# Patient Record
Sex: FEMALE | Race: WHITE | NOT HISPANIC OR LATINO | Employment: OTHER | URBAN - METROPOLITAN AREA
[De-identification: names, ages, dates, MRNs, and addresses within clinical notes are randomized per-mention and may not be internally consistent; named-entity substitution may affect disease eponyms.]

---

## 2017-02-08 ENCOUNTER — LAB VISIT (OUTPATIENT)
Dept: LAB | Facility: HOSPITAL | Age: 67
End: 2017-02-08
Attending: PSYCHIATRY & NEUROLOGY
Payer: MEDICARE

## 2017-02-08 ENCOUNTER — OFFICE VISIT (OUTPATIENT)
Dept: NEUROLOGY | Facility: CLINIC | Age: 67
End: 2017-02-08
Payer: MEDICARE

## 2017-02-08 VITALS
BODY MASS INDEX: 36.44 KG/M2 | DIASTOLIC BLOOD PRESSURE: 81 MMHG | HEART RATE: 100 BPM | HEIGHT: 62 IN | SYSTOLIC BLOOD PRESSURE: 140 MMHG | WEIGHT: 198 LBS

## 2017-02-08 DIAGNOSIS — M62.838 MUSCLE SPASTICITY: Primary | ICD-10-CM

## 2017-02-08 DIAGNOSIS — R26.9 GAIT ABNORMALITY: ICD-10-CM

## 2017-02-08 DIAGNOSIS — M62.838 MUSCLE SPASTICITY: ICD-10-CM

## 2017-02-08 DIAGNOSIS — R53.1 WEAKNESS: ICD-10-CM

## 2017-02-08 PROCEDURE — 99214 OFFICE O/P EST MOD 30 MIN: CPT | Mod: S$PBB,,, | Performed by: PHYSICIAN ASSISTANT

## 2017-02-08 PROCEDURE — 99999 PR PBB SHADOW E&M-EST. PATIENT-LVL III: CPT | Mod: PBBFAC,,, | Performed by: PHYSICIAN ASSISTANT

## 2017-02-08 PROCEDURE — 36415 COLL VENOUS BLD VENIPUNCTURE: CPT

## 2017-02-08 NOTE — LETTER
February 8, 2017      Bobby Tran MD  [address]  [place] [address]           [facility]  [address]  [place] [address]  Phone: [phone]  Fax: [phone]          Patient: Lupis Murcia   MR Number: [id number]   YOB: 1950   Date of Visit: 2/8/2017       Dear Dr. Bobby Tran:    Thank you for referring Lupis Murcia to me for evaluation. Attached you will find relevant portions of my assessment and plan of care.    If you have questions, please do not hesitate to call me. I look forward to following Lupis Murcia along with you.    Sincerely,    Corinne Fagan PA-C    Enclosure  CC:  No Recipients    If you would like to receive this communication electronically, please contact [email or web address] or [phone] to request more information on [employer] access.    For providers and/or their staff who would like to refer a patient to [facility], please contact us through our one-stop-shop provider referral line, [facility], at [phone].    If you feel you have received this communication in error or would no longer like to receive these types of communications, please e-mail [email or web address]

## 2017-02-08 NOTE — MR AVS SNAPSHOT
[facility]  [address]  [facility] [address]  Phone: [phone]  Fax: [phone]                  Lupis Mariejonh   2017 1:15 PM   Office Visit    Description:  Female : 1950   Provider:  Corinne Fagan PA-C   Department:  [facility]           Reason for Visit     Follow-up           Diagnoses this Visit        Comments    Muscle spasticity    -  Primary     Weakness         Gait abnormality                To Do List           Goals (5 Years of Data)     None      [facility]     [facility] Care [facility] -  Assistance  Registered nurses in the [facility] On Call Center provide clinical advisement, health education, appointment booking, and other advisory services.  Call for this free service at [phone].             Medications           Message regarding Medications     Verify the changes and/or additions to your medication regime listed below are the same as discussed with your clinician today.  If any of these changes or additions are incorrect, please notify your healthcare provider.             Verify that the below list of medications is an accurate representation of the medications you are currently taking.  If none reported, the list may be blank. If incorrect, please contact your healthcare provider. Carry this list with you in case of emergency.           Current Medications     acetaminophen-codeine 300-30mg (TYLENOL #3) 300-30 mg Tab Take 1 tablet by mouth every 6 (six) hours as needed.    baclofen (LIORESAL) 10 MG tablet Take 1 tablet (10 mg total) by mouth 3 (three) times daily.    candesartan-hydrochlorothiazide (ATACAND HCT) 16-12.5 mg per tablet Take 1 tablet by mouth Daily.    cyanocobalamin 1,000 mcg/mL injection Inject 1,000 mcg as directed every 30 days.    furosemide (LASIX) 20 MG tablet 20 mg.    lorazepam (ATIVAN) 1 MG tablet 1 mg.    naproxen (NAPROSYN) 500 MG tablet     vitamin B comp with C no.4 150 mg Tab Take one  "vitamin B complex tablet a day.  He may take over-the-counter.    vitamin B comp with vit C no.6 500-0.5 mg Tab Take 1 tablet by mouth once daily.           Clinical Reference Information           Your Vitals Were     BP Pulse Height Weight BMI    140/81 100 5' 2" (1.575 m) 89.8 kg (198 lb) 36.21 kg/m2      Blood Pressure          Most Recent Value    BP  (!)  140/81      Allergies as of 2/8/2017     Pcn [Penicillins]    Contrast Media    Motrin [Ibuprofen]      Immunizations Administered on Date of Encounter - 2/8/2017     None      Orders Placed During Today's Visit     Future Labs/Procedures Expected by Expires    GLUTAMIC ACID DECARBOXYLASE  2/8/2017 4/9/2018      Language Assistance Services     ATTENTION: Language assistance services are available, free of charge. Please call [phone].      ATENCIÓN: Si riley stovall, tiene a avina disposición servicios gratuitos de asistencia lingüística. Llame al [phone].     CECIL Ý: N?u b?n nói Ti?ng Vi?t, có các d?ch v? h? tr? ngôn ng? mi?n phí dành cho b?n. G?i s? [phone].         [facility] complies with applicable Federal civil rights laws and does not discriminate on the basis of race, color, national origin, age, disability, or sex.        "

## 2017-02-08 NOTE — PROGRESS NOTES
Patient Name: Lupis Murcia  MRN: [id number]    CC: Leg weakness    Interval hx 2/8/17:  Lupis Murcia is a 67 yo female with 2 yrs of LE weakness, gait/balance problems presents for follow up. No major changes since last visit. No improvement with Baclofen 10 mg tid compared to bid. Had intermittent numbness and tingling of hands, notes prior CTS dx.    Interval hx 12/15/2016:  Lupis Murcia is a 67 yo female with 2 yrs of lower extremity weakness and progressive gait and balance abnormality. Has used a walker for 8 mos. No recent falls. On Baclofen 10 mg twice daily x 2 wks. Notices less stiffness of the back and legs, hasn't noticed large change in function. Has low back pain, mostly when standing and walking. Repeat MRI brain not yet completed.       HPI 10/14/16: Lupis Murcia is a 66 y.o. female with 2yr of LE weakness that began as difficulty with gait and balance and has been progressive over the years. She has reached point of using a walker to get around.    First noticed more difficulty standing at work - tired or weak. Also noticed gait instability - resulted in a couple of falls. No sensory symptoms.     Progressed to having trouble getting up of the toilet, out of chairs, off the couch. She has moved from using cane to walker.     She has rapid onset, right low back pain and lateral left thigh pain.Only happens when standing.    Soles of feet are numb at night.    Prednisone trial for six months did not change her symptoms.     No difficulty chewing or swallowing.     No muscle cramps.     No fasciculations.     NO symptoms in UE.       ROS:   Review of Systems   Constitutional: Negative for malaise/fatigue. Negative for weight loss.   HENT: Negative for hearing loss.   Eyes: Negative for blurred vision and double vision.   Respiratory: Negative for shortness of breath and stridor.   Cardiovascular: Negative for chest pain and palpitations.   Gastrointestinal: Negative for nausea, vomiting  and constipation.   Genitourinary: Negative for frequency. Negative for urgency.   Musculoskeletal: Negative for joint pain. Negative for myalgias and falls.   Skin: Negative for rash.   Neurological: Negative for dizziness and tremors. Negative for focal weakness and seizures.   Endo/Heme/Allergies: Does not bruise/bleed easily.   Psychiatric/Behavioral: Negative for memory loss. Negative for depression and hallucinations. The patient is not nervous/anxious.      Past Medical History  Past Medical History   Diagnosis Date    Vitamin B12 deficiency        Medications    Current Outpatient Prescriptions:     acetaminophen-codeine 300-30mg (TYLENOL #3) 300-30 mg Tab, Take 1 tablet by mouth every 6 (six) hours as needed., Disp: , Rfl: 2    baclofen (LIORESAL) 10 MG tablet, Take 1 tablet (10 mg total) by mouth 3 (three) times daily., Disp: 90 tablet, Rfl: 11    candesartan-hydrochlorothiazide (ATACAND HCT) 16-12.5 mg per tablet, Take 1 tablet by mouth Daily., Disp: , Rfl:     cyanocobalamin 1,000 mcg/mL injection, Inject 1,000 mcg as directed every 30 days., Disp: , Rfl:     furosemide (LASIX) 20 MG tablet, 20 mg., Disp: , Rfl:     lorazepam (ATIVAN) 1 MG tablet, 1 mg., Disp: , Rfl: 2    naproxen (NAPROSYN) 500 MG tablet, , Disp: , Rfl:     vitamin B comp with C no.4 150 mg Tab, Take one vitamin B complex tablet a day.  He may take over-the-counter., Disp: , Rfl:     vitamin B comp with vit C no.6 500-0.5 mg Tab, Take 1 tablet by mouth once daily., Disp: , Rfl:     Allergies  Review of patient's allergies indicates:   Allergen Reactions    Pcn [penicillins] Shortness Of Breath and Rash    Motrin [ibuprofen] Rash       Social History  Social History     Social History    Marital status:      Spouse name: N/A    Number of children: N/A    Years of education: N/A     Occupational History    Not on file.     Social History Main Topics    Smoking status: Never Smoker    Smokeless tobacco: Not on  "file    Alcohol use No    Drug use: No    Sexual activity: Not on file     Other Topics Concern    Not on file     Social History Narrative       Family History  Family History   Problem Relation Age of Onset    Coronary artery disease Father     Lung cancer Father     Lung cancer Mother     Brain cancer Brother        Physical Exam  Visit Vitals    BP (!) 140/81    Pulse 100    Ht 5' 2" (1.575 m)    Wt 89.8 kg (198 lb)    BMI 36.21 kg/m2       General appearance: Well-developed, well-groomed.     Neurologic Exam: The patient is awake, alert and oriented. Language is fluent. Recent and remote memory are normal. Attention span and concentration are normal. Fund of knowledge is appropriate.     Cranial nerves: pupils are round and reactive to light and accommodation. Visual fields are full to confrontation. Fundoscopic exam reveals sharp discs bilaterally, with good venous pulsations. Ocular motility is full in all cardinal positions of gaze. Facial sensation is normal to pinprick and light touch. Corneal reflexes are present bilaterally. Facial activation is symmetric. Hearing is normal bilaterally. Palate elevates symmetrically and gag reflex is intact bilaterally. Shoulder elevation is symmetric and full strength bilaterally. Tongue is midline and neck rotation strength is normal bilaterally. Neck range of motion is normal.     Motor examination of all extremities demonstrates normal bulk. She has increased tone in the lower extremities, more on the left. There are no atrophy or fasciculations. Strength is 5/5 in the upper and lower extremities bilaterally except for weakness in left HF, hamstring, and TA, right HF weakness.     Sensory examination is normal to pinprick, vibration and proprioception in the upper and lower extremities bilaterally. Romberg is negative.    Deep tendon reflexes are 3+ and symmetric in the upper and lower extremities bilaterally. She has two beats of clonus on left and one " on right. Positive jaw jerk. Positive Negron's bilaterally, L>R. Toes are mute.     Gait: She has wide base, short stride length. Stiffness of back and LE noted.    Coordination: Finger to nose and heel to shin testing is normal in both upper and lower extremities. Rapid alternating movements are normal in both upper and lower extremities.     General exam  Cardiovascular: regular rate and rhythm with no murmurs, rubs or gallops. There are no carotid or vertebral artery bruits. Pulses in both upper and lower extremities are symmetric. There is no peripheral edema.   Head and neck: no cervical lymphadenopathy      Lab and Test Results  Results for TING CHAVEZ (MRN [id number]) as of 10/14/2016 14:02   Ref. Range 3/15/2016 12:05   Folate Latest Ref Range: 4.0 - 24.0 ng/mL 11.1   Vitamin B-12 Latest Ref Range: 210 - 950 pg/mL 347   Intrinsic Blocking Factor Latest Ref Range: Negative  Negative   Sed Rate Latest Ref Range: 0 - 20 mm/Hr 42 (H)   Prothrombin Gene Mutation Unknown Normal Genotype   APA Isotype IgG Latest Ref Range: 0.00 - 14.99 GPL <9.40   APA Isotype IgM Latest Ref Range: 0.00 - 12.49 MPL 11.62   DRVVT, Lupus Anticoagulant Latest Ref Range: Negative  SEE COMMENT   Protein C Activity Latest Ref Range: 70 - 140 % 105   Activated Protein C Resistance Latest Ref Range: >or=2.3  2.8   APC Interpretation Unknown SEE BELOW   Protein S Activity Latest Ref Range: 70 - 140 % 99   Hex Phosph Neut Test Latest Ref Range: Negative  Negative   Factor V Activity Latest Ref Range: 60 - 145 % 111   Antithrombin III Latest Ref Range: 83 - 118 % 93     Results for TING CHAVEZ (MRN [id number]) as of 10/14/2016 14:02   Ref. Range 3/15/2016 12:05 5/27/2016 12:40 6/15/2016 16:48   Protein, Serum Latest Ref Range: 6.0 - 8.4 g/dL 6.2     CRP Latest Ref Range: 0.0 - 8.2 mg/L 11.7 (H)     CPK Latest Ref Range: 20 - 180 U/L 121     Homocysteine Latest Ref Range: 4.0 - 15.5 umol/L 7.6     Thiamine Latest Ref Range: 38 - 122 ug/L 45      Vitamin B2 Latest Ref Range: 1 - 19 mcg/L 3     Vitamin B6 Latest Ref Range: 5 - 50 ug/L 3 (L) 4 (L) 4 (L)   TSH Latest Ref Range: 0.400 - 4.000 uIU/mL 1.863       Results for TING CHAVEZ (MRN [id number]) as of 10/14/2016 14:02   Ref. Range 3/15/2016 12:05   Parietal Cell Ab Latest Ref Range: Negative  Negative 1:20   Protein, Serum Latest Ref Range: 6.0 - 8.4 g/dL 6.2   Albumin grams/dl Latest Ref Range: 3.35 - 5.55 g/dL 3.71   Alpha-1 grams/dl Latest Ref Range: 0.17 - 0.41 g/dL 0.33   Alpha-2 grams/dl Latest Ref Range: 0.43 - 0.99 g/dL 0.74   Beta grams/dl Latest Ref Range: 0.50 - 1.10 g/dL 0.70   Gamma grams/dl Latest Ref Range: 0.67 - 1.58 g/dL 0.71   Pathologist Interpretation SPE Unknown REVIEWED   Rheumatoid Factor Latest Ref Range: 0.0 - 15.0 IU/mL <10.0     Results for TING CHAVEZ (MRN [id number]) as of 10/14/2016 14:02   Ref. Range 3/15/2016 12:05 5/27/2016 12:40 6/15/2016 16:48   HTLV I/II Ab Unknown  Negative    RPR Latest Ref Range: Non-reactive  Non-reactive     Lyme Ab Latest Ref Range: <0.90 Index Value   0.08     Results for TING CHAVEZ (MRN [id number]) as of 10/14/2016 14:02   Ref. Range 6/18/2012 11:16 3/15/2016 12:05 5/27/2016 12:40   19-I-II Latest Ref Range: Non-reactive    Non-reactive   AIF Comment Unknown  Test Not Performed    CHEPE Screen Latest Ref Range: Negative <1:160   Negative <1:160    CYTOLOGY SPECIMEN TO PATHOLOGY Unknown Rpt     GP21 Latest Ref Range: Non-reactive    Non-reactive   gp46 Latest Ref Range: Non-reactive    Non-reactive   gp46-I Latest Ref Range: Non-reactive    Non-reactive   gp46-II Latest Ref Range: Non-reactive    Non-reactive   Line Immunoassay Latest Ref Range: Negative    Negative   p19 Latest Ref Range: Non-reactive    Non-reactive   p24 Latest Ref Range: Non-reactive    Non-reactive   Streptavidin Latest Ref Range: Non-reactive    Non-reactive       EMG 11/22/16  Impression   This study is abnormal. There is electrophysiologic evidence for a chronic,  right sciatic neuropathy or S1 radiculopathy. Decreased activation can be due to pain, effort, or a central process.     Assessment and Plan    Orders Placed This Encounter   Procedures    CULTURE, CSF  (INCLUDES STAIN)    FL Lumbar Puncture (xpd)    GLUTAMIC ACID DECARBOXYLASE    Glucose, CSF    GAD65 AB, CSF    ACE, CSF    MS PROFILE    WEST NILE VIRUS, PCR, CSF    CSF cell count with differential    Protein, CSF    Miscellaneous Sendout Test Cerebrospinal Fluid    Lyme Disease Ab, Western Blot, CSF    VDRL, CSF       Lupis CORRY Murcia is a 66 y.o. female with progressive gait instability, hx of B12 deficiency, and an exam consistent with a central process. Baclofen 10 mg three times daily with some minimal improvement. Outside imaging of brain, cervical, and thoracic spine has been unremarkable thus far, though we will obtain the images to personally review. Query MS vs less likely PLS (timeline of symptoms is fairly fast for PLS) vs stiff person syndome vs genetic disorder.     -will check ELSY-65 today   -lumbar puncture to include MS panel   -continue Baclofen 10 mg to tid    -RTC following above results           Corinne Fagan PA-C  Department of Neurology   [facility] [place], [place]      Attending, Dr. Kyle, was available during today's encounter. Any change to plan along with cosign to appear in the EMR.      This document has been electronically signed by Corinne Fagan PA-C on 02/08/2017. I have personally typed this message using the EMR.

## 2017-02-08 NOTE — MR AVS SNAPSHOT
[facility]  [address]  [facility] [address]  Phone: [phone]  Fax: [phone]                  Lupis Mariejonh   2017 1:15 PM   Office Visit    Description:  Female : 1950   Provider:  Corinne Fagan PA-C   Department:  [facility]           Reason for Visit     Follow-up           Diagnoses this Visit        Comments    Muscle spasticity    -  Primary     Weakness         Gait abnormality                To Do List           Goals (5 Years of Data)     None      [facility]     [facility] On Call Nurse Care Line -  Assistance  Registered nurses in the [facility] On Call Center provide clinical advisement, health education, appointment booking, and other advisory services  Call for this free service at [phone].                 Medications           Message regarding Medications     Verify the changes and/or additions to your medication regime listed below are the same as discussed with your clinician today.  If any of these changes or additions are incorrect, please notify your healthcare provider.             Verify that the below list of medications is an accurate representation of the medications you are currently taking.  If none reported, the list may be blank. If incorrect, please contact your healthcare provider. Carry this list with you in case of emergency.           Current Medications     acetaminophen-codeine 300-30mg (TYLENOL #3) 300-30 mg Tab Take 1 tablet by mouth every 6 (six) hours as needed.    baclofen (LIORESAL) 10 MG tablet Take 1 tablet (10 mg total) by mouth 3 (three) times daily.    candesartan-hydrochlorothiazide (ATACAND HCT) 16-12.5 mg per tablet Take 1 tablet by mouth Daily.    cyanocobalamin 1,000 mcg/mL injection Inject 1,000 mcg as directed every 30 days.    furosemide (LASIX) 20 MG tablet 20 mg.    lorazepam (ATIVAN) 1 MG tablet 1 mg.    naproxen (NAPROSYN) 500 MG tablet     vitamin B comp with C no.4 150 mg Tab Take one  "vitamin B complex tablet a day.  He may take over-the-counter.    vitamin B comp with vit C no.6 500-0.5 mg Tab Take 1 tablet by mouth once daily.           Clinical Reference Information           Vital Signs - Last Recorded  Most recent update: 2/8/2017  1:24 PM by Harmony Montanez MA    BP Pulse Ht Wt BMI    (!) 140/81 100 5' 2" (1.575 m) 89.8 kg (198 lb) 36.21 kg/m2      Blood Pressure          Most Recent Value    BP  (!)  140/81      Allergies as of 2/8/2017     Pcn [Penicillins]    Contrast Media    Motrin [Ibuprofen]      Immunizations Administered on Date of Encounter - 2/8/2017     None      Orders Placed During Today's Visit     Future Labs/Procedures Expected by Expires    GLUTAMIC ACID DECARBOXYLASE  2/8/2017 4/9/2018      Translation Services Information     ATTENTION: Language assistance services are available, free of charge. Please call [phone].    ATENCIÓN: Si habla wilman, tiene a avina disposición servicios gratuitos de asistencia lingüística. Llame al [phone].     CECIL Ý: N?u b?n nói Ti?ng Vi?t, có các d?ch v? h? tr? ngôn ng? mi?n phí dành cho b?n. G?i s? [phone].         [facility] complies with applicable Federal civil rights laws and does not discriminate on the basis of race, color, national origin, age, disability, or sex.        "

## 2017-02-10 LAB — GAD65 AB SER-SCNC: 0 NMOL/L

## 2017-03-13 ENCOUNTER — PATIENT MESSAGE (OUTPATIENT)
Dept: NEUROLOGY | Facility: CLINIC | Age: 67
End: 2017-03-13

## 2017-03-24 ENCOUNTER — OFFICE VISIT (OUTPATIENT)
Dept: NEUROLOGY | Facility: CLINIC | Age: 67
End: 2017-03-24
Payer: MEDICARE

## 2017-03-24 ENCOUNTER — TELEPHONE (OUTPATIENT)
Dept: NEUROLOGY | Facility: CLINIC | Age: 67
End: 2017-03-24

## 2017-03-24 VITALS
DIASTOLIC BLOOD PRESSURE: 80 MMHG | SYSTOLIC BLOOD PRESSURE: 156 MMHG | HEIGHT: 62 IN | HEART RATE: 84 BPM | WEIGHT: 191.13 LBS | BODY MASS INDEX: 35.17 KG/M2

## 2017-03-24 DIAGNOSIS — M62.838 MUSCLE SPASM: Primary | ICD-10-CM

## 2017-03-24 PROCEDURE — 99213 OFFICE O/P EST LOW 20 MIN: CPT | Mod: PBBFAC | Performed by: PSYCHIATRY & NEUROLOGY

## 2017-03-24 PROCEDURE — 99213 OFFICE O/P EST LOW 20 MIN: CPT | Mod: S$PBB,,, | Performed by: PSYCHIATRY & NEUROLOGY

## 2017-03-24 PROCEDURE — 99999 PR PBB SHADOW E&M-EST. PATIENT-LVL III: CPT | Mod: PBBFAC,,, | Performed by: PSYCHIATRY & NEUROLOGY

## 2017-03-24 RX ORDER — CLONAZEPAM 0.5 MG/1
0.25 TABLET ORAL 2 TIMES DAILY
Qty: 90 TABLET | Refills: 1 | Status: SHIPPED | OUTPATIENT
Start: 2017-03-24 | End: 2017-05-01

## 2017-03-24 NOTE — MR AVS SNAPSHOT
[facility]  [address]  [facility] [address]  Phone: [phone]  Fax: [phone]                  Lupis Mariejonh   3/24/2017 2:00 PM   Office Visit    Description:  Female : 1950   Provider:  To Kyle MD   Department:  [facility]           Diagnoses this Visit        Comments    Muscle spasm    -  Primary            To Do List           Future Appointments        Provider Department Dept Phone    2017 1:00 PM [facility] [address] [facility] [phone]      Goals (5 Years of Data)     None      Follow-Up and Disposition     Return in about 4 weeks (around 2017).    Follow-up and Disposition History       These Medications        Disp Refills Start End    clonazePAM (KLONOPIN) 0.5 MG tablet 90 tablet 1 3/24/2017     Take 0.5 tablets (0.25 mg total) by mouth 2 (two) times daily. - Oral    Pharmacy: [facility]/pharmacy #[id number] - [place]  [address] [facility] [address] #: [phone]         [facility]     [facility] Nurse Care Line -  Assistance  Unless otherwise directed by your provider, please contact [facility], our nurse care line that is available for  assistance.     Registered nurses in the [facility] On Call Center provide: appointment scheduling, clinical advisement, health education, and other advisory services.  Call: [phone] (toll free)               Medications           Message regarding Medications     Verify the changes and/or additions to your medication regime listed below are the same as discussed with your clinician today.  If any of these changes or additions are incorrect, please notify your healthcare provider.        START taking these NEW medications        Refills    clonazePAM (KLONOPIN) 0.5 MG tablet 1    Sig: Take 0.5 tablets (0.25 mg total) by mouth 2 (two) times daily.    Class: Print    Route: Oral           Verify that the below list of medications is an  "accurate representation of the medications you are currently taking.  If none reported, the list may be blank. If incorrect, please contact your healthcare provider. Carry this list with you in case of emergency.           Current Medications     acetaminophen-codeine 300-30mg (TYLENOL #3) 300-30 mg Tab Take 1 tablet by mouth every 6 (six) hours as needed.    baclofen (LIORESAL) 10 MG tablet Take 1 tablet (10 mg total) by mouth 3 (three) times daily.    candesartan-hydrochlorothiazide (ATACAND HCT) 16-12.5 mg per tablet Take 1 tablet by mouth Daily.    clonazePAM (KLONOPIN) 0.5 MG tablet Take 0.5 tablets (0.25 mg total) by mouth 2 (two) times daily.    cyanocobalamin 1,000 mcg/mL injection Inject 1,000 mcg as directed every 30 days.    furosemide (LASIX) 20 MG tablet 20 mg.    lorazepam (ATIVAN) 1 MG tablet 1 mg.    naproxen (NAPROSYN) 500 MG tablet     vitamin B comp with C no.4 150 mg Tab Take one vitamin B complex tablet a day.  He may take over-the-counter.    vitamin B comp with vit C no.6 500-0.5 mg Tab Take 1 tablet by mouth once daily.           Clinical Reference Information           Your Vitals Were     BP Pulse Height Weight BMI    156/80 84 5' 2" (1.575 m) 86.7 kg (191 lb 2.2 oz) 34.96 kg/m2      Blood Pressure          Most Recent Value    BP  (!)  156/80      Allergies as of 3/24/2017     Pcn [Penicillins]    Contrast Media    Motrin [Ibuprofen]      Immunizations Administered on Date of Encounter - 3/24/2017     None      Orders Placed During Today's Visit      Normal Orders This Visit    Paraneoplastic Autoantibody Eval, CSF     Future Labs/Procedures Expected by Expires    HTLV I/II ANTIBODY  3/24/2017 5/23/2018      Language Assistance Services     ATTENTION: Language assistance services are available, free of charge. Please call [phone].      ATENCIÓN: Si habla wilman, tiene a avina disposición servicios gratuitos de asistencia lingüística. Llame al [phone].     CHÚ Ý: N?u b?n nói " Ti?ng Vi?t, có các d?ch v? h? tr? ngôn ng? mi?n phí dành cho b?n. G?i s? [phone].         [facility] complies with applicable Federal civil rights laws and does not discriminate on the basis of race, color, national origin, age, disability, or sex.

## 2017-03-24 NOTE — PROGRESS NOTES
Patient Name: Lupis Murcia  MRN: [id number]    CC: Leg weakness    Interval hx 3/24/17: Doing about the same. Had to cut back on baclofen from tid to bid d/t dizziness. LP not done yet. Uncertain about what happened with scheduling.     Interval hx 2/8/17:  Lupis Murcia is a 67 yo female with 2 yrs of LE weakness, gait/balance problems presents for follow up. No major changes since last visit. No improvement with Baclofen 10 mg tid compared to bid. Had intermittent numbness and tingling of hands, notes prior CTS dx.    Interval hx 12/15/2016:  Lupis Murcia is a 67 yo female with 2 yrs of lower extremity weakness and progressive gait and balance abnormality. Has used a walker for 8 mos. No recent falls. On Baclofen 10 mg twice daily x 2 wks. Notices less stiffness of the back and legs, hasn't noticed large change in function. Has low back pain, mostly when standing and walking. Repeat MRI brain not yet completed.       HPI 10/14/16: Lupis Murcia is a 66 y.o. female with 2yr of LE weakness that began as difficulty with gait and balance and has been progressive over the years. She has reached point of using a walker to get around.    First noticed more difficulty standing at work - tired or weak. Also noticed gait instability - resulted in a couple of falls. No sensory symptoms.     Progressed to having trouble getting up of the toilet, out of chairs, off the couch. She has moved from using cane to walker.     She has rapid onset, right low back pain and lateral left thigh pain.Only happens when standing.    Soles of feet are numb at night.    Prednisone trial for six months did not change her symptoms.     No difficulty chewing or swallowing.     No muscle cramps.     No fasciculations.     NO symptoms in UE.       ROS:   Review of Systems   Constitutional: Negative for malaise/fatigue. Negative for weight loss.   HENT: Negative for hearing loss.   Eyes: Negative for blurred vision and double vision.    Respiratory: Negative for shortness of breath and stridor.   Cardiovascular: Negative for chest pain and palpitations.   Gastrointestinal: Negative for nausea, vomiting and constipation.   Genitourinary: Negative for frequency. Negative for urgency.   Musculoskeletal: Negative for joint pain. Negative for myalgias and falls.   Skin: Negative for rash.   Neurological: Negative for dizziness and tremors. Negative for focal weakness and seizures.   Endo/Heme/Allergies: Does not bruise/bleed easily.   Psychiatric/Behavioral: Negative for memory loss. Negative for depression and hallucinations. The patient is not nervous/anxious.      Past Medical History  Past Medical History:   Diagnosis Date    Vitamin B12 deficiency        Medications    Current Outpatient Prescriptions:     acetaminophen-codeine 300-30mg (TYLENOL #3) 300-30 mg Tab, Take 1 tablet by mouth every 6 (six) hours as needed., Disp: , Rfl: 2    baclofen (LIORESAL) 10 MG tablet, Take 1 tablet (10 mg total) by mouth 3 (three) times daily., Disp: 90 tablet, Rfl: 11    candesartan-hydrochlorothiazide (ATACAND HCT) 16-12.5 mg per tablet, Take 1 tablet by mouth Daily., Disp: , Rfl:     cyanocobalamin 1,000 mcg/mL injection, Inject 1,000 mcg as directed every 30 days., Disp: , Rfl:     furosemide (LASIX) 20 MG tablet, 20 mg., Disp: , Rfl:     lorazepam (ATIVAN) 1 MG tablet, 1 mg., Disp: , Rfl: 2    naproxen (NAPROSYN) 500 MG tablet, , Disp: , Rfl:     vitamin B comp with C no.4 150 mg Tab, Take one vitamin B complex tablet a day.  He may take over-the-counter., Disp: , Rfl:     vitamin B comp with vit C no.6 500-0.5 mg Tab, Take 1 tablet by mouth once daily., Disp: , Rfl:     Allergies  Review of patient's allergies indicates:   Allergen Reactions    Pcn [penicillins] Shortness Of Breath and Rash    Motrin [ibuprofen] Rash       Social History  Social History     Social History    Marital status:      Spouse name: N/A    Number of children:  "N/A    Years of education: N/A     Occupational History    Not on file.     Social History Main Topics    Smoking status: Never Smoker    Smokeless tobacco: Not on file    Alcohol use No    Drug use: No    Sexual activity: Not on file     Other Topics Concern    Not on file     Social History Narrative       Family History  Family History   Problem Relation Age of Onset    Coronary artery disease Father     Lung cancer Father     Lung cancer Mother     Brain cancer Brother        Physical Exam  BP (!) 156/80  Pulse 84  Ht 5' 2" (1.575 m)  Wt 86.7 kg (191 lb 2.2 oz)  BMI 34.96 kg/m2    General appearance: Well-developed, well-groomed.     Neurologic Exam: The patient is awake, alert and oriented. Language is fluent. Recent and remote memory are normal. Attention span and concentration are normal. Fund of knowledge is appropriate.     Cranial nerves: pupils are round and reactive to light and accommodation. Visual fields are full to confrontation. Fundoscopic exam reveals sharp discs bilaterally, with good venous pulsations. Ocular motility is full in all cardinal positions of gaze. Facial sensation is normal to pinprick and light touch. Corneal reflexes are present bilaterally. Facial activation is symmetric. Hearing is normal bilaterally. Palate elevates symmetrically and gag reflex is intact bilaterally. Shoulder elevation is symmetric and full strength bilaterally. Tongue is midline and neck rotation strength is normal bilaterally. Neck range of motion is normal.     Motor examination of all extremities demonstrates normal bulk. She has increased tone in the lower extremities, more on the left. There are no atrophy or fasciculations. Strength is 5/5 in the upper and lower extremities bilaterally except for weakness in left HF, hamstring, and TA, right HF weakness.     Sensory examination is normal to pinprick, vibration and proprioception in the upper and lower extremities bilaterally. Romberg is " negative.    Deep tendon reflexes are 3+ and symmetric in the upper and lower extremities bilaterally. She has two beats of clonus on left and one on right. Positive jaw jerk. Positive Negron's bilaterally, L>R. Toes are mute.     Gait: She has wide base, short stride length. Stiffness of back and LE noted.    Coordination: Finger to nose and heel to shin testing is normal in both upper and lower extremities. Rapid alternating movements are normal in both upper and lower extremities.     General exam  Cardiovascular: regular rate and rhythm with no murmurs, rubs or gallops. There are no carotid or vertebral artery bruits. Pulses in both upper and lower extremities are symmetric. There is no peripheral edema.   Head and neck: no cervical lymphadenopathy      Lab and Test Results  Results for TING CHAVEZ (MRN [id number]) as of 10/14/2016 14:02   Ref. Range 3/15/2016 12:05   Folate Latest Ref Range: 4.0 - 24.0 ng/mL 11.1   Vitamin B-12 Latest Ref Range: 210 - 950 pg/mL 347   Intrinsic Blocking Factor Latest Ref Range: Negative  Negative   Sed Rate Latest Ref Range: 0 - 20 mm/Hr 42 (H)   Prothrombin Gene Mutation Unknown Normal Genotype   APA Isotype IgG Latest Ref Range: 0.00 - 14.99 GPL <9.40   APA Isotype IgM Latest Ref Range: 0.00 - 12.49 MPL 11.62   DRVVT, Lupus Anticoagulant Latest Ref Range: Negative  SEE COMMENT   Protein C Activity Latest Ref Range: 70 - 140 % 105   Activated Protein C Resistance Latest Ref Range: >or=2.3  2.8   APC Interpretation Unknown SEE BELOW   Protein S Activity Latest Ref Range: 70 - 140 % 99   Hex Phosph Neut Test Latest Ref Range: Negative  Negative   Factor V Activity Latest Ref Range: 60 - 145 % 111   Antithrombin III Latest Ref Range: 83 - 118 % 93     Results for TING CHAVEZ (MRN [id number]) as of 10/14/2016 14:02   Ref. Range 3/15/2016 12:05 5/27/2016 12:40 6/15/2016 16:48   Protein, Serum Latest Ref Range: 6.0 - 8.4 g/dL 6.2     CRP Latest Ref Range: 0.0 - 8.2 mg/L 11.7 (H)      CPK Latest Ref Range: 20 - 180 U/L 121     Homocysteine Latest Ref Range: 4.0 - 15.5 umol/L 7.6     Thiamine Latest Ref Range: 38 - 122 ug/L 45     Vitamin B2 Latest Ref Range: 1 - 19 mcg/L 3     Vitamin B6 Latest Ref Range: 5 - 50 ug/L 3 (L) 4 (L) 4 (L)   TSH Latest Ref Range: 0.400 - 4.000 uIU/mL 1.863       Results for TING CHAVEZ (MRN [id number]) as of 10/14/2016 14:02   Ref. Range 3/15/2016 12:05   Parietal Cell Ab Latest Ref Range: Negative  Negative 1:20   Protein, Serum Latest Ref Range: 6.0 - 8.4 g/dL 6.2   Albumin grams/dl Latest Ref Range: 3.35 - 5.55 g/dL 3.71   Alpha-1 grams/dl Latest Ref Range: 0.17 - 0.41 g/dL 0.33   Alpha-2 grams/dl Latest Ref Range: 0.43 - 0.99 g/dL 0.74   Beta grams/dl Latest Ref Range: 0.50 - 1.10 g/dL 0.70   Gamma grams/dl Latest Ref Range: 0.67 - 1.58 g/dL 0.71   Pathologist Interpretation SPE Unknown REVIEWED   Rheumatoid Factor Latest Ref Range: 0.0 - 15.0 IU/mL <10.0     Results for TNIG CHAVEZ (MRN [id number]) as of 10/14/2016 14:02   Ref. Range 3/15/2016 12:05 5/27/2016 12:40 6/15/2016 16:48   HTLV I/II Ab Unknown  Negative    RPR Latest Ref Range: Non-reactive  Non-reactive     Lyme Ab Latest Ref Range: <0.90 Index Value   0.08     Results for TING CHAVEZ (MRN [id number]) as of 10/14/2016 14:02   Ref. Range 6/18/2012 11:16 3/15/2016 12:05 5/27/2016 12:40   19-I-II Latest Ref Range: Non-reactive    Non-reactive   AIF Comment Unknown  Test Not Performed    CHEPE Screen Latest Ref Range: Negative <1:160   Negative <1:160    CYTOLOGY SPECIMEN TO PATHOLOGY Unknown Rpt     GP21 Latest Ref Range: Non-reactive    Non-reactive   gp46 Latest Ref Range: Non-reactive    Non-reactive   gp46-I Latest Ref Range: Non-reactive    Non-reactive   gp46-II Latest Ref Range: Non-reactive    Non-reactive   Line Immunoassay Latest Ref Range: Negative    Negative   p19 Latest Ref Range: Non-reactive    Non-reactive   p24 Latest Ref Range: Non-reactive    Non-reactive   Streptavidin Latest Ref  Range: Non-reactive    Non-reactive       EMG 11/22/16  Impression   This study is abnormal. There is electrophysiologic evidence for a chronic, right sciatic neuropathy or S1 radiculopathy. Decreased activation can be due to pain, effort, or a central process.     Assessment and Plan    No orders of the defined types were placed in this encounter.      Lupis Murcia is a 66 y.o. female with progressive gait instability, hx of B12 deficiency, and an exam consistent with a central process. Baclofen 10 mg three times daily with some minimal improvement, but had to decrease to bid due to side effects. Outside imaging of brain, cervical, and thoracic spine has been unremarkable thus far. Cspine with mild central canal stenosis in mid-cervical spine due to disc, but do not think that's causative. Query PLS vs hereditary spastic disorder vs autoimmune.       -lumbar puncture   -continue Baclofen 10 mg bid; add clonazepam 0.25mg bid  -RTC following above results

## 2017-03-24 NOTE — LETTER
March 24, 2017      Bobby Tran MD  [address]  [place] [address]           [facility]  [address]  [place] [address]  Phone: [phone]  Fax: [phone]          Patient: Lupis Murcia   MR Number: [id number]   YOB: 1950   Date of Visit: 3/24/2017       Dear Dr. Bobby Tran:    Thank you for referring Lupis Murcia to me for evaluation. Attached you will find relevant portions of my assessment and plan of care.    If you have questions, please do not hesitate to call me. I look forward to following Lupis Murcia along with you.    Sincerely,    To Kyle MD    Enclosure  CC:  No Recipients    If you would like to receive this communication electronically, please contact [email or web address] or [phone] to request more information on [employer] Link access.    For providers and/or their staff who would like to refer a patient to [facility], please contact us through our one-stop-shop provider referral line, [facility], at [phone].    If you feel you have received this communication in error or would no longer like to receive these types of communications, please e-mail [email or web address]

## 2017-04-21 ENCOUNTER — HOSPITAL ENCOUNTER (OUTPATIENT)
Dept: RADIOLOGY | Facility: HOSPITAL | Age: 67
Discharge: HOME | End: 2017-04-21
Attending: PSYCHIATRY & NEUROLOGY
Payer: MEDICARE

## 2017-04-21 DIAGNOSIS — M62.838 MUSCLE SPASTICITY: ICD-10-CM

## 2017-04-21 DIAGNOSIS — R53.1 WEAKNESS: ICD-10-CM

## 2017-04-21 DIAGNOSIS — R26.9 GAIT ABNORMALITY: ICD-10-CM

## 2017-04-21 DIAGNOSIS — M62.838 MUSCLE SPASTICITY: Primary | ICD-10-CM

## 2017-04-21 LAB
CLARITY CSF: ABNORMAL
COLOR CSF: ABNORMAL
GLUCOSE CSF-MCNC: 66 MG/DL
LYMPHOCYTES NFR CSF MANUAL: 36 %
MONOS+MACROS NFR CSF MANUAL: 2 %
NEUTROPHILS NFR CSF MANUAL: 62 %
PROT CSF-MCNC: 244 MG/DL
RBC # CSF: ABNORMAL /CU MM
SPECIMEN VOL CSF: 2 ML
WBC # CSF: 43 /CU MM

## 2017-04-21 PROCEDURE — 86341 ISLET CELL ANTIBODY: CPT

## 2017-04-21 PROCEDURE — 89051 BODY FLUID CELL COUNT: CPT

## 2017-04-21 PROCEDURE — 84157 ASSAY OF PROTEIN OTHER: CPT

## 2017-04-21 PROCEDURE — 62270 DX LMBR SPI PNXR: CPT | Mod: TC

## 2017-04-21 PROCEDURE — 87798 DETECT AGENT NOS DNA AMP: CPT

## 2017-04-21 PROCEDURE — 36415 COLL VENOUS BLD VENIPUNCTURE: CPT

## 2017-04-21 PROCEDURE — 62270 DX LMBR SPI PNXR: CPT | Mod: ,,, | Performed by: RADIOLOGY

## 2017-04-21 PROCEDURE — 86256 FLUORESCENT ANTIBODY TITER: CPT | Mod: 91

## 2017-04-21 PROCEDURE — 82945 GLUCOSE OTHER FLUID: CPT

## 2017-04-21 PROCEDURE — 30000890 ARUP MISCELLANEOUS TEST 1

## 2017-04-21 PROCEDURE — 83916 OLIGOCLONAL BANDS: CPT | Mod: 91

## 2017-04-21 PROCEDURE — 86790 VIRUS ANTIBODY NOS: CPT

## 2017-04-21 PROCEDURE — 86592 SYPHILIS TEST NON-TREP QUAL: CPT

## 2017-04-21 PROCEDURE — 82164 ANGIOTENSIN I ENZYME TEST: CPT

## 2017-04-21 PROCEDURE — 86617 LYME DISEASE ANTIBODY: CPT | Mod: 91

## 2017-04-21 PROCEDURE — 77003 FLUOROGUIDE FOR SPINE INJECT: CPT | Mod: 26,,, | Performed by: RADIOLOGY

## 2017-04-21 NOTE — H&P
Radiology History & Physical      SUBJECTIVE:     Chief Complaint: weakness    History of Present Illness:  Lupis Murcia is a 66 y.o. female who presents for diagnostic lumbar puncture.    Past Medical History:   Diagnosis Date    Vitamin B12 deficiency      Past Surgical History:   Procedure Laterality Date     SECTION         Home Meds:   Prior to Admission medications    Medication Sig Start Date End Date Taking? Authorizing Provider   acetaminophen-codeine 300-30mg (TYLENOL #3) 300-30 mg Tab Take 1 tablet by mouth every 6 (six) hours as needed. 14   Historical Provider, MD   baclofen (LIORESAL) 10 MG tablet Take 1 tablet (10 mg total) by mouth 3 (three) times daily. 16  To Kyle MD   candesartan-hydrochlorothiazide (ATACAND HCT) 16-12.5 mg per tablet Take 1 tablet by mouth Daily. 13   Historical Provider, MD   clonazePAM (KLONOPIN) 0.5 MG tablet Take 0.5 tablets (0.25 mg total) by mouth 2 (two) times daily. 3/24/17   To Kyle MD   cyanocobalamin 1,000 mcg/mL injection Inject 1,000 mcg as directed every 30 days.    Historical Provider, MD   furosemide (LASIX) 20 MG tablet 20 mg. 16   Historical Provider, MD   lorazepam (ATIVAN) 1 MG tablet 1 mg. 16   Historical Provider, MD   naproxen (NAPROSYN) 500 MG tablet  16   Historical Provider, MD   vitamin B comp with C no.4 150 mg Tab Take one vitamin B complex tablet a day.  He may take over-the-counter. 3/21/16   Mukesh Guevara MD   vitamin B comp with vit C no.6 500-0.5 mg Tab Take 1 tablet by mouth once daily.    Historical Provider, MD     Anticoagulants/Antiplatelets: no anticoagulation    Allergies:   Review of patient's allergies indicates:   Allergen Reactions    Pcn [penicillins] Shortness Of Breath and Rash    Contrast media Rash    Motrin [ibuprofen] Rash     Sedation History:  no adverse reactions    Review of Systems:   Hematological: past history of pulmonary  embolism  Respiratory: no shortness of breath  Cardiovascular: no chest pain  Gastrointestinal: no abdominal pain  Genito-Urinary: no dysuria  Musculoskeletal: leg weakness bilaterally  Neurological: no TIA or stroke symptoms         OBJECTIVE:     Vital Signs (Most Recent)       Physical Exam:  ASA: 2  Mallampati: 3, improves to 2 with phonation    General: no acute distress  Mental Status: alert and oriented to person, place and time  HEENT: normocephalic, atraumatic  Chest: unlabored breathing  Heart: regular heart rate  Abdomen: nondistended  Extremity: moves all extremities    Laboratory  No results found for: INR  No results found for: WBC, HGB, HCT, MCV, PLT No results found for: GLU, NA, K, CL, CO2, BUN, CREATININE, CALCIUM, MG, ALT, AST, ALBUMIN, BILITOT, BILIDIR    ASSESSMENT/PLAN:     Sedation Plan: local anesthesia only  Patient will undergo diagnostic LP.    Washington Alba MD  Resident  Department of Radiology  Pager: [phone]

## 2017-04-21 NOTE — PROCEDURES
Radiology Post-Procedure Note    Pre Op Diagnosis: bilateral LE weakness    Post Op Diagnosis: Same    Procedure: lumbar puncture    Procedure performed by: Deep Thomas MD ; Washington Alba MD    Written Informed Consent Obtained: Yes    Specimen Removed: 7 mL CSF    Estimated Blood Loss: Minimal    Findings: Following written informed consent and sterile prep and drape, a 22 gauge spinal needle was inserted at L3 - L4 intralaminar space under fluoroscopic surveillance. Opening pressure was 16 cm H2O. Then, 7 mL blood-tinged CSF removed and sent to the lab for further analysis.  There were no complications.    Patient tolerated procedure well.    Washington Alba MD  Resident  Department of Radiology  Pager: [phone]

## 2017-04-24 LAB
SPECIMEN SOURCE: NORMAL
WNV RNA SPEC QL NAA+PROBE: NEGATIVE

## 2017-04-25 ENCOUNTER — TELEPHONE (OUTPATIENT)
Dept: NEUROLOGY | Facility: CLINIC | Age: 67
End: 2017-04-25

## 2017-04-25 LAB
B BURGDOR IGG CSF-ACNC: NORMAL BANDS
B BURGDOR IGG PATRN CSF IB-IMP: NORMAL KDA
B BURGDOR IGM CSF IB-ACNC: NORMAL BANDS
B BURGDOR IGM PATRN CSF IB-IMP: NORMAL KDA
GAD65 AB CSF-SCNC: 0 NMOL/L

## 2017-04-25 NOTE — TELEPHONE ENCOUNTER
RN spoke with Maryan who stated the Paraneoplastic Autoantibody Eval test was not completed during the LP, due to not enough specimen available.

## 2017-04-25 NOTE — TELEPHONE ENCOUNTER
----- Message from Maryan Kelly sent at 4/25/2017  2:43 PM CDT -----  There was an issue with a test ordered on this patient from 4/21/17.  Please call the Sendout lab as soon as possible at [phone] ext. [phone] for complete details.  Anyone in the Sendout lab will be able to assist you with further information.

## 2017-04-26 LAB
ALBUMIN CSF-MCNC: 149 MG/DL
ALBUMIN SERPL-MCNC: 4600 MG/DL
ARUP MISCELLANEOUS TEST 1: NORMAL
IGG CSF-MCNC: 30.7 MG/DL
IGG SERPL-MCNC: 1050 MG/DL
IGG SYNTH RATE SER+CSF CALC-MRATE: 78.34 MG/24 H
IGG/ALB CLEAR SER+CSF-RTO: 0.91
IGG/ALB CSF: 0.21 {RATIO}
IGG/ALB SER: 0.23 {RATIO}
OLIGOCLONAL BANDS CSF ELPH-IMP: 2 BANDS
OLIGOCLONAL BANDS CSF ELPH-IMP: 4 BANDS
OLIGOCLONAL BANDS SERPL: 2 BANDS

## 2017-04-27 LAB — VDRL CSF QL: NORMAL

## 2017-04-28 ENCOUNTER — TELEPHONE (OUTPATIENT)
Dept: NEUROLOGY | Facility: CLINIC | Age: 67
End: 2017-04-28

## 2017-04-28 LAB — ACE CSF-CCNC: NORMAL U/L

## 2017-04-28 NOTE — TELEPHONE ENCOUNTER
Made aware by Rosalie in the send out lab that the ACE, CSF lab test was not performed because the CSF sample had hemolyzed.

## 2017-04-28 NOTE — TELEPHONE ENCOUNTER
----- Message from Rosalie Mosley sent at 4/28/2017  1:54 PM CDT -----  There was an issue with a test ordered on this patient from _4_/_21_/_17_.  Please call the Sendout lab as soon as possible at [phone] ext. [phone] for complete details.  Anyone in the Sendout lab will be able to assist you with further information.

## 2017-05-01 ENCOUNTER — OFFICE VISIT (OUTPATIENT)
Dept: NEUROLOGY | Facility: CLINIC | Age: 67
End: 2017-05-01
Payer: MEDICARE

## 2017-05-01 ENCOUNTER — TELEPHONE (OUTPATIENT)
Dept: NEUROLOGY | Facility: CLINIC | Age: 67
End: 2017-05-01

## 2017-05-01 VITALS
HEIGHT: 61 IN | WEIGHT: 195.13 LBS | SYSTOLIC BLOOD PRESSURE: 160 MMHG | HEART RATE: 88 BPM | DIASTOLIC BLOOD PRESSURE: 92 MMHG | BODY MASS INDEX: 36.84 KG/M2

## 2017-05-01 DIAGNOSIS — G37.9 DEMYELINATING CHANGES IN BRAIN: Primary | ICD-10-CM

## 2017-05-01 PROCEDURE — 99213 OFFICE O/P EST LOW 20 MIN: CPT | Mod: PBBFAC | Performed by: PSYCHIATRY & NEUROLOGY

## 2017-05-01 PROCEDURE — 99999 PR PBB SHADOW E&M-EST. PATIENT-LVL III: CPT | Mod: PBBFAC,,, | Performed by: PSYCHIATRY & NEUROLOGY

## 2017-05-01 PROCEDURE — 99213 OFFICE O/P EST LOW 20 MIN: CPT | Mod: S$PBB,,, | Performed by: PSYCHIATRY & NEUROLOGY

## 2017-05-01 NOTE — LETTER
May 1, 2017      Bobby Tran MD  [address]  [place] [address]           [facility]  [address]  [place] [address]  Phone: [phone]  Fax: [phone]          Patient: Lupis Murcia   MR Number: [id number]   YOB: 1950   Date of Visit: 5/1/2017       Dear Dr. Bobby Tran:    Thank you for referring Lupis Murcia to me for evaluation. Attached you will find relevant portions of my assessment and plan of care.    If you have questions, please do not hesitate to call me. I look forward to following Lupis Murcia along with you.    Sincerely,    To Kyle MD    Enclosure  CC:  No Recipients    If you would like to receive this communication electronically, please contact [email or web address] or [phone] to request more information on [email or web address] Link access.    For providers and/or their staff who would like to refer a patient to [facility], please contact us through our one-stop-shop provider referral line, [facility], at [phone].    If you feel you have received this communication in error or would no longer like to receive these types of communications, please e-mail [email or web address]

## 2017-05-01 NOTE — PROGRESS NOTES
Patient Name: Lupis Murcia  MRN: [id number]    CC: Leg weakness    Interval Hx 5/1/17: s/p LP, results below. No changes clinically    Interval hx 3/24/17: Doing about the same. Had to cut back on baclofen from tid to bid d/t dizziness. LP not done yet. Uncertain about what happened with scheduling.     Interval hx 2/8/17:  Lupis Murcia is a 65 yo female with 2 yrs of LE weakness, gait/balance problems presents for follow up. No major changes since last visit. No improvement with Baclofen 10 mg tid compared to bid. Had intermittent numbness and tingling of hands, notes prior CTS dx.    Interval hx 12/15/2016:  Lupis Murcia is a 65 yo female with 2 yrs of lower extremity weakness and progressive gait and balance abnormality. Has used a walker for 8 mos. No recent falls. On Baclofen 10 mg twice daily x 2 wks. Notices less stiffness of the back and legs, hasn't noticed large change in function. Has low back pain, mostly when standing and walking. Repeat MRI brain not yet completed.       HPI 10/14/16: Lupis Murcia is a 66 y.o. female with 2yr of LE weakness that began as difficulty with gait and balance and has been progressive over the years. She has reached point of using a walker to get around.    First noticed more difficulty standing at work - tired or weak. Also noticed gait instability - resulted in a couple of falls. No sensory symptoms.     Progressed to having trouble getting up of the toilet, out of chairs, off the couch. She has moved from using cane to walker.     She has rapid onset, right low back pain and lateral left thigh pain.Only happens when standing.    Soles of feet are numb at night.    Prednisone trial for six months did not change her symptoms.     No difficulty chewing or swallowing.     No muscle cramps.     No fasciculations.     NO symptoms in UE.       ROS:   Review of Systems   Constitutional: Negative for malaise/fatigue. Negative for weight loss.   HENT: Negative for  hearing loss.   Eyes: Negative for blurred vision and double vision.   Respiratory: Negative for shortness of breath and stridor.   Cardiovascular: Negative for chest pain and palpitations.   Gastrointestinal: Negative for nausea, vomiting and constipation.   Genitourinary: Negative for frequency. Negative for urgency.   Musculoskeletal: Negative for joint pain. Negative for myalgias and falls.   Skin: Negative for rash.   Neurological: Negative for dizziness and tremors. Negative for focal weakness and seizures.   Endo/Heme/Allergies: Does not bruise/bleed easily.   Psychiatric/Behavioral: Negative for memory loss. Negative for depression and hallucinations. The patient is not nervous/anxious.      Past Medical History  Past Medical History:   Diagnosis Date    Vitamin B12 deficiency        Medications    Current Outpatient Prescriptions:     acetaminophen-codeine 300-30mg (TYLENOL #3) 300-30 mg Tab, Take 1 tablet by mouth every 6 (six) hours as needed., Disp: , Rfl: 2    baclofen (LIORESAL) 10 MG tablet, Take 1 tablet (10 mg total) by mouth 3 (three) times daily., Disp: 90 tablet, Rfl: 11    candesartan-hydrochlorothiazide (ATACAND HCT) 16-12.5 mg per tablet, Take 1 tablet by mouth Daily., Disp: , Rfl:     cyanocobalamin 1,000 mcg/mL injection, Inject 1,000 mcg as directed every 30 days., Disp: , Rfl:     furosemide (LASIX) 20 MG tablet, 20 mg., Disp: , Rfl:     lorazepam (ATIVAN) 1 MG tablet, 1 mg., Disp: , Rfl: 2    naproxen (NAPROSYN) 500 MG tablet, , Disp: , Rfl:     vitamin B comp with C no.4 150 mg Tab, Take one vitamin B complex tablet a day.  He may take over-the-counter., Disp: , Rfl:     vitamin B comp with vit C no.6 500-0.5 mg Tab, Take 1 tablet by mouth once daily., Disp: , Rfl:     Allergies  Review of patient's allergies indicates:   Allergen Reactions    Pcn [penicillins] Shortness Of Breath and Rash    Motrin [ibuprofen] Rash       Social History  Social History     Social History     "Marital status:      Spouse name: N/A    Number of children: N/A    Years of education: N/A     Occupational History    Not on file.     Social History Main Topics    Smoking status: Never Smoker    Smokeless tobacco: Not on file    Alcohol use No    Drug use: No    Sexual activity: Not on file     Other Topics Concern    Not on file     Social History Narrative       Family History  Family History   Problem Relation Age of Onset    Coronary artery disease Father     Lung cancer Father     Lung cancer Mother     Brain cancer Brother        Physical Exam  BP (!) 160/92  Pulse 88  Ht 5' 1" (1.549 m)  Wt 88.5 kg (195 lb 1.7 oz)  BMI 36.87 kg/m2    General appearance: Well-developed, well-groomed.     Neurologic Exam: The patient is awake, alert and oriented. Language is fluent. Recent and remote memory are normal. Attention span and concentration are normal. Fund of knowledge is appropriate.     Cranial nerves: pupils are round and reactive to light and accommodation. Visual fields are full to confrontation. Fundoscopic exam reveals sharp discs bilaterally, with good venous pulsations. Ocular motility is full in all cardinal positions of gaze. Facial sensation is normal to pinprick and light touch. Corneal reflexes are present bilaterally. Facial activation is symmetric. Hearing is normal bilaterally. Palate elevates symmetrically and gag reflex is intact bilaterally. Shoulder elevation is symmetric and full strength bilaterally. Tongue is midline and neck rotation strength is normal bilaterally. Neck range of motion is normal.     Motor examination of all extremities demonstrates normal bulk. She has increased tone in the lower extremities, more on the left. There are no atrophy or fasciculations. Strength is 5/5 in the upper and lower extremities bilaterally except for weakness in left HF, hamstring, and TA, right HF weakness.     Sensory examination is normal to pinprick, vibration and " proprioception in the upper and lower extremities bilaterally. Romberg is negative.    Deep tendon reflexes are 3+ and symmetric in the upper and lower extremities bilaterally. She has two beats of clonus on left and one on right. Positive jaw jerk. Positive Negron's bilaterally, L>R. Toes are mute.     Gait: She has wide base, short stride length. Stiffness of back and LE noted.    Coordination: Finger to nose and heel to shin testing is normal in both upper and lower extremities. Rapid alternating movements are normal in both upper and lower extremities.     General exam  Cardiovascular: regular rate and rhythm with no murmurs, rubs or gallops. There are no carotid or vertebral artery bruits. Pulses in both upper and lower extremities are symmetric. There is no peripheral edema.   Head and neck: no cervical lymphadenopathy      Lab and Test Results  Results for TING CHAVEZ (MRN [id number]) as of 10/14/2016 14:02   Ref. Range 3/15/2016 12:05   Folate Latest Ref Range: 4.0 - 24.0 ng/mL 11.1   Vitamin B-12 Latest Ref Range: 210 - 950 pg/mL 347   Intrinsic Blocking Factor Latest Ref Range: Negative  Negative   Sed Rate Latest Ref Range: 0 - 20 mm/Hr 42 (H)   Prothrombin Gene Mutation Unknown Normal Genotype   APA Isotype IgG Latest Ref Range: 0.00 - 14.99 GPL <9.40   APA Isotype IgM Latest Ref Range: 0.00 - 12.49 MPL 11.62   DRVVT, Lupus Anticoagulant Latest Ref Range: Negative  SEE COMMENT   Protein C Activity Latest Ref Range: 70 - 140 % 105   Activated Protein C Resistance Latest Ref Range: >or=2.3  2.8   APC Interpretation Unknown SEE BELOW   Protein S Activity Latest Ref Range: 70 - 140 % 99   Hex Phosph Neut Test Latest Ref Range: Negative  Negative   Factor V Activity Latest Ref Range: 60 - 145 % 111   Antithrombin III Latest Ref Range: 83 - 118 % 93     Results for TING CHAVEZ (MRN [id number]) as of 10/14/2016 14:02   Ref. Range 3/15/2016 12:05 5/27/2016 12:40 6/15/2016 16:48   Protein, Serum Latest Ref  Range: 6.0 - 8.4 g/dL 6.2     CRP Latest Ref Range: 0.0 - 8.2 mg/L 11.7 (H)     CPK Latest Ref Range: 20 - 180 U/L 121     Homocysteine Latest Ref Range: 4.0 - 15.5 umol/L 7.6     Thiamine Latest Ref Range: 38 - 122 ug/L 45     Vitamin B2 Latest Ref Range: 1 - 19 mcg/L 3     Vitamin B6 Latest Ref Range: 5 - 50 ug/L 3 (L) 4 (L) 4 (L)   TSH Latest Ref Range: 0.400 - 4.000 uIU/mL 1.863       Results for TING CHAVEZ (MRN [id number]) as of 10/14/2016 14:02   Ref. Range 3/15/2016 12:05   Parietal Cell Ab Latest Ref Range: Negative  Negative 1:20   Protein, Serum Latest Ref Range: 6.0 - 8.4 g/dL 6.2   Albumin grams/dl Latest Ref Range: 3.35 - 5.55 g/dL 3.71   Alpha-1 grams/dl Latest Ref Range: 0.17 - 0.41 g/dL 0.33   Alpha-2 grams/dl Latest Ref Range: 0.43 - 0.99 g/dL 0.74   Beta grams/dl Latest Ref Range: 0.50 - 1.10 g/dL 0.70   Gamma grams/dl Latest Ref Range: 0.67 - 1.58 g/dL 0.71   Pathologist Interpretation SPE Unknown REVIEWED   Rheumatoid Factor Latest Ref Range: 0.0 - 15.0 IU/mL <10.0     Results for TING CHAVEZ (MRN [id number]) as of 10/14/2016 14:02   Ref. Range 3/15/2016 12:05 5/27/2016 12:40 6/15/2016 16:48   HTLV I/II Ab Unknown  Negative    RPR Latest Ref Range: Non-reactive  Non-reactive     Lyme Ab Latest Ref Range: <0.90 Index Value   0.08     Results for TING CHAVEZ (MRN [id number]) as of 10/14/2016 14:02   Ref. Range 6/18/2012 11:16 3/15/2016 12:05 5/27/2016 12:40   19-I-II Latest Ref Range: Non-reactive    Non-reactive   AIF Comment Unknown  Test Not Performed    CHEPE Screen Latest Ref Range: Negative <1:160   Negative <1:160    CYTOLOGY SPECIMEN TO PATHOLOGY Unknown Rpt     GP21 Latest Ref Range: Non-reactive    Non-reactive   gp46 Latest Ref Range: Non-reactive    Non-reactive   gp46-I Latest Ref Range: Non-reactive    Non-reactive   gp46-II Latest Ref Range: Non-reactive    Non-reactive   Line Immunoassay Latest Ref Range: Negative    Negative   p19 Latest Ref Range: Non-reactive    Non-reactive    p24 Latest Ref Range: Non-reactive    Non-reactive   Streptavidin Latest Ref Range: Non-reactive    Non-reactive     Results for LUPIS MURCIA (MRN [id number]) as of 5/1/2017 15:05   Ref. Range 4/21/2017 14:10   Color, CSF Latest Ref Range: Colorless  Red (A)   Heme Aliquot Latest Units: mL 2.0   Appearance, CSF Latest Ref Range: Clear  Bloody (A)   WBC, CSF Latest Ref Range: 0 - 5 /cu mm 43 (H)   RBC, CSF Latest Ref Range: 0 /cu mm 60649 (A)   Segmented Neutrophils, CSF Latest Ref Range: 0 - 6 % 62 (H)   Lymphs, CSF Latest Ref Range: 40 - 80 % 36 (L)   Mono/Macrophage, CSF Latest Ref Range: 15 - 45 % 2 (L)   Glucose, CSF Latest Ref Range: 40 - 70 mg/dL 66   Protein, CSF Latest Ref Range: 15 - 40 mg/dL 244 (H)   Angio Convert Enzyme, CSF Unknown Test Not Performed   CSF Bands Latest Units: bands 4   Serum Bands Latest Units: bands 2   Olig Bands Interpretation, CSF Latest Ref Range: <4 bands 2   IgG Index, CSF Latest Ref Range: <=0.85  0.91 (H)   Albumin, CSF Latest Ref Range: <=27.0 mg/dL 149.0 (H)   IGG/ALBUMIN RATIO, CSF Latest Ref Range: <=0.21  0.21   IgG Synthetic Rate Latest Ref Range: <=12 mg/24 h 78.34 (H)   Albumin, Serum Latest Ref Range: 3200 - 4800 mg/dL 4600   ELSY Antibody, CSF Latest Ref Range: <=0.02 nmol/L 0.00   West Nile Virus Source Unknown CSF   West Nile Virus Result Latest Ref Range: Negative  Negative   IgG, CSF Latest Ref Range: <=8.1 mg/dL 30.7 (H)     EMG 11/22/16  Impression   This study is abnormal. There is electrophysiologic evidence for a chronic, right sciatic neuropathy or S1 radiculopathy. Decreased activation can be due to pain, effort, or a central process.     Assessment and Plan    Lupis Murcia is a 66 y.o. female with progressive gait instability, hx of B12 deficiency, and an exam consistent with a central process. Baclofen 10 mg three times daily with some minimal improvement, but had to decrease to bid due to side effects. Outside imaging of brain, cervical, and  thoracic spine has been noteable for some subcortical white matter changes on brain MRI. This, in conjunction with LP results raise the issue of demyelinating disease.     Will refer to Dr Henley for question of MS vs other autoimmune central process.     Crescencio Kyle MD, ULI, FAAN  Department of Neurology   [facility] [place], [place]

## 2017-05-01 NOTE — MR AVS SNAPSHOT
[facility] Neurology  [address]  [facility] [address]  Phone: [phone]  Fax: [phone]                  Lupis Mariejonh   2017 3:00 PM   Office Visit    Description:  Female : 1950   Provider:  To Kyle MD   Department:  [facility] - Neurology           Reason for Visit     Follow-up                To Do List           Goals (5 Years of Data)     None      [facility]     [facility] On Call Nurse Care Line -  Assistance  Unless otherwise directed by your provider, please contact [facility], our nurse care line that is available for  assistance.     Registered nurses in the [facility] provide: appointment scheduling, clinical advisement, health education, and other advisory services.  Call: [phone] (toll free)               Medications           Message regarding Medications     Verify the changes and/or additions to your medication regime listed below are the same as discussed with your clinician today.  If any of these changes or additions are incorrect, please notify your healthcare provider.        STOP taking these medications     clonazePAM (KLONOPIN) 0.5 MG tablet Take 0.5 tablets (0.25 mg total) by mouth 2 (two) times daily.           Verify that the below list of medications is an accurate representation of the medications you are currently taking.  If none reported, the list may be blank. If incorrect, please contact your healthcare provider. Carry this list with you in case of emergency.           Current Medications     acetaminophen-codeine 300-30mg (TYLENOL #3) 300-30 mg Tab Take 1 tablet by mouth every 6 (six) hours as needed.    baclofen (LIORESAL) 10 MG tablet Take 1 tablet (10 mg total) by mouth 3 (three) times daily.    candesartan-hydrochlorothiazide (ATACAND HCT) 16-12.5 mg per tablet Take 1 tablet by mouth Daily.    cyanocobalamin 1,000 mcg/mL injection Inject 1,000 mcg as directed every 30 days.    furosemide  "(LASIX) 20 MG tablet 20 mg.    lorazepam (ATIVAN) 1 MG tablet 1 mg.    naproxen (NAPROSYN) 500 MG tablet     vitamin B comp with C no.4 150 mg Tab Take one vitamin B complex tablet a day.  He may take over-the-counter.    vitamin B comp with vit C no.6 500-0.5 mg Tab Take 1 tablet by mouth once daily.           Clinical Reference Information           Your Vitals Were     BP Pulse Height Weight BMI    160/92 88 5' 1" (1.549 m) 88.5 kg (195 lb 1.7 oz) 36.87 kg/m2      Blood Pressure          Most Recent Value    BP  (!)  160/92      Allergies as of 5/1/2017     Pcn [Penicillins]    Contrast Media    Motrin [Ibuprofen]      Immunizations Administered on Date of Encounter - 5/1/2017     None      Language Assistance Services     ATTENTION: Language assistance services are available, free of charge. Please call [phone].      ATENCIÓN: Si habla reinaldoañol, tiene a avina disposición servicios gratuitos de asistencia lingüística. Llame al [phone].     CECIL Ý: N?u b?n nói Ti?ng Vi?t, có các d?ch v? h? tr? ngôn ng? mi?n phí dành cho b?n. G?i s? [phone].         [facility] complies with applicable Federal civil rights laws and does not discriminate on the basis of race, color, national origin, age, disability, or sex.        "

## 2017-05-05 NOTE — TELEPHONE ENCOUNTER
Blanca,   It's showing on your Epic schedule that you will be participation [facility]. Is this still a go

## 2017-06-12 ENCOUNTER — OFFICE VISIT (OUTPATIENT)
Dept: NEUROLOGY | Facility: CLINIC | Age: 67
End: 2017-06-12
Payer: MEDICARE

## 2017-06-12 ENCOUNTER — TELEPHONE (OUTPATIENT)
Dept: NEUROLOGY | Facility: CLINIC | Age: 67
End: 2017-06-12

## 2017-06-12 VITALS
HEART RATE: 91 BPM | HEIGHT: 61 IN | SYSTOLIC BLOOD PRESSURE: 155 MMHG | WEIGHT: 194 LBS | BODY MASS INDEX: 36.63 KG/M2 | DIASTOLIC BLOOD PRESSURE: 83 MMHG

## 2017-06-12 DIAGNOSIS — Z74.09 OTHER REDUCED MOBILITY: ICD-10-CM

## 2017-06-12 DIAGNOSIS — R90.89 ABNORMAL BRAIN MRI: Primary | ICD-10-CM

## 2017-06-12 DIAGNOSIS — E55.9 VITAMIN D DEFICIENCY: ICD-10-CM

## 2017-06-12 DIAGNOSIS — G37.1: ICD-10-CM

## 2017-06-12 DIAGNOSIS — Z71.89 COUNSELING REGARDING GOALS OF CARE: ICD-10-CM

## 2017-06-12 DIAGNOSIS — R26.9 GAIT DISTURBANCE: ICD-10-CM

## 2017-06-12 DIAGNOSIS — R79.82 CRP ELEVATED: ICD-10-CM

## 2017-06-12 PROCEDURE — 99214 OFFICE O/P EST MOD 30 MIN: CPT | Mod: PBBFAC | Performed by: CLINICAL NURSE SPECIALIST

## 2017-06-12 PROCEDURE — 99354 PR PROLONGED SVC, OUPT, 1ST HR: CPT | Mod: S$PBB,,, | Performed by: CLINICAL NURSE SPECIALIST

## 2017-06-12 PROCEDURE — 1159F MED LIST DOCD IN RCRD: CPT | Mod: ,,, | Performed by: CLINICAL NURSE SPECIALIST

## 2017-06-12 PROCEDURE — 99215 OFFICE O/P EST HI 40 MIN: CPT | Mod: S$PBB,,, | Performed by: CLINICAL NURSE SPECIALIST

## 2017-06-12 PROCEDURE — 1126F AMNT PAIN NOTED NONE PRSNT: CPT | Mod: ,,, | Performed by: CLINICAL NURSE SPECIALIST

## 2017-06-12 PROCEDURE — 99999 PR PBB SHADOW E&M-EST. PATIENT-LVL IV: CPT | Mod: PBBFAC,,, | Performed by: CLINICAL NURSE SPECIALIST

## 2017-06-12 NOTE — PROGRESS NOTES
Neurology Consultation    Ms. Lupis Murcia is a 66 year old female who [facility] as referred by Dr. Kyle. Patient has scattered white matter lesions on brain MRI. LP shows 2 OCB and elevated IgG index.She is accompanied by her daughter, Mahogany. The history has been provided by the patient and her daughter.     History of present illness:    In 1998, patient reports that she was diagnosed with a B12 deficiency. She woke up one morning in that year and could not walk well. She went to see Dr. Phelan, a neurologist. She was admitted to the hospital for 2 days. No MRIs were done, and she was not treated with steroids. After discharge, she could walk a little better. The whole episode lasted about a week. Patient reports that she had a left Babinski at the time. She has done B12 injections since then.     About 3 years ago, she developed walking problems again, and she noticed that she was walking more slowly. Over the past 3 years, this walking problem has become more progressive. She stopped working 2.5 years ago, and she has not been able to drive for the past year. She developed a left foot drop about a year ago.     She is currently walking with a walker. Her last fall was about a year ago, and she was using a cane at the time. After the walking issue started, she used a cane within a year and a walker within two years.       In general, she feels like her energy level is good. She sleeps well at night. She feels rested when she gets up. She has urinary urgency and some occasional leakage. She denies history of UTIs. Bowels are regular. She feels tightness and stiffness in her legs. Her legs jump sometimes at night, right more than left. She does not feel like Baclofen is helping. She has cataracts and sees an eye doctor. She has numbness and tingling to the bottom of her feet and legs at night, and she sometimes feels like things are crawling on her. This comes and goes. She denies  any memory issues. She denies any depression or anxiety. She denies any issues with fine motor coordination, but does have carpal tunnel syndrome in the right hand.  She denies any slurred or stuttered speech. She denies any swallowing problems. She has lower back pain that sometimes makes her feel like she might fall. When she gets overheated, she feels weak in general. She denies any upper body weakness.     Over the years, she has seen 5 neurologists and has been given several different diagnoses. She has had several EMGs and MRIs of the brain and spine    Dr. De Leon gave her a trial of prednisone for 6-7 months, which did not help her walking at all. After taking prednisone, she developed lymphedema in her legs.   She also saw Dr. Kiran in the past, who told her that she did not have MS, but proposed starting her on Aubagio. This is by patient report.     She denies any family history of MS.     She lives at home with her daughter.      Review of systems:   A 15 system review of systems was completed by the patient and personally reviewed by me. This information will be scanned into the patient's chart.     Past Medical History:   Diagnosis Date    Vitamin B12 deficiency        Past Surgical History:   Procedure Laterality Date     SECTION         Family History   Problem Relation Age of Onset    Coronary artery disease Father     Lung cancer Father     Lung cancer Mother     Brain cancer Brother        Social History     Social History    Marital status:      Spouse name: N/A    Number of children: N/A    Years of education: N/A     Social History Main Topics    Smoking status: Never Smoker    Smokeless tobacco: None    Alcohol use No    Drug use: No    Sexual activity: Not Asked     Other Topics Concern    None     Social History Narrative    None     Medications reviewed with patient.     Review of patient's allergies indicates:   Allergen Reactions    Pcn [penicillins]  "Shortness Of Breath and Rash    Contrast media Rash    Motrin [ibuprofen] Rash       Physical Exam    Vitals:    06/12/17 1420   BP: (!) 155/83   Pulse: 91   Weight: 88 kg (194 lb)   Height: 5' 1" (1.549 m)     T25 FW: 36.49 seconds with walker; left foot drop and circumduction gait     In general, the patient is well nourished.    Unable to visualize fundi bilaterally.     MENTAL STATUS: language is fluent, normal verbal comprehension, short-term and remote memory is intact, attention is normal, patient is alert and oriented x 3, fund of knowlege is appropriate by vocabulary.     CRANIAL NERVE EXAM:  There is no internuclear ophthalmoplegia.  Extraocular muscles are intact. Pupils are equal, round, and reactive to light. No facial asymmetry. Facial sensation is intact bilaterally. There is no dysarthria. Uvula is midline, and palate moves symmetrically. Shoulder shrug intact bilaterally. Tongue protrusion is midline. Hearing is grossly intact. Neck is supple.     MOTOR EXAM: Normal bulk and tone throughout UE and LE bilaterally.  No pronator drift. Rapid sequential movements are slow in the left upper and lower extremity.  Strength is 5/5 in all groups in the upper extremities and right lower extremity. Left lower extremity--iliopsoas 3/5; hamstring 3/5, ant tibial 3/5    REFLEXES: 2+ and symmetric throughout in all four extremities; She has left Babinski.     SENSORY EXAM: Slightly decreased vibratory sense to bilateral lower extremities; intact to upper extremities    COORDINATION: Slow finger to nose with left hand    GAIT: Wide-based, slow, and unsteady. Unable to perform Romberg.       IMAGING (personally reviewed):    MRI brain 1/27/17--periventricular white matter changes noted  MRi c-spine 6/2016--possible area of demyelination in the upper cervical cord; moderate to severe degenerative disc changes at C5-6 and C6-7; mild but broad base protrusion of chronic discogenic osteophytic complex at C5-6; no " acute disc herniation or significant central canal stenosis; mild bilateral foraminal stenosis at C5-6    LABS:    MS PROFILE   Order: [id number]   Status:  Final result   Visible to patient:  Yes (Patient Portal) Next appt:  None Dx:  Gait abnormality; Muscle spasticity; ...    Ref Range & Units 1mo ago   CSF Bands bands 4   Olig Bands Interpretation, CSF <4 bands 2   Comments: The oligoclonal band assay detected 3 or less IgG bands in   the CSF, which are not present in the serum. This is a   Negative result.   CSF is used in the diagnosis of MS by identifying increased   intrathecal IgG sythesis qualitatively (Oligoclonal Bands)   or quantitatively (IgG index or IgG synthesis rate, CSF).   Oligoclonal bands (4 or more CSF-specific bands) and/or an   elevated CSF IgG index are detected in up to 90% of   patients with MS. These findings, however, are not specific   for MS as CSF-specific IgG synthesis may also be found in   patients with other neurologic diseases including   infectious, inflammatory, cerebrovascular, and   paraneoplastic disorders.    Serum Bands bands 2   IgG Index, CSF <=0.85 0.91    IgG, CSF <=8.1 mg/dL 30.7    Albumin, CSF <=27.0 mg/dL 149.0    IgG/Albumin Ratio, CSF <=0.21 0.21   IgG Synthetic Rate <=12 mg/24 h 78.34    IgG,S 767 - 1590 mg/dL 1050   Albumin, Serum 3200 - 4800 mg/dL 4600   IgG/Albumin, S <=0.40 0.23   Comments: Test Performed by:   [address] [address]    Resulting Agency  [place]   Narrative     Specimen Tube Number->Tube 2      Specimen Collected: 04/21/17 14:10 Last Resulted: 04/26/17 14:42 Lab Flowsheet Order Details View Encounter Lab and Collection Details Routing Result               CSF protein=244 (elevated)  HTLV I/II negative  VDRL CSF negative   Lyme CSF negative  West Nile CSF negative  CSF glucose normal    CSF cell count with differential   Order: [id number]   Status:  Final result   Visible to  patient:  Yes (Patient Portal) Next appt:  None Dx:  Gait abnormality; Muscle spasticity; ...    Ref Range & Units 1mo ago   Heme Aliquot mL 2.0   Appearance, CSF Clear Bloody    Color, CSF Colorless Red    WBC, CSF 0 - 5 /cu mm 43    RBC, CSF 0 /cu mm 24102    Segmented Neutrophils, CSF 0 - 6 % 62    Lymphs, CSF 40 - 80 % 36    Mono/Macrophage, CSF 15 - 45 % 2    Resulting Agency  [facility]           ELSY Ab CSF negative     ESR elevated in 3/2016     ASSESSMENT:        1.   White matter changes on MRI in brain and c-spine  2.   Gait disturbance     3.   Abnormal CSF results--positive MS profile (high IgG index).     4.   Herniated disc in cervical spine          DISCUSSION:    I think it is possible that this patient has MS. She has an MRI brain with white matter changes and a suspicious area on the cervical spine. She had a possible demyelinating event in 1998 and a progressive decline in her walking over the past 3 years. She also had a positive MS profile on her lumbar puncture (elevated IgG index).  We discussed doing some new MRIs, but she is resistant to doing these because of claustrophobia. We explained to her that the open scanners that she has used in the past do not provide the best images, and it's possible that something important has been missed diagnostically with all of her recent MRIs because of poor quality. Still, she prefers to do other diagnostic testing first. We will proceed with repeating some labs that have been elevated in the past, including ESR and CRP. We will also check B12 since she has reported deficiency. We will check SSA/SSB, ACE, CHEPE, and Cardiolipin Ab to complete MS mimic workup. We will check a visual evoked potential to assess for any evidence of demyelination along the optic nerve pathway. If this is inconclusive or negative, we will again discuss MRIs with patient. She will send us a copy of her T-spine MRI disc for upload. We will check her Vitamin D level today and make a  recommendation on dosage. We discussed the importance of high dose vitamin D and that higher doses of vitamin D have been linked to less MS disease activity. Dr. Henley and I plan to see her back upon completion of testing.     RECOMMENDATIONS:  1. Visual evoked potential  2. Labs today-- Repeat ESR and CRP; check B12, Vitamin D, Cardiolipin Ab, SSA/SSB, ACE, CHEPE           3. Patient will bring thoracic spine MRI disc            4. MRI brain and cervical spine--wide bore scanner here at [facility]; [facility] prior to MRI  (patient defers this for now)  5. Follow up with me and Dr. Henley after VEP and labs are done                   Over 50% of this 90 minute visit was spent discussing the patient's history and current symptoms, as well as making plans for additional testing and follow-up. Dr. Henley was present for the last 20 minutes of the visit and also participated in the medical decision-making. The patient and her daughter agree with the plan of care.   There are no diagnoses linked to this encounter.      Thank you for the consult.      Blanca Virgen, [facility]-BC, MSCN

## 2017-06-13 ENCOUNTER — TELEPHONE (OUTPATIENT)
Dept: NEUROLOGY | Facility: CLINIC | Age: 67
End: 2017-06-13

## 2017-06-14 ENCOUNTER — TELEPHONE (OUTPATIENT)
Dept: NEUROLOGY | Facility: CLINIC | Age: 67
End: 2017-06-14

## 2017-06-19 ENCOUNTER — DOCUMENTATION ONLY (OUTPATIENT)
Dept: NEUROLOGY | Facility: CLINIC | Age: 67
End: 2017-06-19

## 2017-06-20 ENCOUNTER — PATIENT MESSAGE (OUTPATIENT)
Dept: NEUROLOGY | Facility: CLINIC | Age: 67
End: 2017-06-20

## 2017-06-21 ENCOUNTER — TELEPHONE (OUTPATIENT)
Dept: NEUROLOGY | Facility: CLINIC | Age: 67
End: 2017-06-21

## 2017-06-22 ENCOUNTER — PATIENT MESSAGE (OUTPATIENT)
Dept: NEUROLOGY | Facility: CLINIC | Age: 67
End: 2017-06-22

## 2017-06-23 DIAGNOSIS — R79.82 CRP ELEVATED: ICD-10-CM

## 2017-06-23 DIAGNOSIS — R76.0 ANTI-CARDIOLIPIN ANTIBODY POSITIVE: Primary | ICD-10-CM

## 2017-06-23 DIAGNOSIS — R70.0 ELEVATED SED RATE: ICD-10-CM

## 2017-06-27 ENCOUNTER — HOSPITAL ENCOUNTER (OUTPATIENT)
Dept: NEUROLOGY | Facility: CLINIC | Age: 67
Discharge: HOME | End: 2017-06-27
Payer: MEDICARE

## 2017-06-27 ENCOUNTER — LAB VISIT (OUTPATIENT)
Dept: LAB | Facility: HOSPITAL | Age: 67
End: 2017-06-27
Attending: CLINICAL NURSE SPECIALIST
Payer: MEDICARE

## 2017-06-27 DIAGNOSIS — G37.1: ICD-10-CM

## 2017-06-27 DIAGNOSIS — R90.89 ABNORMAL BRAIN MRI: ICD-10-CM

## 2017-06-27 DIAGNOSIS — R76.0 ANTI-CARDIOLIPIN ANTIBODY POSITIVE: ICD-10-CM

## 2017-06-27 DIAGNOSIS — R26.9 GAIT DISTURBANCE: ICD-10-CM

## 2017-06-27 LAB — CRP SERPL-MCNC: 12 MG/L

## 2017-06-27 PROCEDURE — 86140 C-REACTIVE PROTEIN: CPT

## 2017-06-27 PROCEDURE — 85613 RUSSELL VIPER VENOM DILUTED: CPT

## 2017-06-27 PROCEDURE — 95930 VISUAL EP TEST CNS W/I&R: CPT | Mod: 26,S$PBB,, | Performed by: PSYCHIATRY & NEUROLOGY

## 2017-06-27 PROCEDURE — 95930 PR VISUAL EVOKED POTENTIAL TEST: ICD-10-PCS | Mod: 26,S$PBB,, | Performed by: PSYCHIATRY & NEUROLOGY

## 2017-06-27 PROCEDURE — 95930 VISUAL EP TEST CNS W/I&R: CPT | Mod: PBBFAC

## 2017-06-27 PROCEDURE — 86146 BETA-2 GLYCOPROTEIN ANTIBODY: CPT | Mod: 59

## 2017-06-27 PROCEDURE — 36415 COLL VENOUS BLD VENIPUNCTURE: CPT

## 2017-06-29 LAB
B2 GLYCOPROT1 IGA SER QL: <9 SAU
B2 GLYCOPROT1 IGG SER QL: <9 SGU
B2 GLYCOPROT1 IGM SER QL: 21 SMU

## 2017-06-30 PROCEDURE — 85598 HEXAGNAL PHOSPH PLTLT NEUTRL: CPT

## 2017-07-05 LAB — LA PPP-IMP: NORMAL

## 2017-07-07 ENCOUNTER — PATIENT MESSAGE (OUTPATIENT)
Dept: NEUROLOGY | Facility: CLINIC | Age: 67
End: 2017-07-07

## 2017-07-13 ENCOUNTER — TELEPHONE (OUTPATIENT)
Dept: NEUROLOGY | Facility: CLINIC | Age: 67
End: 2017-07-13

## 2017-07-13 ENCOUNTER — PATIENT MESSAGE (OUTPATIENT)
Dept: NEUROLOGY | Facility: CLINIC | Age: 67
End: 2017-07-13

## 2017-07-13 DIAGNOSIS — Z86.718 HISTORY OF BLOOD CLOTS: Primary | ICD-10-CM

## 2017-07-13 DIAGNOSIS — I15.9 SECONDARY HYPERTENSION: ICD-10-CM

## 2017-07-13 DIAGNOSIS — D68.8 OTHER SPECIFIED COAGULATION DEFECTS: ICD-10-CM

## 2017-07-13 DIAGNOSIS — R76.0 ANTI-CARDIOLIPIN ANTIBODY POSITIVE: ICD-10-CM

## 2017-07-14 LAB — APTT HEX PL PPP: NEGATIVE S

## 2017-07-17 ENCOUNTER — PATIENT MESSAGE (OUTPATIENT)
Dept: NEUROLOGY | Facility: CLINIC | Age: 67
End: 2017-07-17

## 2017-07-20 DIAGNOSIS — R76.0 ANTI-CARDIOLIPIN ANTIBODY POSITIVE: Primary | ICD-10-CM

## 2017-07-21 ENCOUNTER — CLINICAL SUPPORT (OUTPATIENT)
Dept: REHABILITATION | Facility: HOSPITAL | Age: 67
End: 2017-07-21
Attending: CLINICAL NURSE SPECIALIST
Payer: MEDICARE

## 2017-07-21 ENCOUNTER — PATIENT MESSAGE (OUTPATIENT)
Dept: NEUROLOGY | Facility: CLINIC | Age: 67
End: 2017-07-21

## 2017-07-21 ENCOUNTER — TELEPHONE (OUTPATIENT)
Dept: HEMATOLOGY/ONCOLOGY | Facility: CLINIC | Age: 67
End: 2017-07-21

## 2017-07-21 DIAGNOSIS — R26.9 GAIT DISTURBANCE: ICD-10-CM

## 2017-07-21 PROCEDURE — 97110 THERAPEUTIC EXERCISES: CPT | Mod: PO

## 2017-07-21 PROCEDURE — G8979 MOBILITY GOAL STATUS: HCPCS | Mod: CN,PO

## 2017-07-21 PROCEDURE — G8978 MOBILITY CURRENT STATUS: HCPCS | Mod: CN,PO

## 2017-07-21 PROCEDURE — 97162 PT EVAL MOD COMPLEX 30 MIN: CPT | Mod: PO

## 2017-07-21 NOTE — TELEPHONE ENCOUNTER
----- Message from Jimena Arriaga sent at 7/21/2017  1:15 PM CDT -----  Contact: Self   They need to reschedule pts consult with [facility] for a day in August when her daughter can take her.   Call Sue [phone]      Appointment changed. Appointment mailed.

## 2017-07-21 NOTE — PROGRESS NOTES
OUTPATIENT PHYSICAL THERAPY  PHYSICAL THERAPY EVALUATION    Name: Lupis WHEATLEY [facility] Number: [id number]    Diagnosis:   Encounter Diagnosis   Name Primary?    Gait disturbance      Physician: Blanca Virgen APRN,*  Treatment Orders: PT Eval and Treat  Past Medical History:   Diagnosis Date    Vitamin B12 deficiency        Evaluation Date: 7/21/17  Visit # authorized: 20  Authorization period: 12/31/17  Plan of care Expiration: 9/15/17  MD referral: y    History   Precautions: falls risk    Onset Date: Roughly a year to a year and 1/2 ago  Prior Level of Function: independent  Current level of Function: limited with mobility, ambulates with RW, poor balance  Social history: Pt lives with her daughter who is there to care for her in the morning and evenings. 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        History of Present Illness: Lupis is a 66 y.o. female that presents to [facility] secondary to gait abnormality and poor balance. Lupis states she has a neurological disorder which they are testing her for, but are currently unsure of the diagnosis. She states it might be DMITRIY or MS, but they are still testing. She states she has trouble with walking, and she has limited endurance. She states a RW for the last 10 months in the community and around the home. She has grab bars in her shower, and she reports she stands to shower. She reports she has not had a fall since she has been using her walker. She reports she has foot drop in her L LE, but she was never given an AFO. She reports she would like to walk better and increase her endurance. She reports she has had multiple MRIs of the brain, neck, and low back. She states she does not have much pain at this time.      Pts goals: increase her walking distance and mobility       No cultural, environmental, or spiritual barriers identified to treatment or learning.      Objective       ROM:   UPPER EXTREMITY--AROM/PROM  (R)  "UE: WNLs  (L) UE: limited with full L UE elevation           RANGE OF MOTION--LOWER EXTREMITIES  RANGE OF MOTION--LOWER EXTREMITIES   (R) LE: WFLs   (L) LE: WFLs      Strength: manual muscle test grades below     Lower Extremity Strength  Right LE  Left LE    Knee extension: 4/5 Knee extension: 3+/5   Knee flexion: 4/5 Knee flexion: 3+/5   Hip flexion: 4/5 Hip flexion: 2/5   Hip extension:  NT Hip extension: NT   Hip abduction: sitting 4-/5  Hip abduction: 3+/5   Hip adduction: sitting 3+/5 Hip adduction 3+/5   Ankle dorsiflexion: 4-/5 Ankle dorsiflexion: 2-/5   Ankle plantarflexion: NT Ankle plantarflexion: NT         Gait Assessment:   - AD used: RW  - Assistance: supervision  - Distance: 50"    GAIT DEVIATIONS:  Lupis displays the following deviations with ambulation: L foot drag noted with tendency to circumduct and ER rotate the left hip due to foot drag and lack of knee and hip flexion    Endurance Deficit:   Evaluation   Timed Up and Go 1 minute 8 seconds   30 second Chair Rise 6 completed           Postural control:    Standing unsupported: feet apart, level ground 30 seconds, close S  Standing unsupported: feet apart, eyes close 20 seconds, CGA, LOB  Standing unsupported: feet together, CGA 5 seconds, LOB      Functional Limitations Reports - G Codes  Category: Mobility  Tool: TUG  Score: 100% limited   Current: % impaired, limited or restricted  Goal: % impaired, limited or restricted      Written Home Exercises Provided: Exercises performed and provided in form of a handout including sitting march, LAQs, sitting heel raises, sitting toe raises, sitting hip add, and sitting abd with OTB 2 x 10 of each activity.  Pt demo good understanding of the education provided. Lupis demonstrated fair return demonstration of activities.     Education provided re:role of PT, goals for PT, scheduling - pt verbalized understanding. Discussed insurance limitations with pt.       Assessment     Lupis is a " 66 y.o. female referred to outpatient physical therapy with a diagnosis of gait disturbance. She is currently undergoing testing to determine her cause of B LE weakness, balance and gait issues. There is a suspected diagnosis of MS; however, it has not been confirmed at this time. Demonstrates impairments including: limitations as described in the problem list. Pt prognosis is Fair. Positive prognostic factors include motivation to participate. Negative prognostic factors include unknown origin of symptoms, progressive worsening of symptoms over time. Pt will benefit from skilled outpatient physical therapy to address the above stated deficits, provide pt/family education, and to maximize pt's level of independence.     Medical necessity is demonstrated by the following IMPAIRMENTS/PROBLEMS:  weakness, impaired endurance, impaired self care skills, impaired functional mobility, gait instability, impaired balance, decreased coordination, decreased lower extremity function, decreased safety awareness and decreased ROM      History  Co-morbidities and personal factors that may impact the plan of care Examination  Body Structures and Functions, activity limitations and participation restrictions that may impact the plan of care Clinical Presentation   Decision Making/ Complexity Score   Co-morbidities:   upper motor neuron lesion of undetermined origin        Personal Factors:   no deficits Body Regions:   lower extremities  trunk    Body Systems:   gross symmetry  ROM  strength  gross coordinated movement  balance  gait  transfers  transitions    Activity limitations:   Learning and applying knowledge  no deficits    General Tasks and Commands  no deficits    Mobility  walking  using transportation (bus, train, plane, car)  driving (bike, car, motorcycle)    Self care  washing oneself (bathing, drying, washing hands)  caring for body parts (brushing teeth, shaving, grooming)  toileting  dressing  looking after one's  health    Domestic Life  shopping  cooking  doing house work (cleaning house, washing dishes, laundry)  assisting others    Life Areas  no deficits    Community and Social Life  community life  recreation and leisure    Participation Restrictions:   difficulty with ADLs, household and community mobility         evolving clinical presentation with changing clinical characteristics            moderate high moderate moderate         Anticipated barriers to physical therapy: transportation barriers    Pt's spiritual, cultural and educational needs considered and pt agreeable to plan of care and goals as stated below:     GOALS:   Short term goals: 4 weeks, pt agrees to goals set.  1. Pt and caregivers will evaluate home for safety, including checking lighting and hazards on the floor such as rugs or cords and remove them.  2. Pt will be able to ambulate for 100' without requiring a rest break or reporting SOB in order to improve activity endurance.  3. Pt will tolerate 30 minutes of physical therapy treatment with 1 rest break to demonstrate overall improvement in CV endurance  4. Pt will decrease TUG score by > or = 3 seconds in order to demonstrate decreased risk for falls.      Long term goals: 8 weeks, pt agrees to goals set  1. Pt will ambulate 300 ft/communiuty distances with AD with no rest breaks with modified independence.  2. Pt will improve TUG score by > or = 5 seconds to decrease risk for falls in the home and community environment.  3. Pt will be able to safely negotiate 3 steps in order to improve her functional mobility with HR use and a step to pattern.  4. Pt will tolerate 50 minutes of physical therapy with < or = 1 rest breaks in order to demonstrate overall improved CV endurance.   5. Pt will be able to perform > or = 8 chair rises in order to demonstrate improved LE strength and endurance.       Plan   Outpatient physical therapy 1- 2 times weekly to include: pt ed, hep, therapeutic exercises,  therapeutic activities, neuromuscular re-education/ balance exercises, joint mobilizations, aquatic therapy and modalities prn.   Cont PT for  8 weeks. Patient may be seen by PTA as part of the rehabilitation team.   Ashley Holstein, PT 7/21/2017     I certify the need for these services furnished under this plan of treatment and while under my care.  ____________________________________ Physician/Referring Practitioner   Date of Signature

## 2017-07-21 NOTE — PLAN OF CARE
OUTPATIENT PHYSICAL THERAPY  PHYSICAL THERAPY EVALUATION    Name: Lupis WHEATLEY [facility] Number: [id number]    Diagnosis:   Encounter Diagnosis   Name Primary?    Gait disturbance      Physician: Blanca Virgen APRN,*  Treatment Orders: PT Eval and Treat  Past Medical History:   Diagnosis Date    Vitamin B12 deficiency        Evaluation Date: 7/21/17  Visit # authorized: 20  Authorization period: 12/31/17  Plan of care Expiration: 9/15/17  MD referral: y    History   Precautions: falls risk    Onset Date: Roughly a year to a year and 1/2 ago  Prior Level of Function: independent  Current level of Function: limited with mobility, ambulates with RW, poor balance  Social history: Pt lives with her daughter who is there to care for her in the morning and evenings. 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        History of Present Illness: Lupis is a 66 y.o. female that presents to [facility] secondary to gait abnormality and poor balance. uLpis states she has a neurological disorder which they are testing her for, but are currently unsure of the diagnosis. She states it might be DMITRIY or MS, but they are still testing. She states she has trouble with walking, and she has limited endurance. She states a RW for the last 10 months in the community and around the home. She has grab bars in her shower, and she reports she stands to shower. She reports she has not had a fall since she has been using her walker. She reports she has foot drop in her L LE, but she was never given an AFO. She reports she would like to walk better and increase her endurance. She reports she has had multiple MRIs of the brain, neck, and low back. She states she does not have much pain at this time.      Pts goals: increase her walking distance and mobility       No cultural, environmental, or spiritual barriers identified to treatment or learning.      Objective       ROM:   UPPER EXTREMITY--AROM/PROM  (R)  "UE: WNLs  (L) UE: limited with full L UE elevation           RANGE OF MOTION--LOWER EXTREMITIES  RANGE OF MOTION--LOWER EXTREMITIES   (R) LE: WFLs   (L) LE: WFLs      Strength: manual muscle test grades below     Lower Extremity Strength  Right LE  Left LE    Knee extension: 4/5 Knee extension: 3+/5   Knee flexion: 4/5 Knee flexion: 3+/5   Hip flexion: 4/5 Hip flexion: 2/5   Hip extension:  NT Hip extension: NT   Hip abduction: sitting 4-/5  Hip abduction: 3+/5   Hip adduction: sitting 3+/5 Hip adduction 3+/5   Ankle dorsiflexion: 4-/5 Ankle dorsiflexion: 2-/5   Ankle plantarflexion: NT Ankle plantarflexion: NT         Gait Assessment:   - AD used: RW  - Assistance: supervision  - Distance: 50"    GAIT DEVIATIONS:  Lupis displays the following deviations with ambulation: L foot drag noted with tendency to circumduct and ER rotate the left hip due to foot drag and lack of knee and hip flexion    Endurance Deficit:   Evaluation   Timed Up and Go 1 minute 8 seconds   30 second Chair Rise 6 completed           Postural control:    Standing unsupported: feet apart, level ground 30 seconds, close S  Standing unsupported: feet apart, eyes close 20 seconds, CGA, LOB  Standing unsupported: feet together, CGA 5 seconds, LOB      Functional Limitations Reports - G Codes  Category: Mobility  Tool: TUG  Score: 100% limited   Current: % impaired, limited or restricted  Goal: % impaired, limited or restricted      Written Home Exercises Provided: Exercises performed and provided in form of a handout including sitting march, LAQs, sitting heel raises, sitting toe raises, sitting hip add, and sitting abd with OTB 2 x 10 of each activity.  Pt demo good understanding of the education provided. Lupis demonstrated fair return demonstration of activities.     Education provided re:role of PT, goals for PT, scheduling - pt verbalized understanding. Discussed insurance limitations with pt.       Assessment     Lupis is a " 66 y.o. female referred to outpatient physical therapy with a diagnosis of gait disturbance. She is currently undergoing testing to determine her cause of B LE weakness, balance and gait issues. There is a suspected diagnosis of MS; however, it has not been confirmed at this time. Demonstrates impairments including: limitations as described in the problem list. Pt prognosis is Fair. Positive prognostic factors include motivation to participate. Negative prognostic factors include unknown origin of symptoms, progressive worsening of symptoms over time. Pt will benefit from skilled outpatient physical therapy to address the above stated deficits, provide pt/family education, and to maximize pt's level of independence.     Medical necessity is demonstrated by the following IMPAIRMENTS/PROBLEMS:  weakness, impaired endurance, impaired self care skills, impaired functional mobility, gait instability, impaired balance, decreased coordination, decreased lower extremity function, decreased safety awareness and decreased ROM      History  Co-morbidities and personal factors that may impact the plan of care Examination  Body Structures and Functions, activity limitations and participation restrictions that may impact the plan of care Clinical Presentation   Decision Making/ Complexity Score   Co-morbidities:   upper motor neuron lesion of undetermined origin        Personal Factors:   no deficits Body Regions:   lower extremities  trunk    Body Systems:   gross symmetry  ROM  strength  gross coordinated movement  balance  gait  transfers  transitions    Activity limitations:   Learning and applying knowledge  no deficits    General Tasks and Commands  no deficits    Mobility  walking  using transportation (bus, train, plane, car)  driving (bike, car, motorcycle)    Self care  washing oneself (bathing, drying, washing hands)  caring for body parts (brushing teeth, shaving, grooming)  toileting  dressing  looking after one's  health    Domestic Life  shopping  cooking  doing house work (cleaning house, washing dishes, laundry)  assisting others    Life Areas  no deficits    Community and Social Life  community life  recreation and leisure    Participation Restrictions:   difficulty with ADLs, household and community mobility         evolving clinical presentation with changing clinical characteristics            moderate high moderate moderate         Anticipated barriers to physical therapy: transportation barriers    Pt's spiritual, cultural and educational needs considered and pt agreeable to plan of care and goals as stated below:     GOALS:   Short term goals: 4 weeks, pt agrees to goals set.  1. Pt and caregivers will evaluate home for safety, including checking lighting and hazards on the floor such as rugs or cords and remove them.  2. Pt will be able to ambulate for 100' without requiring a rest break or reporting SOB in order to improve activity endurance.  3. Pt will tolerate 30 minutes of physical therapy treatment with 1 rest break to demonstrate overall improvement in CV endurance  4. Pt will decrease TUG score by > or = 3 seconds in order to demonstrate decreased risk for falls.      Long term goals: 8 weeks, pt agrees to goals set  1. Pt will ambulate 300 ft/communiuty distances with AD with no rest breaks with modified independence.  2. Pt will improve TUG score by > or = 5 seconds to decrease risk for falls in the home and community environment.  3. Pt will be able to safely negotiate 3 steps in order to improve her functional mobility with HR use and a step to pattern.  4. Pt will tolerate 50 minutes of physical therapy with < or = 1 rest breaks in order to demonstrate overall improved CV endurance.   5. Pt will be able to perform > or = 8 chair rises in order to demonstrate improved LE strength and endurance.       Plan   Outpatient physical therapy 1- 2 times weekly to include: pt ed, hep, therapeutic exercises,  therapeutic activities, neuromuscular re-education/ balance exercises, joint mobilizations, aquatic therapy and modalities prn.   Cont PT for  8 weeks. Patient may be seen by PTA as part of the rehabilitation team.   Ashley Holstein, PT 7/21/2017     I certify the need for these services furnished under this plan of treatment and while under my care.  ____________________________________ Physician/Referring Practitioner   Date of Signature

## 2017-07-24 ENCOUNTER — OFFICE VISIT (OUTPATIENT)
Dept: NEUROLOGY | Facility: CLINIC | Age: 67
End: 2017-07-24
Payer: MEDICARE

## 2017-07-24 VITALS
SYSTOLIC BLOOD PRESSURE: 140 MMHG | DIASTOLIC BLOOD PRESSURE: 82 MMHG | WEIGHT: 193 LBS | HEART RATE: 82 BPM | BODY MASS INDEX: 36.44 KG/M2 | HEIGHT: 61 IN

## 2017-07-24 DIAGNOSIS — Z71.89 COUNSELING REGARDING GOALS OF CARE: ICD-10-CM

## 2017-07-24 DIAGNOSIS — R26.9 GAIT DISTURBANCE: ICD-10-CM

## 2017-07-24 DIAGNOSIS — R83.9 ABNORMAL FINDING IN CSF: ICD-10-CM

## 2017-07-24 DIAGNOSIS — R76.0 ANTI-CARDIOLIPIN ANTIBODY POSITIVE: ICD-10-CM

## 2017-07-24 DIAGNOSIS — E53.8 B12 DEFICIENCY: ICD-10-CM

## 2017-07-24 DIAGNOSIS — R90.89 ABNORMAL BRAIN MRI: Primary | ICD-10-CM

## 2017-07-24 DIAGNOSIS — M21.372 LEFT FOOT DROP: ICD-10-CM

## 2017-07-24 DIAGNOSIS — E55.9 VITAMIN D DEFICIENCY: ICD-10-CM

## 2017-07-24 PROCEDURE — 99999 PR PBB SHADOW E&M-EST. PATIENT-LVL III: CPT | Mod: PBBFAC,,, | Performed by: CLINICAL NURSE SPECIALIST

## 2017-07-24 PROCEDURE — 1159F MED LIST DOCD IN RCRD: CPT | Mod: ,,, | Performed by: CLINICAL NURSE SPECIALIST

## 2017-07-24 PROCEDURE — 99213 OFFICE O/P EST LOW 20 MIN: CPT | Mod: PBBFAC | Performed by: CLINICAL NURSE SPECIALIST

## 2017-07-24 PROCEDURE — 99215 OFFICE O/P EST HI 40 MIN: CPT | Mod: S$PBB,,, | Performed by: CLINICAL NURSE SPECIALIST

## 2017-07-24 RX ORDER — CYANOCOBALAMIN 1000 UG/ML
INJECTION, SOLUTION INTRAMUSCULAR; SUBCUTANEOUS
Qty: 10 ML | Refills: 1 | Status: SHIPPED | OUTPATIENT
Start: 2017-07-24 | End: 2017-08-09 | Stop reason: SDUPTHER

## 2017-07-24 RX ORDER — CHOLECALCIFEROL (VITAMIN D3) 25 MCG
5000 TABLET ORAL DAILY
Status: ON HOLD | COMMUNITY
End: 2022-06-02 | Stop reason: HOSPADM

## 2017-07-24 NOTE — PROGRESS NOTES
"Subjective:       Patient ID: Lupis Murcia is a 66 y.o. female who presents today for a routine clinic visit. She was last seen on 6/12/17. She is accompanied by her daughter. The history has been provided by the patient and her daughter.     HPI:  · Taking vitamin D3 as recommended? Yes -  Dose: 1500 units   · She has started physical therapy and will be going once a week and doing home exercises.   · Her energy level remains low. She continues to have weakness in her legs.   · She "feels like things are crawling on her legs." This seems to be worse at night. She also has numbness in her feet and legs, but this is also worse at night. Her feet get cold at night.   · She has urinary frequency and urgency. She wears a pad.   · She continues to have tightness and stiffness in legs. It takes her a while to get moving in the morning.   · She denies any new or different symptoms.     She has not had any miscarriages in her lifetime. She has had four pregnancies & four term living babies. She denies any sensitivity to light, oral ulcers, rashes, cold fingertips, mottled skin. She does have hair loss/more thinning.  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  She also had DVT in her late 20s after being on birth control pills.     She has history of hypertension. She is not sure if she has high cholesterol.       SOCIAL HISTORY  Social History   Substance Use Topics    Smoking status: Never Smoker    Smokeless tobacco: Not on file    Alcohol use No     Living arrangements - the patient lives with her daughter   Employment: She is unemployed.         Objective:        1. 25 foot timed walk: 33 seconds with walker today; was 36.49 seconds at last visit with walker    Neurologic Exam      T25 FW: 36.49 seconds with walker; left foot drop and circumduction gait      In general, the patient is well nourished.       MENTAL STATUS: language is fluent, normal verbal comprehension, short-term " and remote memory is intact, attention is normal, patient is alert and oriented x 3, fund of knowlege is appropriate by vocabulary.        MOTOR EXAM: Normal bulk and tone throughout UE and LE bilaterally. . Rapid sequential movements are slow in the left upper extremity.  Strength is 5/5 in all groups in the upper extremities and right lower extremity. Left lower extremity--iliopsoas 3/5; hamstring 3/5, ant tibial 3/5     REFLEXES: 2+ and symmetric throughout in all four extremities.      SENSORY EXAM: Decreased vibratory sense to bilateral lower extremities; intact to upper extremities     COORDINATION: Slow finger to nose with left hand     GAIT: Wide-based, slow, and unsteady. Romberg is unsteady.        Imaging:   Visual evoked potential--6/27/17    RESULTS:   Visual Acuity OS 20 / 100 OD 20 / 100   Corrective Lenses Worn [ ] Not worn [X]   RESULTS:   The evoked potentials were not well formed but reproducible. The latency of the first major positive component is given above. The morphology and latencies comparing the response from stimulation each individually have the expected symmetry however the latencies of the P100 component is delayed bilaterally. The responses recorded over the lateral occipital cortex are also symmetrical.   INTERPRETATION:   Abnormal VEP - The responses are symmetrically delayed but Visual Acuity was reduced which can produce this finding. Re-evaluation of the patient is suggested with the patient wear corrective lenses.     She does not wear corrective lenses.       MRI T-spine 12/2014--Thoracic portion of the cord is normal in caliber and signal intensity.     Reviewed MRI brain from 2013, which showed white matter periventricular changes, but pattern is not typical of MS.   Labs:     Lab Results   Component Value Date    LFEOHYRM51DY 5 (L) 06/12/2017     CBC and APTT done earlier today (results pending)    Hexagonal Phospholipid Neutralization   Order: [id number]   Status:  Final  result   Visible to patient:  Yes (Patient Portal) Next appt:  07/28/2017 at 10:00 AM in Outpatient Rehab (Ashley Holstein, )     Ref Range & Units 3wk ago 1yr ago    Hex Phosph Neut Test Negative  Negative  NegativeCM           BETA-2 GLYCOPROTEIN ANTIBODIES   Order: [id number]   Status:  Final result   Visible to patient:  Yes (Patient Portal) Next appt:  07/28/2017 at 10:00 AM in Outpatient Rehab (Ashley Holstein, ) Dx:  Anti-cardiolipin antibody positive    Ref Range & Units 3wk ago   Beta-2 Glyco 1 IgG <=20 SGU <9   Beta-2 Glyco 1 IgM <=20 SMU 21    Beta-2 Glyco 1 IgA <=20 WAYNE <9           ANTICOAGULANT (DRVVT)   Order: [id number]   Status:  Final result   Visible to patient:  Yes (Patient Portal) Next appt:  07/28/2017 at 10:00 AM in Outpatient Rehab (Ashley Holstein, ) Dx:  Anti-cardiolipin antibody positive     Ref Range & Units 3wk ago 1yr ago    DRVVT, Lupus Anticoagulant Negative  SEE COMMENT  SEE COMMENTCM   Comments: The DRVVT is weakly positive, but the hexagonal phospholipid is   negative.  This pattern often implies a weak or transient lupus   anticoagulant.  Recommend repeat testing in 12 weeks or more.   Interpreted by Crystal Cobos M.D.    Resulting Agency              [facility]   Order: [id number]   Status:  Final result   Visible to patient:  Yes (Patient Portal) Next appt:  07/28/2017 at 10:00 AM in Outpatient Rehab (Ashley Holstein, ) Dx:  Anti-cardiolipin antibody positive   Notes Recorded by SURINDER Paige, CNS on 6/27/2017 at 6:01 PM CDT  CRP remains elevated.     Ref Range & Units 3wk ago 1yr ago    CRP 0.0 - 8.2 mg/L 12.0  11.7            SSA/SSB negative  ACE normal=33    Notes Recorded by SURINDER Paige, CNS on 6/14/2017 at 5:20 PM CDT  ACE normal. CHEPE negative. Cardiolipin IgG negative, but IgM positive. SSA/SSB negative.     Ref Range & Units 1mo ago 1yr ago    APA Isotype IgG 0.00 - 14.99 GPL <9.40 <9.40CM   Comments: IgG Anticardiolipin Antibody:  "  15 - 20 GPL   Indeterminate   21 - 80 GPL   Low to Medium Positive   >80 GPL      High Positive   The following results were obtained with the [employer]   Lite CHEO IgG III YAMILEX. Cardiolipin IgG values obtained with   different manufacturers' assay methods may not be used   interchangeably. The magnitude of the reported IgG levels   cannot be correlated to an endpoint titer.     APA Isotype IgM 0.00 - 12.49 MPL 26.46  11.62CM   Comments: IgM Anticardiolipin Antibody   12.5 - 20 MPL Indeterminate   21 - 80 MPL   Low to Medium Positive   >80 MPL     High Positive   The following results were obtained with the [employer]   Lite CHEO IgM III YAMILEX. Cardiolipin IgM values obtained with   different manufacturers' assay methods may not be used   interchangeably. The magnitude of the reported IgM levels   cannot be correlated to an endpoint titer.    Resulting Agency  [facility]           CHEPE   Order: [id number]   Status:  Final result   Visible to patient:  Yes (Patient Portal) Next appt:  07/28/2017 at 10:00 AM in Outpatient Rehab (Ashley Holstein, ) Dx:  Gait disturbance; Abnormal brain MRI   Notes Recorded by SURINDER Paige, CNS on 6/14/2017 at 5:20 PM CDT  ACE normal. CHEPE negative. Cardiolipin IgG negative, but IgM positive. SSA/SSB negative.     Ref Range & Units 1mo ago 1yr ago    CHEPE Screen Negative <1:160  Negative <1:160 <1:160" class="[id number]"> Negative <1:160           S03=431  ESR=49 (elevated)      Diagnosis/Assessment/Plan:   1.) White matter changes on MRI in brain  2.) Progressive gait disturbance  3.) Abnormal CSF results--positive MS profile (high IgG index)  4.) Elevated Cardiolipin Ab and beta 2 glycoprotein  · Assessment: Ms. Murcia has had a possible demyelinating event in 1998 and a progressive decline in her walking over the past 3 years. She also had a positive MS profile on her lumbar puncture (elevated IgG index). I think it's possible that she does have an MS diagnosis, but " she also has an elevated cardiolipin IgM, an elevated beta 2 glycoprotein, and a history of 2 thromboembolic events. Anti phospholipid syndrome is also a possibility for her diagnosis. I doubt that she has both of these disease processes occurring simultaneously. The VEP was not conclusive diagnostically. I have reached out to rheumatology, and they recommend that she proceed with a hematology workup to further explore possibility of antiphospholipid syndrome. If their workup is negative, we may recommend treatment for multiple sclerosis. Patient has deferred additional MRIs because of claustrophobia. She continues to have a left drop foot, and she is in physical therapy. I will send an order for a left AFO to [facility] so she can be fit for custom AFO. I also recommend that she start taking Vitamin D 10,000 units daily for her very low Vitamin D level. Finally, I recommend Vitamin B 12 supplementation with injections for Vit B72=560. We stressed the importance of ruling out other potential causes for her symptoms and MRI presentation in order to choose the right treatment.       Over 50% of this 40 minute visit was spent in direct face to face counseling of the patient about her test results and plans for ongoing workup, including visit with hematology.  I will see her back in late August. Dr. Henley was present for the last 10 minutes of the visit and participated in the medical decision making. The patient and her daughter agree with the plan of care.      There are no diagnoses linked to this encounter.    Blanca Virgen, [facility] Center[facility]-BC, MSCN

## 2017-07-26 ENCOUNTER — PATIENT MESSAGE (OUTPATIENT)
Dept: NEUROLOGY | Facility: CLINIC | Age: 67
End: 2017-07-26

## 2017-07-28 ENCOUNTER — CLINICAL SUPPORT (OUTPATIENT)
Dept: REHABILITATION | Facility: HOSPITAL | Age: 67
End: 2017-07-28
Attending: CLINICAL NURSE SPECIALIST
Payer: MEDICARE

## 2017-07-28 DIAGNOSIS — R26.9 GAIT DISTURBANCE: ICD-10-CM

## 2017-07-28 PROCEDURE — 97110 THERAPEUTIC EXERCISES: CPT | Mod: PO

## 2017-07-28 PROCEDURE — 97112 NEUROMUSCULAR REEDUCATION: CPT | Mod: PO

## 2017-07-28 NOTE — PROGRESS NOTES
"                                                    Physical Therapy Progress Note     Name: Lupis Murcia  Clinic Number: [id number]  Diagnosis:   Encounter Diagnosis   Name Primary?    Gait disturbance      Physician: Blanca Virgen APRN,*  Treatment Orders: PT Eval and Treat  Past Medical History:   Diagnosis Date    Vitamin B12 deficiency        Precautions: standard      Evaluation Date: 7/21/17  Visit # authorized: 2/20  Authorization period: 12/31/17  Plan of care Expiration: 9/15/17  MD referral: y    G code visit: 2 of 10    Time In: 1005  Time Out: 1100  Total Treatment Time: 55 minutes      Missed visits:  no show(),  Cancellations()    Subjective     Pt reports: Mornings are the worst. She is stiff and moves very slowly.  Pain Scale:  No pian prior to therapy    Objective     Lupis received therapeutic exercises to develop strength, endurance and ROM for 15 minutes including:    Sit to stand x 10 21" height x 10 20" height (no UE use)  Standing Heel raises 2 x 10  Standing march 2 x 10  Standing hip abd x 10 B    Patient participated in neuromuscular re-education activities to improve: Balance, Coordination, Proprioception and Posture for 40 minutes. The following activities were included:     Static balance eyes open feet together 3 x 30s  Static balance Eyes closed feet together 3 x 30s  Modified tandem balance 3 x 30s B  Weaving in and out of cones 10' x 4  Step over small obstacle 5x (unable to perform on L)  Step ups onto foam x 10 B, requires assist with moving L LE      Written Home Exercises Provided: Continue with current HEP.  Pt demo good understanding of the education provided. Lupis Murcia demonstrated good return demonstration of activities.     Assessment       Pt required frequent rest breaks throughout tx session due to fatigue. Pt has tendency to get discouraged when she has difficulty performing an activity requiring motivation from PT to continue. She continues with " significant L LE weakness especially notable during gait with decreased hip and knee flexion with L LE in ER and tendency to drag behind her. Discussed possible trial with the [employer] device, and pt has an appt for an AFO in the coming two to three weeks.  Pt will continue to benefit from skilled outpatient physical therapy to address the deficits listed below in the problem list, provide pt/family education and to maximize pt's level of independence in the home and community environment.        GOALS:   Short term goals: 4 weeks, pt agrees to goals set.  1. Pt and caregivers will evaluate home for safety, including checking lighting and hazards on the floor such as rugs or cords and remove them.  2. Pt will be able to ambulate for 100' without requiring a rest break or reporting SOB in order to improve activity endurance.  3. Pt will tolerate 30 minutes of physical therapy treatment with 1 rest break to demonstrate overall improvement in CV endurance  4. Pt will decrease TUG score by > or = 3 seconds in order to demonstrate decreased risk for falls.      Long term goals: 8 weeks, pt agrees to goals set  1. Pt will ambulate 300 ft/communiuty distances with AD with no rest breaks with modified independence.  2. Pt will improve TUG score by > or = 5 seconds to decrease risk for falls in the home and community environment.  3. Pt will be able to safely negotiate 3 steps in order to improve her functional mobility with HR use and a step to pattern.  4. Pt will tolerate 50 minutes of physical therapy with < or = 1 rest breaks in order to demonstrate overall improved CV endurance.   5. Pt will be able to perform > or = 8 chair rises in order to demonstrate improved LE strength and endurance.       Plan   Continue with established Plan of Care towards PT goals.

## 2017-08-01 ENCOUNTER — DOCUMENTATION ONLY (OUTPATIENT)
Dept: NEUROLOGY | Facility: CLINIC | Age: 67
End: 2017-08-01

## 2017-08-09 ENCOUNTER — TELEPHONE (OUTPATIENT)
Dept: HEMATOLOGY/ONCOLOGY | Facility: CLINIC | Age: 67
End: 2017-08-09

## 2017-08-09 ENCOUNTER — INITIAL CONSULT (OUTPATIENT)
Dept: HEMATOLOGY/ONCOLOGY | Facility: CLINIC | Age: 67
End: 2017-08-09
Payer: MEDICARE

## 2017-08-09 ENCOUNTER — LAB VISIT (OUTPATIENT)
Dept: LAB | Facility: HOSPITAL | Age: 67
End: 2017-08-09
Attending: INTERNAL MEDICINE
Payer: MEDICARE

## 2017-08-09 ENCOUNTER — PATIENT MESSAGE (OUTPATIENT)
Dept: NEUROLOGY | Facility: CLINIC | Age: 67
End: 2017-08-09

## 2017-08-09 VITALS
HEIGHT: 62 IN | HEART RATE: 80 BPM | BODY MASS INDEX: 35.3 KG/M2 | DIASTOLIC BLOOD PRESSURE: 76 MMHG | SYSTOLIC BLOOD PRESSURE: 142 MMHG | WEIGHT: 191.81 LBS

## 2017-08-09 DIAGNOSIS — D64.9 ANEMIA, UNSPECIFIED TYPE: ICD-10-CM

## 2017-08-09 DIAGNOSIS — E87.6 HYPOKALEMIA: Primary | ICD-10-CM

## 2017-08-09 DIAGNOSIS — E53.8 B12 DEFICIENCY: ICD-10-CM

## 2017-08-09 DIAGNOSIS — R26.9 GAIT ABNORMALITY: ICD-10-CM

## 2017-08-09 DIAGNOSIS — D64.9 ANEMIA, UNSPECIFIED TYPE: Primary | ICD-10-CM

## 2017-08-09 DIAGNOSIS — E53.8 VITAMIN B 12 DEFICIENCY: ICD-10-CM

## 2017-08-09 LAB
ALBUMIN SERPL BCP-MCNC: 3.9 G/DL
ALP SERPL-CCNC: 72 U/L
ALT SERPL W/O P-5'-P-CCNC: 13 U/L
ANION GAP SERPL CALC-SCNC: 10 MMOL/L
AST SERPL-CCNC: 14 U/L
BASOPHILS # BLD AUTO: 0.01 K/UL
BASOPHILS NFR BLD: 0.2 %
BILIRUB SERPL-MCNC: 0.4 MG/DL
BUN SERPL-MCNC: 12 MG/DL
CALCIUM SERPL-MCNC: 9.7 MG/DL
CHLORIDE SERPL-SCNC: 105 MMOL/L
CO2 SERPL-SCNC: 27 MMOL/L
CREAT SERPL-MCNC: 0.7 MG/DL
DIFFERENTIAL METHOD: ABNORMAL
EOSINOPHIL # BLD AUTO: 0.1 K/UL
EOSINOPHIL NFR BLD: 1.2 %
ERYTHROCYTE [DISTWIDTH] IN BLOOD BY AUTOMATED COUNT: 13.2 %
EST. GFR  (AFRICAN AMERICAN): >60 ML/MIN/1.73 M^2
EST. GFR  (NON AFRICAN AMERICAN): >60 ML/MIN/1.73 M^2
FERRITIN SERPL-MCNC: 74 NG/ML
GLUCOSE SERPL-MCNC: 126 MG/DL
HAPTOGLOB SERPL-MCNC: 230 MG/DL
HCT VFR BLD AUTO: 36.1 %
HGB BLD-MCNC: 12.3 G/DL
LYMPHOCYTES # BLD AUTO: 1.6 K/UL
LYMPHOCYTES NFR BLD: 28.7 %
MCH RBC QN AUTO: 31.2 PG
MCHC RBC AUTO-ENTMCNC: 34.1 G/DL
MCV RBC AUTO: 92 FL
MONOCYTES # BLD AUTO: 0.4 K/UL
MONOCYTES NFR BLD: 7.7 %
NEUTROPHILS # BLD AUTO: 3.5 K/UL
NEUTROPHILS NFR BLD: 62 %
PLATELET # BLD AUTO: 231 K/UL
PMV BLD AUTO: 8.9 FL
POTASSIUM SERPL-SCNC: 3.3 MMOL/L
PROT SERPL-MCNC: 7.4 G/DL
RBC # BLD AUTO: 3.94 M/UL
RETICS/RBC NFR AUTO: 1.6 %
SODIUM SERPL-SCNC: 142 MMOL/L
WBC # BLD AUTO: 5.61 K/UL

## 2017-08-09 PROCEDURE — 80053 COMPREHEN METABOLIC PANEL: CPT

## 2017-08-09 PROCEDURE — 82728 ASSAY OF FERRITIN: CPT

## 2017-08-09 PROCEDURE — 99999 PR PBB SHADOW E&M-EST. PATIENT-LVL III: CPT | Mod: PBBFAC,,, | Performed by: INTERNAL MEDICINE

## 2017-08-09 PROCEDURE — 86255 FLUORESCENT ANTIBODY SCREEN: CPT | Mod: 91

## 2017-08-09 PROCEDURE — 99205 OFFICE O/P NEW HI 60 MIN: CPT | Mod: S$PBB,,, | Performed by: INTERNAL MEDICINE

## 2017-08-09 PROCEDURE — 36415 COLL VENOUS BLD VENIPUNCTURE: CPT

## 2017-08-09 PROCEDURE — 1159F MED LIST DOCD IN RCRD: CPT | Mod: ,,, | Performed by: INTERNAL MEDICINE

## 2017-08-09 PROCEDURE — 82747 ASSAY OF FOLIC ACID RBC: CPT

## 2017-08-09 PROCEDURE — 85025 COMPLETE CBC W/AUTO DIFF WBC: CPT

## 2017-08-09 PROCEDURE — 85045 AUTOMATED RETICULOCYTE COUNT: CPT

## 2017-08-09 PROCEDURE — 83010 ASSAY OF HAPTOGLOBIN QUANT: CPT

## 2017-08-09 RX ORDER — CYANOCOBALAMIN 1000 UG/ML
INJECTION, SOLUTION INTRAMUSCULAR; SUBCUTANEOUS
Qty: 10 ML | Refills: 1 | Status: ON HOLD | OUTPATIENT
Start: 2017-08-09 | End: 2022-06-02 | Stop reason: HOSPADM

## 2017-08-09 RX ORDER — POTASSIUM CHLORIDE 20 MEQ/1
20 TABLET, EXTENDED RELEASE ORAL DAILY
Qty: 30 TABLET | Refills: 11 | Status: SHIPPED | OUTPATIENT
Start: 2017-08-09 | End: 2018-08-09

## 2017-08-09 NOTE — TELEPHONE ENCOUNTER
Left message  Anemia not present now  K3.3--advised K dur 20 mEq daily--will send Rx.  Repeat K in 3-4 weeks

## 2017-08-09 NOTE — LETTER
August 9, 2017      Blanca Virgen, APRN, CNS  [address]  [facility] [address]           [place] [facility]  [address]  [facility] [address]  Phone: [phone]          Patient: Lupis Murcia   MR Number: [id number]   YOB: 1950   Date of Visit: 8/9/2017       Dear Blanca Virgen:    Thank you for referring Lupis Murcia to me for evaluation. Attached you will find relevant portions of my assessment and plan of care.    If you have questions, please do not hesitate to call me. I look forward to following Lupis Murcia along with you.    Sincerely,    Dimas Tirado Jr., MD    Enclosure  CC:  No Recipients    If you would like to receive this communication electronically, please contact [email or web address] or [phone] to request more information on [employer] access.    For providers and/or their staff who would like to refer a patient to [facility], please contact us through our one-stop-shop provider referral line, [facility], at [phone].    If you feel you have received this communication in error or would no longer like to receive these types of communications, please e-mail [email or web address]

## 2017-08-09 NOTE — PROGRESS NOTES
HISTORY OF PRESENT ILLNESS:  Mrs. Divina lloyd is a 67-year-old woman who is seen   in consultation from Blanca Virgen RN and Kiki Henley M.D.  She appears to   primarily be here in regard to the question of thrombophilia and specifically   the antiphospholipid antibody syndrome.  However, there are a number of other   hematologic issues.    In her late 20s, she experienced thrombophlebitis in a leg, she believes her   right leg.  There were two episodes of this and each was treated with a short   inpatient course of heparin.  She recalls some discomfort in the leg and some   redness.  She had these episodes shortly after discontinuing oral contraceptive   medication and the physicians treating her at that time attributed the thromboses   to the birth control pills.    She has had no spontaneous abortions and no premature births.    In , she delivered her last child by  section.  During the two   weeks prior to that delivery, she had been experiencing leg pain.  The pains   were attributed to the pregnancy itself.  She was discharged from [facility] four days after the  section, but she recalls having some   chest pain on the day of discharge.  The following day, the chest pain became   more severe and she had dyspnea.  [place] and was diagnosed with   pulmonary emboli.  She believes that a V/Q scan was performed.  She recalls that   a venogram was done and was negative.  She specifically remembers the venogram   because of venogram had been performed many years earlier when she had had the   thrombophlebitis.  She remained an inpatient in [facility] taking heparin   for 6-1/2 weeks and she was discharged home to take Coumadin for between 6 and   12 months.  She has had no further episodes of thromboembolic disease.  She has   developed some chronic swelling of both of her legs, but she has not had   cellulitis or other leg complications.    She had coagulation  studies performed in March 2016.  All of the studies   assessing her for inherited causes of thrombophilia were normal or negative.  A   lupus anticoagulant assay was reported to show a weakly positive dilute Josue   viper venom time and a negative hexagonal phospholipid.  The IgM   antiphospholipid antibody was very slightly elevated at 11.6 and the IgG   antibody was normal.  These studies were repeated in late June 2017 and again   the dilute Josue viper venom time was weakly positive and the hexagonal   phospholipid was negative.  The IgM antiphospholipid antibody was again positive   at 26 (0-12.5) and the IgM anti-beta 2 glycoprotein 1 antibody was very   slightly positive at 21 (less than 20).  IgA beta 2 glycoprotein 1 antibody, IgG   beta 2 glycoprotein 1 antibody and the IgG antiphospholipid antibody assays   were negative or normal.    In 1998, she suddenly developed weakness in her legs.  She was hospitalized for   two days at [facility] and was told that her vitamin B12 level was   very low.  She recalls a value of around 50.  Since that time, she has taken 1   mg of vitamin B12 IM each month.  Her leg weakness improved substantially for a   number of years.    She has had several B12 levels done at [facility] since March 2016 and three of the   four have been in the lower portion of the normal range.  She was recently   advised to take 1 mg of vitamin B12 parenterally daily for seven days.  I agree   with that advice, but I have also instructed her to then continue taking vitamin   B12 every two weeks instead of every month.  A homocysteine level and   methylmalonic acid level were normal.    She developed problems with her balance about three years ago and her legs   became progressively weaker.  She eventually started to use a walker, which she   can still use for short distances, but she must use a wheelchair for longer   distances.  She frequently experiences numbness in her feet and  legs.  She   has had no similar symptoms in her upper extremities, although she does have   right carpal tunnel syndrome.  She recalls a physician elsewhere giving her a   short course of prednisone for her weakness and there was some transient   improvement.  However, she developed more swelling in her legs.  An   ultrasound examination revealed no venous thrombotic disease.  The prednisone   was discontinued.    A recent blood count shows a very mild anemia with hemoglobin 11.8.    She has hypertension and she has had a tubal ligation.    She does not smoke and she does not drink alcohol.  She is  and lives   with one of her daughters.    Both her mother and father had lung cancer.  A brother had a glioblastoma   multiforme at age 39 and  from that disease.  No family members have had   venous thrombotic disease and no family members have had hematologic disorders,   except her daughter, who accompanies her today, had anemia as a child and still   has problems with iron deficiency anemia.    ADDITIONAL PAST HISTORY, SYSTEM REVIEW, SOCIAL HISTORY AND FAMILY HISTORY:  Have   been reviewed and updated in the electronic record.    PHYSICAL EXAMINATION:  GENERAL APPEARANCE:  An overweight woman, who cannot climb on to an examination   table.  She sat on a procedure table which raised her with a hydraulic lift.  EYES:  No jaundice or pallor.  EARS:  Clear canals and membranes.  NOSE:  Clear nares.  SINUSES:  No tenderness.  MOUTH AND THROAT:  Only a few teeth in poor repair remain.  No mucosal lesions.  NECK:  No masses or bruits.  No thyroid abnormalities.  LYMPH NODES:  No enlarged cervical, axillary or inguinal nodes.  CHEST AND LUNGS:  Normal respiratory effort.  Clear to auscultation and   percussion.  HEART:  Regular rate and rhythm without murmur or gallop.  ABDOMEN:  Soft without masses or tenderness.  No hepatosplenomegaly.  PERIPHERAL VASCULATURE:  Good pulses in the feet and ankles.  EXTREMITIES:   There is a modest lymphedema of both lower legs and there is some   pink discoloration and thickening of the skin of the pretibial regions.  No   lesions that are suspicious for skin cancer in the areas examined.  NEUROLOGIC:  She walks very slowly and stands very slowly.  Memory is good.  She   is fully oriented.    IMPRESSION:  1.  Vitamin B12 deficiency.  2.  History of thrombophlebitis and pulmonary emboli--with these episodes   occurring in situations where the risk of thrombosis is increased (i.e.,provoked).  3.  I cannot make a diagnosis of the antiphospholipid antibody syndrome in this   patient.  I cannot find evidence in the notes recorded in the chart that any of   her neurologic difficulty is felt to be due to thrombotic disease.  The   laboratory evidence for the antiphospholipid syndrome is very minimal and she   does not have a clear history of unprovoked thrombosis or spontaneous  abortions or pregnancy complications. One of these clinical events is required  for that diagnosis. Of course, there would be more evidence for the diagnosis   if one finds that her neurologic deterioration is due to vascular events.  If that  is the case, anticoagulation would seem indicated regardless of the coagulation   studies.  4.  Mild anemia.    RECOMMENDATIONS:  1.  I have already noted my advice about the use of vitamin B12.  2.  To evaluate her anemia further, blood will be drawn today for CBC, iron and   iron binding capacity, ferritin, soluble transferrin receptor, reticulocyte   count, and haptoglobin.    Mrs. Murcia and her daughter had many issues to discuss and questions to be   answered and most of her 80-minute appointment today was devoted to discussing   the above matters with her.    ADDENDUM: Her hemoglobin is now normal, as are iron studies. No evidence of  hemolysis. I find no abnormalities in her peripheral blood smear.  SHERI/AVRIL  dd: 08/09/2017 13:48:34 (CDT)  td: 08/10/2017 01:57:51 (CDT)  Doc ID    #[id number]  Job ID #[id number]    CC: Blanca Henley M.D., MPH

## 2017-08-10 ENCOUNTER — TELEPHONE (OUTPATIENT)
Dept: NEUROLOGY | Facility: CLINIC | Age: 67
End: 2017-08-10

## 2017-08-10 LAB — STFR SERPL-MCNC: 3.5 MG/L

## 2017-08-11 ENCOUNTER — TELEPHONE (OUTPATIENT)
Dept: NEUROLOGY | Facility: CLINIC | Age: 67
End: 2017-08-11

## 2017-08-11 ENCOUNTER — DOCUMENTATION ONLY (OUTPATIENT)
Dept: NEUROLOGY | Facility: CLINIC | Age: 67
End: 2017-08-11

## 2017-08-11 ENCOUNTER — PATIENT MESSAGE (OUTPATIENT)
Dept: NEUROLOGY | Facility: CLINIC | Age: 67
End: 2017-08-11

## 2017-08-11 LAB
ANCA AB TITR SER IF: NORMAL TITER
FOLATE RBC-MCNC: 828 NG/ML
P-ANCA TITR SER IF: NORMAL TITER

## 2017-08-17 ENCOUNTER — CLINICAL SUPPORT (OUTPATIENT)
Dept: REHABILITATION | Facility: HOSPITAL | Age: 67
End: 2017-08-17
Attending: CLINICAL NURSE SPECIALIST
Payer: MEDICARE

## 2017-08-17 DIAGNOSIS — R26.9 GAIT DISTURBANCE: ICD-10-CM

## 2017-08-17 PROCEDURE — 97110 THERAPEUTIC EXERCISES: CPT | Mod: PO

## 2017-08-17 PROCEDURE — 97112 NEUROMUSCULAR REEDUCATION: CPT | Mod: PO

## 2017-08-17 NOTE — PROGRESS NOTES
"                                                    Physical Therapy Progress Note     Name: Lupis Murcia  Clinic Number: [id number]  Diagnosis:   Encounter Diagnosis   Name Primary?    Gait disturbance      Physician: Blanca Virgen APRN,*  Treatment Orders: PT Eval and Treat  Past Medical History:   Diagnosis Date    Vitamin B12 deficiency        Precautions: standard      Evaluation Date: 7/21/17  Visit # authorized: 3/20  Authorization period: 12/31/17  Plan of care Expiration: 9/15/17  MD referral: y    G code visit: 3 of 10    Time In: 0200 pm  Time Out: 0255 pm  Total Treatment Time: 55 minutes      Missed visits:  no show(),  Cancellations()    Subjective     Pt reports: Mornings are the worst. She is stiff and moves very slowly.  Pain Scale:  No pian prior to therapy    Objective     Lupis received therapeutic exercises to develop strength, endurance and ROM for 20 minutes including:    Sit to stand x 10 21" height x 10 20" height   Standing Heel raises 2 x 10  Standing march 2 x 10  Standing hip abd x 10 B  Sitting chops in diagonal red ball x 10 ea direction    Patient participated in neuromuscular re-education activities to improve: Balance, Coordination, Proprioception and Posture for 35 minutes. The following activities were included:     Static balance eyes open feet together 3 x 30s  Static balance Eyes closed feet together 3 x 30s  Modified tandem balance 3 x 30s B  Sitting on dynadisc reaching in different directions x 10  Ball throw and catch static standing with CGA x 20  Weaving in and out of cones 10' x 4      Written Home Exercises Provided: Continue with current HEP.  Pt demo good understanding of the education provided. Lupis Murcia demonstrated good return demonstration of activities.     Assessment       Pt did well with static and dynamic balance activities added at today's session. She requires CGA to min A with all balance activities due to unsteadiness and occasional LOB. " Pt continues to require frequent rest breaks throughout tx session due to fatigue. Pt has tendency to get discouraged when she has difficulty performing an activity requiring motivation from PT to continue. She continues with significant L LE weakness especially notable during gait with decreased hip and knee flexion with L LE in ER and tendency to drag behind her. Discussed possible trial with the [employer] device, and trial with neuro PT upstairs..  Pt will continue to benefit from skilled outpatient physical therapy to address the deficits listed below in the problem list, provide pt/family education and to maximize pt's level of independence in the home and community environment.        GOALS:   Short term goals: 4 weeks, pt agrees to goals set.  1. Pt and caregivers will evaluate home for safety, including checking lighting and hazards on the floor such as rugs or cords and remove them.  2. Pt will be able to ambulate for 100' without requiring a rest break or reporting SOB in order to improve activity endurance.  3. Pt will tolerate 30 minutes of physical therapy treatment with 1 rest break to demonstrate overall improvement in CV endurance  4. Pt will decrease TUG score by > or = 3 seconds in order to demonstrate decreased risk for falls.      Long term goals: 8 weeks, pt agrees to goals set  1. Pt will ambulate 300 ft/communiuty distances with AD with no rest breaks with modified independence.  2. Pt will improve TUG score by > or = 5 seconds to decrease risk for falls in the home and community environment.  3. Pt will be able to safely negotiate 3 steps in order to improve her functional mobility with HR use and a step to pattern.  4. Pt will tolerate 50 minutes of physical therapy with < or = 1 rest breaks in order to demonstrate overall improved CV endurance.   5. Pt will be able to perform > or = 8 chair rises in order to demonstrate improved LE strength and endurance.       Plan   Continue with  established Plan of Care towards PT goals.

## 2017-08-21 ENCOUNTER — DOCUMENTATION ONLY (OUTPATIENT)
Dept: NEUROLOGY | Facility: CLINIC | Age: 67
End: 2017-08-21

## 2017-08-21 NOTE — PROGRESS NOTES
Faxed signed Written Order and Letter of Medical Necessity to [facility] at [phone] on 8/21/2017

## 2017-08-25 ENCOUNTER — OFFICE VISIT (OUTPATIENT)
Dept: NEUROLOGY | Facility: CLINIC | Age: 67
End: 2017-08-25
Payer: MEDICARE

## 2017-08-25 ENCOUNTER — TELEPHONE (OUTPATIENT)
Dept: NEUROLOGY | Facility: CLINIC | Age: 67
End: 2017-08-25

## 2017-08-25 VITALS
SYSTOLIC BLOOD PRESSURE: 141 MMHG | BODY MASS INDEX: 36.44 KG/M2 | HEART RATE: 73 BPM | HEIGHT: 62 IN | DIASTOLIC BLOOD PRESSURE: 86 MMHG | WEIGHT: 198 LBS

## 2017-08-25 DIAGNOSIS — R25.2 SPASTICITY: ICD-10-CM

## 2017-08-25 DIAGNOSIS — Z79.899 HIGH RISK MEDICATION USE: ICD-10-CM

## 2017-08-25 DIAGNOSIS — R26.9 GAIT DISTURBANCE: ICD-10-CM

## 2017-08-25 DIAGNOSIS — G35 MULTIPLE SCLEROSIS: Primary | ICD-10-CM

## 2017-08-25 DIAGNOSIS — Z29.89 PROPHYLACTIC IMMUNOTHERAPY: ICD-10-CM

## 2017-08-25 DIAGNOSIS — Z71.89 COUNSELING REGARDING GOALS OF CARE: ICD-10-CM

## 2017-08-25 PROCEDURE — 1159F MED LIST DOCD IN RCRD: CPT | Mod: ,,, | Performed by: CLINICAL NURSE SPECIALIST

## 2017-08-25 PROCEDURE — 1126F AMNT PAIN NOTED NONE PRSNT: CPT | Mod: ,,, | Performed by: CLINICAL NURSE SPECIALIST

## 2017-08-25 PROCEDURE — 99999 PR PBB SHADOW E&M-EST. PATIENT-LVL III: CPT | Mod: PBBFAC,,, | Performed by: CLINICAL NURSE SPECIALIST

## 2017-08-25 PROCEDURE — 99214 OFFICE O/P EST MOD 30 MIN: CPT | Mod: S$PBB,,, | Performed by: CLINICAL NURSE SPECIALIST

## 2017-08-25 PROCEDURE — 99213 OFFICE O/P EST LOW 20 MIN: CPT | Mod: PBBFAC | Performed by: CLINICAL NURSE SPECIALIST

## 2017-08-25 NOTE — PROGRESS NOTES
"Subjective:       Patient ID: Lupis Murcia is a 67 y.o. female who presents today for a routine clinic visit. She was last seen on 7/24/17. She is accompanied by her daughter. The history has been provided by the patient and her daughter.      HPI:  · Taking vitamin D3 as recommended? Yes -  Dose: 1500 units   Since last visit, she saw Dr. Tirado in hematology. His note reads " I cannot make a diagnosis of the antiphospholipid antibody syndrome in this   patient.  I cannot find evidence in the notes recorded in the chart that any of her neurologic difficulty is felt to be due to thrombotic disease. The laboratory evidence for the antiphospholipid syndrome is very minimal and she does not have a clear history of unprovoked thrombosis or spontaneous abortions or pregnancy complications. One of these clinical events is required for that diagnosis. Of course, there would be more evidence for the diagnosis if one finds that her neurologic deterioration is due to vascular events.  If that  is the case, anticoagulation would seem indicated regardless of the coagulation."       She is experiencing stiffness after sitting too long and when she stands up to walk. She also gets muscle spasms in her feet.   SOCIAL HISTORY  Social History   Substance Use Topics    Smoking status: Never Smoker    Smokeless tobacco: Never Used    Alcohol use No     Living arrangements - the patient lives with her daughter.   Employment: unemployed         Objective:        1. 25 foot timed walk: 29.56 seconds today with walker; was 33 seconds at last visit with walker     Neurologic Exam      Remainder of neuro exam deferred today.         Labs:     Lab Results   Component Value Date    [id number] 5 (L) 06/12/2017       Diagnosis/Assessment/Plan:    1. Multiple Sclerosis  · Assessment: Ms. Murcia had a demyelinating event in 1998 and a progressive decline in her walking over the past 3 years. She also had a positive MS profile on her " lumbar puncture (elevated IgG index). She has abnormal brain MRI that is suggestive of MS (multiple periventricular lesions) and possible cervical spine lesion. As such, she meets criteria for multiple sclerosis.   She has seen hematology for possible antiphospholipid syndrome, and the hematologist did not feel that she met criteria for APS. Lab mimics were negative, except for elevated ESR and CRP, which are nonspecific. Since we have ruled out other conditions, we have decided to start treatment for multiple sclerosis. We discussed the options for disease modifying therapy, but ultimately decided on Aubagio. I think this is a good option for her since she is well past childbearing years, and I think she has more chance of some improvement on this medication than on first line injectables. However, we discussed expectations of disease modifying therapy in MS, which is to prevent disease progression and further disability. She does have degenerative changes in the spine, which may be contributing to some of her walking impairment. I recommend that she continue physical therapy, as her walk time has improved since she has started therapy.   · Imaging: MRI brain and cervical spine in March 2018 with oral Ativan and wide bore machine   · Disease Modifying Therapies: As above, we will start Aubagio. We discussed the mechanism of action of the medication, potential side effects and risks (elevated liver enzymes, stomach upset, hair thinning), and requirements for frequent blood draws (CBC and LFTs monthly for 6 months, then every 3 months thereafter). Patient verbalized understanding and is agreeable to this. We will get baseline CBC, LFT, and Quantiferon next week. Aubagio literature provided to patient.     2. MS Symptom Assessment / Management  · Spasticity: Increase baclofen to 10mg in the morning and 15mg at bedtime.   · Gait Disturbance: Continue physical therapy.     Over 50% of this 30 minute visit was spent in  direct face to face counseling of the patient about MS, the plan for immune therapy, and the management of her symptoms. The patient and her daughter agree with the plan of care. She will follow up with Dr. Henley in December.         There are no diagnoses linked to this encounter.

## 2017-08-27 ENCOUNTER — PATIENT MESSAGE (OUTPATIENT)
Dept: NEUROLOGY | Facility: CLINIC | Age: 67
End: 2017-08-27

## 2017-09-05 ENCOUNTER — LAB VISIT (OUTPATIENT)
Dept: LAB | Facility: HOSPITAL | Age: 67
End: 2017-09-05
Attending: CLINICAL NURSE SPECIALIST
Payer: MEDICARE

## 2017-09-05 DIAGNOSIS — Z79.899 HIGH RISK MEDICATION USE: ICD-10-CM

## 2017-09-05 DIAGNOSIS — G35 MULTIPLE SCLEROSIS: ICD-10-CM

## 2017-09-05 PROCEDURE — 86480 TB TEST CELL IMMUN MEASURE: CPT

## 2017-09-05 PROCEDURE — 36415 COLL VENOUS BLD VENIPUNCTURE: CPT

## 2017-09-07 LAB
MITOGEN NIL: >10 IU/ML
NIL: 0.03 IU/ML
TB ANTIGEN NIL: 0.05 IU/ML
TB ANTIGEN: 0.07 IU/ML
TB GOLD: NEGATIVE

## 2017-09-08 ENCOUNTER — DOCUMENTATION ONLY (OUTPATIENT)
Dept: NEUROLOGY | Facility: CLINIC | Age: 67
End: 2017-09-08

## 2017-09-13 ENCOUNTER — DOCUMENTATION ONLY (OUTPATIENT)
Dept: NEUROLOGY | Facility: CLINIC | Age: 67
End: 2017-09-13

## 2017-09-13 NOTE — PROGRESS NOTES
Fax received from [employer]. Loren DE JESUS approved from 8/14/17-9/13/18 with case ID [id number].

## 2017-09-26 ENCOUNTER — CLINICAL SUPPORT (OUTPATIENT)
Dept: REHABILITATION | Facility: HOSPITAL | Age: 67
End: 2017-09-26
Attending: CLINICAL NURSE SPECIALIST
Payer: MEDICARE

## 2017-09-26 DIAGNOSIS — R26.9 GAIT DISTURBANCE: ICD-10-CM

## 2017-09-26 PROCEDURE — 97116 GAIT TRAINING THERAPY: CPT | Mod: PO | Performed by: PHYSICAL THERAPIST

## 2017-09-26 NOTE — PROGRESS NOTES
"                                                    Physical Therapy Progress Note     Name: Lupis Murcia  Clinic Number: [id number]  Medical diagnosis:   R90.89 (ICD-10-CM) - Abnormal brain MRI   R26.9 (ICD-10-CM) - Gait disturbance     Encounter Diagnosis:   1. Gait disturbance           Physician: Blanca Virgen APRN,*  Treatment Orders: PT Eval and Treat  Past Medical History:   Diagnosis Date    Vitamin B12 deficiency        Precautions: standard    Evaluation Date: 7/21/17  Visit # authorized: 4/20  Authorization period: 12/31/17  Plan of care Expiration: 11/8/17  MD referral: y    G code visit: 4 of 10    Functional Limitations Reports - G Codes  Category: mobility   Tool: TUG  Score: 1'8"    G codes 4/10    eval CN-CN             Subjective     Pt reports: I was just diagnosed with MS  Pain Scale:  denies    Objective     Lupis received gait training to develop strength, endurance, ROM, and trial DME for 40 minutes including:    Left DF= 3/5    Left DF= -8 degrees    TUG with RW= 35 sec- left knee hyperextension and increased flexion at left hip  TUG with Bioness with RW- 29 sec with no knee hyperextension    NP today:   Sit to stand x 10 21" height x 10 20" height   Standing Heel raises 2 x 10  Standing march 2 x 10  Standing hip abd x 10 B  Sitting chops in diagonal red ball x 10 ea direction    Patient participated in neuromuscular re-education activities to improve: Balance, Coordination, Proprioception and Posture for 0 minutes. The following activities were included:   Static balance eyes open feet together 3 x 30s  Static balance Eyes closed feet together 3 x 30s  Modified tandem balance 3 x 30s B  Sitting on dynadisc reaching in different directions x 10  Ball throw and catch static standing with CGA x 20  Weaving in and out of cones 10' x 4      Written Home Exercises Provided: Continue with current HEP.  Pt demo good understanding of the education provided. Lupis Murcia demonstrated " good return demonstration of activities.     Assessment   Lupis tolerated treatment well. Pt trailed Bioness L300 on left to address foot drop. Prior to Bioness, pt demonstrated left knee hyperextension in standing with increased left hip flexion. With use of Bioness L300 on left, pt demonstrated improved left foot clearance and decreased knee hyperextension. Left DF appeared to fatigue quickly after the device was adjusted for the pt. Pt was able to demonstrate a 6 sec improvement in TUG time with use of Bioness L300. Pt's DF ROM on left is limited also affecting left foot clearance.  Pt is a good candidate for use of L300 to improve gait. Pt will continue to benefit from skilled outpatient physical therapy to address the deficits listed below in the problem list, provide pt/family education and to maximize pt's level of independence in the home and community environment. POC extended x 8 weeks to allow pt to utilize Bioness L300 and participate in PT for education in HEP.        GOALS:   Short term goals: 4 weeks, pt agrees to goals set.  1. Pt and caregivers will evaluate home for safety, including checking lighting and hazards on the floor such as rugs or cords and remove them. Ongoing- pt reports attempting to remove trip hazards  2. Pt will be able to ambulate for 100' without requiring a rest break or reporting SOB in order to improve activity endurance. NT formally  3. Pt will tolerate 30 minutes of physical therapy treatment with 1 rest break to demonstrate overall improvement in CV endurance NT formally  4. Pt will decrease TUG score by > or = 3 seconds in order to demonstrate decreased risk for falls.  Met- 29-35 sec with RW (1st score with Bioness L300)      Long term goals: 8 weeks, pt agrees to goals set  1. Pt will ambulate 300 ft/communiuty distances with AD with no rest breaks with modified independence.  2. Pt will improve TUG score by > or = 5 seconds to decrease risk for falls in the home and  community environment. Met- see STG 4   Revised 9/26/17: pt will perform TUG with Bioness L300 and RW in <20 sec to demonstrate improved independence with ambulation and decreased fall risk.   3. Pt will be able to safely negotiate 3 steps in order to improve her functional mobility with HR use and a step to pattern. NT  4. Pt will tolerate 50 minutes of physical therapy with < or = 1 rest breaks in order to demonstrate overall improved CV endurance. NT  5. Pt will be able to perform > or = 8 chair rises in order to demonstrate improved LE strength and endurance. NT      Plan   Continue with established Plan of Care towards PT goals.

## 2017-09-27 ENCOUNTER — TELEPHONE (OUTPATIENT)
Dept: NEUROLOGY | Facility: CLINIC | Age: 67
End: 2017-09-27

## 2017-09-27 NOTE — TELEPHONE ENCOUNTER
Call received from [facility]. States they are unable to reach pt regarding starting Aubagio.    VM left on both listed numbers for pt to call this office back.

## 2017-09-28 ENCOUNTER — TELEPHONE (OUTPATIENT)
Dept: OBSTETRICS AND GYNECOLOGY | Facility: CLINIC | Age: 67
End: 2017-09-28

## 2017-09-28 NOTE — TELEPHONE ENCOUNTER
Call received from pt. She states that she has decided to hold off on DMT at this time. We had a lengthy discussion about risks of not taking DMT. She states she is more worried about taking MS medication than she is regarding what can happen to her without them. She was very pleasant during this call and plans to keep her f/u appt in December and discuss more at that time.

## 2017-09-28 NOTE — TELEPHONE ENCOUNTER
----- Message from Marian Ibarra sent at 9/28/2017 12:13 PM CDT -----  Contact: Self/ [phone] or [phone]  Patient called and asked what type of beds are used in the appointment rooms.     Please call and advise.

## 2017-09-28 NOTE — TELEPHONE ENCOUNTER
----- Message from Nelsy Montoya sent at 9/28/2017  1:28 PM CDT -----  Contact: [phone]  Patient is returning your call

## 2017-10-04 ENCOUNTER — DOCUMENTATION ONLY (OUTPATIENT)
Dept: REHABILITATION | Facility: HOSPITAL | Age: 67
End: 2017-10-04

## 2017-10-04 ENCOUNTER — CLINICAL SUPPORT (OUTPATIENT)
Dept: REHABILITATION | Facility: HOSPITAL | Age: 67
End: 2017-10-04
Attending: CLINICAL NURSE SPECIALIST
Payer: MEDICARE

## 2017-10-04 DIAGNOSIS — R26.9 GAIT DISTURBANCE: ICD-10-CM

## 2017-10-04 PROCEDURE — 97112 NEUROMUSCULAR REEDUCATION: CPT | Mod: PO

## 2017-10-04 PROCEDURE — 97110 THERAPEUTIC EXERCISES: CPT | Mod: PO

## 2017-10-04 NOTE — PROGRESS NOTES
I, Radha Luz, MARTT, met with Mariann Barrett PTA to discuss this pt's POC. Pt recently diagnosed with MS. Perform core and LE strengthening. Pt demonstrated good response to Bioness L300, can use as able for gait. Pt demonstrated fast neural fatigue with Bioness. Also address DF ROM. Have pt schedule x 4 weeks.    Radha Luz DPT  10/4/2017      Face to face consultation with supervision PT held on 10/04/2017    Mariann Barrett PTA

## 2017-10-04 NOTE — PROGRESS NOTES
"                                                    Physical Therapy Progress Note     Name: Lupis Murcia  Clinic Number: [id number]  Medical diagnosis:   R90.89 (ICD-10-CM) - Abnormal brain MRI   R26.9 (ICD-10-CM) - Gait disturbance     Encounter Diagnosis:   1. Gait disturbance           Physician: Blanca Virgen APRN,*  Treatment Orders: PT Eval and Treat  Past Medical History:   Diagnosis Date    Vitamin B12 deficiency        Precautions: standard    Evaluation Date: 7/21/17  Visit # authorized: 5/20  Authorization period: 12/31/17  Plan of care Expiration: 11/8/17  MD referral: y    G code visit: 5 of 10    Functional Limitations Reports - G Codes  Category: mobility   Tool: TUG  Score: 1'8"    G codes 5/10    eval CN-CN             Subjective     Pt reports: " Ya know I was just diagnosed with MS, it took them 3 years of tests and stuff.  My whole Left side is effected."   Pain Scale:  denies    Objective     Lupis received gait training to develop strength, endurance, ROM, and trial DME for 0 minutes including:      Patient participated in neuromuscular re-education activities to improve: Balance, Coordination, Proprioception and Posture for 35 minutes. The following activities were included:   Bioness L 300 donned on LLE, calf cuff only  Gait around the gym and up/down hallway consisting of 206 total steps.    Sit to supine with Min A to manage B LE  Supine to sit with Min A to manage trunk.      Therapeutic exercises to increase strength and endurance x 10 min including:   Supine:   X 30 reps of bridges with 5 sec hold.  Min A to control LLE   X 30 reps of LLE AAROM heel slides.      Written Home Exercises Provided: Continue with current HEP.  Pt demo good understanding of the education provided. Lupis Murcia demonstrated good return demonstration of activities.     Assessment   Lupis tolerated treatment session fairly well with focus on gait with Bioness L 300 and core and L LE strengthening " exercises.  Pt is very distractible and very verbose making it difficult for pt to concentrate on task at hand.  During gait trials pt would start a conversation and then begin dragging her L LE more.  Pt reports some nervousness and this was causing her difficulty during tx session as well.  Bioness L 300 assisting with increased DF, with quick fatigue.  Had a difficult time with bed mobility today and will practice bed mobility next tx session.       GOALS:   Short term goals: 4 weeks, pt agrees to goals set.  1. Pt and caregivers will evaluate home for safety, including checking lighting and hazards on the floor such as rugs or cords and remove them. Ongoing- pt reports attempting to remove trip hazards  2. Pt will be able to ambulate for 100' without requiring a rest break or reporting SOB in order to improve activity endurance. NT formally  3. Pt will tolerate 30 minutes of physical therapy treatment with 1 rest break to demonstrate overall improvement in CV endurance NT formally  4. Pt will decrease TUG score by > or = 3 seconds in order to demonstrate decreased risk for falls.  Met- 29-35 sec with RW (1st score with Bioness L300)      Long term goals: 8 weeks, pt agrees to goals set  1. Pt will ambulate 300 ft/communiuty distances with AD with no rest breaks with modified independence.  2. Pt will improve TUG score by > or = 5 seconds to decrease risk for falls in the home and community environment. Met- see STG 4   Revised 9/26/17: pt will perform TUG with Bioness L300 and RW in <20 sec to demonstrate improved independence with ambulation and decreased fall risk.   3. Pt will be able to safely negotiate 3 steps in order to improve her functional mobility with HR use and a step to pattern. NT  4. Pt will tolerate 50 minutes of physical therapy with < or = 1 rest breaks in order to demonstrate overall improved CV endurance. NT  5. Pt will be able to perform > or = 8 chair rises in order to demonstrate improved  LE strength and endurance. NT      Plan   Continue with established Plan of Care towards PT goals.

## 2017-11-06 ENCOUNTER — CLINICAL SUPPORT (OUTPATIENT)
Dept: REHABILITATION | Facility: HOSPITAL | Age: 67
End: 2017-11-06
Attending: CLINICAL NURSE SPECIALIST
Payer: MEDICARE

## 2017-11-06 DIAGNOSIS — R26.9 GAIT DISTURBANCE: ICD-10-CM

## 2017-11-06 PROCEDURE — 97116 GAIT TRAINING THERAPY: CPT | Mod: PO | Performed by: PHYSICAL THERAPIST

## 2017-11-06 PROCEDURE — G8979 MOBILITY GOAL STATUS: HCPCS | Mod: CK,PO | Performed by: PHYSICAL THERAPIST

## 2017-11-06 PROCEDURE — G8978 MOBILITY CURRENT STATUS: HCPCS | Mod: CL,PO | Performed by: PHYSICAL THERAPIST

## 2017-11-06 PROCEDURE — 97110 THERAPEUTIC EXERCISES: CPT | Mod: PO | Performed by: PHYSICAL THERAPIST

## 2017-11-07 NOTE — PLAN OF CARE
"                                                    Physical Therapy Progress Note     Name: Lupis Murcia  Clinic Number: [id number]  Medical diagnosis:   R90.89 (ICD-10-CM) - Abnormal brain MRI   R26.9 (ICD-10-CM) - Gait disturbance     Encounter Diagnosis:   1. Gait disturbance           Physician: Blanca Virgen APRN,*  Treatment Orders: PT Eval and Treat  Past Medical History:   Diagnosis Date    Vitamin B12 deficiency        Precautions: standard    Evaluation Date: 7/21/17  Visit # authorized: 6/20  Authorization period: 12/31/17  Plan of care Expiration: 1/3/18      Functional Limitations Reports - G Codes  Category: mobility   Tool: TUG  Score: 37"    G codes 7/10    eval CN-CN   11/6/17 CL-CK         Subjective     Pt reports: no new complaints.  Pain Scale:  9-10/10 LBP    Objective     Lupis received gait training to develop strength, endurance, ROM, and trial DME for 15 minutes including:    TUG with RW= 36 +37 sec  Gait training with RW in and out of session - 130' x 2 with CGA- supervision- max VC not to push RW too far ahead      Therapeutic exercises to increase strength and endurance x 30 min including:   Sit to supine with Min A to manage B LE  Supine to sit with Min A to manage trunk.      30 sec sit<>stand= 8x    Sitting: sitting left HS curls 2 x 10, AAROM   L LAQ 2 x 10, AAROM   L DF with knee ext AROM ~30x   Attempted hip abd- unable to perform without pelvic rotation  Supine: HSS, hip rotation PROM, DF PROM   Pelvic tilts 20 x 5 sec    NP today:    X 30 reps of bridges with 5 sec hold.  Min A to control LLE   X 30 reps of LLE AAROM heel slides.      Written Home Exercises Provided: Continue with current HEP.  Pt demo good understanding of the education provided. Lupis Murcia demonstrated good return demonstration of activities.     Assessment   Assessment period: 10/4/2017- 11/6/17. Lupis tolerated treatment session well. Pt only participated in 1 treatment session due to " transportation (pt's caregiver works full time). Pt requires a left AFO or a Bioness to address left foot drop. Pt reports she has received an AFO, but did not wear to therapy. Pt described a carbon fiber AFO, but will bring to next session for PT observation of gait. Pt is consistently using RW for ambulation, but is unsafe. Pt pushes RW too far forward causing increased trunk and hip flexion in standing. Pt demonstrates improvement in chair rises, sit<>stand, and stair negotiation. Pt is considered a high fall risk per TUG. PT discussed improving attendance to address goals and recommend proper orthotics. Pt can benefit from continued skilled PT to address motor control, strength, ROM, and balance to improve gait ability and overall safety. Pt's progress is limited by attendance. Pt demonstrates a 73% average level of impairment for mobility, pt is anticipated to improve impairment level to 48% (gcode adjusted to demonstrate progress) by d/c. POC extended x 8 weeks to address mobility.      GOALS:   Status as of 11/6/17:   Short term goals: 4 weeks, pt agrees to goals set.  1. Pt and caregivers will evaluate home for safety, including checking lighting and hazards on the floor such as rugs or cords and remove them. met  2. Pt will be able to ambulate for 100' without requiring a rest break or reporting SOB in order to improve activity endurance. Improving- pt can ambulate ~150' with RW and slow speed  3. Pt will tolerate 30 minutes of physical therapy treatment with 1 rest break to demonstrate overall improvement in CV endurance improving- pt requires breaks after ambulating 130-140 ft  4. Pt will decrease TUG score by > or = 3 seconds in order to demonstrate decreased risk for falls.  Met previously-     Long term goals: 8 weeks, pt agrees to goals set  1. Pt will ambulate 300 ft/communiuty distances with AD with no rest breaks with modified independence. See STG 2  2. Pt will improve TUG score by > or = 5  seconds to decrease risk for falls in the home and community environment. Met previously.    Revised 9/26/17: pt will perform TUG with Bioness L300 and RW in <20 sec to demonstrate improved independence with ambulation and decreased fall risk. Improved- 37 sec with RW, no Bioness  3. Pt will be able to safely negotiate 3 steps in order to improve her functional mobility with HR use and a step to pattern. Improved- 4 steps, step to with WILFREDO HR and CGA, max VC  4. Pt will tolerate 50 minutes of physical therapy with < or = 1 rest breaks in order to demonstrate overall improved CV endurance. See STG 3  5. Pt will be able to perform > or = 8 chair rises in order to demonstrate improved LE strength and endurance. Met (11/6/17)- 8x with BUE support      Plan   Continue with established Plan of Care towards PT goals.      Radha Luz DPT  11/6/2017

## 2017-11-15 ENCOUNTER — TELEPHONE (OUTPATIENT)
Dept: NEUROLOGY | Facility: CLINIC | Age: 67
End: 2017-11-15

## 2017-11-15 NOTE — TELEPHONE ENCOUNTER
----- Message from Nilesh Carlson sent at 11/15/2017  3:48 PM CST -----  Contact: Nancy arriola/ Royer Lozoya @ [phone]   Caller is calling to get an update on th medical necessity form, pls call

## 2017-11-28 ENCOUNTER — CLINICAL SUPPORT (OUTPATIENT)
Dept: REHABILITATION | Facility: HOSPITAL | Age: 67
End: 2017-11-28
Attending: CLINICAL NURSE SPECIALIST
Payer: MEDICARE

## 2017-11-28 DIAGNOSIS — R26.9 GAIT DISTURBANCE: ICD-10-CM

## 2017-11-28 PROCEDURE — 97116 GAIT TRAINING THERAPY: CPT | Mod: PO | Performed by: PHYSICAL THERAPIST

## 2017-11-28 PROCEDURE — 97110 THERAPEUTIC EXERCISES: CPT | Mod: PO | Performed by: PHYSICAL THERAPIST

## 2017-11-28 NOTE — PROGRESS NOTES
"  Physical Therapy Progress Note      Name: Lupis WHEATLEY [facility] Number: [id number]  Medical diagnosis:   R90.89 (ICD-10-CM) - Abnormal brain MRI   R26.9 (ICD-10-CM) - Gait disturbance      Encounter Diagnosis:   1. Gait disturbance               Physician: Blanca Virgen APRN,*  Treatment Orders: PT Eval and Treat       Past Medical History:   Diagnosis Date    Vitamin B12 deficiency           Precautions: standard     Evaluation Date: 7/21/17  Visit # authorized: 7/20  Authorization period: 12/31/17  Plan of care Expiration: 1/3/18        Functional Limitations Reports - G Codes  Category: mobility   Tool: TUG  Score: 37"     G codes 8/10    eval CN-CN   11/6/17 CL-CK            Subjective      Pt reports: no new complaints.  Pain Scale:  grossly denies today, reports LBP with extended standing     Objective      Pt attended PT with RW    Lupis received gait training to develop strength, endurance, ROM, and trial DME for 23 minutes including:    Gait training with RW in and out of session - 130' x 2 with supervision- mod VC not to push RW too far ahead        Therapeutic exercises to increase strength and endurance x 30 min including:   Sit to supine with Min A to manage RLE  Supine to sit with Min A to manage trunk.       Sitting: sitting HS curls 2 x 10, AAROM Left   Sitting HSS 10 sec x 5    Supine: hip abd/add with towel to decrease friction 2 x 10, intermittent A with left   PROM: HSS, hip rotation, knee to chest, DF    Runner's stretch for DF- pt unable to perform with good technique    NP today:              L LAQ 2 x 10, AAROM              L DF with knee ext AROM ~30x              Attempted hip abd- unable to perform without pelvic rotation  Supine: HSS, hip rotation PROM, DF PROM              Pelvic tilts 20 x 5 sec     NP today:               X 30 reps of bridges with 5 sec hold.  Min A to control LLE              X 30 reps of LLE AAROM heel slides.       Written Home Exercises Provided: 11/28/17- " "supine hip abd/ add, sitting HS curls with foot slide, sitting HSS  Pt demo good understanding of the education provided. Lupis Murcia demonstrated good return demonstration of activities.      Assessment   Lupis tolerated treatment session well. Pt reports performing LAQ and practicing static standing balance at home. PT advance HEP to include self ROM and strengthening to improve standing and gait. Pt ambulated with a step to gait pattern upon arrival and a reciprocal gait pattern ~50-75% at conclusion of session. Pt reports that she does not currently have funding for obtaining [employer] L300 device for right foot drop. Pt reported that she has a carbon fiber AFO that "does not work" at home. PT requested pt bring in AFO for assessment. Pt can benefit from continued skilled PT to address motor control, strength, ROM, and balance to improve gait ability and overall safety. Pt's progress is limited by attendance.       GOALS:   Status as of 11/6/17:   Short term goals: 4 weeks, pt agrees to goals set.  1. Pt and caregivers will evaluate home for safety, including checking lighting and hazards on the floor such as rugs or cords and remove them. met  2. Pt will be able to ambulate for 100' without requiring a rest break or reporting SOB in order to improve activity endurance. Improving- pt can ambulate ~150' with RW and slow speed  3. Pt will tolerate 30 minutes of physical therapy treatment with 1 rest break to demonstrate overall improvement in CV endurance improving- pt requires breaks after ambulating 130-140 ft  4. Pt will decrease TUG score by > or = 3 seconds in order to demonstrate decreased risk for falls.  Met previously-      Long term goals: 8 weeks, pt agrees to goals set  1. Pt will ambulate 300 ft/communiuty distances with AD with no rest breaks with modified independence. See STG 2  2. Pt will improve TUG score by > or = 5 seconds to decrease risk for falls in the home and community environment. " Met previously.               Revised 9/26/17: pt will perform TUG with Bioness L300 and RW in <20 sec to demonstrate improved independence with ambulation and decreased fall risk. Improved- 37 sec with RW, no Bioness  3. Pt will be able to safely negotiate 3 steps in order to improve her functional mobility with HR use and a step to pattern. Improved- 4 steps, step to with WILFREDO HR and CGA, max VC  4. Pt will tolerate 50 minutes of physical therapy with < or = 1 rest breaks in order to demonstrate overall improved CV endurance. See STG 3  5. Pt will be able to perform > or = 8 chair rises in order to demonstrate improved LE strength and endurance. Met (11/6/17)- 8x with BUE support        Plan   Continue with established Plan of Care towards PT goals.        Radha Luz DPT  11/6/2017

## 2017-11-29 ENCOUNTER — TELEPHONE (OUTPATIENT)
Dept: NEUROLOGY | Facility: CLINIC | Age: 67
End: 2017-11-29

## 2017-11-29 NOTE — TELEPHONE ENCOUNTER
[facility]  With provider for review and signature.

## 2017-12-14 ENCOUNTER — DOCUMENTATION ONLY (OUTPATIENT)
Dept: REHABILITATION | Facility: HOSPITAL | Age: 67
End: 2017-12-14

## 2017-12-14 NOTE — PROGRESS NOTES
"Pt cancelled PT session. Stating she was "unable to make appointment. Pt has one more session scheduled. PT visits will not be extended due to poor attendance (~2x/month).      Radha Luz DPT  12/14/2017    "

## 2017-12-19 ENCOUNTER — OFFICE VISIT (OUTPATIENT)
Dept: NEUROLOGY | Facility: CLINIC | Age: 67
End: 2017-12-19
Payer: MEDICARE

## 2017-12-19 VITALS
DIASTOLIC BLOOD PRESSURE: 90 MMHG | HEIGHT: 62 IN | HEART RATE: 75 BPM | SYSTOLIC BLOOD PRESSURE: 141 MMHG | BODY MASS INDEX: 34.96 KG/M2 | WEIGHT: 190 LBS

## 2017-12-19 DIAGNOSIS — G89.29 CHRONIC LOW BACK PAIN, UNSPECIFIED BACK PAIN LATERALITY, WITH SCIATICA PRESENCE UNSPECIFIED: ICD-10-CM

## 2017-12-19 DIAGNOSIS — R26.9 GAIT DISTURBANCE: ICD-10-CM

## 2017-12-19 DIAGNOSIS — Z71.89 COUNSELING REGARDING GOALS OF CARE: ICD-10-CM

## 2017-12-19 DIAGNOSIS — G35 MS (MULTIPLE SCLEROSIS): Primary | ICD-10-CM

## 2017-12-19 DIAGNOSIS — M54.5 CHRONIC LOW BACK PAIN, UNSPECIFIED BACK PAIN LATERALITY, WITH SCIATICA PRESENCE UNSPECIFIED: ICD-10-CM

## 2017-12-19 PROCEDURE — 99999 PR PBB SHADOW E&M-EST. PATIENT-LVL III: CPT | Mod: PBBFAC,,, | Performed by: PSYCHIATRY & NEUROLOGY

## 2017-12-19 PROCEDURE — 99213 OFFICE O/P EST LOW 20 MIN: CPT | Mod: PBBFAC | Performed by: PSYCHIATRY & NEUROLOGY

## 2017-12-19 PROCEDURE — 99215 OFFICE O/P EST HI 40 MIN: CPT | Mod: S$PBB,,, | Performed by: PSYCHIATRY & NEUROLOGY

## 2017-12-19 NOTE — PROGRESS NOTES
Subjective:       Patient ID: Lupis Murcia is a 67 y.o. female who presents today for a routine clinic visit for MS.      MS HPI:  · DMT: none--she decided not to take Aubagio after all; never started  · Taking vitamin D3 as recommended? Yes -  Dose: 3,000 IU / day;    · Doing PT   · Takes baclofen periodically but it makes her dizzy at times;   · She continues to have some low back pain;   · She denies any other neurologic symptoms    SOCIAL HISTORY  Social History   Substance Use Topics    Smoking status: Never Smoker    Smokeless tobacco: Never Used    Alcohol use No     Living arrangements - the patient lives with their daughter.  Employment : disabled;         Objective:      25 foot timed walk: 32 seconds with rolling lightweight walker    Neurologic Exam    MENTAL STATUS: language is fluent, normal verbal comprehension, short-term and remote memory is intact, attention is normal, patient is alert and oriented x 3, fund of knowlege is appropriate by vocabulary.         MOTOR EXAM: Normal bulk and tone throughout UE and LE bilaterally. . Rapid sequential movements are slow in the left upper extremity.  Strength is 5/5 in all groups in the upper extremities and right lower extremity. Left lower extremity--iliopsoas 3/5; hamstring 3/5, ant tibial 3/5     REFLEXES: 2+ and symmetric throughout in all four extremities.      SENSORY EXAM: Decreased vibratory sense to bilateral lower extremities; intact to upper extremities     COORDINATION: Slow finger to nose with left hand     GAIT: Wide-based, slow, and unsteady. Romberg is unsteady.     Labs:     Lab Results   Component Value Date    THUVQYDE15MQ 5 (L) 06/12/2017     Lab Results   Component Value Date    WBC 5.19 09/05/2017    RBC 3.89 (L) 09/05/2017    HGB 12.1 09/05/2017    HCT 36.4 (L) 09/05/2017    MCV 94 09/05/2017    MCH 31.1 (H) 09/05/2017    MCHC 33.2 09/05/2017    RDW 13.0 09/05/2017     09/05/2017    MPV 9.4 09/05/2017    GRAN 2.4  09/05/2017    GRAN 46.3 09/05/2017    LYMPH 2.2 09/05/2017    LYMPH 41.8 09/05/2017    MONO 0.5 09/05/2017    MONO 9.6 09/05/2017    EOS 0.1 09/05/2017    BASO 0.01 09/05/2017    EOSINOPHIL 1.9 09/05/2017    BASOPHIL 0.2 09/05/2017     Sodium   Date Value Ref Range Status   08/09/2017 142 136 - 145 mmol/L Final     Potassium   Date Value Ref Range Status   08/09/2017 3.3 (L) 3.5 - 5.1 mmol/L Final     Chloride   Date Value Ref Range Status   08/09/2017 105 95 - 110 mmol/L Final     CO2   Date Value Ref Range Status   08/09/2017 27 23 - 29 mmol/L Final     Glucose   Date Value Ref Range Status   08/09/2017 126 (H) 70 - 110 mg/dL Final     BUN, Bld   Date Value Ref Range Status   08/09/2017 12 8 - 23 mg/dL Final     Creatinine   Date Value Ref Range Status   08/09/2017 0.7 0.5 - 1.4 mg/dL Final     Calcium   Date Value Ref Range Status   08/09/2017 9.7 8.7 - 10.5 mg/dL Final     Total Protein   Date Value Ref Range Status   09/05/2017 7.4 6.0 - 8.4 g/dL Final     Albumin   Date Value Ref Range Status   09/05/2017 4.0 3.5 - 5.2 g/dL Final     Total Bilirubin   Date Value Ref Range Status   09/05/2017 0.5 0.1 - 1.0 mg/dL Final     Comment:     For infants and newborns, interpretation of results should be based  on gestational age, weight and in agreement with clinical  observations.  Premature Infant recommended reference ranges:  Up to 24 hours.............<8.0 mg/dL  Up to 48 hours............<12.0 mg/dL  3-5 days..................<15.0 mg/dL  6-29 days.................<15.0 mg/dL       Alkaline Phosphatase   Date Value Ref Range Status   09/05/2017 78 55 - 135 U/L Final     AST   Date Value Ref Range Status   09/05/2017 12 10 - 40 U/L Final     ALT   Date Value Ref Range Status   09/05/2017 13 10 - 44 U/L Final     Anion Gap   Date Value Ref Range Status   08/09/2017 10 8 - 16 mmol/L Final     eGFR if    Date Value Ref Range Status   08/09/2017 >60.0 >60 mL/min/1.73 m^2 Final     eGFR if non African  American   Date Value Ref Range Status   08/09/2017 >60.0 >60 mL/min/1.73 m^2 Final     Comment:     Calculation used to obtain the estimated glomerular filtration  rate (eGFR) is the CKD-EPI equation. Since race is unknown   in our information system, the eGFR values for   -American and Non--American patients are given   for each creatinine result.         Diagnosis/Assessment/Plan:    1. Multiple Sclerosis  · Assessment: Pt is clinically stable; she defers all DMT; I have explained to her in detail that I think the risks of untreated MS (disability) outweigh the risks of the recommended DMT, and she expressed understanding of this, yet still defers DMT.   · Imaging: MRI brain planned March 2018  · Disease Modifying Therapies: as above    2. MS Symptom Assessment / Management  · No other changes to current regimen          3. Low back pain with sciatica--[facility]    Over 50% of this 40 minute visit was spent in direct face to face counseling of the patient about MS, DMT considerations, and MS symptom management.     Follow up with Blanca COLMENARES in March 2018 after MRI    MS (multiple sclerosis)  -     MRI Brain Without Contrast; Future; Expected date: 03/08/2018    Chronic low back pain, unspecified back pain laterality, with sciatica presence unspecified  -     Ambulatory Ref[facility]

## 2017-12-21 ENCOUNTER — CLINICAL SUPPORT (OUTPATIENT)
Dept: REHABILITATION | Facility: HOSPITAL | Age: 67
End: 2017-12-21
Attending: CLINICAL NURSE SPECIALIST
Payer: MEDICARE

## 2017-12-21 DIAGNOSIS — R26.9 GAIT DISTURBANCE: ICD-10-CM

## 2017-12-21 PROCEDURE — 97116 GAIT TRAINING THERAPY: CPT | Mod: PO | Performed by: PHYSICAL THERAPIST

## 2017-12-21 PROCEDURE — 97110 THERAPEUTIC EXERCISES: CPT | Mod: PO | Performed by: PHYSICAL THERAPIST

## 2017-12-26 ENCOUNTER — TELEPHONE (OUTPATIENT)
Dept: NEUROLOGY | Facility: CLINIC | Age: 67
End: 2017-12-26

## 2017-12-26 NOTE — TELEPHONE ENCOUNTER
----- Message from Kayode Buckner sent at 12/26/2017  3:44 PM CST -----  Contact: Peg w/  @ [phone] (VM can be left)  Peg is asking if they can close file for pt's Obagio since she hasn't ordered in a while. Pls call.

## 2017-12-28 NOTE — PLAN OF CARE
"  Physical Therapy Progress Note      Name: Lupis WHEATLEY [facility] Number: [id number]  Medical diagnosis:   R90.89 (ICD-10-CM) - Abnormal brain MRI   R26.9 (ICD-10-CM) - Gait disturbance      Encounter Diagnosis:   1. Gait disturbance               Physician: Blanca Virgen APRN,*  Treatment Orders: PT Eval and Treat       Past Medical History:   Diagnosis Date    Vitamin B12 deficiency           Precautions: standard     Evaluation Date: 7/21/17  Visit # authorized: 8/20  Authorization period: 12/31/17  Plan of care Expiration: 2/28/18        Functional Limitations Reports - G Codes  Category: mobility   Tool: TUG  Score: 37"     G codes 2/10    eval CN-CN   11/6/17 CL-CK            Subjective      Pt reports: no new complaints.  Pain Scale:  LBP, 8-9/10 currently. Pt denied LBP at conclusion of session     Objective      Pt attended PT with RW    Lupis received gait training to develop strength, endurance, ROM, and training with DME for 15 minutes including:    Gait training with RW into session - 130' with supervision- mod VC not to push RW too far ahead, to stand up straight. No rest break needed  TUG with RW= 36 sec + 44 sec       Therapeutic exercises to increase strength and endurance x 29 min including:   Recumbent stepper BUE/LE x 8 min    Sit to supine to right with Min A to manage LLE  Supine to sit with Min A to manage trunk.  VC for technique   Supine: HSS, hip rotation PROM, DF PROM              Pelvic tilts 20 x 5 sec   AAROM single knee to chest 3x   HSS   SLR with pelvic tilt 10x   LTR 10x   SKTC (assisted) 5x    NP today:   Sitting: sitting HS curls 2 x 10, AAROM Left   Sitting HSS 10 sec x 5  Supine:  hip rotation PROM, DF PROM          X 30 reps of bridges with 5 sec hold.  Min A to control LLE              X 30 reps of LLE AAROM heel slides.      hip abd/add with towel to decrease friction 2 x 10, intermittent A with left   L LAQ 2 x 10, AAROM              L DF with knee ext AROM ~30x       "        Attempted hip abd- unable to perform without pelvic rotation    Written Home Exercises Provided: 11/28/17- supine hip abd/ add, sitting HS curls with foot slide, sitting HSS  Pt demo good understanding of the education provided. Lupis Murcia demonstrated good return demonstration of activities.      Assessment   Assessment period: 11/28/17 to 12/21/17. Pt attended 2 PT sessions. Pt missed due to cancellations (transportation). Lupis tolerates treatment sessions well. Pt reports consistent use of RW for mobility. Pt continues to push RW too far forward and requires moderate to maximal verbal cues. Pt is not able to correct for distances needed to travel from room to room. This is likely increasing LBP. Upon this visit, pt reported LBP that resolved with use of hot pack, stretching and exercise. Pt was encouraged to increase participation in stretching and core strengthening exercises to address LBP.  Increased LLE extensor tone impaired mobility as pt became more fatigued. Pt demonstrates poor management of LLE during gait. The following gait deviations are present: increased flexion at hips with RW too far forward, left foot drop, poor WILFREDO hip ext in terminal stance, poor to no left knee flexion during swing, ER of LLE to advance, increased lateral lean to advance BLE, step to gait.  Pt can benefit from continued skilled PT to address motor control, strength, ROM, and balance to improve gait ability and overall safety. Pt's progress is limited by attendance.  POC extended x 8 weeks to continue to address mobility impairments to improve safety with mobility.     GOALS:   Status as of 12/21/17:   Short term goals: 4 weeks, pt agrees to goals set.  1. Pt and caregivers will evaluate home for safety, including checking lighting and hazards on the floor such as rugs or cords and remove them. Met previously  2. Pt will be able to ambulate for 100' without requiring a rest break or reporting SOB in order to  improve activity endurance. Met 12/21/17- pt can ambulate ~130 ft with RW without resting, pace is slow  3. Pt will tolerate 30 minutes of physical therapy treatment with 1 rest break to demonstrate overall improvement in CV endurance same- pt requires breaks after ambulating 130-140 ft  4. Pt will decrease TUG score by > or = 3 seconds in order to demonstrate decreased risk for falls.  Met previously-      Long term goals: 8 weeks, pt agrees to goals set  1. Pt will ambulate 300 ft/communiuty distances with AD with no rest breaks with modified independence. See STG 2  2. Pt will improve TUG score by > or = 5 seconds to decrease risk for falls in the home and community environment. Met previously.               Revised 9/26/17: pt will perform TUG with Bioness L300 and RW in <20 sec to demonstrate improved independence with ambulation and decreased fall risk. Varies- 40 sec (+3 sec) with RW, no Bioness  3. Pt will be able to safely negotiate 3 steps in order to improve her functional mobility with HR use and a step to pattern. NT today- 4 steps, step to with WILFREDO HR and CGA, max VC  4. Pt will tolerate 50 minutes of physical therapy with < or = 1 rest breaks in order to demonstrate overall improved CV endurance. See STG 3  5. Pt will be able to perform > or = 8 chair rises in order to demonstrate improved LE strength and endurance. Met (11/6/17)- 8x with BUE support        Plan   Continue with established Plan of Care towards PT goals.        Radha Luz, RICHARDSON  12/21/2017

## 2018-02-14 ENCOUNTER — TELEPHONE (OUTPATIENT)
Dept: NEUROLOGY | Facility: CLINIC | Age: 68
End: 2018-02-14

## 2018-02-14 NOTE — TELEPHONE ENCOUNTER
----- Message from Nilesh Carlson sent at 2/14/2018  3:24 PM CST -----  Contact: Patient @ [phone]  Patient is calling to r/s the 3-6 and 3-27 appt, pls call

## 2018-02-15 ENCOUNTER — TELEPHONE (OUTPATIENT)
Dept: NEUROLOGY | Facility: CLINIC | Age: 68
End: 2018-02-15

## 2018-02-15 NOTE — TELEPHONE ENCOUNTER
----- Message from Ban Pabon sent at 2/15/2018  1:06 PM CST -----  Contact: Pt  Pt is returning lucila in regards to appts and can be reached at [phone].    Thank you

## 2018-04-13 ENCOUNTER — LAB VISIT (OUTPATIENT)
Dept: LAB | Facility: HOSPITAL | Age: 68
End: 2018-04-13
Payer: MEDICARE

## 2018-04-13 ENCOUNTER — OFFICE VISIT (OUTPATIENT)
Dept: NEUROLOGY | Facility: CLINIC | Age: 68
End: 2018-04-13
Payer: MEDICARE

## 2018-04-13 VITALS
SYSTOLIC BLOOD PRESSURE: 148 MMHG | HEART RATE: 89 BPM | WEIGHT: 203.13 LBS | HEIGHT: 62 IN | BODY MASS INDEX: 37.38 KG/M2 | DIASTOLIC BLOOD PRESSURE: 81 MMHG

## 2018-04-13 DIAGNOSIS — E53.8 LOW SERUM VITAMIN B12: ICD-10-CM

## 2018-04-13 DIAGNOSIS — R26.9 ABNORMALITY OF GAIT AND MOBILITY: ICD-10-CM

## 2018-04-13 DIAGNOSIS — M62.838 MUSCLE SPASM: ICD-10-CM

## 2018-04-13 DIAGNOSIS — Z79.899 OTHER LONG TERM (CURRENT) DRUG THERAPY: ICD-10-CM

## 2018-04-13 DIAGNOSIS — G35 MULTIPLE SCLEROSIS: Primary | ICD-10-CM

## 2018-04-13 DIAGNOSIS — Z71.89 COUNSELING REGARDING GOALS OF CARE: ICD-10-CM

## 2018-04-13 DIAGNOSIS — E66.9 OBESITY, UNSPECIFIED CLASSIFICATION, UNSPECIFIED OBESITY TYPE, UNSPECIFIED WHETHER SERIOUS COMORBIDITY PRESENT: ICD-10-CM

## 2018-04-13 DIAGNOSIS — G35 MULTIPLE SCLEROSIS: ICD-10-CM

## 2018-04-13 DIAGNOSIS — M21.372 LEFT FOOT DROP: ICD-10-CM

## 2018-04-13 DIAGNOSIS — R79.89 LOW VITAMIN D LEVEL: ICD-10-CM

## 2018-04-13 LAB
25(OH)D3+25(OH)D2 SERPL-MCNC: 5 NG/ML
BASOPHILS # BLD AUTO: 0.01 K/UL
BASOPHILS NFR BLD: 0.2 %
DIFFERENTIAL METHOD: ABNORMAL
EOSINOPHIL # BLD AUTO: 0.1 K/UL
EOSINOPHIL NFR BLD: 2.3 %
ERYTHROCYTE [DISTWIDTH] IN BLOOD BY AUTOMATED COUNT: 12.6 %
HCT VFR BLD AUTO: 36.1 %
HGB BLD-MCNC: 12.1 G/DL
IMM GRANULOCYTES # BLD AUTO: 0.01 K/UL
IMM GRANULOCYTES NFR BLD AUTO: 0.2 %
LYMPHOCYTES # BLD AUTO: 2.1 K/UL
LYMPHOCYTES NFR BLD: 39.4 %
MCH RBC QN AUTO: 31.3 PG
MCHC RBC AUTO-ENTMCNC: 33.5 G/DL
MCV RBC AUTO: 93 FL
MONOCYTES # BLD AUTO: 0.5 K/UL
MONOCYTES NFR BLD: 9.2 %
NEUTROPHILS # BLD AUTO: 2.6 K/UL
NEUTROPHILS NFR BLD: 48.7 %
NRBC BLD-RTO: 0 /100 WBC
PLATELET # BLD AUTO: 201 K/UL
PMV BLD AUTO: 9.2 FL
RBC # BLD AUTO: 3.87 M/UL
VIT B12 SERPL-MCNC: 336 PG/ML
WBC # BLD AUTO: 5.33 K/UL

## 2018-04-13 PROCEDURE — 36415 COLL VENOUS BLD VENIPUNCTURE: CPT

## 2018-04-13 PROCEDURE — 82607 VITAMIN B-12: CPT

## 2018-04-13 PROCEDURE — 99215 OFFICE O/P EST HI 40 MIN: CPT | Mod: S$PBB,,, | Performed by: CLINICAL NURSE SPECIALIST

## 2018-04-13 PROCEDURE — 85025 COMPLETE CBC W/AUTO DIFF WBC: CPT

## 2018-04-13 PROCEDURE — 99999 PR PBB SHADOW E&M-EST. PATIENT-LVL III: CPT | Mod: PBBFAC,,, | Performed by: CLINICAL NURSE SPECIALIST

## 2018-04-13 PROCEDURE — 82306 VITAMIN D 25 HYDROXY: CPT

## 2018-04-13 PROCEDURE — 99213 OFFICE O/P EST LOW 20 MIN: CPT | Mod: PBBFAC | Performed by: CLINICAL NURSE SPECIALIST

## 2018-04-13 RX ORDER — BACLOFEN 10 MG/1
TABLET ORAL
Qty: 60 TABLET | Refills: 2 | Status: SHIPPED | OUTPATIENT
Start: 2018-04-13 | End: 2018-07-20 | Stop reason: SDUPTHER
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
## 2018-04-16 ENCOUNTER — PATIENT MESSAGE (OUTPATIENT)
Dept: NEUROLOGY | Facility: CLINIC | Age: 68
End: 2018-04-16

## 2018-04-18 ENCOUNTER — DOCUMENTATION ONLY (OUTPATIENT)
Dept: NEUROLOGY | Facility: CLINIC | Age: 68
End: 2018-04-18

## 2018-05-01 ENCOUNTER — TELEPHONE (OUTPATIENT)
Dept: NEUROLOGY | Facility: CLINIC | Age: 68
End: 2018-05-01

## 2018-05-01 NOTE — TELEPHONE ENCOUNTER
----- Message from Ban Pabon sent at 5/1/2018  3:02 PM CDT -----  Contact: Pt  Pt is calling to speak with nurse in regards to MRIs and can be reached at [phone].    Thank you

## 2018-05-11 ENCOUNTER — CLINICAL SUPPORT (OUTPATIENT)
Dept: REHABILITATION | Facility: HOSPITAL | Age: 68
End: 2018-05-11
Payer: MEDICARE

## 2018-05-11 DIAGNOSIS — M62.81 MUSCLE WEAKNESS OF LOWER EXTREMITY: ICD-10-CM

## 2018-05-11 DIAGNOSIS — Z74.09 IMPAIRED FUNCTIONAL MOBILITY, BALANCE, GAIT, AND ENDURANCE: ICD-10-CM

## 2018-05-11 DIAGNOSIS — Z91.81 AT HIGH RISK FOR FALLS: ICD-10-CM

## 2018-05-11 PROCEDURE — 97162 PT EVAL MOD COMPLEX 30 MIN: CPT | Mod: PO

## 2018-05-11 PROCEDURE — G8979 MOBILITY GOAL STATUS: HCPCS | Mod: CL,PO

## 2018-05-11 PROCEDURE — G8978 MOBILITY CURRENT STATUS: HCPCS | Mod: CM,PO

## 2018-05-11 NOTE — PLAN OF CARE
OUTPATIENT NEUROLOGICAL REHABILITATION  PHYSICAL THERAPY EVALUATION    Name: Lupis WHEATLEY [facility] Number: [id number]    Diagnosis:   Encounter Diagnoses   Name Primary?    Muscle weakness of lower extremity     Impaired functional mobility, balance, gait, and endurance     At high risk for falls      Physician: Blanca Virgen APRN,*  Treatment Orders: PT Eval and Treat  Past Medical History:   Diagnosis Date    Vitamin B12 deficiency        Evaluation Date: 05/11/18  Visit #: 1  Plan of care expiration: 07/06/18  Precautions: fall, universal    History     Medical Diagnosis: multiple sclerosis  PT Diagnosis: significant BLE weakness, impaired ambulation, impaired balance, poor endurance 2/2 diagnosis of MS    PMH significant for: insomnia, anemia    History of Present Illness: Lupis is a 67 y.o. female that presents to [facility] secondary to BLE weakness, poor endurance, impaired ambulation, and impaired balance due to MS. Pt was attending therapy in fall 2017,but had difficulty attending therapy consistently due to transportation. She has noticed a decline in her overall mobility since she stopped attending therapy. She reports feeling frustrated and at times depressed regarding her current state of mobility.     Chief complaints:  1. BLE weakness  2. Impaired ambulation  3. Impaired balance  4. Impaired endurance    Falls in the past year: 0 in a year and a half  Prior Therapy: December 2017, OP PT  Nutrition:  Obese  Social History/Support systems: daughter  Place of Residence (Steps/Adaptations/Levels): [facility], [address], uses RW to ascend/descend step  Previous functional status includes: Mod I for ambulation and transfers with RW, (A) for tub transfers, (A) for LB dressing; light cooking; family provides transportation  Current functional status:  Same as above  Exercise routine prior to onset : none  DME owned: RW  Work/Job description includes:  retired      Subjective   Pt  "stated goals: "Be able to walk without the walker."   Family present/states: none present  Pain: 0/10    Objective   - Follows commands: 100% of time   - Speech: no deficits     Mental status: alert, oriented to person, place, and time, normal mood, behavior, speech, dress, motor activity, and thought processes  Appearance: Casually dressed  Behavior:  Cooperative, appears anxious  Attention Span and Concentration:  Normal    Dominant hand:  left     Posture Alignment in sitting:   Head: forward head   Scapulae: rounded shoulders   Trunk: increased kyphosis   Pelvis: decreased lordosis   Legs: LLE rests in slight AB and IR     Posture Alignment in standing: VCs to stay close to RW  Head: forward head   Scapulae: slouched posture   Trunk: forward flexed   Pelvis: neutral   Legs: LLE ER     Sensation: Light Touch: Impaired: occasional numbness and tingling in B hands and BLE         Tone: gross BLE tone WFL    Visual/Auditory: cataracts, uses magnifying glass to read    Coordination:   - fine motor: WFL  - UE coordination: WFL    - LE coordination:  Unable to test due to significant muscle weakness    ROM:   UPPER EXTREMITY--AROM/PROM  (R) UE: WFLs  (L) UE: limited as follows:    AROM: shoulder flexion ~130 degrees, shoulder AB ~140 degrees   PROM: shoulder flexion 170 degrees, shoulder AB ~165 degrees         RANGE OF MOTION--LOWER EXTREMITIES  (R) LE Hip: PROM WFL, AROM limited by strength deficits   Knee: normal   Ankle: normal    (L) LE: Hip: PROM WFL, AROM limited by strength deficits   Knee: PROM WFL, AROM limited by strength deficits   Ankle: PROM WFL, AROM limited by strength deficits    Strength: manual muscle test grades below   Upper Extremity Strength- not tested at this time    Lower Extremity Strength  Right LE  Left LE    Hip Flexion: 3-/5 Hip Flexion: 2/5   Hip Abduction: 3/5 Hip Abduction: 3-/5   Hip Adduction: 3/5 Hip Adduction 3-/5   Knee Extension: 4+/5 Knee Extension: 3/5   Knee Flexion: 4+/5 Knee " Flexion: 3-/5   Ankle Dorsiflexion: 4/5 Ankle Dorsiflexion: 2-/5   Ankle Plantarflexion: 4+/5 Ankle Plantarflexion: 3/5     Abdominal Strength: 3/5    Flexibility: limited GS flexibility     Evaluation   30 second Chair Rise 10 completed with arms and with legs against that back of the chair       Gait Assessment:   - AD used: RW  - Assistance: S  - Distance: 100 feet    GAIT DEVIATIONS:  Lupis displays the following deviations with ambulation: poor BLE clearance, LLE foot drop, LLE circumduction in swing, jadon foot flat contact, decreased jadon hip extension in stance, forward flexed posture    Impairments contributing to deviations: impaired motor control, BLE weakness, poor endurance    Endurance Deficit: requires seated rest breaks throughout therapy activities     Evaluation   Timed Up and Go 1 min and 21 sec c/ RW, SBA  Poor safety when turning   Self Selected Walking Speed 0.19 m/sec (6m/31s)   Fast Walking Speed NT       Functional Mobility (Bed mobility, transfers)  Bed mobility: Mod I  Supine to sit: Mod I  Sit to supine: Mod I  Transfers to bed: Mod I  Transfers to toilet: Mod I  Sit to stand:  Mod I  Stand pivot:  Mod I  Car transfers: Min A  Wheelchair mobility: NA    ADL's:  Feeding: I  Grooming: Mod I  Hygiene: Mod I  UB Dressing: Mod I  LB Dressing: Mod A  Toileting: Mod I  Bathing: Mod I    Functional Limitations Reports - G Codes  Category: Mobility  Tool: SSWS  Score: 0.19 m/sec  Current: CM at least 80% < 100% impaired, limited or restricted  Goal: CL at least 60% < 80% impaired, limited or restricted    Education provided re:role of PT, goals for PT, scheduling - pt verbalized understanding. Discussed insurance limitations with pt.     Lupis verbalized good understanding of education provided.   Pt has no cultural, educational or language barriers to learning provided.      Assessment   This is a 67 y.o. female referred to outpatient physical therapy and presents with a medical diagnosis of  multiple sclerosis.  Patient presents with significant BLE weakness (LLE more impaired compared to RLE), impaired ambulation, impaired endurance, impaired balance, and limited functional mobility. At this time, mobility deficits mainly attributed to significant BLE weakness and poor endurance. Due to pt's limited mobility, pt is at an increased fall risk at this time.  PT is warranted to address mobility deficits listed above to improve safety and (I) with functional mobility.      PT/PTA met face to face to discuss pt's treatment plan and established goals. Pt will be seen by physical therapy every 6th visit and minimally once per month.     Pt rehab potential is Good. Pt will benefit from continuing skilled outpatient physical therapy to address the deficits listed below in the problem list, provide pt/family education and to maximize pt's level of independence in the home and community environment.     History  Co-morbidities and personal factors that may impact the plan of care Examination  Body Structures and Functions, activity limitations and participation restrictions that may impact the plan of care    Clinical Presentation   Co-morbidities:    high BMI and insomnia, anemia        Personal Factors:   coping style  lifestyle  character  attitudes  relies on family for transportation- family works throughout the week  Anxious behavior  Sedentary lifestyle   Body Regions:   lower extremities  upper extremities  trunk    Body Systems:    gross symmetry  ROM  strength  gross coordinated movement  balance  gait  transfers  motor control            Participation Restrictions:   Limited community interaction due to mobility limitations       Activity limitations:   Learning and applying knowledge  solving problems    General Tasks and Commands  undertaking multiple tasks    Communication  no deficits    Mobility  lifting and carrying objects  fine hand use (grasping/picking up)  walking  driving (bike, car,  motorcycle)    Self care  washing oneself (bathing, drying, washing hands)  dressing  looking after one's health    Domestic Life  shopping  cooking  doing house work (cleaning house, washing dishes, laundry)    Interactions/Relationships  relating with strangers    Life Areas  no deficits    Community and Social Life  community life  recreation and leisure         evolving clinical presentation with changing clinical characteristics                      moderate   high  high Decision Making/ Complexity Score:  moderate         Pt's spiritual, cultural and educational needs considered and pt agreeable to plan of care and goals as stated below:     GOALS:   Short term goals: 4 weeks, pt agrees to goals set.  1. Pt to be (I) with established HEP  2. Pt to improve R hip flexion MMT score to at least 3/5 and L hip flexion MMT score to at least 2+/5 for improved gait mechanics  3. Pt to improve R hip AB and AD MMT scores to at least 3+/5 for improved functional mobility  4. Pt to improve L hip AB and AD MMT scores to at least 3/5 for improved functional mobility  5. Pt to improve L knee extension MMT score to at least 3+/5 for improved gait mechanics  6. Pt to improve L knee flexion MMT score to at least 3/5 for improved gait mechanics  7. Pt to improve L ankle DF MMT score to at least a 2/5 for improved gait mechanics  8. Pt to improve L ankle PF MMT score to at least a 3+/5 for improved gait mechanics  9. Pt to improve chair rise score to at least 5 times with no more than 1 UE support for improved safety with functional mobility  10. Pt to improve TUG score to no more than 60 seconds for improved safety with household ambulation  11. Pt to improve SSWS score to at least 0.21 m/sec for improved safety with community ambulation  12. PT to perform mCTSIB for static balance assessment and establish appropriate goal    Long term goals: 8 weeks, pt agrees to goals set  13. Pt to be (I) with advanced HEP  14. Pt to improve R  hip flexion MMT score to at least 3+/5 and L hip flexion MMT score to at least 3/5 for improved gait mechanics  15. Pt to improve R hip AB and AD MMT scores to at least 4-/5 for improved functional mobility  16. Pt to improve L hip AB and AD MMT scores to at least 3+/5 for improved functional mobility  17. Pt to improve L knee extension MMT score to at least 4-/5 for improved gait mechanics  18. Pt to improve L knee flexion MMT score to at least 3+/5 for improved gait mechanics  19. Pt to improve L ankle DF MMT score to at least a 2+/5 for improved gait mechanics  20. Pt to improve L ankle PF MMT score to at least a 4-/5 for improved gait mechanics  21. Pt to improve chair rise score to at least 5 times with no UE support for improved safety with functional mobility  22. Pt to improve TUG score to no more than 50 seconds for improved safety with household ambulation  23. Pt to improve SSWS score to at least 0.24 m/sec for improved safety with community ambulation        Plan   Outpatient physical therapy 1-2 times weekly to include: Pt Education, HEP, therapeutic exercises, neuromuscular re-education, therapeutic activities, manual therapy, joint mobilizations, aquatic therapy and modalities PRN to achieve established goals. Pt may be seen by PTA as part of the rehabilitation team.     Cont PT for  8 weeks.     Mariola Nixon, PT  05/11/2018

## 2018-05-22 ENCOUNTER — PATIENT MESSAGE (OUTPATIENT)
Dept: NEUROLOGY | Facility: CLINIC | Age: 68
End: 2018-05-22

## 2018-05-29 ENCOUNTER — CLINICAL SUPPORT (OUTPATIENT)
Dept: REHABILITATION | Facility: HOSPITAL | Age: 68
End: 2018-05-29
Payer: MEDICARE

## 2018-05-29 DIAGNOSIS — M62.81 MUSCLE WEAKNESS OF LOWER EXTREMITY: ICD-10-CM

## 2018-05-29 DIAGNOSIS — Z74.09 IMPAIRED FUNCTIONAL MOBILITY, BALANCE, GAIT, AND ENDURANCE: ICD-10-CM

## 2018-05-29 DIAGNOSIS — Z91.81 AT HIGH RISK FOR FALLS: ICD-10-CM

## 2018-05-29 PROCEDURE — 97110 THERAPEUTIC EXERCISES: CPT | Mod: PO

## 2018-05-29 NOTE — PROGRESS NOTES
"  Name: Lupis WHEATLEY [facility] Number: [id number]  Diagnosis:        Encounter Diagnoses   Name Primary?    Muscle weakness of lower extremity      Impaired functional mobility, balance, gait, and endurance      At high risk for falls        Physician: Blanca Virgen APRN,*  Treatment Orders: PT Eval and Treat       Past Medical History:   Diagnosis Date    Vitamin B12 deficiency           Precautions: fall, universal  Visit #: 2  Date of Eval: 5/11/18  Plan of Care Expiration: 7/6/18     G codes 2/10    SSWS On ST caseload?  no          Subjective   Pt reports: feeling slow. Pt states her biggest complaint is her "foot drop" and how it affects her walking.  Pain Scale:  0/10     Objective      Patient received individual therapy with activities as follows:      Lupis received therapeutic exercises to develop strength, endurance, ROM, flexibility, posture and core stabilization for 45 minutes including:     2 x 10 quad set, bilateral, hold 3 sec  2 x 10 supine hip abduction, bilateral  2 x 10 glute set, hold 3 sec  2 x 10 SAQ  2 X 10 heel slides- only able to complete c/ R LE  x 10 ankle pumps, bilateral  X 10 seated marching, verbal cues to maintain upright posture- only able to complete c/ R LE    Ambulated to <> from downstairs waiting room to upstairs therapy gym with RW with supervision from SPT. Increased time to complete.      Written Home Exercises: quad set, supine hip ABD, glute set, SAQ, seated marching, heel slides, ankle pumps  Pt demo good understanding of the education provided. Lupis demonstrated fair return demonstration of activities.      Education provided re: POC, HEP  No spiritual or educational barriers to learning provided     Pt has no cultural, educational or language barriers to learning provided.     Assessment   Pt tolerated tx well and was highly motivated. Pt was unable to complete L LE seated marching and heel slides d/t muscle weakness. Pt was able to complete L ankle " pumps, but her DF was very minimal d/t weakness. Pt was able to ambulate c/ RW from downstairs lobby <> tx table without a rest break. Pt required Mod A during sit<> supine. Pt given HEP. Continue POC for LE strength, endurance, and functional mobility training.      Pt rehab potential is Good. Pt will benefit from continuing skilled outpatient physical therapy to address the deficits listed below in the problem list, provide pt/family education and to maximize pt's level of independence in the home and community environment.              History  Co-morbidities and personal factors that may impact the plan of care Examination  Body Structures and Functions, activity limitations and participation restrictions that may impact the plan of care      Clinical Presentation   Co-morbidities:    high BMI and insomnia, anemia           Personal Factors:   coping style  lifestyle  character  attitudes  relies on family for transportation- family works throughout the week  Anxious behavior  Sedentary lifestyle    Body Regions:   lower extremities  upper extremities  trunk     Body Systems:    gross symmetry  ROM  strength  gross coordinated movement  balance  gait  transfers  motor control                 Participation Restrictions:   Limited community interaction due to mobility limitations          Activity limitations:   Learning and applying knowledge  solving problems     General Tasks and Commands  undertaking multiple tasks     Communication  no deficits     Mobility  lifting and carrying objects  fine hand use (grasping/picking up)  walking  driving (bike, car, motorcycle)     Self care  washing oneself (bathing, drying, washing hands)  dressing  looking after one's health     Domestic Life  shopping  cooking  doing house work (cleaning house, washing dishes, laundry)     Interactions/Relationships  relating with strangers     Life Areas  no deficits     Community and Social Life  community life  recreation and  leisure             evolving clinical presentation with changing clinical characteristics                                moderate   high   high Decision Making/ Complexity Score:  moderate            Pt's spiritual, cultural and educational needs considered and pt agreeable to plan of care and goals as stated below:      GOALS:   Short term goals: 4 weeks, pt agrees to goals set.  1. Pt to be (I) with established HEP  2. Pt to improve R hip flexion MMT score to at least 3/5 and L hip flexion MMT score to at least 2+/5 for improved gait mechanics  3. Pt to improve R hip AB and AD MMT scores to at least 3+/5 for improved functional mobility  4. Pt to improve L hip AB and AD MMT scores to at least 3/5 for improved functional mobility  5. Pt to improve L knee extension MMT score to at least 3+/5 for improved gait mechanics  6. Pt to improve L knee flexion MMT score to at least 3/5 for improved gait mechanics  7. Pt to improve L ankle DF MMT score to at least a 2/5 for improved gait mechanics  8. Pt to improve L ankle PF MMT score to at least a 3+/5 for improved gait mechanics  9. Pt to improve chair rise score to at least 5 times with no more than 1 UE support for improved safety with functional mobility  10. Pt to improve TUG score to no more than 60 seconds for improved safety with household ambulation  11. Pt to improve SSWS score to at least 0.21 m/sec for improved safety with community ambulation  12. PT to perform mCTSIB for static balance assessment and establish appropriate goal     Long term goals: 8 weeks, pt agrees to goals set  13. Pt to be (I) with advanced HEP  14. Pt to improve R hip flexion MMT score to at least 3+/5 and L hip flexion MMT score to at least 3/5 for improved gait mechanics  15. Pt to improve R hip AB and AD MMT scores to at least 4-/5 for improved functional mobility  16. Pt to improve L hip AB and AD MMT scores to at least 3+/5 for improved functional mobility  17. Pt to improve L knee  extension MMT score to at least 4-/5 for improved gait mechanics  18. Pt to improve L knee flexion MMT score to at least 3+/5 for improved gait mechanics  19. Pt to improve L ankle DF MMT score to at least a 2+/5 for improved gait mechanics  20. Pt to improve L ankle PF MMT score to at least a 4-/5 for improved gait mechanics  21. Pt to improve chair rise score to at least 5 times with no UE support for improved safety with functional mobility  22. Pt to improve TUG score to no more than 50 seconds for improved safety with household ambulation  23. Pt to improve SSWS score to at least 0.24 m/sec for improved safety with community ambulation       Plan   Continue PT 1-2x weekly under established Plan of Care, with treatment to include: pt education, HEP, therapeutic exercises, neuromuscular re-education/balance exercises, therapeutic activities, joint mobilizations, and modalities PRN, to work towards established goals. Pt may be seen by PTA to carry out plan of care.      Heidi Pandya, SPT 05/29/2018

## 2018-06-08 ENCOUNTER — DOCUMENTATION ONLY (OUTPATIENT)
Dept: REHABILITATION | Facility: HOSPITAL | Age: 68
End: 2018-06-08

## 2018-07-14 ENCOUNTER — HOSPITAL ENCOUNTER (OUTPATIENT)
Dept: RADIOLOGY | Facility: HOSPITAL | Age: 68
Discharge: HOME | End: 2018-07-14
Attending: PSYCHIATRY & NEUROLOGY
Payer: MEDICARE

## 2018-07-14 ENCOUNTER — LAB VISIT (OUTPATIENT)
Dept: LAB | Facility: HOSPITAL | Age: 68
End: 2018-07-14
Payer: MEDICARE

## 2018-07-14 DIAGNOSIS — E66.9 OBESITY, UNSPECIFIED CLASSIFICATION, UNSPECIFIED OBESITY TYPE, UNSPECIFIED WHETHER SERIOUS COMORBIDITY PRESENT: ICD-10-CM

## 2018-07-14 DIAGNOSIS — G35 MS (MULTIPLE SCLEROSIS): ICD-10-CM

## 2018-07-14 DIAGNOSIS — G35 MULTIPLE SCLEROSIS: ICD-10-CM

## 2018-07-14 DIAGNOSIS — Z79.899 OTHER LONG TERM (CURRENT) DRUG THERAPY: ICD-10-CM

## 2018-07-14 LAB
ESTIMATED AVG GLUCOSE: 117 MG/DL
HBA1C MFR BLD HPLC: 5.7 %

## 2018-07-14 PROCEDURE — 70551 MRI BRAIN STEM W/O DYE: CPT | Mod: TC

## 2018-07-14 PROCEDURE — 36415 COLL VENOUS BLD VENIPUNCTURE: CPT

## 2018-07-14 PROCEDURE — 83036 HEMOGLOBIN GLYCOSYLATED A1C: CPT

## 2018-07-14 PROCEDURE — 70551 MRI BRAIN STEM W/O DYE: CPT | Mod: 26,,, | Performed by: RADIOLOGY

## 2018-07-17 ENCOUNTER — PATIENT MESSAGE (OUTPATIENT)
Dept: NEUROLOGY | Facility: CLINIC | Age: 68
End: 2018-07-17

## 2018-07-20 ENCOUNTER — TELEPHONE (OUTPATIENT)
Dept: NEUROLOGY | Facility: CLINIC | Age: 68
End: 2018-07-20

## 2018-07-20 ENCOUNTER — OFFICE VISIT (OUTPATIENT)
Dept: NEUROLOGY | Facility: CLINIC | Age: 68
End: 2018-07-20
Payer: MEDICARE

## 2018-07-20 ENCOUNTER — LAB VISIT (OUTPATIENT)
Dept: LAB | Facility: HOSPITAL | Age: 68
End: 2018-07-20
Payer: MEDICARE

## 2018-07-20 VITALS
HEART RATE: 87 BPM | SYSTOLIC BLOOD PRESSURE: 132 MMHG | HEIGHT: 62 IN | WEIGHT: 186 LBS | BODY MASS INDEX: 34.23 KG/M2 | DIASTOLIC BLOOD PRESSURE: 80 MMHG

## 2018-07-20 DIAGNOSIS — G35 MULTIPLE SCLEROSIS: ICD-10-CM

## 2018-07-20 DIAGNOSIS — R26.9 GAIT DISTURBANCE: ICD-10-CM

## 2018-07-20 DIAGNOSIS — E55.9 VITAMIN D DEFICIENCY: ICD-10-CM

## 2018-07-20 DIAGNOSIS — Z71.89 COUNSELING REGARDING GOALS OF CARE: ICD-10-CM

## 2018-07-20 DIAGNOSIS — M62.838 MUSCLE SPASM: ICD-10-CM

## 2018-07-20 DIAGNOSIS — G35 MULTIPLE SCLEROSIS: Primary | ICD-10-CM

## 2018-07-20 DIAGNOSIS — R30.0 DYSURIA: ICD-10-CM

## 2018-07-20 LAB
25(OH)D3+25(OH)D2 SERPL-MCNC: 40 NG/ML
BASOPHILS # BLD AUTO: 0.01 K/UL
BASOPHILS NFR BLD: 0.2 %
DIFFERENTIAL METHOD: ABNORMAL
EOSINOPHIL # BLD AUTO: 0.1 K/UL
EOSINOPHIL NFR BLD: 1.6 %
ERYTHROCYTE [DISTWIDTH] IN BLOOD BY AUTOMATED COUNT: 13 %
HCT VFR BLD AUTO: 36 %
HGB BLD-MCNC: 11.8 G/DL
IMM GRANULOCYTES # BLD AUTO: 0.01 K/UL
IMM GRANULOCYTES NFR BLD AUTO: 0.2 %
LYMPHOCYTES # BLD AUTO: 2.1 K/UL
LYMPHOCYTES NFR BLD: 41.7 %
MCH RBC QN AUTO: 30.8 PG
MCHC RBC AUTO-ENTMCNC: 32.8 G/DL
MCV RBC AUTO: 94 FL
MONOCYTES # BLD AUTO: 0.4 K/UL
MONOCYTES NFR BLD: 8.5 %
NEUTROPHILS # BLD AUTO: 2.4 K/UL
NEUTROPHILS NFR BLD: 47.8 %
NRBC BLD-RTO: 0 /100 WBC
PLATELET # BLD AUTO: 209 K/UL
PMV BLD AUTO: 9.3 FL
RBC # BLD AUTO: 3.83 M/UL
WBC # BLD AUTO: 5.06 K/UL

## 2018-07-20 PROCEDURE — 99999 PR PBB SHADOW E&M-EST. PATIENT-LVL III: CPT | Mod: PBBFAC,,, | Performed by: CLINICAL NURSE SPECIALIST

## 2018-07-20 PROCEDURE — 99213 OFFICE O/P EST LOW 20 MIN: CPT | Mod: PBBFAC | Performed by: CLINICAL NURSE SPECIALIST

## 2018-07-20 PROCEDURE — 85025 COMPLETE CBC W/AUTO DIFF WBC: CPT

## 2018-07-20 PROCEDURE — 99215 OFFICE O/P EST HI 40 MIN: CPT | Mod: S$PBB,,, | Performed by: CLINICAL NURSE SPECIALIST

## 2018-07-20 PROCEDURE — 82306 VITAMIN D 25 HYDROXY: CPT

## 2018-07-20 PROCEDURE — 36415 COLL VENOUS BLD VENIPUNCTURE: CPT

## 2018-07-20 RX ORDER — BACLOFEN 10 MG/1
TABLET ORAL
Qty: 60 TABLET | Refills: 2 | Status: SHIPPED | OUTPATIENT
Start: 2018-07-20 | End: 2018-09-18 | Stop reason: SDUPTHER

## 2018-07-20 NOTE — TELEPHONE ENCOUNTER
----- Message from Emily Acharya sent at 7/20/2018  3:53 PM CDT -----  Contact: Sue(dtr) @ [phone]  Calling to reschedule the 11/20 appt, says this date is not good. Please call.

## 2018-07-20 NOTE — PROGRESS NOTES
Subjective:       Patient ID: Lupis Murcia is a 67 y.o. female who presents today for a routine clinic visit for MS.  The history has been provided by the patient. She is accompanied by her daughter, Amanda. She was last seen in April 2018.      MS HPI:  · DMT: N/A  · Taking vitamin D3 as recommended? Yes -  Dose: 5000 units daily   · She did do some PT after her last visit, and this was helpful, but she wants to do it again.   · She did receive a new rollator and tub transfer bench, but Medicare did not pay for it.  · She denies any new or different symptoms.     SOCIAL HISTORY  Social History   Substance Use Topics    Smoking status: Never Smoker    Smokeless tobacco: Never Used    Alcohol use No     Living arrangements - the patient lives with her daughter  Employment--disabled     MS ROS:  · Fatigue: Yes - She gets tired a lot, but doesn't like to go to bed.   · Sleep Disturbance: No. She does not have sleep apnea.   · Bladder Dysfunction: Yes - She has an uncomfortable feeling when she urinates. She feels like she has frequency. She wears a pad for occasional incontinence.   · Bowel Dysfunction: No  · Spasticity: Yes - Spasms are improved. She takes Naproxen once or twice a week, which is helpful.   · Visual Symptoms: Yes - Stable.   · Cognitive: No  · Mood Disorder: Yes - She has depression and anxiety; feels frustrated about her limitations.   · Gait Disturbance: Yes - She uses a walker.   · Falls: No  · Hand Dysfunction: Yes - She has some tightness in her right hand from carpal tunnel.   · Pain: Yes - She has lower back pain; occasional leg pain.   · Sexual Dysfunction: Not Assessed  · Skin Breakdown: No  · Tremors: No  · Dysphagia:  No  · Dysarthria:  No  · Heat sensitivity:  Yes - She tries to avoid the heat.   · Any un-met adaptive needs? No      Objective:        1. 25 foot timed walk: 36.7 seconds today with rolling walker; was 31.8 seconds at last visit with rolling walker   Neurologic Exam       MENTAL STATUS: language is fluent, normal verbal comprehension, short-term and remote memory is intact, attention is normal, patient is alert and oriented x 3, fund of knowlege is appropriate by vocabulary.      MOTOR EXAM: Normal bulk and tone throughout UE and LE bilaterally. Rapid sequential movements are slow in the left upper extremity.  Strength is 5/5 in all groups in the upper extremities and right lower extremity. Left lower extremity--iliopsoas 3/5; hamstring 3/5, ant tibial 3/5     REFLEXES: 2+ and symmetric throughout in all four extremities.      SENSORY EXAM: Decreased vibratory sense to bilateral lower extremities; intact to upper extremities     COORDINATION: Slow finger to nose with left hand; slow toe tap with left foot.      GAIT: Wide-based, slow, and unsteady. Romberg with mild sway.     Imaging:   Narrative     EXAMINATION:  MRI BRAIN WITHOUT CONTRAST    CLINICAL HISTORY:  Multiple sclerosis    TECHNIQUE:  Multiplanar multisequence MR imaging of the brain was performed before without contrast.  Contiguous thin cut images were obtained per demyelination protocol.    COMPARISON:  Images only from outside MRI brain 01/27/2017, 05/23/2013.    FINDINGS:  Intracranial compartment:    Within the cerebral white matter, there are numerous patchy and focal punctate areas of T2/FLAIR signal hyperintensity, largely situated in the periventricular zones along the margins of the corpus callosum and lateral ventricles radiating outward.  Lesions involve the deep, peripheral, and subcortical supratentorial white matter.  Punctate lesions are also present within the britany. No lesions are present in the cerebellum.  Overall plaque burden is moderate.  Lesions are overall better delineated on the current examination related to technique as well as motion degradation of prior examinations; accounting for these limitations, there are no definite new lesions.    There is no diffusion restriction to suggest active  demyelinating plaques.    There is overall mild white matter volume loss.    Skull/extracranial contents (limited evaluation): Bone marrow signal intensity is normal.   Impression       Findings in the cerebral white matter and britany which are typical for multiple sclerosis.  No definite new lesions; however, lesions are overall much more conspicuous on the current examination favored to be primarily related to technical differences between the current examination and prior examinations.  No diffusion restricting lesions to suggest active demyelination.  Examination was performed without contrast, slightly degrading evaluation for active demyelination.    Electronically signed by resident: Chapito Riggins  Date: 07/14/2018  Time: 13:52       Images reviewed with patient.       Labs:     Lab Results   Component Value Date    [id number] 5 (L) 04/13/2018    [id number] 5 (L) 06/12/2017     Lab Results   Component Value Date    WBC 5.33 04/13/2018    RBC 3.87 (L) 04/13/2018    HGB 12.1 04/13/2018    HCT 36.1 (L) 04/13/2018    MCV 93 04/13/2018    MCH 31.3 (H) 04/13/2018    MCHC 33.5 04/13/2018    RDW 12.6 04/13/2018     04/13/2018    MPV 9.2 04/13/2018    GRAN 2.6 04/13/2018    GRAN 48.7 04/13/2018    LYMPH 2.1 04/13/2018    LYMPH 39.4 04/13/2018    MONO 0.5 04/13/2018    MONO 9.2 04/13/2018    EOS 0.1 04/13/2018    BASO 0.01 04/13/2018    EOSINOPHIL 2.3 04/13/2018    BASOPHIL 0.2 04/13/2018       Diagnosis/Assessment/Plan:    1. Multiple Sclerosis  · Assessment: Recent brain MRI was stable in comparison to prior. Lupis's walk time is slower today, and her left foot appears to be dragging more. She is having some urinary symptoms, so it's possible that she has a urinary tract infection that has led to pseudorelapse. I have also re-ordered physical therapy, as her daughter felt that she was walking better when she was in PT.   · Imaging: MRI brain July 2019  · Disease Modifying Therapies: She defers DMT. Continue  Vitamin D. Will check Vitamin D level today.     2. MS Symptom Assessment / Management  · Bladder Dysfunction: Will check UA, Cx, and CBC.   · Spasticity: Recommend that she take Baclofen more consistently (one at bedtime) to help with spasms and back pain.    · Mood Disorder: Discussed counseling and advised that she let us know if she is interested.   · Gait Disturbance: Referral entered for PT at [facility].       Over 50% of this 40 minute visit was spent in direct face to face counseling of the patient about MS and symptom management. The patient agrees with the plan of care. She will follow up with Dr. Henley in 3-4 months.     Blanca Virgen, [facility] Hospital[facility]-BC, MSCN      There are no diagnoses linked to this encounter.

## 2018-07-24 ENCOUNTER — CLINICAL SUPPORT (OUTPATIENT)
Dept: REHABILITATION | Facility: HOSPITAL | Age: 68
End: 2018-07-24
Payer: MEDICARE

## 2018-07-24 DIAGNOSIS — Z91.81 AT HIGH RISK FOR FALLS: ICD-10-CM

## 2018-07-24 DIAGNOSIS — Z74.09 IMPAIRED FUNCTIONAL MOBILITY, BALANCE, GAIT, AND ENDURANCE: ICD-10-CM

## 2018-07-24 DIAGNOSIS — M62.81 MUSCLE WEAKNESS OF LOWER EXTREMITY: ICD-10-CM

## 2018-07-24 DIAGNOSIS — M25.673 DECREASED ROM OF ANKLE: ICD-10-CM

## 2018-07-24 PROCEDURE — 97162 PT EVAL MOD COMPLEX 30 MIN: CPT | Mod: PO | Performed by: PHYSICAL THERAPIST

## 2018-07-24 PROCEDURE — G8978 MOBILITY CURRENT STATUS: HCPCS | Mod: CL,PO | Performed by: PHYSICAL THERAPIST

## 2018-07-24 PROCEDURE — G8979 MOBILITY GOAL STATUS: HCPCS | Mod: CK,PO | Performed by: PHYSICAL THERAPIST

## 2018-08-01 NOTE — PLAN OF CARE
OUTPATIENT NEUROLOGICAL REHABILITATION  PHYSICAL THERAPY EVALUATION    Name: Lupis WHEATLEY [facility] Number: [id number]    Medical Diagnosis:   G35 (ICD-10-CM) - Multiple sclerosis   R26.9 (ICD-10-CM) - Gait disturbance       Encounter Diagnosis:   Encounter Diagnoses   Name Primary?    Muscle weakness of lower extremity     Impaired functional mobility, balance, gait, and endurance     At high risk for falls     Decreased ROM of ankle      Physician: Blanca Virgen APRN,*  Treatment Orders: PT Eval and Treat  Past Medical History:   Diagnosis Date    Vitamin B12 deficiency        Evaluation Date: 7/24/2018  Visit #: 1  (prior visits 2018=2)  Plan of care expiration: 9/18/2018  Precautions: universal, fall, fear of elevators (tx downstairs)    Functional Limitations Reports - G Codes  Category: mobility   Tool: TUG, 30 sec sit<>stand  Score: 45.4 with RW, 5x with UE support  Current: CL at least 60% < 80% impaired, limited or restricted  Goal: CK at least 40% < 60% impaired, limited or restricted    FOTO: multiple sclerosis survey= 38% (62% limitation)  History   Medical Diagnosis: multiple sclerosis, gait disturbance  PT Diagnosis: decreased strength, decreased ankle ROM, decreased motor control, increased muscle tone. Impaired gait, impaired balance  Chief complaint: poor ability to walk, walks slowly, can't lift L foot  History of Present Illness: Lupis is a 67 y.o. female that presents to [facility] secondary to gait disturbance due to Multiple Sclerosis.       Prior Therapy: pt attended 2 PT visits this year, intermittently attended outpatient PT in 2017 ~8 visits over 5 months  Social History: not stated  Place of Residence (Steps/Adaptations/Levels)/ assistance available: pt resides with daughter (nurse), daughter is able to assist pt when she is at home. Pt spends at least 8 hrs/day without assitance  Previous functional status includes: last year pt reports ambulation at a  quicker speed with L foot drop not as noticeable. 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      Subjective   Pt stated goals: be able to walk without walker   Family present/states: NA  Pain: back pain, 8/10, intermittent pain    Objective   - Follows commands: yes   - Speech: no deficits     Mental status: alert, oriented to person, place, and time  Appearance: Casually dressed  Behavior:  calm and cooperative  Attention Span and Concentration:  Normal    Dominant hand:  left     Posture Alignment :forward head, rounded shoulders, LLE rests in abd and ER    Skin integrity:  Intact    Sensation:  Light Touch: Intact           Proprioception:   NT    Tone: 2 More marked increase in muscle tone through most of the ROM, but affected part(s) easily moved  Limbs/muscles affected: LLE- spasticity in quads, HS, hip rotators, PF    Visual/Auditory: reports cataracts    Coordination:   - fine motor: thumb to fingertip- decreased speed on L, fair aim  - UE coordination: supination/ pronation- L decreased speed    - LE coordination:  Alternating toe taps- unable to fully lift L toes    ROM:   UPPER EXTREMITY--AROM/PROM  (R) UE: limited as follows: shoulder ~3/4 full AROM  (L) UE: limited as follows: shoulder >1/2 range AROM           RANGE OF MOTION--LOWER EXTREMITIES  (R) LE Hip: AAROM WFLs   Knee: AAROM WFLs   Ankle: AAROM DF= 4 deg    (L) LE: Hip: AAROM WFLs   Knee: AAROM WFLs   Ankle: AAROM DF= -10 deg    Strength: manual muscle test grades below   Upper Extremity Strength   RUE LUE   Shoulder Flexion: 4+/5 4-/5   Shoulder Abduction: 4-/5 4-/5   Shoulder Extension: 5/5 4+/5   Shoulder External  Rotation: 4-/5 3+/5   Shoulder Internal  Rotation: 4-/5 3+/5   Elbow  Flexion: 5/5 5/5   Elbow Extension: 4/5 4/5   Wrist Flexion: NT NT   Wrist Extension: NT NT   : NT NT     Lower Extremity Strength   RLE LLE   Hip Flexion: 3+/5 2-/5   Hip Extension:  3+/5 2/5   Hip Abduction: 3-/5 2/5   Hip Adduction: NT 2/5   Knee Extension: 5/5 4+/5   Knee Flexion: 3+/5 2-/5   Ankle Dorsiflexion: 4+/5 2+/5   Ankle Plantarflexion: 3+/5 2+/5   Ankle Inversion: 3+/5 2+/5   Ankle Eversion: 4/5 2+/5        Evaluation   Single Limb Stance R LE unable  (<10 sec = HIGH FALL RISK)   Single Limb Stance L LE unable  (<10 sec = HIGH FALL RISK)   30 second Chair Rise 5 completed with arms (pulling on RW)   5 times sit-stand NT     Postural control:  static standing with BUE support= good, without UE support= fair-  Dynamic standing balance with UE support= fair, without UE support= poor-zero.    Gait Assessment:   - AD used: RW  - Assistance: mod I - S  - Distance: limited community,  ~100ft  - Curb: NT due to time  - Ramp:  NT due to time  - Stairs: NT due to time      GAIT DEVIATIONS:  Lupis displays the following deviations with ambulation: L foot drop, increased R lateral lean to advance LLE, no L pelvic advancement over foot in stance, poor L hip and knee flexion for swing, no L heel contact during initial contact, L knee hyperextension in stance    Impairments contributing to deviations: impaired motor control, decreased ankle ROM, increased muscle tone, decreased strength    Endurance Deficit: fair for eval, fair for ambulation      Evaluation   Timed Up and Go 45.4 sec with RW   Self Selected Walking Speed NT   Fast Walking Speed NT     Functional Mobility (Bed mobility, transfers)  Bed mobility: Mod A  Supine to sit: Max A- for trunk and BLE  Sit to supine: Mod A- for BLE  Rolling: Min A  Transfers to bed: Mod I  Transfers to toilet: NT  Sit to stand:  Mod I  Stand pivot:  Mod I  Car transfers: NT  Wheelchair mobility: NT  Floor transfers: NT    Written Home Exercises Provided: eval- ankle  circles, toe taps  Pt demo good understanding of the education provided. Lupis demonstrated fair return demonstration of activities.     Education provided re:role of PT, goals for PT, scheduling - pt verbalized understanding.     Lupis verbalized good understanding of education provided.   Pt has no cultural, educational or language barriers to learning provided.      Assessment   This is a 67 y.o. female referred to outpatient physical therapy and presents with a medical diagnosis of multiple sclerosis and gait disturbance and demonstrates limitations as described in the problem list. Due to pt's deficits, pt requires use of RW for ambulation and cannot clear L foot during swing phase. . PT is warranted to recommend proper orthotics to correct gait deviations and prevent onset of secondary impairments (i.e. Arthritis or joint injury). Pt is at risk for falls per formal assessment. Pt has limited, safe household mobility per formal assessments. Pt reports that impairments in gait are gradually worsening and she is not able to walk asa much. Pt also notes a new onset of LBP. Pt demonstrates a 67% average level of impairment for mobility, pt is anticipated to improve impairment level to 52% by d/c.      History  Co-morbidities and personal factors that may impact the plan of care Examination  Body Structures and Functions, activity limitations and participation restrictions that may impact the plan of care    Clinical Presentation   Co-morbidities:   transportation assistance required        Personal Factors:   history of poor attendance with PT Body Regions:   lower extremities  upper extremities    Body Systems:    gross symmetry  ROM  strength  gross coordinated movement  balance  gait  transfers  transitions  motor control    Participation Restrictions:   1. Fall Risk - impaired balance   2. Weakness   3. Impaired muscle tone  4. Decreased ROM  5. Gait deviations   6. Decreased ambulation   7. Pain    8. Decreased activity tolerance   9. Difficulty participating in daily activities   10. Continued inability to participate in vocational pursuits   11. Requires skilled supervision to complete and progress HEP      Activity limitations:   Learning and applying knowledge  solving problems    General Tasks and Commands  no deficits    Communication  no deficits    Mobility  lifting and carrying objects  fine hand use (grasping/picking up)  walking  using transportation (bus, train, plane, car)  driving (bike, car, motorcycle)    Self care  dressing    Domestic Life  shopping  cooking  doing house work (cleaning house, washing dishes, laundry)  assisting others    Interactions/Relationships  no deficits    Life Areas  no deficits    Community and Social Life  community life  recreation and leisure         evolving clinical presentation with changing clinical characteristics                      moderate     Pt rehab potential is Good. Pt will benefit from continuing skilled outpatient physical therapy to address the deficits listed below in the problem list, provide pt/family education and to maximize pt's level of independence in the home and community environment.       Pt's spiritual, cultural and educational needs considered and pt agreeable to plan of care and goals as stated below:     GOALS:   Short term goals: 6 weeks, pt agrees to goals set.  1. Pt will perform HEP for general strengthening and ROM with supervision to improve carryover of progress made.   2. Pt will demonstrate improved L AAROM DF to 0 deg to improve balance and foot clearance during gait.   3. Pt will perform sit<>supine with min A to demonstrate improved independence with getting in/out of bed.   4. Pt will roll with supervision to improve independence with bed mobility.   5. Assess and assist pt with appropriate orthotics to aid with ambulation.     Long term goals: 12 weeks, pt agrees to goals set  6. Pt will demonstrate improve L ankle  DF to 5 deg to improve foot clearance during gait.   7. Pt will perform sit<>supine with supervision to demonstrate improved independence with bed mobility.   8. Pt will demonstrate a gross improvement in LLE musculature to 2+/5 to improve balance and mobility.   9. Pt will perform TUG with RW in <30 sec to demonstrate a decreased fall risk and improved independence with gait.   10. Pt will perform 7 sit<>stands in 30 sec to demonstrate >25% improvement in strength and mobility.       Plan   Outpatient physical therapy 2-3 times weekly for 12 weeks to include: Pt Education, HEP, therapeutic exercises, gait training, neuromuscular re-education, therapeutic activities, and modalities PRN to achieve established goals. Pt may be seen by PTA as part of the rehabilitation team.       Radha Luz, PT  07/31/2018      I certify the need for these services furnished under this plan of treatment and while under my care.  ____________________________________ Physician/Referring Practitioner   Date of Signature

## 2018-08-01 NOTE — PLAN OF CARE
OUTPATIENT NEUROLOGICAL REHABILITATION  PHYSICAL THERAPY EVALUATION    Name: Lupis WHEATLEY [facility] Number: [id number]    Medical Diagnosis:   G35 (ICD-10-CM) - Multiple sclerosis   R26.9 (ICD-10-CM) - Gait disturbance       Encounter Diagnosis:   Encounter Diagnoses   Name Primary?    Muscle weakness of lower extremity     Impaired functional mobility, balance, gait, and endurance     At high risk for falls     Decreased ROM of ankle      Physician: Blanca Virgen APRN,*  Treatment Orders: PT Eval and Treat  Past Medical History:   Diagnosis Date    Vitamin B12 deficiency        Evaluation Date: 7/24/2018  Visit #: 1  (prior visits 2018=2)  Plan of care expiration: 9/18/2018  Precautions: universal, fall, fear of elevators (tx downstairs)    Functional Limitations Reports - G Codes  Category: mobility   Tool: TUG, 30 sec sit<>stand  Score: 45.4 with RW, 5x with UE support  Current: CL at least 60% < 80% impaired, limited or restricted  Goal: CK at least 40% < 60% impaired, limited or restricted    FOTO: multiple sclerosis survey= 38% (62% limitation)  History   Medical Diagnosis: multiple sclerosis, gait disturbance  PT Diagnosis: decreased strength, decreased ankle ROM, decreased motor control, increased muscle tone. Impaired gait, impaired balance  Chief complaint: poor ability to walk, walks slowly, can't lift L foot  History of Present Illness: Lupis is a 67 y.o. female that presents to [facility] secondary to gait disturbance due to Multiple Sclerosis.       Prior Therapy: pt attended 2 PT visits this year, intermittently attended outpatient PT in 2017 ~8 visits over 5 months  Social History: not stated  Place of Residence (Steps/Adaptations/Levels)/ assistance available: pt resides with daughter (nurse), daughter is able to assist pt when she is at home. Pt spends at least 8 hrs/day without assitance  Previous functional status includes: last year pt reports ambulation at a  quicker speed with L foot drop not as noticeable. 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      Subjective   Pt stated goals: be able to walk without walker   Family present/states: NA  Pain: back pain, 8/10, intermittent pain    Objective   - Follows commands: yes   - Speech: no deficits     Mental status: alert, oriented to person, place, and time  Appearance: Casually dressed  Behavior:  calm and cooperative  Attention Span and Concentration:  Normal    Dominant hand:  left     Posture Alignment :forward head, rounded shoulders, LLE rests in abd and ER    Skin integrity:  Intact    Sensation:  Light Touch: Intact           Proprioception:   NT    Tone: 2 More marked increase in muscle tone through most of the ROM, but affected part(s) easily moved  Limbs/muscles affected: LLE- spasticity in quads, HS, hip rotators, PF    Visual/Auditory: reports cataracts    Coordination:   - fine motor: thumb to fingertip- decreased speed on L, fair aim  - UE coordination: supination/ pronation- L decreased speed    - LE coordination:  Alternating toe taps- unable to fully lift L toes    ROM:   UPPER EXTREMITY--AROM/PROM  (R) UE: limited as follows: shoulder ~3/4 full AROM  (L) UE: limited as follows: shoulder >1/2 range AROM           RANGE OF MOTION--LOWER EXTREMITIES  (R) LE Hip: AAROM WFLs   Knee: AAROM WFLs   Ankle: AAROM DF= 4 deg    (L) LE: Hip: AAROM WFLs   Knee: AAROM WFLs   Ankle: AAROM DF= -10 deg    Strength: manual muscle test grades below   Upper Extremity Strength   RUE LUE   Shoulder Flexion: 4+/5 4-/5   Shoulder Abduction: 4-/5 4-/5   Shoulder Extension: 5/5 4+/5   Shoulder External  Rotation: 4-/5 3+/5   Shoulder Internal  Rotation: 4-/5 3+/5   Elbow  Flexion: 5/5 5/5   Elbow Extension: 4/5 4/5   Wrist Flexion: NT NT   Wrist Extension: NT NT   : NT NT     Lower Extremity Strength   RLE LLE   Hip Flexion: 3+/5 2-/5   Hip Extension:  3+/5 2/5   Hip Abduction: 3-/5 2/5   Hip Adduction: NT 2/5   Knee Extension: 5/5 4+/5   Knee Flexion: 3+/5 2-/5   Ankle Dorsiflexion: 4+/5 2+/5   Ankle Plantarflexion: 3+/5 2+/5   Ankle Inversion: 3+/5 2+/5   Ankle Eversion: 4/5 2+/5        Evaluation   Single Limb Stance R LE unable  (<10 sec = HIGH FALL RISK)   Single Limb Stance L LE unable  (<10 sec = HIGH FALL RISK)   30 second Chair Rise 5 completed with arms (pulling on RW)   5 times sit-stand NT     Postural control:  static standing with BUE support= good, without UE support= fair-  Dynamic standing balance with UE support= fair, without UE support= poor-zero.    Gait Assessment:   - AD used: RW  - Assistance: mod I - S  - Distance: limited community,  ~100ft  - Curb: NT due to time  - Ramp:  NT due to time  - Stairs: NT due to time      GAIT DEVIATIONS:  Lupis displays the following deviations with ambulation: L foot drop, increased R lateral lean to advance LLE, no L pelvic advancement over foot in stance, poor L hip and knee flexion for swing, no L heel contact during initial contact, L knee hyperextension in stance    Impairments contributing to deviations: impaired motor control, decreased ankle ROM, increased muscle tone, decreased strength    Endurance Deficit: fair for eval, fair for ambulation      Evaluation   Timed Up and Go 45.4 sec with RW   Self Selected Walking Speed NT   Fast Walking Speed NT     Functional Mobility (Bed mobility, transfers)  Bed mobility: Mod A  Supine to sit: Max A- for trunk and BLE  Sit to supine: Mod A- for BLE  Rolling: Min A  Transfers to bed: Mod I  Transfers to toilet: NT  Sit to stand:  Mod I  Stand pivot:  Mod I  Car transfers: NT  Wheelchair mobility: NT  Floor transfers: NT    Written Home Exercises Provided: eval- ankle  circles, toe taps  Pt demo good understanding of the education provided. Lupis demonstrated fair return demonstration of activities.     Education provided re:role of PT, goals for PT, scheduling - pt verbalized understanding.     Lupis verbalized good understanding of education provided.   Pt has no cultural, educational or language barriers to learning provided.      Assessment   This is a 67 y.o. female referred to outpatient physical therapy and presents with a medical diagnosis of multiple sclerosis and gait disturbance and demonstrates limitations as described in the problem list. Due to pt's deficits, pt requires use of RW for ambulation and cannot clear L foot during swing phase. . PT is warranted to recommend proper orthotics to correct gait deviations and prevent onset of secondary impairments (i.e. Arthritis or joint injury). Pt is at risk for falls per formal assessment. Pt has limited, safe household mobility per formal assessments. Pt reports that impairments in gait are gradually worsening and she is not able to walk asa much. Pt also notes a new onset of LBP. Pt demonstrates a 67% average level of impairment for mobility, pt is anticipated to improve impairment level to 52% by d/c.      History  Co-morbidities and personal factors that may impact the plan of care Examination  Body Structures and Functions, activity limitations and participation restrictions that may impact the plan of care    Clinical Presentation   Co-morbidities:   transportation assistance required        Personal Factors:   history of poor attendance with PT Body Regions:   lower extremities  upper extremities    Body Systems:    gross symmetry  ROM  strength  gross coordinated movement  balance  gait  transfers  transitions  motor control    Participation Restrictions:   1. Fall Risk - impaired balance   2. Weakness   3. Impaired muscle tone  4. Decreased ROM  5. Gait deviations   6. Decreased ambulation   7. Pain    8. Decreased activity tolerance   9. Difficulty participating in daily activities   10. Continued inability to participate in vocational pursuits   11. Requires skilled supervision to complete and progress HEP      Activity limitations:   Learning and applying knowledge  solving problems    General Tasks and Commands  no deficits    Communication  no deficits    Mobility  lifting and carrying objects  fine hand use (grasping/picking up)  walking  using transportation (bus, train, plane, car)  driving (bike, car, motorcycle)    Self care  dressing    Domestic Life  shopping  cooking  doing house work (cleaning house, washing dishes, laundry)  assisting others    Interactions/Relationships  no deficits    Life Areas  no deficits    Community and Social Life  community life  recreation and leisure         evolving clinical presentation with changing clinical characteristics                      moderate     Pt rehab potential is Good. Pt will benefit from continuing skilled outpatient physical therapy to address the deficits listed below in the problem list, provide pt/family education and to maximize pt's level of independence in the home and community environment.       Pt's spiritual, cultural and educational needs considered and pt agreeable to plan of care and goals as stated below:     GOALS:   Short term goals: 6 weeks, pt agrees to goals set.  1. Pt will perform HEP for general strengthening and ROM with supervision to improve carryover of progress made.   2. Pt will demonstrate improved L AAROM DF to 0 deg to improve balance and foot clearance during gait.   3. Pt will perform sit<>supine with min A to demonstrate improved independence with getting in/out of bed.   4. Pt will roll with supervision to improve independence with bed mobility.   5. Assess and assist pt with appropriate orthotics to aid with ambulation.     Long term goals: 12 weeks, pt agrees to goals set  6. Pt will demonstrate improve L ankle  DF to 5 deg to improve foot clearance during gait.   7. Pt will perform sit<>supine with supervision to demonstrate improved independence with bed mobility.   8. Pt will demonstrate a gross improvement in LLE musculature to 2+/5 to improve balance and mobility.   9. Pt will perform TUG with RW in <30 sec to demonstrate a decreased fall risk and improved independence with gait.   10. Pt will perform 7 sit<>stands in 30 sec to demonstrate >25% improvement in strength and mobility.       Plan   Outpatient physical therapy 2-3 times weekly for 12 weeks to include: Pt Education, HEP, therapeutic exercises, gait training, neuromuscular re-education, therapeutic activities, and modalities PRN to achieve established goals. Pt may be seen by PTA as part of the rehabilitation team.       Radha Luz, PT  07/31/2018      I certify the need for these services furnished under this plan of treatment and while under my care.  ____________________________________ Physician/Referring Practitioner   Date of Signature

## 2018-08-14 ENCOUNTER — TELEPHONE (OUTPATIENT)
Dept: NEUROLOGY | Facility: CLINIC | Age: 68
End: 2018-08-14

## 2018-08-14 ENCOUNTER — DOCUMENTATION ONLY (OUTPATIENT)
Dept: REHABILITATION | Facility: HOSPITAL | Age: 68
End: 2018-08-14

## 2018-08-14 NOTE — PROGRESS NOTES
Patient did not attend today's PT visit. Patient did not call to cancel or reschedule.    Radha Luz, RICHARDSON  8/14/2018

## 2018-08-14 NOTE — TELEPHONE ENCOUNTER
"Call received from pt. States that her daughter received an email from Blanca stating that pt needed to be "checked out" again and an appt was needed. Appt scheduled for 9/18 at 1:30pm. Pt aware.  "

## 2018-08-17 ENCOUNTER — DOCUMENTATION ONLY (OUTPATIENT)
Dept: REHABILITATION | Facility: HOSPITAL | Age: 68
End: 2018-08-17

## 2018-08-17 NOTE — PROGRESS NOTES
Patient did not attend today's PT visit. Patient did not call to cancel or reschedule.    Radha Luz, RICHARDSON  8/17/2018

## 2018-08-21 ENCOUNTER — DOCUMENTATION ONLY (OUTPATIENT)
Dept: REHABILITATION | Facility: HOSPITAL | Age: 68
End: 2018-08-21

## 2018-08-21 ENCOUNTER — TELEPHONE (OUTPATIENT)
Dept: REHABILITATION | Facility: HOSPITAL | Age: 68
End: 2018-08-21

## 2018-08-24 ENCOUNTER — CLINICAL SUPPORT (OUTPATIENT)
Dept: REHABILITATION | Facility: HOSPITAL | Age: 68
End: 2018-08-24
Payer: MEDICARE

## 2018-08-24 DIAGNOSIS — Z74.09 IMPAIRED FUNCTIONAL MOBILITY, BALANCE, GAIT, AND ENDURANCE: ICD-10-CM

## 2018-08-24 DIAGNOSIS — M25.673 DECREASED ROM OF ANKLE: ICD-10-CM

## 2018-08-24 DIAGNOSIS — Z91.81 AT HIGH RISK FOR FALLS: ICD-10-CM

## 2018-08-24 DIAGNOSIS — M62.81 MUSCLE WEAKNESS OF LOWER EXTREMITY: ICD-10-CM

## 2018-08-24 PROCEDURE — 97110 THERAPEUTIC EXERCISES: CPT | Mod: PO | Performed by: PHYSICAL THERAPIST

## 2018-08-24 NOTE — PLAN OF CARE
Physical Therapy Progress Note     Name: Lupis WHEATLEY [facility] Number: [id number]  Diagnosis:   G35 (ICD-10-CM) - Multiple sclerosis   R26.9 (ICD-10-CM) - Gait disturbance        Encounter Diagnoses   Name Primary?    Decreased ROM of ankle     Muscle weakness of lower extremity     Impaired functional mobility, balance, gait, and endurance     At high risk for falls      Physician: Blanca Virgen APRN,*  Treatment Orders: PT Eval and Treat  Past Medical History:   Diagnosis Date    Vitamin B12 deficiency        Precautions: standard  Visit #: 2  Date of Eval: 7/24/2018  Plan of Care Expiration: 9/18/2018    Functional Limitations Reports - G Codes  Category: mobility   Tool: TUG, 30 sec sit<>stand  Score: 45.4 with RW, 5x with UE support    G-CODE  2/10   eval CL-CK             Subjective   Pt reports: no new complaints- reports performing ankle exercises, denies any other exercises  Pain Scale:  7/10 LBP upon arrival to session. Pt reported sharp pain extending from R hip to lateral knee. Reported improve pain in RLE at conclusion of session    Objective     Patient received individual therapy to increase strength, endurance, ROM, flexibility, posture, core stabilization, balance, mobility, and gait with activities as follows:     Pt participated in therapeutic exercises x 43  Minutes to improve strength, ROM, decrease tone, and improve mobility:   AAROM L DF= - 6 deg    Sit>supine to L with mod A for BLE  Supine>sit to L with mod A for LLE and trunk. Max VC for technique    Ambulation with RW x 127 ft x 2 with supervision- L foot drop, no L foot clearance during swing, poor L hip/ knee flex for swing    Supine: SKTC, PROM 3 x 30 sec   PROM HSS 3 x 30 sec   Bridging 10x, max A to flex LLE, mod A to maintain position   Pelvic tilts 10x  Sitting: heel slides with pillowcase on L 10x, mod A for full ROM   Heel slides with OTB R 10x   Hip abd OTB  10x   Marching 20x, AAROM (pt performed)       Written Home Exercises: 8/24/18- seated exercises: pelvic tilt, heel slides, hip abd PTB, AAROM marching  eval- ankle circles, toe taps  Pt demo good understanding of the education provided. Lupis demonstrated good return demonstration of activities.     Education provided re: POC, HEP    Pt has no cultural, educational or language barriers to learning provided.    Assessment   Lupis tolerated treatment well. Pt's LLE demonstrated increased tone in quads- MAS 2+ to 3. Tone decreased to MAS 1+ after stretching. Pt reported complaince with ankle HEP, but denied any other significant activity. Pt was instructed in a sitting a HEP as she demonstrates poor motor control and increased LLE and is not able to independently participate in a supine strengthening program at this time. Pt is reporting attempting too ambulate more as supervision is available. Pt continues to report a fear of falling. Pt demonstrates the following gait impairments:  L foot drop, no L foot clearance during swing, poor L hip/ knee flex for swing, L knee hyperextension throughout gait cycle. Pt could benefit from obtaining a posterior leaf AFO for L to improve foot clearance and decrease L knee hyperextension. Pt reports she has a L AFO possibly carbon fiber anterior leaf per pt description. This type of AFO would encourage increased L knee hyperextension which is already present. Limited progress is due to pt attending only today's treatment since eval. Pt reports poor attendance due to transportation issues and not feeling well last week.     Pt prognosis is Good. Pt will continue to benefit from skilled outpatient physical therapy to address the deficits listed in the problem list, provide pt/family education and to maximize pt's level of independence in the home and community environment.     Anticipated barriers to physical therapy: transportation assistance required, history of poor attendance  with PT    Medical necessity is demonstrated by the following IMPAIRMENTS/PROBLEM LIST:   Fall Risk - impaired balance   Weakness   Impaired muscle tone  Decreased ROM  Gait deviations   Decreased ambulation   Pain   Decreased activity tolerance   Difficulty participating in daily activities   Continued inability to participate in vocational pursuits   Requires skilled supervision to complete and progress HEP   Pt's spiritual, cultural and educational needs considered and pt agreeable to plan of care and GOALS as stated below:   Status as of 8/24/18:   Short term goals: 6 weeks, pt agrees to goals set.  1. Pt will perform HEP for general strengthening and ROM with supervision to improve carryover of progress made. Ongoing- issued sitting HEP  2. Pt will demonstrate improved L AAROM DF to 0 deg to improve balance and foot clearance during gait. Improved- -6 deg AAROM   3. Pt will perform sit<>supine with min A to demonstrate improved independence with getting in/out of bed. Improved with VC- mod A sit<>supine   4. Pt will roll with supervision to improve independence with bed mobility. NT  5. Assess and assist pt with appropriate orthotics to aid with ambulation. PT recommends L AFO     Long term goals: 12 weeks, pt agrees to goals set  6. Pt will demonstrate improve L ankle DF to 5 deg to improve foot clearance during gait.   7. Pt will perform sit<>supine with supervision to demonstrate improved independence with bed mobility.   8. Pt will demonstrate a gross improvement in LLE musculature to 2+/5 to improve balance and mobility.   9. Pt will perform TUG with RW in <30 sec to demonstrate a decreased fall risk and improved independence with gait.   10. Pt will perform 7 sit<>stands in 30 sec to demonstrate >25% improvement in strength and mobility.      Plan   Continue PT 1-2x weekly under established Plan of Care, with treatment to include: pt education, HEP, therapeutic exercises, gait training, neuromuscular  re-education/balance exercises, therapeutic activities, and modalities PRN, to work towards established goals. Pt may be seen by PTA to carry out plan of care.     Radha Luz, PT   08/24/2018

## 2018-08-30 ENCOUNTER — TELEPHONE (OUTPATIENT)
Dept: NEUROLOGY | Facility: CLINIC | Age: 68
End: 2018-08-30

## 2018-08-30 DIAGNOSIS — M21.862 HYPEREXTENSION DEFORMITY OF LEFT KNEE: ICD-10-CM

## 2018-08-30 DIAGNOSIS — M21.379 FOOT-DROP, UNSPECIFIED LATERALITY: Primary | ICD-10-CM

## 2018-08-30 DIAGNOSIS — G35 MULTIPLE SCLEROSIS: ICD-10-CM

## 2018-08-30 NOTE — TELEPHONE ENCOUNTER
----- Message from Radha Luz PT sent at 8/24/2018  6:52 PM CDT -----  Blanca,    Pt could benefit from obtaining a L posterior leaf AFO to assist with correcting foot drop and L knee hyperextension. Pt has not brought in her AFO for PT to assess. Per pt's description, brace may be anterior leaf which would encourage knee hyperextension. If you agree please submit orders. We may need to get a custom AFO for her insurance to cover since she already has an AFO.     Thank you for this referral,  Radha Luz, MARTT

## 2018-09-14 ENCOUNTER — CLINICAL SUPPORT (OUTPATIENT)
Dept: REHABILITATION | Facility: HOSPITAL | Age: 68
End: 2018-09-14
Payer: MEDICARE

## 2018-09-14 DIAGNOSIS — Z74.09 IMPAIRED FUNCTIONAL MOBILITY, BALANCE, GAIT, AND ENDURANCE: ICD-10-CM

## 2018-09-14 DIAGNOSIS — M25.673 DECREASED ROM OF ANKLE: ICD-10-CM

## 2018-09-14 DIAGNOSIS — Z91.81 AT HIGH RISK FOR FALLS: ICD-10-CM

## 2018-09-14 DIAGNOSIS — M62.81 MUSCLE WEAKNESS OF LOWER EXTREMITY: ICD-10-CM

## 2018-09-14 PROCEDURE — 97110 THERAPEUTIC EXERCISES: CPT | Mod: PO | Performed by: PHYSICAL THERAPIST

## 2018-09-14 NOTE — PROGRESS NOTES
Physical Therapy Progress Note      Name: Lupis WHEATLEY [facility] Number: [id number]  Diagnosis:   G35 (ICD-10-CM) - Multiple sclerosis   R26.9 (ICD-10-CM) - Gait disturbance              Encounter Diagnoses   Name Primary?    Decreased ROM of ankle      Muscle weakness of lower extremity      Impaired functional mobility, balance, gait, and endurance      At high risk for falls        Physician: Blanca Virgen APRN,*  Treatment Orders: PT Eval and Treat       Past Medical History:   Diagnosis Date    Vitamin B12 deficiency           Precautions: standard  Visit #: 3  Date of Eval: 7/24/2018  Plan of Care Expiration: 9/18/2018     Functional Limitations Reports - G Codes  Category: mobility   Tool: TUG, 30 sec sit<>stand  Score: 45.4 with RW, 5x with UE support     G-CODE  3/10   eval CL-CK                Subjective   Pt reports: no new complaints- reports compliance with sitting and standing HEP  Pain Scale:  occassional LBP immediately upon standing  Objective      Patient received individual therapy to increase strength, endurance, ROM, flexibility, posture, core stabilization, balance, mobility, and gait with activities as follows:      Pt participated in therapeutic exercises x 53  Minutes to improve strength, ROM, decrease tone, and improve mobility:      Sit>supine to L with mod A for BLE  Supine>sit to L with mod A for LLE and trunk. Max VC for technique     Ambulation with RW x 127 ft x 2 with supervision- CGA- L foot drop, no L foot clearance during swing, poor L hip/ knee flex for swing, slow speed, intermittent VC to correct when pt unable to advance LLE     Supine: SKTC, PROM 3 x 30 sec              PROM HSS 3 x 30 sec   PROM hip rotation 5x   PROM DF 2 x 30 sec              Bridging 2 x 10, max A to flex LLE, mod A to maintain position              LTR 10x    Heel slides, max A L 10x   Hip abd/add 2 x 10, mod A    Sitting: HS curls L OTB 10x                 Written Home Exercises:  8/24/18- seated exercises: pelvic tilt, heel slides, hip abd PTB, AAROM marching  eval- ankle circles, toe taps  Pt demo good understanding of the education provided. Lupis demonstrated good return demonstration of activities.      Education provided re: POC, HEP     Pt has no cultural, educational or language barriers to learning provided.     Assessment   Lupis tolerated treatment well. Pt reported increased compliance with HEP. Pt reports she has scheduled an appointment to be fitted for a new AFO. Pt demonstrated increased LLE extensor tone today with decreased ability to consistently step with LLE> pt performed mostly a step to gait, but was jim to partially intermittently correct with moderate verbal cues.   Pt demonstrates the following gait impairments:  L foot drop, no L foot clearance during swing, poor L hip/ knee flex for swing, L knee hyperextension throughout gait cycle.      Pt prognosis is Good to fair. Pt will continue to benefit from skilled outpatient physical therapy to address the deficits listed in the problem list, provide pt/family education and to maximize pt's level of independence in the home and community environment.      Anticipated barriers to physical therapy: transportation assistance required, history of poor attendance with PT     Medical necessity is demonstrated by the following IMPAIRMENTS/PROBLEM LIST:   Fall Risk - impaired balance   Weakness   Impaired muscle tone  Decreased ROM  Gait deviations   Decreased ambulation   Pain   Decreased activity tolerance   Difficulty participating in daily activities   Continued inability to participate in vocational pursuits   Requires skilled supervision to complete and progress HEP   Pt's spiritual, cultural and educational needs considered and pt agreeable to plan of care and GOALS as stated below:   Status as of 8/24/18:   Short term goals: 6 weeks, pt agrees to goals set.  1. Pt will perform HEP for general strengthening and ROM  with supervision to improve carryover of progress made. Ongoing- issued sitting HEP  2. Pt will demonstrate improved L AAROM DF to 0 deg to improve balance and foot clearance during gait. Improved- -6 deg AAROM   3. Pt will perform sit<>supine with min A to demonstrate improved independence with getting in/out of bed. Improved with VC- mod A sit<>supine   4. Pt will roll with supervision to improve independence with bed mobility. NT  5. Assess and assist pt with appropriate orthotics to aid with ambulation. PT recommends L AFO     Long term goals: 12 weeks, pt agrees to goals set  6. Pt will demonstrate improve L ankle DF to 5 deg to improve foot clearance during gait.   7. Pt will perform sit<>supine with supervision to demonstrate improved independence with bed mobility.   8. Pt will demonstrate a gross improvement in LLE musculature to 2+/5 to improve balance and mobility.   9. Pt will perform TUG with RW in <30 sec to demonstrate a decreased fall risk and improved independence with gait.   10. Pt will perform 7 sit<>stands in 30 sec to demonstrate >25% improvement in strength and mobility.      Plan   Continue PT 1-2x weekly under established Plan of Care, with treatment to include: pt education, HEP, therapeutic exercises, gait training, neuromuscular re-education/balance exercises, therapeutic activities, and modalities PRN, to work towards established goals. Pt may be seen by PTA to carry out plan of care.      Radha Luz, PT   08/24/2018

## 2018-09-18 ENCOUNTER — OFFICE VISIT (OUTPATIENT)
Dept: NEUROLOGY | Facility: CLINIC | Age: 68
End: 2018-09-18
Payer: MEDICARE

## 2018-09-18 VITALS
BODY MASS INDEX: 35.94 KG/M2 | HEIGHT: 62 IN | DIASTOLIC BLOOD PRESSURE: 84 MMHG | SYSTOLIC BLOOD PRESSURE: 138 MMHG | WEIGHT: 195.31 LBS | HEART RATE: 84 BPM

## 2018-09-18 DIAGNOSIS — M21.372 LEFT FOOT DROP: ICD-10-CM

## 2018-09-18 DIAGNOSIS — G35 MULTIPLE SCLEROSIS: Primary | ICD-10-CM

## 2018-09-18 DIAGNOSIS — Z71.89 COUNSELING REGARDING GOALS OF CARE: ICD-10-CM

## 2018-09-18 DIAGNOSIS — R26.9 GAIT DISTURBANCE: ICD-10-CM

## 2018-09-18 DIAGNOSIS — R25.2 SPASTICITY: ICD-10-CM

## 2018-09-18 DIAGNOSIS — N31.9 NEUROGENIC DYSFUNCTION OF THE URINARY BLADDER: ICD-10-CM

## 2018-09-18 PROCEDURE — 99215 OFFICE O/P EST HI 40 MIN: CPT | Mod: S$PBB,,, | Performed by: CLINICAL NURSE SPECIALIST

## 2018-09-18 PROCEDURE — 99213 OFFICE O/P EST LOW 20 MIN: CPT | Mod: PBBFAC | Performed by: CLINICAL NURSE SPECIALIST

## 2018-09-18 PROCEDURE — 99999 PR PBB SHADOW E&M-EST. PATIENT-LVL III: CPT | Mod: PBBFAC,,, | Performed by: CLINICAL NURSE SPECIALIST

## 2018-09-18 RX ORDER — BACLOFEN 10 MG/1
TABLET ORAL
Qty: 60 TABLET | Refills: 5 | Status: SHIPPED | OUTPATIENT
Start: 2018-09-18 | End: 2019-10-23 | Stop reason: SDUPTHER

## 2018-09-18 NOTE — PROGRESS NOTES
"Subjective:       Patient ID: Lupis Murcia is a 68 y.o. female who presents today for a routine clinic visit for MS.  The history has been provided by the patient. She is accompanied by her daughter. She was last seen in July 2018.     MS HPI:  · DMT: N/A  · Taking vitamin D3 as recommended? Yes -  Dose: 5000 units   · At last visit, it was recommended that she take Baclofen more consistently to help with spasms and back pain. She reports that she has recently restarted it, and it seems to be helping.   · She was also referred to PT. She does not feel that her walking has improved. She is averaging once a week for therapy.   · She has numbness in both of her legs at night when lying flat (sometimes when sitting). This is not new.   · She denies any recent infections.   · She has a new plastic AFO that she got this afternoon.     SOCIAL HISTORY  Social History     Tobacco Use    Smoking status: Never Smoker    Smokeless tobacco: Never Used   Substance Use Topics    Alcohol use: No    Drug use: No     Living arrangements - the patient lives with her daughter  Employment--disabled     MS ROS:  · Fatigue: Yes - Stable. She states "You sleep when you're dead."   · Sleep Disturbance: No. She does not have sleep apnea.   · Bladder Dysfunction: Yes - She has frequency. She sometimes feels like she is not emptying completely. Her BP pill does contain a diuretic. She wears a pad for occasional incontinence, but this is rare.   · Bowel Dysfunction: No  · Spasticity: Yes -She has restarted Baclofen for spasms at night.   · Visual Symptoms: Yes - Stable. She has cataracts. She plans to make an eye doctor appt.   · Cognitive: No. She denies any memory changes.   · Mood Disorder: Yes - She feels "mean" sometimes.  She has depression and anxiety; feels frustrated about her limitations.   · Gait Disturbance: Yes - She uses a walker. She has a AFO now.   · Falls: No  · Hand Dysfunction: Yes - She has some numbness in her " "right hand from carpal tunnel (mostly at night).  · Pain: Yes - She has lower back pain; occasional leg pain when she gets out of bed in the morning  · Sexual Dysfunction: Not Assessed  · Skin Breakdown: No  · Tremors: No  · Dysphagia:  No  · Dysarthria:  The right upper lip feels "funny" at times when she speaks. This does not impact the quality of her speech.   · Heat sensitivity:  Yes - She tries to avoid the heat.   · Any un-met adaptive needs? No    Objective:        1. 25 foot timed walk: 33.6 seconds 1st attempt and 39.5 seconds with 2nd attempt today; was 36.7 seconds at last visit with rolling walker  Neurologic Exam      MENTAL STATUS: language is fluent, normal verbal comprehension, short-term and remote memory is intact, attention is normal, patient is alert and oriented x 3, fund of knowlege is appropriate by vocabulary.      MOTOR EXAM: Normal bulk and tone throughout UE and LE bilaterally. Rapid sequential movements are slow in the left upper extremity.  Strength is 5/5 in all groups in the upper extremities and right lower extremity. Left lower extremity--iliopsoas 3/5; hamstring 3/5, ant tibial 3/5     REFLEXES: 2+ and symmetric throughout in all four extremities.      SENSORY EXAM: Decreased vibratory sense to bilateral lower extremities; intact to upper extremities     COORDINATION: Slow finger to nose with left hand     GAIT: Wide-based, slow, and unsteady; She is wearing AFO today and using walker. Left foot is dragging.     Imaging:     No new imaging to review today.       Labs:     Lab Results   Component Value Date    PLFFZWTB54BM 40 07/20/2018    SSCBCDAX57LX 5 (L) 04/13/2018    WULGGYBL73VJ 5 (L) 06/12/2017       Lab Results   Component Value Date    WBC 5.06 07/20/2018    RBC 3.83 (L) 07/20/2018    HGB 11.8 (L) 07/20/2018    HCT 36.0 (L) 07/20/2018    MCV 94 07/20/2018    MCH 30.8 07/20/2018    MCHC 32.8 07/20/2018    RDW 13.0 07/20/2018     07/20/2018    MPV 9.3 07/20/2018    GRAN " 2.4 07/20/2018    GRAN 47.8 07/20/2018    LYMPH 2.1 07/20/2018    LYMPH 41.7 07/20/2018    MONO 0.4 07/20/2018    MONO 8.5 07/20/2018    EOS 0.1 07/20/2018    BASO 0.01 07/20/2018    EOSINOPHIL 1.6 07/20/2018    BASOPHIL 0.2 07/20/2018     Lab Results   Component Value Date    COLORU Yellow 07/20/2018    APPEARANCEUA Clear 07/20/2018    PHUR 5.0 07/20/2018    SPECGRAV 1.015 07/20/2018    PROTEINUA Negative 07/20/2018    GLUCUA Negative 07/20/2018    KETONESU Negative 07/20/2018    BILIRUBINUA Negative 07/20/2018    OCCULTUA 1+ (A) 07/20/2018    NITRITE Negative 07/20/2018    UROBILINOGEN Negative 07/20/2018    LEUKOCYTESUR Negative 07/20/2018       Diagnosis/Assessment/Plan:    1. Multiple Sclerosis  · Assessment: Lupis continue to have left foot drop and gait disturbance, but it seems to be mostly stable. Her walk time is about 3 seconds faster than at last visit, but it is still slower than her fastest time in the past 15 months. Her goal is to walk unassisted. I think with ongoing therapy, her gait will improve, but we discussed that safety and more stability is important now.   · Imaging: None planned for now. Will likely plan for brain MRI next July 2019.   · Disease Modifying Therapies: Continue Vitamin D. Will check Vitamin D level in November.     2. MS Symptom Assessment / Management  · Spasticity: Continue Baclofen.   · Gait Disturbance: Recommend continued physical therapy as outpatient and home exercises. Continue use of AFO.     Over 50% of this 40 minute visit was spent in direct face to face counseling of the patient about MS, DMT considerations, and MS symptom management. The patient agrees with the plan of care. She will follow up with Dr. Henley in January.     Blanca Virgen, [facility]-BC, MSCN      There are no diagnoses linked to this encounter.

## 2018-09-20 ENCOUNTER — TELEPHONE (OUTPATIENT)
Dept: OBSTETRICS AND GYNECOLOGY | Facility: CLINIC | Age: 68
End: 2018-09-20

## 2018-09-20 NOTE — TELEPHONE ENCOUNTER
----- Message from Se Waller sent at 9/20/2018  3:58 PM CDT -----  Contact: [phone]  Patient called in requesting to speak with you. Patient prefers to speak with a nurse. Please call.

## 2018-09-21 ENCOUNTER — DOCUMENTATION ONLY (OUTPATIENT)
Dept: REHABILITATION | Facility: HOSPITAL | Age: 68
End: 2018-09-21

## 2018-09-21 NOTE — PROGRESS NOTES
PT/PTA met face to face to discuss pt's treatment plan and progress towards established goals. Continue with current PT POC. Pt will be seen by physical therapist at least every 6th treatment day or every 30 days, whichever occurs first.      Bret Jose, PTA  09/21/2018

## 2018-10-09 ENCOUNTER — TELEPHONE (OUTPATIENT)
Dept: OBSTETRICS AND GYNECOLOGY | Facility: CLINIC | Age: 68
End: 2018-10-09

## 2018-10-09 NOTE — TELEPHONE ENCOUNTER
----- Message from Rubi Dos Santos sent at 10/9/2018  2:33 PM CDT -----  Contact: [phone]/self  Patient requesting to speak with you regarding her upcoming appt. Please call and advise.

## 2018-11-01 ENCOUNTER — DOCUMENTATION ONLY (OUTPATIENT)
Dept: REHABILITATION | Facility: HOSPITAL | Age: 68
End: 2018-11-01

## 2018-11-01 NOTE — PROGRESS NOTES
PHYSICAL THERAPY DISCHARGE SUMMARY     Name: Lupis WHEATLEY [facility] Number: [id number]    Diagnosis:   G35 (ICD-10-CM) - Multiple sclerosis   R26.9 (ICD-10-CM) - Gait disturbance                 Encounter Diagnoses   Name Primary?    Decreased ROM of ankle      Muscle weakness of lower extremity      Impaired functional mobility, balance, gait, and endurance      At high risk for falls        Physician: Blanca Virgen APRN,*  Treatment Orders: PT Eval and Treat  Past Medical History:   Diagnosis Date    Vitamin B12 deficiency        Initial visit: 7/24/2018  Date of Last visit: 9/14/2018  Total Visits Received: 3        Functional Limitations Reports - G Codes  Category: mobility   Tool: TUG, 30 sec sit<>stand  Score: Not tested due to unexpected d/c  Goal: CK at least 40% < 60% impaired, limited or restricted    DME recommended: pt has RW, recommended pt wear L AFO  HEP provided:seated exercises: pelvic tilt, heel slides, hip abd PTB, AAROM marching, ankle circles, toe taps  Pain:unrated occasional LBP with sit>stand    OBJECTIVE     ROM:   UPPER EXTREMITY--AROM/PROM  Not tested due to unexpected d/c         RANGE OF MOTION--LOWER EXTREMITIES  Not tested due to unexpected d/c    Strength: manual muscle test grades below   Upper Extremity Strength  Not tested due to unexpected d/c    Lower Extremity Strength  Not tested due to unexpected d/c    Abdominal Strength: Not tested due to unexpected d/c       Evaluation   Single Limb Stance R LE Not tested due to unexpected d/c  (<10 sec = HIGH FALL RISK)   Single Limb Stance L LE Not tested due to unexpected d/c  (<10 sec = HIGH FALL RISK)   30 second Chair Rise Not tested due to unexpected d/c   5 times sit-stand Not tested due to unexpected d/c     Gait Assessment:   - AD used: Rw  - Assistance: supervision- CGA (depends on fatigue and floor surface)  - Distance: ~100 ft  - Curb: NT  - Ramp:  NT     Evaluation   Timed Up and Go Not tested due to unexpected d/c    Self Selected Walking Speed Not tested due to unexpected d/c   Fast Walking Speed Not tested due to unexpected d/c     Functional Mobility (Bed mobility, transfers)  Bed mobility: Mod A  Supine to sit: Mod A  Sit to supine: Mod A  Rolling: Mod A  Transfers to bed: Mod A  Transfers to toilet: D  Sit to stand:  Min A  Stand pivot:  Min A- mod A  Car transfers: NT  Wheelchair mobility: D  Floor transfers: NT      ASSESSMENT   68 year old female with diagnosis of Multiple Sclerosis referred to [facility] received skilled outpatient PT 7/24/2018 to 9/14/2018. Pt attended 2 PT sessions after evaluation. Pt demonstrated limited progress with L ankle ROM and bed mobility. Pt was instructed in a sitting HEP to address strength and motor control. Pt was unable to independently perform supine exercises for HEP. Pt's gait was grossly unchanged and is limited by increased muscle tone of LLE and L foot drop. PT recommended pt wear AFO (pt reported she has a carbon fiber AFO) for fitting/ use assessment. Pt never wore AFO to therapy session. Pt cancelled and rescheduled several PT sessions due to weather and transportation. Pt d/c due to poor attendance with PT. Pt would benefit from skilled PT if she were able to attend on a consistent basis. Functional status obtained from last PT assessment performed on 8/24/2018.       Status Towards Goals Met:   as of 8/24/18:   Short term goals:   1. Pt will perform HEP for general strengthening and ROM with supervision to improve carryover of progress made. Ongoing/ not met- issued sitting HEP  2. Pt will demonstrate improved L AAROM DF to 0 deg to improve balance and foot clearance during gait. Improved/ not met- -6 deg AAROM   3. Pt will perform sit<>supine with min A to demonstrate improved independence with getting in/out of bed. Improved with VC/ not met- mod A sit<>supine   4. Pt will roll with supervision to improve independence with bed mobility. NT  5. Assess  and assist pt with appropriate orthotics to aid with ambulation. PT recommends L AFO     Long term goals: 12 weeks, pt agrees to goals set  6. Pt will demonstrate improve L ankle DF to 5 deg to improve foot clearance during gait.   7. Pt will perform sit<>supine with supervision to demonstrate improved independence with bed mobility.   8. Pt will demonstrate a gross improvement in LLE musculature to 2+/5 to improve balance and mobility.   9. Pt will perform TUG with RW in <30 sec to demonstrate a decreased fall risk and improved independence with gait.   10. Pt will perform 7 sit<>stands in 30 sec to demonstrate >25% improvement in strength and mobility.       Goals Not achieved and why: poor attendance/ compliance with HEP    Discharge reason : Non-Compliance with attendance    PLAN   This patient is discharged from Physical Therapy Services.         Radha Luz, PT  11/01/2018

## 2018-11-26 ENCOUNTER — PROCEDURE VISIT (OUTPATIENT)
Dept: OBSTETRICS AND GYNECOLOGY | Facility: CLINIC | Age: 68
End: 2018-11-26
Payer: MEDICARE

## 2018-11-26 ENCOUNTER — HOSPITAL ENCOUNTER (OUTPATIENT)
Dept: RADIOLOGY | Facility: HOSPITAL | Age: 68
Discharge: HOME | End: 2018-11-26
Attending: OBSTETRICS & GYNECOLOGY
Payer: MEDICARE

## 2018-11-26 ENCOUNTER — OFFICE VISIT (OUTPATIENT)
Dept: OBSTETRICS AND GYNECOLOGY | Facility: CLINIC | Age: 68
End: 2018-11-26
Payer: MEDICARE

## 2018-11-26 VITALS
SYSTOLIC BLOOD PRESSURE: 134 MMHG | DIASTOLIC BLOOD PRESSURE: 88 MMHG | HEIGHT: 62 IN | WEIGHT: 195.75 LBS | BODY MASS INDEX: 36.02 KG/M2

## 2018-11-26 DIAGNOSIS — Z01.419 WELL WOMAN EXAM WITH ROUTINE GYNECOLOGICAL EXAM: Primary | ICD-10-CM

## 2018-11-26 DIAGNOSIS — Z01.419 WELL WOMAN EXAM WITH ROUTINE GYNECOLOGICAL EXAM: ICD-10-CM

## 2018-11-26 DIAGNOSIS — Z78.0 MENOPAUSE: ICD-10-CM

## 2018-11-26 DIAGNOSIS — R14.0 BLOATING SYMPTOM: ICD-10-CM

## 2018-11-26 DIAGNOSIS — Z12.31 ENCOUNTER FOR SCREENING MAMMOGRAM FOR MALIGNANT NEOPLASM OF BREAST: ICD-10-CM

## 2018-11-26 PROCEDURE — 76830 TRANSVAGINAL US NON-OB: CPT | Mod: PBBFAC,PO

## 2018-11-26 PROCEDURE — 77067 SCR MAMMO BI INCL CAD: CPT | Mod: 26,,, | Performed by: RADIOLOGY

## 2018-11-26 PROCEDURE — 76830 TRANSVAGINAL US NON-OB: CPT | Mod: 26,S$PBB,, | Performed by: OBSTETRICS & GYNECOLOGY

## 2018-11-26 PROCEDURE — 76856 US EXAM PELVIC COMPLETE: CPT | Mod: PBBFAC,PO

## 2018-11-26 PROCEDURE — 88175 CYTOPATH C/V AUTO FLUID REDO: CPT

## 2018-11-26 PROCEDURE — 77063 BREAST TOMOSYNTHESIS BI: CPT | Mod: 26,,, | Performed by: RADIOLOGY

## 2018-11-26 PROCEDURE — 99999 PR PBB SHADOW E&M-EST. PATIENT-LVL IV: CPT | Mod: PBBFAC,,, | Performed by: OBSTETRICS & GYNECOLOGY

## 2018-11-26 PROCEDURE — G0101 CA SCREEN;PELVIC/BREAST EXAM: HCPCS | Mod: S$PBB,,, | Performed by: OBSTETRICS & GYNECOLOGY

## 2018-11-26 PROCEDURE — 77063 BREAST TOMOSYNTHESIS BI: CPT | Mod: TC

## 2018-11-26 PROCEDURE — 99214 OFFICE O/P EST MOD 30 MIN: CPT | Mod: PBBFAC,PO,25 | Performed by: OBSTETRICS & GYNECOLOGY

## 2018-11-26 PROCEDURE — 77067 SCR MAMMO BI INCL CAD: CPT | Mod: TC

## 2018-11-26 PROCEDURE — 76856 US EXAM PELVIC COMPLETE: CPT | Mod: 26,S$PBB,, | Performed by: OBSTETRICS & GYNECOLOGY

## 2018-11-26 NOTE — PROGRESS NOTES
Subjective:       Patient ID: Lupis Murcia is a 68 y.o. female.    Chief Complaint: Well Woman (no complaints)    Mobility impairment due to MS--seen in room with GYN procedures chair  No HRT  C/O bloated abdominal sensation    HPI  Review of Systems   Gastrointestinal: Negative for abdominal distention, abdominal pain, constipation and nausea.   Genitourinary: Negative for dyspareunia, dysuria, genital sores, pelvic pain, vaginal bleeding and vaginal discharge.       Objective:      Physical Exam   Constitutional: She appears well-developed and well-nourished.   Pulmonary/Chest: Right breast exhibits no mass, no nipple discharge, no skin change and no tenderness. Left breast exhibits no mass, no nipple discharge, no skin change and no tenderness.   Abdominal: Soft. Bowel sounds are normal. She exhibits no distension and no mass. There is no tenderness. There is no rebound and no guarding. Hernia confirmed negative in the right inguinal area and confirmed negative in the left inguinal area.   Genitourinary: Rectum normal, vagina normal and uterus normal. No breast tenderness or discharge. There is no lesion on the right labia. There is no lesion on the left labia. Uterus is not fixed and not tender. Cervix exhibits no motion tenderness, no discharge and no friability. Right adnexum displays no mass, no tenderness and no fullness. Left adnexum displays no mass, no tenderness and no fullness. No tenderness in the vagina. No vaginal discharge found.   Lymphadenopathy:        Right: No inguinal adenopathy present.        Left: No inguinal adenopathy present.       Bimanual is not adequate due to distended abdomen and positioning difficulties  Assessment:       1. Well woman exam with routine gynecological exam    2. Menopause    3. Bloating symptom    4. Encounter for screening mammogram for malignant neoplasm of breast         Plan:         annual gyn visit; pap and mammogram; U/S today    ULTRASOUND IS BENIGN---2  CALCIFIED SMALL FIBROIDS; EMS 2.7; NON-VIS OVARIES, NO FREE FLUID

## 2018-11-30 ENCOUNTER — TELEPHONE (OUTPATIENT)
Dept: OBSTETRICS AND GYNECOLOGY | Facility: CLINIC | Age: 68
End: 2018-11-30

## 2018-11-30 NOTE — TELEPHONE ENCOUNTER
Patient notified that pap smear is still in process. Will mail copy of mammogram and ultrasound report. All questions answered and patient verbalized understanding.

## 2018-11-30 NOTE — TELEPHONE ENCOUNTER
----- Message from Susie Multani sent at 11/30/2018  1:47 PM CST -----  Contact: self / [phone]  Patient is requesting a call back regarding, she has questions about her test results. Please advise

## 2018-12-07 ENCOUNTER — TELEPHONE (OUTPATIENT)
Dept: OBSTETRICS AND GYNECOLOGY | Facility: CLINIC | Age: 68
End: 2018-12-07

## 2018-12-07 NOTE — TELEPHONE ENCOUNTER
----- Message from Marian Ibarra sent at 12/7/2018  1:00 PM CST -----  Contact: self/[phone]  Patient called to speak with Batsheva about an issue that was spoken on last week.    Please call and advise.

## 2019-01-02 ENCOUNTER — HOSPITAL ENCOUNTER (EMERGENCY)
Facility: HOSPITAL | Age: 69
Discharge: HOME | End: 2019-01-02
Attending: EMERGENCY MEDICINE
Payer: MEDICARE

## 2019-01-02 VITALS
SYSTOLIC BLOOD PRESSURE: 140 MMHG | WEIGHT: 196 LBS | BODY MASS INDEX: 35.85 KG/M2 | TEMPERATURE: 99 F | OXYGEN SATURATION: 95 % | RESPIRATION RATE: 16 BRPM | HEART RATE: 105 BPM | DIASTOLIC BLOOD PRESSURE: 77 MMHG

## 2019-01-02 DIAGNOSIS — R07.81 RIB PAIN ON LEFT SIDE: ICD-10-CM

## 2019-01-02 DIAGNOSIS — S09.90XA INJURY OF HEAD, INITIAL ENCOUNTER: ICD-10-CM
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
## 2019-01-02 DIAGNOSIS — M79.89 LEG SWELLING: ICD-10-CM
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
## 2019-01-02 LAB
ANION GAP SERPL CALC-SCNC: 12 MMOL/L
BASOPHILS # BLD AUTO: 0.01 K/UL
BASOPHILS NFR BLD: 0.1 %
BUN SERPL-MCNC: 12 MG/DL
CALCIUM SERPL-MCNC: 10.1 MG/DL
CHLORIDE SERPL-SCNC: 103 MMOL/L
CO2 SERPL-SCNC: 27 MMOL/L
CREAT SERPL-MCNC: 0.7 MG/DL
DIFFERENTIAL METHOD: NORMAL
EOSINOPHIL # BLD AUTO: 0 K/UL
EOSINOPHIL NFR BLD: 0.2 %
ERYTHROCYTE [DISTWIDTH] IN BLOOD BY AUTOMATED COUNT: 12.8 %
EST. GFR  (AFRICAN AMERICAN): >60 ML/MIN/1.73 M^2
EST. GFR  (NON AFRICAN AMERICAN): >60 ML/MIN/1.73 M^2
GLUCOSE SERPL-MCNC: 123 MG/DL
HCT VFR BLD AUTO: 38.7 %
HGB BLD-MCNC: 13 G/DL
IMM GRANULOCYTES # BLD AUTO: 0.03 K/UL
IMM GRANULOCYTES NFR BLD AUTO: 0.3 %
LYMPHOCYTES # BLD AUTO: 1.7 K/UL
LYMPHOCYTES NFR BLD: 20.1 %
MCH RBC QN AUTO: 30.2 PG
MCHC RBC AUTO-ENTMCNC: 33.6 G/DL
MCV RBC AUTO: 90 FL
MONOCYTES # BLD AUTO: 0.6 K/UL
MONOCYTES NFR BLD: 6.6 %
NEUTROPHILS # BLD AUTO: 6.3 K/UL
NEUTROPHILS NFR BLD: 72.7 %
NRBC BLD-RTO: 0 /100 WBC
PLATELET # BLD AUTO: 248 K/UL
PMV BLD AUTO: 9.5 FL
POTASSIUM SERPL-SCNC: 3.8 MMOL/L
RBC # BLD AUTO: 4.31 M/UL
SODIUM SERPL-SCNC: 142 MMOL/L
WBC # BLD AUTO: 8.65 K/UL

## 2019-01-02 PROCEDURE — 99284 EMERGENCY DEPT VISIT MOD MDM: CPT | Mod: ,,, | Performed by: PHYSICIAN ASSISTANT

## 2019-01-02 PROCEDURE — 99284 PR EMERGENCY DEPT VISIT,LEVEL IV: ICD-10-PCS | Mod: ,,, | Performed by: PHYSICIAN ASSISTANT

## 2019-01-02 PROCEDURE — 93010 ELECTROCARDIOGRAM REPORT: CPT | Mod: ,,, | Performed by: INTERNAL MEDICINE

## 2019-01-02 PROCEDURE — 93005 ELECTROCARDIOGRAM TRACING: CPT

## 2019-01-02 PROCEDURE — 85025 COMPLETE CBC W/AUTO DIFF WBC: CPT

## 2019-01-02 PROCEDURE — 99285 EMERGENCY DEPT VISIT HI MDM: CPT | Mod: 25

## 2019-01-02 PROCEDURE — 93010 EKG 12-LEAD: ICD-10-PCS | Mod: ,,, | Performed by: INTERNAL MEDICINE

## 2019-01-02 PROCEDURE — 80048 BASIC METABOLIC PNL TOTAL CA: CPT

## 2019-01-02 RX ORDER — OXYCODONE AND ACETAMINOPHEN 5; 325 MG/1; MG/1
1 TABLET ORAL EVERY 6 HOURS PRN
Qty: 18 TABLET | Refills: 0 | Status: SHIPPED | OUTPATIENT
Start: 2019-01-02 | End: 2019-06-04

## 2019-01-02 NOTE — ED PROVIDER NOTES
Encounter Date: 2019    SCRIBE #1 NOTE: I, Son Jelena, am scribing for, and in the presence of,  Dr. Kaur . I have scribed the following portions of the note - the EKG reading.       History     Chief Complaint   Patient presents with    Fall     Fell in bathroom at 11am, struck back of head, no LOC.  Pt with pain to left ribs and left leg.      Ms Murcia is a 67 yo  female patient with PMHx of MS that presents to the ED with headache, left sided rib and chest pain after a mechanical fall at home this morning. Pt was using her walker to get to the bathroom at home when her walker got caught on something causing her to fall. Pt reports hitting her head on a towel rack and landing on her left side. Pt had to crawl to the phone to call her daughter after the fall. Pt denies LOC. Pt has moderate bruising to left sided ribs. Pt complaining of pain worse on deep inspiration. Pt also complaining of dull headache to left occipital region. Pt reports left lower extremity swelling and redness in her calf that has been present before her fall. Pt denies any dizziness, numbness, changes in vision, abdominal pain, N/V/D, urinary symptoms, fevers, chills, myalgias.           Review of patient's allergies indicates:   Allergen Reactions    Contrast media Shortness Of Breath and Rash    Pcn [penicillins] Shortness Of Breath and Rash    Celebrex [celecoxib] Other (See Comments)     Swallowing problems     Motrin [ibuprofen] Rash     Past Medical History:   Diagnosis Date    MS (multiple sclerosis)     Vitamin B12 deficiency      Past Surgical History:   Procedure Laterality Date    BREAST CYST EXCISION Left     over 40yrs ago     SECTION       Family History   Problem Relation Age of Onset    Coronary artery disease Father     Lung cancer Father     Lung cancer Mother     Brain cancer Brother      Social History     Tobacco Use    Smoking status: Never Smoker    Smokeless tobacco: Never Used    Substance Use Topics    Alcohol use: No    Drug use: No     Review of Systems   Constitutional: Negative for chills and fever.   HENT: Negative for congestion, rhinorrhea, sinus pressure, sinus pain and sore throat.    Eyes: Negative for pain and visual disturbance.   Respiratory: Negative for cough, choking and shortness of breath.    Cardiovascular: Positive for leg swelling. Negative for chest pain and palpitations.   Gastrointestinal: Negative for abdominal pain, diarrhea, nausea and vomiting.   Genitourinary: Negative for dysuria and flank pain.   Musculoskeletal: Positive for gait problem (Hx MS, drop foot, uses walker at home) and myalgias.   Skin: Positive for pallor and rash (redness to left calf).   Neurological: Positive for headaches. Negative for dizziness, syncope, speech difficulty, weakness, light-headedness and numbness.       Physical Exam     Initial Vitals [01/02/19 1643]   BP Pulse Resp Temp SpO2   (!) 184/109 (!) 118 20 98.4 °F (36.9 °C) 98 %      MAP       --         Physical Exam    Constitutional: She appears well-developed and well-nourished. She is cooperative. She does not appear ill. No distress.   HENT:   Head: Normocephalic and atraumatic. Head is without raccoon's eyes, without Tsang's sign, without contusion and without laceration.   Eyes: Conjunctivae and EOM are normal. Pupils are equal, round, and reactive to light.   Neck: Normal range of motion. Neck supple. No spinous process tenderness and no muscular tenderness present.   Cardiovascular: Normal rate and intact distal pulses. Exam reveals no gallop and no friction rub.    No murmur heard.  Pulmonary/Chest: No respiratory distress. She has no decreased breath sounds. She has no wheezes. She has no rhonchi. She has no rales.   Abdominal: Soft. Bowel sounds are normal. There is no tenderness. There is no rigidity, no guarding and no CVA tenderness.   Musculoskeletal:        Left lower leg: She exhibits swelling and edema.  She exhibits no tenderness and no bony tenderness.   Moderate bruising to left ribs. No abrasions, lacerations.    Neurological: She is alert and oriented to person, place, and time. She has normal strength. No cranial nerve deficit or sensory deficit. Gait abnormal. Coordination normal.         ED Course   Procedures  Labs Reviewed   BASIC METABOLIC PANEL - Abnormal; Notable for the following components:       Result Value    Glucose 123 (*)     All other components within normal limits   CBC W/ AUTO DIFFERENTIAL     EKG Readings: (Independently Interpreted)   Initial Reading: No STEMI. Heart Rate: 119 bpm .       Imaging Results          US Lower Extremity Veins Bilateral (Final result)  Result time 01/02/19 22:36:14    Final result by Terry Marie MD (01/02/19 22:36:14)                 Impression:      No evidence of deep venous thrombosis in either lower extremity.      Electronically signed by: Terry Marie MD  Date:    01/02/2019  Time:    22:36             Narrative:    EXAMINATION:  US LOWER EXTREMITY VEINS BILATERAL    CLINICAL HISTORY:  Other specified soft tissue disorders    TECHNIQUE:  Duplex and color flow Doppler and dynamic compression was performed of the bilateral lower extremity veins was performed.    COMPARISON:  None    FINDINGS:  Right thigh veins: The common femoral, femoral, popliteal, upper greater saphenous, and deep femoral veins are patent and free of thrombus. The veins are normally compressible and have normal phasic flow and augmentation response.    Right calf veins: The visualized calf veins are patent.    Left thigh veins: The common femoral, femoral, popliteal, upper greater saphenous, and deep femoral veins are patent and free of thrombus. The veins are normally compressible and have normal phasic flow and augmentation response.    Left calf veins: The visualized calf veins are patent.    Miscellaneous: None                               CT Head Without Contrast (Final  result)  Result time 01/02/19 19:18:24    Final result by Terry Marie MD (01/02/19 19:18:24)                 Impression:      No acute intracranial abnormalities    Senescent changes as above.      Electronically signed by: Terry Marie MD  Date:    01/02/2019  Time:    19:18             Narrative:    EXAMINATION:  CT HEAD WITHOUT CONTRAST    CLINICAL HISTORY:  Head trauma, headache;    TECHNIQUE:  Low dose axial images were obtained through the head.  Coronal and sagittal reformations were also performed. Contrast was not administered.    COMPARISON:  None.    FINDINGS:  The brain parenchyma appears normal for age with good corticomedullary differentiation.  There is no evidence of acute infarct, hemorrhage, or mass.  There is ventricular and sulcal enlargement consistent with generalized atrophy.  Moderate confluent decreased supratentorial white matter attenuation most likely related to chronic nonspecific small vessel disease.   No mass-effect or midline shift.  There are no abnormal extra-axial fluid collections.  The paranasal sinuses and mastoid air cells are essentially clear .  The calvarium appears intact.  .                               X-Ray Ribs 2 View Left (Final result)  Result time 01/02/19 18:52:34    Final result by Prieto Palacios MD (01/02/19 18:52:34)                 Impression:      No acute process.  No rib fracture is visualized.      Electronically signed by: Prieto Palacios MD  Date:    01/02/2019  Time:    18:52             Narrative:    EXAMINATION:  XR CHEST PA AND LATERAL; XR RIBS 2 VIEW LEFT    CLINICAL HISTORY:  Unspecified fall, initial encounter    TECHNIQUE:  PA and lateral views of the chest were performed.  Left ribs, five views    COMPARISON:  None    FINDINGS:  Mild cardiomegaly.  Tortuosity of the thoracic aorta.  Normal pulmonary vasculature.  Lungs are clear.  No pneumothorax.  No pleural effusion.    No evidence of acute fracture or dislocation.  No evidence  of rib fracture is visualized.                               X-Ray Chest PA And Lateral (Final result)  Result time 01/02/19 18:52:34    Final result by Prieto Palacios MD (01/02/19 18:52:34)                 Impression:      No acute process.  No rib fracture is visualized.      Electronically signed by: Prieto Palacios MD  Date:    01/02/2019  Time:    18:52             Narrative:    EXAMINATION:  XR CHEST PA AND LATERAL; XR RIBS 2 VIEW LEFT    CLINICAL HISTORY:  Unspecified fall, initial encounter    TECHNIQUE:  PA and lateral views of the chest were performed.  Left ribs, five views    COMPARISON:  None    FINDINGS:  Mild cardiomegaly.  Tortuosity of the thoracic aorta.  Normal pulmonary vasculature.  Lungs are clear.  No pneumothorax.  No pleural effusion.    No evidence of acute fracture or dislocation.  No evidence of rib fracture is visualized.                                 Medical Decision Making:   History:   Old Medical Records: I decided to obtain old medical records.  Initial Assessment:   Ms Murcia is a 67 yo  female patient with PMHx of MS that presents to the ED with headache, left sided rib and chest pain after a mechanical fall at home this morning. Pt was using her walker to get to the bathroom at home when her walker got caught on something causing her to fall. Pt reports hitting her head on a towel rack and landing on her left side. Pt had to crawl to the phone to call her daughter after the fall. Pt denies LOC. Pt has moderate bruising to left sided ribs. Pt complaining of pain worse on deep inspiration. Pt also complaining of dull headache to left occipital region.  Differential Diagnosis:   ICH   Rib fracture  Pneumothorax  DVT  Clinical Tests:   Lab Tests: Ordered and Reviewed  Radiological Study: Ordered and Reviewed  Medical Tests: Ordered and Reviewed  ED Management:  Pt hemodynamically stable on arrival to ED. Pt conversational and in no acute distress. Labs, CXR, X-ray  left ribs, CT Head ordered. CT Head reveals no acute intracranial abnormalities. Xrays reveals no acute fractures or processes. U/S bilateral lower extremities ordered due to LLE swelling and redness. Pt reports history of DVT thirty years ago. US reveals no evidence of DVT. Plan is to discharge patient home and have her follow up with PCP. Results and plan discussed with patient who is understanding and agreeable with plan. She was discharged with prescription for percocet.  She will follow up with PCP.  All of the patient's questions were answered.  I reviewed the patient's chart, labs, and imaging and discussed the case with my supervising physician.               Attending Attestation:     Physician Attestation Statement for NP/PA:   I discussed this assessment and plan of this patient with the NP/PA, but I did not personally examine the patient. The face to face encounter was performed by the NP/PA.                     Clinical Impression:   The primary encounter diagnosis was Fall. Diagnoses of Rib pain on left side, Accident due to mechanical fall without injury, initial encounter, Injury of head, initial encounter, and Leg swelling were also pertinent to this visit.      Disposition:   Disposition: Discharged  Condition: Stable                        Samir Bronson PA-C  01/03/19 1740       Jason Stockton MD  01/04/19 1343

## 2019-01-02 NOTE — ED NOTES
Patient arrives for evaluation of occipital head pain with intermittent dizziness and left rib pain with bruising since stumble and fall at 1300 today - no open wounds - bruising noted to left mid-axillary line along ribs on left side 4 cm x 8 cm - denies SOB - no other reported injuries

## 2019-01-03 NOTE — PROVIDER PROGRESS NOTES - EMERGENCY DEPT.
Encounter Date: 1/2/2019    ED Physician Progress Notes        Physician Note:   Ultrasound of LE are negative for DVT and the patient is discharged.      I, Solis Bowles, am scribing for, and in the presence of, Dr. Stockton. I performed the above scribed service and the documentation accurately describes the services I performed. I attest to the accuracy of the note.

## 2019-01-03 NOTE — DISCHARGE INSTRUCTIONS
Please follow up with PCP to discuss today's Emergency Department visit and for further evaluation and management. Please return to the Emergency Department if your symptoms worsen or you develop any additional concerning symptoms.

## 2019-01-09 ENCOUNTER — TELEPHONE (OUTPATIENT)
Dept: OBSTETRICS AND GYNECOLOGY | Facility: CLINIC | Age: 69
End: 2019-01-09

## 2019-01-09 NOTE — LETTER
January 9, 2019    Lupis Murcia  [address] [place] [address]             [facility]  [address]  [address] [facility]  [place] [address]  Phone: [phone] Dear Ms. Murcia:    The results of your most recent Pap smear are normal. This means that no cancerous or precancerous cells were seen. We recommend that you come back in 1 year for your annual exam and 1 years for your next Pap smear.    If you have any questions or concerns, please don't hesitate to call.    Sincerely,        Dr. Samir Pearson

## 2019-01-21 ENCOUNTER — TELEPHONE (OUTPATIENT)
Dept: NEUROLOGY | Facility: CLINIC | Age: 69
End: 2019-01-21

## 2019-01-21 NOTE — TELEPHONE ENCOUNTER
----- Message from Tran Campos sent at 1/21/2019  1:17 PM CST -----  Contact: Pt. [phone]  Needs Advice    Reason for call: The patient would like to speak to someone regarding scheduling. Please contact the patient to discuss further.          Communication Preference:PHONE     Additional Information:

## 2019-01-21 NOTE — TELEPHONE ENCOUNTER
----- Message from Nilesh Carlson sent at 1/21/2019  8:46 AM CST -----  Contact: Patient @ [phone]  Patient calling to r/s the 1-22nd appt, pls call to discuss

## 2019-02-05 ENCOUNTER — OFFICE VISIT (OUTPATIENT)
Dept: NEUROLOGY | Facility: CLINIC | Age: 69
End: 2019-02-05
Payer: MEDICARE

## 2019-02-05 VITALS
BODY MASS INDEX: 37 KG/M2 | SYSTOLIC BLOOD PRESSURE: 152 MMHG | WEIGHT: 201.06 LBS | HEIGHT: 62 IN | HEART RATE: 93 BPM | DIASTOLIC BLOOD PRESSURE: 80 MMHG

## 2019-02-05 DIAGNOSIS — R26.9 GAIT DISTURBANCE: ICD-10-CM

## 2019-02-05 DIAGNOSIS — G35 MULTIPLE SCLEROSIS: Primary | ICD-10-CM

## 2019-02-05 DIAGNOSIS — M62.838 MUSCLE SPASM: ICD-10-CM

## 2019-02-05 DIAGNOSIS — Z71.89 COUNSELING REGARDING GOALS OF CARE: ICD-10-CM

## 2019-02-05 PROCEDURE — 99214 OFFICE O/P EST MOD 30 MIN: CPT | Mod: S$PBB,,, | Performed by: CLINICAL NURSE SPECIALIST

## 2019-02-05 PROCEDURE — 99213 OFFICE O/P EST LOW 20 MIN: CPT | Mod: PBBFAC | Performed by: CLINICAL NURSE SPECIALIST

## 2019-02-05 PROCEDURE — 99999 PR PBB SHADOW E&M-EST. PATIENT-LVL III: ICD-10-PCS | Mod: PBBFAC,,, | Performed by: CLINICAL NURSE SPECIALIST

## 2019-02-05 PROCEDURE — 99999 PR PBB SHADOW E&M-EST. PATIENT-LVL III: CPT | Mod: PBBFAC,,, | Performed by: CLINICAL NURSE SPECIALIST

## 2019-02-05 PROCEDURE — 99214 PR OFFICE/OUTPT VISIT, EST, LEVL IV, 30-39 MIN: ICD-10-PCS | Mod: S$PBB,,, | Performed by: CLINICAL NURSE SPECIALIST

## 2019-02-05 NOTE — PROGRESS NOTES
Subjective:       Patient ID: Lupis Murcia is a 68 y.o. female who presents today for a routine clinic visit for MS.  The history has been provided by the patient. She was last seen in September 2018. She is accompanied by her daughter, Amanda.     MS HPI:  · DMT: N/A  · Taking vitamin D3 as recommended? Yes -  Dose: 5000 units daily   · She has not done PT in a few months.   · She is wearing new AFO today.   · She had a fall on 1/2/19 in the bathroom when home alone. She hit her head on the tile floor. She did not lose consciousness. She had to drag herself to the next room, which took about an hour. She was able to call her daughter and ask her to leave work and get her off of the floor. She went to ER. She had head CT. US lower extremity was normal (left leg was swollen and red). Chest xray was normal. She had severe rib pain after falling.   · Since her fall, she has not been walking much.   · She has carpal tunnel in the right hand. Her hand is numb at night, but not so much during the day.       SOCIAL HISTORY  Social History     Tobacco Use    Smoking status: Never Smoker    Smokeless tobacco: Never Used   Substance Use Topics    Alcohol use: No    Drug use: No     Living arrangements - the patient lives with her daughter  Employment--disabled     MS ROS:  · Fatigue: Yes - Stable.   · Sleep Disturbance: No.   · Bladder Dysfunction: Yes -Stable.  She has frequency and urgency.  Her BP pill does contain a diuretic. She wears a pad for occasional incontinence, but this is rare. She denies any recent UTIs. She recently saw her gynecologist.   · Bowel Dysfunction: No  · Spasticity: Yes -Mild spasms at night.   · Visual Symptoms: Yes - Stable. She has cataracts.   · Cognitive: No.   · Mood Disorder: Yes - She gets frustrated and depressed about her limitations sometimes.   · Gait Disturbance: Yes - She uses a walker. She has a AFO now.   · Falls: As above.   · Hand Dysfunction: Yes - She has some numbness  "in her right hand from carpal tunnel (mostly at night).  · Pain: Yes -pain level is "not that bad." Back pain is improved.   · Sexual Dysfunction: Not Assessed  · Skin Breakdown: No  · Tremors: No  · Dysphagia:  No  · Dysarthria:  No  · Heat sensitivity:  Yes - She tries to avoid the heat. She does not feel affected by the cold.   · Any un-met adaptive needs? No       Objective:        1. 25 foot timed walk: 33.3 seconds today with rolling walker;  was 33.6 seconds at last visit with rolling walker;   Neurologic Exam      MENTAL STATUS: language is fluent, normal verbal comprehension, short-term and remote memory is intact, attention is normal, patient is alert and oriented x 3, fund of knowlege is appropriate by vocabulary.      MOTOR EXAM: Normal bulk and tone throughout UE and LE bilaterally. Rapid sequential movements are slow in the left upper extremity.  Strength is 5/5 in all groups in the upper extremities and right lower extremity. Left lower extremity--iliopsoas 3/5; hamstring 3/5, ant tibial 3/5     REFLEXES: 2+ and symmetric throughout in all four extremities.      SENSORY EXAM: Decreased vibratory sense to bilateral lower extremities; intact to upper extremities     COORDINATION: Slow finger to nose with left hand     GAIT: Wide-based, slow, and unsteady; She is wearing AFO today and using walker. Left foot is dragging.     Imaging:     No new imaging to review today.     Labs:     Lab Results   Component Value Date    LRHHPTYW14IA 33 11/26/2018    [id number] 40 07/20/2018    [id number] 5 (L) 04/13/2018     Lab Results   Component Value Date    WBC 8.65 01/02/2019    RBC 4.31 01/02/2019    HGB 13.0 01/02/2019    HCT 38.7 01/02/2019    MCV 90 01/02/2019    MCH 30.2 01/02/2019    MCHC 33.6 01/02/2019    RDW 12.8 01/02/2019     01/02/2019    MPV 9.5 01/02/2019    GRAN 6.3 01/02/2019    GRAN 72.7 01/02/2019    LYMPH 1.7 01/02/2019    LYMPH 20.1 01/02/2019    MONO 0.6 01/02/2019    MONO 6.6 " 01/02/2019    EOS 0.0 01/02/2019    BASO 0.01 01/02/2019    EOSINOPHIL 0.2 01/02/2019    BASOPHIL 0.1 01/02/2019     Sodium   Date Value Ref Range Status   01/02/2019 142 136 - 145 mmol/L Final     Potassium   Date Value Ref Range Status   01/02/2019 3.8 3.5 - 5.1 mmol/L Final     Chloride   Date Value Ref Range Status   01/02/2019 103 95 - 110 mmol/L Final     CO2   Date Value Ref Range Status   01/02/2019 27 23 - 29 mmol/L Final     Glucose   Date Value Ref Range Status   01/02/2019 123 (H) 70 - 110 mg/dL Final     BUN, Bld   Date Value Ref Range Status   01/02/2019 12 8 - 23 mg/dL Final     Creatinine   Date Value Ref Range Status   01/02/2019 0.7 0.5 - 1.4 mg/dL Final     Calcium   Date Value Ref Range Status   01/02/2019 10.1 8.7 - 10.5 mg/dL Final     Total Protein   Date Value Ref Range Status   09/05/2017 7.4 6.0 - 8.4 g/dL Final     Albumin   Date Value Ref Range Status   09/05/2017 4.0 3.5 - 5.2 g/dL Final     Total Bilirubin   Date Value Ref Range Status   09/05/2017 0.5 0.1 - 1.0 mg/dL Final     Comment:     For infants and newborns, interpretation of results should be based  on gestational age, weight and in agreement with clinical  observations.  Premature Infant recommended reference ranges:  Up to 24 hours.............<8.0 mg/dL  Up to 48 hours............<12.0 mg/dL  3-5 days..................<15.0 mg/dL  6-29 days.................<15.0 mg/dL       Alkaline Phosphatase   Date Value Ref Range Status   09/05/2017 78 55 - 135 U/L Final     AST   Date Value Ref Range Status   09/05/2017 12 10 - 40 U/L Final     ALT   Date Value Ref Range Status   09/05/2017 13 10 - 44 U/L Final     Anion Gap   Date Value Ref Range Status   01/02/2019 12 8 - 16 mmol/L Final     eGFR if    Date Value Ref Range Status   01/02/2019 >60.0 >60 mL/min/1.73 m^2 Final     eGFR if non    Date Value Ref Range Status   01/02/2019 >60.0 >60 mL/min/1.73 m^2 Final     Comment:     Calculation used to  obtain the estimated glomerular filtration  rate (eGFR) is the CKD-EPI equation.          Diagnosis/Assessment/Plan:    1. Multiple Sclerosis  · Assessment: Lupis is clinically stable today.  · Imaging: May consider new brain MRI in July 2019; can order at next visit if indicated.   · Disease Modifying Therapies: Continue Vitamin D. Will check Vitamin D level at next visit. She continues to defer traditional disease modifying therapy.     2. MS Symptom Assessment / Management  · Bladder: Will monitor.   · Spasticity: Continue Baclofen.     Over 50% of this 35 minute visit was spent in direct face to face counseling of the patient about MS and MS symptom management. The patient agrees with the plan of care. She will follow up with Dr. Henley in June 2019.     Blanca Virgen, [facility]-BC, MSCN      There are no diagnoses linked to this encounter.

## 2019-03-08 ENCOUNTER — PATIENT MESSAGE (OUTPATIENT)
Dept: NEUROLOGY | Facility: CLINIC | Age: 69
End: 2019-03-08

## 2019-03-08 DIAGNOSIS — G35 MULTIPLE SCLEROSIS: Primary | ICD-10-CM

## 2019-03-28 ENCOUNTER — CLINICAL SUPPORT (OUTPATIENT)
Dept: REHABILITATION | Facility: HOSPITAL | Age: 69
End: 2019-03-28
Payer: MEDICARE

## 2019-03-28 DIAGNOSIS — M21.372 FOOT DROP, LEFT: ICD-10-CM

## 2019-03-28 DIAGNOSIS — R26.9 GAIT ABNORMALITY: Primary | ICD-10-CM

## 2019-03-28 PROCEDURE — 97162 PT EVAL MOD COMPLEX 30 MIN: CPT | Mod: PO

## 2019-03-28 NOTE — PROGRESS NOTES
[facility]  Physical Therapy Neurological Rehabilitation Initial Evaluation    Name: Lupis Murcia  Clinic Number: [id number]    Therapy Diagnosis:   Encounter Diagnoses   Name Primary?    Gait abnormality Yes    Foot drop, left      Physician: Blanca Virgen APRN,*    Physician Orders: PT Eval and Treat   Medical Diagnosis from Referral:   Diagnosis   G35 (ICD-10-CM) - Multiple sclerosis     Evaluation Date: 3/28/2019  Authorization Period Expiration: 19  Plan of Care Expiration: 19  Visit # / Visits authorized:     Time In: 1345  Time Out:1430  Total Billable Time: 45 minutes    Precautions: Standard and Fall    Subjective   Date of onset: History of MS  History of current condition - Lupis reports: that since she last saw physical therapy in November, she has been walking slower and doesn't go outside as often as she used to because she feels embarrassed by how long it takes for her to walk with her walker. She has noticed that her legs have been getting weaker, her left leg more than her right, and that he daughter has been helping her with getting on and off of the shower tub.     Medical History:   Past Medical History:   Diagnosis Date    MS (multiple sclerosis)     Vitamin B12 deficiency        Surgical History:   Lupis Murcia  has a past surgical history that includes  section and Breast cyst excision (Left).    Medications:   Lupis has a current medication list which includes the following prescription(s): acetaminophen-codeine 300-30mg, baclofen, candesartan-hydrochlorothiazide, cyanocobalamin, furosemide, lorazepam, naproxen, oxycodone-acetaminophen, vitamin b comp with vit c no.6, and vitamin d.    Allergies:   Review of patient's allergies indicates:   Allergen Reactions    Contrast media Shortness Of Breath and Rash    Pcn [penicillins] Shortness Of Breath and Rash    Celebrex [celecoxib] Other (See Comments)     Swallowing  problems     Motrin [ibuprofen] Rash        Prior Therapy: has had previous physical therapy but wasn't able to make many visits due to transportation issues  Social History:  lives with their daughter  Falls: in January in the bathroom  DME: Walker, Shower chair and L AFO    Home Environment: 1 story house with 2 steps to get into   Exercise Routine / History: rarely   Family Present at time of Eval: none - daughter dropped her off  Occupation: unemployed  Prior Level of Function: was able to walk faster in home and community  Current Level of Function: doesn't get out into community, very slow gait and high fall risk    Pain:  Current 0/10, worst 8/10, best 0/10   Location: bilateral upper legs  Description: Burning, Tingling, Numb and Sharp  Aggravating Factors: Morning  Easing Factors: n/a    Pts goals: wants to walk without the walker    Objective     Patient's mobility presenting to therapy evaluation: walks with device    Mental status: alert, oriented to person, place, and time  Appearance: Casually dressed  Behavior:  calm and cooperative  Attention Span and Concentration:  Normal  Follows commands: 100% of time   Speech: no deficits     Dominant hand:  left     Posture Alignment in sitting:  Right shoulder elevated, shifted towards the R side, weight bearing predominantly on R hip    Posture Alignment in standing: R shoulder elevated     Sensation: Light Touch: Intact         Tone: decreased  Limbs/muscles affected: B LE    Visual/Auditory: patient states she has cataracts    Coordination:   - fine motor:  Intact  - UE coordination:  Intact  - LE coordination:   Impaired    ROM:   UPPER EXTREMITY--AROM/PROM  (R) UE: WFLs  (L) UE: WFLs         RANGE OF MOTION--LOWER EXTREMITIES  (R) LE Hip: normal   Knee: normal   Ankle: normal    (L) LE: Hip: limited due to weakness   Knee: normal   Ankle: normal    Strength: manual muscle test grades below   Lower Extremity Strength  Right LE  Left LE    Hip Flexion:  3+/5 Hip Flexion: 2/5   Hip Extension:  4/5 Hip Extension: 4-/5   Hip Abduction: 4/5 Hip Abduction: 3+/5   Hip Adduction: 4+/5 Hip Adduction 3+/5   Knee Extension: 4/5 Knee Extension: 3+/5   Knee Flexion: 4/5 Knee Flexion: 2/5   Ankle Dorsiflexion: 4/5 Ankle Dorsiflexion: trace        Evaluation 04/01/2019   30 second Chair Rise  (normative values to maintain physical independence: ) 6 completed with arms         Balance Testing    Evaluation 04/01/2019   Tandem Stance R LE forward, eyes open 2s  (<30 sec = Increased FALL RISK)   Tandem Stance L LE forward, eyes open 1s  (<30 sec = Increased FALL RISK)       Postural control: MCTSIB: Evaluation 04/01/2019   1. Eyes Open/feet together/Firm:  30 seconds   2. Eyes Closed/feet together/Firm:  24 seconds   3. Eyes Open/feet together/Foam:  n/a seconds   4. Eyes Closed/feet together/Foam:  n/a seconds       GAIT ASSESSMENT  - AD used: Rolling Walker  - Assistance: supervision  - Distance: home distances    Observed Gait Deviations:  Lupis displays the following deviations with ambulation:  Abnormal, decreased pako, increased time in double stance, decreased step length, decreased stride length and decreased toe-to-floor clearance. Wears L AFO due to foot drop. L foot dragging with swing phase.     Impairments contributing to deviations/impairments: impaired balance, impaired motor control, abnormal muscle tone and decreased strength     Evaluation 04/01/2019   Self Selected Walking Speed 0.15 m/sec (6m/41s) with  Rolling Walker     Endurance Deficit: moderate    Functional Mobility (Bed mobility, transfers)  Bed mobility:  Min A   Supine to sit:  Mod I  Sit to supine:  Mod I  Sit to stand:   Mod I  Stand pivot:    Mod I  Transfers to bed:  Mod I  Transfers to toilet: Mod I  Transfers to shower / tub:   Min A  Car transfers:  Min A  aWheelchair mobility: n/a    CMS Impairment/Limitation/Restriction for FOTO  Survey    Therapist reviewed FOTO scores for Lupis CORRY  Divina on 3/28/2019.   FOTO documents entered into [employer] - see Media section.    Limitation Score: 60%         Assessment   Lupis is a 68 y.o. female referred to outpatient Physical Therapy with a medical diagnosis of multiple sclerosis. Pt presents with B LE weakness, decreased gait velocity, decreased balance, and decreased ability to perform sit to stands. The patient's LE weakness and decreased sit to stand repetitions shows difficulty with performing transfers. The patient requires assistance of her daughter when transferring onto the shower bench due to LE weakness. The patient has a decreased gait velocity making the patient anxious about walking in public thereby keeping the patient home and avoiding community ambulation. Balance deficits show an increased fall risk for the patient. Skilled therapy is required to improve LE strength to increasing transfer independence and mobility. Therapy can also improve balance leading to a decreased risk for falls for future ambulation. Patient will be seen 1 time a week due to transportation concerns for 8 weeks. POC : 3/28/19-5/22/19.    Pt prognosis is Fair.   Pt will benefit from skilled outpatient Physical Therapy to address the deficits stated above and in the chart below, provide pt/family education, and to maximize pt's level of independence.     Plan of care discussed with patient: Yes  Pt's spiritual, cultural and educational needs considered and patient is agreeable to the plan of care and goals as stated below:     Anticipated Barriers for therapy: transportation,     Medical Necessity is demonstrated by the following  History  Co-morbidities and personal factors that may impact the plan of care Co-morbidities:   transportation assistance required    Personal Factors:   coping style     moderate   Examination  Body Structures and Functions, activity limitations and participation restrictions that may impact the plan of care Body Regions:   lower  extremities    Body Systems:    ROM  strength  balance  gait    Activity limitations:   Learning and applying knowledge  no deficits    General Tasks and Commands  no deficits    Communication  no deficits    Mobility  walking  driving (bike, car, motorcycle)    Self care  looking after one's health    Domestic Life  doing house work (cleaning house, washing dishes, laundry)    Interactions/Relationships  no deficits    Life Areas  basic economic transactions    Community and Social Life  no deficits         high   Clinical Presentation evolving clinical presentation with changing clinical characteristics moderate   Decision Making/ Complexity Score: moderate     Goals:  Short Term Goals: 4 weeks   1. Pt will improve SSWS to 0.4 m/s for improvements in safety during ambulation  2. Pt will improve L hip flexion strength to 3/5 to allow for easier transfers  3. Pt will improve B tandem standing balance to 5 seconds each leg showing improved balance safety  4. Pt will improve 30 second sit to stand to 5 reps showing improvements in LE strength and endurance  5. Pt will be able to perform HEP without VC's showing ability to maintain gains from therapy since she is unable to attended 2 times per week    Long Term Goals: 8 weeks   1. Pt will improve B hip flexion to 4/5 to allow for easier transfer ability  2. Pt will improve SSWS to 0.6 m/s for improvements in safety during ambulation  3. Pt will improve FOTO limitation score to </= 45% for improved self perception of functional mobility.  4. Pt will be able to perform HEP independently showing ability to maintain gains from therapy since she is unable to attended 2 times per week  5. Pt will improve B tandem standing balance to 15 seconds each leg showing improved balance safety  6. Pt will improve 30 second sit to stand to 7 reps showing improvements in LE strength and endurance    Plan   Plan of care Certification: 3/28/2019 to 5/22/19.    Outpatient Physical Therapy  "1 times weekly for 8 weeks to include the following interventions: Gait Training, Manual Therapy, Moist Heat/ Ice, Neuromuscular Re-ed, Orthotic Management and Training, Patient Education, Self Care, Therapeutic Activites and Therapeutic Exercise.     Basil Morales, SPT    "I, Letty Lemus DPT, certify that I was present in the room directing the student in service delivery and guiding them using my skilled judgment. As the co-signing therapist, I have reviewed the student's documentation and am responsible for the treatment, assessment and plan."     Letty Lemus, PT  04/01/2019        "

## 2019-04-01 ENCOUNTER — DOCUMENTATION ONLY (OUTPATIENT)
Dept: REHABILITATION | Facility: HOSPITAL | Age: 69
End: 2019-04-01

## 2019-04-01 NOTE — PROGRESS NOTES
Face to face meeting completed with Letty Lemus  PT regarding current status and progress of   Lupis Murcia .Seen downstairs only  Michele Charlton, ANSELMO Lemus, PT  04/01/2019

## 2019-04-01 NOTE — PROGRESS NOTES
PT/PTA met face to face to discuss pt's treatment plan and progress towards established goals.  Continue with current PT POC with focus on general strengthening, stretching and gait.  Patient will be seen by physical therapist at least every sixth treatment or 30 days, whichever occurs first.    Mariann Barrett, PTA  04/01/2019      Letty Lemus, PT  04/01/2019

## 2019-04-01 NOTE — PLAN OF CARE
[facility]  Physical Therapy Neurological Rehabilitation Initial Evaluation    Name: Lupis Murcia  Clinic Number: [id number]    Therapy Diagnosis:   Encounter Diagnoses   Name Primary?    Gait abnormality Yes    Foot drop, left      Physician: Blanca Virgen APRN,*    Physician Orders: PT Eval and Treat   Medical Diagnosis from Referral:   Diagnosis   G35 (ICD-10-CM) - Multiple sclerosis     Evaluation Date: 3/28/2019  Authorization Period Expiration: 19  Plan of Care Expiration: 19  Visit # / Visits authorized:     Time In: 1345  Time Out:1430  Total Billable Time: 45 minutes    Precautions: Standard and Fall    Subjective   Date of onset: History of MS  History of current condition - Lupis reports: that since she last saw physical therapy in November, she has been walking slower and doesn't go outside as often as she used to because she feels embarrassed by how long it takes for her to walk with her walker. She has noticed that her legs have been getting weaker, her left leg more than her right, and that he daughter has been helping her with getting on and off of the shower tub.     Medical History:   Past Medical History:   Diagnosis Date    MS (multiple sclerosis)     Vitamin B12 deficiency        Surgical History:   Lupis Murcia  has a past surgical history that includes  section and Breast cyst excision (Left).    Medications:   Lupis has a current medication list which includes the following prescription(s): acetaminophen-codeine 300-30mg, baclofen, candesartan-hydrochlorothiazide, cyanocobalamin, furosemide, lorazepam, naproxen, oxycodone-acetaminophen, vitamin b comp with vit c no.6, and vitamin d.    Allergies:   Review of patient's allergies indicates:   Allergen Reactions    Contrast media Shortness Of Breath and Rash    Pcn [penicillins] Shortness Of Breath and Rash    Celebrex [celecoxib] Other (See Comments)     Swallowing  problems     Motrin [ibuprofen] Rash        Prior Therapy: has had previous physical therapy but wasn't able to make many visits due to transportation issues  Social History:  lives with their daughter  Falls: in January in the bathroom  DME: Walker, Shower chair and L AFO    Home Environment: 1 story house with 2 steps to get into   Exercise Routine / History: rarely   Family Present at time of Eval: none - daughter dropped her off  Occupation: unemployed  Prior Level of Function: was able to walk faster in home and community  Current Level of Function: doesn't get out into community, very slow gait and high fall risk    Pain:  Current 0/10, worst 8/10, best 0/10   Location: bilateral upper legs  Description: Burning, Tingling, Numb and Sharp  Aggravating Factors: Morning  Easing Factors: n/a    Pts goals: wants to walk without the walker    Objective     Patient's mobility presenting to therapy evaluation: walks with device    Mental status: alert, oriented to person, place, and time  Appearance: Casually dressed  Behavior:  calm and cooperative  Attention Span and Concentration:  Normal  Follows commands: 100% of time   Speech: no deficits     Dominant hand:  left     Posture Alignment in sitting:  Right shoulder elevated, shifted towards the R side, weight bearing predominantly on R hip    Posture Alignment in standing: R shoulder elevated     Sensation: Light Touch: Intact         Tone: decreased  Limbs/muscles affected: B LE    Visual/Auditory: patient states she has cataracts    Coordination:   - fine motor:  Intact  - UE coordination:  Intact  - LE coordination:   Impaired    ROM:   UPPER EXTREMITY--AROM/PROM  (R) UE: WFLs  (L) UE: WFLs         RANGE OF MOTION--LOWER EXTREMITIES  (R) LE Hip: normal   Knee: normal   Ankle: normal    (L) LE: Hip: limited due to weakness   Knee: normal   Ankle: normal    Strength: manual muscle test grades below   Lower Extremity Strength  Right LE  Left LE    Hip Flexion:  3+/5 Hip Flexion: 2/5   Hip Extension:  4/5 Hip Extension: 4-/5   Hip Abduction: 4/5 Hip Abduction: 3+/5   Hip Adduction: 4+/5 Hip Adduction 3+/5   Knee Extension: 4/5 Knee Extension: 3+/5   Knee Flexion: 4/5 Knee Flexion: 2/5   Ankle Dorsiflexion: 4/5 Ankle Dorsiflexion: trace        Evaluation 04/01/2019   30 second Chair Rise  (normative values to maintain physical independence: ) 6 completed with arms         Balance Testing    Evaluation 04/01/2019   Tandem Stance R LE forward, eyes open 2s  (<30 sec = Increased FALL RISK)   Tandem Stance L LE forward, eyes open 1s  (<30 sec = Increased FALL RISK)       Postural control: MCTSIB: Evaluation 04/01/2019   1. Eyes Open/feet together/Firm:  30 seconds   2. Eyes Closed/feet together/Firm:  24 seconds   3. Eyes Open/feet together/Foam:  n/a seconds   4. Eyes Closed/feet together/Foam:  n/a seconds       GAIT ASSESSMENT  - AD used: Rolling Walker  - Assistance: supervision  - Distance: home distances    Observed Gait Deviations:  Lupis displays the following deviations with ambulation:  Abnormal, decreased pako, increased time in double stance, decreased step length, decreased stride length and decreased toe-to-floor clearance. Wears L AFO due to foot drop. L foot dragging with swing phase.     Impairments contributing to deviations/impairments: impaired balance, impaired motor control, abnormal muscle tone and decreased strength     Evaluation 04/01/2019   Self Selected Walking Speed 0.15 m/sec (6m/41s) with  Rolling Walker     Endurance Deficit: moderate    Functional Mobility (Bed mobility, transfers)  Bed mobility:  Min A   Supine to sit:  Mod I  Sit to supine:  Mod I  Sit to stand:   Mod I  Stand pivot:    Mod I  Transfers to bed:  Mod I  Transfers to toilet: Mod I  Transfers to shower / tub:   Min A  Car transfers:  Min A  aWheelchair mobility: n/a    CMS Impairment/Limitation/Restriction for FOTO  Survey    Therapist reviewed FOTO scores for Lupis CORRY  Divina on 3/28/2019.   FOTO documents entered into [employer] - see Media section.    Limitation Score: 60%         Assessment   Lupis is a 68 y.o. female referred to outpatient Physical Therapy with a medical diagnosis of multiple sclerosis. Pt presents with B LE weakness, decreased gait velocity, decreased balance, and decreased ability to perform sit to stands. The patient's LE weakness and decreased sit to stand repetitions shows difficulty with performing transfers. The patient requires assistance of her daughter when transferring onto the shower bench due to LE weakness. The patient has a decreased gait velocity making the patient anxious about walking in public thereby keeping the patient home and avoiding community ambulation. Balance deficits show an increased fall risk for the patient. Skilled therapy is required to improve LE strength to increasing transfer independence and mobility. Therapy can also improve balance leading to a decreased risk for falls for future ambulation. Patient will be seen 1 time a week due to transportation concerns for 8 weeks. POC : 3/28/19-5/22/19.    Pt prognosis is Fair.   Pt will benefit from skilled outpatient Physical Therapy to address the deficits stated above and in the chart below, provide pt/family education, and to maximize pt's level of independence.     Plan of care discussed with patient: Yes  Pt's spiritual, cultural and educational needs considered and patient is agreeable to the plan of care and goals as stated below:     Anticipated Barriers for therapy: transportation,     Medical Necessity is demonstrated by the following  History  Co-morbidities and personal factors that may impact the plan of care Co-morbidities:   transportation assistance required    Personal Factors:   coping style     moderate   Examination  Body Structures and Functions, activity limitations and participation restrictions that may impact the plan of care Body Regions:   lower  extremities    Body Systems:    ROM  strength  balance  gait    Activity limitations:   Learning and applying knowledge  no deficits    General Tasks and Commands  no deficits    Communication  no deficits    Mobility  walking  driving (bike, car, motorcycle)    Self care  looking after one's health    Domestic Life  doing house work (cleaning house, washing dishes, laundry)    Interactions/Relationships  no deficits    Life Areas  basic economic transactions    Community and Social Life  no deficits         high   Clinical Presentation evolving clinical presentation with changing clinical characteristics moderate   Decision Making/ Complexity Score: moderate     Goals:  Short Term Goals: 4 weeks   1. Pt will improve SSWS to 0.4 m/s for improvements in safety during ambulation  2. Pt will improve L hip flexion strength to 3/5 to allow for easier transfers  3. Pt will improve B tandem standing balance to 5 seconds each leg showing improved balance safety  4. Pt will improve 30 second sit to stand to 5 reps showing improvements in LE strength and endurance  5. Pt will be able to perform HEP without VC's showing ability to maintain gains from therapy since she is unable to attended 2 times per week    Long Term Goals: 8 weeks   1. Pt will improve B hip flexion to 4/5 to allow for easier transfer ability  2. Pt will improve SSWS to 0.6 m/s for improvements in safety during ambulation  3. Pt will improve FOTO limitation score to </= 45% for improved self perception of functional mobility.  4. Pt will be able to perform HEP independently showing ability to maintain gains from therapy since she is unable to attended 2 times per week  5. Pt will improve B tandem standing balance to 15 seconds each leg showing improved balance safety  6. Pt will improve 30 second sit to stand to 7 reps showing improvements in LE strength and endurance    Plan   Plan of care Certification: 3/28/2019 to 5/22/19.    Outpatient Physical Therapy  "1 times weekly for 8 weeks to include the following interventions: Gait Training, Manual Therapy, Moist Heat/ Ice, Neuromuscular Re-ed, Orthotic Management and Training, Patient Education, Self Care, Therapeutic Activites and Therapeutic Exercise.     Basil Morales, SPT    "I, Letty Lemus DPT, certify that I was present in the room directing the student in service delivery and guiding them using my skilled judgment. As the co-signing therapist, I have reviewed the student's documentation and am responsible for the treatment, assessment and plan."     Letty Lemus, PT  04/01/2019      "

## 2019-04-06 ENCOUNTER — DOCUMENTATION ONLY (OUTPATIENT)
Dept: REHABILITATION | Facility: HOSPITAL | Age: 69
End: 2019-04-06

## 2019-04-06 NOTE — PROGRESS NOTES
PT/PTA met face to face to discuss pt's treatment plan and progress towards established goals. Continue with current PT POC. Pt will be seen by physical therapist at least every 6th treatment day or every 30 days, whichever occurs first.      Bret Jose, PTA  04/06/2019    Letty Lemus, PT  04/08/2019

## 2019-04-12 ENCOUNTER — CLINICAL SUPPORT (OUTPATIENT)
Dept: REHABILITATION | Facility: HOSPITAL | Age: 69
End: 2019-04-12
Payer: MEDICARE

## 2019-04-12 DIAGNOSIS — M21.372 FOOT DROP, LEFT: ICD-10-CM

## 2019-04-12 DIAGNOSIS — R26.9 GAIT ABNORMALITY: ICD-10-CM

## 2019-04-12 PROCEDURE — 97116 GAIT TRAINING THERAPY: CPT | Mod: PO

## 2019-04-12 PROCEDURE — 97530 THERAPEUTIC ACTIVITIES: CPT | Mod: PO

## 2019-04-12 PROCEDURE — 97110 THERAPEUTIC EXERCISES: CPT | Mod: PO

## 2019-04-12 NOTE — PROGRESS NOTES
Physical Therapy Daily Treatment Note     Name: Lupis WHEATLEY [facility] Number: [id number]    Therapy Diagnosis: No diagnosis found.  Physician: Blanca Virgen, SURINDER,*    Visit Date: 4/12/2019    Physician Orders: PT evaluation and treatment  Medical Diagnosis: G35 (ICD-10-CM) - Multiple sclerosis  Evaluation Date: 3/28/19  Authorization Period Expiration: 12/31/19  Plan of Care Certification Period: 5/22/19  Visit #/Visits authorized: 1/20     Time In: 1605  Time Out: 1655  Total Billable Time: 50 minutes    Precautions: Standard and Fall    Subjective     Pt reports: Feeling very stiff this afternoon.  She was not issued a HEP during the initial evaluation.  Response to previous treatment: no adverse affects  Functional change: no functional change reported    Pain: 0/10  Location: N/A    Objective     Lupis received therapeutic exercises to develop strength, flexibility and ROM for 30 minutes including:    Supine: bilaterally    Manual Hip flexor stretch 2 x 30 sec   Manual Quad stretch 2 x 30 sec   Manual HS stretch 2 x 30 sec   Hip/knee PROM   SKTC  10 x 10 sec     Bridges 2 x 10 reps     Sitting:   Sitting hip flexion RLE 2 x 10, LLE 2 x 10    Sit to stands from hi/low mat: 2 x 10 reps       Lupis participated in neuromuscular re-education activities to improve: balance for 5 minutes. The following activities were included:    Balance on foam 2 x 30 sec with nbos, cga, no UE support   Modified tandem on solid surface 2 x 30 sec, cga, intermittent UE support      Lupis participated in gait training to improve functional mobility and safety for 15 minutes, including:  Pt ambulated in and out of downstairs gym and between exercise stations (~120 ft total) with RW and L AFO secondary foot drop. Pt demonstrating significantly decreased pako, increased time in double stance, decreased step length, decreased stride length and decreased toe-to-floor clearance.      Home Exercises Provided and Patient  Education Provided     Education provided: Pt was educated on all therapeutic exercises performed during today's tx visit.     Written Home Exercises Provided: No. Pt was not issued any exercises during today's tx visit but will issued a HEP during the next tx visit.   Exercises were reviewed and Lupis was able to demonstrate them prior to the end of the session.  Lupis demonstrated good understanding of the education provided.       Assessment   Pt presents with a significantly stiff L hip and knee upon arrival. Lupis's LLE received manual stretching and PROM in supine which was tolerated well. Core weakness was also noted. HEP was not issued but will be issued during the next tx visit. Pt could benefit from continued BLE stretching/strengthening and core stabilization during future tx visits.        Lupis is progressing well towards her goals.   Pt prognosis is fair.     Pt will continue to benefit from skilled outpatient physical therapy to address the deficits listed in the problem list box on initial evaluation, provide pt/family education and to maximize pt's level of independence in the home and community environment.     Pt's spiritual, cultural and educational needs considered and pt agreeable to plan of care and goals.     Anticipated barriers to physical therapy:     Goals:  Short Term Goals: 4 weeks   1. Pt will improve SSWS to 0.4 m/s for improvements in safety during ambulation  2. Pt will improve L hip flexion strength to 3/5 to allow for easier transfers  3. Pt will improve B tandem standing balance to 5 seconds each leg showing improved balance safety  4. Pt will improve 30 second sit to stand to 5 reps showing improvements in LE strength and endurance  5. Pt will be able to perform HEP without VC's showing ability to maintain gains from therapy since she is unable to attended 2 times per week     Long Term Goals: 8 weeks   1. Pt will improve B hip flexion to 4/5 to allow for easier transfer  ability  2. Pt will improve SSWS to 0.6 m/s for improvements in safety during ambulation  3. Pt will improve FOTO limitation score to </= 45% for improved self perception of functional mobility.  4. Pt will be able to perform HEP independently showing ability to maintain gains from therapy since she is unable to attended 2 times per week  5. Pt will improve B tandem standing balance to 15 seconds each leg showing improved balance safety  6. Pt will improve 30 second sit to stand to 7 reps showing improvements in LE strength and endurance      Plan   Will continue to address flexibility/strengthening of BLE and core stabilization during subsequent tx visits.        Bret Jose, PTA

## 2019-04-22 ENCOUNTER — CLINICAL SUPPORT (OUTPATIENT)
Dept: REHABILITATION | Facility: HOSPITAL | Age: 69
End: 2019-04-22
Payer: MEDICARE

## 2019-04-22 DIAGNOSIS — R26.9 GAIT ABNORMALITY: ICD-10-CM

## 2019-04-22 DIAGNOSIS — M21.372 FOOT DROP, LEFT: ICD-10-CM

## 2019-04-22 PROCEDURE — 97110 THERAPEUTIC EXERCISES: CPT | Mod: PO

## 2019-04-22 PROCEDURE — 97116 GAIT TRAINING THERAPY: CPT | Mod: PO

## 2019-04-22 PROCEDURE — 97112 NEUROMUSCULAR REEDUCATION: CPT | Mod: PO

## 2019-04-22 NOTE — PROGRESS NOTES
Physical Therapy Daily Treatment Note     Name: Lupis WHEATLEY [facility] Number: [id number]    Therapy Diagnosis:   Encounter Diagnoses   Name Primary?    Foot drop, left     Gait abnormality      Physician: Blanca Virgen APRN,*    Visit Date: 4/22/2019    Physician Orders: PT evaluation and treatment  Medical Diagnosis: G35 (ICD-10-CM) - Multiple sclerosis  Evaluation Date: 3/28/19  Authorization Period Expiration: 12/31/19  Plan of Care Certification Period: 5/22/19  Visit #/Visits authorized: 3/20     Time In: 11:15  Time Out: 12:00  Total Billable Time: 40 minutes  5 minutes not billable      Precautions: Standard and Fall    Subjective     Pt reports: Feeling very stiff this morning and walking slow.  Response to previous treatment: no adverse affects  Functional change: no functional change reported    Pain: 0/10  Location: N/A    Objective     Lupis received therapeutic exercises to develop strength, flexibility and ROM for 17 minutes including:    Supine: bilaterally    LLE ankle DF stretch 3 x 30   SKTC  2 x 30 sec B     Sitting:   Sitting hip flexion RLE  x 10, LLE x 10 AAROM with cues to help initiate movement    Sit to stands from hi/low mat: x 10 reps    LAQ 2 x 10reps RLE with 2# weight LLE against gravity    Patient participated in neuromuscular re-education activities to improve: Balance, Coordination and Proprioception for 15 minutes. The following activities were included:     Standing static balance on floor 30 seconds without UE support  2 x 30sec static balance EC intermittent UE support  Tandem with each foot forward x 30 sec each side  Standing on airex foam pad vertical/horizontal head turns x 30 secs      Lupis participated in gait training to improve functional mobility and safety for 8 minutes, including:  Pt ambulated in and out of downstairs gym with SPT giving cues about knee bend and education discussing trying to activate hip flexors on LLE to assist with foot drop. Assessing  knee hyperextension and given cues when appropriate to avoid knee hyperextension    Home Exercises Provided and Patient Education Provided     Education provided: Pt was educated on MS related weakness / physiology since she asked and expressed poor understanding of her condition.     Written Home Exercises Provided: Not yet provided.      Assessment   Pt presents into clinic today with feeling slow and stiff. She felt that she was walking much slower than previous days. When getting onto the mat in a supine position, the patient required Francie to help elevate the LLE onto the mat. Sitting up from supine, patient required modA from SPT to assist with LLE negotiation and assist with sitting up. Patient required cues during tandem stance to avoid left knee hyperextension. Frequent breaks were taken during treatment due to patient fatigue and lower back becoming sore.      Lupis is progressing well towards her goals.   Pt prognosis is fair.     Pt will continue to benefit from skilled outpatient physical therapy to address the deficits listed in the problem list box on initial evaluation, provide pt/family education and to maximize pt's level of independence in the home and community environment.     Pt's spiritual, cultural and educational needs considered and pt agreeable to plan of care and goals.     Anticipated barriers to physical therapy: transportation dependent from family     Goals:  Short Term Goals: 4 weeks   1. Pt will improve SSWS to 0.4 m/s for improvements in safety during ambulation ONGOING  2. Pt will improve L hip flexion strength to 3/5 to allow for easier transfers ONGOING  3. Pt will improve B tandem standing balance to 5 seconds each leg showing improved balance safety ONGOING  4. Pt will improve 30 second sit to stand to 5 reps showing improvements in LE strength and endurance ONGOING  5. Pt will be able to perform HEP without VC's showing ability to maintain gains from therapy since she is  "unable to attended 2 times per week ONGOING     Long Term Goals: 8 weeks   1. Pt will improve B hip flexion to 4/5 to allow for easier transfer ability ONGOING  2. Pt will improve SSWS to 0.6 m/s for improvements in safety during ambulation ONGOING  3. Pt will improve FOTO limitation score to </= 45% for improved self perception of functional mobility. ONGOING  4. Pt will be able to perform HEP independently showing ability to maintain gains from therapy since she is unable to attended 2 times per week ONGOING  5. Pt will improve B tandem standing balance to 15 seconds each leg showing improved balance safety ONGOING  6. Pt will improve 30 second sit to stand to 7 reps showing improvements in LE strength and endurance ONGOING    Plan   Continue with stretching LE musculature, additional balance exercises and training, hip flexion exercise in supine with assist to reduce gravity placed on LLE. Provide HEP for balance and LLE strengthening (in sitting/supine) next visit.     Basil Morales, SPT     "I, Letty Lemus DPT, certify that I was present in the room directing the student in service delivery and guiding them using my skilled judgment. As the co-signing therapist, I have reviewed the student's documentation and am responsible for the treatment, assessment and plan."     Letty Lemus, PT  04/22/2019          "

## 2019-04-30 ENCOUNTER — CLINICAL SUPPORT (OUTPATIENT)
Dept: REHABILITATION | Facility: HOSPITAL | Age: 69
End: 2019-04-30
Payer: MEDICARE

## 2019-04-30 DIAGNOSIS — M21.372 FOOT DROP, LEFT: ICD-10-CM

## 2019-04-30 DIAGNOSIS — R26.9 GAIT ABNORMALITY: ICD-10-CM

## 2019-04-30 PROCEDURE — 97116 GAIT TRAINING THERAPY: CPT | Mod: PO

## 2019-04-30 PROCEDURE — 97110 THERAPEUTIC EXERCISES: CPT | Mod: PO

## 2019-04-30 PROCEDURE — 97112 NEUROMUSCULAR REEDUCATION: CPT | Mod: PO

## 2019-04-30 NOTE — PROGRESS NOTES
"  Physical Therapy Daily Treatment Note     Name: Lupis WHEATLEY [facility] Number: [id number]    Therapy Diagnosis:   Encounter Diagnoses   Name Primary?    Foot drop, left     Gait abnormality      Physician: Blanca Virgen APRN,*    Visit Date: 4/30/2019    Physician Orders: PT evaluation and treatment  Medical Diagnosis: G35 (ICD-10-CM) - Multiple sclerosis  Evaluation Date: 3/28/19  Authorization Period Expiration: 12/31/19  Plan of Care Certification Period: 5/22/19  Visit #/Visits authorized: 4/20     Time In: 11:15  Time Out: 12:00  Total Billable Time: 45 minutes     Precautions: Standard and Fall    Subjective     Pt reports: "I'm sorry I'm so slow. I had to walk a long ways in from the parking lot." Reports working on balance at home.   Response to previous treatment: no adverse affects  Functional change: no functional change reported    Pain: 0/10  Location: N/A    Objective     Lupis received therapeutic exercises to develop strength, flexibility and ROM for 10 minutes including:  Sitting:  Heel cord/hamstring stretch with strap and stool 3 x 30s B  Sitting hip flexion 3 x 1 min reps alternating sides      Patient participated in neuromuscular re-education activities to improve: Balance, Coordination and Proprioception for 20 minutes. The following activities were included:     Standing static balance on floor 30 seconds without UE support  Semi-Tandem with each foot forward  x 30 sec trials each side - arms out to sides  Feet together balance  x 30 sec trials each side - arms out to sides and forwards  Staggered stance rocking with and without UE support B sides trials    Lupis participated in gait training to improve functional mobility and safety for 15 minutes, including:  Pt ambulated in and out of downstairs gym with cues about knee bend and step length. As pt fatgiued, increased L foot drag present and increased difficulty with following gait compensation cues.       Written Home Exercises " Provided: yes.  Exercises were reviewed and Lupis was able to demonstrate them prior to the end of the session.  Lupis demonstrated good  understanding of the education provided.     See EMR under Patient Instructions for exercises provided 4/30/2019.       Assessment   Focused was on HEP today and ensuring good pt understanding. She needs increased repetitions with instructions and explanations during education due to poor understanding of condition. She did well with all HEP exercises and is able to complete them on her own, although I instructed her to only do balance exercises when her daughter is with her for safety reasons. She was able to show better step length on the L side with initial walking out of gym after exercises, but fatigued after 20 feet and returned to a step to pattern. Continue tx for strength, ROM and balance.       Lupis is progressing well towards her goals.   Pt prognosis is fair.     Pt will continue to benefit from skilled outpatient physical therapy to address the deficits listed in the problem list box on initial evaluation, provide pt/family education and to maximize pt's level of independence in the home and community environment.     Pt's spiritual, cultural and educational needs considered and pt agreeable to plan of care and goals.     Anticipated barriers to physical therapy: transportation dependent from family     Goals:  Short Term Goals: 4 weeks   1. Pt will improve SSWS to 0.4 m/s for improvements in safety during ambulation ONGOING  2. Pt will improve L hip flexion strength to 3/5 to allow for easier transfers ONGOING  3. Pt will improve B tandem standing balance to 5 seconds each leg showing improved balance safety ONGOING  4. Pt will improve 30 second sit to stand to 5 reps showing improvements in LE strength and endurance ONGOING  5. Pt will be able to perform HEP without VC's showing ability to maintain gains from therapy since she is unable to attended 2 times  per week ONGOING     Long Term Goals: 8 weeks   1. Pt will improve B hip flexion to 4/5 to allow for easier transfer ability ONGOING  2. Pt will improve SSWS to 0.6 m/s for improvements in safety during ambulation ONGOING  3. Pt will improve FOTO limitation score to </= 45% for improved self perception of functional mobility. ONGOING  4. Pt will be able to perform HEP independently showing ability to maintain gains from therapy since she is unable to attended 2 times per week ONGOING  5. Pt will improve B tandem standing balance to 15 seconds each leg showing improved balance safety ONGOING  6. Pt will improve 30 second sit to stand to 7 reps showing improvements in LE strength and endurance ONGOING    Plan   May try demo knee cage brace with gait next visit. Continue to work on ankle ROM, LE strength and balance for gait performance.     Letty Lemus, PT  04/30/2019

## 2019-04-30 NOTE — PATIENT INSTRUCTIONS
"Heel cord and  Hamstrings - Short Sitting with strap (non-elastic)        Rest leg on raised surface. Wrap strap or belt around ball of foot. Keep knee straight. Lift chest and lean forwards towards toes, pulling toes towards chest at same time. Hold 30-60 seconds. Should feel the   Repeat 3 times. 1-2 sessions per day.     Copyright © [employer]. All rights reserved.     High Stepping in Place (Sitting)        Sitting, alternately lift knees as high as possible. Keep torso erect.  Repeat 10 times. Do 2 sets. 1 sessions per day.      Feet Heel-Toe "semi-Tandem", Varied Arm Positions       With eyes open, STAGGER FEET - 1 foot forward, does not have to be perfectly lines up, arms out, look straight ahead at a stationary object. Hold up to 30 seconds.  Repeat with left foot in front.   Can change arm position for varied difficulty. Do within walker with chair behind.   Do 3-5 repetitions.  1-2 sessions per day.    Copyright © [employer]. All rights reserved.     Feet Together, Arm Motion - Eyes Open    With eyes open, feet together, move arms up and down: to front and sides  Repeat 10 times per session. Do 1-2 sessions per day.    Copyright © [employer]. All rights reserved.     "

## 2019-05-06 ENCOUNTER — DOCUMENTATION ONLY (OUTPATIENT)
Dept: REHABILITATION | Facility: HOSPITAL | Age: 69
End: 2019-05-06

## 2019-05-06 NOTE — PROGRESS NOTES
Face to face meeting completed with Letty Lemus PT regarding current status and progress of   Lupis [place] .  ANSELMO Aguilar, PT  05/06/2019

## 2019-05-06 NOTE — PROGRESS NOTES
PT/PTA met face to face to discuss pt's treatment plan and progress towards established goals.  Continue with current PT POC with focus on stretching, general strengthening and gait.  Patient will be seen by physical therapist at least every sixth treatment or 30 days, whichever occurs first.    Mariann Barrett, PTA  05/06/2019    Letty Lemus, PT  05/06/2019

## 2019-05-10 ENCOUNTER — DOCUMENTATION ONLY (OUTPATIENT)
Dept: REHABILITATION | Facility: HOSPITAL | Age: 69
End: 2019-05-10

## 2019-05-10 NOTE — PROGRESS NOTES
PT/PTA met face to face to discuss pt's treatment plan and progress towards established goals. Continue with current PT POC, including: balance, stretching, strengthening, ROM L foot . Pt will be seen by physical therapist at least every 6th treatment day or every 30 days, whichever occurs first.      Bret Jose, PTA  5/9/19    Letty Lemus, PT  05/13/2019

## 2019-05-13 ENCOUNTER — CLINICAL SUPPORT (OUTPATIENT)
Dept: REHABILITATION | Facility: HOSPITAL | Age: 69
End: 2019-05-13
Payer: MEDICARE

## 2019-05-13 DIAGNOSIS — M21.372 FOOT DROP, LEFT: ICD-10-CM

## 2019-05-13 DIAGNOSIS — R26.9 GAIT ABNORMALITY: ICD-10-CM

## 2019-05-13 PROCEDURE — 97116 GAIT TRAINING THERAPY: CPT | Mod: PO

## 2019-05-13 PROCEDURE — 97110 THERAPEUTIC EXERCISES: CPT | Mod: PO

## 2019-05-13 PROCEDURE — 97112 NEUROMUSCULAR REEDUCATION: CPT | Mod: PO

## 2019-05-13 NOTE — PROGRESS NOTES
"  Physical Therapy Daily Treatment Note     Name: Lupis WHEATLEY [facility] Number: [id number]    Therapy Diagnosis:   Encounter Diagnoses   Name Primary?    Foot drop, left     Gait abnormality      Physician: Blanca Virgen APRN,*    Visit Date: 5/13/2019    Physician Orders: PT evaluation and treatment  Medical Diagnosis: G35 (ICD-10-CM) - Multiple sclerosis  Evaluation Date: 3/28/19  Authorization Period Expiration: 12/31/19  Plan of Care Certification Period: 5/22/19  Visit #/Visits authorized: 4/20     Time In: 11:15  Time Out: 12:00  Total Billable Time: 45 minutes     Precautions: Standard and Fall    Subjective     Pt reports: "I'm sorry I'm slow, its this foot drop."   Response to previous treatment: no adverse affects  Functional change: no functional change reported    Pain: 0/10  Location: N/A    Objective     Lupis received therapeutic exercises to develop strength, flexibility and ROM for 25 minutes including:  Sit to supine with Min A.  Min A to manage B LE's  Supine:  3 x 30 sec of B LE manual HS stretches  X 30 reps of B LE manual hip and knee flex/ext  X 30 reps of bridges with 3 sec hold  X 30 reps of B LE SAQ over bolster with 3 sec hold  Supine to sit with Min A.  Min A to elevate trunk.      Patient participated in neuromuscular re-education activities to improve: Balance, Coordination and Proprioception for 8 minutes. The following activities were included:   Airex foam pad:   Min A to get on/off the pad    2 x 60 sec of static standing with CGA    2 x 60 sec of medial/lateral weight shifting with CGA    X 60 sec of anterior/posterior weight shifting with CGA    Lupis participated in gait training to improve functional mobility and safety for 10 minutes, including:  Pt ambulated in and out of downstairs gym with cues about knee bend and step length. As pt fatgiued, increased L foot drag present and increased difficulty with following gait compensation cues.       Written Home Exercises " Provided: yes.  Exercises were reviewed and Lupis was able to demonstrate them prior to the end of the session.  Lupis demonstrated good  understanding of the education provided.     See EMR under Patient Instructions for exercises provided 4/30/2019.       Assessment   Lupis tolerated tx session fairly well and focused on supine and sitting therex.  Pt requires assistance for bed mobility and exercises.  Cont to require assistance for exercises on the LLE.  Pt required CGA for safety on the Airex foam pad.      Lupis is progressing well towards her goals.   Pt prognosis is fair.     Pt will continue to benefit from skilled outpatient physical therapy to address the deficits listed in the problem list box on initial evaluation, provide pt/family education and to maximize pt's level of independence in the home and community environment.     Pt's spiritual, cultural and educational needs considered and pt agreeable to plan of care and goals.     Anticipated barriers to physical therapy: transportation dependent from family     Goals:  Short Term Goals: 4 weeks   1. Pt will improve SSWS to 0.4 m/s for improvements in safety during ambulation ONGOING  2. Pt will improve L hip flexion strength to 3/5 to allow for easier transfers ONGOING  3. Pt will improve B tandem standing balance to 5 seconds each leg showing improved balance safety ONGOING  4. Pt will improve 30 second sit to stand to 5 reps showing improvements in LE strength and endurance ONGOING  5. Pt will be able to perform HEP without VC's showing ability to maintain gains from therapy since she is unable to attended 2 times per week ONGOING     Long Term Goals: 8 weeks   1. Pt will improve B hip flexion to 4/5 to allow for easier transfer ability ONGOING  2. Pt will improve SSWS to 0.6 m/s for improvements in safety during ambulation ONGOING  3. Pt will improve FOTO limitation score to </= 45% for improved self perception of functional mobility.  ONGOING  4. Pt will be able to perform HEP independently showing ability to maintain gains from therapy since she is unable to attended 2 times per week ONGOING  5. Pt will improve B tandem standing balance to 15 seconds each leg showing improved balance safety ONGOING  6. Pt will improve 30 second sit to stand to 7 reps showing improvements in LE strength and endurance ONGOING    Plan   Try demo knee cage brace with gait next visit. Continue to work on ankle ROM, LE strength and balance for gait performance.     Mariann Barrett, PTA  05/13/2019

## 2019-05-30 ENCOUNTER — CLINICAL SUPPORT (OUTPATIENT)
Dept: REHABILITATION | Facility: HOSPITAL | Age: 69
End: 2019-05-30
Payer: MEDICARE

## 2019-05-30 DIAGNOSIS — R26.9 GAIT ABNORMALITY: ICD-10-CM

## 2019-05-30 DIAGNOSIS — M21.372 FOOT DROP, LEFT: ICD-10-CM

## 2019-05-30 PROCEDURE — 97110 THERAPEUTIC EXERCISES: CPT | Mod: PO

## 2019-05-30 NOTE — PATIENT INSTRUCTIONS
External Rotation: ROM In Flexion (Supine)        Position (A) [place]: Support left leg, hip and knee bent to 90°. Other hand grasps ankle. Motion (B) -Glide lower leg across opposite leg, keeping knee in place. CAUTION: Do not twist knee. Stop at point of tension. HOLD 30s, Repeat 3 times. Repeat with other leg. Do 1-2 sessions per day.      Copyright © [employer]. All rights reserved.     Internal Rotation / Flexion: Stretch - Piriformis (Supine)      Position (A) Patient: Keep trunk steady. [place]: Support left leg with both hands. Motion (B) - [place] lifts leg, bending hip and knee. - Move knee toward opposite shoulder. - Patient should feel stretch in buttock.  Hold 30 seconds. Repeat 3 times. Repeat with other leg. Do 1-2 sessions per day.    Copyright © [employer]. All rights reserved.

## 2019-05-30 NOTE — PLAN OF CARE
"Physical Therapy Daily Treatment Note     Name: Lupis WHEATLEY [facility] Number: [id number]    Therapy Diagnosis:   Encounter Diagnoses   Name Primary?    Foot drop, left     Gait abnormality      Physician: Blanca Virgen APRN,*    Visit Date: 5/30/2019    Physician Orders: PT evaluation and treatment  Medical Diagnosis: G35 (ICD-10-CM) - Multiple sclerosis  Evaluation Date: 3/28/19  Authorization Period Expiration: 12/31/19  Plan of Care Certification Period: 7/24/19  Visit #/Visits authorized: 5 / 20     Time In: 14:30  Time Out: 15:15  Total Billable Time: 45 minutes     Precautions: Standard and Fall    Subjective     Pt reports: "I missed a couple visits due to I think sciatic pain"   HEP compliance: POOR -   Pt was scared to do stretching exercises with the pain.    Response to previous treatment: no adverse affects   Functional change: no functional change reported     Pain: 3/10   Location: R buttocks     Objective     Lupis received therapeutic exercises to develop strength, flexibility and ROM for 45 minutes including:    Lower Extremity Strength  Right LE   Left LE     Hip Flexion: 3+/5 Hip Flexion: 2/5   Hip Extension:  4/5 Hip Extension: 4-/5   Hip Abduction: 4/5 Hip Abduction: 3+/5   Hip Adduction: 4+/5 Hip Adduction 4/5   Knee Extension: 3+/5 Knee Extension: 3+/5   Knee Flexion: 3+/5 Knee Flexion: 2/5   Ankle Dorsiflexion: 5/5 Ankle Dorsiflexion: trace           Evaluation  04/01/2019 PN 5/30/19   30 second Chair Rise  (normative values to maintain physical independence: 15 ) 6 completed with arms    9 reps with UE assist from red low mat in gym         Balance Testing     Evaluation 04/01/2019 PN 5/30/19   Tandem Stance R LE forward, eyes open 2s  (<30 sec = Increased FALL RISK) unable   Tandem Stance L LE forward, eyes open 1s  (<30 sec = Increased FALL RISK) unable         Postural control: MCTSIB: Evaluation 04/01/2019 5/30/19   1. Eyes Open/feet together/Firm:  30 seconds 25s   2. Eyes " Closed/feet together/Firm:  24 seconds 10s   3. Eyes Open/feet together/Foam:  n/a seconds NT    4. Eyes Closed/feet together/Foam:  n/a seconds NT      GAIT ASSESSMENT    - AD used: Rolling Walker  - Assistance: supervision  - Distance: home distances     Observed Gait Deviations:  Lupis displays the following deviations with ambulation:  Abnormal, decreased pako, increased time in double stance, decreased step length, decreased stride length and decreased toe-to-floor clearance. Wears L AFO due to foot drop. L foot dragging with swing phase.      Impairments contributing to deviations/impairments: impaired balance, impaired motor control, abnormal muscle tone and decreased strength       Evaluation 04/01/2019 5/30/19   Self Selected Walking Speed 0.15 m/sec (6m/41s) with  Rolling Walker 0.09 m/sec (6m/65s) with  Rolling Walker      Pt requested to not lay down for PROM as intended    Seated hamstring 3 x 30s B - reinforced edu for home stretching  Seated piriformis stretch passively from PT to R LE 3 x 30s, relieved hip/buttick pain    Education on how to perform same exercises in supine at home with daughter. Also demonstrated knee to chest stretch for piriformis.     CMS Impairment/Limitation/Restriction for FOTO  Survey     Therapist reviewed FOTO scores for Lupis Mariejonh on 5/30/2019.   FOTO documents entered into [employer] - see Media section.     Limitation Score: 57%                   Written Home Exercises Provided: yes.  Exercises were reviewed and Lupis was able to demonstrate them prior to the end of the session.  Lupis demonstrated good  understanding of the education provided.     See EMR under Patient Instructions for exercises provided 4/30/2019.       Assessment   Reassessment period: 4/1/19-5/30/2019. Pt reassessed for progress note today and updated POC. Lupis has had to cancel multiple appts which has decreased her to only attending 5 visits including today since the evaluation April  1st, 2019. She continues to have severe gait impairments and scored slower today with self selected speed. She however did improve with 30s chair rise score for LE endurance. She was given an updated stretching HEP to address the LE tone which is now causing pain and discomfort. I also reviewed her prior HEP for balance and encouraged her to continue that daily.  Her manual muscle testing did not have significant changes but did have slight 1/2 grade changes in either direction. She scored worse on the MCTSIB balance tests today as well. Her limited transportation, poor understanding of her condition and poor carryover at home are all significant barriers to her progress. Further skilled PT is warranted to continue to address balance deficits, gait impairments, ROM impairments and weakness at B LEs and trunk. Extend POC x 8 more weeks. Goals updated below.     Lupis is progressing well towards her goals.   Pt prognosis is fair.     Pt will continue to benefit from skilled outpatient physical therapy to address the deficits listed in the problem list box on initial evaluation, provide pt/family education and to maximize pt's level of independence in the home and community environment.     Pt's spiritual, cultural and educational needs considered and pt agreeable to plan of care and goals.     Anticipated barriers to physical therapy: transportation dependent from family     Goals:  Short Term Goals: 4 weeks   1. Pt will improve SSWS to 0.4 m/s for improvements in safety during ambulation ONGOING  2. Pt will improve L hip flexion strength to 3/5 to allow for easier transfers ONGOING  3. Pt will improve B tandem standing balance to 5 seconds each leg showing improved balance safety ONGOING  4. Pt will improve 30 second sit to stand to 5 reps showing improvements in LE strength and endurance MET 5/30/19  5. Pt will be able to perform HEP without VC's showing ability to maintain gains from therapy since she is unable  to attended 2 times per week ONGOING     Long Term Goals: 8 weeks   1. Pt will improve B hip flexion to 4/5 to allow for easier transfer ability ONGOING  2. Pt will improve SSWS to 0.6 m/s for improvements in safety during ambulation ONGOING  3. Pt will improve FOTO limitation score to </= 45% for improved self perception of functional mobility. ONGOING  4. Pt will be able to perform HEP independently showing ability to maintain gains from therapy since she is unable to attended 2 times per week ONGOING  5. Pt will improve B tandem standing balance to 15 seconds each leg showing improved balance safety Discontinue 5/30/19  6. Pt will improve 30 second sit to stand to 7 reps showing improvements in LE strength and endurance MET 5/30/19  1. Upgrade: Pt will improve 30 second sit to stand to 11 reps showing improvements in LE strength and endurance.      Plan   Try again with supine PROM to both LEs if pt will allow positioning. Reinforce HEP. Balance training as able after PROM.     Letty Lemus, PT  05/30/2019

## 2019-05-30 NOTE — PROGRESS NOTES
"  Physical Therapy Daily Treatment Note     Name: Lupis WHEATLEY [facility] Number: [id number]    Therapy Diagnosis:   Encounter Diagnoses   Name Primary?    Foot drop, left     Gait abnormality      Physician: Blanca Virgen APRN,*    Visit Date: 5/30/2019    Physician Orders: PT evaluation and treatment  Medical Diagnosis: G35 (ICD-10-CM) - Multiple sclerosis  Evaluation Date: 3/28/19  Authorization Period Expiration: 12/31/19  Plan of Care Certification Period: 7/24/19  Visit #/Visits authorized: 5 / 20     Time In: 14:30  Time Out: 15:15  Total Billable Time: 45 minutes     Precautions: Standard and Fall    Subjective     Pt reports: "I missed a couple visits due to I think sciatic pain"   HEP compliance: POOR -   Pt was scared to do stretching exercises with the pain.    Response to previous treatment: no adverse affects   Functional change: no functional change reported     Pain: 3/10   Location: R buttocks     Objective     Lupis received therapeutic exercises to develop strength, flexibility and ROM for 45 minutes including:    Lower Extremity Strength  Right LE   Left LE     Hip Flexion: 3+/5 Hip Flexion: 2/5   Hip Extension:  4/5 Hip Extension: 4-/5   Hip Abduction: 4/5 Hip Abduction: 3+/5   Hip Adduction: 4+/5 Hip Adduction 4/5   Knee Extension: 3+/5 Knee Extension: 3+/5   Knee Flexion: 3+/5 Knee Flexion: 2/5   Ankle Dorsiflexion: 5/5 Ankle Dorsiflexion: trace           Evaluation  04/01/2019 PN 5/30/19   30 second Chair Rise  (normative values to maintain physical independence: 15 ) 6 completed with arms    9 reps with UE assist from red low mat in gym         Balance Testing     Evaluation 04/01/2019 PN 5/30/19   Tandem Stance R LE forward, eyes open 2s  (<30 sec = Increased FALL RISK) unable   Tandem Stance L LE forward, eyes open 1s  (<30 sec = Increased FALL RISK) unable         Postural control: MCTSIB: Evaluation 04/01/2019 5/30/19   1. Eyes Open/feet together/Firm:  30 seconds 25s   2. Eyes " Closed/feet together/Firm:  24 seconds 10s   3. Eyes Open/feet together/Foam:  n/a seconds NT    4. Eyes Closed/feet together/Foam:  n/a seconds NT      GAIT ASSESSMENT    - AD used: Rolling Walker  - Assistance: supervision  - Distance: home distances     Observed Gait Deviations:  Lupis displays the following deviations with ambulation:  Abnormal, decreased pako, increased time in double stance, decreased step length, decreased stride length and decreased toe-to-floor clearance. Wears L AFO due to foot drop. L foot dragging with swing phase.      Impairments contributing to deviations/impairments: impaired balance, impaired motor control, abnormal muscle tone and decreased strength       Evaluation 04/01/2019 5/30/19   Self Selected Walking Speed 0.15 m/sec (6m/41s) with  Rolling Walker 0.09 m/sec (6m/65s) with  Rolling Walker      Pt requested to not lay down for PROM as intended    Seated hamstring 3 x 30s B - reinforced edu for home stretching  Seated piriformis stretch passively from PT to R LE 3 x 30s, relieved hip/buttick pain    Education on how to perform same exercises in supine at home with daughter. Also demonstrated knee to chest stretch for piriformis.     CMS Impairment/Limitation/Restriction for FOTO  Survey     Therapist reviewed FOTO scores for Lupis Mraiejonh on 5/30/2019.   FOTO documents entered into [id number] - see Media section.     Limitation Score: 57%                   Written Home Exercises Provided: yes.  Exercises were reviewed and Lupis was able to demonstrate them prior to the end of the session.  Lupis demonstrated good  understanding of the education provided.     See EMR under Patient Instructions for exercises provided 4/30/2019.       Assessment   Reassessment period: 4/1/19-5/30/2019. Pt reassessed for progress note today and updated POC. Lupis has had to cancel multiple appts which has decreased her to only attending 5 visits including today since the evaluation April  1st, 2019. She continues to have severe gait impairments and scored slower today with self selected speed. She however did improve with 30s chair rise score for LE endurance. She was given an updated stretching HEP to address the LE tone which is now causing pain and discomfort. I also reviewed her prior HEP for balance and encouraged her to continue that daily.  Her manual muscle testing did not have significant changes but did have slight 1/2 grade changes in either direction. She scored worse on the MCTSIB balance tests today as well. Her limited transportation, poor understanding of her condition and poor carryover at home are all significant barriers to her progress. Further skilled PT is warranted to continue to address balance deficits, gait impairments, ROM impairments and weakness at B LEs and trunk. Extend POC x 8 more weeks. Goals updated below.     Lupis is progressing well towards her goals.   Pt prognosis is fair.     Pt will continue to benefit from skilled outpatient physical therapy to address the deficits listed in the problem list box on initial evaluation, provide pt/family education and to maximize pt's level of independence in the home and community environment.     Pt's spiritual, cultural and educational needs considered and pt agreeable to plan of care and goals.     Anticipated barriers to physical therapy: transportation dependent from family     Goals:  Short Term Goals: 4 weeks   1. Pt will improve SSWS to 0.4 m/s for improvements in safety during ambulation ONGOING  2. Pt will improve L hip flexion strength to 3/5 to allow for easier transfers ONGOING  3. Pt will improve B tandem standing balance to 5 seconds each leg showing improved balance safety ONGOING  4. Pt will improve 30 second sit to stand to 5 reps showing improvements in LE strength and endurance MET 5/30/19  5. Pt will be able to perform HEP without VC's showing ability to maintain gains from therapy since she is unable  to attended 2 times per week ONGOING     Long Term Goals: 8 weeks   1. Pt will improve B hip flexion to 4/5 to allow for easier transfer ability ONGOING  2. Pt will improve SSWS to 0.6 m/s for improvements in safety during ambulation ONGOING  3. Pt will improve FOTO limitation score to </= 45% for improved self perception of functional mobility. ONGOING  4. Pt will be able to perform HEP independently showing ability to maintain gains from therapy since she is unable to attended 2 times per week ONGOING  5. Pt will improve B tandem standing balance to 15 seconds each leg showing improved balance safety Discontinue 5/30/19  6. Pt will improve 30 second sit to stand to 7 reps showing improvements in LE strength and endurance MET 5/30/19  1. Upgrade: Pt will improve 30 second sit to stand to 11 reps showing improvements in LE strength and endurance.      Plan   Try again with supine PROM to both LEs if pt will allow positioning. Reinforce HEP. Balance training as able after PROM.     Letty Lemus, PT  05/30/2019

## 2019-06-03 ENCOUNTER — DOCUMENTATION ONLY (OUTPATIENT)
Dept: REHABILITATION | Facility: HOSPITAL | Age: 69
End: 2019-06-03

## 2019-06-03 NOTE — PROGRESS NOTES
Face to face meeting completed with Letty Lemus PT regarding current status and progress of   Lupis Murcia .  Michele Charlton, PTA      Face to face completed 6/3/19.     Letty Lemus, PT  06/04/2019

## 2019-06-03 NOTE — PROGRESS NOTES
PT/PTA met face to face to discuss pt's treatment plan and progress towards established goals.  Continue with current PT POC with focus on strengthening, standing balance, standing tolerance, gait and stretching.  Patient will be seen by physical therapist at least every sixth treatment or 30 days, whichever occurs first.    Mariann Barrett, PTA  06/03/2019    Letty Lemus, PT  06/04/2019

## 2019-06-04 ENCOUNTER — OFFICE VISIT (OUTPATIENT)
Dept: NEUROLOGY | Facility: CLINIC | Age: 69
End: 2019-06-04
Payer: MEDICARE

## 2019-06-04 VITALS
WEIGHT: 201.94 LBS | SYSTOLIC BLOOD PRESSURE: 135 MMHG | DIASTOLIC BLOOD PRESSURE: 81 MMHG | HEART RATE: 90 BPM | BODY MASS INDEX: 37.16 KG/M2 | HEIGHT: 62 IN

## 2019-06-04 DIAGNOSIS — R26.9 GAIT DISTURBANCE: ICD-10-CM

## 2019-06-04 DIAGNOSIS — G82.20 PARAPARESIS: ICD-10-CM

## 2019-06-04 DIAGNOSIS — Z71.89 COUNSELING REGARDING GOALS OF CARE: ICD-10-CM

## 2019-06-04 DIAGNOSIS — E53.8 VITAMIN B12 DEFICIENCY: ICD-10-CM

## 2019-06-04 DIAGNOSIS — G35 MS (MULTIPLE SCLEROSIS): Primary | ICD-10-CM

## 2019-06-04 DIAGNOSIS — E55.9 VITAMIN D DEFICIENCY: ICD-10-CM

## 2019-06-04 PROCEDURE — 99999 PR PBB SHADOW E&M-EST. PATIENT-LVL III: CPT | Mod: PBBFAC,,, | Performed by: PSYCHIATRY & NEUROLOGY

## 2019-06-04 PROCEDURE — 99999 PR PBB SHADOW E&M-EST. PATIENT-LVL III: ICD-10-PCS | Mod: PBBFAC,,, | Performed by: PSYCHIATRY & NEUROLOGY

## 2019-06-04 PROCEDURE — 99213 OFFICE O/P EST LOW 20 MIN: CPT | Mod: PBBFAC | Performed by: PSYCHIATRY & NEUROLOGY

## 2019-06-04 PROCEDURE — 99215 PR OFFICE/OUTPT VISIT, EST, LEVL V, 40-54 MIN: ICD-10-PCS | Mod: S$PBB,,, | Performed by: PSYCHIATRY & NEUROLOGY

## 2019-06-04 PROCEDURE — 99215 OFFICE O/P EST HI 40 MIN: CPT | Mod: S$PBB,,, | Performed by: PSYCHIATRY & NEUROLOGY

## 2019-06-04 RX ORDER — ERYTHROMYCIN BASE 250 MG
1 CAPSULE,DELAYED RELEASE (ENTERIC COATED) ORAL
Refills: 0 | COMMUNITY
Start: 2019-05-02 | End: 2021-10-12

## 2019-06-04 NOTE — PROGRESS NOTES
Subjective:       Patient ID: Lupis Murcia is a 68 y.o. female who presents today for a routine clinic visit for MS.  She is accompanied by her daughter.     MS HPI:  · DMT: N/A   · Taking vitamin D3 as recommended? Yes -  5,000   · She has a left AFO  · Doing PT at [facility]  · Doing home exercise program  · Overall, she feels stable;     SOCIAL HISTORY  Social History     Tobacco Use    Smoking status: Never Smoker    Smokeless tobacco: Never Used   Substance Use Topics    Alcohol use: No    Drug use: No     Living arrangements - the patient lives with her daughter  Employment:  Disabled     MS ROS:  · Fatigue: Yes - Stable.   · Sleep Disturbance: No.  Does nap during the day;    · Bladder Dysfunction: Yes -Stable.  She has frequency and urgency. No UTIs  · Bowel Dysfunction: No  · Spasticity: No  · Visual Symptoms: Yes - She has cataracts, but o/w no issues;    · Cognitive: No.   · Mood Disorder: Yes, She gets frustrated by her disability  · Gait Disturbance: Yes - She uses a walker. She has a AFO now.   · Falls: None since last visit;   · Hand Dysfunction: Yes - no issues  · Pain: Yes - right sided sciatica  · Sexual Dysfunction: Not Assessed  · Skin Breakdown: No  · Tremors: No  · Dysphagia:  No  · Dysarthria:  No  · Heat sensitivity:  Yes -   · Any un-met adaptive needs? No      Objective:      25 foot timed walk:  38s with rolling walker and AFO; was 33 second in Feb with same equipment; she drags her left leg;   Neurologic Exam        Slow RSM right hand, 3/5 IO on right  Right LE: HF 1/5, KF 2/5, DF 2/5          Labs:     Lab Results   Component Value Date    AGTLQWDW01SX 34 06/04/2019    [id number] 33 11/26/2018    [id number] 40 07/20/2018       Lab Results   Component Value Date    WBC 5.35 06/04/2019    RBC 3.89 (L) 06/04/2019    HGB 12.1 06/04/2019    HCT 37.2 06/04/2019    MCV 96 06/04/2019    MCH 31.1 (H) 06/04/2019    MCHC 32.5 06/04/2019    RDW 12.8 06/04/2019      06/04/2019    MPV 9.6 06/04/2019    GRAN 2.8 06/04/2019    GRAN 53.1 06/04/2019    LYMPH 1.9 06/04/2019    LYMPH 35.7 06/04/2019    MONO 0.5 06/04/2019    MONO 9.3 06/04/2019    EOS 0.1 06/04/2019    BASO 0.01 06/04/2019    EOSINOPHIL 1.3 06/04/2019    BASOPHIL 0.2 06/04/2019     Sodium   Date Value Ref Range Status   01/02/2019 142 136 - 145 mmol/L Final     Potassium   Date Value Ref Range Status   01/02/2019 3.8 3.5 - 5.1 mmol/L Final     Chloride   Date Value Ref Range Status   01/02/2019 103 95 - 110 mmol/L Final     CO2   Date Value Ref Range Status   01/02/2019 27 23 - 29 mmol/L Final     Glucose   Date Value Ref Range Status   01/02/2019 123 (H) 70 - 110 mg/dL Final     BUN, Bld   Date Value Ref Range Status   01/02/2019 12 8 - 23 mg/dL Final     Creatinine   Date Value Ref Range Status   01/02/2019 0.7 0.5 - 1.4 mg/dL Final     Calcium   Date Value Ref Range Status   01/02/2019 10.1 8.7 - 10.5 mg/dL Final     Total Protein   Date Value Ref Range Status   09/05/2017 7.4 6.0 - 8.4 g/dL Final     Albumin   Date Value Ref Range Status   09/05/2017 4.0 3.5 - 5.2 g/dL Final     Total Bilirubin   Date Value Ref Range Status   09/05/2017 0.5 0.1 - 1.0 mg/dL Final     Comment:     For infants and newborns, interpretation of results should be based  on gestational age, weight and in agreement with clinical  observations.  Premature Infant recommended reference ranges:  Up to 24 hours.............<8.0 mg/dL  Up to 48 hours............<12.0 mg/dL  3-5 days..................<15.0 mg/dL  6-29 days.................<15.0 mg/dL       Alkaline Phosphatase   Date Value Ref Range Status   09/05/2017 78 55 - 135 U/L Final     AST   Date Value Ref Range Status   09/05/2017 12 10 - 40 U/L Final     ALT   Date Value Ref Range Status   09/05/2017 13 10 - 44 U/L Final     Anion Gap   Date Value Ref Range Status   01/02/2019 12 8 - 16 mmol/L Final     eGFR if    Date Value Ref Range Status   01/02/2019  >60.0 >60 mL/min/1.73 m^2 Final     eGFR if non    Date Value Ref Range Status   01/02/2019 >60.0 >60 mL/min/1.73 m^2 Final     Comment:     Calculation used to obtain the estimated glomerular filtration  rate (eGFR) is the CKD-EPI equation.            Diagnosis/Assessment/Plan:    1. Multiple Sclerosis  · Assessment: MS  · Imaging: deferred in 2019 since 2018 MRI stable;  Deferred until 2020, no marixa as she is allergic  · Disease Modifying Therapies: discussed various DMT including Copaxone, and Aubagio and briefly Ocrevus; for now pt wants to think more about it;  Also discussed Ampyra, but she defers for now; info given;     2. MS Symptom Assessment / Management  · No other changes to regimen described in ROS above    F/u Blanca Virgen CNS in 4mo      Our visit today lasted 40 minutes, and 100% of this time was spent face to face with the patient. Over 50% of this visit included discussion of the treatment plan/medication changes/symptom management/exam findings/imaging results/coordination of care. The patient agrees with the plan of care.         Problem List Items Addressed This Visit        Unprioritized    Gait disturbance    Paraparesis    MS (multiple sclerosis) - Primary    Relevant Orders    Vitamin B12 (Completed)    Vitamin D (Completed)    CBC auto differential (Completed)    Vitamin D deficiency    Relevant Orders    Vitamin B12 (Completed)    Vitamin D (Completed)    CBC auto differential (Completed)      Other Visit Diagnoses     Vitamin B12 deficiency        Relevant Orders    Vitamin B12 (Completed)    Vitamin D (Completed)    CBC auto differential (Completed)    Counseling regarding goals of care

## 2019-06-06 ENCOUNTER — DOCUMENTATION ONLY (OUTPATIENT)
Dept: REHABILITATION | Facility: HOSPITAL | Age: 69
End: 2019-06-06

## 2019-06-07 NOTE — PROGRESS NOTES
PT/PTA met face to face to discuss pt's treatment plan and progress towards established goals. Continue with current PT POC, including: balance & stretch/strength. Pt will be seen by physical therapist at least every 6th treatment day or every 30 days, whichever occurs first.      Bret Jose, PTA  6/05/19    Face to face on 6/5/19    Letty Lemus, PT  06/10/2019

## 2019-06-09 PROBLEM — E55.9 VITAMIN D DEFICIENCY: Status: ACTIVE | Noted: 2019-06-09

## 2019-06-09 PROBLEM — G82.20 PARAPARESIS: Status: ACTIVE | Noted: 2019-06-09

## 2019-06-09 PROBLEM — G35 MS (MULTIPLE SCLEROSIS): Status: ACTIVE | Noted: 2019-06-09

## 2019-06-20 ENCOUNTER — CLINICAL SUPPORT (OUTPATIENT)
Dept: REHABILITATION | Facility: HOSPITAL | Age: 69
End: 2019-06-20
Payer: MEDICARE

## 2019-06-20 DIAGNOSIS — M21.372 FOOT DROP, LEFT: ICD-10-CM

## 2019-06-20 DIAGNOSIS — R26.9 GAIT ABNORMALITY: ICD-10-CM

## 2019-06-20 PROCEDURE — 97110 THERAPEUTIC EXERCISES: CPT | Mod: PO

## 2019-06-20 NOTE — PATIENT INSTRUCTIONS
PELVIC TILT: Lateral      Shift weight to one side. Raise opposite hip from surface.     Copyright © [employer]. All rights reserved.     PELVIC TILT: Anterior      Start in slumped position. Roll pelvis forward to arch back.    Copyright © V[employer]. All rights reserved.

## 2019-06-20 NOTE — PROGRESS NOTES
"  Physical Therapy Daily Treatment Note     Name: Lupis WHEATLEY [facility]  Clinic Number: [id number]    Therapy Diagnosis:   Encounter Diagnoses   Name Primary?    Foot drop, left     Gait abnormality      Physician: Blanca Virgen APRN,*    Visit Date: 6/20/2019    Physician Orders: PT evaluation and treatment  Medical Diagnosis: G35 (ICD-10-CM) - Multiple sclerosis  Evaluation Date: 3/28/19  Authorization Period Expiration: 12/31/19  Plan of Care Certification Period: 7/24/19  Visit #/Visits authorized: 6 / 20     Time In: 13:45  Time Out: 14:30  Total Billable Time: 45 minutes     Precautions: Standard and Fall    Subjective     Pt reports: "Sorry I am so slow"  HEP compliance: POOR    Response to previous treatment: no adverse affects   Functional change: no functional change reported     Pain: 1-2/10   Location: R buttocks and low back    Objective     Lupis received therapeutic exercises to develop strength, flexibility and ROM for 45 minutes including:      Seated piriformis stretch passively from PT to R and L LE 3 x 30s   DKTC with feet on physioball x 10 AA from PT/tech  SAQ 2 x 10 LLE   Bridges 2 x 10 assist at LLE to stop from moving    Seated hip flexion marches x 10 B    Standing L knee flexion "lock/unlock" x 10 at RW  Standing hip hikes - switched to seated due to unable    Pelvic tilts A/P and medial lateral - poor facilitation and needed max cues and tactile cues to get partial motion. - updated HEP to include these    Gait leaving clinic with improved L hip hike and LLE swing although still not complete swing phase to even equal with R foot.     Written Home Exercises Provided: yes.  Exercises were reviewed and Lupis was able to demonstrate them prior to the end of the session.  Lupis demonstrated good  understanding of the education provided.     See EMR under Patient Instructions for exercises provided 4/30/2019. 6/20/19.       Assessment   Updated HEP to include pelvic tilts due to poor pt " "ability in clinic. She is unable to control her pelvis due to weakness and impaired flexibility which is likely a large factor in her LLE control in gait. Education completed on how this is connected to her gait. She did report leg felt "lighter" after stretching. Continue to stretch each session. Overall progress continues to be limited by pt attendance due to ride dependency and frequently arriving late which limits time in session.        Lupis is progressing well towards her goals.   Pt prognosis is fair.     Pt will continue to benefit from skilled outpatient physical therapy to address the deficits listed in the problem list box on initial evaluation, provide pt/family education and to maximize pt's level of independence in the home and community environment.     Pt's spiritual, cultural and educational needs considered and pt agreeable to plan of care and goals.     Anticipated barriers to physical therapy: transportation dependent from family     Goals:  Short Term Goals: 4 weeks   1. Pt will improve SSWS to 0.4 m/s for improvements in safety during ambulation ONGOING  2. Pt will improve L hip flexion strength to 3/5 to allow for easier transfers ONGOING  3. Pt will improve B tandem standing balance to 5 seconds each leg showing improved balance safety ONGOING  4. Pt will improve 30 second sit to stand to 5 reps showing improvements in LE strength and endurance MET 5/30/19  5. Pt will be able to perform HEP without VC's showing ability to maintain gains from therapy since she is unable to attended 2 times per week ONGOING     Long Term Goals: 8 weeks   1. Pt will improve B hip flexion to 4/5 to allow for easier transfer ability ONGOING  2. Pt will improve SSWS to 0.6 m/s for improvements in safety during ambulation ONGOING  3. Pt will improve FOTO limitation score to </= 45% for improved self perception of functional mobility. ONGOING  4. Pt will be able to perform HEP independently showing ability to " maintain gains from therapy since she is unable to attended 2 times per week ONGOING  5. Pt will improve B tandem standing balance to 15 seconds each leg showing improved balance safety Discontinue 5/30/19  6. Pt will improve 30 second sit to stand to 7 reps showing improvements in LE strength and endurance MET 5/30/19  1. Upgrade: Pt will improve 30 second sit to stand to 11 reps showing improvements in LE strength and endurance.      Plan   Try again with supine PROM to both LEs. Add L ankle PROM to DF. Continue pelvic tilts and add small dynadisc under hips.     Letty Lemus, PT  06/20/2019

## 2019-07-01 ENCOUNTER — DOCUMENTATION ONLY (OUTPATIENT)
Dept: REHABILITATION | Facility: HOSPITAL | Age: 69
End: 2019-07-01

## 2019-07-01 NOTE — PROGRESS NOTES
PT/PTA met face to face to discuss pt's treatment plan and progress towards established goals.  Continue with current PT POC with focus on stretching, strengthening, and pelvic mobility.  Patient will be seen by physical therapist at least every sixth treatment or 30 days, whichever occurs first.    Mariann Barrett, PTA  07/01/2019    Face to face 7/1/19  Letty Lemus, PT  07/02/2019

## 2019-07-03 ENCOUNTER — CLINICAL SUPPORT (OUTPATIENT)
Dept: REHABILITATION | Facility: HOSPITAL | Age: 69
End: 2019-07-03
Payer: MEDICARE

## 2019-07-03 DIAGNOSIS — M21.372 FOOT DROP, LEFT: ICD-10-CM

## 2019-07-03 DIAGNOSIS — R26.9 GAIT ABNORMALITY: ICD-10-CM

## 2019-07-03 PROCEDURE — 97110 THERAPEUTIC EXERCISES: CPT | Mod: PO

## 2019-07-03 NOTE — PROGRESS NOTES
"  Physical Therapy Daily Treatment Note     Name: Lupis WHEATLEY [facility] Number: [id number]    Therapy Diagnosis:   Encounter Diagnoses   Name Primary?    Foot drop, left     Gait abnormality      Physician: Blanca Virgen APRN,*    Visit Date: 7/3/2019    Physician Orders: PT evaluation and treatment  Medical Diagnosis: G35 (ICD-10-CM) - Multiple sclerosis  Evaluation Date: 3/28/19  Authorization Period Expiration: 12/31/19  Plan of Care Certification Period: 7/24/19  Visit #/Visits authorized: 7 / 20     Time In: 3:15  Time Out: 4:00  Total Billable Time: 45 minutes     Precautions: Standard and Fall    Subjective     Pt reports: no new c/o  HEP compliance: stated she does all her stretches at home.  Response to previous treatment: no adverse affects   Functional change: no functional change reported     Pain: 1-2/10   Location: R buttocks and low back    Objective     Lupis received therapeutic exercises to develop strength, flexibility and ROM for 45 minutes including:      Seated piriformis stretch passively from PTA to R and L LE 3 x 30" hold  DKTC with feet on physioball x 10 AA from PTA  SAQ 2 x 10 LLE   Bridges 2 x 10 assist at LLE to stop from moving    Seated hip flexion marches 2 x 10 B  Seated: AP and lateral pelvic tilts x 10 reps all directions    Standing L knee flexion "lock/unlock" x 10 at RW  Standing hip hikes - switched to seated due to unable  Standing: L TKE w/OTB 2 x 10 reps    Pelvic tilts A/P and medial lateral - poor facilitation and needed max cues and tactile cues to get partial motion. - updated HEP to include these    Gait following interventions demo poor L hip hike and LLE swing phase not even step to gait pattern    Written Home Exercises Provided: yes.  Exercises were reviewed and Ulpis was able to demonstrate them prior to the end of the session.  Lupis demonstrated good  understanding of the education provided.     See EMR under Patient Instructions for exercises " provided 4/30/2019. 6/20/19.       Assessment    Tolerated treatment well. Focused on LE stretching and core strengthening. Became tearful regarding dysfunction.      Lupis is progressing well towards her goals.   Pt prognosis is fair.     Pt will continue to benefit from skilled outpatient physical therapy to address the deficits listed in the problem list box on initial evaluation, provide pt/family education and to maximize pt's level of independence in the home and community environment.     Pt's spiritual, cultural and educational needs considered and pt agreeable to plan of care and goals.     Anticipated barriers to physical therapy: transportation dependent from family     Goals:  Short Term Goals: 4 weeks   1. Pt will improve SSWS to 0.4 m/s for improvements in safety during ambulation ONGOING  2. Pt will improve L hip flexion strength to 3/5 to allow for easier transfers ONGOING  3. Pt will improve B tandem standing balance to 5 seconds each leg showing improved balance safety ONGOING  4. Pt will improve 30 second sit to stand to 5 reps showing improvements in LE strength and endurance MET 5/30/19  5. Pt will be able to perform HEP without VC's showing ability to maintain gains from therapy since she is unable to attended 2 times per week ONGOING     Long Term Goals: 8 weeks   1. Pt will improve B hip flexion to 4/5 to allow for easier transfer ability ONGOING  2. Pt will improve SSWS to 0.6 m/s for improvements in safety during ambulation ONGOING  3. Pt will improve FOTO limitation score to </= 45% for improved self perception of functional mobility. ONGOING  4. Pt will be able to perform HEP independently showing ability to maintain gains from therapy since she is unable to attended 2 times per week ONGOING  5. Pt will improve B tandem standing balance to 15 seconds each leg showing improved balance safety Discontinue 5/30/19  6. Pt will improve 30 second sit to stand to 7 reps showing improvements in  LE strength and endurance MET 5/30/19  1. Upgrade: Pt will improve 30 second sit to stand to 11 reps showing improvements in LE strength and endurance.      Plan   Try again with supine PROM to both LEs. Add L ankle PROM to DF. Continue pelvic tilts and add small dynadisc under hips.     Michele Charlton, PTA  07/03/2019

## 2019-07-04 ENCOUNTER — DOCUMENTATION ONLY (OUTPATIENT)
Dept: REHABILITATION | Facility: HOSPITAL | Age: 69
End: 2019-07-04

## 2019-07-05 ENCOUNTER — DOCUMENTATION ONLY (OUTPATIENT)
Dept: REHABILITATION | Facility: HOSPITAL | Age: 69
End: 2019-07-05

## 2019-07-05 NOTE — PROGRESS NOTES
PT/PTA met face to face to discuss pt's treatment plan and progress towards established goals. Continue with current PT POC, including: stretching and pelvic mobility. Pt will be seen by physical therapist at least every 6th treatment day or every 30 days, whichever occurs first.      Bret Jose, PTA  7/01/19    Letty Lemus, PT  07/08/2019

## 2019-07-05 NOTE — PROGRESS NOTES
Face to face meeting completed with Letty Lemus PT regarding current status and progress of   Lupis Murcia . LI LE  Stretch strength and core pelvis  Michele Charlton, ANSELMO Lemus, PT  07/08/2019

## 2019-08-05 ENCOUNTER — DOCUMENTATION ONLY (OUTPATIENT)
Dept: REHABILITATION | Facility: HOSPITAL | Age: 69
End: 2019-08-05

## 2019-08-05 DIAGNOSIS — M21.372 FOOT DROP, LEFT: ICD-10-CM

## 2019-08-05 DIAGNOSIS — R26.9 GAIT ABNORMALITY: ICD-10-CM

## 2019-08-05 NOTE — PROGRESS NOTES
OUTPATIENT PHYSICAL THERAPY DISCHARGE SUMMARY     Name: Lupis WHEATLEY [facility] Number: [id number]    Diagnosis:   Encounter Diagnoses   Name Primary?    Foot drop, left     Gait abnormality      Treatment Orders: PT Eval and Treat  Past Medical History:   Diagnosis Date    MS (multiple sclerosis)     Vitamin B12 deficiency        Initial visit: 3/28/19  Date of Last visit: 7/3/19  Date of Discharge Note:  2019  Total Visits Received: 8  Missed Visits: Cancelled all remaining appts, never rescheduled.     ASSESSMENT   Mrs. Baugh had difficulty with attending PT throughout her POC which limited overall progress. She requested only 1x/week vs the 2x/week recommended due to rides, and even then she only attended an average of 2 x per month over the past 4 months. An HEP had been established but her understanding and lack of assist at home limited her compliance with it. She cancelled all remaining appts and never rescheduled, making it unable to get final DC numbers. Her POC is now  and she is discharged from PT at this time.     Goals:  Short Term Goals: 4 weeks   1. Pt will improve SSWS to 0.4 m/s for improvements in safety during ambulation   2. Pt will improve L hip flexion strength to 3/5 to allow for easier transfers   3. Pt will improve B tandem standing balance to 5 seconds each leg showing improved balance safety   4. Pt will improve 30 second sit to stand to 5 reps showing improvements in LE strength and endurance MET 19  5. Pt will be able to perform HEP without VC's showing ability to maintain gains from therapy since she is unable to attended 2 times per week ONGOING     Long Term Goals: 8 weeks   1. Pt will improve B hip flexion to 4/5 to allow for easier transfer ability   2. Pt will improve SSWS to 0.6 m/s for improvements in safety during ambulation   3. Pt will improve FOTO limitation score to </= 45% for improved self perception of functional mobility.   4. Pt will be able to  perform HEP independently showing ability to maintain gains from therapy since she is unable to attended 2 times per week   5. Pt will improve B tandem standing balance to 15 seconds each leg showing improved balance safety Discontinue 5/30/19  6. Pt will improve 30 second sit to stand to 7 reps showing improvements in LE strength and endurance MET 5/30/19  1. Upgrade: Pt will improve 30 second sit to stand to 11 reps showing improvements in LE strength and endurance.       Discharge reason : Patient self discharge    PLAN   This patient is discharged from Outpatient Physical Therapy Services.     Letty Lemus, PT  08/05/2019

## 2019-10-15 NOTE — PROGRESS NOTES
Contacted patient about rescheduling her follow up post testing-per AP. She was scheduled for 7/5 but appt had to be pushed back due to AP 3:30np-needing two blocks.     Pt was rescheduled for 7/24. Pt states that her daughter who also works for [employer] has already put in for that day (7/5) and got approved and she doesn't know if she will be able to change the date. But she will discuss with her daughter the change. (She sounded worrisome about her health.)   Dose change 10/11 to 75 mcg Levothyroxine. Rx denied, incorrect dose.

## 2019-10-23 ENCOUNTER — OFFICE VISIT (OUTPATIENT)
Dept: NEUROLOGY | Facility: CLINIC | Age: 69
End: 2019-10-23
Payer: MEDICARE

## 2019-10-23 ENCOUNTER — DOCUMENTATION ONLY (OUTPATIENT)
Dept: NEUROLOGY | Facility: CLINIC | Age: 69
End: 2019-10-23

## 2019-10-23 ENCOUNTER — LAB VISIT (OUTPATIENT)
Dept: LAB | Facility: HOSPITAL | Age: 69
End: 2019-10-23
Payer: MEDICARE

## 2019-10-23 VITALS
SYSTOLIC BLOOD PRESSURE: 155 MMHG | BODY MASS INDEX: 37.32 KG/M2 | DIASTOLIC BLOOD PRESSURE: 84 MMHG | HEIGHT: 62 IN | HEART RATE: 85 BPM | WEIGHT: 202.81 LBS

## 2019-10-23 DIAGNOSIS — G35 MULTIPLE SCLEROSIS: Primary | ICD-10-CM

## 2019-10-23 DIAGNOSIS — G35 MULTIPLE SCLEROSIS: ICD-10-CM

## 2019-10-23 DIAGNOSIS — M62.838 MUSCLE SPASM: ICD-10-CM

## 2019-10-23 DIAGNOSIS — R35.0 URINARY FREQUENCY: ICD-10-CM

## 2019-10-23 DIAGNOSIS — Z71.89 COUNSELING REGARDING GOALS OF CARE: ICD-10-CM

## 2019-10-23 DIAGNOSIS — R26.9 GAIT DISTURBANCE: ICD-10-CM

## 2019-10-23 DIAGNOSIS — F32.A DEPRESSION, UNSPECIFIED DEPRESSION TYPE: ICD-10-CM

## 2019-10-23 DIAGNOSIS — G35 MS (MULTIPLE SCLEROSIS): ICD-10-CM

## 2019-10-23 LAB
BASOPHILS # BLD AUTO: 0.02 K/UL (ref 0–0.2)
BASOPHILS NFR BLD: 0.4 % (ref 0–1.9)
BILIRUB UR QL STRIP: NEGATIVE
CLARITY UR REFRACT.AUTO: ABNORMAL
COLOR UR AUTO: YELLOW
DIFFERENTIAL METHOD: ABNORMAL
EOSINOPHIL # BLD AUTO: 0.1 K/UL (ref 0–0.5)
EOSINOPHIL NFR BLD: 1.8 % (ref 0–8)
ERYTHROCYTE [DISTWIDTH] IN BLOOD BY AUTOMATED COUNT: 12.8 % (ref 11.5–14.5)
GLUCOSE UR QL STRIP: NEGATIVE
HCT VFR BLD AUTO: 34.7 % (ref 37–48.5)
HGB BLD-MCNC: 11.4 G/DL (ref 12–16)
HGB UR QL STRIP: NEGATIVE
IMM GRANULOCYTES # BLD AUTO: 0.01 K/UL (ref 0–0.04)
IMM GRANULOCYTES NFR BLD AUTO: 0.2 % (ref 0–0.5)
KETONES UR QL STRIP: NEGATIVE
LEUKOCYTE ESTERASE UR QL STRIP: NEGATIVE
LYMPHOCYTES # BLD AUTO: 2.2 K/UL (ref 1–4.8)
LYMPHOCYTES NFR BLD: 45.5 % (ref 18–48)
MCH RBC QN AUTO: 30.6 PG (ref 27–31)
MCHC RBC AUTO-ENTMCNC: 32.9 G/DL (ref 32–36)
MCV RBC AUTO: 93 FL (ref 82–98)
MONOCYTES # BLD AUTO: 0.5 K/UL (ref 0.3–1)
MONOCYTES NFR BLD: 9.8 % (ref 4–15)
NEUTROPHILS # BLD AUTO: 2.1 K/UL (ref 1.8–7.7)
NEUTROPHILS NFR BLD: 42.3 % (ref 38–73)
NITRITE UR QL STRIP: NEGATIVE
NRBC BLD-RTO: 0 /100 WBC
PH UR STRIP: 6 [PH] (ref 5–8)
PLATELET # BLD AUTO: 194 K/UL (ref 150–350)
PMV BLD AUTO: 9 FL (ref 9.2–12.9)
PROT UR QL STRIP: NEGATIVE
RBC # BLD AUTO: 3.72 M/UL (ref 4–5.4)
SP GR UR STRIP: 1.02 (ref 1–1.03)
URN SPEC COLLECT METH UR: ABNORMAL
WBC # BLD AUTO: 4.9 K/UL (ref 3.9–12.7)

## 2019-10-23 PROCEDURE — 99213 OFFICE O/P EST LOW 20 MIN: CPT | Mod: PBBFAC | Performed by: CLINICAL NURSE SPECIALIST

## 2019-10-23 PROCEDURE — 99999 PR PBB SHADOW E&M-EST. PATIENT-LVL III: ICD-10-PCS | Mod: PBBFAC,,, | Performed by: CLINICAL NURSE SPECIALIST

## 2019-10-23 PROCEDURE — 85025 COMPLETE CBC W/AUTO DIFF WBC: CPT

## 2019-10-23 PROCEDURE — 99999 PR PBB SHADOW E&M-EST. PATIENT-LVL III: CPT | Mod: PBBFAC,,, | Performed by: CLINICAL NURSE SPECIALIST

## 2019-10-23 PROCEDURE — 99215 OFFICE O/P EST HI 40 MIN: CPT | Mod: S$PBB,,, | Performed by: CLINICAL NURSE SPECIALIST

## 2019-10-23 PROCEDURE — 36415 COLL VENOUS BLD VENIPUNCTURE: CPT

## 2019-10-23 PROCEDURE — 99215 PR OFFICE/OUTPT VISIT, EST, LEVL V, 40-54 MIN: ICD-10-PCS | Mod: S$PBB,,, | Performed by: CLINICAL NURSE SPECIALIST

## 2019-10-23 PROCEDURE — 81003 URINALYSIS AUTO W/O SCOPE: CPT

## 2019-10-23 RX ORDER — BACLOFEN 10 MG/1
TABLET ORAL
Qty: 60 TABLET | Refills: 5 | Status: SHIPPED | OUTPATIENT
Start: 2019-10-23 | End: 2020-03-03 | Stop reason: SDUPTHER

## 2019-10-23 NOTE — PROGRESS NOTES
Subjective:       Patient ID: Lupis Murcia is a 69 y.o. female who presents today for a routine clinic visit for MS.  She was last seen by Dr. Henley in June 2019.     MS HPI:  · DMT: N/A--She defers DMT   · Taking vitamin D3 as recommended? Yes -  Dose: 5000 units   · She did not have a good experience in PT. However, she would like to go back.   · She has had a lot of urinary frequency. She has some urgency at night. She wears a Poise pad. She limits her use of Lasix to avoid excessive urination.   · She has vision issues. She has trouble reading small print. She plans to see an eye doctor.     SOCIAL HISTORY  Social History     Tobacco Use    Smoking status: Never Smoker    Smokeless tobacco: Never Used   Substance Use Topics    Alcohol use: No    Drug use: No     Living arrangements - the patient lives with her daughter  Employment: receives Social Security     MS REVIEW OF SYMPTOMS 10/23/2019   Do you feel abnormally tired on most days? No   Do you feel you generally sleep well? Yes   Do you have difficulty controlling your bladder?  Yes   Do you have difficulty controlling your bowels?  No   Do you have frequent muscle cramps, tightness or spasms in your limbs?  Yes--baclofen helps    Do you have new visual symptoms?  No--ongoing issues; has been told in the past that she has cataracts   Do you have worsening difficulty with your memory or thinking? No   Do you have worsening symptoms of anxiety or depression?  Yes--feels frustrated over her limitations    For patients who walk, Do you have more difficulty walking?  No--worse at certain times; wears the AFO    Have you fallen since your last visit?  No   For patients who use wheelchairs: Do you have any skin wounds or breakdown? Not Applicable   Do you have difficulty using your hands?  No   Do you have shooting or burning pain? No; has numbness in the right hand, but she believes that this is carpal tunnel.    Do you have difficulty with sexual  function?  No   If you are sexually active, are you using birth control? Y/N  N/A Not Applicable   Do you often choke when swallowing liquids or solid food?  No   Do you experience worsening symptoms when overheated? No--avoids the heat now    Do you need any new equipment such as a wheelchair, walker or shower chair? No--interested in a lift chair    Do you receive co-pay financial assistance for your principal MS medicine? Not Applicable   Would you be interested in participating in an MS research trial in the future? Not Applicable   Do you feel you have adequate family/friend support?  No   Do you have health insurance?   Yes   Are you currently employed? No   Do you receive SSDI/SSI?  No   Do you use marijuana or cannabis products? No   Have you been diagnosed with a urinary tract infection since your last visit here? No   Have you been diagnosed with a respiratory tract infection since your last visit here? No   Have you been to the emergency room since your last visit here? No   Have you been hospitalized since your last visit here?  No         FSS SCORE & INTERPRETATION 10/23/2019   FSS SCORE  27   FSS SCORE INTERPRETATION May not be suffering from fatigue     MS KIMMY-D SCORE & INTERPRETATION 10/23/2019   KIMMY-D SCORE  17   KIMMY-D INTERPRETATION  Mild to moderate Depression     MS ELSY-7 SCORE & INTERPRETATION 10/23/2019   ELSY-7 SCORE  4   ELSY-7 SCORE INTERPRETATION Normal     PEQ MS MOS PAIN EFFECTS SCORE & INTERPRETATION 10/23/2019   PES SCORE 11   PES SCORE INTERPRETATION Scores can range from 6-30.  Items are scaled so that higher scores indicate a greater impact of pain on a patients mood and behavior.     No flowsheet data found.  MS BLADDER CONTROL SCORE & INTERPRETATION 10/23/2019   BLCS SCORE 8   BLCS SCORE INTERPRETATION  Scores can range from 0-22, with higher scores indicating greater bladder control problems.     MS BOWEL CONTROL SCORE & INTERPRETATION 10/23/2019   BWCS SCORE 2   BWCS SCORE  INTERPRETATION Scores can range from 0-26, with higher scores indicating greater bowel control problems.     PEQ MS IMPACT OF VISUAL IMPAIRMENT SCORE & INTERPRETATION 10/23/2019   CARLOTA SCALE SCORE  5   CARLOTA SCORE INTERPRETATION Scores can range from 0-15, with higher scores indicating greater impact of visual problems on daily activites.     MS PDQ SCORE & INTERPRETATION 10/23/2019   PDQ RETROSPECTIVE MEMORY SUBSCALE 1   PDQ ATTENTION/CONCENTRATION SUBSCALE 3   PDQ PROSPECTIVE MEMORY SUBSCALE 1   PDQ PLANNING/ORGANIZATION SUBSCALE 5   PDQ TOTAL SCORE 10   PDQ SCORE INTERPRETATION Scores can range from 0-80, with higher scores indicating greater perceived cognitive impairment.     MSSS SCORE & INTERPRETATION 10/23/2019   MSSS TANGIBLE SUPPORT SUBSCALE 75   MSSS EMOTIONAL/INFORMATIONAL SUPPORT SUBSCALE 68.75   MSSS AFFECTIONATE SUPPORT SUBSCALE 75   MSSS POSITIVE SOCIAL INTERACTION SUBSCALE 66.67   MSSS TOTAL SCORE 71.36   MSSS SCORE INTERPRETATION Scores can range from 0-100, with higher scores indicating greater perceived support.       Objective:        1. 25 foot timed walk: 33.8 seconds today with walker and AFO; was 38 seconds with rolling walker and AFO in June.   Neurologic Exam      MENTAL STATUS: language is fluent, normal verbal comprehension, short-term and remote memory is intact, attention is normal, patient is alert and oriented x 3, fund of knowlege is appropriate by vocabulary.      MOTOR EXAM: Normal bulk and tone throughout UE and LE bilaterally. Rapid sequential movements are slow in the left upper extremity.  Strength is 5/5 in all groups in the upper extremities and right lower extremity. Left lower extremity--iliopsoas 3/5; hamstring 3/5, ant tibial 3/5     REFLEXES: 2+ and symmetric throughout in all four extremities.      SENSORY EXAM: Decreased vibratory sense to bilateral lower extremities; intact to upper extremities     COORDINATION: Slow finger to nose with left hand. Romberg positive.       GAIT: Wide-based, slow, and unsteady; She is wearing AFO today and using walker. Left foot is dragging.        Imaging:     No results found for this or any previous visit.      Labs:     Lab Results   Component Value Date    [id number] 34 06/04/2019    [id number] 33 11/26/2018    [id number] 40 07/20/2018     Lab Results   Component Value Date    WBC 5.35 06/04/2019    RBC 3.89 (L) 06/04/2019    HGB 12.1 06/04/2019    HCT 37.2 06/04/2019    MCV 96 06/04/2019    MCH 31.1 (H) 06/04/2019    MCHC 32.5 06/04/2019    RDW 12.8 06/04/2019     06/04/2019    MPV 9.6 06/04/2019    GRAN 2.8 06/04/2019    GRAN 53.1 06/04/2019    LYMPH 1.9 06/04/2019    LYMPH 35.7 06/04/2019    MONO 0.5 06/04/2019    MONO 9.3 06/04/2019    EOS 0.1 06/04/2019    BASO 0.01 06/04/2019    EOSINOPHIL 1.3 06/04/2019    BASOPHIL 0.2 06/04/2019     Sodium   Date Value Ref Range Status   01/02/2019 142 136 - 145 mmol/L Final     Potassium   Date Value Ref Range Status   01/02/2019 3.8 3.5 - 5.1 mmol/L Final     Chloride   Date Value Ref Range Status   01/02/2019 103 95 - 110 mmol/L Final     CO2   Date Value Ref Range Status   01/02/2019 27 23 - 29 mmol/L Final     Glucose   Date Value Ref Range Status   01/02/2019 123 (H) 70 - 110 mg/dL Final     BUN, Bld   Date Value Ref Range Status   01/02/2019 12 8 - 23 mg/dL Final     Creatinine   Date Value Ref Range Status   01/02/2019 0.7 0.5 - 1.4 mg/dL Final     Calcium   Date Value Ref Range Status   01/02/2019 10.1 8.7 - 10.5 mg/dL Final     Total Protein   Date Value Ref Range Status   09/05/2017 7.4 6.0 - 8.4 g/dL Final     Albumin   Date Value Ref Range Status   09/05/2017 4.0 3.5 - 5.2 g/dL Final     Total Bilirubin   Date Value Ref Range Status   09/05/2017 0.5 0.1 - 1.0 mg/dL Final     Comment:     For infants and newborns, interpretation of results should be based  on gestational age, weight and in agreement with clinical  observations.  Premature Infant recommended reference  ranges:  Up to 24 hours.............<8.0 mg/dL  Up to 48 hours............<12.0 mg/dL  3-5 days..................<15.0 mg/dL  6-29 days.................<15.0 mg/dL       Alkaline Phosphatase   Date Value Ref Range Status   09/05/2017 78 55 - 135 U/L Final     AST   Date Value Ref Range Status   09/05/2017 12 10 - 40 U/L Final     ALT   Date Value Ref Range Status   09/05/2017 13 10 - 44 U/L Final     Anion Gap   Date Value Ref Range Status   01/02/2019 12 8 - 16 mmol/L Final     eGFR if    Date Value Ref Range Status   01/02/2019 >60.0 >60 mL/min/1.73 m^2 Final     eGFR if non    Date Value Ref Range Status   01/02/2019 >60.0 >60 mL/min/1.73 m^2 Final     Comment:     Calculation used to obtain the estimated glomerular filtration  rate (eGFR) is the CKD-EPI equation.          Diagnosis/Assessment/Plan:    1. Multiple Sclerosis  · Assessment: Lupis has moderate disability from MS. Her exam is stable today, and walk time is a bit faster.   · Imaging: Deferred for 2019; will consider in summer 2020  · Disease Modifying Therapies: She continues to defer PT. Continue Vitamin D. Will check level at next visit. Patient requests CBC today.     2. MS Symptom Assessment / Management  · Bladder Dysfunction: Will check UA today to screen for infection; may consider referral to urology.   · Spasticity: Continue Baclofen.   · Visual Symptoms: Encourage follow-up with her eye doctor.   · Gait Disturbance: Will discuss plan for PT with her therapists at [facility]. Patient would like to focus more on walking during sessions. Will send a new order if needed. Tried U-step in clinic, but she wants to hold off on this walker for now.   · Adaptive needs: Will discuss with our LCSW if lift chair might be covered by Medicare.   · Mood: Discussed that she likely has adjustment disorder. Encouraged her to try going out to grocery store and using scooter. She admits that she only leaves the house to go  to medical appointments.      Our visit today lasted 40 minutes, and 100% of this time was spent face to face with the patient. Over 50% of this visit included discussion of the treatment plan/symptom management/exam findings/coordination of care. The patient agrees with the plan of care. I will see her back in 4 months.     Blanca Virgen, [facility]-BC, MSCN    Problem List Items Addressed This Visit     None      Visit Diagnoses     Urinary frequency    -  Primary    Relevant Orders    Urinalysis, Reflex to Urine Culture Urine, Clean Catch (Completed)    CBC auto differential (Completed)    Multiple sclerosis        Relevant Medications    baclofen (LIORESAL) 10 MG tablet    Other Relevant Orders    CBC auto differential (Completed)

## 2019-10-24 ENCOUNTER — PATIENT MESSAGE (OUTPATIENT)
Dept: NEUROLOGY | Facility: CLINIC | Age: 69
End: 2019-10-24

## 2019-10-24 DIAGNOSIS — G35 MULTIPLE SCLEROSIS: Primary | ICD-10-CM

## 2019-10-24 DIAGNOSIS — R26.9 GAIT DISTURBANCE: ICD-10-CM

## 2019-10-30 ENCOUNTER — PATIENT MESSAGE (OUTPATIENT)
Dept: NEUROLOGY | Facility: CLINIC | Age: 69
End: 2019-10-30

## 2019-10-30 ENCOUNTER — TELEPHONE (OUTPATIENT)
Dept: PSYCHIATRY | Facility: CLINIC | Age: 69
End: 2019-10-30

## 2019-10-30 NOTE — TELEPHONE ENCOUNTER
SW intern left voicemail message for pt's daughter Amanda regarding proceeding with lift chair order and coverage. SW intern asked for call back.

## 2019-11-04 NOTE — TELEPHONE ENCOUNTER
FELICE intern spoke with Amanda, pt's daughter, by phone. SW intern informed Amanda of plan to attempted to receive coverage from insurance first and then continue with MS charities. Pt informed this SW intern that they are not necessarily looking for a lift chair but just a chair that pt can get out of much more easily. SW intern informed pt that lift chairs (or sofa chairs in general) are not typically covered by insurance. Pt reported acceptance of moving forward with this plan and provided her email address: [email or web address].

## 2019-11-05 ENCOUNTER — TELEPHONE (OUTPATIENT)
Dept: NEUROLOGY | Facility: CLINIC | Age: 69
End: 2019-11-05

## 2019-11-05 DIAGNOSIS — G35 MULTIPLE SCLEROSIS: ICD-10-CM

## 2019-11-05 DIAGNOSIS — R26.9 GAIT DISTURBANCE: Primary | ICD-10-CM

## 2019-11-05 NOTE — TELEPHONE ENCOUNTER
----- Message from Safia Sifuentes, MSW Intern sent at 11/4/2019 10:39 AM CST -----  Blanca,    Spoke with Amanda and she stated that lift chair is not necessarily what is needed (a regular sofa chair that is easy to get out of would do). I informed her of potential plan. She is good to proceed with this. I will continue moving forward. What do you think about trying to order/submit/get lift chair covered still?     Safia  ----- Message -----  From: SURINDER Paige CNS  Sent: 10/29/2019   5:49 PM CST  To: Safia Sifuentes, MSW Intern     Safia, could you touch base with Lupis's daughter to see if she is comfortable with proceeding with this?   ----- Message -----  From: NICOLE Barr  Sent: 10/25/2019   5:03 PM CDT  To: SURINDER Paige CNS, #    If you send and order we can see if [employer] or another vendor can provide the chair and bill insurance for the lift mechanism.  However, my guess is that they won't.  So, maybe [facility] or [employer] would be willing to chip in.  Can you please give to Safia to follow up on?  I'm copying her in this message.    ----- Message -----  From: SURINDER Paige CNS  Sent: 10/25/2019   4:57 PM CDT  To: NICOLE Barr    Like a lazy boy lift chair. Think it's worth sending an order?   ----- Message -----  From: NICOLE Barr  Sent: 10/23/2019   5:32 PM CDT  To: SURINDER Paige, CNS    Do you mean a stair lift chair or a lazy boy type chair that will lift her from sitting to standing?    For the stair lift, no.  For the lazy boy lift chair, maybe it will pay for the lift mechanism in the chair but generally, no.    ----- Message -----  From: SURINDER Paige, CNS  Sent: 10/23/2019   4:58 PM CDT  To: Clarissa Parra, [facility]    D, the patient is interested in a lift chair. Is this something that Medicare would cover?

## 2019-11-06 ENCOUNTER — CLINICAL SUPPORT (OUTPATIENT)
Dept: REHABILITATION | Facility: HOSPITAL | Age: 69
End: 2019-11-06
Attending: CLINICAL NURSE SPECIALIST
Payer: MEDICARE

## 2019-11-06 DIAGNOSIS — R29.898 WEAKNESS OF LEFT LOWER EXTREMITY: ICD-10-CM

## 2019-11-06 DIAGNOSIS — Z74.09 IMPAIRED FUNCTIONAL MOBILITY, BALANCE, GAIT, AND ENDURANCE: Primary | ICD-10-CM

## 2019-11-06 DIAGNOSIS — R29.898 ABNORMAL MUSCLE TONE: ICD-10-CM

## 2019-11-06 PROCEDURE — 97162 PT EVAL MOD COMPLEX 30 MIN: CPT | Mod: PO

## 2019-11-07 NOTE — PLAN OF CARE
[facility]  Physical Therapy Initial Evaluation    Name: Lupis WHEATLEY [facility] Number: [id number]    Therapy Diagnosis:   Encounter Diagnoses   Name Primary?    Weakness of left lower extremity     Abnormal muscle tone     Impaired functional mobility, balance, gait, and endurance Yes     Physician: Blanca Virgen APRN,*    Physician Orders: PT Eval and Treat ;Gait training, fall prevention, core stability, stretching  Medical Diagnosis from Referral: multiple sclerosis, gait disturbance  Evaluation Date: 11/6/2019  Authorization Period Expiration: 10/25/19 to 10/24/20  Plan of Care Expiration: 11/6/19 to 1/1/20  Visit # / Visits authorized: 1/ 1( further authorization needed)    Time In: 1600  Time Out: 1645  Total Billable Time: 45 minutes    Precautions: Standard, Fall and pt wears L AFO; pt has fear of elevators and needs escort by staff to and from 2nd floor gym.      Subjective     History of Present Illness: Lupis is a 69 y.o. female that presents to [facility] secondary to being diagnosed with MS ~ 1 and 1/2 years ago. Pt has been through many stints of PT at [facility] and has had difficulty with attendance due to transportation. Pt has also requested to be treated on the first floor in the past due to her fear of elevators. Today however, pt is willing to come up to 2nd floor, if escorted by PT or PT tech. Today's evaluation performed in 2nd floor gym.    Chief complaints:  1. Difficulty with walking  2. Decreased strength to L leg  3. Balance    Falls in the past year: pt fell in January and ended up in the ER.  Prior Therapy: many stints at [facility] here at [facility]  Nutrition:  Overweight  Social History/Support systems: Pt's youngest daughter lives with pt in [place]  Place of Residence (Steps/Adaptations/Levels): 1 story home with ramp access  Previous functional status includes: daughter helps with lower body dressing, showering, pt walks mod  "I with RW and transfers with mod I using RW  Current functional status:  Similar to above  Exercise routine prior to onset : pt tries to  her walker without holding on, does some cervical ROM as well as theraband exercises with LEs  DME owned: RW, tub bench, raised toilet  Work/Job description includes:  Pt is no longer working.    Pt stated goals: "to be able to walk without the walker."  Family present/states: daughter present but leaves at start of session    Pain:   Current 5-6/10, worst 8-9/10, best 2-3/10   Location: bilateral legs and and lower back   Description: Numb  Aggravating Factors: pt unsure  Easing Factors: elevation of legs    Past Medical History and Allergies  Lupis Murcia  has a past medical history of MS (multiple sclerosis) and Vitamin B12 deficiency.    Lupis Murcia  has a past surgical history that includes  section and Breast cyst excision (Left).    Lupis has a current medication list which includes the following prescription(s): acetaminophen-codeine 300-30mg, baclofen, candesartan-hydrochlorothiazide, cyanocobalamin, erythromycin, furosemide, lorazepam, naproxen, vitamin b comp with vit c no.6, and vitamin d.    Review of patient's allergies indicates:   Allergen Reactions    Contrast media Shortness Of Breath and Rash    Pcn [penicillins] Shortness Of Breath and Rash    Celebrex [celecoxib] Other (See Comments)     Swallowing problems     Motrin [ibuprofen] Rash        Imaging, CT scan films: 19  COMPARISON:  None.    FINDINGS:  The brain parenchyma appears normal for age with good corticomedullary differentiation.  There is no evidence of acute infarct, hemorrhage, or mass.  There is ventricular and sulcal enlargement consistent with generalized atrophy.  Moderate confluent decreased supratentorial white matter attenuation most likely related to chronic nonspecific small vessel disease.   No mass-effect or midline shift.  There are no abnormal " extra-axial fluid collections.  The paranasal sinuses and mastoid air cells are essentially clear .  The calvarium appears intact.  .      Impression       No acute intracranial abnormalities    Senescent changes as above.           Objective   - Follows commands: 100% of time   - Speech: no deficits ~ pt is extremely talkative throughout the evaluation!    Mental status: alert, oriented to person, place, and time  Appearance: Casually dressed and Poorly groomed  Behavior:  calm, cooperative and adequate rapport can be established  Attention Span and Concentration:  Normal    Dominant hand:  left     Posture Alignment in sitting:  Marked kyphosis        Posture Alignment in standing:   L knee hyperextension, L foot and LE ER    Sensation: Light Touch: Mostly intact with reports of tingling to LEs; pt does report limited sensation to L foot         Tone: 1-  Slight increase in muscle tone, manifested by a catch and release or by minimal resistance at the end of the range of motino when the affected part(s) is moved in flexion or extension  Limbs/muscles affected: B LE ~ this is noted upon first attempt to passively flex limb, then limb tends to demonstrate mildly decreased muscle tone    Edema: moderate to B LE    Visual/Auditory: pt reports she has cataracts  Tracking:NT  Saccades: NT  Acuity:uses magnifying glass for up close reading  R/L discrimination: Intact  Visual field: Intact    Coordination:   - fine motor: NT  - UE coordination: NT   - LE coordination:  NT    ROM:   UPPER EXTREMITY--AROM/PROM  (R) UE: WFL with passive assistance to reach full shoulder flexion range  (L) UE: limited as follows: L shoulder flex to about 155 degrees before pain           RANGE OF MOTION--LOWER EXTREMITIES  (R) LE Hip: lacks 90 degrees flexion in supine   Knee: WFL   Ankle: WFL with pasive stretching into ankle dorsiflex    (L) LE: Hip: lacks 90 degrees flexion in supine   Knee: WFL   Ankle: WFL with passive stretching into  ankle dorsiflex    Strength: manual muscle test grades below   Upper Extremity Strength~ not tested formally but at least 3/5 to B UE within available ROM    Lower Extremity Strength  Right LE  Left LE    Hip Flexion: 2+/5 Hip Flexion: 1+/5   Hip Extension:  4+/5 Hip Extension: 4-/5   Hip Abduction: 4+/5 Hip Abduction: 4-/5   Hip Adduction: 3+/5 Hip Adduction 2-/5   Knee Extension: 5/5 Knee Extension: 3+/5   Knee Flexion: 4-/5 Knee Flexion: 2-/5   Ankle Dorsiflexion: 4+/5 Ankle Dorsiflexion: 0/5   Ankle Plantarflexion: 5/5 Ankle Plantarflexion: 3/5*   *within available ROM    Abdominal Strength: 3/5    Flexibility: NT due to time constraints but appears limited at B hamstrings     Evaluation   Single Limb Stance R LE NT due to safety concerns  (<10 sec = HIGH FALL RISK)   Single Limb Stance L LE NT due to safety concerns  (<10 sec = HIGH FALL RISK)   30 second Chair Rise 9 completed with arms   5 times sit-stand NT         Gait Assessment:   - AD used: RW  - Assistance: mod I  - Distance: ~ 72 feet    GAIT DEVIATIONS:  Lupis displays the following deviations with ambulation: forward flexed trunk, very decreased pako, increased time in B limb support, decreased step length, L foot and limb ER, L knee hyperextension during stance phase, L toe drag during swing phase.    Impairments contributing to deviations: impaired motor control, LE edema, impaired tone, decreased strength, decreased endurance    Endurance Deficit:  moderate      Evaluation   Timed Up and Go 58 sec   Self Selected Walking Speed  0.18m/sec (6m/33s)   Fast Walking Speed NT       Functional Mobility (Bed mobility, transfers)  Bed mobility: Min A to Mod A in clinic with HOB elevated  Supine to sit: Mod A in clinic with HOB elevated  Sit to supine: Min A in clinic  Transfers to bed: Mod I with RW  Transfers to toilet: Mod I with RW  Sit to stand:  Mod I with RW  Stand pivot:  Mod I with RW  Car transfers: unknown at this time  Wheelchair mobility:  N/A    ADL's: ~ Pt reports mod I to I with all but lower body dressing and bathing( daughter assists)          CMS Impairment/Limitation/Restriction for FOTO  Survey~ not captured due to clerical error.         TREATMENT     Home Exercises and Patient Education Provided    Education provided: scheduling and plan of care      Written Home Exercises Provided: to be provided in a future session.      Assessment   This is a 69 y.o. female referred to outpatient physical therapy and presents with a medical diagnosis of multiple sclerosis, gait disturbance .  Patient presents with decreased LE strength, L> R, B LE edema, decreased posture in sitting and standing, limited ability to ambulate due to considerable foot drop on L, tonal abnormality and decreased activity tolerance. Due to pt's slow moving nature, tests and measures were limited today. With respect to TUG and SSWS, these are both tremendously limited, though her SSWS is a bit faster than what was recorded in a previous evaluation. Pt's 30 second chair rise is also a bit higher today than what was recorded in a previous evaluation. Pt's AFO appears flimsy for her body mass and LE edema, and is therefore not much help at all in assisting pt to clear her toes when ambulating. PT feels pt has some potential for improvement, but her goal of walking without a walker is not realistic. PT is warranted to help pt improve her overall mobility skills, balance, activity tolerance and to consider an alternative to her current L AFO .    Pt prognosis is Fair.   Pt will benefit from skilled outpatient Physical Therapy to address the deficits stated above and in the chart below, provide pt/family education, and to maximize pt's level of independence.     Plan of care discussed with patient: Yes  Pt's spiritual, cultural and educational needs considered and patient is agreeable to the plan of care and goals as stated below:     Anticipated Barriers for therapy: Pt is limited in  her ability to get to clinic due to not driving and daughter only being available 1x/week. Pt also has a fear of elevators.    PT/PTA met face to face to discuss pt's treatment plan and established goals. Pt will be seen by physical therapy every 6th visit and minimally once per month.      History  Co-morbidities and personal factors that may impact the plan of care Examination  Body Structures and Functions, activity limitations and participation restrictions that may impact the plan of care    Clinical Presentation   Co-morbidities:   difficulty sleeping and transportation assistance required        Personal Factors:   coping style Body Regions:   lower extremities  trunk    Body Systems:    gross symmetry  ROM  strength  balance  gait            Participation Restrictions:   Pt does not drive and has a fear of elevators     Activity limitations:   Learning and applying knowledge  no deficits    General Tasks and Commands  undertaking multiple tasks    Communication  no deficits    Mobility  lifting and carrying objects  walking  moving around using equipment (WC)  using transportation (bus, train, plane, car)  driving (bike, car, motorcycle)    Self care  washing oneself (bathing, drying, washing hands)  dressing  looking after one's health    Domestic Life  shopping  cooking  doing house work (cleaning house, washing dishes, laundry)  assisting others    Interactions/Relationships  no deficits    Life Areas  no deficits    Community and Social Life  community life  recreation and leisure         evolving clinical presentation with changing clinical characteristics                      moderate   moderate  high Decision Making/ Complexity Score:  moderate         Pt's spiritual, cultural and educational needs considered and pt agreeable to plan of care and goals as stated below:     GOALS:   Short term goals: 4 weeks, pt agrees to goals set.  1. Pt to be issued first installment of HEP and to report at least  partial compliance  2. Pt to improve her TUG score with RW to 56 seconds for improved household ambulation and decreased fall risk  3. Pt to improve her 30 second chair rise to 10 repetitions to demonstrate increased functional LE strength and improved ease with this transition  4. PT to perform MCTSIB to assess balance/postural control and set appropriate goals if warranted  5. Pt to improve her SSWS to 0.19 m/sec for improved limited community ambulation with decreased fall risk  6. Pt to improve supine> sit transition to min/mod A with HOB elevated    Long term goals: 8 weeks, pt agrees to goals set  7. Pt to be compliant with HEP  8. Pt to improve her 30 second chair rise to 11 repetitions to demonstrate  increased functional LE strength and improved ease with this transition  9. Pt to improve her TUG score with RW to 54 seconds for improved household ambulation and decreased fall risk  10. Pt to improve her SSWS to 0.21 m/sec for improved limited community ambulation with decreased fall risk  11. Pt to improve supine> sit transition to min A with HOB elevated      Plan   Outpatient physical therapy 1 times weekly for 8 weeks( until 1/1/20) to include: Pt Education, HEP, therapeutic exercises, neuromuscular re-education, therapeutic activities, manual therapy, joint mobilizations, aquatic therapy and modalities PRN to achieve established goals. Pt may be seen by PTA as part of the rehabilitation team.       Horace Agarwal, PT  11/06/2019

## 2019-11-11 NOTE — TELEPHONE ENCOUNTER
FELICE intern e-mailed pt's daughter, Keven, information to apply for financial assistance for the lift chair through the [employer] and the [employer] to [email or web address], as requested. FELICE foster informed Keven that provider portion and diagnosis letter would be submitted by this FELICE intern separately.

## 2019-11-11 NOTE — TELEPHONE ENCOUNTER
SW intern spoke with pt's daughter, Amanda, by phone. SW intern explained that the lift chair would not be covered through insurance. SW intern informed Amanda that lift chair (as opposed to regular sofa chair) was recommended by PAMEL[facility] Montemayor-BC, [facility] if possible.

## 2019-11-13 NOTE — TELEPHONE ENCOUNTER
SW intern faxed dx letter and provider portion of [employer] application to [facility] at F#: [phone].

## 2019-11-20 ENCOUNTER — PATIENT MESSAGE (OUTPATIENT)
Dept: NEUROLOGY | Facility: CLINIC | Age: 69
End: 2019-11-20

## 2019-11-29 ENCOUNTER — DOCUMENTATION ONLY (OUTPATIENT)
Dept: REHABILITATION | Facility: HOSPITAL | Age: 69
End: 2019-11-29

## 2019-11-29 NOTE — PROGRESS NOTES
PT/PTA met face to face to discuss pt's treatment plan and progress towards established goals. Continue with current PT POC, including: standing balance and strenghtening. Pt will be seen by physical therapist at least every 6th treatment day or every 30 days, whichever occurs first.      Bret Jose, PTA  11/29/19

## 2019-12-02 ENCOUNTER — DOCUMENTATION ONLY (OUTPATIENT)
Dept: REHABILITATION | Facility: HOSPITAL | Age: 69
End: 2019-12-02

## 2019-12-02 NOTE — PROGRESS NOTES
Face to face meeting completed with Horace Agarwal PT on 11/29/19 regarding current status and progress of   Lupis Murcia .  Michele Charlton, PTA

## 2019-12-03 ENCOUNTER — DOCUMENTATION ONLY (OUTPATIENT)
Dept: REHABILITATION | Facility: HOSPITAL | Age: 69
End: 2019-12-03

## 2019-12-03 NOTE — PROGRESS NOTES
PT/PTA met face to face to discuss pt's treatment plan and progress towards established goals.  Continue with current PT POC with focus on therex, stretching and strengthening.  Patient will be seen by physical therapist at least every sixth treatment or 30 days, whichever occurs first.    Mariann Barrett, PTA  12/03/2019

## 2019-12-26 ENCOUNTER — DOCUMENTATION ONLY (OUTPATIENT)
Dept: REHABILITATION | Facility: HOSPITAL | Age: 69
End: 2019-12-26

## 2019-12-27 NOTE — PROGRESS NOTES
Physical Therapy Discharge Summary    Patient: Lupis Murcia  MRN: [id number]  Diagnosis: multiple sclerosis, gait disturbance      Patient is a referred to Physical Therapy with a diagnosis of     Weakness of left lower extremity      Abnormal muscle tone      Impaired functional mobility, balance, gait, and endurance         and a referral for Eval and Treat . Patient received initial evaluation on  11/6/19 and has not returned since 11/6/19. Pt cancelled all subsequent visits following her initial evaluation and has not scheduled any further sessions. Lupis Murcia will be discharged from skilled PT services today.     Horace Agarwal, PT  12/26/19

## 2020-01-10 ENCOUNTER — TELEPHONE (OUTPATIENT)
Dept: OBSTETRICS AND GYNECOLOGY | Facility: CLINIC | Age: 70
End: 2020-01-10

## 2020-01-10 DIAGNOSIS — Z12.31 VISIT FOR SCREENING MAMMOGRAM: Primary | ICD-10-CM

## 2020-01-10 NOTE — TELEPHONE ENCOUNTER
Patient notified that she is due for her annual check up at the end of November. Notified patient that Dr. Pearson is out on medical leave until further notice. Mammogram order has been placed for patient to schedule. All questions answered and patient verbalized understanding.

## 2020-01-10 NOTE — TELEPHONE ENCOUNTER
----- Message from Gisselle Walden sent at 1/10/2020  9:27 AM CST -----  Contact: Self [phone]  Patient would like to speak with you about a personal matter. Patient would like the message sent on high alert because she is not getting a call back. Please advise

## 2020-02-10 ENCOUNTER — TELEPHONE (OUTPATIENT)
Dept: OBSTETRICS AND GYNECOLOGY | Facility: CLINIC | Age: 70
End: 2020-02-10

## 2020-02-10 NOTE — TELEPHONE ENCOUNTER
----- Message from Lisbet Duran sent at 2/10/2020  1:33 PM CST -----  Contact: [phone] self   Pt is requesting to speak with you re: mammogram order. Please advise

## 2020-02-11 ENCOUNTER — PATIENT MESSAGE (OUTPATIENT)
Dept: NEUROLOGY | Facility: CLINIC | Age: 70
End: 2020-02-11

## 2020-02-11 DIAGNOSIS — H26.8 OTHER CATARACT OF BOTH EYES: Primary | ICD-10-CM

## 2020-02-12 ENCOUNTER — HOSPITAL ENCOUNTER (OUTPATIENT)
Dept: RADIOLOGY | Facility: HOSPITAL | Age: 70
Discharge: HOME | End: 2020-02-12
Attending: OBSTETRICS & GYNECOLOGY
Payer: MEDICARE

## 2020-02-12 DIAGNOSIS — Z12.31 VISIT FOR SCREENING MAMMOGRAM: ICD-10-CM

## 2020-02-12 PROCEDURE — 77067 SCR MAMMO BI INCL CAD: CPT | Mod: TC

## 2020-02-12 PROCEDURE — 77067 SCR MAMMO BI INCL CAD: CPT | Mod: 26,,, | Performed by: RADIOLOGY

## 2020-02-12 PROCEDURE — 77063 MAMMO DIGITAL SCREENING BILAT WITH TOMOSYNTHESIS_CAD: ICD-10-PCS | Mod: 26,,, | Performed by: RADIOLOGY

## 2020-02-12 PROCEDURE — 77063 BREAST TOMOSYNTHESIS BI: CPT | Mod: 26,,, | Performed by: RADIOLOGY

## 2020-02-12 PROCEDURE — 77067 MAMMO DIGITAL SCREENING BILAT WITH TOMOSYNTHESIS_CAD: ICD-10-PCS | Mod: 26,,, | Performed by: RADIOLOGY

## 2020-03-03 ENCOUNTER — OFFICE VISIT (OUTPATIENT)
Dept: NEUROLOGY | Facility: CLINIC | Age: 70
End: 2020-03-03
Payer: MEDICARE

## 2020-03-03 VITALS
SYSTOLIC BLOOD PRESSURE: 146 MMHG | BODY MASS INDEX: 37.16 KG/M2 | HEART RATE: 89 BPM | HEIGHT: 62 IN | DIASTOLIC BLOOD PRESSURE: 85 MMHG | WEIGHT: 201.94 LBS

## 2020-03-03 DIAGNOSIS — N31.9 NEUROGENIC DYSFUNCTION OF THE URINARY BLADDER: ICD-10-CM

## 2020-03-03 DIAGNOSIS — G35 MULTIPLE SCLEROSIS: Primary | ICD-10-CM

## 2020-03-03 DIAGNOSIS — R26.9 GAIT DISTURBANCE: ICD-10-CM

## 2020-03-03 DIAGNOSIS — Z79.899 OTHER LONG TERM (CURRENT) DRUG THERAPY: ICD-10-CM

## 2020-03-03 DIAGNOSIS — Z71.89 COUNSELING REGARDING GOALS OF CARE: ICD-10-CM

## 2020-03-03 PROCEDURE — 99999 PR PBB SHADOW E&M-EST. PATIENT-LVL III: CPT | Mod: PBBFAC,,, | Performed by: CLINICAL NURSE SPECIALIST

## 2020-03-03 PROCEDURE — 99215 OFFICE O/P EST HI 40 MIN: CPT | Mod: S$PBB,,, | Performed by: CLINICAL NURSE SPECIALIST

## 2020-03-03 PROCEDURE — 99213 OFFICE O/P EST LOW 20 MIN: CPT | Mod: PBBFAC | Performed by: CLINICAL NURSE SPECIALIST

## 2020-03-03 PROCEDURE — 99215 PR OFFICE/OUTPT VISIT, EST, LEVL V, 40-54 MIN: ICD-10-PCS | Mod: S$PBB,,, | Performed by: CLINICAL NURSE SPECIALIST

## 2020-03-03 PROCEDURE — 99999 PR PBB SHADOW E&M-EST. PATIENT-LVL III: ICD-10-PCS | Mod: PBBFAC,,, | Performed by: CLINICAL NURSE SPECIALIST

## 2020-03-03 RX ORDER — FUROSEMIDE 40 MG/1
40 TABLET ORAL DAILY
COMMUNITY
Start: 2020-02-23 | End: 2021-10-12

## 2020-03-03 RX ORDER — BACLOFEN 10 MG/1
TABLET ORAL
Qty: 60 TABLET | Refills: 5 | Status: SHIPPED | OUTPATIENT
Start: 2020-03-03 | End: 2021-10-12

## 2020-03-03 NOTE — PROGRESS NOTES
Subjective:          Patient ID: Lupis Murcia is a 69 y.o. female who presents today for a routine clinic visit for MS.  She was last seen in October 2019. The history has been provided by the patient. She is accompanied by her daughter, Sue.     MS HPI:  · DMT: None--defers DMT   · Taking vitamin D3 as recommended? Yes -  Dose: 5000 units daily   · She did go back to  and then stopped again. She is interested in going back, but does not want to go back to [facility].   · She has a referral to ophthalmology to be evaluated for cataracts.   · She received her lift chair with assistance from the [employer], and this is working well.   · She is wearing her left AFO.   · She denies any falls since last visit.   · She continues to have some urinary frequency, but is not interested in taking a medication for this.  · She uses a walker around the house and a wheelchair when she comes to doctor's appointments. She feels unsteady when using a cane due to left foot drop.     Medications:  Current Outpatient Medications   Medication Sig    acetaminophen-codeine 300-30mg (TYLENOL #3) 300-30 mg Tab Take 1 tablet by mouth every 6 (six) hours as needed.    baclofen (LIORESAL) 10 MG tablet Take 10mg twice daily.    candesartan-hydrochlorothiazide (ATACAND HCT) 16-12.5 mg per tablet Take 1 tablet by mouth Daily.    cyanocobalamin 1,000 mcg/mL injection Inject 1ml daily for 1 week, then weekly for 1 month, then every 2 weeks thereafter. Please provide 23g 1in needle and 1cc syringes    erythromycin 250 mg EC capsule Take 1 mg by mouth.    furosemide (LASIX) 20 MG tablet 20 mg.    lorazepam (ATIVAN) 1 MG tablet 1 mg.    naproxen (NAPROSYN) 500 MG tablet Take by mouth.     vitamin B comp with vit C no.6 500-0.5 mg Tab Take 1 tablet by mouth once daily.    vitamin D 1000 units Tab Take 5,000 Units by mouth once daily.      No current facility-administered medications for this visit.        SOCIAL  HISTORY  Social History     Tobacco Use    Smoking status: Never Smoker    Smokeless tobacco: Never Used   Substance Use Topics    Alcohol use: No    Drug use: No       Living arrangements - the patient lives with her daughter    ROS:    REVIEW OF SYMPTOMS 3/3/2020   Do you feel abnormally tired on most days? No--poor endurance    Do you feel you generally sleep well? Yes--waking up three times to urinate   Do you have difficulty controlling your bladder?  Yes--as above    Do you have difficulty controlling your bowels?  No   Do you have frequent muscle cramps, tightness or spasms in your limbs?  No--takes baclofen, which is helpful    Do you have new visual symptoms?  Believes she has cataracts, plans to set up an appt with an eye doctor    Do you have worsening difficulty with your memory or thinking? No   Do you have worsening symptoms of anxiety or depression?  Yes--gets aggravated some times about her limitations    For patients who walk, Do you have more difficulty walking?  No--about the same    Have you fallen since your last visit?  No   For patients who use wheelchairs: Do you have any skin wounds or breakdown? Not Applicable; just dry skin    Do you have difficulty using your hands?  No; her daughter said she drops things frequently    Do you have shooting or burning pain? No   Do you have difficulty with sexual function?  Not assessed    If you are sexually active, are you using birth control? Y/N  N/A Not Applicable   Do you often choke when swallowing liquids or solid food?  No   Do you experience worsening symptoms when overheated? No--just avoids the heat    Do you need any new equipment such as a wheelchair, walker or shower chair? No--need new walker; she needs bilateral assistance to ambulate at home   Do you receive co-pay financial assistance for your principal MS medicine? Not Applicable   Would you be interested in participating in an MS research trial in the future? Not Applicable   Do  you feel you have adequate family/friend support?  No   Do you have health insurance?   Yes   Are you currently employed? No   Do you receive SSDI/SSI?  No   Do you use marijuana or cannabis products? No   Have you been diagnosed with a urinary tract infection since your last visit here? No   Have you been diagnosed with a respiratory tract infection since your last visit here? No   Have you been to the emergency room since your last visit here? No   Have you been hospitalized since your last visit here?  No            Objective:        1. 25 foot timed walk: 31.3 seconds today with walker and AFO--gait is wide-based, slow; was 33.8 seconds at last visit with walker and AFO. She needs to use walker for assistance when rising to standing from a seated position.     Neurologic Exam        MENTAL STATUS: language is fluent, normal verbal comprehension, short-term and remote memory is intact, attention is normal, patient is alert and oriented x 3, fund of knowlege is appropriate by vocabulary.      MOTOR EXAM: Normal bulk and tone throughout UE and LE bilaterally. Rapid sequential movements are slow in the left upper extremity.  Strength is 5/5 in all groups in the upper extremities and right lower extremity. Left lower extremity--iliopsoas 3/5; hamstring 3/5, ant tibial 3/5     REFLEXES: 2+ and symmetric throughout in all four extremities.      SENSORY EXAM: Decreased vibratory sense to bilateral lower extremities; intact to upper extremities     COORDINATION: Slow finger to nose with left hand. Romberg positive.      GAIT: Wide-based, slow, and unsteady--requires walker; She is wearing AFO today and using walker. Left foot is dragging.      Imaging:     No new imaging to review       Labs:     Lab Results   Component Value Date    [id number] 34 06/04/2019    GDRPCYKX76FQ 33 11/26/2018    [id number] 40 07/20/2018     Lab Results   Component Value Date    WBC 4.90 10/23/2019    RBC 3.72 (L) 10/23/2019    HGB 11.4  (L) 10/23/2019    HCT 34.7 (L) 10/23/2019    MCV 93 10/23/2019    MCH 30.6 10/23/2019    MCHC 32.9 10/23/2019    RDW 12.8 10/23/2019     10/23/2019    MPV 9.0 (L) 10/23/2019    GRAN 2.1 10/23/2019    GRAN 42.3 10/23/2019    LYMPH 2.2 10/23/2019    LYMPH 45.5 10/23/2019    MONO 0.5 10/23/2019    MONO 9.8 10/23/2019    EOS 0.1 10/23/2019    BASO 0.02 10/23/2019    EOSINOPHIL 1.8 10/23/2019    BASOPHIL 0.4 10/23/2019     Sodium   Date Value Ref Range Status   01/02/2019 142 136 - 145 mmol/L Final     Potassium   Date Value Ref Range Status   01/02/2019 3.8 3.5 - 5.1 mmol/L Final     Chloride   Date Value Ref Range Status   01/02/2019 103 95 - 110 mmol/L Final     CO2   Date Value Ref Range Status   01/02/2019 27 23 - 29 mmol/L Final     Glucose   Date Value Ref Range Status   01/02/2019 123 (H) 70 - 110 mg/dL Final     BUN, Bld   Date Value Ref Range Status   01/02/2019 12 8 - 23 mg/dL Final     Creatinine   Date Value Ref Range Status   01/02/2019 0.7 0.5 - 1.4 mg/dL Final     Calcium   Date Value Ref Range Status   01/02/2019 10.1 8.7 - 10.5 mg/dL Final     Total Protein   Date Value Ref Range Status   09/05/2017 7.4 6.0 - 8.4 g/dL Final     Albumin   Date Value Ref Range Status   09/05/2017 4.0 3.5 - 5.2 g/dL Final     Total Bilirubin   Date Value Ref Range Status   09/05/2017 0.5 0.1 - 1.0 mg/dL Final     Comment:     For infants and newborns, interpretation of results should be based  on gestational age, weight and in agreement with clinical  observations.  Premature Infant recommended reference ranges:  Up to 24 hours.............<8.0 mg/dL  Up to 48 hours............<12.0 mg/dL  3-5 days..................<15.0 mg/dL  6-29 days.................<15.0 mg/dL       Alkaline Phosphatase   Date Value Ref Range Status   09/05/2017 78 55 - 135 U/L Final     AST   Date Value Ref Range Status   09/05/2017 12 10 - 40 U/L Final     ALT   Date Value Ref Range Status   09/05/2017 13 10 - 44 U/L Final     Anion Gap    Date Value Ref Range Status   01/02/2019 12 8 - 16 mmol/L Final     eGFR if    Date Value Ref Range Status   01/02/2019 >60.0 >60 mL/min/1.73 m^2 Final     eGFR if non    Date Value Ref Range Status   01/02/2019 >60.0 >60 mL/min/1.73 m^2 Final     Comment:     Calculation used to obtain the estimated glomerular filtration  rate (eGFR) is the CKD-EPI equation.        MS Impression and Plan:     NEURO MULTIPLE SCLEROSIS IMPRESSION:   MS Status:     Number of relapses in the past year?:  0    Clinical Progression:  Clinically Stable  Plan:     DMT comment:  She defers DMT; continue Vitamin D.     Symptom Management:  Implement change in symptom management (Will refer her to PT; she and her daughter will explore options close to home and let me know where she wants to go. )    Implement Change in Symptom Management:  Adaptive Needs (Order placed for new walker--patient has gait disturbance and imbalance d/t left foot drop. A cane does not provide adequate stability. )     Next Imaging Due: 6/15/2020     MRI brain and c-spine without contrast in June  Will check Vitamin D during the summer, as well.  Baclofen refilled today      Our visit today lasted 40 minutes, and 100% of this time was spent face to face with the patient. Over 50% of this visit included discussion of the treatment plan/medication changes/symptom management/exam findings/imaging results/coordination of care. The patient agrees with the plan of care. She will follow up with Dr. Henley in 5 months.    Problem List Items Addressed This Visit     Gait disturbance      Other Visit Diagnoses     Multiple sclerosis    -  Primary    Relevant Medications    baclofen (LIORESAL) 10 MG tablet    Other Relevant Orders    MRI Brain Demyelinating Without Contrast    MRI Cervical Spine Demyelinating Without Contrast    WALKER FOR HOME USE    Vitamin D    Counseling regarding goals of care        Relevant Orders    Vitamin D    Other  long term (current) drug therapy         Relevant Orders    Vitamin D          SURINDER Ortiz, CNS

## 2020-03-06 ENCOUNTER — TELEPHONE (OUTPATIENT)
Dept: PSYCHIATRY | Facility: CLINIC | Age: 70
End: 2020-03-06

## 2020-03-11 ENCOUNTER — PATIENT MESSAGE (OUTPATIENT)
Dept: NEUROLOGY | Facility: CLINIC | Age: 70
End: 2020-03-11

## 2020-03-11 DIAGNOSIS — G35 MULTIPLE SCLEROSIS: Primary | ICD-10-CM

## 2020-03-11 DIAGNOSIS — R26.9 GAIT DISTURBANCE: ICD-10-CM

## 2020-03-16 ENCOUNTER — TELEPHONE (OUTPATIENT)
Dept: PSYCHIATRY | Facility: CLINIC | Age: 70
End: 2020-03-16

## 2020-03-16 NOTE — TELEPHONE ENCOUNTER
Received fax from [employer] that pt's new walker would not be covered by Medicare because it is within 5 years of last walker ordered. Called pt to update

## 2020-03-18 ENCOUNTER — TELEPHONE (OUTPATIENT)
Dept: PSYCHIATRY | Facility: CLINIC | Age: 70
End: 2020-03-18

## 2020-03-18 DIAGNOSIS — G35 MULTIPLE SCLEROSIS: Primary | ICD-10-CM

## 2020-03-18 DIAGNOSIS — R26.9 GAIT DISTURBANCE: ICD-10-CM

## 2020-03-18 NOTE — TELEPHONE ENCOUNTER
Sent email to pt explaining potential resources for MS foundations that could help her get a new walker. Called pt to update, left geovany.

## 2020-03-20 NOTE — TELEPHONE ENCOUNTER
MSW learned prior to leave that pt had received a walker from [facility] on 12/8/2015 and so does not qualify for a new walker.  In speaking with pt, she learned that the previous walker is damaged.  Will request repair order from provider.

## 2020-03-24 NOTE — TELEPHONE ENCOUNTER
SW received walker repair order from provider.  Phoned [employer] (original vendor) and was routed to [employer], which took over [employer].  Staff member advised that we could send a repair order to them, but Medicare won't pay for the repairs.  No action taken, as pt/daughter have been provided information on how to obtain a new walker through MS organizations.

## 2020-10-24 ENCOUNTER — HOSPITAL ENCOUNTER (OUTPATIENT)
Dept: RADIOLOGY | Facility: HOSPITAL | Age: 70
Discharge: HOME | End: 2020-10-24
Attending: CLINICAL NURSE SPECIALIST
Payer: MEDICARE

## 2020-10-24 DIAGNOSIS — G35 MULTIPLE SCLEROSIS: ICD-10-CM

## 2020-10-24 PROCEDURE — 70551 MRI BRAIN STEM W/O DYE: CPT | Mod: 26,,, | Performed by: RADIOLOGY

## 2020-10-24 PROCEDURE — 70551 MRI BRAIN STEM W/O DYE: CPT | Mod: TC

## 2020-10-24 PROCEDURE — 70551 MRI BRAIN DEMYELINATING WITHOUT CONTRAST: ICD-10-PCS | Mod: 26,,, | Performed by: RADIOLOGY

## 2020-10-29 ENCOUNTER — PATIENT MESSAGE (OUTPATIENT)
Dept: NEUROLOGY | Facility: CLINIC | Age: 70
End: 2020-10-29

## 2020-10-29 DIAGNOSIS — I63.81 THALAMIC INFARCTION: Primary | ICD-10-CM

## 2020-10-30 ENCOUNTER — TELEPHONE (OUTPATIENT)
Dept: ADMINISTRATIVE | Facility: OTHER | Age: 70
End: 2020-10-30

## 2020-10-30 NOTE — TELEPHONE ENCOUNTER
Left voice message for patient to return call to schedule appointment from referral to Vascular Neurology.  Angeline WHEATLEY [phone]

## 2020-11-02 ENCOUNTER — PATIENT MESSAGE (OUTPATIENT)
Dept: PSYCHIATRY | Facility: CLINIC | Age: 70
End: 2020-11-02

## 2020-11-25 ENCOUNTER — LAB VISIT (OUTPATIENT)
Dept: LAB | Facility: HOSPITAL | Age: 70
End: 2020-11-25
Attending: OBSTETRICS & GYNECOLOGY
Payer: MEDICARE

## 2020-11-25 DIAGNOSIS — R35.0 URINARY FREQUENCY: ICD-10-CM

## 2020-11-25 DIAGNOSIS — G35 MS (MULTIPLE SCLEROSIS): ICD-10-CM

## 2020-11-25 DIAGNOSIS — R35.0 URINARY FREQUENCY: Primary | ICD-10-CM

## 2020-11-25 PROCEDURE — 87086 URINE CULTURE/COLONY COUNT: CPT

## 2020-11-26 LAB — BACTERIA UR CULT: NO GROWTH

## 2020-11-27 NOTE — PROGRESS NOTES
Ms Baugh,   Your urine culture is negative so no UTI/ bladder infection   Have a great weekend!  Dr Pérez

## 2021-01-07 RX ORDER — SULFAMETHOXAZOLE AND TRIMETHOPRIM 400; 80 MG/1; MG/1
1 TABLET ORAL 2 TIMES DAILY
Qty: 20 TABLET | Refills: 0 | Status: SHIPPED | OUTPATIENT
Start: 2021-01-07 | End: 2021-01-17

## 2021-01-07 RX ORDER — FLUCONAZOLE 100 MG/1
100 TABLET ORAL DAILY
Qty: 11 TABLET | Refills: 0 | Status: SHIPPED | OUTPATIENT
Start: 2021-01-07 | End: 2021-01-18

## 2021-01-13 RX ORDER — MUPIROCIN 20 MG/G
OINTMENT TOPICAL 2 TIMES DAILY
Qty: 22 G | Refills: 3 | Status: SHIPPED | OUTPATIENT
Start: 2021-01-13 | End: 2021-10-12

## 2021-02-05 ENCOUNTER — PATIENT MESSAGE (OUTPATIENT)
Dept: NEUROLOGY | Facility: CLINIC | Age: 71
End: 2021-02-05

## 2021-02-12 RX ORDER — DOXYCYCLINE 100 MG/1
100 CAPSULE ORAL EVERY 12 HOURS
Qty: 20 CAPSULE | Refills: 1 | Status: SHIPPED | OUTPATIENT
Start: 2021-02-12 | End: 2021-02-19

## 2021-03-31 ENCOUNTER — IMMUNIZATION (OUTPATIENT)
Dept: PRIMARY CARE CLINIC | Facility: CLINIC | Age: 71
End: 2021-03-31
Payer: MEDICARE

## 2021-03-31 DIAGNOSIS — Z23 NEED FOR VACCINATION: Primary | ICD-10-CM

## 2021-03-31 PROCEDURE — 91303 PR SARSCOV2 VAC AD26 .5ML IM: CPT | Mod: PBBFAC | Performed by: INTERNAL MEDICINE

## 2021-03-31 PROCEDURE — 0031A PR IMMUNIZ ADMIN, SARS-COV-2 COVID-19 VACC, 5X10VP/0.5ML: CPT | Mod: CV19,PBBFAC | Performed by: INTERNAL MEDICINE

## 2021-03-31 RX ADMIN — JNJ-78436735 0.5 ML: 50000000000 SUSPENSION INTRAMUSCULAR at 11:03

## 2021-05-27 ENCOUNTER — LAB VISIT (OUTPATIENT)
Dept: LAB | Facility: HOSPITAL | Age: 71
End: 2021-05-27
Attending: OBSTETRICS & GYNECOLOGY
Payer: MEDICARE

## 2021-05-27 DIAGNOSIS — R31.9 HEMATURIA, UNSPECIFIED TYPE: ICD-10-CM

## 2021-05-27 DIAGNOSIS — R31.9 HEMATURIA, UNSPECIFIED TYPE: Primary | ICD-10-CM

## 2021-05-27 PROCEDURE — 87088 URINE BACTERIA CULTURE: CPT | Performed by: OBSTETRICS & GYNECOLOGY

## 2021-05-27 PROCEDURE — 87086 URINE CULTURE/COLONY COUNT: CPT | Performed by: OBSTETRICS & GYNECOLOGY

## 2021-05-27 PROCEDURE — 87186 SC STD MICRODIL/AGAR DIL: CPT | Performed by: OBSTETRICS & GYNECOLOGY

## 2021-05-27 PROCEDURE — 87077 CULTURE AEROBIC IDENTIFY: CPT | Performed by: OBSTETRICS & GYNECOLOGY

## 2021-05-29 LAB — BACTERIA UR CULT: ABNORMAL

## 2021-05-31 ENCOUNTER — PATIENT MESSAGE (OUTPATIENT)
Dept: PSYCHIATRY | Facility: CLINIC | Age: 71
End: 2021-05-31

## 2021-07-01 ENCOUNTER — LAB VISIT (OUTPATIENT)
Dept: LAB | Facility: HOSPITAL | Age: 71
End: 2021-07-01
Attending: OBSTETRICS & GYNECOLOGY
Payer: MEDICARE

## 2021-07-01 DIAGNOSIS — R35.0 URINARY FREQUENCY: ICD-10-CM

## 2021-07-01 DIAGNOSIS — R35.0 URINARY FREQUENCY: Primary | ICD-10-CM

## 2021-07-01 PROCEDURE — 87086 URINE CULTURE/COLONY COUNT: CPT | Performed by: OBSTETRICS & GYNECOLOGY

## 2021-07-02 LAB — BACTERIA UR CULT: NORMAL

## 2021-09-01 ENCOUNTER — PATIENT MESSAGE (OUTPATIENT)
Dept: PSYCHIATRY | Facility: CLINIC | Age: 71
End: 2021-09-01

## 2021-09-02 ENCOUNTER — PATIENT MESSAGE (OUTPATIENT)
Dept: PSYCHIATRY | Facility: CLINIC | Age: 71
End: 2021-09-02

## 2021-10-12 ENCOUNTER — OFFICE VISIT (OUTPATIENT)
Dept: NEUROLOGY | Facility: CLINIC | Age: 71
End: 2021-10-12
Payer: MEDICARE

## 2021-10-12 VITALS
HEART RATE: 70 BPM | DIASTOLIC BLOOD PRESSURE: 74 MMHG | SYSTOLIC BLOOD PRESSURE: 138 MMHG | HEIGHT: 62 IN | BODY MASS INDEX: 36.99 KG/M2 | WEIGHT: 201 LBS

## 2021-10-12 DIAGNOSIS — R26.9 GAIT DISTURBANCE: ICD-10-CM

## 2021-10-12 DIAGNOSIS — Z71.89 COUNSELING REGARDING GOALS OF CARE: ICD-10-CM

## 2021-10-12 DIAGNOSIS — N31.9 NEUROGENIC BLADDER: ICD-10-CM

## 2021-10-12 DIAGNOSIS — Z79.899 OTHER LONG TERM (CURRENT) DRUG THERAPY: ICD-10-CM

## 2021-10-12 DIAGNOSIS — G35 MULTIPLE SCLEROSIS: Primary | ICD-10-CM

## 2021-10-12 DIAGNOSIS — M21.372 LEFT FOOT DROP: ICD-10-CM

## 2021-10-12 PROCEDURE — 99999 PR PBB SHADOW E&M-EST. PATIENT-LVL IV: CPT | Mod: PBBFAC,,, | Performed by: CLINICAL NURSE SPECIALIST

## 2021-10-12 PROCEDURE — 99214 OFFICE O/P EST MOD 30 MIN: CPT | Mod: PBBFAC | Performed by: CLINICAL NURSE SPECIALIST

## 2021-10-12 PROCEDURE — 99999 PR PBB SHADOW E&M-EST. PATIENT-LVL IV: ICD-10-PCS | Mod: PBBFAC,,, | Performed by: CLINICAL NURSE SPECIALIST

## 2021-10-12 PROCEDURE — 99215 PR OFFICE/OUTPT VISIT, EST, LEVL V, 40-54 MIN: ICD-10-PCS | Mod: S$PBB,,, | Performed by: CLINICAL NURSE SPECIALIST

## 2021-10-12 PROCEDURE — 99215 OFFICE O/P EST HI 40 MIN: CPT | Mod: S$PBB,,, | Performed by: CLINICAL NURSE SPECIALIST

## 2021-10-12 RX ORDER — BACLOFEN 10 MG/1
10 TABLET ORAL
COMMUNITY
Start: 2021-04-28 | End: 2021-10-12 | Stop reason: SDUPTHER

## 2021-10-12 RX ORDER — ERYTHROMYCIN 250 MG/1
TABLET, COATED ORAL
Status: ON HOLD | COMMUNITY
Start: 2021-08-15 | End: 2022-05-11 | Stop reason: HOSPADM

## 2021-10-12 RX ORDER — GUAIFENESIN 1200 MG
325 TABLET, EXTENDED RELEASE 12 HR ORAL
Status: ON HOLD | COMMUNITY
Start: 2021-04-28 | End: 2022-06-14 | Stop reason: CLARIF

## 2021-10-12 RX ORDER — BACLOFEN 10 MG/1
10 TABLET ORAL 2 TIMES DAILY
Qty: 60 TABLET | Refills: 5 | Status: SHIPPED | OUTPATIENT
Start: 2021-10-12 | End: 2022-06-02

## 2021-10-15 ENCOUNTER — TELEPHONE (OUTPATIENT)
Dept: PSYCHIATRY | Facility: CLINIC | Age: 71
End: 2021-10-15
Payer: MEDICARE

## 2021-10-18 ENCOUNTER — TELEPHONE (OUTPATIENT)
Dept: PSYCHIATRY | Facility: CLINIC | Age: 71
End: 2021-10-18
Payer: MEDICARE

## 2021-10-22 ENCOUNTER — PATIENT MESSAGE (OUTPATIENT)
Dept: PSYCHIATRY | Facility: CLINIC | Age: 71
End: 2021-10-22
Payer: MEDICARE

## 2021-11-23 ENCOUNTER — LAB VISIT (OUTPATIENT)
Dept: LAB | Facility: HOSPITAL | Age: 71
End: 2021-11-23
Attending: OBSTETRICS & GYNECOLOGY
Payer: MEDICARE

## 2021-11-23 DIAGNOSIS — R35.0 URINARY FREQUENCY: Primary | ICD-10-CM

## 2021-11-23 DIAGNOSIS — R35.0 URINARY FREQUENCY: ICD-10-CM

## 2021-11-23 PROCEDURE — 87086 URINE CULTURE/COLONY COUNT: CPT | Performed by: OBSTETRICS & GYNECOLOGY

## 2021-11-24 LAB — BACTERIA UR CULT: NO GROWTH

## 2021-11-29 ENCOUNTER — PATIENT MESSAGE (OUTPATIENT)
Dept: UROGYNECOLOGY | Facility: CLINIC | Age: 71
End: 2021-11-29
Payer: MEDICARE

## 2021-12-18 ENCOUNTER — LAB VISIT (OUTPATIENT)
Dept: LAB | Facility: HOSPITAL | Age: 71
End: 2021-12-18
Payer: MEDICARE

## 2021-12-18 DIAGNOSIS — Z79.899 OTHER LONG TERM (CURRENT) DRUG THERAPY: ICD-10-CM

## 2021-12-18 DIAGNOSIS — G35 MULTIPLE SCLEROSIS: ICD-10-CM

## 2021-12-18 LAB
25(OH)D3+25(OH)D2 SERPL-MCNC: 39 NG/ML (ref 30–96)
ALBUMIN SERPL BCP-MCNC: 3.5 G/DL (ref 3.5–5.2)
ALP SERPL-CCNC: 59 U/L (ref 55–135)
ALT SERPL W/O P-5'-P-CCNC: 6 U/L (ref 10–44)
ANION GAP SERPL CALC-SCNC: 8 MMOL/L (ref 8–16)
AST SERPL-CCNC: 9 U/L (ref 10–40)
BASOPHILS # BLD AUTO: 0.02 K/UL (ref 0–0.2)
BASOPHILS NFR BLD: 0.3 % (ref 0–1.9)
BILIRUB SERPL-MCNC: 0.4 MG/DL (ref 0.1–1)
BUN SERPL-MCNC: 17 MG/DL (ref 8–23)
CALCIUM SERPL-MCNC: 9.7 MG/DL (ref 8.7–10.5)
CHLORIDE SERPL-SCNC: 102 MMOL/L (ref 95–110)
CHOLEST SERPL-MCNC: 191 MG/DL (ref 120–199)
CHOLEST/HDLC SERPL: 4 {RATIO} (ref 2–5)
CO2 SERPL-SCNC: 29 MMOL/L (ref 23–29)
CREAT SERPL-MCNC: 0.7 MG/DL (ref 0.5–1.4)
DIFFERENTIAL METHOD: ABNORMAL
EOSINOPHIL # BLD AUTO: 0.2 K/UL (ref 0–0.5)
EOSINOPHIL NFR BLD: 2.7 % (ref 0–8)
ERYTHROCYTE [DISTWIDTH] IN BLOOD BY AUTOMATED COUNT: 13.4 % (ref 11.5–14.5)
EST. GFR  (AFRICAN AMERICAN): >60 ML/MIN/1.73 M^2
EST. GFR  (NON AFRICAN AMERICAN): >60 ML/MIN/1.73 M^2
GLUCOSE SERPL-MCNC: 111 MG/DL (ref 70–110)
HCT VFR BLD AUTO: 33.3 % (ref 37–48.5)
HDLC SERPL-MCNC: 48 MG/DL (ref 40–75)
HDLC SERPL: 25.1 % (ref 20–50)
HGB BLD-MCNC: 10.2 G/DL (ref 12–16)
IMM GRANULOCYTES # BLD AUTO: 0.02 K/UL (ref 0–0.04)
IMM GRANULOCYTES NFR BLD AUTO: 0.3 % (ref 0–0.5)
LDLC SERPL CALC-MCNC: 119 MG/DL (ref 63–159)
LYMPHOCYTES # BLD AUTO: 2 K/UL (ref 1–4.8)
LYMPHOCYTES NFR BLD: 33.1 % (ref 18–48)
MCH RBC QN AUTO: 29.8 PG (ref 27–31)
MCHC RBC AUTO-ENTMCNC: 30.6 G/DL (ref 32–36)
MCV RBC AUTO: 97 FL (ref 82–98)
MONOCYTES # BLD AUTO: 0.6 K/UL (ref 0.3–1)
MONOCYTES NFR BLD: 10.1 % (ref 4–15)
NEUTROPHILS # BLD AUTO: 3.2 K/UL (ref 1.8–7.7)
NEUTROPHILS NFR BLD: 53.5 % (ref 38–73)
NONHDLC SERPL-MCNC: 143 MG/DL
NRBC BLD-RTO: 0 /100 WBC
PLATELET # BLD AUTO: 246 K/UL (ref 150–450)
PMV BLD AUTO: 9.9 FL (ref 9.2–12.9)
POTASSIUM SERPL-SCNC: 4 MMOL/L (ref 3.5–5.1)
PROT SERPL-MCNC: 7.2 G/DL (ref 6–8.4)
RBC # BLD AUTO: 3.42 M/UL (ref 4–5.4)
SODIUM SERPL-SCNC: 139 MMOL/L (ref 136–145)
TRIGL SERPL-MCNC: 120 MG/DL (ref 30–150)
VIT B12 SERPL-MCNC: 266 PG/ML (ref 210–950)
WBC # BLD AUTO: 6.02 K/UL (ref 3.9–12.7)

## 2021-12-18 PROCEDURE — 80061 LIPID PANEL: CPT | Performed by: CLINICAL NURSE SPECIALIST

## 2021-12-18 PROCEDURE — 85025 COMPLETE CBC W/AUTO DIFF WBC: CPT | Performed by: CLINICAL NURSE SPECIALIST

## 2021-12-18 PROCEDURE — 82306 VITAMIN D 25 HYDROXY: CPT | Performed by: CLINICAL NURSE SPECIALIST

## 2021-12-18 PROCEDURE — 80053 COMPREHEN METABOLIC PANEL: CPT | Performed by: CLINICAL NURSE SPECIALIST

## 2021-12-18 PROCEDURE — 36415 COLL VENOUS BLD VENIPUNCTURE: CPT | Performed by: CLINICAL NURSE SPECIALIST

## 2021-12-18 PROCEDURE — 82607 VITAMIN B-12: CPT | Performed by: CLINICAL NURSE SPECIALIST

## 2021-12-21 ENCOUNTER — PATIENT MESSAGE (OUTPATIENT)
Dept: NEUROLOGY | Facility: CLINIC | Age: 71
End: 2021-12-21
Payer: MEDICARE

## 2022-02-28 ENCOUNTER — PATIENT MESSAGE (OUTPATIENT)
Dept: PSYCHIATRY | Facility: CLINIC | Age: 72
End: 2022-02-28
Payer: MEDICARE

## 2022-04-15 RX ORDER — FLUCONAZOLE 150 MG/1
150 TABLET ORAL DAILY
Qty: 3 TABLET | Refills: 2 | Status: SHIPPED | OUTPATIENT
Start: 2022-04-15 | End: 2022-04-18

## 2022-04-15 RX ORDER — CLOTRIMAZOLE AND BETAMETHASONE DIPROPIONATE 10; .64 MG/G; MG/G
CREAM TOPICAL
Qty: 15 G | Refills: 2 | Status: ON HOLD | OUTPATIENT
Start: 2022-04-15 | End: 2022-06-02 | Stop reason: HOSPADM

## 2022-05-01 ENCOUNTER — PATIENT MESSAGE (OUTPATIENT)
Dept: NEUROLOGY | Facility: CLINIC | Age: 72
End: 2022-05-01
Payer: MEDICARE

## 2022-05-01 ENCOUNTER — HOSPITAL ENCOUNTER (INPATIENT)
Facility: HOSPITAL | Age: 72
LOS: 10 days | Discharge: NURSING, REHAB OR LONG-TERM CARE | DRG: 682 | End: 2022-05-11
Attending: EMERGENCY MEDICINE | Admitting: INTERNAL MEDICINE
Payer: MEDICARE

## 2022-05-01 DIAGNOSIS — L03.119 CELLULITIS OF LOWER EXTREMITY, UNSPECIFIED LATERALITY: Primary | ICD-10-CM

## 2022-05-01 DIAGNOSIS — L89.90 PRESSURE INJURY OF SKIN, UNSPECIFIED INJURY STAGE, UNSPECIFIED LOCATION: ICD-10-CM

## 2022-05-01 DIAGNOSIS — M25.512 LEFT SHOULDER PAIN: ICD-10-CM

## 2022-05-01 DIAGNOSIS — R14.0 ABDOMINAL DISTENSION: ICD-10-CM

## 2022-05-01 DIAGNOSIS — N17.9 AKI (ACUTE KIDNEY INJURY): ICD-10-CM

## 2022-05-01 DIAGNOSIS — L30.4 INTERTRIGO: ICD-10-CM

## 2022-05-01 DIAGNOSIS — R00.0 TACHYCARDIA: ICD-10-CM

## 2022-05-01 DIAGNOSIS — Z91.89 AT RISK FOR PROLONGED QT INTERVAL SYNDROME: ICD-10-CM

## 2022-05-01 DIAGNOSIS — E87.5 HYPERKALEMIA: ICD-10-CM

## 2022-05-01 DIAGNOSIS — R07.9 CHEST PAIN: ICD-10-CM

## 2022-05-01 DIAGNOSIS — M79.89 SWELLING OF BOTH LOWER EXTREMITIES: ICD-10-CM

## 2022-05-01 PROBLEM — L89.300 PRESSURE INJURY OF BUTTOCK, UNSTAGEABLE: Status: ACTIVE | Noted: 2022-05-01

## 2022-05-01 PROBLEM — I89.0 LYMPHEDEMA: Status: ACTIVE | Noted: 2022-05-01

## 2022-05-01 PROBLEM — G93.41 ENCEPHALOPATHY, METABOLIC: Status: ACTIVE | Noted: 2022-05-01

## 2022-05-01 LAB
ALBUMIN SERPL BCP-MCNC: 3.4 G/DL (ref 3.5–5.2)
ALP SERPL-CCNC: 85 U/L (ref 55–135)
ALT SERPL W/O P-5'-P-CCNC: 12 U/L (ref 10–44)
ANION GAP SERPL CALC-SCNC: 15 MMOL/L (ref 8–16)
ANION GAP SERPL CALC-SCNC: 19 MMOL/L (ref 8–16)
AST SERPL-CCNC: 13 U/L (ref 10–40)
BACTERIA #/AREA URNS AUTO: NORMAL /HPF
BASOPHILS # BLD AUTO: 0.02 K/UL (ref 0–0.2)
BASOPHILS NFR BLD: 0.2 % (ref 0–1.9)
BILIRUB SERPL-MCNC: 0.4 MG/DL (ref 0.1–1)
BILIRUB UR QL STRIP: NEGATIVE
BUN SERPL-MCNC: 121 MG/DL (ref 8–23)
BUN SERPL-MCNC: 169 MG/DL (ref 8–23)
CALCIUM SERPL-MCNC: 9.3 MG/DL (ref 8.7–10.5)
CALCIUM SERPL-MCNC: 9.9 MG/DL (ref 8.7–10.5)
CHLORIDE SERPL-SCNC: 103 MMOL/L (ref 95–110)
CHLORIDE SERPL-SCNC: 106 MMOL/L (ref 95–110)
CK SERPL-CCNC: 562 U/L (ref 20–180)
CLARITY UR REFRACT.AUTO: ABNORMAL
CO2 SERPL-SCNC: 16 MMOL/L (ref 23–29)
CO2 SERPL-SCNC: 18 MMOL/L (ref 23–29)
COLOR UR AUTO: YELLOW
CREAT SERPL-MCNC: 2.9 MG/DL (ref 0.5–1.4)
CREAT SERPL-MCNC: 3.9 MG/DL (ref 0.5–1.4)
DIFFERENTIAL METHOD: ABNORMAL
EOSINOPHIL # BLD AUTO: 0.1 K/UL (ref 0–0.5)
EOSINOPHIL NFR BLD: 1.3 % (ref 0–8)
ERYTHROCYTE [DISTWIDTH] IN BLOOD BY AUTOMATED COUNT: 14.6 % (ref 11.5–14.5)
EST. GFR  (AFRICAN AMERICAN): 12.5 ML/MIN/1.73 M^2
EST. GFR  (AFRICAN AMERICAN): 18 ML/MIN/1.73 M^2
EST. GFR  (NON AFRICAN AMERICAN): 10.9 ML/MIN/1.73 M^2
EST. GFR  (NON AFRICAN AMERICAN): 15.6 ML/MIN/1.73 M^2
GLUCOSE SERPL-MCNC: 122 MG/DL (ref 70–110)
GLUCOSE SERPL-MCNC: 128 MG/DL (ref 70–110)
GLUCOSE UR QL STRIP: NEGATIVE
HCT VFR BLD AUTO: 32.1 % (ref 37–48.5)
HGB BLD-MCNC: 9.9 G/DL (ref 12–16)
HGB UR QL STRIP: ABNORMAL
HYALINE CASTS UR QL AUTO: 1 /LPF
IMM GRANULOCYTES # BLD AUTO: 0.04 K/UL (ref 0–0.04)
IMM GRANULOCYTES NFR BLD AUTO: 0.4 % (ref 0–0.5)
KETONES UR QL STRIP: NEGATIVE
LACTATE SERPL-SCNC: 1.2 MMOL/L (ref 0.5–2.2)
LACTATE SERPL-SCNC: 1.6 MMOL/L (ref 0.5–2.2)
LEUKOCYTE ESTERASE UR QL STRIP: NEGATIVE
LYMPHOCYTES # BLD AUTO: 1 K/UL (ref 1–4.8)
LYMPHOCYTES NFR BLD: 10.2 % (ref 18–48)
MCH RBC QN AUTO: 29.2 PG (ref 27–31)
MCHC RBC AUTO-ENTMCNC: 30.8 G/DL (ref 32–36)
MCV RBC AUTO: 95 FL (ref 82–98)
MICROSCOPIC COMMENT: NORMAL
MONOCYTES # BLD AUTO: 0.8 K/UL (ref 0.3–1)
MONOCYTES NFR BLD: 8.2 % (ref 4–15)
NEUTROPHILS # BLD AUTO: 8 K/UL (ref 1.8–7.7)
NEUTROPHILS NFR BLD: 79.7 % (ref 38–73)
NITRITE UR QL STRIP: NEGATIVE
NRBC BLD-RTO: 0 /100 WBC
PH UR STRIP: 5 [PH] (ref 5–8)
PLATELET # BLD AUTO: 311 K/UL (ref 150–450)
PMV BLD AUTO: 10.2 FL (ref 9.2–12.9)
POCT GLUCOSE: 146 MG/DL (ref 70–110)
POCT GLUCOSE: 281 MG/DL (ref 70–110)
POTASSIUM SERPL-SCNC: 5.6 MMOL/L (ref 3.5–5.1)
POTASSIUM SERPL-SCNC: 6.3 MMOL/L (ref 3.5–5.1)
PROT SERPL-MCNC: 8.2 G/DL (ref 6–8.4)
PROT UR QL STRIP: NEGATIVE
RBC # BLD AUTO: 3.39 M/UL (ref 4–5.4)
RBC #/AREA URNS AUTO: 2 /HPF (ref 0–4)
SODIUM SERPL-SCNC: 138 MMOL/L (ref 136–145)
SODIUM SERPL-SCNC: 139 MMOL/L (ref 136–145)
SP GR UR STRIP: 1.01 (ref 1–1.03)
SQUAMOUS #/AREA URNS AUTO: 0 /HPF
URN SPEC COLLECT METH UR: ABNORMAL
WBC # BLD AUTO: 10.05 K/UL (ref 3.9–12.7)
WBC #/AREA URNS AUTO: 3 /HPF (ref 0–5)

## 2022-05-01 PROCEDURE — 80053 COMPREHEN METABOLIC PANEL: CPT | Performed by: PHYSICIAN ASSISTANT

## 2022-05-01 PROCEDURE — 84133 ASSAY OF URINE POTASSIUM: CPT | Performed by: PHYSICIAN ASSISTANT

## 2022-05-01 PROCEDURE — 99285 EMERGENCY DEPT VISIT HI MDM: CPT | Mod: 25

## 2022-05-01 PROCEDURE — 25000003 PHARM REV CODE 250

## 2022-05-01 PROCEDURE — 84300 ASSAY OF URINE SODIUM: CPT | Performed by: PHYSICIAN ASSISTANT

## 2022-05-01 PROCEDURE — 96375 TX/PRO/DX INJ NEW DRUG ADDON: CPT

## 2022-05-01 PROCEDURE — 96365 THER/PROPH/DIAG IV INF INIT: CPT

## 2022-05-01 PROCEDURE — 99284 PR EMERGENCY DEPT VISIT,LEVEL IV: ICD-10-PCS | Mod: ,,, | Performed by: PHYSICIAN ASSISTANT

## 2022-05-01 PROCEDURE — 63600175 PHARM REV CODE 636 W HCPCS: Performed by: PHYSICIAN ASSISTANT

## 2022-05-01 PROCEDURE — 87040 BLOOD CULTURE FOR BACTERIA: CPT | Performed by: PHYSICIAN ASSISTANT

## 2022-05-01 PROCEDURE — 86803 HEPATITIS C AB TEST: CPT | Performed by: EMERGENCY MEDICINE

## 2022-05-01 PROCEDURE — 85025 COMPLETE CBC W/AUTO DIFF WBC: CPT | Performed by: PHYSICIAN ASSISTANT

## 2022-05-01 PROCEDURE — 12000002 HC ACUTE/MED SURGE SEMI-PRIVATE ROOM

## 2022-05-01 PROCEDURE — 80047 BASIC METABLC PNL IONIZED CA: CPT

## 2022-05-01 PROCEDURE — 25000003 PHARM REV CODE 250: Performed by: PHYSICIAN ASSISTANT

## 2022-05-01 PROCEDURE — 83935 ASSAY OF URINE OSMOLALITY: CPT | Performed by: PHYSICIAN ASSISTANT

## 2022-05-01 PROCEDURE — 93010 ELECTROCARDIOGRAM REPORT: CPT | Mod: ,,, | Performed by: INTERNAL MEDICINE

## 2022-05-01 PROCEDURE — 82962 GLUCOSE BLOOD TEST: CPT

## 2022-05-01 PROCEDURE — 80048 BASIC METABOLIC PNL TOTAL CA: CPT | Performed by: PHYSICIAN ASSISTANT

## 2022-05-01 PROCEDURE — 81001 URINALYSIS AUTO W/SCOPE: CPT | Performed by: PHYSICIAN ASSISTANT

## 2022-05-01 PROCEDURE — 83605 ASSAY OF LACTIC ACID: CPT | Mod: 91 | Performed by: PHYSICIAN ASSISTANT

## 2022-05-01 PROCEDURE — 93010 EKG 12-LEAD: ICD-10-PCS | Mod: ,,, | Performed by: INTERNAL MEDICINE

## 2022-05-01 PROCEDURE — 93005 ELECTROCARDIOGRAM TRACING: CPT

## 2022-05-01 PROCEDURE — 99284 EMERGENCY DEPT VISIT MOD MDM: CPT | Mod: ,,, | Performed by: PHYSICIAN ASSISTANT

## 2022-05-01 PROCEDURE — 82436 ASSAY OF URINE CHLORIDE: CPT | Performed by: PHYSICIAN ASSISTANT

## 2022-05-01 PROCEDURE — 82550 ASSAY OF CK (CPK): CPT | Performed by: PHYSICIAN ASSISTANT

## 2022-05-01 PROCEDURE — 96361 HYDRATE IV INFUSION ADD-ON: CPT

## 2022-05-01 PROCEDURE — 84540 ASSAY OF URINE/UREA-N: CPT | Performed by: PHYSICIAN ASSISTANT

## 2022-05-01 PROCEDURE — 82570 ASSAY OF URINE CREATININE: CPT | Performed by: PHYSICIAN ASSISTANT

## 2022-05-01 RX ORDER — INDOMETHACIN 25 MG/1
50 CAPSULE ORAL
Status: COMPLETED | OUTPATIENT
Start: 2022-05-01 | End: 2022-05-01

## 2022-05-01 RX ORDER — LORAZEPAM 2 MG/ML
1 INJECTION INTRAMUSCULAR
Status: COMPLETED | OUTPATIENT
Start: 2022-05-01 | End: 2022-05-01

## 2022-05-01 RX ORDER — ACETAMINOPHEN AND CODEINE PHOSPHATE 300; 30 MG/1; MG/1
1 TABLET ORAL ONCE
Status: DISCONTINUED | OUTPATIENT
Start: 2022-05-01 | End: 2022-05-01

## 2022-05-01 RX ORDER — CALCIUM GLUCONATE 20 MG/ML
1 INJECTION, SOLUTION INTRAVENOUS
Status: COMPLETED | OUTPATIENT
Start: 2022-05-01 | End: 2022-05-01

## 2022-05-01 RX ORDER — DOXYLAMINE SUCCINATE 25 MG
TABLET ORAL 2 TIMES DAILY
Status: DISCONTINUED | OUTPATIENT
Start: 2022-05-01 | End: 2022-05-11 | Stop reason: HOSPADM

## 2022-05-01 RX ORDER — CALCIUM GLUCONATE 20 MG/ML
INJECTION, SOLUTION INTRAVENOUS
Status: COMPLETED
Start: 2022-05-01 | End: 2022-05-01

## 2022-05-01 RX ADMIN — SODIUM BICARBONATE 50 MEQ: 84 INJECTION, SOLUTION INTRAVENOUS at 07:05

## 2022-05-01 RX ADMIN — CALCIUM GLUCONATE 1 G: 20 INJECTION, SOLUTION INTRAVENOUS at 07:05

## 2022-05-01 RX ADMIN — LORAZEPAM 1 MG: 2 INJECTION INTRAMUSCULAR; INTRAVENOUS at 09:05

## 2022-05-01 RX ADMIN — SODIUM CHLORIDE, SODIUM LACTATE, POTASSIUM CHLORIDE, AND CALCIUM CHLORIDE 2262 ML: .6; .31; .03; .02 INJECTION, SOLUTION INTRAVENOUS at 06:05

## 2022-05-01 RX ADMIN — MICONAZOLE NITRATE: 20 CREAM TOPICAL at 07:05

## 2022-05-01 RX ADMIN — DEXTROSE 250 ML: 10 SOLUTION INTRAVENOUS at 08:05

## 2022-05-01 RX ADMIN — INSULIN HUMAN 10 UNITS: 100 INJECTION, SOLUTION PARENTERAL at 08:05

## 2022-05-01 RX ADMIN — CEFTRIAXONE 2 G: 2 INJECTION, SOLUTION INTRAVENOUS at 06:05

## 2022-05-01 NOTE — EKG INTERPRETATIONS - EMERGENCY DEPT.
Independently interpreted by MD:  Rate 101, NSR, no stemi, no ectopy, no hypertrophy, nonspecific ST and T wave changes  
I performed the initial face to face bedside interview with this patient regarding history of present illness, review of symptoms and past medical, social and family history.  I completed an independent physical examination.  I was the initial provider who evaluated this patient.  The history, review of symptoms and examination was documented by the scribe in my presence and I attest to the accuracy of the documentation.  I have signed out the follow up of any pending tests (i.e. labs, radiological studies) to the ACP.  I have discussed the patient’s plan of care and disposition with the ACP.

## 2022-05-01 NOTE — ED PROVIDER NOTES
Encounter Date: 2022       History     Chief Complaint   Patient presents with    Fall    Urinary Tract Infection     Arrived from home for unwitnessed fall 5 days ago - Denies hitting head or LOC. Per pt's daughter, she has not been ambulatory since fall and has been sitting in urine saturated diaper since fall. Erythema noted to vaginal area w/ strong, foul odor. Wounds noted to ble, oozing from site.     71-year-old female with history of multiple sclerosis, lymphedema presents emergency department for AMS, lower extremity weakness, rash.  Patient is accompanied by her daughters he states that 6 days ago she was helping her mother go to the bathroom when her right leg gave out and she lowered herself down hitting her right armpit on the walker as she fell.  Denies any head injury from the fall.  States that 3 days ago she has been sitting in her chair and unable to get up.  Daughter notes foul-smelling urine as well as a red rash on her vulva.  States that she has been having intermittent episodes of altered mental status and appears to be more lethargic than usual.  Patient denies any chest pain, shortness of breath, abdominal pain or fevers/chills.        Review of patient's allergies indicates:   Allergen Reactions    Contrast media Shortness Of Breath and Rash    Pcn [penicillins] Shortness Of Breath and Rash    Celebrex [celecoxib] Other (See Comments)     Swallowing problems     Diazepam Hives    Motrin [ibuprofen] Rash     Past Medical History:   Diagnosis Date    MS (multiple sclerosis)     Vitamin B12 deficiency      Past Surgical History:   Procedure Laterality Date    BREAST CYST EXCISION Left     over 40yrs ago     SECTION       Family History   Problem Relation Age of Onset    Coronary artery disease Father     Lung cancer Father     Lung cancer Mother     Brain cancer Brother      Social History     Tobacco Use    Smoking status: Never Smoker    Smokeless tobacco: Never  Used   Substance Use Topics    Alcohol use: No    Drug use: No     Review of Systems   Constitutional: Negative for chills and fever.   HENT: Negative for sore throat.    Eyes: Negative for pain and visual disturbance.   Respiratory: Negative for cough and shortness of breath.    Cardiovascular: Negative for chest pain.   Genitourinary: Negative for difficulty urinating and dysuria.   Musculoskeletal: Positive for arthralgias.   Skin: Positive for color change and rash.   Neurological: Positive for weakness.   Psychiatric/Behavioral: Positive for confusion.       Physical Exam     Initial Vitals [05/01/22 1624]   BP Pulse Resp Temp SpO2   (!) 109/44 (!) 118 18 98.8 °F (37.1 °C) 97 %      MAP       --         Physical Exam    Nursing note and vitals reviewed.  Constitutional: She appears well-developed and well-nourished.   HENT:   Head: Normocephalic and atraumatic.   Eyes: Conjunctivae are normal. Pupils are equal, round, and reactive to light.   Neck: Neck supple.   Normal range of motion.  Cardiovascular: Regular rhythm, normal heart sounds and intact distal pulses. Exam reveals no gallop and no friction rub.    No murmur heard.  Pulmonary/Chest: Breath sounds normal.   Abdominal: Abdomen is soft. Bowel sounds are normal. She exhibits no distension and no mass. There is no abdominal tenderness. There is no rebound and no guarding.   Musculoskeletal:      Cervical back: Normal range of motion and neck supple.      Comments: Bilateral lower extremity edema.  With chronic appearing dry crusted skin.    Foot drop to the left foot.         Neurological: She is alert and oriented to person, place, and time.   Skin: Capillary refill takes less than 2 seconds.   Erythema with satellite lesions noted to the vulva as well as the intertriginous spaces.    Large area of erythema on the sacral region with areas of skin breakdown which are black and appeared necrotic as noted in picture.   Psychiatric: She has a normal mood  and affect.         ED Course   Procedures         Labs Reviewed   CBC W/ AUTO DIFFERENTIAL - Abnormal; Notable for the following components:       Result Value    RBC 3.39 (*)     Hemoglobin 9.9 (*)     Hematocrit 32.1 (*)     MCHC 30.8 (*)     RDW 14.6 (*)     Gran # (ANC) 8.0 (*)     Gran % 79.7 (*)     Lymph % 10.2 (*)     All other components within normal limits   COMPREHENSIVE METABOLIC PANEL - Abnormal; Notable for the following components:    Potassium 6.3 (*)     CO2 16 (*)     Glucose 128 (*)      (*)     Creatinine 3.9 (*)     Albumin 3.4 (*)     Anion Gap 19 (*)     eGFR if  12.5 (*)     eGFR if non  10.9 (*)     All other components within normal limits   URINALYSIS, REFLEX TO URINE CULTURE - Abnormal; Notable for the following components:    Appearance, UA Hazy (*)     Occult Blood UA 3+ (*)     All other components within normal limits    Narrative:     Specimen Source->Urine   CK - Abnormal; Notable for the following components:     (*)     All other components within normal limits   BASIC METABOLIC PANEL - Abnormal; Notable for the following components:    Potassium 5.6 (*)     CO2 18 (*)     Glucose 122 (*)     Creatinine 2.9 (*)     eGFR if  18.0 (*)     eGFR if non  15.6 (*)     All other components within normal limits   POCT GLUCOSE - Abnormal; Notable for the following components:    POCT Glucose 146 (*)     All other components within normal limits   POCT GLUCOSE - Abnormal; Notable for the following components:    POCT Glucose 281 (*)     All other components within normal limits   CULTURE, BLOOD   CULTURE, BLOOD   LACTIC ACID, PLASMA   URINALYSIS MICROSCOPIC    Narrative:     Specimen Source->Urine   HEPATITIS C ANTIBODY   LACTIC ACID, PLASMA   POCT GLUCOSE MONITORING CONTINUOUS   ISTAT CHEM8          Imaging Results          CT Abdomen Pelvis  Without Contrast (Final result)  Result time 05/01/22 22:29:49     Final result by Nadeem Vega MD (05/01/22 22:29:49)                 Impression:      No evidence of nephrolithiasis or obstructive uropathy.    Hepatomegaly and hepatic steatosis.    Motion and artifact limited study.    Additional findings discussed in the body of the report.      Electronically signed by: Nadeem Vega MD  Date:    05/01/2022  Time:    22:29             Narrative:    EXAMINATION:  CT ABDOMEN PELVIS WITHOUT CONTRAST    CLINICAL HISTORY:  Sacra wound;    TECHNIQUE:  Low dose axial images, sagittal and coronal reformations were obtained from the lung bases to the pubic symphysis.  Oral contrast was not administered.    COMPARISON:  None.    FINDINGS:  Lower chest: Heart is borderline enlarged.  There are possible mild emphysematous changes in the lung bases.  Dependent subsegmental atelectasis.  Detailed evaluation of the lung bases is limited by motion.    Abdomen:    Evaluation of the solid abdominal organs and bowel is limited in the absence of IV contrast.  Evaluation is limited by extensive streak artifact due to the patient's arms overlying the field of view.    Liver is markedly enlarged measuring approximately 22.5 cm in craniocaudal length.  There is diffuse steatosis.  Evaluation for mass is limited by absence of IV contrast and the presence significant artifact related to the patient's arms overlying the field of view.  Gallbladder is unremarkable.  No intra-or extrahepatic biliary ductal dilatation.    Spleen, adrenals, and pancreas are unremarkable allowing for motion and artifact limitations.    Kidneys are symmetric.  No renal or ureteral stones. No hydronephrosis.    No distended loops of small bowel. Mild stool burden throughout the colon.  Appendix is normal.    Abdominal aorta is normal in course and caliber.    Multiple subcentimeter retroperitoneal lymph nodes which are not significantly enlarged by size criteria.    No abdominal free fluid or pneumoperitoneum.    Pelvis:  Urinary bladder, rectum, and uterus are unremarkable.  No free fluid in the pelvis.    Bones and soft tissues: No aggressive osseous lesions.  Degenerative changes in the spine.  Mild body wall edema and mild edema in the gluteal soft tissues.  No soft tissue emphysema or sizable collection identified in the field of view.  Suggest correlation with direct visualization.                               CT Head Without Contrast (Final result)  Result time 05/01/22 22:12:10    Final result by Nadeem Vega MD (05/01/22 22:12:10)                 Impression:      No CT evidence of acute intracranial abnormality.    Patchy and confluent periventricular white matter hypoattenuation suggestive of known multiple sclerosis or chronic microvascular ischemic change.  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      Electronically signed by: Nadeem Vega MD  Date:    05/01/2022  Time:    22:12             Narrative:    EXAMINATION:  CT HEAD WITHOUT CONTRAST    CLINICAL HISTORY:  Mental status change, unknown cause;    TECHNIQUE:  Low dose axial images were obtained through the head.  Coronal and sagittal reformations were also performed. Contrast was not administered.    COMPARISON:  MRI brain, 10/24/2020.  CT head, 01/02/2019.    FINDINGS:  Exam quality is limited by motion.    No evidence of acute territorial infarct, hemorrhage, mass effect, or midline shift.    Generalized cerebral volume loss with ex vacuo dilation of the ventricles and sulci.  Patchy and confluent periventricular white matter hypoattenuation.  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    No displaced calvarial fracture.    Minimal mucosal thickening in the maxillary sinuses.  Otherwise, the visualized paranasal sinuses and mastoid air cells are essentially clear.  Scattered dental caries noted.                               X-Ray Shoulder Trauma Left (Final result)  Result time 05/01/22 19:49:43    Final result by Yonathan Tomas MD  (05/01/22 19:49:43)                 Impression:      1. No acute displaced fracture or dislocation of the left shoulder.      Electronically signed by: Yonathan Tomas MD  Date:    05/01/2022  Time:    19:49             Narrative:    EXAMINATION:  XR SHOULDER TRAUMA 3 VIEW LEFT    CLINICAL HISTORY:  Pain in left shoulder    TECHNIQUE:  Three views of the left shoulder were performed.    COMPARISON  None    FINDINGS:  Three views left shoulder.    The left humeral head maintains appropriate relationship with the glenoid.  The acromioclavicular joint is intact.  No acute displaced left rib fracture.  The left lung zones are grossly clear allowing for technique.                               X-Ray Chest AP Portable (Final result)  Result time 05/01/22 19:44:08    Final result by Yonathan Tomas MD (05/01/22 19:44:08)                 Impression:      1. Coarse interstitial attenuation accentuated by shallow inspiratory effort, no large focal consolidation.      Electronically signed by: Yonathan Tomas MD  Date:    05/01/2022  Time:    19:44             Narrative:    EXAMINATION:  XR CHEST AP PORTABLE    CLINICAL HISTORY:  Sepsis;    TECHNIQUE:  Single frontal view of the chest was performed.    COMPARISON:  01/02/2019    FINDINGS:  The cardiomediastinal silhouette is prominent, similar to the previous examination noting magnification by technique..  There is no pleural effusion.  The trachea is deviated rightward by a tortuous aorta..  The lungs are symmetrically expanded bilaterally with prominent interstitial attenuation bilaterally accentuated by shallow inspiratory effort and habitus..  No large focal consolidation seen.  There is no pneumothorax.  The osseous structures are remarkable for degenerative changes..                                 Medications   miconazole 2 % cream ( Topical (Top) Given 5/1/22 1915)   dextrose 10% bolus 125 mL (has no administration in time range)   dextrose 10% bolus 250 mL (0 g  Intravenous Stopped 5/1/22 2024)   sodium zirconium cyclosilicate packet 10 g (0 g Oral Hold 5/1/22 2007)   lactated ringers bolus 2,262 mL (2,262 mLs Intravenous New Bag 5/1/22 1845)   cefTRIAXone (ROCEPHIN) 2 g/50 mL D5W IVPB (0 g Intravenous Stopped 5/1/22 1954)   insulin regular injection 10 Units (10 Units Intravenous Given 5/1/22 2011)   sodium bicarbonate solution 50 mEq (50 mEq Intravenous Given 5/1/22 1955)   calcium gluconat 1 g in NS IVPB (premixed) (0 g Intravenous Stopped 5/1/22 2001)   lorazepam injection 1 mg (1 mg Intravenous Given 5/1/22 2121)     Medical Decision Making:   History:   Old Medical Records: I decided to obtain old medical records.  Old Records Summarized: records from previous admission(s).  Clinical Tests:   Lab Tests: Ordered and Reviewed  Radiological Study: Ordered and Reviewed  Medical Tests: Ordered and Reviewed  Other:   I have discussed this case with another health care provider.       APC / Resident Notes:   71 y.o. year old female presenting with weakness, AMS.  On exam patient is afebrile and nontoxic.  Heart rate and rhythm are regular. Lungs with clear breath sounds throughout. Abdomen is soft, nontender.  Bilateral lower extremity edema with chronic appearing dry crusted skin lesions.  Pressure also noted to the sacral region with erythema as well as small area of black necrotic tissue.  Patient also noted to have intertriginous candidal infection on her vulva.    DDx includes but is not limited to intertrigo, sacral ulcer, sepsis, UTI, ICH, pneumonia, MS     ED workup reveals on arrival to the ED patient is alert and oriented x4 but is lethargic but easily arousable.  Noted to have intertriginous rash.  Was started on miconazole topical in the ED.  Also noted to have a larger pressure wound to the sacrum with some necrotic tissue.  CT abdomen pelvis was obtained which shows no fluid collection or tracking.  CT head noncontrast shows no acute intracranial abnormalities.   Patient noted to have foul-smelling urine on arrival UTI was suspected so 2 g of Rocephin was given in the ED.  However UA was negative for infection.  Chest x-ray also with no acute cardiopulmonary abnormalities.  Patient noted to have an HANDY with a BUN of 169 and creatinine of 3.9.  Patient is hyperkalemic with a potassium is 6.3.  He EKG sinus rhythm but does show some nonspecific T-wave abnormalities and calcium gluconate was given and patient was shifted once in the ED with a repeat potassium of 5.5.  Will admit to hospital medicine for further evaluation management of patient's HANDY, weakness and hyperkalemia.    I discussed the care of this patient with my supervising physician.          ED Course as of 05/01/22 2247   Sun May 01, 2022   1942 Potassium(!!): 6.3  Confirm with lab no hemolysis [HJ]   2010 Creatinine(!): 3.9 [HJ]   2010 BUN(!): 169 [HJ]      ED Course User Index  [HJ] Howie Lee PA-C             Clinical Impression:   Final diagnoses:  [R00.0] Tachycardia  [L30.4] Intertrigo  [M25.512] Left shoulder pain  [E87.5] Hyperkalemia  [N17.9] HANDY (acute kidney injury) (Primary)  [L89.90] Pressure injury of skin, unspecified injury stage, unspecified location          ED Disposition Condition    Admit               Howie Lee PA-C  05/01/22 2054       Howie Lee PA-C  05/01/22 2118       Howie Lee PA-C  05/01/22 6225

## 2022-05-01 NOTE — ED TRIAGE NOTES
"Lupis Murcia, a 71 y.o. female presents to the ED w/ complaint of UTI. Pt arrives from home after a witnessed fall on Monday. Pt's daughter states that she lives with patient and pt had an episode of weakness on Monday in the bathroom and daughter helped lower patient to ground. Denies LOC/head injury/blood thinners. Pt's daughter states that pt had a lift assist from  and was ambulatory with a walker after fall until Thursday. Per Pt's daughter pt became lethargic on Thursday with episodes of confusion. Pt's daughter states that pt was unable to stand starting on Thursday night. Per daughter pt was put in a depend on Thursday night and daughter was not able to move patient to change depend until she called EMS today. Daughter states that pt's depend was saturated with urine but daughter could not move pt despite getting help from several other people. Pt has history of MS and has a left foot drop. Pt has increased weakness to right leg since Thursday. Pt arrives in ED saturated in foul-smelling urine. Pt reports "I feel groggy and weak." Pt reports left shoulder pain. Denies chest pain/SOB. Denies n/v/d. Pt has weeping wounds to bilateral shins that have saturated stockings. Pt's genital area is edematous and red. Pressure wounds noted to inner thigh and buttocks. Pt reports "burning pain" to genital area.     Triage note:  Chief Complaint   Patient presents with    Fall    Urinary Tract Infection     Arrived from home for unwitnessed fall 5 days ago - Denies hitting head or LOC. Per pt's daughter, she has not been ambulatory since fall and has been sitting in urine saturated diaper since fall. Erythema noted to vaginal area w/ strong, foul odor. Wounds noted to ble, oozing from site.     Review of patient's allergies indicates:   Allergen Reactions    Contrast media Shortness Of Breath and Rash    Pcn [penicillins] Shortness Of Breath and Rash    Celebrex [celecoxib] Other (See Comments)     Swallowing " problems     Diazepam Hives    Motrin [ibuprofen] Rash     Past Medical History:   Diagnosis Date    MS (multiple sclerosis)     Vitamin B12 deficiency      Patient identifiers verified and correct for Lupis Murcia    LOC: The patient is alert but falls asleep if no one is speaking with her. The patient is AOX4 and speaking appropriately.   APPEARANCE: Pt is moaning in pain. Pt is saturated in urine.  HEENT: WDL, PERRLA  PSYCHOSOCIAL: Patient is calm and cooperative. Denies SI/HI.  SKIN: The skin is warm, flaky. Weeping wounds to bilateral shins. Genital area is edematous with erythema. Wounds noted to sacrum and inner thighs.   RESPIRATORY: Airway is open and patent. Bilateral chest rise and fall. Respiratory rate even and unlabored.  No accessory muscle use noted.  CARDIAC: Patient has a normal rate and rhythm. No complaints of chest pain.  ABDOMEN/GI: Soft, non tender. Distention noted. Denies n/v/d.   URINARY:  Incontinent of urine. Genital area is edematous with severe erythema.  NEUROLOGIC: Eyes open spontaneously. Speech clear.  Able to follow commands, demonstrating ability to actively and appropriately communicate within context of current conversation. Symmetrical facial muscles. Movement is purposeful. Denies dizziness/lightheadedness.  MUSCULOSKELETAL: No obvious deformities noted. Weakness noted to bilateral lower extremities. Pt has left foot drop from MS.  PERIPHERAL VASCULAR: Cap refill <3 secs bilaterally. Edema noted to bilateral arms and legs. Denies numbness and tingling in extremities.

## 2022-05-02 PROBLEM — R33.9 URINARY RETENTION: Status: ACTIVE | Noted: 2022-05-02

## 2022-05-02 LAB
ANION GAP SERPL CALC-SCNC: 10 MMOL/L (ref 8–16)
ANION GAP SERPL CALC-SCNC: 8 MMOL/L (ref 8–16)
ANION GAP SERPL CALC-SCNC: 9 MMOL/L (ref 8–16)
BASOPHILS # BLD AUTO: 0.02 K/UL (ref 0–0.2)
BASOPHILS NFR BLD: 0.3 % (ref 0–1.9)
BUN SERPL-MCNC: 105 MG/DL (ref 8–23)
BUN SERPL-MCNC: 114 MG/DL (ref 8–23)
BUN SERPL-MCNC: 136 MG/DL (ref 8–23)
CALCIUM SERPL-MCNC: 9 MG/DL (ref 8.7–10.5)
CALCIUM SERPL-MCNC: 9.4 MG/DL (ref 8.7–10.5)
CALCIUM SERPL-MCNC: 9.6 MG/DL (ref 8.7–10.5)
CHLORIDE SERPL-SCNC: 105 MMOL/L (ref 95–110)
CHLORIDE SERPL-SCNC: 107 MMOL/L (ref 95–110)
CHLORIDE SERPL-SCNC: 108 MMOL/L (ref 95–110)
CHLORIDE UR-SCNC: 42 MMOL/L (ref 25–200)
CO2 SERPL-SCNC: 23 MMOL/L (ref 23–29)
CO2 SERPL-SCNC: 25 MMOL/L (ref 23–29)
CO2 SERPL-SCNC: 26 MMOL/L (ref 23–29)
CREAT SERPL-MCNC: 1.5 MG/DL (ref 0.5–1.4)
CREAT SERPL-MCNC: 2.1 MG/DL (ref 0.5–1.4)
CREAT SERPL-MCNC: 2.6 MG/DL (ref 0.5–1.4)
CREAT UR-MCNC: 104 MG/DL (ref 15–325)
CREAT UR-MCNC: 51 MG/DL (ref 15–325)
DIFFERENTIAL METHOD: ABNORMAL
EOSINOPHIL # BLD AUTO: 0.1 K/UL (ref 0–0.5)
EOSINOPHIL NFR BLD: 1.7 % (ref 0–8)
ERYTHROCYTE [DISTWIDTH] IN BLOOD BY AUTOMATED COUNT: 14.8 % (ref 11.5–14.5)
EST. GFR  (AFRICAN AMERICAN): 20.5 ML/MIN/1.73 M^2
EST. GFR  (AFRICAN AMERICAN): 26.5 ML/MIN/1.73 M^2
EST. GFR  (AFRICAN AMERICAN): 39.8 ML/MIN/1.73 M^2
EST. GFR  (NON AFRICAN AMERICAN): 17.8 ML/MIN/1.73 M^2
EST. GFR  (NON AFRICAN AMERICAN): 23 ML/MIN/1.73 M^2
EST. GFR  (NON AFRICAN AMERICAN): 34.6 ML/MIN/1.73 M^2
GLUCOSE SERPL-MCNC: 118 MG/DL (ref 70–110)
GLUCOSE SERPL-MCNC: 135 MG/DL (ref 70–110)
GLUCOSE SERPL-MCNC: 173 MG/DL (ref 70–110)
HCT VFR BLD AUTO: 25.7 % (ref 37–48.5)
HGB BLD-MCNC: 8.1 G/DL (ref 12–16)
IMM GRANULOCYTES # BLD AUTO: 0.03 K/UL (ref 0–0.04)
IMM GRANULOCYTES NFR BLD AUTO: 0.5 % (ref 0–0.5)
LACTATE SERPL-SCNC: 1.5 MMOL/L (ref 0.5–2.2)
LYMPHOCYTES # BLD AUTO: 1 K/UL (ref 1–4.8)
LYMPHOCYTES NFR BLD: 17.5 % (ref 18–48)
MAGNESIUM SERPL-MCNC: 2.4 MG/DL (ref 1.6–2.6)
MCH RBC QN AUTO: 29.8 PG (ref 27–31)
MCHC RBC AUTO-ENTMCNC: 31.5 G/DL (ref 32–36)
MCV RBC AUTO: 95 FL (ref 82–98)
MONOCYTES # BLD AUTO: 0.6 K/UL (ref 0.3–1)
MONOCYTES NFR BLD: 10.3 % (ref 4–15)
NEUTROPHILS # BLD AUTO: 4 K/UL (ref 1.8–7.7)
NEUTROPHILS NFR BLD: 69.7 % (ref 38–73)
NRBC BLD-RTO: 0 /100 WBC
OSMOLALITY UR: 490 MOSM/KG (ref 50–1200)
PHOSPHATE SERPL-MCNC: 6.3 MG/DL (ref 2.7–4.5)
PLATELET # BLD AUTO: 243 K/UL (ref 150–450)
PMV BLD AUTO: 10.3 FL (ref 9.2–12.9)
POCT GLUCOSE: 164 MG/DL (ref 70–110)
POCT GLUCOSE: 202 MG/DL (ref 70–110)
POTASSIUM SERPL-SCNC: 4.2 MMOL/L (ref 3.5–5.1)
POTASSIUM SERPL-SCNC: 4.9 MMOL/L (ref 3.5–5.1)
POTASSIUM SERPL-SCNC: 5.3 MMOL/L (ref 3.5–5.1)
POTASSIUM UR-SCNC: 54 MMOL/L (ref 15–95)
PROCALCITONIN SERPL IA-MCNC: 0.22 NG/ML
PROT UR-MCNC: 9 MG/DL (ref 0–15)
PROT/CREAT UR: 0.18 MG/G{CREAT} (ref 0–0.2)
RBC # BLD AUTO: 2.72 M/UL (ref 4–5.4)
SARS-COV-2 RDRP RESP QL NAA+PROBE: NEGATIVE
SODIUM SERPL-SCNC: 138 MMOL/L (ref 136–145)
SODIUM SERPL-SCNC: 141 MMOL/L (ref 136–145)
SODIUM SERPL-SCNC: 142 MMOL/L (ref 136–145)
SODIUM UR-SCNC: 34 MMOL/L (ref 20–250)
URATE SERPL-MCNC: 12.2 MG/DL (ref 2.4–5.7)
UUN UR-MCNC: 684 MG/DL (ref 140–1050)
WBC # BLD AUTO: 5.72 K/UL (ref 3.9–12.7)

## 2022-05-02 PROCEDURE — 20600001 HC STEP DOWN PRIVATE ROOM

## 2022-05-02 PROCEDURE — 83605 ASSAY OF LACTIC ACID: CPT | Performed by: HOSPITALIST

## 2022-05-02 PROCEDURE — 63600175 PHARM REV CODE 636 W HCPCS: Performed by: HOSPITALIST

## 2022-05-02 PROCEDURE — 85025 COMPLETE CBC W/AUTO DIFF WBC: CPT | Performed by: HOSPITALIST

## 2022-05-02 PROCEDURE — S0166 INJ OLANZAPINE 2.5MG: HCPCS | Performed by: INTERNAL MEDICINE

## 2022-05-02 PROCEDURE — 25000242 PHARM REV CODE 250 ALT 637 W/ HCPCS: Performed by: HOSPITALIST

## 2022-05-02 PROCEDURE — 99223 1ST HOSP IP/OBS HIGH 75: CPT | Mod: ,,, | Performed by: HOSPITALIST

## 2022-05-02 PROCEDURE — 80048 BASIC METABOLIC PNL TOTAL CA: CPT | Mod: 91 | Performed by: INTERNAL MEDICINE

## 2022-05-02 PROCEDURE — 83735 ASSAY OF MAGNESIUM: CPT | Performed by: HOSPITALIST

## 2022-05-02 PROCEDURE — 25000003 PHARM REV CODE 250: Performed by: INTERNAL MEDICINE

## 2022-05-02 PROCEDURE — 94640 AIRWAY INHALATION TREATMENT: CPT

## 2022-05-02 PROCEDURE — 36415 COLL VENOUS BLD VENIPUNCTURE: CPT | Performed by: HOSPITALIST

## 2022-05-02 PROCEDURE — 92610 EVALUATE SWALLOWING FUNCTION: CPT

## 2022-05-02 PROCEDURE — 25000003 PHARM REV CODE 250: Performed by: HOSPITALIST

## 2022-05-02 PROCEDURE — 99900035 HC TECH TIME PER 15 MIN (STAT)

## 2022-05-02 PROCEDURE — 97535 SELF CARE MNGMENT TRAINING: CPT

## 2022-05-02 PROCEDURE — 99223 PR INITIAL HOSPITAL CARE,LEVL III: ICD-10-PCS | Mod: ,,, | Performed by: HOSPITALIST

## 2022-05-02 PROCEDURE — 63600175 PHARM REV CODE 636 W HCPCS: Performed by: INTERNAL MEDICINE

## 2022-05-02 PROCEDURE — 80048 BASIC METABOLIC PNL TOTAL CA: CPT | Performed by: HOSPITALIST

## 2022-05-02 PROCEDURE — 27000221 HC OXYGEN, UP TO 24 HOURS

## 2022-05-02 PROCEDURE — 84550 ASSAY OF BLOOD/URIC ACID: CPT | Performed by: HOSPITALIST

## 2022-05-02 PROCEDURE — U0002 COVID-19 LAB TEST NON-CDC: HCPCS | Performed by: HOSPITALIST

## 2022-05-02 PROCEDURE — 51702 INSERT TEMP BLADDER CATH: CPT

## 2022-05-02 PROCEDURE — 84145 PROCALCITONIN (PCT): CPT | Performed by: HOSPITALIST

## 2022-05-02 PROCEDURE — 82570 ASSAY OF URINE CREATININE: CPT | Performed by: INTERNAL MEDICINE

## 2022-05-02 PROCEDURE — 84100 ASSAY OF PHOSPHORUS: CPT | Performed by: HOSPITALIST

## 2022-05-02 PROCEDURE — 25000003 PHARM REV CODE 250: Performed by: PHYSICIAN ASSISTANT

## 2022-05-02 PROCEDURE — 94761 N-INVAS EAR/PLS OXIMETRY MLT: CPT

## 2022-05-02 RX ORDER — HEPARIN SODIUM 5000 [USP'U]/ML
7500 INJECTION, SOLUTION INTRAVENOUS; SUBCUTANEOUS EVERY 8 HOURS
Status: DISCONTINUED | OUTPATIENT
Start: 2022-05-02 | End: 2022-05-06

## 2022-05-02 RX ORDER — AMOXICILLIN 250 MG
1 CAPSULE ORAL DAILY
Status: DISCONTINUED | OUTPATIENT
Start: 2022-05-02 | End: 2022-05-09

## 2022-05-02 RX ORDER — AMMONIUM LACTATE 12 G/100G
LOTION TOPICAL 2 TIMES DAILY PRN
Status: DISCONTINUED | OUTPATIENT
Start: 2022-05-02 | End: 2022-05-11 | Stop reason: HOSPADM

## 2022-05-02 RX ORDER — SODIUM CHLORIDE, SODIUM LACTATE, POTASSIUM CHLORIDE, CALCIUM CHLORIDE 600; 310; 30; 20 MG/100ML; MG/100ML; MG/100ML; MG/100ML
INJECTION, SOLUTION INTRAVENOUS CONTINUOUS
Status: DISCONTINUED | OUTPATIENT
Start: 2022-05-02 | End: 2022-05-03

## 2022-05-02 RX ORDER — OLANZAPINE 10 MG/2ML
5 INJECTION, POWDER, FOR SOLUTION INTRAMUSCULAR ONCE
Status: COMPLETED | OUTPATIENT
Start: 2022-05-02 | End: 2022-05-02

## 2022-05-02 RX ORDER — OLANZAPINE 2.5 MG/1
2.5 TABLET ORAL EVERY 6 HOURS PRN
Status: DISCONTINUED | OUTPATIENT
Start: 2022-05-02 | End: 2022-05-09

## 2022-05-02 RX ORDER — TALC
6 POWDER (GRAM) TOPICAL NIGHTLY PRN
Status: DISCONTINUED | OUTPATIENT
Start: 2022-05-02 | End: 2022-05-11 | Stop reason: HOSPADM

## 2022-05-02 RX ORDER — PROCHLORPERAZINE EDISYLATE 5 MG/ML
5 INJECTION INTRAMUSCULAR; INTRAVENOUS EVERY 6 HOURS PRN
Status: DISCONTINUED | OUTPATIENT
Start: 2022-05-02 | End: 2022-05-02

## 2022-05-02 RX ORDER — CHOLECALCIFEROL (VITAMIN D3) 25 MCG
1000 TABLET ORAL DAILY
Status: DISCONTINUED | OUTPATIENT
Start: 2022-05-02 | End: 2022-05-11 | Stop reason: HOSPADM

## 2022-05-02 RX ORDER — OLANZAPINE 10 MG/2ML
2.5 INJECTION, POWDER, FOR SOLUTION INTRAMUSCULAR EVERY 6 HOURS PRN
Status: DISCONTINUED | OUTPATIENT
Start: 2022-05-02 | End: 2022-05-09

## 2022-05-02 RX ORDER — ONDANSETRON 2 MG/ML
4 INJECTION INTRAMUSCULAR; INTRAVENOUS EVERY 8 HOURS PRN
Status: DISCONTINUED | OUTPATIENT
Start: 2022-05-02 | End: 2022-05-11 | Stop reason: HOSPADM

## 2022-05-02 RX ORDER — IPRATROPIUM BROMIDE AND ALBUTEROL SULFATE 2.5; .5 MG/3ML; MG/3ML
3 SOLUTION RESPIRATORY (INHALATION) EVERY 4 HOURS PRN
Status: DISCONTINUED | OUTPATIENT
Start: 2022-05-02 | End: 2022-05-11 | Stop reason: HOSPADM

## 2022-05-02 RX ORDER — SODIUM CHLORIDE 9 MG/ML
INJECTION, SOLUTION INTRAVENOUS CONTINUOUS
Status: DISCONTINUED | OUTPATIENT
Start: 2022-05-02 | End: 2022-05-03

## 2022-05-02 RX ORDER — SODIUM CHLORIDE 0.9 % (FLUSH) 0.9 %
10 SYRINGE (ML) INJECTION
Status: DISCONTINUED | OUTPATIENT
Start: 2022-05-02 | End: 2022-05-11 | Stop reason: HOSPADM

## 2022-05-02 RX ORDER — ACETAMINOPHEN 325 MG/1
650 TABLET ORAL EVERY 4 HOURS PRN
Status: DISCONTINUED | OUTPATIENT
Start: 2022-05-02 | End: 2022-05-11 | Stop reason: HOSPADM

## 2022-05-02 RX ORDER — POLYETHYLENE GLYCOL 3350 17 G/17G
17 POWDER, FOR SOLUTION ORAL 2 TIMES DAILY PRN
Status: DISCONTINUED | OUTPATIENT
Start: 2022-05-02 | End: 2022-05-11 | Stop reason: HOSPADM

## 2022-05-02 RX ORDER — SODIUM CHLORIDE 0.9 % (FLUSH) 0.9 %
10 SYRINGE (ML) INJECTION EVERY 6 HOURS PRN
Status: DISCONTINUED | OUTPATIENT
Start: 2022-05-02 | End: 2022-05-05

## 2022-05-02 RX ORDER — B-COMPLEX WITH VITAMIN C
1 TABLET ORAL DAILY
Status: DISCONTINUED | OUTPATIENT
Start: 2022-05-02 | End: 2022-05-11 | Stop reason: HOSPADM

## 2022-05-02 RX ADMIN — HEPARIN SODIUM 7500 UNITS: 5000 INJECTION INTRAVENOUS; SUBCUTANEOUS at 06:05

## 2022-05-02 RX ADMIN — OLANZAPINE 5 MG: 10 INJECTION, POWDER, LYOPHILIZED, FOR SOLUTION INTRAMUSCULAR at 03:05

## 2022-05-02 RX ADMIN — SODIUM CHLORIDE 500 ML: 0.9 INJECTION, SOLUTION INTRAVENOUS at 04:05

## 2022-05-02 RX ADMIN — MICONAZOLE NITRATE: 20 CREAM TOPICAL at 08:05

## 2022-05-02 RX ADMIN — SODIUM BICARBONATE: 84 INJECTION, SOLUTION INTRAVENOUS at 02:05

## 2022-05-02 RX ADMIN — HEPARIN SODIUM 7500 UNITS: 5000 INJECTION INTRAVENOUS; SUBCUTANEOUS at 04:05

## 2022-05-02 RX ADMIN — ACETAMINOPHEN 650 MG: 325 TABLET ORAL at 08:05

## 2022-05-02 RX ADMIN — IPRATROPIUM BROMIDE AND ALBUTEROL SULFATE 3 ML: 2.5; .5 SOLUTION RESPIRATORY (INHALATION) at 04:05

## 2022-05-02 RX ADMIN — AMMONIUM LACTATE: 12 LOTION TOPICAL at 08:05

## 2022-05-02 RX ADMIN — HEPARIN SODIUM 7500 UNITS: 5000 INJECTION INTRAVENOUS; SUBCUTANEOUS at 08:05

## 2022-05-02 RX ADMIN — SODIUM CHLORIDE, SODIUM LACTATE, POTASSIUM CHLORIDE, AND CALCIUM CHLORIDE: .6; .31; .03; .02 INJECTION, SOLUTION INTRAVENOUS at 05:05

## 2022-05-02 RX ADMIN — OLANZAPINE 2.5 MG: 10 INJECTION, POWDER, LYOPHILIZED, FOR SOLUTION INTRAMUSCULAR at 11:05

## 2022-05-02 RX ADMIN — VANCOMYCIN HYDROCHLORIDE 1750 MG: 10 INJECTION, POWDER, LYOPHILIZED, FOR SOLUTION INTRAVENOUS at 04:05

## 2022-05-02 NOTE — H&P
[facility] - Transplant [facility] [place] Medicine  History & Physical    Patient Name: Lupis Murcia  MRN: [id number]  Patient Class: IP- Inpatient  Admission Date: 5/1/2022  Attending Physician: John Hardin MD [facility] MED S  Admitting Physician: Ritesh Euceda MD  Primary Care Provider: Bobby Tran MD    Patient information was obtained from relative(s), past medical records and ER records.     Subjective:     Principal Problem:HANDY (acute kidney injury)    Chief Complaint:   Chief Complaint   Patient presents with    Fall    Urinary Tract Infection     Arrived from home for unwitnessed fall 5 days ago - Denies hitting head or LOC. Per pt's daughter, she has not been ambulatory since fall and has been sitting in urine saturated diaper since fall. Erythema noted to vaginal area w/ strong, foul odor. Wounds noted to ble, oozing from site.        HPI: 70 y/o WF hx of Multiple Sclerosis, Left Foot Drop, Lymphedema, Obesity (bmi 45) who is referred for admission for acute kidney injury, hyperkalemia and acute encephalopathy.     Patient's daughter Amanda Lowery provides primary history due to pt somnolence s/p ativan and EMR review.     1 week ago on Monday patient had a fall, partially assisted by family when legs gave out, no head injury or LOC.  Daughter noted through the week patient c/o of weakness and difficulty standing as when she fell, her armpit fell onto the walker, pt c/o of left shoulder pain.   On Thursday it was noted patient unable to stand, she is normally able to perform ADL with her walker, but daughter felt when she came home that pt had not moved.  She was also using adult diaper at this time and since Thursday due to body habitus/size patient has been unable to change her diaper since this time.   She has intermittently been taking her medicines during this time, but daughter noted she was eating less,  sleeping more and making confused statements and sought to bring her to the ED.      ED staff noted pts diaper was saturated with urine, vulvar swelling with intertrigo and buttock pressure injury.   Labs notable for Hyperkalemia to 6.3 and BUn/Cr of 169/3.9.     ED Treatment: Insulin 10uni IV regular, D10 bolus, 1gram Calcium Gluconate.  LR 2.2Liters IV, naBicarb 50meq IV x 1, Ativan 1mg IV   Ceftriaxone 2gm IV x 1      Past Medical History:   Diagnosis Date    MS (multiple sclerosis)     Vitamin B12 deficiency        Past Surgical History:   Procedure Laterality Date    BREAST CYST EXCISION Left     over 40yrs ago     SECTION         Review of patient's allergies indicates:   Allergen Reactions    Contrast media Shortness Of Breath and Rash    Pcn [penicillins] Shortness Of Breath and Rash    Celebrex [celecoxib] Other (See Comments)     Swallowing problems     Diazepam Hives    Motrin [ibuprofen] Rash       No current facility-administered medications on file prior to encounter.     Current Outpatient Medications on File Prior to Encounter   Medication Sig    baclofen (LIORESAL) 10 MG tablet Take 1 tablet (10 mg total) by mouth 2 (two) times daily. (Patient taking differently: Take 10 mg by mouth 2 (two) times daily as needed.)    candesartan-hydrochlorothiazide (ATACAND HCT) 16-12.5 mg per tablet Take 1 tablet by mouth Daily.    cyanocobalamin 1,000 mcg/mL injection Inject 1ml daily for 1 week, then weekly for 1 month, then every 2 weeks thereafter. Please provide 23g 1in needle and 1cc syringes    furosemide (LASIX) 20 MG tablet Take 20 mg by mouth daily as needed (leg swelling).    lorazepam (ATIVAN) 1 MG tablet Take 0.5 mg by mouth 3 (three) times daily as needed for Anxiety.    naproxen (NAPROSYN) 500 MG tablet Take 500 mg by mouth 2 (two) times daily with meals.    vitamin B comp with vit C no.6 500-0.5 mg Tab Take 1 tablet by mouth once daily.    vitamin D 1000 units Tab Take 5,000 Units by mouth once daily.     acetaminophen 325 mg Cap Take 325 mg by mouth.     acetaminophen-codeine 300-30mg (TYLENOL #3) 300-30 mg Tab Take 1 tablet by mouth every 6 (six) hours as needed (chronic leg pain).    clotrimazole-betamethasone 1-0.05% (LOTRISONE) cream Apply to affected area 2 times daily    erythromycin base (E-MYCIN) 250 MG Tab PHARMACY ADMINISTERED     Family History       Problem Relation (Age of Onset)    Brain cancer Brother    Coronary artery disease Father    Lung cancer Father, Mother          Tobacco Use    Smoking status: Never Smoker    Smokeless tobacco: Never Used   Substance and Sexual Activity    Alcohol use: No    Drug use: No    Sexual activity: Not on file     Review of Systems   Unable to perform ROS: Mental status change   Objective:     Vital Signs (Most Recent):  Temp: 98.3 °F (36.8 °C) (05/02/22 0106)  Pulse: 94 (05/02/22 0106)  Resp: 16 (05/02/22 0106)  BP: (!) 111/58 (05/02/22 0106)  SpO2: 96 % (05/02/22 0106)   Vital Signs (24h Range):  Temp:  [98.3 °F (36.8 °C)-98.8 °F (37.1 °C)] 98.3 °F (36.8 °C)  Pulse:  [] 94  Resp:  [16-20] 16  SpO2:  [95 %-97 %] 96 %  BP: ()/(44-58) 111/58     Weight: 113.4 kg (250 lb)  Body mass index is 45.73 kg/m².    Physical Exam  Constitutional:       Appearance: She is obese. She is ill-appearing.   HENT:      Head: Normocephalic and atraumatic.      Mouth/Throat:      Pharynx: No oropharyngeal exudate.   Eyes:      General: No scleral icterus.     Pupils: Pupils are equal, round, and reactive to light.   Cardiovascular:      Rate and Rhythm: Normal rate and regular rhythm.   Pulmonary:      Effort: No respiratory distress.      Breath sounds: No wheezing.   Abdominal:      Palpations: Abdomen is soft.      Tenderness: There is no abdominal tenderness. There is no guarding.   Musculoskeletal:      Right lower leg: Edema present.      Left lower leg: Edema present.      Comments: Bilateral lymphedema with ulceration and hypertrophic skin changes   Skin:     Comments: Pressure injury - exam deferred, see  picture below   Neurological:      Mental Status: She is lethargic.      GCS: GCS eye subscore is 2. GCS verbal subscore is 2. GCS motor subscore is 4.         CRANIAL NERVES     CN III, IV, VI   Pupils are equal, round, and reactive to light.           Significant Labs: All pertinent labs within the past 24 hours have been reviewed.  CBC:   Recent Labs   Lab 05/01/22  1823   WBC 10.05   HGB 9.9*   HCT 32.1*        CMP:   Recent Labs   Lab 05/01/22 1822 05/01/22  2214    139   K 6.3* 5.6*    106   CO2 16* 18*   * 122*   * 121*   CREATININE 3.9* 2.9*   CALCIUM 9.9 9.3   PROT 8.2  --    ALBUMIN 3.4*  --    BILITOT 0.4  --    ALKPHOS 85  --    AST 13  --    ALT 12  --    ANIONGAP 19* 15   EGFRNONAA 10.9* 15.6*     Cardiac Markers: No results for input(s): CKMB, MYOGLOBIN, BNP, TROPISTAT in the last 48 hours.  Lactic Acid:   Recent Labs   Lab 05/01/22 1822 05/01/22  2254   LACTATE 1.2 1.6       Significant Imaging: I have reviewed all pertinent imaging results/findings within the past 24 hours.    CT ABDOMEN PELVIS WITHOUT CONTRAST     CLINICAL HISTORY:  Sacra wound;     TECHNIQUE:  Low dose axial images, sagittal and coronal reformations were obtained from the lung bases to the pubic symphysis.  Oral contrast was not administered.     COMPARISON:  None.     FINDINGS:  Lower chest: Heart is borderline enlarged.  There are possible mild emphysematous changes in the lung bases.  Dependent subsegmental atelectasis.  Detailed evaluation of the lung bases is limited by motion.     Abdomen:     Evaluation of the solid abdominal organs and bowel is limited in the absence of IV contrast.  Evaluation is limited by extensive streak artifact due to the patient's arms overlying the field of view.     Liver is markedly enlarged measuring approximately 22.5 cm in craniocaudal length.  There is diffuse steatosis.  Evaluation for mass is limited by absence of IV contrast and the presence significant  artifact related to the patient's arms overlying the field of view.  Gallbladder is unremarkable.  No intra-or extrahepatic biliary ductal dilatation.     Spleen, adrenals, and pancreas are unremarkable allowing for motion and artifact limitations.     Kidneys are symmetric.  No renal or ureteral stones. No hydronephrosis.     No distended loops of small bowel. Mild stool burden throughout the colon.  Appendix is normal.     Abdominal aorta is normal in course and caliber.     Multiple subcentimeter retroperitoneal lymph nodes which are not significantly enlarged by size criteria.     No abdominal free fluid or pneumoperitoneum.     Pelvis: Urinary bladder, rectum, and uterus are unremarkable.  No free fluid in the pelvis.     Bones and soft tissues: No aggressive osseous lesions.  Degenerative changes in the spine.  Mild body wall edema and mild edema in the gluteal soft tissues.  No soft tissue emphysema or sizable collection identified in the field of view.  Suggest correlation with direct visualization.     Impression:     No evidence of nephrolithiasis or obstructive uropathy.     Hepatomegaly and hepatic steatosis.     Motion and artifact limited study.     Additional findings discussed in the body of the report.        Electronically signed by: Nadeem Vega MD  Date:                                            05/01/2022  Time:                                           22:29    Assessment/Plan:     * HANDY (acute kidney injury)  Patient with acute kidney injury likely d/t IVVD/Dehydration and Pre-renal azotemia Which is currently improving. Labs reviewed- Renal function/electrolytes with Estimated Creatinine Clearance: 20.9 mL/min (A) (based on SCr of 2.9 mg/dL (H)). according to latest data. Monitor urine output and serial BMP and adjust therapy as needed. Avoid nephrotoxins and renally dose meds for GFR listed above.   -patient with non-oliguric handy that is likely pre-renal in etiology, but higher severity  given hyperkalemia, metabolic acidosis and uremic encephalopathy.   -obtain urine electrolytes,   -Q4hr BMP until hyperkalemia resolves  - Give IV Bicarb Drip x 1 L to assist with volume and acidosis management.   -Nursing made multiple attempts at Slaughter placement but pt vular swelling unable to pass, pt urinating connected to purewick.   MOnitor bladder scans if UOP falls as her bladder appears distended on CT scan but no hydronephrosis present.   -hold home anti-hypertensives (ARB/Thiazide) hold any NSAIDs, pt was taking both in last week.   -nephrology consultation   -    Hyperkalemia  As above   q4hr BMP  -bicarb infusion to help acidosis management.       Encephalopathy, metabolic  Secondary to uremia  -fall  -aspiration precautions      Pressure injury of buttock, unstageable  q2 turns  -low airloss overlay mattress  -wound care consultation       Lymphedema  -wound care consultation to assist in managmeent      MS (multiple sclerosis)  ENTER PT/OT consults when patient is more awake alert and can work with therapy services.         VTE Risk Mitigation (From admission, onward)         Ordered     IP VTE HIGH RISK PATIENT  Once         05/02/22 0031     Place sequential compression device  Until discontinued         05/02/22 0031                   Ritesh Euceda MD  Department of Hospital Medicine   [facility] - Transplant Stepdown

## 2022-05-02 NOTE — PROGRESS NOTES
Pt combative and confused.  Yelling obscenties at her daughter and nurse.  IV's pulled out.  MD notified -ordered zyprexa IM and restriants.  PICC team cx as daughter said iv's are difficult on her.  Purewick in place.

## 2022-05-02 NOTE — PLAN OF CARE
Rapid and MD at bedside for hypotension.   Tachycardia 's. Increase oxygen demands.   Admitted to floor from ED with HANDY.   Unable to answer orientation questions. Pt lethargic. Daughter suspects that IV ativan used to sedate patient during CT scan may have cause confusion.   Wound care consulted. Neph consulted.     Pt in lowest settings, call light w/in reach, NAD. WCTM.

## 2022-05-02 NOTE — CONSULTS
[facility] - Transplant Stepdown  Urology  Consult Note    Patient Name: Lupis Murcia  MRN: [id number]  Admission Date: 2022  Hospital Length of Stay: 1   Code Status: Full Code   Attending Provider: John Hardin MD   Consulting Provider: Bart Pizano MD  Primary Care Physician: Bobby Tran MD  Principal Problem:HANDY (acute kidney injury)    Inpatient consult to Urology  Consult performed by: Bart Pizano MD  Consult ordered by: John Hardin MD          Subjective:     HPI:  Lupis Murcia is a 70 y/o WF hx of Multiple Sclerosis, Left Foot Drop, Lymphedema, Obesity (bmi 45) who was admitted on 22 for acute kidney injury, hyperkalemia and acute encephalopathy. Urology consulted for obstructive uropathy likely causing newly developed HANDY. Presently, WBC 5.7, hgb 8.1, , Cr 2.9 (baseline 0.7). UA 3+ occult blood. Micro-UA 2 RBC, 3 WBC, rare bacteria. CT imaging showing significant bladder overdistention without signs of b/l hydroureteronephrosis.      Past Medical History:   Diagnosis Date    MS (multiple sclerosis)     Vitamin B12 deficiency        Past Surgical History:   Procedure Laterality Date    BREAST CYST EXCISION Left     over 40yrs ago     SECTION         Review of patient's allergies indicates:   Allergen Reactions    Contrast media Shortness Of Breath and Rash    Pcn [penicillins] Shortness Of Breath and Rash    Celebrex [celecoxib] Other (See Comments)     Swallowing problems     Diazepam Hives    Motrin [ibuprofen] Rash       Family History       Problem Relation (Age of Onset)    Brain cancer Brother    Coronary artery disease Father    Lung cancer Father, Mother            Tobacco Use    Smoking status: Never Smoker    Smokeless tobacco: Never Used   Substance and Sexual Activity    Alcohol use: No    Drug use: No    Sexual activity: Not on file       Review of Systems   Unable to perform ROS: Mental status change     Objective:     Temp:  [98.3 °F  (36.8 °C)-99.6 °F (37.6 °C)] 98.9 °F (37.2 °C)  Pulse:  [] 78  Resp:  [16-29] 20  SpO2:  [94 %-99 %] 98 %  BP: ()/(36-66) 102/54     Body mass index is 43.02 kg/m².    Date 05/02/22 0700 - 05/03/22 0659   Shift 8914-8783 5282-7894 9872-6851 24 Hour Total   INTAKE   I.V.(mL/kg) 629.7(5.9)   629.7(5.9)   Shift Total(mL/kg) 629.7(5.9)   629.7(5.9)   OUTPUT   Urine(mL/kg/hr) 900   900   Shift Total(mL/kg) 900(8.4)   900(8.4)   Weight (kg) 106.7 106.7 106.7 106.7          Drains       Drain  Duration             Female External Urinary Catheter 05/01/22 1752 <1 day                    Physical Exam  Vitals and nursing note reviewed.   Constitutional:       Appearance: She is obese. She is ill-appearing.   HENT:      Head: Atraumatic.      Nose: Nose normal.   Eyes:      Extraocular Movements: Extraocular movements intact.   Cardiovascular:      Rate and Rhythm: Normal rate.   Pulmonary:      Effort: Pulmonary effort is normal.   Abdominal:      General: Abdomen is flat.   Genitourinary:     Comments: Edematous/erythematous vulva  Musculoskeletal:         General: Swelling present. Normal range of motion.      Cervical back: Normal range of motion.      Right lower leg: Edema present.      Left lower leg: Edema present.      Comments: Bilateral lymphedema with ulceration and hypertrophic skin changes    Neurological:      Mental Status: She is disoriented.       Significant Labs:    BMP:  Recent Labs   Lab 05/01/22 1822 05/01/22  2214 05/02/22  0718    139 138   K 6.3* 5.6* 4.9    106 107   CO2 16* 18* 23   * 121* 136*   CREATININE 3.9* 2.9* 2.6*   CALCIUM 9.9 9.3 9.0       CBC:  Recent Labs   Lab 05/01/22 1823 05/02/22  0718   WBC 10.05 5.72   HGB 9.9* 8.1*   HCT 32.1* 25.7*    243       All pertinent labs results from the past 24 hours have been reviewed.    Significant Imaging:  All pertinent imaging results/findings from the past 24 hours have been  reviewed.                      Assessment and Plan:     Urinary retention  Lupis Murcia is a 71 y.o. female with hx of MS who was consulted to urology for urinary retention with HANDY.      - Consult d/w Staff Urologist  - Strict I's/O's  - Trend Cr, avoid nephrotoxic agents, and dose medications to patient's current GFR  - 16 Fr espinal placed with return of 750cc of urine   - Keep espinal for 7 days with subsequent VT   - Recommend ambulation as possible, avoiding constipation, avoiding narcotics.   - Please have nurse teach patient Clean Intermittent Catheterization (CIC). If the patient is able to do CIC, she will need to straight cath every 4-6 hours or to maintain a volume of <400 cc with each catheterization. The frequency of catheterization can be increased as needed if volumes are >400 cc with each cath, or decreased as needed if there is spontaneous voiding between caths and volumes are consistently low. Patient can stop CIC once he/she is spontaneously able to void on their own.  - If she is unable to do CIC, leave an indwelling espinal catheter for 1 week and place a referral for outpatient Urology follow up for a voiding trial.  - All remaining care per primary team            VTE Risk Mitigation (From admission, onward)         Ordered     heparin (porcine) injection 7,500 Units  Every 8 hours         05/02/22 0232     IP VTE HIGH RISK PATIENT  Once         05/02/22 0232     Place sequential compression device  Until discontinued         05/02/22 0232     Place sequential compression device  Until discontinued         05/02/22 0031                Thank you for your consult. I will sign off. Please contact us if you have any additional questions.    Bart Pizano MD  Urology  [facility] - Trans[facility] Stepdown

## 2022-05-02 NOTE — ASSESSMENT & PLAN NOTE
Patient with acute kidney injury likely d/t IVVD/Dehydration and Pre-renal azotemia Which is currently improving. Labs reviewed- Renal function/electrolytes with Estimated Creatinine Clearance: 20.9 mL/min (A) (based on SCr of 2.9 mg/dL (H)). according to latest data. Monitor urine output and serial BMP and adjust therapy as needed. Avoid nephrotoxins and renally dose meds for GFR listed above.   -patient with non-oliguric lucia that is likely pre-renal in etiology, but higher severity given hyperkalemia, metabolic acidosis and uremic encephalopathy.   -obtain urine electrolytes,   -Q4hr BMP until hyperkalemia resolves  - Give IV Bicarb Drip x 1 L to assist with volume and acidosis management.   -Nursing made multiple attempts at Slaughter placement but pt vular swelling unable to pass, pt urinating connected to purewick.   MOnitor bladder scans if UOP falls as her bladder appears distended on CT scan but no hydronephrosis present.   -hold home anti-hypertensives (ARB/Thiazide) hold any NSAIDs, pt was taking both in last week.   -nephrology consultation   -

## 2022-05-02 NOTE — PROGRESS NOTES
PT now awake. Recognizes daughter and friend at bedside. Picc team Able to place IV, RN give heparin and take blood pressure and temperature with ease.  Pt sipping on coke and eating ice chips.  Aware that she is at [facility] and that the year is 2022.

## 2022-05-02 NOTE — HPI
Lupis Murcia is a 72 y/o WF hx of Multiple Sclerosis, Left Foot Drop, Lymphedema, Obesity (bmi 45) who was admitted on 5/1/22 for acute kidney injury, hyperkalemia and acute encephalopathy. Urology consulted for obstructive uropathy likely causing newly developed HANDY. Presently, WBC 5.7, hgb 8.1, , Cr 2.9 (baseline 0.7). UA 3+ occult blood. Micro-UA 2 RBC, 3 WBC, rare bacteria. CT imaging showing significant bladder overdistention without signs of b/l hydroureteronephrosis.

## 2022-05-02 NOTE — PLAN OF CARE
"PT now oriented to person, time and place. Continues to make odd comments and thinks " they are going to kill me/ poison me" .  Slaughter placed by urology- see note. PIV placed by midline team - see note.  Consults to wound care, urology - both at bedside today. VSS. Telemetry monitoring in place SR-ST. Afebrile.  2L NC % when awake.  Sleep apnea noted- oxygen place while sleeping.  Pt on waffle mattress.  Turning with pillows.  Bilateral wounds to LE- RN to cleanse with soap/water/ pat dry and put on ammonim lactate - awaiting bottle.  Antifungal cream to perineal area.  Stage 2 x2 noted to backside- triad cream BID per wound care - no foam dressing needed.  LR @100cc/hr.  Trending labs- creat and BUN dec.  LActic dec. Procal WNL.  UA clean.  Blood cx's pending.  Pr/CR sent - WNL. Zyprexa given IM x1 - slept and woke up more oriented and calm.  IM zypreza prn ordered as needed.  Renal diet ordered- sipping water and coke for daughter and friend.  Accuchecks Q6. Emotional support given to daughter and friend at bedside- able to talk with staff MD, urology , wound care.   Daughter took shower and care for herself a bit- encouraged breaks for family members.  Will continue to trend labs this evening. Delitrium precautions in place.  Restraints ordered but not needed- order discontinued.   Pt calm and conversant - able to talk about where she went to high school.      "

## 2022-05-02 NOTE — PROGRESS NOTES
05/02/22 0340 05/02/22 0342 05/02/22 0344   Vital Signs   Temp  --  98.9 °F (37.2 °C)  --    Temp src  --  Axillary  --    Temp #2 96.26 °F (35.7 °C) 96.08 °F (35.6 °C) 96.08 °F (35.6 °C)   Temp #2 src Skin Skin Skin   Pulse 106 106 106   Heart Rate Source Continuous Continuous Continuous   Resp 16 17 17   SpO2 (!) 94 % 95 % 96 %   BP (!) 71/36 (!) 75/41 (!) 82/48   MAP (mmHg) 49 53 60     Notified Dr Burciaga of above vital signs MD to come to bedside

## 2022-05-02 NOTE — CONSULTS
Consult acknowledged.     See Medical Student note from today and attestation by Dr Pastor.    .  Yoseph Irwin MD.  Nephrology fellow

## 2022-05-02 NOTE — HPI
70 y/o female with history of Multiple Sclerosis, Left Foot Drop, Lymphedema, Obesity (bmi 43) who was admitted on 5/1/22 for acute kidney injury, hyperkalemia and acute encephalopathy. Admission labs notable for Hyperkalemia to 6.3, Cr 3.9 and . Nephrology consulted for present HANDY      1 week ago on Monday patient had a fall, partially assisted by family when legs gave out, no head injury or LOC.  On Thursday it was noted patient unable to stand,  but daughter felt when she came home that pt had not moved.  She was also using adult diaper at this time and since Thursday due to body habitus/size patient has been unable to change her diaper since this time.   She has intermittently been taking her medicines during this time, but daughter noted decreased intake,  sleeping more and making confused statements and sought to bring her to the ED.

## 2022-05-02 NOTE — PROGRESS NOTES
Pharmacokinetic Initial Assessment: IV Vancomycin    Assessment/Plan:    Initiate intravenous vancomycin with loading dose of 1750 mg once with subsequent doses when random concentrations are less than 20 mcg/mL  Desired empiric serum trough concentration is 10 to 20 mcg/mL  Draw vancomycin random level on 5/3 at 0400.  Pharmacy will continue to follow and monitor vancomycin.      Please contact pharmacy at extension [phone] with any questions regarding this assessment.     Thank you for the consult,   Re Anshul       Patient brief summary:  Lupis Murcia is a 71 y.o. female initiated on antimicrobial therapy with IV Vancomycin for treatment of suspected skin & soft tissue infection    Drug Allergies:   Review of patient's allergies indicates:   Allergen Reactions    Contrast media Shortness Of Breath and Rash    Pcn [penicillins] Shortness Of Breath and Rash    Celebrex [celecoxib] Other (See Comments)     Swallowing problems     Diazepam Hives    Motrin [ibuprofen] Rash       Actual Body Weight:   106.7 kg    Renal Function:   Estimated Creatinine Clearance: 20.1 mL/min (A) (based on SCr of 2.9 mg/dL (H)).     Dialysis Method (if applicable):  N/A    CBC (last 72 hours):  Recent Labs   Lab Result Units 05/01/22  1823   WBC K/uL 10.05   Hemoglobin g/dL 9.9*   Hematocrit % 32.1*   Platelets K/uL 311   Gran % % 79.7*   Lymph % % 10.2*   Mono % % 8.2   Eosinophil % % 1.3   Basophil % % 0.2   Differential Method  Automated       Metabolic Panel (last 72 hours):  Recent Labs   Lab Result Units 05/01/22  1822 05/01/22  1910 05/01/22  1911 05/01/22  2214   Sodium mmol/L 138  --   --  139   Sodium, Urine mmol/L  --  34  --   --    Potassium mmol/L 6.3*  --   --  5.6*   Potassium, Urine mmol/L  --  54  --   --    Chloride mmol/L 103  --   --  106   Chloride, Urine mmol/L  --  42  --   --    CO2 mmol/L 16*  --   --  18*   Glucose mg/dL 128*  --   --  122*   Glucose, UA   --   --  Negative  --    BUN mg/dL 169*   --   --  121*   Creatinine mg/dL 3.9*  --   --  2.9*   Creatinine, Urine mg/dL  --  104.0  --   --    Albumin g/dL 3.4*  --   --   --    Total Bilirubin mg/dL 0.4  --   --   --    Alkaline Phosphatase U/L 85  --   --   --    AST U/L 13  --   --   --    ALT U/L 12  --   --   --        Drug levels (last 3 results):  No results for input(s): VANCOMYCINRA, VANCOMYCINPE, VANCOMYCINTR in the last 72 hours.    Microbiologic Results:  Microbiology Results (last 7 days)     Procedure Component Value Units Date/Time    Blood culture #1 **CANNOT BE ORDERED STAT** [[id number]] Collected: 05/01/22 1823    Order Status: Completed Specimen: Blood from Peripheral, Antecubital, Right Updated: 05/02/22 0315     Blood Culture, Routine No Growth to date    Blood culture #2 **CANNOT BE ORDERED STAT** [[id number]] Collected: 05/01/22 1832    Order Status: Completed Specimen: Blood from Peripheral, Wrist, Right Updated: 05/02/22 0315     Blood Culture, Routine No Growth to date

## 2022-05-02 NOTE — CONSULTS
[facility] - Transplant Stepdown  Wound Care    Patient Name:  Lupis Murcia   MRN:  [id number]  Date: 5/2/2022  Diagnosis: HANDY (acute kidney injury)    History:     Past Medical History:   Diagnosis Date    MS (multiple sclerosis)     Vitamin B12 deficiency        Social History     Socioeconomic History    Marital status:    Tobacco Use    Smoking status: Never Smoker    Smokeless tobacco: Never Used   Substance and Sexual Activity    Alcohol use: No    Drug use: No       Precautions:     Allergies as of 05/01/2022 - Reviewed 05/01/2022   Allergen Reaction Noted    Contrast media Shortness Of Breath and Rash 12/15/2016    Pcn [penicillins] Shortness Of Breath and Rash 07/10/2013    Celebrex [celecoxib] Other (See Comments) 06/12/2017    Diazepam Hives 10/12/2021    Motrin [ibuprofen] Rash 07/10/2013       WO Assessment Details/Treatment   Wound care consult for BLE and coccyx  Patient sleeping; daughter and visitor at bedside. Family unable to remember the wound care treatment she was getting outpatient. Patient not happy about being woken up, but several nurses were able to help Ms. Baugh turn to her side in order for coccyx and buttocks to be visible. Bilateral buttock are black and purple with irregular edges. The periwound is pink and moist. No drainage present on removed dressing. On the left, medial buttocks there is a partial-thickness skin loss that is 1x1x0.5. Triad ointment applied to all areas on her coccyx and buttocks.   Patient's groin is pink and most. Nurse ordered anti-fungal cream to place on the area, but not arrived at bedside at this time.   Patient's bilateral lower extremities are dry, edematous and tender. The skin is dry, thickened with surface cracks with areas of pink, tan, purple, and white areas.     Plan:  Coccyx/buttocks: gently cleanse with wound cleanser and apply triad cream to wound and periwound BID and PRN if soiled.   Bilateral Lower Extremities: cleanse  BLE with soap and water, pat dry and apply Ammonium Lactate Lotion BID     Nursing to continue care and pressure prevention. Turn patient every two hours.   Recommendations made to primary team for above plan via secure chat. Orders placed. Wound care to follow-up PRN.     05/02/2022 05/02/22 1431        Wound 05/01/22 1720 Ulceration Left Leg #1   Date First Assessed/Time First Assessed: 05/01/22 1720   Pre-existing: Yes  Primary Wound Type: Ulceration  Side: Left  Location: Leg  Wound Number: #1   Wound Image     Wound WDL ex   Dressing Appearance Open to air   Drainage Amount None   Appearance Pink;Tan;White;Purple;Dry        Wound 05/01/22 1720 Ulceration Right Leg #2   Date First Assessed/Time First Assessed: 05/01/22 1720   Pre-existing: Yes  Primary Wound Type: Ulceration  Side: Right  Location: Leg  Wound Number: #2   Wound Image     Wound WDL ex   Dressing Appearance Open to air   Drainage Amount None   Appearance Pink;Purple;Tan;White;Dry        Wound 05/01/22 1720 Ulceration Sacral Spine #3   Date First Assessed/Time First Assessed: 05/01/22 1720   Pre-existing: Yes  Primary Wound Type: Ulceration  Location: Sacral Spine  Wound Number: #3   Wound Image    Wound WDL ex   Dressing Appearance Dry   Drainage Amount None   Appearance Pink;Purple;Black;Necrotic;Moist   Tissue loss description Partial thickness   Periwound Area Moist;Halsey   Wound Edges Irregular   Care Skin Barrier;Other (see comments)  (Triad)   Dressing Removed;Other (comment)  (triad)

## 2022-05-02 NOTE — MED STUDENT ASSESSMENT & PLAN
70 y/o female with history of Multiple Sclerosis, Left Foot Drop, Lymphedema, Obesity (bmi 43) who was admitted on 5/1/22 for acute kidney injury, hyperkalemia and acute encephalopathy. Labs notable for Hyperkalemia to 6.3, Cr 3.9 (Baseline ~ 0.7) and . Nephrology consulted for present HANDY      Baseline sCr of  0.7, admission Cr at 3.9 > 2.1 > 1.3 currently Cr at 1.1    - UA shows protein negative, 3 WBC, 2 RBC  - UPCR  0.18  - CT abdomen pelvis showed very distended bladder. Patient likely has urinary retention and overflow incontinence 2/2 MS, this is likely responsible for post-renal HANDY    - Volume status: Euvolemic to sightly volume down   - Electrolytes: , K  3.8  - Acid/Base:Co2 at 27   - Water deficit: 3.6 lt    Plan:  No need for RRT at this time.  Creatinine improving  Potassium within normal ranges  BUN decreasing  Slaughter placed by urology  Daily BMP  Monitor electrolytes closely.  Avoid nephrotoxic medications, NSAIDs, IV contrast, etc.  Medication doses adjusted to GFR  Maintain MAP > 65  IV fluids, 5% dextrose continuous infusion for Hypernatremia   Strict intake/output

## 2022-05-02 NOTE — SUBJECTIVE & OBJECTIVE
Past Medical History:   Diagnosis Date    MS (multiple sclerosis)     Vitamin B12 deficiency        Past Surgical History:   Procedure Laterality Date    BREAST CYST EXCISION Left     over 40yrs ago     SECTION         Review of patient's allergies indicates:   Allergen Reactions    Contrast media Shortness Of Breath and Rash    Pcn [penicillins] Shortness Of Breath and Rash    Celebrex [celecoxib] Other (See Comments)     Swallowing problems     Diazepam Hives    Motrin [ibuprofen] Rash       No current facility-administered medications on file prior to encounter.     Current Outpatient Medications on File Prior to Encounter   Medication Sig    baclofen (LIORESAL) 10 MG tablet Take 1 tablet (10 mg total) by mouth 2 (two) times daily. (Patient taking differently: Take 10 mg by mouth 2 (two) times daily as needed.)    candesartan-hydrochlorothiazide (ATACAND HCT) 16-12.5 mg per tablet Take 1 tablet by mouth Daily.    cyanocobalamin 1,000 mcg/mL injection Inject 1ml daily for 1 week, then weekly for 1 month, then every 2 weeks thereafter. Please provide 23g 1in needle and 1cc syringes    furosemide (LASIX) 20 MG tablet Take 20 mg by mouth daily as needed (leg swelling).    lorazepam (ATIVAN) 1 MG tablet Take 0.5 mg by mouth 3 (three) times daily as needed for Anxiety.    naproxen (NAPROSYN) 500 MG tablet Take 500 mg by mouth 2 (two) times daily with meals.    vitamin B comp with vit C no.6 500-0.5 mg Tab Take 1 tablet by mouth once daily.    vitamin D 1000 units Tab Take 5,000 Units by mouth once daily.     acetaminophen 325 mg Cap Take 325 mg by mouth.    acetaminophen-codeine 300-30mg (TYLENOL #3) 300-30 mg Tab Take 1 tablet by mouth every 6 (six) hours as needed (chronic leg pain).    clotrimazole-betamethasone 1-0.05% (LOTRISONE) cream Apply to affected area 2 times daily    erythromycin base (E-MYCIN) 250 MG Tab PHARMACY ADMINISTERED     Family History       Problem Relation (Age of Onset)    Brain cancer  Brother    Coronary artery disease Father    Lung cancer Father, Mother          Tobacco Use    Smoking status: Never Smoker    Smokeless tobacco: Never Used   Substance and Sexual Activity    Alcohol use: No    Drug use: No    Sexual activity: Not on file     Review of Systems   Unable to perform ROS: Mental status change   Objective:     Vital Signs (Most Recent):  Temp: 98.3 °F (36.8 °C) (05/02/22 0106)  Pulse: 94 (05/02/22 0106)  Resp: 16 (05/02/22 0106)  BP: (!) 111/58 (05/02/22 0106)  SpO2: 96 % (05/02/22 0106)   Vital Signs (24h Range):  Temp:  [98.3 °F (36.8 °C)-98.8 °F (37.1 °C)] 98.3 °F (36.8 °C)  Pulse:  [] 94  Resp:  [16-20] 16  SpO2:  [95 %-97 %] 96 %  BP: ()/(44-58) 111/58     Weight: 113.4 kg (250 lb)  Body mass index is 45.73 kg/m².    Physical Exam  Constitutional:       Appearance: She is obese. She is ill-appearing.   HENT:      Head: Normocephalic and atraumatic.      Mouth/Throat:      Pharynx: No oropharyngeal exudate.   Eyes:      General: No scleral icterus.     Pupils: Pupils are equal, round, and reactive to light.   Cardiovascular:      Rate and Rhythm: Normal rate and regular rhythm.   Pulmonary:      Effort: No respiratory distress.      Breath sounds: No wheezing.   Abdominal:      Palpations: Abdomen is soft.      Tenderness: There is no abdominal tenderness. There is no guarding.   Musculoskeletal:      Right lower leg: Edema present.      Left lower leg: Edema present.      Comments: Bilateral lymphedema with ulceration and hypertrophic skin changes   Skin:     Comments: Pressure injury - exam deferred, see picture below   Neurological:      Mental Status: She is lethargic.      GCS: GCS eye subscore is 2. GCS verbal subscore is 2. GCS motor subscore is 4.         CRANIAL NERVES     CN III, IV, VI   Pupils are equal, round, and reactive to light.           Significant Labs: All pertinent labs within the past 24 hours have been reviewed.  CBC:   Recent Labs   Lab  05/01/22  1823   WBC 10.05   HGB 9.9*   HCT 32.1*        CMP:   Recent Labs   Lab 05/01/22  1822 05/01/22  2214    139   K 6.3* 5.6*    106   CO2 16* 18*   * 122*   * 121*   CREATININE 3.9* 2.9*   CALCIUM 9.9 9.3   PROT 8.2  --    ALBUMIN 3.4*  --    BILITOT 0.4  --    ALKPHOS 85  --    AST 13  --    ALT 12  --    ANIONGAP 19* 15   EGFRNONAA 10.9* 15.6*     Cardiac Markers: No results for input(s): CKMB, MYOGLOBIN, BNP, TROPISTAT in the last 48 hours.  Lactic Acid:   Recent Labs   Lab 05/01/22 1822 05/01/22  2254   LACTATE 1.2 1.6       Significant Imaging: I have reviewed all pertinent imaging results/findings within the past 24 hours.    CT ABDOMEN PELVIS WITHOUT CONTRAST     CLINICAL HISTORY:  Sacra wound;     TECHNIQUE:  Low dose axial images, sagittal and coronal reformations were obtained from the lung bases to the pubic symphysis.  Oral contrast was not administered.     COMPARISON:  None.     FINDINGS:  Lower chest: Heart is borderline enlarged.  There are possible mild emphysematous changes in the lung bases.  Dependent subsegmental atelectasis.  Detailed evaluation of the lung bases is limited by motion.     Abdomen:     Evaluation of the solid abdominal organs and bowel is limited in the absence of IV contrast.  Evaluation is limited by extensive streak artifact due to the patient's arms overlying the field of view.     Liver is markedly enlarged measuring approximately 22.5 cm in craniocaudal length.  There is diffuse steatosis.  Evaluation for mass is limited by absence of IV contrast and the presence significant artifact related to the patient's arms overlying the field of view.  Gallbladder is unremarkable.  No intra-or extrahepatic biliary ductal dilatation.     Spleen, adrenals, and pancreas are unremarkable allowing for motion and artifact limitations.     Kidneys are symmetric.  No renal or ureteral stones. No hydronephrosis.     No distended loops of small bowel.  Mild stool burden throughout the colon.  Appendix is normal.     Abdominal aorta is normal in course and caliber.     Multiple subcentimeter retroperitoneal lymph nodes which are not significantly enlarged by size criteria.     No abdominal free fluid or pneumoperitoneum.     Pelvis: Urinary bladder, rectum, and uterus are unremarkable.  No free fluid in the pelvis.     Bones and soft tissues: No aggressive osseous lesions.  Degenerative changes in the spine.  Mild body wall edema and mild edema in the gluteal soft tissues.  No soft tissue emphysema or sizable collection identified in the field of view.  Suggest correlation with direct visualization.     Impression:     No evidence of nephrolithiasis or obstructive uropathy.     Hepatomegaly and hepatic steatosis.     Motion and artifact limited study.     Additional findings discussed in the body of the report.        Electronically signed by: Nadeem Vega MD  Date:                                            05/01/2022  Time:                                           22:29

## 2022-05-02 NOTE — ASSESSMENT & PLAN NOTE
Lupis Murcia is a 71 y.o. female with hx of MS who was consulted to urology for urinary retention with HANDY.      - Consult d/w Staff Urologist  - Strict I's/O's  - Trend Cr, avoid nephrotoxic agents, and dose medications to patient's current GFR  - 16 Fr espinal placed with return of 750cc of urine   - Keep espinal for 7 days with subsequent VT   - Recommend ambulation as possible, avoiding constipation, avoiding narcotics.   - Please have nurse teach patient Clean Intermittent Catheterization (CIC). If the patient is able to do CIC, she will need to straight cath every 4-6 hours or to maintain a volume of <400 cc with each catheterization. The frequency of catheterization can be increased as needed if volumes are >400 cc with each cath, or decreased as needed if there is spontaneous voiding between caths and volumes are consistently low. Patient can stop CIC once he/she is spontaneously able to void on their own.  - If she is unable to do CIC, leave an indwelling espinal catheter for 1 week and place a referral for outpatient Urology follow up for a voiding trial.  - All remaining care per primary team

## 2022-05-02 NOTE — PLAN OF CARE
[facility] - Transplant Stepdown  Initial Discharge Assessment       Primary Care Provider: Bobby Tran MD    Admission Diagnosis: Intertrigo [L30.4]  Hyperkalemia [E87.5]  Tachycardia [R00.0]  Left shoulder pain [M25.512]  HANDY (acute kidney injury) [N17.9]  Pressure injury of skin, unspecified injury stage, unspecified location [L89.90]    Admission Date: 5/1/2022  Expected Discharge Date: 5/9/2022    Discharge Barriers Identified: (P) None    Payor: MEDICARE / Plan: MEDICARE PART A & B / Product Type: Government /     Extended Emergency Contact Information  Primary Emergency Contact: Amanda Lowery   [place]  Mobile Phone: [phone]  Relation: Daughter    Discharge Plan A: (P) Skilled Nursing Facility  Discharge Plan B: (P) Home Health, Home with family      [id number] - [place], [place] - [address]  [address]  [place] [address]  Phone: [phone] Fax: [phone]    [id number] - [place], [place] - [address]  [address]  [place] [address]  Phone: [phone] Fax: [phone]      Initial Assessment (most recent)       Adult Discharge Assessment - 05/02/22 1807          Discharge Assessment    Assessment Type Discharge Planning Assessment (P)      Confirmed/corrected address, phone number and insurance Yes (P)      Confirmed Demographics Correct on Facesheet (P)      Source of Information patient;family (P)    completed with pt daughter    If unable to respond/provide information was family/caregiver contacted? Yes (P)      Contact Name/Number Azalea Lowery  (P)      Does patient/caregiver understand observation status Yes (P)      Communicated USMAN with patient/caregiver Yes (P)      Reason For Admission Interrigo (P)      Lives With child(reinier), adult (P)    Patient lives with daughter Amanda    Facility Arrived From: home (P)      Do you expect to return to your current living situation? Yes (P)      Do you have help  at home or someone to help you manage your care at home? Yes (P)      Who are your caregiver(s) and their phone number(s)? daughter (P)      Prior to hospitilization cognitive status: Alert/Oriented (P)      Current cognitive status: Alert/Oriented (P)      Walking or Climbing Stairs Difficulty ambulation difficulty, requires equipment (P)    home has ramp    Dressing/Bathing Difficulty bathing difficulty, assistance 1 person (P)      Home Accessibility wheelchair accessible (P)      Home Layout Able to live on 1st floor (P)      Equipment Currently Used at Home walker, standard;shower chair (P)      Readmission within 30 days? No (P)      Patient currently being followed by outpatient case management? No (P)      Do you currently have service(s) that help you manage your care at home? No (P)      Do you take prescription medications? Yes (P)      Do you have prescription coverage? Yes (P)      Do you have any problems affording any of your prescribed medications? No (P)      Is the patient taking medications as prescribed? yes (P)      Who is going to help you get home at discharge? Daughter (P)      How do you get to doctors appointments? family or friend will provide (P)      Are you on dialysis? No (P)      Do you take coumadin? No (P)      Discharge Plan A Skilled Nursing Facility (P)      Discharge Plan B Home Health;Home with family (P)      DME Needed Upon Discharge  -- (P)    patient may need lift chair. Sw advised daughter this is OOP expense but CM is able to provide referral    Discharge Plan discussed with: Adult children (P)      Discharge Barriers Identified None (P)                             Sandra Arguello LMSW  Case Management Social Worker   [facility] [address]

## 2022-05-02 NOTE — PT/OT/SLP EVAL
"Speech Language Pathology Evaluation  Bedside Swallow    Patient Name:  Lupis Murcia   MRN:  [id number]  Admitting Diagnosis: HANDY (acute kidney injury)    Recommendations:                 General Recommendations:  Dysphagia therapy  Diet recommendations:  Mechanical soft, Thin   Aspiration Precautions:  · Assistance with meals   · Check for pocketing/oral residue  · Eliminate distractions  · Feed only when awake/alert  · Meds whole 1 at a time  · Monitor for s/s of aspiration   · Strict aspiration precautions   General Precautions: Standard, aspiration, fall  Communication strategies:  none    History:     Past Medical History:   Diagnosis Date    MS (multiple sclerosis)     Vitamin B12 deficiency        Past Surgical History:   Procedure Laterality Date    BREAST CYST EXCISION Left     over 40yrs ago     SECTION         Prior Intubation HX:  none    Modified Barium Swallow: none on file    Chest X-Rays: 22- Coarse interstitial attenuation accentuated by shallow inspiratory effort, no large focal consolidation    Prior diet: Regular diet with thin liquids per family    Occupation/hobbies/homemaking: none stated    Subjective     Spoke with RN priro to session Pt found sleeping in bed upon SLP entry with family at bedside.    Patient goals: "I want to get out of here"     Pain/Comfort:  · Pain Rating 1:  (none stated)    Respiratory Status: Nasal cannula, flow 2 L/min    Objective:     Oral Musculature Evaluation  · Oral Musculature: unable to assess due to poor participation/comprehension  · Dentition: present and adequate  · Mucosal Quality: adequate  · Volitional Cough: not elicited  · Voice Prior to PO Intake: mild hoarseness, adequate volume    Bedside Swallow Eval:   Consistencies Assessed:  · No consistencies completed this date     Oral Phase:   · Unable to assess    Pharyngeal Phase:   · Unable to assess    Treatment: Pt largely agitated this date, shouting profanities at clinician and " family. Informal oral mechanisms assessment revealed good labia; and lingual ROM and coordination during connected speech tasks. Pt offered a variety of consistencies but declining all PO trials. Ice chip rubbed upon labial surface as well as offered via spoon but pt unwilling to accept this date.    SLP spoke with pt's family regarding findings from assessment, no chronic signs of dysphagia, acutely decreased mentation and effects upon swallow efficacy, ongoing assessments, and SLP POC. Family inquiring into ability to provide pt with food/drink if she becomes more awake/alert given good tolerance of PO prior to admit. Clinician encouraged ongoing attempts of PO and family educated to stop PO if overt signs of aspiration occur. Pt's family verbalized understanding but would continue to benefit from ongoing education.    Assessment:     Lupis Murcia is a 71 y.o. female who presents with decreased interest in PO intake this date. Given that pt consumes a regular diet with thin liquids at baseline and has no history of dysphagia or previous clinical signs of swallow dysfunction, recommend continuation of current mechanical soft diet with thin liquids and strict monitoring for signs of aspiration. SLP to continue to follow for ongoing assessment of swallow physiology.     Goals:   Multidisciplinary Problems     SLP Goals        Problem: SLP    Goal Priority Disciplines Outcome   SLP Goal     SLP Ongoing, Progressing   Description: Speech Pathology Goals  To be met by 5/26/22      1. Pt will participate in ongoing diagnostic dysphagia therapy                            Plan:     · Patient to be seen:  3 x/week   · Plan of Care expires:  06/01/22  · Plan of Care reviewed with:  daughter, family   · SLP Follow-Up:  Yes       Discharge recommendations:   (TBD)   Barriers to Discharge:  Level of Skilled Assistance Needed     Time Tracking:     SLP Treatment Date:   05/02/22  Speech Start Time:  1318  Speech Stop Time:   1335     Speech Total Time (min):  17 min    Billable Minutes: Eval Swallow and Oral Function 8 and Self Care/Home Management Training 9       05/02/2022

## 2022-05-02 NOTE — MEDICAL/APP STUDENT
[facility] - Transplant Stepdown  Progress Note    Patient Name: Lupis Murcia  MRN: [id number]  Patient Class: IP- Inpatient   Admission Date: 5/1/2022  Length of Stay: 1 days  Attending Physician: John Hardin MD  Primary Care Provider: Bobby Tran MD    Subjective:     Principal Problem: HANDY (acute kidney injury)    Chief Complaint:   Chief Complaint   Patient presents with    Fall    Urinary Tract Infection     Arrived from home for unwitnessed fall 5 days ago - Denies hitting head or LOC. Per pt's daughter, she has not been ambulatory since fall and has been sitting in urine saturated diaper since fall. Erythema noted to vaginal area w/ strong, foul odor. Wounds noted to ble, oozing from site.        HPI: 72 y/o female with history of Multiple Sclerosis, Left Foot Drop, Lymphedema, Obesity (bmi 43) who was admitted on 5/1/22 for acute kidney injury, hyperkalemia and acute encephalopathy. Labs notable for Hyperkalemia to 6.3, Cr 3.9 and . Nephrology consulted for present HANYD      1 week ago on Monday patient had a fall, partially assisted by family when legs gave out, no head injury or LOC.  On Thursday it was noted patient unable to stand,  but daughter felt when she came home that pt had not moved.  She was also using adult diaper at this time and since Thursday due to body habitus/size patient has been unable to change her diaper since this time.   She has intermittently been taking her medicines during this time, but daughter noted decreased intake,  sleeping more and making confused statements and sought to bring her to the ED.      ED staff noted pts diaper was saturated with urine, vulvar swelling with intertrigo and buttock pressure injury.         Hospital Course: No notes on file    Interval History: Patient with altered mental status. Creatinine levels have improved with IV fluids, urine output increasing.    Past Medical History:   Diagnosis Date    MS (multiple sclerosis)     Vitamin  B12 deficiency        Past Surgical History:   Procedure Laterality Date    BREAST CYST EXCISION Left     over 40yrs ago     SECTION         Review of patient's allergies indicates:   Allergen Reactions    Contrast media Shortness Of Breath and Rash    Pcn [penicillins] Shortness Of Breath and Rash    Celebrex [celecoxib] Other (See Comments)     Swallowing problems     Diazepam Hives    Motrin [ibuprofen] Rash       No current facility-administered medications on file prior to encounter.     Current Outpatient Medications on File Prior to Encounter   Medication Sig    baclofen (LIORESAL) 10 MG tablet Take 1 tablet (10 mg total) by mouth 2 (two) times daily. (Patient taking differently: Take 10 mg by mouth 2 (two) times daily as needed.)    candesartan-hydrochlorothiazide (ATACAND HCT) 16-12.5 mg per tablet Take 1 tablet by mouth Daily.    cyanocobalamin 1,000 mcg/mL injection Inject 1ml daily for 1 week, then weekly for 1 month, then every 2 weeks thereafter. Please provide 23g 1in needle and 1cc syringes    furosemide (LASIX) 20 MG tablet Take 20 mg by mouth daily as needed (leg swelling).    lorazepam (ATIVAN) 1 MG tablet Take 0.5 mg by mouth 3 (three) times daily as needed for Anxiety.    naproxen (NAPROSYN) 500 MG tablet Take 500 mg by mouth 2 (two) times daily with meals.    vitamin B comp with vit C no.6 500-0.5 mg Tab Take 1 tablet by mouth once daily.    vitamin D 1000 units Tab Take 5,000 Units by mouth once daily.     acetaminophen 325 mg Cap Take 325 mg by mouth.    acetaminophen-codeine 300-30mg (TYLENOL #3) 300-30 mg Tab Take 1 tablet by mouth every 6 (six) hours as needed (chronic leg pain).    clotrimazole-betamethasone 1-0.05% (LOTRISONE) cream Apply to affected area 2 times daily    erythromycin base (E-MYCIN) 250 MG Tab PHARMACY ADMINISTERED     Family History     Problem Relation (Age of Onset)    Brain cancer Brother    Coronary artery disease Father    Lung cancer  Father, Mother        Tobacco Use    Smoking status: Never Smoker    Smokeless tobacco: Never Used   Substance and Sexual Activity    Alcohol use: No    Drug use: No    Sexual activity: Not on file     Review of Systems   Unable to perform ROS: Mental status change     Objective:     Vital Signs (Most Recent):  Temp: 98.9 °F (37.2 °C) (05/02/22 0500)  Pulse: 82 (05/02/22 1523)  Resp: 20 (05/02/22 1200)  BP: (!) 102/54 (05/02/22 1200)  SpO2: 98 % (05/02/22 1200) Vital Signs (24h Range):  Temp:  [98.3 °F (36.8 °C)-99.6 °F (37.6 °C)] 98.9 °F (37.2 °C)  Pulse:  [] 82  Resp:  [16-29] 20  SpO2:  [94 %-99 %] 98 %  BP: ()/(36-66) 102/54     Weight: 106.7 kg (235 lb 3.7 oz)  Body mass index is 43.02 kg/m².    Intake/Output Summary (Last 24 hours) at 5/2/2022 1540  Last data filed at 5/2/2022 0800  Gross per 24 hour   Intake 3641.67 ml   Output 1220 ml   Net 2421.67 ml      Physical Exam  Constitutional:       General: She is not in acute distress.     Appearance: She is obese.   HENT:      Head: Normocephalic.   Eyes:      General: No scleral icterus.  Cardiovascular:      Rate and Rhythm: Normal rate and regular rhythm.      Heart sounds: Normal heart sounds.   Pulmonary:      Effort: Pulmonary effort is normal.   Abdominal:      General: There is no distension.      Palpations: Abdomen is soft.      Tenderness: There is no abdominal tenderness.   Musculoskeletal:         General: No tenderness.      Right lower leg: Edema present.      Left lower leg: Edema present.   Skin:     General: Skin is warm.      Capillary Refill: Capillary refill takes less than 2 seconds.      Comments: Bilateral lymphedema with hypertrophic skin changes     Neurological:      Mental Status: She is disoriented.         Significant Labs:   All pertinent labs within the past 24 hours have been reviewed.  BMP:    Recent Labs   Lab 05/02/22  0718 05/02/22  1335   * 118*    142   K 4.9 5.3*    108   CO2 23 25   BUN  136* 114*   CREATININE 2.6* 2.1*   CALCIUM 9.0 9.6   MG 2.4  --        Significant Imaging: I have reviewed all pertinent imaging results/findings within the past 24 hours.  CXR: I have reviewed all pertinent results/findings within the past 24 hours and my personal findings are:  No pulmonary edema seen   CT Abdomen: Distended bladder without signs of hydronephrosis        Assessment/Plan:      70 y/o female with history of Multiple Sclerosis, Left Foot Drop, Lymphedema, Obesity (bmi 43) who was admitted on 5/1/22 for acute kidney injury, hyperkalemia and acute encephalopathy. Labs notable for Hyperkalemia to 6.3, Cr 3.9 (Baseline ~ 0.7) and . Nephrology consulted for present HANDY      Baseline sCr of  0.7, admission Cr at 3.9 > 2.9 > 2.6 currently Cr at 2.1    - UA shows protein negative, 3 WBC, 2 RBC  - UPCR  0.18  - CT abdomen pelvis showed very distended bladder. Patient likely has urinary retention and overflow incontinence 2/2 MS, this is likely responsible for post-renal HANDY      - Volume status: appears normal  - Electrolytes: , K  5.3   - Acid/Base:Co2 at 25     Plan:  · No need for RRT at this time.  · Daily BMP  · Monitor electrolytes closely.  · Avoid nephrotoxic medications, NSAIDs, IV contrast, etc.  · Medication doses adjusted to GFR  · Maintain MAP > 65  · IV fluids, 0.9 saline 100 ml/hr for 10 hours  · Strict intake/output        Active Diagnoses:    Diagnosis Date Noted POA    PRINCIPAL PROBLEM:  HANDY (acute kidney injury) [N17.9] 05/01/2022 Yes    Urinary retention [R33.9] 05/02/2022 Unknown    Hyperkalemia [E87.5] 05/01/2022 Yes    Lymphedema [I89.0] 05/01/2022 Yes    Pressure injury of buttock, unstageable [L89.300] 05/01/2022 Yes    Encephalopathy, metabolic [G93.41] 05/01/2022 Yes    MS (multiple sclerosis) [G35] 06/09/2019 Yes      Problems Resolved During this Admission:     VTE Risk Mitigation (From admission, onward)         Ordered     heparin (porcine) injection 7,500  Units  Every 8 hours         05/02/22 0232     IP VTE HIGH RISK PATIENT  Once         05/02/22 0232     Place sequential compression device  Until discontinued         05/02/22 0232     Place sequential compression device  Until discontinued         05/02/22 0031                   [facility] y - Transplant Stepdown

## 2022-05-02 NOTE — PROGRESS NOTES
Urology team at the bedside - espinal catheter palced. Linen changed.  Foam dressing intact to sacrum.  REdness noted to perineal area- cream ordered to refill

## 2022-05-02 NOTE — SUBJECTIVE & OBJECTIVE
Past Medical History:   Diagnosis Date    MS (multiple sclerosis)     Vitamin B12 deficiency        Past Surgical History:   Procedure Laterality Date    BREAST CYST EXCISION Left     over 40yrs ago     SECTION         Review of patient's allergies indicates:   Allergen Reactions    Contrast media Shortness Of Breath and Rash    Pcn [penicillins] Shortness Of Breath and Rash    Celebrex [celecoxib] Other (See Comments)     Swallowing problems     Diazepam Hives    Motrin [ibuprofen] Rash       Family History       Problem Relation (Age of Onset)    Brain cancer Brother    Coronary artery disease Father    Lung cancer Father, Mother            Tobacco Use    Smoking status: Never Smoker    Smokeless tobacco: Never Used   Substance and Sexual Activity    Alcohol use: No    Drug use: No    Sexual activity: Not on file       Review of Systems   Unable to perform ROS: Mental status change     Objective:     Temp:  [98.3 °F (36.8 °C)-99.6 °F (37.6 °C)] 98.9 °F (37.2 °C)  Pulse:  [] 78  Resp:  [16-29] 20  SpO2:  [94 %-99 %] 98 %  BP: ()/(36-66) 102/54     Body mass index is 43.02 kg/m².    Date 22 0700 - 22 0659   Shift 1257-5330 0835-6880 1863-0772 24 Hour Total   INTAKE   I.V.(mL/kg) 629.7(5.9)   629.7(5.9)   Shift Total(mL/kg) 629.7(5.9)   629.7(5.9)   OUTPUT   Urine(mL/kg/hr) 900   900   Shift Total(mL/kg) 900(8.4)   900(8.4)   Weight (kg) 106.7 106.7 106.7 106.7          Drains       Drain  Duration             Female External Urinary Catheter 22 1752 <1 day                    Physical Exam  Vitals and nursing note reviewed.   Constitutional:       Appearance: She is obese. She is ill-appearing.   HENT:      Head: Atraumatic.      Nose: Nose normal.   Eyes:      Extraocular Movements: Extraocular movements intact.   Cardiovascular:      Rate and Rhythm: Normal rate.   Pulmonary:      Effort: Pulmonary effort is normal.   Abdominal:      General: Abdomen is flat.   Genitourinary:      Comments: Edematous/erythematous vulva  Musculoskeletal:         General: Swelling present. Normal range of motion.      Cervical back: Normal range of motion.      Right lower leg: Edema present.      Left lower leg: Edema present.      Comments: Bilateral lymphedema with ulceration and hypertrophic skin changes    Neurological:      Mental Status: She is disoriented.       Significant Labs:    BMP:  Recent Labs   Lab 05/01/22 1822 05/01/22 2214 05/02/22  0718    139 138   K 6.3* 5.6* 4.9    106 107   CO2 16* 18* 23   * 121* 136*   CREATININE 3.9* 2.9* 2.6*   CALCIUM 9.9 9.3 9.0       CBC:  Recent Labs   Lab 05/01/22 1823 05/02/22  0718   WBC 10.05 5.72   HGB 9.9* 8.1*   HCT 32.1* 25.7*    243       All pertinent labs results from the past 24 hours have been reviewed.    Significant Imaging:  All pertinent imaging results/findings from the past 24 hours have been reviewed.

## 2022-05-02 NOTE — HPI
72 y/o WF hx of Multiple Sclerosis, Left Foot Drop, Lymphedema, Obesity (bmi 45) who is referred for admission for acute kidney injury, hyperkalemia and acute encephalopathy.     Patient's daughter Amanda Lowery provides primary history due to pt somnolence s/p ativan and EMR review.     1 week ago on Monday patient had a fall, partially assisted by family when legs gave out, no head injury or LOC.  Daughter noted through the week patient c/o of weakness and difficulty standing as when she fell, her armpit fell onto the walker, pt c/o of left shoulder pain.   On Thursday it was noted patient unable to stand, she is normally able to perform ADL with her walker, but daughter felt when she came home that pt had not moved.  She was also using adult diaper at this time and since Thursday due to body habitus/size patient has been unable to change her diaper since this time.   She has intermittently been taking her medicines during this time, but daughter noted she was eating less,  sleeping more and making confused statements and sought to bring her to the ED.     ED staff noted pts diaper was saturated with urine, vulvar swelling with intertrigo and buttock pressure injury.   Labs notable for Hyperkalemia to 6.3 and BUn/Cr of 169/3.9.     ED Treatment: Insulin 10uni IV regular, D10 bolus, 1gram Calcium Gluconate.  LR 2.2Liters IV, naBicarb 50meq IV x 1, Ativan 1mg IV   Ceftriaxone 2gm IV x 1

## 2022-05-02 NOTE — CARE UPDATE
"RAPID RESPONSE NURSE CHART REVIEW        Chart Reviewed: 05/02/2022, 3:24 AM    MRN: [id number]  Bed: 48078/17353 A    Dx: HANDY (acute kidney injury)    Lupis Murcia has a past medical history of MS (multiple sclerosis) and Vitamin B12 deficiency.    Last VS: BP (!) 89/51   Pulse 103   Temp 99.6 °F (37.6 °C) (Oral)   Resp 16   Ht 5' 2" (1.575 m)   Wt 106.7 kg (235 lb 3.7 oz)   LMP  (LMP Unknown)   SpO2 98%   Breastfeeding No   BMI 43.02 kg/m²     24H Vital Sign Range:  Temp:  [98.3 °F (36.8 °C)-99.6 °F (37.6 °C)]   Pulse:  []   Resp:  [16-20]   BP: ()/(44-58)   SpO2:  [95 %-98 %]     Level of Consciousness (AVPU): alert    Recent Labs     05/01/22  1823   WBC 10.05   HGB 9.9*   HCT 32.1*          Recent Labs     05/01/22  1822 05/01/22  2214    139   K 6.3* 5.6*    106   CO2 16* 18*   CREATININE 3.9* 2.9*   * 122*        No results for input(s): PH, PCO2, PO2, HCO3, POCSATURATED, BE in the last 72 hours.     OXYGEN:        O2 Device (Oxygen Therapy): room air    MEWS score: 4    charge RNSiena contacted. No concerns verbalized at this time. Instructed to call [facility] for further concerns or assistance.    Lisbet Grace RN      "

## 2022-05-02 NOTE — ASSESSMENT & PLAN NOTE
72 y/o female with history of Multiple Sclerosis, Left Foot Drop, Lymphedema, Obesity (bmi 43) who was admitted on 5/1/22 for acute kidney injury, hyperkalemia and acute encephalopathy. Labs notable for Hyperkalemia to 6.3, Cr 3.9 (Baseline ~ 0.7) and . Nephrology consulted for present HANDY      CT abdomen pelvis showed very distended bladder. Patient likely has urinary retention and overflow incontinence 2/2 MS, this is likely responsible for post-renal HANDY    Recommendations:  -Place espinal catheter due to overflow incontinence and urinary retention  -Avoid nephrotoxins  -trend creatinine daily  -strict intake and output  -renal diet

## 2022-05-02 NOTE — ED NOTES
3 RNs attempt straight cath unsuccessfully  
Additional urine specimen sent to lab, telebox verified, fluids going to floor with pt  
Dressing removed from bilateral legs per MD order. MD Cherrie states to leave wounds open for a while to dry and then cover with non-adherent dressing.  
ECG changes noted. All leads checked for correct placement. PA brought to bedside. Repeat ECGs obtained. PA states no need to show to provider at this time.  
I-STAT Chem-8+ Results:   Value Reference Range   Sodium 136 136-145 mmol/L   Potassium  6.4 3.5-5.1 mmol/L   Chloride 105  mmol/L   Ionized Calcium 1.19 1.06-1.42 mmol/L   CO2 (measured) 22 23-29 mmol/L   Glucose 176  mg/dL   BUN >140 6-30 mg/dL   Creatinine 3.3 0.5-1.4 mg/dL   Hematocrit 26 36-54%        
I-STAT Chem-8+ Results:   Value Reference Range   Sodium 138 136-145 mmol/L   Potassium  5.5 3.5-5.1 mmol/L   Chloride 106  mmol/L   Ionized Calcium 1.20 1.06-1.42 mmol/L   CO2 (measured) 21 23-29 mmol/L   Glucose 121  mg/dL   BUN >140 6-30 mg/dL   Creatinine 3.2 0.5-1.4 mg/dL   Hematocrit 25 36-54%        
Nonadherent dressings requested from [facility]. [facility] states they can send Abd pads.  
PA aware of pt's K level  
Pt bed linens changed and pt assisted to position of comfort. New purewick applied.   
Pt sheets changed and patient assisted to position of comfort. Mepilex dressing placed on sacrum and inner thigh.  
Pt taken to CT  
Report called to oscar mckeon  
Waiting for telebox for transport  
Xray at bedside  
WDL

## 2022-05-02 NOTE — MED STUDENT SUBJECTIVE & OBJECTIVE
Medical Student Subjective & Objective     Principal Problem: HANDY (acute kidney injury)    Chief Complaint:   Chief Complaint   Patient presents with    Fall    Urinary Tract Infection     Arrived from home for unwitnessed fall 5 days ago - Denies hitting head or LOC. Per pt's daughter, she has not been ambulatory since fall and has been sitting in urine saturated diaper since fall. Erythema noted to vaginal area w/ strong, foul odor. Wounds noted to ble, oozing from site.        HPI: 70 y/o female with history of Multiple Sclerosis, Left Foot Drop, Lymphedema, Obesity (bmi 43) who was admitted on 22 for acute kidney injury, hyperkalemia and acute encephalopathy. Labs notable for Hyperkalemia to 6.3, Cr 3.9 and . Nephrology consulted for present HANDY      1 week ago on Monday patient had a fall, partially assisted by family when legs gave out, no head injury or LOC.  On Thursday it was noted patient unable to stand,  but daughter felt when she came home that pt had not moved.  She was also using adult diaper at this time and since Thursday due to body habitus/size patient has been unable to change her diaper since this time.   She has intermittently been taking her medicines during this time, but daughter noted decreased intake,  sleeping more and making confused statements and sought to bring her to the ED.      ED staff noted pts diaper was saturated with urine, vulvar swelling with intertrigo and buttock pressure injury.         Hospital Course: No notes on file    Interval History: Patient with altered mental status. Creatinine levels have improved with IV fluids, urine output increasing.    Past Medical History:   Diagnosis Date    MS (multiple sclerosis)     Vitamin B12 deficiency        Past Surgical History:   Procedure Laterality Date    BREAST CYST EXCISION Left     over 40yrs ago     SECTION         Review of patient's allergies indicates:   Allergen Reactions    Contrast media  Shortness Of Breath and Rash    Pcn [penicillins] Shortness Of Breath and Rash    Celebrex [celecoxib] Other (See Comments)     Swallowing problems     Diazepam Hives    Motrin [ibuprofen] Rash       No current facility-administered medications on file prior to encounter.     Current Outpatient Medications on File Prior to Encounter   Medication Sig    baclofen (LIORESAL) 10 MG tablet Take 1 tablet (10 mg total) by mouth 2 (two) times daily. (Patient taking differently: Take 10 mg by mouth 2 (two) times daily as needed.)    candesartan-hydrochlorothiazide (ATACAND HCT) 16-12.5 mg per tablet Take 1 tablet by mouth Daily.    cyanocobalamin 1,000 mcg/mL injection Inject 1ml daily for 1 week, then weekly for 1 month, then every 2 weeks thereafter. Please provide 23g 1in needle and 1cc syringes    furosemide (LASIX) 20 MG tablet Take 20 mg by mouth daily as needed (leg swelling).    lorazepam (ATIVAN) 1 MG tablet Take 0.5 mg by mouth 3 (three) times daily as needed for Anxiety.    naproxen (NAPROSYN) 500 MG tablet Take 500 mg by mouth 2 (two) times daily with meals.    vitamin B comp with vit C no.6 500-0.5 mg Tab Take 1 tablet by mouth once daily.    vitamin D 1000 units Tab Take 5,000 Units by mouth once daily.     acetaminophen 325 mg Cap Take 325 mg by mouth.    acetaminophen-codeine 300-30mg (TYLENOL #3) 300-30 mg Tab Take 1 tablet by mouth every 6 (six) hours as needed (chronic leg pain).    clotrimazole-betamethasone 1-0.05% (LOTRISONE) cream Apply to affected area 2 times daily    erythromycin base (E-MYCIN) 250 MG Tab PHARMACY ADMINISTERED     Family History     Problem Relation (Age of Onset)    Brain cancer Brother    Coronary artery disease Father    Lung cancer Father, Mother        Tobacco Use    Smoking status: Never Smoker    Smokeless tobacco: Never Used   Substance and Sexual Activity    Alcohol use: No    Drug use: No    Sexual activity: Not on file     Review of Systems   Unable  to perform ROS: Mental status change     Objective:     Vital Signs (Most Recent):  Temp: 98.9 °F (37.2 °C) (05/02/22 0500)  Pulse: 82 (05/02/22 1523)  Resp: 20 (05/02/22 1200)  BP: (!) 102/54 (05/02/22 1200)  SpO2: 98 % (05/02/22 1200) Vital Signs (24h Range):  Temp:  [98.3 °F (36.8 °C)-99.6 °F (37.6 °C)] 98.9 °F (37.2 °C)  Pulse:  [] 82  Resp:  [16-29] 20  SpO2:  [94 %-99 %] 98 %  BP: ()/(36-66) 102/54     Weight: 106.7 kg (235 lb 3.7 oz)  Body mass index is 43.02 kg/m².    Intake/Output Summary (Last 24 hours) at 5/2/2022 1540  Last data filed at 5/2/2022 0800  Gross per 24 hour   Intake 3641.67 ml   Output 1220 ml   Net 2421.67 ml      Physical Exam  Constitutional:       General: She is not in acute distress.     Appearance: She is obese.   HENT:      Head: Normocephalic.   Eyes:      General: No scleral icterus.  Cardiovascular:      Rate and Rhythm: Normal rate and regular rhythm.      Heart sounds: Normal heart sounds.   Pulmonary:      Effort: Pulmonary effort is normal.   Abdominal:      General: There is no distension.      Palpations: Abdomen is soft.      Tenderness: There is no abdominal tenderness.   Musculoskeletal:         General: No tenderness.      Right lower leg: Edema present.      Left lower leg: Edema present.   Skin:     General: Skin is warm.      Capillary Refill: Capillary refill takes less than 2 seconds.      Comments: Bilateral lymphedema with hypertrophic skin changes     Neurological:      Mental Status: She is disoriented.         Significant Labs:   All pertinent labs within the past 24 hours have been reviewed.  BMP:    Recent Labs   Lab 05/02/22  0718 05/02/22  1335   * 118*    142   K 4.9 5.3*    108   CO2 23 25   * 114*   CREATININE 2.6* 2.1*   CALCIUM 9.0 9.6   MG 2.4  --        Significant Imaging: I have reviewed all pertinent imaging results/findings within the past 24 hours.  CXR: I have reviewed all pertinent results/findings within  the past 24 hours and my personal findings are:  No pulmonary edema seen   CT Abdomen: Distended bladder without signs of hydronephrosis    Medical Student Subjective & Objective

## 2022-05-02 NOTE — CODE/ RAPID DOCUMENTATION
RAPID RESPONSE NURSE NOTE        Admit Date: 2022  LOS: 1  Code Status: Full Code   Date of Consult: 2022  : 1950  Age: 71 y.o.  Weight:   Wt Readings from Last 1 Encounters:   22 106.7 kg (235 lb 3.7 oz)     Sex: female  Race: White   Bed: [facility]/[id number] A:   MRN: [id number]  Time Rapid Response Team page Received: 034  Time Rapid Response Team at Bedside: 034  Time Rapid Response Team left Bedside: 0410  Was the patient discharged from an ICU this admission? No   Was the patient discharged from a PACU within last 24 hours? No   Did the patient receive conscious sedation/general anesthesia in last 24 hours? No  Was the patient in the ED within the past 24 hours? Yes  Was the patient on NIPPV within the past 24 hours? No   Did this progress into an ARC or CPA: no  Attending Physician: John Hardin MD  Primary Service: [facility] MED S       SITUATION    Notified by charge RN via phone call.  Reason for alert: hypotension  Called to evaluate the patient for Circulatory    BACKGROUND     Why is the patient in the hospital?: HANDY (acute kidney injury)    Patient has a past medical history of MS (multiple sclerosis) and Vitamin B12 deficiency.    Last Vitals:  Temp: 98.9 °F (37.2 °C) (342)  Pulse: 95 (453)  Resp: 18 (453)  BP: 85/50 (447)  SpO2: 99 % (453)    24 Hours Vitals Range:  Temp:  [98.3 °F (36.8 °C)-99.6 °F (37.6 °C)]   Pulse:  []   Resp:  [16-29]   BP: ()/(36-66)   SpO2:  [94 %-99 %]     Labs:  Recent Labs     22   WBC 10.05   HGB 9.9*   HCT 32.1*          Recent Labs     224    139   K 6.3* 5.6*    106   CO2 16* 18*   CREATININE 3.9* 2.9*   * 122*        No results for input(s): PH, PCO2, PO2, HCO3, POCSATURATED, BE in the last 72 hours.     ASSESSMENT    Physical Exam  Constitutional:       Appearance: She is ill-appearing.   Cardiovascular:      Rate and Rhythm: Tachycardia  present.   Pulmonary:      Breath sounds: Wheezing present.   Musculoskeletal:      Right lower leg: Tenderness present. 4+ Edema present.      Left lower leg: Tenderness present. 4+ Edema present.      Comments: BLE with thick, scaly red/yellow weeping flaky rash   Skin:     General: Skin is dry.      Capillary Refill: Capillary refill takes 2 to 3 seconds.      Findings: Rash present.      Comments: Moist, reddened rash to perineal area   Neurological:      Mental Status: She is lethargic and confused.      GCS: GCS eye subscore is 3. GCS verbal subscore is 4. GCS motor subscore is 6.       BP 81/72 (58)  SpO2 96% on 2L NC, RR 16  Temp 98.9 axillary    INTERVENTIONS    The patient was seen for a Medical problem. Staff concerns included hypotension. The following interventions were performed: normal saline 500 ml IV bolus  and lactic acid.   Dr Burciaga to bedside, ordered procal and repeat lactic  Broad spectrum antibiotics also ordered by Dr Burciaga    Repeat VS     BP 94/44 (63)    RECOMMENDATIONS    We recommend: Monitor VS closely  Advise against any further sedation medications  Strict I&O  Notify MD HOA if patient becomes acutely hypotensive again  Follow up with labs    PROVIDER ESCALATION    Orders received and case discussed with Dr. Burciaga.    Disposition: Remain in room 80087.    FOLLOW UP    charge Siena MCGRAW, bedside LUCIEN Serna updated on plan of care. Instructed to call the Rapid Response Nurse, Lisbet Grace RN at [phone] for additional questions or concerns.

## 2022-05-03 LAB
ANION GAP SERPL CALC-SCNC: 11 MMOL/L (ref 8–16)
ANION GAP SERPL CALC-SCNC: 12 MMOL/L (ref 8–16)
ANION GAP SERPL CALC-SCNC: 15 MMOL/L (ref 8–16)
BACTERIA #/AREA URNS AUTO: ABNORMAL /HPF
BASOPHILS # BLD AUTO: 0.01 K/UL (ref 0–0.2)
BASOPHILS NFR BLD: 0.2 % (ref 0–1.9)
BILIRUB UR QL STRIP: NEGATIVE
BUN SERPL-MCNC: 74 MG/DL (ref 8–23)
BUN SERPL-MCNC: 84 MG/DL (ref 8–23)
BUN SERPL-MCNC: 98 MG/DL (ref 8–23)
CALCIUM SERPL-MCNC: 9.2 MG/DL (ref 8.7–10.5)
CALCIUM SERPL-MCNC: 9.4 MG/DL (ref 8.7–10.5)
CALCIUM SERPL-MCNC: 9.6 MG/DL (ref 8.7–10.5)
CHLORIDE SERPL-SCNC: 106 MMOL/L (ref 95–110)
CHLORIDE SERPL-SCNC: 107 MMOL/L (ref 95–110)
CHLORIDE SERPL-SCNC: 110 MMOL/L (ref 95–110)
CLARITY UR REFRACT.AUTO: ABNORMAL
CO2 SERPL-SCNC: 24 MMOL/L (ref 23–29)
CO2 SERPL-SCNC: 28 MMOL/L (ref 23–29)
CO2 SERPL-SCNC: 31 MMOL/L (ref 23–29)
COLOR UR AUTO: YELLOW
CREAT SERPL-MCNC: 1.1 MG/DL (ref 0.5–1.4)
CREAT SERPL-MCNC: 1.3 MG/DL (ref 0.5–1.4)
CREAT SERPL-MCNC: 1.3 MG/DL (ref 0.5–1.4)
DIFFERENTIAL METHOD: ABNORMAL
EOSINOPHIL # BLD AUTO: 0 K/UL (ref 0–0.5)
EOSINOPHIL NFR BLD: 0.2 % (ref 0–8)
ERYTHROCYTE [DISTWIDTH] IN BLOOD BY AUTOMATED COUNT: 14.9 % (ref 11.5–14.5)
EST. GFR  (AFRICAN AMERICAN): 47.4 ML/MIN/1.73 M^2
EST. GFR  (AFRICAN AMERICAN): 47.4 ML/MIN/1.73 M^2
EST. GFR  (AFRICAN AMERICAN): 58 ML/MIN/1.73 M^2
EST. GFR  (NON AFRICAN AMERICAN): 41.1 ML/MIN/1.73 M^2
EST. GFR  (NON AFRICAN AMERICAN): 41.1 ML/MIN/1.73 M^2
EST. GFR  (NON AFRICAN AMERICAN): 50.3 ML/MIN/1.73 M^2
ESTIMATED AVG GLUCOSE: 131 MG/DL (ref 68–131)
GLUCOSE SERPL-MCNC: 118 MG/DL (ref 70–110)
GLUCOSE SERPL-MCNC: 136 MG/DL (ref 70–110)
GLUCOSE SERPL-MCNC: 166 MG/DL (ref 70–110)
GLUCOSE UR QL STRIP: NEGATIVE
HBA1C MFR BLD: 6.2 % (ref 4–5.6)
HCT VFR BLD AUTO: 27.5 % (ref 37–48.5)
HGB BLD-MCNC: 8.8 G/DL (ref 12–16)
HGB UR QL STRIP: ABNORMAL
IMM GRANULOCYTES # BLD AUTO: 0.04 K/UL (ref 0–0.04)
IMM GRANULOCYTES NFR BLD AUTO: 0.8 % (ref 0–0.5)
KETONES UR QL STRIP: NEGATIVE
LEUKOCYTE ESTERASE UR QL STRIP: NEGATIVE
LYMPHOCYTES # BLD AUTO: 0.6 K/UL (ref 1–4.8)
LYMPHOCYTES NFR BLD: 11.7 % (ref 18–48)
MAGNESIUM SERPL-MCNC: 2.1 MG/DL (ref 1.6–2.6)
MCH RBC QN AUTO: 29.2 PG (ref 27–31)
MCHC RBC AUTO-ENTMCNC: 32 G/DL (ref 32–36)
MCV RBC AUTO: 91 FL (ref 82–98)
MICROSCOPIC COMMENT: ABNORMAL
MONOCYTES # BLD AUTO: 0.2 K/UL (ref 0.3–1)
MONOCYTES NFR BLD: 3.7 % (ref 4–15)
NEUTROPHILS # BLD AUTO: 4.3 K/UL (ref 1.8–7.7)
NEUTROPHILS NFR BLD: 83.4 % (ref 38–73)
NITRITE UR QL STRIP: NEGATIVE
NRBC BLD-RTO: 0 /100 WBC
PH UR STRIP: 5 [PH] (ref 5–8)
PHOSPHATE SERPL-MCNC: 3.4 MG/DL (ref 2.7–4.5)
PLATELET # BLD AUTO: 251 K/UL (ref 150–450)
PMV BLD AUTO: 10 FL (ref 9.2–12.9)
POCT GLUCOSE: 167 MG/DL (ref 70–110)
POTASSIUM SERPL-SCNC: 3.7 MMOL/L (ref 3.5–5.1)
POTASSIUM SERPL-SCNC: 4.2 MMOL/L (ref 3.5–5.1)
POTASSIUM SERPL-SCNC: 4.9 MMOL/L (ref 3.5–5.1)
PROT UR QL STRIP: NEGATIVE
RBC # BLD AUTO: 3.01 M/UL (ref 4–5.4)
RBC #/AREA URNS AUTO: 12 /HPF (ref 0–4)
SODIUM SERPL-SCNC: 146 MMOL/L (ref 136–145)
SODIUM SERPL-SCNC: 149 MMOL/L (ref 136–145)
SODIUM SERPL-SCNC: 149 MMOL/L (ref 136–145)
SP GR UR STRIP: 1.01 (ref 1–1.03)
URN SPEC COLLECT METH UR: ABNORMAL
VANCOMYCIN SERPL-MCNC: 11 UG/ML
WBC # BLD AUTO: 5.15 K/UL (ref 3.9–12.7)
WBC #/AREA URNS AUTO: 2 /HPF (ref 0–5)

## 2022-05-03 PROCEDURE — 81001 URINALYSIS AUTO W/SCOPE: CPT | Performed by: INTERNAL MEDICINE

## 2022-05-03 PROCEDURE — 63600175 PHARM REV CODE 636 W HCPCS: Performed by: INTERNAL MEDICINE

## 2022-05-03 PROCEDURE — 87040 BLOOD CULTURE FOR BACTERIA: CPT | Performed by: INTERNAL MEDICINE

## 2022-05-03 PROCEDURE — 93010 EKG 12-LEAD: ICD-10-PCS | Mod: ,,, | Performed by: INTERNAL MEDICINE

## 2022-05-03 PROCEDURE — 99232 PR SUBSEQUENT HOSPITAL CARE,LEVL II: ICD-10-PCS | Mod: GC,,, | Performed by: INTERNAL MEDICINE

## 2022-05-03 PROCEDURE — 93010 ELECTROCARDIOGRAM REPORT: CPT | Mod: ,,, | Performed by: INTERNAL MEDICINE

## 2022-05-03 PROCEDURE — 36415 COLL VENOUS BLD VENIPUNCTURE: CPT | Performed by: INTERNAL MEDICINE

## 2022-05-03 PROCEDURE — 99233 SBSQ HOSP IP/OBS HIGH 50: CPT | Mod: ,,, | Performed by: INTERNAL MEDICINE

## 2022-05-03 PROCEDURE — 99232 SBSQ HOSP IP/OBS MODERATE 35: CPT | Mod: GC,,, | Performed by: INTERNAL MEDICINE

## 2022-05-03 PROCEDURE — 63600175 PHARM REV CODE 636 W HCPCS: Performed by: HOSPITALIST

## 2022-05-03 PROCEDURE — 20600001 HC STEP DOWN PRIVATE ROOM

## 2022-05-03 PROCEDURE — 25000003 PHARM REV CODE 250: Performed by: INTERNAL MEDICINE

## 2022-05-03 PROCEDURE — 99233 PR SUBSEQUENT HOSPITAL CARE,LEVL III: ICD-10-PCS | Mod: ,,, | Performed by: INTERNAL MEDICINE

## 2022-05-03 PROCEDURE — 84100 ASSAY OF PHOSPHORUS: CPT | Performed by: HOSPITALIST

## 2022-05-03 PROCEDURE — 80048 BASIC METABOLIC PNL TOTAL CA: CPT | Mod: 91 | Performed by: INTERNAL MEDICINE

## 2022-05-03 PROCEDURE — 80202 ASSAY OF VANCOMYCIN: CPT | Performed by: INTERNAL MEDICINE

## 2022-05-03 PROCEDURE — 85025 COMPLETE CBC W/AUTO DIFF WBC: CPT | Performed by: HOSPITALIST

## 2022-05-03 PROCEDURE — 94761 N-INVAS EAR/PLS OXIMETRY MLT: CPT

## 2022-05-03 PROCEDURE — 25000003 PHARM REV CODE 250: Performed by: PHYSICIAN ASSISTANT

## 2022-05-03 PROCEDURE — 83036 HEMOGLOBIN GLYCOSYLATED A1C: CPT | Performed by: INTERNAL MEDICINE

## 2022-05-03 PROCEDURE — 83735 ASSAY OF MAGNESIUM: CPT | Performed by: HOSPITALIST

## 2022-05-03 PROCEDURE — 93005 ELECTROCARDIOGRAM TRACING: CPT

## 2022-05-03 RX ORDER — SODIUM CHLORIDE 450 MG/100ML
INJECTION, SOLUTION INTRAVENOUS CONTINUOUS
Status: DISCONTINUED | OUTPATIENT
Start: 2022-05-03 | End: 2022-05-04

## 2022-05-03 RX ORDER — CEFEPIME HYDROCHLORIDE 1 G/50ML
2 INJECTION, SOLUTION INTRAVENOUS
Status: DISCONTINUED | OUTPATIENT
Start: 2022-05-03 | End: 2022-05-04

## 2022-05-03 RX ORDER — ACETAMINOPHEN 10 MG/ML
1000 INJECTION, SOLUTION INTRAVENOUS ONCE
Status: COMPLETED | OUTPATIENT
Start: 2022-05-03 | End: 2022-05-03

## 2022-05-03 RX ORDER — ACETAMINOPHEN AND CODEINE PHOSPHATE 300; 30 MG/1; MG/1
1 TABLET ORAL EVERY 6 HOURS PRN
Status: DISCONTINUED | OUTPATIENT
Start: 2022-05-03 | End: 2022-05-11 | Stop reason: HOSPADM

## 2022-05-03 RX ADMIN — AMMONIUM LACTATE: 12 LOTION TOPICAL at 10:05

## 2022-05-03 RX ADMIN — CEFTRIAXONE 2 G: 2 INJECTION, SOLUTION INTRAVENOUS at 01:05

## 2022-05-03 RX ADMIN — HEPARIN SODIUM 7500 UNITS: 5000 INJECTION INTRAVENOUS; SUBCUTANEOUS at 01:05

## 2022-05-03 RX ADMIN — HEPARIN SODIUM 7500 UNITS: 5000 INJECTION INTRAVENOUS; SUBCUTANEOUS at 10:05

## 2022-05-03 RX ADMIN — ONDANSETRON 4 MG: 2 INJECTION INTRAMUSCULAR; INTRAVENOUS at 08:05

## 2022-05-03 RX ADMIN — HEPARIN SODIUM 7500 UNITS: 5000 INJECTION INTRAVENOUS; SUBCUTANEOUS at 05:05

## 2022-05-03 RX ADMIN — VANCOMYCIN HYDROCHLORIDE 1500 MG: 1.5 INJECTION, POWDER, LYOPHILIZED, FOR SOLUTION INTRAVENOUS at 04:05

## 2022-05-03 RX ADMIN — MICONAZOLE NITRATE: 20 CREAM TOPICAL at 10:05

## 2022-05-03 RX ADMIN — SODIUM CHLORIDE: 0.45 INJECTION, SOLUTION INTRAVENOUS at 10:05

## 2022-05-03 RX ADMIN — SODIUM CHLORIDE: 0.45 INJECTION, SOLUTION INTRAVENOUS at 11:05

## 2022-05-03 RX ADMIN — ACETAMINOPHEN 1000 MG: 10 INJECTION INTRAVENOUS at 06:05

## 2022-05-03 RX ADMIN — SODIUM CHLORIDE, SODIUM LACTATE, POTASSIUM CHLORIDE, AND CALCIUM CHLORIDE: .6; .31; .03; .02 INJECTION, SOLUTION INTRAVENOUS at 12:05

## 2022-05-03 NOTE — PT/OT/SLP PROGRESS
Speech Language Pathology      Lupis CORRY Murcia  MRN: [id number]    Patient not seen today secondary to nursing asking therapist not to wake pt as she was asleep and calm  . Spoke with family who reported pt was able to eat a little jello and drink Coke last night without difficulty; however, pt with emesis this am. Family reported pt did not want to eat this am. Reviewed s/s of aspiration. Will follow up per plan of care.

## 2022-05-03 NOTE — MEDICAL/APP STUDENT
[facility] - Transplant Stepdown  Progress Note    Patient Name: Lupis Murcia  MRN: [id number]  Patient Class: IP- Inpatient   Admission Date: 5/1/2022  Length of Stay: 2 days  Attending Physician: John Hardin MD  Primary Care Provider: Bobby Tran MD    Subjective:     Principal Problem: HANDY (acute kidney injury)    Chief Complaint:   Chief Complaint   Patient presents with    Fall    Urinary Tract Infection     Arrived from home for unwitnessed fall 5 days ago - Denies hitting head or LOC. Per pt's daughter, she has not been ambulatory since fall and has been sitting in urine saturated diaper since fall. Erythema noted to vaginal area w/ strong, foul odor. Wounds noted to ble, oozing from site.       HPI: 72 y/o female with history of Multiple Sclerosis, Left Foot Drop, Lymphedema, Obesity (bmi 43) who was admitted on 5/1/22 for acute kidney injury, hyperkalemia and acute encephalopathy. Admission labs notable for Hyperkalemia to 6.3, Cr 3.9 and . Nephrology consulted for present HANDY      1 week ago on Monday patient had a fall, partially assisted by family when legs gave out, no head injury or LOC.  On Thursday it was noted patient unable to stand,  but daughter felt when she came home that pt had not moved.  She was also using adult diaper at this time and since Thursday due to body habitus/size patient has been unable to change her diaper since this time.   She has intermittently been taking her medicines during this time, but daughter noted decreased intake,  sleeping more and making confused statements and sought to bring her to the ED.            Hospital Course: No notes on file    Interval History: Patient with post-renal HANDY secondary to urinary retention. Slaughter catheter placed by urology. Hyperkalemia resolved, creatinine levels improving. Hypernatremia    Past Medical History:   Diagnosis Date    MS (multiple sclerosis)     Vitamin B12 deficiency        Past Surgical History:    Procedure Laterality Date    BREAST CYST EXCISION Left     over 40yrs ago     SECTION         Review of patient's allergies indicates:   Allergen Reactions    Contrast media Shortness Of Breath and Rash    Pcn [penicillins] Shortness Of Breath and Rash    Celebrex [celecoxib] Other (See Comments)     Swallowing problems     Diazepam Hives    Motrin [ibuprofen] Rash       No current facility-administered medications on file prior to encounter.     Current Outpatient Medications on File Prior to Encounter   Medication Sig    baclofen (LIORESAL) 10 MG tablet Take 1 tablet (10 mg total) by mouth 2 (two) times daily. (Patient taking differently: Take 10 mg by mouth 2 (two) times daily as needed.)    candesartan-hydrochlorothiazide (ATACAND HCT) 16-12.5 mg per tablet Take 1 tablet by mouth Daily.    cyanocobalamin 1,000 mcg/mL injection Inject 1ml daily for 1 week, then weekly for 1 month, then every 2 weeks thereafter. Please provide 23g 1in needle and 1cc syringes    furosemide (LASIX) 20 MG tablet Take 20 mg by mouth daily as needed (leg swelling).    lorazepam (ATIVAN) 1 MG tablet Take 0.5 mg by mouth 3 (three) times daily as needed for Anxiety.    naproxen (NAPROSYN) 500 MG tablet Take 500 mg by mouth 2 (two) times daily with meals.    vitamin B comp with vit C no.6 500-0.5 mg Tab Take 1 tablet by mouth once daily.    vitamin D 1000 units Tab Take 5,000 Units by mouth once daily.     acetaminophen 325 mg Cap Take 325 mg by mouth.    acetaminophen-codeine 300-30mg (TYLENOL #3) 300-30 mg Tab Take 1 tablet by mouth every 6 (six) hours as needed (chronic leg pain).    clotrimazole-betamethasone 1-0.05% (LOTRISONE) cream Apply to affected area 2 times daily    erythromycin base (E-MYCIN) 250 MG Tab PHARMACY ADMINISTERED     Family History     Problem Relation (Age of Onset)    Brain cancer Brother    Coronary artery disease Father    Lung cancer Father, Mother        Tobacco Use    Smoking  status: Never Smoker    Smokeless tobacco: Never Used   Substance and Sexual Activity    Alcohol use: No    Drug use: No    Sexual activity: Not on file     Review of Systems   Unable to perform ROS: Mental status change     Objective:     Vital Signs (Most Recent):  Temp: 100.3 °F (37.9 °C) (05/03/22 1201)  Pulse: 79 (05/03/22 1201)  Resp: 17 (05/03/22 1110)  BP: (!) 126/53 (05/03/22 1201)  SpO2: 95 % (05/03/22 1201) Vital Signs (24h Range):  Temp:  [97.5 °F (36.4 °C)-100.3 °F (37.9 °C)] 100.3 °F (37.9 °C)  Pulse:  [75-91] 79  Resp:  [17-20] 17  SpO2:  [93 %-100 %] 95 %  BP: (110-130)/(53-94) 126/53     Weight: 102.4 kg (225 lb 12 oz)  Body mass index is 41.29 kg/m².    Intake/Output Summary (Last 24 hours) at 5/3/2022 1450  Last data filed at 5/3/2022 0600  Gross per 24 hour   Intake 1759.4 ml   Output 3225 ml   Net -1465.6 ml      Physical Exam  Constitutional:       General: She is not in acute distress.     Appearance: She is obese.   HENT:      Head: Normocephalic.      Mouth/Throat:      Mouth: Mucous membranes are dry.   Eyes:      General: No scleral icterus.  Cardiovascular:      Rate and Rhythm: Normal rate and regular rhythm.      Heart sounds: Normal heart sounds.   Pulmonary:      Effort: Pulmonary effort is normal.    Abdominal:      General: There is no distension.      Palpations: Abdomen is soft.      Tenderness: There is no abdominal tenderness.   Musculoskeletal:      Right lower leg: Edema present.      Left lower leg: Edema present.   Skin:     General: Skin is warm.      Comments: Bilateral lymphedema with hypertrophic skin changes   Neurological:      Mental Status: She is disoriented.         Significant Labs:   All pertinent labs within the past 24 hours have been reviewed.  BMP:   Recent Labs   Lab 05/03/22  0734   *   *   K 4.2      CO2 28   BUN 84*   CREATININE 1.3   CALCIUM 9.6   MG 2.1     CBC:   Recent Labs   Lab 05/01/22  1823 05/02/22  0718 05/03/22  0735    WBC 10.05 5.72 5.15   HGB 9.9* 8.1* 8.8*   HCT 32.1* 25.7* 27.5*    243 251       Significant Imaging: I have reviewed all pertinent imaging results/findings within the past 24 hours.        Assessment/Plan:      72 y/o female with history of Multiple Sclerosis, Left Foot Drop, Lymphedema, Obesity (bmi 43) who was admitted on 5/1/22 for acute kidney injury, hyperkalemia and acute encephalopathy. Labs notable for Hyperkalemia to 6.3, Cr 3.9 (Baseline ~ 0.7) and . Nephrology consulted for present HANDY      Baseline sCr of  0.7, admission Cr at 3.9 > 2.6 > 2.1 currently Cr at 1.3    - UA shows protein negative, 3 WBC, 2 RBC  - UPCR  0.18  - CT abdomen pelvis showed very distended bladder. Patient likely has urinary retention and overflow incontinence 2/2 MS, this is likely responsible for post-renal HANDY    - Volume status: Euvolemic to sightly volume down   - Electrolytes: , K  4.2   - Acid/Base:Co2 at 28   - Water deficit: 3.3    Plan:  · No need for RRT at this time.  · Creatinine improving  · Slaughter placed by urology yesterday  · Daily BMP  · Monitor electrolytes closely.  · Avoid nephrotoxic medications, NSAIDs, IV contrast, etc.  · Medication doses adjusted to GFR  · Maintain MAP > 65  · IV fluids, 0.45 saline 125 ml/hr continuous infusion for Hypernatremia   · Strict intake/output        Active Diagnoses:    Diagnosis Date Noted POA    PRINCIPAL PROBLEM:  HANDY (acute kidney injury) [N17.9] 05/01/2022 Yes    Urinary retention [R33.9] 05/02/2022 Unknown    Hyperkalemia [E87.5] 05/01/2022 Yes    Lymphedema [I89.0] 05/01/2022 Yes    Pressure injury of buttock, unstageable [L89.300] 05/01/2022 Yes    Encephalopathy, metabolic [G93.41] 05/01/2022 Yes    MS (multiple sclerosis) [G35] 06/09/2019 Yes      Problems Resolved During this Admission:     VTE Risk Mitigation (From admission, onward)         Ordered     heparin (porcine) injection 7,500 Units  Every 8 hours         05/02/22 0232     IP  VTE HIGH RISK PATIENT  Once         05/02/22 0232     Place sequential compression device  Until discontinued         05/02/22 0232     Place sequential compression device  Until discontinued         05/02/22 0031                   [address] - Transplant Stepdown

## 2022-05-03 NOTE — PROGRESS NOTES
Pharmacokinetic Assessment Follow Up: IV Vancomycin    Vancomycin serum concentration assessment(s):    The random level was drawn correctly and can be used to guide therapy at this time. The measurement is within the desired definitive target range of 10 to 20 mcg/mL.    Vancomycin Regimen Plan:    Change regimen to Vancomycin 1500 mg IV once with next serum trough concentration measured with AM labs on 5/4/2022 approx.12 hours after upcoming dose. Patient recently HANDY, SCr improved as of today, ordered one time vanc as a conservative approach.     Drug levels (last 3 results):  Recent Labs   Lab Result Units 05/03/22  0734   Vancomycin, Random ug/mL 11.0       Pharmacy will continue to follow and monitor vancomycin.    Please contact pharmacy at extension [phone] for questions regarding this assessment.    Thank you for the consult,   Araceli Cobos, PharmD       Patient brief summary:  Lupis Murcia is a 71 y.o. female initiated on antimicrobial therapy with IV Vancomycin for treatment of skin & soft tissue infection      Drug Allergies:   Review of patient's allergies indicates:   Allergen Reactions    Contrast media Shortness Of Breath and Rash    Pcn [penicillins] Shortness Of Breath and Rash    Celebrex [celecoxib] Other (See Comments)     Swallowing problems     Diazepam Hives    Motrin [ibuprofen] Rash       Actual Body Weight:   102.4 kg    Renal Function:   Estimated Creatinine Clearance: 43.8 mL/min (based on SCr of 1.3 mg/dL).,     Dialysis Method (if applicable):  N/A    CBC (last 72 hours):  Recent Labs   Lab Result Units 05/01/22  1823 05/02/22  0718 05/03/22  0735   WBC K/uL 10.05 5.72 5.15   Hemoglobin g/dL 9.9* 8.1* 8.8*   Hematocrit % 32.1* 25.7* 27.5*   Platelets K/uL 311 243 251   Gran % % 79.7* 69.7 83.4*   Lymph % % 10.2* 17.5* 11.7*   Mono % % 8.2 10.3 3.7*   Eosinophil % % 1.3 1.7 0.2   Basophil % % 0.2 0.3 0.2   Differential Method  Automated Automated Automated       Metabolic  Panel (last 72 hours):  Recent Labs   Lab Result Units 05/01/22  1822 05/01/22  1910 05/01/22  1911 05/01/22  2214 05/02/22  0718 05/02/22  0918 05/02/22  1335 05/02/22  1856 05/02/22  2352 05/03/22  0734   Sodium mmol/L 138  --   --  139 138  --  142 141 146* 149*   Sodium, Urine mmol/L  --  34  --   --   --   --   --   --   --   --    Potassium mmol/L 6.3*  --   --  5.6* 4.9  --  5.3* 4.2 4.9 4.2   Potassium, Urine mmol/L  --  54  --   --   --   --   --   --   --   --    Chloride mmol/L 103  --   --  106 107  --  108 105 107 110   Chloride, Urine mmol/L  --  42  --   --   --   --   --   --   --   --    CO2 mmol/L 16*  --   --  18* 23  --  25 26 24 28   Glucose mg/dL 128*  --   --  122* 173*  --  118* 135* 118* 166*   Glucose, UA   --   --  Negative  --   --   --   --   --   --   --    BUN mg/dL 169*  --   --  121* 136*  --  114* 105* 98* 84*   Creatinine mg/dL 3.9*  --   --  2.9* 2.6*  --  2.1* 1.5* 1.3 1.3   Creatinine, Urine mg/dL  --  104.0  --   --   --  51.0  --   --   --   --    Albumin g/dL 3.4*  --   --   --   --   --   --   --   --   --    Total Bilirubin mg/dL 0.4  --   --   --   --   --   --   --   --   --    Alkaline Phosphatase U/L 85  --   --   --   --   --   --   --   --   --    AST U/L 13  --   --   --   --   --   --   --   --   --    ALT U/L 12  --   --   --   --   --   --   --   --   --    Magnesium mg/dL  --   --   --   --  2.4  --   --   --   --  2.1   Phosphorus mg/dL  --   --   --   --  6.3*  --   --   --   --  3.4       Vancomycin Administrations:  vancomycin given in the last 96 hours                   vancomycin 1.75 g in 5 % dextrose 500 mL IVPB (mg) 1,750 mg New Bag 05/02/22 6455                Microbiologic Results:  Microbiology Results (last 7 days)     Procedure Component Value Units Date/Time    Blood culture #1 **CANNOT BE ORDERED STAT** [[id number]] Collected: 05/01/22 7022    Order Status: Completed Specimen: Blood from Peripheral, Antecubital, Right Updated: 05/02/22 6771      Blood Culture, Routine No Growth to date      No Growth to date    Blood culture #2 **CANNOT BE ORDERED STAT** [[id number]] Collected: 05/01/22 1832    Order Status: Completed Specimen: Blood from Peripheral, Wrist, Right Updated: 05/02/22 2212     Blood Culture, Routine No Growth to date      No Growth to date

## 2022-05-03 NOTE — PLAN OF CARE
Patient resting in bed with eyes closed. Daughter at bedside with friend. Patient disoriented x2 with unsuccessful redirection. Resp even and unlabored with no distress noted. Skin warm and dry to touch. Patient easily agitated when touched or repositioned. Patient has multiple wounds present to body (see chart). Wound care and ointments applied per MD orders. Patient abdominal round and obese in appearance. X1 bowel movement this am. Patient is incontinent of bowel and bladder. Slaughter cath intact and patent. U/A obtained today per MD order. Patient tolerated specimen collection well. Patient has generalized weakness. X2 assistance needed for adl care and repositioning. Education provided to daughterSue. Daughter verbalized understanding. Call light within reach, bed lowest position, bed alarm activated. Will continue to monitor. Continue current plan of care.

## 2022-05-03 NOTE — MED STUDENT SUBJECTIVE & OBJECTIVE
Medical Student Subjective & Objective     Principal Problem: HANDY (acute kidney injury)    Chief Complaint:   Chief Complaint   Patient presents with    Fall    Urinary Tract Infection     Arrived from home for unwitnessed fall 5 days ago - Denies hitting head or LOC. Per pt's daughter, she has not been ambulatory since fall and has been sitting in urine saturated diaper since fall. Erythema noted to vaginal area w/ strong, foul odor. Wounds noted to ble, oozing from site.       HPI: 70 y/o female with history of Multiple Sclerosis, Left Foot Drop, Lymphedema, Obesity (bmi 43) who was admitted on 22 for acute kidney injury, hyperkalemia and acute encephalopathy. Admission labs notable for Hyperkalemia to 6.3, Cr 3.9 and . Nephrology consulted for present HANDY      1 week ago on Monday patient had a fall, partially assisted by family when legs gave out, no head injury or LOC.  On Thursday it was noted patient unable to stand,  but daughter felt when she came home that pt had not moved.  She was also using adult diaper at this time and since Thursday due to body habitus/size patient has been unable to change her diaper since this time.   She has intermittently been taking her medicines during this time, but daughter noted decreased intake,  sleeping more and making confused statements and sought to bring her to the ED.            Hospital Course: No notes on file    Interval History: Patient with post-renal HANDY secondary to urinary retention. Slaughter catheter placed by urology. Hyperkalemia resolved, creatinine levels improving. Hypernatremia    Past Medical History:   Diagnosis Date    MS (multiple sclerosis)     Vitamin B12 deficiency        Past Surgical History:   Procedure Laterality Date    BREAST CYST EXCISION Left     over 40yrs ago     SECTION         Review of patient's allergies indicates:   Allergen Reactions    Contrast media Shortness Of Breath and Rash    Pcn [penicillins]  Shortness Of Breath and Rash    Celebrex [celecoxib] Other (See Comments)     Swallowing problems     Diazepam Hives    Motrin [ibuprofen] Rash       No current facility-administered medications on file prior to encounter.     Current Outpatient Medications on File Prior to Encounter   Medication Sig    baclofen (LIORESAL) 10 MG tablet Take 1 tablet (10 mg total) by mouth 2 (two) times daily. (Patient taking differently: Take 10 mg by mouth 2 (two) times daily as needed.)    candesartan-hydrochlorothiazide (ATACAND HCT) 16-12.5 mg per tablet Take 1 tablet by mouth Daily.    cyanocobalamin 1,000 mcg/mL injection Inject 1ml daily for 1 week, then weekly for 1 month, then every 2 weeks thereafter. Please provide 23g 1in needle and 1cc syringes    furosemide (LASIX) 20 MG tablet Take 20 mg by mouth daily as needed (leg swelling).    lorazepam (ATIVAN) 1 MG tablet Take 0.5 mg by mouth 3 (three) times daily as needed for Anxiety.    naproxen (NAPROSYN) 500 MG tablet Take 500 mg by mouth 2 (two) times daily with meals.    vitamin B comp with vit C no.6 500-0.5 mg Tab Take 1 tablet by mouth once daily.    vitamin D 1000 units Tab Take 5,000 Units by mouth once daily.     acetaminophen 325 mg Cap Take 325 mg by mouth.    acetaminophen-codeine 300-30mg (TYLENOL #3) 300-30 mg Tab Take 1 tablet by mouth every 6 (six) hours as needed (chronic leg pain).    clotrimazole-betamethasone 1-0.05% (LOTRISONE) cream Apply to affected area 2 times daily    erythromycin base (E-MYCIN) 250 MG Tab PHARMACY ADMINISTERED     Family History     Problem Relation (Age of Onset)    Brain cancer Brother    Coronary artery disease Father    Lung cancer Father, Mother        Tobacco Use    Smoking status: Never Smoker    Smokeless tobacco: Never Used   Substance and Sexual Activity    Alcohol use: No    Drug use: No    Sexual activity: Not on file     Review of Systems   Unable to perform ROS: Mental status change     Objective:      Vital Signs (Most Recent):  Temp: 100.3 °F (37.9 °C) (05/03/22 1201)  Pulse: 79 (05/03/22 1201)  Resp: 17 (05/03/22 1110)  BP: (!) 126/53 (05/03/22 1201)  SpO2: 95 % (05/03/22 1201) Vital Signs (24h Range):  Temp:  [97.5 °F (36.4 °C)-100.3 °F (37.9 °C)] 100.3 °F (37.9 °C)  Pulse:  [75-91] 79  Resp:  [17-20] 17  SpO2:  [93 %-100 %] 95 %  BP: (110-130)/(53-94) 126/53     Weight: 102.4 kg (225 lb 12 oz)  Body mass index is 41.29 kg/m².    Intake/Output Summary (Last 24 hours) at 5/3/2022 1450  Last data filed at 5/3/2022 0600  Gross per 24 hour   Intake 1759.4 ml   Output 3225 ml   Net -1465.6 ml      Physical Exam  Constitutional:       General: She is not in acute distress.     Appearance: She is obese.   HENT:      Head: Normocephalic.      Mouth/Throat:      Mouth: Mucous membranes are dry.   Eyes:      General: No scleral icterus.  Cardiovascular:      Rate and Rhythm: Normal rate and regular rhythm.      Heart sounds: Normal heart sounds.   Pulmonary:      Effort: Pulmonary effort is normal.   Abdominal:      General: There is no distension.      Palpations: Abdomen is soft.      Tenderness: There is no abdominal tenderness.   Musculoskeletal:      Right lower leg: Edema present.      Left lower leg: Edema present.   Skin:     General: Skin is warm.      Comments: Bilateral lymphedema with hypertrophic skin changes   Neurological:      Mental Status: She is disoriented.         Significant Labs:   All pertinent labs within the past 24 hours have been reviewed.  BMP:   Recent Labs   Lab 05/03/22  0734   *   *   K 4.2      CO2 28   BUN 84*   CREATININE 1.3   CALCIUM 9.6   MG 2.1     CBC:   Recent Labs   Lab 05/01/22  1823 05/02/22  0718 05/03/22  0735   WBC 10.05 5.72 5.15   HGB 9.9* 8.1* 8.8*   HCT 32.1* 25.7* 27.5*    243 251       Significant Imaging: I have reviewed all pertinent imaging results/findings within the past 24 hours.    Medical Student Subjective & Objective

## 2022-05-03 NOTE — PLAN OF CARE
Pt disoriented x4, communicates with family and staff but unable to correctly answer orientation questions. IM zyprexa given x1 for nondirectable agitation - pt cursing and thrashing; not comforted by family at bedside. VSS, maintains SpO2>92% on RA. Telemetry monitoring continued, showing NSR. LR infusing @ 100ml/hr. Pt c/o pain to L arm from when she fell - PRN tylenol given. Redness and excoriation to vagina and thighs - miconazole ointment applied. Pressure ulcers to buttocks - triad ointment applied. BLE swollen, crusted, red, and oozing serous fluid - ammonium lactate lotion applied. Slaughter placed by urology for significant bladder distention, CDI and adhered to top of thigh with StatLock.  Drainage bag below bladder and off the floor, no dependent loops or kinks. Daughter and friend at bedside and active with care. Fall risk precautions maintained.  Pt in lowest bed position setting, lighting adjusted, pt to wear nonskid socks when ambulating, side rails up x2.  Pt remain free from falls during shift. Weight shift assistance given when necessary - waffle mattress and pillows in use. Call light within reach. Will continue to monitor.

## 2022-05-04 PROBLEM — E87.0 HYPERNATREMIA: Status: ACTIVE | Noted: 2022-05-04

## 2022-05-04 PROBLEM — L03.119 CELLULITIS OF LEG: Status: ACTIVE | Noted: 2022-05-04

## 2022-05-04 LAB
ANION GAP SERPL CALC-SCNC: 10 MMOL/L (ref 8–16)
ANION GAP SERPL CALC-SCNC: 10 MMOL/L (ref 8–16)
ANION GAP SERPL CALC-SCNC: 11 MMOL/L (ref 8–16)
ANION GAP SERPL CALC-SCNC: 9 MMOL/L (ref 8–16)
BASOPHILS # BLD AUTO: 0.01 K/UL (ref 0–0.2)
BASOPHILS NFR BLD: 0.2 % (ref 0–1.9)
BUN SERPL-MCNC: 51 MG/DL (ref 8–23)
BUN SERPL-MCNC: 57 MG/DL (ref 8–23)
BUN SERPL-MCNC: 58 MG/DL (ref 8–23)
BUN SERPL-MCNC: 63 MG/DL (ref 8–23)
CALCIUM SERPL-MCNC: 8.9 MG/DL (ref 8.7–10.5)
CALCIUM SERPL-MCNC: 9.2 MG/DL (ref 8.7–10.5)
CALCIUM SERPL-MCNC: 9.3 MG/DL (ref 8.7–10.5)
CALCIUM SERPL-MCNC: 9.3 MG/DL (ref 8.7–10.5)
CHLORIDE SERPL-SCNC: 108 MMOL/L (ref 95–110)
CHLORIDE SERPL-SCNC: 110 MMOL/L (ref 95–110)
CHLORIDE SERPL-SCNC: 112 MMOL/L (ref 95–110)
CHLORIDE SERPL-SCNC: 112 MMOL/L (ref 95–110)
CO2 SERPL-SCNC: 25 MMOL/L (ref 23–29)
CO2 SERPL-SCNC: 27 MMOL/L (ref 23–29)
CO2 SERPL-SCNC: 28 MMOL/L (ref 23–29)
CO2 SERPL-SCNC: 29 MMOL/L (ref 23–29)
CREAT SERPL-MCNC: 1 MG/DL (ref 0.5–1.4)
CREAT SERPL-MCNC: 1.1 MG/DL (ref 0.5–1.4)
DIFFERENTIAL METHOD: ABNORMAL
EOSINOPHIL # BLD AUTO: 0 K/UL (ref 0–0.5)
EOSINOPHIL NFR BLD: 0.5 % (ref 0–8)
ERYTHROCYTE [DISTWIDTH] IN BLOOD BY AUTOMATED COUNT: 14.6 % (ref 11.5–14.5)
ERYTHROCYTE [DISTWIDTH] IN BLOOD BY AUTOMATED COUNT: 14.8 % (ref 11.5–14.5)
EST. GFR  (AFRICAN AMERICAN): 58 ML/MIN/1.73 M^2
EST. GFR  (AFRICAN AMERICAN): >60 ML/MIN/1.73 M^2
EST. GFR  (NON AFRICAN AMERICAN): 50.3 ML/MIN/1.73 M^2
EST. GFR  (NON AFRICAN AMERICAN): 56.4 ML/MIN/1.73 M^2
GLUCOSE SERPL-MCNC: 127 MG/DL (ref 70–110)
GLUCOSE SERPL-MCNC: 133 MG/DL (ref 70–110)
GLUCOSE SERPL-MCNC: 141 MG/DL (ref 70–110)
GLUCOSE SERPL-MCNC: 158 MG/DL (ref 70–110)
HCT VFR BLD AUTO: 24.9 % (ref 37–48.5)
HCT VFR BLD AUTO: 26.5 % (ref 37–48.5)
HGB BLD-MCNC: 7.9 G/DL (ref 12–16)
HGB BLD-MCNC: 8.1 G/DL (ref 12–16)
IMM GRANULOCYTES # BLD AUTO: 0.08 K/UL (ref 0–0.04)
IMM GRANULOCYTES NFR BLD AUTO: 1.3 % (ref 0–0.5)
LYMPHOCYTES # BLD AUTO: 1.1 K/UL (ref 1–4.8)
LYMPHOCYTES NFR BLD: 18.2 % (ref 18–48)
MAGNESIUM SERPL-MCNC: 2 MG/DL (ref 1.6–2.6)
MCH RBC QN AUTO: 29.1 PG (ref 27–31)
MCH RBC QN AUTO: 30.4 PG (ref 27–31)
MCHC RBC AUTO-ENTMCNC: 30.6 G/DL (ref 32–36)
MCHC RBC AUTO-ENTMCNC: 31.7 G/DL (ref 32–36)
MCV RBC AUTO: 95 FL (ref 82–98)
MCV RBC AUTO: 96 FL (ref 82–98)
MONOCYTES # BLD AUTO: 0.6 K/UL (ref 0.3–1)
MONOCYTES NFR BLD: 9.4 % (ref 4–15)
NEUTROPHILS # BLD AUTO: 4.4 K/UL (ref 1.8–7.7)
NEUTROPHILS NFR BLD: 70.4 % (ref 38–73)
NRBC BLD-RTO: 0 /100 WBC
PHOSPHATE SERPL-MCNC: 2.5 MG/DL (ref 2.7–4.5)
PLATELET # BLD AUTO: 189 K/UL (ref 150–450)
PLATELET # BLD AUTO: 213 K/UL (ref 150–450)
PLATELET BLD QL SMEAR: ABNORMAL
PMV BLD AUTO: 10.3 FL (ref 9.2–12.9)
PMV BLD AUTO: 11.6 FL (ref 9.2–12.9)
POTASSIUM SERPL-SCNC: 3.2 MMOL/L (ref 3.5–5.1)
POTASSIUM SERPL-SCNC: 3.3 MMOL/L (ref 3.5–5.1)
POTASSIUM SERPL-SCNC: 3.5 MMOL/L (ref 3.5–5.1)
POTASSIUM SERPL-SCNC: 3.8 MMOL/L (ref 3.5–5.1)
PROCALCITONIN SERPL IA-MCNC: 0.1 NG/ML
RBC # BLD AUTO: 2.6 M/UL (ref 4–5.4)
RBC # BLD AUTO: 2.78 M/UL (ref 4–5.4)
SODIUM SERPL-SCNC: 145 MMOL/L (ref 136–145)
SODIUM SERPL-SCNC: 147 MMOL/L (ref 136–145)
SODIUM SERPL-SCNC: 149 MMOL/L (ref 136–145)
SODIUM SERPL-SCNC: 150 MMOL/L (ref 136–145)
VANCOMYCIN SERPL-MCNC: 13.5 UG/ML
WBC # BLD AUTO: 6.26 K/UL (ref 3.9–12.7)
WBC # BLD AUTO: 6.64 K/UL (ref 3.9–12.7)

## 2022-05-04 PROCEDURE — 99233 PR SUBSEQUENT HOSPITAL CARE,LEVL III: ICD-10-PCS | Mod: ,,, | Performed by: INTERNAL MEDICINE

## 2022-05-04 PROCEDURE — 99223 PR INITIAL HOSPITAL CARE,LEVL III: ICD-10-PCS | Mod: ,,, | Performed by: STUDENT IN AN ORGANIZED HEALTH CARE EDUCATION/TRAINING PROGRAM

## 2022-05-04 PROCEDURE — 20600001 HC STEP DOWN PRIVATE ROOM

## 2022-05-04 PROCEDURE — 84145 PROCALCITONIN (PCT): CPT | Performed by: INTERNAL MEDICINE

## 2022-05-04 PROCEDURE — 84100 ASSAY OF PHOSPHORUS: CPT | Performed by: HOSPITALIST

## 2022-05-04 PROCEDURE — 85025 COMPLETE CBC W/AUTO DIFF WBC: CPT | Performed by: HOSPITALIST

## 2022-05-04 PROCEDURE — 63600175 PHARM REV CODE 636 W HCPCS: Performed by: INTERNAL MEDICINE

## 2022-05-04 PROCEDURE — 99233 SBSQ HOSP IP/OBS HIGH 50: CPT | Mod: ,,, | Performed by: INTERNAL MEDICINE

## 2022-05-04 PROCEDURE — 25000003 PHARM REV CODE 250: Performed by: INTERNAL MEDICINE

## 2022-05-04 PROCEDURE — 63600175 PHARM REV CODE 636 W HCPCS: Performed by: HOSPITALIST

## 2022-05-04 PROCEDURE — 99223 1ST HOSP IP/OBS HIGH 75: CPT | Mod: ,,, | Performed by: STUDENT IN AN ORGANIZED HEALTH CARE EDUCATION/TRAINING PROGRAM

## 2022-05-04 PROCEDURE — 80048 BASIC METABOLIC PNL TOTAL CA: CPT | Mod: 91 | Performed by: INTERNAL MEDICINE

## 2022-05-04 PROCEDURE — 80202 ASSAY OF VANCOMYCIN: CPT | Performed by: INTERNAL MEDICINE

## 2022-05-04 PROCEDURE — 83735 ASSAY OF MAGNESIUM: CPT | Performed by: HOSPITALIST

## 2022-05-04 PROCEDURE — 97535 SELF CARE MNGMENT TRAINING: CPT

## 2022-05-04 PROCEDURE — 94761 N-INVAS EAR/PLS OXIMETRY MLT: CPT

## 2022-05-04 PROCEDURE — 36415 COLL VENOUS BLD VENIPUNCTURE: CPT | Performed by: INTERNAL MEDICINE

## 2022-05-04 PROCEDURE — 27000221 HC OXYGEN, UP TO 24 HOURS

## 2022-05-04 PROCEDURE — 25000003 PHARM REV CODE 250: Performed by: HOSPITALIST

## 2022-05-04 PROCEDURE — 85027 COMPLETE CBC AUTOMATED: CPT | Performed by: INTERNAL MEDICINE

## 2022-05-04 RX ORDER — POTASSIUM CHLORIDE 20 MEQ/1
20 TABLET, EXTENDED RELEASE ORAL ONCE
Status: DISCONTINUED | OUTPATIENT
Start: 2022-05-05 | End: 2022-05-05

## 2022-05-04 RX ORDER — DEXTROSE MONOHYDRATE 50 MG/ML
INJECTION, SOLUTION INTRAVENOUS CONTINUOUS
Status: DISCONTINUED | OUTPATIENT
Start: 2022-05-04 | End: 2022-05-04

## 2022-05-04 RX ORDER — MUPIROCIN 20 MG/G
OINTMENT TOPICAL 2 TIMES DAILY
Status: DISPENSED | OUTPATIENT
Start: 2022-05-04 | End: 2022-05-09

## 2022-05-04 RX ORDER — DEXTROSE MONOHYDRATE 50 MG/ML
INJECTION, SOLUTION INTRAVENOUS CONTINUOUS
Status: ACTIVE | OUTPATIENT
Start: 2022-05-04 | End: 2022-05-05

## 2022-05-04 RX ADMIN — ACETAMINOPHEN 650 MG: 325 TABLET ORAL at 09:05

## 2022-05-04 RX ADMIN — DEXTROSE: 5 SOLUTION INTRAVENOUS at 11:05

## 2022-05-04 RX ADMIN — MICONAZOLE NITRATE: 20 CREAM TOPICAL at 11:05

## 2022-05-04 RX ADMIN — DEXTROSE: 5 SOLUTION INTRAVENOUS at 08:05

## 2022-05-04 RX ADMIN — ONDANSETRON 4 MG: 2 INJECTION INTRAMUSCULAR; INTRAVENOUS at 09:05

## 2022-05-04 RX ADMIN — ONDANSETRON 4 MG: 2 INJECTION INTRAMUSCULAR; INTRAVENOUS at 01:05

## 2022-05-04 RX ADMIN — ACETAMINOPHEN AND CODEINE PHOSPHATE 1 TABLET: 300; 30 TABLET ORAL at 04:05

## 2022-05-04 RX ADMIN — HEPARIN SODIUM 7500 UNITS: 5000 INJECTION INTRAVENOUS; SUBCUTANEOUS at 08:05

## 2022-05-04 RX ADMIN — CEFEPIME HYDROCHLORIDE 2 G: 2 INJECTION, SOLUTION INTRAVENOUS at 11:05

## 2022-05-04 RX ADMIN — VANCOMYCIN HYDROCHLORIDE 1500 MG: 1.5 INJECTION, POWDER, LYOPHILIZED, FOR SOLUTION INTRAVENOUS at 05:05

## 2022-05-04 RX ADMIN — HEPARIN SODIUM 7500 UNITS: 5000 INJECTION INTRAVENOUS; SUBCUTANEOUS at 03:05

## 2022-05-04 RX ADMIN — MUPIROCIN: 20 OINTMENT TOPICAL at 10:05

## 2022-05-04 RX ADMIN — HEPARIN SODIUM 7500 UNITS: 5000 INJECTION INTRAVENOUS; SUBCUTANEOUS at 06:05

## 2022-05-04 RX ADMIN — POTASSIUM PHOSPHATE, MONOBASIC AND POTASSIUM PHOSPHATE, DIBASIC 20 MMOL: 224; 236 INJECTION, SOLUTION, CONCENTRATE INTRAVENOUS at 12:05

## 2022-05-04 RX ADMIN — AMMONIUM LACTATE: 12 LOTION TOPICAL at 11:05

## 2022-05-04 RX ADMIN — SODIUM CHLORIDE: 0.45 INJECTION, SOLUTION INTRAVENOUS at 08:05

## 2022-05-04 RX ADMIN — CEFEPIME HYDROCHLORIDE 2 G: 2 INJECTION, SOLUTION INTRAVENOUS at 12:05

## 2022-05-04 RX ADMIN — ONDANSETRON 4 MG: 2 INJECTION INTRAMUSCULAR; INTRAVENOUS at 11:05

## 2022-05-04 NOTE — PROGRESS NOTES
[facility]  Progress Note    Patient Name: Lupis Murcia  MRN: [id number]  Patient Class: IP- Inpatient   Admission Date: 5/1/2022  Length of Stay: 2 days  Attending Physician: John Hardin MD  Primary Care Provider: Bobby Tran MD        Subjective:     Principal Problem:HANDY (acute kidney injury)        HPI:  70 y/o WF hx of Multiple Sclerosis, Left Foot Drop, Lymphedema, Obesity (bmi 45) who is referred for admission for acute kidney injury, hyperkalemia and acute encephalopathy.     Patient's daughter Amanda Lowery provides primary history due to pt somnolence s/p ativan and EMR review.     1 week ago on Monday patient had a fall, partially assisted by family when legs gave out, no head injury or LOC.  Daughter noted through the week patient c/o of weakness and difficulty standing as when she fell, her armpit fell onto the walker, pt c/o of left shoulder pain.   On Thursday it was noted patient unable to stand, she is normally able to perform ADL with her walker, but daughter felt when she came home that pt had not moved.  She was also using adult diaper at this time and since Thursday due to body habitus/size patient has been unable to change her diaper since this time.   She has intermittently been taking her medicines during this time, but daughter noted she was eating less,  sleeping more and making confused statements and sought to bring her to the ED.     ED staff noted pts diaper was saturated with urine, vulvar swelling with intertrigo and buttock pressure injury.   Labs notable for Hyperkalemia to 6.3 and BUn/Cr of 169/3.9.     ED Treatment: Insulin 10uni IV regular, D10 bolus, 1gram Calcium Gluconate.  LR 2.2Liters IV, naBicarb 50meq IV x 1, Ativan 1mg IV   Ceftriaxone 2gm IV x 1      Overview/Hospital Course:  No notes on file    Interval History: Patient lying in bed, no acute distress. Family at bedside. Overnight, patient had episode of nausea and NBNB  emesis. Patient has waxing and waning of mental status likely due to delirium in the setting of infection and/or uremia. HANDY improving with espinal placed. Nephrology following. Switched to 1/2NS to correct hypernatremia. Resumed home tylenol #3 for chronic leg pain.       Review of Systems   Unable to perform ROS: Mental status change   Objective:     Vital Signs (Most Recent):  Temp: 99.6 °F (37.6 °C) (05/03/22 2000)  Pulse: 75 (05/03/22 2000)  Resp: 18 (05/03/22 2000)  BP: (!) 106/57 (05/03/22 2000)  SpO2: 96 % (05/03/22 2000)   Vital Signs (24h Range):  Temp:  [99.5 °F (37.5 °C)-100.7 °F (38.2 °C)] 99.6 °F (37.6 °C)  Pulse:  [75-89] 75  Resp:  [17-19] 18  SpO2:  [92 %-100 %] 96 %  BP: (106-130)/(53-94) 106/57     Weight: 102.4 kg (225 lb 12 oz)  Body mass index is 41.29 kg/m².    Intake/Output Summary (Last 24 hours) at 5/3/2022 2215  Last data filed at 5/3/2022 1809  Gross per 24 hour   Intake 1509.4 ml   Output 3375 ml   Net -1865.6 ml      Physical Exam  Constitutional:       Appearance: She is well-developed. She is obese. She is ill-appearing.   HENT:      Head: Normocephalic and atraumatic.      Mouth/Throat:      Mouth: Mucous membranes are moist.   Eyes:      General: No scleral icterus.     Pupils: Pupils are equal, round, and reactive to light.   Neck:      Vascular: No JVD.   Cardiovascular:      Rate and Rhythm: Normal rate and regular rhythm.      Heart sounds: No murmur heard.    No friction rub. No gallop.   Pulmonary:      Effort: Pulmonary effort is normal. No respiratory distress.      Breath sounds: Normal breath sounds. No wheezing or rales.   Abdominal:      General: Bowel sounds are normal. There is no distension.      Palpations: Abdomen is soft.      Tenderness: There is no abdominal tenderness. There is no guarding or rebound.   Musculoskeletal:         General: No deformity. Normal range of motion.      Cervical back: Neck supple.   Lymphadenopathy:      Cervical: No cervical adenopathy.    Skin:     General: Skin is warm and dry.      Capillary Refill: Capillary refill takes less than 2 seconds.      Findings: No erythema or rash.      Comments: Severe chronic venous stasis changes and lymphedema in BLE. TTP   Neurological:      Mental Status: She is alert and oriented to person, place, and time.      Cranial Nerves: No cranial nerve deficit.      Sensory: No sensory deficit.   Psychiatric:         Cognition and Memory: Cognition is impaired. Memory is impaired.       Significant Labs: All pertinent labs within the past 24 hours have been reviewed.    Significant Imaging: I have reviewed all pertinent imaging results/findings within the past 24 hours.      Assessment/Plan:      * HANDY (acute kidney injury)  Patient with acute kidney injury likely d/t IVVD/Dehydration and Pre-renal azotemia Which is currently improving. Labs reviewed- Renal function/electrolytes with Estimated Creatinine Clearance: 51.8 mL/min (based on SCr of 1.1 mg/dL). according to latest data. Monitor urine output and serial BMP and adjust therapy as needed. Avoid nephrotoxins and renally dose meds for GFR listed above.   -patient with non-oliguric handy that is likely pre-renal in etiology, but higher severity given hyperkalemia, metabolic acidosis and uremic encephalopathy.   -obtain urine electrolytes,   -trend BMP   - Give IV Bicarb Drip x 1 L to assist with volume and acidosis management.   -Nursing made multiple attempts at Espinal placement but pt vular swelling unable to pass, pt urinating connected to purewick.   MOnitor bladder scans if UOP falls as her bladder appears distended on CT scan but no hydronephrosis present.   -hold home anti-hypertensives (ARB/Thiazide) hold any NSAIDs, pt was taking both in last week.   -nephrology consultation. apprec recs  - switched LR infusion to 1/2NS  - IMPROVING    Urinary retention  - continue espinal   -voiding trial once encephalopathy resolves      Encephalopathy, metabolic  Secondary  to uremia vs infection   -fall  -aspiration precautions  - HANDY resolved with IVF  - now rising Na so encephalopathy could also be associated with hypernatremia  - changed to 1/sNS. If continued to be hypernatremic, will change to D5W      Pressure injury of buttock, unstageable  q2 turns  -low airloss overlay mattress  -wound care consultation       Lymphedema  -wound care consultation to assist in managmeent      Hyperkalemia  As above   q4hr BMP  -bicarb infusion to help acidosis management.       MS (multiple sclerosis)  ENTER PT/OT consults when patient is more awake alert and can work with therapy services.         VTE Risk Mitigation (From admission, onward)         Ordered     heparin (porcine) injection 7,500 Units  Every 8 hours         05/02/22 0232     IP VTE HIGH RISK PATIENT  Once         05/02/22 0232     Place sequential compression device  Until discontinued         05/02/22 0232     Place sequential compression device  Until discontinued         05/02/22 0031                Discharge Planning   USMAN: 5/9/2022     Code Status: Full Code   Is the patient medically ready for discharge?: No    Reason for patient still in hospital (select all that apply): Patient unstable, Patient trending condition, Laboratory test, Treatment and Consult recommendations  Discharge Plan A: Skilled Nursing Facility          Time spent in care of patient: > 35 minutes           John Hardin MD  Department of Hospital Medicine   [facility]

## 2022-05-04 NOTE — ASSESSMENT & PLAN NOTE
Secondary to uremia vs infection   -fall  -aspiration precautions  - HANDY resolved with IVF  - now rising Na so encephalopathy could also be associated with hypernatremia  - changed to 1/sNS. If continued to be hypernatremic, will change to D5W

## 2022-05-04 NOTE — SUBJECTIVE & OBJECTIVE
Interval History: Patient lying in bed, no acute distress. Family at bedside. Overnight, patient had episode of nausea and NBNB emesis. Patient has waxing and waning of mental status likely due to delirium in the setting of infection and/or uremia. HANDY improving with espinal placed. Nephrology following. Switched to 1/2NS to correct hypernatremia. Resumed home tylenol #3 for chronic leg pain.       Review of Systems   Unable to perform ROS: Mental status change   Objective:     Vital Signs (Most Recent):  Temp: 99.6 °F (37.6 °C) (05/03/22 2000)  Pulse: 75 (05/03/22 2000)  Resp: 18 (05/03/22 2000)  BP: (!) 106/57 (05/03/22 2000)  SpO2: 96 % (05/03/22 2000)   Vital Signs (24h Range):  Temp:  [99.5 °F (37.5 °C)-100.7 °F (38.2 °C)] 99.6 °F (37.6 °C)  Pulse:  [75-89] 75  Resp:  [17-19] 18  SpO2:  [92 %-100 %] 96 %  BP: (106-130)/(53-94) 106/57     Weight: 102.4 kg (225 lb 12 oz)  Body mass index is 41.29 kg/m².    Intake/Output Summary (Last 24 hours) at 5/3/2022 2215  Last data filed at 5/3/2022 1809  Gross per 24 hour   Intake 1509.4 ml   Output 3375 ml   Net -1865.6 ml      Physical Exam  Constitutional:       Appearance: She is well-developed. She is obese. She is ill-appearing.   HENT:      Head: Normocephalic and atraumatic.      Mouth/Throat:      Mouth: Mucous membranes are moist.   Eyes:      General: No scleral icterus.     Pupils: Pupils are equal, round, and reactive to light.   Neck:      Vascular: No JVD.   Cardiovascular:      Rate and Rhythm: Normal rate and regular rhythm.      Heart sounds: No murmur heard.    No friction rub. No gallop.   Pulmonary:      Effort: Pulmonary effort is normal. No respiratory distress.      Breath sounds: Normal breath sounds. No wheezing or rales.   Abdominal:      General: Bowel sounds are normal. There is no distension.      Palpations: Abdomen is soft.      Tenderness: There is no abdominal tenderness. There is no guarding or rebound.   Musculoskeletal:         General:  No deformity. Normal range of motion.      Cervical back: Neck supple.   Lymphadenopathy:      Cervical: No cervical adenopathy.   Skin:     General: Skin is warm and dry.      Capillary Refill: Capillary refill takes less than 2 seconds.      Findings: No erythema or rash.      Comments: Severe chronic venous stasis changes and lymphedema in BLE. TTP   Neurological:      Mental Status: She is alert and oriented to person, place, and time.      Cranial Nerves: No cranial nerve deficit.      Sensory: No sensory deficit.   Psychiatric:         Cognition and Memory: Cognition is impaired. Memory is impaired.       Significant Labs: All pertinent labs within the past 24 hours have been reviewed.    Significant Imaging: I have reviewed all pertinent imaging results/findings within the past 24 hours.

## 2022-05-04 NOTE — MED STUDENT SUBJECTIVE & OBJECTIVE
Medical Student Subjective & Objective     Principal Problem: HANDY (acute kidney injury)    Chief Complaint:   Chief Complaint   Patient presents with    Fall    Urinary Tract Infection     Arrived from home for unwitnessed fall 5 days ago - Denies hitting head or LOC. Per pt's daughter, she has not been ambulatory since fall and has been sitting in urine saturated diaper since fall. Erythema noted to vaginal area w/ strong, foul odor. Wounds noted to ble, oozing from site.       HPI: 72 y/o female with history of Multiple Sclerosis, Left Foot Drop, Lymphedema, Obesity (bmi 43) who was admitted on 22 for acute kidney injury, hyperkalemia and acute encephalopathy. Admission labs notable for Hyperkalemia to 6.3, Cr 3.9 and . Nephrology consulted for present HANDY      1 week ago on Monday patient had a fall, partially assisted by family when legs gave out, no head injury or LOC.  On Thursday it was noted patient unable to stand,  but daughter felt when she came home that pt had not moved.  She was also using adult diaper at this time and since Thursday due to body habitus/size patient has been unable to change her diaper since this time.   She has intermittently been taking her medicines during this time, but daughter noted decreased intake,  sleeping more and making confused statements and sought to bring her to the ED.            Hospital Course: No notes on file    Interval History: Patient alert today, responding to questions, with family at bedside. Creatinine and BUN down trending, kidney function improving. Hypernatremia present.    Past Medical History:   Diagnosis Date    MS (multiple sclerosis)     Vitamin B12 deficiency        Past Surgical History:   Procedure Laterality Date    BREAST CYST EXCISION Left     over 40yrs ago     SECTION         Review of patient's allergies indicates:   Allergen Reactions    Contrast media Shortness Of Breath and Rash    Pcn [penicillins] Shortness Of  Breath and Rash    Celebrex [celecoxib] Other (See Comments)     Swallowing problems     Diazepam Hives    Motrin [ibuprofen] Rash       No current facility-administered medications on file prior to encounter.     Current Outpatient Medications on File Prior to Encounter   Medication Sig    baclofen (LIORESAL) 10 MG tablet Take 1 tablet (10 mg total) by mouth 2 (two) times daily. (Patient taking differently: Take 10 mg by mouth 2 (two) times daily as needed.)    candesartan-hydrochlorothiazide (ATACAND HCT) 16-12.5 mg per tablet Take 1 tablet by mouth Daily.    cyanocobalamin 1,000 mcg/mL injection Inject 1ml daily for 1 week, then weekly for 1 month, then every 2 weeks thereafter. Please provide 23g 1in needle and 1cc syringes    furosemide (LASIX) 20 MG tablet Take 20 mg by mouth daily as needed (leg swelling).    lorazepam (ATIVAN) 1 MG tablet Take 0.5 mg by mouth 3 (three) times daily as needed for Anxiety.    naproxen (NAPROSYN) 500 MG tablet Take 500 mg by mouth 2 (two) times daily with meals.    vitamin B comp with vit C no.6 500-0.5 mg Tab Take 1 tablet by mouth once daily.    vitamin D 1000 units Tab Take 5,000 Units by mouth once daily.     acetaminophen 325 mg Cap Take 325 mg by mouth.    acetaminophen-codeine 300-30mg (TYLENOL #3) 300-30 mg Tab Take 1 tablet by mouth every 6 (six) hours as needed (chronic leg pain).    clotrimazole-betamethasone 1-0.05% (LOTRISONE) cream Apply to affected area 2 times daily    erythromycin base (E-MYCIN) 250 MG Tab PHARMACY ADMINISTERED     Family History     Problem Relation (Age of Onset)    Brain cancer Brother    Coronary artery disease Father    Lung cancer Father, Mother        Tobacco Use    Smoking status: Never Smoker    Smokeless tobacco: Never Used   Substance and Sexual Activity    Alcohol use: No    Drug use: No    Sexual activity: Not on file     Review of Systems   Cardiovascular: Positive for leg swelling.   Skin: Positive for color  change.     Objective:     Vital Signs (Most Recent):  Temp: 98.2 °F (36.8 °C) (05/04/22 1128)  Pulse: 81 (05/04/22 1128)  Resp: 18 (05/04/22 0738)  BP: 135/64 (05/04/22 1128)  SpO2: (!) 91 % (05/04/22 1128) Vital Signs (24h Range):  Temp:  [98.2 °F (36.8 °C)-100.7 °F (38.2 °C)] 98.2 °F (36.8 °C)  Pulse:  [75-82] 81  Resp:  [18] 18  SpO2:  [91 %-96 %] 91 %  BP: (106-135)/(57-66) 135/64     Weight: 101.1 kg (222 lb 14.2 oz)  Body mass index is 40.77 kg/m².    Intake/Output Summary (Last 24 hours) at 5/4/2022 1201  Last data filed at 5/4/2022 0615  Gross per 24 hour   Intake 200 ml   Output 2375 ml   Net -2175 ml      Physical Exam  Constitutional:       Appearance: She is ill-appearing.   HENT:      Head: Normocephalic.      Mouth/Throat:      Mouth: Mucous membranes are dry.   Eyes:      General: No scleral icterus.  Cardiovascular:      Rate and Rhythm: Normal rate and regular rhythm.      Heart sounds: Normal heart sounds.   Pulmonary:      Effort: Pulmonary effort is normal.   Abdominal:      General: There is no distension.      Palpations: Abdomen is soft.      Tenderness: There is no abdominal tenderness.   Musculoskeletal:      Right lower leg: Edema present.      Left lower leg: Edema present.   Skin:     General: Skin is warm.      Comments:  Bilateral lymphedema with hypertrophic skin changes    Neurological:      Mental Status: She is alert.         Significant Labs:   All pertinent labs within the past 24 hours have been reviewed.  BMP:   Recent Labs   Lab 05/04/22  0750 05/04/22  1127   * 133*   * 147*   K 3.8 3.5   * 112*   CO2 27 25   BUN 63* 58*   CREATININE 1.1 1.0   CALCIUM 8.9 9.3   MG 2.0  --      CBC:   Recent Labs   Lab 05/03/22  0735 05/04/22  0750 05/04/22  0942   WBC 5.15 6.26 6.64   HGB 8.8* 7.9* 8.1*   HCT 27.5* 24.9* 26.5*    189 213     CMP:   Recent Labs   Lab 05/03/22  1600 05/04/22  0750 05/04/22  1127   * 150* 147*   K 3.7 3.8 3.5    112* 112*    CO2 31* 27 25   * 127* 133*   BUN 74* 63* 58*   CREATININE 1.1 1.1 1.0   CALCIUM 9.4 8.9 9.3   ANIONGAP 12 11 10   EGFRNONAA 50.3* 50.3* 56.4*       Significant Imaging: I have reviewed all pertinent imaging results/findings within the past 24 hours.    Medical Student Subjective & Objective

## 2022-05-04 NOTE — MEDICAL/APP STUDENT
[facility] - Transplant Stepdown  Progress Note    Patient Name: Lupis Murcia  MRN: [id number]  Patient Class: IP- Inpatient   Admission Date: 5/1/2022  Length of Stay: 3 days  Attending Physician: John Hardin MD  Primary Care Provider: Bobby Tran MD    Subjective:     Principal Problem: HANDY (acute kidney injury)    Chief Complaint:   Chief Complaint   Patient presents with    Fall    Urinary Tract Infection     Arrived from home for unwitnessed fall 5 days ago - Denies hitting head or LOC. Per pt's daughter, she has not been ambulatory since fall and has been sitting in urine saturated diaper since fall. Erythema noted to vaginal area w/ strong, foul odor. Wounds noted to ble, oozing from site.       HPI: 70 y/o female with history of Multiple Sclerosis, Left Foot Drop, Lymphedema, Obesity (bmi 43) who was admitted on 5/1/22 for acute kidney injury, hyperkalemia and acute encephalopathy. Admission labs notable for Hyperkalemia to 6.3, Cr 3.9 and . Nephrology consulted for present HANDY      1 week ago on Monday patient had a fall, partially assisted by family when legs gave out, no head injury or LOC.  On Thursday it was noted patient unable to stand,  but daughter felt when she came home that pt had not moved.  She was also using adult diaper at this time and since Thursday due to body habitus/size patient has been unable to change her diaper since this time.   She has intermittently been taking her medicines during this time, but daughter noted decreased intake,  sleeping more and making confused statements and sought to bring her to the ED.            Hospital Course: No notes on file    Interval History: Patient alert today, responding to questions, with family at bedside. Creatinine and BUN down trending, kidney function improving. Hypernatremia present.    Past Medical History:   Diagnosis Date    MS (multiple sclerosis)     Vitamin B12 deficiency        Past Surgical History:   Procedure  Laterality Date    BREAST CYST EXCISION Left     over 40yrs ago     SECTION         Review of patient's allergies indicates:   Allergen Reactions    Contrast media Shortness Of Breath and Rash    Pcn [penicillins] Shortness Of Breath and Rash    Celebrex [celecoxib] Other (See Comments)     Swallowing problems     Diazepam Hives    Motrin [ibuprofen] Rash       No current facility-administered medications on file prior to encounter.     Current Outpatient Medications on File Prior to Encounter   Medication Sig    baclofen (LIORESAL) 10 MG tablet Take 1 tablet (10 mg total) by mouth 2 (two) times daily. (Patient taking differently: Take 10 mg by mouth 2 (two) times daily as needed.)    candesartan-hydrochlorothiazide (ATACAND HCT) 16-12.5 mg per tablet Take 1 tablet by mouth Daily.    cyanocobalamin 1,000 mcg/mL injection Inject 1ml daily for 1 week, then weekly for 1 month, then every 2 weeks thereafter. Please provide 23g 1in needle and 1cc syringes    furosemide (LASIX) 20 MG tablet Take 20 mg by mouth daily as needed (leg swelling).    lorazepam (ATIVAN) 1 MG tablet Take 0.5 mg by mouth 3 (three) times daily as needed for Anxiety.    naproxen (NAPROSYN) 500 MG tablet Take 500 mg by mouth 2 (two) times daily with meals.    vitamin B comp with vit C no.6 500-0.5 mg Tab Take 1 tablet by mouth once daily.    vitamin D 1000 units Tab Take 5,000 Units by mouth once daily.     acetaminophen 325 mg Cap Take 325 mg by mouth.    acetaminophen-codeine 300-30mg (TYLENOL #3) 300-30 mg Tab Take 1 tablet by mouth every 6 (six) hours as needed (chronic leg pain).    clotrimazole-betamethasone 1-0.05% (LOTRISONE) cream Apply to affected area 2 times daily    erythromycin base (E-MYCIN) 250 MG Tab PHARMACY ADMINISTERED     Family History     Problem Relation (Age of Onset)    Brain cancer Brother    Coronary artery disease Father    Lung cancer Father, Mother        Tobacco Use    Smoking status: Never  Smoker    Smokeless tobacco: Never Used   Substance and Sexual Activity    Alcohol use: No    Drug use: No    Sexual activity: Not on file     Review of Systems   Cardiovascular: Positive for leg swelling.   Skin: Positive for color change.      Objective:     Vital Signs (Most Recent):  Temp: 98.2 °F (36.8 °C) (05/04/22 1128)  Pulse: 81 (05/04/22 1128)  Resp: 18 (05/04/22 0738)  BP: 135/64 (05/04/22 1128)  SpO2: (!) 91 % (05/04/22 1128) Vital Signs (24h Range):  Temp:  [98.2 °F (36.8 °C)-100.7 °F (38.2 °C)] 98.2 °F (36.8 °C)  Pulse:  [75-82] 81  Resp:  [18] 18  SpO2:  [91 %-96 %] 91 %  BP: (106-135)/(57-66) 135/64     Weight: 101.1 kg (222 lb 14.2 oz)  Body mass index is 40.77 kg/m².    Intake/Output Summary (Last 24 hours) at 5/4/2022 1201  Last data filed at 5/4/2022 0615  Gross per 24 hour   Intake 200 ml   Output 2375 ml   Net -2175 ml      Physical Exam  Constitutional:       Appearance: She is ill-appearing.   HENT:      Head: Normocephalic.      Mouth/Throat:      Mouth: Mucous membranes are dry.   Eyes:      General: No scleral icterus.  Cardiovascular:      Rate and Rhythm: Normal rate and regular rhythm.      Heart sounds: Normal heart sounds.   Pulmonary:      Effort: Pulmonary effort is normal.   Abdominal:      General: There is no distension.      Palpations: Abdomen is soft.      Tenderness: There is no abdominal tenderness.   Musculoskeletal:      Right lower leg: Edema present.      Left lower leg: Edema present.   Skin:     General: Skin is warm.      Comments:  Bilateral lymphedema with hypertrophic skin changes    Neurological:      Mental Status: She is alert.         Significant Labs:   All pertinent labs within the past 24 hours have been reviewed.  BMP:   Recent Labs   Lab 05/04/22  0750 05/04/22  1127   * 133*   * 147*   K 3.8 3.5   * 112*   CO2 27 25   BUN 63* 58*   CREATININE 1.1 1.0   CALCIUM 8.9 9.3   MG 2.0  --      CBC:   Recent Labs   Lab 05/03/22  0730  05/04/22  0750 05/04/22  0942   WBC 5.15 6.26 6.64   HGB 8.8* 7.9* 8.1*   HCT 27.5* 24.9* 26.5*    189 213     CMP:   Recent Labs   Lab 05/03/22  1600 05/04/22  0750 05/04/22  1127   * 150* 147*   K 3.7 3.8 3.5    112* 112*   CO2 31* 27 25   * 127* 133*   BUN 74* 63* 58*   CREATININE 1.1 1.1 1.0   CALCIUM 9.4 8.9 9.3   ANIONGAP 12 11 10   EGFRNONAA 50.3* 50.3* 56.4*       Significant Imaging: I have reviewed all pertinent imaging results/findings within the past 24 hours.        Assessment/Plan:      70 y/o female with history of Multiple Sclerosis, Left Foot Drop, Lymphedema, Obesity (bmi 43) who was admitted on 5/1/22 for acute kidney injury, hyperkalemia and acute encephalopathy. Labs notable for Hyperkalemia to 6.3, Cr 3.9 (Baseline ~ 0.7) and . Nephrology consulted for present HANDY      Baseline sCr of  0.7, admission Cr at 3.9 > 2.1 > 1.3 currently Cr at 1.1    - UA shows protein negative, 3 WBC, 2 RBC  - UPCR  0.18  - CT abdomen pelvis showed very distended bladder. Patient likely has urinary retention and overflow incontinence 2/2 MS, this is likely responsible for post-renal HANDY    - Volume status: Euvolemic to sightly volume down   - Electrolytes: , K  3.8  - Acid/Base:Co2 at 27   - Water deficit: 3.6 lt    Plan:  · No need for RRT at this time.  · Creatinine improving  · Potassium within normal ranges  · BUN decreasing  · Slaughter placed by urology  · Daily BMP  · Monitor electrolytes closely.  · Avoid nephrotoxic medications, NSAIDs, IV contrast, etc.  · Medication doses adjusted to GFR  · Maintain MAP > 65  · IV fluids, 5% dextrose continuous infusion for Hypernatremia   · Strict intake/output        Active Diagnoses:    Diagnosis Date Noted POA    PRINCIPAL PROBLEM:  HANDY (acute kidney injury) [N17.9] 05/01/2022 Yes    Urinary retention [R33.9] 05/02/2022 Yes    Hyperkalemia [E87.5] 05/01/2022 Yes    Lymphedema [I89.0] 05/01/2022 Yes    Pressure injury of buttock,  unstageable [L89.300] 05/01/2022 Yes    Encephalopathy, metabolic [G93.41] 05/01/2022 Yes    MS (multiple sclerosis) [G35] 06/09/2019 Yes      Problems Resolved During this Admission:     VTE Risk Mitigation (From admission, onward)         Ordered     heparin (porcine) injection 7,500 Units  Every 8 hours         05/02/22 0232     IP VTE HIGH RISK PATIENT  Once         05/02/22 0232     Place sequential compression device  Until discontinued         05/02/22 0232     Place sequential compression device  Until discontinued         05/02/22 0031                   [facility] - Transplant Stepdown

## 2022-05-04 NOTE — ASSESSMENT & PLAN NOTE
71 year old female with PMH of MS, left foot drop, bilateral lower extremity lymphedema, and obesity (BMI 41) [place] [facility] [place] and weakness. On admit was found to have an HANDY, hyperkalemia, and urinary retention which primary team and urology are managing. CT head negative for acute findings. CT abd/pelvis with no obstructive findings.     ID consulted for fevers. Pt has been on Vancomycin and Ceftriaxone, and was recently broadened to Vancomycin and Cefepime due to fevers.     She was noted to have a sacral wound and bilateral leg lymphedema and possible superimposed cellulitis on exam which may be the cause for her fevers. Blood cultures NGTD and other her infectious workup has been unrevealing thus far.     Recommendations:  - Continue Vancomycin while inpatient. Pharmacy to manage dosing with trough level of 15-20.   - Discontinue Cefepime.   - Upon discharge, transition to doxycycline 100 mg PO BID, for a total abx duration of 10 days.   - Continue Miconazole powder.   - Consult wound care for wound mgmt.   - ID will sign off. Pt seen and reviewed with ID staff, Dr. Pedraza.

## 2022-05-04 NOTE — SUBJECTIVE & OBJECTIVE
Past Medical History:   Diagnosis Date    MS (multiple sclerosis)     Vitamin B12 deficiency        Past Surgical History:   Procedure Laterality Date    BREAST CYST EXCISION Left     over 40yrs ago     SECTION         Review of patient's allergies indicates:   Allergen Reactions    Contrast media Shortness Of Breath and Rash    Pcn [penicillins] Shortness Of Breath and Rash    Celebrex [celecoxib] Other (See Comments)     Swallowing problems     Diazepam Hives    Motrin [ibuprofen] Rash       Medications:  Medications Prior to Admission   Medication Sig    baclofen (LIORESAL) 10 MG tablet Take 1 tablet (10 mg total) by mouth 2 (two) times daily. (Patient taking differently: Take 10 mg by mouth 2 (two) times daily as needed.)    candesartan-hydrochlorothiazide (ATACAND HCT) 16-12.5 mg per tablet Take 1 tablet by mouth Daily.    cyanocobalamin 1,000 mcg/mL injection Inject 1ml daily for 1 week, then weekly for 1 month, then every 2 weeks thereafter. Please provide 23g 1in needle and 1cc syringes    furosemide (LASIX) 20 MG tablet Take 20 mg by mouth daily as needed (leg swelling).    lorazepam (ATIVAN) 1 MG tablet Take 0.5 mg by mouth 3 (three) times daily as needed for Anxiety.    naproxen (NAPROSYN) 500 MG tablet Take 500 mg by mouth 2 (two) times daily with meals.    vitamin B comp with vit C no.6 500-0.5 mg Tab Take 1 tablet by mouth once daily.    vitamin D 1000 units Tab Take 5,000 Units by mouth once daily.     acetaminophen 325 mg Cap Take 325 mg by mouth.    acetaminophen-codeine 300-30mg (TYLENOL #3) 300-30 mg Tab Take 1 tablet by mouth every 6 (six) hours as needed (chronic leg pain).    clotrimazole-betamethasone 1-0.05% (LOTRISONE) cream Apply to affected area 2 times daily    erythromycin base (E-MYCIN) 250 MG Tab PHARMACY ADMINISTERED     Antibiotics (From admission, onward)                Start     Stop Route Frequency Ordered    22 1600  vancomycin 1.5 g in dextrose 5 % 250 mL IVPB  "(ready to mix)         -- IV Once 05/04/22 1507    05/04/22 1015  mupirocin 2 % ointment         05/09 0859 Nasl 2 times daily 05/04/22 0903    05/02/22 0502  vancomycin - pharmacy to dose  (vancomycin IVPB)        "And" Linked Group Details    -- IV pharmacy to manage frequency 05/02/22 0402          Antifungals (From admission, onward)                Start     Stop Route Frequency Ordered    05/01/22 1800  miconazole 2 % cream         -- Top 2 times daily 05/01/22 1744          Antivirals (From admission, onward)      None             Immunization History   Administered Date(s) Administered    COVID-19, vector-nr, rS-Ad26, PF ([employer]) 03/31/2021    PPD Test 03/31/2021       Family History       Problem Relation (Age of Onset)    Brain cancer Brother    Coronary artery disease Father    Lung cancer Father, Mother          Social History     Socioeconomic History    Marital status:    Tobacco Use    Smoking status: Never Smoker    Smokeless tobacco: Never Used   Substance and Sexual Activity    Alcohol use: No    Drug use: No     Review of Systems   Unable to perform ROS: Other (limited as pt not cooperative)   Constitutional:  Positive for activity change and appetite change.   HENT:  Negative for mouth sores, sinus pressure, sinus pain, sneezing and sore throat.    Respiratory:  Negative for cough and shortness of breath.    Cardiovascular:  Negative for chest pain and palpitations.   Gastrointestinal:  Positive for diarrhea, nausea and vomiting. Negative for abdominal pain.   Genitourinary:  Negative for dysuria and hematuria.   Musculoskeletal:  Negative for myalgias, neck pain and neck stiffness.   Skin:  Positive for rash (lower legs) and wound (sacrum).   Neurological:  Positive for weakness. Negative for dizziness and headaches.   Psychiatric/Behavioral:  Positive for agitation, confusion and decreased concentration.    Objective:     Vital Signs (Most Recent):  Temp: 98.2 °F (36.8 °C) (05/04/22 " 1128)  Pulse: 81 (05/04/22 1128)  Resp: 18 (05/04/22 0738)  BP: 135/64 (05/04/22 1128)  SpO2: (!) 91 % (05/04/22 1128)   Vital Signs (24h Range):  Temp:  [98.2 °F (36.8 °C)-100.7 °F (38.2 °C)] 98.2 °F (36.8 °C)  Pulse:  [75-82] 81  Resp:  [18] 18  SpO2:  [91 %-96 %] 91 %  BP: (106-135)/(57-66) 135/64     Weight: 101.1 kg (222 lb 14.2 oz)  Body mass index is 40.77 kg/m².    Estimated Creatinine Clearance: 56.6 mL/min (based on SCr of 1 mg/dL).    Physical Exam  Vitals and nursing note reviewed.   Constitutional:       General: She is not in acute distress.     Appearance: Normal appearance. She is obese. She is not ill-appearing, toxic-appearing or diaphoretic.   HENT:      Head: Normocephalic.      Nose: Nose normal.      Mouth/Throat:      Comments: Pt refused assessment    Eyes:      Comments: Pt refused to open eyes during assessment      Cardiovascular:      Rate and Rhythm: Normal rate and regular rhythm.      Pulses: Normal pulses.      Heart sounds: Normal heart sounds. No murmur heard.    No friction rub. No gallop.   Pulmonary:      Effort: Pulmonary effort is normal. No respiratory distress.      Breath sounds: Normal breath sounds. No stridor. No wheezing, rhonchi or rales.   Chest:      Chest wall: No tenderness.   Abdominal:      General: Bowel sounds are normal. There is no distension.      Palpations: Abdomen is soft. There is no mass.      Tenderness: There is no abdominal tenderness. There is no right CVA tenderness, left CVA tenderness, guarding or rebound.      Hernia: No hernia is present.   Genitourinary:     Comments: Slaughter catheter   Musculoskeletal:         General: Swelling present.      Cervical back: Normal range of motion.      Right lower leg: Edema present.      Left lower leg: Edema present.      Comments: Bilateral lymphedema with ulceration and hypertrophic skin changes   Skin:     General: Skin is warm.      Coloration: Skin is not jaundiced or pale.      Findings: Erythema present.  Rash: weeping lymphedema.     Comments: Sacral wound     Neurological:      Mental Status: She is disoriented.      Motor: Weakness present.      Gait: Gait abnormal.   Psychiatric:      Comments: Agitated and non cooperative throughout assessment       Significant Labs:   Recent Lab Results         05/04/22  1127   05/04/22  0942   05/04/22  0750   05/03/22  1600        Procalcitonin   0.10  Comment: A concentration < 0.25 ng/mL represents a low risk of bacterial   infection.  Procalcitonin may not be accurate among patients with localized   infection, recent trauma or major surgery, immunosuppressed state,   invasive fungal infection, renal dysfunction. Decisions regarding   initiation or continuation of antibiotic therapy should not be based   solely on procalcitonin levels.             Anion Gap 10     11   12       Baso #     0.01         Basophil %     0.2         BUN 58     63   74       Calcium 9.3     8.9   9.4       Chloride 112     112   106       CO2 25     27   31       Creatinine 1.0     1.1   1.1       Differential Method     Automated         eGFR if  >60.0     58.0   58.0       eGFR if non  56.4  Comment: Calculation used to obtain the estimated glomerular filtration  rate (eGFR) is the CKD-EPI equation.        50.3  Comment: Calculation used to obtain the estimated glomerular filtration  rate (eGFR) is the CKD-EPI equation.      50.3  Comment: Calculation used to obtain the estimated glomerular filtration  rate (eGFR) is the CKD-EPI equation.          Eos #     0.0         Eosinophil %     0.5         Glucose 133     127   136       Gran # (ANC)     4.4         Gran %     70.4         Hematocrit   26.5   24.9         Hemoglobin   8.1   7.9         Immature Grans (Abs)     0.08  Comment: Mild elevation in immature granulocytes is non specific and   can be seen in a variety of conditions including stress response,   acute inflammation, trauma and pregnancy. Correlation  with other   laboratory and clinical findings is essential.           Immature Granulocytes     1.3         Lymph #     1.1         Lymph %     18.2         Magnesium     2.0         MCH   29.1   30.4         MCHC   30.6   31.7         MCV   95   96         Mono #     0.6         Mono %     9.4         MPV   10.3   11.6         nRBC     0         Phosphorus     2.5         Platelet Estimate     Appears normal         Platelets   213   189  Comment: Platelets are clumped on smear.Platelet count may be affected.         Potassium 3.5     3.8   3.7       RBC   2.78   2.60         RDW   14.6   14.8         Sodium 147     150   149       Vancomycin, Random 13.5             WBC   6.64   6.26                 Significant Imaging: I have reviewed all pertinent imaging results/findings within the past 24 hours.

## 2022-05-04 NOTE — NURSING
- Pt disoriented to time, situation, and place during the night. Redirection not effective.   - Pt becoming notably frustrated and combative when bothered. All interventions, medications explained to pt and family. Pt continues to refuse to turn in bed.  - VSS, monitored on bedside monitor. T max 99.9 during the night.   - Cefepime q8h intiated. Due to time of receiving medication via tube system, pharmacy to reschedule administration times.   - Continuous 0.45% NaCl at 125 cc/hr  - PRN zofran administered x1   - Slaughter catheter present, intact, patent. Draining yellow urine. Refer to flowsheets for output.   - Bed in lowest locked position, bed alarm set, family to remain at bedside.

## 2022-05-04 NOTE — ASSESSMENT & PLAN NOTE
Patient with acute kidney injury likely d/t IVVD/Dehydration and Pre-renal azotemia Which is currently improving. Labs reviewed- Renal function/electrolytes with Estimated Creatinine Clearance: 51.8 mL/min (based on SCr of 1.1 mg/dL). according to latest data. Monitor urine output and serial BMP and adjust therapy as needed. Avoid nephrotoxins and renally dose meds for GFR listed above.   -patient with non-oliguric lucia that is likely pre-renal in etiology, but higher severity given hyperkalemia, metabolic acidosis and uremic encephalopathy.   -obtain urine electrolytes,   -trend BMP   - Give IV Bicarb Drip x 1 L to assist with volume and acidosis management.   -Nursing made multiple attempts at Slaughter placement but pt vular swelling unable to pass, pt urinating connected to purewick.   MOnitor bladder scans if UOP falls as her bladder appears distended on CT scan but no hydronephrosis present.   -hold home anti-hypertensives (ARB/Thiazide) hold any NSAIDs, pt was taking both in last week.   -nephrology consultation. apprec recs  - switched LR infusion to 1/2NS  - IMPROVING

## 2022-05-04 NOTE — PT/OT/SLP PROGRESS
Speech Language Pathology Treatment    Patient Name:  Lupis Murcia   MRN:  [id number]  Admitting Diagnosis: HANDY (acute kidney injury)    Recommendations:       General Recommendations:  Dysphagia therapy  Diet recommendations:  Mechanical soft, Thin   Aspiration Precautions:  · Assistance with meals   · Check for pocketing/oral residue  · Eliminate distractions  · Feed only when awake/alert  · Meds whole 1 at a time  · Monitor for s/s of aspiration   · Strict aspiration precautions   General Precautions: Standard, aspiration, fall  Communication strategies:  none    Subjective     Pt found sleeping in bed with daughter at bedside upon SLP entry into room.     Patient goals: none stated     Pain/Comfort:  · Pain Rating 1: 0/10    Respiratory Status: Room air    Objective:     Has the patient been evaluated by SLP for swallowing?   Yes  Keep patient NPO? No   Current Respiratory Status:        Pt seen for ongoing assessment of swallow function. Daughter at bedside reported pt had a busy morning and recently fell asleep. She stated pt attempted trials of thin liquids including water and [employer] without evidence of difficulty. Pt minimally interested in foods of any textures despite family encouragement. Pt's daughter tearful regarding mother's condition and emotional support was provided. Education including ongoing assessment, continued encouragement of PO intake as able, risk for development of aspiration-related complications, safe swallow strategies, and POC reviewed. Pt's daughter verbalized understanding and had no additional questions or concerns.     Assessment:     Lupis Murcia is a 71 y.o. female who presents with decreased levels of alertness impacting ongoing PO intake. SLP to continue to follow.     Goals:   Multidisciplinary Problems     SLP Goals        Problem: SLP    Goal Priority Disciplines Outcome   SLP Goal     SLP Ongoing, Progressing   Description: Speech Pathology Goals  To be met by  5/26/22      1. Pt will participate in ongoing diagnostic dysphagia therapy                            Plan:     · Patient to be seen:  3 x/week   · Plan of Care expires:  06/01/22  · Plan of Care reviewed with:  daughter   · SLP Follow-Up:  Yes       Discharge recommendations:   (TBD)   Barriers to Discharge:  Level of Skilled Assistance Needed     Time Tracking:     SLP Treatment Date:   05/04/22  Speech Start Time:  1318  Speech Stop Time:  1328     Speech Total Time (min):  10 min    Billable Minutes: Self Care/Home Management Training 10       05/04/2022

## 2022-05-04 NOTE — HPI
Ms. Murcia is a 71 year old female with PMH of MS, left foot drop, bilateral lower extremity lymphedema, and obesity (BMI 41) who presented to [facility] ED with increased confusion, increased fatigue, decreased ADLs, and decreased appetite. Per chart review, pt's daughter witnessed a fall on April 25th in the bathroom after a period of weakness. Pt's daughter was able to catch her fall, but pt did sustain injury to her left shoulder after it fell onto her walker. No head injury or LOC noted. On April 28th, daughter reported the pt was unable to stand or ambulate. The pt uses adult diapers, and due to her size, daughter was unable to change her diaper since April 28th. Pt's daughter stated that her confusion, fatigue, decreased ADLs and decreased appetite started on April 28th.     Upon admission to ED, pt was found to be saturated in foul smelling urine with vulvar swelling and intertrigo, buttocks pressure injury, and bilateral lower extremity lymphedema with weeping wounds. Pt was found to have urinary retention, managed with espinal catheter. Pt labs were notable for an HANDY, BUN/Cr of 169/3.9 and hyperkalemia at 6.3. CPK elevated at 562. no leukocytosis. Negative UA. Afebrile. Chest Xray with no effusion or consolidation. Left shoulder xray with no acute displaced fracture or dislocation. CT head WO with no evidence of acute intracranial abnormality but was significant for patchy and confluent periventricular white matter hypoattenuation suggestive of known multiple sclerosis. CT abd/pelvis with no evidence of nephrolithiasis or obstructive uropathy, but was suggestive of heptomegaly and hepatic steatosis. Lower extremity vascular studies not evident of DVT, bilaterally.     Pt remains free of leukocytosis. HANDY improving- BUN/Cr 63/1.1. Hyperkalemia normalized at 3.8. Hypernatremia worsening at 150. Initial and repeat blood cultures are NGTD in both sets. Pt was originally on ceftriaxone but switched to cefepime  following fevers (tmax 100.7).     At bedside, pt remains confused, but family friend states the confusion has decreased. History limited as pt was only partially corporative during exam. Stated that she wanted to go home. Family friend stated that the pt's temperament is usually pleasant and coorporative, but her confusion and irritability has improved since earlier this week. Family friend at bedside stated pt had nausea and vomiting the day prior and that she has had a decreased appetite. Stated that she has been incontinent of her bowels and has been having frequent diarrhea. She was unsure of how long the pt has had her sacral wound, but states she is frequently immobile. Family friend stated she is usually able to ambulate with a walker and use the bathroom on her prior to last week.     ID was consulted for persistent fevers and negative infectious workup thus far. Pt is currently on cefepime and vancomycin.

## 2022-05-04 NOTE — CONSULTS
[facility] - Transplant Stepdown  Infectious Disease  Consult Note    Patient Name: Lupis Murcia  MRN: [id number]  Admission Date: 5/1/2022  Hospital Length of Stay: 3 days  Attending Physician: John Hardin MD  Primary Care Provider: Bobby Tran MD     Isolation Status: No active isolations    Patient information was obtained from patient, past medical records and ER records.      Inpatient consult to Infectious Diseases  Consult performed by: Ewelina Briceno NP  Consult ordered by: John Hardin MD        Assessment/Plan:     Cellulitis of leg  71 year old female with PMH of MS, left foot drop, bilateral lower extremity lymphedema, and obesity (BMI 41) who presented to [facility] ED with AMS and weakness. On admit was found to have an HANDY, hyperkalemia, and urinary retention which primary team and urology are managing. CT head negative for acute findings. CT abd/pelvis with no obstructive findings.     ID consulted for fevers. Pt has been on Vancomycin and Ceftriaxone, and was recently broadened to Vancomycin and Cefepime due to fevers.     She was noted to have a sacral wound and bilateral leg lymphedema and possible superimposed cellulitis on exam which may be the cause for her fevers. Blood cultures NGTD and other her infectious workup has been unrevealing thus far.     Recommendations:  - Continue Vancomycin while inpatient. Pharmacy to manage dosing with trough level of 15-20.   - Discontinue Cefepime.   - Upon discharge, transition to doxycycline 100 mg PO BID, for a total abx duration of 10 days.   - Continue Miconazole powder.   - Consult wound care for wound mgmt.   - ID will sign off. Pt seen and reviewed with ID staff, Dr. Pedraza.       Pressure injury of buttock, unstageable  See A/P above.         Thank you for your consult. I will sign off. Please contact us if you have any additional questions.    Ewelina Briceno NP  Infectious Disease  [facility] - Transplant Stepdown    Subjective:      Principal Problem: HANDY (acute kidney injury)    HPI: Ms. Murcia is a 71 year old female with PMH of MS, left foot drop, bilateral lower extremity lymphedema, and obesity (BMI 41) who presented to [facility] ED with increased confusion, increased fatigue, decreased ADLs, and decreased appetite. Per chart review, pt's daughter witnessed a fall on April 25th in the bathroom after a period of weakness. Pt's daughter was able to catch her fall, but pt did sustain injury to her left shoulder after it fell onto her walker. No head injury or LOC noted. On April 28th, daughter reported the pt was unable to stand or ambulate. The pt uses adult diapers, and due to her size, daughter was unable to change her diaper since April 28th. Pt's daughter stated that her confusion, fatigue, decreased ADLs and decreased appetite started on April 28th.     Upon admission to ED, pt was found to be saturated in foul smelling urine with vulvar swelling and intertrigo, buttocks pressure injury, and bilateral lower extremity lymphedema with weeping wounds. Pt was found to have urinary retention, managed with espinal catheter. Pt labs were notable for an HANDY, BUN/Cr of 169/3.9 and hyperkalemia at 6.3. CPK elevated at 562. no leukocytosis. Negative UA. Afebrile. Chest Xray with no effusion or consolidation. Left shoulder xray with no acute displaced fracture or dislocation. CT head WO with no evidence of acute intracranial abnormality but was significant for patchy and confluent periventricular white matter hypoattenuation suggestive of known multiple sclerosis. CT abd/pelvis with no evidence of nephrolithiasis or obstructive uropathy, but was suggestive of heptomegaly and hepatic steatosis. Lower extremity vascular studies not evident of DVT, bilaterally.     Pt remains free of leukocytosis. HANDY improving- BUN/Cr 63/1.1. Hyperkalemia normalized at 3.8. Hypernatremia worsening at 150. Initial and repeat blood cultures are NGTD in both sets. Pt was  originally on ceftriaxone but switched to cefepime following fevers (tmax 100.7).     At bedside, pt remains confused, but family friend states the confusion has decreased. History limited as pt was only partially corporative during exam. Stated that she wanted to go home. Family friend stated that the pt's temperament is usually pleasant and coorporative, but her confusion and irritability has improved since earlier this week. Family friend at bedside stated pt had nausea and vomiting the day prior and that she has had a decreased appetite. Stated that she has been incontinent of her bowels and has been having frequent diarrhea. She was unsure of how long the pt has had her sacral wound, but states she is frequently immobile. Family friend stated she is usually able to ambulate with a walker and use the bathroom on her prior to last week.     ID was consulted for persistent fevers and negative infectious workup thus far. Pt is currently on cefepime and vancomycin.                           Past Medical History:   Diagnosis Date    MS (multiple sclerosis)     Vitamin B12 deficiency        Past Surgical History:   Procedure Laterality Date    BREAST CYST EXCISION Left     over 40yrs ago     SECTION         Review of patient's allergies indicates:   Allergen Reactions    Contrast media Shortness Of Breath and Rash    Pcn [penicillins] Shortness Of Breath and Rash    Celebrex [celecoxib] Other (See Comments)     Swallowing problems     Diazepam Hives    Motrin [ibuprofen] Rash       Medications:  Medications Prior to Admission   Medication Sig    baclofen (LIORESAL) 10 MG tablet Take 1 tablet (10 mg total) by mouth 2 (two) times daily. (Patient taking differently: Take 10 mg by mouth 2 (two) times daily as needed.)    candesartan-hydrochlorothiazide (ATACAND HCT) 16-12.5 mg per tablet Take 1 tablet by mouth Daily.    cyanocobalamin 1,000 mcg/mL injection Inject 1ml daily for 1 week, then weekly for  "1 month, then every 2 weeks thereafter. Please provide 23g 1in needle and 1cc syringes    furosemide (LASIX) 20 MG tablet Take 20 mg by mouth daily as needed (leg swelling).    lorazepam (ATIVAN) 1 MG tablet Take 0.5 mg by mouth 3 (three) times daily as needed for Anxiety.    naproxen (NAPROSYN) 500 MG tablet Take 500 mg by mouth 2 (two) times daily with meals.    vitamin B comp with vit C no.6 500-0.5 mg Tab Take 1 tablet by mouth once daily.    vitamin D 1000 units Tab Take 5,000 Units by mouth once daily.     acetaminophen 325 mg Cap Take 325 mg by mouth.    acetaminophen-codeine 300-30mg (TYLENOL #3) 300-30 mg Tab Take 1 tablet by mouth every 6 (six) hours as needed (chronic leg pain).    clotrimazole-betamethasone 1-0.05% (LOTRISONE) cream Apply to affected area 2 times daily    erythromycin base (E-MYCIN) 250 MG Tab PHARMACY ADMINISTERED     Antibiotics (From admission, onward)                Start     Stop Route Frequency Ordered    05/04/22 1600  vancomycin 1.5 g in dextrose 5 % 250 mL IVPB (ready to mix)         -- IV Once 05/04/22 1507    05/04/22 1015  mupirocin 2 % ointment         05/09 0859 Nasl 2 times daily 05/04/22 0903    05/02/22 0502  vancomycin - pharmacy to dose  (vancomycin IVPB)        "And" Linked Group Details    -- IV pharmacy to manage frequency 05/02/22 0402          Antifungals (From admission, onward)                Start     Stop Route Frequency Ordered    05/01/22 1800  miconazole 2 % cream         -- Top 2 times daily 05/01/22 1744          Antivirals (From admission, onward)      None             Immunization History   Administered Date(s) Administered    COVID-19, vector-nr, rS-Ad26, PF ([facility] [place]) 03/31/2021    PPD Test 03/31/2021       Family History       Problem Relation (Age of Onset)    Brain cancer Brother    Coronary artery disease Father    Lung cancer Father, Mother          Social History     Socioeconomic History    Marital status:    Tobacco Use    " Smoking status: Never Smoker    Smokeless tobacco: Never Used   Substance and Sexual Activity    Alcohol use: No    Drug use: No     Review of Systems   Unable to perform ROS: Other (limited as pt not cooperative)   Constitutional:  Positive for activity change and appetite change.   HENT:  Negative for mouth sores, sinus pressure, sinus pain, sneezing and sore throat.    Respiratory:  Negative for cough and shortness of breath.    Cardiovascular:  Negative for chest pain and palpitations.   Gastrointestinal:  Positive for diarrhea, nausea and vomiting. Negative for abdominal pain.   Genitourinary:  Negative for dysuria and hematuria.   Musculoskeletal:  Negative for myalgias, neck pain and neck stiffness.   Skin:  Positive for rash (lower legs) and wound (sacrum).   Neurological:  Positive for weakness. Negative for dizziness and headaches.   Psychiatric/Behavioral:  Positive for agitation, confusion and decreased concentration.    Objective:     Vital Signs (Most Recent):  Temp: 98.2 °F (36.8 °C) (05/04/22 1128)  Pulse: 81 (05/04/22 1128)  Resp: 18 (05/04/22 0738)  BP: 135/64 (05/04/22 1128)  SpO2: (!) 91 % (05/04/22 1128)   Vital Signs (24h Range):  Temp:  [98.2 °F (36.8 °C)-100.7 °F (38.2 °C)] 98.2 °F (36.8 °C)  Pulse:  [75-82] 81  Resp:  [18] 18  SpO2:  [91 %-96 %] 91 %  BP: (106-135)/(57-66) 135/64     Weight: 101.1 kg (222 lb 14.2 oz)  Body mass index is 40.77 kg/m².    Estimated Creatinine Clearance: 56.6 mL/min (based on SCr of 1 mg/dL).    Physical Exam  Vitals and nursing note reviewed.   Constitutional:       General: She is not in acute distress.     Appearance: Normal appearance. She is obese. She is not ill-appearing, toxic-appearing or diaphoretic.   HENT:      Head: Normocephalic.      Nose: Nose normal.      Mouth/Throat:      Comments: Pt refused assessment    Eyes:      Comments: Pt refused to open eyes during assessment      Cardiovascular:      Rate and Rhythm: Normal rate and regular  rhythm.      Pulses: Normal pulses.      Heart sounds: Normal heart sounds. No murmur heard.    No friction rub. No gallop.   Pulmonary:      Effort: Pulmonary effort is normal. No respiratory distress.      Breath sounds: Normal breath sounds. No stridor. No wheezing, rhonchi or rales.   Chest:      Chest wall: No tenderness.   Abdominal:      General: Bowel sounds are normal. There is no distension.      Palpations: Abdomen is soft. There is no mass.      Tenderness: There is no abdominal tenderness. There is no right CVA tenderness, left CVA tenderness, guarding or rebound.      Hernia: No hernia is present.   Genitourinary:     Comments: Slaughter catheter   Musculoskeletal:         General: Swelling present.      Cervical back: Normal range of motion.      Right lower leg: Edema present.      Left lower leg: Edema present.      Comments: Bilateral lymphedema with ulceration and hypertrophic skin changes   Skin:     General: Skin is warm.      Coloration: Skin is not jaundiced or pale.      Findings: Erythema present. Rash: weeping lymphedema.     Comments: Sacral wound     Neurological:      Mental Status: She is disoriented.      Motor: Weakness present.      Gait: Gait abnormal.   Psychiatric:      Comments: Agitated and non cooperative throughout assessment       Significant Labs:   Recent Lab Results         05/04/22  1127   05/04/22  0942   05/04/22  0750   05/03/22  1600        Procalcitonin   0.10  Comment: A concentration < 0.25 ng/mL represents a low risk of bacterial   infection.  Procalcitonin may not be accurate among patients with localized   infection, recent trauma or major surgery, immunosuppressed state,   invasive fungal infection, renal dysfunction. Decisions regarding   initiation or continuation of antibiotic therapy should not be based   solely on procalcitonin levels.             Anion Gap 10     11   12       Baso #     0.01         Basophil %     0.2         BUN 58     63   74       Calcium  9.3     8.9   9.4       Chloride 112     112   106       CO2 25     27   31       Creatinine 1.0     1.1   1.1       Differential Method     Automated         eGFR if  >60.0     58.0   58.0       eGFR if non  56.4  Comment: Calculation used to obtain the estimated glomerular filtration  rate (eGFR) is the CKD-EPI equation.        50.3  Comment: Calculation used to obtain the estimated glomerular filtration  rate (eGFR) is the CKD-EPI equation.      50.3  Comment: Calculation used to obtain the estimated glomerular filtration  rate (eGFR) is the CKD-EPI equation.          Eos #     0.0         Eosinophil %     0.5         Glucose 133     127   136       Gran # (ANC)     4.4         Gran %     70.4         Hematocrit   26.5   24.9         Hemoglobin   8.1   7.9         Immature Grans (Abs)     0.08  Comment: Mild elevation in immature granulocytes is non specific and   can be seen in a variety of conditions including stress response,   acute inflammation, trauma and pregnancy. Correlation with other   laboratory and clinical findings is essential.           Immature Granulocytes     1.3         Lymph #     1.1         Lymph %     18.2         Magnesium     2.0         MCH   29.1   30.4         MCHC   30.6   31.7         MCV   95   96         Mono #     0.6         Mono %     9.4         MPV   10.3   11.6         nRBC     0         Phosphorus     2.5         Platelet Estimate     Appears normal         Platelets   213   189  Comment: Platelets are clumped on smear.Platelet count may be affected.         Potassium 3.5     3.8   3.7       RBC   2.78   2.60         RDW   14.6   14.8         Sodium 147     150   149       Vancomycin, Random 13.5             WBC   6.64   6.26                 Significant Imaging: I have reviewed all pertinent imaging results/findings within the past 24 hours.

## 2022-05-04 NOTE — PLAN OF CARE
Problem: Infection  Goal: Absence of Infection Signs and Symptoms  Outcome: Ongoing, Progressing     Problem: Adult Inpatient Plan of Care  Goal: Plan of Care Review  Outcome: Ongoing, Progressing  Goal: Patient-Specific Goal (Individualized)  Outcome: Ongoing, Progressing  Goal: Absence of Hospital-Acquired Illness or Injury  Outcome: Ongoing, Progressing  Goal: Optimal Comfort and Wellbeing  Outcome: Ongoing, Progressing  Goal: Readiness for Transition of Care  Outcome: Ongoing, Progressing     Problem: Bariatric Environmental Safety  Goal: Safety Maintained with Care  Outcome: Ongoing, Progressing     Problem: Fluid and Electrolyte Imbalance (Acute Kidney Injury/Impairment)  Goal: Fluid and Electrolyte Balance  Outcome: Ongoing, Progressing     Problem: Oral Intake Inadequate (Acute Kidney Injury/Impairment)  Goal: Optimal Nutrition Intake  Outcome: Ongoing, Progressing     Problem: Renal Function Impairment (Acute Kidney Injury/Impairment)  Goal: Effective Renal Function  Outcome: Ongoing, Progressing     Problem: Impaired Wound Healing  Goal: Optimal Wound Healing  Outcome: Ongoing, Progressing     Problem: Skin Injury Risk Increased  Goal: Skin Health and Integrity  Outcome: Ongoing, Progressing     Problem: Fall Injury Risk  Goal: Absence of Fall and Fall-Related Injury  Outcome: Ongoing, Progressing     Problem: Restraint, Nonbehavioral (Nonviolent)  Goal: Absence of Harm or Injury  Outcome: Ongoing, Progressing     Problem: Confusion Acute  Goal: Optimal Cognitive Function  Outcome: Ongoing, Progressing   Patient AAOx2. VSS on RA. US BLE complete. Patient had 3 loose BM and MD aware. Slaughter cath intact draining yellow urine. BLE applied RX lotion and pad underneath for the weeping. Applied cream to groin areas as directed. POC and safety checks reviewed with patient and daughter at bedside. Tylenol 3 given x1 PRN for HA and leg pain. Zofran x1 PRN given for nausea. WCTM.

## 2022-05-04 NOTE — NURSING
Pt notably frustrated, disoriented to time/situation/place and unable to redirect. Refusing to turn and refusing to have wound care provided. Daughter at bedside offering emotional support to pt at this time.

## 2022-05-04 NOTE — PROGRESS NOTES
Pharmacokinetic Assessment Follow Up: IV Vancomycin    Vancomycin serum concentration assessment(s):    The random level was drawn correctly and can be used to guide therapy at this time. The measurement is within the desired definitive target range of 10 to 15 mcg/mL.    Vancomycin Regimen Plan:     Patient with HANDY ( Scr= 3.9) on admit. Baseline = 0.7, Current Scr= 1.0 improved   Trough was drawn ~ 18 hrs from the last dose.   Will continue Pulse dosing today and give Vancomycin 1500 mg IV x 1 today.  Nex trough is due at 1500 on 5/5/22  Please change to Schedule regimen tomorrow if Scr continue to improve.        Drug levels (last 3 results):  Recent Labs   Lab Result Units 05/03/22  0734 05/04/22  1127   Vancomycin, Random ug/mL 11.0 13.5       Pharmacy will continue to follow and monitor vancomycin.    Please contact pharmacy at extension [phone] for questions regarding this assessment.    Thank you for the consult,   Bony Roger       Patient brief summary:  Lupis Murcia is a 71 y.o. female initiated on antimicrobial therapy with IV Vancomycin for treatment of skin & soft tissue infection    The patient's current regimen is Pulse dosing    Drug Allergies:   Review of patient's allergies indicates:   Allergen Reactions    Contrast media Shortness Of Breath and Rash    Pcn [penicillins] Shortness Of Breath and Rash    Celebrex [celecoxib] Other (See Comments)     Swallowing problems     Diazepam Hives    Motrin [ibuprofen] Rash       Actual Body Weight:   101.1 kg    Renal Function:   Estimated Creatinine Clearance: 56.6 mL/min (based on SCr of 1 mg/dL).,     Dialysis Method (if applicable):  N/A    CBC (last 72 hours):  Recent Labs   Lab Result Units 05/01/22  1823 05/02/22  0718 05/03/22  0735 05/03/22  1256 05/04/22  0750 05/04/22  0942   WBC K/uL 10.05 5.72 5.15  --  6.26 6.64   Hemoglobin g/dL 9.9* 8.1* 8.8*  --  7.9* 8.1*   Hemoglobin A1C %  --   --   --  6.2*  --   --    Hematocrit % 32.1* 25.7*  27.5*  --  24.9* 26.5*   Platelets K/uL 311 243 251  --  189 213   Gran % % 79.7* 69.7 83.4*  --  70.4  --    Lymph % % 10.2* 17.5* 11.7*  --  18.2  --    Mono % % 8.2 10.3 3.7*  --  9.4  --    Eosinophil % % 1.3 1.7 0.2  --  0.5  --    Basophil % % 0.2 0.3 0.2  --  0.2  --    Differential Method  Automated Automated Automated  --  Automated  --        Metabolic Panel (last 72 hours):  Recent Labs   Lab Result Units 05/01/22  1822 05/01/22  1910 05/01/22  1911 05/01/22  2214 05/02/22  0718 05/02/22  0918 05/02/22  1335 05/02/22  1856 05/02/22  2352 05/03/22  0734 05/03/22  1335 05/03/22  1600 05/04/22  0750 05/04/22  1127   Sodium mmol/L 138  --   --  139 138  --  142 141 146* 149*  --  149* 150* 147*   Sodium, Urine mmol/L  --  34  --   --   --   --   --   --   --   --   --   --   --   --    Potassium mmol/L 6.3*  --   --  5.6* 4.9  --  5.3* 4.2 4.9 4.2  --  3.7 3.8 3.5   Potassium, Urine mmol/L  --  54  --   --   --   --   --   --   --   --   --   --   --   --    Chloride mmol/L 103  --   --  106 107  --  108 105 107 110  --  106 112* 112*   Chloride, Urine mmol/L  --  42  --   --   --   --   --   --   --   --   --   --   --   --    CO2 mmol/L 16*  --   --  18* 23  --  25 26 24 28  --  31* 27 25   Glucose mg/dL 128*  --   --  122* 173*  --  118* 135* 118* 166*  --  136* 127* 133*   Glucose, UA   --   --  Negative  --   --   --   --   --   --   --  Negative  --   --   --    BUN mg/dL 169*  --   --  121* 136*  --  114* 105* 98* 84*  --  74* 63* 58*   Creatinine mg/dL 3.9*  --   --  2.9* 2.6*  --  2.1* 1.5* 1.3 1.3  --  1.1 1.1 1.0   Creatinine, Urine mg/dL  --  104.0  --   --   --  51.0  --   --   --   --   --   --   --   --    Albumin g/dL 3.4*  --   --   --   --   --   --   --   --   --   --   --   --   --    Total Bilirubin mg/dL 0.4  --   --   --   --   --   --   --   --   --   --   --   --   --    Alkaline Phosphatase U/L 85  --   --   --   --   --   --   --   --   --   --   --   --   --    AST U/L 13  --   --    --   --   --   --   --   --   --   --   --   --   --    ALT U/L 12  --   --   --   --   --   --   --   --   --   --   --   --   --    Magnesium mg/dL  --   --   --   --  2.4  --   --   --   --  2.1  --   --  2.0  --    Phosphorus mg/dL  --   --   --   --  6.3*  --   --   --   --  3.4  --   --  2.5*  --        Vancomycin Administrations:  vancomycin given in the last 96 hours                   vancomycin 1.5 g in dextrose 5 % 250 mL IVPB (ready to mix) (mg) 1,500 mg New Bag 05/03/22 1607    vancomycin 1.75 g in 5 % dextrose 500 mL IVPB (mg) 1,750 mg New Bag 05/02/22 5171                Microbiologic Results:  Microbiology Results (last 7 days)     Procedure Component Value Units Date/Time    Blood culture #1 **CANNOT BE ORDERED STAT** [[id number]] Collected: 05/01/22 1823    Order Status: Completed Specimen: Blood from Peripheral, Antecubital, Right Updated: 05/03/22 2212     Blood Culture, Routine No Growth to date      No Growth to date      No Growth to date    Blood culture #2 **CANNOT BE ORDERED STAT** [[id number]] Collected: 05/01/22 1832    Order Status: Completed Specimen: Blood from Peripheral, Wrist, Right Updated: 05/03/22 2212     Blood Culture, Routine No Growth to date      No Growth to date      No Growth to date    Blood culture [[id number]] Collected: 05/03/22 1256    Order Status: Completed Specimen: Blood Updated: 05/03/22 2115     Blood Culture, Routine No Growth to date    Blood culture [[id number]] Collected: 05/03/22 1256    Order Status: Completed Specimen: Blood Updated: 05/03/22 2115     Blood Culture, Routine No Growth to date

## 2022-05-05 ENCOUNTER — PATIENT MESSAGE (OUTPATIENT)
Dept: NEUROLOGY | Facility: CLINIC | Age: 72
End: 2022-05-05
Payer: MEDICARE

## 2022-05-05 LAB
ANION GAP SERPL CALC-SCNC: 10 MMOL/L (ref 8–16)
ANION GAP SERPL CALC-SCNC: 8 MMOL/L (ref 8–16)
ANION GAP SERPL CALC-SCNC: 8 MMOL/L (ref 8–16)
BUN SERPL-MCNC: 42 MG/DL (ref 8–23)
BUN SERPL-MCNC: 43 MG/DL (ref 8–23)
BUN SERPL-MCNC: 51 MG/DL (ref 8–23)
CALCIUM SERPL-MCNC: 8.9 MG/DL (ref 8.7–10.5)
CALCIUM SERPL-MCNC: 9 MG/DL (ref 8.7–10.5)
CALCIUM SERPL-MCNC: 9.2 MG/DL (ref 8.7–10.5)
CHLORIDE SERPL-SCNC: 105 MMOL/L (ref 95–110)
CHLORIDE SERPL-SCNC: 106 MMOL/L (ref 95–110)
CHLORIDE SERPL-SCNC: 108 MMOL/L (ref 95–110)
CO2 SERPL-SCNC: 28 MMOL/L (ref 23–29)
CO2 SERPL-SCNC: 29 MMOL/L (ref 23–29)
CO2 SERPL-SCNC: 31 MMOL/L (ref 23–29)
CREAT SERPL-MCNC: 1 MG/DL (ref 0.5–1.4)
CREAT SERPL-MCNC: 1.1 MG/DL (ref 0.5–1.4)
CREAT SERPL-MCNC: 1.1 MG/DL (ref 0.5–1.4)
ERYTHROCYTE [DISTWIDTH] IN BLOOD BY AUTOMATED COUNT: 14 % (ref 11.5–14.5)
EST. GFR  (AFRICAN AMERICAN): 58 ML/MIN/1.73 M^2
EST. GFR  (AFRICAN AMERICAN): 58 ML/MIN/1.73 M^2
EST. GFR  (AFRICAN AMERICAN): >60 ML/MIN/1.73 M^2
EST. GFR  (NON AFRICAN AMERICAN): 50.3 ML/MIN/1.73 M^2
EST. GFR  (NON AFRICAN AMERICAN): 50.3 ML/MIN/1.73 M^2
EST. GFR  (NON AFRICAN AMERICAN): 56.4 ML/MIN/1.73 M^2
GLUCOSE SERPL-MCNC: 127 MG/DL (ref 70–110)
GLUCOSE SERPL-MCNC: 130 MG/DL (ref 70–110)
GLUCOSE SERPL-MCNC: 141 MG/DL (ref 70–110)
HCT VFR BLD AUTO: 26.1 % (ref 37–48.5)
HCV AB SERPL QL IA: NEGATIVE
HGB BLD-MCNC: 8 G/DL (ref 12–16)
MAGNESIUM SERPL-MCNC: 1.8 MG/DL (ref 1.6–2.6)
MCH RBC QN AUTO: 29.5 PG (ref 27–31)
MCHC RBC AUTO-ENTMCNC: 30.7 G/DL (ref 32–36)
MCV RBC AUTO: 96 FL (ref 82–98)
PHOSPHATE SERPL-MCNC: 2.2 MG/DL (ref 2.7–4.5)
PLATELET # BLD AUTO: 213 K/UL (ref 150–450)
PMV BLD AUTO: 10.8 FL (ref 9.2–12.9)
POTASSIUM SERPL-SCNC: 3.1 MMOL/L (ref 3.5–5.1)
POTASSIUM SERPL-SCNC: 3.1 MMOL/L (ref 3.5–5.1)
POTASSIUM SERPL-SCNC: 3.3 MMOL/L (ref 3.5–5.1)
RBC # BLD AUTO: 2.71 M/UL (ref 4–5.4)
SODIUM SERPL-SCNC: 144 MMOL/L (ref 136–145)
SODIUM SERPL-SCNC: 144 MMOL/L (ref 136–145)
SODIUM SERPL-SCNC: 145 MMOL/L (ref 136–145)
VANCOMYCIN TROUGH SERPL-MCNC: 15.6 UG/ML (ref 10–22)
WBC # BLD AUTO: 8.4 K/UL (ref 3.9–12.7)

## 2022-05-05 PROCEDURE — A4216 STERILE WATER/SALINE, 10 ML: HCPCS | Performed by: PHYSICIAN ASSISTANT

## 2022-05-05 PROCEDURE — 85027 COMPLETE CBC AUTOMATED: CPT | Performed by: INTERNAL MEDICINE

## 2022-05-05 PROCEDURE — 20600001 HC STEP DOWN PRIVATE ROOM

## 2022-05-05 PROCEDURE — 25000003 PHARM REV CODE 250: Performed by: PHYSICIAN ASSISTANT

## 2022-05-05 PROCEDURE — 36415 COLL VENOUS BLD VENIPUNCTURE: CPT | Performed by: INTERNAL MEDICINE

## 2022-05-05 PROCEDURE — 92526 ORAL FUNCTION THERAPY: CPT

## 2022-05-05 PROCEDURE — 63600175 PHARM REV CODE 636 W HCPCS: Performed by: INTERNAL MEDICINE

## 2022-05-05 PROCEDURE — 63600175 PHARM REV CODE 636 W HCPCS: Performed by: HOSPITALIST

## 2022-05-05 PROCEDURE — 25000003 PHARM REV CODE 250: Performed by: INTERNAL MEDICINE

## 2022-05-05 PROCEDURE — 94761 N-INVAS EAR/PLS OXIMETRY MLT: CPT

## 2022-05-05 PROCEDURE — 80048 BASIC METABOLIC PNL TOTAL CA: CPT | Performed by: INTERNAL MEDICINE

## 2022-05-05 PROCEDURE — 27000221 HC OXYGEN, UP TO 24 HOURS

## 2022-05-05 PROCEDURE — 84100 ASSAY OF PHOSPHORUS: CPT | Performed by: INTERNAL MEDICINE

## 2022-05-05 PROCEDURE — 80048 BASIC METABOLIC PNL TOTAL CA: CPT | Mod: 91 | Performed by: INTERNAL MEDICINE

## 2022-05-05 PROCEDURE — 99900035 HC TECH TIME PER 15 MIN (STAT)

## 2022-05-05 PROCEDURE — 80202 ASSAY OF VANCOMYCIN: CPT | Performed by: INTERNAL MEDICINE

## 2022-05-05 PROCEDURE — 99233 PR SUBSEQUENT HOSPITAL CARE,LEVL III: ICD-10-PCS | Mod: ,,, | Performed by: INTERNAL MEDICINE

## 2022-05-05 PROCEDURE — 83735 ASSAY OF MAGNESIUM: CPT | Performed by: INTERNAL MEDICINE

## 2022-05-05 PROCEDURE — 99233 SBSQ HOSP IP/OBS HIGH 50: CPT | Mod: ,,, | Performed by: INTERNAL MEDICINE

## 2022-05-05 PROCEDURE — 25000003 PHARM REV CODE 250: Performed by: HOSPITALIST

## 2022-05-05 RX ORDER — SYRING-NEEDL,DISP,INSUL,0.3 ML 29 G X1/2"
296 SYRINGE, EMPTY DISPOSABLE MISCELLANEOUS ONCE
Status: DISCONTINUED | OUTPATIENT
Start: 2022-05-05 | End: 2022-05-09

## 2022-05-05 RX ORDER — POTASSIUM CHLORIDE 20 MEQ/1
40 TABLET, EXTENDED RELEASE ORAL 3 TIMES DAILY
Status: DISPENSED | OUTPATIENT
Start: 2022-05-05 | End: 2022-05-06

## 2022-05-05 RX ORDER — MAGNESIUM SULFATE HEPTAHYDRATE 40 MG/ML
2 INJECTION, SOLUTION INTRAVENOUS ONCE
Status: DISCONTINUED | OUTPATIENT
Start: 2022-05-05 | End: 2022-05-05

## 2022-05-05 RX ORDER — BISACODYL 10 MG
10 SUPPOSITORY, RECTAL RECTAL DAILY PRN
Status: DISCONTINUED | OUTPATIENT
Start: 2022-05-05 | End: 2022-05-09

## 2022-05-05 RX ORDER — METOCLOPRAMIDE HYDROCHLORIDE 5 MG/ML
10 INJECTION INTRAMUSCULAR; INTRAVENOUS EVERY 6 HOURS PRN
Status: DISCONTINUED | OUTPATIENT
Start: 2022-05-05 | End: 2022-05-11 | Stop reason: HOSPADM

## 2022-05-05 RX ORDER — SODIUM CHLORIDE 9 MG/ML
INJECTION, SOLUTION INTRAVENOUS
Status: DISCONTINUED | OUTPATIENT
Start: 2022-05-05 | End: 2022-05-11 | Stop reason: HOSPADM

## 2022-05-05 RX ORDER — LANOLIN ALCOHOL/MO/W.PET/CERES
400 CREAM (GRAM) TOPICAL 2 TIMES DAILY
Status: DISCONTINUED | OUTPATIENT
Start: 2022-05-05 | End: 2022-05-09

## 2022-05-05 RX ADMIN — POTASSIUM PHOSPHATE, MONOBASIC AND POTASSIUM PHOSPHATE, DIBASIC 20 MMOL: 224; 236 INJECTION, SOLUTION, CONCENTRATE INTRAVENOUS at 03:05

## 2022-05-05 RX ADMIN — ONDANSETRON 4 MG: 2 INJECTION INTRAMUSCULAR; INTRAVENOUS at 07:05

## 2022-05-05 RX ADMIN — ACETAMINOPHEN 650 MG: 325 TABLET ORAL at 04:05

## 2022-05-05 RX ADMIN — POTASSIUM CHLORIDE 40 MEQ: 20 TABLET, EXTENDED RELEASE ORAL at 02:05

## 2022-05-05 RX ADMIN — ACETAMINOPHEN AND CODEINE PHOSPHATE 1 TABLET: 300; 30 TABLET ORAL at 11:05

## 2022-05-05 RX ADMIN — HEPARIN SODIUM 7500 UNITS: 5000 INJECTION INTRAVENOUS; SUBCUTANEOUS at 06:05

## 2022-05-05 RX ADMIN — Medication 10 ML: at 08:05

## 2022-05-05 RX ADMIN — VANCOMYCIN HYDROCHLORIDE 1500 MG: 1.5 INJECTION, POWDER, LYOPHILIZED, FOR SOLUTION INTRAVENOUS at 06:05

## 2022-05-05 RX ADMIN — HEPARIN SODIUM 7500 UNITS: 5000 INJECTION INTRAVENOUS; SUBCUTANEOUS at 02:05

## 2022-05-05 RX ADMIN — AMMONIUM LACTATE: 12 LOTION TOPICAL at 09:05

## 2022-05-05 RX ADMIN — HEPARIN SODIUM 7500 UNITS: 5000 INJECTION INTRAVENOUS; SUBCUTANEOUS at 09:05

## 2022-05-05 RX ADMIN — DEXTROSE: 5 SOLUTION INTRAVENOUS at 12:05

## 2022-05-05 RX ADMIN — MAGNESIUM SULFATE 2 G: 2 INJECTION INTRAVENOUS at 12:05

## 2022-05-05 RX ADMIN — Medication 400 MG: at 02:05

## 2022-05-05 RX ADMIN — ONDANSETRON 4 MG: 2 INJECTION INTRAMUSCULAR; INTRAVENOUS at 10:05

## 2022-05-05 RX ADMIN — METOCLOPRAMIDE 10 MG: 5 INJECTION, SOLUTION INTRAMUSCULAR; INTRAVENOUS at 02:05

## 2022-05-05 RX ADMIN — POTASSIUM CHLORIDE 40 MEQ: 20 TABLET, EXTENDED RELEASE ORAL at 11:05

## 2022-05-05 NOTE — PHYSICIAN QUERY
PT Name: Lupis Murcia  MR #: [id number]     DOCUMENTATION CLARIFICATION     CDS/: Harmony Og RN              Contact information: [email or web address]  This form is a permanent document in the medical record.     Query Date: May 5, 2022    By submitting this query, we are merely seeking further clarification of documentation.  Please utilize your independent clinical judgment when addressing the question(s) below.    The Medical Record contains the following:   Indicators   Supporting Clinical Findings Location in Medical Record    Non-blanchable erythema/redness     x Ulcer/Injury/Skin Breakdown Pressure Injury to Buttoc, unstagable    Bilateral buttock are black and purple with irregular edges.   The periwound is pink and moist.   No drainage present on removed dressing.   On the left, medial buttocks there is a partial-thickness skin loss that is 1x1x0.5.   Triad ointment applied to all areas on her coccyx and buttocks    Sacral spine ulceration   Date first assessed:  5/1/22  Pink;Purple;Black;Necrotic;Moist  Partial thickness                        Severe dermatitis on sacrum due to incontinence with chronic lymphedema noted on LE with venous stasis        5/4 Hosp Med MD PN      5/2 Wound Care consult                                                            5/4 ID MD PN        5/1 Media/ Photographs    Deep Tissue Injury     x Wound care consult Plan:  Coccyx/buttocks: gently cleanse with wound cleanser and apply triad cream to wound and periwound BID and PRN if soiled.   Bilateral Lower Extremities: cleanse BLE with soap and water, pat dry and apply Ammonium Lactate Lotion BID      Nursing to continue care and pressure prevention. Turn patient every two hours.   Recommendations made to primary team for above plan via secure chat. Orders placed. Wound care to follow-up PRN.  5/2 Wound care consult   x Acute/Chronic Illness     x Medication/Treatment Wound care consult  Turn every 2  hours 5/2 NSG orders        Other       The clinical guidelines noted are only a system guideline. It does not replace the providers clinical judgment.    Per the National Pressure Injury Advisory Panel:   A pressure injury is localized damage to the skin and underlying soft tissue usually over a bony prominence or related to a medical or other device. The injury can present as intact skin or an open ulcer and may be painful. The injury occurs as a result of intense and/or prolonged pressure or pressure in combination with shear. The tolerance of soft tissue for pressure and shear may also be affected by microclimate, nutrition, perfusion, co-morbidities and condition of the soft tissue.       Stage 2 Pressure Injury:  Partial-thickness loss of skin with exposed dermis. The wound bed is viable, pink or red, moist, and may also present as an intact or ruptured serum-filled blister.    Stage 3 Pressure Injury:  Full-thickness loss of skin, in which adipose (fat) is visible in the ulcer and granulation tissue and epibole (rolled wound edges) are often present. Slough and/or eschar may be visible. Undermining and tunneling may occur.    Stage 4 Pressure Injury:  Full-thickness skin and tissue loss with exposed or directly palpable fascia, muscle, tendon, ligament, cartilage or bone in the ulcer. Slough and/or eschar may be visible. Epibole (rolled edges), undermining and/or tunneling often occur.    Unstageable Pressure Injury:  Full-thickness skin and tissue loss in which the extent of tissue damage within the ulcer cannot be confirmed because it is obscured by slough or eschar. If slough or eschar is removed, a Stage 3 or Stage 4 pressure injury will be revealed.      Michele all that apply     Clarify location of integumentary diagnosis:       (   ) Sacrum (specify stage):    (   ) Unstageable  (   ) Moisture Dermatitis only  (   ) other (specify stage):_______    (   )  Bilateral Buttock (specify stage):     (   )  Unstageable   (   ) other (specify stage):_______         [   ] Other Integumentary Diagnosis (please specify):______________     [ {Click F2; select 'X' if Clinically Undetermined:244543} ] Clinically Undetermined             Please document in your progress notes daily for the duration of treatment until resolved and include in your discharge summary.    Reference:    PERRY Hanson., STEVAN Marshall., Goldberg, M., PERRY Holloway, PERRY Cobb, & PAUL Herrera. (2016). Revised National Pressure Ulcer Advisory Panel Pressure Injury Staging System: Revised Pressure Injury Staging System. J Wound Ostomy Continence Nurs, 43(6), 585-597. doi:10.1097/won.9409080757695693    Form No.15558

## 2022-05-05 NOTE — SUBJECTIVE & OBJECTIVE
Interval History: Patient lying in bed, no acute distress. Fever overnight. Family at bedside. Mental status improving. HANDY has been improving since espinal placement. Wound care evaluated BLE and sacral wound. US LE negative for DVT. Tylenol #3 controlling BLE pain.       Review of Systems   Constitutional:  Positive for activity change, fatigue and fever. Negative for chills.   HENT:  Negative for congestion and trouble swallowing.    Eyes:  Negative for visual disturbance.   Respiratory:  Negative for cough and shortness of breath.    Cardiovascular:  Positive for leg swelling. Negative for chest pain.   Gastrointestinal:  Positive for nausea. Negative for abdominal distention, abdominal pain and vomiting.   Endocrine: Negative for cold intolerance, heat intolerance, polydipsia and polyuria.   Genitourinary:  Negative for difficulty urinating and dysuria.   Musculoskeletal:  Positive for myalgias. Negative for back pain, neck pain and neck stiffness.   Skin:  Positive for wound. Negative for rash.   Neurological:  Positive for weakness. Negative for dizziness and light-headedness.   Hematological:  Negative for adenopathy. Does not bruise/bleed easily.   Psychiatric/Behavioral:  Negative for confusion and sleep disturbance.      Objective:     Vital Signs (Most Recent):  Temp: (!) 100.6 °F (38.1 °C) (05/04/22 2024)  Pulse: 77 (05/04/22 2035)  Resp: 18 (05/04/22 2024)  BP: 121/60 (05/04/22 2024)  SpO2: (!) 90 % (05/04/22 2035)   Vital Signs (24h Range):  Temp:  [98.2 °F (36.8 °C)-100.6 °F (38.1 °C)] 100.6 °F (38.1 °C)  Pulse:  [77-82] 77  Resp:  [18] 18  SpO2:  [89 %-95 %] 90 %  BP: (118-135)/(59-66) 121/60     Weight: 101.1 kg (222 lb 14.2 oz)  Body mass index is 40.77 kg/m².    Intake/Output Summary (Last 24 hours) at 5/4/2022 2056  Last data filed at 5/4/2022 2028  Gross per 24 hour   Intake 2699.96 ml   Output 1625 ml   Net 1074.96 ml        Physical Exam  Constitutional:       Appearance: She is well-developed.  She is obese. She is ill-appearing.   HENT:      Head: Normocephalic and atraumatic.      Mouth/Throat:      Mouth: Mucous membranes are moist.   Eyes:      General: No scleral icterus.     Pupils: Pupils are equal, round, and reactive to light.   Neck:      Vascular: No JVD.   Cardiovascular:      Rate and Rhythm: Normal rate and regular rhythm.      Heart sounds: No murmur heard.    No friction rub. No gallop.   Pulmonary:      Effort: Pulmonary effort is normal. No respiratory distress.      Breath sounds: Normal breath sounds. No wheezing or rales.   Abdominal:      General: Bowel sounds are normal. There is no distension.      Palpations: Abdomen is soft.      Tenderness: There is no abdominal tenderness. There is no guarding or rebound.   Musculoskeletal:         General: No deformity. Normal range of motion.      Cervical back: Neck supple.   Lymphadenopathy:      Cervical: No cervical adenopathy.   Skin:     General: Skin is warm and dry.      Capillary Refill: Capillary refill takes less than 2 seconds.      Findings: No erythema or rash.      Comments: Severe chronic venous stasis changes and lymphedema in BLE. TTP  Sacral wound   Neurological:      Mental Status: She is alert and oriented to person, place, and time.      Cranial Nerves: No cranial nerve deficit.      Sensory: No sensory deficit.   Psychiatric:         Cognition and Memory: Cognition is impaired. Memory is impaired.       Significant Labs: All pertinent labs within the past 24 hours have been reviewed.    Significant Imaging: I have reviewed all pertinent imaging results/findings within the past 24 hours.

## 2022-05-05 NOTE — NURSING
"1130- Dr. Hardin notified of patient K+ result of 3.1, replacement ordered by MD    1157- Patient encouraged to take PO K+, patient states "those pills are too big, I'm gonna throw up". Daughter at bedside, patient able to swallow pills one at a time.     1245- Mg replacement ordered, initiated by RN.     1305-daughter called with call bell, patient complaining that IV site is burning. IV Mg stopped and disconnected, flushed with saline. Patient states saline flush does not hurt. MD Hardin notified via secured chat that patient cannot tolerate IV Mg.   "

## 2022-05-05 NOTE — NURSING
1400- midline consult obtained, new PIV in place. IV potassium phosphate initiated per MAR. Patient c/o nausea, not due for PRN zoan Dr. Hardin notified via secure chat, 1434-order for IV reglan PRN obtained and administered per MAR.     1545- wound care to BLE provided by RN, area cleansed and pat dry, ordered ammonium cream applied. Patient tolerated wound care well.    1615-RN completed wound care, patient c/o no relief of nausea with PRN reglan, patient noted to be distended and firm, denies tenderness. Daughter at bedside states patient's abd is usually distended. Dr. Hardin notified via secured chat, KUB ordered, completed at bedside.     1730- Patient with episode of fecal incontinence, area cleansed, triad cream applied to sacral wound, patient passing flatus while rolling side to side, states she feels some relief with repositioning

## 2022-05-05 NOTE — PT/OT/SLP PROGRESS
Physical Therapy      Patient Name:  Lupis Murcia   MRN:  [id number]    Patient not seen today secondary to  (AM: c/o needs to rest and nausea: PM: nursing hold d/t continuing nausea). Will follow-up next day.

## 2022-05-05 NOTE — PLAN OF CARE
Patient received, alert and awake, vitals per flowsheet. Patient c/o nausea, PRN med administered per MAR with moderate relief. Patient refused AM meds, care, and repositioning due to nausea. Patient restless and irritable, laying in bed with eyes closed. Slaughter catheter in place, draining with no issues. Daughter at bedside, educated on POC, questions encouraged. Call bell within reach, no needs at this time.     Problem: Infection  Goal: Absence of Infection Signs and Symptoms  Outcome: Ongoing, Progressing     Problem: Adult Inpatient Plan of Care  Goal: Plan of Care Review  Outcome: Ongoing, Progressing  Goal: Patient-Specific Goal (Individualized)  Outcome: Ongoing, Progressing  Goal: Absence of Hospital-Acquired Illness or Injury  Outcome: Ongoing, Progressing  Goal: Optimal Comfort and Wellbeing  Outcome: Ongoing, Progressing  Goal: Readiness for Transition of Care  Outcome: Ongoing, Progressing     Problem: Bariatric Environmental Safety  Goal: Safety Maintained with Care  Outcome: Ongoing, Progressing     Problem: Fluid and Electrolyte Imbalance (Acute Kidney Injury/Impairment)  Goal: Fluid and Electrolyte Balance  Outcome: Ongoing, Progressing     Problem: Oral Intake Inadequate (Acute Kidney Injury/Impairment)  Goal: Optimal Nutrition Intake  Outcome: Ongoing, Progressing     Problem: Renal Function Impairment (Acute Kidney Injury/Impairment)  Goal: Effective Renal Function  Outcome: Ongoing, Progressing     Problem: Impaired Wound Healing  Goal: Optimal Wound Healing  Outcome: Ongoing, Progressing     Problem: Skin Injury Risk Increased  Goal: Skin Health and Integrity  Outcome: Ongoing, Progressing     Problem: Fall Injury Risk  Goal: Absence of Fall and Fall-Related Injury  Outcome: Ongoing, Progressing     Problem: Confusion Acute  Goal: Optimal Cognitive Function  Outcome: Ongoing, Progressing

## 2022-05-05 NOTE — PT/OT/SLP PROGRESS
Speech Language Pathology Treatment/Discharge Summary    Patient Name:  Lupis Murcia   MRN:  [id number]  Admitting Diagnosis: HANDY (acute kidney injury)    Recommendations:                 General Recommendations:  Follow-up not indicated  Diet recommendations:  Mechanical soft, Thin   Aspiration Precautions:  · Assistance with meals   · Check for pocketing/oral residue  · Eliminate distractions  · Feed only when awake/alert  · Meds whole 1 at a time  · Monitor for s/s of aspiration   · Strict aspiration precautions   General Precautions: Standard, aspiration, fall  Communication strategies:  none    Subjective     Pt found resting in bed upon SLP entry. Daughter at bedside and attentive to pt's needs.     Patient goals: to get nausea meds     Pain/Comfort:  · Pain Rating 1: 0/10    Respiratory Status: Room air    Objective:     Has the patient been evaluated by SLP for swallowing?   Yes  Keep patient NPO? No   Current Respiratory Status:        Pt seen for ongoing assessment of swallow function. Daughter reported pt has been nauseated and is currently waiting for medications. Pt continues to refuse all PO intake with poor participation in meals. Occasional bites of jello, pudding, and applesauce tolerated well per family report; pt additionally took morning meds 1 pill at a time without difficulty.     SLP spoke with daughter regarding discontinuing therapy orders 2/2 pt's poor appetite and poor engagement in therapy sessions. Education regarding risk for aspiration and development of aspiration-related complications reviewed and family was encouraged to speak to staff if concerns of dysphagia arise when pt's PO intake increases. Pt verbalized understanding but would continue to benefit from ongoing education. Pt's daughter verbalized understanding and was in agreement with POC.     Assessment:     Lupis Murcia is a 71 y.o. female who presents with minimal PO intake. Given pt's decreased engagement in meals  with no overt signs of aspiration reported by family and no significant risk factors for aspiration or development of aspiration-related complications, recommend continuation of current diet orders. No additional skilled speech services required at this time. Please re-consult should concerns for dysphagia arise if/when increased PO intake is achieved.       Goals:   Multidisciplinary Problems     SLP Goals        Problem: SLP    Goal Priority Disciplines Outcome   SLP Goal     SLP Adequate for Care Transition   Description: Speech Pathology Goals  To be met by 5/26/22      1. Pt will participate in ongoing diagnostic dysphagia therapy                            Plan:     · Patient to be seen:  3 x/week   · Plan of Care expires:  06/01/22  · Plan of Care reviewed with:  patient, daughter   · SLP Follow-Up:  No       Discharge recommendations:  nursing facility, skilled   Barriers to Discharge:  Decreased PO intake    Time Tracking:     SLP Treatment Date:   05/05/22  Speech Start Time:  1415  Speech Stop Time:  1420     Speech Total Time (min):  5 min    Billable Minutes: Treatment Swallowing Dysfunction 5 05/05/2022

## 2022-05-05 NOTE — ASSESSMENT & PLAN NOTE
Patient with acute kidney injury likely d/t IVVD/Dehydration and Pre-renal azotemia Which is currently improving. Labs reviewed- Renal function/electrolytes with Estimated Creatinine Clearance: 51.5 mL/min (based on SCr of 1.1 mg/dL). according to latest data. Monitor urine output and serial BMP and adjust therapy as needed. Avoid nephrotoxins and renally dose meds for GFR listed above.   -patient with non-oliguric lucia that is likely pre-renal in etiology, but higher severity given hyperkalemia, metabolic acidosis and uremic encephalopathy.   -obtain urine electrolytes,   -trend BMP   - Give IV Bicarb Drip x 1 L to assist with volume and acidosis management.   -Nursing made multiple attempts at Slaughter placement but pt vular swelling unable to pass, pt urinating connected to purewick.   MOnitor bladder scans if UOP falls as her bladder appears distended on CT scan but no hydronephrosis present.   -hold home anti-hypertensives (ARB/Thiazide) hold any NSAIDs, pt was taking both in last week.   -nephrology consultation. apprec recs  - switched to D5W  - IMPROVING

## 2022-05-05 NOTE — PROGRESS NOTES
[facility] - Transplant [facility] Medicine  Progress Note    Patient Name: Lupis Murcia  MRN: [id number]  Patient Class: IP- Inpatient   Admission Date: 5/1/2022  Length of Stay: 3 days  Attending Physician: John Hardin MD  Primary Care Provider: Bobby Tran MD        Subjective:     Principal Problem:HANDY (acute kidney injury)        HPI:  72 y/o WF hx of Multiple Sclerosis, Left Foot Drop, Lymphedema, Obesity (bmi 45) who is referred for admission for acute kidney injury, hyperkalemia and acute encephalopathy.     Patient's daughter Amanda Lowery provides primary history due to pt somnolence s/p ativan and EMR review.     1 week ago on Monday patient had a fall, partially assisted by family when legs gave out, no head injury or LOC.  Daughter noted through the week patient c/o of weakness and difficulty standing as when she fell, her armpit fell onto the walker, pt c/o of left shoulder pain.   On Thursday it was noted patient unable to stand, she is normally able to perform ADL with her walker, but daughter felt when she came home that pt had not moved.  She was also using adult diaper at this time and since Thursday due to body habitus/size patient has been unable to change her diaper since this time.   She has intermittently been taking her medicines during this time, but daughter noted she was eating less,  sleeping more and making confused statements and sought to bring her to the ED.     ED staff noted pts diaper was saturated with urine, vulvar swelling with intertrigo and buttock pressure injury.   Labs notable for Hyperkalemia to 6.3 and BUn/Cr of 169/3.9.     ED Treatment: Insulin 10uni IV regular, D10 bolus, 1gram Calcium Gluconate.  LR 2.2Liters IV, naBicarb 50meq IV x 1, Ativan 1mg IV   Ceftriaxone 2gm IV x 1      Overview/Hospital Course:  No notes on file    Interval History: Patient lying in bed, no acute distress. Fever overnight. Family at bedside. Mental status improving. HANDY has  been improving since espinal placement. Wound care evaluated BLE and sacral wound. US LE negative for DVT. Tylenol #3 controlling BLE pain.       Review of Systems   Constitutional:  Positive for activity change, fatigue and fever. Negative for chills.   HENT:  Negative for congestion and trouble swallowing.    Eyes:  Negative for visual disturbance.   Respiratory:  Negative for cough and shortness of breath.    Cardiovascular:  Positive for leg swelling. Negative for chest pain.   Gastrointestinal:  Positive for nausea. Negative for abdominal distention, abdominal pain and vomiting.   Endocrine: Negative for cold intolerance, heat intolerance, polydipsia and polyuria.   Genitourinary:  Negative for difficulty urinating and dysuria.   Musculoskeletal:  Positive for myalgias. Negative for back pain, neck pain and neck stiffness.   Skin:  Positive for wound. Negative for rash.   Neurological:  Positive for weakness. Negative for dizziness and light-headedness.   Hematological:  Negative for adenopathy. Does not bruise/bleed easily.   Psychiatric/Behavioral:  Negative for confusion and sleep disturbance.      Objective:     Vital Signs (Most Recent):  Temp: (!) 100.6 °F (38.1 °C) (05/04/22 2024)  Pulse: 77 (05/04/22 2035)  Resp: 18 (05/04/22 2024)  BP: 121/60 (05/04/22 2024)  SpO2: (!) 90 % (05/04/22 2035)   Vital Signs (24h Range):  Temp:  [98.2 °F (36.8 °C)-100.6 °F (38.1 °C)] 100.6 °F (38.1 °C)  Pulse:  [77-82] 77  Resp:  [18] 18  SpO2:  [89 %-95 %] 90 %  BP: (118-135)/(59-66) 121/60     Weight: 101.1 kg (222 lb 14.2 oz)  Body mass index is 40.77 kg/m².    Intake/Output Summary (Last 24 hours) at 5/4/2022 2056  Last data filed at 5/4/2022 2028  Gross per 24 hour   Intake 2699.96 ml   Output 1625 ml   Net 1074.96 ml        Physical Exam  Constitutional:       Appearance: She is well-developed. She is obese. She is ill-appearing.   HENT:      Head: Normocephalic and atraumatic.      Mouth/Throat:      Mouth: Mucous  membranes are moist.   Eyes:      General: No scleral icterus.     Pupils: Pupils are equal, round, and reactive to light.   Neck:      Vascular: No JVD.   Cardiovascular:      Rate and Rhythm: Normal rate and regular rhythm.      Heart sounds: No murmur heard.    No friction rub. No gallop.   Pulmonary:      Effort: Pulmonary effort is normal. No respiratory distress.      Breath sounds: Normal breath sounds. No wheezing or rales.   Abdominal:      General: Bowel sounds are normal. There is no distension.      Palpations: Abdomen is soft.      Tenderness: There is no abdominal tenderness. There is no guarding or rebound.   Musculoskeletal:         General: No deformity. Normal range of motion.      Cervical back: Neck supple.   Lymphadenopathy:      Cervical: No cervical adenopathy.   Skin:     General: Skin is warm and dry.      Capillary Refill: Capillary refill takes less than 2 seconds.      Findings: No erythema or rash.      Comments: Severe chronic venous stasis changes and lymphedema in BLE. TTP  Sacral wound   Neurological:      Mental Status: She is alert and oriented to person, place, and time.      Cranial Nerves: No cranial nerve deficit.      Sensory: No sensory deficit.   Psychiatric:         Cognition and Memory: Cognition is impaired. Memory is impaired.       Significant Labs: All pertinent labs within the past 24 hours have been reviewed.    Significant Imaging: I have reviewed all pertinent imaging results/findings within the past 24 hours.      Assessment/Plan:      * HADNY (acute kidney injury)  Patient with acute kidney injury likely d/t IVVD/Dehydration and Pre-renal azotemia Which is currently improving. Labs reviewed- Renal function/electrolytes with Estimated Creatinine Clearance: 51.5 mL/min (based on SCr of 1.1 mg/dL). according to latest data. Monitor urine output and serial BMP and adjust therapy as needed. Avoid nephrotoxins and renally dose meds for GFR listed above.   -patient with  non-oliguric handy that is likely pre-renal in etiology, but higher severity given hyperkalemia, metabolic acidosis and uremic encephalopathy.   -obtain urine electrolytes,   -trend BMP   - Give IV Bicarb Drip x 1 L to assist with volume and acidosis management.   -Nursing made multiple attempts at Espinal placement but pt vular swelling unable to pass, pt urinating connected to purewick.   MOnitor bladder scans if UOP falls as her bladder appears distended on CT scan but no hydronephrosis present.   -hold home anti-hypertensives (ARB/Thiazide) hold any NSAIDs, pt was taking both in last week.   -nephrology consultation. apprec recs  - switched to D5W  - IMPROVING    Hypernatremia  - Na 146 --> 149  - switched to D5W  - nephro following   - BMP q4h      Cellulitis of leg  - continue vancomycin   - ID following, apprec recs  - continue local wound care per wound care      Urinary retention  - continue espinal   -voiding trial once encephalopathy resolves      Encephalopathy, metabolic  Secondary to uremia vs infection   -fall  -aspiration precautions  - HANDY resolved with IVF  - now rising Na so encephalopathy could also be associated with hypernatremia  - changed to D5W  - nephro following   - BMP q4h      Pressure injury of buttock, unstageable  q2 turns  -low airloss overlay mattress  -wound care consultation       Lymphedema  -wound care consultation to assist in managmeent      Hyperkalemia  As above   q4hr BMP  -bicarb infusion to help acidosis management.       MS (multiple sclerosis)  ENTER PT/OT consults when patient is more awake alert and can work with therapy services.         VTE Risk Mitigation (From admission, onward)         Ordered     heparin (porcine) injection 7,500 Units  Every 8 hours         05/02/22 0232     IP VTE HIGH RISK PATIENT  Once         05/02/22 0232     Place sequential compression device  Until discontinued         05/02/22 0232     Place sequential compression device  Until discontinued          05/02/22 0031                Discharge Planning   USMAN: 5/9/2022     Code Status: Full Code   Is the patient medically ready for discharge?: No    Reason for patient still in hospital (select all that apply): Patient unstable, Patient trending condition, Laboratory test, Treatment, Consult recommendations and PT / OT recommendations  Discharge Plan A: Skilled Nursing Facility          Time spent in care of patient: > 35 minutes           John Hardin MD  Department of Hospital Medicine   [facility] - Transplant Stepdown

## 2022-05-05 NOTE — NURSING
"RN rounding on patient, patient upset with nursing states "don't touch me, I can't do this anymore". Patient agitated. Daughter at bedside, comforting patient. Patient and daughter declining patient to be repositioned. Emotional support provided. Slaughter emptied, output documented per flowsheets. Patient states she is freezing, offered blankets. Fall precautions continued, no needs at this time.  "

## 2022-05-05 NOTE — PROGRESS NOTES
Pharmacokinetic Assessment Follow Up: IV Vancomycin    Vancomycin serum concentration assessment & plan:  · Vancomycin random level resulted at 15.6 mcg/ml which is within target range of 10 - 20 mcg/ml  · Level is 22 hours post dose of 1500mg  · Scr steady at ~1.0 mg/dl  · Schedule Vancomycin 1500mg IV every 24 hours  · Obtain trough in ~ 5 - 7 days or sooner if kidney function changes significantly    Drug levels (last 3 results):  Recent Labs   Lab Result Units 05/03/22  0734 05/04/22  1127 05/05/22  1517   Vancomycin, Random ug/mL 11.0 13.5  --    Vancomycin-Trough ug/mL  --   --  15.6       Pharmacy will continue to follow and monitor vancomycin.    Please contact pharmacy at extension [phone] for questions regarding this assessment.    Thank you for the consult,   Irasema Younger       Patient brief summary:  Lupis Murcia is a 71 y.o. female initiated on antimicrobial therapy with IV Vancomycin for treatment of skin & soft tissue infection    The patient's current regimen is pulse dosing    Drug Allergies:   Review of patient's allergies indicates:   Allergen Reactions    Contrast media Shortness Of Breath and Rash    Pcn [penicillins] Shortness Of Breath and Rash    Celebrex [celecoxib] Other (See Comments)     Swallowing problems     Diazepam Hives    Motrin [ibuprofen] Rash       Actual Body Weight:   100.8 kg    Renal Function:   Estimated Creatinine Clearance: 56.5 mL/min (based on SCr of 1 mg/dL).,     Dialysis Method (if applicable):  N/A    CBC (last 72 hours):  Recent Labs   Lab Result Units 05/03/22  0735 05/03/22  1256 05/04/22  0750 05/04/22  0942 05/05/22  0828   WBC K/uL 5.15  --  6.26 6.64 8.40   Hemoglobin g/dL 8.8*  --  7.9* 8.1* 8.0*   Hemoglobin A1C %  --  6.2*  --   --   --    Hematocrit % 27.5*  --  24.9* 26.5* 26.1*   Platelets K/uL 251  --  189 213 213   Gran % % 83.4*  --  70.4  --   --    Lymph % % 11.7*  --  18.2  --   --    Mono % % 3.7*  --  9.4  --   --    Eosinophil % % 0.2   --  0.5  --   --    Basophil % % 0.2  --  0.2  --   --    Differential Method  Automated  --  Automated  --   --        Metabolic Panel (last 72 hours):  Recent Labs   Lab Result Units 05/02/22  1856 05/02/22  2352 05/03/22  0734 05/03/22  1335 05/03/22  1600 05/04/22  0750 05/04/22  1127 05/04/22  1604 05/04/22  1952 05/05/22  0023 05/05/22  0828 05/05/22  1056   Sodium mmol/L 141 146* 149*  --  149* 150* 147* 149* 145 144 144 145   Potassium mmol/L 4.2 4.9 4.2  --  3.7 3.8 3.5 3.3* 3.2* 3.3* 3.1* 3.1*   Chloride mmol/L 105 107 110  --  106 112* 112* 110 108 108 105 106   CO2 mmol/L 26 24 28  --  31* 27 25 29 28 28 29 31*   Glucose mg/dL 135* 118* 166*  --  136* 127* 133* 141* 158* 141* 130* 127*   Glucose, UA   --   --   --  Negative  --   --   --   --   --   --   --   --    BUN mg/dL 105* 98* 84*  --  74* 63* 58* 57* 51* 51* 43* 42*   Creatinine mg/dL 1.5* 1.3 1.3  --  1.1 1.1 1.0 1.1 1.1 1.1 1.1 1.0   Magnesium mg/dL  --   --  2.1  --   --  2.0  --   --   --   --  1.8  --    Phosphorus mg/dL  --   --  3.4  --   --  2.5*  --   --   --   --  2.2*  --        Vancomycin Administrations:  vancomycin given in the last 96 hours                   vancomycin 1.5 g in dextrose 5 % 250 mL IVPB (ready to mix) (mg) 1,500 mg New Bag 05/04/22 1709    vancomycin 1.5 g in dextrose 5 % 250 mL IVPB (ready to mix) (mg) 1,500 mg New Bag 05/03/22 1607    vancomycin 1.75 g in 5 % dextrose 500 mL IVPB (mg) 1,750 mg New Bag 05/02/22 0456                Microbiologic Results:  Microbiology Results (last 7 days)     Procedure Component Value Units Date/Time    Blood culture [[id number]] Collected: 05/03/22 1256    Order Status: Completed Specimen: Blood Updated: 05/05/22 1612     Blood Culture, Routine No Growth to date      No Growth to date      No Growth to date    Blood culture [[id number]] Collected: 05/03/22 1256    Order Status: Completed Specimen: Blood Updated: 05/05/22 1612     Blood Culture, Routine No Growth to date      No  Growth to date      No Growth to date    Blood culture #1 **CANNOT BE ORDERED STAT** [[id number]] Collected: 05/01/22 1823    Order Status: Completed Specimen: Blood from Peripheral, Antecubital, Right Updated: 05/04/22 2212     Blood Culture, Routine No Growth to date      No Growth to date      No Growth to date      No Growth to date    Blood culture #2 **CANNOT BE ORDERED STAT** [[id number]] Collected: 05/01/22 1832    Order Status: Completed Specimen: Blood from Peripheral, Wrist, Right Updated: 05/04/22 2212     Blood Culture, Routine No Growth to date      No Growth to date      No Growth to date      No Growth to date

## 2022-05-05 NOTE — PT/OT/SLP PROGRESS
Occupational Therapy      Patient Name:  Lupis Murcia   MRN:  [id number]    Patient not seen today secondary to declining therapy evaluation 2* nausea. Attempted to see patient in a.m and p.m. Will follow-up for OT evaluation.    5/5/2022

## 2022-05-05 NOTE — PHYSICIAN QUERY
PT Name: Lupis Murcia  MR #: [id number]     DOCUMENTATION CLARIFICATION     CDS/: Harmony Og RN              Contact information: [email or web address]  This form is a permanent document in the medical record.     Query Date: May 5, 2022    By submitting this query, we are merely seeking further clarification of documentation.  Please utilize your independent clinical judgment when addressing the question(s) below.    The Medical Record contains the following:   Indicators   Supporting Clinical Findings Location in Medical Record    Non-blanchable erythema/redness     x Ulcer/Injury/Skin Breakdown Large area of erythema on the sacral region with areas of skin breakdown which are black and appeared necrotic as noted in picture  Pressure also noted to the sacral region with erythema as well as small area of black necrotic tissue      Pressure Injury to Buttock, unstagable      Bilateral buttock are black and purple with irregular edges.   The periwound is pink and moist.   No drainage present on removed dressing.   On the left, medial buttocks there is a partial-thickness skin loss that is 1x1x0.5.   Triad ointment applied to all areas on her coccyx and buttocks      Sacral spine ulceration   Date first assessed:  5/1/22  Pink;Purple;Black;Necrotic;Moist  Partial thickness      Severe dermatitis on sacrum due to incontinence with chronic lymphedema noted on LE with venous stasis    5/1 ED MD PN                                                      5/4 Hosp Med MD PN      5/2 Wound Care consult                                        5/4 ID MD PN    Deep Tissue Injury     x Wound care consult Plan:  Coccyx/buttocks: gently cleanse with wound cleanser and apply triad cream to wound and periwound BID and PRN if soiled.   Bilateral Lower Extremities: cleanse BLE with soap and water, pat dry and apply Ammonium Lactate Lotion BID      Nursing to continue care and pressure prevention. Turn patient every  two hours.   Recommendations made to primary team for above plan via secure chat. Orders placed. Wound care to follow-up PRN.  5/2 Wound care consult   x Acute/Chronic Illness     x Medication/Treatment Wound care consult  Turn every 2 hours 5/2 NSG orders        Other       The clinical guidelines noted are only a system guideline. It does not replace the providers clinical judgment.    Per the National Pressure Injury Advisory Panel:   A pressure injury is localized damage to the skin and underlying soft tissue usually over a bony prominence or related to a medical or other device. The injury can present as intact skin or an open ulcer and may be painful. The injury occurs as a result of intense and/or prolonged pressure or pressure in combination with shear. The tolerance of soft tissue for pressure and shear may also be affected by microclimate, nutrition, perfusion, co-morbidities and condition of the soft tissue.       Stage 2 Pressure Injury:  Partial-thickness loss of skin with exposed dermis. The wound bed is viable, pink or red, moist, and may also present as an intact or ruptured serum-filled blister.    Stage 3 Pressure Injury:  Full-thickness loss of skin, in which adipose (fat) is visible in the ulcer and granulation tissue and epibole (rolled wound edges) are often present. Slough and/or eschar may be visible. Undermining and tunneling may occur.    Stage 4 Pressure Injury:  Full-thickness skin and tissue loss with exposed or directly palpable fascia, muscle, tendon, ligament, cartilage or bone in the ulcer. Slough and/or eschar may be visible. Epibole (rolled edges), undermining and/or tunneling often occur.    Unstageable Pressure Injury:  Full-thickness skin and tissue loss in which the extent of tissue damage within the ulcer cannot be confirmed because it is obscured by slough or eschar. If slough or eschar is removed, a Stage 3 or Stage 4 pressure injury will be revealed.          Michele all  that apply     Provider, Please clarify location & type of integumentary diagnosis:       ( X  )Sacrum Pressue ulcer : (  )Stage 2 ( X )Unstageable  (  )Moisture Dermatitis  (  ) other(specify):_______      (   ) Bilateral Buttocks:     (   ) Unstageable    (   ) other (specify stage):_______         [   ] Other Integumentary Diagnosis (please specify):______________     [  ] Clinically Undetermined             Please document in your progress notes daily for the duration of treatment until resolved and include in your discharge summary.    Reference:    PERRY Hanson., STEVAN Marshall., Goldberg, M., LI Holloway., LI Cobb., & PAUL Herrera. (2016). Revised National Pressure Ulcer Advisory Panel Pressure Injury Staging System: Revised Pressure Injury Staging System. J Wound Ostomy Continence Nurs, 43(6), 585-597. doi:10.1097/won.6993138676795613    Form No.48352

## 2022-05-06 ENCOUNTER — ANESTHESIA (OUTPATIENT)
Dept: ENDOSCOPY | Facility: HOSPITAL | Age: 72
DRG: 682 | End: 2022-05-06
Payer: MEDICARE

## 2022-05-06 ENCOUNTER — ANESTHESIA EVENT (OUTPATIENT)
Dept: ENDOSCOPY | Facility: HOSPITAL | Age: 72
DRG: 682 | End: 2022-05-06
Payer: MEDICARE

## 2022-05-06 DIAGNOSIS — K20.90 ESOPHAGITIS: Primary | ICD-10-CM

## 2022-05-06 PROBLEM — R14.0 ABDOMINAL DISTENSION, GASEOUS: Status: ACTIVE | Noted: 2022-05-06

## 2022-05-06 LAB
ANION GAP SERPL CALC-SCNC: 10 MMOL/L (ref 8–16)
BACTERIA BLD CULT: NORMAL
BACTERIA BLD CULT: NORMAL
BUN SERPL-MCNC: 36 MG/DL (ref 8–23)
CALCIUM SERPL-MCNC: 8.9 MG/DL (ref 8.7–10.5)
CHLORIDE SERPL-SCNC: 106 MMOL/L (ref 95–110)
CO2 SERPL-SCNC: 27 MMOL/L (ref 23–29)
CREAT SERPL-MCNC: 1 MG/DL (ref 0.5–1.4)
ERYTHROCYTE [DISTWIDTH] IN BLOOD BY AUTOMATED COUNT: 13.6 % (ref 11.5–14.5)
EST. GFR  (AFRICAN AMERICAN): >60 ML/MIN/1.73 M^2
EST. GFR  (NON AFRICAN AMERICAN): 56.4 ML/MIN/1.73 M^2
GLUCOSE SERPL-MCNC: 116 MG/DL (ref 70–110)
HCT VFR BLD AUTO: 26.4 % (ref 37–48.5)
HGB BLD-MCNC: 8 G/DL (ref 12–16)
MAGNESIUM SERPL-MCNC: 1.7 MG/DL (ref 1.6–2.6)
MCH RBC QN AUTO: 29.6 PG (ref 27–31)
MCHC RBC AUTO-ENTMCNC: 30.3 G/DL (ref 32–36)
MCV RBC AUTO: 98 FL (ref 82–98)
PHOSPHATE SERPL-MCNC: 2.7 MG/DL (ref 2.7–4.5)
PLATELET # BLD AUTO: 208 K/UL (ref 150–450)
PMV BLD AUTO: 10.8 FL (ref 9.2–12.9)
POTASSIUM SERPL-SCNC: 3.7 MMOL/L (ref 3.5–5.1)
RBC # BLD AUTO: 2.7 M/UL (ref 4–5.4)
SODIUM SERPL-SCNC: 143 MMOL/L (ref 136–145)
WBC # BLD AUTO: 8.05 K/UL (ref 3.9–12.7)

## 2022-05-06 PROCEDURE — 25000003 PHARM REV CODE 250: Performed by: INTERNAL MEDICINE

## 2022-05-06 PROCEDURE — 84100 ASSAY OF PHOSPHORUS: CPT | Performed by: INTERNAL MEDICINE

## 2022-05-06 PROCEDURE — 25000003 PHARM REV CODE 250: Performed by: NURSE ANESTHETIST, CERTIFIED REGISTERED

## 2022-05-06 PROCEDURE — C9113 INJ PANTOPRAZOLE SODIUM, VIA: HCPCS | Performed by: INTERNAL MEDICINE

## 2022-05-06 PROCEDURE — 63600175 PHARM REV CODE 636 W HCPCS: Performed by: INTERNAL MEDICINE

## 2022-05-06 PROCEDURE — 99233 SBSQ HOSP IP/OBS HIGH 50: CPT | Mod: ,,, | Performed by: INTERNAL MEDICINE

## 2022-05-06 PROCEDURE — 36415 COLL VENOUS BLD VENIPUNCTURE: CPT | Performed by: INTERNAL MEDICINE

## 2022-05-06 PROCEDURE — 25000003 PHARM REV CODE 250: Performed by: ANESTHESIOLOGY

## 2022-05-06 PROCEDURE — 43235 EGD DIAGNOSTIC BRUSH WASH: CPT | Performed by: INTERNAL MEDICINE

## 2022-05-06 PROCEDURE — 43235 PR EGD, FLEX, DIAGNOSTIC: ICD-10-PCS | Mod: ,,, | Performed by: INTERNAL MEDICINE

## 2022-05-06 PROCEDURE — 99223 PR INITIAL HOSPITAL CARE,LEVL III: ICD-10-PCS | Mod: 25,,, | Performed by: INTERNAL MEDICINE

## 2022-05-06 PROCEDURE — D9220A PRA ANESTHESIA: ICD-10-PCS | Mod: ANES,,, | Performed by: ANESTHESIOLOGY

## 2022-05-06 PROCEDURE — 83735 ASSAY OF MAGNESIUM: CPT | Performed by: INTERNAL MEDICINE

## 2022-05-06 PROCEDURE — 37000008 HC ANESTHESIA 1ST 15 MINUTES: Performed by: INTERNAL MEDICINE

## 2022-05-06 PROCEDURE — 80048 BASIC METABOLIC PNL TOTAL CA: CPT | Performed by: INTERNAL MEDICINE

## 2022-05-06 PROCEDURE — 99223 1ST HOSP IP/OBS HIGH 75: CPT | Mod: 25,,, | Performed by: INTERNAL MEDICINE

## 2022-05-06 PROCEDURE — 99233 PR SUBSEQUENT HOSPITAL CARE,LEVL III: ICD-10-PCS | Mod: ,,, | Performed by: INTERNAL MEDICINE

## 2022-05-06 PROCEDURE — 43235 EGD DIAGNOSTIC BRUSH WASH: CPT | Mod: ,,, | Performed by: INTERNAL MEDICINE

## 2022-05-06 PROCEDURE — 37000009 HC ANESTHESIA EA ADD 15 MINS: Performed by: INTERNAL MEDICINE

## 2022-05-06 PROCEDURE — 27000221 HC OXYGEN, UP TO 24 HOURS

## 2022-05-06 PROCEDURE — 25000003 PHARM REV CODE 250: Performed by: HOSPITALIST

## 2022-05-06 PROCEDURE — 63600175 PHARM REV CODE 636 W HCPCS: Performed by: HOSPITALIST

## 2022-05-06 PROCEDURE — 25000003 PHARM REV CODE 250: Performed by: PHYSICIAN ASSISTANT

## 2022-05-06 PROCEDURE — D9220A PRA ANESTHESIA: ICD-10-PCS | Mod: CRNA,,, | Performed by: NURSE ANESTHETIST, CERTIFIED REGISTERED

## 2022-05-06 PROCEDURE — 12000002 HC ACUTE/MED SURGE SEMI-PRIVATE ROOM

## 2022-05-06 PROCEDURE — D9220A PRA ANESTHESIA: Mod: CRNA,,, | Performed by: NURSE ANESTHETIST, CERTIFIED REGISTERED

## 2022-05-06 PROCEDURE — 85027 COMPLETE CBC AUTOMATED: CPT | Performed by: INTERNAL MEDICINE

## 2022-05-06 PROCEDURE — D9220A PRA ANESTHESIA: Mod: ANES,,, | Performed by: ANESTHESIOLOGY

## 2022-05-06 PROCEDURE — 63600175 PHARM REV CODE 636 W HCPCS: Performed by: NURSE ANESTHETIST, CERTIFIED REGISTERED

## 2022-05-06 RX ORDER — ROCURONIUM BROMIDE 10 MG/ML
INJECTION, SOLUTION INTRAVENOUS
Status: DISCONTINUED | OUTPATIENT
Start: 2022-05-06 | End: 2022-05-06

## 2022-05-06 RX ORDER — PANTOPRAZOLE SODIUM 40 MG/10ML
40 INJECTION, POWDER, LYOPHILIZED, FOR SOLUTION INTRAVENOUS 2 TIMES DAILY
Status: DISCONTINUED | OUTPATIENT
Start: 2022-05-06 | End: 2022-05-11 | Stop reason: HOSPADM

## 2022-05-06 RX ORDER — LORAZEPAM 0.5 MG/1
0.5 TABLET ORAL ONCE
Status: COMPLETED | OUTPATIENT
Start: 2022-05-06 | End: 2022-05-06

## 2022-05-06 RX ORDER — LIDOCAINE HYDROCHLORIDE 20 MG/ML
INJECTION INTRAVENOUS
Status: DISCONTINUED | OUTPATIENT
Start: 2022-05-06 | End: 2022-05-06

## 2022-05-06 RX ORDER — ONDANSETRON 2 MG/ML
4 INJECTION INTRAMUSCULAR; INTRAVENOUS DAILY PRN
Status: DISCONTINUED | OUTPATIENT
Start: 2022-05-06 | End: 2022-05-06 | Stop reason: HOSPADM

## 2022-05-06 RX ORDER — PROPOFOL 10 MG/ML
VIAL (ML) INTRAVENOUS
Status: DISCONTINUED | OUTPATIENT
Start: 2022-05-06 | End: 2022-05-06

## 2022-05-06 RX ORDER — SUCCINYLCHOLINE CHLORIDE 20 MG/ML
INJECTION INTRAMUSCULAR; INTRAVENOUS
Status: DISCONTINUED | OUTPATIENT
Start: 2022-05-06 | End: 2022-05-06

## 2022-05-06 RX ORDER — HEPARIN SODIUM 5000 [USP'U]/ML
7500 INJECTION, SOLUTION INTRAVENOUS; SUBCUTANEOUS EVERY 8 HOURS
Status: DISCONTINUED | OUTPATIENT
Start: 2022-05-06 | End: 2022-05-11 | Stop reason: HOSPADM

## 2022-05-06 RX ADMIN — CHOLECALCIFEROL TAB 25 MCG (1000 UNIT) 1000 UNITS: 25 TAB at 08:05

## 2022-05-06 RX ADMIN — ONDANSETRON 4 MG: 2 INJECTION INTRAMUSCULAR; INTRAVENOUS at 08:05

## 2022-05-06 RX ADMIN — PROPOFOL 150 MG: 10 INJECTION, EMULSION INTRAVENOUS at 01:05

## 2022-05-06 RX ADMIN — VANCOMYCIN HYDROCHLORIDE 1500 MG: 1.5 INJECTION, POWDER, LYOPHILIZED, FOR SOLUTION INTRAVENOUS at 05:05

## 2022-05-06 RX ADMIN — AMMONIUM LACTATE: 12 LOTION TOPICAL at 08:05

## 2022-05-06 RX ADMIN — SUCCINYLCHOLINE CHLORIDE 200 MG: 20 INJECTION, SOLUTION INTRAMUSCULAR; INTRAVENOUS at 01:05

## 2022-05-06 RX ADMIN — MUPIROCIN: 20 OINTMENT TOPICAL at 08:05

## 2022-05-06 RX ADMIN — LIDOCAINE HYDROCHLORIDE 50 MG: 20 INJECTION, SOLUTION INTRAVENOUS at 01:05

## 2022-05-06 RX ADMIN — ROCURONIUM BROMIDE 5 MG: 10 INJECTION, SOLUTION INTRAVENOUS at 01:05

## 2022-05-06 RX ADMIN — SENNOSIDES AND DOCUSATE SODIUM 1 TABLET: 50; 8.6 TABLET ORAL at 08:05

## 2022-05-06 RX ADMIN — HEPARIN SODIUM 7500 UNITS: 5000 INJECTION INTRAVENOUS; SUBCUTANEOUS at 09:05

## 2022-05-06 RX ADMIN — ACETAMINOPHEN AND CODEINE PHOSPHATE 1 TABLET: 300; 30 TABLET ORAL at 09:05

## 2022-05-06 RX ADMIN — PANTOPRAZOLE SODIUM 40 MG: 40 INJECTION, POWDER, FOR SOLUTION INTRAVENOUS at 01:05

## 2022-05-06 RX ADMIN — ACETAMINOPHEN AND CODEINE PHOSPHATE 1 TABLET: 300; 30 TABLET ORAL at 04:05

## 2022-05-06 RX ADMIN — Medication 400 MG: at 08:05

## 2022-05-06 RX ADMIN — Medication 400 MG: at 09:05

## 2022-05-06 RX ADMIN — HEPARIN SODIUM 7500 UNITS: 5000 INJECTION INTRAVENOUS; SUBCUTANEOUS at 07:05

## 2022-05-06 RX ADMIN — PANTOPRAZOLE SODIUM 40 MG: 40 INJECTION, POWDER, FOR SOLUTION INTRAVENOUS at 09:05

## 2022-05-06 RX ADMIN — MICONAZOLE NITRATE: 20 CREAM TOPICAL at 09:05

## 2022-05-06 RX ADMIN — LORAZEPAM 0.5 MG: 0.5 TABLET ORAL at 11:05

## 2022-05-06 RX ADMIN — SODIUM CHLORIDE: 0.9 INJECTION, SOLUTION INTRAVENOUS at 01:05

## 2022-05-06 NOTE — PROGRESS NOTES
Patient returned to the unit from EGD, scope noted gastritis. Patient aao at this time, vitals stable.

## 2022-05-06 NOTE — NURSING TRANSFER
Nursing Transfer Note      5/6/2022     Reason patient is being transferred: postop    Transfer To: [id number]    Transfer via stretcher    Transfer with n/a    Transported by [facility] p[facility]    Medicines sent: n/a    Any special needs or follow-up needed: none    Chart send with patient: Yes    Notified: daughter    Patient reassessed at: 5/6/22    Upon arrival to floor:  Report given to

## 2022-05-06 NOTE — PROGRESS NOTES
[facility] Medicine  Progress Note    Patient Name: Lupis Murcia  MRN: [id number]  Patient Class: IP- Inpatient   Admission Date: 5/1/2022  Length of Stay: 5 days  Attending Physician: John Hardin MD  Primary Care Provider: Bobby Tran MD        Subjective:     Principal Problem:HANDY (acute kidney injury)        HPI:  70 y/o WF hx of Multiple Sclerosis, Left Foot Drop, Lymphedema, Obesity (bmi 45) who is referred for admission for acute kidney injury, hyperkalemia and acute encephalopathy.     Patient's daughter Amanda Lowery provides primary history due to pt somnolence s/p ativan and EMR review.     1 week ago on Monday patient had a fall, partially assisted by family when legs gave out, no head injury or LOC.  Daughter noted through the week patient c/o of weakness and difficulty standing as when she fell, her armpit fell onto the walker, pt c/o of left shoulder pain.   On Thursday it was noted patient unable to stand, she is normally able to perform ADL with her walker, but daughter felt when she came home that pt had not moved.  She was also using adult diaper at this time and since Thursday due to body habitus/size patient has been unable to change her diaper since this time.   She has intermittently been taking her medicines during this time, but daughter noted she was eating less,  sleeping more and making confused statements and sought to bring her to the ED.     ED staff noted pts diaper was saturated with urine, vulvar swelling with intertrigo and buttock pressure injury.   Labs notable for Hyperkalemia to 6.3 and BUn/Cr of 169/3.9.     ED Treatment: Insulin 10uni IV regular, D10 bolus, 1gram Calcium Gluconate.  LR 2.2Liters IV, naBicarb 50meq IV x 1, Ativan 1mg IV   Ceftriaxone 2gm IV x 1      Overview/Hospital Course:  No notes on file    Interval History: Patient lying in bed, no acute distress. Afebtrile overnight. Daughter at bedside. Mental status improving.  HANDY has been improving since espinal placement. Wound care evaluated BLE and sacral wound. US LE negative for DVT. Tylenol #3 controlling BLE pain. Constipation, abdominal distension and N/V today and increased stool softener. KUB showed distension and possible GOO. CT A/P with oral contrast ordered and GI consulted.       Review of Systems   Constitutional:  Positive for activity change, fatigue and fever. Negative for chills.   HENT:  Negative for congestion and trouble swallowing.    Eyes:  Negative for visual disturbance.   Respiratory:  Negative for cough and shortness of breath.    Cardiovascular:  Positive for leg swelling. Negative for chest pain.   Gastrointestinal:  Positive for nausea. Negative for abdominal distention, abdominal pain and vomiting.   Endocrine: Negative for cold intolerance, heat intolerance, polydipsia and polyuria.   Genitourinary:  Negative for difficulty urinating and dysuria.   Musculoskeletal:  Positive for myalgias. Negative for back pain, neck pain and neck stiffness.   Skin:  Positive for wound. Negative for rash.   Neurological:  Positive for weakness. Negative for dizziness and light-headedness.   Hematological:  Negative for adenopathy. Does not bruise/bleed easily.   Psychiatric/Behavioral:  Negative for confusion and sleep disturbance.      Objective:     Vital Signs (Most Recent):  Temp: 98.9 °F (37.2 °C) (05/05/22 2359)  Pulse: 69 (05/05/22 2359)  Resp: 18 (05/05/22 2359)  BP: 127/66 (05/05/22 2359)  SpO2: 97 % (05/05/22 2359)   Vital Signs (24h Range):  Temp:  [98.5 °F (36.9 °C)-100.1 °F (37.8 °C)] 98.9 °F (37.2 °C)  Pulse:  [64-74] 69  Resp:  [17-18] 18  SpO2:  [89 %-100 %] 97 %  BP: (111-147)/(57-70) 127/66     Weight: 100.8 kg (222 lb 3.6 oz)  Body mass index is 40.65 kg/m².    Intake/Output Summary (Last 24 hours) at 5/6/2022 0121  Last data filed at 5/5/2022 2200  Gross per 24 hour   Intake 1912.94 ml   Output 1310 ml   Net 602.94 ml        Physical  Exam  Constitutional:       Appearance: She is well-developed. She is obese. She is ill-appearing.   HENT:      Head: Normocephalic and atraumatic.      Mouth/Throat:      Mouth: Mucous membranes are moist.   Eyes:      General: No scleral icterus.     Extraocular Movements: Extraocular movements intact.      Pupils: Pupils are equal, round, and reactive to light.   Neck:      Vascular: No JVD.   Cardiovascular:      Rate and Rhythm: Normal rate and regular rhythm.      Heart sounds: No murmur heard.    No friction rub. No gallop.   Pulmonary:      Effort: Pulmonary effort is normal. No respiratory distress.      Breath sounds: Normal breath sounds. No wheezing or rales.   Abdominal:      General: Bowel sounds are normal. There is distension.      Palpations: Abdomen is soft.      Tenderness: There is abdominal tenderness. There is no guarding or rebound.   Musculoskeletal:         General: No deformity. Normal range of motion.      Cervical back: Neck supple.   Lymphadenopathy:      Cervical: No cervical adenopathy.   Skin:     General: Skin is warm and dry.      Capillary Refill: Capillary refill takes less than 2 seconds.      Findings: No erythema or rash.      Comments: Severe chronic venous stasis changes and lymphedema in BLE. TTP  Sacral wound   Neurological:      General: No focal deficit present.      Mental Status: She is alert and oriented to person, place, and time.      Cranial Nerves: No cranial nerve deficit.      Sensory: No sensory deficit.      Motor: Weakness present.   Psychiatric:         Mood and Affect: Mood normal.         Cognition and Memory: Cognition is impaired. Memory is impaired.       Significant Labs: All pertinent labs within the past 24 hours have been reviewed.    Significant Imaging: I have reviewed all pertinent imaging results/findings within the past 24 hours.      Assessment/Plan:      * HANDY (acute kidney injury)  Patient with acute kidney injury likely d/t IVVD/Dehydration  and Pre-renal azotemia Which is currently improving. Labs reviewed- Renal function/electrolytes with Estimated Creatinine Clearance: 56.5 mL/min (based on SCr of 1 mg/dL). according to latest data. Monitor urine output and serial BMP and adjust therapy as needed. Avoid nephrotoxins and renally dose meds for GFR listed above.   -patient with non-oliguric handy that is likely pre-renal in etiology, but higher severity given hyperkalemia, metabolic acidosis and uremic encephalopathy.   -obtain urine electrolytes,   -trend BMP   - Give IV Bicarb Drip x 1 L to assist with volume and acidosis management.   -Nursing made multiple attempts at Espinal placement but pt vular swelling unable to pass, pt urinating connected to purewick.   MOnitor bladder scans if UOP falls as her bladder appears distended on CT scan but no hydronephrosis present.   -hold home anti-hypertensives (ARB/Thiazide) hold any NSAIDs, pt was taking both in last week.   -nephrology consultation. apprec recs  - completed D5W  - IMPROVING    Abdominal distension, gaseous  Nausea/Vomiting  - KUB on 5/5 showed severe gaseous distension concerning for gastric outlet obstruction  - continue bowel regimen   - followup CT A/P with oral contrast  - GI consulted. followup recs  - continue anti-emetics  - cautious with opioids       Hypernatremia  - Na 146 --> 149  - switched to D5W  - nephro following   - BMP daily  - RESOLVED      Cellulitis of leg  - continue vancomycin   - ID following, apprec recs  - continue local wound care per wound care  - discontinued cefepime    Urinary retention  - continue espinal   -voiding trial once encephalopathy resolves      Encephalopathy, metabolic  Secondary to uremia vs infection   -fall  -aspiration precautions  - HANDY resolved with IVF  - now rising Na so encephalopathy could also be associated with hypernatremia  - changed to D5W  - nephro following   - BMP daily  - IMPROVING    Pressure injury of buttock, unstageable  q2  turns  -low airloss overlay mattress  -wound care consultation       Lymphedema  -wound care consultation to assist in managmeent  - continue vancomycin for cellulitis     Hyperkalemia  As above   q4hr BMP  -bicarb infusion to help acidosis management.       MS (multiple sclerosis)  ENTER PT/OT consults when patient is more awake alert and can work with therapy services.         VTE Risk Mitigation (From admission, onward)         Ordered     heparin (porcine) injection 7,500 Units  Every 8 hours         05/02/22 0232     IP VTE HIGH RISK PATIENT  Once         05/02/22 0232     Place sequential compression device  Until discontinued         05/02/22 0232                Discharge Planning   USMAN: 5/12/2022     Code Status: Full Code   Is the patient medically ready for discharge?: No    Reason for patient still in hospital (select all that apply): Patient unstable, Patient new problem, Patient trending condition, Laboratory test, Treatment, Imaging, Consult recommendations, PT / OT recommendations and Pending disposition  Discharge Plan A: Skilled Nursing Facility            Time spent in care of patient: > 35 minutes         John Hardin MD  Department of Hospital Medicine   [facility] - [facility]

## 2022-05-06 NOTE — HPI
Lupis Murcia is a 70 y/o WF hx of Multiple Sclerosis, Left Foot Drop, Lymphedema, Obesity who admitted for AMS, HANDY. GI consulted today for concerns of GOO.   She has been in the hospital for about 5 days, getting treated for HANDY, hyperkalemia and AMS, improving from that prospective. She started to have abdominal distension yesterday. She initally had diarrhea, but then had BM daily with formed stool. She had nausea once and vomiting once about 2 days ago. KUB was ordered and showed abdominal distension and possible gastric outlet obstruction. She never had EGD on Colonoscopy in the past. She has no other complains. Hx obtained for the daughter too at bedside. No abdominal surgery in the past. Not on AC, just DVT ppx while in hospital.

## 2022-05-06 NOTE — ANESTHESIA POSTPROCEDURE EVALUATION
Anesthesia Post Evaluation    Patient: Lupis Murcia    Procedure(s) Performed: Procedure(s) (LRB):  EGD (ESOPHAGOGASTRODUODENOSCOPY) (N/A)    Final Anesthesia Type: general      Patient location during evaluation: PACU  Patient participation: Yes- Able to Participate  Level of consciousness: awake and alert  Post-procedure vital signs: reviewed and stable  Pain management: adequate  Airway patency: patent    PONV status at discharge: No PONV  Anesthetic complications: no      Cardiovascular status: blood pressure returned to baseline  Respiratory status: unassisted, spontaneous ventilation and room air  Hydration status: euvolemic  Follow-up not needed.          Vitals Value Taken Time   /65 05/06/22 1415   Temp 36.8 °C (98.2 °F) 05/06/22 1400   Pulse 65 05/06/22 1415   Resp 16 05/06/22 1415   SpO2 95 % 05/06/22 1415         Event Time   Out of Recovery 14:16:00         Pain/Enriqueta Score: Pain Rating Prior to Med Admin: 6 (5/6/2022  4:50 AM)  Pain Rating Post Med Admin: 2 (5/6/2022  5:50 AM)  Enriqueta Score: 10 (5/6/2022  2:00 PM)

## 2022-05-06 NOTE — TRANSFER OF CARE
"Anesthesia Transfer of Care Note    Patient: Lupis Murcia    Procedure(s) Performed: Procedure(s) (LRB):  EGD (ESOPHAGOGASTRODUODENOSCOPY) (N/A)    Patient location: PACU    Anesthesia Type: general    Transport from OR: Transported from OR on 6-10 L/min O2 by face mask with adequate spontaneous ventilation    Post pain: adequate analgesia    Post assessment: no apparent anesthetic complications and tolerated procedure well    Post vital signs: stable    Level of consciousness: awake and alert    Nausea/Vomiting: no nausea/vomiting    Complications: none    Transfer of care protocol was followed      Last vitals:   Visit Vitals  /60 (BP Location: Left arm, Patient Position: Lying)   Pulse 75   Temp 37.1 °C (98.8 °F) (Temporal)   Resp 20   Ht 5' 2" (1.575 m)   Wt 100.2 kg (220 lb 14.4 oz)   LMP  (LMP Unknown)   SpO2 99%   Breastfeeding No   BMI 40.40 kg/m²     "

## 2022-05-06 NOTE — ASSESSMENT & PLAN NOTE
Secondary to uremia vs infection   -fall  -aspiration precautions  - HANDY resolved with IVF  - now rising Na so encephalopathy could also be associated with hypernatremia  - changed to D5W  - nephro following   - BMP daily  - IMPROVING

## 2022-05-06 NOTE — ASSESSMENT & PLAN NOTE
Nausea/Vomiting  - KUB on 5/5 showed severe gaseous distension concerning for gastric outlet obstruction  - continue bowel regimen   - followup CT A/P with oral contrast  - GI consulted. followup recs  - continue anti-emetics  - cautious with opioids

## 2022-05-06 NOTE — ANESTHESIA PROCEDURE NOTES
Intubation    Date/Time: 5/6/2022 1:13 PM  Performed by: Nela Gates CRNA  Authorized by: Yue France MD     Intubation:     Induction:  Rapid sequence induction    Intubated:  Postinduction    Mask Ventilation:  N/a    Attempts:  1    Attempted By:  CRNA    Method of Intubation:  Video laryngoscopy    Blade:  Teresa 3    Laryngeal View Grade: Grade I - full view of cords      Difficult Airway Encountered?: No      Complications:  None    Airway Device:  Oral endotracheal tube    Airway Device Size:  7.5    Style/Cuff Inflation:  Cuffed    Inflation Amount (mL):  5    Tube secured:  21    Secured at:  The lips    Placement Verified By:  Capnometry    Complicating Factors:  None    Findings Post-Intubation:  BS equal bilateral and atraumatic/condition of teeth unchanged

## 2022-05-06 NOTE — CONSULTS
[facility] - [facility]  Gastroenterology  Consult Note    Patient Name: Lupis Murcia  MRN: [id number]  Admission Date: 2022  Hospital Length of Stay: 5 days  Code Status: Full Code   Attending Provider: John Hardin MD   Consulting Provider: Rizwan Barrett MD  Primary Care Physician: Bobby Tran MD  Principal Problem:HANDY (acute kidney injury)    Inpatient consult to Gastroenterology  Consult performed by: Rizwan Barrett MD  Consult ordered by: John Hardin MD        Subjective:     HPI:  Lupis Murcia is a 72 y/o WF hx of Multiple Sclerosis, Left Foot Drop, Lymphedema, Obesity who admitted for AMS, HANDY. GI consulted today for concerns of GOO.   She has been in the hospital for about 5 days, getting treated for HANDY, hyperkalemia and AMS, improving from that prospective. She started to have abdominal distension yesterday. She initally had diarrhea, but then had BM daily with formed stool. She had nausea once and vomiting once about 2 days ago. KUB was ordered and showed abdominal distension and possible gastric outlet obstruction. She never had EGD on Colonoscopy in the past. She has no other complains. Hx obtained for the daughter too at bedside. No abdominal surgery in the past. Not on AC, just DVT ppx while in hospital.            Past Medical History:   Diagnosis Date    MS (multiple sclerosis)     Vitamin B12 deficiency        Past Surgical History:   Procedure Laterality Date    BREAST CYST EXCISION Left     over 40yrs ago     SECTION         Review of patient's allergies indicates:   Allergen Reactions    Contrast media Shortness Of Breath and Rash    Pcn [penicillins] Shortness Of Breath and Rash    Celebrex [celecoxib] Other (See Comments)     Swallowing problems     Diazepam Hives    Motrin [ibuprofen] Rash     Family History       Problem Relation (Age of Onset)    Brain cancer Brother    Coronary artery disease Father    Lung cancer Father, Mother          Tobacco  Use    Smoking status: Never Smoker    Smokeless tobacco: Never Used   Substance and Sexual Activity    Alcohol use: No    Drug use: No    Sexual activity: Not on file     Review of Systems   Constitutional:  Positive for activity change and appetite change. Negative for fever.   HENT:  Negative for congestion, sore throat and trouble swallowing.    Respiratory:  Negative for cough, shortness of breath and wheezing.    Cardiovascular:  Positive for leg swelling. Negative for chest pain.   Gastrointestinal:  Positive for abdominal distention, constipation, diarrhea, nausea and vomiting. Negative for abdominal pain and blood in stool.   Genitourinary:  Negative for difficulty urinating, dysuria and frequency.   Musculoskeletal:  Positive for gait problem. Negative for arthralgias and back pain.   Skin:  Positive for wound. Negative for color change.   Neurological:  Negative for dizziness, light-headedness and headaches.   Psychiatric/Behavioral:  Negative for agitation, behavioral problems and confusion.    Objective:     Vital Signs (Most Recent):  Temp: 98 °F (36.7 °C) (05/06/22 0342)  Pulse: 66 (05/06/22 0830)  Resp: 18 (05/06/22 0450)  BP: 107/60 (05/06/22 0342)  SpO2: (!) 94 % (05/06/22 0830)   Vital Signs (24h Range):  Temp:  [98 °F (36.7 °C)-99.2 °F (37.3 °C)] 98 °F (36.7 °C)  Pulse:  [59-70] 66  Resp:  [17-18] 18  SpO2:  [91 %-99 %] 94 %  BP: (107-147)/(58-70) 107/60     Weight: 100.2 kg (220 lb 14.4 oz) (05/06/22 0500)  Body mass index is 40.4 kg/m².      Intake/Output Summary (Last 24 hours) at 5/6/2022 0834  Last data filed at 5/6/2022 0600  Gross per 24 hour   Intake 966.04 ml   Output 1460 ml   Net -493.96 ml       Lines/Drains/Airways       Drain  Duration                  Urethral Catheter 05/02/22 1353 3 days              Peripheral Intravenous Line  Duration                  Peripheral IV - Single Lumen 05/05/22 1514 20 G;1 3/4 in Right;Anterior Upper Arm <1 day                    Physical  Exam  Constitutional:       Appearance: Normal appearance. She is obese.   HENT:      Head: Normocephalic and atraumatic.      Nose: Nose normal.      Mouth/Throat:      Mouth: Mucous membranes are moist.   Eyes:      Pupils: Pupils are equal, round, and reactive to light.   Cardiovascular:      Rate and Rhythm: Normal rate.      Pulses: Normal pulses.   Pulmonary:      Effort: Pulmonary effort is normal. No respiratory distress.      Breath sounds: No wheezing or rales.   Abdominal:      General: Bowel sounds are normal. There is distension.      Tenderness: There is no abdominal tenderness. There is no guarding.   Musculoskeletal:      Right lower leg: Edema present.      Left lower leg: Edema present.   Skin:     General: Skin is warm.      Comments: Lymphedema in both legs    Neurological:      General: No focal deficit present.      Mental Status: She is alert and oriented to person, place, and time.   Psychiatric:         Mood and Affect: Mood normal.         Behavior: Behavior normal.       Significant Labs:  CBC:   Recent Labs   Lab 05/04/22  0942 05/05/22  0828 05/06/22  0646   WBC 6.64 8.40 8.05   HGB 8.1* 8.0* 8.0*   HCT 26.5* 26.1* 26.4*    213 208     CMP:   Recent Labs   Lab 05/06/22  0646   *   CALCIUM 8.9      K 3.7   CO2 27      BUN 36*   CREATININE 1.0     All pertinent lab results from the last 24 hours have been reviewed.    Significant Imaging:  Imaging results within the past 24 hours have been reviewed.    Assessment/Plan:     Abdominal distension  Lupis Murcia is a 72 y/o WF hx of Multiple Sclerosis, Left Foot Drop, Lymphedema, Obesity who admitted for AMS, HANDY. GI consulted today for concerns of GOO.     KUB was ordered and showed abdominal distension and possible gastric outlet obstruction. She never had EGD on Colonoscopy in the past.      Recommendation:   - Keep NPO   - Hold DVT ppx today  - Will proceed with EGD today   - Please call for any questions or  concerns.         Thank you for your consult. I will follow-up with patient. Please contact us if you have any additional questions.    Rizwan Barrett MD  Gastroenterology  [facility]

## 2022-05-06 NOTE — PT/OT/SLP PROGRESS
Occupational Therapy      Patient Name:  Lupis Murcia   MRN:  [id number]    Patient not seen today secondary to Pt about to be transferred [facility] for EDG procedure. Pt and family member requesting to hold this date due to abdominal pain and pending EGD procedure. Will follow-up on 5/7.    5/6/2022

## 2022-05-06 NOTE — PT/OT/SLP PROGRESS
Physical Therapy      Patient Name:  Lupis Murcia   MRN:  [id number]    Patient not seen today secondary to  (pt. going for EGD). Will follow-up over weekend or Monday.    Horace Denton, PT  5/6/2022

## 2022-05-06 NOTE — PLAN OF CARE
"Patient aao today, according to patients daughter patient seems "much clearer". One bm noted, loose dark green, incontinent of stool. Wound care done to sacral DTI and perineum excoriation. Wound care done to BLE ammonium motion applied. EGD showed gastritis. Nausea noted once today, zofran given, no emesis. Vitals stable. Patient did not work with OT due to the pending EGD, patient declined. Creatinine 1, . T max 100.4.   Patients daughter at the bedside, very attentive to the patient and involved in patients care.  "

## 2022-05-06 NOTE — ANESTHESIA PREPROCEDURE EVALUATION
[facility]  Anesthesia Pre-Operative Evaluation         Patient Name: Lupis Murcia  YOB: 1950  MRN: [id number]    SUBJECTIVE:     Pre-operative evaluation for Procedure(s) (LRB):  EGD (ESOPHAGOGASTRODUODENOSCOPY) (N/A)     05/06/2022    Lupis Murcia is a 70 y/o WF hx of Multiple Sclerosis, Left Foot Drop, Lymphedema, Obesity who admitted for AMS, HANDY. GI consulted today for concerns of GOO.   She has been in the hospital for about 5 days, getting treated for HANDY, hyperkalemia and AMS, improving from that prospective. She started to have abdominal distension yesterday. She initally had diarrhea, but then had BM daily with formed stool. She had nausea once and vomiting once about 2 days ago. KUB was ordered and showed abdominal distension and possible gastric outlet obstruction. She never had EGD on Colonoscopy in the past. She has no other complains. Hx obtained for the daughter too at bedside. No abdominal surgery in the past. Not on AC, just DVT ppx while in hospital.     Patient now presents for the above procedure(s).      LDA: None documented.       Peripheral IV - Single Lumen 05/05/22 1514 20 G;1 3/4 in Right;Anterior Upper Arm (Active)   Site Assessment Clean;Dry;Intact;No redness;No swelling 05/05/22 1930   Extremity Assessment Distal to IV No abnormal discoloration 05/05/22 1930   Line Status Flushed;Saline locked;Blood return noted 05/05/22 1930   Dressing Status Clean;Dry;Intact 05/05/22 1930   Dressing Intervention Integrity maintained 05/05/22 1930   Dressing Change Due 05/09/22 05/05/22 1930   Site Change Due 05/09/22 05/05/22 1930   Reason Not Rotated Not due 05/05/22 1930   Number of days: 0            Urethral Catheter 05/02/22 1353 (Active)   $ Slaughter Insertion Bedside Insertion Performed 05/02/22 1200   Site Assessment Clean;Intact 05/05/22 1930   Collection Container Urimeter 05/05/22 1930   Securement Method secured to top of thigh w/ adhesive device  "05/05/22 1930   Catheter Care Performed yes 05/05/22 1930   Reason for Continuing Urinary Catheterization Placed by Urology Service 05/05/22 1930   CAUTI Prevention Bundle Securement Device in place with 1" slack;Intact seal between catheter & drainage tubing;Drainage bag/urimeter off the floor;Sheeting clip in use;No dependent loops or kinks;Drainage bag/urimeter not overfilled (<2/3 full);Drainage bag/urimeter below bladder 05/05/22 1930   Output (mL) 450 mL 05/06/22 0500   Number of days: 3        Prev airway: None documented.    Drips: None documented.       Patient Active Problem List   Diagnosis    Gait abnormality    Babinski reflex    Weakness of left hip    Negron sign present    Foot drop, left    Insomnia secondary to chronic pain    Abnormal brain MRI    Gait disturbance    Anemia    Vitamin B 12 deficiency    Muscle weakness of lower extremity    Impaired functional mobility, balance, gait, and endurance    At high risk for falls    Decreased ROM of ankle    Paraparesis    MS (multiple sclerosis)    Vitamin D deficiency    Leg weakness    Abnormal muscle tone    HANDY (acute kidney injury)    Hyperkalemia    Lymphedema    Pressure injury of buttock, unstageable    Encephalopathy, metabolic    Urinary retention    Cellulitis of leg    Hypernatremia    Abdominal distension       Review of patient's allergies indicates:   Allergen Reactions    Contrast media Shortness Of Breath and Rash    Pcn [penicillins] Shortness Of Breath and Rash    Celebrex [celecoxib] Other (See Comments)     Swallowing problems     Diazepam Hives    Motrin [ibuprofen] Rash       Current Outpatient Medications:    Current Facility-Administered Medications:     0.9%  NaCl infusion, , Intravenous, PRN, John Hardin MD    acetaminophen tablet 650 mg, 650 mg, Oral, Q4H PRN, Ritesh Euceda MD, 650 mg at 05/05/22 0424    acetaminophen-codeine 300-30mg per tablet 1 tablet, 1 tablet, Oral, Q6H " PRN, John Hardin MD, 1 tablet at 05/06/22 0450    albuterol-ipratropium 2.5 mg-0.5 mg/3 mL nebulizer solution 3 mL, 3 mL, Nebulization, Q4H PRN, Clinton Burciaga DO, 3 mL at 05/02/22 0453    ammonium lactate 12 % lotion, , Topical (Top), BID PRN, John Hardin MD, Given by Other at 05/06/22 0839    B-complex with vitamin C tablet 1 tablet, 1 tablet, Oral, Daily, Ritesh Euceda MD    bisacodyL suppository 10 mg, 10 mg, Rectal, Daily PRN, John Hardin MD    dextrose 10% bolus 125 mL, 12.5 g, Intravenous, PRN, Howie Lee PA-C    dextrose 10% bolus 250 mL, 25 g, Intravenous, PRN, Howie Lee PA-C, Stopped at 05/01/22 2024    heparin (porcine) injection 7,500 Units, 7,500 Units, Subcutaneous, Q8H, John Hardin MD    magnesium citrate solution 296 mL, 296 mL, Oral, Once, John Hardin MD    magnesium oxide tablet 400 mg, 400 mg, Oral, BID, John Hardin MD, 400 mg at 05/06/22 0857    melatonin tablet 6 mg, 6 mg, Oral, Nightly PRN, Howie Lee PA-C    metoclopramide HCl injection 10 mg, 10 mg, Intravenous, Q6H PRN, John Hardin MD, 10 mg at 05/05/22 1452    miconazole 2 % cream, , Topical (Top), BID, Howie Lee PA-C, Given at 05/06/22 0900    mupirocin 2 % ointment, , Nasal, BID, John Hardin MD, Given by Other at 05/06/22 0838    OLANZapine injection 2.5 mg, 2.5 mg, Intramuscular, Q6H PRN, John Hardin MD, 2.5 mg at 05/02/22 2345    OLANZapine tablet 2.5 mg, 2.5 mg, Oral, Q6H PRN, John Hardin MD    ondansetron injection 4 mg, 4 mg, Intravenous, Q8H PRN, Ritesh Euceda MD, 4 mg at 05/06/22 0853    polyethylene glycol packet 17 g, 17 g, Oral, BID PRN, Ritesh Euceda MD    senna-docusate 8.6-50 mg per tablet 1 tablet, 1 tablet, Oral, Daily, Ritesh Euceda MD, 1 tablet at 05/06/22 0857    sodium chloride 0.9% flush 10 mL, 10 mL, Intravenous, PRN, Howie Lee PA-C, 10 mL at 05/05/22 2030    Pharmacy to dose Vancomycin consult, , , Once **AND**  vancomycin - pharmacy to dose, , Intravenous, pharmacy to manage frequency, Clinton Burciaga,     vancomycin 1.5 g in dextrose 5 % 250 mL IVPB (ready to mix), 1,500 mg, Intravenous, Q24H, John Hardin MD, Stopped at 22    vitamin D 1000 units tablet 1,000 Units, 1,000 Units, Oral, Daily, Ritesh Euceda MD, 1,000 Units at 22 0857    Past Surgical History:   Procedure Laterality Date    BREAST CYST EXCISION Left     over 40yrs ago     SECTION         Social History     Socioeconomic History    Marital status:    Tobacco Use    Smoking status: Never Smoker    Smokeless tobacco: Never Used   Substance and Sexual Activity    Alcohol use: No    Drug use: No       OBJECTIVE:     Vital Signs Range (Last 24H):  Temp:  [36.6 °C (97.9 °F)-37.3 °C (99.2 °F)]   Pulse:  [59-70]   Resp:  [17-18]   BP: (107-147)/(58-70)   SpO2:  [91 %-99 %]       Significant Labs:  Lab Results   Component Value Date    WBC 8.05 2022    HGB 8.0 (L) 2022    HCT 26.4 (L) 2022     2022    CHOL 191 2021    TRIG 120 2021    HDL 48 2021    ALT 12 2022    AST 13 2022     2022    K 3.7 2022     2022    CREATININE 1.0 2022    BUN 36 (H) 2022    CO2 27 2022    TSH 1.863 03/15/2016    HGBA1C 6.2 (H) 2022       Diagnostic Studies: No relevant studies.    EKG:   Results for orders placed or performed during the hospital encounter of 22   EKG 12-lead    Collection Time: 22 11:45 AM    Narrative    Test Reason : Z91.89,    Vent. Rate : 087 BPM     Atrial Rate : 087 BPM     P-R Int : 148 ms          QRS Dur : 068 ms      QT Int : 362 ms       P-R-T Axes : 049 -13 009 degrees     QTc Int : 435 ms    Sinus rhythm with artifact  Minimal voltage criteria for LVH, may be normal variant ( R in aVL )  Nonspecific T wave abnormality  Abnormal ECG  When compared with ECG of 01-MAY-2022  22:33,  Premature ventricular complexes are now Present  ST no longer depressed in Inferior leads  Nonspecific T wave abnormality has replaced inverted T waves in Inferior  leads  Nonspecific T wave abnormality, worse in Lateral leads  Confirmed by Jeovany GIBBS MD ([id number]) on 5/3/2022 3:23:49 PM    Referred By: RAFAEL   SELF           Confirmed By:Jeovany GIBBS MD       2D ECHO:  TTE:  No results found for this or any previous visit.    PETE:  No results found for this or any previous visit.    ASSESSMENT/PLAN:           Pre-op Assessment    I have reviewed the Patient Summary Reports.    I have reviewed the NPO Status.      Review of Systems  Cardiovascular:  Cardiovascular Normal     Pulmonary:  Pulmonary Normal    Neurological:  Neurology Normal        Physical Exam  General: Well nourished    Airway:  Mallampati: III       Chest/Lungs:  Clear to auscultation, Normal Respiratory Rate    Heart:  Rate: Normal  Rhythm: Regular Rhythm        Anesthesia Plan  Type of Anesthesia, risks & benefits discussed:    Anesthesia Type: MAC, Gen Supraglottic Airway  Post Op Pain Control Plan: multimodal analgesia  Airway Plan: Direct  Informed Consent: Informed consent signed with the Patient and all parties understand the risks and agree with anesthesia plan.  All questions answered.   ASA Score: 3    Ready For Surgery From Anesthesia Perspective.     .

## 2022-05-06 NOTE — ASSESSMENT & PLAN NOTE
Lupis Murcia is a 72 y/o WF hx of Multiple Sclerosis, Left Foot Drop, Lymphedema, Obesity who admitted for AMS, HANDY. GI consulted today for concerns of GOO.     KUB was ordered and showed abdominal distension and possible gastric outlet obstruction. She never had EGD on Colonoscopy in the past.      Recommendation:   - Keep NPO   - Hold DVT ppx today  - Will proceed with EGD today   - Please call for any questions or concerns.

## 2022-05-06 NOTE — PLAN OF CARE
[facility] - Trans[facility] Stepdown  Discharge Reassessment    Primary Care Provider: Bobby Tran MD    Expected Discharge Date: 5/12/2022    Per MD, patient to have an EGD today.  Not medically ready for discharge        Reassessment (most recent)     Discharge Reassessment - 05/06/22 1000        Discharge Reassessment    Assessment Type Discharge Planning Reassessment     Did the patient's condition or plan change since previous assessment? No     Communicated USMAN with patient/caregiver Date not available/Unable to determine     Discharge Plan A Skilled Nursing Facility     Discharge Plan B Home Health     DME Needed Upon Discharge  none     Discharge Barriers Identified None     Why the patient remains in the hospital Requires continued medical care               Wendie Hurley, RN, CCRN-K, CCM  Neuro-Critical Care   [phone]

## 2022-05-06 NOTE — PROVATION PATIENT INSTRUCTIONS
Discharge Summary/Instructions after an Endoscopic Procedure  Patient Name: Lupis Murcia  Patient MRN: [id number]  Patient YOB: 1950  Friday, May 6, 2022  Radha Arango MD  Dear patient,  As a result of recent federal legislation (The Federal Cures Act), you may   receive lab or pathology results from your procedure in your [facility]   account before your physician is able to contact you. Your physician or   their representative will relay the results to you with their   recommendations at their soonest availability.  Thank you,  RESTRICTIONS:  During your procedure today, you received medications for sedation.  These   medications may affect your judgment, balance and coordination.  Therefore,   for 24 hours, you have the following restrictions:   - DO NOT drive a car, operate machinery, make legal/financial decisions,   sign important papers or drink alcohol.    ACTIVITY:  Today: no heavy lifting, straining or running due to procedural   sedation/anesthesia.  The following day: return to full activity including work.  DIET:  Eat and drink normally unless instructed otherwise.     TREATMENT FOR COMMON SIDE EFFECTS:  - Mild abdominal pain, nausea, belching, bloating or excessive gas:  rest,   eat lightly and use a heating pad.  - Sore Throat: treat with throat lozenges and/or gargle with warm salt   water.  - Because air was used during the procedure, expelling large amounts of air   from your rectum or belching is normal.  - If a bowel prep was taken, you may not have a bowel movement for 1-3 days.    This is normal.  SYMPTOMS TO WATCH FOR AND REPORT TO YOUR PHYSICIAN:  1. Abdominal pain or bloating, other than gas cramps.  2. Chest pain.  3. Back pain.  4. Signs of infection such as: chills or fever occurring within 24 hours   after the procedure.  5. Rectal bleeding, which would show as bright red, maroon, or black stools.   (A tablespoon of blood from the rectum is not serious, especially if    hemorrhoids are present.)  6. Vomiting.  7. Weakness or dizziness.  GO DIRECTLY TO THE NEAREST EMERGENCY ROOM IF YOU HAVE ANY OF THE FOLLOWING:      Difficulty breathing              Chills and/or fever over 101 F   Persistent vomiting and/or vomiting blood   Severe abdominal pain   Severe chest pain   Black, tarry stools   Bleeding- more than one tablespoon   Any other symptom or condition that you feel may need urgent attention  Your doctor recommends these additional instructions:  If any biopsies were taken, your doctors clinic will contact you in 1 to 2   weeks with any results.  - [facility] for [facility].   - Advance diet as tolerated.   - Continue present medications. Protonix 40mg IV BID started.  - Recommend CT abdomen and pelvis to further evaluate for a cause for her   presentation.  - Repeat upper endoscopy in 8 weeks to check healing of her esophagitis.  For questions, problems or results please call your physician - Radha Arango MD at Work:  [phone].  [facility], EMERGENCY ROOM PHONE NUMBER: [phone]  IF A COMPLICATION OR EMERGENCY SITUATION ARISES AND YOU ARE UNABLE TO REACH   YOUR PHYSICIAN - GO DIRECTLY TO THE EMERGENCY ROOM.  Radha Arango MD  5/6/2022 1:42:13 PM  This report has been verified and signed electronically.  Dear patient,  As a result of recent federal legislation (The Federal Cures Act), you may   receive lab or pathology results from your procedure in your [facility]   account before your physician is able to contact you. Your physician or   their representative will relay the results to you with their   recommendations at their soonest availability.  Thank you,  PROVATION

## 2022-05-06 NOTE — ASSESSMENT & PLAN NOTE
- continue vancomycin   - ID following, apprec recs  - continue local wound care per wound care  - discontinued cefepime

## 2022-05-06 NOTE — PROGRESS NOTES
Patient incontinent of stool this am, dark green liquid stool noted. Patient max assist to turn, barrier applied to DTI. BLE cleaned and ammonium lotion applied, pad changed.

## 2022-05-06 NOTE — SUBJECTIVE & OBJECTIVE
Past Medical History:   Diagnosis Date    MS (multiple sclerosis)     Vitamin B12 deficiency        Past Surgical History:   Procedure Laterality Date    BREAST CYST EXCISION Left     over 40yrs ago     SECTION         Review of patient's allergies indicates:   Allergen Reactions    Contrast media Shortness Of Breath and Rash    Pcn [penicillins] Shortness Of Breath and Rash    Celebrex [celecoxib] Other (See Comments)     Swallowing problems     Diazepam Hives    Motrin [ibuprofen] Rash     Family History       Problem Relation (Age of Onset)    Brain cancer Brother    Coronary artery disease Father    Lung cancer Father, Mother          Tobacco Use    Smoking status: Never Smoker    Smokeless tobacco: Never Used   Substance and Sexual Activity    Alcohol use: No    Drug use: No    Sexual activity: Not on file     Review of Systems   Constitutional:  Positive for activity change and appetite change. Negative for fever.   HENT:  Negative for congestion, sore throat and trouble swallowing.    Respiratory:  Negative for cough, shortness of breath and wheezing.    Cardiovascular:  Positive for leg swelling. Negative for chest pain.   Gastrointestinal:  Positive for abdominal distention, constipation, diarrhea, nausea and vomiting. Negative for abdominal pain and blood in stool.   Genitourinary:  Negative for difficulty urinating, dysuria and frequency.   Musculoskeletal:  Positive for gait problem. Negative for arthralgias and back pain.   Skin:  Positive for wound. Negative for color change.   Neurological:  Negative for dizziness, light-headedness and headaches.   Psychiatric/Behavioral:  Negative for agitation, behavioral problems and confusion.    Objective:     Vital Signs (Most Recent):  Temp: 98 °F (36.7 °C) (22 0342)  Pulse: 66 (22 0830)  Resp: 18 (22 0450)  BP: 107/60 (22 0342)  SpO2: (!) 94 % (22 0830)   Vital Signs (24h Range):  Temp:  [98 °F (36.7 °C)-99.2 °F (37.3 °C)]  98 °F (36.7 °C)  Pulse:  [59-70] 66  Resp:  [17-18] 18  SpO2:  [91 %-99 %] 94 %  BP: (107-147)/(58-70) 107/60     Weight: 100.2 kg (220 lb 14.4 oz) (05/06/22 0500)  Body mass index is 40.4 kg/m².      Intake/Output Summary (Last 24 hours) at 5/6/2022 0834  Last data filed at 5/6/2022 0600  Gross per 24 hour   Intake 966.04 ml   Output 1460 ml   Net -493.96 ml       Lines/Drains/Airways       Drain  Duration                  Urethral Catheter 05/02/22 1353 3 days              Peripheral Intravenous Line  Duration                  Peripheral IV - Single Lumen 05/05/22 1514 20 G;1 3/4 in Right;Anterior Upper Arm <1 day                    Physical Exam  Constitutional:       Appearance: Normal appearance. She is obese.   HENT:      Head: Normocephalic and atraumatic.      Nose: Nose normal.      Mouth/Throat:      Mouth: Mucous membranes are moist.   Eyes:      Pupils: Pupils are equal, round, and reactive to light.   Cardiovascular:      Rate and Rhythm: Normal rate.      Pulses: Normal pulses.   Pulmonary:      Effort: Pulmonary effort is normal. No respiratory distress.      Breath sounds: No wheezing or rales.   Abdominal:      General: Bowel sounds are normal. There is distension.      Tenderness: There is no abdominal tenderness. There is no guarding.   Musculoskeletal:      Right lower leg: Edema present.      Left lower leg: Edema present.   Skin:     General: Skin is warm.      Comments: Lymphedema in both legs    Neurological:      General: No focal deficit present.      Mental Status: She is alert and oriented to person, place, and time.   Psychiatric:         Mood and Affect: Mood normal.         Behavior: Behavior normal.       Significant Labs:  CBC:   Recent Labs   Lab 05/04/22  0942 05/05/22  0828 05/06/22  0646   WBC 6.64 8.40 8.05   HGB 8.1* 8.0* 8.0*   HCT 26.5* 26.1* 26.4*    213 208     CMP:   Recent Labs   Lab 05/06/22  0646   *   CALCIUM 8.9      K 3.7   CO2 27      BUN  36*   CREATININE 1.0     All pertinent lab results from the last 24 hours have been reviewed.    Significant Imaging:  Imaging results within the past 24 hours have been reviewed.

## 2022-05-06 NOTE — ASSESSMENT & PLAN NOTE
Patient with acute kidney injury likely d/t IVVD/Dehydration and Pre-renal azotemia Which is currently improving. Labs reviewed- Renal function/electrolytes with Estimated Creatinine Clearance: 56.5 mL/min (based on SCr of 1 mg/dL). according to latest data. Monitor urine output and serial BMP and adjust therapy as needed. Avoid nephrotoxins and renally dose meds for GFR listed above.   -patient with non-oliguric lucia that is likely pre-renal in etiology, but higher severity given hyperkalemia, metabolic acidosis and uremic encephalopathy.   -obtain urine electrolytes,   -trend BMP   - Give IV Bicarb Drip x 1 L to assist with volume and acidosis management.   -Nursing made multiple attempts at Slaughter placement but pt vular swelling unable to pass, pt urinating connected to purewick.   MOnitor bladder scans if UOP falls as her bladder appears distended on CT scan but no hydronephrosis present.   -hold home anti-hypertensives (ARB/Thiazide) hold any NSAIDs, pt was taking both in last week.   -nephrology consultation. apprec recs  - completed D5W  - IMPROVING

## 2022-05-06 NOTE — TREATMENT PLAN
GI Treatment Plan:    Impression:            - LA Grade D esophagitis with no bleeding.                          - Bilious gastric fluid.                          - Normal examined duodenum.                          - No specimens collected.                          - The patient is a 72 yo female who presented with                          abdominal distension, nausea and vomiting with                          imaging concerning for gastric outlet obstruction.                          EGD today showed severe esophagitis but there was                          not evidence of gastric outlet obstruction.   Recommendation:        - Return patient to [facility] for ongoing care.                          - Advance diet as tolerated.                          - Continue present medications. Protonix 40mg IV                          BID started.                          - Recommend CT abdomen and pelvis to further                          evaluate for a cause for her presentation.                          - Repeat upper endoscopy in 8 weeks to check                          healing of her esophagitis (ordered)    Please call back with questions or if patient has change in clinical status.    Sushant Lantigua MD  Gastroenterology/Hepatology, PGY IV  [facility]

## 2022-05-06 NOTE — SUBJECTIVE & OBJECTIVE
Interval History: Patient lying in bed, no acute distress. Afebtrile overnight. Daughter at bedside. Mental status improving. HANDY has been improving since espinal placement. Wound care evaluated BLE and sacral wound. US LE negative for DVT. Tylenol #3 controlling BLE pain. Constipation, abdominal distension and N/V today and increased stool softener. KUB showed distension and possible GOO. CT A/P with oral contrast ordered and GI consulted.       Review of Systems   Constitutional:  Positive for activity change, fatigue and fever. Negative for chills.   HENT:  Negative for congestion and trouble swallowing.    Eyes:  Negative for visual disturbance.   Respiratory:  Negative for cough and shortness of breath.    Cardiovascular:  Positive for leg swelling. Negative for chest pain.   Gastrointestinal:  Positive for nausea. Negative for abdominal distention, abdominal pain and vomiting.   Endocrine: Negative for cold intolerance, heat intolerance, polydipsia and polyuria.   Genitourinary:  Negative for difficulty urinating and dysuria.   Musculoskeletal:  Positive for myalgias. Negative for back pain, neck pain and neck stiffness.   Skin:  Positive for wound. Negative for rash.   Neurological:  Positive for weakness. Negative for dizziness and light-headedness.   Hematological:  Negative for adenopathy. Does not bruise/bleed easily.   Psychiatric/Behavioral:  Negative for confusion and sleep disturbance.      Objective:     Vital Signs (Most Recent):  Temp: 98.9 °F (37.2 °C) (05/05/22 2359)  Pulse: 69 (05/05/22 2359)  Resp: 18 (05/05/22 2359)  BP: 127/66 (05/05/22 2359)  SpO2: 97 % (05/05/22 2359)   Vital Signs (24h Range):  Temp:  [98.5 °F (36.9 °C)-100.1 °F (37.8 °C)] 98.9 °F (37.2 °C)  Pulse:  [64-74] 69  Resp:  [17-18] 18  SpO2:  [89 %-100 %] 97 %  BP: (111-147)/(57-70) 127/66     Weight: 100.8 kg (222 lb 3.6 oz)  Body mass index is 40.65 kg/m².    Intake/Output Summary (Last 24 hours) at 5/6/2022 0121  Last data  filed at 5/5/2022 2200  Gross per 24 hour   Intake 1912.94 ml   Output 1310 ml   Net 602.94 ml        Physical Exam  Constitutional:       Appearance: She is well-developed. She is obese. She is ill-appearing.   HENT:      Head: Normocephalic and atraumatic.      Mouth/Throat:      Mouth: Mucous membranes are moist.   Eyes:      General: No scleral icterus.     Extraocular Movements: Extraocular movements intact.      Pupils: Pupils are equal, round, and reactive to light.   Neck:      Vascular: No JVD.   Cardiovascular:      Rate and Rhythm: Normal rate and regular rhythm.      Heart sounds: No murmur heard.    No friction rub. No gallop.   Pulmonary:      Effort: Pulmonary effort is normal. No respiratory distress.      Breath sounds: Normal breath sounds. No wheezing or rales.   Abdominal:      General: Bowel sounds are normal. There is distension.      Palpations: Abdomen is soft.      Tenderness: There is abdominal tenderness. There is no guarding or rebound.   Musculoskeletal:         General: No deformity. Normal range of motion.      Cervical back: Neck supple.   Lymphadenopathy:      Cervical: No cervical adenopathy.   Skin:     General: Skin is warm and dry.      Capillary Refill: Capillary refill takes less than 2 seconds.      Findings: No erythema or rash.      Comments: Severe chronic venous stasis changes and lymphedema in BLE. TTP  Sacral wound   Neurological:      General: No focal deficit present.      Mental Status: She is alert and oriented to person, place, and time.      Cranial Nerves: No cranial nerve deficit.      Sensory: No sensory deficit.      Motor: Weakness present.   Psychiatric:         Mood and Affect: Mood normal.         Cognition and Memory: Cognition is impaired. Memory is impaired.       Significant Labs: All pertinent labs within the past 24 hours have been reviewed.    Significant Imaging: I have reviewed all pertinent imaging results/findings within the past 24 hours.

## 2022-05-07 LAB
ANION GAP SERPL CALC-SCNC: 11 MMOL/L (ref 8–16)
BUN SERPL-MCNC: 30 MG/DL (ref 8–23)
CALCIUM SERPL-MCNC: 8.9 MG/DL (ref 8.7–10.5)
CHLORIDE SERPL-SCNC: 106 MMOL/L (ref 95–110)
CO2 SERPL-SCNC: 28 MMOL/L (ref 23–29)
CREAT SERPL-MCNC: 1 MG/DL (ref 0.5–1.4)
ERYTHROCYTE [DISTWIDTH] IN BLOOD BY AUTOMATED COUNT: 13.4 % (ref 11.5–14.5)
EST. GFR  (AFRICAN AMERICAN): >60 ML/MIN/1.73 M^2
EST. GFR  (NON AFRICAN AMERICAN): 56.4 ML/MIN/1.73 M^2
GLUCOSE SERPL-MCNC: 96 MG/DL (ref 70–110)
HCT VFR BLD AUTO: 26.5 % (ref 37–48.5)
HGB BLD-MCNC: 8.1 G/DL (ref 12–16)
MCH RBC QN AUTO: 29.6 PG (ref 27–31)
MCHC RBC AUTO-ENTMCNC: 30.6 G/DL (ref 32–36)
MCV RBC AUTO: 97 FL (ref 82–98)
PLATELET # BLD AUTO: 208 K/UL (ref 150–450)
PMV BLD AUTO: 11.2 FL (ref 9.2–12.9)
POTASSIUM SERPL-SCNC: 3.6 MMOL/L (ref 3.5–5.1)
RBC # BLD AUTO: 2.74 M/UL (ref 4–5.4)
SODIUM SERPL-SCNC: 145 MMOL/L (ref 136–145)
WBC # BLD AUTO: 7.16 K/UL (ref 3.9–12.7)

## 2022-05-07 PROCEDURE — 25000003 PHARM REV CODE 250: Performed by: HOSPITALIST

## 2022-05-07 PROCEDURE — 85027 COMPLETE CBC AUTOMATED: CPT | Performed by: INTERNAL MEDICINE

## 2022-05-07 PROCEDURE — 25000003 PHARM REV CODE 250: Performed by: INTERNAL MEDICINE

## 2022-05-07 PROCEDURE — 97530 THERAPEUTIC ACTIVITIES: CPT

## 2022-05-07 PROCEDURE — 97167 OT EVAL HIGH COMPLEX 60 MIN: CPT

## 2022-05-07 PROCEDURE — 63600175 PHARM REV CODE 636 W HCPCS: Performed by: HOSPITALIST

## 2022-05-07 PROCEDURE — 63600175 PHARM REV CODE 636 W HCPCS: Performed by: INTERNAL MEDICINE

## 2022-05-07 PROCEDURE — C9113 INJ PANTOPRAZOLE SODIUM, VIA: HCPCS | Performed by: INTERNAL MEDICINE

## 2022-05-07 PROCEDURE — 99233 SBSQ HOSP IP/OBS HIGH 50: CPT | Mod: ,,, | Performed by: INTERNAL MEDICINE

## 2022-05-07 PROCEDURE — 99233 PR SUBSEQUENT HOSPITAL CARE,LEVL III: ICD-10-PCS | Mod: ,,, | Performed by: INTERNAL MEDICINE

## 2022-05-07 PROCEDURE — 99900035 HC TECH TIME PER 15 MIN (STAT)

## 2022-05-07 PROCEDURE — 12000002 HC ACUTE/MED SURGE SEMI-PRIVATE ROOM

## 2022-05-07 PROCEDURE — 94761 N-INVAS EAR/PLS OXIMETRY MLT: CPT

## 2022-05-07 PROCEDURE — 80048 BASIC METABOLIC PNL TOTAL CA: CPT | Performed by: INTERNAL MEDICINE

## 2022-05-07 PROCEDURE — 36415 COLL VENOUS BLD VENIPUNCTURE: CPT | Performed by: INTERNAL MEDICINE

## 2022-05-07 RX ORDER — LORAZEPAM 0.5 MG/1
0.5 TABLET ORAL 3 TIMES DAILY PRN
Status: DISCONTINUED | OUTPATIENT
Start: 2022-05-07 | End: 2022-05-11 | Stop reason: HOSPADM

## 2022-05-07 RX ADMIN — VANCOMYCIN HYDROCHLORIDE 1500 MG: 1.5 INJECTION, POWDER, LYOPHILIZED, FOR SOLUTION INTRAVENOUS at 05:05

## 2022-05-07 RX ADMIN — PANTOPRAZOLE SODIUM 40 MG: 40 INJECTION, POWDER, FOR SOLUTION INTRAVENOUS at 09:05

## 2022-05-07 RX ADMIN — MUPIROCIN: 20 OINTMENT TOPICAL at 09:05

## 2022-05-07 RX ADMIN — Medication 400 MG: at 10:05

## 2022-05-07 RX ADMIN — MUPIROCIN: 20 OINTMENT TOPICAL at 10:05

## 2022-05-07 RX ADMIN — HEPARIN SODIUM 7500 UNITS: 5000 INJECTION INTRAVENOUS; SUBCUTANEOUS at 09:05

## 2022-05-07 RX ADMIN — METOCLOPRAMIDE 10 MG: 5 INJECTION, SOLUTION INTRAMUSCULAR; INTRAVENOUS at 05:05

## 2022-05-07 RX ADMIN — AMMONIUM LACTATE: 12 LOTION TOPICAL at 10:05

## 2022-05-07 RX ADMIN — ACETAMINOPHEN AND CODEINE PHOSPHATE 1 TABLET: 300; 30 TABLET ORAL at 11:05

## 2022-05-07 RX ADMIN — ONDANSETRON 4 MG: 2 INJECTION INTRAMUSCULAR; INTRAVENOUS at 12:05

## 2022-05-07 RX ADMIN — LORAZEPAM 0.5 MG: 0.5 TABLET ORAL at 11:05

## 2022-05-07 RX ADMIN — HEPARIN SODIUM 7500 UNITS: 5000 INJECTION INTRAVENOUS; SUBCUTANEOUS at 03:05

## 2022-05-07 RX ADMIN — HEPARIN SODIUM 7500 UNITS: 5000 INJECTION INTRAVENOUS; SUBCUTANEOUS at 05:05

## 2022-05-07 RX ADMIN — PANTOPRAZOLE SODIUM 40 MG: 40 INJECTION, POWDER, FOR SOLUTION INTRAVENOUS at 10:05

## 2022-05-07 RX ADMIN — Medication 1 TABLET: at 10:05

## 2022-05-07 RX ADMIN — MICONAZOLE NITRATE: 20 CREAM TOPICAL at 09:05

## 2022-05-07 RX ADMIN — CHOLECALCIFEROL TAB 25 MCG (1000 UNIT) 1000 UNITS: 25 TAB at 10:05

## 2022-05-07 RX ADMIN — LORAZEPAM 0.5 MG: 0.5 TABLET ORAL at 03:05

## 2022-05-07 NOTE — PT/OT/SLP EVAL
Occupational Therapy   Evaluation    Name: Lupis Murcia  MRN: [id number]  Admitting Diagnosis:  HANDY (acute kidney injury)  Recent Surgery: Procedure(s) (LRB):  EGD (ESOPHAGOGASTRODUODENOSCOPY) (N/A) 1 Day Post-Op    Recommendations:     Discharge Recommendations: nursing facility, skilled  Discharge Equipment Recommendations:  none  Barriers to discharge:  None    Assessment:     Lupis Murcia is a 71 y.o. female with a medical diagnosis of HANDY (acute kidney injury).  She presents with impairments listed below. Pt did well to participate int he session, but did display deficits for endurance w/ therapy on this date. Pt is functioning below baseline at this time and is requiring increased overall assist. Pt is currently unable to assume prior life roles. Pt did well to complete all tasks and is very motivated to improve overall functioning at this time. Pt displayed global deconditioning requiring increased assist for ADLs and mobility at this time. Pt would benefit from skilled OT services to improve independence and overall occupational functioning.     Performance deficits affecting function: weakness, impaired endurance, impaired self care skills, impaired functional mobilty, impaired balance, gait instability, decreased lower extremity function, decreased upper extremity function, impaired cardiopulmonary response to activity.      Rehab Prognosis: Good; patient would benefit from acute skilled OT services to address these deficits and reach maximum level of function.       Plan:     Patient to be seen 4 x/week to address the above listed problems via self-care/home management, therapeutic activities, therapeutic exercises, neuromuscular re-education  · Plan of Care Expires: 06/07/22  · Plan of Care Reviewed with: patient    Subjective     Chief Complaint: Debility   Patient/Family Comments/goals: Return to [facility].     Occupational Profile:  Living Environment: Pt lives in a [facility] w/ TH to enter.   Previous  level of function: MOD I for ADLs and MOD I for mobility (RW).   Roles and Routines: N/A  Equipment Used at Home:  walker, standard, shower chair  Assistance upon Discharge: Pt has limited assistance during the day.     Pain/Comfort:  · Pain Rating 1: 0/10  · Pain Rating Post-Intervention 1: 0/10    Patients cultural, spiritual, Christianity conflicts given the current situation:      Objective:     Communicated with: RN prior to session.  Patient found HOB elevated with pulse ox (continuous), telemetry upon OT entry to room.    General Precautions: Standard, fall, aspiration   Orthopedic Precautions:N/A   Braces: N/A  Respiratory Status: Room air    Occupational Performance:    Bed Mobility:    · Patient completed Scooting/Bridging with maximal assistance  · Patient completed Supine to Sit with maximal assistance  · Patient completed Sit to Supine with maximal assistance    Functional Mobility/Transfers:  · Pt deferred on this date    Activities of Daily Living:  · Lower Body Dressing: total assistance      Cognitive/Visual Perceptual:  Cognitive/Psychosocial Skills:     -       Oriented to: Person, Place, Time and Situation   -       Follows Commands/attention:Follows multistep  commands  -       Communication: clear/fluent  -       Memory: No Deficits noted  -       Safety awareness/insight to disability: intact   -       Mood/Affect/Coping skills/emotional control: Appropriate to situation  Visual/Perceptual:      -Intact      Physical Exam:  Balance:    -       initially required max a for sitting balance, but progressed to sba as session prolonged  Postural examination/scapula alignment:    -       Rounded shoulders  Skin integrity: Visible skin intact  Upper Extremity Range of Motion:     -       Right Upper Extremity: WFL  -       Left Upper Extremity: WFL  Upper Extremity Strength:    -       Right Upper Extremity: WFL  -       Left Upper Extremity: WFL   Strength:    -       Right Upper Extremity: WFL  -        Left Upper Extremity: WFL  Fine Motor Coordination:    -       Intact  Gross motor coordination:   WFL    AMPAC 6 Click ADL:  AMPAC Total Score: 14    Treatment & Education:  Pt sat EOB ~20 min initially requiring max a for sitting balance, but progressed to sba as session prolonged  Pt educated on:   Pt educated on energy conservation technique of pursed lipped breathing.   POC & overall coordination of care    D/C recs   Early mobility   Importance of oob activities   Importance of participating in therapy   Possible benefits of therapy    Education:    Patient left HOB elevated with all lines intact, call button in reach and dtr present    GOALS:   Multidisciplinary Problems     Occupational Therapy Goals        Problem: Occupational Therapy    Goal Priority Disciplines Outcome Interventions   Occupational Therapy Goal     OT, PT/OT Ongoing, Progressing    Description: Goals to be met by: 2022     Patient will increase functional independence with ADLs by performing:    UE Dressing with [place].  LE Dressing with [place].  Grooming while standing at sink with Modified [place].  Toileting from toilet with [place] for hygiene and clothing management.   Toilet transfer to toilet with Modified [place].                     History:     Past Medical History:   Diagnosis Date    MS (multiple sclerosis)     Vitamin B12 deficiency        Past Surgical History:   Procedure Laterality Date    BREAST CYST EXCISION Left     over 40yrs ago     SECTION      ESOPHAGOGASTRODUODENOSCOPY N/A 2022    Procedure: EGD (ESOPHAGOGASTRODUODENOSCOPY);  Surgeon: Radha Arango MD;  Location: [address];  Service: Endoscopy;  Laterality: N/A;       Time Tracking:     OT Date of Treatment: 22  OT Start Time: 937  OT Stop Time: 1005  OT Total Time (min): 28 min    Billable Minutes:Evaluation 18 minutes  Therapeutic Activity 10 minutes    2022

## 2022-05-07 NOTE — PROGRESS NOTES
Dr. Hardin said espinal could be removed by RN. Will continue to monitor. Patient should void by midnight,

## 2022-05-07 NOTE — PLAN OF CARE
Problem: Occupational Therapy  Goal: Occupational Therapy Goal  Description: Goals to be met by: 5/21/2022     Patient will increase functional independence with ADLs by performing:    UE Dressing with [place].  LE Dressing with [place].  Grooming while standing at sink with Modified [place].  Toileting from toilet with [place] for hygiene and clothing management.   Toilet transfer to toilet with Modified [place].    Outcome: Ongoing, Progressing    Edward Cabral OTR/L  5/7/2022

## 2022-05-08 LAB
ANION GAP SERPL CALC-SCNC: 10 MMOL/L (ref 8–16)
BACTERIA BLD CULT: NORMAL
BACTERIA BLD CULT: NORMAL
BUN SERPL-MCNC: 25 MG/DL (ref 8–23)
CALCIUM SERPL-MCNC: 8.6 MG/DL (ref 8.7–10.5)
CHLORIDE SERPL-SCNC: 106 MMOL/L (ref 95–110)
CO2 SERPL-SCNC: 26 MMOL/L (ref 23–29)
CREAT SERPL-MCNC: 0.8 MG/DL (ref 0.5–1.4)
ERYTHROCYTE [DISTWIDTH] IN BLOOD BY AUTOMATED COUNT: 13.5 % (ref 11.5–14.5)
EST. GFR  (AFRICAN AMERICAN): >60 ML/MIN/1.73 M^2
EST. GFR  (NON AFRICAN AMERICAN): >60 ML/MIN/1.73 M^2
GLUCOSE SERPL-MCNC: 96 MG/DL (ref 70–110)
HCT VFR BLD AUTO: 26.4 % (ref 37–48.5)
HGB BLD-MCNC: 8.3 G/DL (ref 12–16)
MCH RBC QN AUTO: 29.6 PG (ref 27–31)
MCHC RBC AUTO-ENTMCNC: 31.4 G/DL (ref 32–36)
MCV RBC AUTO: 94 FL (ref 82–98)
PLATELET # BLD AUTO: 209 K/UL (ref 150–450)
PMV BLD AUTO: 10.9 FL (ref 9.2–12.9)
POTASSIUM SERPL-SCNC: 3.4 MMOL/L (ref 3.5–5.1)
RBC # BLD AUTO: 2.8 M/UL (ref 4–5.4)
SODIUM SERPL-SCNC: 142 MMOL/L (ref 136–145)
VANCOMYCIN TROUGH SERPL-MCNC: 13 UG/ML (ref 10–22)
WBC # BLD AUTO: 7.52 K/UL (ref 3.9–12.7)

## 2022-05-08 PROCEDURE — 99497 ADVNCD CARE PLAN 30 MIN: CPT | Mod: ,,, | Performed by: INTERNAL MEDICINE

## 2022-05-08 PROCEDURE — 85027 COMPLETE CBC AUTOMATED: CPT | Performed by: INTERNAL MEDICINE

## 2022-05-08 PROCEDURE — 94761 N-INVAS EAR/PLS OXIMETRY MLT: CPT

## 2022-05-08 PROCEDURE — C9113 INJ PANTOPRAZOLE SODIUM, VIA: HCPCS | Performed by: INTERNAL MEDICINE

## 2022-05-08 PROCEDURE — 63600175 PHARM REV CODE 636 W HCPCS: Performed by: HOSPITALIST

## 2022-05-08 PROCEDURE — 63600175 PHARM REV CODE 636 W HCPCS: Performed by: INTERNAL MEDICINE

## 2022-05-08 PROCEDURE — 80202 ASSAY OF VANCOMYCIN: CPT | Performed by: INTERNAL MEDICINE

## 2022-05-08 PROCEDURE — 97530 THERAPEUTIC ACTIVITIES: CPT

## 2022-05-08 PROCEDURE — 97161 PT EVAL LOW COMPLEX 20 MIN: CPT

## 2022-05-08 PROCEDURE — 25000003 PHARM REV CODE 250: Performed by: INTERNAL MEDICINE

## 2022-05-08 PROCEDURE — 99497 PR ADVNCD CARE PLAN 30 MIN: ICD-10-PCS | Mod: ,,, | Performed by: INTERNAL MEDICINE

## 2022-05-08 PROCEDURE — 99232 SBSQ HOSP IP/OBS MODERATE 35: CPT | Mod: ,,, | Performed by: INTERNAL MEDICINE

## 2022-05-08 PROCEDURE — 25000003 PHARM REV CODE 250: Performed by: PHYSICIAN ASSISTANT

## 2022-05-08 PROCEDURE — 12000002 HC ACUTE/MED SURGE SEMI-PRIVATE ROOM

## 2022-05-08 PROCEDURE — 99232 PR SUBSEQUENT HOSPITAL CARE,LEVL II: ICD-10-PCS | Mod: ,,, | Performed by: INTERNAL MEDICINE

## 2022-05-08 PROCEDURE — 36415 COLL VENOUS BLD VENIPUNCTURE: CPT | Performed by: INTERNAL MEDICINE

## 2022-05-08 PROCEDURE — 80048 BASIC METABOLIC PNL TOTAL CA: CPT | Performed by: INTERNAL MEDICINE

## 2022-05-08 PROCEDURE — 25000003 PHARM REV CODE 250: Performed by: HOSPITALIST

## 2022-05-08 RX ORDER — POTASSIUM CHLORIDE 750 MG/1
30 CAPSULE, EXTENDED RELEASE ORAL 3 TIMES DAILY
Status: COMPLETED | OUTPATIENT
Start: 2022-05-08 | End: 2022-05-08

## 2022-05-08 RX ADMIN — ONDANSETRON 4 MG: 2 INJECTION INTRAMUSCULAR; INTRAVENOUS at 08:05

## 2022-05-08 RX ADMIN — PANTOPRAZOLE SODIUM 40 MG: 40 INJECTION, POWDER, FOR SOLUTION INTRAVENOUS at 09:05

## 2022-05-08 RX ADMIN — CHOLECALCIFEROL TAB 25 MCG (1000 UNIT) 1000 UNITS: 25 TAB at 09:05

## 2022-05-08 RX ADMIN — AMMONIUM LACTATE: 12 LOTION TOPICAL at 10:05

## 2022-05-08 RX ADMIN — ACETAMINOPHEN 650 MG: 325 TABLET ORAL at 08:05

## 2022-05-08 RX ADMIN — Medication 6 MG: at 08:05

## 2022-05-08 RX ADMIN — Medication 1 TABLET: at 09:05

## 2022-05-08 RX ADMIN — VANCOMYCIN HYDROCHLORIDE 1500 MG: 1.5 INJECTION, POWDER, LYOPHILIZED, FOR SOLUTION INTRAVENOUS at 05:05

## 2022-05-08 RX ADMIN — POTASSIUM CHLORIDE 30 MEQ: 10 CAPSULE, COATED, EXTENDED RELEASE ORAL at 09:05

## 2022-05-08 RX ADMIN — MUPIROCIN: 20 OINTMENT TOPICAL at 08:05

## 2022-05-08 RX ADMIN — Medication 400 MG: at 09:05

## 2022-05-08 RX ADMIN — MICONAZOLE NITRATE: 20 CREAM TOPICAL at 08:05

## 2022-05-08 RX ADMIN — HEPARIN SODIUM 7500 UNITS: 5000 INJECTION INTRAVENOUS; SUBCUTANEOUS at 10:05

## 2022-05-08 RX ADMIN — MUPIROCIN: 20 OINTMENT TOPICAL at 10:05

## 2022-05-08 RX ADMIN — HEPARIN SODIUM 7500 UNITS: 5000 INJECTION INTRAVENOUS; SUBCUTANEOUS at 02:05

## 2022-05-08 RX ADMIN — HEPARIN SODIUM 7500 UNITS: 5000 INJECTION INTRAVENOUS; SUBCUTANEOUS at 07:05

## 2022-05-08 RX ADMIN — LORAZEPAM 0.5 MG: 0.5 TABLET ORAL at 10:05

## 2022-05-08 RX ADMIN — POTASSIUM CHLORIDE 30 MEQ: 10 CAPSULE, COATED, EXTENDED RELEASE ORAL at 02:05

## 2022-05-08 RX ADMIN — PANTOPRAZOLE SODIUM 40 MG: 40 INJECTION, POWDER, FOR SOLUTION INTRAVENOUS at 08:05

## 2022-05-08 NOTE — ASSESSMENT & PLAN NOTE
Nausea/Vomiting  - KUB on 5/5 showed severe gaseous distension concerning for gastric outlet obstruction  - continue bowel regimen   - CT A/P with oral contrast negative for obstruction  - GI consulted. followup recs  - continue anti-emetics  - cautious with opioids   - IMPROVING

## 2022-05-08 NOTE — ASSESSMENT & PLAN NOTE
- continue vancomycin   - ID following, apprec recs  - continue local wound care per wound care  - discontinued cefepime and continue vancomycin. Continue for 10 days and can switch to doxycycline upon discharge

## 2022-05-08 NOTE — ASSESSMENT & PLAN NOTE
Secondary to uremia vs infection   -fall  -aspiration precautions  - HANDY resolved with IVF  - now rising Na so encephalopathy could also be associated with hypernatremia  - changed to D5W  - nephro following   - BMP daily  - RESOLVED

## 2022-05-08 NOTE — PLAN OF CARE
Problem: Physical Therapy  Goal: Physical Therapy Goal  Description: Goals to be met by: 22    Patient will increase functional independence with mobility by performin. Supine to sit with Stand-by Assistance  2. Sit to supine with Stand-by Assistance  3. Sit to stand transfer with Contact Guard Assistance  4. Gait  x 50 feet with Minimal Assistance using Rolling Walker.   5.  Patient will demonstrate independence with a home exercise program  6. Patient's balance will be FAIR: Needs CONTACT GUARD during gait.  Outcome: Ongoing, Progressing     PT evaluation completed, goals established and plan of care reviewed with patient.  Patient demonstrates decreased independence secondary to weakness and confusion.  Patient followed directions appropriately with intermittent confusion throughout.  Patient required between max A and mod A x 2 for bed mobility and transfers.  Patient tolerated sitting edge of bed ~ 5-6 min before progressing to standing.  Patient obtained standing x 3 with mod A x2 and rolling walker.  Patient tolerated stance ~ 30 seconds each trial.  Patient will benefit from skilled PT for strengthening and mobility training in an acute care and SNF.    Prieto Flores, PT, DPT  2022

## 2022-05-08 NOTE — CONSULTS
Advance Care Planning     Date: 05/08/2022    Today a meeting took place: bedside    Patient Participation: Patient is able to participate     Attendees (Name and  Relationship to patient): Daughter Amanda Lowery and ex brother in law Mr. Murcia.    Staff attendees (Name and  Role): Rose Barba MD    ACP Conversation (General): Understanding of advance care planning and role of health care agent defined Discussed documents in [place] and the order of decision maker if do not define a HCPOA.  Provided copy of How to Start the Conversation about Advance Care Planning and how to complete. She will discuss with her daughter and her other kids (who live out of town-[place]) and complete documents. This illness has scared her.  Code Status: Full Code     ACP Documents: Provided ACP documents    Goals of care: The patient and family endorses that what is most important right now is to focus on remaining as independent as possible    Accordingly, we have decided that the best plan to meet the patient's goals includes continuing with treatment      Recommendations/  Follow-up tasks: Other (specify below) Azalea Patel was given the [id number]-wish line if she has any additional questions.      Length of ACP   conversation in minutes: 20 minutes

## 2022-05-08 NOTE — PROGRESS NOTES
[facility] ([address] Medicine  Progress Note    Patient Name: Lupis Murcia  MRN: [id number]  Patient Class: IP- Inpatient   Admission Date: 5/1/2022  Length of Stay: 7 days  Attending Physician: John Hardin MD  Primary Care Provider: Bobby Tran MD        Subjective:     Principal Problem:HANDY (acute kidney injury)        HPI:  72 y/o WF hx of Multiple Sclerosis, Left Foot Drop, Lymphedema, Obesity (bmi 45) who is referred for admission for acute kidney injury, hyperkalemia and acute encephalopathy.     Patient's daughter Amanda Lowery provides primary history due to pt somnolence s/p ativan and EMR review.     1 week ago on Monday patient had a fall, partially assisted by family when legs gave out, no head injury or LOC.  Daughter noted through the week patient c/o of weakness and difficulty standing as when she fell, her armpit fell onto the walker, pt c/o of left shoulder pain.   On Thursday it was noted patient unable to stand, she is normally able to perform ADL with her walker, but daughter felt when she came home that pt had not moved.  She was also using adult diaper at this time and since Thursday due to body habitus/size patient has been unable to change her diaper since this time.   She has intermittently been taking her medicines during this time, but daughter noted she was eating less,  sleeping more and making confused statements and sought to bring her to the ED.     ED staff noted pts diaper was saturated with urine, vulvar swelling with intertrigo and buttock pressure injury.   Labs notable for Hyperkalemia to 6.3 and BUn/Cr of 169/3.9.     ED Treatment: Insulin 10uni IV regular, D10 bolus, 1gram Calcium Gluconate.  LR 2.2Liters IV, naBicarb 50meq IV x 1, Ativan 1mg IV   Ceftriaxone 2gm IV x 1      Overview/Hospital Course:  No notes on file    Interval History: Patient lying in bed, no acute distress. Afebtrile overnight. Daughter at bedside. Alert and  oriented x3. In good spirits. Worked well with therapy. Kidney function at baseline. Plan to perform voiding trial today. Tolerating IV vancomycin. Resumed home prn xanax. Plan to discharge to rehab on 5/9.      Review of Systems   Constitutional:  Positive for activity change, fatigue and fever. Negative for chills.   HENT:  Negative for congestion and trouble swallowing.    Eyes:  Negative for visual disturbance.   Respiratory:  Negative for cough and shortness of breath.    Cardiovascular:  Positive for leg swelling. Negative for chest pain.   Gastrointestinal:  Positive for nausea. Negative for abdominal distention, abdominal pain and vomiting.   Endocrine: Negative for cold intolerance, heat intolerance, polydipsia and polyuria.   Genitourinary:  Negative for difficulty urinating and dysuria.   Musculoskeletal:  Positive for myalgias. Negative for back pain, neck pain and neck stiffness.   Skin:  Positive for wound. Negative for rash.   Neurological:  Positive for weakness. Negative for dizziness and light-headedness.   Hematological:  Negative for adenopathy. Does not bruise/bleed easily.   Psychiatric/Behavioral:  Negative for confusion and sleep disturbance.      Objective:     Vital Signs (Most Recent):  Temp: 98.5 °F (36.9 °C) (05/08/22 0423)  Pulse: 69 (05/08/22 0423)  Resp: 14 (05/08/22 0423)  BP: (!) 145/70 (05/08/22 0423)  SpO2: (!) 94 % (05/08/22 0423)   Vital Signs (24h Range):  Temp:  [98.4 °F (36.9 °C)-99.5 °F (37.5 °C)] 98.5 °F (36.9 °C)  Pulse:  [66-76] 69  Resp:  [14-16] 14  SpO2:  [93 %-97 %] 94 %  BP: (127-168)/(60-77) 145/70     Weight: 100.8 kg (222 lb 3.6 oz)  Body mass index is 40.65 kg/m².    Intake/Output Summary (Last 24 hours) at 5/8/2022 0448  Last data filed at 5/8/2022 0400  Gross per 24 hour   Intake 720 ml   Output 1150 ml   Net -430 ml        Physical Exam  Constitutional:       Appearance: She is well-developed. She is obese. She is ill-appearing.   HENT:      Head:  Normocephalic and atraumatic.      Mouth/Throat:      Mouth: Mucous membranes are moist.   Eyes:      General: No scleral icterus.     Extraocular Movements: Extraocular movements intact.      Pupils: Pupils are equal, round, and reactive to light.   Neck:      Vascular: No JVD.   Cardiovascular:      Rate and Rhythm: Normal rate and regular rhythm.      Heart sounds: No murmur heard.    No friction rub. No gallop.   Pulmonary:      Effort: Pulmonary effort is normal. No respiratory distress.      Breath sounds: Normal breath sounds. No wheezing or rales.   Abdominal:      General: Bowel sounds are normal. There is distension.      Palpations: Abdomen is soft.      Tenderness: There is abdominal tenderness. There is no guarding or rebound.   Musculoskeletal:         General: No deformity. Normal range of motion.      Cervical back: Neck supple.   Lymphadenopathy:      Cervical: No cervical adenopathy.   Skin:     General: Skin is warm and dry.      Capillary Refill: Capillary refill takes less than 2 seconds.      Findings: No erythema or rash.      Comments: Severe chronic venous stasis changes and lymphedema in BLE. TTP  Sacral wound   Neurological:      General: No focal deficit present.      Mental Status: She is alert and oriented to person, place, and time.      Cranial Nerves: No cranial nerve deficit.      Sensory: No sensory deficit.      Motor: Weakness present.   Psychiatric:         Mood and Affect: Mood normal.         Cognition and Memory: Cognition is impaired. Memory is impaired.       Significant Labs: All pertinent labs within the past 24 hours have been reviewed.    Significant Imaging: I have reviewed all pertinent imaging results/findings within the past 24 hours.      Assessment/Plan:      * HANDY (acute kidney injury)  Patient with acute kidney injury likely d/t IVVD/Dehydration and Pre-renal azotemia Which is currently improving. Labs reviewed- Renal function/electrolytes with Estimated  Creatinine Clearance: 56.5 mL/min (based on SCr of 1 mg/dL). according to latest data. Monitor urine output and serial BMP and adjust therapy as needed. Avoid nephrotoxins and renally dose meds for GFR listed above.   -patient with non-oliguric handy that is likely pre-renal in etiology, but higher severity given hyperkalemia, metabolic acidosis and uremic encephalopathy.   -obtain urine electrolytes,   -trend BMP   - Give IV Bicarb Drip x 1 L to assist with volume and acidosis management.   -Nursing made multiple attempts at Espinal placement but pt vular swelling unable to pass, pt urinating connected to purewick.   MOnitor bladder scans if UOP falls as her bladder appears distended on CT scan but no hydronephrosis present.   -hold home anti-hypertensives (ARB/Thiazide) hold any NSAIDs, pt was taking both in last week.   -nephrology consultation. apprec recs  - completed D5W  - IMPROVING    Abdominal distension  Nausea/Vomiting  - KUB on 5/5 showed severe gaseous distension concerning for gastric outlet obstruction  - continue bowel regimen   - CT A/P with oral contrast negative for obstruction  - GI consulted. followup recs  - continue anti-emetics  - cautious with opioids   - IMPROVING      Hypernatremia  - Na 146 --> 149  - switched to D5W  - nephro following   - BMP daily  - RESOLVED      Cellulitis of leg  - continue vancomycin   - ID following, apprec recs  - continue local wound care per wound care  - discontinued cefepime and continue vancomycin. Continue for 10 days and can switch to doxycycline upon discharge    Urinary retention  - continue espinal   -voiding trial once encephalopathy resolves. Plan to discontinued espinal tomorrow      Encephalopathy, metabolic  Secondary to uremia vs infection   -fall  -aspiration precautions  - HANDY resolved with IVF  - now rising Na so encephalopathy could also be associated with hypernatremia  - changed to D5W  - nephro following   - BMP daily  - RESOLVED    Pressure injury  of buttock, unstageable  q2 turns  -low airloss overlay mattress  -wound care consultation       Lymphedema  -wound care consultation to assist in managmeent  - continue vancomycin for cellulitis     Hyperkalemia  As above   q4hr BMP  -bicarb infusion to help acidosis management.       MS (multiple sclerosis)  ENTER PT/OT consults when patient is more awake alert and can work with therapy services.         VTE Risk Mitigation (From admission, onward)         Ordered     heparin (porcine) injection 7,500 Units  Every 8 hours         05/06/22 0850     IP VTE HIGH RISK PATIENT  Once         05/02/22 0232     Place sequential compression device  Until discontinued         05/02/22 0232                Discharge Planning   USMAN: 5/12/2022     Code Status: Full Code   Is the patient medically ready for discharge?: No    Reason for patient still in hospital (select all that apply): Patient unstable, Patient trending condition, Laboratory test, Treatment, Consult recommendations, PT / OT recommendations and Pending disposition  Discharge Plan A: Skilled Nursing Facility          Time spent in care of patient: > 35 minutes           John Hardin MD  Department of Hospital Medicine   [facility] - Intensive Care ([place])

## 2022-05-08 NOTE — PLAN OF CARE
Problem: Infection  Goal: Absence of Infection Signs and Symptoms  Outcome: Ongoing, Progressing     Problem: Adult Inpatient Plan of Care  Goal: Plan of Care Review  Outcome: Ongoing, Progressing     Problem: Bariatric Environmental Safety  Goal: Safety Maintained with Care  Outcome: Ongoing, Progressing     Problem: Fluid and Electrolyte Imbalance (Acute Kidney Injury/Impairment)  Goal: Fluid and Electrolyte Balance  Outcome: Ongoing, Progressing     Pt AOx3. No acute events noted during shift. Vitals remained stable. No c/o pain or discomfort noted, PRN meds given per MD orders. Q1-2hr safety checks completed. Bed remained low, locked, with all personal items in reach.

## 2022-05-08 NOTE — SUBJECTIVE & OBJECTIVE
Interval History: Patient lying in bed, no acute distress. Afebtrile overnight. Daughter at bedside. Mental status close to baseline. Continues to have good UOP with espinal. Denies fever, chills, chest pain, N/V or confusion.      Review of Systems   Constitutional:  Positive for activity change, fatigue and fever. Negative for chills.   HENT:  Negative for congestion and trouble swallowing.    Eyes:  Negative for visual disturbance.   Respiratory:  Negative for cough and shortness of breath.    Cardiovascular:  Positive for leg swelling. Negative for chest pain.   Gastrointestinal:  Positive for nausea. Negative for abdominal distention, abdominal pain and vomiting.   Endocrine: Negative for cold intolerance, heat intolerance, polydipsia and polyuria.   Genitourinary:  Negative for difficulty urinating and dysuria.   Musculoskeletal:  Positive for myalgias. Negative for back pain, neck pain and neck stiffness.   Skin:  Positive for wound. Negative for rash.   Neurological:  Positive for weakness. Negative for dizziness and light-headedness.   Hematological:  Negative for adenopathy. Does not bruise/bleed easily.   Psychiatric/Behavioral:  Negative for confusion and sleep disturbance.      Objective:     Vital Signs (Most Recent):  Temp: 98.5 °F (36.9 °C) (05/08/22 0423)  Pulse: 69 (05/08/22 0423)  Resp: 14 (05/08/22 0423)  BP: (!) 145/70 (05/08/22 0423)  SpO2: (!) 94 % (05/08/22 0423)   Vital Signs (24h Range):  Temp:  [98.4 °F (36.9 °C)-99.5 °F (37.5 °C)] 98.5 °F (36.9 °C)  Pulse:  [66-76] 69  Resp:  [14-16] 14  SpO2:  [93 %-97 %] 94 %  BP: (127-168)/(60-77) 145/70     Weight: 100.8 kg (222 lb 3.6 oz)  Body mass index is 40.65 kg/m².    Intake/Output Summary (Last 24 hours) at 5/8/2022 0443  Last data filed at 5/8/2022 0400  Gross per 24 hour   Intake 720 ml   Output 1150 ml   Net -430 ml        Physical Exam  Constitutional:       Appearance: She is well-developed. She is obese. She is ill-appearing.   HENT:       Head: Normocephalic and atraumatic.      Mouth/Throat:      Mouth: Mucous membranes are moist.   Eyes:      General: No scleral icterus.     Extraocular Movements: Extraocular movements intact.      Pupils: Pupils are equal, round, and reactive to light.   Neck:      Vascular: No JVD.   Cardiovascular:      Rate and Rhythm: Normal rate and regular rhythm.      Heart sounds: No murmur heard.    No friction rub. No gallop.   Pulmonary:      Effort: Pulmonary effort is normal. No respiratory distress.      Breath sounds: Normal breath sounds. No wheezing or rales.   Abdominal:      General: Bowel sounds are normal. There is distension.      Palpations: Abdomen is soft.      Tenderness: There is abdominal tenderness. There is no guarding or rebound.   Musculoskeletal:         General: No deformity. Normal range of motion.      Cervical back: Neck supple.   Lymphadenopathy:      Cervical: No cervical adenopathy.   Skin:     General: Skin is warm and dry.      Capillary Refill: Capillary refill takes less than 2 seconds.      Findings: No erythema or rash.      Comments: Severe chronic venous stasis changes and lymphedema in BLE. TTP  Sacral wound   Neurological:      General: No focal deficit present.      Mental Status: She is alert and oriented to person, place, and time.      Cranial Nerves: No cranial nerve deficit.      Sensory: No sensory deficit.      Motor: Weakness present.   Psychiatric:         Mood and Affect: Mood normal.         Cognition and Memory: Cognition is impaired. Memory is impaired.       Significant Labs: All pertinent labs within the past 24 hours have been reviewed.    Significant Imaging: I have reviewed all pertinent imaging results/findings within the past 24 hours.

## 2022-05-08 NOTE — ASSESSMENT & PLAN NOTE
- continue espinal   -voiding trial once encephalopathy resolves. Plan to discontinued espinal tomorrow

## 2022-05-08 NOTE — PROGRESS NOTES
[facility] - [facility] ([address] Medicine  Progress Note    Patient Name: Lupis Murcia  MRN: [id number]  Patient Class: IP- Inpatient   Admission Date: 5/1/2022  Length of Stay: 7 days  Attending Physician: John Hardin MD  Primary Care Provider: Bobby Tran MD        Subjective:     Principal Problem:HANDY (acute kidney injury)        HPI:  70 y/o WF hx of Multiple Sclerosis, Left Foot Drop, Lymphedema, Obesity (bmi 45) who is referred for admission for acute kidney injury, hyperkalemia and acute encephalopathy.     Patient's daughter Amanda Lowery provides primary history due to pt somnolence s/p ativan and EMR review.     1 week ago on Monday patient had a fall, partially assisted by family when legs gave out, no head injury or LOC.  Daughter noted through the week patient c/o of weakness and difficulty standing as when she fell, her armpit fell onto the walker, pt c/o of left shoulder pain.   On Thursday it was noted patient unable to stand, she is normally able to perform ADL with her walker, but daughter felt when she came home that pt had not moved.  She was also using adult diaper at this time and since Thursday due to body habitus/size patient has been unable to change her diaper since this time.   She has intermittently been taking her medicines during this time, but daughter noted she was eating less,  sleeping more and making confused statements and sought to bring her to the ED.     ED staff noted pts diaper was saturated with urine, vulvar swelling with intertrigo and buttock pressure injury.   Labs notable for Hyperkalemia to 6.3 and BUn/Cr of 169/3.9.     ED Treatment: Insulin 10uni IV regular, D10 bolus, 1gram Calcium Gluconate.  LR 2.2Liters IV, naBicarb 50meq IV x 1, Ativan 1mg IV   Ceftriaxone 2gm IV x 1      Overview/Hospital Course:  No notes on file    Interval History: Patient lying in bed, no acute distress. Afebtrile overnight. Daughter at bedside. Mental status  close to baseline. Continues to have good UOP with espinal. Denies fever, chills, chest pain, N/V or confusion.      Review of Systems   Constitutional:  Positive for activity change, fatigue and fever. Negative for chills.   HENT:  Negative for congestion and trouble swallowing.    Eyes:  Negative for visual disturbance.   Respiratory:  Negative for cough and shortness of breath.    Cardiovascular:  Positive for leg swelling. Negative for chest pain.   Gastrointestinal:  Positive for nausea. Negative for abdominal distention, abdominal pain and vomiting.   Endocrine: Negative for cold intolerance, heat intolerance, polydipsia and polyuria.   Genitourinary:  Negative for difficulty urinating and dysuria.   Musculoskeletal:  Positive for myalgias. Negative for back pain, neck pain and neck stiffness.   Skin:  Positive for wound. Negative for rash.   Neurological:  Positive for weakness. Negative for dizziness and light-headedness.   Hematological:  Negative for adenopathy. Does not bruise/bleed easily.   Psychiatric/Behavioral:  Negative for confusion and sleep disturbance.      Objective:     Vital Signs (Most Recent):  Temp: 98.5 °F (36.9 °C) (05/08/22 0423)  Pulse: 69 (05/08/22 0423)  Resp: 14 (05/08/22 0423)  BP: (!) 145/70 (05/08/22 0423)  SpO2: (!) 94 % (05/08/22 0423)   Vital Signs (24h Range):  Temp:  [98.4 °F (36.9 °C)-99.5 °F (37.5 °C)] 98.5 °F (36.9 °C)  Pulse:  [66-76] 69  Resp:  [14-16] 14  SpO2:  [93 %-97 %] 94 %  BP: (127-168)/(60-77) 145/70     Weight: 100.8 kg (222 lb 3.6 oz)  Body mass index is 40.65 kg/m².    Intake/Output Summary (Last 24 hours) at 5/8/2022 0443  Last data filed at 5/8/2022 0400  Gross per 24 hour   Intake 720 ml   Output 1150 ml   Net -430 ml        Physical Exam  Constitutional:       Appearance: She is well-developed. She is obese. She is ill-appearing.   HENT:      Head: Normocephalic and atraumatic.      Mouth/Throat:      Mouth: Mucous membranes are moist.   Eyes:       General: No scleral icterus.     Extraocular Movements: Extraocular movements intact.      Pupils: Pupils are equal, round, and reactive to light.   Neck:      Vascular: No JVD.   Cardiovascular:      Rate and Rhythm: Normal rate and regular rhythm.      Heart sounds: No murmur heard.    No friction rub. No gallop.   Pulmonary:      Effort: Pulmonary effort is normal. No respiratory distress.      Breath sounds: Normal breath sounds. No wheezing or rales.   Abdominal:      General: Bowel sounds are normal. There is distension.      Palpations: Abdomen is soft.      Tenderness: There is abdominal tenderness. There is no guarding or rebound.   Musculoskeletal:         General: No deformity. Normal range of motion.      Cervical back: Neck supple.   Lymphadenopathy:      Cervical: No cervical adenopathy.   Skin:     General: Skin is warm and dry.      Capillary Refill: Capillary refill takes less than 2 seconds.      Findings: No erythema or rash.      Comments: Severe chronic venous stasis changes and lymphedema in BLE. TTP  Sacral wound   Neurological:      General: No focal deficit present.      Mental Status: She is alert and oriented to person, place, and time.      Cranial Nerves: No cranial nerve deficit.      Sensory: No sensory deficit.      Motor: Weakness present.   Psychiatric:         Mood and Affect: Mood normal.         Cognition and Memory: Cognition is impaired. Memory is impaired.       Significant Labs: All pertinent labs within the past 24 hours have been reviewed.    Significant Imaging: I have reviewed all pertinent imaging results/findings within the past 24 hours.      Assessment/Plan:      * HANDY (acute kidney injury)  Patient with acute kidney injury likely d/t IVVD/Dehydration and Pre-renal azotemia Which is currently improving. Labs reviewed- Renal function/electrolytes with Estimated Creatinine Clearance: 56.5 mL/min (based on SCr of 1 mg/dL). according to latest data. Monitor urine output  and serial BMP and adjust therapy as needed. Avoid nephrotoxins and renally dose meds for GFR listed above.   -patient with non-oliguric handy that is likely pre-renal in etiology, but higher severity given hyperkalemia, metabolic acidosis and uremic encephalopathy.   -obtain urine electrolytes,   -trend BMP   - Give IV Bicarb Drip x 1 L to assist with volume and acidosis management.   -Nursing made multiple attempts at Espinal placement but pt vular swelling unable to pass, pt urinating connected to purewick.   MOnitor bladder scans if UOP falls as her bladder appears distended on CT scan but no hydronephrosis present.   -hold home anti-hypertensives (ARB/Thiazide) hold any NSAIDs, pt was taking both in last week.   -nephrology consultation. apprec recs  - completed D5W  - IMPROVING    Abdominal distension  Nausea/Vomiting  - KUB on 5/5 showed severe gaseous distension concerning for gastric outlet obstruction  - continue bowel regimen   - CT A/P with oral contrast negative for obstruction  - GI consulted. followup recs  - continue anti-emetics  - cautious with opioids   - IMPROVING      Hypernatremia  - Na 146 --> 149  - switched to D5W  - nephro following   - BMP daily  - RESOLVED      Cellulitis of leg  - continue vancomycin   - ID following, apprec recs  - continue local wound care per wound care  - discontinued cefepime and continue vancomycin. Continue for 10 days and can switch to doxycycline upon discharge    Urinary retention  - continue espinal   -voiding trial once encephalopathy resolves. Plan to discontinued espinal tomorrow      Encephalopathy, metabolic  Secondary to uremia vs infection   -fall  -aspiration precautions  - HANDY resolved with IVF  - now rising Na so encephalopathy could also be associated with hypernatremia  - changed to D5W  - nephro following   - BMP daily  - RESOLVED    Pressure injury of buttock, unstageable  q2 turns  -low airloss overlay mattress  -wound care consultation        Lymphedema  -wound care consultation to assist in managmeent  - continue vancomycin for cellulitis     Hyperkalemia  As above   q4hr BMP  -bicarb infusion to help acidosis management.       MS (multiple sclerosis)  ENTER PT/OT consults when patient is more awake alert and can work with therapy services.         VTE Risk Mitigation (From admission, onward)         Ordered     heparin (porcine) injection 7,500 Units  Every 8 hours         05/06/22 0850     IP VTE HIGH RISK PATIENT  Once         05/02/22 0232     Place sequential compression device  Until discontinued         05/02/22 0232                Discharge Planning   USMAN: 5/12/2022     Code Status: Full Code   Is the patient medically ready for discharge?: No    Reason for patient still in hospital (select all that apply): Patient unstable, Patient trending condition, Laboratory test, Treatment, Consult recommendations and PT / OT recommendations  Discharge Plan A: Skilled Nursing Facility          Time spent in care of patient: > 35 minutes           John Hardin MD  Department of Hospital Medicine   [facility] - Intensive Care ([place])

## 2022-05-08 NOTE — PROGRESS NOTES
Pharmacokinetic Assessment Follow Up: IV Vancomycin    Vancomycin serum concentration assessment(s):    The trough level was drawn correctly and can be used to guide therapy at this time. The measurement is within the desired definitive target range of 10 to 20 mcg/mL.    Vancomycin Regimen Plan:    Continue regimen of Vancomycin 1500 mg IV every 24 hours with next serum trough concentration measured on Friday 5/13 @ 1630 or sooner if kidney function changes significantly      Drug levels (last 3 results):  Recent Labs   Lab Result Units 05/08/22  1640   Vancomycin-Trough ug/mL 13.0       Pharmacy will continue to follow and monitor vancomycin.    Please contact pharmacy at extension [phone] for questions regarding this assessment.    Thank you for the consult,   Irasema Aren       Patient brief summary:  Lupis Murcia is a 71 y.o. female initiated on antimicrobial therapy with IV Vancomycin for treatment of skin & soft tissue infection    The patient's current regimen is 1500mg IV every 24 hours    Drug Allergies:   Review of patient's allergies indicates:   Allergen Reactions    Contrast media Shortness Of Breath and Rash    Pcn [penicillins] Shortness Of Breath and Rash    Celebrex [celecoxib] Other (See Comments)     Swallowing problems     Diazepam Hives    Motrin [ibuprofen] Rash       Actual Body Weight:   100.8 kg    Renal Function:   Estimated Creatinine Clearance: 70.6 mL/min (based on SCr of 0.8 mg/dL).,     Dialysis Method (if applicable):  N/A    CBC (last 72 hours):  Recent Labs   Lab Result Units 05/06/22  0646 05/07/22  0349 05/08/22  0643   WBC K/uL 8.05 7.16 7.52   Hemoglobin g/dL 8.0* 8.1* 8.3*   Hematocrit % 26.4* 26.5* 26.4*   Platelets K/uL 208 208 209       Metabolic Panel (last 72 hours):  Recent Labs   Lab Result Units 05/06/22  0646 05/07/22  0349 05/08/22  0643   Sodium mmol/L 143 145 142   Potassium mmol/L 3.7 3.6 3.4*   Chloride mmol/L 106 106 106   CO2 mmol/L 27 28 26   Glucose  mg/dL 116* 96 96   BUN mg/dL 36* 30* 25*   Creatinine mg/dL 1.0 1.0 0.8   Magnesium mg/dL 1.7  --   --    Phosphorus mg/dL 2.7  --   --        Vancomycin Administrations:  vancomycin given in the last 96 hours                   vancomycin 1.5 g in dextrose 5 % 250 mL IVPB (ready to mix) (mg) 1,500 mg New Bag 05/08/22 1734     1,500 mg New Bag 05/07/22 1735     1,500 mg New Bag 05/06/22 1725     1,500 mg New Bag 05/05/22 1855                Microbiologic Results:  Microbiology Results (last 7 days)     Procedure Component Value Units Date/Time    Blood culture [[id number]] Collected: 05/03/22 1256    Order Status: Completed Specimen: Blood Updated: 05/08/22 1612     Blood Culture, Routine No growth after 5 days.    Blood culture [[id number]] Collected: 05/03/22 1256    Order Status: Completed Specimen: Blood Updated: 05/08/22 1612     Blood Culture, Routine No growth after 5 days.    Blood culture #2 **CANNOT BE ORDERED STAT** [[id number]] Collected: 05/01/22 1832    Order Status: Completed Specimen: Blood from Peripheral, Wrist, Right Updated: 05/06/22 2212     Blood Culture, Routine No growth after 5 days.    Blood culture #1 **CANNOT BE ORDERED STAT** [[id number]] Collected: 05/01/22 1823    Order Status: Completed Specimen: Blood from Peripheral, Antecubital, Right Updated: 05/06/22 2212     Blood Culture, Routine No growth after 5 days.

## 2022-05-08 NOTE — PT/OT/SLP EVAL
"Physical Therapy Co-Evaluation    Patient Name:  Lupis Murcia   MRN:  [id number]    Recommendations:     Discharge Recommendations:  nursing facility, skilled   Discharge Equipment Recommendations: none   Barriers to discharge: Decreased caregiver support    Assessment:       Lupis Murcia is a 71 y.o. female admitted with a medical diagnosis of HANDY (acute kidney injury).  She presents with the following impairments/functional limitations:  weakness, impaired endurance, impaired self care skills, impaired functional mobilty, gait instability, decreased lower extremity function, orthopedic precautions, impaired muscle length, pain, impaired balance.      Patient demonstrates decreased independence secondary to weakness and confusion.  Patient followed directions appropriately with intermittent confusion throughout.  Patient required between max A and mod A x 2 for bed mobility and transfers.  Patient tolerated sitting edge of bed ~ 5-6 min before progressing to standing.  Patient obtained standing x 3 with mod A x2 and rolling walker.  Patient tolerated stance ~ 30 seconds each trial.  Patient will benefit from skilled PT for strengthening and mobility training in an acute care and SNF.    Rehab Prognosis: Good; patient would benefit from acute skilled PT services 4 x/week to address these deficits and reach maximum level of function.  Patient is most appropriate to go to nursing facility, skilled .  Recent Surgery: Procedure(s) (LRB):  EGD (ESOPHAGOGASTRODUODENOSCOPY) (N/A) 2 Days Post-Op    Plan:     During this hospitalization, patient to be seen 4 x/week to address the identified rehab impairments via gait training, therapeutic activities, therapeutic exercises, neuromuscular re-education and progress toward the following goals:    · Plan of Care Expires:  06/07/22    Subjective     Subjective:"It was good to sit up yesterday with the OT, Edward, the one with the name from the [facility] " "mermaid."  Pain/Comfort:  · Pain Rating 1: 0/10  · Pain Rating Post-Intervention 1: 0/10    Patients cultural, spiritual, Yazidi conflicts given the current situation: no    Living Environment:  Prior level of function: Patient lives with her daughter in a single story home.  Patient was using a walker for ambulation with baseline MS and has L foot drop (no AFO).  Family would intermittently assist with ADLs.    Support available upon discharge: family  Equipment owned: walker, rolling, shower chair  Objective:     Communicated with nursing prior to session.  Patient found supine with pulse ox (continuous), telemetry  upon PT entry to room.    General Precautions: Standard, fall   Orthopedic Precautions:N/A   Braces: N/A     Exams:  · RLE ROM: ~ 75% of functional range  · RLE Strength: grossly 4/5  · LLE ROM: ~ 75% of functional range  · LLE Strength: grossly 4/5  · Cognitive Exam:  Patient is oriented to Person and Place      Functional Mobility:  · Bed Mobility:     · Supine to Sit: maximal assistance for LE management and trunk management  · Sit to Supine: maximal assistance for LE management and trunk management  · Transfers:     · Sit to Stand: moderate assistance and of 2 persons with rolling walker for weight shifting  · Gait: Patient unable to ambulate secondary to weakness.  · Balance:   · Sitting: FAIR+: Maintains balance through MINIMAL excursions of active trunk motion  · Standing: POOR+: Needs MIN (minimal ) assist during gait      Therapeutic Activities and Exercises:   Patient tolerated sitting edge of bed ~ 8 min before progressing to standing.  Patient able to obtain stance ~ 3 times with tolerance of position between 15- 30 seconds each trial.      Patient Education:    Patient and Family member educated on assistive device use, bed mobility training, Fall risk, home safety, Home exercise program, plan of care and transfer training by explanation.  Patient was receptive to education and needs " reinforcement.     AM-PAC 6 CLICK MOBILITY  Total Score:10     Patient left supine with all lines intact and call button in reach.    GOALS:   Multidisciplinary Problems     Physical Therapy Goals        Problem: Physical Therapy    Goal Priority Disciplines Outcome Goal Variances Interventions   Physical Therapy Goal     PT, PT/OT Ongoing, Progressing     Description: Goals to be met by: 22    Patient will increase functional independence with mobility by performin. Supine to sit with Stand-by Assistance  2. Sit to supine with Stand-by Assistance  3. Sit to stand transfer with Contact Guard Assistance  4. Gait  x 50 feet with Minimal Assistance using Rolling Walker.   5.  Patient will demonstrate independence with a home exercise program  6. Patient's balance will be FAIR: Needs CONTACT GUARD during gait.                   History:     Past Medical History:   Diagnosis Date    MS (multiple sclerosis)     Vitamin B12 deficiency        Past Surgical History:   Procedure Laterality Date    BREAST CYST EXCISION Left     over 40yrs ago     SECTION      ESOPHAGOGASTRODUODENOSCOPY N/A 2022    Procedure: EGD (ESOPHAGOGASTRODUODENOSCOPY);  Surgeon: Radha Arango MD;  Location: [address];  Service: Endoscopy;  Laterality: N/A;       Time Tracking:     PT Received On: 22  PT Start Time: 1315     PT Stop Time: 1340  PT Total Time (min): 25 min     Billable Minutes: Evaluation 10 min Therapeutic Activity 15 min      Prieto Flores PT  2022

## 2022-05-08 NOTE — SUBJECTIVE & OBJECTIVE
Interval History: Patient lying in bed, no acute distress. Afebtrile overnight. Daughter at bedside. Alert and oriented x3. In good spirits. Worked well with therapy. Kidney function at baseline. Plan to perform voiding trial today. Tolerating IV vancomycin. Resumed home prn xanax. Plan to discharge to rehab on 5/9.      Review of Systems   Constitutional:  Positive for activity change, fatigue and fever. Negative for chills.   HENT:  Negative for congestion and trouble swallowing.    Eyes:  Negative for visual disturbance.   Respiratory:  Negative for cough and shortness of breath.    Cardiovascular:  Positive for leg swelling. Negative for chest pain.   Gastrointestinal:  Positive for nausea. Negative for abdominal distention, abdominal pain and vomiting.   Endocrine: Negative for cold intolerance, heat intolerance, polydipsia and polyuria.   Genitourinary:  Negative for difficulty urinating and dysuria.   Musculoskeletal:  Positive for myalgias. Negative for back pain, neck pain and neck stiffness.   Skin:  Positive for wound. Negative for rash.   Neurological:  Positive for weakness. Negative for dizziness and light-headedness.   Hematological:  Negative for adenopathy. Does not bruise/bleed easily.   Psychiatric/Behavioral:  Negative for confusion and sleep disturbance.      Objective:     Vital Signs (Most Recent):  Temp: 98.5 °F (36.9 °C) (05/08/22 0423)  Pulse: 69 (05/08/22 0423)  Resp: 14 (05/08/22 0423)  BP: (!) 145/70 (05/08/22 0423)  SpO2: (!) 94 % (05/08/22 0423)   Vital Signs (24h Range):  Temp:  [98.4 °F (36.9 °C)-99.5 °F (37.5 °C)] 98.5 °F (36.9 °C)  Pulse:  [66-76] 69  Resp:  [14-16] 14  SpO2:  [93 %-97 %] 94 %  BP: (127-168)/(60-77) 145/70     Weight: 100.8 kg (222 lb 3.6 oz)  Body mass index is 40.65 kg/m².    Intake/Output Summary (Last 24 hours) at 5/8/2022 3328  Last data filed at 5/8/2022 0400  Gross per 24 hour   Intake 720 ml   Output 1150 ml   Net -430 ml        Physical  Exam  Constitutional:       Appearance: She is well-developed. She is obese. She is ill-appearing.   HENT:      Head: Normocephalic and atraumatic.      Mouth/Throat:      Mouth: Mucous membranes are moist.   Eyes:      General: No scleral icterus.     Extraocular Movements: Extraocular movements intact.      Pupils: Pupils are equal, round, and reactive to light.   Neck:      Vascular: No JVD.   Cardiovascular:      Rate and Rhythm: Normal rate and regular rhythm.      Heart sounds: No murmur heard.    No friction rub. No gallop.   Pulmonary:      Effort: Pulmonary effort is normal. No respiratory distress.      Breath sounds: Normal breath sounds. No wheezing or rales.   Abdominal:      General: Bowel sounds are normal. There is distension.      Palpations: Abdomen is soft.      Tenderness: There is abdominal tenderness. There is no guarding or rebound.   Musculoskeletal:         General: No deformity. Normal range of motion.      Cervical back: Neck supple.   Lymphadenopathy:      Cervical: No cervical adenopathy.   Skin:     General: Skin is warm and dry.      Capillary Refill: Capillary refill takes less than 2 seconds.      Findings: No erythema or rash.      Comments: Severe chronic venous stasis changes and lymphedema in BLE. TTP  Sacral wound   Neurological:      General: No focal deficit present.      Mental Status: She is alert and oriented to person, place, and time.      Cranial Nerves: No cranial nerve deficit.      Sensory: No sensory deficit.      Motor: Weakness present.   Psychiatric:         Mood and Affect: Mood normal.         Cognition and Memory: Cognition is impaired. Memory is impaired.       Significant Labs: All pertinent labs within the past 24 hours have been reviewed.    Significant Imaging: I have reviewed all pertinent imaging results/findings within the past 24 hours.

## 2022-05-09 LAB
ANION GAP SERPL CALC-SCNC: 9 MMOL/L (ref 8–16)
BUN SERPL-MCNC: 19 MG/DL (ref 8–23)
CALCIUM SERPL-MCNC: 8.5 MG/DL (ref 8.7–10.5)
CHLORIDE SERPL-SCNC: 109 MMOL/L (ref 95–110)
CO2 SERPL-SCNC: 25 MMOL/L (ref 23–29)
CREAT SERPL-MCNC: 0.7 MG/DL (ref 0.5–1.4)
EST. GFR  (AFRICAN AMERICAN): >60 ML/MIN/1.73 M^2
EST. GFR  (NON AFRICAN AMERICAN): >60 ML/MIN/1.73 M^2
GLUCOSE SERPL-MCNC: 94 MG/DL (ref 70–110)
MAGNESIUM SERPL-MCNC: 1.7 MG/DL (ref 1.6–2.6)
PHOSPHATE SERPL-MCNC: 1.9 MG/DL (ref 2.7–4.5)
POTASSIUM SERPL-SCNC: 3.5 MMOL/L (ref 3.5–5.1)
SODIUM SERPL-SCNC: 143 MMOL/L (ref 136–145)

## 2022-05-09 PROCEDURE — 12000002 HC ACUTE/MED SURGE SEMI-PRIVATE ROOM

## 2022-05-09 PROCEDURE — 99232 SBSQ HOSP IP/OBS MODERATE 35: CPT | Mod: ,,, | Performed by: INTERNAL MEDICINE

## 2022-05-09 PROCEDURE — 63600175 PHARM REV CODE 636 W HCPCS: Performed by: INTERNAL MEDICINE

## 2022-05-09 PROCEDURE — 25000003 PHARM REV CODE 250: Performed by: INTERNAL MEDICINE

## 2022-05-09 PROCEDURE — 36415 COLL VENOUS BLD VENIPUNCTURE: CPT | Performed by: INTERNAL MEDICINE

## 2022-05-09 PROCEDURE — 83735 ASSAY OF MAGNESIUM: CPT | Performed by: INTERNAL MEDICINE

## 2022-05-09 PROCEDURE — 63600175 PHARM REV CODE 636 W HCPCS: Performed by: HOSPITALIST

## 2022-05-09 PROCEDURE — 80048 BASIC METABOLIC PNL TOTAL CA: CPT | Performed by: INTERNAL MEDICINE

## 2022-05-09 PROCEDURE — C9113 INJ PANTOPRAZOLE SODIUM, VIA: HCPCS | Performed by: INTERNAL MEDICINE

## 2022-05-09 PROCEDURE — 25000003 PHARM REV CODE 250: Performed by: HOSPITALIST

## 2022-05-09 PROCEDURE — 84100 ASSAY OF PHOSPHORUS: CPT | Performed by: INTERNAL MEDICINE

## 2022-05-09 PROCEDURE — 99232 PR SUBSEQUENT HOSPITAL CARE,LEVL II: ICD-10-PCS | Mod: ,,, | Performed by: INTERNAL MEDICINE

## 2022-05-09 RX ORDER — SODIUM,POTASSIUM PHOSPHATES 280-250MG
2 POWDER IN PACKET (EA) ORAL EVERY 4 HOURS
Status: DISPENSED | OUTPATIENT
Start: 2022-05-09 | End: 2022-05-10

## 2022-05-09 RX ORDER — AMOXICILLIN 250 MG
1 CAPSULE ORAL 2 TIMES DAILY PRN
Status: DISCONTINUED | OUTPATIENT
Start: 2022-05-09 | End: 2022-05-11 | Stop reason: HOSPADM

## 2022-05-09 RX ORDER — LOPERAMIDE HYDROCHLORIDE 2 MG/1
2 CAPSULE ORAL ONCE
Status: COMPLETED | OUTPATIENT
Start: 2022-05-09 | End: 2022-05-09

## 2022-05-09 RX ADMIN — LORAZEPAM 0.5 MG: 0.5 TABLET ORAL at 09:05

## 2022-05-09 RX ADMIN — ACETAMINOPHEN 650 MG: 325 TABLET ORAL at 04:05

## 2022-05-09 RX ADMIN — POTASSIUM & SODIUM PHOSPHATES POWDER PACK 280-160-250 MG 2 PACKET: 280-160-250 PACK at 04:05

## 2022-05-09 RX ADMIN — PANTOPRAZOLE SODIUM 40 MG: 40 INJECTION, POWDER, FOR SOLUTION INTRAVENOUS at 09:05

## 2022-05-09 RX ADMIN — ACETAMINOPHEN AND CODEINE PHOSPHATE 1 TABLET: 300; 30 TABLET ORAL at 06:05

## 2022-05-09 RX ADMIN — Medication 1 TABLET: at 09:05

## 2022-05-09 RX ADMIN — POTASSIUM & SODIUM PHOSPHATES POWDER PACK 280-160-250 MG 2 PACKET: 280-160-250 PACK at 09:05

## 2022-05-09 RX ADMIN — ACETAMINOPHEN 650 MG: 325 TABLET ORAL at 09:05

## 2022-05-09 RX ADMIN — LOPERAMIDE HYDROCHLORIDE 2 MG: 2 CAPSULE ORAL at 11:05

## 2022-05-09 RX ADMIN — LORAZEPAM 0.5 MG: 0.5 TABLET ORAL at 04:05

## 2022-05-09 RX ADMIN — MICONAZOLE NITRATE: 20 CREAM TOPICAL at 09:05

## 2022-05-09 RX ADMIN — HEPARIN SODIUM 7500 UNITS: 5000 INJECTION INTRAVENOUS; SUBCUTANEOUS at 09:05

## 2022-05-09 RX ADMIN — HEPARIN SODIUM 7500 UNITS: 5000 INJECTION INTRAVENOUS; SUBCUTANEOUS at 04:05

## 2022-05-09 RX ADMIN — CHOLECALCIFEROL TAB 25 MCG (1000 UNIT) 1000 UNITS: 25 TAB at 09:05

## 2022-05-09 RX ADMIN — HEPARIN SODIUM 7500 UNITS: 5000 INJECTION INTRAVENOUS; SUBCUTANEOUS at 05:05

## 2022-05-09 RX ADMIN — Medication 400 MG: at 09:05

## 2022-05-09 RX ADMIN — ONDANSETRON 4 MG: 2 INJECTION INTRAMUSCULAR; INTRAVENOUS at 06:05

## 2022-05-09 NOTE — PLAN OF CARE
[facility] - [facility] ([facility]-)  Discharge Reassessment    Primary Care Provider: Bobby Tran MD    Expected Discharge Date: 5/11/2022     Patient is not medically ready for discharge at this time.      Reassessment (most recent)     Discharge Reassessment - 05/09/22 1822        Discharge Reassessment    Assessment Type Discharge Planning Reassessment     Did the patient's condition or plan change since previous assessment? No     Discharge Plan A Skilled Nursing Facility     Discharge Plan B Home Health     DME Needed Upon Discharge  --   tbd    Discharge Barriers Identified None     Why the patient remains in the hospital Requires continued medical care

## 2022-05-09 NOTE — PLAN OF CARE
Problem: Infection  Goal: Absence of Infection Signs and Symptoms  Outcome: Ongoing, Progressing     Problem: Fluid and Electrolyte Imbalance (Acute Kidney Injury/Impairment)  Goal: Fluid and Electrolyte Balance  Outcome: Ongoing, Progressing   Pt had frequent BM's. MD notified and ordered loperamide and given. Wound care performed. Bed locked and in lowest position. Call light in reach.

## 2022-05-09 NOTE — PROGRESS NOTES
Patient refusing PRN tylenol 3 at this time. Medication secured in lock box located in [facility].

## 2022-05-09 NOTE — PLAN OF CARE
Problem: Infection  Goal: Absence of Infection Signs and Symptoms  Outcome: Ongoing, Progressing     Problem: Adult Inpatient Plan of Care  Goal: Plan of Care Review  Outcome: Ongoing, Progressing  Goal: Patient-Specific Goal (Individualized)  Outcome: Ongoing, Progressing  Goal: Absence of Hospital-Acquired Illness or Injury  Outcome: Ongoing, Progressing  Goal: Optimal Comfort and Wellbeing  Outcome: Ongoing, Progressing  Goal: Readiness for Transition of Care  Outcome: Ongoing, Progressing     Problem: Bariatric Environmental Safety  Goal: Safety Maintained with Care  Outcome: Ongoing, Progressing     Problem: Fluid and Electrolyte Imbalance (Acute Kidney Injury/Impairment)  Goal: Fluid and Electrolyte Balance  Outcome: Ongoing, Progressing     Problem: Oral Intake Inadequate (Acute Kidney Injury/Impairment)  Goal: Optimal Nutrition Intake  Outcome: Ongoing, Progressing     Problem: Renal Function Impairment (Acute Kidney Injury/Impairment)  Goal: Effective Renal Function  Outcome: Ongoing, Progressing     Problem: Impaired Wound Healing  Goal: Optimal Wound Healing  Outcome: Ongoing, Progressing     Problem: Skin Injury Risk Increased  Goal: Skin Health and Integrity  Outcome: Ongoing, Progressing     Problem: Fall Injury Risk  Goal: Absence of Fall and Fall-Related Injury  Outcome: Ongoing, Progressing     Problem: Confusion Acute  Goal: Optimal Cognitive Function  Outcome: Ongoing, Progressing     Problem: Coping Ineffective  Goal: Effective Coping  Outcome: Ongoing, Progressing

## 2022-05-09 NOTE — PT/OT/SLP PROGRESS
Physical Therapy      Patient Name:  Lupis Murcia   MRN:  [id number]    Patient not seen today secondary to pt refusal both AM/PM attempt due to pt w/ reports of having diarrhea all night. Nursing aware. Will follow-up per PT POC.

## 2022-05-09 NOTE — PT/OT/SLP PROGRESS
Occupational Therapy      Patient Name:  Lupis Murcia   MRN:  [id number]    Patient not seen today secondary to needing to be cleaned. Will follow-up.    5/9/2022

## 2022-05-10 PROBLEM — R14.0 ABDOMINAL DISTENSION: Status: RESOLVED | Noted: 2022-05-06 | Resolved: 2022-05-10

## 2022-05-10 PROBLEM — G93.41 ENCEPHALOPATHY, METABOLIC: Status: RESOLVED | Noted: 2022-05-01 | Resolved: 2022-05-10

## 2022-05-10 PROBLEM — N17.9 AKI (ACUTE KIDNEY INJURY): Status: RESOLVED | Noted: 2022-05-01 | Resolved: 2022-05-10

## 2022-05-10 PROBLEM — R33.9 URINARY RETENTION: Status: RESOLVED | Noted: 2022-05-02 | Resolved: 2022-05-10

## 2022-05-10 PROBLEM — E87.0 HYPERNATREMIA: Status: RESOLVED | Noted: 2022-05-04 | Resolved: 2022-05-10

## 2022-05-10 PROBLEM — E87.5 HYPERKALEMIA: Status: RESOLVED | Noted: 2022-05-01 | Resolved: 2022-05-10

## 2022-05-10 LAB
ANION GAP SERPL CALC-SCNC: 10 MMOL/L (ref 8–16)
BUN SERPL-MCNC: 17 MG/DL (ref 8–23)
CALCIUM SERPL-MCNC: 8.6 MG/DL (ref 8.7–10.5)
CHLORIDE SERPL-SCNC: 108 MMOL/L (ref 95–110)
CO2 SERPL-SCNC: 24 MMOL/L (ref 23–29)
CREAT SERPL-MCNC: 0.7 MG/DL (ref 0.5–1.4)
EST. GFR  (AFRICAN AMERICAN): >60 ML/MIN/1.73 M^2
EST. GFR  (NON AFRICAN AMERICAN): >60 ML/MIN/1.73 M^2
GLUCOSE SERPL-MCNC: 102 MG/DL (ref 70–110)
MAGNESIUM SERPL-MCNC: 1.7 MG/DL (ref 1.6–2.6)
PHOSPHATE SERPL-MCNC: 2.3 MG/DL (ref 2.7–4.5)
POTASSIUM SERPL-SCNC: 3.6 MMOL/L (ref 3.5–5.1)
SODIUM SERPL-SCNC: 142 MMOL/L (ref 136–145)

## 2022-05-10 PROCEDURE — 99232 PR SUBSEQUENT HOSPITAL CARE,LEVL II: ICD-10-PCS | Mod: ,,, | Performed by: INTERNAL MEDICINE

## 2022-05-10 PROCEDURE — 63600175 PHARM REV CODE 636 W HCPCS: Performed by: HOSPITALIST

## 2022-05-10 PROCEDURE — 84100 ASSAY OF PHOSPHORUS: CPT | Performed by: INTERNAL MEDICINE

## 2022-05-10 PROCEDURE — 97110 THERAPEUTIC EXERCISES: CPT

## 2022-05-10 PROCEDURE — 97530 THERAPEUTIC ACTIVITIES: CPT | Mod: CQ

## 2022-05-10 PROCEDURE — 63600175 PHARM REV CODE 636 W HCPCS: Performed by: INTERNAL MEDICINE

## 2022-05-10 PROCEDURE — 94761 N-INVAS EAR/PLS OXIMETRY MLT: CPT

## 2022-05-10 PROCEDURE — 36415 COLL VENOUS BLD VENIPUNCTURE: CPT | Performed by: INTERNAL MEDICINE

## 2022-05-10 PROCEDURE — 80048 BASIC METABOLIC PNL TOTAL CA: CPT | Performed by: INTERNAL MEDICINE

## 2022-05-10 PROCEDURE — 83735 ASSAY OF MAGNESIUM: CPT | Performed by: INTERNAL MEDICINE

## 2022-05-10 PROCEDURE — 25000003 PHARM REV CODE 250: Performed by: INTERNAL MEDICINE

## 2022-05-10 PROCEDURE — 25000003 PHARM REV CODE 250: Performed by: HOSPITALIST

## 2022-05-10 PROCEDURE — 99232 SBSQ HOSP IP/OBS MODERATE 35: CPT | Mod: ,,, | Performed by: INTERNAL MEDICINE

## 2022-05-10 PROCEDURE — C9113 INJ PANTOPRAZOLE SODIUM, VIA: HCPCS | Performed by: INTERNAL MEDICINE

## 2022-05-10 PROCEDURE — 12000002 HC ACUTE/MED SURGE SEMI-PRIVATE ROOM

## 2022-05-10 RX ORDER — LOPERAMIDE HYDROCHLORIDE 2 MG/1
2 CAPSULE ORAL 4 TIMES DAILY PRN
Status: DISCONTINUED | OUTPATIENT
Start: 2022-05-10 | End: 2022-05-11 | Stop reason: HOSPADM

## 2022-05-10 RX ORDER — TAMSULOSIN HYDROCHLORIDE 0.4 MG/1
0.4 CAPSULE ORAL DAILY
Status: DISCONTINUED | OUTPATIENT
Start: 2022-05-10 | End: 2022-05-11

## 2022-05-10 RX ORDER — AMOXICILLIN 250 MG
1 CAPSULE ORAL 2 TIMES DAILY PRN
Status: ON HOLD
Start: 2022-05-10 | End: 2022-06-02 | Stop reason: HOSPADM

## 2022-05-10 RX ORDER — PANTOPRAZOLE SODIUM 40 MG/1
40 TABLET, DELAYED RELEASE ORAL DAILY
Qty: 30 TABLET | Refills: 11 | Status: ON HOLD
Start: 2022-05-10 | End: 2022-06-02 | Stop reason: SDUPTHER

## 2022-05-10 RX ORDER — LOPERAMIDE HYDROCHLORIDE 2 MG/1
2 CAPSULE ORAL 4 TIMES DAILY PRN
Refills: 0 | Status: ON HOLD
Start: 2022-05-10 | End: 2022-06-02 | Stop reason: HOSPADM

## 2022-05-10 RX ORDER — AMMONIUM LACTATE 12 G/100G
LOTION TOPICAL 2 TIMES DAILY PRN
Refills: 0 | Status: ON HOLD
Start: 2022-05-10 | End: 2022-06-02 | Stop reason: HOSPADM

## 2022-05-10 RX ORDER — DOXYCYCLINE HYCLATE 100 MG
100 TABLET ORAL EVERY 12 HOURS
Qty: 4 TABLET | Refills: 0 | Status: ON HOLD
Start: 2022-05-10 | End: 2022-06-02 | Stop reason: HOSPADM

## 2022-05-10 RX ORDER — SODIUM,POTASSIUM PHOSPHATES 280-250MG
1 POWDER IN PACKET (EA) ORAL EVERY 4 HOURS
Status: ACTIVE | OUTPATIENT
Start: 2022-05-10 | End: 2022-05-10

## 2022-05-10 RX ORDER — DOXYLAMINE SUCCINATE 25 MG
TABLET ORAL 2 TIMES DAILY
Refills: 0
Start: 2022-05-10 | End: 2022-06-02

## 2022-05-10 RX ORDER — DOXYCYCLINE HYCLATE 100 MG
100 TABLET ORAL EVERY 12 HOURS
Status: DISCONTINUED | OUTPATIENT
Start: 2022-05-10 | End: 2022-05-11 | Stop reason: HOSPADM

## 2022-05-10 RX ADMIN — DOXYCYCLINE HYCLATE 100 MG: 100 TABLET, COATED ORAL at 09:05

## 2022-05-10 RX ADMIN — Medication 1 TABLET: at 09:05

## 2022-05-10 RX ADMIN — HEPARIN SODIUM 7500 UNITS: 5000 INJECTION INTRAVENOUS; SUBCUTANEOUS at 09:05

## 2022-05-10 RX ADMIN — ACETAMINOPHEN AND CODEINE PHOSPHATE 1 TABLET: 300; 30 TABLET ORAL at 06:05

## 2022-05-10 RX ADMIN — PANTOPRAZOLE SODIUM 40 MG: 40 INJECTION, POWDER, FOR SOLUTION INTRAVENOUS at 09:05

## 2022-05-10 RX ADMIN — HEPARIN SODIUM 7500 UNITS: 5000 INJECTION INTRAVENOUS; SUBCUTANEOUS at 04:05

## 2022-05-10 RX ADMIN — LORAZEPAM 0.5 MG: 0.5 TABLET ORAL at 04:05

## 2022-05-10 RX ADMIN — ACETAMINOPHEN 650 MG: 325 TABLET ORAL at 06:05

## 2022-05-10 RX ADMIN — MICONAZOLE NITRATE: 20 CREAM TOPICAL at 09:05

## 2022-05-10 RX ADMIN — CHOLECALCIFEROL TAB 25 MCG (1000 UNIT) 1000 UNITS: 25 TAB at 09:05

## 2022-05-10 RX ADMIN — HEPARIN SODIUM 7500 UNITS: 5000 INJECTION INTRAVENOUS; SUBCUTANEOUS at 06:05

## 2022-05-10 RX ADMIN — ONDANSETRON 4 MG: 2 INJECTION INTRAMUSCULAR; INTRAVENOUS at 10:05

## 2022-05-10 RX ADMIN — TAMSULOSIN HYDROCHLORIDE 0.4 MG: 0.4 CAPSULE ORAL at 06:05

## 2022-05-10 RX ADMIN — LORAZEPAM 0.5 MG: 0.5 TABLET ORAL at 06:05

## 2022-05-10 NOTE — PROGRESS NOTES
[facility] ([address] Medicine  Progress Note    Patient Name: Lupis Murcia  MRN: [id number]  Patient Class: IP- Inpatient   Admission Date: 5/1/2022  Length of Stay: 9 days  Attending Physician: John Hardin MD  Primary Care Provider: Bobby Tran MD        Subjective:     Principal Problem:HANDY (acute kidney injury)        HPI:  70 y/o WF hx of Multiple Sclerosis, Left Foot Drop, Lymphedema, Obesity (bmi 45) who is referred for admission for acute kidney injury, hyperkalemia and acute encephalopathy.     Patient's daughter Amanda Lowery provides primary history due to pt somnolence s/p ativan and EMR review.     1 week ago on Monday patient had a fall, partially assisted by family when legs gave out, no head injury or LOC.  Daughter noted through the week patient c/o of weakness and difficulty standing as when she fell, her armpit fell onto the walker, pt c/o of left shoulder pain.   On Thursday it was noted patient unable to stand, she is normally able to perform ADL with her walker, but daughter felt when she came home that pt had not moved.  She was also using adult diaper at this time and since Thursday due to body habitus/size patient has been unable to change her diaper since this time.   She has intermittently been taking her medicines during this time, but daughter noted she was eating less,  sleeping more and making confused statements and sought to bring her to the ED.     ED staff noted pts diaper was saturated with urine, vulvar swelling with intertrigo and buttock pressure injury.   Labs notable for Hyperkalemia to 6.3 and BUn/Cr of 169/3.9.     ED Treatment: Insulin 10uni IV regular, D10 bolus, 1gram Calcium Gluconate.  LR 2.2Liters IV, naBicarb 50meq IV x 1, Ativan 1mg IV   Ceftriaxone 2gm IV x 1      Overview/Hospital Course:  No notes on file    Interval History: Patient lying in bed, no acute distress. Afebtrile overnight. Daughter at bedside. Alert and  oriented x3. Mental status back to baseline. Discontinued espinal and she has been voiding well. Does note diarrhea and stopped stool softener, magnesium oxide and switched from vancomycin to oral doxycycline. Ordered prn Imodium. Denies fever, chills, chest pain, SOB, N/V or abdominal pain. Awaiting SNF placement.       Review of Systems   Constitutional:  Positive for activity change and fatigue. Negative for chills and fever.   HENT:  Negative for congestion and trouble swallowing.    Eyes:  Negative for visual disturbance.   Respiratory:  Negative for cough and shortness of breath.    Cardiovascular:  Positive for leg swelling. Negative for chest pain.   Gastrointestinal:  Negative for abdominal distention, abdominal pain, nausea and vomiting.   Endocrine: Negative for cold intolerance, heat intolerance, polydipsia and polyuria.   Genitourinary:  Negative for difficulty urinating and dysuria.   Musculoskeletal:  Positive for myalgias. Negative for back pain, neck pain and neck stiffness.   Skin:  Positive for wound (BL lower extremities). Negative for rash.   Neurological:  Positive for weakness. Negative for dizziness and light-headedness.   Hematological:  Negative for adenopathy. Does not bruise/bleed easily.   Psychiatric/Behavioral:  Negative for confusion and sleep disturbance.      Objective:     Vital Signs (Most Recent):  Temp: 98.3 °F (36.8 °C) (05/10/22 1140)  Pulse: 72 (05/10/22 1140)  Resp: 16 (05/10/22 1140)  BP: (!) 117/58 (05/10/22 1140)  SpO2: (!) 93 % (05/10/22 1140)   Vital Signs (24h Range):  Temp:  [97.7 °F (36.5 °C)-99.9 °F (37.7 °C)] 98.3 °F (36.8 °C)  Pulse:  [66-83] 72  Resp:  [16-19] 16  SpO2:  [90 %-95 %] 93 %  BP: (115-150)/(53-71) 117/58     Weight: 100.8 kg (222 lb 3.6 oz)  Body mass index is 40.65 kg/m².  No intake or output data in the 24 hours ending 05/10/22 1216     Physical Exam  Constitutional:       Appearance: She is well-developed. She is obese. She is ill-appearing.   HENT:       Head: Normocephalic and atraumatic.      Mouth/Throat:      Mouth: Mucous membranes are moist.   Eyes:      General: No scleral icterus.     Extraocular Movements: Extraocular movements intact.      Pupils: Pupils are equal, round, and reactive to light.   Neck:      Vascular: No JVD.   Cardiovascular:      Rate and Rhythm: Normal rate and regular rhythm.      Heart sounds: No murmur heard.    No friction rub. No gallop.   Pulmonary:      Effort: Pulmonary effort is normal. No respiratory distress.      Breath sounds: Normal breath sounds. No wheezing or rales.   Abdominal:      General: Bowel sounds are normal. There is no distension.      Palpations: Abdomen is soft.      Tenderness: There is no abdominal tenderness. There is no guarding or rebound.   Musculoskeletal:         General: No deformity. Normal range of motion.      Cervical back: Neck supple.   Lymphadenopathy:      Cervical: No cervical adenopathy.   Skin:     General: Skin is warm and dry.      Capillary Refill: Capillary refill takes less than 2 seconds.      Findings: No erythema or rash.      Comments: Severe chronic venous stasis changes and lymphedema in BLE. TTP  Sacral wound   Neurological:      General: No focal deficit present.      Mental Status: She is alert and oriented to person, place, and time.      Cranial Nerves: No cranial nerve deficit.      Sensory: No sensory deficit.      Motor: Weakness present.   Psychiatric:         Mood and Affect: Mood normal.       Significant Labs: All pertinent labs within the past 24 hours have been reviewed.    Significant Imaging: I have reviewed all pertinent imaging results/findings within the past 24 hours.      Assessment/Plan:      * HANDY (acute kidney injury)  Patient with acute kidney injury likely d/t IVVD/Dehydration and Pre-renal azotemia Which is currently improving. Labs reviewed- Renal function/electrolytes with Estimated Creatinine Clearance: 80.7 mL/min (based on SCr of 0.7 mg/dL).  according to latest data. Monitor urine output and serial BMP and adjust therapy as needed. Avoid nephrotoxins and renally dose meds for GFR listed above.   -patient with non-oliguric handy that is likely pre-renal in etiology, but higher severity given hyperkalemia, metabolic acidosis and uremic encephalopathy.   -obtain urine electrolytes,   -trend BMP   - Give IV Bicarb Drip x 1 L to assist with volume and acidosis management.   -Nursing made multiple attempts at Espinal placement but pt vular swelling unable to pass, pt urinating connected to purewick.   MOnitor bladder scans if UOP falls as her bladder appears distended on CT scan but no hydronephrosis present.   -hold home anti-hypertensives (ARB/Thiazide) hold any NSAIDs, pt was taking both in last week.   -nephrology consultation. apprec recs  - completed D5W  - RESOLVED    Abdominal distension  Nausea/Vomiting  - KUB on 5/5 showed severe gaseous distension concerning for gastric outlet obstruction  - continue bowel regimen   - CT A/P with oral contrast negative for obstruction  - GI consulted. followup recs  - continue anti-emetics  - cautious with opioids   - RESOLVED      Hypernatremia  - Na 146 --> 149  - switched to D5W  - nephro following   - BMP daily  - RESOLVED      Cellulitis of leg  - continue vancomycin   - ID following, apprec recs  - continue local wound care per wound care  - discontinued cefepime and continue vancomycin. Continue for 10 days and can switch to doxycycline upon discharge (end date: 5/12/22)  - Switched to oral doxycycline prior to admission per patient request due to concern for diarrhea    Urinary retention  - continue espinal   -voiding trial once encephalopathy resolves.   - discontinued espinal. Urinating well       Encephalopathy, metabolic  Secondary to uremia vs infection   -fall  -aspiration precautions  - HANDY resolved with IVF  - now rising Na so encephalopathy could also be associated with hypernatremia  - changed to D5W  -  nephro following   - BMP daily  - RESOLVED    Pressure injury of buttock, unstageable  q2 turns  -low airloss overlay mattress  -wound care consultation       Lymphedema  -wound care consultation to assist in managmeent  - continue vancomycin for cellulitis     Hyperkalemia  As above   q4hr BMP  -bicarb infusion to help acidosis management.       MS (multiple sclerosis)  ENTER PT/OT consults when patient is more awake alert and can work with therapy services.         VTE Risk Mitigation (From admission, onward)         Ordered     heparin (porcine) injection 7,500 Units  Every 8 hours         05/06/22 0850     IP VTE HIGH RISK PATIENT  Once         05/02/22 0232     Place sequential compression device  Until discontinued         05/02/22 0232                Discharge Planning   USMAN: 5/10/2022     Code Status: Full Code   Is the patient medically ready for discharge?: Yes    Reason for patient still in hospital (select all that apply): Patient unstable, Patient trending condition, Laboratory test, Treatment and Pending disposition  Discharge Plan A: Skilled Nursing Facility          Time spent in care of patient: > 35 minutes           John Hardin MD  Department of Hospital Medicine   [facility] - Intensive Care ([place])

## 2022-05-10 NOTE — ASSESSMENT & PLAN NOTE
Nausea/Vomiting  - KUB on 5/5 showed severe gaseous distension concerning for gastric outlet obstruction  - continue bowel regimen   - CT A/P with oral contrast negative for obstruction  - GI consulted. followup recs  - continue anti-emetics  - cautious with opioids   - RESOLVED

## 2022-05-10 NOTE — PLAN OF CARE
NURSING HOME ORDERS    05/11/2022  [facility]  [facility] - [facility] ([facility])  [address]  [facility] [address]  Dept: [phone]  Loc: [phone]     Admit to Nursing Home:  Skilled Nursing Facility    Diagnoses:  Active Hospital Problems    Diagnosis  POA    Cellulitis of leg [L03.119]  Yes    Lymphedema [I89.0]  Yes    Pressure injury of buttock, unstageable [L89.300]  Yes    MS (multiple sclerosis) [G35]  Yes      Resolved Hospital Problems    Diagnosis Date Resolved POA    *HANDY (acute kidney injury) [N17.9] 05/11/2022 Yes    Abdominal distension [R14.0] 05/11/2022 Yes    Hypernatremia [E87.0] 05/11/2022 No    Urinary retention [R33.9] 05/11/2022 Yes    Hyperkalemia [E87.5] 05/11/2022 Yes    Encephalopathy, metabolic [G93.41] 05/11/2022 Yes       Code Status: FULL    Patient is homebound due to:  HANDY (acute kidney injury)    Allergies:  Review of patient's allergies indicates:   Allergen Reactions    Contrast media Shortness Of Breath and Rash    Pcn [penicillins] Shortness Of Breath and Rash    Celebrex [celecoxib] Other (See Comments)     Swallowing problems     Diazepam Hives    Motrin [ibuprofen] Rash       Vitals:  Per facility protocol     Diet: regular diet    Activities:   Up in a chair each morning as tolerated, Ambulate with assistance to bathroom and Activity as tolerated    Labs:  Per facility protocol     Nursing Precautions:  Fall and Pressure ulcer prevention    Consults:   PT to evaluate and treat- 5 times a week, OT to evaluate and treat- 5 times a week and Wound Care     Miscellaneous Care: Routine Skin for Bedridden Patients:  Apply moisture barrier cream to all  Wound Care: :  - Nursing to cleanse BLE with soap and water, pat dry and apply Ammonium Lactate Lotion BID  - Apply to intertriginous areas, lower abdomen groin  - Nursing to gently cleanse with wound cleanser and apply triad cream to wound and periwound BID and PRN if  soiled                    Diabetes Care:  SN to perform and educate Diabetic management with blood glucose monitoring:, Fingerstick blood sugar a.m. and p.m. and Report CBG < 60 or > 350 to physician.      Medications: Discontinue all previous medication orders, if any. See new list below.     Medication List      START taking these medications    ammonium lactate 12 % lotion  Commonly known as: LAC-HYDRIN  Apply topically 2 (two) times daily as needed for Dry Skin (Nursing to cleanse BLE with soap and water, pat dry and apply Ammonium).     doxycycline 100 MG tablet  Commonly known as: VIBRA-TABS  Take 1 tablet (100 mg total) by mouth every 12 (twelve) hours. for 2 days     loperamide 2 mg capsule  Commonly known as: IMODIUM  Take 1 capsule (2 mg total) by mouth 4 (four) times daily as needed for Diarrhea.     miconazole 2 % cream  Commonly known as: MICOTIN  Apply topically 2 (two) times daily. Apply to intertriginous areas, lower abdomen groin for 14 days     pantoprazole 40 MG tablet  Commonly known as: PROTONIX  Take 1 tablet (40 mg total) by mouth once daily.     potassium, sodium phosphates 280-160-250 mg Pwpk  Commonly known as: PHOS-NAK  Take 1 packet by mouth 4 (four) times daily with meals and nightly. for 5 days     senna-docusate 8.6-50 mg 8.6-50 mg per tablet  Commonly known as: PERICOLACE  Take 1 tablet by mouth 2 (two) times daily as needed for Constipation.     tamsulosin 0.4 mg Cap  Commonly known as: FLOMAX  Take 1 capsule (0.4 mg total) by mouth daily with lunch.        CHANGE how you take these medications    baclofen 10 MG tablet  Commonly known as: LIORESAL  Take 1 tablet (10 mg total) by mouth 2 (two) times daily.  What changed:   · when to take this  · reasons to take this        CONTINUE taking these medications    acetaminophen 325 mg Cap  Take 325 mg by mouth.     acetaminophen-codeine 300-30mg 300-30 mg Tab  Commonly known as: TYLENOL #3  Take 1 tablet by mouth every 6 (six) hours as  needed (chronic leg pain).     candesartan-hydrochlorothiazide 16-12.5 mg per tablet  Commonly known as: ATACAND HCT  Take 1 tablet by mouth Daily.     clotrimazole-betamethasone 1-0.05% cream  Commonly known as: LOTRISONE  Apply to affected area 2 times daily     cyanocobalamin 1,000 mcg/mL injection  Inject 1ml daily for 1 week, then weekly for 1 month, then every 2 weeks thereafter. Please provide 23g 1in needle and 1cc syringes     furosemide 20 MG tablet  Commonly known as: LASIX  Take 20 mg by mouth daily as needed (leg swelling).     LORazepam 1 MG tablet  Commonly known as: ATIVAN  Take 0.5 mg by mouth 3 (three) times daily as needed for Anxiety.     vitamin B comp with vit C no.6 500-0.5 mg Tab  Take 1 tablet by mouth once daily.     vitamin D 1000 units Tab  Commonly known as: VITAMIN D3  Take 5,000 Units by mouth once daily.        STOP taking these medications    erythromycin base 250 MG Tab     naproxen 500 MG tablet  Commonly known as: NAPROSYN              Immunizations Administered as of 5/11/2022     Name Date Dose VIS Date Route Exp Date    COVID-19, vector-nr, [id number] (J&J) 3/31/2021 11:16 AM 0.5 mL 1/13/2021 Intramuscular 6/20/2021    Site: Right deltoid     Given By: Harmony Augustine LPN     : [employer]     Lot: [id number]           This patient has had both covid vaccinations    Some patients may experience side effects after vaccination.  These may include fever, headache, muscle or joint aches.  Most symptoms resolve with 24-48 hours and do not require urgent medical evaluation unless they persist for more than 72 hours or symptoms are concerning for an unrelated medical condition.          _________________________________  John Hardin MD  05/11/2022

## 2022-05-10 NOTE — ASSESSMENT & PLAN NOTE
- continue vancomycin   - ID following, apprec recs  - continue local wound care per wound care  - discontinued cefepime and continue vancomycin. Continue for 10 days and can switch to doxycycline upon discharge (end date: 5/12/22)  - Switched to oral doxycycline prior to admission per patient request due to concern for diarrhea

## 2022-05-10 NOTE — ASSESSMENT & PLAN NOTE
Patient with acute kidney injury likely d/t IVVD/Dehydration and Pre-renal azotemia Which is currently improving. Labs reviewed- Renal function/electrolytes with Estimated Creatinine Clearance: 80.7 mL/min (based on SCr of 0.7 mg/dL). according to latest data. Monitor urine output and serial BMP and adjust therapy as needed. Avoid nephrotoxins and renally dose meds for GFR listed above.   -patient with non-oliguric lucia that is likely pre-renal in etiology, but higher severity given hyperkalemia, metabolic acidosis and uremic encephalopathy.   -obtain urine electrolytes,   -trend BMP   - Give IV Bicarb Drip x 1 L to assist with volume and acidosis management.   -Nursing made multiple attempts at Slaughter placement but pt vular swelling unable to pass, pt urinating connected to purewick.   MOnitor bladder scans if UOP falls as her bladder appears distended on CT scan but no hydronephrosis present.   -hold home anti-hypertensives (ARB/Thiazide) hold any NSAIDs, pt was taking both in last week.   -nephrology consultation. apprec recs  - completed D5W  - RESOLVED

## 2022-05-10 NOTE — ASSESSMENT & PLAN NOTE
- continue espinal   -voiding trial once encephalopathy resolves.   - discontinued espinal. Urinating well

## 2022-05-10 NOTE — PROGRESS NOTES
VANCOMYCIN DOSING BY PHARMACY DISCONTINUATION NOTE    Lupis Murcia is a 71 y.o. female who had been consulted for vancomycin dosing.    The pharmacy consult for vancomycin dosing has been discontinued.     Vancomycin Dosing by Pharmacy Consult will sign-off. Please reconsult if necessary. Thank you for allowing us to participate in this patient's care.     Terry Bowles, PharmD, BCPS  Ext. [phone]

## 2022-05-10 NOTE — PLAN OF CARE
Problem: Occupational Therapy  Goal: Occupational Therapy Goal  Description: Goals to be met by: 5/21/2022     Patient will increase functional independence with ADLs by performing:    UE Dressing with Supervision.  LE Dressing with Supervision.  Grooming while standing at sink with Supervision  Toileting from toilet with Supervision for hygiene and clothing management.   Toilet transfer to toilet with Supervision.  Supine to sit with Supervision    Outcome: Ongoing, Progressing     Goals updated.

## 2022-05-10 NOTE — PT/OT/SLP PROGRESS
Occupational Therapy   Treatment    Name: Lupis Murcia  MRN: [id number]  Admitting Diagnosis:  HANDY (acute kidney injury)  4 Days Post-Op    Recommendations:     Discharge Recommendations: nursing facility, skilled  Discharge Equipment Recommendations:  none  Barriers to discharge:  None    Assessment:     Lupis Murcia is a 71 y.o. female with a medical diagnosis of HANDY (acute kidney injury).  She presents with fair participation and motivation. Pt continues to require (A) with all activities & is at risk for falls. Goals remain appropriate. Performance deficits affecting function are weakness, impaired endurance, impaired self care skills, impaired functional mobilty, impaired balance, decreased upper extremity function, decreased lower extremity function, decreased safety awareness, impaired cardiopulmonary response to activity, decreased ROM.     Rehab Prognosis:  Fair; patient would benefit from acute skilled OT services to address these deficits and reach maximum level of function.       Plan:     Patient to be seen 4 x/week to address the above listed problems via self-care/home management, therapeutic activities, therapeutic exercises, neuromuscular re-education  · Plan of Care Expires: 06/07/22  · Plan of Care Reviewed with: patient, daughter    Subjective   Upon 1st session attempt, pt verbalizing being upset regarding news that she was being discharged today.  Notified pt that OT would find CM/SW & ask him/her to come talk with pt & pt's family to discuss whether or not pt was discharging.  Upon 2nd attempt at session, pt reported that SW & MD came to talk with her & cleared everything up and that she was not being discharged today.  Pain/Comfort:  · Pain Rating 1: 0/10  · Pain Rating Post-Intervention 1: 0/10    Objective:     Communicated with: RN prior to session.  Patient found supine with telemetry, pulse ox (continuous) (daughter present in room) upon OT entry to room.    General Precautions:  Standard, fall, aspiration   Orthopedic Precautions:N/A   Braces: N/A     Occupational Performance:       [facility] 6 Click ADL: 14    Treatment & Education:  Pt declined EOB/OOB activities on this date reporting that she had performed therapy earlier today.  Upon discussion of UE therex and importance of UE strength & ROM pt was agreeable to performing therapy with BUE while supine. Pt performed AROM exercises with BUE in all planes (AAROM for shoulder flexion with LUE) x 10 reps each while supine.  Provided education on therex that pt can perform outside of therapy session later today & on other days including pacing of exercises with pt verbalizing understanding.  Pt had no further questions & when asked whether there were any concerns pt reported none.      Patient left supine with all lines intact, call button in reach, RN notified and daughter presentEducation:      GOALS:   Multidisciplinary Problems     Occupational Therapy Goals        Problem: Occupational Therapy    Goal Priority Disciplines Outcome Interventions   Occupational Therapy Goal     OT, PT/OT Ongoing, Progressing    Description: Goals to be met by: 5/21/2022     Patient will increase functional independence with ADLs by performing:    UE Dressing with Supervision.  LE Dressing with Supervision.  Grooming while standing at sink with Supervision  Toileting from toilet with Supervision for hygiene and clothing management.   Toilet transfer to toilet with Supervision.  Supine to sit with Supervision                     Time Tracking:     OT Date of Treatment: 05/10/22  OT Start Time: 1430  OT Stop Time: 1442  OT Total Time (min): 12 min    Billable Minutes:Therapeutic Exercise 12    OT/LOW: OT          5/10/2022

## 2022-05-10 NOTE — SUBJECTIVE & OBJECTIVE
Interval History: Patient lying in bed, no acute distress. Afebtrile overnight. Daughter at bedside. Alert and oriented x3. Mental status back to baseline. Discontinued espinal and she has been voiding well. Does note diarrhea and stopped stool softener, magnesium oxide and switched from vancomycin to oral doxycycline. Ordered prn Imodium. Denies fever, chills, chest pain, SOB, N/V or abdominal pain. Awaiting SNF placement.       Review of Systems   Constitutional:  Positive for activity change and fatigue. Negative for chills and fever.   HENT:  Negative for congestion and trouble swallowing.    Eyes:  Negative for visual disturbance.   Respiratory:  Negative for cough and shortness of breath.    Cardiovascular:  Positive for leg swelling. Negative for chest pain.   Gastrointestinal:  Negative for abdominal distention, abdominal pain, nausea and vomiting.   Endocrine: Negative for cold intolerance, heat intolerance, polydipsia and polyuria.   Genitourinary:  Negative for difficulty urinating and dysuria.   Musculoskeletal:  Positive for myalgias. Negative for back pain, neck pain and neck stiffness.   Skin:  Positive for wound (BL lower extremities). Negative for rash.   Neurological:  Positive for weakness. Negative for dizziness and light-headedness.   Hematological:  Negative for adenopathy. Does not bruise/bleed easily.   Psychiatric/Behavioral:  Negative for confusion and sleep disturbance.      Objective:     Vital Signs (Most Recent):  Temp: 98.3 °F (36.8 °C) (05/10/22 1140)  Pulse: 72 (05/10/22 1140)  Resp: 16 (05/10/22 1140)  BP: (!) 117/58 (05/10/22 1140)  SpO2: (!) 93 % (05/10/22 1140)   Vital Signs (24h Range):  Temp:  [97.7 °F (36.5 °C)-99.9 °F (37.7 °C)] 98.3 °F (36.8 °C)  Pulse:  [66-83] 72  Resp:  [16-19] 16  SpO2:  [90 %-95 %] 93 %  BP: (115-150)/(53-71) 117/58     Weight: 100.8 kg (222 lb 3.6 oz)  Body mass index is 40.65 kg/m².  No intake or output data in the 24 hours ending 05/10/22 1216      Physical Exam  Constitutional:       Appearance: She is well-developed. She is obese. She is ill-appearing.   HENT:      Head: Normocephalic and atraumatic.      Mouth/Throat:      Mouth: Mucous membranes are moist.   Eyes:      General: No scleral icterus.     Extraocular Movements: Extraocular movements intact.      Pupils: Pupils are equal, round, and reactive to light.   Neck:      Vascular: No JVD.   Cardiovascular:      Rate and Rhythm: Normal rate and regular rhythm.      Heart sounds: No murmur heard.    No friction rub. No gallop.   Pulmonary:      Effort: Pulmonary effort is normal. No respiratory distress.      Breath sounds: Normal breath sounds. No wheezing or rales.   Abdominal:      General: Bowel sounds are normal. There is no distension.      Palpations: Abdomen is soft.      Tenderness: There is no abdominal tenderness. There is no guarding or rebound.   Musculoskeletal:         General: No deformity. Normal range of motion.      Cervical back: Neck supple.   Lymphadenopathy:      Cervical: No cervical adenopathy.   Skin:     General: Skin is warm and dry.      Capillary Refill: Capillary refill takes less than 2 seconds.      Findings: No erythema or rash.      Comments: Severe chronic venous stasis changes and lymphedema in BLE. TTP  Sacral wound   Neurological:      General: No focal deficit present.      Mental Status: She is alert and oriented to person, place, and time.      Cranial Nerves: No cranial nerve deficit.      Sensory: No sensory deficit.      Motor: Weakness present.   Psychiatric:         Mood and Affect: Mood normal.       Significant Labs: All pertinent labs within the past 24 hours have been reviewed.    Significant Imaging: I have reviewed all pertinent imaging results/findings within the past 24 hours.

## 2022-05-10 NOTE — PROGRESS NOTES
[facility] - [facility] ([address] Medicine  Progress Note    Patient Name: Lupis Murcia  MRN: [id number]  Patient Class: IP- Inpatient   Admission Date: 5/1/2022  Length of Stay: 9 days  Attending Physician: John Hardin MD  Primary Care Provider: Bobby Tran MD        Subjective:     Principal Problem:HANDY (acute kidney injury)        HPI:  70 y/o WF hx of Multiple Sclerosis, Left Foot Drop, Lymphedema, Obesity (bmi 45) who is referred for admission for acute kidney injury, hyperkalemia and acute encephalopathy.     Patient's daughter Amanda Lowery provides primary history due to pt somnolence s/p ativan and EMR review.     1 week ago on Monday patient had a fall, partially assisted by family when legs gave out, no head injury or LOC.  Daughter noted through the week patient c/o of weakness and difficulty standing as when she fell, her armpit fell onto the walker, pt c/o of left shoulder pain.   On Thursday it was noted patient unable to stand, she is normally able to perform ADL with her walker, but daughter felt when she came home that pt had not moved.  She was also using adult diaper at this time and since Thursday due to body habitus/size patient has been unable to change her diaper since this time.   She has intermittently been taking her medicines during this time, but daughter noted she was eating less,  sleeping more and making confused statements and sought to bring her to the ED.     ED staff noted pts diaper was saturated with urine, vulvar swelling with intertrigo and buttock pressure injury.   Labs notable for Hyperkalemia to 6.3 and BUn/Cr of 169/3.9.     ED Treatment: Insulin 10uni IV regular, D10 bolus, 1gram Calcium Gluconate.  LR 2.2Liters IV, naBicarb 50meq IV x 1, Ativan 1mg IV   Ceftriaxone 2gm IV x 1      Overview/Hospital Course:  No notes on file    Interval History: Patient lying in bed, no acute distress. Afebtrile overnight. Daughter at bedside. Alert and  oriented x3. Mental status back to baseline. Worked well with therapy. Kidney function at baseline. Plan to perform voiding trial today. Tolerating IV vancomycin. Resumed home prn xanax. Plan to discharge to rehab on 5/9. Working well with therapy.       Review of Systems   Constitutional:  Positive for activity change and fatigue. Negative for chills and fever.   HENT:  Negative for congestion and trouble swallowing.    Eyes:  Negative for visual disturbance.   Respiratory:  Negative for cough and shortness of breath.    Cardiovascular:  Positive for leg swelling. Negative for chest pain.   Gastrointestinal:  Positive for nausea. Negative for abdominal distention, abdominal pain and vomiting.   Endocrine: Negative for cold intolerance, heat intolerance, polydipsia and polyuria.   Genitourinary:  Negative for difficulty urinating and dysuria.   Musculoskeletal:  Positive for myalgias. Negative for back pain, neck pain and neck stiffness.   Skin:  Positive for wound. Negative for rash.   Neurological:  Positive for weakness. Negative for dizziness and light-headedness.   Hematological:  Negative for adenopathy. Does not bruise/bleed easily.   Psychiatric/Behavioral:  Negative for confusion and sleep disturbance.      Objective:     Vital Signs (Most Recent):  Temp: 98.3 °F (36.8 °C) (05/10/22 1140)  Pulse: 72 (05/10/22 1140)  Resp: 16 (05/10/22 1140)  BP: (!) 117/58 (05/10/22 1140)  SpO2: (!) 93 % (05/10/22 1140)   Vital Signs (24h Range):  Temp:  [97.7 °F (36.5 °C)-99.9 °F (37.7 °C)] 98.3 °F (36.8 °C)  Pulse:  [66-83] 72  Resp:  [16-19] 16  SpO2:  [90 %-95 %] 93 %  BP: (115-150)/(53-71) 117/58     Weight: 100.8 kg (222 lb 3.6 oz)  Body mass index is 40.65 kg/m².  No intake or output data in the 24 hours ending 05/10/22 1214     Physical Exam  Constitutional:       Appearance: She is well-developed. She is obese. She is ill-appearing.   HENT:      Head: Normocephalic and atraumatic.      Mouth/Throat:      Mouth:  Mucous membranes are moist.   Eyes:      General: No scleral icterus.     Extraocular Movements: Extraocular movements intact.      Pupils: Pupils are equal, round, and reactive to light.   Neck:      Vascular: No JVD.   Cardiovascular:      Rate and Rhythm: Normal rate and regular rhythm.      Heart sounds: No murmur heard.    No friction rub. No gallop.   Pulmonary:      Effort: Pulmonary effort is normal. No respiratory distress.      Breath sounds: Normal breath sounds. No wheezing or rales.   Abdominal:      General: Bowel sounds are normal. There is distension.      Palpations: Abdomen is soft.      Tenderness: There is abdominal tenderness. There is no guarding or rebound.   Musculoskeletal:         General: No deformity. Normal range of motion.      Cervical back: Neck supple.   Lymphadenopathy:      Cervical: No cervical adenopathy.   Skin:     General: Skin is warm and dry.      Capillary Refill: Capillary refill takes less than 2 seconds.      Findings: No erythema or rash.      Comments: Severe chronic venous stasis changes and lymphedema in BLE. TTP  Sacral wound   Neurological:      General: No focal deficit present.      Mental Status: She is alert and oriented to person, place, and time.      Cranial Nerves: No cranial nerve deficit.      Sensory: No sensory deficit.      Motor: Weakness present.   Psychiatric:         Mood and Affect: Mood normal.       Significant Labs: All pertinent labs within the past 24 hours have been reviewed.    Significant Imaging: I have reviewed all pertinent imaging results/findings within the past 24 hours.      Assessment/Plan:      * HANDY (acute kidney injury)  Patient with acute kidney injury likely d/t IVVD/Dehydration and Pre-renal azotemia Which is currently improving. Labs reviewed- Renal function/electrolytes with Estimated Creatinine Clearance: 56.5 mL/min (based on SCr of 1 mg/dL). according to latest data. Monitor urine output and serial BMP and adjust therapy  as needed. Avoid nephrotoxins and renally dose meds for GFR listed above.   -patient with non-oliguric handy that is likely pre-renal in etiology, but higher severity given hyperkalemia, metabolic acidosis and uremic encephalopathy.   -obtain urine electrolytes,   -trend BMP   - Give IV Bicarb Drip x 1 L to assist with volume and acidosis management.   -Nursing made multiple attempts at Espinal placement but pt vular swelling unable to pass, pt urinating connected to purewick.   MOnitor bladder scans if UOP falls as her bladder appears distended on CT scan but no hydronephrosis present.   -hold home anti-hypertensives (ARB/Thiazide) hold any NSAIDs, pt was taking both in last week.   -nephrology consultation. apprec recs  - completed D5W  - IMPROVING    Abdominal distension  Nausea/Vomiting  - KUB on 5/5 showed severe gaseous distension concerning for gastric outlet obstruction  - continue bowel regimen   - CT A/P with oral contrast negative for obstruction  - GI consulted. followup recs  - continue anti-emetics  - cautious with opioids   - IMPROVING      Hypernatremia  - Na 146 --> 149  - switched to D5W  - nephro following   - BMP daily  - RESOLVED      Cellulitis of leg  - continue vancomycin   - ID following, apprec recs  - continue local wound care per wound care  - discontinued cefepime and continue vancomycin. Continue for 10 days and can switch to doxycycline upon discharge    Urinary retention  - continue espinal   -voiding trial once encephalopathy resolves. Plan to discontinued espinal tomorrow      Encephalopathy, metabolic  Secondary to uremia vs infection   -fall  -aspiration precautions  - HANDY resolved with IVF  - now rising Na so encephalopathy could also be associated with hypernatremia  - changed to D5W  - nephro following   - BMP daily  - RESOLVED    Pressure injury of buttock, unstageable  q2 turns  -low airloss overlay mattress  -wound care consultation       Lymphedema  -wound care consultation to  assist in managmeent  - continue vancomycin for cellulitis     Hyperkalemia  As above   q4hr BMP  -bicarb infusion to help acidosis management.       MS (multiple sclerosis)  ENTER PT/OT consults when patient is more awake alert and can work with therapy services.         VTE Risk Mitigation (From admission, onward)         Ordered     heparin (porcine) injection 7,500 Units  Every 8 hours         05/06/22 0850     IP VTE HIGH RISK PATIENT  Once         05/02/22 0232     Place sequential compression device  Until discontinued         05/02/22 0232                Discharge Planning   USMAN: 5/10/2022     Code Status: Full Code   Is the patient medically ready for discharge?: Yes    Reason for patient still in hospital (select all that apply): Patient trending condition, Laboratory test, Treatment and Pending disposition  Discharge Plan A: Skilled Nursing Facility          Time spent in care of patient: > 35 minutes           John Hardin MD  Department of Hospital Medicine   [facility] - Intensive Care ([place]-[facility])

## 2022-05-10 NOTE — PLAN OF CARE
Problem: Infection  Goal: Absence of Infection Signs and Symptoms  Outcome: Ongoing, Progressing     Problem: Adult Inpatient Plan of Care  Goal: Optimal Comfort and Wellbeing  Outcome: Ongoing, Progressing     Problem: Fluid and Electrolyte Imbalance (Acute Kidney Injury/Impairment)  Goal: Fluid and Electrolyte Balance  Outcome: Ongoing, Progressing     Problem: Oral Intake Inadequate (Acute Kidney Injury/Impairment)  Goal: Optimal Nutrition Intake  Outcome: Ongoing, Progressing     Problem: Impaired Wound Healing  Goal: Optimal Wound Healing  Outcome: Ongoing, Progressing   Initial bladder scan showed 453cc. MD notified and ordered a straight cath.  Pt urinated at least 200cc, resulting in second scan showing 214. Will let night nurse. No Bms noted during the shift. Call light in reach. Bed locked and in lowest position.

## 2022-05-10 NOTE — PLAN OF CARE
Problem: Adult Inpatient Plan of Care  Goal: Optimal Comfort and Wellbeing  Outcome: Ongoing, Progressing     Problem: Fluid and Electrolyte Imbalance (Acute Kidney Injury/Impairment)  Goal: Fluid and Electrolyte Balance  Outcome: Ongoing, Progressing     Problem: Oral Intake Inadequate (Acute Kidney Injury/Impairment)  Goal: Optimal Nutrition Intake  Outcome: Ongoing, Progressing   Pt free from pain. Pt has not c/o BM, loperamide is working. Bed locked and in lowest position. Call light in reach.

## 2022-05-10 NOTE — SUBJECTIVE & OBJECTIVE
Interval History: Patient lying in bed, no acute distress. Afebtrile overnight. Daughter at bedside. Alert and oriented x3. Mental status back to baseline. Worked well with therapy. Kidney function at baseline. Plan to perform voiding trial today. Tolerating IV vancomycin. Resumed home prn xanax. Plan to discharge to rehab on 5/9. Working well with therapy.       Review of Systems   Constitutional:  Positive for activity change and fatigue. Negative for chills and fever.   HENT:  Negative for congestion and trouble swallowing.    Eyes:  Negative for visual disturbance.   Respiratory:  Negative for cough and shortness of breath.    Cardiovascular:  Positive for leg swelling. Negative for chest pain.   Gastrointestinal:  Positive for nausea. Negative for abdominal distention, abdominal pain and vomiting.   Endocrine: Negative for cold intolerance, heat intolerance, polydipsia and polyuria.   Genitourinary:  Negative for difficulty urinating and dysuria.   Musculoskeletal:  Positive for myalgias. Negative for back pain, neck pain and neck stiffness.   Skin:  Positive for wound. Negative for rash.   Neurological:  Positive for weakness. Negative for dizziness and light-headedness.   Hematological:  Negative for adenopathy. Does not bruise/bleed easily.   Psychiatric/Behavioral:  Negative for confusion and sleep disturbance.      Objective:     Vital Signs (Most Recent):  Temp: 98.3 °F (36.8 °C) (05/10/22 1140)  Pulse: 72 (05/10/22 1140)  Resp: 16 (05/10/22 1140)  BP: (!) 117/58 (05/10/22 1140)  SpO2: (!) 93 % (05/10/22 1140)   Vital Signs (24h Range):  Temp:  [97.7 °F (36.5 °C)-99.9 °F (37.7 °C)] 98.3 °F (36.8 °C)  Pulse:  [66-83] 72  Resp:  [16-19] 16  SpO2:  [90 %-95 %] 93 %  BP: (115-150)/(53-71) 117/58     Weight: 100.8 kg (222 lb 3.6 oz)  Body mass index is 40.65 kg/m².  No intake or output data in the 24 hours ending 05/10/22 1214     Physical Exam  Constitutional:       Appearance: She is well-developed. She is  obese. She is ill-appearing.   HENT:      Head: Normocephalic and atraumatic.      Mouth/Throat:      Mouth: Mucous membranes are moist.   Eyes:      General: No scleral icterus.     Extraocular Movements: Extraocular movements intact.      Pupils: Pupils are equal, round, and reactive to light.   Neck:      Vascular: No JVD.   Cardiovascular:      Rate and Rhythm: Normal rate and regular rhythm.      Heart sounds: No murmur heard.    No friction rub. No gallop.   Pulmonary:      Effort: Pulmonary effort is normal. No respiratory distress.      Breath sounds: Normal breath sounds. No wheezing or rales.   Abdominal:      General: Bowel sounds are normal. There is distension.      Palpations: Abdomen is soft.      Tenderness: There is abdominal tenderness. There is no guarding or rebound.   Musculoskeletal:         General: No deformity. Normal range of motion.      Cervical back: Neck supple.   Lymphadenopathy:      Cervical: No cervical adenopathy.   Skin:     General: Skin is warm and dry.      Capillary Refill: Capillary refill takes less than 2 seconds.      Findings: No erythema or rash.      Comments: Severe chronic venous stasis changes and lymphedema in BLE. TTP  Sacral wound   Neurological:      General: No focal deficit present.      Mental Status: She is alert and oriented to person, place, and time.      Cranial Nerves: No cranial nerve deficit.      Sensory: No sensory deficit.      Motor: Weakness present.   Psychiatric:         Mood and Affect: Mood normal.       Significant Labs: All pertinent labs within the past 24 hours have been reviewed.    Significant Imaging: I have reviewed all pertinent imaging results/findings within the past 24 hours.

## 2022-05-10 NOTE — PT/OT/SLP PROGRESS
Physical Therapy Treatment    Patient Name:  Lupis Murcia   MRN:  [id number]    Recommendations:     Discharge Recommendations:  nursing facility, skilled   Discharge Equipment Recommendations: none   Barriers to discharge: Decreased caregiver support    Assessment:     Lupis Murcia is a 71 y.o. female admitted with a medical diagnosis of HANDY (acute kidney injury).  She presents with the following impairments/functional limitations:  weakness, impaired endurance, impaired functional mobilty, impaired self care skills, impaired balance, gait instability, impaired muscle length, pain. Pt participated, and tolerated treatment well. Pt will continue to benefit from skilled PT services to improve all deficits noted above. Resume PT POC as indicated.     Rehab Prognosis: Good; patient would benefit from acute skilled PT services to address these deficits and reach maximum level of function.    Recent Surgery: Procedure(s) (LRB):  EGD (ESOPHAGOGASTRODUODENOSCOPY) (N/A) 4 Days Post-Op    Plan:     During this hospitalization, patient to be seen 4 x/week to address the identified rehab impairments via gait training, therapeutic activities, therapeutic exercises, neuromuscular re-education and progress toward the following goals:    · Plan of Care Expires:  06/07/22    Subjective     Chief Complaint: none stated  Patient/Family Comments/goals: none stated  Pain/Comfort:  · Pain Rating 1:  (not rated)  · Location 1: back  · Pain Addressed 1: Reposition  · Pain Rating Post-Intervention 1:  (not rated)      Objective:     Communicated with nursing prior to session.  Patient found supine with  (all lines intact and daughter present) upon PT entry to room.     General Precautions: Standard, fall   Orthopedic Precautions:N/A   Braces: N/A     Functional Mobility:  · Bed Mobility:  Scooting: maximal assistance  · Supine to Sit: maximal assistance  · Sit to Supine: maximal assistance  · Transfers:  Sit to Stand:  minimum  assistance and of 2 persons with rolling walker  · Balance: Pt stood ~4 min with CGA using RW. Cueing for upright posture.       AM-PAC 6 CLICK MOBILITY   Total Score: 10       Therapeutic Activities and Exercises:   -BLE therex x10 rep with assistance: LAQ,HF    Patient left HOB elevated with all lines intact, call button in reach, nursing notified and daughter present..    GOALS:   Multidisciplinary Problems     Physical Therapy Goals        Problem: Physical Therapy    Goal Priority Disciplines Outcome Goal Variances Interventions   Physical Therapy Goal     PT, PT/OT Ongoing, Progressing     Description: Goals to be met by: 22    Patient will increase functional independence with mobility by performin. Supine to sit with Stand-by Assistance  2. Sit to supine with Stand-by Assistance  3. Sit to stand transfer with Contact Guard Assistance  4. Gait  x 50 feet with Minimal Assistance using Rolling Walker.   5.  Patient will demonstrate independence with a home exercise program  6. Patient's balance will be FAIR: Needs CONTACT GUARD during gait.                   Time Tracking:     PT Received On: 05/10/22  PT Start Time: 1040     PT Stop Time: 1109  PT Total Time (min): 29 min     Billable Minutes: Therapeutic Activity 29    Treatment Type: Treatment  PT/PTA: PTA     PTA Visit Number: 1     05/10/2022

## 2022-05-10 NOTE — PLAN OF CARE
05/10/22 1345   Post-Acute Status   Post-Acute Authorization Placement   Post-Acute Placement Status Referrals Sent   Discharge Plan   Discharge Plan A Skilled Nursing Facility   Discharge Plan B Skilled Nursing Facility     SW met at bedside with the Pt and her daughter. [facility] is the first choice for placement. The family is reviewing other SNF's in the area and will give choices today.    2:58 PM  Three additional SNF's were sent referrals:  [facility]  [facility]    SW will follow.    ADRIANA Garcia LMSW  [facility]  [id number]

## 2022-05-11 ENCOUNTER — HOSPITAL ENCOUNTER (INPATIENT)
Facility: HOSPITAL | Age: 72
LOS: 34 days | Discharge: ANOTHER HOSPITAL | DRG: 603 | End: 2022-06-14
Attending: HOSPITALIST | Admitting: HOSPITALIST
Payer: MEDICARE

## 2022-05-11 VITALS
HEIGHT: 62 IN | RESPIRATION RATE: 20 BRPM | HEART RATE: 75 BPM | BODY MASS INDEX: 40.85 KG/M2 | TEMPERATURE: 98 F | DIASTOLIC BLOOD PRESSURE: 77 MMHG | SYSTOLIC BLOOD PRESSURE: 146 MMHG | OXYGEN SATURATION: 95 % | WEIGHT: 222 LBS

## 2022-05-11 DIAGNOSIS — L02.419 CELLULITIS AND ABSCESS OF LEG, EXCEPT FOOT: ICD-10-CM

## 2022-05-11 DIAGNOSIS — L03.119 CELLULITIS AND ABSCESS OF LEG, EXCEPT FOOT: ICD-10-CM

## 2022-05-11 DIAGNOSIS — L89.300 PRESSURE INJURY OF BUTTOCK, UNSTAGEABLE, UNSPECIFIED LATERALITY: Primary | ICD-10-CM

## 2022-05-11 PROBLEM — E87.0 HYPERNATREMIA: Status: RESOLVED | Noted: 2022-05-04 | Resolved: 2022-05-11

## 2022-05-11 PROBLEM — R33.9 URINARY RETENTION: Status: RESOLVED | Noted: 2022-05-02 | Resolved: 2022-05-11

## 2022-05-11 PROBLEM — R14.0 ABDOMINAL DISTENSION: Status: RESOLVED | Noted: 2022-05-06 | Resolved: 2022-05-11

## 2022-05-11 PROBLEM — E87.5 HYPERKALEMIA: Status: RESOLVED | Noted: 2022-05-01 | Resolved: 2022-05-11

## 2022-05-11 PROBLEM — G93.41 ENCEPHALOPATHY, METABOLIC: Status: RESOLVED | Noted: 2022-05-01 | Resolved: 2022-05-11

## 2022-05-11 PROBLEM — N17.9 AKI (ACUTE KIDNEY INJURY): Status: RESOLVED | Noted: 2022-05-01 | Resolved: 2022-05-11

## 2022-05-11 LAB
ANION GAP SERPL CALC-SCNC: 9 MMOL/L (ref 8–16)
ASCENDING AORTA: 3.63 CM
AV INDEX (PROSTH): 0.5
AV MEAN GRADIENT: 20 MMHG
AV PEAK GRADIENT: 40 MMHG
AV VALVE AREA: 1.98 CM2
AV VELOCITY RATIO: 0.45
BSA FOR ECHO PROCEDURE: 2.1 M2
BUN SERPL-MCNC: 15 MG/DL (ref 8–23)
CALCIUM SERPL-MCNC: 8.6 MG/DL (ref 8.7–10.5)
CHLORIDE SERPL-SCNC: 106 MMOL/L (ref 95–110)
CO2 SERPL-SCNC: 25 MMOL/L (ref 23–29)
CREAT SERPL-MCNC: 0.8 MG/DL (ref 0.5–1.4)
CV ECHO LV RWT: 0.38 CM
DOP CALC AO PEAK VEL: 3.18 M/S
DOP CALC AO VTI: 59.93 CM
DOP CALC LVOT AREA: 4 CM2
DOP CALC LVOT DIAMETER: 2.25 CM
DOP CALC LVOT PEAK VEL: 1.42 M/S
DOP CALC LVOT STROKE VOLUME: 118.74 CM3
DOP CALCLVOT PEAK VEL VTI: 29.88 CM
E WAVE DECELERATION TIME: 158.65 MSEC
E/A RATIO: 1.02
E/E' RATIO: 13.69 M/S
ECHO LV POSTERIOR WALL: 0.95 CM (ref 0.6–1.1)
EJECTION FRACTION: 65 %
ERYTHROCYTE [DISTWIDTH] IN BLOOD BY AUTOMATED COUNT: 14.6 % (ref 11.5–14.5)
EST. GFR  (AFRICAN AMERICAN): >60 ML/MIN/1.73 M^2
EST. GFR  (NON AFRICAN AMERICAN): >60 ML/MIN/1.73 M^2
FRACTIONAL SHORTENING: 33 % (ref 28–44)
GLUCOSE SERPL-MCNC: 95 MG/DL (ref 70–110)
HCT VFR BLD AUTO: 26.4 % (ref 37–48.5)
HGB BLD-MCNC: 8.1 G/DL (ref 12–16)
INTERVENTRICULAR SEPTUM: 0.93 CM (ref 0.6–1.1)
LA MAJOR: 5.8 CM
LA MINOR: 5.67 CM
LA WIDTH: 4.16 CM
LEFT ATRIUM SIZE: 4.14 CM
LEFT ATRIUM VOLUME INDEX MOD: 34.9 ML/M2
LEFT ATRIUM VOLUME INDEX: 42 ML/M2
LEFT ATRIUM VOLUME MOD: 69.88 CM3
LEFT ATRIUM VOLUME: 83.94 CM3
LEFT INTERNAL DIMENSION IN SYSTOLE: 3.32 CM (ref 2.1–4)
LEFT VENTRICLE DIASTOLIC VOLUME INDEX: 57.68 ML/M2
LEFT VENTRICLE DIASTOLIC VOLUME: 115.35 ML
LEFT VENTRICLE MASS INDEX: 82 G/M2
LEFT VENTRICLE SYSTOLIC VOLUME INDEX: 22.4 ML/M2
LEFT VENTRICLE SYSTOLIC VOLUME: 44.76 ML
LEFT VENTRICULAR INTERNAL DIMENSION IN DIASTOLE: 4.95 CM (ref 3.5–6)
LEFT VENTRICULAR MASS: 164.77 G
LV LATERAL E/E' RATIO: 12.71 M/S
LV SEPTAL E/E' RATIO: 14.83 M/S
MCH RBC QN AUTO: 29 PG (ref 27–31)
MCHC RBC AUTO-ENTMCNC: 30.7 G/DL (ref 32–36)
MCV RBC AUTO: 95 FL (ref 82–98)
MV A" WAVE DURATION": 11.8 MSEC
MV PEAK A VEL: 0.87 M/S
MV PEAK E VEL: 0.89 M/S
MV STENOSIS PRESSURE HALF TIME: 46.01 MS
MV VALVE AREA P 1/2 METHOD: 4.78 CM2
PISA TR MAX VEL: 2.88 M/S
PLATELET # BLD AUTO: 173 K/UL (ref 150–450)
PMV BLD AUTO: 10.6 FL (ref 9.2–12.9)
POCT GLUCOSE: 91 MG/DL (ref 70–110)
POTASSIUM SERPL-SCNC: 3.5 MMOL/L (ref 3.5–5.1)
PULM VEIN S/D RATIO: 1.27
PV PEAK D VEL: 0.52 M/S
PV PEAK S VEL: 0.66 M/S
RA MAJOR: 5.4 CM
RA WIDTH: 3.15 CM
RBC # BLD AUTO: 2.79 M/UL (ref 4–5.4)
RIGHT VENTRICULAR END-DIASTOLIC DIMENSION: 3.19 CM
RV TISSUE DOPPLER FREE WALL SYSTOLIC VELOCITY 1 (APICAL 4 CHAMBER VIEW): 21 CM/S
SINUS: 3.17 CM
SODIUM SERPL-SCNC: 140 MMOL/L (ref 136–145)
STJ: 3.51 CM
TDI LATERAL: 0.07 M/S
TDI SEPTAL: 0.06 M/S
TDI: 0.07 M/S
TR MAX PG: 33 MMHG
TRICUSPID ANNULAR PLANE SYSTOLIC EXCURSION: 1.72 CM
WBC # BLD AUTO: 6.86 K/UL (ref 3.9–12.7)

## 2022-05-11 PROCEDURE — 25000003 PHARM REV CODE 250: Performed by: HOSPITALIST

## 2022-05-11 PROCEDURE — 25000003 PHARM REV CODE 250: Performed by: INTERNAL MEDICINE

## 2022-05-11 PROCEDURE — 63600175 PHARM REV CODE 636 W HCPCS: Performed by: INTERNAL MEDICINE

## 2022-05-11 PROCEDURE — 99239 PR HOSPITAL DISCHARGE DAY,>30 MIN: ICD-10-PCS | Mod: ,,, | Performed by: INTERNAL MEDICINE

## 2022-05-11 PROCEDURE — C9113 INJ PANTOPRAZOLE SODIUM, VIA: HCPCS | Performed by: INTERNAL MEDICINE

## 2022-05-11 PROCEDURE — 36415 COLL VENOUS BLD VENIPUNCTURE: CPT | Performed by: INTERNAL MEDICINE

## 2022-05-11 PROCEDURE — 85027 COMPLETE CBC AUTOMATED: CPT | Performed by: INTERNAL MEDICINE

## 2022-05-11 PROCEDURE — 99239 HOSP IP/OBS DSCHRG MGMT >30: CPT | Mod: ,,, | Performed by: INTERNAL MEDICINE

## 2022-05-11 PROCEDURE — 80048 BASIC METABOLIC PNL TOTAL CA: CPT | Performed by: INTERNAL MEDICINE

## 2022-05-11 PROCEDURE — 97530 THERAPEUTIC ACTIVITIES: CPT

## 2022-05-11 PROCEDURE — 11000004 HC SNF PRIVATE

## 2022-05-11 PROCEDURE — 63600175 PHARM REV CODE 636 W HCPCS: Performed by: HOSPITALIST

## 2022-05-11 PROCEDURE — 97535 SELF CARE MNGMENT TRAINING: CPT

## 2022-05-11 PROCEDURE — 97110 THERAPEUTIC EXERCISES: CPT

## 2022-05-11 PROCEDURE — 25500020 PHARM REV CODE 255: Performed by: INTERNAL MEDICINE

## 2022-05-11 RX ORDER — LOSARTAN POTASSIUM 50 MG/1
100 TABLET ORAL DAILY
Status: DISCONTINUED | OUTPATIENT
Start: 2022-05-12 | End: 2022-05-12

## 2022-05-11 RX ORDER — SODIUM,POTASSIUM PHOSPHATES 280-250MG
1 POWDER IN PACKET (EA) ORAL
Status: DISCONTINUED | OUTPATIENT
Start: 2022-05-11 | End: 2022-05-11 | Stop reason: HOSPADM

## 2022-05-11 RX ORDER — IBUPROFEN 200 MG
24 TABLET ORAL
Status: DISCONTINUED | OUTPATIENT
Start: 2022-05-11 | End: 2022-05-12

## 2022-05-11 RX ORDER — INSULIN ASPART 100 [IU]/ML
0-5 INJECTION, SOLUTION INTRAVENOUS; SUBCUTANEOUS
Status: DISCONTINUED | OUTPATIENT
Start: 2022-05-11 | End: 2022-05-12

## 2022-05-11 RX ORDER — ACETAMINOPHEN 325 MG/1
650 TABLET ORAL EVERY 6 HOURS PRN
Status: DISCONTINUED | OUTPATIENT
Start: 2022-05-11 | End: 2022-06-15 | Stop reason: HOSPADM

## 2022-05-11 RX ORDER — AMOXICILLIN 250 MG
1 CAPSULE ORAL 2 TIMES DAILY
Status: DISCONTINUED | OUTPATIENT
Start: 2022-05-11 | End: 2022-05-13

## 2022-05-11 RX ORDER — HYDROCHLOROTHIAZIDE 12.5 MG/1
12.5 TABLET ORAL DAILY
Status: DISCONTINUED | OUTPATIENT
Start: 2022-05-12 | End: 2022-05-13

## 2022-05-11 RX ORDER — TALC
6 POWDER (GRAM) TOPICAL NIGHTLY PRN
Status: DISCONTINUED | OUTPATIENT
Start: 2022-05-11 | End: 2022-06-15 | Stop reason: HOSPADM

## 2022-05-11 RX ORDER — SODIUM,POTASSIUM PHOSPHATES 280-250MG
1 POWDER IN PACKET (EA) ORAL
Status: DISPENSED | OUTPATIENT
Start: 2022-05-11 | End: 2022-05-16

## 2022-05-11 RX ORDER — CALCIUM CARBONATE 200(500)MG
500 TABLET,CHEWABLE ORAL 2 TIMES DAILY PRN
Status: DISCONTINUED | OUTPATIENT
Start: 2022-05-11 | End: 2022-06-15 | Stop reason: HOSPADM

## 2022-05-11 RX ORDER — B-COMPLEX WITH VITAMIN C
1 TABLET ORAL DAILY
Status: DISCONTINUED | OUTPATIENT
Start: 2022-05-12 | End: 2022-06-15 | Stop reason: HOSPADM

## 2022-05-11 RX ORDER — TAMSULOSIN HYDROCHLORIDE 0.4 MG/1
0.4 CAPSULE ORAL DAILY
Status: DISCONTINUED | OUTPATIENT
Start: 2022-05-12 | End: 2022-05-12

## 2022-05-11 RX ORDER — ALPRAZOLAM 0.5 MG/1
0.5 TABLET ORAL 3 TIMES DAILY PRN
Status: DISCONTINUED | OUTPATIENT
Start: 2022-05-11 | End: 2022-05-11

## 2022-05-11 RX ORDER — BACLOFEN 10 MG/1
10 TABLET ORAL 2 TIMES DAILY
Status: DISCONTINUED | OUTPATIENT
Start: 2022-05-11 | End: 2022-05-12

## 2022-05-11 RX ORDER — CYANOCOBALAMIN 1000 UG/ML
1000 INJECTION, SOLUTION INTRAMUSCULAR; SUBCUTANEOUS
Status: DISCONTINUED | OUTPATIENT
Start: 2022-05-26 | End: 2022-05-12

## 2022-05-11 RX ORDER — LOPERAMIDE HYDROCHLORIDE 2 MG/1
2 CAPSULE ORAL 4 TIMES DAILY PRN
Status: DISCONTINUED | OUTPATIENT
Start: 2022-05-11 | End: 2022-06-15 | Stop reason: HOSPADM

## 2022-05-11 RX ORDER — GLUCAGON 1 MG
1 KIT INJECTION
Status: DISCONTINUED | OUTPATIENT
Start: 2022-05-11 | End: 2022-05-12

## 2022-05-11 RX ORDER — LORAZEPAM 0.5 MG/1
0.5 TABLET ORAL EVERY 8 HOURS PRN
Status: DISCONTINUED | OUTPATIENT
Start: 2022-05-11 | End: 2022-06-13

## 2022-05-11 RX ORDER — ERYTHROMYCIN 500 MG/1
500 TABLET, COATED ORAL
Status: CANCELLED | OUTPATIENT
Start: 2022-05-11

## 2022-05-11 RX ORDER — CYANOCOBALAMIN 1000 UG/ML
1000 INJECTION, SOLUTION INTRAMUSCULAR; SUBCUTANEOUS
Status: DISCONTINUED | OUTPATIENT
Start: 2022-06-30 | End: 2022-05-12

## 2022-05-11 RX ORDER — IBUPROFEN 200 MG
16 TABLET ORAL
Status: DISCONTINUED | OUTPATIENT
Start: 2022-05-11 | End: 2022-05-12

## 2022-05-11 RX ORDER — ACETAMINOPHEN AND CODEINE PHOSPHATE 300; 30 MG/1; MG/1
1 TABLET ORAL EVERY 6 HOURS PRN
Status: DISCONTINUED | OUTPATIENT
Start: 2022-05-11 | End: 2022-06-15 | Stop reason: HOSPADM

## 2022-05-11 RX ORDER — FUROSEMIDE 20 MG/1
20 TABLET ORAL DAILY PRN
Status: DISCONTINUED | OUTPATIENT
Start: 2022-05-11 | End: 2022-06-15 | Stop reason: HOSPADM

## 2022-05-11 RX ORDER — TAMSULOSIN HYDROCHLORIDE 0.4 MG/1
0.4 CAPSULE ORAL
Status: DISCONTINUED | OUTPATIENT
Start: 2022-05-11 | End: 2022-05-11 | Stop reason: HOSPADM

## 2022-05-11 RX ORDER — CYANOCOBALAMIN 1000 UG/ML
1000 INJECTION, SOLUTION INTRAMUSCULAR; SUBCUTANEOUS DAILY
Status: DISCONTINUED | OUTPATIENT
Start: 2022-05-12 | End: 2022-05-12

## 2022-05-11 RX ORDER — SODIUM,POTASSIUM PHOSPHATES 280-250MG
1 POWDER IN PACKET (EA) ORAL
Refills: 0 | Status: ON HOLD
Start: 2022-05-11 | End: 2022-06-02 | Stop reason: HOSPADM

## 2022-05-11 RX ORDER — TAMSULOSIN HYDROCHLORIDE 0.4 MG/1
0.4 CAPSULE ORAL
Qty: 30 CAPSULE | Refills: 1
Start: 2022-05-11 | End: 2022-06-02

## 2022-05-11 RX ORDER — CHOLECALCIFEROL (VITAMIN D3) 25 MCG
1000 TABLET ORAL DAILY
Status: DISCONTINUED | OUTPATIENT
Start: 2022-05-12 | End: 2022-06-15 | Stop reason: HOSPADM

## 2022-05-11 RX ORDER — DOXYLAMINE SUCCINATE 25 MG
TABLET ORAL 2 TIMES DAILY
Status: DISPENSED | OUTPATIENT
Start: 2022-05-11 | End: 2022-05-25

## 2022-05-11 RX ORDER — PANTOPRAZOLE SODIUM 40 MG/1
40 TABLET, DELAYED RELEASE ORAL DAILY
Status: DISCONTINUED | OUTPATIENT
Start: 2022-05-12 | End: 2022-05-12

## 2022-05-11 RX ORDER — CLOTRIMAZOLE AND BETAMETHASONE DIPROPIONATE 10; .64 MG/G; MG/G
CREAM TOPICAL 2 TIMES DAILY
Status: DISCONTINUED | OUTPATIENT
Start: 2022-05-11 | End: 2022-05-12

## 2022-05-11 RX ORDER — AMMONIUM LACTATE 12 G/100G
LOTION TOPICAL 2 TIMES DAILY PRN
Status: DISCONTINUED | OUTPATIENT
Start: 2022-05-11 | End: 2022-05-13

## 2022-05-11 RX ORDER — DOXYCYCLINE HYCLATE 100 MG
100 TABLET ORAL EVERY 12 HOURS
Status: COMPLETED | OUTPATIENT
Start: 2022-05-11 | End: 2022-05-13

## 2022-05-11 RX ADMIN — DOXYCYCLINE HYCLATE 100 MG: 100 TABLET, COATED ORAL at 09:05

## 2022-05-11 RX ADMIN — LORAZEPAM 0.5 MG: 0.5 TABLET ORAL at 02:05

## 2022-05-11 RX ADMIN — CHOLECALCIFEROL TAB 25 MCG (1000 UNIT) 1000 UNITS: 25 TAB at 09:05

## 2022-05-11 RX ADMIN — HEPARIN SODIUM 7500 UNITS: 5000 INJECTION INTRAVENOUS; SUBCUTANEOUS at 06:05

## 2022-05-11 RX ADMIN — HUMAN ALBUMIN MICROSPHERES AND PERFLUTREN 0.66 MG: 10; .22 INJECTION, SOLUTION INTRAVENOUS at 08:05

## 2022-05-11 RX ADMIN — TAMSULOSIN HYDROCHLORIDE 0.4 MG: 0.4 CAPSULE ORAL at 02:05

## 2022-05-11 RX ADMIN — ONDANSETRON 4 MG: 2 INJECTION INTRAMUSCULAR; INTRAVENOUS at 04:05

## 2022-05-11 RX ADMIN — POTASSIUM & SODIUM PHOSPHATES POWDER PACK 280-160-250 MG 1 PACKET: 280-160-250 PACK at 02:05

## 2022-05-11 RX ADMIN — LORAZEPAM 0.5 MG: 0.5 TABLET ORAL at 10:05

## 2022-05-11 RX ADMIN — Medication 1 TABLET: at 09:05

## 2022-05-11 RX ADMIN — PANTOPRAZOLE SODIUM 40 MG: 40 INJECTION, POWDER, FOR SOLUTION INTRAVENOUS at 09:05

## 2022-05-11 RX ADMIN — LORAZEPAM 0.5 MG: 0.5 TABLET ORAL at 06:05

## 2022-05-11 RX ADMIN — ACETAMINOPHEN 650 MG: 325 TABLET ORAL at 06:05

## 2022-05-11 RX ADMIN — ACETAMINOPHEN AND CODEINE PHOSPHATE 1 TABLET: 300; 30 TABLET ORAL at 04:05

## 2022-05-11 RX ADMIN — HEPARIN SODIUM 7500 UNITS: 5000 INJECTION INTRAVENOUS; SUBCUTANEOUS at 02:05

## 2022-05-11 RX ADMIN — ACETAMINOPHEN 650 MG: 325 TABLET ORAL at 09:05

## 2022-05-11 RX ADMIN — MICONAZOLE NITRATE: 20 CREAM TOPICAL at 09:05

## 2022-05-11 NOTE — NURSING
Patient admitted to SNF. Patient arrived to SNF via [facility] transport and daughter present. Patient denies pain or discomfort at this time. Patient has +3 edema to BLE with skin dried drainage and dry flaky skin. Patient has redness to abdominal skin folds and under breasts. Patient has sacral wound with triad ointment applied to area. Sacral Area is blanchable. IV to left AC with 20G saline lock. Daughter at bedside.

## 2022-05-11 NOTE — PROGRESS NOTES
Pt being transferred to [facility].  Report given to Latonya receiving nurse.  Okay to transfer with piv to [facility].  W/C transport to take pt to facility.

## 2022-05-11 NOTE — PT/OT/SLP PROGRESS
"Occupational Therapy   Treatment    Name: Lupis Murcia  MRN: [id number]  Admitting Diagnosis:  HANDY (acute kidney injury)  5 Days Post-Op    Recommendations:     Discharge Recommendations: nursing facility, skilled  Discharge Equipment Recommendations:  none  Barriers to discharge:  None    Assessment:     Lupis Murcia is a 71 y.o. female with a medical diagnosis of HANDY (acute kidney injury).  She presents with increased anxiety with movement and general weakness. Pt motivated to participated in therapy with mod verbal cues to decrease anxiety. Patient stood well from EOB 2x for ~ 5 minute overall using RW with good trunk control secondary to increased anxiety using the RW. Performance deficits affecting function are weakness, impaired endurance, impaired self care skills, impaired functional mobilty, gait instability, impaired balance, decreased lower extremity function, pain, impaired coordination, impaired skin. Patient remains appropriate for SNF at discharge and continues to benefit from skilled acute OT while in the hospital.    Rehab Prognosis:  Good; patient would benefit from acute skilled OT services to address these deficits and reach maximum level of function.       Plan:     Patient to be seen 3 x/week to address the above listed problems via self-care/home management, therapeutic activities, therapeutic exercises  · Plan of Care Expires: 06/11/22  · Plan of Care Reviewed with: patient, daughter    Subjective   Patient states "I am so scared of falling with the RW like I did before"  Pain/Comfort:  · Pain Rating 1:  (Pt did not rate.)  · Pain Addressed 2: Pre-medicate for activity, Distraction, Reposition    Objective:     Communicated with: nurse prior to session.  Patient found HOB elevated with PureWick, pulse ox (continuous), telemetry upon OT entry to room. Daughter was present during session.    General Precautions: Standard, fall   Orthopedic Precautions:N/A   Braces: N/A  Respiratory " Status: Room air     Occupational Performance:     Bed Mobility:    · Patient completed Rolling/Turning to Left with  maximal assistance, 2 persons and with side rail  · Patient completed Scooting/Bridging with maximal assistance and 2 persons  · Patient completed Supine to Sit with maximal assistance and 2 persons  · Patient completed Sit to Supine with maximal assistance and 2 persons     Functional Mobility/Transfers:  · Patient completed Sit <> Stand Transfer 2x with moderate assistance and of 2 persons  with  rolling walker on first attempt. Patient performed sit<>stand with min assist of 2 person with RW on second attempt.  · Functional Mobility: Patient stood from EOB with 2-person min assist for ~5 minutes total.    Activities of Daily Living:  · Grooming: contact guard assistance to wipe face with washcloth while sitting EOB.      [facility] 6 Click ADL: 15    Treatment & Education:  - OT/POC  - Importance of mobility to maximize recovery.  - Safety w/ functional mobility; hand placement to ensure safe transfers to various surfaces in prep for ADLs  - Reviewed RW management via verbalization and demonstration   -Reviewed UE exercises given during last session. Patient verbalized understanding and demonstrated exercises.    Patient maintained dynamic sitting balance at EOB with CGA for ~15 minutes. Patient required mod cues to decrease anxiety about falling off EOB.    Patient anxious about moving to SNF and would benefit from education on the environment and roles she would encounter there. Provided education regarding what to expect/assistance available at SNF.    Provided education regarding role of OT, POC, & discharge recommendations with pt verbalizing understanding.  Pt had no further questions & when asked whether there were any concerns pt reported none.      Patient left supine with all lines intact, call button in reach, nurse notified and daughter presentEducation:      GOALS:   Multidisciplinary  Problems     Occupational Therapy Goals        Problem: Occupational Therapy    Goal Priority Disciplines Outcome Interventions   Occupational Therapy Goal     OT, PT/OT Ongoing, Progressing    Description: Goals to be met by: 5/21/2022     Patient will increase functional independence with ADLs by performing:    UE Dressing with Supervision.  LE Dressing with Supervision.  Grooming while standing at sink with Supervision  Toileting from toilet with Supervision for hygiene and clothing management.   Toilet transfer to toilet with Supervision.  Supine to sit with Supervision                     Time Tracking:     OT Date of Treatment: 05/11/22  OT Start Time: 0845  OT Stop Time: 0935  OT Total Time (min): 50 min    Billable Minutes:Self Care/Home Management 15  Therapeutic Activity 22  Therapeutic Exercise 13    OT/LOW: OT          5/11/2022

## 2022-05-11 NOTE — SUBJECTIVE & OBJECTIVE
Interval History: Patient lying in bed, no acute distress. Afebtrile overnight. Daughter at bedside. Alert and oriented x3. Mental status back to baseline. Patient and daughter were frustrated due to them being notified that they would be discharged today. Primary team explained that discharge order was placed but patient would not be discharged until SNF was arranged. Patient reports loose stools improved with imodium and stopping stool softeners. Patient also has reduced UOP and bladder scan 423. Straight cath performed and urinary retention protocol initiated and ordered flomax. Awaiting SNF placement.       Review of Systems   Constitutional:  Positive for activity change and fatigue. Negative for chills and fever.   HENT:  Negative for congestion and trouble swallowing.    Eyes:  Negative for visual disturbance.   Respiratory:  Negative for cough and shortness of breath.    Cardiovascular:  Positive for leg swelling. Negative for chest pain.   Gastrointestinal:  Negative for abdominal distention, abdominal pain, nausea and vomiting.   Endocrine: Negative for cold intolerance, heat intolerance, polydipsia and polyuria.   Genitourinary:  Negative for difficulty urinating and dysuria.   Musculoskeletal:  Positive for myalgias. Negative for back pain, neck pain and neck stiffness.   Skin:  Positive for wound (BL lower extremities). Negative for rash.   Neurological:  Positive for weakness. Negative for dizziness and light-headedness.   Hematological:  Negative for adenopathy. Does not bruise/bleed easily.   Psychiatric/Behavioral:  Negative for confusion and sleep disturbance.      Objective:     Vital Signs (Most Recent):  Temp: 98.3 °F (36.8 °C) (05/10/22 2035)  Pulse: 80 (05/10/22 2323)  Resp: 16 (05/10/22 2035)  BP: (!) 144/73 (05/10/22 2035)  SpO2: (!) 92 % (05/10/22 2323)   Vital Signs (24h Range):  Temp:  [98.2 °F (36.8 °C)-98.5 °F (36.9 °C)] 98.3 °F (36.8 °C)  Pulse:  [69-80] 80  Resp:  [14-18] 16  SpO2:   [90 %-95 %] 92 %  BP: (110-144)/(54-73) 144/73     Weight: 100.8 kg (222 lb 3.6 oz)  Body mass index is 40.65 kg/m².  No intake or output data in the 24 hours ending 05/11/22 0010     Physical Exam  Constitutional:       Appearance: She is well-developed. She is obese. She is ill-appearing.   HENT:      Head: Normocephalic and atraumatic.      Mouth/Throat:      Mouth: Mucous membranes are moist.   Eyes:      General: No scleral icterus.     Extraocular Movements: Extraocular movements intact.      Pupils: Pupils are equal, round, and reactive to light.   Neck:      Vascular: No JVD.   Cardiovascular:      Rate and Rhythm: Normal rate and regular rhythm.      Heart sounds: No murmur heard.    No friction rub. No gallop.   Pulmonary:      Effort: Pulmonary effort is normal. No respiratory distress.      Breath sounds: Normal breath sounds. No wheezing or rales.   Abdominal:      General: Bowel sounds are normal. There is no distension.      Palpations: Abdomen is soft.      Tenderness: There is no abdominal tenderness. There is no guarding or rebound.   Musculoskeletal:         General: No deformity. Normal range of motion.      Cervical back: Neck supple.   Lymphadenopathy:      Cervical: No cervical adenopathy.   Skin:     General: Skin is warm and dry.      Capillary Refill: Capillary refill takes less than 2 seconds.      Findings: No erythema or rash.      Comments: Severe chronic venous stasis changes and lymphedema in BLE. TTP  Sacral wound   Neurological:      General: No focal deficit present.      Mental Status: She is alert and oriented to person, place, and time.      Cranial Nerves: No cranial nerve deficit.      Sensory: No sensory deficit.      Motor: Weakness present.   Psychiatric:         Mood and Affect: Mood normal.       Significant Labs: All pertinent labs within the past 24 hours have been reviewed.    Significant Imaging: I have reviewed all pertinent imaging results/findings within the past  24 hours.

## 2022-05-11 NOTE — PLAN OF CARE
Per Harmony Fermin with [facility], pt is accepted to their facility.  Pt and family informed.  Nurse Kelly given number for report via secure chat.  Awaiting transport time info from [facility].  Will continue to follow for discharge needs.    Update 2:20pm  Per [facility], transport may be arranged for 4pm.  This CM arranged transport via wheelchair van (per pt request) through [facility] for 4pm today.  Nurse Mendoza notified of the above.    Hector Escobar RN CM  Case Management  [phone]

## 2022-05-11 NOTE — PLAN OF CARE
Problem: Occupational Therapy  Goal: Occupational Therapy Goal  Description: Goals to be met by: 5/21/2022     Patient will increase functional independence with ADLs by performing:    UE Dressing with Supervision.  LE Dressing with Supervision.  Grooming while standing at sink with Supervision  Toileting from toilet with Supervision for hygiene and clothing management.   Toilet transfer to toilet with Supervision.  Supine to sit with Supervision    Outcome: Ongoing, Progressing    Goals are made appropriate

## 2022-05-11 NOTE — ASSESSMENT & PLAN NOTE
- continue espinal   -voiding trial once encephalopathy resolves.   - discontinued espinal. Urinating well until 5/10 when UOP decreased and bladder scan 423 cc and straight cathed. Started urinary retention protocol

## 2022-05-11 NOTE — PROGRESS NOTES
[facility] ([address] Medicine  Progress Note    Patient Name: Lupis Murcia  MRN: [id number]  Patient Class: IP- Inpatient   Admission Date: 5/1/2022  Length of Stay: 10 days  Attending Physician: John Hardin MD  Primary Care Provider: Bobby Tran MD        Subjective:     Principal Problem:HANDY (acute kidney injury)        HPI:  72 y/o WF hx of Multiple Sclerosis, Left Foot Drop, Lymphedema, Obesity (bmi 45) who is referred for admission for acute kidney injury, hyperkalemia and acute encephalopathy.     Patient's daughter Amanda Lowery provides primary history due to pt somnolence s/p ativan and EMR review.     1 week ago on Monday patient had a fall, partially assisted by family when legs gave out, no head injury or LOC.  Daughter noted through the week patient c/o of weakness and difficulty standing as when she fell, her armpit fell onto the walker, pt c/o of left shoulder pain.   On Thursday it was noted patient unable to stand, she is normally able to perform ADL with her walker, but daughter felt when she came home that pt had not moved.  She was also using adult diaper at this time and since Thursday due to body habitus/size patient has been unable to change her diaper since this time.   She has intermittently been taking her medicines during this time, but daughter noted she was eating less,  sleeping more and making confused statements and sought to bring her to the ED.     ED staff noted pts diaper was saturated with urine, vulvar swelling with intertrigo and buttock pressure injury.   Labs notable for Hyperkalemia to 6.3 and BUn/Cr of 169/3.9.     ED Treatment: Insulin 10uni IV regular, D10 bolus, 1gram Calcium Gluconate.  LR 2.2Liters IV, naBicarb 50meq IV x 1, Ativan 1mg IV   Ceftriaxone 2gm IV x 1      Overview/Hospital Course:  No notes on file    Interval History: Patient lying in bed, no acute distress. Afebtrile overnight. Daughter at bedside. Alert and  oriented x3. Mental status back to baseline. Patient and daughter were frustrated due to them being notified that they would be discharged today. Primary team explained that discharge order was placed but patient would not be discharged until SNF was arranged. Patient reports loose stools improved with imodium and stopping stool softeners. Patient also has reduced UOP and bladder scan 423. Straight cath performed and urinary retention protocol initiated and ordered flomax. Awaiting SNF placement.       Review of Systems   Constitutional:  Positive for activity change and fatigue. Negative for chills and fever.   HENT:  Negative for congestion and trouble swallowing.    Eyes:  Negative for visual disturbance.   Respiratory:  Negative for cough and shortness of breath.    Cardiovascular:  Positive for leg swelling. Negative for chest pain.   Gastrointestinal:  Negative for abdominal distention, abdominal pain, nausea and vomiting.   Endocrine: Negative for cold intolerance, heat intolerance, polydipsia and polyuria.   Genitourinary:  Negative for difficulty urinating and dysuria.   Musculoskeletal:  Positive for myalgias. Negative for back pain, neck pain and neck stiffness.   Skin:  Positive for wound (BL lower extremities). Negative for rash.   Neurological:  Positive for weakness. Negative for dizziness and light-headedness.   Hematological:  Negative for adenopathy. Does not bruise/bleed easily.   Psychiatric/Behavioral:  Negative for confusion and sleep disturbance.      Objective:     Vital Signs (Most Recent):  Temp: 98.3 °F (36.8 °C) (05/10/22 2035)  Pulse: 80 (05/10/22 2323)  Resp: 16 (05/10/22 2035)  BP: (!) 144/73 (05/10/22 2035)  SpO2: (!) 92 % (05/10/22 2323)   Vital Signs (24h Range):  Temp:  [98.2 °F (36.8 °C)-98.5 °F (36.9 °C)] 98.3 °F (36.8 °C)  Pulse:  [69-80] 80  Resp:  [14-18] 16  SpO2:  [90 %-95 %] 92 %  BP: (110-144)/(54-73) 144/73     Weight: 100.8 kg (222 lb 3.6 oz)  Body mass index is 40.65  kg/m².  No intake or output data in the 24 hours ending 05/11/22 0010     Physical Exam  Constitutional:       Appearance: She is well-developed. She is obese. She is ill-appearing.   HENT:      Head: Normocephalic and atraumatic.      Mouth/Throat:      Mouth: Mucous membranes are moist.   Eyes:      General: No scleral icterus.     Extraocular Movements: Extraocular movements intact.      Pupils: Pupils are equal, round, and reactive to light.   Neck:      Vascular: No JVD.   Cardiovascular:      Rate and Rhythm: Normal rate and regular rhythm.      Heart sounds: No murmur heard.    No friction rub. No gallop.   Pulmonary:      Effort: Pulmonary effort is normal. No respiratory distress.      Breath sounds: Normal breath sounds. No wheezing or rales.   Abdominal:      General: Bowel sounds are normal. There is no distension.      Palpations: Abdomen is soft.      Tenderness: There is no abdominal tenderness. There is no guarding or rebound.   Musculoskeletal:         General: No deformity. Normal range of motion.      Cervical back: Neck supple.   Lymphadenopathy:      Cervical: No cervical adenopathy.   Skin:     General: Skin is warm and dry.      Capillary Refill: Capillary refill takes less than 2 seconds.      Findings: No erythema or rash.      Comments: Severe chronic venous stasis changes and lymphedema in BLE. TTP  Sacral wound   Neurological:      General: No focal deficit present.      Mental Status: She is alert and oriented to person, place, and time.      Cranial Nerves: No cranial nerve deficit.      Sensory: No sensory deficit.      Motor: Weakness present.   Psychiatric:         Mood and Affect: Mood normal.       Significant Labs: All pertinent labs within the past 24 hours have been reviewed.    Significant Imaging: I have reviewed all pertinent imaging results/findings within the past 24 hours.      Assessment/Plan:      * HANDY (acute kidney injury)  Patient with acute kidney injury likely d/t  IVVD/Dehydration and Pre-renal azotemia Which is currently improving. Labs reviewed- Renal function/electrolytes with Estimated Creatinine Clearance: 80.7 mL/min (based on SCr of 0.7 mg/dL). according to latest data. Monitor urine output and serial BMP and adjust therapy as needed. Avoid nephrotoxins and renally dose meds for GFR listed above.   -patient with non-oliguric lucia that is likely pre-renal in etiology, but higher severity given hyperkalemia, metabolic acidosis and uremic encephalopathy.   -obtain urine electrolytes,   -trend BMP   - Give IV Bicarb Drip x 1 L to assist with volume and acidosis management.   -Nursing made multiple attempts at Espinal placement but pt vular swelling unable to pass, pt urinating connected to purewick.   MOnitor bladder scans if UOP falls as her bladder appears distended on CT scan but no hydronephrosis present.   -hold home anti-hypertensives (ARB/Thiazide) hold any NSAIDs, pt was taking both in last week.   -nephrology consultation. apprec recs  - completed D5W  - RESOLVED    Abdominal distension  Nausea/Vomiting  - KUB on 5/5 showed severe gaseous distension concerning for gastric outlet obstruction  - continue bowel regimen   - CT A/P with oral contrast negative for obstruction  - GI consulted. followup recs  - continue anti-emetics  - cautious with opioids   - RESOLVED      Hypernatremia  - Na 146 --> 149  - switched to D5W  - nephro following   - BMP daily  - RESOLVED      Cellulitis of leg  - continue vancomycin   - ID following, apprec recs  - continue local wound care per wound care  - discontinued cefepime and continue vancomycin. Continue for 10 days and can switch to doxycycline upon discharge (end date: 5/12/22)  - Switched to oral doxycycline prior to admission per patient request due to concern for diarrhea    Urinary retention  - continue espinal   -voiding trial once encephalopathy resolves.   - discontinued espinal. Urinating well until 5/10 when UOP decreased and  bladder scan 423 cc and straight cathed. Started urinary retention protocol       Encephalopathy, metabolic  Secondary to uremia vs infection   -fall  -aspiration precautions  - HANDY resolved with IVF  - now rising Na so encephalopathy could also be associated with hypernatremia  - changed to D5W  - nephro following   - BMP daily  - RESOLVED    Pressure injury of buttock, unstageable  q2 turns  -low airloss overlay mattress  -wound care consultation       Lymphedema  -wound care consultation to assist in managmeent  - continue vancomycin for cellulitis     Hyperkalemia  As above   q4hr BMP  -bicarb infusion to help acidosis management.       MS (multiple sclerosis)  ENTER PT/OT consults when patient is more awake alert and can work with therapy services.         VTE Risk Mitigation (From admission, onward)         Ordered     heparin (porcine) injection 7,500 Units  Every 8 hours         05/06/22 0850     IP VTE HIGH RISK PATIENT  Once         05/02/22 0232     Place sequential compression device  Until discontinued         05/02/22 0232                Discharge Planning   USMAN: 5/10/2022     Code Status: Full Code   Is the patient medically ready for discharge?: Yes    Reason for patient still in hospital (select all that apply): Patient unstable, Patient trending condition, Laboratory test, Treatment and Pending disposition  Discharge Plan A: Skilled Nursing Facility          Time spent in care of patient: > 35 minutes           John Hardin MD  Department of Hospital Medicine   [facility] - Intensive Care ([place]-[facility])

## 2022-05-11 NOTE — PT/OT/SLP PROGRESS
Physical Therapy      Patient Name:  Lupis Murcia   MRN:  [id number]    Patient not seen today secondary to pt refusal 2/2 pt awaiting to be discharged to SNF facility. Daughter present. No charges made.

## 2022-05-11 NOTE — PLAN OF CARE
SW completed the LOCET via phone. SW faxed [facility] to obtain the 142 for NH admission.      Ruthie Hay, LEXI  [id number]

## 2022-05-12 LAB
ANION GAP SERPL CALC-SCNC: 13 MMOL/L (ref 8–16)
BASOPHILS # BLD AUTO: 0.01 K/UL (ref 0–0.2)
BASOPHILS NFR BLD: 0.1 % (ref 0–1.9)
BUN SERPL-MCNC: 13 MG/DL (ref 8–23)
CALCIUM SERPL-MCNC: 8.8 MG/DL (ref 8.7–10.5)
CHLORIDE SERPL-SCNC: 109 MMOL/L (ref 95–110)
CO2 SERPL-SCNC: 20 MMOL/L (ref 23–29)
CREAT SERPL-MCNC: 0.7 MG/DL (ref 0.5–1.4)
DIFFERENTIAL METHOD: ABNORMAL
EOSINOPHIL # BLD AUTO: 0.2 K/UL (ref 0–0.5)
EOSINOPHIL NFR BLD: 3 % (ref 0–8)
ERYTHROCYTE [DISTWIDTH] IN BLOOD BY AUTOMATED COUNT: 15.1 % (ref 11.5–14.5)
EST. GFR  (AFRICAN AMERICAN): >60 ML/MIN/1.73 M^2
EST. GFR  (NON AFRICAN AMERICAN): >60 ML/MIN/1.73 M^2
GLUCOSE SERPL-MCNC: 81 MG/DL (ref 70–110)
HCT VFR BLD AUTO: 28.3 % (ref 37–48.5)
HGB BLD-MCNC: 8.6 G/DL (ref 12–16)
IMM GRANULOCYTES # BLD AUTO: 0.05 K/UL (ref 0–0.04)
IMM GRANULOCYTES NFR BLD AUTO: 0.7 % (ref 0–0.5)
LYMPHOCYTES # BLD AUTO: 1.4 K/UL (ref 1–4.8)
LYMPHOCYTES NFR BLD: 19.2 % (ref 18–48)
MAGNESIUM SERPL-MCNC: 1.7 MG/DL (ref 1.6–2.6)
MCH RBC QN AUTO: 29.1 PG (ref 27–31)
MCHC RBC AUTO-ENTMCNC: 30.4 G/DL (ref 32–36)
MCV RBC AUTO: 96 FL (ref 82–98)
MONOCYTES # BLD AUTO: 1 K/UL (ref 0.3–1)
MONOCYTES NFR BLD: 13 % (ref 4–15)
NEUTROPHILS # BLD AUTO: 4.7 K/UL (ref 1.8–7.7)
NEUTROPHILS NFR BLD: 64 % (ref 38–73)
NRBC BLD-RTO: 0 /100 WBC
PHOSPHATE SERPL-MCNC: 2.5 MG/DL (ref 2.7–4.5)
PLATELET # BLD AUTO: 177 K/UL (ref 150–450)
PMV BLD AUTO: 10.8 FL (ref 9.2–12.9)
POCT GLUCOSE: 103 MG/DL (ref 70–110)
POCT GLUCOSE: 89 MG/DL (ref 70–110)
POTASSIUM SERPL-SCNC: 3.6 MMOL/L (ref 3.5–5.1)
RBC # BLD AUTO: 2.96 M/UL (ref 4–5.4)
SODIUM SERPL-SCNC: 142 MMOL/L (ref 136–145)
WBC # BLD AUTO: 7.33 K/UL (ref 3.9–12.7)

## 2022-05-12 PROCEDURE — 25000003 PHARM REV CODE 250: Performed by: HOSPITALIST

## 2022-05-12 PROCEDURE — 97530 THERAPEUTIC ACTIVITIES: CPT

## 2022-05-12 PROCEDURE — 80048 BASIC METABOLIC PNL TOTAL CA: CPT | Performed by: HOSPITALIST

## 2022-05-12 PROCEDURE — 84100 ASSAY OF PHOSPHORUS: CPT | Performed by: HOSPITALIST

## 2022-05-12 PROCEDURE — 83735 ASSAY OF MAGNESIUM: CPT | Performed by: HOSPITALIST

## 2022-05-12 PROCEDURE — 36415 COLL VENOUS BLD VENIPUNCTURE: CPT | Performed by: HOSPITALIST

## 2022-05-12 PROCEDURE — 97535 SELF CARE MNGMENT TRAINING: CPT

## 2022-05-12 PROCEDURE — 97163 PT EVAL HIGH COMPLEX 45 MIN: CPT

## 2022-05-12 PROCEDURE — 97165 OT EVAL LOW COMPLEX 30 MIN: CPT

## 2022-05-12 PROCEDURE — 63600175 PHARM REV CODE 636 W HCPCS: Performed by: NURSE PRACTITIONER

## 2022-05-12 PROCEDURE — 85025 COMPLETE CBC W/AUTO DIFF WBC: CPT | Performed by: HOSPITALIST

## 2022-05-12 PROCEDURE — 25000003 PHARM REV CODE 250: Performed by: NURSE PRACTITIONER

## 2022-05-12 PROCEDURE — 11000004 HC SNF PRIVATE

## 2022-05-12 RX ORDER — LOSARTAN POTASSIUM 25 MG/1
25 TABLET ORAL DAILY
Status: DISCONTINUED | OUTPATIENT
Start: 2022-05-13 | End: 2022-05-13

## 2022-05-12 RX ORDER — HEPARIN SODIUM 5000 [USP'U]/ML
7500 INJECTION, SOLUTION INTRAVENOUS; SUBCUTANEOUS EVERY 8 HOURS
Status: DISCONTINUED | OUTPATIENT
Start: 2022-05-12 | End: 2022-06-09

## 2022-05-12 RX ORDER — CYANOCOBALAMIN 1000 UG/ML
1000 INJECTION, SOLUTION INTRAMUSCULAR; SUBCUTANEOUS
Status: DISCONTINUED | OUTPATIENT
Start: 2022-05-13 | End: 2022-06-15 | Stop reason: HOSPADM

## 2022-05-12 RX ORDER — SODIUM BICARBONATE 650 MG/1
650 TABLET ORAL ONCE
Status: COMPLETED | OUTPATIENT
Start: 2022-05-12 | End: 2022-05-12

## 2022-05-12 RX ORDER — LANOLIN ALCOHOL/MO/W.PET/CERES
400 CREAM (GRAM) TOPICAL 2 TIMES DAILY
Status: COMPLETED | OUTPATIENT
Start: 2022-05-12 | End: 2022-05-13

## 2022-05-12 RX ORDER — PANTOPRAZOLE SODIUM 40 MG/1
40 TABLET, DELAYED RELEASE ORAL 2 TIMES DAILY
Status: DISCONTINUED | OUTPATIENT
Start: 2022-05-12 | End: 2022-06-15 | Stop reason: HOSPADM

## 2022-05-12 RX ADMIN — SODIUM BICARBONATE 650 MG TABLET 650 MG: at 11:05

## 2022-05-12 RX ADMIN — LORAZEPAM 0.5 MG: 0.5 TABLET ORAL at 08:05

## 2022-05-12 RX ADMIN — POTASSIUM & SODIUM PHOSPHATES POWDER PACK 280-160-250 MG 1 PACKET: 280-160-250 PACK at 04:05

## 2022-05-12 RX ADMIN — ACETAMINOPHEN 650 MG: 325 TABLET ORAL at 08:05

## 2022-05-12 RX ADMIN — AMMONIUM LACTATE: 12 LOTION TOPICAL at 08:05

## 2022-05-12 RX ADMIN — LORAZEPAM 0.5 MG: 0.5 TABLET ORAL at 06:05

## 2022-05-12 RX ADMIN — POTASSIUM & SODIUM PHOSPHATES POWDER PACK 280-160-250 MG 1 PACKET: 280-160-250 PACK at 10:05

## 2022-05-12 RX ADMIN — HEPARIN SODIUM 7500 UNITS: 5000 INJECTION INTRAVENOUS; SUBCUTANEOUS at 04:05

## 2022-05-12 RX ADMIN — HYDROCHLOROTHIAZIDE 12.5 MG: 12.5 TABLET ORAL at 08:05

## 2022-05-12 RX ADMIN — DOXYCYCLINE HYCLATE 100 MG: 100 TABLET, COATED ORAL at 10:05

## 2022-05-12 RX ADMIN — POTASSIUM & SODIUM PHOSPHATES POWDER PACK 280-160-250 MG 1 PACKET: 280-160-250 PACK at 11:05

## 2022-05-12 RX ADMIN — ACETAMINOPHEN AND CODEINE PHOSPHATE 1 TABLET: 300; 30 TABLET ORAL at 01:05

## 2022-05-12 RX ADMIN — SENNOSIDES AND DOCUSATE SODIUM 1 TABLET: 50; 8.6 TABLET ORAL at 10:05

## 2022-05-12 RX ADMIN — Medication 400 MG: at 11:05

## 2022-05-12 RX ADMIN — PANTOPRAZOLE SODIUM 40 MG: 40 TABLET, DELAYED RELEASE ORAL at 08:05

## 2022-05-12 RX ADMIN — HEPARIN SODIUM 7500 UNITS: 5000 INJECTION INTRAVENOUS; SUBCUTANEOUS at 10:05

## 2022-05-12 RX ADMIN — Medication 1 TABLET: at 08:05

## 2022-05-12 RX ADMIN — Medication 400 MG: at 10:05

## 2022-05-12 RX ADMIN — DOXYCYCLINE HYCLATE 100 MG: 100 TABLET, COATED ORAL at 09:05

## 2022-05-12 RX ADMIN — PANTOPRAZOLE SODIUM 40 MG: 40 TABLET, DELAYED RELEASE ORAL at 10:05

## 2022-05-12 RX ADMIN — Medication 1000 UNITS: at 08:05

## 2022-05-12 RX ADMIN — ACETAMINOPHEN 650 MG: 325 TABLET ORAL at 06:05

## 2022-05-12 NOTE — PLAN OF CARE
Problem: Adult Inpatient Plan of Care  Goal: Absence of Hospital-Acquired Illness or Injury  Outcome: Ongoing, Progressing     Problem: Adult Inpatient Plan of Care  Goal: Optimal Comfort and Wellbeing  Outcome: Ongoing, Progressing     Problem: Skin Injury Risk Increased  Goal: Skin Health and Integrity  Outcome: Ongoing, Progressing     Problem: Bariatric Environmental Safety  Goal: Safety Maintained with Care  Outcome: Ongoing, Progressing

## 2022-05-12 NOTE — PLAN OF CARE
Problem: Physical Therapy  Goal: Physical Therapy Goal  Description: Goals to be met by: 6/9/22    Patient will increase functional independence with mobility by performing:    . Supine to sit with MInimal Assistance  . Sit to supine with MInimal Assistance  . Rolling to Left and Right with Minimal Assistance.  . Sit to stand transfer with Minimal Assistance  . Bed to chair transfer with Minimal Assistance using Rolling Walker/no AD  . Gait  x 10 feet with Moderate Assistance using RW/// bars   . Wheelchair propulsion x50 feet with Minimal Assistance using bilateral uppper extremities  . Sitting at edge of bed x5 minutes with Stand-by Assistance    Outcome: Ongoing, Progressing

## 2022-05-12 NOTE — PT/OT/SLP EVAL
Occupational Therapy   Evaluation and Treatment     Name: Lupis Murcia  MRN: [id number]  Admit Date: 5/11/2022  Admitting Diagnosis:  Cellulitis and abscess of legs    General Precautions: Standard, fall   Orthopedic Precautions:N/A   Braces: N/A     Recommendations:     Discharge Recommendations: home health OT (and 24 hour extensive assistance)  Level of Assistance Recommended: 24 hours significant assistance  Discharge Equipment Recommendations:  hip kit  Barriers to discharge:  Decreased caregiver support    Assessment:     Lupis Murcia is a 71 y.o. female with a medical diagnosis of Cellulitis and abscess of legs. Pt agreeable to OT evaluation and treatment, tolerating session fair with good participation throughout. PLOF reported as requiring min-mod A for ADLs and completing functional mobility with mod I using RW. At this time, pt presents with deficits including weakness, impaired endurance, impaired self care skills, impaired functional mobilty, gait instability, impaired balance, decreased upper extremity function, decreased lower extremity function, decreased safety awareness, pain, edema, impaired cardiopulmonary response to activity, and impaired skin impacting safety and performance in self care, functional transfers, and functional mobility. Pt required max A of 2 persons for all bed mobility and to complete squat pivot transfers due to overall weakness and impaired standing tolerance and balance. Pt required significant assistance to complete self care tasks, which were mostly completed at bed level due to overall weakness and impaired sitting baalance and endurance. Pt would benefit from skilled OT services to address identified deficits for improved safety and independence in functional tasks.      Rehab Potential is fair    Activity Tolerance: Fair    Plan:     Patient to be seen 6 x/week to address the above listed problems via self-care/home management, therapeutic activities, therapeutic  exercises  · Plan of Care Expires: 06/12/22  · Plan of Care Reviewed with: patient    Subjective     Chief Complaint: pt complains of overall weakness  Patient/Family Comments/goals: to get stronger and to go home     Occupational Profile:  Living Environment: Pt lives with daughter in [facility] 2STE w/ ramp entrance; walk in tub w/ built in seat and WIS w/ shower chair.   Previous level of function: min-mod A for self care tasks; pt takes sponge baths vs showering; pt sleeps in left chair   Roles and Routines: retired   Equipment Used at Home:  shower chair, walker, rolling, rollator, raised toilet, lift device, bedside commode  Assistance upon Discharge: Limited assistance from daughter during day as she works.     Pain/Comfort:  · Pain Rating 1: 0/10  · Pain Rating Post-Intervention 1: 0/10    Patients cultural, spiritual, Latter-day conflicts given the current situation: no    Objective:   Co-evaluation performed due to patient's multiple deficits requiring two skilled therapists to appropriately and safely assess patient's strength and endurance while facilitating functional tasks in addition to accommodating for patient's activity tolerance.     Communicated with: TANMAY prior to session.  Patient found supine with  (no lines) upon OT entry to room.    Occupational Performance:     Eating   Assistance Needed Independent   CARE Score - Eating 6   Oral Hygiene   Assistance Needed Set-up / clean-up   Comment   (seated in w/c)   CARE Score - Oral Hygiene 5   Toileting Hygiene   Assistance Needed Physical assistance   Physical Assistance Level Total assistance   Comment   (completed at bed level: assist for all parts of pavan hygiene and clothing management with pt rolling bilaterally with max A, 2 person assist)   CARE Score - Toileting Hygiene 1   Shower/Bathe Self   Assistance Needed Physical assistance   Physical Assistance Level 26%-50%   Comment   (bilateral lower legs and feet w/ impaired skin; pt required assistance  to clean pavan area and thighs at bed level, pt cleaning UB seated at EOB with min A for balance)   CARE Score - Shower/Bathe Self 3   Upper Body Dressing   Assistance Needed Physical assistance   Physical Assistance Level 26%-50%   Comment   (to thread pullover shirt over head and down trunk)   CARE Score - Upper Body Dressing 3   Lower Body Dressing   Assistance Needed Physical assistance   Physical Assistance Level Total assistance   Comment   (assist of 2 persons to don pants in supine at bed level)   CARE Score - Lower Body Dressing 1   Putting On/Taking Off Footwear   Assistance Needed Physical assistance   Physical Assistance Level Total assistance   Comment   (assist for all parts of donning bilateral socks)   CARE Score - Putting On/Taking Off Footwear 1   Chair/Bed-to-Chair Transfer   Assistance Needed Physical assistance   Physical Assistance Level Total assistance   Comment   (max A of 2 persons to complete squat pivot t/f)   CARE Score - Chair/Bed-to-Chair Transfer 1   Toilet Transfer   Reason if not Attempted Safety concerns   CARE Score - Toilet Transfer 88       Bed Mobility:    · Patient completed Rolling/Turning to Left with  maximal assistance  · Patient completed Rolling/Turning to Right with maximal assistance  · Patient completed Scooting/Bridging with maximal assistance  · Patient completed Supine to Sit with maximal assistance and 2 persons    Functional Mobility/Transfers:  · Patient completed Sit <> Stand Transfer with maximal assistance and of 2 persons  with  rolling walker   · Patient completed Bed > Chair Transfer using Squat Pivot technique with maximal assistance and of 2 persons with no assistive device      Cognitive/Visual Perceptual:  Cognitive/Psychosocial Skills:     -       Oriented to: Person, Place, Time and Situation   -       Follows Commands/attention:Follows multistep  commands    Physical Exam:  Upper Extremity Range of Motion:     -       Right Upper Extremity: WFL  -        Left Upper Extremity: WFL  Upper Extremity Strength:    -       Right Upper Extremity: 4/5 throughout extremity   -       Left Upper Extremity: 4/5 throughout extremity   Balance:   Static Sitting: min A  Dynamic Sitting: min A      AMPAC 6 Click ADL:  AMPAC Total Score: 13    Treatment & Education:  Pt educated on:   - POC/OT role   - benefit of performing OOB activity   - use of call light for NSG assistance   - safety when performing self care tasks, functional transfers  - proper body mechanics & posture   Pt receptive to education providing verbal understanding on this day.     Education:    Patient left up in chair with call button in reach    GOALS:   Multidisciplinary Problems     Occupational Therapy Goals        Problem: Occupational Therapy    Goal Priority Disciplines Outcome Interventions   Occupational Therapy Goal     OT, PT/OT Ongoing, Progressing    Description: Goals to be met by:     Patient will increase functional independence with ADLs by performing:    UE Dressing with [place].  LE Dressing with Moderate Assistance using AE as needed.  Grooming while seated at sink with [place].  Toileting from bedside commode or toilet with Minimal Assistance for hygiene and clothing management.   Bathing from shower chair/chair level with Minimal Assistance.  Stand pivot transfers with Minimal Assistance using RW.  Toilet transfer to bedside commode with Minimal Assistance.  Upper extremity exercise program with supervision.  Caregiver will be educated on level of assist required to safely perform self care tasks and functional transfers.                     History:     Past Medical History:   Diagnosis Date    MS (multiple sclerosis)     Vitamin B12 deficiency        Past Surgical History:   Procedure Laterality Date    BREAST CYST EXCISION Left     over 40yrs ago     SECTION      ESOPHAGOGASTRODUODENOSCOPY N/A 2022    Procedure: EGD (ESOPHAGOGASTRODUODENOSCOPY);  Surgeon:  Radha Arango MD;  Location: [facility] ([address] [place] [address]);  Service: Endoscopy;  Laterality: N/A;       Time Tracking:   Co-evaluation performed due to patient's multiple deficits requiring two skilled therapists to appropriately and safely assess patient's strength and endurance while facilitating functional tasks in addition to accommodating for patient's activity tolerance.   OT Date of Treatment: 05/12/22  OT Start Time: 1028  OT Stop Time: 1101  OT Total Time (min): 33 min    Billable Minutes:Evaluation 18  Self Care/Home Management 15    5/12/2022

## 2022-05-12 NOTE — PLAN OF CARE
Problem: Occupational Therapy  Goal: Occupational Therapy Goal  Description: Goals to be met by: 6/2    Patient will increase functional independence with ADLs by performing:    UE Dressing with [place].  LE Dressing with Moderate Assistance using AE as needed.  Grooming while seated at sink with [place].  Toileting from bedside commode or toilet with Minimal Assistance for hygiene and clothing management.   Bathing from shower chair/chair level with Minimal Assistance.  Stand pivot transfers with Minimal Assistance using RW.  Toilet transfer to bedside commode with Minimal Assistance.  Upper extremity exercise program with supervision.  Caregiver will be educated on level of assist required to safely perform self care tasks and functional transfers.    Outcome: Ongoing, Progressing

## 2022-05-12 NOTE — PROGRESS NOTES
"                                                        [facility]                                  Skilled Nursing Facility                   Progress Note     Admit Date: 5/11/2022  USMAN TBD  Principal Problem:  Cellulitis of leg   HPI obtained from patient interview and chart review     Chief Complaint: Establish Care/ Initial Visit     HPI:   Mrs. Murcia is a 71 year old female with PMHx of Multiple Sclerosis, Left Foot Drop, Lymphedema, Obesity (bmi 45) who presents to SNF following hospitalization for cellulitis, acute kidney injury and acute encephalopathy.  Admission to SNF for secondary weakness and debility.     Patient originally presented to [facility] ED on 05/01 after suffering a recent fall.  Per [facility] notes, "Patient's daughter Amanda Lowery provides primary history due to pt somnolence s/p ativan and EMR review. 1 week ago on Monday patient had a fall, partially assisted by family when legs gave out, no head injury or LOC.  Daughter noted through the week patient c/o of weakness and difficulty standing as when she fell, her armpit fell onto the walker, pt c/o of left shoulder pain.  On Thursday it was noted patient unable to stand, she is normally able to perform ADL with her walker, but daughter felt when she came home that pt had not moved.  She was also using adult diaper at this time and since Thursday due to body habitus/size patient has been unable to change her diaper since this time.  She has intermittently been taking her medicines during this time, but daughter noted she was eating less,  sleeping more and making confused statements and sought to bring her to the ED. ED staff noted pts diaper was saturated with urine, vulvar swelling with intertrigo and buttock pressure injury.  Labs notable for Hyperkalemia to 6.3 and BUn/Cr of 169/3.9. ED Treatment: Insulin 10uni IV regular, D10 bolus, 1gram Calcium Gluconate.  LR 2.2Liters IV, naBicarb 50meq IV x 1, Ativan 1mg IV " "Ceftriaxone 2gm IV x . Patient reports loose stools improved with imodium and stopping stool softeners. Patient also has reduced UOP and bladder scan 423. Straight cath performed and urinary retention protocol initiated and ordered flomax."    Today, patient is urinating spontaneously, intermittent bladder scans done without > 300 mL, patient also refusing some bladder scans.  Patient states medications were added to her SNF orders that she is no longer on, discontinuing B12 loading dose of injections, baclofen and topical cream, patient also wants to be off of Flomax.  BP is normotensive, will decrease losartan 25 mg, replacing magnesium and sodium bicarb.  Patient is not diabetic and does not require blood glucose checks.    Patient will be treated at [facility] with PT and OT to improve functional status and ability to perform ADLs.     Past Medical History: Patient has a past medical history of MS (multiple sclerosis) and Vitamin B12 deficiency.    Past Surgical History: Patient has a past surgical history that includes  section; Breast cyst excision (Left); and Esophagogastroduodenoscopy (N/A, 2022).    Social History: Patient reports that she has never smoked. She has never used smokeless tobacco. She reports that she does not drink alcohol and does not use drugs.    Family History: family history includes Brain cancer in her brother; Coronary artery disease in her father; Lung cancer in her father and mother.    Allergies: Patient is allergic to contrast media, pcn [penicillins], celebrex [celecoxib], diazepam, and motrin [ibuprofen].    ROS  Constitutional: Negative for fever   Eyes: Negative for blurred vision, double vision   Respiratory: Negative for cough, shortness of breath   Cardiovascular: Negative for chest pain, palpitations. + leg swelling.   Gastrointestinal: Negative for abdominal pain, constipation, diarrhea, nausea, vomiting.   Genitourinary: Negative for dysuria, frequency "   Musculoskeletal:  + generalized weakness.  + bilateral lower extremity pain  Skin: Negative for itching and rash.   Neurological: Negative for dizziness, headaches.   Psychiatric/Behavioral: Negative for depression. The patient is nervous/anxious.      24 hour Vital Sign Range   Temp:  [98 °F (36.7 °C)-99.4 °F (37.4 °C)]   Pulse:  [75-87]   Resp:  [18-20]   BP: (111-148)/(54-77)   SpO2:  [94 %-95 %]     PEx  Constitutional: Patient appears debilitated.  No distress noted  HENT:   Head: Normocephalic and atraumatic.   Eyes: Pupils are equal, round  Neck: Normal range of motion. Neck supple.   Cardiovascular: Normal rate, regular rhythm and normal heart sounds.    Pulmonary/Chest: Effort normal and breath sounds are clear  Abdominal: Soft. Bowel sounds are normal.   Musculoskeletal: Normal range of motion.   Neurological: Alert and oriented to person, place, and time.   Psychiatric: Normal mood and affect. Behavior is normal.   Skin: Skin is warm and dry. Full skin assessment completed.  Bilateral lower extremity with pain contract scar tissue surrounded by hyperkeratotic skin.  Moisture associated dermatitis to buttocks.     Wound 05/01/22 1720 Ulceration Sacral Spine #3   Date First Assessed/Time First Assessed: 05/01/22 1720   Pre-existing: Yes  Primary Wound Type: Ulceration  Location: Sacral Spine  Wound Number: #3   Wound WDL ex   Dressing Appearance Intact   Drainage Amount None   Appearance Pink;Red;Moist   Tissue loss description Partial thickness   Periwound Area Excoriated;Moist   Wound Edges Irregular   Care Cleansed with:;Soap and water;Applied:;Skin Barrier        Wound 05/01/22 1720 Ulceration Left Leg #1   Date First Assessed/Time First Assessed: 05/01/22 1720   Pre-existing: Yes  Primary Wound Type: Ulceration  Side: Left  Location: Leg  Wound Number: #1   Wound WDL ex   Dressing Appearance Open to air   Drainage Amount None   Appearance Intact;Dry  (hyperkeratic skin)   Tissue loss description Not  applicable   Periwound Area Intact;Dry;Pink;Scar tissue   Wound Edges Rolled/closed   Care Cleansed with:;Soap and water;Applied:;Moisturizing agent   Dressing Change Due 05/13/22  (5/13/22 evening)        Wound 05/01/22 1720 Ulceration Right Leg #2   Date First Assessed/Time First Assessed: 05/01/22 1720   Pre-existing: Yes  Primary Wound Type: Ulceration  Side: Right  Location: Leg  Wound Number: #2   Wound WDL ex   Dressing Appearance Open to air   Drainage Amount None   Appearance Intact;Pink;Dry  (hyperkeratic skin)   Tissue loss description Not applicable   Periwound Area Intact;Edematous   Care Cleansed with:;Soap and water;Applied:;Moisturizing agent   Dressing Change Due 05/13/22  (5/13/22 evening)         Recent Labs   Lab 05/12/22  0453      K 3.6      CO2 20*   BUN 13   CREATININE 0.7   MG 1.7       Recent Labs   Lab 05/12/22  0453   WBC 7.33   RBC 2.96*   HGB 8.6*   HCT 28.3*      MCV 96   MCH 29.1   MCHC 30.4*         Assessment and Plan:    Cellulitis of leg  - continue vancomycin   - ID consulted at C  - continue local wound care per wound care  - completed cefepime and continue vancomycin. switched to doxycycline upon discharge (end date: 5/12/22)     Urinary retention   - discontinued espinal prior to transfer to SNF.   - Urinating well until 5/10   - initiated order for bladder scans PRN     Pressure injury of buttock, unstageable  - q2 turns  - low airloss overlay mattress  - wound care consultation       Lymphedema  - wound care consultation to assist in managmeent  - continue ammonium lactate lotion BID  - continue doxycycline for cellulitis     HTN  - decrease losartan to 25 mg daily     MS (multiple sclerosis)  - PT/OT  - f/u with out pt provider     Metabolic acidosis, mild  - initiated sodium bicarbonate 650 mg x 1 dose    Hypomagnesemia  - initiated magnesium oxide 400 mg BID x2 days    B12 deficiency  - change orders to cyanocobalamin 1000 mcg every 30 days, dose to be  given on 05/13 as patient has been overdue due to recent hospitalization    Debility   - Continue with PT/OT for gait training and strengthening and restoration of ADL's   - Encourage mobility, OOB in chair, and early ambulation as appropriate  - Fall precautions   - Monitor for bowel and bladder dysfunction  - Monitor for and prevent skin breakdown and pressure ulcers  - initiated DVT prophylaxis with  heparin 7.5 K q.8 hours       Anticipate disposition:  Home with home health      Follow-up needed during SNF stay-    Follow-up needed after discharge from SNF:   - PCP, hospital follow-up, needs to be scheduled    No future appointments.      I certify that SNF services are required to be given on an inpatient basis because Lupis Murcia needs for skilled nursing care and/or skilled rehabilitation are required on a daily basis and such services can only practically be provided in a skilled nursing facility setting and are for an ongoing condition for which she received inpatient care in the hospital.     Total time of the visit 112 minutes [id number]  Non physical exam/ non charting time: 70 minutes   Description of non physical exam/non charting time: counseling patient on clinical conditions and therapies provided regarding cellulitis with remaining antibiotic treatment, antihypertensive medication regimen, extensive medication review as patient states she is on a lot of old medications, importance of follow-up appointments and beginning of discharge planning.  Patient's daughter at bedside provided care update.  Extensive chart review completed including all consultation notes.  All pertinent laboratory and radiographical images reviewed.        Amy Conklin NP  Department of Hospital Medicine   [facility]     DOS: 5/12/2022       Patient note was created using MModal Dictation.  Any errors in syntax or even information may not have been identified and edited on initial  review prior to signing this note.

## 2022-05-12 NOTE — PT/OT/SLP EVAL
Physical Therapy Evaluation    Patient Name:  Lupis Murcia   MRN:  [id number]  Admit Date: 5/11/2022  Admitting Diagnosis:Cellulitis and abscess of legs  Recent Surgeries: N/A    General Precautions: Standard, fall   Orthopedic Precautions:N/A   Braces: N/A     Recommendations:     Discharge Recommendations:  home health PT (will need 24hr extensive assistance)   Level of Assistance Recommended: 24 hours significant assistance  Discharge Equipment Recommendations: none   Barriers to discharge: Decreased caregiver support    Assessment:     Lupis Murcia is a 71 y.o. female admitted with a medical diagnosis of Cellulitis and abscess of legs .  Performance deficits affecting function  weakness, impaired endurance, impaired self care skills, impaired functional mobilty, gait instability, impaired balance, decreased lower extremity function, decreased ROM, impaired skin, edema . Pt with significant B L/E swelling and generalized weakness. Pt was Mod I PTA and now requires Max A x 2people for transfers and was not able to ambulate today. Pt will benefit from continued snf rehab to help pt improve her overall functional mobility and safety.    Rehab Potential is good     Activity Tolerance: Good    Plan:     Patient to be seen 6 x/week to address the above listed problems via gait training, therapeutic activities, therapeutic exercises, neuromuscular re-education, wheelchair management/training     Plan of Care Expires: 06/11/22  Plan of Care Reviewed with: patient    Subjective     Chief Complaint: weakness, fear of falling  Patient/Family Comments/goals: gain (I)  Pain/Comfort:  ·  0/10 pain prior to and after treatment    Living Environment:   Pt lives with daughter in [facility] 2STE w/ ramp entrance; walk in tub w/ built in seat and WIS w/ shower chair.   Previous level of function: min-mod A for self care tasks; pt takes sponge baths vs showering; pt sleeps in left chair   Roles and Routines: retired   Equipment Used  at Home:  shower chair, walker, rolling, rollator, raised toilet, lift device, bedside commode  Assistance upon Discharge: Limited assistance from daughter during day as she works.   Patients cultural, spiritual, Gnosticist conflicts given the current situation: no    Objective:     Communicated with pt's nurse prior to session.  Patient found supine with    upon PT entry to room.    Exams:  · Cognitive Exam:  Patient is oriented to Person, Place, Time and Situation  · Gross Motor Coordination:  Decrease B L/E's d.t weakness and swelling  · Sensation:    · -       Intact  · Skin Integrity/Edema:      · -       Skin integrity: Dry scar B L/Es  · -       Edema: Severe B L/Es  · RLE ROM: WFL except limited knee flexion d.t swelling  · RLE Strength: grossly 2-/5  · LLE ROM: WFL except limited knee flexion d.t swelling  · LLE Strength: grossly 2-/5    Functional Mobility:     05/12/22 1300   Prior Functioning: Everyday Activities   Self Care Needed some help   Indoor Mobility (Ambulation) Independent   Stairs Unknown   Functional Cognition Independent   Prior Device Use Walker   Roll Left and Right   Physical Assistance Level 76% or more   Comment with HOB flat and no rail   CARE Score - Roll Left and Right 2   Sit to Lying   Physical Assistance Level Total assistance   Comment with HOB flat and no rail; Max A x 2people   CARE Score - Sit to Lying 1   Lying to Sitting on Side of Bed   Physical Assistance Level Total assistance   Comment with HOB flat and no rail; Max A x 2people   CARE Score - Lying to Sitting on Side of Bed 1   Sit to Stand   Physical Assistance Level Total assistance   Comment ModA x 2people in the //bars   CARE Score - Sit to Stand 1   Chair/Bed-to-Chair Transfer   Physical Assistance Level Total assistance   Comment Max A x 2 people SQPT   CARE Score - Chair/Bed-to-Chair Transfer 1   Chair/Bed-to-Chair Transfer Discharge Goal   Discharge Goal 2   Car Transfer   Reason if not Attempted Environmental  limitations   CARE Score - Car Transfer 10   Walk 10 Feet   Comment pt unable to advance B L/e's in the // bars   Reason if not Attempted Safety concerns   CARE Score - Walk 10 Feet 88   Walk 50 Feet with Two Turns   Reason if not Attempted Safety concerns   CARE Score - Walk 50 Feet with Two Turns 88   Walk 150 Feet   Reason if not Attempted Safety concerns   CARE Score - Walk 150 Feet 88   Walking 10 Feet on Uneven Surfaces   Reason if not Attempted Safety concerns   CARE Score - Walking 10 Feet on Uneven Surfaces 88   1 Step (Curb)   Reason if not Attempted Safety concerns   CARE Score - 1 Step (Curb) 88   4 Steps   Reason if not Attempted Safety concerns   CARE Score - 4 Steps 88   12 Steps   Reason if not Attempted Safety concerns   CARE Score - 12 Steps 88   Picking Up Object   Reason if not Attempted Safety concerns   CARE Score - Picking Up Object 88   Uses a Wheelchair/Scooter?   Uses a Wheelchair/Scooter? Yes   Wheel 50 Feet with Two Turns   Comment d.t fatigue and B U/E weakness   Reason if not Attempted Safety concerns   CARE Score - Wheel 50 Feet with Two Turns 88   Type of Wheelchair/Scooter Manual   Wheel 150 Feet   Reason if not Attempted Safety concerns   CARE Score - Wheel 150 Feet 88   Type of Wheelchair/Scooter Manual       Therapeutic Activities and Exercises: Multiple transfers and education.    Education:   Patient provided with daily orientation and goals of this PT session.   Patient educated to transfer to bedside chair/bedside commode/bathroom with RN/PCT present.    Patient educated on Fall risk, home safety and plan of care by explanation.    Patient Verbalized Understanding.   Time provided for therapeutic counseling and discussion of current health disposition. All questions answered to satisfaction, within scope of PT practice; voiced no other concerns. White board updated in patient's room, RN notified of session.      AM-PAC 6 CLICK MOBILITY  Total Score:10     Patient left  supine with call button in reach.    GOALS:   Multidisciplinary Problems     Physical Therapy Goals        Problem: Physical Therapy    Goal Priority Disciplines Outcome Goal Variances Interventions   Physical Therapy Goal     PT, PT/OT Ongoing, Progressing     Description: Goals to be met by: 22    Patient will increase functional independence with mobility by performing:    . Supine to sit with MInimal Assistance  . Sit to supine with MInimal Assistance  . Rolling to Left and Right with Minimal Assistance.  . Sit to stand transfer with Minimal Assistance  . Bed to chair transfer with Minimal Assistance using Rolling Walker/no AD  . Gait  x 10 feet with Moderate Assistance using RW/// bars   . Wheelchair propulsion x50 feet with Minimal Assistance using bilateral uppper extremities  . Sitting at edge of bed x5 minutes with Stand-by Assistance                     History:     Past Medical History:   Diagnosis Date    MS (multiple sclerosis)     Vitamin B12 deficiency        Past Surgical History:   Procedure Laterality Date    BREAST CYST EXCISION Left     over 40yrs ago     SECTION      ESOPHAGOGASTRODUODENOSCOPY N/A 2022    Procedure: EGD (ESOPHAGOGASTRODUODENOSCOPY);  Surgeon: Radha Arango MD;  Location: [address];  Service: Endoscopy;  Laterality: N/A;       Time Tracking:     PT Received On: 22  PT Start Time: 1020     PT Stop Time: 1115  PT Total Time (min): 55 min     Billable Minutes: Evaluation 30 and Therapeutic Activity 2022

## 2022-05-13 PROBLEM — D63.8 ANEMIA OF CHRONIC DISEASE: Status: ACTIVE | Noted: 2017-08-09

## 2022-05-13 PROCEDURE — 11000004 HC SNF PRIVATE

## 2022-05-13 PROCEDURE — 25000003 PHARM REV CODE 250: Performed by: NURSE PRACTITIONER

## 2022-05-13 PROCEDURE — 25000003 PHARM REV CODE 250: Performed by: HOSPITALIST

## 2022-05-13 PROCEDURE — 63600175 PHARM REV CODE 636 W HCPCS: Performed by: NURSE PRACTITIONER

## 2022-05-13 PROCEDURE — 99306 1ST NF CARE HIGH MDM 50: CPT | Mod: AI,,, | Performed by: HOSPITALIST

## 2022-05-13 PROCEDURE — 99306 PR NURSING FACILITY CARE, INIT, HIGH SEVERITY: ICD-10-PCS | Mod: AI,,, | Performed by: HOSPITALIST

## 2022-05-13 RX ORDER — ONDANSETRON 4 MG/1
4 TABLET, ORALLY DISINTEGRATING ORAL EVERY 6 HOURS PRN
Status: DISCONTINUED | OUTPATIENT
Start: 2022-05-13 | End: 2022-06-15 | Stop reason: HOSPADM

## 2022-05-13 RX ORDER — CANDESARTAN CILEXETIL AND HYDROCHLOROTHIAZIDE 16; 12.5 MG/1; MG/1
1 TABLET ORAL DAILY
Status: DISCONTINUED | OUTPATIENT
Start: 2022-05-14 | End: 2022-05-14

## 2022-05-13 RX ORDER — AMMONIUM LACTATE 12 G/100G
LOTION TOPICAL 2 TIMES DAILY
Status: DISCONTINUED | OUTPATIENT
Start: 2022-05-13 | End: 2022-06-15 | Stop reason: HOSPADM

## 2022-05-13 RX ORDER — BISACODYL 10 MG
10 SUPPOSITORY, RECTAL RECTAL DAILY PRN
Status: DISCONTINUED | OUTPATIENT
Start: 2022-05-13 | End: 2022-06-09

## 2022-05-13 RX ORDER — AMOXICILLIN 250 MG
2 CAPSULE ORAL 2 TIMES DAILY
Status: DISCONTINUED | OUTPATIENT
Start: 2022-05-13 | End: 2022-06-09

## 2022-05-13 RX ADMIN — CYANOCOBALAMIN 1000 MCG: 1000 INJECTION, SOLUTION INTRAMUSCULAR; SUBCUTANEOUS at 09:05

## 2022-05-13 RX ADMIN — Medication 400 MG: at 08:05

## 2022-05-13 RX ADMIN — Medication 400 MG: at 09:05

## 2022-05-13 RX ADMIN — HEPARIN SODIUM 7500 UNITS: 5000 INJECTION INTRAVENOUS; SUBCUTANEOUS at 10:05

## 2022-05-13 RX ADMIN — LORAZEPAM 0.5 MG: 0.5 TABLET ORAL at 11:05

## 2022-05-13 RX ADMIN — ONDANSETRON 4 MG: 4 TABLET, ORALLY DISINTEGRATING ORAL at 03:05

## 2022-05-13 RX ADMIN — POTASSIUM & SODIUM PHOSPHATES POWDER PACK 280-160-250 MG 1 PACKET: 280-160-250 PACK at 05:05

## 2022-05-13 RX ADMIN — LORAZEPAM 0.5 MG: 0.5 TABLET ORAL at 08:05

## 2022-05-13 RX ADMIN — ACETAMINOPHEN AND CODEINE PHOSPHATE 1 TABLET: 300; 30 TABLET ORAL at 02:05

## 2022-05-13 RX ADMIN — SENNOSIDES AND DOCUSATE SODIUM 2 TABLET: 50; 8.6 TABLET ORAL at 08:05

## 2022-05-13 RX ADMIN — MICONAZOLE NITRATE: 20 CREAM TOPICAL at 09:05

## 2022-05-13 RX ADMIN — Medication 1 TABLET: at 09:05

## 2022-05-13 RX ADMIN — LORAZEPAM 0.5 MG: 0.5 TABLET ORAL at 02:05

## 2022-05-13 RX ADMIN — HEPARIN SODIUM 7500 UNITS: 5000 INJECTION INTRAVENOUS; SUBCUTANEOUS at 05:05

## 2022-05-13 RX ADMIN — MICONAZOLE NITRATE: 20 CREAM TOPICAL at 10:05

## 2022-05-13 RX ADMIN — PANTOPRAZOLE SODIUM 40 MG: 40 TABLET, DELAYED RELEASE ORAL at 08:05

## 2022-05-13 RX ADMIN — ACETAMINOPHEN AND CODEINE PHOSPHATE 1 TABLET: 300; 30 TABLET ORAL at 11:05

## 2022-05-13 RX ADMIN — ACETAMINOPHEN AND CODEINE PHOSPHATE 1 TABLET: 300; 30 TABLET ORAL at 05:05

## 2022-05-13 RX ADMIN — POTASSIUM & SODIUM PHOSPHATES POWDER PACK 280-160-250 MG 1 PACKET: 280-160-250 PACK at 08:05

## 2022-05-13 RX ADMIN — PANTOPRAZOLE SODIUM 40 MG: 40 TABLET, DELAYED RELEASE ORAL at 09:05

## 2022-05-13 RX ADMIN — HEPARIN SODIUM 7500 UNITS: 5000 INJECTION INTRAVENOUS; SUBCUTANEOUS at 01:05

## 2022-05-13 RX ADMIN — DOXYCYCLINE HYCLATE 100 MG: 100 TABLET, COATED ORAL at 09:05

## 2022-05-13 RX ADMIN — HYDROCHLOROTHIAZIDE 12.5 MG: 12.5 TABLET ORAL at 09:05

## 2022-05-13 RX ADMIN — Medication 1000 UNITS: at 09:05

## 2022-05-13 RX ADMIN — AMMONIUM LACTATE: 12 LOTION TOPICAL at 10:05

## 2022-05-13 RX ADMIN — POTASSIUM & SODIUM PHOSPHATES POWDER PACK 280-160-250 MG 1 PACKET: 280-160-250 PACK at 11:05

## 2022-05-13 NOTE — PLAN OF CARE
Pt in room with family to bedside. Pt voided spontaneously last night. Bladder scan showed 200ml post void. Pt c/o nausea this am order placed. Wound care complete to sacral area.     Problem: Adult Inpatient Plan of Care  Goal: Plan of Care Review  Outcome: Ongoing, Progressing  Goal: Patient-Specific Goal (Individualized)  Outcome: Ongoing, Progressing  Goal: Absence of Hospital-Acquired Illness or Injury  Outcome: Ongoing, Progressing  Goal: Optimal Comfort and Wellbeing  Outcome: Ongoing, Progressing  Goal: Readiness for Transition of Care  Outcome: Ongoing, Progressing

## 2022-05-13 NOTE — PT/OT/SLP PROGRESS
"Occupational Therapy      Patient Name:  Lupis Murcia   MRN:  [id number]    Patient not seen today secondary to patient declining OT as she reports "having a rough morning" and would like to participate in therapy over the weekend. Therapist informing patient and daughter that OT will follow up tomorrow as appropriate.     5/13/2022  "

## 2022-05-13 NOTE — CONSULTS
[facility] - Skilled Nursing  Adult Nutrition  Consult Note    SUMMARY   Recommendations  Continue regular diet, RD following  Goals: PO to meet 85% of EEN/EPN by next RD follow up  Nutrition Goal Status: new  Communication of RD Recs: other (comment) (POC)    Assessment and Plan   Increased nutrient needs( protein) related to wound healing as evidenced by BLE lymphedema/Cellulitis    New    Plan  General diet  Collaboration with other providers  Vitamin/mineral supplement therapy- Vit D, Vit B complex/Vit C, Mg, Vit D     Malnutrition Assessment  5/13/22     Skin (Micronutrient): edema, thickened, wounds unhealed, cracked, rough  Hair/Scalp (Micronutrient): dry  Eyes (Micronutrient): conjunctiva dull  Teeth (Micronutrient): broken dentition (no upper teeth)  Musculoskeletal/Lower Extremities: edema       Energy Intake (Malnutrition): less than or equal to 50% for greater than or equal to 5 days   Orbital Region (Subcutaneous Fat Loss): well nourished  Upper Arm Region (Subcutaneous Fat Loss): well nourished  Thoracic and Lumbar Region: well nourished   [place] Region (Muscle Loss): mild depletion  Clavicle and Acromion Bone Region (Muscle Loss): mild depletion  Dorsal Hand (Muscle Loss): moderate depletion   Edema (Fluid Accumulation): 3-->moderate             Reason for Assessment    Reason For Assessment: consult  Diagnosis: infection/sepsis (Cellulitis of leg)  Relevant Medical History: HANDY, anemia, chronic pain, Vit B12 deficiency, MS, lymphedema, obesity  Interdisciplinary Rounds: did not attend  General Information Comments: Lives with family, patient states that she is distended with constipation, had poor appetite at acute hospital but PO intake is improving. Daughter is staying in room with her. Some outside food. She did order from menu today and is pleased. She currently has outside diet cola at bedside. Patient daughter reports that wound to buttocks is healing and that patient was recently diagnosed  "with esophagitis so no red gravy. NFPE completed no malnutrtion at this time. Lack of upper teeth does not affect chewing or swallowing.  Nutrition Discharge Planning: DC on regular diet    Nutrition Risk Screen    Nutrition Risk Screen: dysphagia or difficulty swallowing    Nutrition/Diet History    Patient Reported Diet/Restrictions/Preferences: general  Typical Food/Fluid Intake: regular meals  Food Preferences: no tomato gravy no spicy foods  Spiritual, Cultural Beliefs, Jain Practices, Values that Affect Care: no  Vitamin/Mineral/Herbal Supplements: Vit B12, Vit D, Vit B complex/Vit C,  Food Allergies: NKFA  Factors Affecting Nutritional Intake: decreased appetite, constipation    Anthropometrics    Temp: 98.5 °F (36.9 °C)  Height Method: Stated  Height: 5' 2" (157.5 cm)  Height (inches): 62 in  Weight Method: Bed Scale  Weight: 99.6 kg (219 lb 9.3 oz)  Weight (lb): 219.58 lb  Ideal Body Weight (IBW), Female: 110 lb  % Ideal Body Weight, Female (lb): 199.62 %  BMI (Calculated): 40.2  BMI Grade: greater than 40 - morbid obesity  Usual Body Weight (UBW), k.27 kg (reported by pt)  % Usual Body Weight: 129.17  % Weight Change From Usual Weight: 28.9 %       Lab/Procedures/Meds    Pertinent Labs Reviewed: reviewed  Pertinent Labs Comments: Hg 8.6, HCt 28.3, PO4 2.5  Pertinent Medications Reviewed: reviewed  Pertinent Medications Comments: Vit D, Vit B12, Vit B complex/Vit C, Mg, candesartan-HCTZ           Estimated/Assessed Needs    Weight Used For Calorie Calculations: 99.6 kg (219 lb 9.3 oz)  Energy Calorie Requirements (kcal): 1459  Energy Need Method: Campbell-St Jeor (x 1.0 (PAL) 2/2 obesity)  Protein Requirements: 60g  Weight Used For Protein Calculations: 50 kg (110 lb 3.7 oz) (x 1.2g/kg IBW 2/2 obesity)  Fluid Requirements (mL): 1500 or per MD  Estimated Fluid Requirement Method: RDA Method  RDA Method (mL): 1459  CHO Requirement: -      Nutrition Prescription Ordered    Current Diet Order: " Regular    Evaluation of Received Nutrient/Fluid Intake    I/O: +150  Energy Calories Required: meeting needs  Protein Required: meeting needs  Fluid Required: meeting needs  Comments: LBM 5/11  Tolerance: tolerating  % Intake of Estimated Energy Needs: 75 - 100 %  % Meal Intake: 50 - 75 %    Nutrition Risk    Level of Risk/Frequency of Follow-up: low (one time per week)       Monitor and Evaluation    Food and Nutrient Intake: food and beverage intake  Food and Nutrient Adminstration: diet order  Anthropometric Measurements: weight change  Biochemical Data, Medical Tests and Procedures: inflammatory profile, gastrointestinal profile, electrolyte and renal panel  Nutrition-Focused Physical Findings: skin       Nutrition Follow-Up    RD Follow-up?: Yes

## 2022-05-13 NOTE — PT/OT/SLP PROGRESS
"Physical Therapy      Patient Name:  Lupis Mucria   MRN:  [id number]    Patient not seen today secondary to unwilling to participate. Pt reports "having a rough morning" and not wanting to get OOB at this moment. Pt reports willing to participate tomorrow.  Will follow-up as schedule per PT POC.    05/13/2022          "

## 2022-05-13 NOTE — PLAN OF CARE
Continue regular diet, RD following  Goals: PO to meet 85% of EEN/EPN by next RD follow up  Nutrition Goal Status: new  Communication of RD Recs: other (comment) (POC)     Assessment and Plan   Increased nutrient needs( protein) related to wound healing as evidenced by BLE lymphedema/Cellulitis     New     Plan  General diet  Collaboration with other providers  Vitamin/mineral supplement therapy- Vit D, Vit B complex/Vit C, Mg, Vit D

## 2022-05-13 NOTE — PROGRESS NOTES
[facility] - Skilled Nursing  Wound Care    Patient Name:  Lupis Murcia   MRN:  [id number]  Date: 5/13/2022  Diagnosis: Cellulitis of leg    History:     Past Medical History:   Diagnosis Date    Lymphedema     MS (multiple sclerosis)     Vitamin B12 deficiency        Social History     Socioeconomic History    Marital status:    Tobacco Use    Smoking status: Never Smoker    Smokeless tobacco: Never Used   Substance and Sexual Activity    Alcohol use: No    Drug use: No       Precautions:     Allergies as of 05/11/2022 - Reviewed 05/11/2022   Allergen Reaction Noted    Contrast media Shortness Of Breath and Rash 12/15/2016    Pcn [penicillins] Shortness Of Breath and Rash 07/10/2013    Celebrex [celecoxib] Other (See Comments) 06/12/2017    Diazepam Hives 10/12/2021    Motrin [ibuprofen] Rash 07/10/2013       [facility] Assessment Details/Treatment   Wound care consulted for BLE and coccyx area  The BLE skin is intact, pink intact scar tissue surrounded by hyperkeratic skin that is easily removed with cleansing at edges.   The coccyx/buttocks have partial thickness skin loss with excoriation to the pavan-wound.   Discussed care of skin to BLE and buttocks with daughter/Ms. Murcia- verbalized understanding.     Plan:  Coccyx/buttocks, - triad ointment bid/prn cleansing  BLE-  after cleansing skin/pat dry then apply Lac Hydrin lotion BID  Waffle overlay, cushion    Nursing to continue care, pressure prevention measures  Wound care will follow-up prn   Recommendations made to primary team for above plan per written report . Orders placed.        05/13/22 0900        Wound 05/01/22 1720 Ulceration Sacral Spine #3   Date First Assessed/Time First Assessed: 05/01/22 1720   Pre-existing: Yes  Primary Wound Type: Ulceration  Location: Sacral Spine  Wound Number: #3   Wound Image    Wound WDL ex   Dressing Appearance Intact   Drainage Amount None   Appearance Pink;Red;Moist   Tissue loss description Partial thickness    Periwound Area Excoriated;Moist   Wound Edges Irregular   Care Cleansed with:;Soap and water;Applied:;Skin Barrier        Wound 05/01/22 1720 Ulceration Left Leg #1   Date First Assessed/Time First Assessed: 05/01/22 1720   Pre-existing: Yes  Primary Wound Type: Ulceration  Side: Left  Location: Leg  Wound Number: #1   Wound Image    Wound WDL ex   Dressing Appearance Open to air   Drainage Amount None   Appearance Intact;Dry  (hyperkeratic skin)   Tissue loss description Not applicable   Periwound Area Intact;Dry;Pink;Scar tissue   Wound Edges Rolled/closed   Care Cleansed with:;Soap and water;Applied:;Moisturizing agent   Dressing Change Due 05/13/22  (5/13/22 evening)        Wound 05/01/22 1720 Ulceration Right Leg #2   Date First Assessed/Time First Assessed: 05/01/22 1720   Pre-existing: Yes  Primary Wound Type: Ulceration  Side: Right  Location: Leg  Wound Number: #2   Wound Image    Wound WDL ex   Dressing Appearance Open to air   Drainage Amount None   Appearance Intact;Pink;Dry  (hyperkeratic skin)   Tissue loss description Not applicable   Periwound Area Intact;Edematous   Care Cleansed with:;Soap and water;Applied:;Moisturizing agent   Dressing Change Due 05/13/22  (5/13/22 evening)       05/13/2022

## 2022-05-13 NOTE — H&P
"Hospital Medicine  Skilled Nursing Facility   History and Physical Exam      Date of Service: 05/13/2022      Patient Name: Lupis Murcia  MRN: [id number]  Admission Date: 5/11/2022  Attending Physician: Rogerio Fried MD  Primary Care Provider: Bobby Tran MD  Code Status: Full Code      Principal problem:  Cellulitis of leg      Chief Complaint:   Chief Complaint   Patient presents with    Cellulitis     Admitted to OS for rehabilitation following hospital stay for cellulitis and acute kidney injury.           HPI:   "Mrs. Murcia is a 71 year old female with PMHx of Multiple Sclerosis, Left Foot Drop, Lymphedema, Obesity (bmi 45) who presents to SNF following hospitalization for cellulitis, acute kidney injury and acute encephalopathy.  Admission to SNF for secondary weakness and debility.     Patient originally presented to [facility] ED on 05/01 after suffering a recent fall.  Per [facility] notes, "Patient's daughter Amanda Lowery provides primary history due to pt somnolence s/p ativan and EMR review. 1 week ago on Monday patient had a fall, partially assisted by family when legs gave out, no head injury or LOC.  Daughter noted through the week patient c/o of weakness and difficulty standing as when she fell, her armpit fell onto the walker, pt c/o of left shoulder pain.  On Thursday it was noted patient unable to stand, she is normally able to perform ADL with her walker, but daughter felt when she came home that pt had not moved.  She was also using adult diaper at this time and since Thursday due to body habitus/size patient has been unable to change her diaper since this time.  She has intermittently been taking her medicines during this time, but daughter noted she was eating less,  sleeping more and making confused statements and sought to bring her to the ED. ED staff noted pts diaper was saturated with urine, vulvar swelling with intertrigo and buttock pressure injury.  Labs notable for Hyperkalemia to " "6.3 and BUn/Cr of 169/3.9. ED Treatment: Insulin 10uni IV regular, D10 bolus, 1gram Calcium Gluconate.  LR 2.2Liters IV, naBicarb 50meq IV x 1, Ativan 1mg IV Ceftriaxone 2gm IV x . Patient reports loose stools improved with imodium and stopping stool softeners. Patient also has reduced UOP and bladder scan 423. Straight cath performed and urinary retention protocol initiated and ordered flomax."     Today, patient is urinating spontaneously, intermittent bladder scans done without > 300 mL, patient also refusing some bladder scans.  Patient states medications were added to her SNF orders that she is no longer on, discontinuing B12 loading dose of injections, baclofen and topical cream, patient also wants to be off of Flomax.  BP is normotensive, will decrease losartan 25 mg, replacing magnesium and sodium bicarb.  Patient is not diabetic and does not require blood glucose checks." per Amy Conklin, NP on 22.     She is doing okay today. Says that her legs are feeling better with less swelling and redness. She is having constipation currently, but denies any abdominal pain or nausea. She says that she gets full quickly while eating. She would like to take her home candasartan/HCTZ instead of the losartan that is on formulary.     Patient admitted with skilled services with PT and OT to improve functional status and ability to perform ADLs.       Past Medical History:   Past Medical History:   Diagnosis Date    Lymphedema     MS (multiple sclerosis)     Vitamin B12 deficiency        Past Surgical History:   Past Surgical History:   Procedure Laterality Date    BREAST CYST EXCISION Left     over 40yrs ago     SECTION      ESOPHAGOGASTRODUODENOSCOPY N/A 2022    Procedure: EGD (ESOPHAGOGASTRODUODENOSCOPY);  Surgeon: Radha Arango MD;  Location: [address]);  Service: Endoscopy;  Laterality: N/A;       Social History:   Tobacco Use    Smoking status: Never Smoker    Smokeless tobacco: " Never Used   Substance and Sexual Activity    Alcohol use: No    Drug use: No    Sexual activity: Not on file       Family History:   Family History     Problem Relation (Age of Onset)    Brain cancer Brother    Coronary artery disease Father    Lung cancer Father, Mother          No current facility-administered medications on file prior to encounter.     Current Outpatient Medications on File Prior to Encounter   Medication Sig    acetaminophen-codeine 300-30mg (TYLENOL #3) 300-30 mg Tab Take 1 tablet by mouth every 6 (six) hours as needed (chronic leg pain).    ammonium lactate (LAC-HYDRIN) 12 % lotion Apply topically 2 (two) times daily as needed for Dry Skin (Nursing to cleanse BLE with soap and water, pat dry and apply Ammonium).    baclofen (LIORESAL) 10 MG tablet Take 1 tablet (10 mg total) by mouth 2 (two) times daily. (Patient taking differently: Take 10 mg by mouth 2 (two) times daily as needed.)    [] doxycycline (VIBRA-TABS) 100 MG tablet Take 1 tablet (100 mg total) by mouth every 12 (twelve) hours. for 2 days    loperamide (IMODIUM) 2 mg capsule Take 1 capsule (2 mg total) by mouth 4 (four) times daily as needed for Diarrhea.    lorazepam (ATIVAN) 1 MG tablet Take 0.5 mg by mouth 3 (three) times daily as needed for Anxiety.    miconazole (MICOTIN) 2 % cream Apply topically 2 (two) times daily. Apply to intertriginous areas, lower abdomen groin for 14 days    pantoprazole (PROTONIX) 40 MG tablet Take 1 tablet (40 mg total) by mouth once daily.    potassium, sodium phosphates (PHOS-NAK) 280-160-250 mg PwPk Take 1 packet by mouth 4 (four) times daily with meals and nightly. for 5 days    senna-docusate 8.6-50 mg (PERICOLACE) 8.6-50 mg per tablet Take 1 tablet by mouth 2 (two) times daily as needed for Constipation.    tamsulosin (FLOMAX) 0.4 mg Cap Take 1 capsule (0.4 mg total) by mouth daily with lunch.    vitamin B comp with vit C no.6 500-0.5 mg Tab Take 1 tablet by mouth once  daily.    vitamin D 1000 units Tab Take 5,000 Units by mouth once daily.     acetaminophen 325 mg Cap Take 325 mg by mouth.    candesartan-hydrochlorothiazide (ATACAND HCT) 16-12.5 mg per tablet Take 1 tablet by mouth Daily.    clotrimazole-betamethasone 1-0.05% (LOTRISONE) cream Apply to affected area 2 times daily    cyanocobalamin 1,000 mcg/mL injection Inject 1ml daily for 1 week, then weekly for 1 month, then every 2 weeks thereafter. Please provide 23g 1in needle and 1cc syringes    furosemide (LASIX) 20 MG tablet Take 20 mg by mouth daily as needed (leg swelling).       Allergies:   Review of patient's allergies indicates:   Allergen Reactions    Contrast media Shortness Of Breath and Rash    Pcn [penicillins] Shortness Of Breath and Rash    Celebrex [celecoxib] Other (See Comments)     Swallowing problems     Diazepam Hives    Motrin [ibuprofen] Rash       ROS:  Review of Systems   Constitutional: Positive for appetite change. Negative for chills and fever.   HENT: Negative for congestion and sore throat.    Eyes: Negative for redness and visual disturbance.   Respiratory: Negative for cough and shortness of breath.    Cardiovascular: Positive for leg swelling. Negative for chest pain and palpitations.   Gastrointestinal: Positive for abdominal distention and constipation. Negative for abdominal pain, nausea and vomiting.   Genitourinary: Negative for difficulty urinating and dysuria.   Musculoskeletal: Negative for arthralgias and back pain.   Skin: Negative for pallor and rash.   Allergic/Immunologic: Negative for environmental allergies and food allergies.   Neurological: Positive for weakness. Negative for dizziness and light-headedness.   Hematological: Does not bruise/bleed easily.   Psychiatric/Behavioral: Negative for sleep disturbance. The patient is not nervous/anxious.          Objective:  Temp:  [98.5 °F (36.9 °C)-99 °F (37.2 °C)]   Pulse:  [81-83]   Resp:  [13-18]   BP:  (131-141)/(67-74)   SpO2:  [94 %-96 %]     Body mass index is 40.16 kg/m².      Physical Exam  Vitals and nursing note reviewed.   Constitutional:       General: She is not in acute distress.     Appearance: She is well-developed.   HENT:      Head: Normocephalic and atraumatic.      Right Ear: External ear normal.      Left Ear: External ear normal.      Nose: No nasal deformity or mucosal edema.      Mouth/Throat:      Mouth: Mucous membranes are moist.      Pharynx: Uvula midline. No oropharyngeal exudate.   Eyes:      General: No scleral icterus.     Conjunctiva/sclera: Conjunctivae normal.      Pupils: Pupils are equal, round, and reactive to light.   Neck:      Trachea: Phonation normal.   Cardiovascular:      Rate and Rhythm: Normal rate and regular rhythm.      Heart sounds: S1 normal and S2 normal. No murmur heard.  Pulmonary:      Effort: Pulmonary effort is normal. No respiratory distress.      Breath sounds: Normal breath sounds. No wheezing or rales.   Chest:   Breasts:      Right: No supraclavicular adenopathy.      Left: No supraclavicular adenopathy.       Abdominal:      General: Bowel sounds are normal. There is no distension.      Palpations: Abdomen is soft.      Tenderness: There is no abdominal tenderness. There is no guarding or rebound.   Musculoskeletal:         General: No tenderness.      Cervical back: Neck supple. No muscular tenderness.      Right lower le+ Edema present.      Left lower le+ Edema present.   Lymphadenopathy:      Cervical:      Right cervical: No superficial cervical adenopathy.     Left cervical: No superficial cervical adenopathy.      Upper Body:      Right upper body: No supraclavicular adenopathy.      Left upper body: No supraclavicular adenopathy.   Skin:     General: Skin is warm and dry.      Findings: Bruising present. No erythema or rash.      Comments: Lymphedema changes present on bilateral legs.    Neurological:      Mental Status: She is alert and  oriented to person, place, and time.      Motor: No tremor or seizure activity.   Psychiatric:         Mood and Affect: Mood normal.         Behavior: Behavior normal. Behavior is cooperative.         Thought Content: Thought content normal.         Significant Labs:   A1C:   Recent Labs   Lab 05/03/22  1256   HGBA1C 6.2*     POCT Glucose:   Recent Labs   Lab 05/11/22  2130 05/12/22  0736 05/12/22  1139   POCTGLUCOSE 91 89 103     CBC:  Recent Labs   Lab 05/11/22  1110 05/12/22  0453   WBC 6.86 7.33   HGB 8.1* 8.6*   HCT 26.4* 28.3*    177   MCV 95 96     BMP:  Recent Labs   Lab 05/11/22  1110 05/12/22  0453   GLU 95 81    142   K 3.5 3.6    109   CO2 25 20*   BUN 15 13   CREATININE 0.8 0.7   CALCIUM 8.6* 8.8   MG  --  1.7   PHOS  --  2.5*       Significant Imaging: I have reviewed all pertinent imaging results/findings completed during prior hospitalization.      Assessment and Plan:  Active Diagnoses:    Diagnosis Date Noted POA    PRINCIPAL PROBLEM:  Cellulitis of leg [L03.119] 05/04/2022 Yes    Lymphedema [I89.0] 05/01/2022 Yes    Pressure injury of buttock, unstageable [L89.300] 05/01/2022 Yes    MS (multiple sclerosis) [G35] 06/09/2019 Yes    Paraparesis [G82.20] 06/09/2019 Yes    Vitamin D deficiency [E55.9] 06/09/2019 Yes    Impaired functional mobility, balance, gait, and endurance [Z74.09] 05/11/2018 Yes    Vitamin B 12 deficiency [E53.8] 08/09/2017 Yes    Anemia of chronic disease [D63.8] 08/09/2017 Yes    Insomnia secondary to chronic pain [G89.29, G47.01] 06/15/2016 Yes      Problems Resolved During this Admission:       Cellulitis of leg  - ID consulted at [facility]  - continue local wound care per wound care  - completed cefepime and continue vancomycin. switched to doxycycline upon discharge and completed course 5/12/22     Urinary retention   - discontinued espinal prior to transfer to SNF.   - Urinating well until 5/10   - bladder scans PRN     Pressure injury of buttock,  unstageable  - q2 turns  - low airloss overlay mattress  - wound care consultation       Lymphedema  - wound care consultation to assist in managmeent  - continue ammonium lactate lotion BID     HTN  - Will resume her home candasartan/HCTZ 16/12.5.   - Monitor BP closely and adjust meds as needed.      MS (multiple sclerosis)  - PT/OT  - Follow up with Dr. Henley after discharge.       B12 deficiency  - Resumed her home cyanocobalamin 1000 mcg every 30 days to be given today.    Debility   - Continue with PT/OT for gait training and strengthening and restoration of ADL's   - Encourage mobility, OOB in chair, and early ambulation as appropriate  - Fall precautions   - Monitor for bowel and bladder dysfunction  - Monitor for and prevent skin breakdown and pressure ulcers  - Continue DVT prophylaxis with  heparin 7.5 K q.8 hours       Anticipated Disposition:  Home with home health      No future appointments.    I certify that SNF services are required to be given on an inpatient basis because Lupis Murcia needs for skilled nursing care and/or skilled rehabilitation are required on a daily basis and such services can only practically be provided in a skilled nursing facility setting and are for an ongoing condition for which she received inpatient care in the hospital.       Rogerio Fried MD  Department of Hospital Medicine   [facility] - Skilled Nursing

## 2022-05-13 NOTE — PLAN OF CARE
SW met with patient in room for Discharge Planning Assessment. Pt lives with adult daughterAmanda. Pt was previously using a rollator for ambulation. Patient will have transportation and daily activity assistance at discharge from daughter.SW is following this Pt for DC planning needs. There are no identified needs at this time.     Patient's preferred pharmacy is [facility] LMSW  Case Management Department  [facility]  [email or web address] - Skilled Nursing  Initial Discharge Assessment       Primary Care Provider: Bobby Tran MD    Admission Diagnosis: Cellulitis and abscess of leg, except foot [L03.119, L02.419]    Admission Date: 5/11/2022  Expected Discharge Date: 6/2/2022    Discharge Barriers Identified: None    Payor: MEDICARE / Plan: MEDICARE PART A & B / Product Type: Government /     Extended Emergency Contact Information  Primary Emergency Contact: MorganAlec brayi  Address: [address] [place] [address]  Mobile Phone: [phone]  Relation: Daughter    Discharge Plan A: Home Health, Home with family  Discharge Plan B: Home Health, Home with family      [id number] [place], [place] - [address]  [address]  [place] [address]  Phone: [phone] Fax: [phone]    [id number] - [place] - [address]  [address]  [place] [address]  Phone: [phone] Fax: [phone]      Initial Assessment (most recent)     Adult Discharge Assessment - 05/12/22 1201        Discharge Assessment    Assessment Type Discharge Planning Assessment     Source of Information patient;family;health record     Contact Name/Number Azalea Lowery      Communicated USMAN with patient/caregiver Yes     Lives With child(reinier), adult     Do you expect to return to your current living situation? Yes     Do you have help at home or someone to help you manage your care at  home? Yes     Who are your caregiver(s) and their phone number(s)? Aazlea Lowery      Prior to hospitilization cognitive status: Alert/Oriented     Current cognitive status: Alert/Oriented     Home Accessibility wheelchair accessible     Home Layout Able to live on 1st floor     Readmission within 30 days? No     Patient currently being followed by outpatient case management? No     Do you currently have service(s) that help you manage your care at home? No     Do you take prescription medications? Yes     Do you have prescription coverage? Yes     Do you have any problems affording any of your prescribed medications? No     Is the patient taking medications as prescribed? yes     Who is going to help you get home at discharge? Azalea Lowery      How do you get to doctors appointments? family or friend will provide     Are you on dialysis? No     Do you take coumadin? No     Discharge Plan A Home Health;Home with family     Discharge Plan B Home Health;Home with family     DME Needed Upon Discharge  other (see comments)   TBD    Discharge Plan discussed with: Adult children     Discharge Barriers Identified None        Relationship/Environment    Name(s) of Who Lives With Patient Azalea Lowery

## 2022-05-13 NOTE — NURSING
"Attempted to bladder scan pt as ordered and as stated from nurse report of last BS of 0500. Patient yelled and stated, "they shouldn't be bladder scanning me, ""I don't need that." This nurse attempted to explain to patient that an order is written to record to bladder scanned. Patient continued to refuse and this nurse removed from room to inform charge nurse of patient's refusal.      "

## 2022-05-14 PROCEDURE — 11000004 HC SNF PRIVATE

## 2022-05-14 PROCEDURE — 97530 THERAPEUTIC ACTIVITIES: CPT

## 2022-05-14 PROCEDURE — 25000003 PHARM REV CODE 250: Performed by: HOSPITALIST

## 2022-05-14 PROCEDURE — 97535 SELF CARE MNGMENT TRAINING: CPT

## 2022-05-14 PROCEDURE — 97110 THERAPEUTIC EXERCISES: CPT

## 2022-05-14 PROCEDURE — 25000003 PHARM REV CODE 250: Performed by: NURSE PRACTITIONER

## 2022-05-14 PROCEDURE — 63600175 PHARM REV CODE 636 W HCPCS: Performed by: NURSE PRACTITIONER

## 2022-05-14 RX ORDER — LOSARTAN POTASSIUM 50 MG/1
100 TABLET ORAL DAILY
Status: DISCONTINUED | OUTPATIENT
Start: 2022-05-14 | End: 2022-05-16

## 2022-05-14 RX ORDER — HYDROCHLOROTHIAZIDE 12.5 MG/1
12.5 TABLET ORAL DAILY
Status: DISCONTINUED | OUTPATIENT
Start: 2022-05-14 | End: 2022-06-15 | Stop reason: HOSPADM

## 2022-05-14 RX ADMIN — ACETAMINOPHEN AND CODEINE PHOSPHATE 1 TABLET: 300; 30 TABLET ORAL at 09:05

## 2022-05-14 RX ADMIN — LORAZEPAM 0.5 MG: 0.5 TABLET ORAL at 11:05

## 2022-05-14 RX ADMIN — Medication 1 TABLET: at 09:05

## 2022-05-14 RX ADMIN — POTASSIUM & SODIUM PHOSPHATES POWDER PACK 280-160-250 MG 1 PACKET: 280-160-250 PACK at 04:05

## 2022-05-14 RX ADMIN — LORAZEPAM 0.5 MG: 0.5 TABLET ORAL at 04:05

## 2022-05-14 RX ADMIN — PANTOPRAZOLE SODIUM 40 MG: 40 TABLET, DELAYED RELEASE ORAL at 08:05

## 2022-05-14 RX ADMIN — HYDROCHLOROTHIAZIDE 12.5 MG: 12.5 TABLET ORAL at 09:05

## 2022-05-14 RX ADMIN — MICONAZOLE NITRATE: 20 CREAM TOPICAL at 09:05

## 2022-05-14 RX ADMIN — Medication 1000 UNITS: at 09:05

## 2022-05-14 RX ADMIN — HEPARIN SODIUM 7500 UNITS: 5000 INJECTION INTRAVENOUS; SUBCUTANEOUS at 10:05

## 2022-05-14 RX ADMIN — ACETAMINOPHEN AND CODEINE PHOSPHATE 1 TABLET: 300; 30 TABLET ORAL at 06:05

## 2022-05-14 RX ADMIN — HEPARIN SODIUM 7500 UNITS: 5000 INJECTION INTRAVENOUS; SUBCUTANEOUS at 02:05

## 2022-05-14 RX ADMIN — POTASSIUM & SODIUM PHOSPHATES POWDER PACK 280-160-250 MG 1 PACKET: 280-160-250 PACK at 08:05

## 2022-05-14 RX ADMIN — AMMONIUM LACTATE: 12 LOTION TOPICAL at 09:05

## 2022-05-14 RX ADMIN — AMMONIUM LACTATE: 12 LOTION TOPICAL at 08:05

## 2022-05-14 RX ADMIN — PANTOPRAZOLE SODIUM 40 MG: 40 TABLET, DELAYED RELEASE ORAL at 09:05

## 2022-05-14 RX ADMIN — LORAZEPAM 0.5 MG: 0.5 TABLET ORAL at 08:05

## 2022-05-14 RX ADMIN — SENNOSIDES AND DOCUSATE SODIUM 2 TABLET: 50; 8.6 TABLET ORAL at 08:05

## 2022-05-14 RX ADMIN — HEPARIN SODIUM 7500 UNITS: 5000 INJECTION INTRAVENOUS; SUBCUTANEOUS at 06:05

## 2022-05-14 RX ADMIN — ACETAMINOPHEN AND CODEINE PHOSPHATE 1 TABLET: 300; 30 TABLET ORAL at 02:05

## 2022-05-14 RX ADMIN — POTASSIUM & SODIUM PHOSPHATES POWDER PACK 280-160-250 MG 1 PACKET: 280-160-250 PACK at 11:05

## 2022-05-14 RX ADMIN — ONDANSETRON 4 MG: 4 TABLET, ORALLY DISINTEGRATING ORAL at 11:05

## 2022-05-14 RX ADMIN — POTASSIUM & SODIUM PHOSPHATES POWDER PACK 280-160-250 MG 1 PACKET: 280-160-250 PACK at 06:05

## 2022-05-14 RX ADMIN — MICONAZOLE NITRATE: 20 CREAM TOPICAL at 08:05

## 2022-05-14 RX ADMIN — SENNOSIDES AND DOCUSATE SODIUM 2 TABLET: 50; 8.6 TABLET ORAL at 09:05

## 2022-05-14 NOTE — PLAN OF CARE
Problem: Physical Therapy  Goal: Physical Therapy Goal  Description: Goals to be met by: 6/9/22    Patient will increase functional independence with mobility by performing:    . Supine to sit with MInimal Assistance  . Sit to supine with MInimal Assistance  . Rolling to Left and Right with Minimal Assistance.  . Sit to stand transfer with Minimal Assistance  . Bed to chair transfer with Minimal Assistance using Rolling Walker/no AD  . Gait  x 10 feet with Moderate Assistance using RW/// bars   . Wheelchair propulsion x50 feet with Minimal Assistance using bilateral uppper extremities  . Sitting at edge of bed x5 minutes with Stand-by Assistance    Outcome: Ongoing, Progressing   Pt's goals remain appropriate and pt will continue to benefit from skilled PT services to work towards improved functional mobility including: bed mobility, transfers, w/c mobility, and gait.   5/14/2022

## 2022-05-14 NOTE — PT/OT/SLP PROGRESS
"Physical Therapy Treatment/partial co treat with OT    Patient Name:  Lupis Murcia   MRN:  [id number]  Admit Date: 5/11/2022  Admitting Diagnosis: Cellulitis of leg  General Precautions: Standard, fall   Orthopedic Precautions:N/A   Braces: N/A     Recommendations:     Discharge Recommendations:  home with home health   Level of Assistance Recommended at Discharge: 24 hours significant assistance  Discharge Equipment Recommendations:  (TBD as pt progresses)   Barriers to discharge: Decreased caregiver support    Assessment:     Lupis Murcia is a 71 y.o. female admitted with a medical diagnosis of Cellulitis of leg . Pt is unsafe with functional mobility at this time due to pt requires max assist of 2 for bed mobility, moderate to max assist of 2 for transfers, and CGA for sitting balance due to posterior lean at times due to pt fearful of falling off end of the bed. Pt is motivated to progress with functional mobility.    Performance deficits affecting function:  weakness, impaired balance, impaired endurance, impaired self care skills, impaired functional mobilty, pain, edema, impaired skin, decreased safety awareness, decreased lower extremity function, decreased ROM .    Rehab Potential is fair    Activity Tolerance: Good    Plan:     Patient to be seen 6 x/week to address the above listed problems via gait training, therapeutic activities, therapeutic exercises, neuromuscular re-education, wheelchair management/training    · Plan of Care Expires: 06/11/22  · Plan of Care Reviewed with: patient, daughter    Subjective   "I fell with the walker, I can't use it".     Pain/Comfort:  · Pain Rating 1: 8/10  · Location - Side 1: Bilateral  · Location - Orientation 1: lower  · Location 1: leg  · Pain Addressed 1: Pre-medicate for activity, Reposition, Cessation of Activity  · Pain Rating Post-Intervention 1: 5/10    Patient's cultural, spiritual, Buddhist conflicts given the current situation:  · no    Objective: "     Communicated with nurse prior to session.  Patient found sitting edge of bed with OTwith  (no lines) upon PT entry to room.     Therapeutic Activities and Exercises: Partial Co-treat with OT due to medical complexity of pt and need for skilled hands for safe intervention. Pt performed B LE exs in sitting x5 reps; hip flex with AAROM and knee ext with AAROM. Pt stood x 4 trials with moderate assist of 1-2 persons with HHA of 1 person x 2 trial to clean pt, don brief,  and pants for ~ 15 sec each trial then with RW with 2 personsx 2 trials for 40 sec then 1 min    Functional Mobility:  · Bed Mobility:     · Rolling Left:  maximal assistance  · Rolling Right: maximal assistance  · Sit to Supine: maximal assistance and of 2 persons  · Transfers:     · Sit to Stand:  maximal assistance and of 2 persons with rolling walker  · Sit to stand with HHA with moderate assist with manual cues to facilitate hip/knee ext  · Transfer bed to w/c, w/c to bedside chair, then bedside chair to bed with max assist of 1-2 persons with squat pivot due to pt unable to lift her foot to step  · Bed to Chair: maximal assistance and of 2 persons with  hand-held assist  using  Squat Pivot    AM-PAC 6 CLICK MOBILITY  10    Patient left HOB elevated (pt remained up in bedside chair ~ 2 1/2 hours prior to PT return to transfer pt back to bed as above) with call button in reach, nurse notified and daughter present.    GOALS:   Multidisciplinary Problems     Physical Therapy Goals        Problem: Physical Therapy    Goal Priority Disciplines Outcome Goal Variances Interventions   Physical Therapy Goal     PT, PT/OT Ongoing, Progressing     Description: Goals to be met by: 6/9/22    Patient will increase functional independence with mobility by performing:    . Supine to sit with MInimal Assistance  . Sit to supine with MInimal Assistance  . Rolling to Left and Right with Minimal Assistance.  . Sit to stand transfer with Minimal Assistance  . Bed  to chair transfer with Minimal Assistance using Rolling Walker/no AD  . Gait  x 10 feet with Moderate Assistance using RW/// bars   . Wheelchair propulsion x50 feet with Minimal Assistance using bilateral uppper extremities  . Sitting at edge of bed x5 minutes with Stand-by Assistance                     Time Tracking:     PT Received On: 05/14/22  PT Total Time (min): 51 min     Billable Minutes: Therapeutic Activity 35 and Therapeutic Exercise 16    Treatment Type: Treatment  PT/PTA: PT     PTA Visit Number: 0     05/14/2022

## 2022-05-14 NOTE — PLAN OF CARE
Problem: Adult Inpatient Plan of Care  Goal: Plan of Care Review  Outcome: Ongoing, Progressing  Goal: Patient-Specific Goal (Individualized)  Outcome: Ongoing, Progressing     Problem: Bariatric Environmental Safety  Goal: Safety Maintained with Care  Outcome: Ongoing, Progressing     Problem: Impaired Wound Healing  Goal: Optimal Wound Healing  Outcome: Ongoing, Progressing     Problem: Skin Injury Risk Increased  Goal: Skin Health and Integrity  Outcome: Ongoing, Progressing     Problem: Fall Injury Risk  Goal: Absence of Fall and Fall-Related Injury  Outcome: Ongoing, Progressing

## 2022-05-14 NOTE — PT/OT/SLP PROGRESS
"Occupational Therapy   Treatment ([facility] with PT)    Name: Lupis Murcia  MRN: [id number]  Admit Date: 5/11/2022  Admitting Diagnosis:  Cellulitis of leg    General Precautions: Standard, fall   Orthopedic Precautions:N/A   Braces: N/A     Recommendations:     Discharge Recommendations: home health PT, home health OT  Level of Assistance Recommended at Discharge: 24 hours physical assistance for all ADL's and home management tasks  Discharge Equipment Recommendations:  hip kit  Barriers to discharge:  Decreased caregiver support    Assessment:     Lupis Murcia is a 71 y.o. female with a medical diagnosis of Cellulitis of leg.  She presents with the following performance deficits affecting function are weakness, impaired endurance, impaired sensation, impaired self care skills, impaired functional mobilty, gait instability, impaired balance, decreased coordination, decreased lower extremity function, decreased safety awareness, pain, impaired coordination, impaired skin, edema. Pt was agreeable to OT and was noted to make progress towards her goals in therapy.  Pt worked on ADLs and func mobility.  Pt was very fearful of falling (due to hx of falls) and required encouragement and max A x2 to perform standing tasks.  Pt's goals remain appropriate at this time.  She will continue to benefit from skilled OT services in order to assist her with increasing her safety and level of independence with self care and mobility tasks.     Rehab Potential is fair    Activity tolerance:  Fair    Plan:     Patient to be seen 6 x/week to address the above listed problems via self-care/home management, therapeutic activities, therapeutic exercises    · Plan of Care Expires: 06/12/22  · Plan of Care Reviewed with: patient, daughter    Subjective     Communicated with: nurse prior to session.   "I can't stand!  I don't want to fall!"  Pt very fearful of falling due to hx of fall, but agreed to attempt standing with assistance of both " OT and PT.  .    Pain/Comfort:  · Pain Rating 1: 8/10  · Location - Side 1: Bilateral  · Location 1: foot (lower leg)  · Pain Addressed 1: Pre-medicate for activity, Distraction, Cessation of Activity  · Pain Rating Post-Intervention 1: 5/10    Patient's cultural, spiritual, Judaism conflicts given the current situation:  · no    Objective:     Patient found HOB elevated with  (no active lines) upon OT entry to room.    Bed Mobility:    · Patient completed Supine to Sit with maximal assistance and assist with B LEs     Functional Mobility/Transfers:  · Patient completed Sit <> Stand Transfer with maximal assistance and varried between max x1 and max x2  with  hand-held assist and rolling walker   · Patient completed Bed <> Chair Transfer using Stand Pivot technique with maximal assistance and of 2 persons with hand-held assist  · Functional Mobility:   · Pt standing with PT in front of bed for ~3-4x while OT worked on toileting (hygiene and changing soiled brief) and pulling pants up to waist  · Pt standing with PT in front of w/c - attempted to stand with increasing duration - noted with L knee in hyperextension - when encouraged to step forward, pt stated she could not move her legs.  Stood for less than half a min for first attempt and 1 min for 2nd.  Able to shift weight side to side.     Activities of Daily Living:  · Feeding:  modified independence    · Grooming: set up A w/c level at sink  · Lower Body Dressing: total assistance to rosetta socks and pants  · Toileting: total assistance hygiene and clothing mgmt    [facility] 6 Click ADL: 13      Treatment & Education:  · Pt completed ADLs and func mobility activities for tx session as noted above  · Pt required max A x2 with transfers and standing tasks - needed encouragement with standing due to fear of falling, but able to stand   · Whiteboard updated  · Pt educated on role of OT and POC      Patient left up in chair with call button in reach and daughter  presentEducation:      GOALS:   Multidisciplinary Problems     Occupational Therapy Goals        Problem: Occupational Therapy    Goal Priority Disciplines Outcome Interventions   Occupational Therapy Goal     OT, PT/OT Ongoing, Progressing    Description: Goals to be met by: 6/2    Patient will increase functional independence with ADLs by performing:    UE Dressing with [place].  LE Dressing with Moderate Assistance using AE as needed.  Grooming while seated at sink with [place].  Toileting from bedside commode or toilet with Minimal Assistance for hygiene and clothing management.   Bathing from shower chair/chair level with Minimal Assistance.  Stand pivot transfers with Minimal Assistance using RW.  Toilet transfer to bedside commode with Minimal Assistance.  Upper extremity exercise program with supervision.  Caregiver will be educated on level of assist required to safely perform self care tasks and functional transfers.                     Time Tracking:     OT Date of Treatment: OT Date of Treatment: 05/14/22  OT Total Time (min):  38    Billable Minutes:Self Care/Home Management 23  Therapeutic Activity 8    5/14/2022

## 2022-05-14 NOTE — PLAN OF CARE
Problem: Occupational Therapy  Goal: Occupational Therapy Goal  Description: Goals to be met by: 6/2    Patient will increase functional independence with ADLs by performing:    UE Dressing with [place].  LE Dressing with Moderate Assistance using AE as needed.  Grooming while seated at sink with [place].  Toileting from bedside commode or toilet with Minimal Assistance for hygiene and clothing management.   Bathing from shower chair/chair level with Minimal Assistance.  Stand pivot transfers with Minimal Assistance using RW.  Toilet transfer to bedside commode with Minimal Assistance.  Upper extremity exercise program with supervision.  Caregiver will be educated on level of assist required to safely perform self care tasks and functional transfers.    Outcome: Ongoing, Progressing     Pt was agreeable to OT and was noted to make progress towards her goals in therapy.  Pt worked on ADLs and func mobility.  Pt was very fearful of falling (due to hx of falls) and required encouragement and max A x2 to perform standing tasks.  Pt's goals remain appropriate at this time.  She will continue to benefit from skilled OT services in order to assist her with increasing her safety and level of independence with self care and mobility tasks.     Lisbet Springer, OT  5/14/2022

## 2022-05-15 PROCEDURE — 25000003 PHARM REV CODE 250: Performed by: NURSE PRACTITIONER

## 2022-05-15 PROCEDURE — 25000003 PHARM REV CODE 250: Performed by: HOSPITALIST

## 2022-05-15 PROCEDURE — 11000004 HC SNF PRIVATE

## 2022-05-15 PROCEDURE — 63600175 PHARM REV CODE 636 W HCPCS: Performed by: NURSE PRACTITIONER

## 2022-05-15 PROCEDURE — 94761 N-INVAS EAR/PLS OXIMETRY MLT: CPT

## 2022-05-15 RX ADMIN — ACETAMINOPHEN AND CODEINE PHOSPHATE 1 TABLET: 300; 30 TABLET ORAL at 10:05

## 2022-05-15 RX ADMIN — POTASSIUM & SODIUM PHOSPHATES POWDER PACK 280-160-250 MG 1 PACKET: 280-160-250 PACK at 06:05

## 2022-05-15 RX ADMIN — MICONAZOLE NITRATE: 20 CREAM TOPICAL at 09:05

## 2022-05-15 RX ADMIN — AMMONIUM LACTATE: 12 LOTION TOPICAL at 08:05

## 2022-05-15 RX ADMIN — HEPARIN SODIUM 7500 UNITS: 5000 INJECTION INTRAVENOUS; SUBCUTANEOUS at 02:05

## 2022-05-15 RX ADMIN — HEPARIN SODIUM 7500 UNITS: 5000 INJECTION INTRAVENOUS; SUBCUTANEOUS at 06:05

## 2022-05-15 RX ADMIN — POTASSIUM & SODIUM PHOSPHATES POWDER PACK 280-160-250 MG 1 PACKET: 280-160-250 PACK at 11:05

## 2022-05-15 RX ADMIN — LORAZEPAM 0.5 MG: 0.5 TABLET ORAL at 05:05

## 2022-05-15 RX ADMIN — SENNOSIDES AND DOCUSATE SODIUM 2 TABLET: 50; 8.6 TABLET ORAL at 09:05

## 2022-05-15 RX ADMIN — PANTOPRAZOLE SODIUM 40 MG: 40 TABLET, DELAYED RELEASE ORAL at 09:05

## 2022-05-15 RX ADMIN — AMMONIUM LACTATE: 12 LOTION TOPICAL at 09:05

## 2022-05-15 RX ADMIN — Medication 1000 UNITS: at 09:05

## 2022-05-15 RX ADMIN — MICONAZOLE NITRATE: 20 CREAM TOPICAL at 08:05

## 2022-05-15 RX ADMIN — ACETAMINOPHEN AND CODEINE PHOSPHATE 1 TABLET: 300; 30 TABLET ORAL at 04:05

## 2022-05-15 RX ADMIN — ACETAMINOPHEN AND CODEINE PHOSPHATE 1 TABLET: 300; 30 TABLET ORAL at 09:05

## 2022-05-15 RX ADMIN — Medication 1 TABLET: at 09:05

## 2022-05-15 RX ADMIN — POTASSIUM & SODIUM PHOSPHATES POWDER PACK 280-160-250 MG 1 PACKET: 280-160-250 PACK at 04:05

## 2022-05-15 RX ADMIN — POTASSIUM & SODIUM PHOSPHATES POWDER PACK 280-160-250 MG 1 PACKET: 280-160-250 PACK at 08:05

## 2022-05-15 RX ADMIN — LORAZEPAM 0.5 MG: 0.5 TABLET ORAL at 02:05

## 2022-05-15 RX ADMIN — SENNOSIDES AND DOCUSATE SODIUM 2 TABLET: 50; 8.6 TABLET ORAL at 08:05

## 2022-05-15 RX ADMIN — HYDROCHLOROTHIAZIDE 12.5 MG: 12.5 TABLET ORAL at 09:05

## 2022-05-15 RX ADMIN — HEPARIN SODIUM 7500 UNITS: 5000 INJECTION INTRAVENOUS; SUBCUTANEOUS at 10:05

## 2022-05-15 RX ADMIN — LORAZEPAM 0.5 MG: 0.5 TABLET ORAL at 10:05

## 2022-05-15 RX ADMIN — PANTOPRAZOLE SODIUM 40 MG: 40 TABLET, DELAYED RELEASE ORAL at 08:05

## 2022-05-15 NOTE — NURSING
Patient has not had BM today. Patient was offered suppository throughout the day. Patient has stated several times she feels like to have a BM and to wait a little while. Charge nurse notified.

## 2022-05-15 NOTE — NURSING
Patient has not had BM in 4 days. Offered patient suppository. Patient asked that suppository be administered after lunch to see if Bm is made before then. Scheduled stool softener administered this morning. Warm prune juice offered to patient. Patient states she will sip on prune juice

## 2022-05-16 LAB
ANION GAP SERPL CALC-SCNC: 11 MMOL/L (ref 8–16)
BASOPHILS # BLD AUTO: 0.02 K/UL (ref 0–0.2)
BASOPHILS NFR BLD: 0.4 % (ref 0–1.9)
BUN SERPL-MCNC: 13 MG/DL (ref 8–23)
CALCIUM SERPL-MCNC: 8.8 MG/DL (ref 8.7–10.5)
CHLORIDE SERPL-SCNC: 101 MMOL/L (ref 95–110)
CO2 SERPL-SCNC: 24 MMOL/L (ref 23–29)
CREAT SERPL-MCNC: 0.6 MG/DL (ref 0.5–1.4)
DIFFERENTIAL METHOD: ABNORMAL
EOSINOPHIL # BLD AUTO: 0.1 K/UL (ref 0–0.5)
EOSINOPHIL NFR BLD: 3 % (ref 0–8)
ERYTHROCYTE [DISTWIDTH] IN BLOOD BY AUTOMATED COUNT: 15.5 % (ref 11.5–14.5)
EST. GFR  (AFRICAN AMERICAN): >60 ML/MIN/1.73 M^2
EST. GFR  (NON AFRICAN AMERICAN): >60 ML/MIN/1.73 M^2
GLUCOSE SERPL-MCNC: 82 MG/DL (ref 70–110)
HCT VFR BLD AUTO: 24.8 % (ref 37–48.5)
HGB BLD-MCNC: 7.7 G/DL (ref 12–16)
IMM GRANULOCYTES # BLD AUTO: 0.02 K/UL (ref 0–0.04)
IMM GRANULOCYTES NFR BLD AUTO: 0.4 % (ref 0–0.5)
LYMPHOCYTES # BLD AUTO: 1.2 K/UL (ref 1–4.8)
LYMPHOCYTES NFR BLD: 24.6 % (ref 18–48)
MAGNESIUM SERPL-MCNC: 1.6 MG/DL (ref 1.6–2.6)
MCH RBC QN AUTO: 29.6 PG (ref 27–31)
MCHC RBC AUTO-ENTMCNC: 31 G/DL (ref 32–36)
MCV RBC AUTO: 95 FL (ref 82–98)
MONOCYTES # BLD AUTO: 0.6 K/UL (ref 0.3–1)
MONOCYTES NFR BLD: 13.7 % (ref 4–15)
NEUTROPHILS # BLD AUTO: 2.7 K/UL (ref 1.8–7.7)
NEUTROPHILS NFR BLD: 57.9 % (ref 38–73)
NRBC BLD-RTO: 0 /100 WBC
PHOSPHATE SERPL-MCNC: 2.6 MG/DL (ref 2.7–4.5)
PLATELET # BLD AUTO: 199 K/UL (ref 150–450)
PMV BLD AUTO: 11.1 FL (ref 9.2–12.9)
POTASSIUM SERPL-SCNC: 3.6 MMOL/L (ref 3.5–5.1)
RBC # BLD AUTO: 2.6 M/UL (ref 4–5.4)
SODIUM SERPL-SCNC: 136 MMOL/L (ref 136–145)
WBC # BLD AUTO: 4.68 K/UL (ref 3.9–12.7)

## 2022-05-16 PROCEDURE — 97116 GAIT TRAINING THERAPY: CPT

## 2022-05-16 PROCEDURE — 97535 SELF CARE MNGMENT TRAINING: CPT | Mod: CO

## 2022-05-16 PROCEDURE — 63600175 PHARM REV CODE 636 W HCPCS: Performed by: NURSE PRACTITIONER

## 2022-05-16 PROCEDURE — 36415 COLL VENOUS BLD VENIPUNCTURE: CPT | Performed by: HOSPITALIST

## 2022-05-16 PROCEDURE — 97110 THERAPEUTIC EXERCISES: CPT

## 2022-05-16 PROCEDURE — 84100 ASSAY OF PHOSPHORUS: CPT | Performed by: HOSPITALIST

## 2022-05-16 PROCEDURE — 97530 THERAPEUTIC ACTIVITIES: CPT

## 2022-05-16 PROCEDURE — 85025 COMPLETE CBC W/AUTO DIFF WBC: CPT | Performed by: HOSPITALIST

## 2022-05-16 PROCEDURE — 25000003 PHARM REV CODE 250: Performed by: HOSPITALIST

## 2022-05-16 PROCEDURE — 80048 BASIC METABOLIC PNL TOTAL CA: CPT | Performed by: HOSPITALIST

## 2022-05-16 PROCEDURE — 83735 ASSAY OF MAGNESIUM: CPT | Performed by: HOSPITALIST

## 2022-05-16 PROCEDURE — 25000003 PHARM REV CODE 250: Performed by: NURSE PRACTITIONER

## 2022-05-16 PROCEDURE — 11000004 HC SNF PRIVATE

## 2022-05-16 RX ORDER — BISACODYL 10 MG
10 SUPPOSITORY, RECTAL RECTAL ONCE
Status: DISCONTINUED | OUTPATIENT
Start: 2022-05-16 | End: 2022-05-18

## 2022-05-16 RX ORDER — POTASSIUM CHLORIDE 20 MEQ/1
40 TABLET, EXTENDED RELEASE ORAL ONCE
Status: COMPLETED | OUTPATIENT
Start: 2022-05-16 | End: 2022-05-16

## 2022-05-16 RX ORDER — LANOLIN ALCOHOL/MO/W.PET/CERES
400 CREAM (GRAM) TOPICAL 2 TIMES DAILY
Status: COMPLETED | OUTPATIENT
Start: 2022-05-16 | End: 2022-05-18

## 2022-05-16 RX ADMIN — POTASSIUM CHLORIDE 40 MEQ: 1500 TABLET, EXTENDED RELEASE ORAL at 05:05

## 2022-05-16 RX ADMIN — PANTOPRAZOLE SODIUM 40 MG: 40 TABLET, DELAYED RELEASE ORAL at 08:05

## 2022-05-16 RX ADMIN — ACETAMINOPHEN AND CODEINE PHOSPHATE 1 TABLET: 300; 30 TABLET ORAL at 08:05

## 2022-05-16 RX ADMIN — SENNOSIDES AND DOCUSATE SODIUM 2 TABLET: 50; 8.6 TABLET ORAL at 08:05

## 2022-05-16 RX ADMIN — ACETAMINOPHEN AND CODEINE PHOSPHATE 1 TABLET: 300; 30 TABLET ORAL at 01:05

## 2022-05-16 RX ADMIN — Medication 1 TABLET: at 08:05

## 2022-05-16 RX ADMIN — HEPARIN SODIUM 7500 UNITS: 5000 INJECTION INTRAVENOUS; SUBCUTANEOUS at 06:05

## 2022-05-16 RX ADMIN — AMMONIUM LACTATE: 12 LOTION TOPICAL at 08:05

## 2022-05-16 RX ADMIN — Medication 400 MG: at 08:05

## 2022-05-16 RX ADMIN — POTASSIUM & SODIUM PHOSPHATES POWDER PACK 280-160-250 MG 1 PACKET: 280-160-250 PACK at 11:05

## 2022-05-16 RX ADMIN — HEPARIN SODIUM 7500 UNITS: 5000 INJECTION INTRAVENOUS; SUBCUTANEOUS at 01:05

## 2022-05-16 RX ADMIN — LORAZEPAM 0.5 MG: 0.5 TABLET ORAL at 05:05

## 2022-05-16 RX ADMIN — HYDROCHLOROTHIAZIDE 12.5 MG: 12.5 TABLET ORAL at 08:05

## 2022-05-16 RX ADMIN — POTASSIUM & SODIUM PHOSPHATES POWDER PACK 280-160-250 MG 1 PACKET: 280-160-250 PACK at 06:05

## 2022-05-16 RX ADMIN — MICONAZOLE NITRATE: 20 CREAM TOPICAL at 08:05

## 2022-05-16 RX ADMIN — DIBASIC SODIUM PHOSPHATE, MONOBASIC POTASSIUM PHOSPHATE AND MONOBASIC SODIUM PHOSPHATE 2 TABLET: 852; 155; 130 TABLET ORAL at 05:05

## 2022-05-16 RX ADMIN — Medication 1000 UNITS: at 08:05

## 2022-05-16 RX ADMIN — ONDANSETRON 4 MG: 4 TABLET, ORALLY DISINTEGRATING ORAL at 08:05

## 2022-05-16 RX ADMIN — ACETAMINOPHEN AND CODEINE PHOSPHATE 1 TABLET: 300; 30 TABLET ORAL at 06:05

## 2022-05-16 RX ADMIN — LORAZEPAM 0.5 MG: 0.5 TABLET ORAL at 06:05

## 2022-05-16 RX ADMIN — HEPARIN SODIUM 7500 UNITS: 5000 INJECTION INTRAVENOUS; SUBCUTANEOUS at 11:05

## 2022-05-16 NOTE — PROGRESS NOTES
[facility]                                  Skilled Nursing Facility                   Progress Note     Admit Date: 2022  USMAN 2022  Principal Problem:  Cellulitis of leg   HPI obtained from patient interview and chart review     Chief Complaint: Re-evaluation of medical treatment and therapy status: Lab review    HPI:   Mrs. Murcia is a 71 year old female with PMHx of Multiple Sclerosis, Left Foot Drop, Lymphedema, Obesity (bmi 45) who presents to SNF following hospitalization for cellulitis, acute kidney injury and acute encephalopathy.  Admission to SNF for secondary weakness and debility.    Interval history: All of today's labs reviewed and are listed below.  Phos 2.6, Mag 1.6, K 3.4.  24 hr vital sign ranges listed below.  Patient denies shortness of breath, abdominal discomfort, nausea, or vomiting.  Patient reports an adequate appetite.  Patient denies dysuria.  Patient's last bowel movement was on , she has been intermittently refusing her bowel regimen. Patient progessing with PT/OT. Continuing to follow and treat all acute and chronic conditions.    Past Medical History: Patient has a past medical history of Lymphedema, MS (multiple sclerosis), and Vitamin B12 deficiency.    Past Surgical History: Patient has a past surgical history that includes  section; Breast cyst excision (Left); and Esophagogastroduodenoscopy (N/A, 2022).    Social History: Patient reports that she has never smoked. She has never used smokeless tobacco. She reports that she does not drink alcohol and does not use drugs.    Family History: family history includes Brain cancer in her brother; Coronary artery disease in her father; Lung cancer in her father and mother.    Allergies: Patient is allergic to contrast media, pcn [penicillins], celebrex [celecoxib], diazepam, and motrin [ibuprofen].    ROS  Constitutional: Negative for  fever   Eyes: Negative for blurred vision, double vision   Respiratory: Negative for cough, shortness of breath   Cardiovascular: Negative for chest pain, palpitations. + leg swelling.   Gastrointestinal: Negative for abdominal pain, diarrhea, nausea, vomiting.  +constipation  Genitourinary: Negative for dysuria, frequency   Musculoskeletal:  + generalized weakness.  + bilateral lower extremity pain  Skin: Negative for itching and rash.   Neurological: Negative for dizziness, headaches.   Psychiatric/Behavioral: Negative for depression. The patient is nervous/anxious    24 hour Vital Sign Range   Temp:  [96.7 °F (35.9 °C)-98.4 °F (36.9 °C)]   Pulse:  [78-86]   Resp:  [18]   BP: (125-134)/(60-67)   SpO2:  [92 %-97 %]     PEx  Constitutional: Patient appears debilitated.  No distress noted  HENT:   Head: Normocephalic and atraumatic.   Eyes: Pupils are equal, round  Neck: Normal range of motion. Neck supple.   Cardiovascular: Normal rate, regular rhythm and normal heart sounds.    Pulmonary/Chest: Effort normal and breath sounds are clear  Abdominal: Soft. Bowel sounds are normal.   Musculoskeletal: Normal range of motion.   Neurological: Alert and oriented to person, place, and time.   Psychiatric: Normal mood and affect. Behavior is normal.   Skin: Skin is warm and dry. Bilateral lower extremity with pain contract scar tissue surrounded by hyperkeratotic skin.  Moisture associated dermatitis to buttocks.    Wound that you did not assess today:  Wound location(s)/type(s):  Buttocks  Not visualized today. No signs/symptoms of infection or wound-related changes reported.         Recent Labs   Lab 05/16/22 0513      K 3.6      CO2 24   BUN 13   CREATININE 0.6   MG 1.6       Recent Labs   Lab 05/16/22 0512   WBC 4.68   RBC 2.60*   HGB 7.7*   HCT 24.8*      MCV 95   MCH 29.6   MCHC 31.0*         Assessment and Plan:    Hypomagnesemia  Hypophosphatemia  Hypokalemia  - initiated magnesium oxide 400 mg  BID x2 days, K-Phos packets QID x2 doses, potassium 40 mEq x1 dose    Constipation  - initiated bisacodyl suppository, continue senna docusate 2 tabs BID    Cellulitis of leg  - continue vancomycin   - [place] [facility] [place] continue [place] wound care per wound care  - completed cefepime and continue vancomycin. switched to doxycycline upon discharge (end date: 5/12/22)  - completed treatment was doxycycline     Urinary retention   - discontinued espinal prior to transfer to SNF.   - Urinating well until 5/10   -  bladder scans PRN     Pressure injury of buttock, unstageable  - q2 turns  - low airloss overlay mattress  - wound care consultation       Lymphedema  - wound care consultation to assist in managmeent  - continue ammonium lactate lotion BID  - continue doxycycline for cellulitis      HTN  -  discontinue losartan to 25 mg daily- BP's low to normal and patient is refusing to take losartan.  Continue HCTZ 12.5 mg daily     MS (multiple sclerosis)  - PT/OT  - f/u with out pt provider      B12 deficiency  - change orders to cyanocobalamin 1000 mcg every 30 days, dose to be given on 05/13 as patient has been overdue due to recent hospitalization     Debility   - Continue with PT/OT for gait training and strengthening and restoration of ADL's   - Encourage mobility, OOB in chair, and early ambulation as appropriate  - Fall precautions   - Monitor for bowel and bladder dysfunction  - Monitor for and prevent skin breakdown and pressure ulcers  - initiated DVT prophylaxis with  heparin 7.5 K q.8 hours        Anticipate disposition:  Home with home health        Follow-up needed during SNF stay-     Follow-up needed after discharge from SNF:   - PCP, hospital follow-up, needs to be scheduled      No future appointments.      Amy Conklin NP  Department of Hospital Medicine   [facility]     DOS: 5/16/2022       Patient note was created using MModal Dictation.  Any errors in syntax or  even information may not have been identified and edited on initial review prior to signing this note.

## 2022-05-16 NOTE — PLAN OF CARE
Problem: Physical Therapy  Goal: Physical Therapy Goal  Description: Goals to be met by: 6/9/22    Patient will increase functional independence with mobility by performing:    . Supine to sit with MInimal Assistance  . Sit to supine with MInimal Assistance  . Rolling to Left and Right with Minimal Assistance.  . Sit to stand transfer with Minimal Assistance with RW-not met  . Bed to chair transfer with Minimal Assistance using Rolling Walker/no AD-not met  . Gait  x 10 feet with Moderate Assistance using RW-not met  . Wheelchair propulsion x50 feet with Minimal Assistance using bilateral uppper extremities-not met  . Sitting at edge of bed x5 minutes with Stand-by Assistance-not met    Outcome: Ongoing, Progressing

## 2022-05-16 NOTE — PT/OT/SLP PROGRESS
Physical Therapy Treatment    Patient Name:  Lupsi Murcia   MRN:  [id number]  Admit Date: 5/11/2022  Admitting Diagnosis: Cellulitis of leg  Recent Surgeries: N/A    General Precautions: Standard, fall   Orthopedic Precautions:N/A   Braces: N/A     Recommendations:     Discharge Recommendations:  home with home health   Level of Assistance Recommended at Discharge: 24 hours significant assistance  Discharge Equipment Recommendations:  (TBD as pt progresses)   Barriers to discharge: Decreased caregiver support    Assessment:     Lupis Murcia is a 71 y.o. female admitted with a medical diagnosis of Cellulitis of leg . Pt less fearful today and was able to ambulate 3 x length of the // bars with Long especially to advance LLE d.t previous LLE foot drop. Pt will benefit from continued snf rehab to help improve her overall functional mobility and safety.     Performance deficits affecting function:  weakness, impaired endurance, impaired self care skills, impaired functional mobilty, gait instability, impaired balance, decreased upper extremity function, decreased lower extremity function, decreased coordination, impaired skin, edema, impaired cardiopulmonary response to activity   Rehab Potential is good    Activity Tolerance: Good    Plan:     Patient to be seen 6 x/week to address the above listed problems via gait training, therapeutic activities, therapeutic exercises, neuromuscular re-education, wheelchair management/training    · Plan of Care Expires: 06/11/22  · Plan of Care Reviewed with: patient    Subjective     Pt. Agreeable and very motivated to participate with PT.     Pain/Comfort:  · Pain Rating 1: 0/10  · Pain Rating Post-Intervention 1: 0/10    Patient's cultural, spiritual, Restorationism conflicts given the current situation:  · no    Objective:     Communicated with pt's nurse prior to session.  Patient found up in chair with   upon PT entry to room.     Therapeutic Activities and Exercises:  Mini-eliptical x 10mins with frequent rest breaks d.t pt. Having a hard time processing information on how to conitnue the cycling motion.    Functional Mobility:  · Bed Mobility:     · Sit to Supine: maximal assistance and especially for B l/E elevation onto bed  · Scooting: to HOB with Long when bed placed in a trendelenberg position.  · Transfers:     · Sit to Standx4 trials:  minimum assistance with // bars x 3 trials and Mod A x 2people using a RW.  · Bed to Chair: moderate assistance and of 2 persons with  rolling walker  using  Stand Pivot  · Gait: 3 x length of the // bars with Long and w/c following closely. Long especially to advance LLE d.t previous LLE foot drop.    AM-PAC 6 CLICK MOBILITY  11    Patient left supine with call button in reach and pt's daughter present.    GOALS:   Multidisciplinary Problems     Physical Therapy Goals        Problem: Physical Therapy    Goal Priority Disciplines Outcome Goal Variances Interventions   Physical Therapy Goal     PT, PT/OT Ongoing, Progressing     Description: Goals to be met by: 6/9/22    Patient will increase functional independence with mobility by performing:    . Supine to sit with MInimal Assistance  . Sit to supine with MInimal Assistance  . Rolling to Left and Right with Minimal Assistance.  . Sit to stand transfer with Minimal Assistance with RW-not met  . Bed to chair transfer with Minimal Assistance using Rolling Walker/no AD-not met  . Gait  x 10 feet with Moderate Assistance using RW-not met  . Wheelchair propulsion x50 feet with Minimal Assistance using bilateral uppper extremities-not met  . Sitting at edge of bed x5 minutes with Stand-by Assistance-not met                     Time Tracking:     PT Received On: 05/16/22  PT Total Time (min):     Billable Minutes: Gait Training 15mins, Therapeutic Activity 13mins and Therapeutic Exercise 10mins    Treatment Type: Treatment  PT/PTA: PT     PTA Visit Number: 0     05/16/2022

## 2022-05-16 NOTE — PT/OT/SLP PROGRESS
Occupational Therapy   Treatment    Name: Lupis Murcia  MRN: [id number]  Admit Date: 5/11/2022  Admitting Diagnosis:  Cellulitis of leg    General Precautions: Standard, fall   Orthopedic Precautions:N/A   Braces:       Recommendations:     Discharge Recommendations: home health PT, home health OT  Level of Assistance Recommended at Discharge: 24 hours physical assistance for all ADL's and home management tasks  Discharge Equipment Recommendations:  hip kit  Barriers to discharge:  Decreased caregiver support    Assessment:     Lupis Murcia is a 71 y.o. female with a medical diagnosis of Cellulitis of leg.  She presents with  Performance deficits affecting function are weakness, impaired endurance, impaired sensation, impaired self care skills, impaired functional mobility, gait instability, impaired balance, decreased coordination, decreased lower extremity function, decreased safety awareness, pain, impaired coordination, impaired skin, edema  .     Pt. Was cooperative and participated well with session on this day.  Pt. Still continues to requires  increase  (A) for 2 for safety and task management. Pt. Encouragement provided.  Pt  continues to demonstrate levels of physical deficits with  functional indep with daily management activities tasks, selfcare skills with balance,  functional mobility, UB strength and endurance. Pt. Will continue to benefit from continued OT to progress towards goals     Rehab Potential is fair    Activity tolerance:  Fair    Plan:     Patient to be seen 6 x/week to address the above listed problems via self-care/home management, therapeutic activities, therapeutic exercises    · Plan of Care Expires: 06/12/22  · Plan of Care Reviewed with: patient    Subjective     Communicated with:  nsg and Pt.prior to session.  I just get so afraid at times    Pain/Comfort:  Pain Rating 1: 7/10  Location - Side 1: Left  Location - Orientation 1: lower  Location 1: leg  Pain Addressed 1:  Pre-medicate for activity, Reposition, Distraction  Pain Rating Post-Intervention 1: 6/10    Patient's cultural, spiritual, Uatsdin conflicts given the current situation:  no    Objective:     Patient found HOB elevated    upon OT entry to room.    Bed Mobility:    · Patient completed Rolling/Turning to Left with  moderate assistance  · Patient completed Rolling/Turning to Right with moderate assistance  · Patient completed Scooting/Bridging with maximal assistance  · Patient completed Supine to Sit with maximal assistance     Functional Mobility/Transfers:  · Patient completed Bed <> Chair Transfer using Slide Board technique with maximal assistance x2  with slide board from EOB to w/c and cues for safety and hand placement     Activities of Daily Living:  · Grooming: supervision with grooming aspects at sink level  · Upper Body Dressing: moderate assistance to doff/rosetta pull over gown and then rosetta shirt  · Lower Body Dressing: total assistance to rosetta pants bed level and BLE socks with TA  · Toileting: total assistance Pt. with (A) to change wet brief    AMPAC 6 Click ADL: 13    Treatment & Education:    Pt edu on role of OT, POC, safety when performing self care tasks , benefit of performing OOB activity, and safety when performing functional transfers and mobility management for preparation with goals to progress towards next level of care    Patient left up in chair with all lines intact and call button in reachEducation:      GOALS:   Multidisciplinary Problems     Occupational Therapy Goals        Problem: Occupational Therapy    Goal Priority Disciplines Outcome Interventions   Occupational Therapy Goal     OT, PT/OT Ongoing, Progressing    Description: Goals to be met by: 6/2    Patient will increase functional independence with ADLs by performing:    UE Dressing with [place].  LE Dressing with Moderate Assistance using AE as needed.  Grooming while seated at sink with [place].  Toileting from  bedside commode or toilet with Minimal Assistance for hygiene and clothing management.   Bathing from shower chair/chair level with Minimal Assistance.  Stand pivot transfers with Minimal Assistance using RW.  Toilet transfer to bedside commode with Minimal Assistance.  Upper extremity exercise program with supervision.  Caregiver will be educated on level of assist required to safely perform self care tasks and functional transfers.                     Time Tracking:     OT Date of Treatment: OT Date of Treatment: 05/16/22  OT Total Time (min):      Billable Minutes:Self Care/Home Management 45    5/16/2022  A client care conference was performed between the POPEYER and BOWIE, prior to treatment to discuss the patient's status, treatment plan and established goals.

## 2022-05-16 NOTE — PLAN OF CARE
Problem: Occupational Therapy  Goal: Occupational Therapy Goal  Description: Goals to be met by: 6/2    Patient will increase functional independence with ADLs by performing:    UE Dressing with [place].  LE Dressing with Moderate Assistance using AE as needed.  Grooming while seated at sink with [place].  Toileting from bedside commode or toilet with Minimal Assistance for hygiene and clothing management.   Bathing from shower chair/chair level with Minimal Assistance.  Stand pivot transfers with Minimal Assistance using RW.  Toilet transfer to bedside commode with Minimal Assistance.  Upper extremity exercise program with supervision.  Caregiver will be educated on level of assist required to safely perform self care tasks and functional transfers.    Outcome: Ongoing, Progressing

## 2022-05-16 NOTE — PLAN OF CARE
Patient is AAOX4 vitals stable respirations even and unlabored. Can voice needs and pain. No pain noted. No signs of distress noted. Incontinent to bowel and bladder. Cream applied to bilateral extremities per order.Safety measures in place. Call light within reach. Bed to lowest position.

## 2022-05-16 NOTE — CLINICAL REVIEW
Clinical Pharmacy Chart Review Note      Admit Date: 5/11/2022   LOS: 5 days       Lupis Murcia is a 71 y.o. female admitted to SNF for PT/OT after hospitalization for cellulitis of leg.    Active Hospital Problems    Diagnosis  POA    *Cellulitis of leg [L03.119]  Yes    Lymphedema [I89.0]  Yes    Pressure injury of buttock, unstageable [L89.300]  Yes    MS (multiple sclerosis) [G35]  Yes    Paraparesis [G82.20]  Yes    Vitamin D deficiency [E55.9]  Yes    Impaired functional mobility, balance, gait, and endurance [Z74.09]  Yes    Vitamin B 12 deficiency [E53.8]  Yes    Anemia of chronic disease [D63.8]  Yes    Insomnia secondary to chronic pain [G89.29, G47.01]  Yes      Resolved Hospital Problems   No resolved problems to display.     Review of patient's allergies indicates:   Allergen Reactions    Contrast media Shortness Of Breath and Rash    Pcn [penicillins] Shortness Of Breath and Rash    Celebrex [celecoxib] Other (See Comments)     Swallowing problems     Diazepam Hives    Motrin [ibuprofen] Rash     Patient Active Problem List    Diagnosis Date Noted    Cellulitis of leg 05/04/2022    Lymphedema 05/01/2022    Pressure injury of buttock, unstageable 05/01/2022    Leg weakness 11/06/2019    Abnormal muscle tone 11/06/2019    Paraparesis 06/09/2019    MS (multiple sclerosis) 06/09/2019    Vitamin D deficiency 06/09/2019    Decreased ROM of ankle 07/24/2018    Muscle weakness of lower extremity 05/11/2018    Impaired functional mobility, balance, gait, and endurance 05/11/2018    At high risk for falls 05/11/2018    Anemia of chronic disease 08/09/2017    Vitamin B 12 deficiency 08/09/2017    Gait disturbance 07/21/2017    Abnormal brain MRI     Foot drop, left 06/15/2016    Insomnia secondary to chronic pain 06/15/2016    Gait abnormality 03/15/2016    Babinski reflex 03/15/2016    Weakness of left hip 03/15/2016    Negron sign present 03/15/2016       Scheduled  Meds:    ammonium lactate   Topical (Top) BID    B-complex with vitamin C  1 tablet Oral Daily    cyanocobalamin  1,000 mcg Intramuscular Q30 Days    heparin (porcine)  7,500 Units Subcutaneous Q8H    hydroCHLOROthiazide  12.5 mg Oral Daily    losartan  100 mg Oral Daily    miconazole   Topical (Top) BID    pantoprazole  40 mg Oral BID    potassium, sodium phosphates  1 packet Oral QID (AC & HS)    senna-docusate 8.6-50 mg  2 tablet Oral BID    vitamin D  1,000 Units Oral Daily     Continuous Infusions:   PRN Meds: acetaminophen, acetaminophen-codeine 300-30mg, bisacodyL, calcium carbonate, dextrose 10%, dextrose 10%, furosemide, loperamide, LORazepam, melatonin, ondansetron    OBJECTIVE:     Vital Signs (Last 24H)  Temp:  [96.7 °F (35.9 °C)-98.4 °F (36.9 °C)]   Pulse:  [78-86]   Resp:  [18]   BP: (125-134)/(60-67)   SpO2:  [92 %-97 %]     Laboratory:  CBC:   Recent Labs   Lab 05/11/22 1110 05/12/22 0453 05/16/22 0512   WBC 6.86 7.33 4.68   RBC 2.79* 2.96* 2.60*   HGB 8.1* 8.6* 7.7*   HCT 26.4* 28.3* 24.8*    177 199   MCV 95 96 95   MCH 29.0 29.1 29.6   MCHC 30.7* 30.4* 31.0*     BMP:   Recent Labs   Lab 05/10/22  0535 05/11/22  1110 05/12/22 0453 05/16/22  0513    95 81 82    140 142 136   K 3.6 3.5 3.6 3.6    106 109 101   CO2 24 25 20* 24   BUN 17 15 13 13   CREATININE 0.7 0.8 0.7 0.6   CALCIUM 8.6* 8.6* 8.8 8.8   MG 1.7  --  1.7 1.6     CMP:   Recent Labs   Lab 05/11/22  1110 05/12/22 0453 05/16/22  0513   GLU 95 81 82   CALCIUM 8.6* 8.8 8.8    142 136   K 3.5 3.6 3.6   CO2 25 20* 24    109 101   BUN 15 13 13   CREATININE 0.8 0.7 0.6     Lab Results   Component Value Date    ALT 12 05/01/2022    AST 13 05/01/2022    ALKPHOS 85 05/01/2022    BILITOT 0.4 05/01/2022         ASSESSMENT/PLAN:     I have reviewed the medications in compliance with CMS Regulation F756 of the [place]. Based on information gathered, the following items may need to be  addressed:    **According to PMH and home medication list, patient takes the following medications at home. These medications are not currently ordered at SNF:  · Candesartan-HCTZ daily (non-formulary, currently taking losartan)    **Lorazepam ordered as needed for anxiety. Per CMS guidelines: The timeframe is limited for PRN psychotropic medications, which are not antipsychotic medications, to 14 days, unless a longer timeframe is deemed appropriate by the attending physician or the prescribing practitioner. This is a home medication and patient is currently taking. Will continue to monitor use and associated side effects. Consider discontinuation if adverse effects observed ie. Dizziness, drowsiness, somnolence       Medications reviewed by PharmD, please re-consult if needed.      Nicolette Morales, Pharm. D.  Clinical Pharmacist  [facility]

## 2022-05-17 PROCEDURE — 25000003 PHARM REV CODE 250: Performed by: HOSPITALIST

## 2022-05-17 PROCEDURE — 97530 THERAPEUTIC ACTIVITIES: CPT | Mod: CQ

## 2022-05-17 PROCEDURE — 25000003 PHARM REV CODE 250: Performed by: NURSE PRACTITIONER

## 2022-05-17 PROCEDURE — 97530 THERAPEUTIC ACTIVITIES: CPT | Mod: CO

## 2022-05-17 PROCEDURE — 97110 THERAPEUTIC EXERCISES: CPT | Mod: CQ

## 2022-05-17 PROCEDURE — 11000004 HC SNF PRIVATE

## 2022-05-17 PROCEDURE — 63600175 PHARM REV CODE 636 W HCPCS: Performed by: NURSE PRACTITIONER

## 2022-05-17 RX ADMIN — AMMONIUM LACTATE: 12 LOTION TOPICAL at 09:05

## 2022-05-17 RX ADMIN — SENNOSIDES AND DOCUSATE SODIUM 2 TABLET: 50; 8.6 TABLET ORAL at 08:05

## 2022-05-17 RX ADMIN — ONDANSETRON 4 MG: 4 TABLET, ORALLY DISINTEGRATING ORAL at 10:05

## 2022-05-17 RX ADMIN — AMMONIUM LACTATE: 12 LOTION TOPICAL at 08:05

## 2022-05-17 RX ADMIN — HEPARIN SODIUM 7500 UNITS: 5000 INJECTION INTRAVENOUS; SUBCUTANEOUS at 09:05

## 2022-05-17 RX ADMIN — SENNOSIDES AND DOCUSATE SODIUM 2 TABLET: 50; 8.6 TABLET ORAL at 09:05

## 2022-05-17 RX ADMIN — HYDROCHLOROTHIAZIDE 12.5 MG: 12.5 TABLET ORAL at 08:05

## 2022-05-17 RX ADMIN — MICONAZOLE NITRATE: 20 CREAM TOPICAL at 09:05

## 2022-05-17 RX ADMIN — PANTOPRAZOLE SODIUM 40 MG: 40 TABLET, DELAYED RELEASE ORAL at 08:05

## 2022-05-17 RX ADMIN — ACETAMINOPHEN AND CODEINE PHOSPHATE 1 TABLET: 300; 30 TABLET ORAL at 09:05

## 2022-05-17 RX ADMIN — LORAZEPAM 0.5 MG: 0.5 TABLET ORAL at 09:05

## 2022-05-17 RX ADMIN — LORAZEPAM 0.5 MG: 0.5 TABLET ORAL at 01:05

## 2022-05-17 RX ADMIN — ACETAMINOPHEN AND CODEINE PHOSPHATE 1 TABLET: 300; 30 TABLET ORAL at 02:05

## 2022-05-17 RX ADMIN — Medication 400 MG: at 09:05

## 2022-05-17 RX ADMIN — ACETAMINOPHEN AND CODEINE PHOSPHATE 1 TABLET: 300; 30 TABLET ORAL at 08:05

## 2022-05-17 RX ADMIN — Medication 400 MG: at 08:05

## 2022-05-17 RX ADMIN — PANTOPRAZOLE SODIUM 40 MG: 40 TABLET, DELAYED RELEASE ORAL at 09:05

## 2022-05-17 RX ADMIN — HEPARIN SODIUM 7500 UNITS: 5000 INJECTION INTRAVENOUS; SUBCUTANEOUS at 02:05

## 2022-05-17 RX ADMIN — Medication 1000 UNITS: at 08:05

## 2022-05-17 RX ADMIN — Medication 1 TABLET: at 08:05

## 2022-05-17 RX ADMIN — HEPARIN SODIUM 7500 UNITS: 5000 INJECTION INTRAVENOUS; SUBCUTANEOUS at 05:05

## 2022-05-17 RX ADMIN — MICONAZOLE NITRATE: 20 CREAM TOPICAL at 08:05

## 2022-05-17 RX ADMIN — LORAZEPAM 0.5 MG: 0.5 TABLET ORAL at 11:05

## 2022-05-17 NOTE — PT/OT/SLP PROGRESS
Occupational Therapy   Treatment    Name: Lupis Murcia  MRN: [id number]  Admit Date: 5/11/2022  Admitting Diagnosis:  Cellulitis of leg    General Precautions: Standard, fall   Orthopedic Precautions:N/A   Braces:       Recommendations:     Discharge Recommendations: home health PT, home health OT  Level of Assistance Recommended at Discharge: 24 hours physical assistance for all ADL's and home management tasks  Discharge Equipment Recommendations:  hip kit  Barriers to discharge:  Decreased caregiver support    Assessment:     Lupis Murcia is a 71 y.o. female with a medical diagnosis of Cellulitis of leg.  She presents with  Performance deficits affecting function are weakness, impaired endurance, impaired sensation, impaired self care skills, impaired functional mobility, gait instability, impaired balance, decreased coordination, decreased lower extremity function, decreased safety awareness, pain, impaired coordination, impaired skin, edema  .     Pt.fatigued on this day Pt. With  Pt.pt limited to in bed on this day. Pt  continues to demonstrate levels of physical deficits with  functional indep with daily management activities tasks, selfcare skills with balance,  functional mobility, UB strength and endurance. Pt. Will continue to benefit from continued OT to progress towards goals     Rehab Potential is fair    Activity tolerance:  Fair    Plan:     Patient to be seen 6 x/week to address the above listed problems via self-care/home management, therapeutic activities, therapeutic exercises    · Plan of Care Expires: 06/12/22  · Plan of Care Reviewed with: patient    Subjective     Communicated with:  nsg and Pt.prior to session.  I am just to tired I just had a chery BM    Pain/Comfort:  Pain Rating 1:  (did not rate)  Location - Side 1: Bilateral  Location - Orientation 1: generalized  Location 1: abdomen  Pain Addressed 1: Pre-medicate for activity, Reposition, Distraction    Patient's cultural,  spiritual, Roman Catholic conflicts given the current situation:  no    Objective:     Patient found HOB elevated  Sleeping on arrival   upon OT entry to room.    Bed Mobility:    · Patient completed Rolling/Turning to Left with  moderate assistance  · Patient completed Rolling/Turning to Right with moderate assistance  · Patient completed Scooting/Bridging with maximal assistance    Functional Mobility/Transfers:           Not tested     Activities of Daily Living:  · Grooming: supervision with grooming aspects bed level    [facility] 6 Click ADL: 13    Treatment & Education:  Pt. With 2# dowel activity with 2x20 reps with  shd flex, bicep curls horz adb/add and forward flex motion to increase BUE ROM and strength.     Pt. With therex performed to increase ROM, endurance selfcare task and fxl mobility for independence     Pt edu on role of OT, POC, safety when performing self care tasks , benefit of performing OOB activity, and safety when performing functional transfers and mobility management for preparation with goals to progress towards next level of care    Patient left up in chair with all lines intact and call button in reachEducation:      GOALS:   Multidisciplinary Problems     Occupational Therapy Goals        Problem: Occupational Therapy    Goal Priority Disciplines Outcome Interventions   Occupational Therapy Goal     OT, PT/OT Ongoing, Progressing    Description: Goals to be met by: 6/2    Patient will increase functional independence with ADLs by performing:    UE Dressing with [place].  LE Dressing with Moderate Assistance using AE as needed.  Grooming while seated at sink with [place].  Toileting from bedside commode or toilet with Minimal Assistance for hygiene and clothing management.   Bathing from shower chair/chair level with Minimal Assistance.  Stand pivot transfers with Minimal Assistance using RW.  Toilet transfer to bedside commode with Minimal Assistance.  Upper extremity exercise  program with supervision.  Caregiver will be educated on level of assist required to safely perform self care tasks and functional transfers.                     Time Tracking:     OT Date of Treatment: OT Date of Treatment: 05/17/22  OT Total Time (min):      Billable Minutes:Therapeutic Activity 15    5/17/2022

## 2022-05-17 NOTE — PT/OT/SLP PROGRESS
Physical Therapy Treatment    Patient Name:  Lupis Murcia   MRN:  [id number]  Admit Date: 5/11/2022  Admitting Diagnosis: Cellulitis of leg  Recent Surgeries: N/A    General Precautions: Standard, fall   Orthopedic Precautions:N/A   Braces: N/A     Recommendations:     Discharge Recommendations:  home with home health   Level of Assistance Recommended at Discharge: 24 hours significant assistance  Discharge Equipment Recommendations:  (TBD as pt progresses)   Barriers to discharge: Decreased caregiver support    Assessment:     Lupis Murcia is a 71 y.o. female admitted with a medical diagnosis of Cellulitis of leg. Pt therapy session limited due to pt's c/o abdominal pain from constipation. Pt stated she had recent diarrhea/bowel movement from suppository and is afraid to have another one, therefore, agreed to participate in therapy in her room only. Pt therapy session focused on bed mob, transfers and LE strengthening in order to achieve highest level of function. Pt would continue to benefit from skilled PT services per POC.       Performance deficits affecting function:  weakness, impaired endurance, impaired self care skills, impaired functional mobilty, gait instability, impaired balance, decreased upper extremity function, decreased lower extremity function, decreased coordination, impaired skin, edema, impaired cardiopulmonary response to activity .    Rehab Potential is good    Activity Tolerance: Fair    Plan:     Patient to be seen 6 x/week to address the above listed problems via gait training, therapeutic activities, therapeutic exercises, neuromuscular re-education, wheelchair management/training    · Plan of Care Expires: 06/11/22  · Plan of Care Reviewed with: patient    Subjective     Pt agreeable to PT in room.     Pain/Comfort:  · Pain Rating 1:  (pain not rated)  · Location - Side 1: Bilateral  · Location - Orientation 1: generalized  · Location 1: abdomen  · Pain Addressed 1: Reposition,  Cessation of Activity, Distraction  · Pain Rating Post-Intervention 1:  (constipated/bloated)    Patient's cultural, spiritual, Zoroastrianism conflicts given the current situation:  · no    Objective:     Communicated with nursing prior to session.  Patient found HOB elevated with  (no lines) upon PT entry to room.     Therapeutic Activities and Exercises: supine LE exercises, with AAROM applied to LLE, including: heel slides, ABD/ADD scissors, quad sets, AP and GS x 10 reps, each.     Functional Mobility:  · Bed Mobility:     · Bridging: maximal assistance  · Supine to Sit: moderate assistance  · Sit to Supine: moderate assistance and of 2 persons  · Transfers:     · Pt attempted to perform stand pivot t/f from EOB to recliner chair with Max A x 2 people, but pt was too anxious/fearful of falling and could not complete transfer.   · Sit to Stand:  moderate assistance and of 2 persons with rolling walker    AM-PAC 6 CLICK MOBILITY  11    Patient left HOB elevated with call button in reach.    GOALS:   Multidisciplinary Problems     Physical Therapy Goals        Problem: Physical Therapy    Goal Priority Disciplines Outcome Goal Variances Interventions   Physical Therapy Goal     PT, PT/OT Ongoing, Progressing     Description: Goals to be met by: 6/9/22    Patient will increase functional independence with mobility by performing:    . Supine to sit with MInimal Assistance  . Sit to supine with MInimal Assistance  . Rolling to Left and Right with Minimal Assistance.  . Sit to stand transfer with Minimal Assistance with RW-not met  . Bed to chair transfer with Minimal Assistance using Rolling Walker/no AD-not met  . Gait  x 10 feet with Moderate Assistance using RW-not met  . Wheelchair propulsion x50 feet with Minimal Assistance using bilateral uppper extremities-not met  . Sitting at edge of bed x5 minutes with Stand-by Assistance-not met                     Time Tracking:     PT Received On: 05/17/22  PT Total Time  (min):  28 min     Billable Minutes: Therapeutic Activity 13 and Therapeutic Exercise 15    Treatment Type: Treatment  PT/PTA: PTA     PTA Visit Number: 1 05/17/2022

## 2022-05-17 NOTE — NURSING
"Patient has not had a BM since 5/11. Patient keeps saying, "I feel like it is going to come out soon, I have a lot of gas." Patient's abdomen is large, hard, and distended. Patient continues to refused suppository.   Active bowel sounds in all four quadrants. Patient's stated, "If I do not have a BM today, (5-17-22) I will take let nurse give me a suppository."   "

## 2022-05-17 NOTE — PLAN OF CARE
Pt AAOx4, pt agrees with POC, NADN, VS as charted, No falls this shift, No complaints of N/V, pain managed PRN meds, regular diet tolerated, wound care performed, frequent rounding for patient safety and care, bed in lowest position, side rails up x2, call light in reach, wctm.        Problem: Adult Inpatient Plan of Care  Goal: Plan of Care Review  Outcome: Ongoing, Progressing  Goal: Patient-Specific Goal (Individualized)  Outcome: Ongoing, Progressing  Goal: Absence of Hospital-Acquired Illness or Injury  Outcome: Ongoing, Progressing  Goal: Optimal Comfort and Wellbeing  Outcome: Ongoing, Progressing  Goal: Readiness for Transition of Care  Outcome: Ongoing, Progressing

## 2022-05-18 PROCEDURE — 25000003 PHARM REV CODE 250: Performed by: HOSPITALIST

## 2022-05-18 PROCEDURE — 25000003 PHARM REV CODE 250: Performed by: NURSE PRACTITIONER

## 2022-05-18 PROCEDURE — 97116 GAIT TRAINING THERAPY: CPT | Mod: CQ

## 2022-05-18 PROCEDURE — 97535 SELF CARE MNGMENT TRAINING: CPT | Mod: CO

## 2022-05-18 PROCEDURE — 11000004 HC SNF PRIVATE

## 2022-05-18 PROCEDURE — 97530 THERAPEUTIC ACTIVITIES: CPT | Mod: CQ

## 2022-05-18 PROCEDURE — 63600175 PHARM REV CODE 636 W HCPCS: Performed by: NURSE PRACTITIONER

## 2022-05-18 RX ADMIN — ONDANSETRON 4 MG: 4 TABLET, ORALLY DISINTEGRATING ORAL at 04:05

## 2022-05-18 RX ADMIN — ACETAMINOPHEN AND CODEINE PHOSPHATE 1 TABLET: 300; 30 TABLET ORAL at 08:05

## 2022-05-18 RX ADMIN — AMMONIUM LACTATE: 12 LOTION TOPICAL at 08:05

## 2022-05-18 RX ADMIN — HEPARIN SODIUM 7500 UNITS: 5000 INJECTION INTRAVENOUS; SUBCUTANEOUS at 06:05

## 2022-05-18 RX ADMIN — AMMONIUM LACTATE: 12 LOTION TOPICAL at 09:05

## 2022-05-18 RX ADMIN — PANTOPRAZOLE SODIUM 40 MG: 40 TABLET, DELAYED RELEASE ORAL at 09:05

## 2022-05-18 RX ADMIN — ACETAMINOPHEN AND CODEINE PHOSPHATE 1 TABLET: 300; 30 TABLET ORAL at 09:05

## 2022-05-18 RX ADMIN — Medication 1 TABLET: at 08:05

## 2022-05-18 RX ADMIN — LORAZEPAM 0.5 MG: 0.5 TABLET ORAL at 05:05

## 2022-05-18 RX ADMIN — SENNOSIDES AND DOCUSATE SODIUM 2 TABLET: 50; 8.6 TABLET ORAL at 08:05

## 2022-05-18 RX ADMIN — PANTOPRAZOLE SODIUM 40 MG: 40 TABLET, DELAYED RELEASE ORAL at 08:05

## 2022-05-18 RX ADMIN — HEPARIN SODIUM 7500 UNITS: 5000 INJECTION INTRAVENOUS; SUBCUTANEOUS at 02:05

## 2022-05-18 RX ADMIN — MICONAZOLE NITRATE: 20 CREAM TOPICAL at 09:05

## 2022-05-18 RX ADMIN — ACETAMINOPHEN AND CODEINE PHOSPHATE 1 TABLET: 300; 30 TABLET ORAL at 03:05

## 2022-05-18 RX ADMIN — HYDROCHLOROTHIAZIDE 12.5 MG: 12.5 TABLET ORAL at 08:05

## 2022-05-18 RX ADMIN — MICONAZOLE NITRATE: 20 CREAM TOPICAL at 08:05

## 2022-05-18 RX ADMIN — Medication 1000 UNITS: at 08:05

## 2022-05-18 RX ADMIN — LORAZEPAM 0.5 MG: 0.5 TABLET ORAL at 08:05

## 2022-05-18 RX ADMIN — Medication 400 MG: at 08:05

## 2022-05-18 RX ADMIN — SENNOSIDES AND DOCUSATE SODIUM 2 TABLET: 50; 8.6 TABLET ORAL at 09:05

## 2022-05-18 RX ADMIN — HEPARIN SODIUM 7500 UNITS: 5000 INJECTION INTRAVENOUS; SUBCUTANEOUS at 09:05

## 2022-05-18 NOTE — HOSPITAL COURSE
Encephalopathy likely multifactorial. Mental status improved as electrolyte disturbances corrected and infection treated. Hypernatremia and acidosis resolved with IVF. HANDY due to urinary retention which resolved with espinal placement. ID consulted for fever and treated BLE cellulitis due to lymphedema. Wound care evaluated lymphedema in BLE and sacral pressure injury. N/V worked up by GI. EGD showed esophagitis. PPI started. Oral intake improved. PT/OT evaluated patient and recommended SNF.

## 2022-05-18 NOTE — PT/OT/SLP PROGRESS
Physical Therapy Treatment    Patient Name:  Lupis Murcia   MRN:  [id number]  Admit Date: 5/11/2022  Admitting Diagnosis: Cellulitis of leg  Recent Surgeries: N/A    General Precautions: Standard, fall   Orthopedic Precautions:N/A   Braces: N/A     Recommendations:     Discharge Recommendations:  home with home health   Level of Assistance Recommended at Discharge: 24 hours significant assistance  Discharge Equipment Recommendations:  (TBD as pt progresses)   Barriers to discharge: Decreased caregiver support    Assessment:     Lupis Murcia is a 71 y.o. female admitted with a medical diagnosis of Cellulitis of leg. Pt tolerated therapy session well with focus on increasing independence with functional transfers, bed mob, gait and w/c mob in order to assist with achieving highest level of function. Pt would continue to benefit from skilled PT services per POC.       Performance deficits affecting function:  weakness, impaired endurance, impaired self care skills, impaired functional mobilty, gait instability, impaired balance, decreased upper extremity function, decreased lower extremity function, decreased coordination, impaired skin, edema, impaired cardiopulmonary response to activity .    Rehab Potential is good    Activity Tolerance: Fair    Plan:     Patient to be seen 6 x/week to address the above listed problems via gait training, therapeutic activities, therapeutic exercises, neuromuscular re-education, wheelchair management/training    · Plan of Care Expires: 06/11/22  · Plan of Care Reviewed with: patient    Subjective     Pt agreeable to PT.     Pain/Comfort:  · Pain Rating 1: 0/10  · Pain Rating Post-Intervention 1: 0/10    Patient's cultural, spiritual, Advent conflicts given the current situation:  · no    Objective:   Patient found supine getting dressed with BOWIE/PCT.  with  (no lines) upon PT entry to room.     Functional Mobility:  · Bed Mobility:     · Supine to Sit: moderate assistance  and of 2 persons  · Sit to supine: Max A x 2  · Bridging: Mod A x 2, drawsheet/guard rails and bed in Trendelenberg  · Transfers:     · Sit to Stand: 3 sets, contact guard assistance with // bars. X 1 set from w/c to RW at Min A  · Bed to Chair: moderate assistance and of 2 persons with  no AD  using  Squat Pivot;  · W/c to EOB: mod A x 2, sliding board.   · Gait: x 10', 3 sets in // bars, Min A with tactile/verbal cues for assitance with LLE due to drop foot.   · Wheelchair Propulsion:  Pt propelled Standard wheelchair x 10 feet on Level tile with  Bilateral upper extremity with Moderate Assistance. Pt with decreased strength through BUEs, specifically LUE, presenting with difficulty steering/propelling w/c forward.     AM-PAC 6 CLICK MOBILITY  11    Patient left up in chair with call button in reach.    GOALS:   Multidisciplinary Problems     Physical Therapy Goals        Problem: Physical Therapy    Goal Priority Disciplines Outcome Goal Variances Interventions   Physical Therapy Goal     PT, PT/OT Ongoing, Progressing     Description: Goals to be met by: 6/9/22    Patient will increase functional independence with mobility by performing:    . Supine to sit with MInimal Assistance  . Sit to supine with MInimal Assistance  . Rolling to Left and Right with Minimal Assistance.  . Sit to stand transfer with Minimal Assistance with RW-not met  . Bed to chair transfer with Minimal Assistance using Rolling Walker/no AD-not met  . Gait  x 10 feet with Moderate Assistance using RW-not met  . Wheelchair propulsion x50 feet with Minimal Assistance using bilateral uppper extremities-not met  . Sitting at edge of bed x5 minutes with Stand-by Assistance-not met                     Time Tracking:     PT Received On: 05/18/22  PT Total Time (min):   57 min    Billable Minutes: Gait Training 12 and Therapeutic Activity 45    Treatment Type: Treatment  PT/PTA: PTA     PTA Visit Number: 2     05/18/2022

## 2022-05-18 NOTE — PLAN OF CARE
Problem: Adult Inpatient Plan of Care  Goal: Plan of Care Review  Outcome: Ongoing, Progressing  Goal: Patient-Specific Goal (Individualized)  Outcome: Ongoing, Progressing  Goal: Absence of Hospital-Acquired Illness or Injury  Outcome: Ongoing, Progressing  Goal: Optimal Comfort and Wellbeing  Outcome: Ongoing, Progressing  Goal: Readiness for Transition of Care  Outcome: Ongoing, Progressing     Problem: Bariatric Environmental Safety  Goal: Safety Maintained with Care  Outcome: Ongoing, Progressing     Problem: Impaired Wound Healing  Goal: Optimal Wound Healing  Outcome: Ongoing, Progressing     Problem: Skin Injury Risk Increased  Goal: Skin Health and Integrity  Outcome: Ongoing, Progressing     Problem: Fall Injury Risk  Goal: Absence of Fall and Fall-Related Injury  Outcome: Ongoing, Progressing     Problem: Fluid and Electrolyte Imbalance (Acute Kidney Injury/Impairment)  Goal: Fluid and Electrolyte Balance  Outcome: Ongoing, Progressing     Problem: Oral Intake Inadequate (Acute Kidney Injury/Impairment)  Goal: Optimal Nutrition Intake  Outcome: Ongoing, Progressing     Problem: Renal Function Impairment (Acute Kidney Injury/Impairment)  Goal: Effective Renal Function  Outcome: Ongoing, Progressing

## 2022-05-18 NOTE — TREATMENT PLAN
"Rehab Services' DME recommendations    Lupis Murcia  MRN: [id number]     [x] Walker Luis Alberto (4'4"-5'7")    Accessories N/A    Wheels Yes    [x] 3 in 1 commode Standard and Drop arm    [x] Wheelchair  Number of hours up in a wheelchair per day 4-8      Style Light weight       Seat Width 20    Seat Depth 20    Back Height Standard    Leg Support Elevated leg rest, Articulating and Swing Away    Arm Height Desk and Swing Away    Lap Belt Velcro    Accessories Front Brakes, Brake Extensions, Anti-tippers and Safety belt    Cushion Roho    Justification for Cushion prevent risk of pressure ulcer     Justification for wheelchair order: (Please select all that apply) Caregiver is capable and willing to operate wheelchair safely, The patient has significant edema of the lower extremities that requires elevating leg rest, The patient requires the use of a wheelchair for ADLs within the home and Patient mobility limitations cannot be sufficiently resolved by the use of other ambulatory therapies      - Hip Kit Standard/Short    - Home health PT, OT and Aide        Yue Dos Santos, PTA 5/18/2022     Addendum Gabriela Curtis, PT 6/2/2022    "

## 2022-05-18 NOTE — PROGRESS NOTES
[facility]                                  Skilled Nursing Facility                   Progress Note     Admit Date: 2022  USMAN 2022  Principal Problem:  Cellulitis of leg   HPI obtained from patient interview and chart review     Chief Complaint: Re-evaluation of medical treatment and therapy status:  Re-evaluation of constipation    HPI:   Mrs. Mrucia is a 71 year old female with PMHx of Multiple Sclerosis, Left Foot Drop, Lymphedema, Obesity (bmi 45) who presents to SNF following hospitalization for cellulitis, acute kidney injury and acute encephalopathy.  Admission to SNF for secondary weakness and debility.    Interval history: A 24 hr vital sign ranges listed below.  BP's low to normal.  Patient states she had a normal caliber bowel movement without the use of her suppository.  Patient denies shortness of breath, abdominal discomfort, nausea, or vomiting.  Patient reports an adequate appetite. Patient progessing slowly with PT/OT- Pt attempted to perform stand pivot t/f from EOB to recliner chair with Max A x 2 people, but pt was too anxious/fearful of falling and could not complete transfer. Continuing to follow and treat all acute and chronic conditions.    Past Medical History: Patient has a past medical history of Lymphedema, MS (multiple sclerosis), and Vitamin B12 deficiency.    Past Surgical History: Patient has a past surgical history that includes  section; Breast cyst excision (Left); and Esophagogastroduodenoscopy (N/A, 2022).    Social History: Patient reports that she has never smoked. She has never used smokeless tobacco. She reports that she does not drink alcohol and does not use drugs.    Family History: family history includes Brain cancer in her brother; Coronary artery disease in her father; Lung cancer in her father and mother.    Allergies: Patient is allergic to contrast media, pcn  [penicillins], celebrex [celecoxib], diazepam, and motrin [ibuprofen].    ROS  Constitutional: Negative for fever   Eyes: Negative for blurred vision, double vision   Respiratory: Negative for cough, shortness of breath   Cardiovascular: Negative for chest pain, palpitations. + leg swelling.   Gastrointestinal: Negative for abdominal pain, diarrhea, nausea, vomiting, constipation  Genitourinary: Negative for dysuria, frequency   Musculoskeletal:  + generalized weakness.  + bilateral lower extremity pain  Skin: Negative for itching and rash.   Neurological: Negative for dizziness, headaches.   Psychiatric/Behavioral: Negative for depression. The patient is nervous/anxious    24 hour Vital Sign Range   Temp:  [97.9 °F (36.6 °C)-98.9 °F (37.2 °C)]   Pulse:  [88-90]   Resp:  [16-18]   BP: (128-131)/(67-91)   SpO2:  [95 %-97 %]     PEx  Constitutional: Patient appears debilitated.  No distress noted  HENT:   Head: Normocephalic and atraumatic.   Eyes: Pupils are equal, round  Neck: Normal range of motion. Neck supple.   Cardiovascular: Normal rate, regular rhythm and normal heart sounds.    Pulmonary/Chest: Effort normal and breath sounds are clear  Abdominal: Soft. Bowel sounds are normal.   Musculoskeletal: Normal range of motion.   Neurological: Alert and oriented to person, place, and time.   Psychiatric: Normal mood and affect. Behavior is normal.   Skin: Skin is warm and dry. Bilateral lower extremity with pain contract scar tissue surrounded by hyperkeratotic skin.  Moisture associated dermatitis to buttocks.    Wound that you did not assess today:  Wound location(s)/type(s):  Buttocks  Not visualized today. No signs/symptoms of infection or wound-related changes reported.         No results for input(s): GLUCOSE, NA, K, CL, CO2, BUN, CREATININE, MG in the last 24 hours.    Invalid input(s):  CALCIUM    No results for input(s): WBC, RBC, HGB, HCT, PLT, MCV, MCH, MCHC in the last 24 hours.      Assessment and  Plan:      Constipation  - resolved, continue senna docusate 2 tabs BID    Cellulitis of leg  - continue vancomycin   [place] [facility] [place] continue [place] wound care per wound care  - completed cefepime and continue vancomycin. switched to doxycycline upon discharge (end date: 5/12/22)  - completed treatment was doxycycline     Urinary retention   - discontinued espinal prior to transfer to SNF.   - Urinating well until 5/10   -  bladder scans PRN     Pressure injury of buttock, unstageable  - q2 turns  - low airloss overlay mattress  - wound care consultation       Lymphedema  - wound care consultation to assist in managmeent  - continue ammonium lactate lotion BID  - continue doxycycline for cellulitis      HTN  -  continue to hold losartan to 25 mg daily- BP's low to normal and patient is refusing to take losartan.  Continue HCTZ 12.5 mg daily     MS (multiple sclerosis)  - PT/OT  - f/u with out pt provider      B12 deficiency  - change orders to cyanocobalamin 1000 mcg every 30 days, dose to be given on 05/13 as patient has been overdue due to recent hospitalization     Debility   - Continue with PT/OT for gait training and strengthening and restoration of ADL's   - Encourage mobility, OOB in chair, and early ambulation as appropriate  - Fall precautions   - Monitor for bowel and bladder dysfunction  - Monitor for and prevent skin breakdown and pressure ulcers  -  continue  DVT prophylaxis with  heparin 7.5 K q.8 hours        Anticipate disposition:  Home with home health        Follow-up needed during SNF stay-     Follow-up needed after discharge from SNF:   - PCP, hospital follow-up, needs to be scheduled      No future appointments.      Amy Conklin NP  Department of Hospital Medicine   [facility]     DOS: 5/17/2022       Patient note was created using MModal Dictation.  Any errors in syntax or even information may not have been identified and edited on initial review  prior to signing this note.

## 2022-05-18 NOTE — PT/OT/SLP PROGRESS
Occupational Therapy   Treatment    Name: Lupis Murcia  MRN: [id number]  Admit Date: 5/11/2022  Admitting Diagnosis:  Cellulitis of leg    General Precautions: Standard, fall   Orthopedic Precautions:N/A   Braces:       Recommendations:     Discharge Recommendations: home health PT, home health OT  Level of Assistance Recommended at Discharge: 24 hours physical assistance for all ADL's and home management tasks  Discharge Equipment Recommendations:  hip kit  Barriers to discharge:  Decreased caregiver support    Assessment:     Lupis Murcia is a 71 y.o. female with a medical diagnosis of Cellulitis of leg.  She presents with  Performance deficits affecting function are weakness, impaired endurance, impaired sensation, impaired self care skills, impaired functional mobility, gait instability, impaired balance, decreased coordination, decreased lower extremity function, decreased safety awareness, pain, impaired coordination, impaired skin, edema  .     Pt. Was cooperative and participated well with session on this day.  Pt. Still continues to requires  increase  (A) for 2 for safety and task management. Pt. With Encouragement provided.  Pt  continues to demonstrate levels of physical deficits with  functional indep with daily management activities tasks, selfcare skills with balance,  functional mobility, UB strength and endurance. Pt. Will continue to benefit from continued OT to progress towards goals     Rehab Potential is fair    Activity tolerance:  Fair    Plan:     Patient to be seen 6 x/week to address the above listed problems via self-care/home management, therapeutic activities, therapeutic exercises    · Plan of Care Expires: 06/12/22  · Plan of Care Reviewed with: patient    Subjective     Communicated with:  nsg and Pt.prior to session.  I just get so afraid at times    Pain/Comfort:  Pain Rating 1: 0/10  Pain Rating Post-Intervention 1: 0/10    Patient's cultural, spiritual, Islam conflicts  given the current situation:  no    Objective:     Patient found HOB elevated and daughter present    upon OT entry to room.    Bed Mobility:    · Patient completed Rolling/Turning to Left with  moderate assistance  · Patient completed Rolling/Turning to Right with moderate assistance  · Patient completed Scooting/Bridging with maximal assistance  · Patient completed Supine to Sit with maximal assistance     Functional Mobility/Transfers:  · Patient completed Bed <> Chair Transfer using with maximal assistance x2 with squat pivot from EOB to w/c and cues for safety and hand placement  COTX with PTA for t/fs    Activities of Daily Living:  · Grooming: supervision with grooming   · Upper Body Dressing: moderate assistance to doff/rosetta pull over gown and then rosetta shirt  · Lower Body Dressing: total assistance to rosetta pants bed level and BLE socks with TA  · Toileting: total assistance Pt. with (A) to change brief with BM     [facility] 6 Click ADL: 13    Treatment & Education:  Pt edu on role of OT, POC, safety when performing self care tasks , benefit of performing OOB activity, and safety when performing functional transfers and mobility management for preparation with goals to progress towards next level of care    Patient left up in chair with all lines intact and call button in reachEducation:      GOALS:   Multidisciplinary Problems     Occupational Therapy Goals        Problem: Occupational Therapy    Goal Priority Disciplines Outcome Interventions   Occupational Therapy Goal     OT, PT/OT Ongoing, Progressing    Description: Goals to be met by: 6/2    Patient will increase functional independence with ADLs by performing:    UE Dressing with [place].  LE Dressing with Moderate Assistance using AE as needed.  Grooming while seated at sink with [place].  Toileting from bedside commode or toilet with Minimal Assistance for hygiene and clothing management.   Bathing from shower chair/chair level with Minimal  Assistance.  Stand pivot transfers with Minimal Assistance using RW.  Toilet transfer to bedside commode with Minimal Assistance.  Upper extremity exercise program with supervision.  Caregiver will be educated on level of assist required to safely perform self care tasks and functional transfers.                     Time Tracking:     OT Date of Treatment: OT Date of Treatment: 05/18/22  OT Total Time (min):      Billable Minutes:Self Care/Home Management 38    5/18/2022

## 2022-05-18 NOTE — ASSESSMENT & PLAN NOTE
Patient with acute kidney injury likely d/t IVVD/Dehydration and Pre-renal azotemia Which is currently improving. Labs reviewed- Renal function/electrolytes with Estimated Creatinine Clearance: 94.1 mL/min (based on SCr of 0.6 mg/dL). according to latest data. Monitor urine output and serial BMP and adjust therapy as needed. Avoid nephrotoxins and renally dose meds for GFR listed above.   -patient with non-oliguric lucia that is likely pre-renal in etiology, but higher severity given hyperkalemia, metabolic acidosis and uremic encephalopathy.   -obtain urine electrolytes,   -trend BMP   - Give IV Bicarb Drip x 1 L to assist with volume and acidosis management.   -Nursing made multiple attempts at Slaughter placement but pt vular swelling unable to pass, pt urinating connected to purewick.   MOnitor bladder scans if UOP falls as her bladder appears distended on CT scan but no hydronephrosis present.   -hold home anti-hypertensives (ARB/Thiazide) hold any NSAIDs, pt was taking both in last week.   -nephrology consultation. apprec recs  - completed D5W  - RESOLVED

## 2022-05-18 NOTE — DISCHARGE SUMMARY
[facility] - Intensive [facility] ([place] [address])  [facility] Medicine  Discharge Summary      Patient Name: Lupis Murcia  MRN: [id number]  Patient Class: IP- Inpatient  Admission Date: 5/1/2022  Hospital Length of Stay: 10 days  Discharge Date and Time: 5/11/2022  4:43 PM  Attending Physician: No att. providers found   Discharging Provider: John Hardin MD  Primary Care Provider: Bobby Tran MD  [facility] Medicine Team: [facility]OS[facility] MED R John Hardin MD    HPI:   72 y/o WF hx of Multiple Sclerosis, Left Foot Drop, Lymphedema, Obesity (bmi 45) who is referred for admission for acute kidney injury, hyperkalemia and acute encephalopathy.     Patient's daughter Amanda Lowery provides primary history due to pt somnolence s/p ativan and EMR review.     1 week ago on Monday patient had a fall, partially assisted by family when legs gave out, no head injury or LOC.  Daughter noted through the week patient c/o of weakness and difficulty standing as when she fell, her armpit fell onto the walker, pt c/o of left shoulder pain.   On Thursday it was noted patient unable to stand, she is normally able to perform ADL with her walker, but daughter felt when she came home that pt had not moved.  She was also using adult diaper at this time and since Thursday due to body habitus/size patient has been unable to change her diaper since this time.   She has intermittently been taking her medicines during this time, but daughter noted she was eating less,  sleeping more and making confused statements and sought to bring her to the ED.     ED staff noted pts diaper was saturated with urine, vulvar swelling with intertrigo and buttock pressure injury.   Labs notable for Hyperkalemia to 6.3 and BUn/Cr of 169/3.9.     ED Treatment: Insulin 10uni IV regular, D10 bolus, 1gram Calcium Gluconate.  LR 2.2Liters IV, naBicarb 50meq IV x 1, Ativan 1mg IV   Ceftriaxone 2gm IV x 1      Procedure(s) (LRB):  EGD (ESOPHAGOGASTRODUODENOSCOPY) (N/A)       Hospital Course:   Encephalopathy likely multifactorial. Mental status improved as electrolyte disturbances corrected and infection treated. Hypernatremia and acidosis resolved with IVF. HANDY due to urinary retention which resolved with espinal placement. ID consulted for fever and treated BLE cellulitis due to lymphedema. Wound care evaluated lymphedema in BLE and sacral pressure injury. N/V worked up by GI. EGD showed esophagitis. PPI started. Oral intake improved. PT/OT evaluated patient and recommended SNF.       Goals of Care Treatment Preferences:  Code Status: Full Code          What is most important right now is to focus on remaining as independent as possible.  Accordingly, we have decided that the best plan to meet the patient's goals includes continuing with treatment.      Consults:   Consults (From admission, onward)        Status Ordering Provider     Inpatient consult to Palliative Care  Once        Provider:  (Not yet assigned)    Completed PERRI VILLELA     Inpatient consult to Gastroenterology  Once        Provider:  (Not yet assigned)    Completed PERRI VILLELA     Inpatient consult to Midline team  Once        Provider:  (Not yet assigned)    Completed PERRI VILLELA     Inpatient consult to Infectious Diseases  Once        Provider:  (Not yet assigned)    Completed PERRI VILLELA     Inpatient consult to Urology  Once        Provider:  (Not yet assigned)    Completed PERRI VILLELA     Inpatient consult to Midline team  Once        Provider:  (Not yet assigned)    Completed PERRI VILLELA     Inpatient consult to Nephrology  Once        Provider:  (Not yet assigned)    Completed DENY YARBROUGH          * HANDY (acute kidney injury)  Patient with acute kidney injury likely d/t IVVD/Dehydration and Pre-renal azotemia Which is currently improving. Labs reviewed- Renal function/electrolytes with Estimated Creatinine Clearance: 94.1 mL/min (based on SCr of 0.6 mg/dL). according  to latest data. Monitor urine output and serial BMP and adjust therapy as needed. Avoid nephrotoxins and renally dose meds for GFR listed above.   -patient with non-oliguric handy that is likely pre-renal in etiology, but higher severity given hyperkalemia, metabolic acidosis and uremic encephalopathy.   -obtain urine electrolytes,   -trend BMP   - Give IV Bicarb Drip x 1 L to assist with volume and acidosis management.   -Nursing made multiple attempts at Espinal placement but pt vular swelling unable to pass, pt urinating connected to purewick.   MOnitor bladder scans if UOP falls as her bladder appears distended on CT scan but no hydronephrosis present.   -hold home anti-hypertensives (ARB/Thiazide) hold any NSAIDs, pt was taking both in last week.   -nephrology consultation. apprec recs  - completed D5W  - RESOLVED    Abdominal distension  Nausea/Vomiting  - KUB on 5/5 showed severe gaseous distension concerning for gastric outlet obstruction  - continue bowel regimen   - CT A/P with oral contrast negative for obstruction  - GI consulted. followup recs  - continue anti-emetics  - cautious with opioids   - RESOLVED      Hypernatremia  - Na 146 --> 149  - switched to D5W  - nephro following   - BMP daily  - RESOLVED      Cellulitis of leg  - continue vancomycin   - ID following, apprec recs  - continue local wound care per wound care  - discontinued cefepime and continue vancomycin. Continue for 10 days and can switch to doxycycline upon discharge (end date: 5/12/22)  - Switched to oral doxycycline prior to admission per patient request due to concern for diarrhea    Urinary retention  - continue espinal   -voiding trial once encephalopathy resolves.   - discontinued espinal. Urinating well until 5/10 when UOP decreased and bladder scan 423 cc and straight cathed. Started urinary retention protocol       Encephalopathy, metabolic  Secondary to uremia vs infection   -fall  -aspiration precautions  - HANDY resolved with  IVF  - now rising Na so encephalopathy could also be associated with hypernatremia  - changed to D5W  - nephro following   - BMP daily  - RESOLVED    Pressure injury of buttock, unstageable  q2 turns  -low airloss overlay mattress  -wound care consultation       Lymphedema  -wound care consultation to assist in managmeent  - continue vancomycin for cellulitis     Hyperkalemia  As above   q4hr BMP  -bicarb infusion to help acidosis management.       MS (multiple sclerosis)  ENTER PT/OT consults when patient is more awake alert and can work with therapy services.         Final Active Diagnoses:    Diagnosis Date Noted POA    PRINCIPAL PROBLEM:  HANDY (acute kidney injury) [N17.9] 05/01/2022 Yes    Abdominal distension [R14.0] 05/06/2022 Yes    Cellulitis of leg [L03.119] 05/04/2022 Yes    Hypernatremia [E87.0] 05/04/2022 No    Urinary retention [R33.9] 05/02/2022 Yes    Hyperkalemia [E87.5] 05/01/2022 Yes    Lymphedema [I89.0] 05/01/2022 Yes    Pressure injury of buttock, unstageable [L89.300] 05/01/2022 Yes    Encephalopathy, metabolic [G93.41] 05/01/2022 Yes    MS (multiple sclerosis) [G35] 06/09/2019 Yes      Problems Resolved During this Admission:       Discharged Condition: good    Disposition: Skilled Nursing Facility    Follow Up:    Patient Instructions:      Ambulatory referral/consult to Wound Clinic   Standing Status: Future   Referral Priority: Routine Referral Type: Consultation   Referral Reason: Specialty Services Required   Requested Specialty: Wound Care   Number of Visits Requested: 1       Significant Diagnostic Studies: Labs:      Latest Reference Range & Units 05/11/22 11:10   WBC 3.90 - 12.70 K/uL 6.86   RBC 4.00 - 5.40 M/uL 2.79 (L)   Hemoglobin 12.0 - 16.0 g/dL 8.1 (L)   Hematocrit 37.0 - 48.5 % 26.4 (L)   MCV 82 - 98 fL 95   MCH 27.0 - 31.0 pg 29.0   MCHC 32.0 - 36.0 g/dL 30.7 (L)   RDW 11.5 - 14.5 % 14.6 (H)   Platelets 150 - 450 K/uL 173   (L): Data is abnormally low  (H): Data is  abnormally high     Latest Reference Range & Units 05/11/22 11:10   Sodium 136 - 145 mmol/L 140   Potassium 3.5 - 5.1 mmol/L 3.5   Chloride 95 - 110 mmol/L 106   CO2 23 - 29 mmol/L 25   Anion Gap 8 - 16 mmol/L 9   BUN 8 - 23 mg/dL 15   Creatinine 0.5 - 1.4 mg/dL 0.8   EGFR if non African American >60 mL/min/1.73 m^2 >60.0 [1]   EGFR if African American >60 mL/min/1.73 m^2 >60.0   Glucose 70 - 110 mg/dL 95   Calcium 8.7 - 10.5 mg/dL 8.6 (L)   (L): Data is abnormally low  [1] Calculation used to obtain the estimated glomerular filtration   rate (eGFR) is the CKD-EPI equation.      , Lipid Panel   Lab Results   Component Value Date    CHOL 191 12/18/2021    HDL 48 12/18/2021    LDLCALC 119.0 12/18/2021    TRIG 120 12/18/2021    CHOLHDL 25.1 12/18/2021   , Troponin No results for input(s): TROPONINI in the last 168 hours. and A1C:   Recent Labs   Lab 05/03/22  1256   HGBA1C 6.2*     Radiology:   Imaging Results          CT Abdomen Pelvis  Without Contrast (Final result)  Result time 05/01/22 22:29:49    Final result by Nadeem Vega MD (05/01/22 22:29:49)                 Impression:      No evidence of nephrolithiasis or obstructive uropathy.    Hepatomegaly and hepatic steatosis.    Motion and artifact limited study.    Additional findings discussed in the body of the report.      Electronically signed by: Nadeem Vega MD  Date:    05/01/2022  Time:    22:29             Narrative:    EXAMINATION:  CT ABDOMEN PELVIS WITHOUT CONTRAST    CLINICAL HISTORY:  Sacra wound;    TECHNIQUE:  Low dose axial images, sagittal and coronal reformations were obtained from the lung bases to the pubic symphysis.  Oral contrast was not administered.    COMPARISON:  None.    FINDINGS:  Lower chest: Heart is borderline enlarged.  There are possible mild emphysematous changes in the lung bases.  Dependent subsegmental atelectasis.  Detailed evaluation of the lung bases is limited by motion.    Abdomen:    Evaluation of the solid  abdominal organs and bowel is limited in the absence of IV contrast.  Evaluation is limited by extensive streak artifact due to the patient's arms overlying the field of view.    Liver is markedly enlarged measuring approximately 22.5 cm in craniocaudal length.  There is diffuse steatosis.  Evaluation for mass is limited by absence of IV contrast and the presence significant artifact related to the patient's arms overlying the field of view.  Gallbladder is unremarkable.  No intra-or extrahepatic biliary ductal dilatation.    Spleen, adrenals, and pancreas are unremarkable allowing for motion and artifact limitations.    Kidneys are symmetric.  No renal or ureteral stones. No hydronephrosis.    No distended loops of small bowel. Mild stool burden throughout the colon.  Appendix is normal.    Abdominal aorta is normal in course and caliber.    Multiple subcentimeter retroperitoneal lymph nodes which are not significantly enlarged by size criteria.    No abdominal free fluid or pneumoperitoneum.    Pelvis: Urinary bladder, rectum, and uterus are unremarkable.  No free fluid in the pelvis.    Bones and soft tissues: No aggressive osseous lesions.  Degenerative changes in the spine.  Mild body wall edema and mild edema in the gluteal soft tissues.  No soft tissue emphysema or sizable collection identified in the field of view.  Suggest correlation with direct visualization.                               CT Head Without Contrast (Final result)  Result time 05/01/22 22:12:10    Final result by Nadeem Vega MD (05/01/22 22:12:10)                 Impression:      No CT evidence of acute intracranial abnormality.    Patchy and confluent periventricular white matter hypoattenuation suggestive of known multiple sclerosis or chronic microvascular ischemic change.  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      Electronically signed by: Nadeem Vega MD  Date:    05/01/2022  Time:    22:12             Narrative:     EXAMINATION:  CT HEAD WITHOUT CONTRAST    CLINICAL HISTORY:  Mental status change, unknown cause;    TECHNIQUE:  Low dose axial images were obtained through the head.  Coronal and sagittal reformations were also performed. Contrast was not administered.    COMPARISON:  MRI brain, 10/24/2020.  CT head, 01/02/2019.    FINDINGS:  Exam quality is limited by motion.    No evidence of acute territorial infarct, hemorrhage, mass effect, or midline shift.    Generalized cerebral volume loss with ex vacuo dilation of the ventricles and sulci.  Patchy and confluent periventricular white matter hypoattenuation.  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    No displaced calvarial fracture.    Minimal mucosal thickening in the maxillary sinuses.  Otherwise, the visualized paranasal sinuses and mastoid air cells are essentially clear.  Scattered dental caries noted.                               X-Ray Shoulder Trauma Left (Final result)  Result time 05/01/22 19:49:43    Final result by Yonathan Tomas MD (05/01/22 19:49:43)                 Impression:      1. No acute displaced fracture or dislocation of the left shoulder.      Electronically signed by: Yonathan Tomas MD  Date:    05/01/2022  Time:    19:49             Narrative:    EXAMINATION:  XR SHOULDER TRAUMA 3 VIEW LEFT    CLINICAL HISTORY:  Pain in left shoulder    TECHNIQUE:  Three views of the left shoulder were performed.    COMPARISON  None    FINDINGS:  Three views left shoulder.    The left humeral head maintains appropriate relationship with the glenoid.  The acromioclavicular joint is intact.  No acute displaced left rib fracture.  The left lung zones are grossly clear allowing for technique.                               X-Ray Chest AP Portable (Final result)  Result time 05/01/22 19:44:08    Final result by Yonathan Tomas MD (05/01/22 19:44:08)                 Impression:      1. Coarse interstitial attenuation accentuated by shallow inspiratory  effort, no large focal consolidation.      Electronically signed by: Yonathan Tomas MD  Date:    05/01/2022  Time:    19:44             Narrative:    EXAMINATION:  XR CHEST AP PORTABLE    CLINICAL HISTORY:  Sepsis;    TECHNIQUE:  Single frontal view of the chest was performed.    COMPARISON:  01/02/2019    FINDINGS:  The cardiomediastinal silhouette is prominent, similar to the previous examination noting magnification by technique..  There is no pleural effusion.  The trachea is deviated rightward by a tortuous aorta..  The lungs are symmetrically expanded bilaterally with prominent interstitial attenuation bilaterally accentuated by shallow inspiratory effort and habitus..  No large focal consolidation seen.  There is no pneumothorax.  The osseous structures are remarkable for degenerative changes..                                  Pending Diagnostic Studies:     Procedure Component Value Units Date/Time    Basic metabolic panel [[id number]] Collected: 05/05/22 0828    Order Status: Sent Lab Status: In process Updated: 05/05/22 0828    Specimen: Blood     Vancomycin, random [[id number]] Collected: 05/04/22 0750    Order Status: Sent Lab Status: In process Updated: 05/04/22 0750    Specimen: Blood          TTE (5/10/22):    · The left ventricle is normal in size with normal systolic function. The estimated ejection fraction is 65%.  · Normal right ventricular size with normal right ventricular systolic function.  · Grade II left ventricular diastolic dysfunction.  · Biatrial enlargement.  · There is mild-to-moderate aortic valve stenosis.  · Aortic valve area is 1.98 cm2; peak velocity is 3.18 m/s; mean gradient is 20 mmHg.        Medications:  Reconciled Home Medications:      Medication List      START taking these medications    ammonium lactate 12 % lotion  Commonly known as: LAC-HYDRIN  Apply topically 2 (two) times daily as needed for Dry Skin (Nursing to cleanse BLE with soap and water, pat dry and apply  Ammonium).     loperamide 2 mg capsule  Commonly known as: IMODIUM  Take 1 capsule (2 mg total) by mouth 4 (four) times daily as needed for Diarrhea.     miconazole 2 % cream  Commonly known as: MICOTIN  Apply topically 2 (two) times daily. Apply to intertriginous areas, lower abdomen groin for 14 days     pantoprazole 40 MG tablet  Commonly known as: PROTONIX  Take 1 tablet (40 mg total) by mouth once daily.     senna-docusate 8.6-50 mg 8.6-50 mg per tablet  Commonly known as: PERICOLACE  Take 1 tablet by mouth 2 (two) times daily as needed for Constipation.     tamsulosin 0.4 mg Cap  Commonly known as: FLOMAX  Take 1 capsule (0.4 mg total) by mouth daily with lunch.        CHANGE how you take these medications    baclofen 10 MG tablet  Commonly known as: LIORESAL  Take 1 tablet (10 mg total) by mouth 2 (two) times daily.  What changed:   · when to take this  · reasons to take this        CONTINUE taking these medications    acetaminophen 325 mg Cap  Take 325 mg by mouth.     acetaminophen-codeine 300-30mg 300-30 mg Tab  Commonly known as: TYLENOL #3  Take 1 tablet by mouth every 6 (six) hours as needed (chronic leg pain).     candesartan-hydrochlorothiazide 16-12.5 mg per tablet  Commonly known as: ATACAND HCT  Take 1 tablet by mouth Daily.     clotrimazole-betamethasone 1-0.05% cream  Commonly known as: LOTRISONE  Apply to affected area 2 times daily     cyanocobalamin 1,000 mcg/mL injection  Inject 1ml daily for 1 week, then weekly for 1 month, then every 2 weeks thereafter. Please provide 23g 1in needle and 1cc syringes     furosemide 20 MG tablet  Commonly known as: LASIX  Take 20 mg by mouth daily as needed (leg swelling).     LORazepam 1 MG tablet  Commonly known as: ATIVAN  Take 0.5 mg by mouth 3 (three) times daily as needed for Anxiety.     vitamin B comp with vit C no.6 500-0.5 mg Tab  Take 1 tablet by mouth once daily.     vitamin D 1000 units Tab  Commonly known as: VITAMIN D3  Take 5,000 Units by  mouth once daily.        STOP taking these medications    erythromycin base 250 MG Tab     naproxen 500 MG tablet  Commonly known as: NAPROSYN        ASK your doctor about these medications    doxycycline 100 MG tablet  Commonly known as: VIBRA-TABS  Take 1 tablet (100 mg total) by mouth every 12 (twelve) hours. for 2 days  Ask about: Should I take this medication?     potassium, sodium phosphates 280-160-250 mg Pwpk  Commonly known as: PHOS-NAK  Take 1 packet by mouth 4 (four) times daily with meals and nightly. for 5 days  Ask about: Should I take this medication?            Indwelling Lines/Drains at time of discharge:   Lines/Drains/Airways     None                 Time spent on the discharge of patient: 45 minutes         John Hardin MD  Department of Hospital Medicine  [facility] - Intensive Care ([place])

## 2022-05-19 LAB
ANION GAP SERPL CALC-SCNC: 12 MMOL/L (ref 8–16)
BASOPHILS # BLD AUTO: 0.01 K/UL (ref 0–0.2)
BASOPHILS NFR BLD: 0.3 % (ref 0–1.9)
BUN SERPL-MCNC: 10 MG/DL (ref 8–23)
CALCIUM SERPL-MCNC: 8.8 MG/DL (ref 8.7–10.5)
CHLORIDE SERPL-SCNC: 106 MMOL/L (ref 95–110)
CO2 SERPL-SCNC: 27 MMOL/L (ref 23–29)
CREAT SERPL-MCNC: 0.7 MG/DL (ref 0.5–1.4)
DIFFERENTIAL METHOD: ABNORMAL
EOSINOPHIL # BLD AUTO: 0.1 K/UL (ref 0–0.5)
EOSINOPHIL NFR BLD: 3.6 % (ref 0–8)
ERYTHROCYTE [DISTWIDTH] IN BLOOD BY AUTOMATED COUNT: 15.9 % (ref 11.5–14.5)
EST. GFR  (AFRICAN AMERICAN): >60 ML/MIN/1.73 M^2
EST. GFR  (NON AFRICAN AMERICAN): >60 ML/MIN/1.73 M^2
GLUCOSE SERPL-MCNC: 120 MG/DL (ref 70–110)
HCT VFR BLD AUTO: 26 % (ref 37–48.5)
HGB BLD-MCNC: 7.9 G/DL (ref 12–16)
IMM GRANULOCYTES # BLD AUTO: 0.01 K/UL (ref 0–0.04)
IMM GRANULOCYTES NFR BLD AUTO: 0.3 % (ref 0–0.5)
LYMPHOCYTES # BLD AUTO: 1.5 K/UL (ref 1–4.8)
LYMPHOCYTES NFR BLD: 38.3 % (ref 18–48)
MAGNESIUM SERPL-MCNC: 1.6 MG/DL (ref 1.6–2.6)
MCH RBC QN AUTO: 29.4 PG (ref 27–31)
MCHC RBC AUTO-ENTMCNC: 30.4 G/DL (ref 32–36)
MCV RBC AUTO: 97 FL (ref 82–98)
MONOCYTES # BLD AUTO: 0.5 K/UL (ref 0.3–1)
MONOCYTES NFR BLD: 13.5 % (ref 4–15)
NEUTROPHILS # BLD AUTO: 1.7 K/UL (ref 1.8–7.7)
NEUTROPHILS NFR BLD: 44 % (ref 38–73)
NRBC BLD-RTO: 0 /100 WBC
PHOSPHATE SERPL-MCNC: 2.9 MG/DL (ref 2.7–4.5)
PLATELET # BLD AUTO: 219 K/UL (ref 150–450)
PMV BLD AUTO: 10.7 FL (ref 9.2–12.9)
POTASSIUM SERPL-SCNC: 3 MMOL/L (ref 3.5–5.1)
RBC # BLD AUTO: 2.69 M/UL (ref 4–5.4)
SODIUM SERPL-SCNC: 145 MMOL/L (ref 136–145)
WBC # BLD AUTO: 3.92 K/UL (ref 3.9–12.7)

## 2022-05-19 PROCEDURE — 84100 ASSAY OF PHOSPHORUS: CPT | Performed by: HOSPITALIST

## 2022-05-19 PROCEDURE — 97530 THERAPEUTIC ACTIVITIES: CPT | Mod: CQ

## 2022-05-19 PROCEDURE — 25000003 PHARM REV CODE 250: Performed by: HOSPITALIST

## 2022-05-19 PROCEDURE — 11000004 HC SNF PRIVATE

## 2022-05-19 PROCEDURE — 36415 COLL VENOUS BLD VENIPUNCTURE: CPT | Performed by: HOSPITALIST

## 2022-05-19 PROCEDURE — 85025 COMPLETE CBC W/AUTO DIFF WBC: CPT | Performed by: HOSPITALIST

## 2022-05-19 PROCEDURE — 80048 BASIC METABOLIC PNL TOTAL CA: CPT | Performed by: HOSPITALIST

## 2022-05-19 PROCEDURE — 63600175 PHARM REV CODE 636 W HCPCS: Performed by: NURSE PRACTITIONER

## 2022-05-19 PROCEDURE — 25000003 PHARM REV CODE 250: Performed by: NURSE PRACTITIONER

## 2022-05-19 PROCEDURE — 97116 GAIT TRAINING THERAPY: CPT | Mod: CQ

## 2022-05-19 PROCEDURE — 83735 ASSAY OF MAGNESIUM: CPT | Performed by: HOSPITALIST

## 2022-05-19 RX ORDER — POTASSIUM CHLORIDE 20 MEQ/1
40 TABLET, EXTENDED RELEASE ORAL 2 TIMES DAILY
Status: COMPLETED | OUTPATIENT
Start: 2022-05-19 | End: 2022-05-20

## 2022-05-19 RX ORDER — LANOLIN ALCOHOL/MO/W.PET/CERES
400 CREAM (GRAM) TOPICAL 2 TIMES DAILY
Status: COMPLETED | OUTPATIENT
Start: 2022-05-19 | End: 2022-05-21

## 2022-05-19 RX ADMIN — ACETAMINOPHEN AND CODEINE PHOSPHATE 1 TABLET: 300; 30 TABLET ORAL at 09:05

## 2022-05-19 RX ADMIN — MICONAZOLE NITRATE: 20 CREAM TOPICAL at 09:05

## 2022-05-19 RX ADMIN — AMMONIUM LACTATE: 12 LOTION TOPICAL at 09:05

## 2022-05-19 RX ADMIN — PANTOPRAZOLE SODIUM 40 MG: 40 TABLET, DELAYED RELEASE ORAL at 09:05

## 2022-05-19 RX ADMIN — HEPARIN SODIUM 7500 UNITS: 5000 INJECTION INTRAVENOUS; SUBCUTANEOUS at 09:05

## 2022-05-19 RX ADMIN — LORAZEPAM 0.5 MG: 0.5 TABLET ORAL at 01:05

## 2022-05-19 RX ADMIN — LORAZEPAM 0.5 MG: 0.5 TABLET ORAL at 07:05

## 2022-05-19 RX ADMIN — HEPARIN SODIUM 7500 UNITS: 5000 INJECTION INTRAVENOUS; SUBCUTANEOUS at 06:05

## 2022-05-19 RX ADMIN — HEPARIN SODIUM 7500 UNITS: 5000 INJECTION INTRAVENOUS; SUBCUTANEOUS at 03:05

## 2022-05-19 RX ADMIN — SENNOSIDES AND DOCUSATE SODIUM 2 TABLET: 50; 8.6 TABLET ORAL at 09:05

## 2022-05-19 RX ADMIN — ACETAMINOPHEN AND CODEINE PHOSPHATE 1 TABLET: 300; 30 TABLET ORAL at 03:05

## 2022-05-19 RX ADMIN — Medication 1 TABLET: at 09:05

## 2022-05-19 RX ADMIN — Medication 1000 UNITS: at 09:05

## 2022-05-19 RX ADMIN — POTASSIUM CHLORIDE 40 MEQ: 1500 TABLET, EXTENDED RELEASE ORAL at 09:05

## 2022-05-19 RX ADMIN — LORAZEPAM 0.5 MG: 0.5 TABLET ORAL at 10:05

## 2022-05-19 RX ADMIN — Medication 400 MG: at 09:05

## 2022-05-19 RX ADMIN — HYDROCHLOROTHIAZIDE 12.5 MG: 12.5 TABLET ORAL at 09:05

## 2022-05-19 NOTE — PT/OT/SLP PROGRESS
Occupational Therapy      Patient Name:  Lupis Murcia   MRN:  [id number]    Patient not seen today secondary to patient unwilling to participate due to fatigue. Will follow-up 5/20/22 as appropriate.     5/19/2022

## 2022-05-19 NOTE — PROGRESS NOTES
[facility] - Skilled Nursing  Wound Care    Patient Name:  Lupis Murcia   MRN:  [id number]  Date: 5/19/2022  Diagnosis: Cellulitis of leg    History:     Past Medical History:   Diagnosis Date    Lymphedema     MS (multiple sclerosis)     Vitamin B12 deficiency        Social History     Socioeconomic History    Marital status:    Tobacco Use    Smoking status: Never Smoker    Smokeless tobacco: Never Used   Substance and Sexual Activity    Alcohol use: No    Drug use: No       Precautions:     Allergies as of 05/11/2022 - Reviewed 05/11/2022   Allergen Reaction Noted    Contrast media Shortness Of Breath and Rash 12/15/2016    Pcn [penicillins] Shortness Of Breath and Rash 07/10/2013    Celebrex [celecoxib] Other (See Comments) 06/12/2017    Diazepam Hives 10/12/2021    Motrin [ibuprofen] Rash 07/10/2013       [facility] Assessment Details/Treatment   Wound care follow-up  The sacrum has partial thickness skin loss with irregular wound edges- improvement observed.   The BLE have thick dry skin with alligator skin appearance. Skin intact.  Thick hardened skin flaking off.   Independent in bed.     Plan:  Sacrum- continue Triad ointment BID/prn  BLE- continue Lac-Hydrin ointment BID    Nursing to continue care pressure prevention measures  Wound care to follow-up prn    Recommendations made to primary team for above plan per written report . Orders placed.      05/19/22 0815        Wound 05/01/22 1720 Incontinence associated dermatitis Sacral Spine #3   Date First Assessed/Time First Assessed: 05/01/22 1720   Pre-existing: Yes  Primary Wound Type: Incontinence associated dermatitis  Location: Sacral Spine  Wound Number: #3   Wound Image    Wound WDL ex   Dressing Appearance Open to air   Drainage Amount None   Drainage Characteristics/Odor No odor   Appearance Pink;Moist   Tissue loss description Partial thickness   Periwound Area Moist;Excoriated   Wound Edges Irregular;Open   Wound Length (cm) 1.2 cm   Wound  Width (cm) 0.4 cm   Wound Depth (cm) 0.2 cm   Wound Volume (cm^3) 0.096 cm^3   Wound Surface Area (cm^2) 0.48 cm^2   Care Cleansed with:;Soap and water;Applied:;Skin Barrier   Skin Interventions   Device Skin Pressure Protection positioning supports utilized;pressure points protected   Pressure Reduction Devices pressure-redistributing mattress utilized;positioning supports utilized;heel offloading device utilized;chair cushion utilized   Pressure Reduction Techniques frequent weight shift encouraged;heels elevated off bed;positioned off wounds;pressure points protected       05/19/2022

## 2022-05-19 NOTE — PROGRESS NOTES
[facility]                                  Skilled Nursing Facility                   Progress Note     Admit Date: 2022  USMAN 2022  Principal Problem:  Cellulitis of leg   HPI obtained from patient interview and chart review     Chief Complaint: Re-evaluation of medical treatment and therapy status:  Lab review,  Re-evaluation of constipation, hypokalemia, hypomagnesemia    HPI:   Mrs. Murcia is a 71 year old female with PMHx of Multiple Sclerosis, Left Foot Drop, Lymphedema, Obesity (bmi 45) who presents to SNF following hospitalization for cellulitis, acute kidney injury and acute encephalopathy.  Admission to SNF for secondary weakness and debility.    Interval history: All of today's labs reviewed and are listed below.  K 3.0, Mag 1.6.  24 hr vital sign ranges listed below.  Patient states she has been having regular bowel movements for the last 2 days.  Patient denies shortness of breath, abdominal discomfort, nausea, or vomiting.  Patient reports an adequate appetite.  Patient denies dysuria.    Patient progessing with PT/OT- Gait: [facility] with Bilateral UE support with max assist of LLE. W/C following for safety. With cues to weight shift for foot clearance.  Patient is intermittently refusing therapy.  I had a discussion with her today to at least do a little bit each time a session is offered.  Patient was in agreement to longer refuse but that refused OT shortly after.  Continuing to follow and treat all acute and chronic conditions.      Past Medical History: Patient has a past medical history of Lymphedema, MS (multiple sclerosis), and Vitamin B12 deficiency.    Past Surgical History: Patient has a past surgical history that includes  section; Breast cyst excision (Left); and Esophagogastroduodenoscopy (N/A, 2022).    Social History: Patient reports that she has never  smoked. She has never used smokeless tobacco. She reports that she does not drink alcohol and does not use drugs.    Family History: family history includes Brain cancer in her brother; Coronary artery disease in her father; Lung cancer in her father and mother.    Allergies: Patient is allergic to contrast media, pcn [penicillins], celebrex [celecoxib], diazepam, and motrin [ibuprofen].    ROS  Constitutional: Negative for fever   Eyes: Negative for blurred vision, double vision   Respiratory: Negative for cough, shortness of breath   Cardiovascular: Negative for chest pain, palpitations. + leg swelling.   Gastrointestinal: Negative for abdominal pain, diarrhea, nausea, vomiting, constipation  Genitourinary: Negative for dysuria, frequency   Musculoskeletal:  + generalized weakness.  + bilateral lower extremity pain  Skin: Negative for itching and rash.   Neurological: Negative for dizziness, headaches.   Psychiatric/Behavioral: Negative for depression. The patient is nervous/anxious    24 hour Vital Sign Range   Temp:  [98.3 °F (36.8 °C)-99.2 °F (37.3 °C)]   Pulse:  [90]   Resp:  [18-20]   BP: (142-145)/(64-69)   SpO2:  [92 %-95 %]     PEx  Constitutional: Patient appears debilitated.  No distress noted  HENT:   Head: Normocephalic and atraumatic.   Eyes: Pupils are equal, round  Neck: Normal range of motion. Neck supple.   Cardiovascular: Normal rate, regular rhythm and normal heart sounds.    Pulmonary/Chest: Effort normal and breath sounds are clear  Abdominal: Soft. Bowel sounds are normal.   Musculoskeletal: Normal range of motion.   Neurological: Alert and oriented to person, place, and time.   Psychiatric: Normal mood and affect. Behavior is normal.   Skin: Skin is warm and dry. Bilateral lower extremity with pain contract scar tissue surrounded by hyperkeratotic skin.  Moisture associated dermatitis to buttocks.    05/19/22 0815         Wound 05/01/22 1720 Incontinence associated dermatitis Sacral Spine #3    Date First Assessed/Time First Assessed: 05/01/22 1720   Pre-existing: Yes  Primary Wound Type: Incontinence associated dermatitis  Location: Sacral Spine  Wound Number: #3   Wound WDL ex   Dressing Appearance Open to air   Drainage Amount None   Drainage Characteristics/Odor No odor   Appearance Pink;Moist   Tissue loss description Partial thickness   Periwound Area Moist;Excoriated   Wound Edges Irregular;Open   Wound Length (cm) 1.2 cm   Wound Width (cm) 0.4 cm   Wound Depth (cm) 0.2 cm   Wound Volume (cm^3) 0.096 cm^3   Wound Surface Area (cm^2) 0.48 cm^2   Care Cleansed with:;Soap and water;Applied:;Skin Barrier   Skin Interventions   Device Skin Pressure Protection positioning supports utilized;pressure points protected   Pressure Reduction Devices pressure-redistributing mattress utilized;positioning supports utilized;heel offloading device utilized;chair cushion utilized   Pressure Reduction Techniques frequent weight shift encouraged;heels elevated off bed;positioned off wounds;pressure points protected           Recent Labs   Lab 05/19/22  0504      K 3.0*      CO2 27   BUN 10   CREATININE 0.7   MG 1.6       Recent Labs   Lab 05/19/22  0504   WBC 3.92   RBC 2.69*   HGB 7.9*   HCT 26.0*      MCV 97   MCH 29.4   MCHC 30.4*         Assessment and Plan:    Hypokalemia.  Hypomagnesemia  - initiated potassium 40 mEq x2 doses, magnesium oxide 400 mg BID x2 days    Constipation  - resolved, continue senna docusate 2 tabs BID    Cellulitis of leg  - continue vancomycin   - [place] [facility] [place]  - continue local wound care per wound care  - completed cefepime and continue vancomycin. switched to doxycycline upon discharge (end date: 5/12/22)  - completed treatment was doxycycline     Urinary retention   - discontinued espinal prior to transfer to SNF.   - Urinating well until 5/10   -  bladder scans PRN     Pressure injury of buttock, unstageable  - q2 turns  - low airloss overlay mattress  - wound  care consultation       Lymphedema  - wound care consultation to assist in managmeent  - continue ammonium lactate lotion BID  - continue doxycycline for cellulitis      HTN  -  continue to hold losartan to 25 mg daily- BP's low to normal and patient is refusing to take losartan.  Continue HCTZ 12.5 mg daily     MS (multiple sclerosis)  - PT/OT  - f/u with out pt provider      B12 deficiency  - change orders to cyanocobalamin 1000 mcg every 30 days, dose to be given on 05/13 as patient has been overdue due to recent hospitalization     Debility   - Continue with PT/OT for gait training and strengthening and restoration of ADL's   - Encourage mobility, OOB in chair, and early ambulation as appropriate  - Fall precautions   - Monitor for bowel and bladder dysfunction  - Monitor for and prevent skin breakdown and pressure ulcers  -  continue  DVT prophylaxis with  heparin 7.5 K q.8 hours        Anticipate disposition:  Home with home health        Follow-up needed during SNF stay-     Follow-up needed after discharge from SNF:   - PCP, hospital follow-up, needs to be scheduled      No future appointments.      Amy Conklin NP  Department of Hospital Medicine   [facility] Skilled [facility]     DOS: 5/19/2022      Patient note was created using MModal Dictation.  Any errors in syntax or even information may not have been identified and edited on initial review prior to signing this note.

## 2022-05-19 NOTE — PT/OT/SLP PROGRESS
"Physical Therapy Treatment    Patient Name:  Lupis Murcia   MRN:  [id number]  Admit Date: 5/11/2022  Admitting Diagnosis: Cellulitis of leg  Recent Surgeries: N/A    General Precautions: Standard, fall   Orthopedic Precautions:N/A   Braces: N/A     Recommendations:     Discharge Recommendations:  home with home health   Level of Assistance Recommended at Discharge: 24 hours significant assistance  Discharge Equipment Recommendations:  (TBD as pt progresses)   Barriers to discharge: Decreased caregiver support    Assessment:     Lupis Murcia is a 71 y.o. female admitted with a medical diagnosis of Cellulitis of leg . Pt did well today with focus on standing activities and gait training.Pt. Still continues to requires  max  (A) for 2 for safety for SB transfer due to fear of falling .  Pt does require max assistance during gait for managing LLE and requires seated rest breaks. Pt also requires maximal assist for transferring and moderate assistance with bed mobility. Pt will continue to benefit from PT to improve strength and become independent with functional activities upon discharge.    Performance deficits affecting function:  weakness, impaired endurance, impaired self care skills, impaired functional mobilty, gait instability, impaired balance, decreased upper extremity function, decreased lower extremity function, decreased coordination, impaired skin, edema, impaired cardiopulmonary response to activity .    Rehab Potential is good    Activity Tolerance: Good    Plan:     Patient to be seen 6 x/week to address the above listed problems via gait training, therapeutic activities, therapeutic exercises, neuromuscular re-education    · Plan of Care Expires: 06/11/22  · Plan of Care Reviewed with: patient    Subjective     "im scared Im gonna fall".     Pain/Comfort:  · Pain Rating 1: 0/10  · Location - Side 1: Bilateral  · Location - Orientation 1: generalized  · Location 1: abdomen  · Pain Addressed 1: " Pre-medicate for activity, Reposition, Distraction  · Pain Rating Post-Intervention 1: 0/10    Patient's cultural, spiritual, Druze conflicts given the current situation:  · no    Objective:     Communicated with Nurse prior to session.  Patient found HOB elevated with   upon PT entry to room.     Therapeutic Activities and Exercises:   Downed socks gait belt and mask for ambulation  Updated white board  Bedside table was setup for safety and convenience   Pt had no further questions or concerns within PTA scope of practice.    Functional Mobility:  · Bed Mobility:     · Rolling Right: moderate assistance  · Supine to Sit: moderate assistance  · Transfers:     · Slide board transfer from bed to W/C with cueing for hand placement and foot placement max assist of 2 people  · Sit to Stand:  minimum assistance with in parrel bars   · Gait: Pt ambulated inside parralel 3x 6ft,4ft,6ft with Bilateral UE support with max assist of LLE. W/C following for safety. With cues to weight shift for foot clearance.    AM-PAC 6 CLICK MOBILITY  11    Patient left In wheelchair with all lines intact and call button in reach.    GOALS:   Multidisciplinary Problems     Physical Therapy Goals        Problem: Physical Therapy    Goal Priority Disciplines Outcome Goal Variances Interventions   Physical Therapy Goal     PT, PT/OT Ongoing, Progressing     Description: Goals to be met by: 6/9/22    Patient will increase functional independence with mobility by performing:    . Supine to sit with MInimal Assistance  . Sit to supine with MInimal Assistance  . Rolling to Left and Right with Minimal Assistance.  . Sit to stand transfer with Minimal Assistance with RW-not met  . Bed to chair transfer with Minimal Assistance using Rolling Walker/no AD-not met  . Gait  x 10 feet with Moderate Assistance using RW-not met  . Wheelchair propulsion x50 feet with Minimal Assistance using bilateral uppper extremities-not met  . Sitting at edge of bed  x5 minutes with Stand-by Assistance-not met                     Time Tracking:     PT Received On:    PT Total Time (min):   38    Billable Minutes: Gait [id number] and Therapeutic Activity 23    Treatment Type: Treatment  PT/PTA: PTA     PTA Visit Number: 3     05/19/2022

## 2022-05-19 NOTE — PLAN OF CARE
Plan of care reviewed with patient. Patient up in chair, reports Tylenol 3 is most effective pain management for her. Bilateral lower extremities red, edematous, swollen; prescribed cream applied. Wound care following for wound assistance of lower extremities and sacrum. VSS, no questions at this time.

## 2022-05-20 PROCEDURE — 11000004 HC SNF PRIVATE

## 2022-05-20 PROCEDURE — 25000003 PHARM REV CODE 250: Performed by: NURSE PRACTITIONER

## 2022-05-20 PROCEDURE — 63600175 PHARM REV CODE 636 W HCPCS: Performed by: NURSE PRACTITIONER

## 2022-05-20 PROCEDURE — 25000003 PHARM REV CODE 250: Performed by: HOSPITALIST

## 2022-05-20 PROCEDURE — 97530 THERAPEUTIC ACTIVITIES: CPT

## 2022-05-20 PROCEDURE — 97530 THERAPEUTIC ACTIVITIES: CPT | Mod: CQ

## 2022-05-20 PROCEDURE — 97116 GAIT TRAINING THERAPY: CPT | Mod: CQ

## 2022-05-20 PROCEDURE — 97535 SELF CARE MNGMENT TRAINING: CPT

## 2022-05-20 RX ADMIN — HEPARIN SODIUM 7500 UNITS: 5000 INJECTION INTRAVENOUS; SUBCUTANEOUS at 05:05

## 2022-05-20 RX ADMIN — HEPARIN SODIUM 7500 UNITS: 5000 INJECTION INTRAVENOUS; SUBCUTANEOUS at 01:05

## 2022-05-20 RX ADMIN — MICONAZOLE NITRATE: 20 CREAM TOPICAL at 09:05

## 2022-05-20 RX ADMIN — LORAZEPAM 0.5 MG: 0.5 TABLET ORAL at 03:05

## 2022-05-20 RX ADMIN — AMMONIUM LACTATE: 12 LOTION TOPICAL at 10:05

## 2022-05-20 RX ADMIN — PANTOPRAZOLE SODIUM 40 MG: 40 TABLET, DELAYED RELEASE ORAL at 08:05

## 2022-05-20 RX ADMIN — ACETAMINOPHEN AND CODEINE PHOSPHATE 1 TABLET: 300; 30 TABLET ORAL at 06:05

## 2022-05-20 RX ADMIN — Medication 400 MG: at 08:05

## 2022-05-20 RX ADMIN — HEPARIN SODIUM 7500 UNITS: 5000 INJECTION INTRAVENOUS; SUBCUTANEOUS at 10:05

## 2022-05-20 RX ADMIN — Medication 1 TABLET: at 09:05

## 2022-05-20 RX ADMIN — ONDANSETRON 4 MG: 4 TABLET, ORALLY DISINTEGRATING ORAL at 06:05

## 2022-05-20 RX ADMIN — ACETAMINOPHEN AND CODEINE PHOSPHATE 1 TABLET: 300; 30 TABLET ORAL at 01:05

## 2022-05-20 RX ADMIN — LORAZEPAM 0.5 MG: 0.5 TABLET ORAL at 10:05

## 2022-05-20 RX ADMIN — SENNOSIDES AND DOCUSATE SODIUM 2 TABLET: 50; 8.6 TABLET ORAL at 08:05

## 2022-05-20 RX ADMIN — PANTOPRAZOLE SODIUM 40 MG: 40 TABLET, DELAYED RELEASE ORAL at 09:05

## 2022-05-20 RX ADMIN — Medication 400 MG: at 09:05

## 2022-05-20 RX ADMIN — HYDROCHLOROTHIAZIDE 12.5 MG: 12.5 TABLET ORAL at 09:05

## 2022-05-20 RX ADMIN — ACETAMINOPHEN AND CODEINE PHOSPHATE 1 TABLET: 300; 30 TABLET ORAL at 08:05

## 2022-05-20 RX ADMIN — POTASSIUM CHLORIDE 40 MEQ: 1500 TABLET, EXTENDED RELEASE ORAL at 09:05

## 2022-05-20 RX ADMIN — AMMONIUM LACTATE: 12 LOTION TOPICAL at 09:05

## 2022-05-20 RX ADMIN — LORAZEPAM 0.5 MG: 0.5 TABLET ORAL at 01:05

## 2022-05-20 RX ADMIN — Medication 1000 UNITS: at 09:05

## 2022-05-20 NOTE — PROGRESS NOTES
[facility] - Skilled Nursing  Adult Nutrition  Progress Note    SUMMARY   Recommendations  Continue regular diet, RD following  Goals: PO to meet 85% of EEN/EPN by next RD follow up  Nutrition Goal Status: progressing towards goal    Assessment and Plan   Increased nutrient needs( protein) related to wound healing as evidenced by BLE lymphedema/Cellulitis     Continues     Plan  General diet  Collaboration with other providers  Vitamin/mineral supplement therapy- Vit D, Vit B complex/Vit C, Mg, Vit D     Malnutrition Assessment  5/13/22     Skin (Micronutrient): edema, thickened, wounds unhealed, cracked, rough  Hair/Scalp (Micronutrient): dry  Eyes (Micronutrient): conjunctiva dull  Teeth (Micronutrient): broken dentition (no upper teeth)  Musculoskeletal/Lower Extremities: edema       Energy Intake (Malnutrition): less than or equal to 50% for greater than or equal to 5 days   Orbital Region (Subcutaneous Fat Loss): well nourished  Upper Arm Region (Subcutaneous Fat Loss): well nourished  Thoracic and Lumbar Region: well nourished   [place] Region (Muscle Loss): mild depletion  Clavicle and Acromion Bone Region (Muscle Loss): mild depletion  Dorsal Hand (Muscle Loss): moderate depletion   Edema (Fluid Accumulation): 3-->moderate             Reason for Assessment    Reason For Assessment: RD follow-up  Diagnosis: infection/sepsis (Cellulitis of leg)  Relevant Medical History: HANDY, anemia, chronic pain, Vit B12 deficiency, MS, lymphedema, obesity  Interdisciplinary Rounds: did not attend  General Information Comments: pt very happy with , she reports she is getting enough food, daughter on way in bringing beverage, iced tea.  Nutrition Discharge Planning: DC on regular diet    Nutrition Risk Screen    Nutrition Risk Screen: dysphagia or difficulty swallowing    Nutrition/Diet History    Patient Reported Diet/Restrictions/Preferences: general  Typical Food/Fluid Intake: regular meals  Food Preferences: no  "tomato gravy no spicy foods  Spiritual, Cultural Beliefs, Rastafari Practices, Values that Affect Care: no  Vitamin/Mineral/Herbal Supplements: Vit B12, Vit D, Vit B complex/Vit C,  Food Allergies: NKFA  Factors Affecting Nutritional Intake: decreased appetite, constipation    Anthropometrics    Temp: 97.7 °F (36.5 °C)  Height Method: Stated  Height: 5' 2" (157.5 cm)  Height (inches): 62 in  Weight Method: Bed Scale  Weight: 99.6 kg (219 lb 9.3 oz)  Weight (lb): 219.58 lb  Ideal Body Weight (IBW), Female: 110 lb  % Ideal Body Weight, Female (lb): 199.62 %  BMI (Calculated): 40.2  BMI Grade: greater than 40 - morbid obesity  Usual Body Weight (UBW), k.27 kg (reported by pt)  % Usual Body Weight: 129.17  % Weight Change From Usual Weight: 28.9 %       Lab/Procedures/Meds    Pertinent Labs Reviewed: reviewed  Pertinent Labs Comments: Hg 7.9, Hct 26.0, K 3.0 glucose 120  Pertinent Medications Reviewed: reviewed  Pertinent Medications Comments: Vit D, Vit B12, Vit B complex/Vit C, Mg, candesartan-HCTZ         Estimated/Assessed Needs    Weight Used For Calorie Calculations: 99.6 kg (219 lb 9.3 oz)  Energy Calorie Requirements (kcal): 1459  Energy Need Method: Harnett-St Jeor (x 1.0 (PAL) 2/2 obesity)  Protein Requirements: 60g  Weight Used For Protein Calculations: 50 kg (110 lb 3.7 oz) (x 1.2g/kg IBW 2/2 obesity)  Fluid Requirements (mL): 1500 or per MD  Estimated Fluid Requirement Method: RDA Method  RDA Method (mL): 1459  CHO Requirement: -      Nutrition Prescription Ordered    Current Diet Order: Regular  Nutrition Order Comments: PO 75%    Evaluation of Received Nutrient/Fluid Intake    I/O: no data  Energy Calories Required: meeting needs  Protein Required: meeting needs  Fluid Required: meeting needs  Comments: LBM   Tolerance: tolerating  % Intake of Estimated Energy Needs: 75 - 100 %  % Meal Intake: 75 - 100 %    Nutrition Risk    Level of Risk/Frequency of Follow-up: low (one time per week) "     Monitor and Evaluation    Food and Nutrient Intake: food and beverage intake  Food and Nutrient Adminstration: diet order  Anthropometric Measurements: weight change  Biochemical Data, Medical Tests and Procedures: inflammatory profile, gastrointestinal profile, electrolyte and renal panel  Nutrition-Focused Physical Findings: skin     Nutrition Follow-Up    RD Follow-up?: Yes

## 2022-05-20 NOTE — PT/OT/SLP PROGRESS
Occupational Therapy   Treatment    Name: Lupis Murcia  MRN: [id number]  Admit Date: 5/11/2022  Admitting Diagnosis:  Cellulitis of leg    General Precautions: Standard, fall (free floating anxiety)   Orthopedic Precautions:N/A   Braces: N/A     Recommendations:     Discharge Recommendations: home health OT  Level of Assistance Recommended at Discharge: 24 hours physical assistance for all ADL's and home management tasks  Discharge Equipment Recommendations:  bedside commode, bath bench  Barriers to discharge:   (increased care giver assist requirements.)    Assessment:     Lupis Murcia is a 71 y.o. female with a medical diagnosis of Cellulitis of leg (B). The Patient presents with significant anxiety related to OOB initiative and bedside standing. Anxiety responses diminished as session progressed and Patient calmly reintroduced to pre-standing activities, progressing then to assisted stand. Patient able to t/f with staff assist of 1>2 w/o slide board this am. Support staff of 1 beneficial for SBA, equipment monitoring and initial sit to stand (Patient successful stand trials x3/4 this am).  Performance deficits affecting function are weakness, impaired endurance, impaired self care skills, impaired sensation, impaired functional mobilty, gait instability, impaired balance, decreased safety awareness, decreased lower extremity function, decreased upper extremity function, decreased coordination, impaired skin, edema.     Rehab Potential is good    Activity tolerance:  Good with provision of supportive and corrective feedback.     Plan:     Patient to be seen 6 x/week to address the above listed problems via self-care/home management, therapeutic activities, therapeutic exercises, neuromuscular re-education    · Plan of Care Expires: 06/12/22  · Plan of Care Reviewed with: patient    Subjective     Communicated with: RN prior to session.     Pain/Comfort:  · Pain Rating 1: 0/10    Patient's cultural,  spiritual, Moravian conflicts given the current situation:  · no    Objective:     Patient found supine with  (no lines) upon OT entry to room.    Bed Mobility:    · Patient completed Rolling/Turning to Left with  maximal assistance and 2 persons  · Patient completed Rolling/Turning to Right with maximal assistance and 2 persons  · Patient completed Scooting/Bridging with maximal assistance and 2 persons  · Patient completed Supine to Sit with maximal assistance > moderate assistance     Functional Mobility/Transfers:  · Patient completed Sit <> Stand Transfer trials with maximal assistance x 2 persons x2 trials with hand-held assist iat outset. Performance levels progressing then to max a x1 for stand only.   · Patient completed Bed <> wheel Chair Transfer final trial max a x2 staff, with r/w . 2nd caregiver beneficial for w/c stability assurance during sitting, for hand placement tactile cues and for posterior hip guidance.   · Functional Mobility: Fair (+) static stand. Patient initially unable to weight shift and repositioning BLEs for step t/f or pivot t/f, Very high postural rigidity noted and anxiety related. Good to very good Patient resposnes to supportive feedback evidenced by decreased gravitational insecurity as session progressed.     Activities of Daily Living: Free floating anxiety responses to al intal transitional movements , noted. Patient unable to internally;y self regulate and reporting significant gravitational insecurity. EOB sitting effective, however, (+) responses to verbal support, good information integration noted.   · Grooming: set up <> SBA    · Upper Body Dressing: moderate assistance    · Lower Body Dressing: total assistance and of 2 persons  own stretch lounge wear  · Toileting: total assistance      AMPA 6 Click ADL: 16    OT Exercises: Deep breath ex intro at sessions outset to bolster resp/circualtion as needed in func mobility interventions, however session largely ADL and  therapeutic activity training this date.     Orienting interventions provided. Patient response (++)  *Education provided to Patient, re: role of OT, and OT Treatment rationales / protocols. Patient  verbalized understanding.  *Patient agreeable to therapy session. Lights on / shade open.  SC:   *Basic self-care tasks and rudimentary functional mobility undertaken -- assist levels noted above.    Treatment & Education:  Pt worked on OOB sitting > ADL t/f training  to improve safety and independence during functional activities, routine ADLs (as noted above) and general func mobility (above). Wheel chair training introduced at sessions close. However, Patient fatigued and RN arrival for nursing needs commenced.  Collaborative discussion of long term goals, importance of continued OOB activity, routine self care skills redevelopment needs and general in room/bedside safety constructs also undertaken.Patient states her Daughter (cohabitant) is an RN and assisted with LB dressing routinely prior to admit.     Whiteboard updated.      SESSION'S END:  *General in room safety and (S)'d mobility advised, call button use reviewed/in hand.  *Questions/concerns within OT scope addressed.         Patient left up in chair with call button in reach and RN presentEducation:      GOALS:   Multidisciplinary Problems     Occupational Therapy Goals        Problem: Occupational Therapy    Goal Priority Disciplines Outcome Interventions   Occupational Therapy Goal     OT, PT/OT Ongoing, Progressing    Description: Goals to be met by: 6/2    Patient will increase functional independence with ADLs by performing:    UE Dressing with [place].  LE Dressing with Moderate Assistance using AE as needed.  Grooming while seated at sink with [place].  Toileting from bedside commode or toilet with Minimal Assistance for hygiene and clothing management.   Bathing from shower chair/chair level with Minimal Assistance.  Stand pivot transfers  with Minimal Assistance using RW.  Toilet transfer to bedside commode with Minimal Assistance.  Upper extremity exercise program with supervision.  Caregiver will be educated on level of assist required to safely perform self care tasks and functional transfers.                     Time Tracking:     OT Date of Treatment: OT Date of Treatment: 05/20/22  OT Total Time (min):  39    Billable Minutes:Self Care/Self Management 25  Therapeutic Activity 14    5/20/2022

## 2022-05-20 NOTE — PLAN OF CARE
Problem: Occupational Therapy  Goal: Occupational Therapy Goal  Description: Goals to be met by: 6/2    Patient will increase functional independence with ADLs by performing:    UE Dressing with [place].  LE Dressing with Moderate Assistance using AE as needed.  Grooming while seated at sink with [place].  Toileting from bedside commode or toilet with Minimal Assistance for hygiene and clothing management.   Bathing from shower chair/chair level with Minimal Assistance.  Stand pivot transfers with Minimal Assistance using RW.  Toilet transfer to bedside commode with Minimal Assistance.  Upper extremity exercise program with supervision.  Caregiver will be educated on level of assist required to safely perform self care tasks and functional transfers.    Outcome: Ongoing, Progressing

## 2022-05-20 NOTE — PLAN OF CARE
Problem: Adult Inpatient Plan of Care  Goal: Plan of Care Review  Outcome: Ongoing, Progressing  Goal: Patient-Specific Goal (Individualized)  Outcome: Ongoing, Progressing  Goal: Absence of Hospital-Acquired Illness or Injury  Outcome: Ongoing, Progressing  Goal: Readiness for Transition of Care  Outcome: Ongoing, Progressing     Problem: Skin Injury Risk Increased  Goal: Skin Health and Integrity  Outcome: Ongoing, Progressing     Problem: Fall Injury Risk  Goal: Absence of Fall and Fall-Related Injury  Outcome: Ongoing, Progressing

## 2022-05-20 NOTE — PT/OT/SLP PROGRESS
Physical Therapy Treatment    Patient Name:  Lupis Murcia   MRN:  [id number]  Admit Date: 5/11/2022  Admitting Diagnosis: Cellulitis of leg  Recent Surgeries: N/A    General Precautions: Standard, fall   Orthopedic Precautions:N/A   Braces:   N.A    Recommendations:     Discharge Recommendations:  home with home health   Level of Assistance Recommended at Discharge: 24 hours significant assistance  Discharge Equipment Recommendations:  (TBD as pt progresses)   Barriers to discharge: Decreased caregiver support    Assessment:     Lupis Murcia is a 71 y.o. female admitted with a medical diagnosis of Cellulitis of leg.  Pt participated, and tolerated treatment well. Pt will continue to benefit from skilled PT services to improve all deficits noted below. Resume PT POC as indicated.      Performance deficits affecting function:  weakness, impaired endurance, impaired self care skills, impaired functional mobilty, gait instability, impaired balance, decreased upper extremity function, decreased lower extremity function, decreased coordination, impaired skin, edema, impaired cardiopulmonary response to activity .    Rehab Potential is good    Activity Tolerance: Good    Plan:     Patient to be seen 6 x/week to address the above listed problems via gait training, therapeutic activities, therapeutic exercises, neuromuscular re-education    · Plan of Care Expires: 06/11/22  · Plan of Care Reviewed with: patient    Subjective     Pt was willing to participate with therapy. .     Pain/Comfort:  · Pain Rating 1: 0/10  · Pain Rating Post-Intervention 1: 0/10    Patient's cultural, spiritual, Christian conflicts given the current situation:  · no    Objective:     Communicated with nursing prior to session.  Patient found up in chair with daughter present upon PT entry to room.     Therapeutic Activities and Exercises:   -BLE therex x10 reps with assistance to LLE: LAQ,HF  -UBE x5 min with 3 brief rest breaks during  activity.     Functional Mobility:  · Transfers:  Sit to Stand: to/from chair inside // bars  minimum assistance   · Gait: Pt ambulated length of // bars x2 trials with w/c follow and Max A to advance LLE.    AM-PAC 6 CLICK MOBILITY  11    Patient left up in chair with all lines intact, call button in reach and nursing notified.    GOALS:   Multidisciplinary Problems     Physical Therapy Goals        Problem: Physical Therapy    Goal Priority Disciplines Outcome Goal Variances Interventions   Physical Therapy Goal     PT, PT/OT Ongoing, Progressing     Description: Goals to be met by: 6/9/22    Patient will increase functional independence with mobility by performing:    . Supine to sit with MInimal Assistance  . Sit to supine with MInimal Assistance  . Rolling to Left and Right with Minimal Assistance.  . Sit to stand transfer with Minimal Assistance with RW-not met  . Bed to chair transfer with Minimal Assistance using Rolling Walker/no AD-not met  . Gait  x 10 feet with Moderate Assistance using RW-not met  . Wheelchair propulsion x50 feet with Minimal Assistance using bilateral uppper extremities-not met  . Sitting at edge of bed x5 minutes with Stand-by Assistance-not met                     Time Tracking:     PT Received On: 05/20/22  PT Total Time (min): 45 min     Billable Minutes: Gait Training 10, Therapeutic Activity 15 and Therapeutic Exercise 15    Treatment Type: Treatment  PT/PTA: PTA     PTA Visit Number: 4     05/20/2022

## 2022-05-21 PROCEDURE — 25000003 PHARM REV CODE 250: Performed by: HOSPITALIST

## 2022-05-21 PROCEDURE — 97116 GAIT TRAINING THERAPY: CPT

## 2022-05-21 PROCEDURE — 25000003 PHARM REV CODE 250: Performed by: NURSE PRACTITIONER

## 2022-05-21 PROCEDURE — 97530 THERAPEUTIC ACTIVITIES: CPT

## 2022-05-21 PROCEDURE — 11000004 HC SNF PRIVATE

## 2022-05-21 PROCEDURE — 97110 THERAPEUTIC EXERCISES: CPT

## 2022-05-21 PROCEDURE — 63600175 PHARM REV CODE 636 W HCPCS: Performed by: NURSE PRACTITIONER

## 2022-05-21 RX ADMIN — HYDROCHLOROTHIAZIDE 12.5 MG: 12.5 TABLET ORAL at 09:05

## 2022-05-21 RX ADMIN — Medication 1000 UNITS: at 09:05

## 2022-05-21 RX ADMIN — HEPARIN SODIUM 7500 UNITS: 5000 INJECTION INTRAVENOUS; SUBCUTANEOUS at 01:05

## 2022-05-21 RX ADMIN — LORAZEPAM 0.5 MG: 0.5 TABLET ORAL at 11:05

## 2022-05-21 RX ADMIN — PANTOPRAZOLE SODIUM 40 MG: 40 TABLET, DELAYED RELEASE ORAL at 09:05

## 2022-05-21 RX ADMIN — AMMONIUM LACTATE: 12 LOTION TOPICAL at 09:05

## 2022-05-21 RX ADMIN — HEPARIN SODIUM 7500 UNITS: 5000 INJECTION INTRAVENOUS; SUBCUTANEOUS at 06:05

## 2022-05-21 RX ADMIN — ACETAMINOPHEN AND CODEINE PHOSPHATE 1 TABLET: 300; 30 TABLET ORAL at 06:05

## 2022-05-21 RX ADMIN — ACETAMINOPHEN AND CODEINE PHOSPHATE 1 TABLET: 300; 30 TABLET ORAL at 01:05

## 2022-05-21 RX ADMIN — Medication 1 TABLET: at 09:05

## 2022-05-21 RX ADMIN — Medication 400 MG: at 09:05

## 2022-05-21 RX ADMIN — LORAZEPAM 0.5 MG: 0.5 TABLET ORAL at 06:05

## 2022-05-21 RX ADMIN — ACETAMINOPHEN AND CODEINE PHOSPHATE 1 TABLET: 300; 30 TABLET ORAL at 09:05

## 2022-05-21 RX ADMIN — HEPARIN SODIUM 7500 UNITS: 5000 INJECTION INTRAVENOUS; SUBCUTANEOUS at 11:05

## 2022-05-21 RX ADMIN — MICONAZOLE NITRATE: 20 CREAM TOPICAL at 09:05

## 2022-05-21 RX ADMIN — LORAZEPAM 0.5 MG: 0.5 TABLET ORAL at 03:05

## 2022-05-21 NOTE — PLAN OF CARE
Problem: Adult Inpatient Plan of Care  Goal: Plan of Care Review  Outcome: Ongoing, Progressing  Goal: Patient-Specific Goal (Individualized)  Outcome: Ongoing, Progressing  Goal: Absence of Hospital-Acquired Illness or Injury  Outcome: Ongoing, Progressing  Goal: Optimal Comfort and Wellbeing  Outcome: Ongoing, Progressing     Problem: Renal Function Impairment (Acute Kidney Injury/Impairment)  Goal: Effective Renal Function  Outcome: Ongoing, Progressing

## 2022-05-21 NOTE — PLAN OF CARE
Problem: Physical Therapy  Goal: Physical Therapy Goal  Description: Goals to be met by: 6/9/22    Patient will increase functional independence with mobility by performing:    . Supine to sit with MInimal Assistance-not met  . Sit to supine with MInimal Assistance-not met  . Rolling to Left and Right with Minimal Assistance.  . Sit to stand transfer with Minimal Assistance with RW-met  Updated 5/21/2022 Sit to stand transfer with CGA with RW  . Bed to chair transfer with Minimal Assistance using Rolling Walker/no AD-not met  . Gait  x 10 feet with Moderate Assistance using RW- met  Updated 5/21/2022 Gait  x 20 feet with Long using RW  . Wheelchair propulsion x50 feet with Minimal Assistance using bilateral uppper extremities-not met  . Sitting at edge of bed x5 minutes with Stand-by Assistance-not met    Outcome: Ongoing, Progressing

## 2022-05-21 NOTE — PT/OT/SLP PROGRESS
Physical Therapy Treatment    Patient Name:  Lupis Murcia   MRN:  [id number]  Admit Date: 5/11/2022  Admitting Diagnosis: Cellulitis of leg  Recent Surgeries: N/A    General Precautions: Standard, fall   Orthopedic Precautions:N/A   Braces: N/A     Recommendations:     Discharge Recommendations:  home with home health   Level of Assistance Recommended at Discharge: 24 hours significant assistance  Discharge Equipment Recommendations:  (TBD as pt progresses)   Barriers to discharge: Decreased caregiver support    Assessment:     Lupis Murcia is a 71 y.o. female admitted with a medical diagnosis of Cellulitis of leg . Pt making good functional progress but still very fearful of falling. Pt was able to ambulate with a RW today, a total of 18ft with Long. Pt will benefit from continued snf rehab to help pt improve her overall functional mobility and safety prior to d/c home.      Performance deficits affecting function:  weakness, impaired endurance, impaired self care skills, impaired functional mobilty, gait instability, impaired balance, decreased lower extremity function, decreased upper extremity function, decreased ROM, impaired skin, edema, impaired cardiopulmonary response to activity .    Rehab Potential is good    Activity Tolerance: Good    Plan:     Patient to be seen 6 x/week to address the above listed problems via gait training, therapeutic activities, therapeutic exercises, neuromuscular re-education    · Plan of Care Expires: 06/11/22  · Plan of Care Reviewed with: patient    Subjective     Pt agreeable to work with PT.     Pain/Comfort:  · Pain Rating 1: 0/10  · Pain Rating Post-Intervention 1: 0/10    Patient's cultural, spiritual, Amish conflicts given the current situation:  · no    Objective:     Communicated with pt's nurse prior to session.  Patient found HOB elevated with   upon PT entry to room.     Therapeutic Activities and Exercises: UBEx 12mins to help improve pt's overall  cardiovascular endurance. Resistance set where pt was working at a Modified [place] of 3.    Functional Mobility:  · Bed Mobility:     · Scooting: maximal assistance  · Supine to Sit: maximal assistance  · Sit to Supine: maximal assistance  · Transfers:     · Sit to Stand:  moderate assistance with rolling walker  · Bed <> Chair: moderate assistance and of 2 persons with  rolling walker  using  Stand Pivot. Pt needed extra time as she was very fearful of falling.  · Gait: 8ft +10ft with RW and Long for safety as pt needing Long to advance LLE and for walker management. pt with decrease B foot clearance LLE>RLE, decrease step length and decrease pako.    AM-PAC 6 CLICK MOBILITY  11    Patient left HOB elevated with call button in reach and pt's daughter present.    GOALS:   Multidisciplinary Problems     Physical Therapy Goals        Problem: Physical Therapy    Goal Priority Disciplines Outcome Goal Variances Interventions   Physical Therapy Goal     PT, PT/OT Ongoing, Progressing     Description: Goals to be met by: 6/9/22    Patient will increase functional independence with mobility by performing:    . Supine to sit with MInimal Assistance-not met  . Sit to supine with MInimal Assistance-not met  . Rolling to Left and Right with Minimal Assistance.  . Sit to stand transfer with Minimal Assistance with RW-met  Updated 5/21/2022 Sit to stand transfer with CGA with RW  . Bed to chair transfer with Minimal Assistance using Rolling Walker/no AD-not met  . Gait  x 10 feet with Moderate Assistance using RW- met  Updated 5/21/2022 Gait  x 20 feet with Long using RW  . Wheelchair propulsion x50 feet with Minimal Assistance using bilateral uppper extremities-not met  . Sitting at edge of bed x5 minutes with Stand-by Assistance-not met                     Time Tracking:     PT Received On: 05/21/22  PT Total Time (min):     Billable Minutes: Gait Training 15mins, Therapeutic Activity 17mins and Therapeutic Exercise  15mins    Treatment Type: Treatment  PT/PTA: PT     PTA Visit Number: 0     05/21/2022

## 2022-05-22 PROCEDURE — 25000003 PHARM REV CODE 250: Performed by: HOSPITALIST

## 2022-05-22 PROCEDURE — 63600175 PHARM REV CODE 636 W HCPCS: Performed by: NURSE PRACTITIONER

## 2022-05-22 PROCEDURE — 25000003 PHARM REV CODE 250: Performed by: NURSE PRACTITIONER

## 2022-05-22 PROCEDURE — 11000004 HC SNF PRIVATE

## 2022-05-22 RX ADMIN — ONDANSETRON 4 MG: 4 TABLET, ORALLY DISINTEGRATING ORAL at 10:05

## 2022-05-22 RX ADMIN — LORAZEPAM 0.5 MG: 0.5 TABLET ORAL at 03:05

## 2022-05-22 RX ADMIN — ACETAMINOPHEN AND CODEINE PHOSPHATE 1 TABLET: 300; 30 TABLET ORAL at 06:05

## 2022-05-22 RX ADMIN — AMMONIUM LACTATE: 12 LOTION TOPICAL at 10:05

## 2022-05-22 RX ADMIN — HEPARIN SODIUM 7500 UNITS: 5000 INJECTION INTRAVENOUS; SUBCUTANEOUS at 06:05

## 2022-05-22 RX ADMIN — AMMONIUM LACTATE: 12 LOTION TOPICAL at 09:05

## 2022-05-22 RX ADMIN — Medication 1 TABLET: at 10:05

## 2022-05-22 RX ADMIN — HEPARIN SODIUM 7500 UNITS: 5000 INJECTION INTRAVENOUS; SUBCUTANEOUS at 01:05

## 2022-05-22 RX ADMIN — PANTOPRAZOLE SODIUM 40 MG: 40 TABLET, DELAYED RELEASE ORAL at 10:05

## 2022-05-22 RX ADMIN — ACETAMINOPHEN AND CODEINE PHOSPHATE 1 TABLET: 300; 30 TABLET ORAL at 01:05

## 2022-05-22 RX ADMIN — PANTOPRAZOLE SODIUM 40 MG: 40 TABLET, DELAYED RELEASE ORAL at 07:05

## 2022-05-22 RX ADMIN — ACETAMINOPHEN AND CODEINE PHOSPHATE 1 TABLET: 300; 30 TABLET ORAL at 07:05

## 2022-05-22 RX ADMIN — Medication 1000 UNITS: at 10:05

## 2022-05-22 RX ADMIN — HEPARIN SODIUM 7500 UNITS: 5000 INJECTION INTRAVENOUS; SUBCUTANEOUS at 10:05

## 2022-05-22 RX ADMIN — HYDROCHLOROTHIAZIDE 12.5 MG: 12.5 TABLET ORAL at 10:05

## 2022-05-22 RX ADMIN — LORAZEPAM 0.5 MG: 0.5 TABLET ORAL at 10:05

## 2022-05-22 RX ADMIN — LORAZEPAM 0.5 MG: 0.5 TABLET ORAL at 06:05

## 2022-05-22 NOTE — PT/OT/SLP PROGRESS
Occupational Therapy      Patient Name:  Lupis Murcia   MRN:  [id number]    Patient not seen today secondary to polite decline stating she hasn't gotten any sleep   . Will follow-up with POC     5/22/2022

## 2022-05-22 NOTE — PLAN OF CARE
POC reviewed with pt and daughter, verbalized understanding. VSS on RA. AAOX4. Remains free of falls and injury.    - sacral wound  - bilateral LE wounds  - wound care completed per order  - last bm 5/21, brief incontinence  - per previous notes, pt refused OT    Tolerating diet, denies nausea. Pain controlled. Patient denies chest pain & SOB. No acute events or distress noted. Bed in lowest position, call light in reach, frequent rounds made for safety.     WCTM.      Problem: Adult Inpatient Plan of Care  Goal: Plan of Care Review  Outcome: Ongoing, Progressing  Goal: Patient-Specific Goal (Individualized)  Outcome: Ongoing, Progressing  Goal: Absence of Hospital-Acquired Illness or Injury  Outcome: Ongoing, Progressing  Goal: Optimal Comfort and Wellbeing  Outcome: Ongoing, Progressing  Goal: Readiness for Transition of Care  Outcome: Ongoing, Progressing     Problem: Bariatric Environmental Safety  Goal: Safety Maintained with Care  Outcome: Ongoing, Progressing     Problem: Skin Injury Risk Increased  Goal: Skin Health and Integrity  Outcome: Ongoing, Progressing     Problem: Fall Injury Risk  Goal: Absence of Fall and Fall-Related Injury  Outcome: Ongoing, Progressing

## 2022-05-23 LAB
ANION GAP SERPL CALC-SCNC: 11 MMOL/L (ref 8–16)
BASOPHILS # BLD AUTO: 0.01 K/UL (ref 0–0.2)
BASOPHILS NFR BLD: 0.3 % (ref 0–1.9)
BUN SERPL-MCNC: 11 MG/DL (ref 8–23)
CALCIUM SERPL-MCNC: 9.4 MG/DL (ref 8.7–10.5)
CHLORIDE SERPL-SCNC: 103 MMOL/L (ref 95–110)
CO2 SERPL-SCNC: 29 MMOL/L (ref 23–29)
CREAT SERPL-MCNC: 0.7 MG/DL (ref 0.5–1.4)
DIFFERENTIAL METHOD: ABNORMAL
EOSINOPHIL # BLD AUTO: 0.1 K/UL (ref 0–0.5)
EOSINOPHIL NFR BLD: 3.7 % (ref 0–8)
ERYTHROCYTE [DISTWIDTH] IN BLOOD BY AUTOMATED COUNT: 16.9 % (ref 11.5–14.5)
EST. GFR  (AFRICAN AMERICAN): >60 ML/MIN/1.73 M^2
EST. GFR  (NON AFRICAN AMERICAN): >60 ML/MIN/1.73 M^2
GLUCOSE SERPL-MCNC: 119 MG/DL (ref 70–110)
HCT VFR BLD AUTO: 27.9 % (ref 37–48.5)
HGB BLD-MCNC: 8.3 G/DL (ref 12–16)
IMM GRANULOCYTES # BLD AUTO: 0.03 K/UL (ref 0–0.04)
IMM GRANULOCYTES NFR BLD AUTO: 0.8 % (ref 0–0.5)
LYMPHOCYTES # BLD AUTO: 1.5 K/UL (ref 1–4.8)
LYMPHOCYTES NFR BLD: 42.4 % (ref 18–48)
MAGNESIUM SERPL-MCNC: 1.9 MG/DL (ref 1.6–2.6)
MCH RBC QN AUTO: 29.4 PG (ref 27–31)
MCHC RBC AUTO-ENTMCNC: 29.7 G/DL (ref 32–36)
MCV RBC AUTO: 99 FL (ref 82–98)
MONOCYTES # BLD AUTO: 0.4 K/UL (ref 0.3–1)
MONOCYTES NFR BLD: 11.8 % (ref 4–15)
NEUTROPHILS # BLD AUTO: 1.5 K/UL (ref 1.8–7.7)
NEUTROPHILS NFR BLD: 41 % (ref 38–73)
NRBC BLD-RTO: 0 /100 WBC
PHOSPHATE SERPL-MCNC: 2.9 MG/DL (ref 2.7–4.5)
PLATELET # BLD AUTO: 233 K/UL (ref 150–450)
PMV BLD AUTO: 10.4 FL (ref 9.2–12.9)
POTASSIUM SERPL-SCNC: 3.3 MMOL/L (ref 3.5–5.1)
RBC # BLD AUTO: 2.82 M/UL (ref 4–5.4)
SODIUM SERPL-SCNC: 143 MMOL/L (ref 136–145)
WBC # BLD AUTO: 3.56 K/UL (ref 3.9–12.7)

## 2022-05-23 PROCEDURE — 36415 COLL VENOUS BLD VENIPUNCTURE: CPT | Performed by: HOSPITALIST

## 2022-05-23 PROCEDURE — 25000003 PHARM REV CODE 250: Performed by: HOSPITALIST

## 2022-05-23 PROCEDURE — 97530 THERAPEUTIC ACTIVITIES: CPT | Mod: CQ

## 2022-05-23 PROCEDURE — 63600175 PHARM REV CODE 636 W HCPCS: Performed by: NURSE PRACTITIONER

## 2022-05-23 PROCEDURE — 97116 GAIT TRAINING THERAPY: CPT | Mod: CQ

## 2022-05-23 PROCEDURE — 80048 BASIC METABOLIC PNL TOTAL CA: CPT | Performed by: HOSPITALIST

## 2022-05-23 PROCEDURE — 85025 COMPLETE CBC W/AUTO DIFF WBC: CPT | Performed by: HOSPITALIST

## 2022-05-23 PROCEDURE — 25000003 PHARM REV CODE 250: Performed by: NURSE PRACTITIONER

## 2022-05-23 PROCEDURE — 11000004 HC SNF PRIVATE

## 2022-05-23 PROCEDURE — 83735 ASSAY OF MAGNESIUM: CPT | Performed by: HOSPITALIST

## 2022-05-23 PROCEDURE — 97530 THERAPEUTIC ACTIVITIES: CPT | Mod: CO

## 2022-05-23 PROCEDURE — 84100 ASSAY OF PHOSPHORUS: CPT | Performed by: HOSPITALIST

## 2022-05-23 RX ORDER — POTASSIUM CHLORIDE 20 MEQ/1
40 TABLET, EXTENDED RELEASE ORAL 2 TIMES DAILY
Status: COMPLETED | OUTPATIENT
Start: 2022-05-23 | End: 2022-05-24

## 2022-05-23 RX ADMIN — HEPARIN SODIUM 7500 UNITS: 5000 INJECTION INTRAVENOUS; SUBCUTANEOUS at 10:05

## 2022-05-23 RX ADMIN — Medication 1000 UNITS: at 09:05

## 2022-05-23 RX ADMIN — LORAZEPAM 0.5 MG: 0.5 TABLET ORAL at 03:05

## 2022-05-23 RX ADMIN — AMMONIUM LACTATE: 12 LOTION TOPICAL at 08:05

## 2022-05-23 RX ADMIN — LORAZEPAM 0.5 MG: 0.5 TABLET ORAL at 11:05

## 2022-05-23 RX ADMIN — AMMONIUM LACTATE: 12 LOTION TOPICAL at 09:05

## 2022-05-23 RX ADMIN — ACETAMINOPHEN AND CODEINE PHOSPHATE 1 TABLET: 300; 30 TABLET ORAL at 05:05

## 2022-05-23 RX ADMIN — HEPARIN SODIUM 7500 UNITS: 5000 INJECTION INTRAVENOUS; SUBCUTANEOUS at 01:05

## 2022-05-23 RX ADMIN — ACETAMINOPHEN AND CODEINE PHOSPHATE 1 TABLET: 300; 30 TABLET ORAL at 01:05

## 2022-05-23 RX ADMIN — HYDROCHLOROTHIAZIDE 12.5 MG: 12.5 TABLET ORAL at 09:05

## 2022-05-23 RX ADMIN — PANTOPRAZOLE SODIUM 40 MG: 40 TABLET, DELAYED RELEASE ORAL at 08:05

## 2022-05-23 RX ADMIN — MICONAZOLE NITRATE: 20 CREAM TOPICAL at 09:05

## 2022-05-23 RX ADMIN — SENNOSIDES AND DOCUSATE SODIUM 2 TABLET: 50; 8.6 TABLET ORAL at 09:05

## 2022-05-23 RX ADMIN — ACETAMINOPHEN AND CODEINE PHOSPHATE 1 TABLET: 300; 30 TABLET ORAL at 08:05

## 2022-05-23 RX ADMIN — Medication 1 TABLET: at 09:05

## 2022-05-23 RX ADMIN — POTASSIUM CHLORIDE 40 MEQ: 1500 TABLET, EXTENDED RELEASE ORAL at 08:05

## 2022-05-23 RX ADMIN — PANTOPRAZOLE SODIUM 40 MG: 40 TABLET, DELAYED RELEASE ORAL at 09:05

## 2022-05-23 RX ADMIN — HEPARIN SODIUM 7500 UNITS: 5000 INJECTION INTRAVENOUS; SUBCUTANEOUS at 05:05

## 2022-05-23 RX ADMIN — LORAZEPAM 0.5 MG: 0.5 TABLET ORAL at 06:05

## 2022-05-23 RX ADMIN — MICONAZOLE NITRATE: 20 CREAM TOPICAL at 08:05

## 2022-05-23 NOTE — PT/OT/SLP PROGRESS
Occupational Therapy   Treatment    Name: Lupis Murcia  MRN: [id number]  Admit Date: 5/11/2022  Admitting Diagnosis:  Cellulitis of leg    General Precautions: Standard, fall (free floating anxiety)   Orthopedic Precautions:N/A   Braces:       Recommendations:     Discharge Recommendations: home health OT  Level of Assistance Recommended at Discharge: 24 hours physical assistance for all ADL's and home management tasks  Discharge Equipment Recommendations:  bedside commode, bath bench  Barriers to discharge:   (increased care giver assist requirements.)    Assessment:     Lupis Murcia is a 71 y.o. female with a medical diagnosis of Cellulitis of leg.  She presents with  Performance deficits affecting function are weakness, impaired endurance, impaired sensation, impaired self care skills, impaired functional mobility, gait instability, impaired balance, decreased coordination, decreased lower extremity function, decreased safety awareness, pain, impaired coordination, impaired skin, edema  .     Pt. Was cooperative and participated well with session on this day.  Pt. Still continues to requires  Increase   (A) for 2 for safety and task management. Pt. With Encouragement provided. Pt. Still remains anxious.   Pt  continues to demonstrate levels of physical deficits with  functional indep with daily management activities tasks, selfcare skills with balance,  functional mobility, UB strength and endurance. Pt. Will continue to benefit from continued OT to progress towards goals     Rehab Potential is fair    Activity tolerance:  Fair    Plan:     Patient to be seen 6 x/week to address the above listed problems via self-care/home management, therapeutic activities, therapeutic exercises, neuromuscular re-education    · Plan of Care Expires: 06/12/22  · Plan of Care Reviewed with: patient    Subjective     Communicated with:  nsg and Pt.prior to session.  The back of my knees are tight. I don't want to  fall    Pain/Comfort:  Pain Rating 1: 6/10  Location - Side 1: Bilateral  Location - Orientation 1: posterior  Location 1: knee  Pain Addressed 1: Pre-medicate for activity, Distraction, Reposition    Patient's cultural, spiritual, Holiness conflicts given the current situation:  no    Objective:     Patient found sitting edge of bed and PTA present    upon OT entry to room.  Functional Mobility/Transfers:  · Patient completed Bed <> Chair Transfer using with maximal assistance x2 with 5 steps with RW Pt. Provided with encouragement as she became anxious with use of RW. from EOB to w/c and cues for safety and hand placemen.Pt. also with (A) with //bars with PTA   COTX with PTA for t/fs    Activities of Daily Living:  · Grooming: supervision with grooming   · Lower Body Dressing: total assistance to rosetta pants EOB level  an to manage over hips instance with RW for bal and BLE socks with TA  · Toileting: total assistance Pt. with (A) to change brief with BM       AMPAC 6 Click ADL: 16     OT Exercises UBE x 10 min with min resistance    Treatment & Education:  Pt edu on role of OT, POC, safety when performing self care tasks , benefit of performing OOB activity, and safety when performing functional transfers and mobility management for preparation with goals to progress towards next level of care    Patient left up in chair with all lines intact and call button in reachEducation:      GOALS:   Multidisciplinary Problems     Occupational Therapy Goals        Problem: Occupational Therapy    Goal Priority Disciplines Outcome Interventions   Occupational Therapy Goal     OT, PT/OT Ongoing, Progressing    Description: Goals to be met by: 6/2    Patient will increase functional independence with ADLs by performing:    UE Dressing with [place].  LE Dressing with Moderate Assistance using AE as needed.  Grooming while seated at sink with [place].  Toileting from bedside commode or toilet with Minimal Assistance  for hygiene and clothing management.   Bathing from shower chair/chair level with Minimal Assistance.  Stand pivot transfers with Minimal Assistance using RW.  Toilet transfer to bedside commode with Minimal Assistance.  Upper extremity exercise program with supervision.  Caregiver will be educated on level of assist required to safely perform self care tasks and functional transfers.                     Time Tracking:     OT Date of Treatment: OT Date of Treatment: 05/23/22  OT Total Time (min):      Billable Minutes:Therapeutic Activity 39    5/23/2022

## 2022-05-23 NOTE — PROGRESS NOTES
[facility]                                  Skilled Nursing Facility                   Progress Note     Admit Date: 2022  USMAN 2022  Principal Problem:  Cellulitis of leg   HPI obtained from patient interview and chart review     Chief Complaint: Re-evaluation of medical treatment and therapy status:  Lab review,  Re-evaluation of constipation, hypokalemia,     HPI:   Mrs. Murcia is a 71 year old female with PMHx of Multiple Sclerosis, Left Foot Drop, Lymphedema, Obesity (bmi 45) who presents to SNF following hospitalization for cellulitis, acute kidney injury and acute encephalopathy.  Admission to SNF for secondary weakness and debility.    Interval history: All of today's labs reviewed and are listed below.  24 hr vital sign ranges listed below.  K 3.3  Patient denies shortness of breath, abdominal discomfort, nausea, or vomiting.  Patient reports an adequate appetite.  Patient denies dysuria.  Patient reports having regular bowel movements.  Patient progessing with PT/OT. Continuing to follow and treat all acute and chronic conditions.    Past Medical History: Patient has a past medical history of Lymphedema, MS (multiple sclerosis), and Vitamin B12 deficiency.    Past Surgical History: Patient has a past surgical history that includes  section; Breast cyst excision (Left); and Esophagogastroduodenoscopy (N/A, 2022).    Social History: Patient reports that she has never smoked. She has never used smokeless tobacco. She reports that she does not drink alcohol and does not use drugs.    Family History: family history includes Brain cancer in her brother; Coronary artery disease in her father; Lung cancer in her father and mother.    Allergies: Patient is allergic to contrast media, pcn [penicillins], celebrex [celecoxib], diazepam, and motrin [ibuprofen].    ROS  Constitutional: Negative for fever   Eyes: Negative for  blurred vision, double vision   Respiratory: Negative for cough, shortness of breath   Cardiovascular: Negative for chest pain, palpitations. + leg swelling.   Gastrointestinal: Negative for abdominal pain, diarrhea, nausea, vomiting, constipation  Genitourinary: Negative for dysuria, frequency   Musculoskeletal:  + generalized weakness.  + bilateral lower extremity pain  Skin: Negative for itching and rash.   Neurological: Negative for dizziness, headaches.   Psychiatric/Behavioral: Negative for depression. The patient is nervous/anxious    24 hour Vital Sign Range   Temp:  [98.6 °F (37 °C)-99 °F (37.2 °C)]   Pulse:  [84-87]   Resp:  [18]   BP: (114-163)/(56-72)   SpO2:  [93 %-96 %]     PEx  Constitutional: Patient appears debilitated.  No distress noted  HENT:   Head: Normocephalic and atraumatic.   Eyes: Pupils are equal, round  Neck: Normal range of motion. Neck supple.   Cardiovascular: Normal rate, regular rhythm and normal heart sounds.    Pulmonary/Chest: Effort normal and breath sounds are clear  Abdominal: Soft. Bowel sounds are normal.   Musculoskeletal: Normal range of motion.   Neurological: Alert and oriented to person, place, and time.   Psychiatric: Normal mood and affect. Behavior is normal.   Skin: Skin is warm and dry. Bilateral lower extremity with pain contract scar tissue surrounded by hyperkeratotic skin.  Moisture associated dermatitis to buttocks.    05/19/22 0815         Wound 05/01/22 1720 Incontinence associated dermatitis Sacral Spine #3   Date First Assessed/Time First Assessed: 05/01/22 1720   Pre-existing: Yes  Primary Wound Type: Incontinence associated dermatitis  Location: Sacral Spine  Wound Number: #3   Wound WDL ex   Dressing Appearance Open to air   Drainage Amount None   Drainage Characteristics/Odor No odor   Appearance Pink;Moist   Tissue loss description Partial thickness   Periwound Area Moist;Excoriated   Wound Edges Irregular;Open   Wound Length (cm) 1.2 cm   Wound Width  (cm) 0.4 cm   Wound Depth (cm) 0.2 cm   Wound Volume (cm^3) 0.096 cm^3   Wound Surface Area (cm^2) 0.48 cm^2   Care Cleansed with:;Soap and water;Applied:;Skin Barrier   Skin Interventions   Device Skin Pressure Protection positioning supports utilized;pressure points protected   Pressure Reduction Devices pressure-redistributing mattress utilized;positioning supports utilized;heel offloading device utilized;chair cushion utilized   Pressure Reduction Techniques frequent weight shift encouraged;heels elevated off bed;positioned off wounds;pressure points protected           Recent Labs   Lab 05/23/22  0634      K 3.3*      CO2 29   BUN 11   CREATININE 0.7   MG 1.9       Recent Labs   Lab 05/23/22  0634   WBC 3.56*   RBC 2.82*   HGB 8.3*   HCT 27.9*      MCV 99*   MCH 29.4   MCHC 29.7*         Assessment and Plan:    Hypokalemia.  - initiated potassium 40 mEq x2 doses    Constipation  -  continue senna docusate 2 tabs BID    Cellulitis of leg  - continue vancomycin   - [place] [facility] [place]  - continue local wound care per wound care  - completed cefepime and continue vancomycin. switched to doxycycline upon discharge (end date: 5/12/22)  - completed treatment was doxycycline     Urinary retention   - discontinued espinal prior to transfer to SNF.   - Urinating well until 5/10   -  bladder scans PRN     Pressure injury of buttock, unstageable  - q2 turns  - low airloss overlay mattress  - wound care consultation       Lymphedema  - wound care consultation to assist in managmeent  - continue ammonium lactate lotion BID  - continue doxycycline for cellulitis      HTN  -  continue to hold losartan to 25 mg daily- BP's low to normal and patient is refusing to take losartan.  Continue HCTZ 12.5 mg daily     MS (multiple sclerosis)  - PT/OT  - f/u with out pt provider      B12 deficiency  - change orders to cyanocobalamin 1000 mcg every 30 days, dose to be given on 05/13 as patient has been overdue due to  recent hospitalization     Debility   - Continue with PT/OT for gait training and strengthening and restoration of ADL's   - Encourage mobility, OOB in chair, and early ambulation as appropriate  - Fall precautions   - Monitor for bowel and bladder dysfunction  - Monitor for and prevent skin breakdown and pressure ulcers  -  continue  DVT prophylaxis with  heparin 7.5 K q.8 hours        Anticipate disposition:  Home with home health        Follow-up needed during [facility] stay-     Follow-up needed after discharge from SNF:   - PCP, hospital follow-up, needs to be scheduled      Future Appointments   Date Time Provider Department Center   5/24/2022  7:00 AM [facility] [place]   5/27/2022  7:00 AM [facility] [place]   6/15/2022 10:45 AM Samir Sahni MD [facility]         Amy Conklin NP  Department of Hospital Medicine   [facility]     DOS: 5/23/2022      Patient note was created using MModal Dictation.  Any errors in syntax or even information may not have been identified and edited on initial review prior to signing this note.

## 2022-05-23 NOTE — PT/OT/SLP PROGRESS
Physical Therapy Treatment    Patient Name:  Lupis Murcia   MRN:  [id number]  Admit Date: 5/11/2022  Admitting Diagnosis: Cellulitis of leg  Recent Surgeries: N/A    General Precautions: Standard, fall   Orthopedic Precautions:N/A   Braces: N/A     Recommendations:     Discharge Recommendations:  home with home health   Level of Assistance Recommended at Discharge: 24 hours significant assistance  Discharge Equipment Recommendations:  (TBD as pt progresses)   Barriers to discharge: Decreased caregiver support    Assessment:     Lupis Murcia is a 71 y.o. female admitted with a medical diagnosis of Cellulitis of leg. Pt tolerated therapy session fairly well, presenting with anxiety still when performing standing and ambulation, but with encouragement patient agreeable to ambulate with RW x 5 steps. Pt therapy session focused on bed mob, transfers, sitting balance, gait training in order to increase pt's independence. Pt would continue to benefit from skilled PT services per POC.      Performance deficits affecting function:  weakness, impaired endurance, impaired self care skills, impaired functional mobilty, gait instability, impaired balance, decreased lower extremity function, decreased upper extremity function, decreased ROM, impaired skin, edema, impaired cardiopulmonary response to activity .    Rehab Potential is good    Activity Tolerance: Fair    Plan:     Patient to be seen 6 x/week to address the above listed problems via gait training, therapeutic activities, therapeutic exercises, neuromuscular re-education    · Plan of Care Expires: 06/11/22  · Plan of Care Reviewed with: patient    Subjective     Pt agreeable to PT.     Pain/Comfort:  · Pain Rating 1: 5/10  · Location - Side 1: Bilateral  · Location - Orientation 1: posterior  · Location 1: knee  · Pain Addressed 1: Reposition, Cessation of Activity  · Pain Rating Post-Intervention 1: 5/10    Patient's cultural, spiritual, Yazidi conflicts  given the current situation:  · no    Objective:     Patient found HOB elevated with  (no lines) upon PT entry to room.     Functional Mobility:  · Bed Mobility:     · Rolling Left:  minimum assistance  · Rolling Right: moderate assistance  · Scooting: moderate assistance  · Supine to Sit: moderate assistance  · Sit EOB to supine: Mod A x 2, guard rail, LE assistance and trunk assistance  · Transfers:     · Sit to Stand:  minimum assistance and of 2 persons with rolling walker  · Gait: x 5 steps, RW, Mod A x 2 to Max A x 2 upon pt's B knees becoming fatigued/anxiety/complaints of pain post B knees. pt requires Mod A for initiation of L LE step due to drop foot. // bars x 10', Min A x 2, Mod A for initiation of LLE to step forward due to drop foot.   · W/c to EOB: Max A x 2, squat pivot t/f, no AD/HHA.   · Balance: static standing, RW, x 1 minute at Min A.     AM-PAC 6 CLICK MOBILITY  11    Patient left up in chair with BOWIE present.    GOALS:   Multidisciplinary Problems     Physical Therapy Goals        Problem: Physical Therapy    Goal Priority Disciplines Outcome Goal Variances Interventions   Physical Therapy Goal     PT, PT/OT Ongoing, Progressing     Description: Goals to be met by: 6/9/22    Patient will increase functional independence with mobility by performing:    . Supine to sit with MInimal Assistance-not met  . Sit to supine with MInimal Assistance-not met  . Rolling to Left and Right with Minimal Assistance.  . Sit to stand transfer with Minimal Assistance with RW-met  Updated 5/21/2022 Sit to stand transfer with CGA with RW  . Bed to chair transfer with Minimal Assistance using Rolling Walker/no AD-not met  . Gait  x 10 feet with Moderate Assistance using RW- met  Updated 5/21/2022 Gait  x 20 feet with Long using RW  . Wheelchair propulsion x50 feet with Minimal Assistance using bilateral uppper extremities-not met  . Sitting at edge of bed x5 minutes with Stand-by Assistance-not met                      Time Tracking:     PT Received On: 05/23/22  PT Total Time (min):   42 min    Billable Minutes: Gait Training 15 and Therapeutic Activity 27    Treatment Type: Treatment  PT/PTA: PTA     PTA Visit Number: 1     05/23/2022

## 2022-05-24 PROCEDURE — 97535 SELF CARE MNGMENT TRAINING: CPT | Mod: CO

## 2022-05-24 PROCEDURE — 63600175 PHARM REV CODE 636 W HCPCS: Performed by: NURSE PRACTITIONER

## 2022-05-24 PROCEDURE — 25000003 PHARM REV CODE 250: Performed by: NURSE PRACTITIONER

## 2022-05-24 PROCEDURE — 25000003 PHARM REV CODE 250: Performed by: HOSPITALIST

## 2022-05-24 PROCEDURE — 11000004 HC SNF PRIVATE

## 2022-05-24 PROCEDURE — 97530 THERAPEUTIC ACTIVITIES: CPT | Mod: CQ

## 2022-05-24 PROCEDURE — 97116 GAIT TRAINING THERAPY: CPT | Mod: CQ

## 2022-05-24 RX ADMIN — HEPARIN SODIUM 7500 UNITS: 5000 INJECTION INTRAVENOUS; SUBCUTANEOUS at 10:05

## 2022-05-24 RX ADMIN — MICONAZOLE NITRATE: 20 CREAM TOPICAL at 08:05

## 2022-05-24 RX ADMIN — PANTOPRAZOLE SODIUM 40 MG: 40 TABLET, DELAYED RELEASE ORAL at 08:05

## 2022-05-24 RX ADMIN — LORAZEPAM 0.5 MG: 0.5 TABLET ORAL at 10:05

## 2022-05-24 RX ADMIN — Medication 1 TABLET: at 09:05

## 2022-05-24 RX ADMIN — SENNOSIDES AND DOCUSATE SODIUM 2 TABLET: 50; 8.6 TABLET ORAL at 08:05

## 2022-05-24 RX ADMIN — LORAZEPAM 0.5 MG: 0.5 TABLET ORAL at 06:05

## 2022-05-24 RX ADMIN — PANTOPRAZOLE SODIUM 40 MG: 40 TABLET, DELAYED RELEASE ORAL at 09:05

## 2022-05-24 RX ADMIN — HEPARIN SODIUM 7500 UNITS: 5000 INJECTION INTRAVENOUS; SUBCUTANEOUS at 02:05

## 2022-05-24 RX ADMIN — POTASSIUM CHLORIDE 40 MEQ: 1500 TABLET, EXTENDED RELEASE ORAL at 09:05

## 2022-05-24 RX ADMIN — AMMONIUM LACTATE: 12 LOTION TOPICAL at 08:05

## 2022-05-24 RX ADMIN — HYDROCHLOROTHIAZIDE 12.5 MG: 12.5 TABLET ORAL at 09:05

## 2022-05-24 RX ADMIN — Medication 1000 UNITS: at 09:05

## 2022-05-24 RX ADMIN — ACETAMINOPHEN AND CODEINE PHOSPHATE 1 TABLET: 300; 30 TABLET ORAL at 09:05

## 2022-05-24 RX ADMIN — MICONAZOLE NITRATE: 20 CREAM TOPICAL at 09:05

## 2022-05-24 RX ADMIN — ACETAMINOPHEN AND CODEINE PHOSPHATE 1 TABLET: 300; 30 TABLET ORAL at 05:05

## 2022-05-24 RX ADMIN — HEPARIN SODIUM 7500 UNITS: 5000 INJECTION INTRAVENOUS; SUBCUTANEOUS at 06:05

## 2022-05-24 RX ADMIN — LORAZEPAM 0.5 MG: 0.5 TABLET ORAL at 02:05

## 2022-05-24 NOTE — PT/OT/SLP PROGRESS
Occupational Therapy   Treatment    Name: Lupis Murcia  MRN: [id number]  Admit Date: 5/11/2022  Admitting Diagnosis:  Cellulitis of leg    General Precautions: Standard, fall (free floating anxiety)   Orthopedic Precautions:N/A   Braces:       Recommendations:     Discharge Recommendations: home health OT  Level of Assistance Recommended at Discharge: 24 hours physical assistance for all ADL's and home management tasks  Discharge Equipment Recommendations:  bedside commode, bath bench  Barriers to discharge:   (increased care giver assist requirements.)    Assessment:     Lupis Murcia is a 71 y.o. female with a medical diagnosis of Cellulitis of leg.  She presents with  Performance deficits affecting function are weakness, impaired endurance, impaired sensation, impaired self care skills, impaired functional mobility, gait instability, impaired balance, decreased coordination, decreased lower extremity function, decreased safety awareness, pain, impaired coordination, impaired skin, edema  .     Pt participated fair with session on this day.  Pt. Still continues to requires Increase (A) for 2 for safety and task management. Pt. With Encouragement provided. Pt. Still remains anxious epically  with transitional  Movement with RW.   Pt  continues to demonstrate levels of physical deficits with  functional indep with daily management activities tasks, selfcare skills with balance,  functional mobility, UB strength and endurance. Pt. Will continue to benefit from continued OT to progress towards goals     Rehab Potential is fair    Activity tolerance:  Fair    Plan:     Patient to be seen 6 x/week to address the above listed problems via self-care/home management, therapeutic activities, therapeutic exercises, neuromuscular re-education    · Plan of Care Expires: 06/12/22  · Plan of Care Reviewed with: patient    Subjective     Communicated with:  nsg and Pt.prior to session.  I can't I can't Pt. Very  anxious    Pain/Comfort:  Pain Rating 1: 0/10  Location - Side 1: Bilateral  Location - Orientation 1: posterior  Location 1: knee  Pain Addressed 1: Pre-medicate for activity, Reposition, Distraction  Pain Rating Post-Intervention 1: 6/10    Patient's cultural, spiritual, Judaism conflicts given the current situation:  no    Objective:     Patient found HOB elevated     upon OT entry to room.    Bed Mobility:    · Patient completed Rolling/Turning to Left with  moderate assistance  · Patient completed Rolling/Turning to Right with moderate assistance  · Patient completed Scooting/Bridging with maximal assistance  · Patient completed Supine to Sit with maximal assistance    · Patient completed  Sit to Supine with total  assistance x2    Functional Mobility/Transfers:  · Patient completed Sit <> Stand  Transfer with moderate assistance x 2 trials with RW for balance and cues for safety and hand placement   · Patient completed Bed <> Chair Transfer using with maximal assistance x2-3 with 5 steps and then 8 steps with RW Pt. Provided with encouragement as she became anxious with use of RW Pt. Began yelling I can't I can;t while transitioning with RW with w/c behind  and cues for safety and hand placement. COTX with PTA for t/fs    Activities of Daily Living:  · Grooming: supervision with hair care management   · Lower Body Dressing: total assistance to rosetta pants EOB level  an to manage over hips instance with RW for bal and BLE socks with TA    AMPA 6 Click ADL: 16     Treatment & Education:  Pt edu on role of OT, POC, safety when performing self care tasks , benefit of performing OOB activity, and safety when performing functional transfers and mobility management for preparation with goals to progress towards next level of care    Patient left up in chair with all lines intact and call button in reachEducation:      GOALS:   Multidisciplinary Problems     Occupational Therapy Goals        Problem: Occupational  Therapy    Goal Priority Disciplines Outcome Interventions   Occupational Therapy Goal     OT, PT/OT Ongoing, Progressing    Description: Goals to be met by: 6/2    Patient will increase functional independence with ADLs by performing:    UE Dressing with [place].  LE Dressing with Moderate Assistance using AE as needed.  Grooming while seated at sink with [place].  Toileting from bedside commode or toilet with Minimal Assistance for hygiene and clothing management.   Bathing from shower chair/chair level with Minimal Assistance.  Stand pivot transfers with Minimal Assistance using RW.  Toilet transfer to bedside commode with Minimal Assistance.  Upper extremity exercise program with supervision.  Caregiver will be educated on level of assist required to safely perform self care tasks and functional transfers.                     Time Tracking:     OT Date of Treatment: OT Date of Treatment: 05/24/22  OT Total Time (min):      Billable Minutes:Self Care/Home Management 40    5/24/2022

## 2022-05-24 NOTE — PT/OT/SLP PROGRESS
Physical Therapy Treatment    Patient Name:  Lupis Murcia   MRN:  [id number]  Admit Date: 5/11/2022  Admitting Diagnosis: Cellulitis of leg  Recent Surgeries: N/A    General Precautions: Standard, fall   Orthopedic Precautions:N/A   Braces: N/A     Recommendations:     Discharge Recommendations:  home with home health   Level of Assistance Recommended at Discharge: 24 hours significant assistance  Discharge Equipment Recommendations:  (TBD as pt progresses)   Barriers to discharge: Decreased caregiver support    Assessment:     Lupis Murcia is a 71 y.o. female admitted with a medical diagnosis of Cellulitis of leg. Pt tolerated therapy session well with focus on gait training, transfers, bed mob and safety awareness in order to assist with achieving highest level of independence.  Pt would continue to benefit from skilled PT services per POC. Pt still presents with high anxiety during gait/transfers with max encouragement required and Max A x 3 during gait.     Performance deficits affecting function:  weakness, impaired endurance, impaired self care skills, impaired functional mobilty, gait instability, impaired balance, decreased lower extremity function, decreased upper extremity function, decreased ROM, impaired skin, edema, impaired cardiopulmonary response to activity .    Rehab Potential is good    Activity Tolerance: Fair    Plan:     Patient to be seen 6 x/week to address the above listed problems via gait training, therapeutic activities, therapeutic exercises, neuromuscular re-education    · Plan of Care Expires: 06/11/22  · Plan of Care Reviewed with: patient    Subjective     Pt agreeable to PT.     Pain/Comfort:  · Pain Rating 1: 0/10  · Pain Rating Post-Intervention 1: 0/10    Patient's cultural, spiritual, Shinto conflicts given the current situation:  · no    Objective:     Patient found HOB elevated with  (no lines) upon PT entry to room.     Functional Mobility:  · Bed Mobility:      · Scooting: moderate assistance/max assistance  · Supine to Sit: moderate assistance  · Transfers:     · Sit to Stand: 2 sets, moderate assistance with rolling walker  · Bed to Chair: moderate assistance and maximal assistance with  rolling walker  using  Step Transfer  · Gait: x 5', 2 sets, RW, Mod/Max A x 3 with max tactile assistance with bringing LLE forward due to drop foot. Pt requires max encouragement due to anxiety upon standing/taking steps in RW, with vc/tactile cues on erect posture and keeping AD close to pt's body.     AM-PAC 6 CLICK MOBILITY  11    Patient left up in chair with call button in reach.    GOALS:   Multidisciplinary Problems     Physical Therapy Goals        Problem: Physical Therapy    Goal Priority Disciplines Outcome Goal Variances Interventions   Physical Therapy Goal     PT, PT/OT Ongoing, Progressing     Description: Goals to be met by: 6/9/22    Patient will increase functional independence with mobility by performing:    . Supine to sit with MInimal Assistance-not met  . Sit to supine with MInimal Assistance-not met  . Rolling to Left and Right with Minimal Assistance.  . Sit to stand transfer with Minimal Assistance with RW-met  Updated 5/21/2022 Sit to stand transfer with CGA with RW  . Bed to chair transfer with Minimal Assistance using Rolling Walker/no AD-not met  . Gait  x 10 feet with Moderate Assistance using RW- met  Updated 5/21/2022 Gait  x 20 feet with Long using RW  . Wheelchair propulsion x50 feet with Minimal Assistance using bilateral uppper extremities-not met  . Sitting at edge of bed x5 minutes with Stand-by Assistance-not met                     Time Tracking:     PT Received On: 05/24/22   Treatment minutes: 31 min          Billable Minutes: Gait Training 14 and Therapeutic Activity 17    Treatment Type: Treatment  PT/PTA: PTA     PTA Visit Number: 2     05/24/2022

## 2022-05-25 PROCEDURE — 25000003 PHARM REV CODE 250: Performed by: HOSPITALIST

## 2022-05-25 PROCEDURE — 97116 GAIT TRAINING THERAPY: CPT | Mod: CQ

## 2022-05-25 PROCEDURE — 25000003 PHARM REV CODE 250: Performed by: NURSE PRACTITIONER

## 2022-05-25 PROCEDURE — 63600175 PHARM REV CODE 636 W HCPCS: Performed by: NURSE PRACTITIONER

## 2022-05-25 PROCEDURE — 97535 SELF CARE MNGMENT TRAINING: CPT | Mod: CO

## 2022-05-25 PROCEDURE — 97110 THERAPEUTIC EXERCISES: CPT | Mod: CO

## 2022-05-25 PROCEDURE — 11000004 HC SNF PRIVATE

## 2022-05-25 RX ADMIN — HEPARIN SODIUM 7500 UNITS: 5000 INJECTION INTRAVENOUS; SUBCUTANEOUS at 01:05

## 2022-05-25 RX ADMIN — Medication 1 TABLET: at 11:05

## 2022-05-25 RX ADMIN — PANTOPRAZOLE SODIUM 40 MG: 40 TABLET, DELAYED RELEASE ORAL at 08:05

## 2022-05-25 RX ADMIN — MICONAZOLE NITRATE: 20 CREAM TOPICAL at 11:05

## 2022-05-25 RX ADMIN — ONDANSETRON 4 MG: 4 TABLET, ORALLY DISINTEGRATING ORAL at 09:05

## 2022-05-25 RX ADMIN — ACETAMINOPHEN AND CODEINE PHOSPHATE 1 TABLET: 300; 30 TABLET ORAL at 01:05

## 2022-05-25 RX ADMIN — LORAZEPAM 0.5 MG: 0.5 TABLET ORAL at 05:05

## 2022-05-25 RX ADMIN — AMMONIUM LACTATE: 12 LOTION TOPICAL at 08:05

## 2022-05-25 RX ADMIN — AMMONIUM LACTATE: 12 LOTION TOPICAL at 11:05

## 2022-05-25 RX ADMIN — ACETAMINOPHEN AND CODEINE PHOSPHATE 1 TABLET: 300; 30 TABLET ORAL at 08:05

## 2022-05-25 RX ADMIN — ACETAMINOPHEN AND CODEINE PHOSPHATE 1 TABLET: 300; 30 TABLET ORAL at 12:05

## 2022-05-25 RX ADMIN — HYDROCHLOROTHIAZIDE 12.5 MG: 12.5 TABLET ORAL at 11:05

## 2022-05-25 RX ADMIN — Medication 1000 UNITS: at 11:05

## 2022-05-25 RX ADMIN — LORAZEPAM 0.5 MG: 0.5 TABLET ORAL at 09:05

## 2022-05-25 RX ADMIN — HEPARIN SODIUM 7500 UNITS: 5000 INJECTION INTRAVENOUS; SUBCUTANEOUS at 05:05

## 2022-05-25 RX ADMIN — PANTOPRAZOLE SODIUM 40 MG: 40 TABLET, DELAYED RELEASE ORAL at 11:05

## 2022-05-25 RX ADMIN — HEPARIN SODIUM 7500 UNITS: 5000 INJECTION INTRAVENOUS; SUBCUTANEOUS at 08:05

## 2022-05-25 NOTE — PT/OT/SLP PROGRESS
"Physical Therapy Treatment    Patient Name:  Lupis Murcia   MRN:  [id number]  Admit Date: 5/11/2022  Admitting Diagnosis: Cellulitis of leg  General Precautions: Standard, fall   Orthopedic Precautions:N/A   Braces: N/A     Recommendations:     Discharge Recommendations:  home with home health   Level of Assistance Recommended at Discharge: 24 hours significant assistance  Discharge Equipment Recommendations:  (TBD as pt progresses)   Barriers to discharge: Decreased caregiver support    Assessment:     Lupis Murcia is a 71 y.o. female admitted with a medical diagnosis of Cellulitis of leg. Pt tolerated therapy session well with focus on gait training in // bars, transfers and standing balance in order to achieve highest level of function. Pt's anxiety is very limiting to pt's improvement in therapy, preventing pt from ambulating in RW and requires max encouragement. Pt would continue to benefit from skilled PT services per POC.       Performance deficits affecting function:  weakness, impaired endurance, impaired self care skills, impaired functional mobilty, gait instability, impaired balance, decreased lower extremity function, decreased upper extremity function, decreased ROM, impaired skin, edema, impaired cardiopulmonary response to activity .    Rehab Potential is good    Activity Tolerance: Fair    Plan:     Patient to be seen 6 x/week to address the above listed problems via gait training, therapeutic activities, therapeutic exercises, neuromuscular re-education    · Plan of Care Expires: 06/11/22  · Plan of Care Reviewed with: patient    Subjective     Pt agreeable to PT, very anxious "I did not sleep at all last night. I have been up worrying about walking with the RW today."      Pain/Comfort:  · Pain Rating 1: 0/10  · Pain Rating Post-Intervention 1: 0/10    Patient's cultural, spiritual, Faith conflicts given the current situation:  · no    Objective:     Communicated with OT prior to " session.  Patient found sitting edge of bed with  (no lines) upon PT entry to room.     Functional Mobility:  · Transfers:     · Sit to Stand: 3 sets, minimum assistance with // bars  · Gait: x 10', x 18', // bars, Min A overall with max A/tactile cues to LLE due to drop foot.   · Balance: standing dyn reaching across midline/outside MEGAN, CGA, UE support on // bars.     AM-PAC 6 CLICK MOBILITY  11    Patient left up in chair with call button in reach.    GOALS:   Multidisciplinary Problems     Physical Therapy Goals        Problem: Physical Therapy    Goal Priority Disciplines Outcome Goal Variances Interventions   Physical Therapy Goal     PT, PT/OT Ongoing, Progressing     Description: Goals to be met by: 6/9/22    Patient will increase functional independence with mobility by performing:    . Supine to sit with MInimal Assistance-not met  . Sit to supine with MInimal Assistance-not met  . Rolling to Left and Right with Minimal Assistance.  . Sit to stand transfer with Minimal Assistance with RW-met  Updated 5/21/2022 Sit to stand transfer with CGA with RW  . Bed to chair transfer with Minimal Assistance using Rolling Walker/no AD-not met  . Gait  x 10 feet with Moderate Assistance using RW- met  Updated 5/21/2022 Gait  x 20 feet with Long using RW  . Wheelchair propulsion x50 feet with Minimal Assistance using bilateral uppper extremities-not met  . Sitting at edge of bed x5 minutes with Stand-by Assistance-not met                     Time Tracking:     PT Received On: 05/25/22             Billable Minutes: Gait Training 29    Treatment Type: Treatment  PT/PTA: PTA     PTA Visit Number: 3     05/25/2022

## 2022-05-25 NOTE — PLAN OF CARE
Problem: Adult Inpatient Plan of Care  Goal: Plan of Care Review  Outcome: Ongoing, Progressing  Goal: Patient-Specific Goal (Individualized)  Outcome: Ongoing, Progressing  Goal: Absence of Hospital-Acquired Illness or Injury  Outcome: Ongoing, Progressing  Goal: Optimal Comfort and Wellbeing  Outcome: Ongoing, Progressing     Problem: Bariatric Environmental Safety  Goal: Safety Maintained with Care  Outcome: Ongoing, Progressing     Problem: Impaired Wound Healing  Goal: Optimal Wound Healing  Outcome: Ongoing, Progressing     Problem: Fall Injury Risk  Goal: Absence of Fall and Fall-Related Injury  Outcome: Ongoing, Progressing     Problem: Oral Intake Inadequate (Acute Kidney Injury/Impairment)  Goal: Optimal Nutrition Intake  Outcome: Ongoing, Progressing

## 2022-05-25 NOTE — PT/OT/SLP PROGRESS
Occupational Therapy   Treatment    Name: Lupis Murcia  MRN: [id number]  Admit Date: 5/11/2022  Admitting Diagnosis:  Cellulitis of leg    General Precautions: Standard, fall (free floating anxiety)   Orthopedic Precautions:N/A   Braces:       Recommendations:     Discharge Recommendations: home health OT  Level of Assistance Recommended at Discharge: 24 hours physical assistance for all ADL's and home management tasks  Discharge Equipment Recommendations:  bedside commode, bath bench  Barriers to discharge:   (increased care giver assist requirements.)    Assessment:     Lupis Murcia is a 71 y.o. female with a medical diagnosis of Cellulitis of leg.  She presents with  Performance deficits affecting function are weakness, impaired endurance, impaired sensation, impaired self care skills, impaired functional mobility, gait instability, impaired balance, decreased coordination, decreased lower extremity function, decreased safety awareness, pain, impaired coordination, impaired skin, edema  .     Pt participated fair with session on this day.  Pt. Still continues to requires Increase (A) for 2 for safety and task management. Pt. With Encouragement provided. Pt. Still remains anxious epically  with transitional  Movement with RW.   Pt  continues to demonstrate levels of physical deficits with  functional indep with daily management activities tasks, selfcare skills with balance,  functional mobility, UB strength and endurance. Pt. Will continue to benefit from continued OT to progress towards goals     Rehab Potential is fair    Activity tolerance:  Fair    Plan:     Patient to be seen 6 x/week to address the above listed problems via self-care/home management, therapeutic activities, therapeutic exercises, neuromuscular re-education    · Plan of Care Expires: 06/12/22  · Plan of Care Reviewed with: patient    Subjective     Communicated with:  nsg and Pt.prior to session.  I was so nervious I didn't sleep last  night    Pain/Comfort:  Pain Rating 1: 0/10  Location - Side 1: Bilateral  Location - Orientation 1: posterior  Location 1: knee    Patient's cultural, spiritual, Yazdanism conflicts given the current situation:  no    Objective:     Patient found HOB elevated and daughter present     upon OT entry to room.    Bed Mobility:    · Patient completed Supine to Sit with moderated to maximal assistance      Functional Mobility/Transfers:  · Patient completed Sit <> Stand  Transfer with moderate assistance with RW for balance and cues for safety and hand placement   · Patient completed Bed <> Chair Transfer using with maximal assistance with sliding board transfer  2 nd person for safety from EOB to w/c Pt. With cue for safety and hand placement with SBT technique     Activities of Daily Living:  · Grooming: supervision with hair care management   · Lower Body Dressing: maximum assistance to rosetta pants EOB level  an to manage over hips instance with RW for bal and BLE socks with TA    AMPAC 6 Click ADL: 16     OT Exercises UBE x 10 min with min resistance    Treatment & Education:    Pt. With 1# dowel activity with 2x20 reps with  shd flex, bicep curls horz adb/add and forward flex motion to increase BUE ROM and strength.    Pt. With therex performed to increase ROM, endurance selfcare task and fxl mobility for independence     Pt edu on role of OT, POC, safety when performing self care tasks , benefit of performing OOB activity, and safety when performing functional transfers and mobility management for preparation with goals to progress towards next level of care    Patient left up in chair with all lines intact and call button in reachEducation:      GOALS:   Multidisciplinary Problems     Occupational Therapy Goals        Problem: Occupational Therapy    Goal Priority Disciplines Outcome Interventions   Occupational Therapy Goal     OT, PT/OT Ongoing, Progressing    Description: Goals to be met by: 6/2    Patient will  increase functional independence with ADLs by performing:    UE Dressing with [place].  LE Dressing with Moderate Assistance using AE as needed.  Grooming while seated at sink with [place].  Toileting from bedside commode or toilet with Minimal Assistance for hygiene and clothing management.   Bathing from shower chair/chair level with Minimal Assistance.  Stand pivot transfers with Minimal Assistance using RW.  Toilet transfer to bedside commode with Minimal Assistance.  Upper extremity exercise program with supervision.  Caregiver will be educated on level of assist required to safely perform self care tasks and functional transfers.                     Time Tracking:     OT Date of Treatment: OT Date of Treatment: 05/25/22  OT Total Time (min):      Billable Minutes:Self Care/Home Management 24  Therapeutic Exercise 20    5/25/2022

## 2022-05-26 LAB
ANION GAP SERPL CALC-SCNC: 11 MMOL/L (ref 8–16)
BASOPHILS # BLD AUTO: 0.01 K/UL (ref 0–0.2)
BASOPHILS NFR BLD: 0.3 % (ref 0–1.9)
BUN SERPL-MCNC: 14 MG/DL (ref 8–23)
CALCIUM SERPL-MCNC: 9.6 MG/DL (ref 8.7–10.5)
CHLORIDE SERPL-SCNC: 104 MMOL/L (ref 95–110)
CO2 SERPL-SCNC: 25 MMOL/L (ref 23–29)
CREAT SERPL-MCNC: 0.8 MG/DL (ref 0.5–1.4)
DIFFERENTIAL METHOD: ABNORMAL
EOSINOPHIL # BLD AUTO: 0.2 K/UL (ref 0–0.5)
EOSINOPHIL NFR BLD: 4.7 % (ref 0–8)
ERYTHROCYTE [DISTWIDTH] IN BLOOD BY AUTOMATED COUNT: 17.2 % (ref 11.5–14.5)
EST. GFR  (AFRICAN AMERICAN): >60 ML/MIN/1.73 M^2
EST. GFR  (NON AFRICAN AMERICAN): >60 ML/MIN/1.73 M^2
GLUCOSE SERPL-MCNC: 112 MG/DL (ref 70–110)
HCT VFR BLD AUTO: 28.3 % (ref 37–48.5)
HGB BLD-MCNC: 8.6 G/DL (ref 12–16)
IMM GRANULOCYTES # BLD AUTO: 0.03 K/UL (ref 0–0.04)
IMM GRANULOCYTES NFR BLD AUTO: 0.8 % (ref 0–0.5)
LYMPHOCYTES # BLD AUTO: 1.5 K/UL (ref 1–4.8)
LYMPHOCYTES NFR BLD: 38.2 % (ref 18–48)
MAGNESIUM SERPL-MCNC: 1.7 MG/DL (ref 1.6–2.6)
MCH RBC QN AUTO: 29.7 PG (ref 27–31)
MCHC RBC AUTO-ENTMCNC: 30.4 G/DL (ref 32–36)
MCV RBC AUTO: 98 FL (ref 82–98)
MONOCYTES # BLD AUTO: 0.4 K/UL (ref 0.3–1)
MONOCYTES NFR BLD: 11.1 % (ref 4–15)
NEUTROPHILS # BLD AUTO: 1.7 K/UL (ref 1.8–7.7)
NEUTROPHILS NFR BLD: 44.9 % (ref 38–73)
NRBC BLD-RTO: 0 /100 WBC
PHOSPHATE SERPL-MCNC: 3.6 MG/DL (ref 2.7–4.5)
PLATELET # BLD AUTO: 238 K/UL (ref 150–450)
PMV BLD AUTO: 10.1 FL (ref 9.2–12.9)
POTASSIUM SERPL-SCNC: 4 MMOL/L (ref 3.5–5.1)
RBC # BLD AUTO: 2.9 M/UL (ref 4–5.4)
SODIUM SERPL-SCNC: 140 MMOL/L (ref 136–145)
WBC # BLD AUTO: 3.87 K/UL (ref 3.9–12.7)

## 2022-05-26 PROCEDURE — 25000003 PHARM REV CODE 250: Performed by: HOSPITALIST

## 2022-05-26 PROCEDURE — 36415 COLL VENOUS BLD VENIPUNCTURE: CPT | Performed by: HOSPITALIST

## 2022-05-26 PROCEDURE — 84100 ASSAY OF PHOSPHORUS: CPT | Performed by: HOSPITALIST

## 2022-05-26 PROCEDURE — 11000004 HC SNF PRIVATE

## 2022-05-26 PROCEDURE — 85025 COMPLETE CBC W/AUTO DIFF WBC: CPT | Performed by: HOSPITALIST

## 2022-05-26 PROCEDURE — 97530 THERAPEUTIC ACTIVITIES: CPT | Mod: CQ

## 2022-05-26 PROCEDURE — 63600175 PHARM REV CODE 636 W HCPCS: Performed by: NURSE PRACTITIONER

## 2022-05-26 PROCEDURE — 97530 THERAPEUTIC ACTIVITIES: CPT | Mod: CO

## 2022-05-26 PROCEDURE — 25000003 PHARM REV CODE 250: Performed by: NURSE PRACTITIONER

## 2022-05-26 PROCEDURE — 83735 ASSAY OF MAGNESIUM: CPT | Performed by: HOSPITALIST

## 2022-05-26 PROCEDURE — 80048 BASIC METABOLIC PNL TOTAL CA: CPT | Performed by: HOSPITALIST

## 2022-05-26 PROCEDURE — 97116 GAIT TRAINING THERAPY: CPT | Mod: CQ

## 2022-05-26 PROCEDURE — 97535 SELF CARE MNGMENT TRAINING: CPT | Mod: CO

## 2022-05-26 RX ORDER — LANOLIN ALCOHOL/MO/W.PET/CERES
400 CREAM (GRAM) TOPICAL 2 TIMES DAILY
Status: COMPLETED | OUTPATIENT
Start: 2022-05-26 | End: 2022-05-28

## 2022-05-26 RX ORDER — POLYETHYLENE GLYCOL 3350 17 G/17G
17 POWDER, FOR SOLUTION ORAL DAILY
Status: DISCONTINUED | OUTPATIENT
Start: 2022-05-26 | End: 2022-06-06

## 2022-05-26 RX ADMIN — LORAZEPAM 0.5 MG: 0.5 TABLET ORAL at 10:05

## 2022-05-26 RX ADMIN — ACETAMINOPHEN AND CODEINE PHOSPHATE 1 TABLET: 300; 30 TABLET ORAL at 09:05

## 2022-05-26 RX ADMIN — Medication 1000 UNITS: at 10:05

## 2022-05-26 RX ADMIN — Medication 1 TABLET: at 10:05

## 2022-05-26 RX ADMIN — LORAZEPAM 0.5 MG: 0.5 TABLET ORAL at 02:05

## 2022-05-26 RX ADMIN — HYDROCHLOROTHIAZIDE 12.5 MG: 12.5 TABLET ORAL at 10:05

## 2022-05-26 RX ADMIN — HEPARIN SODIUM 7500 UNITS: 5000 INJECTION INTRAVENOUS; SUBCUTANEOUS at 05:05

## 2022-05-26 RX ADMIN — LORAZEPAM 0.5 MG: 0.5 TABLET ORAL at 03:05

## 2022-05-26 RX ADMIN — PANTOPRAZOLE SODIUM 40 MG: 40 TABLET, DELAYED RELEASE ORAL at 09:05

## 2022-05-26 RX ADMIN — HEPARIN SODIUM 7500 UNITS: 5000 INJECTION INTRAVENOUS; SUBCUTANEOUS at 09:05

## 2022-05-26 RX ADMIN — HEPARIN SODIUM 7500 UNITS: 5000 INJECTION INTRAVENOUS; SUBCUTANEOUS at 02:05

## 2022-05-26 RX ADMIN — AMMONIUM LACTATE: 12 LOTION TOPICAL at 10:05

## 2022-05-26 RX ADMIN — PANTOPRAZOLE SODIUM 40 MG: 40 TABLET, DELAYED RELEASE ORAL at 10:05

## 2022-05-26 RX ADMIN — ACETAMINOPHEN AND CODEINE PHOSPHATE 1 TABLET: 300; 30 TABLET ORAL at 10:05

## 2022-05-26 RX ADMIN — AMMONIUM LACTATE: 12 LOTION TOPICAL at 09:05

## 2022-05-26 RX ADMIN — Medication 400 MG: at 09:05

## 2022-05-26 NOTE — PLAN OF CARE
Interdisciplinary team, Anup Sherman, Unit Director, Harmony Fermin RN MDS Coordinator, Mary Oh PT, Rehab Supervisor, Fatemeh Andrea RN, Case Management, and Lisbet Leary, Dietician, spoke to patient for care plan conference, weekly status update, and therapy progress update. Tentative discharge date set for 6/2/22. Patient requesting extension if possible.

## 2022-05-26 NOTE — PROGRESS NOTES
[facility] - Skilled Nursing  Wound Care    Patient Name:  Lupis Murcia   MRN:  [id number]  Date: 5/26/2022  Diagnosis: Cellulitis of leg    History:     Past Medical History:   Diagnosis Date    Lymphedema     MS (multiple sclerosis)     Vitamin B12 deficiency        Social History     Socioeconomic History    Marital status:    Tobacco Use    Smoking status: Never Smoker    Smokeless tobacco: Never Used   Substance and Sexual Activity    Alcohol use: No    Drug use: No       Precautions:     Allergies as of 05/11/2022 - Reviewed 05/11/2022   Allergen Reaction Noted    Contrast media Shortness Of Breath and Rash 12/15/2016    Pcn [penicillins] Shortness Of Breath and Rash 07/10/2013    Celebrex [celecoxib] Other (See Comments) 06/12/2017    Diazepam Hives 10/12/2021    Motrin [ibuprofen] Rash 07/10/2013       [facility] Assessment Details/Treatment   Ms. Murcia is sitting up in a chair at bedside with chair cushion.  She reports her coccyx/buttock area is dry, barely anything there anymore.  The PCT reports the skin is dry, no blemishes, no discoloration.    Her BLE skin is intact, the hard dry skin is softer now and easy to remove with cleansing.    Discussed wound care prevention/treatment- verbalized understanding.  Plan:  Coccyx/buttocks- continue triad ointment BID/prn  BLE- continue lac hydrin BID-     Nursing to continue care, pressure prevention measures  Wound care will follow-up prn     Recommendations made to primary team for above plan per written report . Orders in  place.     05/26/2022

## 2022-05-26 NOTE — PT/OT/SLP PROGRESS
Physical Therapy Treatment    Patient Name:  Lupis Murcia   MRN:  [id number]  Admit Date: 5/11/2022  Admitting Diagnosis: Cellulitis of leg  General Precautions: Standard, fall   Orthopedic Precautions:N/A   Braces: N/A     Recommendations:     Discharge Recommendations:  home with home health   Level of Assistance Recommended at Discharge: 24 hours significant assistance  Discharge Equipment Recommendations:  (TBD as pt progresses)   Barriers to discharge: Decreased caregiver support    Assessment:     Lupis Murcia is a 71 y.o. female admitted with a medical diagnosis of Cellulitis of leg. Pt tolerated therapy session well with focus on increasing independence with bed mob, transfers and gait training in order to assist with achieving highest level of function. Pt would continue to benefit from skilled PT services per POC.     Performance deficits affecting function:  weakness, impaired endurance, impaired self care skills, impaired functional mobilty, gait instability, impaired balance, decreased lower extremity function, decreased upper extremity function, decreased ROM, impaired skin, edema, impaired cardiopulmonary response to activity .    Rehab Potential is good    Activity Tolerance: Fair    Plan:     Patient to be seen 6 x/week to address the above listed problems via gait training, therapeutic activities, therapeutic exercises, neuromuscular re-education    · Plan of Care Expires: 06/11/22  · Plan of Care Reviewed with: patient    Subjective     Pt agreeable to PT.     Pain/Comfort:  · Pain Rating 1:  (not rated)  · Location - Side 1: Bilateral  · Location - Orientation 1: generalized  · Location 1: head  · Pain Addressed 1: Nurse notified  · Pain Rating Post-Intervention 1:  (not rated)    Patient's cultural, spiritual, Denominational conflicts given the current situation:  · no    Objective:     Communicated with nursing during session.  Patient found HOB elevated with  (no lines) upon PT entry to room.      Functional Mobility:  · Bed Mobility:     · Rolling Left:  moderate assistance  · Rolling Right: moderate assistance  · Scooting: moderate assistance  · Supine to Sit: moderate assistance  · Transfers:     · Sit to Stand: 3 sets, minimum assistance x 2 people with rolling walker  · Bed to Chair: moderate assistance and of 2 persons with  no AD  using  Squat Pivot  · Gait: x 8', x 4', RW, Mod A x 2 follow with w/c and tactile cues/Mod A to initiate LLE step due to drop foot.   · UE ergometer: x 3 mins to increase cardio endurance and trunk strengthening.     AM-PAC 6 CLICK MOBILITY  11    Patient left up in chair with call button in reach and visitors present.    GOALS:   Multidisciplinary Problems     Physical Therapy Goals        Problem: Physical Therapy    Goal Priority Disciplines Outcome Goal Variances Interventions   Physical Therapy Goal     PT, PT/OT Ongoing, Progressing     Description: Goals to be met by: 6/9/22    Patient will increase functional independence with mobility by performing:    . Supine to sit with MInimal Assistance-not met  . Sit to supine with MInimal Assistance-not met  . Rolling to Left and Right with Minimal Assistance.  . Sit to stand transfer with Minimal Assistance with RW-met  Updated 5/21/2022 Sit to stand transfer with CGA with RW  . Bed to chair transfer with Minimal Assistance using Rolling Walker/no AD-not met  . Gait  x 10 feet with Moderate Assistance using RW- met  Updated 5/21/2022 Gait  x 20 feet with Long using RW  . Wheelchair propulsion x50 feet with Minimal Assistance using bilateral uppper extremities-not met  . Sitting at edge of bed x5 minutes with Stand-by Assistance-not met                     Time Tracking:     PT Received On: 05/26/22             Billable Minutes: Gait Training 15 and Therapeutic Activity 36    Treatment Type: Treatment  PT/PTA: PTA     PTA Visit Number: 4     05/26/2022

## 2022-05-26 NOTE — PROGRESS NOTES
[facility]                                  Skilled Nursing Facility                   Progress Note     Admit Date: 2022  USMAN 2022  Principal Problem:  Cellulitis of leg   HPI obtained from patient interview and chart review     Chief Complaint: Re-evaluation of medical treatment and therapy status:  Lab review,  Re-evaluation of constipation, hypomagnesemia    HPI:   Mrs. Murcia is a 71 year old female with PMHx of Multiple Sclerosis, Left Foot Drop, Lymphedema, Obesity (bmi 45) who presents to SNF following hospitalization for cellulitis, acute kidney injury and acute encephalopathy.  Admission to SNF for secondary weakness and debility.    Interval history:All of today's labs reviewed and are listed below.  Mag 1.7.  24 hr vital sign ranges listed below.  Patient denies shortness of breath, abdominal discomfort, nausea, or vomiting.  Patient reports an adequate appetite.  Patient denies dysuria.  Patient still having bowel movement difficulties, only passing small movements, will initiate daily MiraLax.  Patient progessing slowly with PT/OT- Gait: x 8', x 4', RW, Mod A x 2 follow with w/c and tactile cues/Mod A to initiate LLE step due to drop foot.. Continuing to follow and treat all acute and chronic conditions.      Past Medical History: Patient has a past medical history of Lymphedema, MS (multiple sclerosis), and Vitamin B12 deficiency.    Past Surgical History: Patient has a past surgical history that includes  section; Breast cyst excision (Left); and Esophagogastroduodenoscopy (N/A, 2022).    Social History: Patient reports that she has never smoked. She has never used smokeless tobacco. She reports that she does not drink alcohol and does not use drugs.    Family History: family history includes Brain cancer in her brother; Coronary artery disease in her father; Lung cancer in her father and  mother.    Allergies: Patient is allergic to contrast media, pcn [penicillins], celebrex [celecoxib], diazepam, and motrin [ibuprofen].    ROS  Constitutional: Negative for fever   Eyes: Negative for blurred vision, double vision   Respiratory: Negative for cough, shortness of breath   Cardiovascular: Negative for chest pain, palpitations. + leg swelling.   Gastrointestinal: Negative for abdominal pain, diarrhea, nausea, vomiting, constipation  Genitourinary: Negative for dysuria, frequency   Musculoskeletal:  + generalized weakness.  + bilateral lower extremity pain  Skin: Negative for itching and rash.   Neurological: Negative for dizziness, headaches.   Psychiatric/Behavioral: Negative for depression. The patient is nervous/anxious    24 hour Vital Sign Range   Temp:  [97.1 °F (36.2 °C)-98.2 °F (36.8 °C)]   Pulse:  [90-95]   Resp:  [16-18]   BP: (147-150)/(68-72)   SpO2:  [96 %-99 %]     PEx  Constitutional: Patient appears debilitated.  No distress noted  HENT:   Head: Normocephalic and atraumatic.   Eyes: Pupils are equal, round  Neck: Normal range of motion. Neck supple.   Cardiovascular: Normal rate, regular rhythm and normal heart sounds.    Pulmonary/Chest: Effort normal and breath sounds are clear  Abdominal: Soft. Bowel sounds are normal.   Musculoskeletal: Normal range of motion.   Neurological: Alert and oriented to person, place, and time.   Psychiatric: Normal mood and affect. Behavior is normal.   Skin: Skin is warm and dry. Bilateral lower extremity with pain contract scar tissue surrounded by hyperkeratotic skin.  Moisture associated dermatitis to buttocks, improved.          Recent Labs   Lab 05/26/22  0511      K 4.0      CO2 25   BUN 14   CREATININE 0.8   MG 1.7       Recent Labs   Lab 05/26/22  0511   WBC 3.87*   RBC 2.90*   HGB 8.6*   HCT 28.3*      MCV 98   MCH 29.7   MCHC 30.4*         Assessment and Plan:    Constipation  -  initiated MiraLax daily, continue senna  docusate 2 tabs BID    Hypomagnesemia  - initiated magnesium oxide 400 mg BID x2 days    Cellulitis of leg  - continue vancomycin   - ID consulted at [facility]  - continue local wound care per wound care  - completed cefepime and continue vancomycin. switched to doxycycline upon discharge (end date: 5/12/22)  - completed treatment was doxycycline     Urinary retention   - discontinued espinal prior to transfer to SNF.   - Urinating well until 5/10   -  bladder scans PRN     Pressure injury of buttock, unstageable  - q2 turns  - low airloss overlay mattress  - wound care consultation       Lymphedema  - wound care consultation to assist in managmeent  - continue ammonium lactate lotion BID  - continue doxycycline for cellulitis      HTN  -  continue to hold losartan to 25 mg daily- BP's low to normal and patient is refusing to take losartan.  Continue HCTZ 12.5 mg daily     MS (multiple sclerosis)  - PT/OT  - f/u with out pt provider      B12 deficiency  - change orders to cyanocobalamin 1000 mcg every 30 days, dose to be given on 05/13 as patient has been overdue due to recent hospitalization     Debility   - Continue with PT/OT for gait training and strengthening and restoration of ADL's   - Encourage mobility, OOB in chair, and early ambulation as appropriate  - Fall precautions   - Monitor for bowel and bladder dysfunction  - Monitor for and prevent skin breakdown and pressure ulcers  -  continue  DVT prophylaxis with  heparin 7.5 K q.8 hours        Anticipate disposition:  Home with home health        Follow-up needed during SNF stay-     Follow-up needed after discharge from SNF:   - PCP, hospital follow-up, needs to be scheduled      Future Appointments   Date Time Provider Department Center   5/27/2022  7:00 AM [facility] SPEC LAB [facility] SPECLAB [facility]   6/15/2022 10:45 AM Samir Sahni MD [facility]         Amy Conklin NP  Department of Hospital Medicine    [facility]     DOS: 5/26/2022      Patient note was created using MModal Dictation.  Any errors in syntax or even information may not have been identified and edited on initial review prior to signing this note.

## 2022-05-26 NOTE — PT/OT/SLP PROGRESS
Occupational Therapy   Treatment    Name: Lupis Murcia  MRN: [id number]  Admit Date: 5/11/2022  Admitting Diagnosis:  Cellulitis of leg    General Precautions: Standard, fall (free floating anxiety)   Orthopedic Precautions:N/A   Braces:       Recommendations:     Discharge Recommendations: home health OT  Level of Assistance Recommended at Discharge: 24 hours physical assistance for all ADL's and home management tasks  Discharge Equipment Recommendations:  bedside commode, bath bench  Barriers to discharge:   (increased care giver assist requirements.)    Assessment:     Lupis Murcia is a 71 y.o. female with a medical diagnosis of Cellulitis of leg.  She presents with  Performance deficits affecting function are weakness, impaired endurance, impaired sensation, impaired self care skills, impaired functional mobility, gait instability, impaired balance, decreased coordination, decreased lower extremity function, decreased safety awareness, pain, impaired coordination, impaired skin, edema  .     Pt participated fair with session on this day.  Pt. Still continues to requires Increase (A) for 2 for safety and task management. Pt. With Encouragement provided. Pt. Still remains anxious epically  with transitional movements with RW.   Pt  continues to demonstrate levels of physical deficits with  functional indep with daily management activities tasks, selfcare skills with balance,  functional mobility, UB strength and endurance. Pt. Will continue to benefit from continued OT to progress towards goals     Rehab Potential is fair    Activity tolerance:  Fair    Plan:     Patient to be seen 6 x/week to address the above listed problems via self-care/home management, therapeutic activities, therapeutic exercises, neuromuscular re-education    · Plan of Care Expires: 06/12/22  · Plan of Care Reviewed with: patient    Subjective     Communicated with:  nsg and Pt.prior to session.  I just get so nervous      Pain/Comfort:  Pain Rating 1:  (did not rate)  Location - Orientation 1: generalized  Location 1: head  Pain Addressed 1: Nurse notified  Pain Rating Post-Intervention 1: other (see comments) (did not rate)    Patient's cultural, spiritual, Mu-ism conflicts given the current situation:  no    Objective:     Patient found HOB elevated     upon OT entry to room.    Bed Mobility:    · Patient completed Rolling/Turning to Left with  moderate assistance  · Patient completed Rolling/Turning to Right with moderate assistance  · Patient completed Scooting/Bridging with maximal assistance  · Patient completed Supine to Sit with moderate assistance      Functional Mobility/Transfers:  · Patient completed Sit <> Stand  Transfer with minimal to moderate assistance  X 2 with 3 trials with RW for balance and cues for safety and hand placement   · Patient completed Bed <> Chair Transfer using with moderate assistance x2 with SPT from EOB to w/c   · COTX with PTA for t/f's  with 5 steps and then 8 ft  with RW Pt. Provided with encouragement Pt. Not as anxious on this day with use of RW      Activities of Daily Living:  · Grooming: supervision with hair care management   · Lower Body Dressing: maximal assistance to rosetta pants EOB level an to manage over hips instance with RW for bal and BLE socks with TA    [facility] 6 Click ADL: 16     OT Exercises UBE x 10 min     Treatment & Education:  Pt edu on role of OT, POC, safety when performing self care tasks , benefit of performing OOB activity, and safety when performing functional transfers and mobility management for preparation with goals to progress towards next level of care    Patient left up in chair with all lines intact and call button in reachEducation:      GOALS:   Multidisciplinary Problems     Occupational Therapy Goals        Problem: Occupational Therapy    Goal Priority Disciplines Outcome Interventions   Occupational Therapy Goal     OT, PT/OT Ongoing, Progressing     Description: Goals to be met by: 6/2    Patient will increase functional independence with ADLs by performing:    UE Dressing with [place].  LE Dressing with Moderate Assistance using AE as needed.  Grooming while seated at sink with [place].  Toileting from bedside commode or toilet with Minimal Assistance for hygiene and clothing management.   Bathing from shower chair/chair level with Minimal Assistance.  Stand pivot transfers with Minimal Assistance using RW.  Toilet transfer to bedside commode with Minimal Assistance.  Upper extremity exercise program with supervision.  Caregiver will be educated on level of assist required to safely perform self care tasks and functional transfers.                     Time Tracking:     OT Date of Treatment: OT Date of Treatment: 05/26/22  OT Total Time (min):      Billable Minutes:Therapeutic Activity 42    5/26/2022

## 2022-05-27 PROCEDURE — 11000004 HC SNF PRIVATE

## 2022-05-27 PROCEDURE — 97530 THERAPEUTIC ACTIVITIES: CPT

## 2022-05-27 PROCEDURE — 25000003 PHARM REV CODE 250: Performed by: HOSPITALIST

## 2022-05-27 PROCEDURE — 97110 THERAPEUTIC EXERCISES: CPT

## 2022-05-27 PROCEDURE — 63600175 PHARM REV CODE 636 W HCPCS: Performed by: NURSE PRACTITIONER

## 2022-05-27 PROCEDURE — 97116 GAIT TRAINING THERAPY: CPT

## 2022-05-27 PROCEDURE — 25000003 PHARM REV CODE 250: Performed by: NURSE PRACTITIONER

## 2022-05-27 RX ADMIN — ACETAMINOPHEN AND CODEINE PHOSPHATE 1 TABLET: 300; 30 TABLET ORAL at 09:05

## 2022-05-27 RX ADMIN — PANTOPRAZOLE SODIUM 40 MG: 40 TABLET, DELAYED RELEASE ORAL at 08:05

## 2022-05-27 RX ADMIN — HYDROCHLOROTHIAZIDE 12.5 MG: 12.5 TABLET ORAL at 08:05

## 2022-05-27 RX ADMIN — Medication 400 MG: at 09:05

## 2022-05-27 RX ADMIN — ACETAMINOPHEN AND CODEINE PHOSPHATE 1 TABLET: 300; 30 TABLET ORAL at 08:05

## 2022-05-27 RX ADMIN — LORAZEPAM 0.5 MG: 0.5 TABLET ORAL at 05:05

## 2022-05-27 RX ADMIN — HEPARIN SODIUM 7500 UNITS: 5000 INJECTION INTRAVENOUS; SUBCUTANEOUS at 03:05

## 2022-05-27 RX ADMIN — Medication 1000 UNITS: at 08:05

## 2022-05-27 RX ADMIN — ACETAMINOPHEN AND CODEINE PHOSPHATE 1 TABLET: 300; 30 TABLET ORAL at 03:05

## 2022-05-27 RX ADMIN — LORAZEPAM 0.5 MG: 0.5 TABLET ORAL at 08:05

## 2022-05-27 RX ADMIN — PANTOPRAZOLE SODIUM 40 MG: 40 TABLET, DELAYED RELEASE ORAL at 09:05

## 2022-05-27 RX ADMIN — HEPARIN SODIUM 7500 UNITS: 5000 INJECTION INTRAVENOUS; SUBCUTANEOUS at 05:05

## 2022-05-27 RX ADMIN — Medication 1 TABLET: at 08:05

## 2022-05-27 RX ADMIN — Medication 400 MG: at 08:05

## 2022-05-27 RX ADMIN — HEPARIN SODIUM 7500 UNITS: 5000 INJECTION INTRAVENOUS; SUBCUTANEOUS at 09:05

## 2022-05-27 NOTE — PT/OT/SLP PROGRESS
Physical Therapy Treatment    Patient Name:  Lupis Murcia   MRN:  [id number]  Admit Date: 5/11/2022  Admitting Diagnosis: Cellulitis of leg  Recent Surgeries: none    General Precautions: Standard, fall   Orthopedic Precautions:N/A   Braces:   none    Recommendations:     Discharge Recommendations:  home with home health   Level of Assistance Recommended at Discharge: 24 hours light assistance  Discharge Equipment Recommendations:  (TBD as pt progresses)   Barriers to discharge: Decreased caregiver support    Assessment:     Lupis Murcia is a 71 y.o. female admitted with a medical diagnosis of Cellulitis of leg . Pt jeffery session well w/ good participation but does present w/ fear of falling. She is however making progress w/ mobility and required less assistance for transfers and gait on this date. She will continue PT POC.      Performance deficits affecting function:  weakness, impaired endurance, impaired self care skills, impaired functional mobilty, gait instability, impaired balance, decreased lower extremity function, decreased upper extremity function, decreased ROM, impaired skin, edema, impaired cardiopulmonary response to activity .    Rehab Potential is good    Activity Tolerance: Good    Plan:     Patient to be seen 6 x/week to address the above listed problems via gait training, therapeutic activities, therapeutic exercises, neuromuscular re-education    · Plan of Care Expires: 06/11/22  · Plan of Care Reviewed with: patient    Subjective     Pt agreeable to session.     Pain/Comfort:  · Pain Rating 1: 7/10  · Location - Side 1: Right  · Location - Orientation 1: generalized  · Location 1: leg  · Pain Addressed 1: Pre-medicate for activity, Reposition  · Pain Rating Post-Intervention 1: 7/10    Patient's cultural, spiritual, Confucianist conflicts given the current situation:  · no    Objective:     Communicated with patient and her daughter prior to session.  Patient found sitting edge of bed with   (no lines) upon PT entry to room.     Therapeutic Activities and Exercises:   Seated BLE therex x10 reps (GS, HF w/ AAROM, LAQ, AP) w/ cues for form    Functional Mobility:  · Transfers:    · Sit<>stand to/from EOB (1 trial) and to/from w/c (2 trials); all w/ RW and Long to rise  · Stand pivot EOB>w/c w/ RW and Long  · Cues for hand/foot placement  · Gait:   · 2 trials (5ft and 10ft) w/ RW and Mod/Long for stability, RW management, and LLE advancement  · Pt's daughter will bring pt's person AFO for LLE foot drop along w/ her tennis shoe for PT can assess fit and determine if a new brace needs to be ordered.   · Balance:   · Static stand w/ RW and Long for stability while PT/tech donned clean brief and pants  · Wheelchair Propulsion:   · 10ft w/ BUE and ModA since pt veers left despite cues for technique    AM-PAC 6 CLICK MOBILITY  13    Patient left up in chair with call button in reach.    GOALS:   Multidisciplinary Problems     Physical Therapy Goals        Problem: Physical Therapy    Goal Priority Disciplines Outcome Goal Variances Interventions   Physical Therapy Goal     PT, PT/OT Ongoing, Progressing     Description: Goals to be met by: 6/9/22    Patient will increase functional independence with mobility by performing:    . Supine to sit with MInimal Assistance-not met  . Sit to supine with MInimal Assistance-not met  . Rolling to Left and Right with Minimal Assistance.  . Sit to stand transfer with Minimal Assistance with RW-met  Updated 5/21/2022 Sit to stand transfer with CGA with RW  . Bed to chair transfer with Minimal Assistance using Rolling Walker/no AD-not met  . Gait  x 10 feet with Moderate Assistance using RW- met  Updated 5/21/2022 Gait  x 20 feet with Long using RW  . Wheelchair propulsion x50 feet with Minimal Assistance using bilateral uppper extremities-not met  . Sitting at edge of bed x5 minutes with Stand-by Assistance-Met 5/27/2022                     Time Tracking:     PT Received On:  05/27/22  PT Start Time: 1304  PT Stop Time: 1348  PT Total Time (min): 44 min    Billable Minutes: Gait Training 14, Therapeutic Activity 20, Therapeutic Exercise 10 and Total Time 44    Treatment Type: Treatment  PT/PTA: PT     PTA Visit Number: 0     05/27/2022

## 2022-05-27 NOTE — PT/OT/SLP PROGRESS
"Occupational Therapy      Patient Name:  Lupis Murcia   MRN:  [id number]    Patient not seen today secondary to patient unwilling to participate due to fatigue.  She said, "My sister's a nurse and helped me get ready earlier, and I worked with PT."  Will follow-up as scheduled per OT POC.    5/27/2022  "

## 2022-05-27 NOTE — PLAN OF CARE
Problem: Adult Inpatient Plan of Care  Goal: Plan of Care Review  Outcome: Ongoing, Progressing  Goal: Patient-Specific Goal (Individualized)  Outcome: Ongoing, Progressing  Goal: Absence of Hospital-Acquired Illness or Injury  Outcome: Ongoing, Progressing     Problem: Impaired Wound Healing  Goal: Optimal Wound Healing  Outcome: Ongoing, Progressing     Problem: Fall Injury Risk  Goal: Absence of Fall and Fall-Related Injury  Outcome: Ongoing, Progressing

## 2022-05-28 PROCEDURE — 25000003 PHARM REV CODE 250: Performed by: NURSE PRACTITIONER

## 2022-05-28 PROCEDURE — 11000004 HC SNF PRIVATE

## 2022-05-28 PROCEDURE — 25000003 PHARM REV CODE 250: Performed by: HOSPITALIST

## 2022-05-28 PROCEDURE — 63600175 PHARM REV CODE 636 W HCPCS: Performed by: NURSE PRACTITIONER

## 2022-05-28 RX ADMIN — Medication 400 MG: at 09:05

## 2022-05-28 RX ADMIN — ONDANSETRON 4 MG: 4 TABLET, ORALLY DISINTEGRATING ORAL at 02:05

## 2022-05-28 RX ADMIN — PANTOPRAZOLE SODIUM 40 MG: 40 TABLET, DELAYED RELEASE ORAL at 10:05

## 2022-05-28 RX ADMIN — ACETAMINOPHEN AND CODEINE PHOSPHATE 1 TABLET: 300; 30 TABLET ORAL at 05:05

## 2022-05-28 RX ADMIN — AMMONIUM LACTATE: 12 LOTION TOPICAL at 09:05

## 2022-05-28 RX ADMIN — Medication 1 TABLET: at 09:05

## 2022-05-28 RX ADMIN — PANTOPRAZOLE SODIUM 40 MG: 40 TABLET, DELAYED RELEASE ORAL at 09:05

## 2022-05-28 RX ADMIN — HEPARIN SODIUM 7500 UNITS: 5000 INJECTION INTRAVENOUS; SUBCUTANEOUS at 03:05

## 2022-05-28 RX ADMIN — LORAZEPAM 0.5 MG: 0.5 TABLET ORAL at 02:05

## 2022-05-28 RX ADMIN — HYDROCHLOROTHIAZIDE 12.5 MG: 12.5 TABLET ORAL at 09:05

## 2022-05-28 RX ADMIN — LORAZEPAM 0.5 MG: 0.5 TABLET ORAL at 06:05

## 2022-05-28 RX ADMIN — ACETAMINOPHEN AND CODEINE PHOSPHATE 1 TABLET: 300; 30 TABLET ORAL at 06:05

## 2022-05-28 RX ADMIN — LORAZEPAM 0.5 MG: 0.5 TABLET ORAL at 10:05

## 2022-05-28 RX ADMIN — HEPARIN SODIUM 7500 UNITS: 5000 INJECTION INTRAVENOUS; SUBCUTANEOUS at 05:05

## 2022-05-28 RX ADMIN — Medication 1000 UNITS: at 09:05

## 2022-05-28 RX ADMIN — POLYETHYLENE GLYCOL 3350 17 G: 17 POWDER, FOR SOLUTION ORAL at 09:05

## 2022-05-28 RX ADMIN — HEPARIN SODIUM 7500 UNITS: 5000 INJECTION INTRAVENOUS; SUBCUTANEOUS at 10:05

## 2022-05-28 RX ADMIN — ACETAMINOPHEN AND CODEINE PHOSPHATE 1 TABLET: 300; 30 TABLET ORAL at 12:05

## 2022-05-28 RX ADMIN — SENNOSIDES AND DOCUSATE SODIUM 2 TABLET: 50; 8.6 TABLET ORAL at 09:05

## 2022-05-29 PROCEDURE — 63600175 PHARM REV CODE 636 W HCPCS: Performed by: NURSE PRACTITIONER

## 2022-05-29 PROCEDURE — 25000003 PHARM REV CODE 250: Performed by: HOSPITALIST

## 2022-05-29 PROCEDURE — 97110 THERAPEUTIC EXERCISES: CPT

## 2022-05-29 PROCEDURE — 11000004 HC SNF PRIVATE

## 2022-05-29 PROCEDURE — 25000003 PHARM REV CODE 250: Performed by: NURSE PRACTITIONER

## 2022-05-29 PROCEDURE — 97116 GAIT TRAINING THERAPY: CPT | Mod: CQ

## 2022-05-29 PROCEDURE — 97530 THERAPEUTIC ACTIVITIES: CPT | Mod: CQ

## 2022-05-29 PROCEDURE — 97530 THERAPEUTIC ACTIVITIES: CPT

## 2022-05-29 RX ADMIN — Medication 1 TABLET: at 08:05

## 2022-05-29 RX ADMIN — ACETAMINOPHEN AND CODEINE PHOSPHATE 1 TABLET: 300; 30 TABLET ORAL at 12:05

## 2022-05-29 RX ADMIN — HEPARIN SODIUM 7500 UNITS: 5000 INJECTION INTRAVENOUS; SUBCUTANEOUS at 05:05

## 2022-05-29 RX ADMIN — HEPARIN SODIUM 7500 UNITS: 5000 INJECTION INTRAVENOUS; SUBCUTANEOUS at 02:05

## 2022-05-29 RX ADMIN — PANTOPRAZOLE SODIUM 40 MG: 40 TABLET, DELAYED RELEASE ORAL at 08:05

## 2022-05-29 RX ADMIN — ACETAMINOPHEN AND CODEINE PHOSPHATE 1 TABLET: 300; 30 TABLET ORAL at 03:05

## 2022-05-29 RX ADMIN — ACETAMINOPHEN AND CODEINE PHOSPHATE 1 TABLET: 300; 30 TABLET ORAL at 08:05

## 2022-05-29 RX ADMIN — LORAZEPAM 0.5 MG: 0.5 TABLET ORAL at 08:05

## 2022-05-29 RX ADMIN — LORAZEPAM 0.5 MG: 0.5 TABLET ORAL at 02:05

## 2022-05-29 RX ADMIN — HEPARIN SODIUM 7500 UNITS: 5000 INJECTION INTRAVENOUS; SUBCUTANEOUS at 10:05

## 2022-05-29 RX ADMIN — HYDROCHLOROTHIAZIDE 12.5 MG: 12.5 TABLET ORAL at 08:05

## 2022-05-29 RX ADMIN — LORAZEPAM 0.5 MG: 0.5 TABLET ORAL at 11:05

## 2022-05-29 RX ADMIN — Medication 1000 UNITS: at 08:05

## 2022-05-29 NOTE — PLAN OF CARE
Problem: Adult Inpatient Plan of Care  Goal: Plan of Care Review  Outcome: Ongoing, Progressing  Goal: Patient-Specific Goal (Individualized)  Outcome: Ongoing, Progressing     Problem: Skin Injury Risk Increased  Goal: Skin Health and Integrity  Outcome: Ongoing, Progressing     Problem: Fall Injury Risk  Goal: Absence of Fall and Fall-Related Injury  Outcome: Ongoing, Progressing

## 2022-05-29 NOTE — PT/OT/SLP PROGRESS
Physical Therapy Treatment    Patient Name:  Lupis Murcia   MRN:  [id number]  Admit Date: 5/11/2022  Admitting Diagnosis: Cellulitis of leg  General Precautions: Standard, fall   Orthopedic Precautions:N/A   Braces: N/A     Recommendations:     Discharge Recommendations:  home with home health   Level of Assistance Recommended at Discharge: 24 hours significant assistance  Discharge Equipment Recommendations:  (TBD as pt progresses)   Barriers to discharge: Decreased caregiver support    Assessment:     Lupis Murcia is a 71 y.o. female admitted with a medical diagnosis of Cellulitis of leg. Pt tolerated therapy session well with focus on transfers and gait training using RW, in order to assist with preparing pt for d/c from PT. Pt would continue to benefit from skilled PT services per POC.       Performance deficits affecting function:  weakness, impaired endurance, impaired self care skills, impaired functional mobilty, gait instability, impaired balance, decreased lower extremity function, decreased upper extremity function, decreased ROM, impaired skin, edema, impaired cardiopulmonary response to activity .    Rehab Potential is good    Activity Tolerance: Fair    Plan:     Patient to be seen 6 x/week to address the above listed problems via gait training, therapeutic activities, therapeutic exercises, neuromuscular re-education    · Plan of Care Expires: 06/11/22  · Plan of Care Reviewed with: patient    Subjective     Pt agreeable to PT.     Pain/Comfort:  · Pain Rating 1: 0/10  · Pain Rating Post-Intervention 1: 0/10    Patient's cultural, spiritual, Mandaen conflicts given the current situation:  · no    Objective:     Communicated with OT prior to session.  Patient found up in chair with  (no active lines) upon PT entry to room.     Functional Mobility:  · Transfers:     · Sit to Stand: 2 sets, minimum assistance with rolling walker. Requires sequencing reminders.   · Gait: x 5, x 6', RW, Min A x 2  (follow with w/c) and Mod A for initiation of LLE step due to drop foot/reinforcing RW in place due to pt's anxiety.   · W/c to EOB: stand pivot t/f, RW, Min A x 2.  · EOB to supine: Mod A x 2, guard rails      AM-PAC 6 CLICK MOBILITY  13    Patient left up in chair with call button in reach.    GOALS:   Multidisciplinary Problems     Physical Therapy Goals        Problem: Physical Therapy    Goal Priority Disciplines Outcome Goal Variances Interventions   Physical Therapy Goal     PT, PT/OT Ongoing, Progressing     Description: Goals to be met by: 6/9/22    Patient will increase functional independence with mobility by performing:    . Supine to sit with MInimal Assistance-not met  . Sit to supine with MInimal Assistance-not met  . Rolling to Left and Right with Minimal Assistance.  . Sit to stand transfer with Minimal Assistance with RW-met  Updated 5/21/2022 Sit to stand transfer with CGA with RW  . Bed to chair transfer with Minimal Assistance using Rolling Walker/no AD-not met  . Gait  x 10 feet with Moderate Assistance using RW- met  Updated 5/21/2022 Gait  x 20 feet with Long using RW  . Wheelchair propulsion x50 feet with Minimal Assistance using bilateral uppper extremities-not met  . Sitting at edge of bed x5 minutes with Stand-by Assistance-Met 5/27/2022                     Time Tracking:     PT Received On: 05/29/22  PT Start Time: 1014  PT Stop Time: 1036  PT Total Time (min): 22 min     Second Treatment Encounter:  Start Time: 1340  Stop Time: 1351  Total Time (min): 11 min     Two separate encounters performed for gait training and second session to get patient back into bed.     Billable Minutes: Gait Training 11 Therapeutic Activity 22 Total Mins: 33 mins    Treatment Type: Treatment  PT/PTA: PTA     PTA Visit Number: 1     05/29/2022

## 2022-05-29 NOTE — PT/OT/SLP PROGRESS
"Occupational Therapy   Treatment    Name: Lupis Murcia  MRN: [id number]  Admit Date: 5/11/2022  Admitting Diagnosis:  Cellulitis of leg    General Precautions: Standard, fall   Orthopedic Precautions:N/A   Braces: N/A     Recommendations:     Discharge Recommendations: home health OT, home health PT  Level of Assistance Recommended at Discharge: 24 hours physical assistance for all ADL's and home management tasks  Discharge Equipment Recommendations:  bedside commode, bath bench  Barriers to discharge:  Decreased caregiver support    Assessment:     Lupis Murcia is a 71 y.o. female with a medical diagnosis of Cellulitis of leg.  She presents with the following performance deficits affecting function: weakness, impaired endurance, impaired sensation, impaired self care skills, impaired functional mobilty, gait instability, impaired balance, decreased coordination, decreased lower extremity function, decreased safety awareness, pain, decreased ROM, impaired coordination, impaired skin, edema. Pt was agreeable to OT and was noted to make progress towards her goals in therapy.   During today's session, pt was able to reach goal for transfer with min A using RW, however, goal is being maintained for consistency before upgrading.  Pt worked on UE strength/endurance exercises after func mobility.  Pt's goals remain appropriate at this time.  She will continue to benefit from skilled OT services in order to assist her with increasing her safety and level of independence with self care and mobility tasks.       Rehab Potential is good    Activity tolerance:  Fair    Plan:     Patient to be seen 6 x/week to address the above listed problems via self-care/home management, therapeutic activities, therapeutic exercises    · Plan of Care Expires: 06/16/22  · Plan of Care Reviewed with: patient, daughter    Subjective     Communicated with: nurse prior to session.   "I didn't do that.  It was you."  Pt's response when OT " praised her for her fantastic progress with transfers as she was able to stand and transfer to her wheelchair with min A using her RW.  .    Pain/Comfort:  · Pain Rating 1: 0/10  · Location - Side 1: Left  · Location 1: flank  · Pain Addressed 1: Pre-medicate for activity, Distraction, Cessation of Activity  · Pain Rating Post-Intervention 1: 7/10    Patient's cultural, spiritual, Alevism conflicts given the current situation:  · no    Objective:     Patient found sitting EOB (assisted to EOB by daugther prior to OT arriving) with  (no active lines) upon OT entry to room.    Bed Mobility:    · N/A     Functional Mobility/Transfers:  · Patient completed Sit <> Stand Transfer with minimum assistance  with  rolling walker   · Patient completed Bed <> Chair Transfer using Step Transfer technique with minimum assistance with rolling walker  · Functional Mobility: Pt able to scoot to EOB and stand with min A - stood in front of bed for a short while before initiating steps to turn and transfer to w/c - able to perform full task with min A of 1 person only (2nd person in room available, but not needed)    Activities of Daily Living:  · N/A    [facility] 6 Click ADL: 16    OT Exercises: UE Ergometer set for 15' with mod resistance, but only able to complete 10' total with 3 short rest breaks.  Pt worked on UE strengthening and endurance exercises to improve her safety and independence during functional activities, such as, w/c mobility, transfers, walking with RW, bed mobility and ADLs'.  During and after UBE exercises, pt c/o pain in L side.     Treatment & Education:  · Pt completed ADLs and func mobility activities for tx session as noted above  · Pt noted with great improvement on standing and transfers - daughter present in room for beginning of session  · Pt educated on role of OT and POC      Patient left up in chair with in therapy gym - waiting to begin with PTEducation:      GOALS:   Multidisciplinary Problems      Occupational Therapy Goals        Problem: Occupational Therapy    Goal Priority Disciplines Outcome Interventions   Occupational Therapy Goal     OT, PT/OT Ongoing, Progressing    Description: Goals to be met by: 6/2    Patient will increase functional independence with ADLs by performing:    UE Dressing with [place].  LE Dressing with Moderate Assistance using AE as needed.  Grooming while seated at sink with [place] [employer].  Toileting from bedside commode or toilet with Minimal Assistance for hygiene and clothing management.   Bathing from shower chair/chair level with Minimal Assistance.  Stand pivot transfers with Minimal Assistance using RW.  Toilet transfer to bedside commode with Minimal Assistance.  Upper extremity exercise program with supervision.  Caregiver will be educated on level of assist required to safely perform self care tasks and functional transfers.                     Time Tracking:     OT Date of Treatment: 05/29/22               Billable Minutes:Therapeutic Activity 23  Therapeutic Exercise 15    5/29/2022

## 2022-05-29 NOTE — PLAN OF CARE
Problem: Occupational Therapy  Goal: Occupational Therapy Goal  Description: Goals to be met by: 6/2    Patient will increase functional independence with ADLs by performing:    UE Dressing with [place].  LE Dressing with Moderate Assistance using AE as needed.  Grooming while seated at sink with [place].  Toileting from bedside commode or toilet with Minimal Assistance for hygiene and clothing management.   Bathing from shower chair/chair level with Minimal Assistance.  Stand pivot transfers with Minimal Assistance using RW.  Toilet transfer to bedside commode with Minimal Assistance.  Upper extremity exercise program with supervision.  Caregiver will be educated on level of assist required to safely perform self care tasks and functional transfers.    Outcome: Ongoing, Progressing     Pt was agreeable to OT and was noted to make progress towards her goals in therapy.   During today's session, pt was able to reach goal for transfer with min A using RW, however, goal is being maintained for consistency before upgrading.  Pt worked on UE strength/endurance exercises after func mobility.  Pt's goals remain appropriate at this time.  She will continue to benefit from skilled OT services in order to assist her with increasing her safety and level of independence with self care and mobility tasks.     Lisbet Springer, OT  5/29/2022

## 2022-05-30 LAB
ANION GAP SERPL CALC-SCNC: 11 MMOL/L (ref 8–16)
BASOPHILS # BLD AUTO: 0.02 K/UL (ref 0–0.2)
BASOPHILS NFR BLD: 0.4 % (ref 0–1.9)
BUN SERPL-MCNC: 16 MG/DL (ref 8–23)
CALCIUM SERPL-MCNC: 9.8 MG/DL (ref 8.7–10.5)
CHLORIDE SERPL-SCNC: 102 MMOL/L (ref 95–110)
CO2 SERPL-SCNC: 27 MMOL/L (ref 23–29)
CREAT SERPL-MCNC: 0.8 MG/DL (ref 0.5–1.4)
DIFFERENTIAL METHOD: ABNORMAL
EOSINOPHIL # BLD AUTO: 0.2 K/UL (ref 0–0.5)
EOSINOPHIL NFR BLD: 4.7 % (ref 0–8)
ERYTHROCYTE [DISTWIDTH] IN BLOOD BY AUTOMATED COUNT: 17.1 % (ref 11.5–14.5)
EST. GFR  (AFRICAN AMERICAN): >60 ML/MIN/1.73 M^2
EST. GFR  (NON AFRICAN AMERICAN): >60 ML/MIN/1.73 M^2
GLUCOSE SERPL-MCNC: 136 MG/DL (ref 70–110)
HCT VFR BLD AUTO: 31.7 % (ref 37–48.5)
HGB BLD-MCNC: 9.4 G/DL (ref 12–16)
IMM GRANULOCYTES # BLD AUTO: 0.01 K/UL (ref 0–0.04)
IMM GRANULOCYTES NFR BLD AUTO: 0.2 % (ref 0–0.5)
LYMPHOCYTES # BLD AUTO: 1.5 K/UL (ref 1–4.8)
LYMPHOCYTES NFR BLD: 30.5 % (ref 18–48)
MAGNESIUM SERPL-MCNC: 1.8 MG/DL (ref 1.6–2.6)
MCH RBC QN AUTO: 30.1 PG (ref 27–31)
MCHC RBC AUTO-ENTMCNC: 29.7 G/DL (ref 32–36)
MCV RBC AUTO: 102 FL (ref 82–98)
MONOCYTES # BLD AUTO: 0.5 K/UL (ref 0.3–1)
MONOCYTES NFR BLD: 10 % (ref 4–15)
NEUTROPHILS # BLD AUTO: 2.7 K/UL (ref 1.8–7.7)
NEUTROPHILS NFR BLD: 54.2 % (ref 38–73)
NRBC BLD-RTO: 0 /100 WBC
PHOSPHATE SERPL-MCNC: 3.2 MG/DL (ref 2.7–4.5)
PLATELET # BLD AUTO: 258 K/UL (ref 150–450)
PMV BLD AUTO: 10.4 FL (ref 9.2–12.9)
POTASSIUM SERPL-SCNC: 3.6 MMOL/L (ref 3.5–5.1)
RBC # BLD AUTO: 3.12 M/UL (ref 4–5.4)
SODIUM SERPL-SCNC: 140 MMOL/L (ref 136–145)
WBC # BLD AUTO: 4.92 K/UL (ref 3.9–12.7)

## 2022-05-30 PROCEDURE — 25000003 PHARM REV CODE 250: Performed by: HOSPITALIST

## 2022-05-30 PROCEDURE — 85025 COMPLETE CBC W/AUTO DIFF WBC: CPT | Performed by: HOSPITALIST

## 2022-05-30 PROCEDURE — 11000004 HC SNF PRIVATE

## 2022-05-30 PROCEDURE — 84100 ASSAY OF PHOSPHORUS: CPT | Performed by: HOSPITALIST

## 2022-05-30 PROCEDURE — 83735 ASSAY OF MAGNESIUM: CPT | Performed by: HOSPITALIST

## 2022-05-30 PROCEDURE — 63600175 PHARM REV CODE 636 W HCPCS: Performed by: NURSE PRACTITIONER

## 2022-05-30 PROCEDURE — 97112 NEUROMUSCULAR REEDUCATION: CPT | Mod: CQ

## 2022-05-30 PROCEDURE — 97530 THERAPEUTIC ACTIVITIES: CPT | Mod: CQ

## 2022-05-30 PROCEDURE — 25000003 PHARM REV CODE 250: Performed by: NURSE PRACTITIONER

## 2022-05-30 PROCEDURE — 36415 COLL VENOUS BLD VENIPUNCTURE: CPT | Performed by: HOSPITALIST

## 2022-05-30 PROCEDURE — 97535 SELF CARE MNGMENT TRAINING: CPT

## 2022-05-30 PROCEDURE — 97116 GAIT TRAINING THERAPY: CPT | Mod: CQ

## 2022-05-30 PROCEDURE — 80048 BASIC METABOLIC PNL TOTAL CA: CPT | Performed by: HOSPITALIST

## 2022-05-30 RX ORDER — LANOLIN ALCOHOL/MO/W.PET/CERES
400 CREAM (GRAM) TOPICAL 2 TIMES DAILY
Status: COMPLETED | OUTPATIENT
Start: 2022-05-30 | End: 2022-06-01

## 2022-05-30 RX ADMIN — HEPARIN SODIUM 7500 UNITS: 5000 INJECTION INTRAVENOUS; SUBCUTANEOUS at 06:05

## 2022-05-30 RX ADMIN — ACETAMINOPHEN AND CODEINE PHOSPHATE 1 TABLET: 300; 30 TABLET ORAL at 09:05

## 2022-05-30 RX ADMIN — PANTOPRAZOLE SODIUM 40 MG: 40 TABLET, DELAYED RELEASE ORAL at 09:05

## 2022-05-30 RX ADMIN — HEPARIN SODIUM 7500 UNITS: 5000 INJECTION INTRAVENOUS; SUBCUTANEOUS at 09:05

## 2022-05-30 RX ADMIN — LORAZEPAM 0.5 MG: 0.5 TABLET ORAL at 03:05

## 2022-05-30 RX ADMIN — PANTOPRAZOLE SODIUM 40 MG: 40 TABLET, DELAYED RELEASE ORAL at 08:05

## 2022-05-30 RX ADMIN — HEPARIN SODIUM 7500 UNITS: 5000 INJECTION INTRAVENOUS; SUBCUTANEOUS at 01:05

## 2022-05-30 RX ADMIN — Medication 1000 UNITS: at 09:05

## 2022-05-30 RX ADMIN — Medication 1 TABLET: at 09:05

## 2022-05-30 RX ADMIN — AMMONIUM LACTATE: 12 LOTION TOPICAL at 09:05

## 2022-05-30 RX ADMIN — LORAZEPAM 0.5 MG: 0.5 TABLET ORAL at 06:05

## 2022-05-30 RX ADMIN — Medication 400 MG: at 08:05

## 2022-05-30 RX ADMIN — HYDROCHLOROTHIAZIDE 12.5 MG: 12.5 TABLET ORAL at 09:05

## 2022-05-30 RX ADMIN — ACETAMINOPHEN AND CODEINE PHOSPHATE 1 TABLET: 300; 30 TABLET ORAL at 02:05

## 2022-05-30 RX ADMIN — ACETAMINOPHEN AND CODEINE PHOSPHATE 1 TABLET: 300; 30 TABLET ORAL at 01:05

## 2022-05-30 RX ADMIN — LORAZEPAM 0.5 MG: 0.5 TABLET ORAL at 10:05

## 2022-05-30 NOTE — PT/OT/SLP PROGRESS
Occupational Therapy   Treatment    Name: Lupis Murcia  MRN: [id number]  Admit Date: 5/11/2022  Admitting Diagnosis:  Cellulitis of leg    General Precautions: Standard, fall   Orthopedic Precautions:N/A   Braces: N/A     Recommendations:     Discharge Recommendations: home health OT  Level of Assistance Recommended at Discharge: 24 hours physical assistance for all ADL's and home management tasks  Discharge Equipment Recommendations:  bedside commode, bath bench  Barriers to discharge:  Decreased caregiver support    Assessment:     Lupis Murcia is a 71 y.o. female with a medical diagnosis of Cellulitis of leg.Performance deficits affecting function are weakness, impaired endurance, impaired self care skills, impaired functional mobilty, gait instability, impaired balance, decreased coordination, impaired sensation, decreased safety awareness, decreased ROM, impaired coordination, impaired skin, edema. Patient demonstrates increased anxiety with sit<>stands and transfers, but patient did well needing min A using RW and education on hand placement and technique to perform sit>stand when using RW. Patient would benefit from continued skilled acute OT to improve functional mobility, increase independence with ADLs, and address established goals.    Rehab Potential is good    Activity tolerance:  Good    Plan:     Patient to be seen 6 x/week to address the above listed problems via self-care/home management, therapeutic activities, therapeutic exercises    · Plan of Care Expires: 06/16/22  · Plan of Care Reviewed with: patient    Subjective     Communicated with: TANMAY prior to session.     Pain/Comfort:  · Pain Rating 1: 0/10  · Pain Rating Post-Intervention 1: 0/10    Patient's cultural, spiritual, Orthodoxy conflicts given the current situation:  · no    Objective:     Patient found HOB elevated upon OT entry to room.    Bed Mobility:    · Patient completed Rolling/Turning to Left with  minimum  assistance  · Patient completed Scooting/Bridging with minimum assistance anteriorly to EOB  · Patient completed Supine to Sit with moderate assistance with HOB flat, but used handrail    Functional Mobility/Transfers:  · Patient completed Sit <> Stand Transfer with contact guard assistance  with  rolling walker   · Patient completed Bed > Chair Transfer using Stand Pivot technique with contact guard assistance with rolling walker  · Patient completed Toilet Transfer w/c<>BSC over toilet stand pivot technique with contact guard assistance with  rolling walker and grab bars    Activities of Daily Living:  · Toileting: total assistance patient had a BM and needed to be cleaned thoroughly. Extra time needed on toilet.     [facility] 6 Click ADL: 16    Treatment & Education:  Role of OT and POC  ADL retraining  Functional mobility training  Safety    Hand placement and technique with sit>stand using RW.     Patient left up in chair with call button in reach and all needs met. Education:      GOALS:   Multidisciplinary Problems     Occupational Therapy Goals        Problem: Occupational Therapy    Goal Priority Disciplines Outcome Interventions   Occupational Therapy Goal     OT, PT/OT Ongoing, Progressing    Description: Goals to be met by: 6/2    Patient will increase functional independence with ADLs by performing:    UE Dressing with [place].  LE Dressing with Moderate Assistance using AE as needed.  Grooming while seated at sink with [place].  Toileting from bedside commode or toilet with Minimal Assistance for hygiene and clothing management.   Bathing from shower chair/chair level with Minimal Assistance.  Stand pivot transfers with Minimal Assistance using RW.  Toilet transfer to bedside commode with Minimal Assistance.  Upper extremity exercise program with supervision.  Caregiver will be educated on level of assist required to safely perform self care tasks and functional transfers.                      Time Tracking:     OT Date of Treatment: 05/30/22  OT Start Time: 1126    OT Stop Time: 1208  OT Total Time (min): 42 min    Billable Minutes:Self Care/Home Management 42    5/30/2022

## 2022-05-30 NOTE — PT/OT/SLP PROGRESS
Physical Therapy Treatment    Patient Name:  Lupis Murcia   MRN:  [id number]  Admit Date: 5/11/2022  Admitting Diagnosis: Cellulitis of leg  Recent Surgeries:     General Precautions: Standard, fall   Orthopedic Precautions:N/A   Braces: N/A     Recommendations:     Discharge Recommendations:  home with home health   Level of Assistance Recommended at Discharge: 24 hours significant assistance  Discharge Equipment Recommendations:  (TBD as pt progresses)   Barriers to discharge: Decreased caregiver support    Assessment:     Lupis Murcia is a 71 y.o. female admitted with a medical diagnosis of Cellulitis of leg. Pt tolerated therapy session well with focus on gait training, transfers, bed mobility in order to assist with increasing pt's independence and prepare for d/c from PT. Pt would continue to benefit from skilled PT services per POC.      Performance deficits affecting function:  weakness, impaired endurance, impaired self care skills, impaired functional mobilty, gait instability, impaired balance, decreased lower extremity function, decreased upper extremity function, decreased ROM, impaired skin, edema, impaired cardiopulmonary response to activity .    Rehab Potential is good    Activity Tolerance: Good and Fair    Plan:     Patient to be seen 6 x/week to address the above listed problems via gait training, therapeutic activities, therapeutic exercises, neuromuscular re-education    · Plan of Care Expires: 06/11/22  · Plan of Care Reviewed with: patient    Subjective     Pt agreeable to PT.     Pain/Comfort:  · Pain Rating 1: 0/10  · Pain Rating Post-Intervention 1: 0/10    Patient's cultural, spiritual, Baptist conflicts given the current situation:  · no    Objective:     Communicated with PCT prior to session.  Patient found up in chair with  (no active lines) upon PT entry to room.     Therapeutic Activities and Exercises: UBE x 11 1/2 mins, low resistance to increase cardio endurance and  sitting balance.     Functional Mobility:  · Bed Mobility:     · Bridging: stand by assistance  · Sit to Supine: moderate assistance and of 2 persons  · Transfers:     · Sit to Stand: 2 sets, minimum assistance with rolling walker  · Bed to Chair: moderate assistance with  rolling walker  using  Step Transfer  · Gait: x 12', RW, Min A x 2 (follow with w/c). Tactile assistance with stepping with LLE due to drop foot/AD mngmt assistance.     AM-PAC 6 CLICK MOBILITY  13    Patient left HOB elevated with call button in reach and PCT present.    GOALS:   Multidisciplinary Problems     Physical Therapy Goals        Problem: Physical Therapy    Goal Priority Disciplines Outcome Goal Variances Interventions   Physical Therapy Goal     PT, PT/OT Ongoing, Progressing     Description: Goals to be met by: 6/9/22    Patient will increase functional independence with mobility by performing:    . Supine to sit with MInimal Assistance-not met  . Sit to supine with MInimal Assistance-not met  . Rolling to Left and Right with Minimal Assistance.  . Sit to stand transfer with Minimal Assistance with RW-met  Updated 5/21/2022 Sit to stand transfer with CGA with RW  . Bed to chair transfer with Minimal Assistance using Rolling Walker/no AD-not met  . Gait  x 10 feet with Moderate Assistance using RW- met  Updated 5/21/2022 Gait  x 20 feet with Long using RW  . Wheelchair propulsion x50 feet with Minimal Assistance using bilateral uppper extremities-not met  . Sitting at edge of bed x5 minutes with Stand-by Assistance-Met 5/27/2022                     Time Tracking:     PT Received On: 05/30/22  PT Start Time: 1502  PT Stop Time: 1547  PT Total Time (min): 45 min    Billable Minutes: Gait Training 12, Therapeutic Activity 21 and Neuromuscular Re-education 12    Treatment Type: Treatment  PT/PTA: PTA     PTA Visit Number: 2     05/30/2022

## 2022-05-30 NOTE — PROGRESS NOTES
[facility]                                  Skilled Nursing Facility                   Progress Note     Admit Date: 2022  USAMN 2022  Principal Problem:  Cellulitis of leg   HPI obtained from patient interview and chart review     Chief Complaint: Re-evaluation of medical treatment and therapy status:  Lab review,  Re-evaluation of constipation, hypomagnesemia    HPI:   Mrs. Murcia is a 71 year old female with PMHx of Multiple Sclerosis, Left Foot Drop, Lymphedema, Obesity (bmi 45) who presents to SNF following hospitalization for cellulitis, acute kidney injury and acute encephalopathy.  Admission to SNF for secondary weakness and debility.    Interval history:All of today's labs reviewed and are listed below.  Mag 1.8, H&H improved.  24 hr vital sign ranges listed below.  Patient denies shortness of breath, abdominal discomfort, nausea, or vomiting.  Patient reports an adequate appetite.  Patient denies dysuria.  Patient reports having regular bowel movements.  Patient progessing with PT/OT- Gait: x 5, x 6', RW, Min A x 2 (follow with w/c) and Mod A for initiation of LLE step due to drop foot/reinforcing RW in place due to pt's anxiety. Continuing to follow and treat all acute and chronic conditions.    Past Medical History: Patient has a past medical history of Lymphedema, MS (multiple sclerosis), and Vitamin B12 deficiency.    Past Surgical History: Patient has a past surgical history that includes  section; Breast cyst excision (Left); and Esophagogastroduodenoscopy (N/A, 2022).    Social History: Patient reports that she has never smoked. She has never used smokeless tobacco. She reports that she does not drink alcohol and does not use drugs.    Family History: family history includes Brain cancer in her brother; Coronary artery disease in her father; Lung cancer in her father and mother.    Allergies: Patient is  allergic to contrast media, pcn [penicillins], celebrex [celecoxib], diazepam, and motrin [ibuprofen].    ROS  Constitutional: Negative for fever   Eyes: Negative for blurred vision, double vision   Respiratory: Negative for cough, shortness of breath   Cardiovascular: Negative for chest pain, palpitations. + leg swelling.   Gastrointestinal: Negative for abdominal pain, diarrhea, nausea, vomiting, constipation  Genitourinary: Negative for dysuria, frequency   Musculoskeletal:  + generalized weakness.  + bilateral lower extremity pain  Skin: Negative for itching and rash.   Neurological: Negative for dizziness, headaches.   Psychiatric/Behavioral: Negative for depression. The patient is nervous/anxious    24 hour Vital Sign Range   Temp:  [97.8 °F (36.6 °C)-98.2 °F (36.8 °C)]   Pulse:  [92-93]   Resp:  [18]   BP: (118-140)/(64-77)   SpO2:  [95 %-96 %]     PEx  Constitutional: Patient appears debilitated.  No distress noted  HENT:   Head: Normocephalic and atraumatic.   Eyes: Pupils are equal, round  Neck: Normal range of motion. Neck supple.   Cardiovascular: Normal rate, regular rhythm and normal heart sounds.    Pulmonary/Chest: Effort normal and breath sounds are clear  Abdominal: Soft. Bowel sounds are normal.   Musculoskeletal: Normal range of motion.   Neurological: Alert and oriented to person, place, and time.   Psychiatric: Normal mood and affect. Behavior is normal.   Skin: Skin is warm and dry. Bilateral lower extremity with pain contract scar tissue surrounded by hyperkeratotic skin.  Moisture associated dermatitis to buttocks, improved.      Recent Labs   Lab 05/30/22  0807      K 3.6      CO2 27   BUN 16   CREATININE 0.8   MG 1.8       Recent Labs   Lab 05/30/22  0807   WBC 4.92   RBC 3.12*   HGB 9.4*   HCT 31.7*      *   MCH 30.1   MCHC 29.7*         Assessment and Plan:    Hypomagnesemia  - initiated magnesium oxide 400 mg BID x2 days    Constipation  -  continue MiraLax  daily, continue senna docusate 2 tabs BID- however patient refusing    Cellulitis of leg  - continue vancomycin   - ID consulted at [facility]  - continue local wound care per wound care  - completed cefepime and continue vancomycin. switched to doxycycline upon discharge (end date: 5/12/22)  - completed treatment was doxycycline     Urinary retention   - discontinued espinal prior to transfer to SNF.   - Urinating well until 5/10   -  bladder scans PRN     Pressure injury of buttock, unstageable  - q2 turns  - low airloss overlay mattress  - wound care consultation       Lymphedema  - wound care consultation to assist in managmeent  - continue ammonium lactate lotion BID  - continue doxycycline for cellulitis      HTN  -  continue to hold losartan to 25 mg daily- BP's low to normal and patient is refusing to take losartan.  Continue HCTZ 12.5 mg daily     MS (multiple sclerosis)  - PT/OT  - f/u with out pt provider      B12 deficiency  - change orders to cyanocobalamin 1000 mcg every 30 days, dose to be given on 05/13 as patient has been overdue due to recent hospitalization     Debility   - Continue with PT/OT for gait training and strengthening and restoration of ADL's   - Encourage mobility, OOB in chair, and early ambulation as appropriate  - Fall precautions   - Monitor for bowel and bladder dysfunction  - Monitor for and prevent skin breakdown and pressure ulcers  -  continue  DVT prophylaxis with  heparin 7.5 K q.8 hours        Anticipate disposition:  Home with home health        Follow-up needed during SNF stay-     Follow-up needed after discharge from SNF:   - PCP, hospital follow-up, needs to be scheduled      Future Appointments   Date Time Provider Department Center   5/31/2022  7:15 AM [facility] SPEC LAB [facility] SPECLAB [facility]   6/15/2022 10:45 AM Samir Sahni MD [facility]         Amy Conklin NP  Department of Hospital Medicine   [facility]     DOS: 5/30/2022      Patient note was created using MModal Dictation.  Any errors in syntax or even information may not have been identified and edited on initial review prior to signing this note.

## 2022-05-31 PROCEDURE — 25000003 PHARM REV CODE 250: Performed by: HOSPITALIST

## 2022-05-31 PROCEDURE — 25000003 PHARM REV CODE 250: Performed by: NURSE PRACTITIONER

## 2022-05-31 PROCEDURE — 97535 SELF CARE MNGMENT TRAINING: CPT | Mod: CO

## 2022-05-31 PROCEDURE — 97116 GAIT TRAINING THERAPY: CPT | Mod: CQ

## 2022-05-31 PROCEDURE — 97530 THERAPEUTIC ACTIVITIES: CPT | Mod: CQ

## 2022-05-31 PROCEDURE — 63600175 PHARM REV CODE 636 W HCPCS: Performed by: NURSE PRACTITIONER

## 2022-05-31 PROCEDURE — 97110 THERAPEUTIC EXERCISES: CPT | Mod: CQ

## 2022-05-31 PROCEDURE — 11000004 HC SNF PRIVATE

## 2022-05-31 RX ADMIN — HEPARIN SODIUM 7500 UNITS: 5000 INJECTION INTRAVENOUS; SUBCUTANEOUS at 02:05

## 2022-05-31 RX ADMIN — HYDROCHLOROTHIAZIDE 12.5 MG: 12.5 TABLET ORAL at 08:05

## 2022-05-31 RX ADMIN — Medication 400 MG: at 09:05

## 2022-05-31 RX ADMIN — ACETAMINOPHEN AND CODEINE PHOSPHATE 1 TABLET: 300; 30 TABLET ORAL at 11:05

## 2022-05-31 RX ADMIN — HEPARIN SODIUM 7500 UNITS: 5000 INJECTION INTRAVENOUS; SUBCUTANEOUS at 06:05

## 2022-05-31 RX ADMIN — LORAZEPAM 0.5 MG: 0.5 TABLET ORAL at 02:05

## 2022-05-31 RX ADMIN — Medication 1000 UNITS: at 08:05

## 2022-05-31 RX ADMIN — ACETAMINOPHEN AND CODEINE PHOSPHATE 1 TABLET: 300; 30 TABLET ORAL at 05:05

## 2022-05-31 RX ADMIN — Medication 1 TABLET: at 08:05

## 2022-05-31 RX ADMIN — LORAZEPAM 0.5 MG: 0.5 TABLET ORAL at 06:05

## 2022-05-31 RX ADMIN — HEPARIN SODIUM 7500 UNITS: 5000 INJECTION INTRAVENOUS; SUBCUTANEOUS at 09:05

## 2022-05-31 RX ADMIN — Medication 400 MG: at 08:05

## 2022-05-31 RX ADMIN — LORAZEPAM 0.5 MG: 0.5 TABLET ORAL at 09:05

## 2022-05-31 RX ADMIN — PANTOPRAZOLE SODIUM 40 MG: 40 TABLET, DELAYED RELEASE ORAL at 08:05

## 2022-05-31 RX ADMIN — PANTOPRAZOLE SODIUM 40 MG: 40 TABLET, DELAYED RELEASE ORAL at 09:05

## 2022-05-31 NOTE — PT/OT/SLP PROGRESS
Occupational Therapy   Treatment    Name: Lupis Murcia  MRN: [id number]  Admit Date: 5/11/2022  Admitting Diagnosis:  Cellulitis of leg    General Precautions: Standard, fall   Orthopedic Precautions:N/A   Braces:       Recommendations:     Discharge Recommendations: home health OT  Level of Assistance Recommended at Discharge: 24 hours physical assistance for all ADL's and home management tasks  Discharge Equipment Recommendations:  bedside commode, bath bench  Barriers to discharge:  Decreased caregiver support    Assessment:     Lupis Murcia is a 71 y.o. female with a medical diagnosis of Cellulitis of leg.  She presents with  Performance deficits affecting function are weakness, impaired endurance, impaired sensation, impaired self care skills, impaired functional mobility, gait instability, impaired balance, decreased coordination, decreased lower extremity function, decreased safety awareness, pain, impaired coordination, impaired skin, edema  .     Pt participated fair with session on this day. Pt. With Encouragement provided. Pt. With good demo on this day with transitional movements with RW.   Pt  continues to demonstrate levels of physical deficits with  functional indep with daily management activities tasks, selfcare skills with balance,  functional mobility, UB strength and endurance. Pt. Will continue to benefit from continued OT to progress towards goals     Rehab Potential is fair    Activity tolerance:  Fair    Plan:     Patient to be seen 6 x/week to address the above listed problems via self-care/home management, therapeutic activities, therapeutic exercises    · Plan of Care Expires: 06/16/22  · Plan of Care Reviewed with: patient    Subjective     Communicated with:  nsg and Pt.prior to session.  I just get so nervous     Pain/Comfort:  Pain Rating 1: 5/10  Location - Side 1: Left  Location 1: leg  Pain Addressed 1: Pre-medicate for activity, Reposition, Distraction  Pain Rating  Post-Intervention 1: 5/10    Patient's cultural, spiritual, Church conflicts given the current situation:  no    Objective:     Patient found HOB elevated     upon OT entry to room.    Bed Mobility:    · Patient completed Rolling/Turning to Left with  moderate assistance  · Patient completed Rolling/Turning to Right with moderate assistance  · Patient completed Scooting/Bridging with maximal assistance  · Patient completed Supine to Sit with moderate assistance      Functional Mobility/Transfers:  · Patient completed Sit <> Stand  Transfer with minimalwith RW for balance and cues for safety and hand placement   · Patient completed Bed <> Chair Transfer using with minimal assistance x2 with SPT from EOB to w/c     Activities of Daily Living:  · Grooming: supervision with hair care management   · Lower Body Dressing: maximal assistance to rosetta pants EOB level an to manage over hips instance with RW for bal and BLE socks with TA. Several attempts made to rosetta Pt. LLE AFO into tennis shoe. Unable to fit properly inside shoe due to swelling and improper fit.    [facility] 6 Click ADL: 16     OT Exercises UBE x 10 min     Treatment & Education:  Pt edu on role of OT, POC, safety when performing self care tasks , benefit of performing OOB activity, and safety when performing functional transfers and mobility management for preparation with goals to progress towards next level of care    Patient left up in chair with all lines intact and call button in reachEducation:      GOALS:   Multidisciplinary Problems     Occupational Therapy Goals        Problem: Occupational Therapy    Goal Priority Disciplines Outcome Interventions   Occupational Therapy Goal     OT, PT/OT Ongoing, Progressing    Description: Goals to be met by: 6/2    Patient will increase functional independence with ADLs by performing:    UE Dressing with [place].  LE Dressing with Moderate Assistance using AE as needed.  Grooming while seated at sink with  [place].  Toileting from bedside commode or toilet with Minimal Assistance for hygiene and clothing management.   Bathing from shower chair/chair level with Minimal Assistance.  Stand pivot transfers with Minimal Assistance using RW.  Toilet transfer to bedside commode with Minimal Assistance.  Upper extremity exercise program with supervision.  Caregiver will be educated on level of assist required to safely perform self care tasks and functional transfers.                     Time Tracking:     OT Date of Treatment: OT Date of Treatment: 05/31/22  OT Total Time (min):      Billable Minutes:Therapeutic Activity 46    5/31/2022

## 2022-05-31 NOTE — PT/OT/SLP PROGRESS
Physical Therapy Treatment    Patient Name:  Lupis Murcia   MRN:  [id number]  Admit Date: 5/11/2022  Admitting Diagnosis: Cellulitis of leg  Recent Surgeries:     General Precautions: Standard, fall   Orthopedic Precautions:N/A   Braces: N/A     Recommendations:     Discharge Recommendations:  home with home health   Level of Assistance Recommended at Discharge: 24 hours significant assistance  Discharge Equipment Recommendations:  (TBD as pt progresses)   Barriers to discharge: Decreased caregiver support    Assessment:     Lupis Murcia is a 71 y.o. female admitted with a medical diagnosis of Cellulitis of leg. Pt tolerated therapy session well with focus on increasing independence with gait training, transfers, safety awareness and LE strengthening. Pt would continue to benefit from skilled PT services per POC.      Performance deficits affecting function:  weakness, impaired endurance, impaired self care skills, impaired functional mobilty, gait instability, impaired balance, decreased lower extremity function, decreased upper extremity function, decreased ROM, impaired skin, edema, impaired cardiopulmonary response to activity .    Rehab Potential is good    Activity Tolerance: Fair    Plan:     Patient to be seen 6 x/week to address the above listed problems via gait training, therapeutic activities, therapeutic exercises, neuromuscular re-education    · Plan of Care Expires: 06/11/22  · Plan of Care Reviewed with: patient    Subjective     Pt agreeable to PT.     Pain/Comfort:  · Pain Rating 1: 0/10  · Pain Rating Post-Intervention 1: 0/10    Patient's cultural, spiritual, Baptism conflicts given the current situation:  · no    Objective:     Communicated with OT prior to session.  Patient found up in chair with  (no active lines) upon PT entry to room.     Therapeutic Activities and Exercises: seated RLE exercises, including: LAQ's, marching, AP, GS, ABD/ADD scissors x 15 - 20 reps, as well as LLE  CLEOPATRA Florez.     Functional Mobility:  · Transfers:     · Sit to Stand: 2 sets, minimum assistance with rolling walker. Pt requires reminders/cues on locking brakes, hand placement.   · Gait: x 3', x 8', RW, Min/Mod A x 2 (follow with w/c). Pt required tactile assistance with steering/AD mngmt; Mod to Max A to initiate LLE step forward due to drop foot.     AM-PAC 6 CLICK MOBILITY  13    Patient left up in chair with call button in reach.    GOALS:   Multidisciplinary Problems     Physical Therapy Goals        Problem: Physical Therapy    Goal Priority Disciplines Outcome Goal Variances Interventions   Physical Therapy Goal     PT, PT/OT Ongoing, Progressing     Description: Goals to be met by: 6/9/22    Patient will increase functional independence with mobility by performing:    . Supine to sit with MInimal Assistance-not met  . Sit to supine with MInimal Assistance-not met  . Rolling to Left and Right with Minimal Assistance.  . Sit to stand transfer with Minimal Assistance with RW-met  Updated 5/21/2022 Sit to stand transfer with CGA with RW  . Bed to chair transfer with Minimal Assistance using Rolling Walker/no AD-not met  . Gait  x 10 feet with Moderate Assistance using RW- met  Updated 5/21/2022 Gait  x 20 feet with Long using RW  . Wheelchair propulsion x50 feet with Minimal Assistance using bilateral uppper extremities-not met  . Sitting at edge of bed x5 minutes with Stand-by Assistance-Met 5/27/2022                     Time Tracking:     PT Received On: 05/31/22  PT Start Time: 1021  PT Stop Time: 1102  PT Total Time (min): 41 min    Billable Minutes: Gait Training 14, Therapeutic Activity 15 and Therapeutic Exercise 12    Treatment Type: Treatment  PT/PTA: PTA     PTA Visit Number: 3     05/31/2022

## 2022-06-01 PROCEDURE — 97530 THERAPEUTIC ACTIVITIES: CPT | Mod: CO

## 2022-06-01 PROCEDURE — 11000004 HC SNF PRIVATE

## 2022-06-01 PROCEDURE — 97116 GAIT TRAINING THERAPY: CPT | Mod: CQ

## 2022-06-01 PROCEDURE — 25000003 PHARM REV CODE 250: Performed by: HOSPITALIST

## 2022-06-01 PROCEDURE — 63600175 PHARM REV CODE 636 W HCPCS: Performed by: NURSE PRACTITIONER

## 2022-06-01 PROCEDURE — 25000003 PHARM REV CODE 250: Performed by: NURSE PRACTITIONER

## 2022-06-01 PROCEDURE — 97530 THERAPEUTIC ACTIVITIES: CPT | Mod: CQ

## 2022-06-01 RX ADMIN — AMMONIUM LACTATE: 12 LOTION TOPICAL at 09:06

## 2022-06-01 RX ADMIN — PANTOPRAZOLE SODIUM 40 MG: 40 TABLET, DELAYED RELEASE ORAL at 08:06

## 2022-06-01 RX ADMIN — HEPARIN SODIUM 7500 UNITS: 5000 INJECTION INTRAVENOUS; SUBCUTANEOUS at 02:06

## 2022-06-01 RX ADMIN — Medication 400 MG: at 09:06

## 2022-06-01 RX ADMIN — Medication 1000 UNITS: at 09:06

## 2022-06-01 RX ADMIN — Medication 1 TABLET: at 09:06

## 2022-06-01 RX ADMIN — LORAZEPAM 0.5 MG: 0.5 TABLET ORAL at 02:06

## 2022-06-01 RX ADMIN — ACETAMINOPHEN AND CODEINE PHOSPHATE 1 TABLET: 300; 30 TABLET ORAL at 05:06

## 2022-06-01 RX ADMIN — HYDROCHLOROTHIAZIDE 12.5 MG: 12.5 TABLET ORAL at 09:06

## 2022-06-01 RX ADMIN — LORAZEPAM 0.5 MG: 0.5 TABLET ORAL at 06:06

## 2022-06-01 RX ADMIN — HEPARIN SODIUM 7500 UNITS: 5000 INJECTION INTRAVENOUS; SUBCUTANEOUS at 09:06

## 2022-06-01 RX ADMIN — HEPARIN SODIUM 7500 UNITS: 5000 INJECTION INTRAVENOUS; SUBCUTANEOUS at 06:06

## 2022-06-01 RX ADMIN — ACETAMINOPHEN AND CODEINE PHOSPHATE 1 TABLET: 300; 30 TABLET ORAL at 09:06

## 2022-06-01 NOTE — PT/OT/SLP PROGRESS
Occupational Therapy   Treatment    Name: Lupis Murcia  MRN: [id number]  Admit Date: 5/11/2022  Admitting Diagnosis:  Cellulitis of leg    General Precautions: Standard, fall   Orthopedic Precautions:N/A   Braces:       Recommendations:     Discharge Recommendations: home health OT  Level of Assistance Recommended at Discharge: 24 hours physical assistance for all ADL's and home management tasks  Discharge Equipment Recommendations:  bedside commode, bath bench  Barriers to discharge:  Decreased caregiver support    Assessment:     Lupis Murcia is a 71 y.o. female with a medical diagnosis of Cellulitis of leg.  She presents with  Performance deficits affecting function are weakness, impaired endurance, impaired sensation, impaired self care skills, impaired functional mobility, gait instability, impaired balance, decreased coordination, decreased lower extremity function, decreased safety awareness, pain, impaired coordination, impaired skin, edema  .     Pt participated fair with session on this day. Pt. With Encouragement provided. Pt. With good demo on this day with transitional movements with RW.   Pt  continues to demonstrate levels of physical deficits with  functional indep with daily management activities tasks, selfcare skills with balance,  functional mobility, UB strength and endurance. Pt. Will continue to benefit from continued OT to progress towards goals     Rehab Potential is fair    Activity tolerance:  Fair    Plan:     Patient to be seen 6 x/week to address the above listed problems via self-care/home management, therapeutic activities, therapeutic exercises    · Plan of Care Expires: 06/16/22  · Plan of Care Reviewed with: patient    Subjective     Communicated with:  nsg and Pt.prior to session.  I just get so nervous     Pain/Comfort:  Pain Rating 1: 0/10  Pain Rating Post-Intervention 1: 0/10    Patient's cultural, spiritual, [place] conflicts given the current  situation:  no    Objective:     Patient found HOB elevated     upon OT entry to room.    Functional Mobility/Transfers:  · Patient completed Sit <> Stand  Transfer with minimal with RW for balance and cues for safety and hand placement       Activities of Daily Living:  · Grooming: supervision with hair care management   · Lower Body Dressing: moderate assistance to rosetta/doff pants with use of AE from w/c level an to manage over hips instance with RW for bal     AMPA 6 Click ADL: 16     OT Exercises UBE x 10 min     Treatment & Education:  n. Pt. With BUE AROM x 10 reps with  shd flex,elbow flex/ext horz adb/add and forward flex motion  to increase BUE ROM, sitting bal/posture and strength.     Pt edu on role of OT, POC, safety when performing self care tasks , benefit of performing OOB activity, and safety when performing functional transfers and mobility management for preparation with goals to progress towards next level of care    Patient left up in chair with all lines intact and call button in reachEducation:      GOALS:   Multidisciplinary Problems     Occupational Therapy Goals        Problem: Occupational Therapy    Goal Priority Disciplines Outcome Interventions   Occupational Therapy Goal     OT, PT/OT Ongoing, Progressing    Description: Goals to be met by: 6/2    Patient will increase functional independence with ADLs by performing:    UE Dressing with [place].  LE Dressing with Moderate Assistance using AE as needed.  Grooming while seated at sink with [place].  Toileting from bedside commode or toilet with Minimal Assistance for hygiene and clothing management.   Bathing from shower chair/chair level with Minimal Assistance.  Stand pivot transfers with Minimal Assistance using RW.  Toilet transfer to bedside commode with Minimal Assistance.  Upper extremity exercise program with supervision.  Caregiver will be educated on level of assist required to safely perform self care tasks and  functional transfers.                     Time Tracking:     OT Date of Treatment: OT Date of Treatment: 06/01/22  OT Total Time (min):      Billable Minutes:Therapeutic Activity 40    6/1/2022

## 2022-06-01 NOTE — PT/OT/SLP PROGRESS
Physical Therapy Treatment    Patient Name:  Lupis Murcia   MRN:  [id number]  Admit Date: 5/11/2022  Admitting Diagnosis: Cellulitis of leg  Recent Surgeries:     General Precautions: Standard, fall   Orthopedic Precautions:N/A   Braces: N/A     Recommendations:     Discharge Recommendations:  home with home health   Level of Assistance Recommended at Discharge: 24 hours significant assistance  Discharge Equipment Recommendations:  (TBD as pt progresses)   Barriers to discharge: Decreased caregiver support    Assessment:     Lupis Murcia is a 71 y.o. female admitted with a medical diagnosis of Cellulitis of leg. Pt tolerated therapy session well with focus on increasing independence with functional transfers and gait training using RW to assist with achieving highest level of function upon d/c. Pt is making consistent improvement with ambulation using RW each day, waiting to see if pt's daughter can bring a different pair of shoes for AFO to fit in due to edema in pt's feet. Pt would continue to benefit from skilled PT services per POC.     Performance deficits affecting function:  weakness, impaired endurance, impaired self care skills, impaired functional mobilty, gait instability, impaired balance, decreased lower extremity function, decreased upper extremity function, decreased ROM, impaired skin, edema, impaired cardiopulmonary response to activity .    Rehab Potential is good    Activity Tolerance: Good and Fair    Plan:     Patient to be seen 6 x/week to address the above listed problems via gait training, therapeutic activities, therapeutic exercises, neuromuscular re-education    · Plan of Care Expires: 06/11/22  · Plan of Care Reviewed with: patient    Subjective     Pt agreeable to PT.     Pain/Comfort:  · Pain Rating 1: 0/10  · Pain Rating Post-Intervention 1: 0/10    Patient's cultural, spiritual, Religion conflicts given the current situation:  · no    Objective:     Communicated with daughter  prior to session.  Patient found up in chair with  (no active lines) upon PT entry to room.     Functional Mobility:  · Transfers:     · Sit to Stand: 3 sets, minimum assistance with rolling walker  · Gait: x 8', x 13', x 25', RW at Mod A x 2 for w/c mngmt and assistance with AD steering and initiation of LLE due to drop foot.     AM-PAC 6 CLICK MOBILITY  13    Patient left up in chair with therapy tech returning patient back to her room, call light in reach.    GOALS:   Multidisciplinary Problems     Physical Therapy Goals        Problem: Physical Therapy    Goal Priority Disciplines Outcome Goal Variances Interventions   Physical Therapy Goal     PT, PT/OT Ongoing, Progressing     Description: Goals to be met by: 6/9/22    Patient will increase functional independence with mobility by performing:    . Supine to sit with MInimal Assistance-not met  . Sit to supine with MInimal Assistance-not met  . Rolling to Left and Right with Minimal Assistance.  . Sit to stand transfer with Minimal Assistance with RW-met  Updated 5/21/2022 Sit to stand transfer with CGA with RW  . Bed to chair transfer with Minimal Assistance using Rolling Walker/no AD-not met  . Gait  x 10 feet with Moderate Assistance using RW- met  Updated 5/21/2022 Gait  x 20 feet with Long using RW  . Wheelchair propulsion x50 feet with Minimal Assistance using bilateral uppper extremities-not met  . Sitting at edge of bed x5 minutes with Stand-by Assistance-Met 5/27/2022                     Time Tracking:     PT Received On: 06/01/22  PT Start Time: 1056  PT Stop Time: 1128  PT Total Time (min): 32 min    Billable Minutes: Gait Training 18 and Therapeutic Activity 14    Treatment Type: Treatment  PT/PTA: PTA     PTA Visit Number: 4     06/01/2022

## 2022-06-02 LAB
ANION GAP SERPL CALC-SCNC: 13 MMOL/L (ref 8–16)
BASOPHILS # BLD AUTO: 0.02 K/UL (ref 0–0.2)
BASOPHILS NFR BLD: 0.3 % (ref 0–1.9)
BUN SERPL-MCNC: 16 MG/DL (ref 8–23)
CALCIUM SERPL-MCNC: 9.9 MG/DL (ref 8.7–10.5)
CHLORIDE SERPL-SCNC: 102 MMOL/L (ref 95–110)
CO2 SERPL-SCNC: 25 MMOL/L (ref 23–29)
CREAT SERPL-MCNC: 0.8 MG/DL (ref 0.5–1.4)
DIFFERENTIAL METHOD: ABNORMAL
EOSINOPHIL # BLD AUTO: 0.2 K/UL (ref 0–0.5)
EOSINOPHIL NFR BLD: 3.6 % (ref 0–8)
ERYTHROCYTE [DISTWIDTH] IN BLOOD BY AUTOMATED COUNT: 16.4 % (ref 11.5–14.5)
EST. GFR  (AFRICAN AMERICAN): >60 ML/MIN/1.73 M^2
EST. GFR  (NON AFRICAN AMERICAN): >60 ML/MIN/1.73 M^2
GLUCOSE SERPL-MCNC: 113 MG/DL (ref 70–110)
HCT VFR BLD AUTO: 31.1 % (ref 37–48.5)
HGB BLD-MCNC: 9.7 G/DL (ref 12–16)
IMM GRANULOCYTES # BLD AUTO: 0.03 K/UL (ref 0–0.04)
IMM GRANULOCYTES NFR BLD AUTO: 0.5 % (ref 0–0.5)
LYMPHOCYTES # BLD AUTO: 2.3 K/UL (ref 1–4.8)
LYMPHOCYTES NFR BLD: 38.6 % (ref 18–48)
MAGNESIUM SERPL-MCNC: 1.9 MG/DL (ref 1.6–2.6)
MCH RBC QN AUTO: 31.3 PG (ref 27–31)
MCHC RBC AUTO-ENTMCNC: 31.2 G/DL (ref 32–36)
MCV RBC AUTO: 100 FL (ref 82–98)
MONOCYTES # BLD AUTO: 0.6 K/UL (ref 0.3–1)
MONOCYTES NFR BLD: 10 % (ref 4–15)
NEUTROPHILS # BLD AUTO: 2.8 K/UL (ref 1.8–7.7)
NEUTROPHILS NFR BLD: 47 % (ref 38–73)
NRBC BLD-RTO: 0 /100 WBC
PHOSPHATE SERPL-MCNC: 3.2 MG/DL (ref 2.7–4.5)
PLATELET # BLD AUTO: 269 K/UL (ref 150–450)
PMV BLD AUTO: 10.5 FL (ref 9.2–12.9)
POTASSIUM SERPL-SCNC: 3.3 MMOL/L (ref 3.5–5.1)
RBC # BLD AUTO: 3.1 M/UL (ref 4–5.4)
SODIUM SERPL-SCNC: 140 MMOL/L (ref 136–145)
WBC # BLD AUTO: 5.9 K/UL (ref 3.9–12.7)

## 2022-06-02 PROCEDURE — 36415 COLL VENOUS BLD VENIPUNCTURE: CPT | Performed by: HOSPITALIST

## 2022-06-02 PROCEDURE — 85025 COMPLETE CBC W/AUTO DIFF WBC: CPT | Performed by: HOSPITALIST

## 2022-06-02 PROCEDURE — 25000003 PHARM REV CODE 250: Performed by: NURSE PRACTITIONER

## 2022-06-02 PROCEDURE — 84100 ASSAY OF PHOSPHORUS: CPT | Performed by: HOSPITALIST

## 2022-06-02 PROCEDURE — 11000004 HC SNF PRIVATE

## 2022-06-02 PROCEDURE — 97530 THERAPEUTIC ACTIVITIES: CPT

## 2022-06-02 PROCEDURE — 83735 ASSAY OF MAGNESIUM: CPT | Performed by: HOSPITALIST

## 2022-06-02 PROCEDURE — 97110 THERAPEUTIC EXERCISES: CPT

## 2022-06-02 PROCEDURE — 63600175 PHARM REV CODE 636 W HCPCS: Performed by: NURSE PRACTITIONER

## 2022-06-02 PROCEDURE — 25000003 PHARM REV CODE 250: Performed by: HOSPITALIST

## 2022-06-02 PROCEDURE — 97116 GAIT TRAINING THERAPY: CPT

## 2022-06-02 PROCEDURE — 80048 BASIC METABOLIC PNL TOTAL CA: CPT | Performed by: HOSPITALIST

## 2022-06-02 PROCEDURE — 97530 THERAPEUTIC ACTIVITIES: CPT | Mod: CO

## 2022-06-02 RX ORDER — LORAZEPAM 0.5 MG/1
0.5 TABLET ORAL EVERY 8 HOURS PRN
Start: 2022-06-02 | End: 2023-09-26

## 2022-06-02 RX ORDER — AMMONIUM LACTATE 12 G/100G
LOTION TOPICAL 2 TIMES DAILY
Refills: 0
Start: 2022-06-02 | End: 2022-06-14

## 2022-06-02 RX ORDER — CHOLECALCIFEROL (VITAMIN D3) 25 MCG
1000 TABLET ORAL DAILY
Status: ON HOLD
Start: 2022-06-03 | End: 2023-06-10 | Stop reason: SDUPTHER

## 2022-06-02 RX ORDER — LOPERAMIDE HYDROCHLORIDE 2 MG/1
2 CAPSULE ORAL 4 TIMES DAILY PRN
Refills: 0
Start: 2022-06-02 | End: 2022-06-12

## 2022-06-02 RX ORDER — POLYETHYLENE GLYCOL 3350 17 G/17G
17 POWDER, FOR SOLUTION ORAL DAILY
Refills: 0
Start: 2022-06-03 | End: 2022-06-14

## 2022-06-02 RX ORDER — FUROSEMIDE 20 MG/1
20 TABLET ORAL DAILY PRN
Status: ON HOLD
Start: 2022-06-02 | End: 2022-06-19 | Stop reason: SDUPTHER

## 2022-06-02 RX ORDER — HYDROCHLOROTHIAZIDE 12.5 MG/1
12.5 TABLET ORAL DAILY
Qty: 30 TABLET | Refills: 11 | Status: ON HOLD
Start: 2022-06-03 | End: 2022-06-19 | Stop reason: HOSPADM

## 2022-06-02 RX ORDER — CYANOCOBALAMIN 1000 UG/ML
1000 INJECTION, SOLUTION INTRAMUSCULAR; SUBCUTANEOUS
Status: ON HOLD
Start: 2022-06-12 | End: 2022-06-20 | Stop reason: HOSPADM

## 2022-06-02 RX ORDER — B-COMPLEX WITH VITAMIN C
1 TABLET ORAL DAILY
Status: ON HOLD
Start: 2022-06-03 | End: 2023-06-10 | Stop reason: HOSPADM

## 2022-06-02 RX ORDER — AMOXICILLIN 250 MG
2 CAPSULE ORAL 2 TIMES DAILY
Start: 2022-06-02 | End: 2022-06-14

## 2022-06-02 RX ORDER — ACETAMINOPHEN AND CODEINE PHOSPHATE 300; 30 MG/1; MG/1
1 TABLET ORAL EVERY 6 HOURS PRN
Start: 2022-06-02 | End: 2022-06-12

## 2022-06-02 RX ORDER — ONDANSETRON 4 MG/1
4 TABLET, ORALLY DISINTEGRATING ORAL EVERY 6 HOURS PRN
Status: ON HOLD
Start: 2022-06-02 | End: 2022-07-25 | Stop reason: HOSPADM

## 2022-06-02 RX ORDER — CALCIUM CARBONATE 200(500)MG
500 TABLET,CHEWABLE ORAL 2 TIMES DAILY PRN
Start: 2022-06-02 | End: 2022-06-14

## 2022-06-02 RX ORDER — BISACODYL 10 MG
10 SUPPOSITORY, RECTAL RECTAL DAILY PRN
Refills: 0
Start: 2022-06-02 | End: 2022-06-14

## 2022-06-02 RX ORDER — TALC
6 POWDER (GRAM) TOPICAL NIGHTLY PRN
Refills: 0
Start: 2022-06-02 | End: 2022-06-14 | Stop reason: CLARIF

## 2022-06-02 RX ORDER — PANTOPRAZOLE SODIUM 40 MG/1
40 TABLET, DELAYED RELEASE ORAL DAILY
Qty: 90 TABLET | Refills: 3 | Status: ON HOLD
Start: 2022-06-02 | End: 2022-07-11 | Stop reason: SDUPTHER

## 2022-06-02 RX ADMIN — HEPARIN SODIUM 7500 UNITS: 5000 INJECTION INTRAVENOUS; SUBCUTANEOUS at 06:06

## 2022-06-02 RX ADMIN — HYDROCHLOROTHIAZIDE 12.5 MG: 12.5 TABLET ORAL at 09:06

## 2022-06-02 RX ADMIN — HEPARIN SODIUM 7500 UNITS: 5000 INJECTION INTRAVENOUS; SUBCUTANEOUS at 09:06

## 2022-06-02 RX ADMIN — LORAZEPAM 0.5 MG: 0.5 TABLET ORAL at 08:06

## 2022-06-02 RX ADMIN — ACETAMINOPHEN AND CODEINE PHOSPHATE 1 TABLET: 300; 30 TABLET ORAL at 06:06

## 2022-06-02 RX ADMIN — HEPARIN SODIUM 7500 UNITS: 5000 INJECTION INTRAVENOUS; SUBCUTANEOUS at 03:06

## 2022-06-02 RX ADMIN — PANTOPRAZOLE SODIUM 40 MG: 40 TABLET, DELAYED RELEASE ORAL at 08:06

## 2022-06-02 RX ADMIN — Medication 1000 UNITS: at 09:06

## 2022-06-02 RX ADMIN — PANTOPRAZOLE SODIUM 40 MG: 40 TABLET, DELAYED RELEASE ORAL at 09:06

## 2022-06-02 RX ADMIN — LORAZEPAM 0.5 MG: 0.5 TABLET ORAL at 12:06

## 2022-06-02 RX ADMIN — AMMONIUM LACTATE: 12 LOTION TOPICAL at 09:06

## 2022-06-02 RX ADMIN — Medication 1 TABLET: at 09:06

## 2022-06-02 RX ADMIN — ACETAMINOPHEN AND CODEINE PHOSPHATE 1 TABLET: 300; 30 TABLET ORAL at 09:06

## 2022-06-02 NOTE — PT/OT/SLP PROGRESS
Occupational Therapy   Treatment    Name: Lupis Murcia  MRN: [id number]  Admit Date: 5/11/2022  Admitting Diagnosis:  Cellulitis of leg    General Precautions: Standard, fall   Orthopedic Precautions:N/A   Braces:       Recommendations:     Discharge Recommendations: home health OT  Level of Assistance Recommended at Discharge: 24 hours physical assistance for all ADL's and home management tasks  Discharge Equipment Recommendations:  bedside commode, bath bench  Barriers to discharge:  Decreased caregiver support    Assessment:     Lupis Murcia is a 71 y.o. female with a medical diagnosis of Cellulitis of leg.  She presents with  Performance deficits affecting function are weakness, impaired endurance, impaired sensation, impaired self care skills, impaired functional mobility, gait instability, impaired balance, decreased coordination, decreased lower extremity function, decreased safety awareness, pain, impaired coordination, impaired skin, edema  .     Pt participated fair with session on this day. Pt. With Encouragement provided. Pt. And daughter  Emotional on this day  Due to concerns of Pt. Current status level and level of progress with will be needed for d/c hioime. Pt. With good demo on this day with transitional movements with RW.   Pt  continues to demonstrate levels of physical deficits with  functional indep with daily management activities tasks, selfcare skills with balance,  functional mobility, UB strength and endurance. Pt. Will continue to benefit from continued OT to progress towards goals     Rehab Potential is fair    Activity tolerance:  Fair    Plan:     Patient to be seen 6 x/week to address the above listed problems via self-care/home management, therapeutic activities, therapeutic exercises    · Plan of Care Expires: 06/16/22  · Plan of Care Reviewed with: patient    Subjective     Communicated with:  nsg and Pt.prior to session.  I just get so nervous     Pain/Comfort:  Pain Rating  1: 0/10  Pain Rating Post-Intervention 1: 0/10    Patient's cultural, spiritual, Zoroastrian conflicts given the current situation:  no    Objective:     Patient found sitting edge of bed with daughter present    upon OT entry to room.    Functional Mobility/Transfers:  · Patient completed Sit <> Stand  Transfer with minimal with RW for balance and cues for safety and hand placement.  Daughter with assisting with part of t/fs from EOB level    Activities of Daily Living:  · Lower Body Dressing: moderate assistance to rosetta pants from EOB  level an to manage over hips instance with RW for bal     AMPAC 6 Click ADL: 16     OT Exercises UBE x 10 min     Treatment & Education:  Pt edu on role of OT, POC, safety when performing self care tasks , benefit of performing OOB activity, and safety when performing functional transfers and mobility management for preparation with goals to progress towards next level of care    Patient left up in chair with with PT  presentEducation:   in gym     GOALS:   Multidisciplinary Problems     Occupational Therapy Goals        Problem: Occupational Therapy    Goal Priority Disciplines Outcome Interventions   Occupational Therapy Goal     OT, PT/OT Ongoing, Progressing    Description: Goals to be met by: 6/2    Patient will increase functional independence with ADLs by performing:    UE Dressing with Stanley.  LE Dressing with Moderate Assistance using AE as needed.  Grooming while seated at sink with Stanley.  Toileting from bedside commode or toilet with Minimal Assistance for hygiene and clothing management.   Bathing from shower chair/chair level with Minimal Assistance.  Stand pivot transfers with Minimal Assistance using RW.  Toilet transfer to bedside commode with Minimal Assistance.  Upper extremity exercise program with supervision.  Caregiver will be educated on level of assist required to safely perform self care tasks and functional transfers.                     Time  Tracking:     OT Date of Treatment: OT Date of Treatment: 06/02/22  OT Total Time (min):      Billable Minutes:Therapeutic Activity 27    6/2/2022

## 2022-06-02 NOTE — PROGRESS NOTES
[facility]                                  Skilled Nursing Facility                   Progress Note     Admit Date: 2022  USMAN 2022  Principal Problem:  Cellulitis of leg   HPI obtained from patient interview and chart review     Chief Complaint: Re-evaluation of medical treatment and therapy status:  Lab review,  Re-evaluation of constipation, hypomagnesemia    HPI:   Mrs. Murcia is a 71 year old female with PMHx of Multiple Sclerosis, Left Foot Drop, Lymphedema, Obesity (bmi 45) who presents to SNF following hospitalization for cellulitis, acute kidney injury and acute encephalopathy.  Admission to SNF for secondary weakness and debility.    Interval history:All of today's labs reviewed and are listed below.  24 hr vital sign ranges listed below.  Seen with daughter at bedside.  Both patient and daughter have concerns regarding anticipated discharge date of .  They are concerned that patient will not be able to ambulate well enough to discharge home.  Will further discuss with case management.  Patient denies shortness of breath, abdominal discomfort, nausea, or vomiting.  Patient reports an adequate appetite.  Patient denies dysuria.  Patient reports having regular bowel movements.  Patient progessing with PT/OT- Gait:x 8', x 13', x 25', RW at Mod A x 2 for w/c mngmt and assistance with AD steering and initiation of LLE due to drop foot.   . Continuing to follow and treat all acute and chronic conditions.    Past Medical History: Patient has a past medical history of Lymphedema, MS (multiple sclerosis), and Vitamin B12 deficiency.    Past Surgical History: Patient has a past surgical history that includes  section; Breast cyst excision (Left); and Esophagogastroduodenoscopy (N/A, 2022).    Social History: Patient reports that she has never smoked. She has never used smokeless tobacco. She reports that she does not  drink alcohol and does not use drugs.    Family History: family history includes Brain cancer in her brother; Coronary artery disease in her father; Lung cancer in her father and mother.    Allergies: Patient is allergic to contrast media, pcn [penicillins], celebrex [celecoxib], diazepam, and motrin [ibuprofen].    ROS  Constitutional: Negative for fever   Eyes: Negative for blurred vision, double vision   Respiratory: Negative for cough, shortness of breath   Cardiovascular: Negative for chest pain, palpitations. + leg swelling.   Gastrointestinal: Negative for abdominal pain, diarrhea, nausea, vomiting, constipation  Genitourinary: Negative for dysuria, frequency   Musculoskeletal:  + generalized weakness.  + bilateral lower extremity pain  Skin: Negative for itching and rash.   Neurological: Negative for dizziness, headaches.   Psychiatric/Behavioral: Negative for depression. The patient is nervous/anxious    24 hour Vital Sign Range   Temp:  [98.2 °F (36.8 °C)]   Pulse:  [90]   Resp:  [16-18]   BP: (140-168)/(67-85)   SpO2:  [96 %]     PEx  Constitutional: Patient appears debilitated.  No distress noted  HENT:   Head: Normocephalic and atraumatic.   Eyes: Pupils are equal, round  Neck: Normal range of motion. Neck supple.   Cardiovascular: Normal rate, regular rhythm and normal heart sounds.    Pulmonary/Chest: Effort normal and breath sounds are clear  Abdominal: Soft. Bowel sounds are normal.   Musculoskeletal: Normal range of motion.   Neurological: Alert and oriented to person, place, and time.   Psychiatric: Normal mood and affect. Behavior is normal.   Skin: Skin is warm and dry. Bilateral lower extremity with pain contract scar tissue surrounded by hyperkeratotic skin.  Moisture associated dermatitis to buttocks, improved.      Recent Labs   Lab 06/02/22 0522      K 3.3*      CO2 25   BUN 16   CREATININE 0.8   MG 1.9       Recent Labs   Lab 06/02/22 0522   WBC 5.90   RBC 3.10*   HGB 9.7*    HCT 31.1*      *   MCH 31.3*   MCHC 31.2*         Assessment and Plan:    Hypomagnesemia  - initiated magnesium oxide 400 mg BID x2 days  6/2-magnesium 1.9 today    Constipation  -  continue MiraLax daily, continue senna docusate 2 tabs BID- however patient refusing    Cellulitis of leg  - continue vancomycin   - ID consulted at [facility]  - continue local wound care per wound care  - completed cefepime and continue vancomycin. switched to doxycycline upon discharge (end date: 5/12/22)  - completed treatment was doxycycline     Urinary retention   - discontinued espinal prior to transfer to SNF.   - Urinating well until 5/10   -  bladder scans PRN     Pressure injury of buttock, unstageable  - q2 turns  - low airloss overlay mattress  - wound care consultation       Lymphedema  - wound care consultation to assist in managmeent  - continue ammonium lactate lotion BID  - continue doxycycline for cellulitis      HTN  -  continue to hold losartan to 25 mg daily- BP's low to normal and patient is refusing to take losartan.  Continue HCTZ 12.5 mg daily     MS (multiple sclerosis)  - PT/OT  - f/u with out pt provider      B12 deficiency  - change orders to cyanocobalamin 1000 mcg every 30 days, dose to be given on 05/13 as patient has been overdue due to recent hospitalization     Debility   - Continue with PT/OT for gait training and strengthening and restoration of ADL's   - Encourage mobility, OOB in chair, and early ambulation as appropriate  - Fall precautions   - Monitor for bowel and bladder dysfunction  - Monitor for and prevent skin breakdown and pressure ulcers  -  continue  DVT prophylaxis with  heparin 7.5 K q.8 hours        Anticipate disposition:  Home with home health        Follow-up needed during SNF stay-     Follow-up needed after discharge from SNF:   - PCP, hospital follow-up, needs to be scheduled      Future Appointments   Date Time Provider Department Center   6/15/2022 10:45 AM Samir PARNELL  MD [facility]         Dionne Galindo NP  Department of Hospital Medicine   [facility]     DOS: 6/2/2022      Patient note was created using MModal Dictation.  Any errors in syntax or even information may not have been identified and edited on initial review prior to signing this note.

## 2022-06-02 NOTE — PT/OT/SLP PROGRESS
Physical Therapy Treatment    Patient Name:  Lupis Murcia   MRN:  [id number]  Admit Date: 5/11/2022  Admitting Diagnosis: Cellulitis of leg  Recent Surgeries: none    General Precautions: Standard, fall   Orthopedic Precautions:N/A   Braces:   none    Recommendations:     Discharge Recommendations:  home with home health   Level of Assistance Recommended at Discharge: 24 hours light assistance  Discharge Equipment Recommendations:  (TBD as pt progresses)   Barriers to discharge: Decreased caregiver support    Assessment:     Lupis Murcia is a 71 y.o. female admitted with a medical diagnosis of Cellulitis of leg . Pt jeffery session well w/ good participation although she was fatigued due to consecutive OT/PT sessions. She remains at a Mod/Francie level for transfers and short distance gait due to weakness- including LLE foot drop. Dr. bond was messaged about getting an AFO order- PT/PTA will continue to follow up. Pt will continue PT POC.      Performance deficits affecting function:  weakness, impaired endurance, impaired self care skills, impaired functional mobilty, gait instability, impaired balance, decreased lower extremity function, decreased upper extremity function, decreased ROM, impaired skin, edema, impaired cardiopulmonary response to activity .    Rehab Potential is good    Activity Tolerance: Good    Plan:     Patient to be seen 6 x/week to address the above listed problems via gait training, therapeutic activities, therapeutic exercises, neuromuscular re-education    · Plan of Care Expires: 06/11/22  · Plan of Care Reviewed with: patient    Subjective     Pt agreeable to session.     Pain/Comfort:  · Pain Rating 1: 0/10  · Pain Rating Post-Intervention 1: 0/10    Patient's cultural, spiritual, Mandaeism conflicts given the current situation:  · no    Objective:     Communicated with patient and BOWIE prior to session.  Patient found up in chair with  (no lines) upon PT entry to room.     Therapeutic  Activities and Exercises:   Seated BLE therex 2x10 reps (GS,HF, LAQ, AP) w/ AAROM for LLE as needed t/o    Functional Mobility:  · Transfers:  · Sit<>stand to/from w/c; multiple trials; w/ RW and in room w/ bed rail; all w/ Francie to rise  · Cues for anterior weight shift  · Gait:   · 2 trials (12ft each) w/ RW and Mod/Francie for stability, RW management, and LLE advancement  · Ace wrap applied for LLE for DF assist  · PT/PTA will follow up about AFO order from Dr. Fried  · Balance:   · Static stand w/ bed rail and CGA/SBA while PT assisted w/ donning clean brief following toileting    AM-PAC 6 CLICK MOBILITY  13    Patient left up in chair with call button in reach and daughter present.    GOALS:   Multidisciplinary Problems     Physical Therapy Goals        Problem: Physical Therapy    Goal Priority Disciplines Outcome Goal Variances Interventions   Physical Therapy Goal     PT, PT/OT Ongoing, Progressing     Description: Goals to be met by: 6/9/22    Patient will increase functional independence with mobility by performing:    . Supine to sit with MInimal Assistance-not met  . Sit to supine with MInimal Assistance-not met  . Rolling to Left and Right with Minimal Assistance.  . Sit to stand transfer with Minimal Assistance with RW-met  Updated 5/21/2022 Sit to stand transfer with CGA with RW  . Bed to chair transfer with Minimal Assistance using Rolling Walker/no AD-not met  . Gait  x 10 feet with Moderate Assistance using RW- met  Updated 5/21/2022 Gait  x 20 feet with Francie using RW  . Wheelchair propulsion x50 feet with Minimal Assistance using bilateral uppper extremities-not met  . Sitting at edge of bed x5 minutes with Stand-by Assistance-Met 5/27/2022                     Time Tracking:     PT Received On: 06/02/22  PT Start Time: 1449  PT Stop Time: 1532  PT Total Time (min): 43 min    Billable Minutes: Gait Training 15, Therapeutic Activity 20, Therapeutic Exercise 8 and Total Time 43    Treatment Type:  Treatment  PT/PTA: PT     PTA Visit Number: 0     06/02/2022

## 2022-06-02 NOTE — PLAN OF CARE
[facility] - Skilled Nursing      HOME HEALTH ORDERS  FACE TO FACE ENCOUNTER    Patient Name: Lupis Murcia  YOB: 1950    PCP: Bobby Tran MD   PCP Address: [address]  PCP Phone Number: [phone]  PCP Fax: [phone]    Encounter Date: 5/11/22    Admit to Home Health    Diagnoses:  Active Hospital Problems    Diagnosis  POA    *Cellulitis of leg [L03.119]  Yes    Lymphedema [I89.0]  Yes    Pressure injury of buttock, unstageable [L89.300]  Yes    MS (multiple sclerosis) [G35]  Yes    Paraparesis [G82.20]  Yes    Vitamin D deficiency [E55.9]  Yes    Impaired functional mobility, balance, gait, and endurance [Z74.09]  Yes    Vitamin B 12 deficiency [E53.8]  Yes    Anemia of chronic disease [D63.8]  Yes    Insomnia secondary to chronic pain [G89.29, G47.01]  Yes      Resolved Hospital Problems   No resolved problems to display.       Follow Up Appointments:  Future Appointments   Date Time Provider Department Center   6/15/2022 10:45 AM Samir Sahni MD [facility]       Allergies:  Review of patient's allergies indicates:   Allergen Reactions    Contrast media Shortness Of Breath and Rash    Pcn [penicillins] Shortness Of Breath and Rash    Celebrex [celecoxib] Other (See Comments)     Swallowing problems     Diazepam Hives    Motrin [ibuprofen] Rash       Medications: Review discharge medications with patient and family and provide education.    Current Facility-Administered Medications   Medication Dose Route Frequency Provider Last Rate Last Admin    acetaminophen tablet 650 mg  650 mg Oral Q6H PRN Rogerio Fried MD   650 mg at 05/12/22 1817    acetaminophen-codeine 300-30mg per tablet 1 tablet  1 tablet Oral Q6H PRN Rogerio Fried MD   1 tablet at 06/02/22 0921    ammonium lactate 12 % lotion   Topical (Top) BID Amy Conklin NP   Given at 06/02/22 0924    B-complex with vitamin C tablet 1 tablet  1 tablet Oral  Daily Rogerio Fried MD   1 tablet at 06/02/22 0923    bisacodyL suppository 10 mg  10 mg Rectal Daily PRN Rogerio Fried MD        calcium carbonate 200 mg calcium (500 mg) chewable tablet 500 mg  500 mg Oral BID PRN Rogerio Fried MD        cyanocobalamin injection 1,000 mcg  1,000 mcg Intramuscular Q30 Days Amy Conklin NP   1,000 mcg at 05/13/22 0946    dextrose 10% bolus 125 mL  12.5 g Intravenous PRN Rogerio Fried MD        dextrose 10% bolus 250 mL  25 g Intravenous PRN Rogerio Fried MD        furosemide tablet 20 mg  20 mg Oral Daily PRN Rogerio Fried MD        heparin (porcine) injection 7,500 Units  7,500 Units Subcutaneous Q8H Amy Conklin NP   7,500 Units at 06/02/22 0610    hydroCHLOROthiazide tablet 12.5 mg  12.5 mg Oral Daily Rogerio Fried MD   12.5 mg at 06/02/22 0923    loperamide capsule 2 mg  2 mg Oral QID PRN Rogerio Fried MD        LORazepam tablet 0.5 mg  0.5 mg Oral Q8H PRN Yesenia Santacruz MD   0.5 mg at 06/02/22 0001    melatonin tablet 6 mg  6 mg Oral Nightly PRN Rogerio Fried MD        ondansetron disintegrating tablet 4 mg  4 mg Oral Q6H PRN Jose David Santacruz MD   4 mg at 05/28/22 1434    pantoprazole EC tablet 40 mg  40 mg Oral BID Amy Conklin NP   40 mg at 06/02/22 0924    polyethylene glycol packet 17 g  17 g Oral Daily Amy Conklin NP   17 g at 05/28/22 0912    senna-docusate 8.6-50 mg per tablet 2 tablet  2 tablet Oral BID Rogerio Fried MD   2 tablet at 05/28/22 0912    vitamin D 1000 units tablet 1,000 Units  1,000 Units Oral Daily Rogerio Fried MD   1,000 Units at 06/02/22 0922     Current Discharge Medication List      START taking these medications    Details   B-complex with vitamin C (Z-BEC OR EQUIV) tablet Take 1 tablet by mouth once daily.      bisacodyL (DULCOLAX) 10 mg Supp Place 1 suppository (10 mg total) rectally daily as needed.  Refills: 0      calcium carbonate (TUMS) 200 mg  calcium (500 mg) chewable tablet Take 1 tablet (500 mg total) by mouth 2 (two) times daily as needed for Heartburn.      hydroCHLOROthiazide (HYDRODIURIL) 12.5 MG Tab Take 1 tablet (12.5 mg total) by mouth once daily.  Qty: 30 tablet, Refills: 11    Comments: .      melatonin (MELATIN) 3 mg tablet Take 2 tablets (6 mg total) by mouth nightly as needed for Insomnia.  Refills: 0      ondansetron (ZOFRAN-ODT) 4 MG TbDL Take 1 tablet (4 mg total) by mouth every 6 (six) hours as needed.      polyethylene glycol (GLYCOLAX) 17 gram PwPk Take 17 g by mouth once daily.  Refills: 0         CONTINUE these medications which have CHANGED    Details   acetaminophen-codeine 300-30mg (TYLENOL #3) 300-30 mg Tab Take 1 tablet by mouth every 6 (six) hours as needed (Chronic leg pain).    Comments: Quantity prescribed more than 7 day supply? No      ammonium lactate (LAC-HYDRIN) 12 % lotion Apply topically 2 (two) times daily.  Refills: 0      cyanocobalamin 1,000 mcg/mL injection Inject 1 mL (1,000 mcg total) into the muscle every 30 days.      furosemide (LASIX) 20 MG tablet Take 1 tablet (20 mg total) by mouth daily as needed (leg swelling).      loperamide (IMODIUM) 2 mg capsule Take 1 capsule (2 mg total) by mouth 4 (four) times daily as needed for Diarrhea.  Refills: 0      LORazepam (ATIVAN) 0.5 MG tablet Take 1 tablet (0.5 mg total) by mouth every 8 (eight) hours as needed for Anxiety.      pantoprazole (PROTONIX) 40 MG tablet Take 1 tablet (40 mg total) by mouth once daily.  Qty: 90 tablet, Refills: 3      senna-docusate 8.6-50 mg (PERICOLACE) 8.6-50 mg per tablet Take 2 tablets by mouth 2 (two) times daily.      vitamin D (VITAMIN D3) 1000 units Tab Take 1 tablet (1,000 Units total) by mouth once daily.         CONTINUE these medications which have NOT CHANGED    Details   acetaminophen 325 mg Cap Take 325 mg by mouth.         STOP taking these medications       baclofen (LIORESAL) 10 MG tablet Comments:   Reason for  Stopping:         doxycycline (VIBRA-TABS) 100 MG tablet Comments:   Reason for Stopping:         miconazole (MICOTIN) 2 % cream Comments:   Reason for Stopping:         potassium, sodium phosphates (PHOS-NAK) 280-160-250 mg PwPk Comments:   Reason for Stopping:         tamsulosin (FLOMAX) 0.4 mg Cap Comments:   Reason for Stopping:         vitamin B comp with vit C no.6 500-0.5 mg Tab Comments:   Reason for Stopping:         candesartan-hydrochlorothiazide (ATACAND HCT) 16-12.5 mg per tablet Comments:   Reason for Stopping:         clotrimazole-betamethasone 1-0.05% (LOTRISONE) cream Comments:   Reason for Stopping:         erythromycin base (E-MYCIN) 250 MG Tab Comments:   Reason for Stopping:                 I have seen and examined this patient within the last 30 days. My clinical findings that support the need for the home health skilled services and home bound status are the following:no   Weakness/numbness causing balance and gait disturbance due to Weakness/Debility making it taxing to leave home.     Diet:   regular diet    Labs:  Report Lab results to PCP.    Referrals/ Consults  Physical Therapy to evaluate and treat. Evaluate for home safety and equipment needs; Establish/upgrade home exercise program. Perform / instruct on therapeutic exercises, gait training, transfer training, and Range of Motion.  Occupational Therapy to evaluate and treat. Evaluate home environment for safety and equipment needs. Perform/Instruct on transfers, ADL training, ROM, and therapeutic exercises.  Aide to provide assistance with personal care, ADLs, and vital signs.    Activities:   activity as tolerated    Nursing:   Agency to admit patient within 24 hours of hospital discharge unless specified on physician order or at patient request    SN to complete comprehensive assessment including routine vital signs. Instruct on disease process and s/s of complications to report to MD. Review/verify medication list sent home with  the patient at time of discharge  and instruct patient/caregiver as needed. Frequency may be adjusted depending on start of care date.     Skilled nurse to perform up to 3 visits PRN for symptoms related to diagnosis    Notify MD if SBP > 160 or < 90; DBP > 90 or < 50; HR > 120 or < 50; Temp > 101; O2 < 88%; Other:       Ok to schedule additional visits based on staff availability and patient request on consecutive days within the home health episode.    When multiple disciplines ordered:    Start of Care occurs on Sunday - Wednesday schedule remaining discipline evaluations as ordered on separate consecutive days following the start of care.    Thursday SOC -schedule subsequent evaluations Friday and Monday the following week.     Friday - Saturday SOC - schedule subsequent discipline evaluations on consecutive days starting Monday of the following week.    For all post-discharge communication and subsequent orders please contact patient's primary care physician. If unable to reach primary care physician or do not receive response within 30 minutes, please contact Dionne Galindo for clinical staff order clarification        Home Health Aide:  Nursing , Physical Therapy  and Occupational Therapy     Wound Care Orders    Cleanse BLE with soap and water, pay dry and apply ammonium lactate lotion BID to BLE, intertriginous areas, and lower abdomen groin    Coccyx/buttocks-  Nursing to cleanse perineal and affected area with perineal cleanser . Pat dry. Apply thin layer of Triad ointment BID and prn cleansing of incontinent episode. Do not apply cover dressing.      I certify that this patient is confined to her home and needs intermittent skilled nursing care, physical therapy and occupational therapy.

## 2022-06-02 NOTE — PLAN OF CARE
Interdisciplinary team, Tiana Pérez, RN Nurse Manager, Harmony Fermin, RN MDS Coordinator, and Lisbet Solomon [facility], spoke to patient and patient's daughter, for care plan conference, weekly status update, and therapy progress update. Tentative discharge date set for 6/14/22.

## 2022-06-03 PROCEDURE — 25000003 PHARM REV CODE 250: Performed by: NURSE PRACTITIONER

## 2022-06-03 PROCEDURE — 63600175 PHARM REV CODE 636 W HCPCS: Performed by: NURSE PRACTITIONER

## 2022-06-03 PROCEDURE — 11000004 HC SNF PRIVATE

## 2022-06-03 PROCEDURE — 97530 THERAPEUTIC ACTIVITIES: CPT | Mod: CO

## 2022-06-03 PROCEDURE — 97116 GAIT TRAINING THERAPY: CPT | Mod: CQ

## 2022-06-03 PROCEDURE — 25000003 PHARM REV CODE 250: Performed by: HOSPITALIST

## 2022-06-03 PROCEDURE — 97530 THERAPEUTIC ACTIVITIES: CPT | Mod: CQ

## 2022-06-03 RX ADMIN — AMMONIUM LACTATE: 12 LOTION TOPICAL at 09:06

## 2022-06-03 RX ADMIN — HEPARIN SODIUM 7500 UNITS: 5000 INJECTION INTRAVENOUS; SUBCUTANEOUS at 09:06

## 2022-06-03 RX ADMIN — LORAZEPAM 0.5 MG: 0.5 TABLET ORAL at 01:06

## 2022-06-03 RX ADMIN — PANTOPRAZOLE SODIUM 40 MG: 40 TABLET, DELAYED RELEASE ORAL at 10:06

## 2022-06-03 RX ADMIN — LORAZEPAM 0.5 MG: 0.5 TABLET ORAL at 05:06

## 2022-06-03 RX ADMIN — Medication 1000 UNITS: at 09:06

## 2022-06-03 RX ADMIN — Medication 1 TABLET: at 09:06

## 2022-06-03 RX ADMIN — PANTOPRAZOLE SODIUM 40 MG: 40 TABLET, DELAYED RELEASE ORAL at 09:06

## 2022-06-03 RX ADMIN — LORAZEPAM 0.5 MG: 0.5 TABLET ORAL at 09:06

## 2022-06-03 RX ADMIN — ACETAMINOPHEN AND CODEINE PHOSPHATE 1 TABLET: 300; 30 TABLET ORAL at 09:06

## 2022-06-03 RX ADMIN — HEPARIN SODIUM 7500 UNITS: 5000 INJECTION INTRAVENOUS; SUBCUTANEOUS at 02:06

## 2022-06-03 RX ADMIN — ACETAMINOPHEN AND CODEINE PHOSPHATE 1 TABLET: 300; 30 TABLET ORAL at 06:06

## 2022-06-03 RX ADMIN — HEPARIN SODIUM 7500 UNITS: 5000 INJECTION INTRAVENOUS; SUBCUTANEOUS at 05:06

## 2022-06-03 RX ADMIN — HYDROCHLOROTHIAZIDE 12.5 MG: 12.5 TABLET ORAL at 09:06

## 2022-06-03 NOTE — PT/OT/SLP PROGRESS
Occupational Therapy   Treatment    Name: Lupis Murcia  MRN: [id number]  Admit Date: 5/11/2022  Admitting Diagnosis:  Cellulitis of leg    General Precautions: Standard, fall   Orthopedic Precautions:N/A   Braces:       Recommendations:     Discharge Recommendations: home health OT  Level of Assistance Recommended at Discharge: 24 hours physical assistance for all ADL's and home management tasks  Discharge Equipment Recommendations:  bedside commode, bath bench  Barriers to discharge:  Decreased caregiver support    Assessment:     Lupis Murcia is a 71 y.o. female with a medical diagnosis of Cellulitis of leg.  She presents with  Performance deficits affecting function are weakness, impaired endurance, impaired sensation, impaired self care skills, impaired functional mobility, gait instability, impaired balance, decreased coordination, decreased lower extremity function, decreased safety awareness, pain, impaired coordination, impaired skin, edema  .     Pt participated fair with session on this day. Pt  continues to demonstrate levels of physical deficits with  functional indep with daily management activities tasks, selfcare skills with balance,  functional mobility, UB strength and endurance. Pt. Will continue to benefit from continued OT to progress towards goals     Rehab Potential is fair    Activity tolerance:  Fair    Plan:     Patient to be seen 6 x/week to address the above listed problems via self-care/home management, therapeutic activities, therapeutic exercises    · Plan of Care Expires: 06/16/22  · Plan of Care Reviewed with: patient    Subjective     Communicated with:  nsg and Pt.prior to session.  I am doing well today     Pain/Comfort:  Pain Rating 1: 0/10  Pain Rating Post-Intervention 1: 0/10    Patient's cultural, spiritual, Sikhism conflicts given the current situation:  no    Objective:     Patient found up in chair in gym with PT present    upon OT entry to room.    Functional  Mobility/Transfers:  · Patient completed Sit <> Stand  Transfer with minimal with RW for balance and cues for safety and hand placement.     Activities of Daily Living:  · Lower Body dressing: Maximum assistance to rosetta/doff L tennis shoe to try on for fit. Pt. Daughter just brought new shoes for Pt. To wear with AFO.    [facility] 6 Click ADL: 16     OT Exercises UBE x 10 min     Treatment & Education:  Pt. With standing act on this day with task. Pt. With CGA for balance aspects with task with  AD at raised counter Pt with visual perception task  x 3 min with standing bal and min cues through out with weight shifting and use of BUE's incorporated and crossing mid line and facilitation with posture in prep for home management .     Pt. With 2# dowel activity with 2x20 reps with  shd flex, bicep curls horz adb/add and forward flex motion to increase BUE ROM and strength.    Pt. With therex performed to increase ROM, endurance selfcare task and fxl mobility for independence   Pt edu on role of OT, POC, safety when performing self care tasks , benefit of performing OOB activity, and safety when performing functional transfers and mobility management for preparation with goals to progress towards next level of care    Patient left up in chair with all lines intact, call button in reach and daughter  presentEducation:   in room     GOALS:   Multidisciplinary Problems     Occupational Therapy Goals        Problem: Occupational Therapy    Goal Priority Disciplines Outcome Interventions   Occupational Therapy Goal     OT, PT/OT Ongoing, Progressing    Description: Goals to be met by: 6/2    Patient will increase functional independence with ADLs by performing:    UE Dressing with [place].  LE Dressing with Moderate Assistance using AE as needed.  Grooming while seated at sink with [place].  Toileting from bedside commode or toilet with Minimal Assistance for hygiene and clothing management.   Bathing from shower  chair/chair level with Minimal Assistance.  Stand pivot transfers with Minimal Assistance using RW.  Toilet transfer to bedside commode with Minimal Assistance.  Upper extremity exercise program with supervision.  Caregiver will be educated on level of assist required to safely perform self care tasks and functional transfers.                     Time Tracking:     OT Date of Treatment: OT Date of Treatment: 06/03/22  OT Total Time (min):      Billable Minutes:Therapeutic Activity 38    6/3/2022

## 2022-06-03 NOTE — PT/OT/SLP PROGRESS
Physical Therapy Treatment    Patient Name:  Lupis Murcia   MRN:  [id number]  Admit Date: 5/11/2022  Admitting Diagnosis: Cellulitis of leg  Recent Surgeries: none    General Precautions: Standard, fall   Orthopedic Precautions:N/A   Braces: N/A     Recommendations:     Discharge Recommendations:  home with home health   Level of Assistance Recommended at Discharge: 24 hours light assistance  Discharge Equipment Recommendations:  (TBD as pt progresses)   Barriers to discharge: Decreased caregiver support    Assessment:     Lupis Murcia is a 71 y.o. female admitted with a medical diagnosis of Cellulitis of leg. Pt tolerated therapy session well with focus on gait training, transfers and bed mob in order to assist with achieving highest level of independence. Pt is showing continual progress with gait training in furthering distance, as well as increased independence with transfers. Pt would continue to benefit from skilled PT services per POC.       Performance deficits affecting function:  weakness, impaired endurance, impaired self care skills, impaired functional mobilty, gait instability, impaired balance, decreased lower extremity function, decreased upper extremity function, decreased ROM, impaired skin, edema, impaired cardiopulmonary response to activity .    Rehab Potential is excellent and good    Activity Tolerance: Good and Fair    Plan:     Patient to be seen 6 x/week to address the above listed problems via gait training, therapeutic activities, therapeutic exercises, neuromuscular re-education    · Plan of Care Expires: 06/11/22  · Plan of Care Reviewed with: patient    Subjective     Pt agreeable to PT.     Pain/Comfort:  · Pain Rating 1: 0/10  · Pain Rating Post-Intervention 1: 0/10    Patient's cultural, spiritual, Anglican conflicts given the current situation:  · no    Objective:     Patient found HOB elevated with  (no lines) upon PT entry to room.     Therapeutic Activities and Exercises:  seated RLE LAQ's, AP x 15 reps, each    Functional Mobility:  · Bed Mobility:     · Scooting: minimum assistance  · Supine to Sit: minimum assistance  · Transfers:     · Sit to Stand: 2 sets, minimum assistance with rolling walker  · Bed to Chair: minimum assistance with  rolling walker  using  Step Transfer  · Gait: x 30', x 25', RW, Min/Mod A for steering AD/LLE mngmt and initiation    AM-PAC 6 CLICK MOBILITY  13    Patient left up in chair with OT present.    GOALS:   Multidisciplinary Problems     Physical Therapy Goals        Problem: Physical Therapy    Goal Priority Disciplines Outcome Goal Variances Interventions   Physical Therapy Goal     PT, PT/OT Ongoing, Progressing     Description: Goals to be met by: 6/9/22    Patient will increase functional independence with mobility by performing:    . Supine to sit with MInimal Assistance-not met  . Sit to supine with MInimal Assistance-not met  . Rolling to Left and Right with Minimal Assistance.  . Sit to stand transfer with Minimal Assistance with RW-met  Updated 5/21/2022 Sit to stand transfer with CGA with RW  . Bed to chair transfer with Minimal Assistance using Rolling Walker/no AD-not met  . Gait  x 10 feet with Moderate Assistance using RW- met  Updated 5/21/2022 Gait  x 20 feet with Long using RW  . Wheelchair propulsion x50 feet with Minimal Assistance using bilateral uppper extremities-not met  . Sitting at edge of bed x5 minutes with Stand-by Assistance-Met 5/27/2022                     Time Tracking:     PT Received On: 06/03/22  PT Start Time: 1050  PT Stop Time: 1127  PT Total Time (min): 37 min    Billable Minutes: Gait Training 15 and Therapeutic Activity 22    Treatment Type: Treatment  PT/PTA: PTA     PTA Visit Number: 1     06/03/2022

## 2022-06-04 PROCEDURE — 11000004 HC SNF PRIVATE

## 2022-06-04 PROCEDURE — 63600175 PHARM REV CODE 636 W HCPCS: Performed by: NURSE PRACTITIONER

## 2022-06-04 PROCEDURE — 25000003 PHARM REV CODE 250: Performed by: HOSPITALIST

## 2022-06-04 PROCEDURE — 25000003 PHARM REV CODE 250: Performed by: NURSE PRACTITIONER

## 2022-06-04 RX ADMIN — LORAZEPAM 0.5 MG: 0.5 TABLET ORAL at 02:06

## 2022-06-04 RX ADMIN — ACETAMINOPHEN AND CODEINE PHOSPHATE 1 TABLET: 300; 30 TABLET ORAL at 05:06

## 2022-06-04 RX ADMIN — HEPARIN SODIUM 7500 UNITS: 5000 INJECTION INTRAVENOUS; SUBCUTANEOUS at 02:06

## 2022-06-04 RX ADMIN — HYDROCHLOROTHIAZIDE 12.5 MG: 12.5 TABLET ORAL at 09:06

## 2022-06-04 RX ADMIN — HEPARIN SODIUM 7500 UNITS: 5000 INJECTION INTRAVENOUS; SUBCUTANEOUS at 06:06

## 2022-06-04 RX ADMIN — PANTOPRAZOLE SODIUM 40 MG: 40 TABLET, DELAYED RELEASE ORAL at 09:06

## 2022-06-04 RX ADMIN — Medication 1000 UNITS: at 09:06

## 2022-06-04 RX ADMIN — LORAZEPAM 0.5 MG: 0.5 TABLET ORAL at 10:06

## 2022-06-04 RX ADMIN — HEPARIN SODIUM 7500 UNITS: 5000 INJECTION INTRAVENOUS; SUBCUTANEOUS at 09:06

## 2022-06-04 RX ADMIN — ACETAMINOPHEN AND CODEINE PHOSPHATE 1 TABLET: 300; 30 TABLET ORAL at 11:06

## 2022-06-04 RX ADMIN — LORAZEPAM 0.5 MG: 0.5 TABLET ORAL at 06:06

## 2022-06-04 RX ADMIN — Medication 1 TABLET: at 09:06

## 2022-06-04 RX ADMIN — ACETAMINOPHEN AND CODEINE PHOSPHATE 1 TABLET: 300; 30 TABLET ORAL at 09:06

## 2022-06-04 NOTE — PT/OT/SLP PROGRESS
Physical Therapy      Patient Name:  Lupis Murcia   MRN:  [id number]    Patient not seen today secondary to fatigued. Upon 1st attempt, pt reports just getting back in bed from sitting up in bedside chair all morning and too fatigued for therapy. Pt's daughter reports getting shoes for new AFO, but new brace yet. Writing therapist unable to return for another attempt. Will follow-up as schedule per PT POC.    06/04/2022

## 2022-06-04 NOTE — PLAN OF CARE
Problem: Adult Inpatient Plan of Care  Goal: Plan of Care Review  Outcome: Ongoing, Progressing  Goal: Patient-Specific Goal (Individualized)  Outcome: Ongoing, Progressing     Problem: Bariatric Environmental Safety  Goal: Safety Maintained with Care  Outcome: Ongoing, Progressing     Problem: Impaired Wound Healing  Goal: Optimal Wound Healing  Outcome: Ongoing, Progressing     Problem: Skin Injury Risk Increased  Goal: Skin Health and Integrity  Outcome: Ongoing, Progressing     Problem: Fall Injury Risk  Goal: Absence of Fall and Fall-Related Injury  Outcome: Ongoing, Progressing     Problem: Fluid and Electrolyte Imbalance (Acute Kidney Injury/Impairment)  Goal: Fluid and Electrolyte Balance  Outcome: Ongoing, Progressing     Problem: Oral Intake Inadequate (Acute Kidney Injury/Impairment)  Goal: Optimal Nutrition Intake  Outcome: Ongoing, Progressing     Problem: Renal Function Impairment (Acute Kidney Injury/Impairment)  Goal: Effective Renal Function  Outcome: Ongoing, Progressing

## 2022-06-05 PROCEDURE — 25000003 PHARM REV CODE 250: Performed by: HOSPITALIST

## 2022-06-05 PROCEDURE — 63600175 PHARM REV CODE 636 W HCPCS: Performed by: NURSE PRACTITIONER

## 2022-06-05 PROCEDURE — 11000004 HC SNF PRIVATE

## 2022-06-05 PROCEDURE — 97530 THERAPEUTIC ACTIVITIES: CPT | Mod: CO

## 2022-06-05 PROCEDURE — 25000003 PHARM REV CODE 250: Performed by: NURSE PRACTITIONER

## 2022-06-05 RX ADMIN — PANTOPRAZOLE SODIUM 40 MG: 40 TABLET, DELAYED RELEASE ORAL at 09:06

## 2022-06-05 RX ADMIN — LORAZEPAM 0.5 MG: 0.5 TABLET ORAL at 10:06

## 2022-06-05 RX ADMIN — HEPARIN SODIUM 7500 UNITS: 5000 INJECTION INTRAVENOUS; SUBCUTANEOUS at 09:06

## 2022-06-05 RX ADMIN — FUROSEMIDE 20 MG: 20 TABLET ORAL at 09:06

## 2022-06-05 RX ADMIN — Medication 1000 UNITS: at 09:06

## 2022-06-05 RX ADMIN — ACETAMINOPHEN AND CODEINE PHOSPHATE 1 TABLET: 300; 30 TABLET ORAL at 06:06

## 2022-06-05 RX ADMIN — ACETAMINOPHEN AND CODEINE PHOSPHATE 1 TABLET: 300; 30 TABLET ORAL at 10:06

## 2022-06-05 RX ADMIN — Medication 1 TABLET: at 09:06

## 2022-06-05 RX ADMIN — HEPARIN SODIUM 7500 UNITS: 5000 INJECTION INTRAVENOUS; SUBCUTANEOUS at 03:06

## 2022-06-05 RX ADMIN — LORAZEPAM 0.5 MG: 0.5 TABLET ORAL at 06:06

## 2022-06-05 RX ADMIN — HEPARIN SODIUM 7500 UNITS: 5000 INJECTION INTRAVENOUS; SUBCUTANEOUS at 06:06

## 2022-06-05 RX ADMIN — LORAZEPAM 0.5 MG: 0.5 TABLET ORAL at 03:06

## 2022-06-05 RX ADMIN — HYDROCHLOROTHIAZIDE 12.5 MG: 12.5 TABLET ORAL at 09:06

## 2022-06-05 NOTE — PT/OT/SLP PROGRESS
Occupational Therapy   Treatment    Name: Lupis Murcia  MRN: [id number]  Admit Date: 5/11/2022  Admitting Diagnosis:  Cellulitis of leg    General Precautions: Standard, fall   Orthopedic Precautions:N/A   Braces:       Recommendations:     Discharge Recommendations: home health OT  Level of Assistance Recommended at Discharge: 24 hours physical assistance for all ADL's and home management tasks  Discharge Equipment Recommendations:  bedside commode, bath bench  Barriers to discharge:  Decreased caregiver support    Assessment:     Lupis Murcia is a 71 y.o. female with a medical diagnosis of Cellulitis of leg.  She presents with  Performance deficits affecting function are weakness, impaired endurance, impaired sensation, impaired self care skills, impaired functional mobility, gait instability, impaired balance, decreased coordination, decreased lower extremity function, decreased safety awareness, pain, impaired coordination, impaired skin, edema  .     Pt participated fair with session on this day. Pt  continues to demonstrate levels of physical deficits with  functional indep with daily management activities tasks, selfcare skills with balance,  functional mobility, UB strength and endurance. Pt. Will continue to benefit from continued OT to progress towards goals     Rehab Potential is fair    Activity tolerance:  Fair    Plan:     Patient to be seen 6 x/week to address the above listed problems via self-care/home management, therapeutic activities, therapeutic exercises    · Plan of Care Expires: 06/16/22  · Plan of Care Reviewed with: patient    Subjective     Communicated with:  nsg and Pt.prior to session.  I am doing well today     Pain/Comfort:  Pain Rating 1: 0/10  Pain Rating Post-Intervention 1: 0/10    Patient's cultural, spiritual, Congregational conflicts given the current situation:  no    Objective:     Patient found up in chair in w/c with daughtert    upon OT entry to room.    Functional  Mobility/Transfers:  · Patient completed Sit <> Stand  Transfer with  Contact guard assistance/minimal with RW for balance and cues for safety and hand placement.   · Pt. Completed stand pivot t/fs from bed side chair <> w/c with CGA with no AD and cues for safety and (A) with mange LLE with transitioning movement     Activities of Daily Living:  Lower Body dressing: Moderate assistance to rosetta pants with use of AE. Pt with (A) for LLE management insertion into pants leg and RLE management and to manage over hips instance with RW for balance   [facility] 6 Click ADL: 16     OT Exercises UBE x 10 min     Treatment & Education:  Pt. With standing act on this day with task. Pt. With CGA for balance aspects with task with  AD at raised counter Pt with visual perception task  x 3 min and then 2 min and 15 sec  with standing bal and min cues through out with weight shifting and use of BUE's incorporated and crossing mid line and facilitation with posture in prep for home management .     Pt. With therex performed to increase ROM, endurance selfcare task and fxl mobility for independence    Pt edu on role of OT, POC, safety when performing self care tasks , benefit of performing OOB activity, and safety when performing functional transfers and mobility management for preparation with goals to progress towards next level of care    Patient left up in chair with all lines intact, call button in reach and daughter  presentEducation:   in room     GOALS:   Multidisciplinary Problems     Occupational Therapy Goals        Problem: Occupational Therapy    Goal Priority Disciplines Outcome Interventions   Occupational Therapy Goal     OT, PT/OT Ongoing, Progressing    Description: Goals to be met by: 6/2    Patient will increase functional independence with ADLs by performing:    UE Dressing with [place].  LE Dressing with Moderate Assistance using AE as needed.  Grooming while seated at sink with [place].  Toileting from  bedside commode or toilet with Minimal Assistance for hygiene and clothing management.   Bathing from shower chair/chair level with Minimal Assistance.  Stand pivot transfers with Minimal Assistance using RW.  Toilet transfer to bedside commode with Minimal Assistance.  Upper extremity exercise program with supervision.  Caregiver will be educated on level of assist required to safely perform self care tasks and functional transfers.                     Time Tracking:     OT Date of Treatment: OT Date of Treatment: 06/05/22  OT Total Time (min):      Billable Minutes:Therapeutic Activity 39    6/5/2022

## 2022-06-06 LAB
ANION GAP SERPL CALC-SCNC: 12 MMOL/L (ref 8–16)
BASOPHILS # BLD AUTO: 0.02 K/UL (ref 0–0.2)
BASOPHILS NFR BLD: 0.4 % (ref 0–1.9)
BUN SERPL-MCNC: 20 MG/DL (ref 8–23)
CALCIUM SERPL-MCNC: 10.3 MG/DL (ref 8.7–10.5)
CHLORIDE SERPL-SCNC: 105 MMOL/L (ref 95–110)
CO2 SERPL-SCNC: 25 MMOL/L (ref 23–29)
CREAT SERPL-MCNC: 0.8 MG/DL (ref 0.5–1.4)
DIFFERENTIAL METHOD: ABNORMAL
EOSINOPHIL # BLD AUTO: 0.1 K/UL (ref 0–0.5)
EOSINOPHIL NFR BLD: 2.5 % (ref 0–8)
ERYTHROCYTE [DISTWIDTH] IN BLOOD BY AUTOMATED COUNT: 16 % (ref 11.5–14.5)
EST. GFR  (AFRICAN AMERICAN): >60 ML/MIN/1.73 M^2
EST. GFR  (NON AFRICAN AMERICAN): >60 ML/MIN/1.73 M^2
GLUCOSE SERPL-MCNC: 124 MG/DL (ref 70–110)
HCT VFR BLD AUTO: 30.3 % (ref 37–48.5)
HGB BLD-MCNC: 9.7 G/DL (ref 12–16)
IMM GRANULOCYTES # BLD AUTO: 0.04 K/UL (ref 0–0.04)
IMM GRANULOCYTES NFR BLD AUTO: 0.7 % (ref 0–0.5)
LYMPHOCYTES # BLD AUTO: 2.3 K/UL (ref 1–4.8)
LYMPHOCYTES NFR BLD: 40.7 % (ref 18–48)
MAGNESIUM SERPL-MCNC: 1.8 MG/DL (ref 1.6–2.6)
MCH RBC QN AUTO: 30.2 PG (ref 27–31)
MCHC RBC AUTO-ENTMCNC: 32 G/DL (ref 32–36)
MCV RBC AUTO: 94 FL (ref 82–98)
MONOCYTES # BLD AUTO: 0.5 K/UL (ref 0.3–1)
MONOCYTES NFR BLD: 8.8 % (ref 4–15)
NEUTROPHILS # BLD AUTO: 2.6 K/UL (ref 1.8–7.7)
NEUTROPHILS NFR BLD: 46.9 % (ref 38–73)
NRBC BLD-RTO: 0 /100 WBC
PHOSPHATE SERPL-MCNC: 3.5 MG/DL (ref 2.7–4.5)
PLATELET # BLD AUTO: 275 K/UL (ref 150–450)
PMV BLD AUTO: 10 FL (ref 9.2–12.9)
POTASSIUM SERPL-SCNC: 3.4 MMOL/L (ref 3.5–5.1)
RBC # BLD AUTO: 3.21 M/UL (ref 4–5.4)
SODIUM SERPL-SCNC: 142 MMOL/L (ref 136–145)
WBC # BLD AUTO: 5.58 K/UL (ref 3.9–12.7)

## 2022-06-06 PROCEDURE — 63600175 PHARM REV CODE 636 W HCPCS: Performed by: NURSE PRACTITIONER

## 2022-06-06 PROCEDURE — 83735 ASSAY OF MAGNESIUM: CPT | Performed by: HOSPITALIST

## 2022-06-06 PROCEDURE — 84100 ASSAY OF PHOSPHORUS: CPT | Performed by: HOSPITALIST

## 2022-06-06 PROCEDURE — 97535 SELF CARE MNGMENT TRAINING: CPT

## 2022-06-06 PROCEDURE — 80048 BASIC METABOLIC PNL TOTAL CA: CPT | Performed by: HOSPITALIST

## 2022-06-06 PROCEDURE — 97116 GAIT TRAINING THERAPY: CPT | Mod: CQ

## 2022-06-06 PROCEDURE — 25000003 PHARM REV CODE 250: Performed by: NURSE PRACTITIONER

## 2022-06-06 PROCEDURE — 97530 THERAPEUTIC ACTIVITIES: CPT | Mod: CQ

## 2022-06-06 PROCEDURE — 11000004 HC SNF PRIVATE

## 2022-06-06 PROCEDURE — 85025 COMPLETE CBC W/AUTO DIFF WBC: CPT | Performed by: HOSPITALIST

## 2022-06-06 PROCEDURE — 25000003 PHARM REV CODE 250: Performed by: HOSPITALIST

## 2022-06-06 PROCEDURE — 36415 COLL VENOUS BLD VENIPUNCTURE: CPT | Performed by: HOSPITALIST

## 2022-06-06 RX ORDER — BISACODYL 10 MG
10 SUPPOSITORY, RECTAL RECTAL ONCE
Status: DISCONTINUED | OUTPATIENT
Start: 2022-06-06 | End: 2022-06-08

## 2022-06-06 RX ORDER — POLYETHYLENE GLYCOL 3350 17 G/17G
17 POWDER, FOR SOLUTION ORAL 2 TIMES DAILY
Status: DISCONTINUED | OUTPATIENT
Start: 2022-06-06 | End: 2022-06-15 | Stop reason: HOSPADM

## 2022-06-06 RX ADMIN — HEPARIN SODIUM 7500 UNITS: 5000 INJECTION INTRAVENOUS; SUBCUTANEOUS at 10:06

## 2022-06-06 RX ADMIN — LORAZEPAM 0.5 MG: 0.5 TABLET ORAL at 06:06

## 2022-06-06 RX ADMIN — LORAZEPAM 0.5 MG: 0.5 TABLET ORAL at 03:06

## 2022-06-06 RX ADMIN — HYDROCHLOROTHIAZIDE 12.5 MG: 12.5 TABLET ORAL at 09:06

## 2022-06-06 RX ADMIN — PANTOPRAZOLE SODIUM 40 MG: 40 TABLET, DELAYED RELEASE ORAL at 09:06

## 2022-06-06 RX ADMIN — LORAZEPAM 0.5 MG: 0.5 TABLET ORAL at 11:06

## 2022-06-06 RX ADMIN — Medication 1 TABLET: at 09:06

## 2022-06-06 RX ADMIN — ACETAMINOPHEN AND CODEINE PHOSPHATE 1 TABLET: 300; 30 TABLET ORAL at 09:06

## 2022-06-06 RX ADMIN — PANTOPRAZOLE SODIUM 40 MG: 40 TABLET, DELAYED RELEASE ORAL at 10:06

## 2022-06-06 RX ADMIN — ACETAMINOPHEN AND CODEINE PHOSPHATE 1 TABLET: 300; 30 TABLET ORAL at 12:06

## 2022-06-06 RX ADMIN — HEPARIN SODIUM 7500 UNITS: 5000 INJECTION INTRAVENOUS; SUBCUTANEOUS at 02:06

## 2022-06-06 RX ADMIN — Medication 1000 UNITS: at 09:06

## 2022-06-06 RX ADMIN — HEPARIN SODIUM 7500 UNITS: 5000 INJECTION INTRAVENOUS; SUBCUTANEOUS at 06:06

## 2022-06-06 RX ADMIN — ACETAMINOPHEN AND CODEINE PHOSPHATE 1 TABLET: 300; 30 TABLET ORAL at 06:06

## 2022-06-06 NOTE — PLAN OF CARE
Problem: Adult Inpatient Plan of Care  Goal: Plan of Care Review  Outcome: Ongoing, Progressing  Goal: Patient-Specific Goal (Individualized)  Outcome: Ongoing, Progressing  Goal: Absence of Hospital-Acquired Illness or Injury  Outcome: Ongoing, Progressing  Goal: Optimal Comfort and Wellbeing  Outcome: Ongoing, Progressing  Goal: Readiness for Transition of Care  Outcome: Ongoing, Progressing     Problem: Impaired Wound Healing  Goal: Optimal Wound Healing  Outcome: Ongoing, Progressing     Problem: Fall Injury Risk  Goal: Absence of Fall and Fall-Related Injury  Outcome: Ongoing, Progressing     Problem: Oral Intake Inadequate (Acute Kidney Injury/Impairment)  Goal: Optimal Nutrition Intake  Outcome: Ongoing, Progressing

## 2022-06-06 NOTE — PT/OT/SLP PROGRESS
Physical Therapy Treatment    Patient Name:  Lupis Murcia   MRN:  [id number]  Admit Date: 5/11/2022  Admitting Diagnosis: Cellulitis of leg  Recent Surgeries: N/A    General Precautions: Standard, fall   Orthopedic Precautions:N/A   Braces: N/A     Recommendations:     Discharge Recommendations:  home with home health   Level of Assistance Recommended at Discharge: 24 hours light assistance  Discharge Equipment Recommendations:  (TBD as pt progresses)   Barriers to discharge: Decreased caregiver support    Assessment:     Lupis Murcia is a 71 y.o. female admitted with a medical diagnosis of Cellulitis of leg. Pt tolerated therapy session well with focus on bed mob, transfers and gait training in order to assist with preparing pt for d/c from PT. Pt would continue to benefit from skilled PT services per POC. Pt is improving with gait amb up to 38', 2 sets today with RW at Min A (with regular sock on LLE to assist with sliding foot). Pt attempted use of new tennis shoes with standard AFO and was not able to initiate LLE step without Max A. Interdisciplinary communication with PT and Rehab Supervisor to order patient custom AFO. Pt would continue to benefit from skilled PT services per POC.     Performance deficits affecting function:  weakness, impaired endurance, impaired self care skills, impaired functional mobilty, gait instability, impaired balance, decreased lower extremity function, decreased upper extremity function, decreased ROM, impaired skin, edema, impaired cardiopulmonary response to activity .    Rehab Potential is good    Activity Tolerance: Fair and good    Plan:     Patient to be seen 6 x/week to address the above listed problems via gait training, therapeutic activities, therapeutic exercises, neuromuscular re-education    · Plan of Care Expires: 06/11/22  · Plan of Care Reviewed with: patient    Subjective     Pt agreeable to PT.     Pain/Comfort:  · Pain Rating 1: 0/10  · Pain Rating  Post-Intervention 1: 0/10    Patient's cultural, spiritual, Pentecostalism conflicts given the current situation:  · no    Objective:     Patient found HOB elevated with  (no lines) upon PT entry to room.     Functional Mobility:  · Bed Mobility:     · Scooting: stand by assistance  · Supine to Sit: stand by assistance  · Transfers:     · Sit to Stand: 2 sets, minimum assistance with rolling walker  · Bed to Chair: minimum assistance with  rolling walker  using  Step Transfer  · Gait: x 38', 2 sets, RW, Min A for LLE initiation and steering/AD mngmt.     AM-PAC 6 CLICK MOBILITY  13    Patient left up in chair with OT present in gym.    GOALS:   Multidisciplinary Problems     Physical Therapy Goals        Problem: Physical Therapy    Goal Priority Disciplines Outcome Goal Variances Interventions   Physical Therapy Goal     PT, PT/OT Ongoing, Progressing     Description: Goals to be met by: 6/9/22    Patient will increase functional independence with mobility by performing:    . Supine to sit with MInimal Assistance-not met  . Sit to supine with MInimal Assistance-not met  . Rolling to Left and Right with Minimal Assistance.  . Sit to stand transfer with Minimal Assistance with RW-met  Updated 5/21/2022 Sit to stand transfer with CGA with RW  . Bed to chair transfer with Minimal Assistance using Rolling Walker/no AD-not met  . Gait  x 10 feet with Moderate Assistance using RW- met  Updated 5/21/2022 Gait  x 20 feet with Long using RW  . Wheelchair propulsion x50 feet with Minimal Assistance using bilateral uppper extremities-not met  . Sitting at edge of bed x5 minutes with Stand-by Assistance-Met 5/27/2022                     Time Tracking:     PT Received On: 06/06/22  PT Start Time: 1321  PT Stop Time: 1408  PT Total Time (min): 47 min    Billable Minutes: Gait Training 17 and Therapeutic Activity 30    Treatment Type: Treatment  PT/PTA: PTA     PTA Visit Number: 2     06/06/2022

## 2022-06-06 NOTE — PT/OT/SLP PROGRESS
"Occupational Therapy   Treatment    Name: Lupis Murcia  MRN: [id number]  Admit Date: 5/11/2022  Admitting Diagnosis:  Cellulitis of leg    General Precautions: Standard, fall   Orthopedic Precautions:N/A   Braces: N/A     Recommendations:     Discharge Recommendations: home health OT  Level of Assistance Recommended at Discharge: 24 hours physical assistance for all ADL's and home management tasks  Discharge Equipment Recommendations:  bedside commode, bath bench  Barriers to discharge:  Decreased caregiver support    Assessment:     Lupis Murcia is a 71 y.o. female with a medical diagnosis of Cellulitis of leg .  She presents after completing PT in gym, pt continued OT in her room. Performance deficits affecting function are weakness, impaired endurance, impaired self care skills, impaired functional mobilty, gait instability, impaired balance, decreased coordination, impaired sensation, decreased safety awareness, decreased ROM, impaired coordination, impaired skin, edema. Pt and daughter in room and discussed their goals to complete prior to d/c, which was safe toilet/BSC transfers. Pt was min A, with fear of falling being her biggest obstacle.      Rehab Potential is good    Activity tolerance:  Good    Plan:     Patient to be seen 6 x/week to address the above listed problems via self-care/home management, therapeutic activities, therapeutic exercises    · Plan of Care Expires: 06/16/22  · Plan of Care Reviewed with: patient, daughter    Subjective   " I didn't walk for 12 days and I feel so weak"  Communicated with: daughter and PT prior to session.     Pain/Comfort:  · Pain Rating 1: 0/10  · Location - Side 1: Left  · Location - Orientation 1: generalized  · Location 1: leg  · Pain Rating Post-Intervention 1: 0/10    Patient's cultural, spiritual, Buddhism conflicts given the current situation:  · no    Objective:     Patient found up in wheel chair with Other (comments) (no lines) upon OT entry to " room.    Bed Mobility:    · Pt up in chair in gym.    Functional Mobility/Transfers:  · Patient completed Sit <> Stand Transfer with minimum assistance  with  rolling walker   · Patient completed Toilet Transfer Step Transfer technique with minimum assistance with  rolling walker  · Functional Mobility: Pt able to ambulate with a RW and min A. Pts transfers and functional mobility hampered by drop foot which requires extra effort and attention.      Activities of Daily Living:  · Lower Body Dressing: maximal assistance to don/doff socks. Pt would benefit from sock aid.  · Toileting: moderate assistance for clothing and hygiene in simulations and min A to tf safely wit VC's for handplacement on BSC and keeping body in walker     [facility] 6 Click ADL: 16    Treatment & Education:  Role of OT and POC  Safety  ADL retraining  Functional mobility training  Discharge planning  Importance of EOB/OOB activity      Patient left up in chair with call button in reach and nurse notifiedEducation:      GOALS:   Multidisciplinary Problems     Occupational Therapy Goals        Problem: Occupational Therapy    Goal Priority Disciplines Outcome Interventions   Occupational Therapy Goal     OT, PT/OT Ongoing, Progressing    Description: Goals to be met by: 6/2    Patient will increase functional independence with ADLs by performing:    UE Dressing with [place].  LE Dressing with Moderate Assistance using AE as needed.  Grooming while seated at sink with [place].  Toileting from bedside commode or toilet with Minimal Assistance for hygiene and clothing management.   Bathing from shower chair/chair level with Minimal Assistance.  Stand pivot transfers with Minimal Assistance using RW.  Toilet transfer to bedside commode with Minimal Assistance.  Upper extremity exercise program with supervision.  Caregiver will be educated on level of assist required to safely perform self care tasks and functional transfers.                      Time Tracking:     OT Date of Treatment: 06/06/22  OT Start Time: 1405    OT Stop Time: 1430  OT Total Time (min): 25 min    Billable Minutes:Self Care/Home Management 25 6/6/2022

## 2022-06-07 PROCEDURE — 25000003 PHARM REV CODE 250: Performed by: HOSPITALIST

## 2022-06-07 PROCEDURE — 97530 THERAPEUTIC ACTIVITIES: CPT | Mod: CQ

## 2022-06-07 PROCEDURE — 25000003 PHARM REV CODE 250: Performed by: NURSE PRACTITIONER

## 2022-06-07 PROCEDURE — 97535 SELF CARE MNGMENT TRAINING: CPT | Mod: CO

## 2022-06-07 PROCEDURE — 63600175 PHARM REV CODE 636 W HCPCS: Performed by: NURSE PRACTITIONER

## 2022-06-07 PROCEDURE — 11000004 HC SNF PRIVATE

## 2022-06-07 PROCEDURE — 97116 GAIT TRAINING THERAPY: CPT | Mod: CQ

## 2022-06-07 RX ADMIN — ACETAMINOPHEN AND CODEINE PHOSPHATE 1 TABLET: 300; 30 TABLET ORAL at 08:06

## 2022-06-07 RX ADMIN — ONDANSETRON 4 MG: 4 TABLET, ORALLY DISINTEGRATING ORAL at 05:06

## 2022-06-07 RX ADMIN — Medication 1 TABLET: at 09:06

## 2022-06-07 RX ADMIN — ACETAMINOPHEN AND CODEINE PHOSPHATE 1 TABLET: 300; 30 TABLET ORAL at 03:06

## 2022-06-07 RX ADMIN — LORAZEPAM 0.5 MG: 0.5 TABLET ORAL at 08:06

## 2022-06-07 RX ADMIN — HEPARIN SODIUM 7500 UNITS: 5000 INJECTION INTRAVENOUS; SUBCUTANEOUS at 10:06

## 2022-06-07 RX ADMIN — LORAZEPAM 0.5 MG: 0.5 TABLET ORAL at 11:06

## 2022-06-07 RX ADMIN — HEPARIN SODIUM 7500 UNITS: 5000 INJECTION INTRAVENOUS; SUBCUTANEOUS at 01:06

## 2022-06-07 RX ADMIN — HEPARIN SODIUM 7500 UNITS: 5000 INJECTION INTRAVENOUS; SUBCUTANEOUS at 06:06

## 2022-06-07 RX ADMIN — HYDROCHLOROTHIAZIDE 12.5 MG: 12.5 TABLET ORAL at 09:06

## 2022-06-07 RX ADMIN — Medication 1000 UNITS: at 09:06

## 2022-06-07 RX ADMIN — PANTOPRAZOLE SODIUM 40 MG: 40 TABLET, DELAYED RELEASE ORAL at 08:06

## 2022-06-07 RX ADMIN — PANTOPRAZOLE SODIUM 40 MG: 40 TABLET, DELAYED RELEASE ORAL at 09:06

## 2022-06-07 NOTE — PT/OT/SLP PROGRESS
Occupational Therapy   Treatment/Family Training     Name: Lupis Murcia  MRN: [id number]  Admit Date: 5/11/2022  Admitting Diagnosis:  Cellulitis of leg    General Precautions: Standard, fall   Orthopedic Precautions:N/A   Braces:       Recommendations:     Discharge Recommendations: home health OT  Level of Assistance Recommended at Discharge: 24 hours physical assistance for all ADL's and home management tasks  Discharge Equipment Recommendations:  bedside commode, bath bench  Barriers to discharge:  Decreased caregiver support    Assessment:     Lupis Murcia is a 71 y.o. female with a medical diagnosis of Cellulitis of leg.  She presents with  Performance deficits affecting function are weakness, impaired endurance, impaired sensation, impaired self care skills, impaired functional mobility, gait instability, impaired balance, decreased coordination, decreased lower extremity function, decreased safety awareness, pain, impaired coordination, impaired skin, edema  .     Pt participated fair with session on this day. Pt  continues to demonstrate levels of physical deficits with  functional indep with daily management activities tasks, selfcare skills with balance,  functional mobility, UB strength and endurance. Pt. Will continue to benefit from continued OT to progress towards goals     Rehab Potential is fair    Activity tolerance:  Fair    Plan:     Patient to be seen 6 x/week to address the above listed problems via self-care/home management, therapeutic activities, therapeutic exercises    · Plan of Care Expires: 06/16/22  · Plan of Care Reviewed with: patient, daughter    Subjective     Communicated with:  nsg and Pt.prior to session.  I am doing well today     Pain/Comfort:  Pain Rating 1: 0/10  Pain Rating Post-Intervention 1: 0/10    Patient's cultural, spiritual, Lutheran conflicts given the current situation:  no    Objective:     Patient found up in chair in w/c with daughtert    upon OT entry to  room.    Functional Mobility/Transfers:  · Patient completed Sit <> Stand  Transfer with  Contact guard assistance to stand by assistance with RW for balance and cues for safety and hand placement.   · Pt. Completed stand pivot t/fs from bed side chair <> 3n1  with CCG/SBA with RW  and cues for safety and (A) with mange LLE with transitioning movement     Activities of Daily Living:  Lower Body dressing: Moderate assistance to rosetta pants with use of AE. Pt with (A) for LLE management insertion into pants leg and RLE management and to manage over hips instance with RW for balance     [facility] 6 Click ADL: 16     Treatment & Education:  Pt. With family training held on this day with daughter Sue  reviewed t/f's, selfcare skills and DME and level of (A) for home upon d/c.    Pt edu on role of OT, POC, safety when performing self care tasks , benefit of performing OOB activity, and safety when performing functional transfers and mobility management for preparation with goals to progress towards next level of care    Patient left up in chair with all lines intact, call button in reach and daughter  presentEducation:   in room     GOALS:   Multidisciplinary Problems     Occupational Therapy Goals        Problem: Occupational Therapy    Goal Priority Disciplines Outcome Interventions   Occupational Therapy Goal     OT, PT/OT Ongoing, Progressing    Description: Goals to be met by: 6/2    Patient will increase functional independence with ADLs by performing:    UE Dressing with [place].  LE Dressing with Moderate Assistance using AE as needed.  Grooming while seated at sink with [place].  Toileting from bedside commode or toilet with Minimal Assistance for hygiene and clothing management.   Bathing from shower chair/chair level with Minimal Assistance.  Stand pivot transfers with Minimal Assistance using RW.  Toilet transfer to bedside commode with Minimal Assistance.  Upper extremity exercise program with  supervision.  Caregiver will be educated on level of assist required to safely perform self care tasks and functional transfers.                     Time Tracking:     OT Date of Treatment: OT Date of Treatment: 06/07/22  OT Total Time (min):      Billable Minutes:Therapeutic Activity 45    6/7/2022

## 2022-06-07 NOTE — PT/OT/SLP PROGRESS
Physical Therapy Treatment    Patient Name:  Lupis Murcia   MRN:  [id number]  Admit Date: 5/11/2022  Admitting Diagnosis: Cellulitis of leg  Recent Surgeries: N/A  General Precautions: Standard, fall   Orthopedic Precautions:N/A   Braces: N/A (waiting for custom AFO)     Recommendations:     Discharge Recommendations:  home with home health   Level of Assistance Recommended at Discharge: 24 hours light assistance  Discharge Equipment Recommendations:  (TBD as pt progresses)   Barriers to discharge: Decreased caregiver support    Assessment:     Lupis Murcia is a 71 y.o. female admitted with a medical diagnosis of Cellulitis of leg. Pt tolerated therapy session well with focus on increasing independence with functional transfers, gait training and safety awareness. Patient and family educated on safety/proper tech with overall functional mobility to include trfs, gait, WC mechanics/mobility, fall precautions, pressure relief, and level of A/S required upon D/C for patient care, also level of assistance may fluctuate pending  patient's environment/fatigue/pain,alertness, etc. Discussed DME, HHPT. All questions answered within PTA scope of practice, all verbalized understanding. Deferred discharge questions and medical questions to SW/NP/MD. Pt would continue to benefit from skilled PT services per POC.         Performance deficits affecting function:  weakness, impaired endurance, impaired self care skills, impaired functional mobilty, gait instability, impaired balance, decreased lower extremity function, decreased upper extremity function, decreased ROM, impaired skin, edema, impaired cardiopulmonary response to activity .    Rehab Potential is good    Activity Tolerance: Good and Fair    Plan:     Patient to be seen 6 x/week to address the above listed problems via gait training, therapeutic activities, therapeutic exercises, neuromuscular re-education    · Plan of Care Expires: 06/11/22  · Plan of Care  Reviewed with: patient    Subjective     Pt agreeable to PT.     Pain/Comfort:  · Pain Rating 1: 0/10  · Pain Rating Post-Intervention 1: 0/10    Patient's cultural, spiritual, Restoration conflicts given the current situation:  · no    Objective:     Communicated with OT prior to session.  Patient found up in chair with  (no lines) upon PT entry to room.     Functional Mobility:  · Transfers:     · Sit to Stand: 2 sets, stand by assistance and contact guard assistance with rolling walker  · Recliner chair to wheelchair: stand by assistance with  rolling walker  using  Step Transfer  · Gait: x 30', x 40', RW, Min A for steering/AD mngmt and occasional Min A for initiation of LLE while wearing regular sock to assist wiith moving LLE.     AM-PAC 6 CLICK MOBILITY  14    Patient left up in chair with call button in reach and daughter present.    GOALS:   Multidisciplinary Problems     Physical Therapy Goals        Problem: Physical Therapy    Goal Priority Disciplines Outcome Goal Variances Interventions   Physical Therapy Goal     PT, PT/OT Ongoing, Progressing     Description: Goals to be met by: 6/9/22    Patient will increase functional independence with mobility by performing:    . Supine to sit with MInimal Assistance-not met  . Sit to supine with MInimal Assistance-not met  . Rolling to Left and Right with Minimal Assistance.  . Sit to stand transfer with Minimal Assistance with RW-met  Updated 5/21/2022 Sit to stand transfer with CGA with RW  . Bed to chair transfer with Minimal Assistance using Rolling Walker/no AD-not met  . Gait  x 10 feet with Moderate Assistance using RW- met  Updated 5/21/2022 Gait  x 20 feet with Long using RW  . Wheelchair propulsion x50 feet with Minimal Assistance using bilateral uppper extremities-not met  . Sitting at edge of bed x5 minutes with Stand-by Assistance-Met 5/27/2022                     Time Tracking:     PT Received On: 06/07/22  PT Start Time: 1106  PT Stop Time: 1139  PT  Total Time (min): 33 min    Billable Minutes: Gait Training 13 and Therapeutic Activity 20    Treatment Type: Family Training  PT/PTA: PTA     PTA Visit Number: 3     06/07/2022

## 2022-06-08 PROCEDURE — 25000003 PHARM REV CODE 250: Performed by: HOSPITALIST

## 2022-06-08 PROCEDURE — 25000003 PHARM REV CODE 250: Performed by: NURSE PRACTITIONER

## 2022-06-08 PROCEDURE — 97110 THERAPEUTIC EXERCISES: CPT | Mod: CO

## 2022-06-08 PROCEDURE — 11000004 HC SNF PRIVATE

## 2022-06-08 PROCEDURE — 63600175 PHARM REV CODE 636 W HCPCS: Performed by: NURSE PRACTITIONER

## 2022-06-08 PROCEDURE — 97530 THERAPEUTIC ACTIVITIES: CPT | Mod: CQ

## 2022-06-08 PROCEDURE — 97116 GAIT TRAINING THERAPY: CPT | Mod: CQ

## 2022-06-08 PROCEDURE — 97535 SELF CARE MNGMENT TRAINING: CPT | Mod: CO

## 2022-06-08 RX ADMIN — Medication 1000 UNITS: at 08:06

## 2022-06-08 RX ADMIN — HEPARIN SODIUM 7500 UNITS: 5000 INJECTION INTRAVENOUS; SUBCUTANEOUS at 03:06

## 2022-06-08 RX ADMIN — ACETAMINOPHEN AND CODEINE PHOSPHATE 1 TABLET: 300; 30 TABLET ORAL at 05:06

## 2022-06-08 RX ADMIN — HEPARIN SODIUM 7500 UNITS: 5000 INJECTION INTRAVENOUS; SUBCUTANEOUS at 09:06

## 2022-06-08 RX ADMIN — LORAZEPAM 0.5 MG: 0.5 TABLET ORAL at 05:06

## 2022-06-08 RX ADMIN — HYDROCHLOROTHIAZIDE 12.5 MG: 12.5 TABLET ORAL at 08:06

## 2022-06-08 RX ADMIN — ACETAMINOPHEN AND CODEINE PHOSPHATE 1 TABLET: 300; 30 TABLET ORAL at 09:06

## 2022-06-08 RX ADMIN — ACETAMINOPHEN AND CODEINE PHOSPHATE 1 TABLET: 300; 30 TABLET ORAL at 01:06

## 2022-06-08 RX ADMIN — HEPARIN SODIUM 7500 UNITS: 5000 INJECTION INTRAVENOUS; SUBCUTANEOUS at 06:06

## 2022-06-08 RX ADMIN — LORAZEPAM 0.5 MG: 0.5 TABLET ORAL at 09:06

## 2022-06-08 RX ADMIN — PANTOPRAZOLE SODIUM 40 MG: 40 TABLET, DELAYED RELEASE ORAL at 09:06

## 2022-06-08 RX ADMIN — Medication 1 TABLET: at 08:06

## 2022-06-08 RX ADMIN — PANTOPRAZOLE SODIUM 40 MG: 40 TABLET, DELAYED RELEASE ORAL at 08:06

## 2022-06-08 NOTE — PT/OT/SLP PROGRESS
Physical Therapy Treatment    Patient Name:  Lupis Murcia   MRN:  [id number]  Admit Date: 5/11/2022  Admitting Diagnosis: Cellulitis of leg  Recent Surgeries: N/A  General Precautions: Standard, fall   Orthopedic Precautions:N/A   Braces: N/A     Recommendations:     Discharge Recommendations:  home with home health   Level of Assistance Recommended at Discharge: 24 hours light assistance  Discharge Equipment Recommendations:  (TBD as pt progresses)   Barriers to discharge: Decreased caregiver support    Assessment:     Lupis Murcia is a 71 y.o. female admitted with a medical diagnosis of Cellulitis of leg. Pt tolerated therapy session well with focus on gait training, transfers and w/c mob in order to increase pt's level of independence upon d/c from PT. Pt would continue to benefit from skilled PT services per POC.       Performance deficits affecting function:  weakness, impaired endurance, impaired self care skills, impaired functional mobilty, gait instability, impaired balance, decreased lower extremity function, decreased upper extremity function, decreased ROM, impaired skin, edema, impaired cardiopulmonary response to activity .    Rehab Potential is good    Activity Tolerance: Good and Fair    Plan:     Patient to be seen 6 x/week to address the above listed problems via gait training, therapeutic activities, therapeutic exercises, neuromuscular re-education    · Plan of Care Expires: 06/11/22  · Plan of Care Reviewed with: patient    Subjective     Pt agreeable to PT.     Pain/Comfort:  · Pain Rating 1: 0/10  · Pain Rating Post-Intervention 1: 0/10    Patient's cultural, spiritual, Mandaeism conflicts given the current situation:  · no    Objective:     Communicated with OT prior to session.  Patient found up in chair with  (no lines) upon PT entry to room.     Functional Mobility:  · Transfers:     · Sit to Stand: 2 sets, contact guard assistance and minimum assistance with rolling walker  · Gait:  x 45', x 30', RW, Min A for AD steering/mngmt.   · Wheelchair Propulsion:  Pt propelled Standard wheelchair x 10 feet on Level tile with  Bilateral upper extremity with Moderate Assistance and Maximum Assistance due to decreased UE strength and veering to the Left. Pt resistant to w/c mobility, stating she is not going to be using w/c for mobility when she goes home.    AM-PAC 6 CLICK MOBILITY  14    Patient left up in chair with call button in reach.    GOALS:   Multidisciplinary Problems     Physical Therapy Goals        Problem: Physical Therapy    Goal Priority Disciplines Outcome Goal Variances Interventions   Physical Therapy Goal     PT, PT/OT Ongoing, Progressing     Description: Goals to be met by: 6/9/22    Patient will increase functional independence with mobility by performing:    . Supine to sit with MInimal Assistance-not met  . Sit to supine with MInimal Assistance-not met  . Rolling to Left and Right with Minimal Assistance.  . Sit to stand transfer with Minimal Assistance with RW-met  Updated 5/21/2022 Sit to stand transfer with CGA with RW  . Bed to chair transfer with Minimal Assistance using Rolling Walker/no AD-not met  . Gait  x 10 feet with Moderate Assistance using RW- met  Updated 5/21/2022 Gait  x 20 feet with Long using RW  . Wheelchair propulsion x50 feet with Minimal Assistance using bilateral uppper extremities-not met  . Sitting at edge of bed x5 minutes with Stand-by Assistance-Met 5/27/2022                     Time Tracking:     PT Received On: 06/08/22  PT Start Time: 1136  PT Stop Time: 1207  PT Total Time (min): 31 min    Billable Minutes: Gait Training 14 and Therapeutic Activity 17    Treatment Type: Treatment  PT/PTA: PTA     PTA Visit Number: 4     06/08/2022

## 2022-06-08 NOTE — PROGRESS NOTES
[facility]                                  Skilled Nursing Facility                   Progress Note     Admit Date: 2022  USMAN 2022  Principal Problem:  Cellulitis of leg   HPI obtained from patient interview and chart review     Chief Complaint: Re-evaluation of medical treatment and therapy status:  Lab review,  Re-evaluation of constipation    HPI:   Mrs. Murcia is a 71 year old female with PMHx of Multiple Sclerosis, Left Foot Drop, Lymphedema, Obesity (bmi 45) who presents to SNF following hospitalization for cellulitis, acute kidney injury and acute encephalopathy.  Admission to SNF for secondary weakness and debility.    Interval history:  Patient lying in bed.  No new complaints.  Vital signs stable, afebrile.  Labs reviewed no critical results.  Having regular bowel movements.  Progressing well with therapy. Patient denies shortness of breath, abdominal discomfort, nausea, or vomiting.  Patient reports an adequate appetite.      Past Medical History: Patient has a past medical history of Lymphedema, MS (multiple sclerosis), and Vitamin B12 deficiency.    Past Surgical History: Patient has a past surgical history that includes  section; Breast cyst excision (Left); and Esophagogastroduodenoscopy (N/A, 2022).    Social History: Patient reports that she has never smoked. She has never used smokeless tobacco. She reports that she does not drink alcohol and does not use drugs.    Family History: family history includes Brain cancer in her brother; Coronary artery disease in her father; Lung cancer in her father and mother.    Allergies: Patient is allergic to contrast media, pcn [penicillins], celebrex [celecoxib], diazepam, and motrin [ibuprofen].    ROS  Constitutional: Negative for fever   Eyes: Negative for blurred vision, double vision   Respiratory: Negative for cough, shortness of breath   Cardiovascular:  Negative for chest pain, palpitations. + leg swelling.   Gastrointestinal: Negative for abdominal pain, diarrhea, nausea, vomiting, constipation  Genitourinary: Negative for dysuria, frequency   Musculoskeletal:  + generalized weakness.  + bilateral lower extremity pain  Skin: Negative for itching and rash.   Neurological: Negative for dizziness, headaches.   Psychiatric/Behavioral: Negative for depression. The patient is nervous/anxious    24 hour Vital Sign Range   Temp:  [98 °F (36.7 °C)-98.2 °F (36.8 °C)]   Pulse:  [82-88]   Resp:  [18]   BP: (115-141)/(54-85)   SpO2:  [94 %-96 %]     PEx  Constitutional: Patient appears debilitated.  No distress noted  HENT:   Head: Normocephalic and atraumatic.   Eyes: Pupils are equal, round  Neck: Normal range of motion. Neck supple.   Cardiovascular: Normal rate, regular rhythm and normal heart sounds.    Pulmonary/Chest: Effort normal and breath sounds are clear  Abdominal: Soft. Bowel sounds are normal.   Musculoskeletal: Normal range of motion.   Neurological: Alert and oriented to person, place, and time.   Psychiatric: Normal mood and affect. Behavior is normal.   Skin: Skin is warm and dry. Bilateral lower extremity with pain contract scar tissue surrounded by hyperkeratotic skin.  Moisture associated dermatitis to buttocks, improved.      No results for input(s): GLUCOSE, NA, K, CL, CO2, BUN, CREATININE, MG in the last 24 hours.    Invalid input(s):  CALCIUM    No results for input(s): WBC, RBC, HGB, HCT, PLT, MCV, MCH, MCHC in the last 24 hours.      Assessment and Plan:    Hypomagnesemia  Magnesium 1.8 stable    Constipation  -  continue MiraLax daily, continue senna docusate 2 tabs BID- however patient refusing  Reports having bowel movement    Cellulitis of leg  - continue vancomycin   - ID consulted at [facility]  - continue local wound care per wound care  - completed cefepime and continue vancomycin. switched to doxycycline upon discharge (end date: 5/12/22)  -  completed treatment was doxycycline     Urinary retention   - discontinued espinal prior to transfer to SNF.   - Urinating well until 5/10   -  bladder scans PRN     Pressure injury of buttock, unstageable  - q2 turns  - low airloss overlay mattress  - wound care consultation       Lymphedema  - wound care consultation to assist in managmeent  - continue ammonium lactate lotion BID  - continue doxycycline for cellulitis      HTN  -  continue to hold losartan to 25 mg daily- BP's low to normal and patient is refusing to take losartan.  Continue HCTZ 12.5 mg daily     MS (multiple sclerosis)  - PT/OT  - f/u with out pt provider      B12 deficiency  - change orders to cyanocobalamin 1000 mcg every 30 days, dose to be given on 05/13 as patient has been overdue due to recent hospitalization     Debility   - Continue with PT/OT for gait training and strengthening and restoration of ADL's   - Encourage mobility, OOB in chair, and early ambulation as appropriate  - Fall precautions   - Monitor for bowel and bladder dysfunction  - Monitor for and prevent skin breakdown and pressure ulcers  -  continue  DVT prophylaxis with  heparin 7.5 K q.8 hours        Anticipate disposition:  Home with home health        Follow-up needed during SNF stay-     Follow-up needed after discharge from SNF:   - PCP, hospital follow-up, needs to be scheduled      Future Appointments   Date Time Provider Department Center   6/10/2022  7:00 AM [facility] SPEC LAB [facility] SPECLAB [facility]   7/14/2022  3:00 PM Samir Sahni MD [facility]         Dina Chauhan NP  Department of Hospital Medicine   [facility]     DOS: 6/7/22      Patient note was created using MModal Dictation.  Any errors in syntax or even information may not have been identified and edited on initial review prior to signing this note.

## 2022-06-08 NOTE — PT/OT/SLP PROGRESS
Occupational Therapy   Treatment    Name: Lupis Murcia  MRN: [id number]  Admit Date: 5/11/2022  Admitting Diagnosis:  Cellulitis of leg    General Precautions: Standard, fall   Orthopedic Precautions:N/A   Braces:       Recommendations:     Discharge Recommendations: home health OT  Level of Assistance Recommended at Discharge: 24 hours physical assistance for all ADL's and home management tasks  Discharge Equipment Recommendations:  bedside commode, bath bench  Barriers to discharge:  Decreased caregiver support    Assessment:     Lupis Murcia is a 71 y.o. female with a medical diagnosis of Cellulitis of leg.  She presents with  Performance deficits affecting function are weakness, impaired endurance, impaired sensation, impaired self care skills, impaired functional mobility, gait instability, impaired balance, decreased coordination, decreased lower extremity function, decreased safety awareness, pain, impaired coordination, impaired skin, edema  .     Pt participated fair with session on this day. Pt  continues to demonstrate levels of physical deficits with  functional indep with daily management activities tasks, selfcare skills with balance,  functional mobility, UB strength and endurance. Pt. Will continue to benefit from continued OT to progress towards goals     Rehab Potential is fair    Activity tolerance:  Fair    Plan:     Patient to be seen 6 x/week to address the above listed problems via self-care/home management, therapeutic activities, therapeutic exercises    · Plan of Care Expires: 06/16/22  · Plan of Care Reviewed with: patient, daughter    Subjective     Communicated with:  nsg and Pt.prior to session.  I am doing well today     Pain/Comfort:  Pain Rating 1: 0/10  Pain Rating Post-Intervention 1: 0/10    Patient's cultural, spiritual, Congregational conflicts given the current situation:  no    Objective:     Patient found up in chair in w/c with daughter present     upon OT entry to  room.    Functional Mobility/Transfers:  · Patient completed Sit <> Stand  Transfer with  Contact guard assistance/minimal with RW for balance and cues for safety and hand placement.   · Patient completed Toilet Transfer Step Transfer technique with minimum assistance with  rolling walker and g bars pt. Does have drop foot      Activities of Daily Living:  · Toileting : Moderate assistance from 3n1 level with assist and diaper management and use of G bars     AMP[facility] 6 Click ADL: 16     OT Exercises UBE x 10 min     Treatment & Education:  Pt. With therex performed to increase ROM, endurance selfcare task and fxl mobility for independence  Pt edu on role of OT, POC, safety when performing self care tasks , benefit of performing OOB activity, and safety when performing functional transfers and mobility management for preparation with goals to progress towards next level of care    Patient left up in chair with all lines intact, call button in reach and daughter  presentEducation:   in room     GOALS:   Multidisciplinary Problems     Occupational Therapy Goals        Problem: Occupational Therapy    Goal Priority Disciplines Outcome Interventions   Occupational Therapy Goal     OT, PT/OT Ongoing, Progressing    Description: Goals to be met by: 6/2    Patient will increase functional independence with ADLs by performing:    UE Dressing with [place].  LE Dressing with Moderate Assistance using AE as needed.  Grooming while seated at sink with [place].  Toileting from bedside commode or toilet with Minimal Assistance for hygiene and clothing management.   Bathing from shower chair/chair level with Minimal Assistance.  Stand pivot transfers with Minimal Assistance using RW.  Toilet transfer to bedside commode with Minimal Assistance.  Upper extremity exercise program with supervision.  Caregiver will be educated on level of assist required to safely perform self care tasks and functional transfers.                      Time Tracking:     OT Date of Treatment: OT Date of Treatment: 06/08/22  OT Total Time (min):      Billable Minutes:Self Care/Home Management 28  Therapeutic Exercise 10    6/8/2022

## 2022-06-09 LAB
ANION GAP SERPL CALC-SCNC: 13 MMOL/L (ref 8–16)
BASOPHILS # BLD AUTO: 0.01 K/UL (ref 0–0.2)
BASOPHILS NFR BLD: 0.2 % (ref 0–1.9)
BUN SERPL-MCNC: 18 MG/DL (ref 8–23)
CALCIUM SERPL-MCNC: 9.9 MG/DL (ref 8.7–10.5)
CHLORIDE SERPL-SCNC: 103 MMOL/L (ref 95–110)
CO2 SERPL-SCNC: 24 MMOL/L (ref 23–29)
CREAT SERPL-MCNC: 0.8 MG/DL (ref 0.5–1.4)
DIFFERENTIAL METHOD: ABNORMAL
EOSINOPHIL # BLD AUTO: 0.2 K/UL (ref 0–0.5)
EOSINOPHIL NFR BLD: 2.5 % (ref 0–8)
ERYTHROCYTE [DISTWIDTH] IN BLOOD BY AUTOMATED COUNT: 15.7 % (ref 11.5–14.5)
EST. GFR  (AFRICAN AMERICAN): >60 ML/MIN/1.73 M^2
EST. GFR  (NON AFRICAN AMERICAN): >60 ML/MIN/1.73 M^2
GLUCOSE SERPL-MCNC: 116 MG/DL (ref 70–110)
HCT VFR BLD AUTO: 30.9 % (ref 37–48.5)
HGB BLD-MCNC: 9.5 G/DL (ref 12–16)
IMM GRANULOCYTES # BLD AUTO: 0.03 K/UL (ref 0–0.04)
IMM GRANULOCYTES NFR BLD AUTO: 0.5 % (ref 0–0.5)
LYMPHOCYTES # BLD AUTO: 2.7 K/UL (ref 1–4.8)
LYMPHOCYTES NFR BLD: 41.3 % (ref 18–48)
MAGNESIUM SERPL-MCNC: 1.7 MG/DL (ref 1.6–2.6)
MCH RBC QN AUTO: 30.9 PG (ref 27–31)
MCHC RBC AUTO-ENTMCNC: 30.7 G/DL (ref 32–36)
MCV RBC AUTO: 101 FL (ref 82–98)
MONOCYTES # BLD AUTO: 0.6 K/UL (ref 0.3–1)
MONOCYTES NFR BLD: 9.8 % (ref 4–15)
NEUTROPHILS # BLD AUTO: 3 K/UL (ref 1.8–7.7)
NEUTROPHILS NFR BLD: 45.7 % (ref 38–73)
NRBC BLD-RTO: 0 /100 WBC
PHOSPHATE SERPL-MCNC: 3.6 MG/DL (ref 2.7–4.5)
PLATELET # BLD AUTO: 258 K/UL (ref 150–450)
PMV BLD AUTO: 10.5 FL (ref 9.2–12.9)
POTASSIUM SERPL-SCNC: 2.9 MMOL/L (ref 3.5–5.1)
RBC # BLD AUTO: 3.07 M/UL (ref 4–5.4)
SODIUM SERPL-SCNC: 140 MMOL/L (ref 136–145)
WBC # BLD AUTO: 6.51 K/UL (ref 3.9–12.7)

## 2022-06-09 PROCEDURE — 25000003 PHARM REV CODE 250: Performed by: NURSE PRACTITIONER

## 2022-06-09 PROCEDURE — 84100 ASSAY OF PHOSPHORUS: CPT | Performed by: HOSPITALIST

## 2022-06-09 PROCEDURE — 97530 THERAPEUTIC ACTIVITIES: CPT | Mod: CO

## 2022-06-09 PROCEDURE — 36415 COLL VENOUS BLD VENIPUNCTURE: CPT | Performed by: HOSPITALIST

## 2022-06-09 PROCEDURE — 97116 GAIT TRAINING THERAPY: CPT

## 2022-06-09 PROCEDURE — 85025 COMPLETE CBC W/AUTO DIFF WBC: CPT | Performed by: HOSPITALIST

## 2022-06-09 PROCEDURE — 97110 THERAPEUTIC EXERCISES: CPT

## 2022-06-09 PROCEDURE — 83735 ASSAY OF MAGNESIUM: CPT | Performed by: HOSPITALIST

## 2022-06-09 PROCEDURE — 63600175 PHARM REV CODE 636 W HCPCS: Performed by: NURSE PRACTITIONER

## 2022-06-09 PROCEDURE — 11000004 HC SNF PRIVATE

## 2022-06-09 PROCEDURE — 25000003 PHARM REV CODE 250: Performed by: HOSPITALIST

## 2022-06-09 PROCEDURE — 80048 BASIC METABOLIC PNL TOTAL CA: CPT | Performed by: HOSPITALIST

## 2022-06-09 RX ORDER — POTASSIUM CHLORIDE 20 MEQ/1
40 TABLET, EXTENDED RELEASE ORAL 3 TIMES DAILY
Status: COMPLETED | OUTPATIENT
Start: 2022-06-09 | End: 2022-06-10

## 2022-06-09 RX ORDER — AMOXICILLIN 250 MG
1 CAPSULE ORAL 2 TIMES DAILY PRN
Status: DISCONTINUED | OUTPATIENT
Start: 2022-06-09 | End: 2022-06-15 | Stop reason: HOSPADM

## 2022-06-09 RX ORDER — LANOLIN ALCOHOL/MO/W.PET/CERES
400 CREAM (GRAM) TOPICAL 2 TIMES DAILY
Status: COMPLETED | OUTPATIENT
Start: 2022-06-09 | End: 2022-06-11

## 2022-06-09 RX ADMIN — PANTOPRAZOLE SODIUM 40 MG: 40 TABLET, DELAYED RELEASE ORAL at 08:06

## 2022-06-09 RX ADMIN — Medication 1000 UNITS: at 09:06

## 2022-06-09 RX ADMIN — ACETAMINOPHEN AND CODEINE PHOSPHATE 1 TABLET: 300; 30 TABLET ORAL at 01:06

## 2022-06-09 RX ADMIN — AMMONIUM LACTATE: 12 LOTION TOPICAL at 09:06

## 2022-06-09 RX ADMIN — HYDROCHLOROTHIAZIDE 12.5 MG: 12.5 TABLET ORAL at 09:06

## 2022-06-09 RX ADMIN — Medication 1 TABLET: at 09:06

## 2022-06-09 RX ADMIN — LORAZEPAM 0.5 MG: 0.5 TABLET ORAL at 05:06

## 2022-06-09 RX ADMIN — HEPARIN SODIUM 7500 UNITS: 5000 INJECTION INTRAVENOUS; SUBCUTANEOUS at 05:06

## 2022-06-09 RX ADMIN — POTASSIUM CHLORIDE 40 MEQ: 1500 TABLET, EXTENDED RELEASE ORAL at 08:06

## 2022-06-09 RX ADMIN — Medication 400 MG: at 08:06

## 2022-06-09 RX ADMIN — LORAZEPAM 0.5 MG: 0.5 TABLET ORAL at 09:06

## 2022-06-09 RX ADMIN — LORAZEPAM 0.5 MG: 0.5 TABLET ORAL at 01:06

## 2022-06-09 RX ADMIN — ACETAMINOPHEN AND CODEINE PHOSPHATE 1 TABLET: 300; 30 TABLET ORAL at 06:06

## 2022-06-09 RX ADMIN — ACETAMINOPHEN AND CODEINE PHOSPHATE 1 TABLET: 300; 30 TABLET ORAL at 09:06

## 2022-06-09 RX ADMIN — PANTOPRAZOLE SODIUM 40 MG: 40 TABLET, DELAYED RELEASE ORAL at 09:06

## 2022-06-09 RX ADMIN — POTASSIUM CHLORIDE 40 MEQ: 1500 TABLET, EXTENDED RELEASE ORAL at 05:06

## 2022-06-09 NOTE — PT/OT/SLP PROGRESS
Occupational Therapy   Treatment    Name: Lupis Murcia  MRN: [id number]  Admit Date: 5/11/2022  Admitting Diagnosis:  Cellulitis of leg    General Precautions: Standard, fall   Orthopedic Precautions:N/A   Braces:       Recommendations:     Discharge Recommendations: home health OT  Level of Assistance Recommended at Discharge: 24 hours physical assistance for all ADL's and home management tasks  Discharge Equipment Recommendations:  bedside commode, bath bench  Barriers to discharge:  Decreased caregiver support    Assessment:     Lupis Murcia is a 71 y.o. female with a medical diagnosis of Cellulitis of leg.  She presents with  Performance deficits affecting function are weakness, impaired endurance, impaired sensation, impaired self care skills, impaired functional mobility, gait instability, impaired balance, decreased coordination, decreased lower extremity function, decreased safety awareness, pain, impaired coordination, impaired skin, edema  .     Pt participated fair with session on this day. Pt  continues to demonstrate levels of physical deficits with  functional indep with daily management activities tasks, selfcare skills with balance,  functional mobility, UB strength and endurance. Pt. Will continue to benefit from continued OT to progress towards goals     Rehab Potential is fair    Activity tolerance:  Fair    Plan:     Patient to be seen 6 x/week to address the above listed problems via self-care/home management, therapeutic activities, therapeutic exercises    · Plan of Care Expires: 06/16/22  · Plan of Care Reviewed with: patient    Subjective     Communicated with:  nsg and Pt.prior to session.  I am doing well today     Pain/Comfort:  Pain Rating 1: 0/10  Pain Rating Post-Intervention 1: 0/10    Patient's cultural, spiritual, Roman Catholic conflicts given the current situation:  no    Objective:     Patient found up in chair in w/c with daughter present     upon OT entry to  room.    Functional Mobility/Transfers:  · Patient completed Sit <> Stand  Transfer with  Contact guard assistance with RW for balance and cues for safety and hand placement.     [facility] 6 Click ADL: 16     OT Exercises UBE x 10 min     Treatment & Education:  Pt. With standing act on this day with task. Pt. With CGA/SBA for balance aspects with task with  AD at raised counter Pt with visual perception task with discrimination of various shapes and sizes x 3 min and 10 sec and another 2 min and 20 sec  with standing bal and min cues through out with weight shifting and use of BUE's incorporated and crossing mid line and facilitation with posture in prep for home management .     Pt. With 2# dowel activity with 2x20 reps with  shd flex, bicep curls horz adb/add and forward flex motion to increase BUE ROM and strength.    Pt. With therex performed to increase ROM, endurance selfcare task and fxl mobility for independence     Pt edu on role of OT, POC, safety when performing self care tasks , benefit of performing OOB activity, and safety when performing functional transfers and mobility management for preparation with goals to progress towards next level of care    Patient left up in chair with all lines intact, call button in reach and daughter  presentEducation:   in room     GOALS:   Multidisciplinary Problems     Occupational Therapy Goals        Problem: Occupational Therapy    Goal Priority Disciplines Outcome Interventions   Occupational Therapy Goal     OT, PT/OT Ongoing, Progressing    Description: Goals to be met by: 6/2    Patient will increase functional independence with ADLs by performing:    UE Dressing with [place].  LE Dressing with Moderate Assistance using AE as needed.  Grooming while seated at sink with [place].  Toileting from bedside commode or toilet with Minimal Assistance for hygiene and clothing management.   Bathing from shower chair/chair level with Minimal Assistance.  Stand pivot  transfers with Minimal Assistance using RW.  Toilet transfer to bedside commode with Minimal Assistance.  Upper extremity exercise program with supervision.  Caregiver will be educated on level of assist required to safely perform self care tasks and functional transfers.                     Time Tracking:     OT Date of Treatment: OT Date of Treatment: 06/09/22  OT Total Time (min):      Billable Minutes:Therapeutic Activity 30    6/9/2022

## 2022-06-09 NOTE — PT/OT/SLP PROGRESS
"Physical Therapy Treatment    Patient Name:  Lupis Murcia   MRN:  [id number]  Admit Date: 5/11/2022  Admitting Diagnosis: Cellulitis of leg  General Precautions: Standard, fall   Orthopedic Precautions:N/A   Braces: N/A     Recommendations:     Discharge Recommendations:  home health PT   Level of Assistance Recommended at Discharge: 24 hours light assistance  Discharge Equipment Recommendations: bedside commode, bath bench   Barriers to discharge: Decreased caregiver support    Assessment:     Lupis Murcia is a 71 y.o. female admitted with a medical diagnosis of Cellulitis of leg . Pt is unsafe with functional mobility at this time due to pt requires CGA to minimal assist for transfers, and minimal assist for gait due to pt allows the Rw to get too far in front of her at times and decreased clearance of R foot during swing phase. Pt is motivated to progress with functional mobility.    Performance deficits affecting function:  weakness, impaired endurance, impaired functional mobilty, impaired self care skills, gait instability, impaired balance, impaired sensation, decreased coordination, decreased lower extremity function, impaired skin, edema .    Rehab Potential is good    Activity Tolerance: Good    Plan:     Patient to be seen 6 x/week to address the above listed problems via gait training, therapeutic activities, therapeutic exercises, neuromuscular re-education, wheelchair management/training    · Plan of Care Expires: 06/11/22  · Plan of Care Reviewed with: patient    Subjective   . "They ordered me a brace for this L leg to help me walk"  Pain/Comfort:  · Pain Rating 1: 0/10  · Pain Rating Post-Intervention 1: 0/10    Patient's cultural, spiritual, Denominational conflicts given the current situation:  · no    Objective:     Communicated with nurse prior to session.  Patient found up in chair with  (no lines) upon PT entry to room.     Therapeutic Activities and Exercises: Pt performed sit to stand exs x " 2 sets of 5 reps with CGA to minimal assist    Functional Mobility:  · Transfers:     · Sit to Stand:  contact guard assistance and minimum assistance with rolling walker  · Gait: 26ft then 35ft with Rw with minimal assist with w/c following. pt performed gait with flexed trunk, decreased clearance of L>R foot during swing phase, decreased step length on L>R foot, pt allowed the RW to get too far ahead of her at times causing instability, and at slow pace.  · Wheelchair Propulsion:  Pt propelled Standard wheelchair x 18 feet then 15ft on Level tile with  Bilateral upper extremity with Minimal Assistance. Pt tends to drift to the L due to L UE weakness requiring assist to correct    AM-PAC 6 CLICK MOBILITY  14    Patient left up in chair with call button in reach and nurse notified.    GOALS:   Multidisciplinary Problems     Physical Therapy Goals        Problem: Physical Therapy    Goal Priority Disciplines Outcome Goal Variances Interventions   Physical Therapy Goal     PT, PT/OT Ongoing, Progressing     Description: Goals to be met by: 6/21/22    Patient will increase functional independence with mobility by performing:    . Supine to sit with MInimal Assistance-not met  . Sit to supine with MInimal Assistance-not met  . Rolling to Left and Right with Minimal Assistance.  . Sit to stand transfer with Minimal Assistance with RW-met  Updated 5/21/2022 Sit to stand transfer with CGA with RW  . Bed to chair transfer with Minimal Assistance using Rolling Walker/no AD-not met  . Gait  x 10 feet with Moderate Assistance using RW- met  Updated 5/21/2022 Gait  x 20 feet with Long using RW-met 6/9/22  Updated goal: gait 40ft with RW with CGA-not met  . Wheelchair propulsion x50 feet with Minimal Assistance using bilateral uppper extremities-not met  . Sitting at edge of bed x5 minutes with Stand-by Assistance-Met 5/27/2022                     Time Tracking:     PT Received On: 06/09/22  PT Start Time: 1110  PT Stop Time:  1138  PT Total Time (min): 28 min    Billable Minutes: Gait Training 18 and Therapeutic Exercise 10    Treatment Type: Treatment  PT/PTA: PT     PTA Visit Number: 0     06/09/2022

## 2022-06-09 NOTE — PLAN OF CARE
Interdisciplinary team, Tiana Pérez, RN Nurse Manager, Anup Sherman, Unit Director, and Harmony Fermin, LUCIEN MDS Coordinator, spoke to patient and patient's daughter for care plan conference, weekly status update, and therapy progress update. Tentative discharge date set for 6/17/22.

## 2022-06-09 NOTE — PROGRESS NOTES
[facility]                                  Skilled Nursing Facility                   Progress Note     Admit Date: 2022  USMAN 2022  Principal Problem:  Cellulitis of leg   HPI obtained from patient interview and chart review     Chief Complaint: Re-evaluation of medical treatment and therapy status:  Lab review,  Re-evaluation of constipation, hypomagnesemia, hypokalemia    HPI:   Mrs. Murcia is a 71 year old female with PMHx of Multiple Sclerosis, Left Foot Drop, Lymphedema, Obesity (bmi 45) who presents to SNF following hospitalization for cellulitis, acute kidney injury and acute encephalopathy.  Admission to SNF for secondary weakness and debility.    Interval history:All of today's labs reviewed and are listed below.  K 2.9, Mag 1.7.  24 hr vital sign ranges listed below.  Patient was seen by by orthotics to make her a custom AFO boot, turnaround time should be about a week.  Patient states she is now having more regular bowel movements.  Patient denies shortness of breath, abdominal discomfort, nausea, or vomiting.  Patient reports an adequate appetite.  Patient denies dysuria.  movements.  Patient progessing with PT/OT. Continuing to follow and treat all acute and chronic conditions.     Past Medical History: Patient has a past medical history of Lymphedema, MS (multiple sclerosis), and Vitamin B12 deficiency.    Past Surgical History: Patient has a past surgical history that includes  section; Breast cyst excision (Left); and Esophagogastroduodenoscopy (N/A, 2022).    Social History: Patient reports that she has never smoked. She has never used smokeless tobacco. She reports that she does not drink alcohol and does not use drugs.    Family History: family history includes Brain cancer in her brother; Coronary artery disease in her father; Lung cancer in her father and mother.    Allergies: Patient is allergic  to contrast media, pcn [penicillins], celebrex [celecoxib], diazepam, and motrin [ibuprofen].    ROS  Constitutional: Negative for fever   Eyes: Negative for blurred vision, double vision   Respiratory: Negative for cough, shortness of breath   Cardiovascular: Negative for chest pain, palpitations. + leg swelling.   Gastrointestinal: Negative for abdominal pain, diarrhea, nausea, vomiting, constipation  Genitourinary: Negative for dysuria, frequency   Musculoskeletal:  + generalized weakness.  + bilateral lower extremity pain  Skin: Negative for itching and rash.   Neurological: Negative for dizziness, headaches.   Psychiatric/Behavioral: Negative for depression. The patient is nervous/anxious    24 hour Vital Sign Range   Temp:  [97.6 °F (36.4 °C)-98.1 °F (36.7 °C)]   Pulse:  []   Resp:  [18-19]   BP: (131-138)/(78-93)   SpO2:  [95 %-97 %]     PEx  Constitutional: Patient appears debilitated.  No distress noted  HENT:   Head: Normocephalic and atraumatic.   Eyes: Pupils are equal, round  Neck: Normal range of motion. Neck supple.   Cardiovascular: Normal rate, regular rhythm and normal heart sounds.    Pulmonary/Chest: Effort normal and breath sounds are clear  Abdominal: Soft. Bowel sounds are normal.   Musculoskeletal: Normal range of motion.   Neurological: Alert and oriented to person, place, and time.   Psychiatric: Normal mood and affect. Behavior is normal.   Skin: Skin is warm and dry. Bilateral lower extremity with pain contract scar tissue surrounded by hyperkeratotic skin.  Moisture associated dermatitis to buttocks, improved.      Recent Labs   Lab 06/09/22  0532      K 2.9*      CO2 24   BUN 18   CREATININE 0.8   MG 1.7       Recent Labs   Lab 06/09/22  0532   WBC 6.51   RBC 3.07*   HGB 9.5*   HCT 30.9*      *   MCH 30.9   MCHC 30.7*         Assessment and Plan:    Hypokalemia  - initiated potassium 40 mEq TID x3 doses    Hypomagnesemia  - initiated magnesium oxide 400  mg BID x2 days    Constipation  -  resolved, changing bowel regimen to PRN per patient's request    Cellulitis of leg  - continue vancomycin   - [place] consulted at [facility]  - continue local wound care per wound care  - completed cefepime and continue vancomycin. switched to doxycycline upon discharge (end date: 5/12/22)  - completed treatment was doxycycline     Urinary retention   - discontinued espinal prior to transfer to SNF.   - Urinating well until 5/10   -  bladder scans PRN     Pressure injury of buttock, unstageable  - q2 turns  - low airloss overlay mattress  - wound care consultation       Lymphedema  - wound care consultation to assist in managmeent  - continue ammonium lactate lotion BID  - continue doxycycline for cellulitis      HTN  -  continue to hold losartan to 25 mg daily- BP's low to normal and patient is refusing to take losartan.  Continue HCTZ 12.5 mg daily     MS (multiple sclerosis)  - PT/OT  - f/u with out pt provider      B12 deficiency  - change orders to cyanocobalamin 1000 mcg every 30 days, dose to be given on 05/13 as patient has been overdue due to recent hospitalization     Debility   - Continue with PT/OT for gait training and strengthening and restoration of ADL's   - Encourage mobility, OOB in chair, and early ambulation as appropriate  - Fall precautions   - Monitor for bowel and bladder dysfunction  - Monitor for and prevent skin breakdown and pressure ulcers  -  continue  DVT prophylaxis with  heparin 7.5 K q.8 hours        Anticipate disposition:  Home with home health        Follow-up needed during SNF stay-     Follow-up needed after discharge from SNF:   - PCP, hospital follow-up, needs to be scheduled      Future Appointments   Date Time Provider Department Center   6/10/2022  7:00 AM [facility] SPEC LAB [facility] SPECLAB [facility]   7/14/2022  3:00 PM Samir Sahni MD [facility]         Amy Conklin NP  Department of Hospital  Medicine [facility] Nursing [facility]     DOS: 6/9/2022      Patient note was created using MModal Dictation.  Any errors in syntax or even information may not have been identified and edited on initial review prior to signing this note.

## 2022-06-09 NOTE — PLAN OF CARE
Problem: Physical Therapy  Goal: Physical Therapy Goal  Description: Goals to be met by: 6/21/22    Patient will increase functional independence with mobility by performing:    . Supine to sit with MInimal Assistance-not met  . Sit to supine with MInimal Assistance-not met  . Rolling to Left and Right with Minimal Assistance.  . Sit to stand transfer with Minimal Assistance with RW-met  Updated 5/21/2022 Sit to stand transfer with CGA with RW  . Bed to chair transfer with Minimal Assistance using Rolling Walker/no AD-not met  . Gait  x 10 feet with Moderate Assistance using RW- met  Updated 5/21/2022 Gait  x 20 feet with Long using RW-met 6/9/22  Updated goal: gait 40ft with RW with CGA-not met  . Wheelchair propulsion x50 feet with Minimal Assistance using bilateral uppper extremities-not met  . Sitting at edge of bed x5 minutes with Stand-by Assistance-Met 5/27/2022    Outcome: Ongoing, Progressing   Pt's goals revised as appropriate and pt will continue to benefit from skilled PT services to work towards improved functional mobility including: bed mobility, transfers, w/c mobility, and gait.   6/9/2022

## 2022-06-10 PROCEDURE — 97535 SELF CARE MNGMENT TRAINING: CPT

## 2022-06-10 PROCEDURE — 25000003 PHARM REV CODE 250: Performed by: NURSE PRACTITIONER

## 2022-06-10 PROCEDURE — 97116 GAIT TRAINING THERAPY: CPT | Mod: CQ

## 2022-06-10 PROCEDURE — 25000003 PHARM REV CODE 250: Performed by: HOSPITALIST

## 2022-06-10 PROCEDURE — 11000004 HC SNF PRIVATE

## 2022-06-10 PROCEDURE — 97112 NEUROMUSCULAR REEDUCATION: CPT | Mod: CQ

## 2022-06-10 RX ADMIN — ACETAMINOPHEN AND CODEINE PHOSPHATE 1 TABLET: 300; 30 TABLET ORAL at 07:06

## 2022-06-10 RX ADMIN — HYDROCHLOROTHIAZIDE 12.5 MG: 12.5 TABLET ORAL at 09:06

## 2022-06-10 RX ADMIN — Medication 400 MG: at 09:06

## 2022-06-10 RX ADMIN — PANTOPRAZOLE SODIUM 40 MG: 40 TABLET, DELAYED RELEASE ORAL at 08:06

## 2022-06-10 RX ADMIN — ACETAMINOPHEN AND CODEINE PHOSPHATE 1 TABLET: 300; 30 TABLET ORAL at 11:06

## 2022-06-10 RX ADMIN — PANTOPRAZOLE SODIUM 40 MG: 40 TABLET, DELAYED RELEASE ORAL at 09:06

## 2022-06-10 RX ADMIN — Medication 1000 UNITS: at 09:06

## 2022-06-10 RX ADMIN — POLYETHYLENE GLYCOL 3350 17 G: 17 POWDER, FOR SOLUTION ORAL at 08:06

## 2022-06-10 RX ADMIN — LORAZEPAM 0.5 MG: 0.5 TABLET ORAL at 11:06

## 2022-06-10 RX ADMIN — AMMONIUM LACTATE: 12 LOTION TOPICAL at 09:06

## 2022-06-10 RX ADMIN — Medication 6 MG: at 08:06

## 2022-06-10 RX ADMIN — POTASSIUM CHLORIDE 40 MEQ: 1500 TABLET, EXTENDED RELEASE ORAL at 09:06

## 2022-06-10 RX ADMIN — LORAZEPAM 0.5 MG: 0.5 TABLET ORAL at 08:06

## 2022-06-10 RX ADMIN — Medication 400 MG: at 08:06

## 2022-06-10 RX ADMIN — LORAZEPAM 0.5 MG: 0.5 TABLET ORAL at 02:06

## 2022-06-10 RX ADMIN — POLYETHYLENE GLYCOL 3350 17 G: 17 POWDER, FOR SOLUTION ORAL at 09:06

## 2022-06-10 RX ADMIN — ACETAMINOPHEN AND CODEINE PHOSPHATE 1 TABLET: 300; 30 TABLET ORAL at 04:06

## 2022-06-10 RX ADMIN — Medication 1 TABLET: at 09:06

## 2022-06-10 NOTE — PT/OT/SLP PROGRESS
Physical Therapy Treatment    Patient Name:  Lupis Murcia   MRN:  [id number]  Admit Date: 5/11/2022  Admitting Diagnosis: Cellulitis of leg  Recent Surgeries: N/A    General Precautions: Standard, fall   Orthopedic Precautions:N/A   Braces: N/A     Recommendations:     Discharge Recommendations:  home health PT   Level of Assistance Recommended at Discharge: 24 hours light assistance  Discharge Equipment Recommendations: bedside commode, bath bench   Barriers to discharge: Decreased caregiver support    Assessment:     Lupis Murcia is a 71 y.o. female admitted with a medical diagnosis of Cellulitis of leg. Pt tolerated therapy session well with focus on increasing independence with gait, transfers and cardio endurance/trunk strengthening in order to assist with achieving highest level of function upon d/c from PT. Pt would continue to benefit from skilled PT services per POC.     Performance deficits affecting function:  weakness, impaired endurance, impaired functional mobilty, impaired self care skills, gait instability, impaired balance, impaired sensation, decreased coordination, decreased lower extremity function, impaired skin, edema .    Rehab Potential is good    Activity Tolerance: Good    Plan:     Patient to be seen 6 x/week to address the above listed problems via gait training, therapeutic activities, therapeutic exercises, neuromuscular re-education, wheelchair management/training    · Plan of Care Expires: 06/11/22  · Plan of Care Reviewed with: patient    Subjective     Pt agreeable to PT.     Pain/Comfort:  Pain Rating 1: 0/10  Pain Rating Post-Intervention 1: 0/10    Patient's cultural, spiritual, Caodaism conflicts given the current situation:  no    Objective:     Communicated with OT prior to session.  Patient found up in chair with  (no lines) upon PT entry to room.     Therapeutic Activities and Exercises: UBE x 15 mins without rest    Functional Mobility:  · Transfers:     · Sit to  Stand:  contact guard assistance with rolling walker  · Gait: x 38', x 20', RW, Min A for AD mngmt/steering assistance.     AM-PAC 6 CLICK MOBILITY  14    Patient left up in chair with call button in reach.    GOALS:   Multidisciplinary Problems     Physical Therapy Goals        Problem: Physical Therapy    Goal Priority Disciplines Outcome Goal Variances Interventions   Physical Therapy Goal     PT, PT/OT Ongoing, Progressing     Description: Goals to be met by: 6/21/22    Patient will increase functional independence with mobility by performing:    . Supine to sit with MInimal Assistance-not met  . Sit to supine with MInimal Assistance-not met  . Rolling to Left and Right with Minimal Assistance.  . Sit to stand transfer with Minimal Assistance with RW-met  Updated 5/21/2022 Sit to stand transfer with CGA with RW  . Bed to chair transfer with Minimal Assistance using Rolling Walker/no AD-not met  . Gait  x 10 feet with Moderate Assistance using RW- met  Updated 5/21/2022 Gait  x 20 feet with Long using RW-met 6/9/22  Updated goal: gait 40ft with RW with CGA-not met  . Wheelchair propulsion x50 feet with Minimal Assistance using bilateral uppper extremities-not met  . Sitting at edge of bed x5 minutes with Stand-by Assistance-Met 5/27/2022                     Time Tracking:     PT Received On: 06/10/22  PT Start Time: 1110  PT Stop Time: 1115  PT Total Time (min): 5 min     Second Start Time: 1127  Second Stop Time: 1157  Second Total Time: 30    Billable Minutes: Gait Training 13 and Neuromuscular Re-education 22 35 min total    Treatment Type: Treatment  PT/PTA: PTA     PTA Visit Number: 1     06/10/2022

## 2022-06-10 NOTE — PLAN OF CARE
Problem: Occupational Therapy  Goal: Occupational Therapy Goal  Description: Goals to be met by: 6/17/2022    Patient will increase functional independence with ADLs by performing:    UE Dressing with [place].  LE Dressing with Moderate Assistance using AE as needed.  Grooming while seated at sink with [place].  Toileting from bedside commode or toilet with Minimal Assistance for hygiene and clothing management.   Bathing from shower chair/chair level with Minimal Assistance.  Stand pivot transfers with Minimal Assistance using RW.  Toilet transfer to bedside commode with Minimal Assistance. Met  [place] [facility] [place].   Upper extremity exercise program with supervision.  Caregiver will be educated on level of assist required to safely perform self care tasks and functional transfers.    Outcome: Ongoing, Progressing   Patient's goals are appropriate.

## 2022-06-10 NOTE — PT/OT/SLP PROGRESS
Occupational Therapy   Treatment    Name: Lupis Murcia  MRN: [id number]  Admit Date: 5/11/2022  Admitting Diagnosis:  Cellulitis of leg    General Precautions: Standard, fall   Orthopedic Precautions:N/A   Braces: N/A     Recommendations:     Discharge Recommendations: home health OT  Level of Assistance Recommended at Discharge: 24 hours light assistance for ADL's and homemaking tasks  Discharge Equipment Recommendations:  bedside commode, bath bench  Barriers to discharge:  Decreased caregiver support    Assessment:     Lupis Murcia is a 71 y.o. female with a medical diagnosis of Cellulitis of leg. Performance deficits affecting function are weakness, impaired endurance, impaired self care skills, impaired functional mobilty, gait instability, impaired balance, decreased coordination, impaired sensation, decreased safety awareness, decreased ROM, impaired coordination, impaired skin, edema. Patient would benefit from continued skilled acute OT services to improve functional mobility, increase independence with ADLs, and address established goals. Recommending HHOT once medically appropriate for discharge to increase maximal independence, reduce burden of care, and ensure safety.     Rehab Potential is good    Activity tolerance:  Good    Plan:     Patient to be seen 6 x/week to address the above listed problems via self-care/home management, therapeutic activities, therapeutic exercises    · Plan of Care Expires: 06/16/22  · Plan of Care Reviewed with: patient    Subjective     Communicated with: NSCRISTINA prior to session.     Pain/Comfort:  Pain Rating 1: 0/10  Pain Rating Post-Intervention 1: 0/10    Patient's cultural, spiritual, Gnosticism conflicts given the current situation:  · no    Objective:     Patient found HOB elevated upon OT entry to room.    Bed Mobility:    · Patient completed Supine to Sit with moderate assistance     Functional Mobility/Transfers:  · Patient completed Sit <> Stand Transfer with  contact guard assistance  with  rolling walker and grab bar by toilet  · Patient completed Bed > w/c Transfer using Stand Pivot technique with minimum assistance with rolling walker  · Patient completed Toilet Transfer w/c<>toilet with stand pivot technique with contact guard assistance with  rolling walker and grab bar    Activities of Daily Living:  · Grooming:  washing/drying hands seated in w/c at sink with SBA  · Upper Body Dressing: CGA for doffing and donning pullover gown seated in w/c Lower Body Dressing: moderate assistance Donning pants  · Toileting: maximal assistance  (patient needed extra time on toilet due to having a BM)    [facility] 6 Click ADL: 16    Treatment & Education:  Role of OT and POC  ADL retraining  Functional mobility training  Safety    Patient left up in chair with call button in reach and all needs met. Education:      GOALS:   Multidisciplinary Problems     Occupational Therapy Goals        Problem: Occupational Therapy    Goal Priority Disciplines Outcome Interventions   Occupational Therapy Goal     OT, PT/OT Ongoing, Progressing    Description: Goals to be met by: 6/17/2022    Patient will increase functional independence with ADLs by performing:    UE Dressing with [place].  LE Dressing with Moderate Assistance using AE as needed.  Grooming while seated at sink with [place].  Toileting from bedside commode or toilet with Minimal Assistance for hygiene and clothing management.   Bathing from shower chair/chair level with Minimal Assistance.  Stand pivot transfers with Minimal Assistance using RW.  Toilet transfer to bedside commode with Minimal Assistance. Met  Toilet transfer to [facility] with supervision.   Upper extremity exercise program with supervision.  Caregiver will be educated on level of assist required to safely perform self care tasks and functional transfers.                     Time Tracking:     OT Date of Treatment: 06/10/22  OT Start Time: 0851    OT Stop Time:  0946  OT Total Time (min): 55 min    Billable Minutes:Self Care/Home Management 55    6/10/2022

## 2022-06-10 NOTE — PROGRESS NOTES
[facility] - Skilled Nursing  Adult Nutrition  Progress Note    SUMMARY   Recommendations  Continue regular diet, RD following  Goals: PO to meet 85% of EEN/EPN by next RD follow up  Nutrition Goal Status: progressing towards goal  Communication of RD Recs: other (comment) (POC)    Assessment and Plan  Increased nutrient needs( protein) related to wound healing as evidenced by BLE lymphedema/Cellulitis     Continues     Plan  General diet  Collaboration with other providers  Vitamin/mineral supplement therapy- Vit D, Vit B complex/Vit C, Mg, Vit D     Malnutrition Assessment     Skin (Micronutrient): edema, thickened, wounds unhealed, cracked, rough  Hair/Scalp (Micronutrient): dry  Eyes (Micronutrient): conjunctiva dull  Teeth (Micronutrient): broken dentition (no upper teeth)  Musculoskeletal/Lower Extremities: edema       Energy Intake (Malnutrition): less than or equal to 50% for greater than or equal to 5 days   Orbital Region (Subcutaneous Fat Loss): well nourished  Upper Arm Region (Subcutaneous Fat Loss): well nourished  Thoracic and Lumbar Region: well nourished   Christian Region (Muscle Loss): mild depletion  Clavicle and Acromion Bone Region (Muscle Loss): mild depletion  Dorsal Hand (Muscle Loss): moderate depletion   Edema (Fluid Accumulation): 3-->moderate             Reason for Assessment    Reason For Assessment: RD follow-up  Diagnosis: infection/sepsis (Cellulitis of leg)  Relevant Medical History: HANDY, anemia, chronic pain, Vit B12 deficiency, MS, lymphedema, obesity  Interdisciplinary Rounds: did not attend  General Information Comments: PO % per pt and daugher , she has snacks in room and outside food as well, good appetite. Constipation resolved  Nutrition Discharge Planning: DC on regular diet    Nutrition Risk Screen    Nutrition Risk Screen: dysphagia or difficulty swallowing    Nutrition/Diet History    Patient Reported Diet/Restrictions/Preferences: general  Typical Food/Fluid Intake:  "regular meals  Food Preferences: no tomato gravy no spicy foods  Spiritual, Cultural Beliefs, Rastafari Practices, Values that Affect Care: no  Vitamin/Mineral/Herbal Supplements: Vit B12, Vit D, Vit B complex/Vit C,  Food Allergies: NKFA  Factors Affecting Nutritional Intake: decreased appetite, constipation    Anthropometrics    Temp: 98.1 °F (36.7 °C)  Height Method: Stated  Height: 5' 2" (157.5 cm)  Height (inches): 62 in  Weight Method: Bed Scale  Weight: 99.6 kg (219 lb 9.3 oz)  Weight (lb): 219.58 lb  Ideal Body Weight (IBW), Female: 110 lb  % Ideal Body Weight, Female (lb): 199.62 %  BMI (Calculated): 40.2  BMI Grade: greater than 40 - morbid obesity  Usual Body Weight (UBW), k.27 kg (reported by pt)  % Usual Body Weight: 129.17  % Weight Change From Usual Weight: 28.9 %       Lab/Procedures/Meds    Pertinent Labs Reviewed: reviewed  Pertinent Labs Comments: Hg9.5, Hct 30.9, K 2.9, glucose 116  Pertinent Medications Reviewed: reviewed  Pertinent Medications Comments: Mg         Estimated/Assessed Needs    Weight Used For Calorie Calculations: 99.6 kg (219 lb 9.3 oz)  Energy Calorie Requirements (kcal): 1459  Energy Need Method: [place]-St Jeor (x 1.0 (PAL) 2/2 obesity)  Protein Requirements: 60g  Weight Used For Protein Calculations: 50 kg (110 lb 3.7 oz) (x 1.2g/kg IBW 2/2 obesity)  Fluid Requirements (mL): 1500 or per MD  Estimated Fluid Requirement Method: RDA Method  RDA Method (mL): 1459  CHO Requirement: -      Nutrition Prescription Ordered    Current Diet Order: Regular  Nutrition Order Comments: no red gravy, likes biscuit and gravy in am  Oral Nutrition Supplement: -    Evaluation of Received Nutrient/Fluid Intake    I/O: no data  Energy Calories Required: meeting needs  Protein Required: meeting needs  Fluid Required: meeting needs  Comments: LBM   Tolerance: tolerating  % Intake of Estimated Energy Needs: 75 - 100 %  % Meal Intake: 75 - 100 %    Nutrition Risk    Level of Risk/Frequency " of Follow-up: low (POC)     Monitor and Evaluation    Food and Nutrient Intake: food and beverage intake  Food and Nutrient Adminstration: diet order  Anthropometric Measurements: weight change  Biochemical Data, Medical Tests and Procedures: inflammatory profile, gastrointestinal profile, electrolyte and renal panel  Nutrition-Focused Physical Findings: skin     Nutrition Follow-Up    RD Follow-up?: Yes

## 2022-06-11 PROCEDURE — 11000004 HC SNF PRIVATE

## 2022-06-11 PROCEDURE — 25000003 PHARM REV CODE 250: Performed by: NURSE PRACTITIONER

## 2022-06-11 PROCEDURE — 25000003 PHARM REV CODE 250: Performed by: HOSPITALIST

## 2022-06-11 RX ADMIN — Medication 1000 UNITS: at 08:06

## 2022-06-11 RX ADMIN — HYDROCHLOROTHIAZIDE 12.5 MG: 12.5 TABLET ORAL at 08:06

## 2022-06-11 RX ADMIN — ACETAMINOPHEN AND CODEINE PHOSPHATE 1 TABLET: 300; 30 TABLET ORAL at 04:06

## 2022-06-11 RX ADMIN — LORAZEPAM 0.5 MG: 0.5 TABLET ORAL at 08:06

## 2022-06-11 RX ADMIN — PANTOPRAZOLE SODIUM 40 MG: 40 TABLET, DELAYED RELEASE ORAL at 08:06

## 2022-06-11 RX ADMIN — Medication 400 MG: at 08:06

## 2022-06-11 RX ADMIN — AMMONIUM LACTATE: 12 LOTION TOPICAL at 09:06

## 2022-06-11 RX ADMIN — Medication 1 TABLET: at 08:06

## 2022-06-11 RX ADMIN — LORAZEPAM 0.5 MG: 0.5 TABLET ORAL at 10:06

## 2022-06-11 RX ADMIN — ACETAMINOPHEN AND CODEINE PHOSPHATE 1 TABLET: 300; 30 TABLET ORAL at 08:06

## 2022-06-12 PROCEDURE — 97116 GAIT TRAINING THERAPY: CPT | Mod: CQ

## 2022-06-12 PROCEDURE — 11000004 HC SNF PRIVATE

## 2022-06-12 PROCEDURE — 63600175 PHARM REV CODE 636 W HCPCS: Performed by: NURSE PRACTITIONER

## 2022-06-12 PROCEDURE — 25000003 PHARM REV CODE 250: Performed by: NURSE PRACTITIONER

## 2022-06-12 PROCEDURE — 25000003 PHARM REV CODE 250: Performed by: HOSPITALIST

## 2022-06-12 PROCEDURE — 97112 NEUROMUSCULAR REEDUCATION: CPT | Mod: CQ

## 2022-06-12 RX ADMIN — PANTOPRAZOLE SODIUM 40 MG: 40 TABLET, DELAYED RELEASE ORAL at 08:06

## 2022-06-12 RX ADMIN — Medication 1000 UNITS: at 08:06

## 2022-06-12 RX ADMIN — LORAZEPAM 0.5 MG: 0.5 TABLET ORAL at 11:06

## 2022-06-12 RX ADMIN — POLYETHYLENE GLYCOL 3350 17 G: 17 POWDER, FOR SOLUTION ORAL at 08:06

## 2022-06-12 RX ADMIN — LORAZEPAM 0.5 MG: 0.5 TABLET ORAL at 03:06

## 2022-06-12 RX ADMIN — LORAZEPAM 0.5 MG: 0.5 TABLET ORAL at 05:06

## 2022-06-12 RX ADMIN — AMMONIUM LACTATE: 12 LOTION TOPICAL at 09:06

## 2022-06-12 RX ADMIN — CYANOCOBALAMIN 1000 MCG: 1000 INJECTION, SOLUTION INTRAMUSCULAR; SUBCUTANEOUS at 09:06

## 2022-06-12 RX ADMIN — ACETAMINOPHEN AND CODEINE PHOSPHATE 1 TABLET: 300; 30 TABLET ORAL at 05:06

## 2022-06-12 RX ADMIN — ACETAMINOPHEN AND CODEINE PHOSPHATE 1 TABLET: 300; 30 TABLET ORAL at 12:06

## 2022-06-12 RX ADMIN — ACETAMINOPHEN AND CODEINE PHOSPHATE 1 TABLET: 300; 30 TABLET ORAL at 08:06

## 2022-06-12 RX ADMIN — HYDROCHLOROTHIAZIDE 12.5 MG: 12.5 TABLET ORAL at 08:06

## 2022-06-12 RX ADMIN — Medication 1 TABLET: at 08:06

## 2022-06-12 NOTE — PT/OT/SLP PROGRESS
Physical Therapy Treatment    Patient Name:  Lupis Murcia   MRN:  [id number]  Admit Date: 5/11/2022  Admitting Diagnosis: Cellulitis of leg  Recent Surgeries: N/A  General Precautions: Standard, fall   Orthopedic Precautions:N/A   Braces: N/A     Recommendations:     Discharge Recommendations:  home health PT   Level of Assistance Recommended at Discharge: 24 hours light assistance  Discharge Equipment Recommendations: bedside commode, bath bench   Barriers to discharge: Decreased caregiver support    Assessment:     Lupis Murcia is a 71 y.o. female admitted with a medical diagnosis of Cellulitis of leg. Pt tolerated therapy session well with focus on increasing independence with functional transfers, gait training, sitting balance and cardio endurance. Pt would continue to benefit from skilled PT services per POC.     Performance deficits affecting function:  weakness, impaired endurance, impaired functional mobilty, impaired self care skills, gait instability, impaired balance, impaired sensation, decreased coordination, decreased lower extremity function, impaired skin, edema .    Rehab Potential is good    Activity Tolerance: Good    Plan:     Patient to be seen 6 x/week to address the above listed problems via gait training, therapeutic activities, therapeutic exercises, neuromuscular re-education, wheelchair management/training    · Plan of Care Expires: 06/11/22  · Plan of Care Reviewed with: patient    Subjective     Pt agreeable to PT.     Pain/Comfort:  · Pain Rating 1: 0/10  · Pain Rating Post-Intervention 1: 0/10    Patient's cultural, spiritual, Amish conflicts given the current situation:  · no    Objective:     Patient found up in chair with  (no lines) upon PT entry to room.     Therapeutic Activities and Exercises: seated UBE x 10 mins, without rest to increase cardio endurance and trunk strengthening. Pt unable to do mini elliptical due to decreased muscle control through LLE.      Functional Mobility:  · Transfers:     · Sit to Stand:  contact guard assistance with rolling walker  · Gait: x 50', RW, Min A to CGA, for occasional assistance with re-directing AD to midline.     AM-PAC 6 CLICK MOBILITY  14    Patient left up in chair with call button in reach.    GOALS:   Multidisciplinary Problems     Physical Therapy Goals        Problem: Physical Therapy    Goal Priority Disciplines Outcome Goal Variances Interventions   Physical Therapy Goal     PT, PT/OT Ongoing, Progressing     Description: Goals to be met by: 6/21/22    Patient will increase functional independence with mobility by performing:    . Supine to sit with MInimal Assistance-not met  . Sit to supine with MInimal Assistance-not met  . Rolling to Left and Right with Minimal Assistance.  . Sit to stand transfer with Minimal Assistance with RW-met  Updated 5/21/2022 Sit to stand transfer with CGA with RW  . Bed to chair transfer with Minimal Assistance using Rolling Walker/no AD-not met  . Gait  x 10 feet with Moderate Assistance using RW- met  Updated 5/21/2022 Gait  x 20 feet with Long using RW-met 6/9/22  Updated goal: gait 40ft with RW with CGA-not met  . Wheelchair propulsion x50 feet with Minimal Assistance using bilateral uppper extremities-not met  . Sitting at edge of bed x5 minutes with Stand-by Assistance-Met 5/27/2022                     Time Tracking:     PT Received On: 06/12/22  PT Start Time: 1346  PT Stop Time: 1417  PT Total Time (min): 31 min    Billable Minutes: Gait Training 13 and Neuromuscular Re-education 18    Treatment Type: Treatment  PT/PTA: PTA     PTA Visit Number: 2     06/12/2022

## 2022-06-13 PROCEDURE — 25000242 PHARM REV CODE 250 ALT 637 W/ HCPCS: Performed by: NURSE PRACTITIONER

## 2022-06-13 PROCEDURE — 25000003 PHARM REV CODE 250: Performed by: HOSPITALIST

## 2022-06-13 PROCEDURE — 97530 THERAPEUTIC ACTIVITIES: CPT | Mod: CO

## 2022-06-13 PROCEDURE — 25000003 PHARM REV CODE 250: Performed by: NURSE PRACTITIONER

## 2022-06-13 PROCEDURE — 97110 THERAPEUTIC EXERCISES: CPT | Mod: CQ

## 2022-06-13 PROCEDURE — 11000004 HC SNF PRIVATE

## 2022-06-13 PROCEDURE — 94640 AIRWAY INHALATION TREATMENT: CPT

## 2022-06-13 PROCEDURE — 94761 N-INVAS EAR/PLS OXIMETRY MLT: CPT

## 2022-06-13 PROCEDURE — 97116 GAIT TRAINING THERAPY: CPT | Mod: CQ

## 2022-06-13 RX ORDER — LORAZEPAM 0.5 MG/1
0.5 TABLET ORAL 3 TIMES DAILY PRN
Status: DISCONTINUED | OUTPATIENT
Start: 2022-06-13 | End: 2022-06-15 | Stop reason: HOSPADM

## 2022-06-13 RX ORDER — LEVALBUTEROL INHALATION SOLUTION 0.63 MG/3ML
0.63 SOLUTION RESPIRATORY (INHALATION) EVERY 4 HOURS PRN
Status: DISCONTINUED | OUTPATIENT
Start: 2022-06-13 | End: 2022-06-15 | Stop reason: HOSPADM

## 2022-06-13 RX ORDER — LEVALBUTEROL INHALATION SOLUTION 0.63 MG/3ML
0.63 SOLUTION RESPIRATORY (INHALATION) ONCE
Status: COMPLETED | OUTPATIENT
Start: 2022-06-13 | End: 2022-06-13

## 2022-06-13 RX ADMIN — LEVALBUTEROL HYDROCHLORIDE 0.63 MG: 0.63 SOLUTION RESPIRATORY (INHALATION) at 02:06

## 2022-06-13 RX ADMIN — PANTOPRAZOLE SODIUM 40 MG: 40 TABLET, DELAYED RELEASE ORAL at 08:06

## 2022-06-13 RX ADMIN — PANTOPRAZOLE SODIUM 40 MG: 40 TABLET, DELAYED RELEASE ORAL at 09:06

## 2022-06-13 RX ADMIN — Medication 1 TABLET: at 08:06

## 2022-06-13 RX ADMIN — LORAZEPAM 0.5 MG: 0.5 TABLET ORAL at 09:06

## 2022-06-13 RX ADMIN — HYDROCHLOROTHIAZIDE 12.5 MG: 12.5 TABLET ORAL at 08:06

## 2022-06-13 RX ADMIN — ACETAMINOPHEN AND CODEINE PHOSPHATE 1 TABLET: 300; 30 TABLET ORAL at 09:06

## 2022-06-13 RX ADMIN — LORAZEPAM 0.5 MG: 0.5 TABLET ORAL at 02:06

## 2022-06-13 RX ADMIN — Medication 1000 UNITS: at 08:06

## 2022-06-13 RX ADMIN — ONDANSETRON 4 MG: 4 TABLET, ORALLY DISINTEGRATING ORAL at 04:06

## 2022-06-13 RX ADMIN — ACETAMINOPHEN AND CODEINE PHOSPHATE 1 TABLET: 300; 30 TABLET ORAL at 04:06

## 2022-06-13 NOTE — NURSING
ANT CALHOUN NP NOTIFIED OF WHILE PARTICIPATING IN PHYSICAL THERAPY BECAME SHORT OF BREATH PULSE OXIMETER DROPS TO 85% TO 87%.WHEN PLACED IN CHAIR ACTIVITY STOPPED PULSE OXIMETER RETURNS TO 95%.

## 2022-06-13 NOTE — PT/OT/SLP PROGRESS
Occupational Therapy   Treatment    Name: Lupis Murcia  MRN: [id number]  Admit Date: 5/11/2022  Admitting Diagnosis:  Cellulitis of leg    General Precautions: Standard, fall   Orthopedic Precautions:N/A   Braces:       Recommendations:     Discharge Recommendations: home health OT  Level of Assistance Recommended at Discharge: 24 hours physical assistance for all ADL's and home management tasks  Discharge Equipment Recommendations:  bedside commode, bath bench  Barriers to discharge:  Decreased caregiver support    Assessment:     Lupis Murcia is a 71 y.o. female with a medical diagnosis of Cellulitis of leg.  She presents with  Performance deficits affecting function are weakness, impaired endurance, impaired sensation, impaired self care skills, impaired functional mobility, gait instability, impaired balance, decreased coordination, decreased lower extremity function, decreased safety awareness, pain, impaired coordination, impaired skin, edema. .    Pt participated fair with session on this day.  Pt note with SOB and mild exertion on this day. Pt. 02 stats taken on this day with standing  Hr 137 and 02 88-91% pt. With breaks and HR down and 02 stats increasing. Pt. encouraged to task deep breaths  Pt  continues to demonstrate levels of physical deficits with  functional indep with daily management activities tasks, selfcare skills with balance,  functional mobility, UB strength and endurance. Pt. Will continue to benefit from continued OT to progress towards goals      Rehab Potential is fair    Activity tolerance:  Fair    Plan:     Patient to be seen 6 x/week to address the above listed problems via self-care/home management, therapeutic activities, therapeutic exercises    · Plan of Care Expires: 06/16/22  · Plan of Care Reviewed with: patient    Subjective     Communicated with:  nsg and Pt.prior to session. I am doing well today     Pain/Comfort:  Pain Rating 1: 0/10  Pain Rating Post-Intervention 1:  0/10    Patient's cultural, spiritual, Druze conflicts given the current situation:  no    Objective:     Patient found up in chair in w/c with daughter present    upon OT entry to room.    Functional Mobility/Transfers:  · Patient completed Sit <> Stand  Transfer with  Contact guard assistance/minimal with RW for balance and cues for safety and hand placement.     Activities of Daily Living:  · Pr. Stated daughter assisted her with bathing and grooming needs    · Lower Body Dressing Moderate assistance to rosetta pants over BLE feet due to drop (A)'d need to manage feet into pants and pt. Able to mange over hips instance with RW for bal    AMPA 6 Click ADL: 16     OT Exercises UBE x 10 min     Treatment & Education:  Pt. With standing act on this day with task. Pt. With CGA/SBA for balance aspects with task with  AD at raised counter Pt with visual perception task with discrimination of various shapes and sizes x 3 min 53 sec and then 2 12 sec with standing bal and min cues through out with weight shifting and use of BUE's incorporated and crossing mid line and facilitation with posture in prep for home management .    Pt. With therex performed to increase ROM, endurance selfcare task and fxl mobility for independence     Pt edu on role of OT, POC, safety when performing self care tasks , benefit of performing OOB activity, and safety when performing functional transfers and mobility management for preparation with goals to progress towards next level of care    Patient left up in chair with all lines intact, call button in reach Education:  in room     GOALS:   Multidisciplinary Problems     Occupational Therapy Goals        Problem: Occupational Therapy    Goal Priority Disciplines Outcome Interventions   Occupational Therapy Goal     OT, PT/OT Ongoing, Progressing    Description: Goals to be met by: 6/17/2022    Patient will increase functional independence with ADLs by performing:    UE Dressing with  [place].  LE Dressing with Moderate Assistance using AE as needed.  Grooming while seated at sink with [place].  Toileting from bedside commode or toilet with Minimal Assistance for hygiene and clothing management.   Bathing from shower chair/chair level with Minimal Assistance.  Stand pivot transfers with Minimal Assistance using RW.  Toilet transfer to bedside commode with Minimal Assistance. Met  Toilet transfer to [facility] with supervision.   Upper extremity exercise program with supervision.  Caregiver will be educated on level of assist required to safely perform self care tasks and functional transfers.                     Time Tracking:     OT Date of Treatment: OT Date of Treatment: 06/13/22  OT Total Time (min):      Billable Minutes:Therapeutic Exercise 40    6/13/2022

## 2022-06-13 NOTE — PT/OT/SLP PROGRESS
"Physical Therapy Treatment    Patient Name:  Lupis Murcia   MRN:  [id number]  Admit Date: 5/11/2022  Admitting Diagnosis: Cellulitis of leg  Recent Surgeries: N/A  General Precautions: Standard, fall   Orthopedic Precautions:N/A   Braces: N/A     Recommendations:     Discharge Recommendations:  home health PT   Level of Assistance Recommended at Discharge: 24 hours light assistance  Discharge Equipment Recommendations: bedside commode, bath bench   Barriers to discharge: Decreased caregiver support    Assessment:     Lupis Murcia is a 71 y.o. female admitted with a medical diagnosis of Cellulitis of leg  Pt tolerated therapy session well with focus on increasing independence with gait training, transfers, LE strengthening and safety awareness in order to assist with achieving highest level of function. Pt would continue to benefit from skilled PT services per POC.     Performance deficits affecting function:  weakness, impaired endurance, impaired functional mobilty, impaired self care skills, gait instability, impaired balance, impaired sensation, decreased coordination, decreased lower extremity function, impaired skin, edema .    Rehab Potential is good    Activity Tolerance: Good    Plan:     Patient to be seen 6 x/week to address the above listed problems via gait training, therapeutic activities, therapeutic exercises, neuromuscular re-education, wheelchair management/training    · Plan of Care Expires: 06/11/22  · Plan of Care Reviewed with: patient    Subjective     Pt agreeable to PT. "I have been SOB today with OT, and she measured my oxygen during the session and it went below 90%."      Pain/Comfort:  · Pain Rating 1: 0/10  · Pain Rating Post-Intervention 1: 0/10    Patient's cultural, spiritual, Sikh conflicts given the current situation:  · no    Objective:     Communicated with OT prior to session.  Patient found up in chair with  (no lines) upon PT entry to room.     Therapeutic Activities " and Exercises: Seated RLE strength exercises, including: LAQ's, AP, ABD/ADD scissors, marching, GS x 20 reps, each.     Functional Mobility:  · Transfers:     · Sit to Stand:  contact guard assistance with rolling walker  · Gait: x 20', 2 sets, RW, CGA. Focus on increasing step length with LLE due to foot drop.    · Pt education on deep breathing technique due to SOB.     AM-PAC 6 CLICK MOBILITY  14    Patient left up in chair with call button in reach and nursing notified of patient's o2 sats from 86-92% during session with SOB. When finished at end of session patient did not present SOB and stated she felt fine with 92% sats on RA.    GOALS:   Multidisciplinary Problems     Physical Therapy Goals        Problem: Physical Therapy    Goal Priority Disciplines Outcome Goal Variances Interventions   Physical Therapy Goal     PT, PT/OT Ongoing, Progressing     Description: Goals to be met by: 6/21/22    Patient will increase functional independence with mobility by performing:    . Supine to sit with MInimal Assistance-not met  . Sit to supine with MInimal Assistance-not met  . Rolling to Left and Right with Minimal Assistance.  . Sit to stand transfer with Minimal Assistance with RW-met  Updated 5/21/2022 Sit to stand transfer with CGA with RW  . Bed to chair transfer with Minimal Assistance using Rolling Walker/no AD-not met  . Gait  x 10 feet with Moderate Assistance using RW- met  Updated 5/21/2022 Gait  x 20 feet with Long using RW-met 6/9/22  Updated goal: gait 40ft with RW with CGA-not met  . Wheelchair propulsion x50 feet with Minimal Assistance using bilateral uppper extremities-not met  . Sitting at edge of bed x5 minutes with Stand-by Assistance-Met 5/27/2022                     Time Tracking:     PT Received On: 06/13/22  PT Start Time: 1140  PT Stop Time: 1216  PT Total Time (min): 36 min    Billable Minutes: Gait Training 20 and Therapeutic Exercise 16    Treatment Type: Treatment  PT/PTA: PTA     PTA  Visit Number: 3     06/13/2022

## 2022-06-14 ENCOUNTER — HOSPITAL ENCOUNTER (INPATIENT)
Facility: HOSPITAL | Age: 72
LOS: 6 days | Discharge: NURSING, REHAB OR LONG-TERM CARE | DRG: 175 | End: 2022-06-20
Attending: EMERGENCY MEDICINE | Admitting: INTERNAL MEDICINE
Payer: MEDICARE

## 2022-06-14 VITALS
TEMPERATURE: 98 F | SYSTOLIC BLOOD PRESSURE: 111 MMHG | WEIGHT: 203.25 LBS | BODY MASS INDEX: 37.4 KG/M2 | HEART RATE: 100 BPM | DIASTOLIC BLOOD PRESSURE: 67 MMHG | HEIGHT: 62 IN | OXYGEN SATURATION: 92 % | RESPIRATION RATE: 18 BRPM

## 2022-06-14 DIAGNOSIS — I26.99 PULMONARY EMBOLISM: ICD-10-CM

## 2022-06-14 DIAGNOSIS — R09.02 HYPOXIA: ICD-10-CM

## 2022-06-14 DIAGNOSIS — R07.9 CHEST PAIN: ICD-10-CM

## 2022-06-14 DIAGNOSIS — R06.02 SHORTNESS OF BREATH: ICD-10-CM

## 2022-06-14 PROBLEM — I26.09 ACUTE PULMONARY EMBOLISM WITH ACUTE COR PULMONALE: Status: ACTIVE | Noted: 2022-06-14

## 2022-06-14 PROBLEM — J96.01 ACUTE HYPOXEMIC RESPIRATORY FAILURE: Status: ACTIVE | Noted: 2022-06-14

## 2022-06-14 LAB
ALBUMIN SERPL BCP-MCNC: 3.9 G/DL (ref 3.5–5.2)
ALP SERPL-CCNC: 65 U/L (ref 55–135)
ALT SERPL W/O P-5'-P-CCNC: 18 U/L (ref 10–44)
ANION GAP SERPL CALC-SCNC: 15 MMOL/L (ref 8–16)
ANION GAP SERPL CALC-SCNC: 16 MMOL/L (ref 8–16)
APTT BLDCRRT: 24.1 SEC (ref 21–32)
APTT BLDCRRT: 33.5 SEC (ref 21–32)
AST SERPL-CCNC: 17 U/L (ref 10–40)
BASOPHILS # BLD AUTO: 0.01 K/UL (ref 0–0.2)
BASOPHILS # BLD AUTO: 0.01 K/UL (ref 0–0.2)
BASOPHILS NFR BLD: 0.1 % (ref 0–1.9)
BASOPHILS NFR BLD: 0.1 % (ref 0–1.9)
BILIRUB SERPL-MCNC: 0.5 MG/DL (ref 0.1–1)
BNP SERPL-MCNC: 577 PG/ML (ref 0–99)
BUN SERPL-MCNC: 22 MG/DL (ref 8–23)
BUN SERPL-MCNC: 24 MG/DL (ref 8–23)
CALCIUM SERPL-MCNC: 10.9 MG/DL (ref 8.7–10.5)
CALCIUM SERPL-MCNC: 10.9 MG/DL (ref 8.7–10.5)
CHLORIDE SERPL-SCNC: 100 MMOL/L (ref 95–110)
CHLORIDE SERPL-SCNC: 103 MMOL/L (ref 95–110)
CO2 SERPL-SCNC: 22 MMOL/L (ref 23–29)
CO2 SERPL-SCNC: 23 MMOL/L (ref 23–29)
CREAT SERPL-MCNC: 0.9 MG/DL (ref 0.5–1.4)
CREAT SERPL-MCNC: 0.9 MG/DL (ref 0.5–1.4)
CTP QC/QA: YES
DIFFERENTIAL METHOD: ABNORMAL
DIFFERENTIAL METHOD: ABNORMAL
EOSINOPHIL # BLD AUTO: 0 K/UL (ref 0–0.5)
EOSINOPHIL # BLD AUTO: 0.1 K/UL (ref 0–0.5)
EOSINOPHIL NFR BLD: 0.1 % (ref 0–8)
EOSINOPHIL NFR BLD: 1.2 % (ref 0–8)
ERYTHROCYTE [DISTWIDTH] IN BLOOD BY AUTOMATED COUNT: 15.9 % (ref 11.5–14.5)
ERYTHROCYTE [DISTWIDTH] IN BLOOD BY AUTOMATED COUNT: 16.1 % (ref 11.5–14.5)
EST. GFR  (AFRICAN AMERICAN): >60 ML/MIN/1.73 M^2
EST. GFR  (AFRICAN AMERICAN): >60 ML/MIN/1.73 M^2
EST. GFR  (NON AFRICAN AMERICAN): >60 ML/MIN/1.73 M^2
EST. GFR  (NON AFRICAN AMERICAN): >60 ML/MIN/1.73 M^2
GLUCOSE SERPL-MCNC: 156 MG/DL (ref 70–110)
GLUCOSE SERPL-MCNC: 168 MG/DL (ref 70–110)
HCT VFR BLD AUTO: 34.6 % (ref 37–48.5)
HCT VFR BLD AUTO: 35.2 % (ref 37–48.5)
HGB BLD-MCNC: 11 G/DL (ref 12–16)
HGB BLD-MCNC: 11.2 G/DL (ref 12–16)
IMM GRANULOCYTES # BLD AUTO: 0.02 K/UL (ref 0–0.04)
IMM GRANULOCYTES # BLD AUTO: 0.03 K/UL (ref 0–0.04)
IMM GRANULOCYTES NFR BLD AUTO: 0.3 % (ref 0–0.5)
IMM GRANULOCYTES NFR BLD AUTO: 0.4 % (ref 0–0.5)
INR PPP: 1.1 (ref 0.8–1.2)
LYMPHOCYTES # BLD AUTO: 1.2 K/UL (ref 1–4.8)
LYMPHOCYTES # BLD AUTO: 2.1 K/UL (ref 1–4.8)
LYMPHOCYTES NFR BLD: 15.3 % (ref 18–48)
LYMPHOCYTES NFR BLD: 27.5 % (ref 18–48)
MAGNESIUM SERPL-MCNC: 2 MG/DL (ref 1.6–2.6)
MCH RBC QN AUTO: 30.9 PG (ref 27–31)
MCH RBC QN AUTO: 31.1 PG (ref 27–31)
MCHC RBC AUTO-ENTMCNC: 31.8 G/DL (ref 32–36)
MCHC RBC AUTO-ENTMCNC: 31.8 G/DL (ref 32–36)
MCV RBC AUTO: 97 FL (ref 82–98)
MCV RBC AUTO: 98 FL (ref 82–98)
MONOCYTES # BLD AUTO: 0.5 K/UL (ref 0.3–1)
MONOCYTES # BLD AUTO: 0.8 K/UL (ref 0.3–1)
MONOCYTES NFR BLD: 6.6 % (ref 4–15)
MONOCYTES NFR BLD: 9.7 % (ref 4–15)
NEUTROPHILS # BLD AUTO: 4.7 K/UL (ref 1.8–7.7)
NEUTROPHILS # BLD AUTO: 6.1 K/UL (ref 1.8–7.7)
NEUTROPHILS NFR BLD: 61.2 % (ref 38–73)
NEUTROPHILS NFR BLD: 77.5 % (ref 38–73)
NRBC BLD-RTO: 0 /100 WBC
NRBC BLD-RTO: 0 /100 WBC
PHOSPHATE SERPL-MCNC: 4.2 MG/DL (ref 2.7–4.5)
PLATELET # BLD AUTO: 218 K/UL (ref 150–450)
PLATELET # BLD AUTO: 255 K/UL (ref 150–450)
PMV BLD AUTO: 10.1 FL (ref 9.2–12.9)
PMV BLD AUTO: 10.1 FL (ref 9.2–12.9)
POCT GLUCOSE: 144 MG/DL (ref 70–110)
POTASSIUM SERPL-SCNC: 3.6 MMOL/L (ref 3.5–5.1)
POTASSIUM SERPL-SCNC: 3.9 MMOL/L (ref 3.5–5.1)
PROT SERPL-MCNC: 7.7 G/DL (ref 6–8.4)
PROTHROMBIN TIME: 11 SEC (ref 9–12.5)
RBC # BLD AUTO: 3.54 M/UL (ref 4–5.4)
RBC # BLD AUTO: 3.62 M/UL (ref 4–5.4)
SARS-COV-2 RDRP RESP QL NAA+PROBE: NEGATIVE
SODIUM SERPL-SCNC: 138 MMOL/L (ref 136–145)
SODIUM SERPL-SCNC: 141 MMOL/L (ref 136–145)
TROPONIN I SERPL DL<=0.01 NG/ML-MCNC: 0.42 NG/ML (ref 0–0.03)
TROPONIN I SERPL DL<=0.01 NG/ML-MCNC: 0.71 NG/ML (ref 0–0.03)
WBC # BLD AUTO: 7.71 K/UL (ref 3.9–12.7)
WBC # BLD AUTO: 7.85 K/UL (ref 3.9–12.7)

## 2022-06-14 PROCEDURE — 96366 THER/PROPH/DIAG IV INF ADDON: CPT

## 2022-06-14 PROCEDURE — 25000003 PHARM REV CODE 250

## 2022-06-14 PROCEDURE — 85610 PROTHROMBIN TIME: CPT | Performed by: STUDENT IN AN ORGANIZED HEALTH CARE EDUCATION/TRAINING PROGRAM

## 2022-06-14 PROCEDURE — 99291 PR CRITICAL CARE, E/M 30-74 MINUTES: ICD-10-PCS | Mod: ,,, | Performed by: INTERNAL MEDICINE

## 2022-06-14 PROCEDURE — 83735 ASSAY OF MAGNESIUM: CPT | Performed by: NURSE PRACTITIONER

## 2022-06-14 PROCEDURE — 25000003 PHARM REV CODE 250: Performed by: STUDENT IN AN ORGANIZED HEALTH CARE EDUCATION/TRAINING PROGRAM

## 2022-06-14 PROCEDURE — U0002 COVID-19 LAB TEST NON-CDC: HCPCS

## 2022-06-14 PROCEDURE — 27000221 HC OXYGEN, UP TO 24 HOURS

## 2022-06-14 PROCEDURE — 85730 THROMBOPLASTIN TIME PARTIAL: CPT | Mod: 91 | Performed by: INTERNAL MEDICINE

## 2022-06-14 PROCEDURE — 96365 THER/PROPH/DIAG IV INF INIT: CPT

## 2022-06-14 PROCEDURE — 80053 COMPREHEN METABOLIC PANEL: CPT | Performed by: STUDENT IN AN ORGANIZED HEALTH CARE EDUCATION/TRAINING PROGRAM

## 2022-06-14 PROCEDURE — 93010 ELECTROCARDIOGRAM REPORT: CPT | Mod: 76,,, | Performed by: INTERNAL MEDICINE

## 2022-06-14 PROCEDURE — 99291 CRITICAL CARE FIRST HOUR: CPT | Mod: ,,, | Performed by: INTERNAL MEDICINE

## 2022-06-14 PROCEDURE — 96376 TX/PRO/DX INJ SAME DRUG ADON: CPT

## 2022-06-14 PROCEDURE — 99900035 HC TECH TIME PER 15 MIN (STAT)

## 2022-06-14 PROCEDURE — 99284 PR EMERGENCY DEPT VISIT,LEVEL IV: ICD-10-PCS | Mod: ,,, | Performed by: EMERGENCY MEDICINE

## 2022-06-14 PROCEDURE — 85025 COMPLETE CBC W/AUTO DIFF WBC: CPT | Mod: 91 | Performed by: STUDENT IN AN ORGANIZED HEALTH CARE EDUCATION/TRAINING PROGRAM

## 2022-06-14 PROCEDURE — 99285 EMERGENCY DEPT VISIT HI MDM: CPT | Mod: 25

## 2022-06-14 PROCEDURE — 94761 N-INVAS EAR/PLS OXIMETRY MLT: CPT

## 2022-06-14 PROCEDURE — 18500000 HC LEAVE OF ABSENCE HOSPITAL SERVICES

## 2022-06-14 PROCEDURE — 25000003 PHARM REV CODE 250: Performed by: NURSE PRACTITIONER

## 2022-06-14 PROCEDURE — 94640 AIRWAY INHALATION TREATMENT: CPT

## 2022-06-14 PROCEDURE — 20000000 HC ICU ROOM

## 2022-06-14 PROCEDURE — 93010 ELECTROCARDIOGRAM REPORT: CPT | Mod: ,,, | Performed by: INTERNAL MEDICINE

## 2022-06-14 PROCEDURE — 84484 ASSAY OF TROPONIN QUANT: CPT | Mod: 91

## 2022-06-14 PROCEDURE — 84484 ASSAY OF TROPONIN QUANT: CPT | Performed by: STUDENT IN AN ORGANIZED HEALTH CARE EDUCATION/TRAINING PROGRAM

## 2022-06-14 PROCEDURE — 36415 COLL VENOUS BLD VENIPUNCTURE: CPT | Performed by: NURSE PRACTITIONER

## 2022-06-14 PROCEDURE — 85025 COMPLETE CBC W/AUTO DIFF WBC: CPT | Performed by: NURSE PRACTITIONER

## 2022-06-14 PROCEDURE — 80048 BASIC METABOLIC PNL TOTAL CA: CPT | Mod: XB | Performed by: NURSE PRACTITIONER

## 2022-06-14 PROCEDURE — 63600175 PHARM REV CODE 636 W HCPCS: Performed by: STUDENT IN AN ORGANIZED HEALTH CARE EDUCATION/TRAINING PROGRAM

## 2022-06-14 PROCEDURE — 85730 THROMBOPLASTIN TIME PARTIAL: CPT | Performed by: STUDENT IN AN ORGANIZED HEALTH CARE EDUCATION/TRAINING PROGRAM

## 2022-06-14 PROCEDURE — 93005 ELECTROCARDIOGRAM TRACING: CPT

## 2022-06-14 PROCEDURE — 84100 ASSAY OF PHOSPHORUS: CPT | Performed by: NURSE PRACTITIONER

## 2022-06-14 PROCEDURE — 99284 EMERGENCY DEPT VISIT MOD MDM: CPT | Mod: ,,, | Performed by: EMERGENCY MEDICINE

## 2022-06-14 PROCEDURE — 83880 ASSAY OF NATRIURETIC PEPTIDE: CPT | Performed by: STUDENT IN AN ORGANIZED HEALTH CARE EDUCATION/TRAINING PROGRAM

## 2022-06-14 PROCEDURE — 93010 EKG 12-LEAD: ICD-10-PCS | Mod: ,,, | Performed by: INTERNAL MEDICINE

## 2022-06-14 PROCEDURE — 25000242 PHARM REV CODE 250 ALT 637 W/ HCPCS: Performed by: NURSE PRACTITIONER

## 2022-06-14 RX ORDER — SODIUM CHLORIDE 0.9 % (FLUSH) 0.9 %
10 SYRINGE (ML) INJECTION EVERY 12 HOURS PRN
Status: DISCONTINUED | OUTPATIENT
Start: 2022-06-14 | End: 2022-06-20 | Stop reason: HOSPADM

## 2022-06-14 RX ORDER — PANTOPRAZOLE SODIUM 40 MG/1
40 TABLET, DELAYED RELEASE ORAL DAILY
Status: DISCONTINUED | OUTPATIENT
Start: 2022-06-15 | End: 2022-06-14

## 2022-06-14 RX ORDER — ACETAMINOPHEN AND CODEINE PHOSPHATE 300; 30 MG/1; MG/1
1 TABLET ORAL EVERY 6 HOURS PRN
COMMUNITY

## 2022-06-14 RX ORDER — PANTOPRAZOLE SODIUM 40 MG/1
40 TABLET, DELAYED RELEASE ORAL 2 TIMES DAILY
Status: DISCONTINUED | OUTPATIENT
Start: 2022-06-14 | End: 2022-06-15

## 2022-06-14 RX ORDER — INSULIN ASPART 100 [IU]/ML
0-5 INJECTION, SOLUTION INTRAVENOUS; SUBCUTANEOUS
Status: DISCONTINUED | OUTPATIENT
Start: 2022-06-14 | End: 2022-06-20 | Stop reason: HOSPADM

## 2022-06-14 RX ORDER — HEPARIN SODIUM,PORCINE/D5W 25000/250
0-40 INTRAVENOUS SOLUTION INTRAVENOUS CONTINUOUS
Status: DISCONTINUED | OUTPATIENT
Start: 2022-06-14 | End: 2022-06-16

## 2022-06-14 RX ORDER — GLUCAGON 1 MG
1 KIT INJECTION
Status: DISCONTINUED | OUTPATIENT
Start: 2022-06-14 | End: 2022-06-20 | Stop reason: HOSPADM

## 2022-06-14 RX ORDER — NALOXONE HCL 0.4 MG/ML
0.02 VIAL (ML) INJECTION
Status: DISCONTINUED | OUTPATIENT
Start: 2022-06-14 | End: 2022-06-20 | Stop reason: HOSPADM

## 2022-06-14 RX ORDER — IBUPROFEN 200 MG
24 TABLET ORAL
Status: DISCONTINUED | OUTPATIENT
Start: 2022-06-14 | End: 2022-06-20 | Stop reason: HOSPADM

## 2022-06-14 RX ORDER — IBUPROFEN 200 MG
16 TABLET ORAL
Status: DISCONTINUED | OUTPATIENT
Start: 2022-06-14 | End: 2022-06-20 | Stop reason: HOSPADM

## 2022-06-14 RX ORDER — LORAZEPAM 0.5 MG/1
0.5 TABLET ORAL EVERY 8 HOURS PRN
Status: DISCONTINUED | OUTPATIENT
Start: 2022-06-14 | End: 2022-06-15

## 2022-06-14 RX ORDER — DIPHENHYDRAMINE HYDROCHLORIDE 50 MG/ML
50 INJECTION INTRAMUSCULAR; INTRAVENOUS
Status: DISCONTINUED | OUTPATIENT
Start: 2022-06-14 | End: 2022-06-14

## 2022-06-14 RX ORDER — METHYLPREDNISOLONE SOD SUCC 125 MG
125 VIAL (EA) INJECTION
Status: DISCONTINUED | OUTPATIENT
Start: 2022-06-14 | End: 2022-06-14

## 2022-06-14 RX ORDER — POLYETHYLENE GLYCOL 3350 17 G/17G
17 POWDER, FOR SOLUTION ORAL DAILY
Status: DISCONTINUED | OUTPATIENT
Start: 2022-06-15 | End: 2022-06-20 | Stop reason: HOSPADM

## 2022-06-14 RX ADMIN — LORAZEPAM 0.5 MG: 0.5 TABLET ORAL at 06:06

## 2022-06-14 RX ADMIN — HEPARIN SODIUM 30 UNITS/KG/HR: 5000 INJECTION INTRAVENOUS; SUBCUTANEOUS at 09:06

## 2022-06-14 RX ADMIN — PANTOPRAZOLE SODIUM 40 MG: 40 TABLET, DELAYED RELEASE ORAL at 09:06

## 2022-06-14 RX ADMIN — LEVALBUTEROL HYDROCHLORIDE 0.63 MG: 0.63 SOLUTION RESPIRATORY (INHALATION) at 06:06

## 2022-06-14 RX ADMIN — LORAZEPAM 0.5 MG: 0.5 TABLET ORAL at 05:06

## 2022-06-14 RX ADMIN — HEPARIN SODIUM 20 UNITS/KG/HR: 5000 INJECTION INTRAVENOUS; SUBCUTANEOUS at 09:06

## 2022-06-14 RX ADMIN — HEPARIN SODIUM 18 UNITS/KG/HR: 5000 INJECTION INTRAVENOUS; SUBCUTANEOUS at 02:06

## 2022-06-14 NOTE — ED PROVIDER NOTES
Encounter Date: 2022       History     Chief Complaint   Patient presents with    Shortness of Breath     Arrived from SNF for SOB X2 days. Progressive worsening today sating mid-80s on RA. Placed on 6L of O2 via NC w/ EMS sating 94%. No lung or cardiac hx.      HPI   71-year-old female with past medical history of remote thromboembolic disease, chronic lower extremity lymphedema, multiple sclerosis who presents with dyspnea and hypoxia.  Patient states that yesterday while working with physical therapy, she became acutely short of breath without associated chest pain.  She was found to be hypoxic and was placed on 3 L by nasal cannula with improvement in oxygen saturations.  Patient to declined dyspnea, so today she is brought to the hospital via EMS.  They note that she was satting approximately 91% on 3 L, so was placed on 6 L.    Of note, patient was previous on heparin for DVT prophylaxis which was discontinued about 3 days ago due to concerns over abdominal ecchymoses.    She denies chest pain, fever, chills, nausea, emesis.  She notes that her bilateral lower extremities are edematous at her baseline without acute changes.      Review of patient's allergies indicates:   Allergen Reactions    Contrast media Shortness Of Breath and Rash    Pcn [penicillins] Shortness Of Breath and Rash    Celebrex [celecoxib] Other (See Comments)     Swallowing problems     Diazepam Hives    Motrin [ibuprofen] Rash     Past Medical History:   Diagnosis Date    Lymphedema     MS (multiple sclerosis)     Vitamin B12 deficiency      Past Surgical History:   Procedure Laterality Date    BREAST CYST EXCISION Left     over 40yrs ago     SECTION      ESOPHAGOGASTRODUODENOSCOPY N/A 2022    Procedure: EGD (ESOPHAGOGASTRODUODENOSCOPY);  Surgeon: Radha Arango MD;  Location: [facility] ([address] [place] [address]);  Service: Endoscopy;  Laterality: N/A;     Family History   Problem Relation Age of Onset    Coronary artery  disease Father     Lung cancer Father     Lung cancer Mother     Brain cancer Brother      Social History     Tobacco Use    Smoking status: Never Smoker    Smokeless tobacco: Never Used   Substance Use Topics    Alcohol use: No    Drug use: No     Review of Systems   Constitutional: Negative for chills and fever.   HENT: Negative for congestion and sore throat.    Eyes: Negative for visual disturbance.   Respiratory: Positive for shortness of breath. Negative for chest tightness.    Cardiovascular: Positive for leg swelling (Unchanged from baseline). Negative for chest pain.   Gastrointestinal: Negative for abdominal pain, blood in stool, constipation, diarrhea, nausea and vomiting.   Endocrine: Negative for polyuria.   Genitourinary: Negative for dysuria.   Musculoskeletal: Negative for joint swelling.   Skin: Negative for rash.   Neurological: Negative for dizziness, seizures, weakness and headaches.   Psychiatric/Behavioral: Negative for agitation.       Physical Exam     Initial Vitals [06/14/22 0915]   BP Pulse Resp Temp SpO2   (!) 140/92 (!) 115 18 98.1 °F (36.7 °C) (!) 94 %      MAP       --         Physical Exam    Nursing note and vitals reviewed.  Constitutional: She appears well-developed.   HENT:   Head: Normocephalic and atraumatic.   Eyes: Conjunctivae and EOM are normal. Pupils are equal, round, and reactive to light.   Neck: Neck supple.   Normal range of motion.  Cardiovascular: Regular rhythm and intact distal pulses.   Tachycardic   Pulmonary/Chest: No respiratory distress. She has no wheezes.   Abdominal: Abdomen is soft. She exhibits no distension. There is no abdominal tenderness.   Musculoskeletal:         General: Edema present. Normal range of motion.      Cervical back: Normal range of motion and neck supple.      Comments: 2+ pitting edema bilateral lower extremities with tree barking; negative Francisco Javier sign bilaterally     Neurological: She is alert and oriented to person, place, and  time. No cranial nerve deficit.   Skin: Skin is warm and dry.   Psychiatric: She has a normal mood and affect.         ED Course   Procedures  Labs Reviewed   B-TYPE NATRIURETIC PEPTIDE - Abnormal; Notable for the following components:       Result Value     (*)     All other components within normal limits   CBC W/ AUTO DIFFERENTIAL - Abnormal; Notable for the following components:    RBC 3.54 (*)     Hemoglobin 11.0 (*)     Hematocrit 34.6 (*)     MCH 31.1 (*)     MCHC 31.8 (*)     RDW 15.9 (*)     Gran % 77.5 (*)     Lymph % 15.3 (*)     All other components within normal limits   COMPREHENSIVE METABOLIC PANEL - Abnormal; Notable for the following components:    CO2 22 (*)     Glucose 168 (*)     BUN 24 (*)     Calcium 10.9 (*)     All other components within normal limits   TROPONIN I - Abnormal; Notable for the following components:    Troponin I 0.423 (*)     All other components within normal limits   APTT    Narrative:     Draw baseline aPTT prior to starting the heparin bolus or  infusion  (if patient is on warfarin prior to heparin therapy)   PROTIME-INR    Narrative:     Draw baseline aPTT prior to starting the heparin bolus or  infusion  (if patient is on warfarin prior to heparin therapy)        ECG Results          EKG 12-lead (Final result)  Result time 06/14/22 11:34:58    Final result by Interface, Lab In [facility] (06/14/22 11:34:58)                 Narrative:    Test Reason : R06.02,    Vent. Rate : 103 BPM     Atrial Rate : 103 BPM     P-R Int : 124 ms          QRS Dur : 068 ms      QT Int : 306 ms       P-R-T Axes : 040 -24 082 degrees     QTc Int : 400 ms    Sinus tachycardia  LVH  Possible Lateral infarct ,age undetermined  Inferior infarct ,age undetermined  Abnormal ECG  When compared with ECG of 03-MAY-2022 11:45,  Borderline criteria for Lateral infarct are now Present  Inferior infarct is now Present  Nonspecific T wave abnormality now evident in Anterior leads  Confirmed by CHANDA  SUZANNA ZAYAS ([id number]) on 6/14/2022 11:34:47 AM    Referred By: [email or web address]   SELF           Confirmed By:SUZANNA ARMAS MD                            Imaging Results           NM Lung Scan Perfusion Particulate (Final result)  Result time 06/14/22 14:04:13   Procedure changed from NM Lung Scan Ventilation Perfusion     Final result by Willie Michaels IV, MD (06/14/22 14:04:13)                 Impression:      This represents a high suspicion for pulmonary embolism.    This report was flagged in Epic as abnormal.    This critical finding was discovered/received at 13:40.  The critical information above was relayed directly by Dr. Mauricio Muniz by telephone to Dr. Hayes on 6/14/2022 at 13:45.    Electronically signed by resident: Mauricio Muniz  Date:    06/14/2022  Time:    13:49    Electronically signed by: Willie Michaels  Date:    06/14/2022  Time:    14:04             Narrative:    EXAMINATION:  NM LUNG SCAN PERFUSION    CLINICAL HISTORY:  Pulmonary embolism (PE) suspected, high prob;PE;    TECHNIQUE:  Following the IV administration of 5.5 mCi of Tc-99m-MAA, multiple images of the thorax were obtained in various projections.    COMPARISON:  Chest radiograph 06/14/2022.    FINDINGS:  Perfusion: Multiple peripheral wedge-shaped segmental perfusion defects, most prominent in the mid to lower lung zones bilaterally without corresponding abnormality on same day radiograph.                               X-Ray Chest AP Portable (Final result)  Result time 06/14/22 10:33:01    Final result by Barry Wolf MD (06/14/22 10:33:01)                 Impression:      No significant changes      Electronically signed by: Barry Wolf MD  Date:    06/14/2022  Time:    10:33             Narrative:    EXAMINATION:  XR CHEST AP PORTABLE    CLINICAL HISTORY:  hyopxia;    TECHNIQUE:  Single frontal view of the chest was performed.    COMPARISON:  No 06/13/2022 ne    FINDINGS:  Mild cardiomegaly.  The lungs are clear.  No pleural  effusion                                 Medications   heparin 25,000 units in dextrose 5% 250 mL (100 units/mL) infusion HIGH INTENSITY nomogram - [facility] (18 Units/kg/hr × 66.9 kg (Adjusted) Intravenous New Bag 6/14/22 1411)   heparin 25,000 units in dextrose 5% (100 units/ml) IV bolus from bag - ADDITIONAL PRN BOLUS - 60 units/kg (has no administration in time range)   heparin 25,000 units in dextrose 5% (100 units/ml) IV bolus from bag - ADDITIONAL PRN BOLUS - 30 units/kg (has no administration in time range)   heparin 25,000 units in dextrose 5% (100 units/ml) IV bolus from bag INITIAL BOLUS (5,352 Units Intravenous Bolus from Bag 6/14/22 1412)           APC / Resident Notes:   71-year-old female with past medical history as above who presents with acute onset dyspnea which began yesterday.  Afebrile, hypoxic, tachycardic.  Physical exam notable for chronic lower extremity edema without other significant findings.  Given patient's history of thromboembolic disease as well as hypoxia, high level of concern for pulmonary embolus.  Unable to obtain CT angiogram secondary to patient's contrast allergy, so V/Q scan was ordered.  Workup notable for elevated BNP and troponin concerning for underlying right heart strain, so heparin drip initiated prior to the CT scan.  V/Q scan resulted as high probability for pulmonary embolus, so Cardiology was consulted who performed point of care ultrasound which demonstrated signs of right heart strain.  Patient will be admitted to critical care for close monitoring.    Karishma Nation  [facility] PGY-3  Emergency Medicine  3:55 PM 6/14/2022        [unfilled]   ED Course as of 06/14/22 1555   Tue Jun 14, 2022   1151 Given evidence of heart strain, will treat empirically for pulmonary embolus [CC]   1406 VQ study with high probability for pulmonary embolus.  Cardiology consulted for stat echo [CC]   1522 Spoke with Critical Care, pending evaluation [CC]      ED Course User Index  [CC]  Karishma Nation MD             Clinical Impression:   Final diagnoses:  [R09.02] Hypoxia  [R06.02] Shortness of breath          ED Disposition Condition    Admit               Karishma Nation MD  Resident  06/14/22 6314

## 2022-06-14 NOTE — HPI
71-year-old female with history of PE during pregnancy requiring heparin drip, chronic lower extremity lymphedema, multiple sclerosis, vitamin D deficiency who presented to the ED due to shortness of breath and dyspnea on exertion two days prior to arrival. Patient states she was undergoing physical therapy at her rehabilitation facility and was walking when she suddenly became short of breath. She states since that time, her symptoms have worsened. She stated that 5 days prior, her primary physician noted that she was experiencing abdominal bruising related to heparin subcutaneous injections. She states she started having worsening respiratory symptoms and was satting 80% on room air. In the ED, cardiology was consulted and a bedside ECHO showed a dilated RV with Cox's sign. Due to her allergy for contrast, she underwent a VQ scan showing evidence of a submassive pulmonary embolism. She was started on a heparin drip.    Vitals were overall stable other than mild tachycardia and on 6L NC with oxygen saturation above 90%. CXR in ED showed mild cardiomegaly. BNP elevated to 577 with troponin elevated to 0.423. She was admitted to Critical Care Team 2 for further management.

## 2022-06-14 NOTE — ED NOTES
Pt BIB EMS for SOB, SPO2 in 80's on RA, SPO2 now 95% on 6L per NC, desats to 90% when speaking.  Pt RR increase when speaking.  Pt denies chest pain, skin warm/dry, lyphedema and scaling to BLEs that pt states has improved.  Lung sounds clear.  VSS. Pt has diffuse bruising to periumbilical abdomen from heparin injections, heparin D/C'd recently.  Bed low/locked, siderails upx2, pt agrees to plan of care.

## 2022-06-14 NOTE — H&P
[facility] - Emergency Dept  Critical Care Medicine  History & Physical    Patient Name: Lupis Murcia  MRN: [id number]  Admission Date: 2022  Hospital Length of Stay: 0 days  Code Status: Full Code  Attending Physician: Albino Acosta MD   Primary Care Provider: Bobby Tran MD   Principal Problem: Acute pulmonary embolism with acute cor pulmonale    Subjective:     HPI:  71-year-old female with history of PE during pregnancy requiring heparin drip, chronic lower extremity lymphedema, multiple sclerosis, vitamin D deficiency who presented to the ED due to shortness of breath and dyspnea on exertion two days prior to arrival. Patient states she was undergoing physical therapy at her rehabilitation facility and was walking when she suddenly became short of breath. She states since that time, her symptoms have worsened. She stated that 5 days prior, her primary physician noted that she was experiencing abdominal bruising related to heparin subcutaneous injections. She states she started having worsening respiratory symptoms and was satting 80% on room air. In the ED, cardiology was consulted and a bedside ECHO showed a dilated RV with Cox's sign. Due to her allergy for contrast, she underwent a VQ scan showing evidence of a submassive pulmonary embolism. She was started on a heparin drip.    Vitals were overall stable other than mild tachycardia and on 6L NC with oxygen saturation above 90%. CXR in ED showed mild cardiomegaly. BNP elevated to 577 with troponin elevated to 0.423. She was admitted to Critical Care Team 2 for further management.       Hospital/ICU Course:  No notes on file     Past Medical History:   Diagnosis Date    Lymphedema     MS (multiple sclerosis)     Vitamin B12 deficiency        Past Surgical History:   Procedure Laterality Date    BREAST CYST EXCISION Left     over 40yrs ago     SECTION      ESOPHAGOGASTRODUODENOSCOPY N/A 2022    Procedure: EGD  (ESOPHAGOGASTRODUODENOSCOPY);  Surgeon: Radha Arango MD;  Location: [facility] ([address] [place] [address]);  Service: Endoscopy;  Laterality: N/A;       Review of patient's allergies indicates:   Allergen Reactions    Contrast media Shortness Of Breath and Rash    Pcn [penicillins] Shortness Of Breath and Rash    Celebrex [celecoxib] Other (See Comments)     Swallowing problems     Diazepam Hives    Motrin [ibuprofen] Rash       Family History       Problem Relation (Age of Onset)    Brain cancer Brother    Coronary artery disease Father    Lung cancer Father, Mother          Tobacco Use    Smoking status: Never Smoker    Smokeless tobacco: Never Used   Substance and Sexual Activity    Alcohol use: No    Drug use: No    Sexual activity: Not on file      Review of Systems   Constitutional:  Negative for chills, fatigue and fever.   HENT:  Negative for congestion and sinus pain.    Eyes:  Negative for pain and discharge.   Respiratory:  Negative for cough, shortness of breath and wheezing.    Cardiovascular:  Negative for chest pain and leg swelling.   Gastrointestinal:  Negative for abdominal pain, constipation, diarrhea, nausea and vomiting.   Genitourinary:  Negative for difficulty urinating and enuresis.   Musculoskeletal:  Positive for gait problem.   Skin:  Positive for color change and wound. Negative for rash.        Due to lymphedema of BL LE   Neurological:  Positive for weakness. Negative for dizziness and light-headedness.        LLE weakness due to MS   Hematological:  Does not bruise/bleed easily.   Psychiatric/Behavioral:  Negative for confusion. The patient is nervous/anxious.    Objective:     Vital Signs (Most Recent):  Temp: 98.3 °F (36.8 °C) (06/14/22 1045)  Pulse: 101 (06/14/22 1502)  Resp: 18 (06/14/22 1502)  BP: 100/63 (06/14/22 1502)  SpO2: (!) 91 % (06/14/22 1502)   Vital Signs (24h Range):  Temp:  [98.1 °F (36.7 °C)-98.6 °F (37 °C)] 98.3 °F (36.8 °C)  Pulse:  [] 101  Resp:  [18-20]  18  SpO2:  [91 %-97 %] 91 %  BP: ()/(63-92) 100/63   Weight: 92.1 kg (203 lb)  Body mass index is 37.13 kg/m².      Intake/Output Summary (Last 24 hours) at 6/14/2022 1750  Last data filed at 6/13/2022 1800  Gross per 24 hour   Intake 350 ml   Output --   Net 350 ml       Physical Exam  Vitals and nursing note reviewed.   Constitutional:       General: She is not in acute distress.     Appearance: Normal appearance.   HENT:      Head: Normocephalic and atraumatic.   Eyes:      Extraocular Movements: Extraocular movements intact.      Conjunctiva/sclera: Conjunctivae normal.   Cardiovascular:      Rate and Rhythm: Regular rhythm. Tachycardia present.      Pulses: Normal pulses.      Heart sounds: Normal heart sounds. No murmur heard.     Comments: Systolic murmur  Pulmonary:      Effort: Pulmonary effort is normal. No respiratory distress.      Breath sounds: Normal breath sounds. No wheezing.   Abdominal:      General: Abdomen is flat. Bowel sounds are normal. There is no distension.      Palpations: Abdomen is soft.      Tenderness: There is no abdominal tenderness.   Musculoskeletal:         General: No swelling or tenderness.      Right lower leg: Edema present.      Left lower leg: Edema present.   Skin:     General: Skin is warm and dry.      Coloration: Skin is not jaundiced.      Findings: Erythema and lesion present.      Comments: BL LE erythema with overlying hyperkeratotic changes   Neurological:      Mental Status: She is alert and oriented to person, place, and time.      Motor: Weakness present.      Comments: LLE weakness due to MS   Psychiatric:         Mood and Affect: Mood normal.         Behavior: Behavior normal.       Vents:     Lines/Drains/Airways       Peripheral Intravenous Line  Duration                  Peripheral IV - Single Lumen 06/14/22 1004 18 G Left Antecubital <1 day         Peripheral IV - Single Lumen 06/14/22 1215 20 G Right Antecubital <1 day                  Significant  Labs:    CBC/Anemia Profile:  Recent Labs   Lab 06/14/22  0522 06/14/22  1016   WBC 7.71 7.85   HGB 11.2* 11.0*   HCT 35.2* 34.6*    218   MCV 97 98   RDW 16.1* 15.9*        Chemistries:  Recent Labs   Lab 06/14/22  0522 06/14/22  1016    141   K 3.9 3.6    103   CO2 23 22*   BUN 22 24*   CREATININE 0.9 0.9   CALCIUM 10.9* 10.9*   ALBUMIN  --  3.9   PROT  --  7.7   BILITOT  --  0.5   ALKPHOS  --  65   ALT  --  18   AST  --  17   MG 2.0  --    PHOS 4.2  --        All pertinent labs within the past 24 hours have been reviewed.    Significant Imaging: I have reviewed all pertinent imaging results/findings within the past 24 hours.    Assessment/Plan:     Neuro  MS (multiple sclerosis)  Muscle weakness of lower extremity    Patient states she has diagnosis of multiple sclerosis leading to weakness of her left lower extremity. She states she normally ambulates with a walker. She normally has mild pain in her legs. Also states she does not take medication for MS.     -- Tylenol prn for pain   -- PT/OT assessment with stabilization     Insomnia secondary to chronic pain  Patient states she takes ativan 0.5mg prn for insomnia and anxiety. Currently anxious due to diagnosis of pulmonary embolism    -- Continue ativan 0.5mg PRN   -- Monitor for signs of acute encephalopathy    Hematology  * Acute pulmonary embolism with acute cor pulmonale  Acute hypoxemic respiratory failure    Patient states two days prior to arrival while undergoing physical therapy and walking, she suddenly developed shortness of breath with dyspnea on exertion. She states she continued to have worsening symptoms over the next two days and was found to have oxygenation saturation of 86% on RA. Of note, she was recently receiving subcutaneous heparin at her rehab facility but five days prior to arrival, it was discontinued. She states her PCP stopped it due to abdominal bruising.     In ED, cardiology evaluated patient and bedside ECHO  showed a dilated RV with Cox's sign, mild RV dysfunction, PASP estimated at ~70 mmHg with systolic flattening of the IVS consistent with pressure overload. LV function appears wnl. IVC 15 mmHg. CXR showed mild cardiomegaly. BNP elevated to 577 and troponin elevated to 0.423.     Cardiology discussed next step in management, including CDT, aspiration thrombectomy, mechanical/surgical thrombectomy, but patient opted for medical management with full-dose anticoagulation.     -- Continue heparin drip  -- Monitor for signs of bleeding  -- Daily CBC  -- aPTT monitoring   -- trend troponin  -- ECHO pending  -- DVT US pending     Endocrine  BMI 37.0-37.9, adult  BMI noted to be 37.13    -- Continue cardiac diet  -- Education of healthy diet upon discharge     Other  Lymphedema  Chronic recorded history of lymphedema. Consider lymphedema with associated chronic statis dermatitis given bilateral findings with overlying hyperkeratotic changes. During last admission (May 2021), required treatment with physical therapy for increased functional status. States improvement since last admission.         Critical Care Daily Checklist:    A: Awake: RASS Goal/Actual Goal:    Actual:     B: Spontaneous Breathing Trial Performed?     C: SAT & SBT Coordinated?  N/A                      D: Delirium: CAM-ICU     E: Early Mobility Performed? Yes   F: Feeding Goal:    Status:     Current Diet Order   Procedures    Diet Cardiac      AS: Analgesia/Sedation None   T: Thromboembolic Prophylaxis Heparin drip   H: HOB > 300 Yes   U: Stress Ulcer Prophylaxis (if needed) protonix   G: Glucose Control LDSSI   B: Bowel Function     I: Indwelling Catheter (Lines & Slaughter) Necessity PIVx2   D: De-escalation of Antimicrobials/Pharmacotherapies None    Plan for the day/ETD Admit to ICU, monitor respiratory distress    Code Status:  Family/Goals of Care: Full Code       Critical secondary to Patient has a condition that poses threat to life and bodily  function: Severe Respiratory Distress     Critical care was time spent personally by me on the following activities: development of treatment plan with patient or surrogate and bedside caregivers, discussions with consultants, evaluation of patient's response to treatment, examination of patient, ordering and performing treatments and interventions, ordering and review of laboratory studies, ordering and review of radiographic studies, pulse oximetry, re-evaluation of patient's condition. This critical care time did not overlap with that of any other provider or involve time for any procedures.     Abena Thomas MD  Critical Care Medicine  [facility] - Emergency Dept

## 2022-06-14 NOTE — PROGRESS NOTES
[facility]                                  Skilled Nursing Facility                   Progress Note     Admit Date: 2022  USMAN 2022  Principal Problem:  Cellulitis of leg   HPI obtained from patient interview and chart review     Chief Complaint:  Patient reporting shortness of breath during therapy    HPI:   Mrs. Murcia is a 71 year old female with PMHx of Multiple Sclerosis, Left Foot Drop, Lymphedema, Obesity (bmi 45) who presents to SNF following hospitalization for cellulitis, acute kidney injury and acute encephalopathy.  Admission to SNF for secondary weakness and debility.    Interval history:  Patient reporting new shortness of breath that began while she was participating with therapy.  Patient is visibly upset over this occurrence.  Daughter is tearful in the room.  Patient is 89-90% on room air.  Patient's daughter at bedside states this was also happening at [facility] and after rest and deep breaths her SpO2 would return to 94%.  During my assessment patient's SpO2 returned to 94% with rest and deep breathing.  Lung sounds are clear with diminished sounds to bases which has been her baseline since admission.  I have ordered a Xopenex treatment, encouraged her to take an additional Ativan dosing which is already ordered PRN and will order chest x-ray.  Patient suffers from severe anxiety as well.  During my assessment patient is sitting in wheelchair and states that she is feeling more comfortable now.  Will re-evaluate tomorrow.    Past Medical History: Patient has a past medical history of Lymphedema, MS (multiple sclerosis), and Vitamin B12 deficiency.    Past Surgical History: Patient has a past surgical history that includes  section; Breast cyst excision (Left); and Esophagogastroduodenoscopy (N/A, 2022).    Social History: Patient reports that she has never smoked. She has never used smokeless tobacco. She  reports that she does not drink alcohol and does not use drugs.    Family History: family history includes Brain cancer in her brother; Coronary artery disease in her father; Lung cancer in her father and mother.    Allergies: Patient is allergic to contrast media, pcn [penicillins], celebrex [celecoxib], diazepam, and motrin [ibuprofen].    ROS  Constitutional: Negative for fever   Eyes: Negative for blurred vision, double vision   Respiratory: Negative for cough.  + shortness of breath   Cardiovascular: Negative for chest pain, palpitations. + leg swelling.   Gastrointestinal: Negative for abdominal pain, diarrhea, nausea, vomiting, constipation  Genitourinary: Negative for dysuria, frequency   Musculoskeletal:  + generalized weakness.  + bilateral lower extremity pain  Skin: Negative for itching and rash.   Neurological: Negative for dizziness, headaches.   Psychiatric/Behavioral: Negative for depression. The patient is nervous/anxious    24 hour Vital Sign Range   Temp:  [97.4 °F (36.3 °C)-98.6 °F (37 °C)]   Pulse:  [100-115]   Resp:  [16-20]   BP: (111-142)/(67-92)   SpO2:  [91 %-96 %]     PEx  Constitutional: Patient appears debilitated.  No distress noted  HENT:   Head: Normocephalic and atraumatic.   Eyes: Pupils are equal, round  Neck: Normal range of motion. Neck supple.   Cardiovascular: Normal rate, regular rhythm and normal heart sounds.    Pulmonary/Chest: Effort normal and breath sounds are clear  Abdominal: Soft. Bowel sounds are normal.   Musculoskeletal: Normal range of motion.   Neurological: Alert and oriented to person, place, and time.   Psychiatric: Normal mood and affect. Behavior is normal.   Skin: Skin is warm and dry. Bilateral lower extremity with pain contract scar tissue surrounded by hyperkeratotic skin.  Moisture associated dermatitis to buttocks, improved.      Recent Labs   Lab 06/14/22  0522 06/14/22  1016    141   K 3.9 3.6    103   CO2 23 22*   BUN 22 24*   CREATININE  0.9 0.9   MG 2.0  --        Recent Labs   Lab 06/14/22  1016   WBC 7.85   RBC 3.54*   HGB 11.0*   HCT 34.6*      MCV 98   MCH 31.1*   MCHC 31.8*         Assessment and Plan:    Shortness of breath  - initiated Xopenex nebulizer treatment x1 and PRN thereafter, chest x-ray, encouraged patient to ask for her PRN Ativan as she appears very anxious  - will follow up with patient tomorrow    Constipation  -  resolved, changing bowel regimen to PRN per patient's request    Cellulitis of leg  - continue vancomycin   - ID consulted at [facility]  - continue local wound care per wound care  - completed cefepime and continue vancomycin. switched to doxycycline upon discharge (end date: 5/12/22)  - completed treatment was doxycycline     Urinary retention   - discontinued espinal prior to transfer to SNF.   - Urinating well until 5/10   -  bladder scans PRN     Pressure injury of buttock, unstageable  - q2 turns  - low airloss overlay mattress  - wound care consultation       Lymphedema  - wound care consultation to assist in managmeent  - continue ammonium lactate lotion BID  - continue doxycycline for cellulitis      HTN  -  continue to hold losartan to 25 mg daily- BP's low to normal and patient is refusing to take losartan.  Continue HCTZ 12.5 mg daily     MS (multiple sclerosis)  - PT/OT  - f/u with out pt provider      B12 deficiency  - change orders to cyanocobalamin 1000 mcg every 30 days, dose to be given on 05/13 as patient has been overdue due to recent hospitalization     Debility   - Continue with PT/OT for gait training and strengthening and restoration of ADL's   - Encourage mobility, OOB in chair, and early ambulation as appropriate  - Fall precautions   - Monitor for bowel and bladder dysfunction  - Monitor for and prevent skin breakdown and pressure ulcers  -  discontinued  DVT prophylaxis with  heparin 7.5 K q.8 hours- per patient request for severely bruised abdomen, patient has been walking more with  therapy, patient reminded to perform frequent ambulation        Anticipate disposition:  Home with home health        Follow-up needed during SNF stay-     Follow-up needed after discharge from SNF:   - PCP, hospital follow-up, needs to be scheduled      Future Appointments   Date Time Provider Department Center   7/14/2022  3:00 PM Samir Sahni MD [facility]         Amy Conklin NP  Department of Hospital Medicine   [facility]     DOS: 6/13/2022      Patient note was created using MModal Dictation.  Any errors in syntax or even information may not have been identified and edited on initial review prior to signing this note.

## 2022-06-14 NOTE — ASSESSMENT & PLAN NOTE
Submassive PE. History of DVT ~30 years prior which sounded provoked. Had extensive discussions with patient and family regarding risks versus benefits of CDT, aspiration thrombectomy, mechanical/surgical thrombectomy, versus full dose anticoagulation. The patient was adamant that she did not want any procedures performed so interventional cardiology was not consulted on this case.  - Recommend full dose lifelong anticoagulation (currently on heparin)  - Formal TTE  - Admit to MICU  - Close monitoring of hemodynamics and respiratory status

## 2022-06-14 NOTE — CONSULTS
[facility] - Emergency Dept  Cardiac Electrophysiology  Consult Note    Admission Date: 2022  Code Status: Prior   Attending Provider: Levon Hayes III, MD  Consulting Provider: Bryan Mathew MD  Principal Problem:<principal problem not specified>    Inpatient consult to Cardiology  Consult performed by: Bryan Mathew MD  Consult ordered by: Karishma Nation MD        Subjective:     Chief Complaint:  SOB     HPI:   72 yo F with MS with foot drop, vit D deficiency, grade D esophagitis who presents from SNF with SOB and hypoxemia. Patient and daughter give the history. She was recently discharged this past month with encephalopathy and HANDY. She was bedridden for several days and lost a great deal of her strength. She was slowly regaining strength and ambulating. Heparin ppx stopped 3 days prior. Reports dyspnea x 2 days. Found to have high prob PE in bilateral mid-lower lung zones. Hypoxemic and requiring supplemental O2. Trop and BNP elevated. HD stable but BP on the softer side. Bedside echo with dilated RV with Cox's sign, mild RV dysfunction, PASP estimated at ~70 mmHg with systolic flattening of the IVS consistent with pressure overload. LV function appears wnl. IVC 15 mmHg.        Past Medical History:   Diagnosis Date    Lymphedema     MS (multiple sclerosis)     Vitamin B12 deficiency        Past Surgical History:   Procedure Laterality Date    BREAST CYST EXCISION Left     over 40yrs ago     SECTION      ESOPHAGOGASTRODUODENOSCOPY N/A 2022    Procedure: EGD (ESOPHAGOGASTRODUODENOSCOPY);  Surgeon: Radha Arango MD;  Location: [address]);  Service: Endoscopy;  Laterality: N/A;       Review of patient's allergies indicates:   Allergen Reactions    Contrast media Shortness Of Breath and Rash    Pcn [penicillins] Shortness Of Breath and Rash    Celebrex [celecoxib] Other (See Comments)     Swallowing problems     Diazepam Hives    Motrin [ibuprofen] Rash        Current Facility-Administered Medications on File Prior to Encounter   Medication    [MAR Hold - Suspended Admission] acetaminophen tablet 650 mg    [MAR Hold - Suspended Admission] acetaminophen-codeine 300-30mg per tablet 1 tablet    [MAR Hold - Suspended Admission] ammonium lactate 12 % lotion    [MAR Hold - Suspended Admission] B-complex with vitamin C tablet 1 tablet    [MAR Hold - Suspended Admission] calcium carbonate 200 mg calcium (500 mg) chewable tablet 500 mg    [MAR Hold - Suspended Admission] cyanocobalamin injection 1,000 mcg    [MAR Hold - Suspended Admission] dextrose 10% bolus 125 mL    [MAR Hold - Suspended Admission] dextrose 10% bolus 250 mL    [MAR Hold - Suspended Admission] furosemide tablet 20 mg    [MAR Hold - Suspended Admission] hydroCHLOROthiazide tablet 12.5 mg    [MAR Hold - Suspended Admission] levalbuterol nebulizer solution 0.63 mg    [MAR Hold - Suspended Admission] loperamide capsule 2 mg    [MAR Hold - Suspended Admission] LORazepam tablet 0.5 mg    [MAR Hold - Suspended Admission] melatonin tablet 6 mg    [MAR Hold - Suspended Admission] ondansetron disintegrating tablet 4 mg    [MAR Hold - Suspended Admission] pantoprazole EC tablet 40 mg    [MAR Hold - Suspended Admission] polyethylene glycol packet 17 g    [MAR Hold - Suspended Admission] senna-docusate 8.6-50 mg per tablet 1 tablet    [MAR Hold - Suspended Admission] vitamin D 1000 units tablet 1,000 Units     Current Outpatient Medications on File Prior to Encounter   Medication Sig    acetaminophen 325 mg Cap Take 325 mg by mouth.    ammonium lactate (LAC-HYDRIN) 12 % lotion Apply topically 2 (two) times daily.    B-complex with vitamin C (Z-BEC OR EQUIV) tablet Take 1 tablet by mouth once daily.    bisacodyL (DULCOLAX) 10 mg Supp Place 1 suppository (10 mg total) rectally daily as needed.    calcium carbonate (TUMS) 200 mg calcium (500 mg) chewable tablet Take 1 tablet (500 mg total) by mouth  2 (two) times daily as needed for Heartburn.    cyanocobalamin 1,000 mcg/mL injection Inject 1 mL (1,000 mcg total) into the muscle every 30 days.    furosemide (LASIX) 20 MG tablet Take 1 tablet (20 mg total) by mouth daily as needed (leg swelling).    hydroCHLOROthiazide (HYDRODIURIL) 12.5 MG Tab Take 1 tablet (12.5 mg total) by mouth once daily.    LORazepam (ATIVAN) 0.5 MG tablet Take 1 tablet (0.5 mg total) by mouth every 8 (eight) hours as needed for Anxiety.    melatonin (MELATIN) 3 mg tablet Take 2 tablets (6 mg total) by mouth nightly as needed for Insomnia.    ondansetron (ZOFRAN-ODT) 4 MG TbDL Take 1 tablet (4 mg total) by mouth every 6 (six) hours as needed.    pantoprazole (PROTONIX) 40 MG tablet Take 1 tablet (40 mg total) by mouth once daily.    polyethylene glycol (GLYCOLAX) 17 gram PwPk Take 17 g by mouth once daily.    senna-docusate 8.6-50 mg (PERICOLACE) 8.6-50 mg per tablet Take 2 tablets by mouth 2 (two) times daily.    vitamin D (VITAMIN D3) 1000 units Tab Take 1 tablet (1,000 Units total) by mouth once daily.    [DISCONTINUED] baclofen (LIORESAL) 10 MG tablet Take 1 tablet (10 mg total) by mouth 2 (two) times daily. (Patient taking differently: Take 10 mg by mouth 2 (two) times daily as needed.)    [DISCONTINUED] candesartan-hydrochlorothiazide (ATACAND HCT) 16-12.5 mg per tablet Take 1 tablet by mouth Daily.    [DISCONTINUED] miconazole (MICOTIN) 2 % cream Apply topically 2 (two) times daily. Apply to intertriginous areas, lower abdomen groin for 14 days    [DISCONTINUED] tamsulosin (FLOMAX) 0.4 mg Cap Take 1 capsule (0.4 mg total) by mouth daily with lunch.     Family History       Problem Relation (Age of Onset)    Brain cancer Brother    Coronary artery disease Father    Lung cancer Father, Mother          Tobacco Use    Smoking status: Never Smoker    Smokeless tobacco: Never Used   Substance and Sexual Activity    Alcohol use: No    Drug use: No    Sexual activity:  Not on file     Review of Systems   All other systems reviewed and are negative.  Objective:     Vital Signs (Most Recent):  Temp: 98.3 °F (36.8 °C) (06/14/22 1045)  Pulse: 101 (06/14/22 1502)  Resp: 18 (06/14/22 1502)  BP: 100/63 (06/14/22 1502)  SpO2: (!) 91 % (06/14/22 1502)   Vital Signs (24h Range):  Temp:  [98.1 °F (36.7 °C)-98.6 °F (37 °C)] 98.3 °F (36.8 °C)  Pulse:  [] 101  Resp:  [18-20] 18  SpO2:  [91 %-97 %] 91 %  BP: ()/(63-92) 100/63       Weight: 92.1 kg (203 lb)  Body mass index is 37.13 kg/m².    SpO2: (!) 91 %  O2 Device (Oxygen Therapy): nasal cannula    Physical Exam  General: NAD. AAO  HENT: EOMI  Neck: supple. No JVD  CV: tachycardic and regular. Normal S1/S2.   Resp: CTAB. No increased work of breathing  Ext: warm. No edema.      Significant Labs: All pertinent lab results from the last 24 hours have been reviewed.    Significant Imaging:  Reviewed              Assessment and Plan:     Acute pulmonary embolism with acute cor pulmonale  Submassive PE. History of DVT ~30 years prior which sounded provoked. Had extensive discussions with patient and family regarding risks versus benefits of CDT, aspiration thrombectomy, mechanical/surgical thrombectomy, versus full dose anticoagulation. The patient was adamant that she did not want any procedures performed so interventional cardiology was not consulted on this case.  - Recommend full dose lifelong anticoagulation (currently on heparin)  - Formal TTE  - Admit to MICU  - Close monitoring of hemodynamics and respiratory status        Thank you for your consult. I will sign off. Please contact us if you have any additional questions.    Bryan Mathew MD  Cardiac Electrophysiology  [facility] - Emergency Dept

## 2022-06-14 NOTE — PT/OT/SLP PROGRESS
Physical Therapy      Patient Name:  Lupis Murcia   MRN:  [id number]    Patient not seen today secondary to being sent out for SOB to ED  . Will follow-up as scheduled/appropriate.

## 2022-06-14 NOTE — SUBJECTIVE & OBJECTIVE
Past Medical History:   Diagnosis Date    Lymphedema     MS (multiple sclerosis)     Vitamin B12 deficiency        Past Surgical History:   Procedure Laterality Date    BREAST CYST EXCISION Left     over 40yrs ago     SECTION      ESOPHAGOGASTRODUODENOSCOPY N/A 2022    Procedure: EGD (ESOPHAGOGASTRODUODENOSCOPY);  Surgeon: Radha Arango MD;  Location: [address]);  Service: Endoscopy;  Laterality: N/A;       Review of patient's allergies indicates:   Allergen Reactions    Contrast media Shortness Of Breath and Rash    Pcn [penicillins] Shortness Of Breath and Rash    Celebrex [celecoxib] Other (See Comments)     Swallowing problems     Diazepam Hives    Motrin [ibuprofen] Rash       Current Facility-Administered Medications on File Prior to Encounter   Medication    [MAR Hold - Suspended Admission] acetaminophen tablet 650 mg    [MAR Hold - Suspended Admission] acetaminophen-codeine 300-30mg per tablet 1 tablet    [MAR Hold - Suspended Admission] ammonium lactate 12 % lotion    [MAR Hold - Suspended Admission] B-complex with vitamin C tablet 1 tablet    [MAR Hold - Suspended Admission] calcium carbonate 200 mg calcium (500 mg) chewable tablet 500 mg    [MAR Hold - Suspended Admission] cyanocobalamin injection 1,000 mcg    [MAR Hold - Suspended Admission] dextrose 10% bolus 125 mL    [MAR Hold - Suspended Admission] dextrose 10% bolus 250 mL    [MAR Hold - Suspended Admission] furosemide tablet 20 mg    [MAR Hold - Suspended Admission] hydroCHLOROthiazide tablet 12.5 mg    [MAR Hold - Suspended Admission] levalbuterol nebulizer solution 0.63 mg    [MAR Hold - Suspended Admission] loperamide capsule 2 mg    [MAR Hold - Suspended Admission] LORazepam tablet 0.5 mg    [MAR Hold - Suspended Admission] melatonin tablet 6 mg    [MAR Hold - Suspended Admission] ondansetron disintegrating tablet 4 mg    [MAR Hold - Suspended Admission] pantoprazole EC tablet 40 mg    [MAR Hold - Suspended Admission]  polyethylene glycol packet 17 g    [MAR Hold - Suspended Admission] senna-docusate 8.6-50 mg per tablet 1 tablet    [MAR Hold - Suspended Admission] vitamin D 1000 units tablet 1,000 Units     Current Outpatient Medications on File Prior to Encounter   Medication Sig    acetaminophen 325 mg Cap Take 325 mg by mouth.    ammonium lactate (LAC-HYDRIN) 12 % lotion Apply topically 2 (two) times daily.    B-complex with vitamin C (Z-BEC OR EQUIV) tablet Take 1 tablet by mouth once daily.    bisacodyL (DULCOLAX) 10 mg Supp Place 1 suppository (10 mg total) rectally daily as needed.    calcium carbonate (TUMS) 200 mg calcium (500 mg) chewable tablet Take 1 tablet (500 mg total) by mouth 2 (two) times daily as needed for Heartburn.    cyanocobalamin 1,000 mcg/mL injection Inject 1 mL (1,000 mcg total) into the muscle every 30 days.    furosemide (LASIX) 20 MG tablet Take 1 tablet (20 mg total) by mouth daily as needed (leg swelling).    hydroCHLOROthiazide (HYDRODIURIL) 12.5 MG Tab Take 1 tablet (12.5 mg total) by mouth once daily.    LORazepam (ATIVAN) 0.5 MG tablet Take 1 tablet (0.5 mg total) by mouth every 8 (eight) hours as needed for Anxiety.    melatonin (MELATIN) 3 mg tablet Take 2 tablets (6 mg total) by mouth nightly as needed for Insomnia.    ondansetron (ZOFRAN-ODT) 4 MG TbDL Take 1 tablet (4 mg total) by mouth every 6 (six) hours as needed.    pantoprazole (PROTONIX) 40 MG tablet Take 1 tablet (40 mg total) by mouth once daily.    polyethylene glycol (GLYCOLAX) 17 gram PwPk Take 17 g by mouth once daily.    senna-docusate 8.6-50 mg (PERICOLACE) 8.6-50 mg per tablet Take 2 tablets by mouth 2 (two) times daily.    vitamin D (VITAMIN D3) 1000 units Tab Take 1 tablet (1,000 Units total) by mouth once daily.    [DISCONTINUED] baclofen (LIORESAL) 10 MG tablet Take 1 tablet (10 mg total) by mouth 2 (two) times daily. (Patient taking differently: Take 10 mg by mouth 2 (two) times daily as needed.)    [DISCONTINUED]  candesartan-hydrochlorothiazide (ATACAND HCT) 16-12.5 mg per tablet Take 1 tablet by mouth Daily.    [DISCONTINUED] miconazole (MICOTIN) 2 % cream Apply topically 2 (two) times daily. Apply to intertriginous areas, lower abdomen groin for 14 days    [DISCONTINUED] tamsulosin (FLOMAX) 0.4 mg Cap Take 1 capsule (0.4 mg total) by mouth daily with lunch.     Family History       Problem Relation (Age of Onset)    Brain cancer Brother    Coronary artery disease Father    Lung cancer Father, Mother          Tobacco Use    Smoking status: Never Smoker    Smokeless tobacco: Never Used   Substance and Sexual Activity    Alcohol use: No    Drug use: No    Sexual activity: Not on file     Review of Systems   All other systems reviewed and are negative.  Objective:     Vital Signs (Most Recent):  Temp: 98.3 °F (36.8 °C) (06/14/22 1045)  Pulse: 101 (06/14/22 1502)  Resp: 18 (06/14/22 1502)  BP: 100/63 (06/14/22 1502)  SpO2: (!) 91 % (06/14/22 1502)   Vital Signs (24h Range):  Temp:  [98.1 °F (36.7 °C)-98.6 °F (37 °C)] 98.3 °F (36.8 °C)  Pulse:  [] 101  Resp:  [18-20] 18  SpO2:  [91 %-97 %] 91 %  BP: ()/(63-92) 100/63       Weight: 92.1 kg (203 lb)  Body mass index is 37.13 kg/m².    SpO2: (!) 91 %  O2 Device (Oxygen Therapy): nasal cannula    Physical Exam  General: NAD. AAO  HENT: EOMI  Neck: supple. No JVD  CV: tachycardic and regular. Normal S1/S2.   Resp: CTAB. No increased work of breathing  Ext: warm. No edema.      Significant Labs: All pertinent lab results from the last 24 hours have been reviewed.    Significant Imaging:  Reviewed

## 2022-06-14 NOTE — ASSESSMENT & PLAN NOTE
Muscle weakness of lower extremity    Patient states she has diagnosis of multiple sclerosis leading to weakness of her left lower extremity. She states she normally ambulates with a walker. She normally has mild pain in her legs. Also states she does not take medication for MS.     -- Tylenol prn for pain   -- PT/OT assessment with stabilization

## 2022-06-14 NOTE — ASSESSMENT & PLAN NOTE
Acute hypoxemic respiratory failure    Patient states two days prior to arrival while undergoing physical therapy and walking, she suddenly developed shortness of breath with dyspnea on exertion. She states she continued to have worsening symptoms over the next two days and was found to have oxygenation saturation of 86% on RA. Of note, she was recently receiving subcutaneous heparin at her rehab facility but five days prior to arrival, it was discontinued. She states her PCP stopped it due to abdominal bruising.     In ED, cardiology evaluated patient and bedside ECHO showed a dilated RV with Cox's sign, mild RV dysfunction, PASP estimated at ~70 mmHg with systolic flattening of the IVS consistent with pressure overload. LV function appears wnl. IVC 15 mmHg. CXR showed mild cardiomegaly. BNP elevated to 577 and troponin elevated to 0.423.     Cardiology discussed next step in management, including CDT, aspiration thrombectomy, mechanical/surgical thrombectomy, but patient opted for medical management with full-dose anticoagulation.     -- Continue heparin drip  -- Monitor for signs of bleeding  -- Daily CBC  -- aPTT monitoring   -- trend troponin  -- ECHO pending  -- DVT US pending

## 2022-06-14 NOTE — NURSING
ANT CALHOUN NP NOTIFIED OF PATIENT PRESENTLY ON O2 3LPM NASAL CANNULA.PATIENT HAD EPISODE OF SHORTNESS OF BREATH.EARLIER THIS AM.PULSE OXIMETER DROPPED TO 85%ON ROOM AIR.WITH O2 3LPM N/C 92%.

## 2022-06-14 NOTE — ASSESSMENT & PLAN NOTE
Chronic recorded history of lymphedema. Consider lymphedema with associated chronic statis dermatitis given bilateral findings with overlying hyperkeratotic changes. During last admission (May 2021), required treatment with physical therapy for increased functional status. States improvement since last admission.

## 2022-06-14 NOTE — NURSING
VERBAL REPORT CALLED TO [facility] NURSE AILYN YAP RN.TO [facility] SID[facility] UP ACCOMPANIED BY DAUGHTER AND EMT X2..FOR FURTHER TREATMENT AND EVALUATION OF RESPIRATORY STATUS.

## 2022-06-14 NOTE — ASSESSMENT & PLAN NOTE
Patient states she takes ativan 0.5mg prn for insomnia and anxiety. Currently anxious due to diagnosis of pulmonary embolism    -- Continue ativan 0.5mg PRN   -- Monitor for signs of acute encephalopathy

## 2022-06-14 NOTE — PT/OT/SLP PROGRESS
Occupational Therapy      Patient Name:  Lpuis Murcia   MRN:  [id number]    Patient not seen today secondary to [facility] am. Will follow-up if appropriate upon return.    6/14/2022

## 2022-06-14 NOTE — HPI
72 yo F with MS with foot drop, vit D deficiency, grade D esophagitis who presents from SNF with SOB and hypoxemia. Patient and daughter give the history. She was recently discharged this past month with encephalopathy and HANDY. She was bedridden for several days and lost a great deal of her strength. She was slowly regaining strength and ambulating. Heparin ppx stopped 3 days prior. Reports dyspnea x 2 days. Found to have high prob PE in bilateral mid-lower lung zones. Hypoxemic and requiring supplemental O2. Trop and BNP elevated. HD stable but BP on the softer side. Bedside echo with dilated RV with Cox's sign, mild RV dysfunction, PASP estimated at ~70 mmHg with systolic flattening of the IVS consistent with pressure overload. LV function appears wnl. IVC 15 mmHg.

## 2022-06-15 LAB
ALBUMIN SERPL BCP-MCNC: 3.4 G/DL (ref 3.5–5.2)
ALP SERPL-CCNC: 56 U/L (ref 55–135)
ALT SERPL W/O P-5'-P-CCNC: 15 U/L (ref 10–44)
ANION GAP SERPL CALC-SCNC: 10 MMOL/L (ref 8–16)
APTT BLDCRRT: 36.2 SEC (ref 21–32)
ASCENDING AORTA: 3.34 CM
AST SERPL-CCNC: 14 U/L (ref 10–40)
AV INDEX (PROSTH): 0.56
AV MEAN GRADIENT: 20 MMHG
AV PEAK GRADIENT: 37 MMHG
AV VALVE AREA: 2.2 CM2
AV VELOCITY RATIO: 0.41
BASOPHILS # BLD AUTO: 0.01 K/UL (ref 0–0.2)
BASOPHILS NFR BLD: 0.2 % (ref 0–1.9)
BILIRUB SERPL-MCNC: 0.4 MG/DL (ref 0.1–1)
BSA FOR ECHO PROCEDURE: 1.99 M2
BUN SERPL-MCNC: 23 MG/DL (ref 8–23)
CALCIUM SERPL-MCNC: 10 MG/DL (ref 8.7–10.5)
CHLORIDE SERPL-SCNC: 104 MMOL/L (ref 95–110)
CO2 SERPL-SCNC: 25 MMOL/L (ref 23–29)
CREAT SERPL-MCNC: 0.9 MG/DL (ref 0.5–1.4)
CV ECHO LV RWT: 0.46 CM
DIFFERENTIAL METHOD: ABNORMAL
DOP CALC AO PEAK VEL: 3.03 M/S
DOP CALC AO VTI: 38.86 CM
DOP CALC LVOT AREA: 3.9 CM2
DOP CALC LVOT DIAMETER: 2.23 CM
DOP CALC LVOT PEAK VEL: 1.23 M/S
DOP CALC LVOT STROKE VOLUME: 85.61 CM3
DOP CALCLVOT PEAK VEL VTI: 21.93 CM
E WAVE DECELERATION TIME: 293.35 MSEC
E/A RATIO: 0.65
E/E' RATIO: 11.78 M/S
ECHO LV POSTERIOR WALL: 1.01 CM (ref 0.6–1.1)
EJECTION FRACTION: 60 %
EOSINOPHIL # BLD AUTO: 0.1 K/UL (ref 0–0.5)
EOSINOPHIL NFR BLD: 1.1 % (ref 0–8)
ERYTHROCYTE [DISTWIDTH] IN BLOOD BY AUTOMATED COUNT: 16.4 % (ref 11.5–14.5)
EST. GFR  (AFRICAN AMERICAN): >60 ML/MIN/1.73 M^2
EST. GFR  (NON AFRICAN AMERICAN): >60 ML/MIN/1.73 M^2
FRACTIONAL SHORTENING: 28 % (ref 28–44)
GLUCOSE SERPL-MCNC: 136 MG/DL (ref 70–110)
HCT VFR BLD AUTO: 29 % (ref 37–48.5)
HGB BLD-MCNC: 9.4 G/DL (ref 12–16)
IMM GRANULOCYTES # BLD AUTO: 0.02 K/UL (ref 0–0.04)
IMM GRANULOCYTES NFR BLD AUTO: 0.3 % (ref 0–0.5)
INTERVENTRICULAR SEPTUM: 1.01 CM (ref 0.6–1.1)
LA MAJOR: 5 CM
LA MINOR: 5.45 CM
LA WIDTH: 2.68 CM
LEFT ATRIUM SIZE: 3.85 CM
LEFT ATRIUM VOLUME INDEX MOD: 11.6 ML/M2
LEFT ATRIUM VOLUME INDEX: 23.9 ML/M2
LEFT ATRIUM VOLUME MOD: 22.18 CM3
LEFT ATRIUM VOLUME: 45.74 CM3
LEFT INTERNAL DIMENSION IN SYSTOLE: 3.17 CM (ref 2.1–4)
LEFT VENTRICLE DIASTOLIC VOLUME INDEX: 46.02 ML/M2
LEFT VENTRICLE DIASTOLIC VOLUME: 87.89 ML
LEFT VENTRICLE MASS INDEX: 78 G/M2
LEFT VENTRICLE SYSTOLIC VOLUME INDEX: 20.9 ML/M2
LEFT VENTRICLE SYSTOLIC VOLUME: 39.92 ML
LEFT VENTRICULAR INTERNAL DIMENSION IN DIASTOLE: 4.4 CM (ref 3.5–6)
LEFT VENTRICULAR MASS: 149.88 G
LV LATERAL E/E' RATIO: 10.6 M/S
LV SEPTAL E/E' RATIO: 13.25 M/S
LYMPHOCYTES # BLD AUTO: 2.3 K/UL (ref 1–4.8)
LYMPHOCYTES NFR BLD: 34.8 % (ref 18–48)
MAGNESIUM SERPL-MCNC: 1.9 MG/DL (ref 1.6–2.6)
MCH RBC QN AUTO: 31.5 PG (ref 27–31)
MCHC RBC AUTO-ENTMCNC: 32.4 G/DL (ref 32–36)
MCV RBC AUTO: 97 FL (ref 82–98)
MONOCYTES # BLD AUTO: 0.6 K/UL (ref 0.3–1)
MONOCYTES NFR BLD: 9.1 % (ref 4–15)
MV PEAK A VEL: 0.81 M/S
MV PEAK E VEL: 0.53 M/S
MV STENOSIS PRESSURE HALF TIME: 85.07 MS
MV VALVE AREA P 1/2 METHOD: 2.59 CM2
NEUTROPHILS # BLD AUTO: 3.6 K/UL (ref 1.8–7.7)
NEUTROPHILS NFR BLD: 54.5 % (ref 38–73)
NRBC BLD-RTO: 0 /100 WBC
PHOSPHATE SERPL-MCNC: 3.9 MG/DL (ref 2.7–4.5)
PISA TR MAX VEL: 3.67 M/S
PLATELET # BLD AUTO: 201 K/UL (ref 150–450)
PMV BLD AUTO: 9.9 FL (ref 9.2–12.9)
POCT GLUCOSE: 134 MG/DL (ref 70–110)
POCT GLUCOSE: 142 MG/DL (ref 70–110)
POCT GLUCOSE: 158 MG/DL (ref 70–110)
POTASSIUM SERPL-SCNC: 3.3 MMOL/L (ref 3.5–5.1)
PROT SERPL-MCNC: 6.4 G/DL (ref 6–8.4)
RA MAJOR: 5.47 CM
RA PRESSURE: 15 MMHG
RA WIDTH: 4.71 CM
RBC # BLD AUTO: 2.98 M/UL (ref 4–5.4)
RIGHT VENTRICULAR END-DIASTOLIC DIMENSION: 4.55 CM
SODIUM SERPL-SCNC: 139 MMOL/L (ref 136–145)
STJ: 2.56 CM
TDI LATERAL: 0.05 M/S
TDI SEPTAL: 0.04 M/S
TDI: 0.05 M/S
TR MAX PG: 54 MMHG
TRICUSPID ANNULAR PLANE SYSTOLIC EXCURSION: 1.65 CM
TROPONIN I SERPL DL<=0.01 NG/ML-MCNC: 0.64 NG/ML (ref 0–0.03)
TROPONIN I SERPL DL<=0.01 NG/ML-MCNC: 0.68 NG/ML (ref 0–0.03)
TROPONIN I SERPL DL<=0.01 NG/ML-MCNC: 0.79 NG/ML (ref 0–0.03)
TV REST PULMONARY ARTERY PRESSURE: 69 MMHG
WBC # BLD AUTO: 6.58 K/UL (ref 3.9–12.7)

## 2022-06-15 PROCEDURE — 94761 N-INVAS EAR/PLS OXIMETRY MLT: CPT

## 2022-06-15 PROCEDURE — 25000003 PHARM REV CODE 250: Performed by: INTERNAL MEDICINE

## 2022-06-15 PROCEDURE — 25500020 PHARM REV CODE 255: Performed by: INTERNAL MEDICINE

## 2022-06-15 PROCEDURE — 80053 COMPREHEN METABOLIC PANEL: CPT

## 2022-06-15 PROCEDURE — 99900035 HC TECH TIME PER 15 MIN (STAT)

## 2022-06-15 PROCEDURE — 85730 THROMBOPLASTIN TIME PARTIAL: CPT | Performed by: STUDENT IN AN ORGANIZED HEALTH CARE EDUCATION/TRAINING PROGRAM

## 2022-06-15 PROCEDURE — 25000003 PHARM REV CODE 250

## 2022-06-15 PROCEDURE — 27000221 HC OXYGEN, UP TO 24 HOURS

## 2022-06-15 PROCEDURE — 83735 ASSAY OF MAGNESIUM: CPT

## 2022-06-15 PROCEDURE — 99291 CRITICAL CARE FIRST HOUR: CPT | Mod: ,,, | Performed by: INTERNAL MEDICINE

## 2022-06-15 PROCEDURE — 84484 ASSAY OF TROPONIN QUANT: CPT | Mod: 91 | Performed by: STUDENT IN AN ORGANIZED HEALTH CARE EDUCATION/TRAINING PROGRAM

## 2022-06-15 PROCEDURE — 84100 ASSAY OF PHOSPHORUS: CPT

## 2022-06-15 PROCEDURE — 25000003 PHARM REV CODE 250: Performed by: STUDENT IN AN ORGANIZED HEALTH CARE EDUCATION/TRAINING PROGRAM

## 2022-06-15 PROCEDURE — 85025 COMPLETE CBC W/AUTO DIFF WBC: CPT | Performed by: INTERNAL MEDICINE

## 2022-06-15 PROCEDURE — 84484 ASSAY OF TROPONIN QUANT: CPT | Mod: 91

## 2022-06-15 PROCEDURE — 20000000 HC ICU ROOM

## 2022-06-15 PROCEDURE — 99291 PR CRITICAL CARE, E/M 30-74 MINUTES: ICD-10-PCS | Mod: ,,, | Performed by: INTERNAL MEDICINE

## 2022-06-15 RX ORDER — ACETAMINOPHEN 500 MG
5000 TABLET ORAL DAILY
Status: DISCONTINUED | OUTPATIENT
Start: 2022-06-15 | End: 2022-06-20 | Stop reason: HOSPADM

## 2022-06-15 RX ORDER — ONDANSETRON 4 MG/1
4 TABLET, ORALLY DISINTEGRATING ORAL EVERY 8 HOURS PRN
Status: DISCONTINUED | OUTPATIENT
Start: 2022-06-15 | End: 2022-06-20 | Stop reason: HOSPADM

## 2022-06-15 RX ORDER — B-COMPLEX WITH VITAMIN C
1 TABLET ORAL DAILY
Status: DISCONTINUED | OUTPATIENT
Start: 2022-06-15 | End: 2022-06-20 | Stop reason: HOSPADM

## 2022-06-15 RX ORDER — PANTOPRAZOLE SODIUM 40 MG/1
40 TABLET, DELAYED RELEASE ORAL DAILY
Status: DISCONTINUED | OUTPATIENT
Start: 2022-06-15 | End: 2022-06-20 | Stop reason: HOSPADM

## 2022-06-15 RX ORDER — LORAZEPAM 0.5 MG/1
0.5 TABLET ORAL EVERY 8 HOURS PRN
Status: DISCONTINUED | OUTPATIENT
Start: 2022-06-15 | End: 2022-06-20 | Stop reason: HOSPADM

## 2022-06-15 RX ORDER — LORAZEPAM 0.5 MG/1
0.5 TABLET ORAL DAILY PRN
Status: DISCONTINUED | OUTPATIENT
Start: 2022-06-15 | End: 2022-06-15

## 2022-06-15 RX ORDER — FUROSEMIDE 20 MG/1
20 TABLET ORAL DAILY
Status: DISCONTINUED | OUTPATIENT
Start: 2022-06-15 | End: 2022-06-20 | Stop reason: HOSPADM

## 2022-06-15 RX ORDER — POTASSIUM CHLORIDE 20 MEQ/1
40 TABLET, EXTENDED RELEASE ORAL
Status: DISPENSED | OUTPATIENT
Start: 2022-06-15 | End: 2022-06-15

## 2022-06-15 RX ADMIN — Medication 1 TABLET: at 03:06

## 2022-06-15 RX ADMIN — POTASSIUM CHLORIDE 40 MEQ: 20 TABLET, EXTENDED RELEASE ORAL at 08:06

## 2022-06-15 RX ADMIN — LORAZEPAM 0.5 MG: 0.5 TABLET ORAL at 08:06

## 2022-06-15 RX ADMIN — PANTOPRAZOLE SODIUM 40 MG: 40 TABLET, DELAYED RELEASE ORAL at 08:06

## 2022-06-15 RX ADMIN — ONDANSETRON 4 MG: 4 TABLET, ORALLY DISINTEGRATING ORAL at 10:06

## 2022-06-15 RX ADMIN — HUMAN ALBUMIN MICROSPHERES AND PERFLUTREN 0.66 MG: 10; .22 INJECTION, SOLUTION INTRAVENOUS at 09:06

## 2022-06-15 RX ADMIN — Medication 5000 UNITS: at 03:06

## 2022-06-15 RX ADMIN — FUROSEMIDE 20 MG: 20 TABLET ORAL at 03:06

## 2022-06-15 RX ADMIN — LORAZEPAM 0.5 MG: 0.5 TABLET ORAL at 12:06

## 2022-06-15 RX ADMIN — HEPARIN SODIUM 20 UNITS/KG/HR: 5000 INJECTION INTRAVENOUS; SUBCUTANEOUS at 05:06

## 2022-06-15 NOTE — PROGRESS NOTES
[facility] - Cardiac Medical ICU  Critical Care Medicine  Progress Note    Patient Name: Lupis Murcia  MRN: [id number]  Admission Date: 6/14/2022  Hospital Length of Stay: 1 days  Code Status: Full Code  Attending Provider: Albino Acosta MD  Primary Care Provider: Bobby Tran MD   Principal Problem: Acute pulmonary embolism with acute cor pulmonale    Subjective:     HPI:  71-year-old female with history of PE during pregnancy requiring heparin drip, chronic lower extremity lymphedema, multiple sclerosis, vitamin D deficiency who presented to the ED due to shortness of breath and dyspnea on exertion two days prior to arrival. Patient states she was undergoing physical therapy at her rehabilitation facility and was walking when she suddenly became short of breath. She states since that time, her symptoms have worsened. She stated that 5 days prior, her primary physician noted that she was experiencing abdominal bruising related to heparin subcutaneous injections. She states she started having worsening respiratory symptoms and was satting 80% on room air. In the ED, cardiology was consulted and a bedside ECHO showed a dilated RV with Cox's sign. Due to her allergy for contrast, she underwent a VQ scan showing evidence of a submassive pulmonary embolism. She was started on a heparin drip.    Vitals were overall stable other than mild tachycardia and on 6L NC with oxygen saturation above 90%. CXR in ED showed mild cardiomegaly. BNP elevated to 577 with troponin elevated to 0.423. She was admitted to Critical Care Team 2 for further management.       Hospital/ICU Course:  Upon admission to the ICU, patient was continued on heparin drip. Cardiology following. TTE showed EF 60% with elevated pulm pressure. Troponins continued to rise, but peaked overtime. After many discussions with patient and family at bedside about procedural intervention for her pulmonary embolism, patient has decided against this and does not want  to further discuss this.       Interval History/Significant Events: LAURA. Remains on 5L NC. PT/OT to assess functionality. Cardiology continued to follow. Troponins peaked at 0.712, but downtrended since that time. Hypokalemic at 3.3, replaced. Continued on heparin drip. ECHO pending.    Review of Systems   Constitutional:  Negative for chills, fatigue and fever.   HENT:  Negative for congestion and sinus pain.    Eyes:  Negative for pain and discharge.   Respiratory:  Negative for cough, shortness of breath and wheezing.    Cardiovascular:  Negative for chest pain and leg swelling.   Gastrointestinal:  Negative for abdominal pain, constipation, diarrhea, nausea and vomiting.   Genitourinary:  Negative for difficulty urinating and enuresis.   Musculoskeletal:  Positive for gait problem.   Skin:  Positive for color change and wound. Negative for rash.        Due to lymphedema of BL LE   Neurological:  Positive for weakness. Negative for dizziness and light-headedness.        LLE weakness due to MS   Hematological:  Does not bruise/bleed easily.   Psychiatric/Behavioral:  Negative for confusion. The patient is nervous/anxious.    Objective:     Vital Signs (Most Recent):  Temp: 98.3 °F (36.8 °C) (06/15/22 1101)  Pulse: 87 (06/15/22 1301)  Resp: 12 (06/15/22 1301)  BP: 114/73 (06/15/22 1300)  SpO2: (!) 92 % (06/15/22 1301)   Vital Signs (24h Range):  Temp:  [98.2 °F (36.8 °C)-98.4 °F (36.9 °C)] 98.3 °F (36.8 °C)  Pulse:  [] 87  Resp:  [12-34] 12  SpO2:  [90 %-97 %] 92 %  BP: (103-156)/(64-86) 114/73   Weight: 90.3 kg (199 lb)  Body mass index is 36.4 kg/m².      Intake/Output Summary (Last 24 hours) at 6/15/2022 1503  Last data filed at 6/15/2022 1301  Gross per 24 hour   Intake 664.79 ml   Output 150 ml   Net 514.79 ml       Physical Exam  Vitals and nursing note reviewed.   Constitutional:       General: She is not in acute distress.     Appearance: Normal appearance.   HENT:      Head: Normocephalic and  atraumatic.   Eyes:      Extraocular Movements: Extraocular movements intact.      Conjunctiva/sclera: Conjunctivae normal.   Cardiovascular:      Rate and Rhythm: Regular rhythm. Tachycardia present.      Pulses: Normal pulses.      Heart sounds: Normal heart sounds. No murmur heard.     Comments: Systolic murmur  Pulmonary:      Effort: Pulmonary effort is normal. No respiratory distress.      Breath sounds: Normal breath sounds. No wheezing.   Abdominal:      General: Abdomen is flat. Bowel sounds are normal. There is no distension.      Palpations: Abdomen is soft.      Tenderness: There is no abdominal tenderness.   Musculoskeletal:         General: No swelling or tenderness.      Right lower leg: Edema present.      Left lower leg: Edema present.   Skin:     General: Skin is warm and dry.      Coloration: Skin is not jaundiced.      Findings: Erythema and lesion present.      Comments: BL LE erythema with overlying hyperkeratotic changes   Neurological:      Mental Status: She is alert and oriented to person, place, and time.      Motor: Weakness present.      Comments: LLE weakness due to MS   Psychiatric:         Mood and Affect: Mood normal.         Behavior: Behavior normal.       Vents:  Oxygen Concentration (%): 28 (06/15/22 0900)  Lines/Drains/Airways       Drain  Duration             Female External Urinary Catheter 06/14/22 2100 <1 day              Peripheral Intravenous Line  Duration                  Peripheral IV - Single Lumen 06/14/22 1004 18 G Left Antecubital 1 day         Peripheral IV - Single Lumen 06/14/22 1215 20 G Right Antecubital 1 day                  Significant Labs:    CBC/Anemia Profile:  Recent Labs   Lab 06/14/22  0522 06/14/22  1016 06/15/22  0603   WBC 7.71 7.85 6.58   HGB 11.2* 11.0* 9.4*   HCT 35.2* 34.6* 29.0*    218 201   MCV 97 98 97   RDW 16.1* 15.9* 16.4*        Chemistries:  Recent Labs   Lab 06/14/22  0522 06/14/22  1016 06/15/22  0653    141 139   K 3.9  3.6 3.3*    103 104   CO2 23 22* 25   BUN 22 24* 23   CREATININE 0.9 0.9 0.9   CALCIUM 10.9* 10.9* 10.0   ALBUMIN  --  3.9 3.4*   PROT  --  7.7 6.4   BILITOT  --  0.5 0.4   ALKPHOS  --  65 56   ALT  --  18 15   AST  --  17 14   MG 2.0  --  1.9   PHOS 4.2  --  3.9       All pertinent labs within the past 24 hours have been reviewed.    Significant Imaging:  I have reviewed all pertinent imaging results/findings within the past 24 hours.      ABG  No results for input(s): PH, PO2, PCO2, HCO3, BE in the last 168 hours.  Assessment/Plan:     Neuro  MS (multiple sclerosis)  Muscle weakness of lower extremity    Patient states she has diagnosis of multiple sclerosis leading to weakness of her left lower extremity. She states she normally ambulates with a walker. She normally has mild pain in her legs. Also states she does not take medication for MS.     -- Tylenol prn for pain   -- PT/OT assessment pending    Insomnia secondary to chronic pain  Patient states she takes ativan 0.5mg prn for insomnia and anxiety. Currently anxious due to diagnosis of pulmonary embolism    -- Continue ativan 0.5mg PRN   -- Monitor for signs of acute encephalopathy    Hematology  * Acute pulmonary embolism with acute cor pulmonale  Acute hypoxemic respiratory failure    Patient states two days prior to arrival while undergoing physical therapy and walking, she suddenly developed shortness of breath with dyspnea on exertion. She states she continued to have worsening symptoms over the next two days and was found to have oxygenation saturation of 86% on RA. Of note, she was recently receiving subcutaneous heparin at her rehab facility but five days prior to arrival, it was discontinued. She states her PCP stopped it due to abdominal bruising.     In ED, cardiology evaluated patient and bedside ECHO showed a dilated RV with Cox's sign, mild RV dysfunction, PASP estimated at ~70 mmHg with systolic flattening of the IVS consistent with  pressure overload. LV function appears wnl. IVC 15 mmHg. CXR showed mild cardiomegaly. BNP elevated to 577 and troponin elevated to 0.423.     Cardiology discussed next step in management, including CDT, aspiration thrombectomy, mechanical/surgical thrombectomy, but patient opted for medical management with full-dose anticoagulation.     ECHO results showing:   · The estimated ejection fraction is 60%.  · The left ventricle is normal in size with concentric remodeling and normal systolic function.  · Grade I left ventricular diastolic dysfunction.  · Mild right ventricular enlargement with mildly to moderately reduced right ventricular systolic function. Positive Cox's sign.  · Mild to moderate tricuspid regurgitation.  · Right atrial enlargement.  · There is mild-to-moderate aortic valve stenosis.  · Aortic valve area is 2.20 cm2; peak velocity is 3.03 m/s; mean gradient is 20 mmHg.  · The estimated PA systolic pressure is 69 mmHg.  · Elevated central venous pressure (15 mmHg).     -- Continue heparin drip  -- Monitor for signs of bleeding  -- Daily CBC  -- aPTT monitoring   -- trend troponin q8hrs  -- DVT US negative    Endocrine  BMI 37.0-37.9, adult  BMI noted to be 37.13    -- Continue cardiac diet  -- Education of healthy diet upon discharge     Vitamin D deficiency  -- Continue home Vit D     Other  Lymphedema  Chronic recorded history of lymphedema. Consider lymphedema with associated chronic statis dermatitis given bilateral findings with overlying hyperkeratotic changes. During last admission (May 2021), required treatment with physical therapy for increased functional status. States improvement since last admission.        Critical Care Daily Checklist:    A: Awake: RASS Goal/Actual Goal:    Actual: Small Agitation Sedation Scale (RASS): Alert and calm   B: Spontaneous Breathing Trial Performed?     C: SAT & SBT Coordinated?  N/A                      D: Delirium: CAM-ICU Overall CAM-ICU: Negative    E: Early Mobility Performed? Yes   F: Feeding Goal:    Status:     Current Diet Order   Procedures    Diet Cardiac      AS: Analgesia/Sedation None   T: Thromboembolic Prophylaxis Heparin   H: HOB > 300 Yes   U: Stress Ulcer Prophylaxis (if needed) Protonix   G: Glucose Control LDSSI   B: Bowel Function     I: Indwelling Catheter (Lines & Slaughter) Necessity PIV, purewick   D: De-escalation of Antimicrobials/Pharmacotherapies None    Plan for the day/ETD Wean oxygen, PT/OT, heparin drip continued    Code Status:  Family/Goals of Care: Full Code       Critical secondary to Patient has a condition that poses threat to life and bodily function: Pulmonary Embolism      Critical care was time spent personally by me on the following activities: development of treatment plan with patient or surrogate and bedside caregivers, discussions with consultants, evaluation of patient's response to treatment, examination of patient, ordering and performing treatments and interventions, ordering and review of laboratory studies, ordering and review of radiographic studies, pulse oximetry, re-evaluation of patient's condition. This critical care time did not overlap with that of any other provider or involve time for any procedures.     Abena Thomas MD  Critical Care Medicine  [facility] - Cardiac Medical ICU

## 2022-06-15 NOTE — SUBJECTIVE & OBJECTIVE
Interval History/Significant Events: NAEON. Remains on 5L NC. PT/OT to assess functionality. Cardiology continued to follow. Troponins peaked at 0.712, but downtrended since that time. Hypokalemic at 3.3, replaced. Continued on heparin drip. ECHO pending.    Review of Systems   Constitutional:  Negative for chills, fatigue and fever.   HENT:  Negative for congestion and sinus pain.    Eyes:  Negative for pain and discharge.   Respiratory:  Negative for cough, shortness of breath and wheezing.    Cardiovascular:  Negative for chest pain and leg swelling.   Gastrointestinal:  Negative for abdominal pain, constipation, diarrhea, nausea and vomiting.   Genitourinary:  Negative for difficulty urinating and enuresis.   Musculoskeletal:  Positive for gait problem.   Skin:  Positive for color change and wound. Negative for rash.        Due to lymphedema of BL LE   Neurological:  Positive for weakness. Negative for dizziness and light-headedness.        LLE weakness due to MS   Hematological:  Does not bruise/bleed easily.   Psychiatric/Behavioral:  Negative for confusion. The patient is nervous/anxious.    Objective:     Vital Signs (Most Recent):  Temp: 98.3 °F (36.8 °C) (06/15/22 1101)  Pulse: 87 (06/15/22 1301)  Resp: 12 (06/15/22 1301)  BP: 114/73 (06/15/22 1300)  SpO2: (!) 92 % (06/15/22 1301)   Vital Signs (24h Range):  Temp:  [98.2 °F (36.8 °C)-98.4 °F (36.9 °C)] 98.3 °F (36.8 °C)  Pulse:  [] 87  Resp:  [12-34] 12  SpO2:  [90 %-97 %] 92 %  BP: (103-156)/(64-86) 114/73   Weight: 90.3 kg (199 lb)  Body mass index is 36.4 kg/m².      Intake/Output Summary (Last 24 hours) at 6/15/2022 1503  Last data filed at 6/15/2022 1301  Gross per 24 hour   Intake 664.79 ml   Output 150 ml   Net 514.79 ml       Physical Exam  Vitals and nursing note reviewed.   Constitutional:       General: She is not in acute distress.     Appearance: Normal appearance.   HENT:      Head: Normocephalic and atraumatic.   Eyes:      Extraocular  Movements: Extraocular movements intact.      Conjunctiva/sclera: Conjunctivae normal.   Cardiovascular:      Rate and Rhythm: Regular rhythm. Tachycardia present.      Pulses: Normal pulses.      Heart sounds: Normal heart sounds. No murmur heard.     Comments: Systolic murmur  Pulmonary:      Effort: Pulmonary effort is normal. No respiratory distress.      Breath sounds: Normal breath sounds. No wheezing.   Abdominal:      General: Abdomen is flat. Bowel sounds are normal. There is no distension.      Palpations: Abdomen is soft.      Tenderness: There is no abdominal tenderness.   Musculoskeletal:         General: No swelling or tenderness.      Right lower leg: Edema present.      Left lower leg: Edema present.   Skin:     General: Skin is warm and dry.      Coloration: Skin is not jaundiced.      Findings: Erythema and lesion present.      Comments: BL LE erythema with overlying hyperkeratotic changes   Neurological:      Mental Status: She is alert and oriented to person, place, and time.      Motor: Weakness present.      Comments: LLE weakness due to MS   Psychiatric:         Mood and Affect: Mood normal.         Behavior: Behavior normal.       Vents:  Oxygen Concentration (%): 28 (06/15/22 0900)  Lines/Drains/Airways       Drain  Duration             Female External Urinary Catheter 06/14/22 2100 <1 day              Peripheral Intravenous Line  Duration                  Peripheral IV - Single Lumen 06/14/22 1004 18 G Left Antecubital 1 day         Peripheral IV - Single Lumen 06/14/22 1215 20 G Right Antecubital 1 day                  Significant Labs:    CBC/Anemia Profile:  Recent Labs   Lab 06/14/22  0522 06/14/22  1016 06/15/22  0603   WBC 7.71 7.85 6.58   HGB 11.2* 11.0* 9.4*   HCT 35.2* 34.6* 29.0*    218 201   MCV 97 98 97   RDW 16.1* 15.9* 16.4*        Chemistries:  Recent Labs   Lab 06/14/22  0522 06/14/22  1016 06/15/22  0653    141 139   K 3.9 3.6 3.3*    103 104   CO2 23 22*  25   BUN 22 24* 23   CREATININE 0.9 0.9 0.9   CALCIUM 10.9* 10.9* 10.0   ALBUMIN  --  3.9 3.4*   PROT  --  7.7 6.4   BILITOT  --  0.5 0.4   ALKPHOS  --  65 56   ALT  --  18 15   AST  --  17 14   MG 2.0  --  1.9   PHOS 4.2  --  3.9       All pertinent labs within the past 24 hours have been reviewed.    Significant Imaging:  I have reviewed all pertinent imaging results/findings within the past 24 hours.

## 2022-06-15 NOTE — HOSPITAL COURSE
Patient prematurely discharge from SNF.  Patient sent to ED for evaluation of shortness of breath with possible PE.

## 2022-06-15 NOTE — NURSING
Informed [facility] that patient is complaining of Chest pain. EKG obtained and MD came to bedside.  Chest pain resolved quickly with no intervention.  Patient feels better.  No new orders received

## 2022-06-15 NOTE — PROGRESS NOTES
[facility]                                  Skilled Nursing Facility                   Progress Note     Admit Date: 2022  USMAN 2022  Principal Problem:  Cellulitis of leg   HPI obtained from patient interview and chart review     Chief Complaint:  SOB    HPI:   Mrs. Murcia is a 71 year old female with PMHx of Multiple Sclerosis, Left Foot Drop, Lymphedema, Obesity (bmi 45) who presents to SNF following hospitalization for cellulitis, acute kidney injury and acute encephalopathy.  Admission to SNF for secondary weakness and debility.    Interval history:  Patient shortness of breath has progressed, she is now requiring 3 L nasal cannula.  Concern for PE.  Will send patient to ED for further evaluation.    Past Medical History: Patient has a past medical history of Lymphedema, MS (multiple sclerosis), and Vitamin B12 deficiency.    Past Surgical History: Patient has a past surgical history that includes  section; Breast cyst excision (Left); and Esophagogastroduodenoscopy (N/A, 2022).    Social History: Patient reports that she has never smoked. She has never used smokeless tobacco. She reports that she does not drink alcohol and does not use drugs.    Family History: family history includes Brain cancer in her brother; Coronary artery disease in her father; Lung cancer in her father and mother.    Allergies: Patient is allergic to contrast media, pcn [penicillins], celebrex [celecoxib], diazepam, and motrin [ibuprofen].    ROS  Constitutional: Negative for fever   Eyes: Negative for blurred vision, double vision   Respiratory: Negative for cough.  + shortness of breath   Cardiovascular: Negative for chest pain, palpitations. + leg swelling.   Gastrointestinal: Negative for abdominal pain, diarrhea, nausea, vomiting, constipation  Genitourinary: Negative for dysuria, frequency   Musculoskeletal:  + generalized weakness.   + bilateral lower extremity pain  Skin: Negative for itching and rash.   Neurological: Negative for dizziness, headaches.   Psychiatric/Behavioral: Negative for depression. The patient is nervous/anxious    24 hour Vital Sign Range   Temp:  [98.2 °F (36.8 °C)-98.6 °F (37 °C)]   Pulse:  []   Resp:  [12-34]   BP: (103-156)/(62-86)   SpO2:  [90 %-97 %]     PEx  Constitutional: Patient appears debilitated.  No distress noted  HENT:   Head: Normocephalic and atraumatic.   Eyes: Pupils are equal, round  Neck: Normal range of motion. Neck supple.   Cardiovascular: Normal rate, regular rhythm and normal heart sounds.    Pulmonary/Chest: Effort normal and breath sounds are clear with diminished bases  Abdominal: Soft. Bowel sounds are normal.   Musculoskeletal: Normal range of motion.   Neurological: Alert and oriented to person, place, and time.   Psychiatric: Normal mood and affect. Behavior is normal.   Skin: Skin is warm and dry. Bilateral lower extremity with pain contract scar tissue surrounded by hyperkeratotic skin.  Moisture associated dermatitis to buttocks, improved.      Recent Labs   Lab 06/15/22  0653      K 3.3*      CO2 25   BUN 23   CREATININE 0.9   MG 1.9       Recent Labs   Lab 06/15/22  0603   WBC 6.58   RBC 2.98*   HGB 9.4*   HCT 29.0*      MCV 97   MCH 31.5*   MCHC 32.4         Assessment and Plan:    Shortness of breath  - 6/13 Xopenex nebulizer treatment x1 and PRN thereafter, chest x-ray, encouraged patient to ask for her PRN Ativan as she appears very anxious  - 6/14 sending patient to ED for evaluation for PE    Constipation  -  resolved, changing bowel regimen to PRN per patient's request    Cellulitis of leg  - continue vancomycin   - ID consulted at [facility]  - continue local wound care per wound care  - completed cefepime and continue vancomycin. switched to doxycycline upon discharge (end date: 5/12/22)  - completed treatment was doxycycline     Urinary retention   -  discontinued espinal prior to transfer to SNF.   - Urinating well until 5/10   -  bladder scans PRN     Pressure injury of buttock, unstageable  - q2 turns  - low airloss overlay mattress  - wound care consultation       Lymphedema  - wound care consultation to assist in managmeent  - continue ammonium lactate lotion BID  - continue doxycycline for cellulitis      HTN  -  continue to hold losartan to 25 mg daily- BP's low to normal and patient is refusing to take losartan.  Continue HCTZ 12.5 mg daily     MS (multiple sclerosis)  - PT/OT  - f/u with out pt provider      B12 deficiency  - change orders to cyanocobalamin 1000 mcg every 30 days, dose to be given on 05/13 as patient has been overdue due to recent hospitalization     Debility   - Continue with PT/OT for gait training and strengthening and restoration of ADL's   - Encourage mobility, OOB in chair, and early ambulation as appropriate  - Fall precautions   - Monitor for bowel and bladder dysfunction  - Monitor for and prevent skin breakdown and pressure ulcers  -  discontinued  DVT prophylaxis with  heparin 7.5 K q.8 hours- per patient request for severely bruised abdomen, patient has been walking more with therapy, patient reminded to perform frequent ambulation        Anticipate disposition:  Home with home health        Follow-up needed during SNF stay-     Follow-up needed after discharge from SNF:   - PCP, hospital follow-up, needs to be scheduled      Future Appointments   Date Time Provider Department Center   7/14/2022  3:00 PM Samir Sahni MD [facility]         Amy Conklin NP  Department of Hospital Medicine   [facility]- Skilled Nursing Facility     DOS: 6/14/2022      Patient note was created using MModal Dictation.  Any errors in syntax or even information may not have been identified and edited on initial review prior to signing this note.

## 2022-06-15 NOTE — CONSULTS
Consult received. Patient admitted to MICU for further management. Please see H& P dated 6/14 by Dr. Thomas.     Kumar Rudolph D.O.  PGY-2 Internal Medicine  Critical Care Medicine

## 2022-06-15 NOTE — ASSESSMENT & PLAN NOTE
Muscle weakness of lower extremity    Patient states she has diagnosis of multiple sclerosis leading to weakness of her left lower extremity. She states she normally ambulates with a walker. She normally has mild pain in her legs. Also states she does not take medication for MS.     -- Tylenol prn for pain   -- PT/OT assessment pending

## 2022-06-15 NOTE — ASSESSMENT & PLAN NOTE
Acute hypoxemic respiratory failure    Patient states two days prior to arrival while undergoing physical therapy and walking, she suddenly developed shortness of breath with dyspnea on exertion. She states she continued to have worsening symptoms over the next two days and was found to have oxygenation saturation of 86% on RA. Of note, she was recently receiving subcutaneous heparin at her rehab facility but five days prior to arrival, it was discontinued. She states her PCP stopped it due to abdominal bruising.     In ED, cardiology evaluated patient and bedside ECHO showed a dilated RV with Cox's sign, mild RV dysfunction, PASP estimated at ~70 mmHg with systolic flattening of the IVS consistent with pressure overload. LV function appears wnl. IVC 15 mmHg. CXR showed mild cardiomegaly. BNP elevated to 577 and troponin elevated to 0.423.     Cardiology discussed next step in management, including CDT, aspiration thrombectomy, mechanical/surgical thrombectomy, but patient opted for medical management with full-dose anticoagulation.     ECHO results showing:   · The estimated ejection fraction is 60%.  · The left ventricle is normal in size with concentric remodeling and normal systolic function.  · Grade I left ventricular diastolic dysfunction.  · Mild right ventricular enlargement with mildly to moderately reduced right ventricular systolic function. Positive Cox's sign.  · Mild to moderate tricuspid regurgitation.  · Right atrial enlargement.  · There is mild-to-moderate aortic valve stenosis.  · Aortic valve area is 2.20 cm2; peak velocity is 3.03 m/s; mean gradient is 20 mmHg.  · The estimated PA systolic pressure is 69 mmHg.  · Elevated central venous pressure (15 mmHg).     -- Continue heparin drip  -- Monitor for signs of bleeding  -- Daily CBC  -- aPTT monitoring   -- trend troponin q8hrs  -- DVT US negative

## 2022-06-15 NOTE — DISCHARGE SUMMARY
"[facility] Medicine  Discharge Summary      Patient Name: Lupis Murcia  MRN: [id number]  Patient Class: IP- SNF  Admission Date: 5/11/2022  Hospital Length of Stay: 34 days  Discharge Date and Time:6/14/2022  Attending Physician: No att. providers found   Discharging Provider: Amy Conklin NP  Primary Care Provider: Bobby Tran MD      HPI:   Mrs. Murcia is a 71 year old female with PMHx of Multiple Sclerosis, Left Foot Drop, Lymphedema, Obesity (bmi 45) who presents to SNF following hospitalization for cellulitis, acute kidney injury and acute encephalopathy.  Admission to SNF for secondary weakness and debility.     Patient originally presented to [facility] ED on 05/01 after suffering a recent fall.  Per [facility] notes, "Patient's daughter Amanda Lowery provides primary history due to pt somnolence s/p ativan and EMR review. 1 week ago on Monday patient had a fall, partially assisted by family when legs gave out, no head injury or LOC.  Daughter noted through the week patient c/o of weakness and difficulty standing as when she fell, her armpit fell onto the walker, pt c/o of left shoulder pain.  On Thursday it was noted patient unable to stand, she is normally able to perform ADL with her walker, but daughter felt when she came home that pt had not moved.  She was also using adult diaper at this time and since Thursday due to body habitus/size patient has been unable to change her diaper since this time.  She has intermittently been taking her medicines during this time, but daughter noted she was eating less,  sleeping more and making confused statements and sought to bring her to the ED. ED staff noted pts diaper was saturated with urine, vulvar swelling with intertrigo and buttock pressure injury.  Labs notable for Hyperkalemia to 6.3 and BUn/Cr of 169/3.9. ED Treatment: Insulin 10uni IV regular, D10 bolus, 1gram Calcium Gluconate.  LR 2.2Liters IV, naBicarb 50meq IV x 1, Ativan 1mg " "IV Ceftriaxone 2gm IV x . Patient reports loose stools improved with imodium and stopping stool softeners. Patient also has reduced UOP and bladder scan 423. Straight cath performed and urinary retention protocol initiated and ordered flomax."     Today, patient is urinating spontaneously, intermittent bladder scans done without > 300 mL, patient also refusing some bladder scans.  Patient states medications were added to her SNF orders that she is no longer on, discontinuing B12 loading dose of injections, baclofen and topical cream, patient also wants to be off of Flomax.  BP is normotensive, will decrease losartan 25 mg, replacing magnesium and sodium bicarb.  Patient is not diabetic and does not require blood glucose checks.     Patient will be treated at [facility] with PT and OT to improve functional status and ability to perform ADLs.        Hospital Course:   Patient prematurely discharge from SNF.  Patient sent to ED for evaluation of shortness of breath with possible PE.       Goals of Care Treatment Preferences:  Code Status: Full Code          What is most important right now is to focus on remaining as independent as possible.  Accordingly, we have decided that the best plan to meet the patient's goals includes continuing with treatment.      Consults:   Consults (From admission, onward)        Status Ordering Provider     IP consult to case management  Once        Provider:  (Not yet assigned)    Completed UBALDO GILMAN     Inpatient consult to Registered Dietitian/Nutritionist  Once        Provider:  (Not yet assigned)    Completed UBALDO GILMAN          No new Assessment & Plan notes have been filed under this hospital service since the last note was generated.  Service: Hospital Medicine    Final Active Diagnoses:    Diagnosis Date Noted POA    PRINCIPAL PROBLEM:  Cellulitis of leg [L03.119] 05/04/2022 Yes    Lymphedema [I89.0] 05/01/2022 Yes    Pressure injury of buttock, unstageable " "[L89.300] 05/01/2022 Yes    MS (multiple sclerosis) [G35] 06/09/2019 Yes    Paraparesis [G82.20] 06/09/2019 Yes    Vitamin D deficiency [E55.9] 06/09/2019 Yes    Impaired functional mobility, balance, gait, and endurance [Z74.09] 05/11/2018 Yes    Vitamin B 12 deficiency [E53.8] 08/09/2017 Yes    Anemia of chronic disease [D63.8] 08/09/2017 Yes    Insomnia secondary to chronic pain [G89.29, G47.01] 06/15/2016 Yes      Problems Resolved During this Admission:       Discharged Condition: fair    Disposition: Short Term Hospital    Follow Up:    Patient Instructions:      WALKER FOR HOME USE     Order Specific Question Answer Comments   Type of Walker: Luis Alberto (4'4"-5'6")    With wheels? Yes    Height: 5' 2" (1.575 m)    Weight: 99.6 kg (219 lb 9.3 oz)    Length of need (1-99 months): 99    Does patient have medical equipment at home? shower chair    Does patient have medical equipment at home? walker, rolling    Does patient have medical equipment at home? rollator    Does patient have medical equipment at home? raised toilet    Does patient have medical equipment at home? lift device    Does patient have medical equipment at home? bedside commode    Please check all that apply: Patient's condition impairs ambulation.      COMMODE FOR HOME USE     Order Specific Question Answer Comments   Type: Drop arm    Type: need to transfer    Height: 5' 2" (1.575 m)    Weight: 99.6 kg (219 lb 9.3 oz)    Does patient have medical equipment at home? shower chair    Does patient have medical equipment at home? walker, rolling    Does patient have medical equipment at home? rollator    Does patient have medical equipment at home? raised toilet    Does patient have medical equipment at home? lift device    Does patient have medical equipment at home? bedside commode    Length of need (1-99 months): 99      WHEELCHAIR FOR HOME USE     Order Specific Question Answer Comments   Hours in W/C per day: 6    Type of Wheelchair: " "Lightweight    Patient unable to propel in Standard wheelchair? Yes    Size(Width): 20"    Leg Support: Elevating leg rests    Leg Support: Articulating leg rests    Leg Support: Swing Away    Arm Height: Swing away    Arm Height: Desk length    Lap Belt: Velcro    Accessories: Front brakes    Accessories: Brake extensions    Accessories: Anti-tippers    Accessories: Safety belt    Cushion: Specialty Cushion    Cushion: Roho    Justification for cushion: Prevent pressure ulcers    Height: 5' 2" (1.575 m)    Weight: 99.6 kg (219 lb 9.3 oz)    Does patient have medical equipment at home? shower chair    Does patient have medical equipment at home? walker, rolling    Does patient have medical equipment at home? rollator    Does patient have medical equipment at home? raised toilet    Does patient have medical equipment at home? lift device    Does patient have medical equipment at home? bedside commode    Length of need (1-99 months): 99    Please check all that apply: Caregiver is capable and willing to operate wheelchair safely.      HIP KIT FOR HOME USE     Order Specific Question Answer Comments   Height: 5' 2" (1.575 m)    Weight: 99.6 kg (219 lb 9.3 oz)    Does patient have medical equipment at home? shower chair    Does patient have medical equipment at home? walker, rolling    Does patient have medical equipment at home? rollator    Does patient have medical equipment at home? raised toilet    Does patient have medical equipment at home? lift device    Does patient have medical equipment at home? bedside commode    Length of need (1-99 months): 99    Type: Short Horn Hip Kit        Significant Diagnostic Studies: Labs:   BMP:   Recent Labs   Lab 06/14/22  0522 06/14/22  1016 06/15/22  0653   * 168* 136*    141 139   K 3.9 3.6 3.3*    103 104   CO2 23 22* 25   BUN 22 24* 23   CREATININE 0.9 0.9 0.9   CALCIUM 10.9* 10.9* 10.0   MG 2.0  --  1.9    and CMP   Recent Labs   Lab 06/14/22  0522 " 06/14/22  1016 06/15/22  0653    141 139   K 3.9 3.6 3.3*    103 104   CO2 23 22* 25   * 168* 136*   BUN 22 24* 23   CREATININE 0.9 0.9 0.9   CALCIUM 10.9* 10.9* 10.0   PROT  --  7.7 6.4   ALBUMIN  --  3.9 3.4*   BILITOT  --  0.5 0.4   ALKPHOS  --  65 56   AST  --  17 14   ALT  --  18 15   ANIONGAP 15 16 10   ESTGFRAFRICA >60.0 >60.0 >60.0   EGFRNONAA >60.0 >60.0 >60.0       Pending Diagnostic Studies:     None         Medications:  Reconciled Home Medications:      Medication List      START taking these medications    B-complex with vitamin C tablet  Commonly known as: Z-Bec or Equiv  Take 1 tablet by mouth once daily.     hydroCHLOROthiazide 12.5 MG Tab  Commonly known as: HYDRODIURIL  Take 1 tablet (12.5 mg total) by mouth once daily.     ondansetron 4 MG Tbdl  Commonly known as: ZOFRAN-ODT  Take 1 tablet (4 mg total) by mouth every 6 (six) hours as needed.        CHANGE how you take these medications    cyanocobalamin 1,000 mcg/mL injection  Inject 1 mL (1,000 mcg total) into the muscle every 30 days.  What changed:   · how much to take  · how to take this  · when to take this  · additional instructions     LORazepam 0.5 MG tablet  Commonly known as: ATIVAN  Take 1 tablet (0.5 mg total) by mouth every 8 (eight) hours as needed for Anxiety.  What changed:   · medication strength  · when to take this     vitamin D 1000 units Tab  Commonly known as: VITAMIN D3  Take 1 tablet (1,000 Units total) by mouth once daily.  What changed: how much to take        CONTINUE taking these medications    furosemide 20 MG tablet  Commonly known as: LASIX  Take 1 tablet (20 mg total) by mouth daily as needed (leg swelling).     pantoprazole 40 MG tablet  Commonly known as: PROTONIX  Take 1 tablet (40 mg total) by mouth once daily.        STOP taking these medications    ammonium lactate 12 % lotion  Commonly known as: LAC-HYDRIN     baclofen 10 MG tablet  Commonly known as: LIORESAL      candesartan-hydrochlorothiazide 16-12.5 mg per tablet  Commonly known as: ATACAND HCT     clotrimazole-betamethasone 1-0.05% cream  Commonly known as: LOTRISONE     doxycycline 100 MG tablet  Commonly known as: VIBRA-TABS     erythromycin base 250 MG Tab     loperamide 2 mg capsule  Commonly known as: IMODIUM     miconazole 2 % cream  Commonly known as: MICOTIN     potassium, sodium phosphates 280-160-250 mg Pwpk  Commonly known as: PHOS-NAK     senna-docusate 8.6-50 mg 8.6-50 mg per tablet  Commonly known as: PERICOLACE     tamsulosin 0.4 mg Cap  Commonly known as: FLOMAX     vitamin B comp with vit C no.6 500-0.5 mg Tab        ASK your doctor about these medications    * acetaminophen-codeine 300-30mg 300-30 mg Tab  Commonly known as: TYLENOL #3  Take 1 tablet by mouth every 6 (six) hours as needed (Chronic leg pain).  Ask about: Should I take this medication?     * acetaminophen-codeine 300-30mg 300-30 mg Tab  Commonly known as: TYLENOL #3  Take 1 tablet by mouth every 6 (six) hours as needed (pain).  Ask about: Which instructions should I use?     loperamide 2 mg capsule  Commonly known as: IMODIUM  Take 1 capsule (2 mg total) by mouth 4 (four) times daily as needed for Diarrhea.  Ask about: Should I take this medication?         * This list has 2 medication(s) that are the same as other medications prescribed for you. Read the directions carefully, and ask your doctor or other care provider to review them with you.                Indwelling Lines/Drains at time of discharge:   Lines/Drains/Airways     None                 Time spent on the discharge of patient: 0 minutes         Amy Conklin NP  Department of [facility] Medicine  [facility]

## 2022-06-15 NOTE — PLAN OF CARE
CMICU DAILY GOALS       A: Awake    RASS: Goal -    Actual - RASS (Small Agitation-Sedation Scale): 0-->alert and calm   Restraint necessity:    B: Breathe   SBT: Not intubated   C: Coordinate A & B, analgesics/sedatives   Pain: managed    SAT: Not intubated  D: Delirium   CAM-ICU: Overall CAM-ICU: Negative  E: Early(intubated/ Progressive (non-intubated) Mobility   MOVE Screen: Pass   Activity: Activity Management: Rolling - L1  FAS: Feeding/Nutrition   Diet order: Diet/Nutrition Received: low saturated fat/low cholesterol,    T: Thrombus   DVT prophylaxis: VTE Required Core Measure: Pharmacological prophylaxis initiated/maintained  H: HOB Elevation   Head of Bed (HOB) Positioning: HOB at 60 degrees  U: Ulcer Prophylaxis   GI: yes  G: Glucose control   managed    S: Skin   Bathing/Skin Care: linen changed, incontinence care, dressed/undressed, bath, complete  Device Skin Pressure Protection: adhesive use limited  Pressure Reduction Devices: foam padding utilized  Pressure Reduction Techniques: weight shift assistance provided  Skin Protection: tubing/devices free from skin contact  B: Bowel Function   no issues   I: Indwelling Catheters   Slaughter necessity:     CVC necessity: No  D: De-escalation Antibiotics   No    Family/Goals of care/Code Status   Code Status: Full Code    24H Vital Sign Range  Temp:  [98.1 °F (36.7 °C)-98.4 °F (36.9 °C)]   Pulse:  []   Resp:  [14-34]   BP: ()/(63-92)   SpO2:  [90 %-97 %]      Shift Events   Patient admitted to the unit from Emergency Room for SOB.  Patient remained on 5L nasal cannula with oxygen sats 90-95.  Patient gets SOB with exertion and is more comfortable with her HOB elevated.  Patient daughter remained at bedside throughout the night.    VS and assessment per flow sheet, patient progressing towards goals as tolerated, plan of care reviewed with patient and daughter, and all concerns addressed, will continue to monitor.    Mya Mustafa

## 2022-06-15 NOTE — PLAN OF CARE
[facility] Card[facility] Medical [facility]  Initial Discharge Assessment       Primary Care Provider: Bobby Tran MD    Admission Diagnosis: Shortness of breath [R06.02]  Pulmonary embolism [I26.99]  Hypoxia [R09.02]  Chest pain [R07.9]    Admission Date: 6/14/2022  Expected Discharge Date:     Discharge Barriers Identified: None    Payor: MEDICARE / Plan: MEDICARE PART A & B / Product Type: Government /     Extended Emergency Contact Information  Primary Emergency Contact: Amanda Lowery  Address: [address]           [place] [address] [place]  Mobile Phone: [phone]  Relation: Daughter    Discharge Plan A: Home Health  Discharge Plan B: Home with family      [id number] [place] [place] - [address]  [address]  [place] [address]  Phone: [phone] Fax: [phone]    [id number] - [place] - [address]  [address] [facility]  [place] [address]  Phone: [phone] Fax: [phone]      Transferred from:     Past Medical History:   Diagnosis Date    Lymphedema     MS (multiple sclerosis)     Vitamin B12 deficiency          CM met with patient and Amanda Lowery (daughter) [phone] in room for Discharge Planning Assessment.  Patient was able to answer some questions due to being very sleepy.  Per daughter, patient lives with her in a 1-story home with 1 step(s) to enter.   Per daughter, patient was dependent with ADLS and used rolling walker for ambulation. Per daughter, patient is not on dialysis and does not take Coumadin.  Patient will have help from Amanda Lowery (daughter) [phone] upon discharge.   Discharge Planning Booklet given to patient/family and discussed.  All questions addressed.  CM will follow for needs.      Initial Assessment (most recent)     Adult Discharge Assessment - 06/15/22 5594        Discharge Assessment    Assessment Type Discharge Planning Assessment     Confirmed/corrected address, phone number and insurance  Yes     Confirmed Demographics Correct on Facesheet     Source of Information patient;family     When was your last doctors appointment? --   unknown    Communicated USMAN with patient/caregiver Date not available/Unable to determine     Reason For Admission Acute pulmonary embolism with acute cor pulmonale     Lives With child(reinier), adult     Facility Arrived From: [facility]     Do you expect to return to your current living situation? Yes     Do you have help at home or someone to help you manage your care at home? Yes     Who are your caregiver(s) and their phone number(s)? Amanda Lowery (daughter) [phone]     Prior to hospitilization cognitive status: Alert/Oriented     Current cognitive status: Alert/Oriented     Walking or Climbing Stairs Difficulty ambulation difficulty, requires equipment     Mobility Management rolling walker     Dressing/Bathing Difficulty bathing difficulty, assistance 1 person;dressing difficulty, assistance 1 person     Dressing/Bathing Management daughter assists in these tasks     Equipment Currently Used at Home walker, rolling     Readmission within 30 days? No     Patient currently being followed by outpatient case management? No     Do you currently have service(s) that help you manage your care at home? No     Do you take prescription medications? Yes     Do you have prescription coverage? Yes     Coverage Medicare - Medicare Part A & B     Do you have any problems affording any of your prescribed medications? No     Is the patient taking medications as prescribed? yes     Who is going to help you get home at discharge? Amanda Lowery (daughter) [phone]     How do you get to doctors appointments? family or friend will provide     Are you on dialysis? No     Do you take coumadin? No     Discharge Plan A Home Health     Discharge Plan B Home with family     DME Needed Upon Discharge  other (see comments)   TBD    Discharge Plan discussed with:  Patient;Adult children     Discharge Barriers Identified None        Relationship/Environment    Name(s) of Who Lives With Patient Amanda Lowery (daughter) [phone]                        PCP:  Bobby Tran MD  [phone]        Pharmacy:    [id number] - [place] - [address]  [address]  [place] [address]  Phone: [phone] Fax: [phone]    [id number] - [place] - [address]  [address] [facility]  [place] [address]  Phone: [phone] Fax: [phone]        Emergency Contacts:  Extended Emergency Contact Information  Primary Emergency Contact: Amanda Lowery  Address: [address] [place], [place] [address] [address] [facility]  Mobile Phone: [phone]  Relation: Daughter      Insurance:    Payor: MEDICARE / Plan: MEDICARE PART A & B / Product Type: Government /     Mandi Mendez RN     [phone]      06/15/2022  2:53 PM

## 2022-06-15 NOTE — HOSPITAL COURSE
Upon admission to the ICU, patient was continued on heparin drip. Cardiology following. TTE showed EF 60% with elevated pulm pressure. Troponins continued to rise, but peaked overtime and downtrended. After many discussions with patient and family at bedside about procedural intervention for her pulmonary embolism, patient has decided against this and does not want to further discuss this. She was found to be subtherapeutic, and aPTT monitored since she was on heparin drip. She was able to have stable oxygenation when sitting up in bed and trying to stand. Therefore, she was determined to be clinically stable for transfer to a stepdown unit.

## 2022-06-16 LAB
ALBUMIN SERPL BCP-MCNC: 3.5 G/DL (ref 3.5–5.2)
ALP SERPL-CCNC: 66 U/L (ref 55–135)
ALT SERPL W/O P-5'-P-CCNC: 21 U/L (ref 10–44)
ANION GAP SERPL CALC-SCNC: 14 MMOL/L (ref 8–16)
APTT BLDCRRT: 37.4 SEC (ref 21–32)
AST SERPL-CCNC: 19 U/L (ref 10–40)
BASOPHILS # BLD AUTO: 0.01 K/UL (ref 0–0.2)
BASOPHILS NFR BLD: 0.1 % (ref 0–1.9)
BILIRUB SERPL-MCNC: 0.4 MG/DL (ref 0.1–1)
BUN SERPL-MCNC: 20 MG/DL (ref 8–23)
CALCIUM SERPL-MCNC: 9.9 MG/DL (ref 8.7–10.5)
CHLORIDE SERPL-SCNC: 108 MMOL/L (ref 95–110)
CO2 SERPL-SCNC: 21 MMOL/L (ref 23–29)
CREAT SERPL-MCNC: 0.8 MG/DL (ref 0.5–1.4)
DIFFERENTIAL METHOD: ABNORMAL
EOSINOPHIL # BLD AUTO: 0.1 K/UL (ref 0–0.5)
EOSINOPHIL NFR BLD: 1.1 % (ref 0–8)
ERYTHROCYTE [DISTWIDTH] IN BLOOD BY AUTOMATED COUNT: 15.9 % (ref 11.5–14.5)
EST. GFR  (AFRICAN AMERICAN): >60 ML/MIN/1.73 M^2
EST. GFR  (NON AFRICAN AMERICAN): >60 ML/MIN/1.73 M^2
GLUCOSE SERPL-MCNC: 131 MG/DL (ref 70–110)
HCT VFR BLD AUTO: 30 % (ref 37–48.5)
HGB BLD-MCNC: 9.8 G/DL (ref 12–16)
IMM GRANULOCYTES # BLD AUTO: 0.02 K/UL (ref 0–0.04)
IMM GRANULOCYTES NFR BLD AUTO: 0.3 % (ref 0–0.5)
LYMPHOCYTES # BLD AUTO: 2.5 K/UL (ref 1–4.8)
LYMPHOCYTES NFR BLD: 33 % (ref 18–48)
MAGNESIUM SERPL-MCNC: 1.9 MG/DL (ref 1.6–2.6)
MCH RBC QN AUTO: 31.9 PG (ref 27–31)
MCHC RBC AUTO-ENTMCNC: 32.7 G/DL (ref 32–36)
MCV RBC AUTO: 98 FL (ref 82–98)
MONOCYTES # BLD AUTO: 0.7 K/UL (ref 0.3–1)
MONOCYTES NFR BLD: 8.9 % (ref 4–15)
NEUTROPHILS # BLD AUTO: 4.3 K/UL (ref 1.8–7.7)
NEUTROPHILS NFR BLD: 56.6 % (ref 38–73)
NRBC BLD-RTO: 0 /100 WBC
PHOSPHATE SERPL-MCNC: 4.2 MG/DL (ref 2.7–4.5)
PLATELET # BLD AUTO: 175 K/UL (ref 150–450)
PMV BLD AUTO: 10 FL (ref 9.2–12.9)
POTASSIUM SERPL-SCNC: 3.6 MMOL/L (ref 3.5–5.1)
PROT SERPL-MCNC: 6.3 G/DL (ref 6–8.4)
RBC # BLD AUTO: 3.07 M/UL (ref 4–5.4)
SODIUM SERPL-SCNC: 143 MMOL/L (ref 136–145)
WBC # BLD AUTO: 7.55 K/UL (ref 3.9–12.7)

## 2022-06-16 PROCEDURE — 97165 OT EVAL LOW COMPLEX 30 MIN: CPT

## 2022-06-16 PROCEDURE — 99233 PR SUBSEQUENT HOSPITAL CARE,LEVL III: ICD-10-PCS | Mod: ,,, | Performed by: INTERNAL MEDICINE

## 2022-06-16 PROCEDURE — 83735 ASSAY OF MAGNESIUM: CPT

## 2022-06-16 PROCEDURE — 25000003 PHARM REV CODE 250

## 2022-06-16 PROCEDURE — 85025 COMPLETE CBC W/AUTO DIFF WBC: CPT | Performed by: STUDENT IN AN ORGANIZED HEALTH CARE EDUCATION/TRAINING PROGRAM

## 2022-06-16 PROCEDURE — 97162 PT EVAL MOD COMPLEX 30 MIN: CPT

## 2022-06-16 PROCEDURE — 85730 THROMBOPLASTIN TIME PARTIAL: CPT | Performed by: INTERNAL MEDICINE

## 2022-06-16 PROCEDURE — 84100 ASSAY OF PHOSPHORUS: CPT

## 2022-06-16 PROCEDURE — 27000221 HC OXYGEN, UP TO 24 HOURS

## 2022-06-16 PROCEDURE — 20600001 HC STEP DOWN PRIVATE ROOM

## 2022-06-16 PROCEDURE — 99900035 HC TECH TIME PER 15 MIN (STAT)

## 2022-06-16 PROCEDURE — 25000003 PHARM REV CODE 250: Performed by: INTERNAL MEDICINE

## 2022-06-16 PROCEDURE — 99233 SBSQ HOSP IP/OBS HIGH 50: CPT | Mod: ,,, | Performed by: INTERNAL MEDICINE

## 2022-06-16 PROCEDURE — 25000003 PHARM REV CODE 250: Performed by: STUDENT IN AN ORGANIZED HEALTH CARE EDUCATION/TRAINING PROGRAM

## 2022-06-16 PROCEDURE — 94761 N-INVAS EAR/PLS OXIMETRY MLT: CPT

## 2022-06-16 PROCEDURE — 80053 COMPREHEN METABOLIC PANEL: CPT

## 2022-06-16 RX ORDER — AMMONIUM LACTATE 12 G/100G
LOTION TOPICAL 2 TIMES DAILY PRN
Status: CANCELLED | OUTPATIENT
Start: 2022-06-16

## 2022-06-16 RX ORDER — AMMONIUM LACTATE 12 G/100G
LOTION TOPICAL 2 TIMES DAILY PRN
Status: DISCONTINUED | OUTPATIENT
Start: 2022-06-16 | End: 2022-06-20 | Stop reason: HOSPADM

## 2022-06-16 RX ORDER — LIDOCAINE 50 MG/G
2 PATCH TOPICAL
Status: DISCONTINUED | OUTPATIENT
Start: 2022-06-16 | End: 2022-06-20 | Stop reason: HOSPADM

## 2022-06-16 RX ORDER — POTASSIUM CHLORIDE 20 MEQ/1
40 TABLET, EXTENDED RELEASE ORAL ONCE
Status: COMPLETED | OUTPATIENT
Start: 2022-06-16 | End: 2022-06-16

## 2022-06-16 RX ORDER — ACETAMINOPHEN AND CODEINE PHOSPHATE 300; 30 MG/1; MG/1
1 TABLET ORAL EVERY 6 HOURS PRN
Status: DISCONTINUED | OUTPATIENT
Start: 2022-06-16 | End: 2022-06-20 | Stop reason: HOSPADM

## 2022-06-16 RX ADMIN — APIXABAN 10 MG: 5 TABLET, FILM COATED ORAL at 08:06

## 2022-06-16 RX ADMIN — LORAZEPAM 0.5 MG: 0.5 TABLET ORAL at 06:06

## 2022-06-16 RX ADMIN — Medication 5000 UNITS: at 09:06

## 2022-06-16 RX ADMIN — PANTOPRAZOLE SODIUM 40 MG: 40 TABLET, DELAYED RELEASE ORAL at 09:06

## 2022-06-16 RX ADMIN — HEPARIN SODIUM 20 UNITS/KG/HR: 5000 INJECTION INTRAVENOUS; SUBCUTANEOUS at 01:06

## 2022-06-16 RX ADMIN — POTASSIUM CHLORIDE 40 MEQ: 20 TABLET, EXTENDED RELEASE ORAL at 09:06

## 2022-06-16 RX ADMIN — FUROSEMIDE 20 MG: 20 TABLET ORAL at 09:06

## 2022-06-16 RX ADMIN — APIXABAN 10 MG: 5 TABLET, FILM COATED ORAL at 11:06

## 2022-06-16 RX ADMIN — Medication 1 TABLET: at 09:06

## 2022-06-16 RX ADMIN — LIDOCAINE 2 PATCH: 50 PATCH CUTANEOUS at 09:06

## 2022-06-16 RX ADMIN — LORAZEPAM 0.5 MG: 0.5 TABLET ORAL at 09:06

## 2022-06-16 NOTE — ASSESSMENT & PLAN NOTE
Muscle weakness of lower extremity    Patient states she has diagnosis of multiple sclerosis leading to weakness of her left lower extremity. She states she normally ambulates with a walker. She normally has mild pain in her legs. Also states she does not take medication for MS.     -- Tylenol prn for pain   -- PT/OT assessment

## 2022-06-16 NOTE — NURSING
Pt AAOx4, NAD, resp. Even/unlabored. Pt denies SOB at this time. Pt attempted to stand at edge of bed with walker. RN and PCT at side to assist Pt. Pt not able to stand straight up. Pulse OX remained 95%. Will inform MD.

## 2022-06-16 NOTE — PLAN OF CARE
Problem: Occupational Therapy  Goal: Occupational Therapy Goal  Description: Goals to be met by: 7 days 6/23/22     Patient will increase functional independence with ADLs by performing:    Pt to complete t/f BSC with MIN A   Pt to complete toileting with MIN A   Pt to complete UE dressing with set-up  Pt to complete g/h skills with supervision.   Pt to tolerated OOB to chair x 3-4 hours daily to increase endurance for functional activity.   Outcome: Ongoing, Progressing

## 2022-06-16 NOTE — PT/OT/SLP EVAL
Physical Therapy Evaluation    Patient Name:  Lupis Murcia   MRN:  [id number]  Admit Date: 6/14/2022  Admitting Diagnosis:  Acute pulmonary embolism with acute cor pulmonale  Length of Stay: 2 days  Recent Surgery: * No surgery found *      Recommendations:     Discharge Recommendations:  Return to SNF   Discharge Equipment Recommendations: bedside commode   Barriers to discharge: None    Assessment:     Lupis Murcia is a 71 y.o. female admitted with a medical diagnosis of Acute pulmonary embolism with acute cor pulmonale.  Patient found alert and has anxiety around performing activity. Patient's vitals remained stable throughout session. Patient stated that she was short of breath. SpO2 readings in mid to high 90's and patient able to carry on a conversation.     Patient was able to sit at edge of bed with one person assistance. Patient declined initiating sit to stands due above listed SOB. Progress next session to initiate sit to stands and gait training. Patient is appropriate for SNF upon hospital discharge.       Problem List: weakness, impaired endurance, impaired sensation, impaired functional mobilty, gait instability, impaired balance, decreased lower extremity function, impaired cardiopulmonary response to activity  Rehab Prognosis: Good; patient would benefit from acute skilled PT services to address these deficits and reach maximum level of function.      Plan:     During this hospitalization, patient to be seen 4 x/week to address the identified rehab impairments via gait training, therapeutic activities, therapeutic exercises, neuromuscular re-education and progress towards the established goals.    · Plan of Care Expires:  07/15/22    Subjective   Communicated with RN prior to session.  Patient found HOB elevated upon PT entry to room, agreeable to evaluation. Lupis Murcia's daughter present during session.    Chief Complaint: Shortness of Breath (Arrived from SNF for SOB X2 days.  "Progressive worsening today sating mid-80s on RA. Placed on 6L of O2 via NC w/ EMS sating 94%. No lung or cardiac hx. )    Patient/Family Comments/goals: Patient states she has not eaten in days and that she is short of breath  Pain/Comfort:  · Pain Rating 1: 0/10  · Pain Rating Post-Intervention 1: 0/10    Living Environment:  Patient lives with daughter in a single family home with no SERINA.     Prior Level of Function:   Patient reports needing assistance with mobility & with ADLs.  Patient was participating in gait training at [facility] prior to admission.   Patient uses DME as follows: walker, rolling, bath bench, raised toilet.       Patient reports they will have assistance from daughter upon discharge.    Objective:   Patient found with: peripheral IV, espinal catheter, blood pressure cuff, telemetry, pulse ox (continuous)     General Precautions: Standard, Cardiac fall   Orthopedic Precautions:N/A   Braces: N/A   Oxygen Device: Nasal Cannula   Vitals: /77   Pulse 88   Temp 98 °F (36.7 °C) (Oral)   Resp 20   Ht 5' 2" (1.575 m)   Wt 90.3 kg (199 lb)   LMP  (LMP Unknown)   SpO2 96%   BMI 36.40 kg/m²     Exams:  · Cognition:   · Alert and Cooperative  · AxOx4  · Command following: Follows multistep  commands  · Fluency: clear/fluent  · Hearing: Intact    · RLE ROM: WFL  · RLE Strength: grossly 3+/5   · LLE ROM: Deficits: Left foot drop (daughter states AFO is coming soon)   · LLE Strength: Deficits: grossly 3/5    Outcome Measures:  AM-PAC 6 CLICK MOBILITY  Turning over in bed (including adjusting bedclothes, sheets and blankets)?: 2  Sitting down on and standing up from a chair with arms (e.g., wheelchair, bedside commode, etc.): 2  Moving from lying on back to sitting on the side of the bed?: 2  Moving to and from a bed to a chair (including a wheelchair)?: 2  Need to walk in hospital room?: 2  Climbing 3-5 steps with a railing?: 2  Basic Mobility Total Score: 12     Functional Mobility:  Additional staff " present: OT and Supervising PT  Bed Mobility:  · Supine to Sit: total assistance with 2 people; HOB elevated   · Scoot to EOB: Total with 2 people     Sitting Balance at Edge of Bed:   Assistance Level Required: Minimal Assistance (static) Maximum Assistance (balance)    Time: 10 min    Postural deviations noted: slouched posture, rounded shoulders, forward head, trunk deviated left and trunk deviated right   Comments:   o Patient able to perform manual muscle testing in a seated position with maximal assistance     Transfers:   · Sit <> Stand Transfer: Not initiated due to patient declining.         Therapeutic Activities, Exercises, & Education:   Patient educated on PT role and POC.  Patient educated on importance of daily ambulation and OOB activity.   Patient verbalized understanding.         Patient left HOB elevated with all lines intact, call button in reach and RN notified.    GOALS:   Multidisciplinary Problems     Physical Therapy Goals        Problem: Physical Therapy    Goal Priority Disciplines Outcome Goal Variances Interventions   Physical Therapy Goal     PT, PT/OT Ongoing, Progressing     Description: Goals to be met by: 2022    Patient will increase functional independence with mobility by performin. Supine to sit with Minimal Assistance  2. Rolling to Left and Right with Minimal Assistance.  3. Sit to stand transfer with Moderate Assistance using Rolling Walker.   4. Gait  x 50 feet with Minimal Assistance using Rolling Walker.                      History:     Past Medical History:   Diagnosis Date    Lymphedema     MS (multiple sclerosis)     Vitamin B12 deficiency        Past Surgical History:   Procedure Laterality Date    BREAST CYST EXCISION Left     over 40yrs ago     SECTION      ESOPHAGOGASTRODUODENOSCOPY N/A 2022    Procedure: EGD (ESOPHAGOGASTRODUODENOSCOPY);  Surgeon: Radha Arango MD;  Location: [address];  Service: Endoscopy;   Laterality: N/A;       Time Tracking:     PT Received On: 06/16/22  PT Start Time: 0810     PT Stop Time: 0823  PT Total Time (min): 13 min     Billable Minutes: Evaluation 10

## 2022-06-16 NOTE — RESIDENT HANDOFF
Handoff     Primary Team: Networked reference to record PCT  Room Number: 8079/8079 A     Patient Name: Lupis Murcia MRN: [id number]     Date of Birth: [id number] Allergies: Contrast media, Pcn [penicillins], Celebrex [celecoxib], Diazepam, and Motrin [ibuprofen]     Age: 71 y.o. Admit Date: 6/14/2022     Sex: female  BMI: Body mass index is 36.4 kg/m².     Code Status: Full Code        Illness Level (current clinical status): Watcher - No    Reason for Admission: Acute pulmonary embolism with acute cor pulmonale    Brief HPI (pertinent PMH and diagnosis or differential diagnosis): 71-year-old female with history of PE during pregnancy requiring heparin drip, chronic lower extremity lymphedema, multiple sclerosis, vitamin D deficiency who presented to the ED due to shortness of breath and dyspnea on exertion two days prior to arrival. Patient states she was undergoing physical therapy at her rehabilitation facility and was walking when she suddenly became short of breath. She states since that time, her symptoms have worsened. She stated that 5 days prior, her primary physician noted that she was experiencing abdominal bruising related to heparin subcutaneous injections. She states she started having worsening respiratory symptoms and was satting 80% on room air. In the ED, cardiology was consulted and a bedside ECHO showed a dilated RV with Cox's sign. Due to her allergy for contrast, she underwent a VQ scan showing evidence of a submassive pulmonary embolism. She was started on a heparin drip.     Vitals were overall stable other than mild tachycardia and on 6L NC with oxygen saturation above 90%. CXR in ED showed mild cardiomegaly. BNP elevated to 577 with troponin elevated to 0.423. She was admitted to Critical Care Team 2 for further management.     Procedure Date: -    Hospital Course (updated, brief assessment by system or problem, significant events): Upon admission to the ICU, patient was continued on  heparin drip. Cardiology following. TTE showed EF 60% with elevated pulm pressure. Troponins continued to rise, but peaked overtime and downtrended. After many discussions with patient and family at bedside about procedural intervention for her pulmonary embolism, patient has decided against this and does not want to further discuss this. She was found to be subtherapeutic, and aPTT monitored since she was on heparin drip. Transitioned to eliquis. She was able to have stable oxygenation when sitting up in bed and trying to stand. Therefore, she was determined to be clinically stable for transfer to a stepdown unit.        Tasks (specific, using if-then statements):   - monitor for signs of hypoxia   - Switched heparin to eliquis, monitor for signs of bleeding     Contingency Plan (special circumstances anticipated and plan): -    Estimated Discharge Date: -    Discharge Disposition: Home or Self Care    Mentored By: Dave ZAYAS

## 2022-06-16 NOTE — NURSING
Pt refused second dose of potassium replacement oral tablets. Effervescent potassium and IV potassium options were also refused. Team notified; will recheck potassium during AM labs.

## 2022-06-16 NOTE — PLAN OF CARE
CMICU DAILY GOALS       A: Awake    RASS: Goal -    Actual - RASS (Small Agitation-Sedation Scale): 0-->alert and calm   Restraint necessity:    B: Breathe   SBT: Not intubated   C: Coordinate A & B, analgesics/sedatives   Pain: managed    SAT: Not intubated  D: Delirium   CAM-ICU: Overall CAM-ICU: Negative  E: Early(intubated/ Progressive (non-intubated) Mobility   MOVE Screen: Fail   Activity: Activity Management: Rolling - L1  FAS: Feeding/Nutrition   Diet order: Diet/Nutrition Received: full liquid,    T: Thrombus   DVT prophylaxis: VTE Required Core Measure: Pharmacological prophylaxis initiated/maintained  H: HOB Elevation   Head of Bed (HOB) Positioning: HOB at 60-90 degrees  U: Ulcer Prophylaxis   GI: yes  G: Glucose control   managed    S: Skin   Bathing/Skin Care: linen changed, incontinence care  Device Skin Pressure Protection: absorbent pad utilized/changed, adhesive use limited  Pressure Reduction Devices: specialty bed utilized, foam padding utilized  Pressure Reduction Techniques: weight shift assistance provided, frequent weight shift encouraged  Skin Protection: adhesive use limited, incontinence pads utilized, transparent dressing maintained, tubing/devices free from skin contact  B: Bowel Function   diarrhea   I: Indwelling Catheters   Slaughter necessity:     CVC necessity: No  D: De-escalation Antibiotics   Yes    Family/Goals of care/Code Status   Code Status: Full Code    24H Vital Sign Range  Temp:  [98 °F (36.7 °C)-99.1 °F (37.3 °C)]   Pulse:  [80-94]   Resp:  [12-27]   BP: ()/(59-90)   SpO2:  [90 %-100 %]      Shift Events   Pt complained of nausea last night and given PRN zofran with moderate relief. Pt refused second dose of potassium replacement and team was notified. Morning potassium 3.6 and team made aware; potassium replacement to be completed during AM.         YOHANNES PALACIOS

## 2022-06-16 NOTE — CONSULTS
[facility] - Telemetry Stepdown  Wound Care    Patient Name:  Lupis Murcia   MRN:  [id number]  Date: 6/16/2022  Diagnosis: Acute pulmonary embolism with acute cor pulmonale    History:     Past Medical History:   Diagnosis Date    Lymphedema     MS (multiple sclerosis)     Vitamin B12 deficiency        Social History     Socioeconomic History    Marital status:    Tobacco Use    Smoking status: Never Smoker    Smokeless tobacco: Never Used   Substance and Sexual Activity    Alcohol use: No    Drug use: No       Precautions:     Allergies as of 06/14/2022 - Reviewed 06/14/2022   Allergen Reaction Noted    Contrast media Shortness Of Breath and Rash 12/15/2016    Pcn [penicillins] Shortness Of Breath and Rash 07/10/2013    Celebrex [celecoxib] Other (See Comments) 06/12/2017    Diazepam Hives 10/12/2021    Motrin [ibuprofen] Rash 07/10/2013       [facility] Assessment Details/Treatment   Wound care consult for buttocks and lower extremities.  There is a partial thickness skin loss to the coccyx POA due to MAD/IAD. Triad applied. The bilateral lower extremities are dry, and scaly. Patient states they look better than they used to. No drainage.    Plan:  Coccyx - Nursing to cleanse with NS or wound cleanser, pat dry and apply triad to coccyx BID and PRN. Cover with foam per patient's request.   Bilateral lower extremity - Nursing to cleanse with NS, pat dry and apply ammonium lactate daily. Can alternate ammonium lactate with sween cream (pink top) per patients request.   Waffle mattress overlay  Turning every 2 hours     Recommendations made to primary team for above plan via secure chat. Orders placed. Wound care to follow-up PRN.      06/16/22 1110        Altered Skin Integrity 06/16/22 1110 Coccyx Moisture associated dermatitis   Date First Assessed/Time First Assessed: 06/16/22 1110   Altered Skin Integrity Present on Admission: yes  Location: Coccyx  Primary Wound Type: Moisture associated dermatitis    Wound Image    Dressing Appearance Dry;Intact   Drainage Amount None   Appearance Pink;Red;Moist   Tissue loss description Partial thickness   Periwound Area Moist;Pink   Dressing Removed;Applied;Other (comment)  (triad)        Altered Skin Integrity 06/16/22 1110 Right anterior;lower Leg Other (comment)   Date First Assessed/Time First Assessed: 06/16/22 1110   Altered Skin Integrity Present on Admission: yes  Side: Right  Orientation: anterior;lower  Location: Leg  Primary Wound Type: (c) Other (comment)   Wound Image    Dressing Appearance Open to air   Drainage Amount None   Appearance Intact;Dry;Yellow;Tan   Tissue loss description Not applicable        Altered Skin Integrity 06/16/22 1110 Left anterior;lower Leg Other (comment)   Date First Assessed/Time First Assessed: 06/16/22 1110   Altered Skin Integrity Present on Admission: yes  Side: Left  Orientation: anterior;lower  Location: Leg  Primary Wound Type: (c) Other (comment)   Dressing Appearance Open to air   Drainage Amount None   Appearance Tan;Yellow;Dry   Tissue loss description Not applicable     06/16/2022

## 2022-06-16 NOTE — PLAN OF CARE
06/16/22 1543   Post-Acute Status   Post-Acute Authorization Placement   Post-Acute Placement Status Referrals Sent   Met with patient and daughter, Amanda, at bedside to discuss discharge planning. PT/OT recommending SNF at this time. Amanda reports patient was admitted to [facility] from [facility] and was informed patient had about 1 week left of therapy. Amanda would like for patient to return to [facility] at discharge.    Spoke with Palma who stated patient would have been discharged within a day or so, also will not have beds until next week.    Adali King, LEXI  [facility]  Ext. [phone]

## 2022-06-16 NOTE — PROGRESS NOTES
[facility] - Telemetry Stepdown  Critical Care Medicine  Progress Note    Patient Name: Lupis Murcia  MRN: [id number]  Admission Date: 6/14/2022  Hospital Length of Stay: 2 days  Code Status: Full Code  Attending Provider: Albino Acosta MD  Primary Care Provider: Bobby Tran MD   Principal Problem: Acute pulmonary embolism with acute cor pulmonale    Subjective:     HPI:  71-year-old female with history of PE during pregnancy requiring heparin drip, chronic lower extremity lymphedema, multiple sclerosis, vitamin D deficiency who presented to the ED due to shortness of breath and dyspnea on exertion two days prior to arrival. Patient states she was undergoing physical therapy at her rehabilitation facility and was walking when she suddenly became short of breath. She states since that time, her symptoms have worsened. She stated that 5 days prior, her primary physician noted that she was experiencing abdominal bruising related to heparin subcutaneous injections. She states she started having worsening respiratory symptoms and was satting 80% on room air. In the ED, cardiology was consulted and a bedside ECHO showed a dilated RV with Cox's sign. Due to her allergy for contrast, she underwent a VQ scan showing evidence of a submassive pulmonary embolism. She was started on a heparin drip.    Vitals were overall stable other than mild tachycardia and on 6L NC with oxygen saturation above 90%. CXR in ED showed mild cardiomegaly. BNP elevated to 577 with troponin elevated to 0.423. She was admitted to Critical Care Team 2 for further management.       Hospital/ICU Course:  Upon admission to the ICU, patient was continued on heparin drip. Cardiology following. TTE showed EF 60% with elevated pulm pressure. Troponins continued to rise, but peaked overtime and downtrended. After many discussions with patient and family at bedside about procedural intervention for her pulmonary embolism, patient has decided against this and  does not want to further discuss this. She was found to be subtherapeutic, and aPTT monitored since she was on heparin drip. Transitioned to eliquis. She was able to have stable oxygenation when sitting up in bed and trying to stand. Therefore, she was determined to be clinically stable for transfer to a stepdown unit.       Interval History/Significant Events: NAEON. PT/OT to evaluate patient. Able to sit on side of bed and stand with assistance with stable oxygenation. Subtherapeutic aPTT, transitioned heparin to eliquis. Continuing to monitor. Lasix 20mg continued. Clinically stable for transfer to stepdown unit.     Review of Systems   Constitutional:  Negative for chills, fatigue and fever.   HENT:  Negative for congestion and sinus pain.    Eyes:  Negative for pain and discharge.   Respiratory:  Negative for cough, shortness of breath and wheezing.    Cardiovascular:  Negative for chest pain and leg swelling.   Gastrointestinal:  Negative for abdominal pain, constipation, diarrhea, nausea and vomiting.   Genitourinary:  Negative for difficulty urinating and enuresis.   Musculoskeletal:  Positive for gait problem.   Skin:  Positive for color change and wound. Negative for rash.        Due to lymphedema of BL LE   Neurological:  Positive for weakness. Negative for dizziness and light-headedness.        LLE weakness due to MS   Hematological:  Does not bruise/bleed easily.   Psychiatric/Behavioral:  Negative for confusion. The patient is nervous/anxious.    Objective:     Vital Signs (Most Recent):  Temp: 97.8 °F (36.6 °C) (06/16/22 1100)  Pulse: 82 (06/16/22 1100)  Resp: (!) 22 (06/16/22 1100)  BP: 131/78 (06/16/22 1100)  SpO2: 97 % (06/16/22 1100)   Vital Signs (24h Range):  Temp:  [97.8 °F (36.6 °C)-99.1 °F (37.3 °C)] 97.8 °F (36.6 °C)  Pulse:  [79-90] 82  Resp:  [12-27] 22  SpO2:  [92 %-100 %] 97 %  BP: ()/(55-90) 131/78   Weight: 90.3 kg (199 lb)  Body mass index is 36.4 kg/m².      Intake/Output  Summary (Last 24 hours) at 6/16/2022 1231  Last data filed at 6/16/2022 1000  Gross per 24 hour   Intake 347.27 ml   Output 401 ml   Net -53.73 ml       Physical Exam  Vitals and nursing note reviewed.   Constitutional:       General: She is not in acute distress.     Appearance: Normal appearance.   HENT:      Head: Normocephalic and atraumatic.   Eyes:      Extraocular Movements: Extraocular movements intact.      Conjunctiva/sclera: Conjunctivae normal.   Cardiovascular:      Rate and Rhythm: Normal rate and regular rhythm.      Pulses: Normal pulses.      Heart sounds: Normal heart sounds. No murmur heard.     Comments: Systolic murmur  Pulmonary:      Effort: Pulmonary effort is normal. No respiratory distress.      Breath sounds: Normal breath sounds. No wheezing.   Abdominal:      General: Abdomen is flat. Bowel sounds are normal. There is no distension.      Palpations: Abdomen is soft.      Tenderness: There is no abdominal tenderness.   Musculoskeletal:         General: No swelling or tenderness.      Right lower leg: Edema present.      Left lower leg: Edema present.   Skin:     General: Skin is warm and dry.      Coloration: Skin is not jaundiced.      Findings: Erythema and lesion present.      Comments: BL LE erythema with overlying hyperkeratotic changes   Neurological:      Mental Status: She is alert and oriented to person, place, and time.      Motor: Weakness present.      Comments: LLE weakness due to MS   Psychiatric:         Mood and Affect: Mood normal.         Behavior: Behavior normal.       Vents:  Oxygen Concentration (%): 28 (06/15/22 0900)  Lines/Drains/Airways       Drain  Duration             Female External Urinary Catheter 06/14/22 2100 1 day              Peripheral Intravenous Line  Duration                  Peripheral IV - Single Lumen 06/14/22 1004 18 G Left Antecubital 2 days         Peripheral IV - Single Lumen 06/14/22 1215 20 G Right Antecubital 2 days                   Significant Labs:    CBC/Anemia Profile:  Recent Labs   Lab 06/15/22  0603 06/16/22  0347   WBC 6.58 7.55   HGB 9.4* 9.8*   HCT 29.0* 30.0*    175   MCV 97 98   RDW 16.4* 15.9*        Chemistries:  Recent Labs   Lab 06/15/22  0653 06/16/22  0347    143   K 3.3* 3.6    108   CO2 25 21*   BUN 23 20   CREATININE 0.9 0.8   CALCIUM 10.0 9.9   ALBUMIN 3.4* 3.5   PROT 6.4 6.3   BILITOT 0.4 0.4   ALKPHOS 56 66   ALT 15 21   AST 14 19   MG 1.9 1.9   PHOS 3.9 4.2       All pertinent labs within the past 24 hours have been reviewed.    Significant Imaging:  I have reviewed all pertinent imaging results/findings within the past 24 hours.      ABG  No results for input(s): PH, PO2, PCO2, HCO3, BE in the last 168 hours.  Assessment/Plan:     Neuro  MS (multiple sclerosis)  Muscle weakness of lower extremity    Patient states she has diagnosis of multiple sclerosis leading to weakness of her left lower extremity. She states she normally ambulates with a walker. She normally has mild pain in her legs. Also states she does not take medication for MS.     -- Tylenol prn for pain   -- PT/OT assessment    Insomnia secondary to chronic pain  Patient states she takes ativan 0.5mg prn for insomnia and anxiety. Currently anxious due to diagnosis of pulmonary embolism    -- Continue ativan 0.5mg PRN q8  -- Monitor for signs of acute encephalopathy    Hematology  * Acute pulmonary embolism with acute cor pulmonale  Acute hypoxemic respiratory failure    Patient states two days prior to arrival while undergoing physical therapy and walking, she suddenly developed shortness of breath with dyspnea on exertion. She states she continued to have worsening symptoms over the next two days and was found to have oxygenation saturation of 86% on RA. Of note, she was recently receiving subcutaneous heparin at her rehab facility but five days prior to arrival, it was discontinued. She states her PCP stopped it due to abdominal  bruising.     In ED, cardiology evaluated patient and bedside ECHO showed a dilated RV with Cox's sign, mild RV dysfunction, PASP estimated at ~70 mmHg with systolic flattening of the IVS consistent with pressure overload. LV function appears wnl. IVC 15 mmHg. CXR showed mild cardiomegaly. BNP elevated to 577 and troponin elevated to 0.423.     Cardiology discussed next step in management, including CDT, aspiration thrombectomy, mechanical/surgical thrombectomy, but patient opted for medical management with full-dose anticoagulation.     ECHO results showing:   · The estimated ejection fraction is 60%.  · The left ventricle is normal in size with concentric remodeling and normal systolic function.  · Grade I left ventricular diastolic dysfunction.  · Mild right ventricular enlargement with mildly to moderately reduced right ventricular systolic function. Positive Cox's sign.  · Mild to moderate tricuspid regurgitation.  · Right atrial enlargement.  · There is mild-to-moderate aortic valve stenosis.  · Aortic valve area is 2.20 cm2; peak velocity is 3.03 m/s; mean gradient is 20 mmHg.  · The estimated PA systolic pressure is 69 mmHg.  · Elevated central venous pressure (15 mmHg).     -- Transitioned from heparin drip to eliquis  -- Monitor for signs of bleeding  -- Daily CBC  -- aPTT monitoring, subtherapeutic  -- DVT US negative    Endocrine  BMI 37.0-37.9, adult  BMI noted to be 37.13    -- Continue cardiac diet  -- Education of healthy diet upon discharge     Vitamin D deficiency  -- Continue home Vit D     Other  Lymphedema  Chronic recorded history of lymphedema. Consider lymphedema with associated chronic statis dermatitis given bilateral findings with overlying hyperkeratotic changes. During last admission (May 2021), required treatment with physical therapy for increased functional status. States improvement since last admission.      Critical Care Daily Checklist:    A: Awake: RASS Goal/Actual  Goal:    Actual: Small Agitation Sedation Scale (RASS): Drowsy   B: Spontaneous Breathing Trial Performed?     C: SAT & SBT Coordinated?  N/A                      D: Delirium: CAM-ICU Overall CAM-ICU: Negative   E: Early Mobility Performed? Yes   F: Feeding Goal:    Status:     Current Diet Order   Procedures    Diet full liquid      AS: Analgesia/Sedation None   T: Thromboembolic Prophylaxis Eliquis   H: HOB > 300 Yes   U: Stress Ulcer Prophylaxis (if needed) protonix   G: Glucose Control LDSSI   B: Bowel Function Stool Occurrence: 1   I: Indwelling Catheter (Lines & Espinal) Necessity PIV and espinal   D: De-escalation of Antimicrobials/Pharmacotherapies None    Plan for the day/ETD Stepdown    Code Status:  Family/Goals of Care: Full Code       Stepdown to Hospital Medicine service.      Critical care was time spent personally by me on the following activities: development of treatment plan with patient or surrogate and bedside caregivers, discussions with consultants, evaluation of patient's response to treatment, examination of patient, ordering and performing treatments and interventions, ordering and review of laboratory studies, ordering and review of radiographic studies, pulse oximetry, re-evaluation of patient's condition. This critical care time did not overlap with that of any other provider or involve time for any procedures.     Abena Thomas MD  Critical Care Medicine  [facility] - Telemetry Stepdown

## 2022-06-16 NOTE — PROVIDER TRANSFER
Hospital Medicine  Stepdown Acceptance Note      Patient Name: Lupis Murcia  MRN:  [id number]  Primary Team: Networked reference to record PCT  Helga Early MD  Date of Admission:  6/14/2022     Length of Stay:  LOS: 2 days   Principal Problem:  Acute pulmonary embolism with acute cor pulmonale      HPI:       71-year-old female with history of PE during pregnancy requiring heparin drip, chronic lower extremity lymphedema, multiple sclerosis, vitamin D deficiency who presented to the ED due to shortness of breath and dyspnea on exertion two days prior to arrival. Patient states she was undergoing physical therapy at her rehabilitation facility and was walking when she suddenly became short of breath. She states since that time, her symptoms have worsened. She stated that 5 days prior, her primary physician noted that she was experiencing abdominal bruising related to heparin subcutaneous injections. She states she started having worsening respiratory symptoms and was satting 80% on room air. In the ED, cardiology was consulted and a bedside ECHO showed a dilated RV with Cox's sign. Due to her allergy for contrast, she underwent a VQ scan showing evidence of a submassive pulmonary embolism. She was started on a heparin drip.     Vitals were overall stable other than mild tachycardia and on 6L NC with oxygen saturation above 90%. CXR in ED showed mild cardiomegaly. BNP elevated to 577 with troponin elevated to 0.423. She was admitted to Critical Care Team 2 for further management.         Hospital Course:       Ms. Lupis Murcia is a 71 y.o. female who was admitted to [facility] ICU on 6/14/2022 with an acute submassive PE with acute cor pulmonale.  She was started on a Heparin gtt.  Cardiology was consulted.  2D echo showed EF 60% with elevated PAP.  Troponin peaked at 0.790.  BLE US was negative for PE.  She was given the option of TPA but refused.  She was transitioned to Eliquis.   She was able to have  stable oxygenation at 4L NC when sitting up in bed and trying to stand, and was thus felt stable to SD to HM on 6/16.    · Monitor oxygen  · F/u CM/SW for SNF      Disposition:  SNF  Discharge Needs:  TBD  USMAN:  6/20/2022  Code Status:    Code Status: Full Code      Patient has been accepted by [facility], who will assume care of the patient upon arrival to the floor.  [phone] with any concerns prior to arrival.      Helga Early MD  [facility]

## 2022-06-16 NOTE — ASSESSMENT & PLAN NOTE
Patient states she takes ativan 0.5mg prn for insomnia and anxiety. Currently anxious due to diagnosis of pulmonary embolism    -- Continue ativan 0.5mg PRN q8  -- Monitor for signs of acute encephalopathy

## 2022-06-16 NOTE — HOSPITAL COURSE
Ms. Lupis Murcia is a 71 y.o. female who was admitted to [facility] ICU on 6/14/2022 with an acute submassive PE with acute cor pulmonale.  She was started on a Heparin gtt.  Cardiology was consulted.  2D echo showed EF 60% with elevated PAP.  Troponin peaked at 0.790.  BLE US was negative for PE.  She was given the option of TPA but refused.  She was transitioned to [facility].   She was able to have stable oxygenation at 4L NC when sitting up in bed and trying to stand, and was thus felt stable to SD to  on 6/16.  PT/OT say SNF.  She was accepted back to [facility], weaned down to 1-2L NC.

## 2022-06-16 NOTE — PLAN OF CARE
Problem: Physical Therapy  Goal: Physical Therapy Goal  Description: Goals to be met by: 2022    Patient will increase functional independence with mobility by performin. Supine to sit with Minimal Assistance  2. Rolling to Left and Right with Minimal Assistance.  3. Sit to stand transfer with Moderate Assistance using Rolling Walker.   4. Gait  x 50 feet with Minimal Assistance using Rolling Walker.     Outcome: Ongoing, Progressing    Eval Completed. Goals Appropriate

## 2022-06-16 NOTE — NURSING
Pt transferred to room 8079 with RN and PCT at side. Telemetry monitor on. Pt AAOx4, NAD, resp. Even/unlabored. Daughter at bedside.

## 2022-06-16 NOTE — SUBJECTIVE & OBJECTIVE
Interval History/Significant Events: NAEON. PT/OT to evaluate patient. Able to sit on side of bed and stand with assistance with stable oxygenation. Subtherapeutic aPTT, transitioned heparin to eliquis. Continuing to monitor. Lasix 20mg continued. Clinically stable for transfer to stepdown unit.     Review of Systems   Constitutional:  Negative for chills, fatigue and fever.   HENT:  Negative for congestion and sinus pain.    Eyes:  Negative for pain and discharge.   Respiratory:  Negative for cough, shortness of breath and wheezing.    Cardiovascular:  Negative for chest pain and leg swelling.   Gastrointestinal:  Negative for abdominal pain, constipation, diarrhea, nausea and vomiting.   Genitourinary:  Negative for difficulty urinating and enuresis.   Musculoskeletal:  Positive for gait problem.   Skin:  Positive for color change and wound. Negative for rash.        Due to lymphedema of BL LE   Neurological:  Positive for weakness. Negative for dizziness and light-headedness.        LLE weakness due to MS   Hematological:  Does not bruise/bleed easily.   Psychiatric/Behavioral:  Negative for confusion. The patient is nervous/anxious.    Objective:     Vital Signs (Most Recent):  Temp: 97.8 °F (36.6 °C) (06/16/22 1100)  Pulse: 82 (06/16/22 1100)  Resp: (!) 22 (06/16/22 1100)  BP: 131/78 (06/16/22 1100)  SpO2: 97 % (06/16/22 1100)   Vital Signs (24h Range):  Temp:  [97.8 °F (36.6 °C)-99.1 °F (37.3 °C)] 97.8 °F (36.6 °C)  Pulse:  [79-90] 82  Resp:  [12-27] 22  SpO2:  [92 %-100 %] 97 %  BP: ()/(55-90) 131/78   Weight: 90.3 kg (199 lb)  Body mass index is 36.4 kg/m².      Intake/Output Summary (Last 24 hours) at 6/16/2022 1231  Last data filed at 6/16/2022 1000  Gross per 24 hour   Intake 347.27 ml   Output 401 ml   Net -53.73 ml       Physical Exam  Vitals and nursing note reviewed.   Constitutional:       General: She is not in acute distress.     Appearance: Normal appearance.   HENT:      Head: Normocephalic  and atraumatic.   Eyes:      Extraocular Movements: Extraocular movements intact.      Conjunctiva/sclera: Conjunctivae normal.   Cardiovascular:      Rate and Rhythm: Normal rate and regular rhythm.      Pulses: Normal pulses.      Heart sounds: Normal heart sounds. No murmur heard.     Comments: Systolic murmur  Pulmonary:      Effort: Pulmonary effort is normal. No respiratory distress.      Breath sounds: Normal breath sounds. No wheezing.   Abdominal:      General: Abdomen is flat. Bowel sounds are normal. There is no distension.      Palpations: Abdomen is soft.      Tenderness: There is no abdominal tenderness.   Musculoskeletal:         General: No swelling or tenderness.      Right lower leg: Edema present.      Left lower leg: Edema present.   Skin:     General: Skin is warm and dry.      Coloration: Skin is not jaundiced.      Findings: Erythema and lesion present.      Comments: BL LE erythema with overlying hyperkeratotic changes   Neurological:      Mental Status: She is alert and oriented to person, place, and time.      Motor: Weakness present.      Comments: LLE weakness due to MS   Psychiatric:         Mood and Affect: Mood normal.         Behavior: Behavior normal.       Vents:  Oxygen Concentration (%): 28 (06/15/22 0900)  Lines/Drains/Airways       Drain  Duration             Female External Urinary Catheter 06/14/22 2100 1 day              Peripheral Intravenous Line  Duration                  Peripheral IV - Single Lumen 06/14/22 1004 18 G Left Antecubital 2 days         Peripheral IV - Single Lumen 06/14/22 1215 20 G Right Antecubital 2 days                  Significant Labs:    CBC/Anemia Profile:  Recent Labs   Lab 06/15/22  0603 06/16/22  0347   WBC 6.58 7.55   HGB 9.4* 9.8*   HCT 29.0* 30.0*    175   MCV 97 98   RDW 16.4* 15.9*        Chemistries:  Recent Labs   Lab 06/15/22  0653 06/16/22  0347    143   K 3.3* 3.6    108   CO2 25 21*   BUN 23 20   CREATININE 0.9 0.8    CALCIUM 10.0 9.9   ALBUMIN 3.4* 3.5   PROT 6.4 6.3   BILITOT 0.4 0.4   ALKPHOS 56 66   ALT 15 21   AST 14 19   MG 1.9 1.9   PHOS 3.9 4.2       All pertinent labs within the past 24 hours have been reviewed.    Significant Imaging:  I have reviewed all pertinent imaging results/findings within the past 24 hours.

## 2022-06-16 NOTE — PT/OT/SLP EVAL
"Occupational Therapy  Co-Evaluation    Name: Lupis Murcia  MRN: [id number]  Admitting Diagnosis:  Acute pulmonary embolism with acute cor pulmonale  Pt with hx of MS with LE weakness. Pt was admitted from SNF with SOB with submassive PE    Recommendations:     Discharge Recommendations: nursing facility, skilled    Assessment:     Lupis Murcia is a 71 y.o. female with a medical diagnosis of Acute pulmonary embolism with acute cor pulmonale. Performance deficits affecting function: weakness, impaired endurance, impaired self care skills, impaired functional mobilty, gait instability, impaired balance, decreased safety awareness.  Pt tolerated session fairly well. Pt was limited this date by anxiety and fear of movement.   Anticipate pt will be appropriate for return to SNF prior to return home to allow for increased independence and safety.     Rehab Prognosis: Good; patient would benefit from acute skilled OT services to address these deficits and reach maximum level of function.       Plan:     Patient to be seen 4 x/week to address the above listed problems via therapeutic activities, therapeutic exercises, self-care/home management  · Plan of Care Expires:    · Plan of Care Reviewed with: patient, daughter    Subjective     "Oh wait. I am not doing this right now!" pt states.   "I can't breathe! Lay me down" pt states.     Occupational Profile:  Pt resides with daughter in one story home with ramp access over one step.  Pt has able to complete basic t/f skills with RW and able to complete dressing and toileting without assist.   Pt has RW, TTB and raised toilet seat    Pain/Comfort:  · Pain Rating 1: 0/10    Patients cultural, spiritual, Cheondoism conflicts given the current situation: no    Objective:     Communicated with: nsg  prior to session.  Patient found sitting EOB with nsg present and daughter present. Pt was propped on pillows with left trunk supported on HOB which was elevated. Pt with tele, " pulse ox, BP in place   General Precautions: Standard,   fall    Occupational Performance:    Bed Mobility:    Pt required TOTAL A x 2 to return supine.     Functional Mobility/Transfers:  · Pt declined stand despite encouragement and support with RW present.     Activities of Daily Living:  · Pt demo ability to complete self feeding and g/h skills with  set-up in supported sitting. Pt unwilling to engage with tasks seated EOB as eager to return supine.     Cognitive/Visual Perceptual  Pt awake, alert and following commands. However, pt with increased anxiety needing cues     Physical Exam:  Pt is right hand dominant and demo Good , Fair strength and intact light touch sensation     AMPAC 6 Click ADL:  [facility] Total Score:      Treatment & Education:  Pt tolerated sitting EOB with MIN A for postural control. Pt declined standing and further assessment stating difficulty breathing and desire to return supine to eat her breakfast. Despite education and encouragement, pt continued to decline and became increasingly frustrated. VSS assessed and remained stable.   Education provided re: importance of continued engagement with ADL and mobility skills.   Education provided re: OT POC and safety with functional mobility/ADL skills.     Education:    Patient left supine with all lines intact, call button in reach, nsg notified and daughter present    GOALS:   Multidisciplinary Problems     Occupational Therapy Goals        Problem: Occupational Therapy    Goal Priority Disciplines Outcome Interventions   Occupational Therapy Goal     OT, PT/OT Ongoing, Progressing    Description: Goals to be met by: 7 days 6/23/22     Patient will increase functional independence with ADLs by performing:    Pt to complete t/f BSC with MIN A   Pt to complete toileting with MIN A   Pt to complete UE dressing with set-up  Pt to complete g/h skills with supervision.   Pt to tolerated OOB to chair x 3-4 hours daily to increase endurance for  functional activity.                    History:     Past Medical History:   Diagnosis Date    Lymphedema     MS (multiple sclerosis)     Vitamin B12 deficiency        Past Surgical History:   Procedure Laterality Date    BREAST CYST EXCISION Left     over 40yrs ago     SECTION      ESOPHAGOGASTRODUODENOSCOPY N/A 2022    Procedure: EGD (ESOPHAGOGASTRODUODENOSCOPY);  Surgeon: Radha Arango MD;  Location: [address];  Service: Endoscopy;  Laterality: N/A;       Time Tracking:     OT Date of Treatment: 22  OT Start Time: 808  OT Stop Time: 823  OT Total Time (min): 15 min    Billable Minutes:Evaluation 15    2022

## 2022-06-16 NOTE — ASSESSMENT & PLAN NOTE
Acute hypoxemic respiratory failure    Patient states two days prior to arrival while undergoing physical therapy and walking, she suddenly developed shortness of breath with dyspnea on exertion. She states she continued to have worsening symptoms over the next two days and was found to have oxygenation saturation of 86% on RA. Of note, she was recently receiving subcutaneous heparin at her rehab facility but five days prior to arrival, it was discontinued. She states her PCP stopped it due to abdominal bruising.     In ED, cardiology evaluated patient and bedside ECHO showed a dilated RV with Cox's sign, mild RV dysfunction, PASP estimated at ~70 mmHg with systolic flattening of the IVS consistent with pressure overload. LV function appears wnl. IVC 15 mmHg. CXR showed mild cardiomegaly. BNP elevated to 577 and troponin elevated to 0.423.     Cardiology discussed next step in management, including CDT, aspiration thrombectomy, mechanical/surgical thrombectomy, but patient opted for medical management with full-dose anticoagulation.     ECHO results showing:   · The estimated ejection fraction is 60%.  · The left ventricle is normal in size with concentric remodeling and normal systolic function.  · Grade I left ventricular diastolic dysfunction.  · Mild right ventricular enlargement with mildly to moderately reduced right ventricular systolic function. Positive Cox's sign.  · Mild to moderate tricuspid regurgitation.  · Right atrial enlargement.  · There is mild-to-moderate aortic valve stenosis.  · Aortic valve area is 2.20 cm2; peak velocity is 3.03 m/s; mean gradient is 20 mmHg.  · The estimated PA systolic pressure is 69 mmHg.  · Elevated central venous pressure (15 mmHg).     -- Transitioned from heparin drip to eliquis  -- Monitor for signs of bleeding  -- Daily CBC  -- aPTT monitoring, subtherapeutic  -- DVT US negative

## 2022-06-16 NOTE — PLAN OF CARE
Problem: Adult Inpatient Plan of Care  Goal: Plan of Care Review  Outcome: Ongoing, Progressing  Goal: Patient-Specific Goal (Individualized)  Outcome: Ongoing, Progressing  Goal: Absence of Hospital-Acquired Illness or Injury  Outcome: Ongoing, Progressing  Goal: Optimal Comfort and Wellbeing  Outcome: Ongoing, Progressing  Goal: Readiness for Transition of Care  Outcome: Ongoing, Progressing     Problem: Fluid and Electrolyte Imbalance (Acute Kidney Injury/Impairment)  Goal: Fluid and Electrolyte Balance  Outcome: Ongoing, Progressing     Problem: Oral Intake Inadequate (Acute Kidney Injury/Impairment)  Goal: Optimal Nutrition Intake  Outcome: Ongoing, Progressing     Problem: Renal Function Impairment (Acute Kidney Injury/Impairment)  Goal: Effective Renal Function  Outcome: Ongoing, Progressing     Problem: Impaired Wound Healing  Goal: Optimal Wound Healing  Outcome: Ongoing, Progressing     Problem: Fall Injury Risk  Goal: Absence of Fall and Fall-Related Injury  Outcome: Ongoing, Progressing     Problem: Skin Injury Risk Increased  Goal: Skin Health and Integrity  Outcome: Ongoing, Progressing

## 2022-06-16 NOTE — NURSING
"Pt refused TB skin test dt "being stuck too many times today."  Teaching on TB skin test given with daughter, Sue, at bedside. Pt and daughter acknowledge teaching and are agreeable to receiving the injection only if absolutely necessary. MD notified and medication placed in patient specific bin.   "

## 2022-06-17 PROBLEM — E66.812 CLASS 2 OBESITY DUE TO EXCESS CALORIES IN ADULT: Status: ACTIVE | Noted: 2022-06-14

## 2022-06-17 PROBLEM — E66.09 CLASS 2 OBESITY DUE TO EXCESS CALORIES IN ADULT: Status: ACTIVE | Noted: 2022-06-14

## 2022-06-17 LAB
ALBUMIN SERPL BCP-MCNC: 3.4 G/DL (ref 3.5–5.2)
ALP SERPL-CCNC: 65 U/L (ref 55–135)
ALT SERPL W/O P-5'-P-CCNC: 17 U/L (ref 10–44)
ANION GAP SERPL CALC-SCNC: 13 MMOL/L (ref 8–16)
AST SERPL-CCNC: 15 U/L (ref 10–40)
BILIRUB SERPL-MCNC: 0.5 MG/DL (ref 0.1–1)
BUN SERPL-MCNC: 17 MG/DL (ref 8–23)
CALCIUM SERPL-MCNC: 10 MG/DL (ref 8.7–10.5)
CHLORIDE SERPL-SCNC: 108 MMOL/L (ref 95–110)
CO2 SERPL-SCNC: 22 MMOL/L (ref 23–29)
CREAT SERPL-MCNC: 0.8 MG/DL (ref 0.5–1.4)
EST. GFR  (AFRICAN AMERICAN): >60 ML/MIN/1.73 M^2
EST. GFR  (NON AFRICAN AMERICAN): >60 ML/MIN/1.73 M^2
GLUCOSE SERPL-MCNC: 109 MG/DL (ref 70–110)
MAGNESIUM SERPL-MCNC: 1.8 MG/DL (ref 1.6–2.6)
PHOSPHATE SERPL-MCNC: 3.9 MG/DL (ref 2.7–4.5)
POCT GLUCOSE: 120 MG/DL (ref 70–110)
POTASSIUM SERPL-SCNC: 3.3 MMOL/L (ref 3.5–5.1)
PROT SERPL-MCNC: 6.5 G/DL (ref 6–8.4)
SODIUM SERPL-SCNC: 143 MMOL/L (ref 136–145)

## 2022-06-17 PROCEDURE — 30200315 PPD INTRADERMAL TEST REV CODE 302: Performed by: HOSPITALIST

## 2022-06-17 PROCEDURE — 97530 THERAPEUTIC ACTIVITIES: CPT

## 2022-06-17 PROCEDURE — 25000003 PHARM REV CODE 250

## 2022-06-17 PROCEDURE — 99232 PR SUBSEQUENT HOSPITAL CARE,LEVL II: ICD-10-PCS | Mod: ,,, | Performed by: HOSPITALIST

## 2022-06-17 PROCEDURE — 97116 GAIT TRAINING THERAPY: CPT | Mod: CQ

## 2022-06-17 PROCEDURE — 25000003 PHARM REV CODE 250: Performed by: STUDENT IN AN ORGANIZED HEALTH CARE EDUCATION/TRAINING PROGRAM

## 2022-06-17 PROCEDURE — 86580 TB INTRADERMAL TEST: CPT | Performed by: HOSPITALIST

## 2022-06-17 PROCEDURE — 99232 SBSQ HOSP IP/OBS MODERATE 35: CPT | Mod: ,,, | Performed by: HOSPITALIST

## 2022-06-17 PROCEDURE — 80053 COMPREHEN METABOLIC PANEL: CPT

## 2022-06-17 PROCEDURE — 25000003 PHARM REV CODE 250: Performed by: HOSPITALIST

## 2022-06-17 PROCEDURE — 84100 ASSAY OF PHOSPHORUS: CPT

## 2022-06-17 PROCEDURE — 36415 COLL VENOUS BLD VENIPUNCTURE: CPT

## 2022-06-17 PROCEDURE — 20600001 HC STEP DOWN PRIVATE ROOM

## 2022-06-17 PROCEDURE — 25000003 PHARM REV CODE 250: Performed by: INTERNAL MEDICINE

## 2022-06-17 PROCEDURE — 83735 ASSAY OF MAGNESIUM: CPT

## 2022-06-17 RX ORDER — POTASSIUM CHLORIDE 20 MEQ/1
40 TABLET, EXTENDED RELEASE ORAL ONCE
Status: COMPLETED | OUTPATIENT
Start: 2022-06-17 | End: 2022-06-17

## 2022-06-17 RX ADMIN — Medication 1 TABLET: at 09:06

## 2022-06-17 RX ADMIN — POTASSIUM CHLORIDE 40 MEQ: 1500 TABLET, EXTENDED RELEASE ORAL at 09:06

## 2022-06-17 RX ADMIN — Medication 5000 UNITS: at 09:06

## 2022-06-17 RX ADMIN — LORAZEPAM 0.5 MG: 0.5 TABLET ORAL at 02:06

## 2022-06-17 RX ADMIN — APIXABAN 10 MG: 5 TABLET, FILM COATED ORAL at 08:06

## 2022-06-17 RX ADMIN — ACETAMINOPHEN AND CODEINE PHOSPHATE 1 TABLET: 300; 30 TABLET ORAL at 12:06

## 2022-06-17 RX ADMIN — TUBERCULIN PURIFIED PROTEIN DERIVATIVE 5 UNITS: 5 INJECTION, SOLUTION INTRADERMAL at 06:06

## 2022-06-17 RX ADMIN — APIXABAN 10 MG: 5 TABLET, FILM COATED ORAL at 09:06

## 2022-06-17 RX ADMIN — FUROSEMIDE 20 MG: 20 TABLET ORAL at 09:06

## 2022-06-17 RX ADMIN — LORAZEPAM 0.5 MG: 0.5 TABLET ORAL at 11:06

## 2022-06-17 RX ADMIN — ACETAMINOPHEN AND CODEINE PHOSPHATE 1 TABLET: 300; 30 TABLET ORAL at 09:06

## 2022-06-17 RX ADMIN — PANTOPRAZOLE SODIUM 40 MG: 40 TABLET, DELAYED RELEASE ORAL at 09:06

## 2022-06-17 RX ADMIN — ACETAMINOPHEN AND CODEINE PHOSPHATE 1 TABLET: 300; 30 TABLET ORAL at 04:06

## 2022-06-17 NOTE — PLAN OF CARE
Problem: Impaired Wound Healing  Goal: Optimal Wound Healing  Outcome: Ongoing, Progressing     Problem: Fall Injury Risk  Goal: Absence of Fall and Fall-Related Injury  Outcome: Ongoing, Progressing     Problem: Skin Injury Risk Increased  Goal: Skin Health and Integrity  Outcome: Ongoing, Progressing

## 2022-06-17 NOTE — ASSESSMENT & PLAN NOTE
· Weakness of LLE  · Has been at SNF  · Normally ambulates with walker  · Not on meds  · PT/OT suggest SNF, wants to return to [facility]

## 2022-06-17 NOTE — PT/OT/SLP PROGRESS
Physical Therapy Treatment    Patient Name:  Lupis Murcia   MRN:  [id number]    Recommendations:     Discharge Recommendations:  nursing facility, skilled (return to SNF)   Discharge Equipment Recommendations: bedside commode   Barriers to discharge: None    Co-treatment performed due to patient's multiple deficits requiring two skilled therapists to appropriately and safely assess patient's strength and endurance while facilitating functional tasks in addition to accommodating for patient's activity tolerance.   Assessment:     Lupis Murcia is a 71 y.o. female admitted with a medical diagnosis of Acute pulmonary embolism with acute cor pulmonale.  She presents with the following impairments/functional limitations:  weakness, impaired endurance, impaired self care skills, impaired functional mobilty, gait instability, decreased ROM, decreased lower extremity function, decreased safety awareness, impaired balance, edema, impaired cardiopulmonary response to activity.    Pt with good tolerance and participation throughout today's treatment session. Pt demonstrated increased confidence while performing functional transfers and gait training after encouragement. Pt required between contact guard and moderate assistance for sit<>stand transfers and ambulation with the RW. Mrs. Murcia continues to progress towards goals, and will continue to benefit from skilled PT treatment to address the functional limitations listed above.    Rehab Prognosis: Good; patient would benefit from acute skilled PT services to address these deficits and reach maximum level of function.    Recent Surgery: * No surgery found *      Plan:     During this hospitalization, patient to be seen 4 x/week to address the identified rehab impairments via gait training, therapeutic activities, therapeutic exercises, neuromuscular re-education and progress toward the following goals:    · Plan of Care Expires:  07/15/22    Subjective       Complaint: fear/ anxiety  Patient/Family Comments/goals: pt with increased confidence t/o the treatment session  Pain/Comfort:  · Pain Rating 1: 0/10 (pt did not indicate pain)  · Pain Rating Post-Intervention 1: 0/10      Objective:     Communicated with RN prior to session.  Patient found sitting edge of bed with OT with pulse ox (continuous), telemetry, oxygen upon PT entry to room.     Additional staff present: OTClaudia    General Precautions: Standard, fall   Orthopedic Precautions:N/A   Braces: N/A  Respiratory Status: Nasal cannula, flow 3 L/min     Functional Mobility:  · Bed Mobility:   · Pt met sitting at EOB with OT, and returned to bedside chair.  · Transfers:     · Sit <> Stand:  contact guard assistance and minimum assistance with rolling walker, 3 trials. Cued for hand placement  · 1st trial from EOB: min (A) of 2   · 2nd & 3rd trials: from chair: CGA of 2  · Gait: Pt ambulates ~8 ft, and then ~10 ft with RW and Min (A) with Max (A) to advance (L) LE due to foot drop. PT able to weight-shift and off load (B) LE, but unable to shuffle (L) LE without assistance. Pt also demonstrates (L) knee hyperextension during standing t/o ambulation trial. Faciliation and cues provided for proper sequencing, weight-shifting, and postural alignment. No LOB noted. All lines remained intact. Seated rest breaks provided between ambulation trials.  · Balance:   · Static standing: CGA   · Dynamic standing: Min (A)      AM-PAC 6 CLICK MOBILITY  Turning over in bed (including adjusting bedclothes, sheets and blankets)?: 2  Sitting down on and standing up from a chair with arms (e.g., wheelchair, bedside commode, etc.): 3  Moving from lying on back to sitting on the side of the bed?: 2  Moving to and from a bed to a chair (including a wheelchair)?: 2  Need to walk in hospital room?: 2  Climbing 3-5 steps with a railing?: 1  Basic Mobility Total Score: 12       Therapeutic Activities and Exercises:  Pt educated on the goals  of the session, PT POC, energy conservation and breathing techniques, and benefits of OOB mobility over the weekend.    O2 sats monitored throughout session. Maintained within normal limits. (94-99%)    Patient left up in chair with all lines intact, call button in reach, RN notified and daughter present..    GOALS:   Multidisciplinary Problems     Physical Therapy Goals        Problem: Physical Therapy    Goal Priority Disciplines Outcome Goal Variances Interventions   Physical Therapy Goal     PT, PT/OT Ongoing, Progressing     Description: Goals to be met by: 2022    Patient will increase functional independence with mobility by performin. Supine to sit with Minimal Assistance  2. Rolling to Left and Right with Minimal Assistance.  3. Sit to stand transfer with Moderate Assistance using Rolling Walker.   4. Gait  x 50 feet with Minimal Assistance using Rolling Walker.                      Time Tracking:     PT Received On: 22  PT Start Time: 1416     PT Stop Time: 1435  PT Total Time (min): 19 min     Billable Minutes: Gait Training 19    Treatment Type: Treatment  PT/PTA: PTA     PTA Visit Number: 1     2022

## 2022-06-17 NOTE — SUBJECTIVE & OBJECTIVE
Interval History: No acute events overnight.  SD from ICU.  Feels well.  Would like to return to [facility] SN[facility], but reports she was allegedly going to be DCed today.  As there are no beds and insurance is closed today for holiday, will follow up on Monday.  Daughter at bedside.  Discussed lifelong anticoagulation.    Review of Systems   Constitutional:  Negative for chills, fatigue and fever.   Respiratory:  Negative for cough and shortness of breath.    Cardiovascular:  Negative for chest pain, palpitations and leg swelling.   Gastrointestinal:  Negative for abdominal pain, diarrhea, nausea and vomiting.   Genitourinary:  Negative for dysuria and urgency.   Neurological:  Negative for dizziness and headaches.   All other systems reviewed and are negative.  Objective:     Vital Signs (Most Recent):  Temp: 97.9 °F (36.6 °C) (06/17/22 1120)  Pulse: 81 (06/17/22 1120)  Resp: 20 (06/17/22 1214)  BP: (!) 131/92 (06/17/22 1120)  SpO2: (!) 93 % (06/17/22 1120)   Vital Signs (24h Range):  Temp:  [97.4 °F (36.3 °C)-98.7 °F (37.1 °C)] 97.9 °F (36.6 °C)  Pulse:  [75-83] 81  Resp:  [18-20] 20  SpO2:  [93 %-98 %] 93 %  BP: (109-133)/(59-99) 131/92     Weight: 90.3 kg (199 lb)  Body mass index is 36.4 kg/m².    Intake/Output Summary (Last 24 hours) at 6/17/2022 1324  Last data filed at 6/17/2022 0918  Gross per 24 hour   Intake 200 ml   Output --   Net 200 ml      Physical Exam  Constitutional:       Appearance: Normal appearance. She is well-developed.   HENT:      Head: Normocephalic and atraumatic.   Cardiovascular:      Rate and Rhythm: Normal rate and regular rhythm.      Heart sounds: No murmur heard.  Pulmonary:      Effort: Pulmonary effort is normal. No respiratory distress.      Breath sounds: Normal breath sounds. No wheezing or rales.      Comments: Comfortable on NC, no increased work of breathing, speaking in full sentences  Abdominal:      General: There is no distension.      Palpations: Abdomen is soft.       Tenderness: There is no abdominal tenderness.   Musculoskeletal:         General: No deformity.   Skin:     General: Skin is warm.   Neurological:      General: No focal deficit present.      Mental Status: She is alert and oriented to person, place, and time. Mental status is at baseline.       Significant Labs: All pertinent labs within the past 24 hours have been reviewed.    Significant Imaging: I have reviewed all pertinent imaging results/findings within the past 24 hours.

## 2022-06-17 NOTE — PT/OT/SLP PROGRESS
Occupational Therapy   Treatment    Name: Lupis Murcia  MRN: [id number]  Admitting Diagnosis:  Acute pulmonary embolism with acute cor pulmonale       Recommendations:     Discharge Recommendations: nursing facility, skilled  Discharge Equipment Recommendations:  bedside commode, wheelchair, walker, rolling  Barriers to discharge:  Decreased caregiver support (Pts daughter highly supportive yet woks FT.)    Assessment:     Lupis Murcia is a 71 y.o. female with a medical diagnosis of Acute pulmonary embolism with acute cor pulmonale.  She presents with the following Performance deficits affecting function are weakness, impaired endurance, impaired self care skills, impaired functional mobilty, impaired cardiopulmonary response to activity.   On approach patient wanted to share entire medical hx with OT although I reported I had performed full chart review. Noted pts anxiety required therapeutic listening throughout session. Pt highly motivated to regain max functional level of I in order to participate in meaningful activities.   Noted SOB with exertion yet no concerns noted with desats and pt exhibited good breathing control during exertion.    Rehab Prognosis:  Good; patient would benefit from acute skilled OT services to address these deficits and reach maximum level of function.       Plan:     Patient to be seen 4 x/week to address the above listed problems via self-care/home management, therapeutic activities, therapeutic exercises  · Plan of Care Expires: 07/16/22  · Plan of Care Reviewed with: patient, daughter    Subjective     Pain/Comfort:  · Pain Rating 1: 0/10  · Pain Rating Post-Intervention 1: 0/10    Objective:     Communicated with: nurse prior to session.  Patient found HOB elevated with peripheral IV, espinal catheter, telemetry, pulse ox (continuous) upon OT entry to room.    General Precautions: Standard, fall   Orthopedic Precautions:N/A   Braces: N/A  Respiratory Status: Room air      Occupational Performance:     Bed Mobility:    · Patient completed Rolling/Turning to Left with  minimum assistance  · Patient completed Supine to Sit with moderate assistance for BLE management. Pt able to manage UB with use of bedrails.    Functional Mobility/Transfers:  · Patient completed Sit <> Stand Transfer with minimum assistance  with  rolling walker   · Patient completed Bed <> Chair Transfer using Step Transfer technique with minimum assistance and and mod A of LLE management due to patient not having her AFO available for this session. with rolling walker  · Functional Mobility: Pt seen for sitting/ standing balance on EOB and walker level. She was able to unweight BUE's while in standing x 30 sec x 3 to participate in ADL task.    Activities of Daily Living:  · Lower Body Dressing: maximal assistance to remove and re-rosetta adult brief while in standing.   · Toileting: minimum assistance for anterior and posterior perineal care.      [facility] 6 Click ADL: 10    Treatment & Education:  -Pt education on OT role and POC.  -Importance of E/OOB activity with staff assistance, emphasis on daily participation and encouragement for patient to sit in BS chair over the weekend.  -Multiple self-care tasks and functional mobility completed -- assistance level noted above  -Safety during functional transfer and mobility ensured  -Patient provided with education on importance of Bilateral UB/LB integration during functional tasks for improvement in functional performance.   -Education provided/reviewed, questions answered within OT scope of practice.   -Education provided to pt on use of call bell and repeating call to receive service in timely manner to prevent falls/injury  -Additional time spent providing emotional support as pt expressed experience since medical diagnosis and hospital stay  -Patient demonstrates understanding and learning this date.    Patient left up in chair with call button in  reachEducation:      GOALS:   Multidisciplinary Problems     Occupational Therapy Goals        Problem: Occupational Therapy    Goal Priority Disciplines Outcome Interventions   Occupational Therapy Goal     OT, PT/OT Ongoing, Progressing    Description: Goals to be met by: 7 days 6/23/22     Patient will increase functional independence with ADLs by performing:    Pt to complete t/f BSC with MIN A   Pt to complete toileting with MIN A   Pt to complete UE dressing with set-up  Pt to complete g/h skills with supervision.   Pt to tolerated OOB to chair x 3-4 hours daily to increase endurance for functional activity.                    Time Tracking:     OT Date of Treatment: 06/17/22  OT Start Time: 1410  OT Stop Time: 1434  OT Total Time (min): 24 min    Billable Minutes:Therapeutic Activity 24    OT/LOW: OT          6/17/2022

## 2022-06-17 NOTE — PROGRESS NOTES
[facility] - Telemetry [facility] Medicine  Progress Note    Patient Name: Lupis Murcia  MRN: [id number]  Patient Class: IP- Inpatient   Admission Date: 6/14/2022  Length of Stay: 3 days  Attending Physician: Helga Early MD  Primary Care Provider: Bobby Tran MD        Subjective:     Principal Problem:Acute pulmonary embolism with acute cor pulmonale        HPI:  71-year-old female with history of PE during pregnancy requiring heparin drip, chronic lower extremity lymphedema, multiple sclerosis, vitamin D deficiency who presented to the ED due to shortness of breath and dyspnea on exertion two days prior to arrival. Patient states she was undergoing physical therapy at her rehabilitation facility and was walking when she suddenly became short of breath. She states since that time, her symptoms have worsened. She stated that 5 days prior, her primary physician noted that she was experiencing abdominal bruising related to heparin subcutaneous injections. She states she started having worsening respiratory symptoms and was satting 80% on room air. In the ED, cardiology was consulted and a bedside ECHO showed a dilated RV with Cox's sign. Due to her allergy for contrast, she underwent a VQ scan showing evidence of a submassive pulmonary embolism. She was started on a heparin drip.     Vitals were overall stable other than mild tachycardia and on 6L NC with oxygen saturation above 90%. CXR in ED showed mild cardiomegaly. BNP elevated to 577 with troponin elevated to 0.423. She was admitted to Critical Care Team 2 for further management.       Overview/Hospital Course:  Ms. Lupis Murcia is a 71 y.o. female who was admitted to [facility] ICU on 6/14/2022 with an acute submassive PE with acute cor pulmonale.  She was started on a Heparin gtt.  Cardiology was consulted.  2D echo showed EF 60% with elevated PAP.  Troponin peaked at 0.790.  BLE US was negative for PE.  She was given the option of TPA but  refused.  She was transition[facility] to [facility].   She was able to have stable oxygenation at 4L NC when sitting up in bed and trying to stand, and was thus felt stable to SD to  on 6/16.  PT/OT say SNF.      Interval History: No acute events overnight.  SD from ICU.  Feels well.  Would like to return to [facility], but reports she was allegedly going to be DCed today.  As there are no beds and insurance is closed today for holiday, will follow up on Monday.  Daughter at bedside.  Discussed lifelong anticoagulation.    Review of Systems   Constitutional:  Negative for chills, fatigue and fever.   Respiratory:  Negative for cough and shortness of breath.    Cardiovascular:  Negative for chest pain, palpitations and leg swelling.   Gastrointestinal:  Negative for abdominal pain, diarrhea, nausea and vomiting.   Genitourinary:  Negative for dysuria and urgency.   Neurological:  Negative for dizziness and headaches.   All other systems reviewed and are negative.  Objective:     Vital Signs (Most Recent):  Temp: 97.9 °F (36.6 °C) (06/17/22 1120)  Pulse: 81 (06/17/22 1120)  Resp: 20 (06/17/22 1214)  BP: (!) 131/92 (06/17/22 1120)  SpO2: (!) 93 % (06/17/22 1120)   Vital Signs (24h Range):  Temp:  [97.4 °F (36.3 °C)-98.7 °F (37.1 °C)] 97.9 °F (36.6 °C)  Pulse:  [75-83] 81  Resp:  [18-20] 20  SpO2:  [93 %-98 %] 93 %  BP: (109-133)/(59-99) 131/92     Weight: 90.3 kg (199 lb)  Body mass index is 36.4 kg/m².    Intake/Output Summary (Last 24 hours) at 6/17/2022 1324  Last data filed at 6/17/2022 0918  Gross per 24 hour   Intake 200 ml   Output --   Net 200 ml      Physical Exam  Constitutional:       Appearance: Normal appearance. She is well-developed.   HENT:      Head: Normocephalic and atraumatic.   Cardiovascular:      Rate and Rhythm: Normal rate and regular rhythm.      Heart sounds: No murmur heard.  Pulmonary:      Effort: Pulmonary effort is normal. No respiratory distress.      Breath sounds: Normal breath sounds. No  wheezing or rales.      Comments: Comfortable on NC, no increased work of breathing, speaking in full sentences  Abdominal:      General: There is no distension.      Palpations: Abdomen is soft.      Tenderness: There is no abdominal tenderness.   Musculoskeletal:         General: No deformity.   Skin:     General: Skin is warm.   Neurological:      General: No focal deficit present.      Mental Status: She is alert and oriented to person, place, and time. Mental status is at baseline.       Significant Labs: All pertinent labs within the past 24 hours have been reviewed.    Significant Imaging: I have reviewed all pertinent imaging results/findings within the past 24 hours.      Assessment/Plan:      * Acute pulmonary embolism with acute cor pulmonale  · Sent from [facility] and found to have a submassive PE  · 2D echo showed EF 60% with elevated PAP  · Troponin peaked at 0.790  · BLE US was negative for PE  · Cardiology consulted:  Offered TPA but refused  · Transitioned from Heparin gtt to Eliquis  · Eliquis 10mg PO BID til 6/22  · Eliquis 5mg PO BID starting 6/23 indefinitely  · On 3L NC, wean oxygen as tolerated  · Of note, does report a history of PE for which she was on Warfarin many years ago      Acute hypoxemic respiratory failure  · 2/2 PE  · Wean off NC as tolerated    Class 2 obesity due to excess calories in adult  · Body mass index is 36.4 kg/m².   · Morbid obesity complicates all aspects of disease management from diagnostic modalities to treatment.   · Weight loss encouraged and health benefits explained to patient.         Lymphedema  · Chronic and stable  · No acute issues      Vitamin D deficiency  · Noted  · Replace      MS (multiple sclerosis)  · Weakness of LLE  · Has been at [facility]  · Normally ambulates with walker  · Not on meds  · PT/OT suggest SNF, wants to return to [facility]      Muscle weakness of lower extremity  · PT/OT      Anemia of chronic disease  · Chronic and stable  · Monitor for  "bleeding on Eliquis      Insomnia secondary to chronic pain  · Chronic issue  · Meds prn      VTE Risk Mitigation (From admission, onward)         Ordered     apixaban tablet 5 mg  2 times daily        "Followed by" Linked Group Details    06/16/22 0951     apixaban tablet 10 mg  2 times daily        "Followed by" Linked Group Details    06/16/22 0951     IP VTE HIGH RISK PATIENT  Once         06/14/22 1728     Place sequential compression device  Until discontinued         06/14/22 1728                Discharge Planning   USMAN: 6/20/2022     Code Status: Full Code   Is the patient medically ready for discharge?: Yes    Reason for patient still in hospital (select all that apply): Patient trending condition  Discharge Plan A: Home Health                  Helga Early MD  Department of Hospital Medicine   [facility] - Telemetry Stepdown    "

## 2022-06-17 NOTE — ASSESSMENT & PLAN NOTE
· Sent from [facility] and found to have a submassive PE  · 2D echo showed EF 60% with elevated PAP  · Troponin peaked at 0.790  · BLE US was negative for PE  · Cardiology consulted:  Offered TPA but refused  · Transitioned from Heparin gtt to Eliquis  · Eliquis 10mg PO BID til 6/22  · Eliquis 5mg PO BID starting 6/23 indefinitely  · On 3L NC, wean oxygen as tolerated  · Of note, does report a history of PE for which she was on Warfarin many years ago

## 2022-06-17 NOTE — ASSESSMENT & PLAN NOTE
· Body mass index is 36.4 kg/m².   · Morbid obesity complicates all aspects of disease management from diagnostic modalities to treatment.   · Weight loss encouraged and health benefits explained to patient.

## 2022-06-18 PROCEDURE — 99232 SBSQ HOSP IP/OBS MODERATE 35: CPT | Mod: ,,, | Performed by: HOSPITALIST

## 2022-06-18 PROCEDURE — 25000003 PHARM REV CODE 250: Performed by: INTERNAL MEDICINE

## 2022-06-18 PROCEDURE — 99232 PR SUBSEQUENT HOSPITAL CARE,LEVL II: ICD-10-PCS | Mod: ,,, | Performed by: HOSPITALIST

## 2022-06-18 PROCEDURE — 25000003 PHARM REV CODE 250: Performed by: STUDENT IN AN ORGANIZED HEALTH CARE EDUCATION/TRAINING PROGRAM

## 2022-06-18 PROCEDURE — 25000003 PHARM REV CODE 250: Performed by: HOSPITALIST

## 2022-06-18 PROCEDURE — 20600001 HC STEP DOWN PRIVATE ROOM

## 2022-06-18 PROCEDURE — 25000003 PHARM REV CODE 250

## 2022-06-18 RX ADMIN — ACETAMINOPHEN AND CODEINE PHOSPHATE 1 TABLET: 300; 30 TABLET ORAL at 08:06

## 2022-06-18 RX ADMIN — LORAZEPAM 0.5 MG: 0.5 TABLET ORAL at 01:06

## 2022-06-18 RX ADMIN — APIXABAN 10 MG: 5 TABLET, FILM COATED ORAL at 08:06

## 2022-06-18 RX ADMIN — ACETAMINOPHEN AND CODEINE PHOSPHATE 1 TABLET: 300; 30 TABLET ORAL at 02:06

## 2022-06-18 RX ADMIN — LORAZEPAM 0.5 MG: 0.5 TABLET ORAL at 09:06

## 2022-06-18 RX ADMIN — FUROSEMIDE 20 MG: 20 TABLET ORAL at 08:06

## 2022-06-18 RX ADMIN — Medication 5000 UNITS: at 08:06

## 2022-06-18 RX ADMIN — PANTOPRAZOLE SODIUM 40 MG: 40 TABLET, DELAYED RELEASE ORAL at 08:06

## 2022-06-18 NOTE — PROGRESS NOTES
[facility] - Telemetry [facility] Medicine  Progress Note    Patient Name: Lupis Murcia  MRN: [id number]  Patient Class: IP- Inpatient   Admission Date: 6/14/2022  Length of Stay: 4 days  Attending Physician: Helga Early MD  Primary Care Provider: Bobby Tran MD        Subjective:     Principal Problem:Acute pulmonary embolism with acute cor pulmonale        HPI:  71-year-old female with history of PE during pregnancy requiring heparin drip, chronic lower extremity lymphedema, multiple sclerosis, vitamin D deficiency who presented to the ED due to shortness of breath and dyspnea on exertion two days prior to arrival. Patient states she was undergoing physical therapy at her rehabilitation facility and was walking when she suddenly became short of breath. She states since that time, her symptoms have worsened. She stated that 5 days prior, her primary physician noted that she was experiencing abdominal bruising related to heparin subcutaneous injections. She states she started having worsening respiratory symptoms and was satting 80% on room air. In the ED, cardiology was consulted and a bedside ECHO showed a dilated RV with Cox's sign. Due to her allergy for contrast, she underwent a VQ scan showing evidence of a submassive pulmonary embolism. She was started on a heparin drip.     Vitals were overall stable other than mild tachycardia and on 6L NC with oxygen saturation above 90%. CXR in ED showed mild cardiomegaly. BNP elevated to 577 with troponin elevated to 0.423. She was admitted to Critical Care Team 2 for further management.       Overview/Hospital Course:  Ms. Lupis Murcia is a 71 y.o. female who was admitted to [facility] ICU on 6/14/2022 with an acute submassive PE with acute cor pulmonale.  She was started on a Heparin gtt.  Cardiology was consulted.  2D echo showed EF 60% with elevated PAP.  Troponin peaked at 0.790.  BLE US was negative for PE.  She was given the option of TPA but  refused.  She was transitioned to [facility].   She was able to have stable oxygenation at 4L NC when sitting up in bed and trying to stand, and was thus felt stable to SD to  on 6/16.  PT/OT say SNF.      Interval History: No acute events overnight.  Worked with therapy yesterday.  Feels good.  Daughter at bedside.  Waiting on SNF.    Review of Systems   Constitutional:  Negative for chills, fatigue and fever.   Respiratory:  Negative for cough and shortness of breath.    Cardiovascular:  Negative for chest pain, palpitations and leg swelling.   Gastrointestinal:  Negative for abdominal pain, diarrhea, nausea and vomiting.   Genitourinary:  Negative for dysuria and urgency.   Neurological:  Negative for dizziness and headaches.   All other systems reviewed and are negative.  Objective:     Vital Signs (Most Recent):  Temp: 98.2 °F (36.8 °C) (06/18/22 1101)  Pulse: 79 (06/18/22 1101)  Resp: 20 (06/18/22 1101)  BP: 121/63 (06/18/22 1101)  SpO2: 96 % (06/18/22 1101)   Vital Signs (24h Range):  Temp:  [97 °F (36.1 °C)-100 °F (37.8 °C)] 98.2 °F (36.8 °C)  Pulse:  [71-85] 79  Resp:  [16-22] 20  SpO2:  [95 %-98 %] 96 %  BP: (121-147)/(63-97) 121/63     Weight: 90.3 kg (199 lb)  Body mass index is 36.4 kg/m².    Intake/Output Summary (Last 24 hours) at 6/18/2022 1130  Last data filed at 6/17/2022 1802  Gross per 24 hour   Intake 640 ml   Output --   Net 640 ml        Physical Exam  Constitutional:       Appearance: Normal appearance. She is well-developed.   HENT:      Head: Normocephalic and atraumatic.   Cardiovascular:      Rate and Rhythm: Normal rate and regular rhythm.      Heart sounds: No murmur heard.  Pulmonary:      Effort: Pulmonary effort is normal. No respiratory distress.      Breath sounds: Normal breath sounds. No wheezing or rales.      Comments: Comfortable on NC, no increased work of breathing, speaking in full sentences  Abdominal:      General: There is no distension.      Palpations: Abdomen is soft.       Tenderness: There is no abdominal tenderness.   Musculoskeletal:         General: No deformity.   Skin:     General: Skin is warm.   Neurological:      General: No focal deficit present.      Mental Status: She is alert and oriented to person, place, and time. Mental status is at baseline.       Significant Labs: All pertinent labs within the past 24 hours have been reviewed.    Significant Imaging: I have reviewed all pertinent imaging results/findings within the past 24 hours.      Assessment/Plan:      * Acute pulmonary embolism with acute cor pulmonale  · Sent from [facility] and found to have a submassive PE  · 2D echo showed EF 60% with elevated PAP  · Troponin peaked at 0.790  · BLE US was negative for PE  · Cardiology consulted:  Offered TPA but refused  · Transitioned from Heparin gtt to Eliquis  · Eliquis 10mg PO BID til 6/22  · Eliquis 5mg PO BID starting 6/23 indefinitely  · On 3L NC, wean oxygen as tolerated  · Of note, does report a history of PE for which she was on Warfarin many years ago      Acute hypoxemic respiratory failure  · 2/2 PE  · Wean off NC as tolerated    Class 2 obesity due to excess calories in adult  · Body mass index is 36.4 kg/m².   · Morbid obesity complicates all aspects of disease management from diagnostic modalities to treatment.   · Weight loss encouraged and health benefits explained to patient.         Lymphedema  · Chronic and stable  · No acute issues      Vitamin D deficiency  · Noted  · Replace      MS (multiple sclerosis)  · Weakness of LLE  · Has been at SNF  · Normally ambulates with walker  · Not on meds  · PT/OT suggest SNF, wants to return to [facility]      Muscle weakness of lower extremity  · PT/OT      Anemia of chronic disease  · Chronic and stable  · Monitor for bleeding on Eliquis      Insomnia secondary to chronic pain  · Chronic issue  · Meds prn        VTE Risk Mitigation (From admission, onward)         Ordered     apixaban tablet 5 mg  2 times daily  "       "Followed by" Linked Group Details    06/16/22 0951     apixaban tablet 10 mg  2 times daily        "Followed by" Linked Group Details    06/16/22 0951     IP VTE HIGH RISK PATIENT  Once         06/14/22 1728     Place sequential compression device  Until discontinued         06/14/22 1728                Discharge Planning   USMAN: 6/20/2022     Code Status: Full Code   Is the patient medically ready for discharge?: Yes    Reason for patient still in hospital (select all that apply): Pending disposition  Discharge Plan A: Home Health                  Helga Early MD  Department of Hospital Medicine   [facility] - Telemetry Stepdown    "

## 2022-06-18 NOTE — SUBJECTIVE & OBJECTIVE
Interval History: No acute events overnight.  Worked with therapy yesterday.  Feels good.  Daughter at bedside.  Waiting on SNF.    Review of Systems   Constitutional:  Negative for chills, fatigue and fever.   Respiratory:  Negative for cough and shortness of breath.    Cardiovascular:  Negative for chest pain, palpitations and leg swelling.   Gastrointestinal:  Negative for abdominal pain, diarrhea, nausea and vomiting.   Genitourinary:  Negative for dysuria and urgency.   Neurological:  Negative for dizziness and headaches.   All other systems reviewed and are negative.  Objective:     Vital Signs (Most Recent):  Temp: 98.2 °F (36.8 °C) (06/18/22 1101)  Pulse: 79 (06/18/22 1101)  Resp: 20 (06/18/22 1101)  BP: 121/63 (06/18/22 1101)  SpO2: 96 % (06/18/22 1101)   Vital Signs (24h Range):  Temp:  [97 °F (36.1 °C)-100 °F (37.8 °C)] 98.2 °F (36.8 °C)  Pulse:  [71-85] 79  Resp:  [16-22] 20  SpO2:  [95 %-98 %] 96 %  BP: (121-147)/(63-97) 121/63     Weight: 90.3 kg (199 lb)  Body mass index is 36.4 kg/m².    Intake/Output Summary (Last 24 hours) at 6/18/2022 1130  Last data filed at 6/17/2022 1802  Gross per 24 hour   Intake 640 ml   Output --   Net 640 ml        Physical Exam  Constitutional:       Appearance: Normal appearance. She is well-developed.   HENT:      Head: Normocephalic and atraumatic.   Cardiovascular:      Rate and Rhythm: Normal rate and regular rhythm.      Heart sounds: No murmur heard.  Pulmonary:      Effort: Pulmonary effort is normal. No respiratory distress.      Breath sounds: Normal breath sounds. No wheezing or rales.      Comments: Comfortable on NC, no increased work of breathing, speaking in full sentences  Abdominal:      General: There is no distension.      Palpations: Abdomen is soft.      Tenderness: There is no abdominal tenderness.   Musculoskeletal:         General: No deformity.   Skin:     General: Skin is warm.   Neurological:      General: No focal deficit present.      Mental  Status: She is alert and oriented to person, place, and time. Mental status is at baseline.       Significant Labs: All pertinent labs within the past 24 hours have been reviewed.    Significant Imaging: I have reviewed all pertinent imaging results/findings within the past 24 hours.

## 2022-06-18 NOTE — PLAN OF CARE
Problem: Adult Inpatient Plan of Care  Goal: Optimal Comfort and Wellbeing  Outcome: Ongoing, Progressing     Problem: Oral Intake Inadequate (Acute Kidney Injury/Impairment)  Goal: Optimal Nutrition Intake  Outcome: Ongoing, Progressing     Problem: Impaired Wound Healing  Goal: Optimal Wound Healing  Outcome: Ongoing, Progressing

## 2022-06-19 LAB — TB INDURATION 48 - 72 HR READ: 0 MM

## 2022-06-19 PROCEDURE — 25000003 PHARM REV CODE 250

## 2022-06-19 PROCEDURE — 99232 PR SUBSEQUENT HOSPITAL CARE,LEVL II: ICD-10-PCS | Mod: ,,, | Performed by: HOSPITALIST

## 2022-06-19 PROCEDURE — 99232 SBSQ HOSP IP/OBS MODERATE 35: CPT | Mod: ,,, | Performed by: HOSPITALIST

## 2022-06-19 PROCEDURE — 25000003 PHARM REV CODE 250: Performed by: STUDENT IN AN ORGANIZED HEALTH CARE EDUCATION/TRAINING PROGRAM

## 2022-06-19 PROCEDURE — 25000003 PHARM REV CODE 250: Performed by: INTERNAL MEDICINE

## 2022-06-19 PROCEDURE — 20600001 HC STEP DOWN PRIVATE ROOM

## 2022-06-19 PROCEDURE — 25000003 PHARM REV CODE 250: Performed by: HOSPITALIST

## 2022-06-19 RX ORDER — FUROSEMIDE 20 MG/1
20 TABLET ORAL DAILY
Status: ON HOLD
Start: 2022-06-19 | End: 2022-07-11 | Stop reason: SDUPTHER

## 2022-06-19 RX ADMIN — APIXABAN 10 MG: 5 TABLET, FILM COATED ORAL at 08:06

## 2022-06-19 RX ADMIN — ACETAMINOPHEN AND CODEINE PHOSPHATE 1 TABLET: 300; 30 TABLET ORAL at 05:06

## 2022-06-19 RX ADMIN — ACETAMINOPHEN AND CODEINE PHOSPHATE 1 TABLET: 300; 30 TABLET ORAL at 10:06

## 2022-06-19 RX ADMIN — PANTOPRAZOLE SODIUM 40 MG: 40 TABLET, DELAYED RELEASE ORAL at 08:06

## 2022-06-19 RX ADMIN — ACETAMINOPHEN AND CODEINE PHOSPHATE 1 TABLET: 300; 30 TABLET ORAL at 03:06

## 2022-06-19 RX ADMIN — FUROSEMIDE 20 MG: 20 TABLET ORAL at 08:06

## 2022-06-19 RX ADMIN — Medication 5000 UNITS: at 08:06

## 2022-06-19 RX ADMIN — LORAZEPAM 0.5 MG: 0.5 TABLET ORAL at 02:06

## 2022-06-19 RX ADMIN — LORAZEPAM 0.5 MG: 0.5 TABLET ORAL at 08:06

## 2022-06-19 RX ADMIN — Medication 1 TABLET: at 08:06

## 2022-06-19 NOTE — PROGRESS NOTES
[facility] - Telemetry [facility] Medicine  Progress Note    Patient Name: Lupis Murcai  MRN: [id number]  Patient Class: IP- Inpatient   Admission Date: 6/14/2022  Length of Stay: 5 days  Attending Physician: Helga Early MD  Primary Care Provider: Bobby Tran MD        Subjective:     Principal Problem:Acute pulmonary embolism with acute cor pulmonale        HPI:  71-year-old female with history of PE during pregnancy requiring heparin drip, chronic lower extremity lymphedema, multiple sclerosis, vitamin D deficiency who presented to the ED due to shortness of breath and dyspnea on exertion two days prior to arrival. Patient states she was undergoing physical therapy at her rehabilitation facility and was walking when she suddenly became short of breath. She states since that time, her symptoms have worsened. She stated that 5 days prior, her primary physician noted that she was experiencing abdominal bruising related to heparin subcutaneous injections. She states she started having worsening respiratory symptoms and was satting 80% on room air. In the ED, cardiology was consulted and a bedside ECHO showed a dilated RV with Cox's sign. Due to her allergy for contrast, she underwent a VQ scan showing evidence of a submassive pulmonary embolism. She was started on a heparin drip.     Vitals were overall stable other than mild tachycardia and on 6L NC with oxygen saturation above 90%. CXR in ED showed mild cardiomegaly. BNP elevated to 577 with troponin elevated to 0.423. She was admitted to Critical Care Team 2 for further management.       Overview/Hospital Course:  Ms. Lupis Murcia is a 71 y.o. female who was admitted to [facility] ICU on 6/14/2022 with an acute submassive PE with acute cor pulmonale.  She was started on a Heparin gtt.  Cardiology was consulted.  2D echo showed EF 60% with elevated PAP.  Troponin peaked at 0.790.  BLE US was negative for PE.  She was given the option of TPA but  refused.  She was transitioned to [facility].   She was able to have stable oxygenation at 4L NC when sitting up in bed and trying to stand, and was thus felt stable to SD to  on 6/16.  PT/OT say SNF.      Interval History: No acute events overnight.  Sat in chair yesterday.  Daughter at bedside.  Waiting on SNF.  Down to 2L NC    Review of Systems   Constitutional:  Negative for chills, fatigue and fever.   Respiratory:  Negative for cough and shortness of breath.    Cardiovascular:  Negative for chest pain, palpitations and leg swelling.   Gastrointestinal:  Negative for abdominal pain, diarrhea, nausea and vomiting.   Genitourinary:  Negative for dysuria and urgency.   Neurological:  Negative for dizziness and headaches.   All other systems reviewed and are negative.  Objective:     Vital Signs (Most Recent):  Temp: 97.9 °F (36.6 °C) (06/19/22 0814)  Pulse: 76 (06/19/22 0814)  Resp: 19 (06/19/22 1037)  BP: 128/68 (06/19/22 0814)  SpO2: (!) 94 % (06/19/22 0814)   Vital Signs (24h Range):  Temp:  [97.9 °F (36.6 °C)-98.5 °F (36.9 °C)] 97.9 °F (36.6 °C)  Pulse:  [76-87] 76  Resp:  [12-22] 19  SpO2:  [94 %-100 %] 94 %  BP: (128-142)/(65-79) 128/68     Weight: 90.3 kg (199 lb)  Body mass index is 36.4 kg/m².    Intake/Output Summary (Last 24 hours) at 6/19/2022 1149  Last data filed at 6/19/2022 0604  Gross per 24 hour   Intake 240 ml   Output --   Net 240 ml        Physical Exam  Constitutional:       Appearance: Normal appearance. She is well-developed.   HENT:      Head: Normocephalic and atraumatic.   Cardiovascular:      Rate and Rhythm: Normal rate and regular rhythm.      Heart sounds: No murmur heard.  Pulmonary:      Effort: Pulmonary effort is normal. No respiratory distress.      Breath sounds: Normal breath sounds. No wheezing or rales.      Comments: Comfortable on NC, no increased work of breathing, speaking in full sentences  Abdominal:      General: There is no distension.      Palpations: Abdomen is  soft.      Tenderness: There is no abdominal tenderness.   Musculoskeletal:         General: No deformity.   Skin:     General: Skin is warm.   Neurological:      General: No focal deficit present.      Mental Status: She is alert and oriented to person, place, and time. Mental status is at baseline.       Significant Labs: All pertinent labs within the past 24 hours have been reviewed.    Significant Imaging: I have reviewed all pertinent imaging results/findings within the past 24 hours.      Assessment/Plan:      * Acute pulmonary embolism with acute cor pulmonale  · Sent from [facility] and found to have a submassive PE  · 2D echo showed EF 60% with elevated PAP  · Troponin peaked at 0.790  · BLE US was negative for PE  · Cardiology consulted:  Offered TPA but refused  · Transitioned from Heparin gtt to Eliquis  · Eliquis 10mg PO BID til 6/22  · Eliquis 5mg PO BID starting 6/23 indefinitely  · On 2L NC, wean oxygen as tolerated  · Of note, does report a history of PE for which she was on Warfarin many years ago      Acute hypoxemic respiratory failure  · 2/2 PE  · Wean off NC as tolerated    Class 2 obesity due to excess calories in adult  · Body mass index is 36.4 kg/m².   · Morbid obesity complicates all aspects of disease management from diagnostic modalities to treatment.   · Weight loss encouraged and health benefits explained to patient.         Lymphedema  · Chronic and stable  · No acute issues      Vitamin D deficiency  · Noted  · Replace      MS (multiple sclerosis)  · Weakness of LLE  · Has been at SNF  · Normally ambulates with walker  · Not on meds  · PT/OT suggest SNF, wants to return to [facility]      Muscle weakness of lower extremity  · PT/OT      Anemia of chronic disease  · Chronic and stable  · Monitor for bleeding on Eliquis      Insomnia secondary to chronic pain  · Chronic issue  · Meds prn        VTE Risk Mitigation (From admission, onward)         Ordered     apixaban tablet 5 mg  2 times  "daily        "Followed by" Linked Group Details    06/16/22 0951     apixaban tablet 10 mg  2 times daily        "Followed by" Linked Group Details    06/16/22 0951     IP VTE HIGH RISK PATIENT  Once         06/14/22 1728     Place sequential compression device  Until discontinued         06/14/22 1728                Discharge Planning   USMAN: 6/20/2022     Code Status: Full Code   Is the patient medically ready for discharge?: Yes    Reason for patient still in hospital (select all that apply): Pending disposition  Discharge Plan A: Home Health                  Helga Early MD  Department of Hospital Medicine   [facility] - Telemetry Stepdown    "

## 2022-06-19 NOTE — ASSESSMENT & PLAN NOTE
· Sent from [facility] and found to have a submassive PE  · 2D echo showed EF 60% with elevated PAP  · Troponin peaked at 0.790  · BLE US was negative for PE  · Cardiology consulted:  Offered TPA but refused  · Transitioned from Heparin gtt to Eliquis  · Eliquis 10mg PO BID til 6/22  · Eliquis 5mg PO BID starting 6/23 indefinitely  · On 2L NC, wean oxygen as tolerated  · Of note, does report a history of PE for which she was on Warfarin many years ago

## 2022-06-19 NOTE — PLAN OF CARE
Problem: Adult Inpatient Plan of Care  Goal: Plan of Care Review  Outcome: Ongoing, Progressing  Goal: Patient-Specific Goal (Individualized)  Outcome: Ongoing, Progressing  Goal: Absence of Hospital-Acquired Illness or Injury  Outcome: Ongoing, Progressing  Goal: Optimal Comfort and Wellbeing  Outcome: Ongoing, Progressing  Goal: Readiness for Transition of Care  Outcome: Ongoing, Progressing     Problem: Fluid and Electrolyte Imbalance (Acute Kidney Injury/Impairment)  Goal: Fluid and Electrolyte Balance  Outcome: Ongoing, Progressing     Problem: Oral Intake Inadequate (Acute Kidney Injury/Impairment)  Goal: Optimal Nutrition Intake  Outcome: Ongoing, Progressing     Problem: Renal Function Impairment (Acute Kidney Injury/Impairment)  Goal: Effective Renal Function  Outcome: Ongoing, Progressing     Problem: Impaired Wound Healing  Goal: Optimal Wound Healing  Outcome: Ongoing, Progressing     Problem: Fall Injury Risk  Goal: Absence of Fall and Fall-Related Injury  Outcome: Ongoing, Progressing     Problem: Skin Injury Risk Increased  Goal: Skin Health and Integrity  Outcome: Ongoing, Progressing    Pt AAOx4, VS WNL, on 2 L NC. C/o headache throughout the day, administered acetaminophen-codeine x2. Spent the afternoon sitting up in her chair. Daughter has remained at the bedside throughout the day. No s/s of distress noted at this time.

## 2022-06-19 NOTE — SUBJECTIVE & OBJECTIVE
Interval History: No acute events overnight.  Sat in chair yesterday.  Daughter at bedside.  Waiting on SNF.  Down to 2L NC    Review of Systems   Constitutional:  Negative for chills, fatigue and fever.   Respiratory:  Negative for cough and shortness of breath.    Cardiovascular:  Negative for chest pain, palpitations and leg swelling.   Gastrointestinal:  Negative for abdominal pain, diarrhea, nausea and vomiting.   Genitourinary:  Negative for dysuria and urgency.   Neurological:  Negative for dizziness and headaches.   All other systems reviewed and are negative.  Objective:     Vital Signs (Most Recent):  Temp: 97.9 °F (36.6 °C) (06/19/22 0814)  Pulse: 76 (06/19/22 0814)  Resp: 19 (06/19/22 1037)  BP: 128/68 (06/19/22 0814)  SpO2: (!) 94 % (06/19/22 0814)   Vital Signs (24h Range):  Temp:  [97.9 °F (36.6 °C)-98.5 °F (36.9 °C)] 97.9 °F (36.6 °C)  Pulse:  [76-87] 76  Resp:  [12-22] 19  SpO2:  [94 %-100 %] 94 %  BP: (128-142)/(65-79) 128/68     Weight: 90.3 kg (199 lb)  Body mass index is 36.4 kg/m².    Intake/Output Summary (Last 24 hours) at 6/19/2022 1149  Last data filed at 6/19/2022 0604  Gross per 24 hour   Intake 240 ml   Output --   Net 240 ml        Physical Exam  Constitutional:       Appearance: Normal appearance. She is well-developed.   HENT:      Head: Normocephalic and atraumatic.   Cardiovascular:      Rate and Rhythm: Normal rate and regular rhythm.      Heart sounds: No murmur heard.  Pulmonary:      Effort: Pulmonary effort is normal. No respiratory distress.      Breath sounds: Normal breath sounds. No wheezing or rales.      Comments: Comfortable on NC, no increased work of breathing, speaking in full sentences  Abdominal:      General: There is no distension.      Palpations: Abdomen is soft.      Tenderness: There is no abdominal tenderness.   Musculoskeletal:         General: No deformity.   Skin:     General: Skin is warm.   Neurological:      General: No focal deficit present.       Mental Status: She is alert and oriented to person, place, and time. Mental status is at baseline.       Significant Labs: All pertinent labs within the past 24 hours have been reviewed.    Significant Imaging: I have reviewed all pertinent imaging results/findings within the past 24 hours.

## 2022-06-20 ENCOUNTER — HOSPITAL ENCOUNTER (INPATIENT)
Facility: HOSPITAL | Age: 72
LOS: 36 days | Discharge: HOME HEALTH | DRG: 175 | End: 2022-07-26
Attending: HOSPITALIST | Admitting: HOSPITALIST
Payer: MEDICARE

## 2022-06-20 VITALS
BODY MASS INDEX: 36.62 KG/M2 | DIASTOLIC BLOOD PRESSURE: 62 MMHG | HEIGHT: 62 IN | WEIGHT: 199 LBS | RESPIRATION RATE: 18 BRPM | TEMPERATURE: 99 F | OXYGEN SATURATION: 96 % | SYSTOLIC BLOOD PRESSURE: 128 MMHG | HEART RATE: 84 BPM

## 2022-06-20 DIAGNOSIS — G35 MS (MULTIPLE SCLEROSIS): ICD-10-CM

## 2022-06-20 DIAGNOSIS — I26.99 ACUTE PULMONARY EMBOLISM: ICD-10-CM

## 2022-06-20 DIAGNOSIS — I26.02 ACUTE SADDLE PULMONARY EMBOLISM WITH ACUTE COR PULMONALE: Primary | ICD-10-CM

## 2022-06-20 PROCEDURE — 25000003 PHARM REV CODE 250: Performed by: INTERNAL MEDICINE

## 2022-06-20 PROCEDURE — 25000003 PHARM REV CODE 250

## 2022-06-20 PROCEDURE — 99239 PR HOSPITAL DISCHARGE DAY,>30 MIN: ICD-10-PCS | Mod: ,,, | Performed by: HOSPITALIST

## 2022-06-20 PROCEDURE — 27000221 HC OXYGEN, UP TO 24 HOURS

## 2022-06-20 PROCEDURE — 11000004 HC SNF PRIVATE

## 2022-06-20 PROCEDURE — 99900035 HC TECH TIME PER 15 MIN (STAT)

## 2022-06-20 PROCEDURE — 25000003 PHARM REV CODE 250: Performed by: HOSPITALIST

## 2022-06-20 PROCEDURE — 25000003 PHARM REV CODE 250: Performed by: STUDENT IN AN ORGANIZED HEALTH CARE EDUCATION/TRAINING PROGRAM

## 2022-06-20 PROCEDURE — 94761 N-INVAS EAR/PLS OXIMETRY MLT: CPT

## 2022-06-20 PROCEDURE — 99239 HOSP IP/OBS DSCHRG MGMT >30: CPT | Mod: ,,, | Performed by: HOSPITALIST

## 2022-06-20 RX ORDER — CHOLECALCIFEROL (VITAMIN D3) 25 MCG
1000 TABLET ORAL DAILY
Status: DISCONTINUED | OUTPATIENT
Start: 2022-06-21 | End: 2022-07-26 | Stop reason: HOSPADM

## 2022-06-20 RX ORDER — IBUPROFEN 200 MG
16 TABLET ORAL
Status: DISCONTINUED | OUTPATIENT
Start: 2022-06-20 | End: 2022-06-21

## 2022-06-20 RX ORDER — INSULIN ASPART 100 [IU]/ML
0-5 INJECTION, SOLUTION INTRAVENOUS; SUBCUTANEOUS
Status: DISCONTINUED | OUTPATIENT
Start: 2022-06-20 | End: 2022-06-21

## 2022-06-20 RX ORDER — GLUCAGON 1 MG
1 KIT INJECTION
Status: DISCONTINUED | OUTPATIENT
Start: 2022-06-20 | End: 2022-06-21

## 2022-06-20 RX ORDER — CYANOCOBALAMIN 1000 UG/ML
1000 INJECTION, SOLUTION INTRAMUSCULAR; SUBCUTANEOUS
Status: DISCONTINUED | OUTPATIENT
Start: 2022-06-21 | End: 2022-06-20

## 2022-06-20 RX ORDER — ACETAMINOPHEN AND CODEINE PHOSPHATE 300; 30 MG/1; MG/1
1 TABLET ORAL EVERY 6 HOURS PRN
Status: DISCONTINUED | OUTPATIENT
Start: 2022-06-20 | End: 2022-07-26 | Stop reason: HOSPADM

## 2022-06-20 RX ORDER — FUROSEMIDE 20 MG/1
20 TABLET ORAL DAILY
Status: DISCONTINUED | OUTPATIENT
Start: 2022-06-21 | End: 2022-07-26 | Stop reason: HOSPADM

## 2022-06-20 RX ORDER — ONDANSETRON 4 MG/1
4 TABLET, ORALLY DISINTEGRATING ORAL EVERY 6 HOURS PRN
Status: DISCONTINUED | OUTPATIENT
Start: 2022-06-20 | End: 2022-07-26 | Stop reason: HOSPADM

## 2022-06-20 RX ORDER — TALC
6 POWDER (GRAM) TOPICAL NIGHTLY PRN
Status: DISCONTINUED | OUTPATIENT
Start: 2022-06-20 | End: 2022-07-26 | Stop reason: HOSPADM

## 2022-06-20 RX ORDER — ACETAMINOPHEN 325 MG/1
650 TABLET ORAL EVERY 6 HOURS PRN
Status: DISCONTINUED | OUTPATIENT
Start: 2022-06-20 | End: 2022-07-26 | Stop reason: HOSPADM

## 2022-06-20 RX ORDER — LORAZEPAM 0.5 MG/1
0.5 TABLET ORAL EVERY 8 HOURS PRN
Status: DISCONTINUED | OUTPATIENT
Start: 2022-06-20 | End: 2022-07-26 | Stop reason: HOSPADM

## 2022-06-20 RX ORDER — AMOXICILLIN 250 MG
1 CAPSULE ORAL 2 TIMES DAILY
Status: DISCONTINUED | OUTPATIENT
Start: 2022-06-20 | End: 2022-07-26 | Stop reason: HOSPADM

## 2022-06-20 RX ORDER — B-COMPLEX WITH VITAMIN C
1 TABLET ORAL DAILY
Status: DISCONTINUED | OUTPATIENT
Start: 2022-06-21 | End: 2022-07-26 | Stop reason: HOSPADM

## 2022-06-20 RX ORDER — IBUPROFEN 200 MG
24 TABLET ORAL
Status: DISCONTINUED | OUTPATIENT
Start: 2022-06-20 | End: 2022-06-21

## 2022-06-20 RX ORDER — CALCIUM CARBONATE 200(500)MG
500 TABLET,CHEWABLE ORAL 2 TIMES DAILY PRN
Status: DISCONTINUED | OUTPATIENT
Start: 2022-06-20 | End: 2022-07-26 | Stop reason: HOSPADM

## 2022-06-20 RX ORDER — PANTOPRAZOLE SODIUM 40 MG/1
40 TABLET, DELAYED RELEASE ORAL DAILY
Status: DISCONTINUED | OUTPATIENT
Start: 2022-06-21 | End: 2022-07-26 | Stop reason: HOSPADM

## 2022-06-20 RX ADMIN — PANTOPRAZOLE SODIUM 40 MG: 40 TABLET, DELAYED RELEASE ORAL at 08:06

## 2022-06-20 RX ADMIN — LORAZEPAM 0.5 MG: 0.5 TABLET ORAL at 06:06

## 2022-06-20 RX ADMIN — Medication 1 TABLET: at 12:06

## 2022-06-20 RX ADMIN — APIXABAN 10 MG: 2.5 TABLET, FILM COATED ORAL at 08:06

## 2022-06-20 RX ADMIN — Medication 5000 UNITS: at 08:06

## 2022-06-20 RX ADMIN — SENNOSIDES AND DOCUSATE SODIUM 1 TABLET: 50; 8.6 TABLET ORAL at 09:06

## 2022-06-20 RX ADMIN — ACETAMINOPHEN AND CODEINE PHOSPHATE 1 TABLET: 300; 30 TABLET ORAL at 12:06

## 2022-06-20 RX ADMIN — ACETAMINOPHEN AND CODEINE PHOSPHATE 1 TABLET: 300; 30 TABLET ORAL at 08:06

## 2022-06-20 RX ADMIN — APIXABAN 10 MG: 5 TABLET, FILM COATED ORAL at 08:06

## 2022-06-20 RX ADMIN — FUROSEMIDE 20 MG: 20 TABLET ORAL at 08:06

## 2022-06-20 RX ADMIN — LORAZEPAM 0.5 MG: 0.5 TABLET ORAL at 08:06

## 2022-06-20 RX ADMIN — ACETAMINOPHEN AND CODEINE PHOSPHATE 1 TABLET: 300; 30 TABLET ORAL at 01:06

## 2022-06-20 NOTE — SUBJECTIVE & OBJECTIVE
Interval History: No acute events overnight.  Sat in chair yesterday.  Daughter at bedside.  Accepted to [facility] today.    Review of Systems   Constitutional:  Negative for chills, fatigue and fever.   Respiratory:  Negative for cough and shortness of breath.    Cardiovascular:  Negative for chest pain, palpitations and leg swelling.   Gastrointestinal:  Negative for abdominal pain, diarrhea, nausea and vomiting.   Genitourinary:  Negative for dysuria and urgency.   Neurological:  Negative for dizziness and headaches.   All other systems reviewed and are negative.  Objective:     Vital Signs (Most Recent):  Temp: 98.6 °F (37 °C) (06/20/22 0736)  Pulse: 78 (06/20/22 0736)  Resp: 18 (06/20/22 0736)  BP: 120/72 (06/20/22 0846)  SpO2: 95 % (06/20/22 0853)   Vital Signs (24h Range):  Temp:  [98.5 °F (36.9 °C)-98.9 °F (37.2 °C)] 98.6 °F (37 °C)  Pulse:  [78-89] 78  Resp:  [15-20] 18  SpO2:  [94 %-98 %] 95 %  BP: (120-139)/(66-79) 120/72     Weight: 90.3 kg (199 lb)  Body mass index is 36.4 kg/m².  No intake or output data in the 24 hours ending 06/20/22 1018     Physical Exam  Constitutional:       Appearance: Normal appearance. She is well-developed.   HENT:      Head: Normocephalic and atraumatic.   Cardiovascular:      Rate and Rhythm: Normal rate and regular rhythm.      Heart sounds: No murmur heard.  Pulmonary:      Effort: Pulmonary effort is normal. No respiratory distress.      Breath sounds: Normal breath sounds. No wheezing or rales.      Comments: Comfortable on NC, no increased work of breathing, speaking in full sentences  Abdominal:      General: There is no distension.      Palpations: Abdomen is soft.      Tenderness: There is no abdominal tenderness.   Musculoskeletal:         General: No deformity.   Skin:     General: Skin is warm.   Neurological:      General: No focal deficit present.      Mental Status: She is alert and oriented to person, place, and time. Mental status is at baseline.        Significant Labs: All pertinent labs within the past 24 hours have been reviewed.    Significant Imaging: I have reviewed all pertinent imaging results/findings within the past 24 hours.

## 2022-06-20 NOTE — LETTER
[address] [address]  Phone: [phone]  Fax: [phone]     To whom it may concern:    Lupis Murcia, 1950, was readmitted to [facility]'s ICU on 6/14/2022 until 6/20/2022. After discharge from the acute setting she was then admitted to the Skilled Nursing Facility to receive further care.     Should you have any further questions, I can be reached at [phone].                 Amy Conklin NP  Post Acute Care- Skilled Nursing Facility (SNF)  [facility]

## 2022-06-20 NOTE — PLAN OF CARE
"EMERGENCY DEPARTMENT ENCOUNTER    Room Number:  03/03  Date seen:  4/21/2018  Time seen: 11:10 AM  PCP: Waqar Melissa MD    HPI:  Chief complaint: Sore throat and Cough  Context:Shira Davila is a 89 y.o. female who presents to the ED with c/o sore throat.   Pt reports cough with yellow sputum. Pt states that it has been difficult to cough the sputum ans she consequently feels very congested in her chest.  Pt denies fever, abdominal pain, chest pain or headache.  Pt took one dose of Mucinex at home PTA.    Timing:Constant  Location:Throat  Radiation:None  Quality:\"Sore\"  Intensity/Severity:Moderate  Associated Symptoms:Cough   Aggravating Factors:None  Alleviating Factors:None  Previous Episodes:Unknown  Treatment before arrival:Mucinex    MEDICAL RECORD REVIEW    ALLERGIES  Amoxicillin; Cortisone; Diazepam; Erythromycin; Horse-derived products; Levoxyl  [levothyroxine sodium]; Lexapro  [escitalopram oxalate]; Lipitor [atorvastatin]; Lopressor  [metoprolol tartrate]; Metaxalone; Omeprazole; Penicillins; Sesame seed (diagnostic); Solarcaine [benzocaine]; and Sulfa antibiotics    PAST MEDICAL HISTORY  Active Ambulatory Problems     Diagnosis Date Noted   • Abdominal pain, vomiting, and diarrhea 03/16/2016   • Chronic constipation 03/16/2016   • Hemorrhoid 03/16/2016   • BP (high blood pressure) 03/16/2016   • Arthralgia of hip 03/16/2016   • LBP (low back pain) 03/16/2016   • Disease of thyroid gland 03/16/2016   • Benign essential HTN 05/28/2017   • Chronic kidney disease, stage III (moderate) 05/28/2017   • Debility 05/28/2017   • Chronic systolic CHF (congestive heart failure) 05/28/2017   • Coronary artery disease 05/29/2017     Resolved Ambulatory Problems     Diagnosis Date Noted   • Colitis presumed infectious (bacterial) 05/28/2017   • Rectal bleeding 05/29/2017   • Abnormal finding in urine 05/29/2017     Past Medical History:   Diagnosis Date   • Arthritis    • CHF (congestive heart failure)    • " Per [facility] at [facility], they have a bed available for patient today. Awaiting report information and SW will set up transportation when appropriate.    Adali King, LEXI  [facility]  Ext. [phone]   Coronary artery disease    • Disease of thyroid gland 3/16/2016   • Diverticulitis    • Hypertension        PAST SURGICAL HISTORY  Past Surgical History:   Procedure Laterality Date   • ABDOMINAL SURGERY      liban   • APPENDECTOMY     • COLON SURGERY      fistulectomy   • EYE SURGERY      cataract   • HYSTERECTOMY     • TONSILLECTOMY         FAMILY HISTORY  Family History   Problem Relation Age of Onset   • Hypertension Other        SOCIAL HISTORY  Social History     Social History   • Marital status: Single     Spouse name: N/A   • Number of children: N/A   • Years of education: N/A     Occupational History   • Not on file.     Social History Main Topics   • Smoking status: Never Smoker   • Smokeless tobacco: Not on file   • Alcohol use No   • Drug use: No   • Sexual activity: Defer     Other Topics Concern   • Not on file     Social History Narrative   • No narrative on file       REVIEW OF SYSTEMS  Review of Systems   Constitutional: Negative for activity change, appetite change, diaphoresis and fever.   HENT: Positive for sore throat. Negative for trouble swallowing.    Eyes: Negative for visual disturbance.   Respiratory: Positive for cough. Negative for chest tightness, shortness of breath and wheezing.    Cardiovascular: Negative for chest pain, palpitations and leg swelling.   Gastrointestinal: Negative for abdominal pain, diarrhea, nausea and vomiting.   Genitourinary: Negative for dysuria.   Musculoskeletal: Negative for back pain.   Skin: Negative for rash.   Neurological: Negative for dizziness, speech difficulty and light-headedness.       PHYSICAL EXAM  ED Triage Vitals [04/21/18 1104]   Temp Heart Rate Resp BP SpO2   -- 86 16 -- 94 %      Temp src Heart Rate Source Patient Position BP Location FiO2 (%)   -- -- -- -- --     Physical Exam   Constitutional: She is oriented to person, place, and time and well-developed, well-nourished, and in no distress. No distress.   HENT:   Head: Normocephalic and  atraumatic.   Mouth/Throat: Uvula is midline and mucous membranes are normal.   Foul smell to breath   Neck: Normal range of motion. Neck supple.   Cardiovascular: S1 normal, S2 normal and normal heart sounds.  Exam reveals no gallop and no friction rub.    No murmur heard.  Pulmonary/Chest: Effort normal. Tachypnea noted. She has no decreased breath sounds. She has wheezes. She has rhonchi. She has no rales.   Abdominal: Soft. Normal appearance. There is no rebound and no guarding.   Musculoskeletal: Normal range of motion.   Neurological: She is alert and oriented to person, place, and time.   Skin: Skin is warm, dry and intact.   Psychiatric: Affect and judgment normal.   Nursing note and vitals reviewed.      LAB RESULTS  Recent Results (from the past 24 hour(s))   Comprehensive Metabolic Panel    Collection Time: 04/21/18 11:47 AM   Result Value Ref Range    Glucose 101 (H) 65 - 99 mg/dL    BUN 22 8 - 23 mg/dL    Creatinine 1.25 (H) 0.57 - 1.00 mg/dL    Sodium 139 136 - 145 mmol/L    Potassium 4.4 3.5 - 5.2 mmol/L    Chloride 99 98 - 107 mmol/L    CO2 27.6 22.0 - 29.0 mmol/L    Calcium 9.2 8.2 - 9.6 mg/dL    Total Protein 7.1 6.0 - 8.5 g/dL    Albumin 3.80 3.50 - 5.20 g/dL    ALT (SGPT) 16 1 - 33 U/L    AST (SGOT) 24 1 - 32 U/L    Alkaline Phosphatase 55 39 - 117 U/L    Total Bilirubin 0.6 0.1 - 1.2 mg/dL    eGFR Non African Amer 40 (L) >60 mL/min/1.73    Globulin 3.3 gm/dL    A/G Ratio 1.2 g/dL    BUN/Creatinine Ratio 17.6 7.0 - 25.0    Anion Gap 12.4 mmol/L   BNP    Collection Time: 04/21/18 11:47 AM   Result Value Ref Range    proBNP 2,259.0 (H) 0.0 - 1,800.0 pg/mL   Lactic Acid, Plasma    Collection Time: 04/21/18 11:47 AM   Result Value Ref Range    Lactate 0.9 0.5 - 2.0 mmol/L   CBC Auto Differential    Collection Time: 04/21/18 11:47 AM   Result Value Ref Range    WBC 7.74 4.50 - 10.70 10*3/mm3    RBC 4.77 3.90 - 5.20 10*6/mm3    Hemoglobin 13.6 11.9 - 15.5 g/dL    Hematocrit 42.8 35.6 - 45.5 %    MCV  89.7 80.5 - 98.2 fL    MCH 28.5 26.9 - 32.0 pg    MCHC 31.8 (L) 32.4 - 36.3 g/dL    RDW 15.4 (H) 11.7 - 13.0 %    RDW-SD 50.6 37.0 - 54.0 fl    MPV 11.5 6.0 - 12.0 fL    Platelets 176 140 - 500 10*3/mm3    Neutrophil % 74.2 42.7 - 76.0 %    Lymphocyte % 16.3 (L) 19.6 - 45.3 %    Monocyte % 7.8 5.0 - 12.0 %    Eosinophil % 1.4 0.3 - 6.2 %    Basophil % 0.3 0.0 - 1.5 %    Immature Grans % 0.0 0.0 - 0.5 %    Neutrophils, Absolute 5.75 1.90 - 8.10 10*3/mm3    Lymphocytes, Absolute 1.26 0.90 - 4.80 10*3/mm3    Monocytes, Absolute 0.60 0.20 - 1.20 10*3/mm3    Eosinophils, Absolute 0.11 0.00 - 0.70 10*3/mm3    Basophils, Absolute 0.02 0.00 - 0.20 10*3/mm3    Immature Grans, Absolute 0.00 0.00 - 0.03 10*3/mm3   Influenza Antigen, Rapid - Swab, Nasopharynx    Collection Time: 04/21/18 11:50 AM   Result Value Ref Range    Influenza A Ag, EIA Negative Negative    Influenza B Ag, EIA Negative Negative   Rapid Strep A Screen - Swab, Throat    Collection Time: 04/21/18 11:50 AM   Result Value Ref Range    Strep A Ag Negative Negative       I ordered the above labs and reviewed the results    RADIOLOGY  XR Chest 2 View   Final Result        TWO-VIEW CHEST     HISTORY: Cough and congestion.     FINDINGS: There is cardiomegaly that is unchanged from 02/22/2018. There  is now mild generalized vascular congestion. There is no focal  infiltrate.     This report was finalized on 4/21/2018 1:12 PM by Dr. Trell Landrum MD.  I ordered the above noted radiological studies and reviewed the images on the PACS system.      MEDICATIONS GIVEN IN ER  Medications   sodium chloride 0.9 % flush 10 mL (not administered)   albuterol (PROVENTIL) nebulizer solution 0.083% 2.5 mg/3mL (2.5 mg Nebulization Given 4/21/18 1148)       EKG  Interpreted by ED Physician    PROCEDURES  Procedures    COURSE & MEDICAL DECISION MAKING  Pertinent Labs and Imaging studies that were ordered and reviewed are noted above.  Results were reviewed/discussed with the  "patient and they were also made aware of online assess.  Pt also made aware that some labs, such as cultures, will not be resulted during ER visit and follow up with PMD is necessary.     Antibiotics prescribed for chronic CHF, symptoms greater than 7 days, age and multiple comorbidities    PROGRESS AND CONSULTS    Progress Notes:    ED Course       1128  Ordered Chest XR, strep screen, and blood work for further evaluation.     1402  Reviewed pt's history and workup with Dr. Valiente.  After a bedside evaluation; Dr Valiente agrees with the plan of care.    1428  Workup is unremarkable.  Rechecked Pt and and discussed the plan to to discharge with antibiotics.  Pt understands and agrees with the plan, all questions answered.      The patient's history, physical exam, and lab findings were discussed with the physician, who also performed a face to face history and physical exam.  I discussed all results and noted any abnormalities with patient.  Discussed absoute need to recheck abnormalities with their family physician.  I answered any of the patient's questions.  Discussed plan for discharge, as there is no emergent indication for admission.  Pt is agreeable and understands need for follow up and repeat testing.  Pt is aware that discharge does not mean that nothing is wrong but it indicates no emergency is present and they must continue care with their family physician.  Pt is discharged with instructions to follow up with primary care doctor to have their blood pressure rechecked.     Disposition vitals:  /75   Pulse 73   Temp 99 °F (37.2 °C) (Tympanic)   Resp 20   Ht 162.6 cm (64\")   Wt 55.8 kg (123 lb)   SpO2 95%   BMI 21.11 kg/m²       DIAGNOSIS  Final diagnoses:   Acute bronchitis, unspecified organism       FOLLOW UP   Waqar Melissa MD  [address]  [address]  [facility] [address]  [phone]    Schedule an appointment as soon as possible for a visit in 2 days        RX   "   Medication List      New Prescriptions    albuterol 108 (90 Base) MCG/ACT inhaler  Commonly known as:  PROVENTIL HFA;VENTOLIN HFA  Inhale 2 puffs Every 6 (Six) Hours As Needed for Wheezing.     levoFLOXacin 500 MG tablet  Commonly known as:  LEVAQUIN  Take 1 tablet by mouth Daily for 5 days.     predniSONE 20 MG tablet  Commonly known as:  DELTASONE  Take 2 tablets by mouth Daily for 5 days.          Documentation assistance provided by henrique Richmond for FRANCISCO Castelan.  Information recorded by the henrique was done at my direction and has been verified and validated by me.  Electronically signed by Amalia Richmond on 4/21/2018 at time 2:28 PM           Amalia Richmond  04/21/18 1442       FRANCISCO Amanda  04/21/18 3626

## 2022-06-20 NOTE — PROGRESS NOTES
[facility] Tele[facility] Medicine  Progress Note    Patient Name: Lupis Murcia  MRN: [id number]  Patient Class: IP- Inpatient   Admission Date: 6/14/2022  Length of Stay: 6 days  Attending Physician: Helga Early MD  Primary Care Provider: Bobby Tran MD        Subjective:     Principal Problem:Acute pulmonary embolism with acute cor pulmonale        HPI:  71-year-old female with history of PE during pregnancy requiring heparin drip, chronic lower extremity lymphedema, multiple sclerosis, vitamin D deficiency who presented to the ED due to shortness of breath and dyspnea on exertion two days prior to arrival. Patient states she was undergoing physical therapy at her rehabilitation facility and was walking when she suddenly became short of breath. She states since that time, her symptoms have worsened. She stated that 5 days prior, her primary physician noted that she was experiencing abdominal bruising related to heparin subcutaneous injections. She states she started having worsening respiratory symptoms and was satting 80% on room air. In the ED, cardiology was consulted and a bedside ECHO showed a dilated RV with Cox's sign. Due to her allergy for contrast, she underwent a VQ scan showing evidence of a submassive pulmonary embolism. She was started on a heparin drip.     Vitals were overall stable other than mild tachycardia and on 6L NC with oxygen saturation above 90%. CXR in ED showed mild cardiomegaly. BNP elevated to 577 with troponin elevated to 0.423. She was admitted to Critical Care Team 2 for further management.       Overview/Hospital Course:  Ms. Lupis Murcia is a 71 y.o. female who was admitted to [facility] ICU on 6/14/2022 with an acute submassive PE with acute cor pulmonale.  She was started on a Heparin gtt.  Cardiology was consulted.  2D echo showed EF 60% with elevated PAP.  Troponin peaked at 0.790.  BLE US was negative for PE.  She was given the option of TPA but  refused.  She was transitioned to [facility].   She was able to have stable oxygenation at 4L NC when sitting up in bed and trying to stand, and was thus felt stable to SD to  on 6/16.  PT/OT say SNF.  She was accepted back to [facility], weaned down to 1-2L NC.      Interval History: No acute events overnight.  Sat in chair yesterday.  Daughter at bedside.  Accepted to [facility] today.    Review of Systems   Constitutional:  Negative for chills, fatigue and fever.   Respiratory:  Negative for cough and shortness of breath.    Cardiovascular:  Negative for chest pain, palpitations and leg swelling.   Gastrointestinal:  Negative for abdominal pain, diarrhea, nausea and vomiting.   Genitourinary:  Negative for dysuria and urgency.   Neurological:  Negative for dizziness and headaches.   All other systems reviewed and are negative.  Objective:     Vital Signs (Most Recent):  Temp: 98.6 °F (37 °C) (06/20/22 0736)  Pulse: 78 (06/20/22 0736)  Resp: 18 (06/20/22 0736)  BP: 120/72 (06/20/22 0846)  SpO2: 95 % (06/20/22 0853)   Vital Signs (24h Range):  Temp:  [98.5 °F (36.9 °C)-98.9 °F (37.2 °C)] 98.6 °F (37 °C)  Pulse:  [78-89] 78  Resp:  [15-20] 18  SpO2:  [94 %-98 %] 95 %  BP: (120-139)/(66-79) 120/72     Weight: 90.3 kg (199 lb)  Body mass index is 36.4 kg/m².  No intake or output data in the 24 hours ending 06/20/22 1018     Physical Exam  Constitutional:       Appearance: Normal appearance. She is well-developed.   HENT:      Head: Normocephalic and atraumatic.   Cardiovascular:      Rate and Rhythm: Normal rate and regular rhythm.      Heart sounds: No murmur heard.  Pulmonary:      Effort: Pulmonary effort is normal. No respiratory distress.      Breath sounds: Normal breath sounds. No wheezing or rales.      Comments: Comfortable on NC, no increased work of breathing, speaking in full sentences  Abdominal:      General: There is no distension.      Palpations: Abdomen is soft.      Tenderness: There is no abdominal  "tenderness.   Musculoskeletal:         General: No deformity.   Skin:     General: Skin is warm.   Neurological:      General: No focal deficit present.      Mental Status: She is alert and oriented to person, place, and time. Mental status is at baseline.       Significant Labs: All pertinent labs within the past 24 hours have been reviewed.    Significant Imaging: I have reviewed all pertinent imaging results/findings within the past 24 hours.      Assessment/Plan:      * Acute pulmonary embolism with acute cor pulmonale  · Sent from [facility] and found to have a submassive PE  · 2D echo showed EF 60% with elevated PAP  · Troponin peaked at 0.790  · BLE US was negative for PE  · Cardiology consulted:  Offered TPA but refused  · Transitioned from Heparin gtt to Eliquis  · Eliquis 10mg PO BID til 6/22  · Eliquis 5mg PO BID starting 6/23 indefinitely  · On 2L NC, wean oxygen as tolerated  · Of note, does report a history of PE for which she was on Warfarin many years ago      Acute hypoxemic respiratory failure  · 2/2 PE  · Wean off NC as tolerated    Class 2 obesity due to excess calories in adult  · Body mass index is 36.4 kg/m².   · Morbid obesity complicates all aspects of disease management from diagnostic modalities to treatment.   · Weight loss encouraged and health benefits explained to patient.         Lymphedema  · Chronic and stable  · No acute issues      Vitamin D deficiency  · Noted  · Replace      MS (multiple sclerosis)  · Weakness of LLE  · Has been at SNF  · Normally ambulates with walker  · Not on meds  · PT/OT suggest SNF, wants to return to [facility]      Muscle weakness of lower extremity  · PT/OT      Anemia of chronic disease  · Chronic and stable  · Monitor for bleeding on Eliquis      Insomnia secondary to chronic pain  · Chronic issue  · Meds prn        VTE Risk Mitigation (From admission, onward)         Ordered     apixaban tablet 5 mg  2 times daily        "Followed by" Linked Group " "Details    06/16/22 0951     apixaban tablet 10 mg  2 times daily        "Followed by" Linked Group Details    06/16/22 0951     IP VTE HIGH RISK PATIENT  Once         06/14/22 1728     Place sequential compression device  Until discontinued         06/14/22 1728                Discharge Planning   USMAN: 6/20/2022     Code Status: Full Code   Is the patient medically ready for discharge?: Yes    Reason for patient still in hospital (select all that apply): Pending disposition  Discharge Plan A: Home Health                  Helga Early MD  Department of Hospital Medicine   [facility] - Telemetry Stepdown    "

## 2022-06-20 NOTE — DISCHARGE SUMMARY
[facility] - Telemetry [facility] Medicine  Discharge Summary      Patient Name: Lupis Murcia  MRN: [id number]  Patient Class: IP- Inpatient  Admission Date: 6/14/2022  Hospital Length of Stay: 6 days  Discharge Date and Time:  06/20/2022 10:20 AM  Attending Physician: Helga Early MD   Discharging Provider: Helga Early MD  Primary Care Provider: Bobby Tran MD  [facility] Medicine Team: [facility] [place] [facility]P MED  Helga Early MD    HPI:   71-year-old female with history of PE during pregnancy requiring heparin drip, chronic lower extremity lymphedema, multiple sclerosis, vitamin D deficiency who presented to the ED due to shortness of breath and dyspnea on exertion two days prior to arrival. Patient states she was undergoing physical therapy at her rehabilitation facility and was walking when she suddenly became short of breath. She states since that time, her symptoms have worsened. She stated that 5 days prior, her primary physician noted that she was experiencing abdominal bruising related to heparin subcutaneous injections. She states she started having worsening respiratory symptoms and was satting 80% on room air. In the ED, cardiology was consulted and a bedside ECHO showed a dilated RV with Cox's sign. Due to her allergy for contrast, she underwent a VQ scan showing evidence of a submassive pulmonary embolism. She was started on a heparin drip.     Vitals were overall stable other than mild tachycardia and on 6L NC with oxygen saturation above 90%. CXR in ED showed mild cardiomegaly. BNP elevated to 577 with troponin elevated to 0.423. She was admitted to Critical Care Team 2 for further management.       * No surgery found *      Hospital Course:   Ms. Lupis Murcia is a 71 y.o. female who was admitted to [facility] ICU on 6/14/2022 with an acute submassive PE with acute cor pulmonale.  She was started on a Heparin gtt.  Cardiology was consulted.  2D echo showed EF 60% with elevated PAP.   Troponin peaked at 0.790.  BLE US was negative for PE.  She was given the option of TPA but refused.  She was transitioned to [facility].   She was able to have stable oxygenation at 4L NC when sitting up in bed and trying to stand, and was thus felt stable to SD to  on 6/16.  PT/OT say SNF.  She was accepted back to [facility], weaned down to 1-2L NC.       Goals of Care Treatment Preferences:  Code Status: Full Code          What is most important right now is to focus on remaining as independent as possible.  Accordingly, we have decided that the best plan to meet the patient's goals includes continuing with treatment.      Consults:   Consults (From admission, onward)        Status Ordering Provider     Inpatient consult to Critical Care Medicine  Once        Provider:  (Not yet assigned)    Completed JUNIOR GREER     Inpatient consult to Cardiology  Once        Provider:  (Not yet assigned)    Completed JUNIOR GREER          No new Assessment & Plan notes have been filed under this hospital service since the last note was generated.  Service: Hospital Medicine    Final Active Diagnoses:    Diagnosis Date Noted POA    PRINCIPAL PROBLEM:  Acute pulmonary embolism with acute cor pulmonale [I26.09] 06/14/2022 Yes    Acute hypoxemic respiratory failure [J96.01] 06/14/2022 Yes    Class 2 obesity due to excess calories in adult [E66.09] 06/14/2022 Yes    Lymphedema [I89.0] 05/01/2022 Yes    Vitamin D deficiency [E55.9] 06/09/2019 Yes    MS (multiple sclerosis) [G35] 06/09/2019 Yes    Muscle weakness of lower extremity [M62.81] 05/11/2018 Yes    Anemia of chronic disease [D63.8] 08/09/2017 Yes    Insomnia secondary to chronic pain [G89.29, G47.01] 06/15/2016 Yes      Problems Resolved During this Admission:       Discharged Condition: good    Disposition: [facility]    Follow Up:     Medications:  Reconciled Home Medications:      Medication List      START taking these medications    * apixaban 5 mg  Tab  Commonly known as: ELIQUIS  Take 2 tablets (10 mg total) by mouth 2 (two) times daily. for 3 days     * apixaban 5 mg Tab  Commonly known as: ELIQUIS  Take 1 tablet (5 mg total) by mouth 2 (two) times daily.  Start taking on: June 23, 2022         * This list has 2 medication(s) that are the same as other medications prescribed for you. Read the directions carefully, and ask your doctor or other care provider to review them with you.            CHANGE how you take these medications    furosemide 20 MG tablet  Commonly known as: LASIX  Take 1 tablet (20 mg total) by mouth once daily.  What changed:   · when to take this  · reasons to take this     vitamin D 1000 units Tab  Commonly known as: VITAMIN D3  Take 1 tablet (1,000 Units total) by mouth once daily.  What changed: how much to take        CONTINUE taking these medications    acetaminophen-codeine 300-30mg 300-30 mg Tab  Commonly known as: TYLENOL #3  Take 1 tablet by mouth every 6 (six) hours as needed (pain).     B-complex with vitamin C tablet  Commonly known as: Z-Bec or Equiv  Take 1 tablet by mouth once daily.     cyanocobalamin 1,000 mcg/mL injection  Inject 1 mL (1,000 mcg total) into the muscle every 30 days.     LORazepam 0.5 MG tablet  Commonly known as: ATIVAN  Take 1 tablet (0.5 mg total) by mouth every 8 (eight) hours as needed for Anxiety.     ondansetron 4 MG Tbdl  Commonly known as: ZOFRAN-ODT  Take 1 tablet (4 mg total) by mouth every 6 (six) hours as needed.     pantoprazole 40 MG tablet  Commonly known as: PROTONIX  Take 1 tablet (40 mg total) by mouth once daily.        STOP taking these medications    ammonium lactate 12 % lotion  Commonly known as: LAC-HYDRIN     bisacodyL 10 mg Supp  Commonly known as: DULCOLAX     calcium carbonate 200 mg calcium (500 mg) chewable tablet  Commonly known as: TUMS     hydroCHLOROthiazide 12.5 MG Tab  Commonly known as: HYDRODIURIL     polyethylene glycol 17 gram Pwpk  Commonly known as: GLYCOLAX      senna-docusate 8.6-50 mg 8.6-50 mg per tablet  Commonly known as: PERICOLACE        ASK your doctor about these medications    baclofen 10 MG tablet  Commonly known as: LIORESAL  Take 1 tablet (10 mg total) by mouth 2 (two) times daily.     candesartan-hydrochlorothiazide 16-12.5 mg per tablet  Commonly known as: ATACAND HCT  Take 1 tablet by mouth Daily.     miconazole 2 % cream  Commonly known as: MICOTIN  Apply topically 2 (two) times daily. Apply to intertriginous areas, lower abdomen groin for 14 days     tamsulosin 0.4 mg Cap  Commonly known as: FLOMAX  Take 1 capsule (0.4 mg total) by mouth daily with lunch.            Indwelling Lines/Drains at time of discharge:   Lines/Drains/Airways     None                 Time spent on the discharge of patient: 35 minutes         Helga Early MD  Department of Hospital Medicine  [facility] - Telemetry Stepdown

## 2022-06-20 NOTE — PLAN OF CARE
06/20/22 1417   Post-Acute Status   Post-Acute Authorization Placement   Post-Acute Placement Status Set-up Complete/Auth obtained   Per [place] in admissions at [facility] in the team secure chat, nurse can call report to [phone] and SW can schedule transportation for 4:00PM.    SW arranged wheelchair transport via [facility]. Requested  time is 4:00PM. Requested  time does not guarantee arrival time.      Adali King LMSW  [facility]  Ext. [phone]

## 2022-06-20 NOTE — NURSING
Education:  Patient given patient education regarding transfer to [facility] unit.  Patient and daughter both educated regarding medications that patient will stop taking and continue taking.  Patient to transfer when RN is notified that bed is ready and who will receive patient report.  Patient VSS at this time and ready for discharge.

## 2022-06-20 NOTE — PT/OT/SLP PROGRESS
Physical Therapy      Patient Name:  Lupis Murcia   MRN:  [id number]    Patient attempted @1420. Patient not seen today secondary to patient with discharge orders signed, and requesting to rest before transport (scheduled @1600). Will follow-up for progressive mobility if pt does not d/c as scheduled.

## 2022-06-21 PROCEDURE — 97116 GAIT TRAINING THERAPY: CPT

## 2022-06-21 PROCEDURE — 27000221 HC OXYGEN, UP TO 24 HOURS

## 2022-06-21 PROCEDURE — 97162 PT EVAL MOD COMPLEX 30 MIN: CPT

## 2022-06-21 PROCEDURE — 97530 THERAPEUTIC ACTIVITIES: CPT

## 2022-06-21 PROCEDURE — 25000003 PHARM REV CODE 250: Performed by: HOSPITALIST

## 2022-06-21 PROCEDURE — 94761 N-INVAS EAR/PLS OXIMETRY MLT: CPT

## 2022-06-21 PROCEDURE — 97165 OT EVAL LOW COMPLEX 30 MIN: CPT

## 2022-06-21 PROCEDURE — 11000004 HC SNF PRIVATE

## 2022-06-21 RX ORDER — MICONAZOLE NITRATE 2 %
POWDER (GRAM) TOPICAL DAILY PRN
Status: DISCONTINUED | OUTPATIENT
Start: 2022-06-21 | End: 2022-07-26 | Stop reason: HOSPADM

## 2022-06-21 RX ORDER — AMMONIUM LACTATE 12 G/100G
LOTION TOPICAL DAILY
Status: DISCONTINUED | OUTPATIENT
Start: 2022-06-22 | End: 2022-07-05

## 2022-06-21 RX ADMIN — APIXABAN 10 MG: 2.5 TABLET, FILM COATED ORAL at 09:06

## 2022-06-21 RX ADMIN — Medication 1 TABLET: at 09:06

## 2022-06-21 RX ADMIN — ACETAMINOPHEN AND CODEINE PHOSPHATE 1 TABLET: 300; 30 TABLET ORAL at 12:06

## 2022-06-21 RX ADMIN — LORAZEPAM 0.5 MG: 0.5 TABLET ORAL at 09:06

## 2022-06-21 RX ADMIN — SENNOSIDES AND DOCUSATE SODIUM 1 TABLET: 50; 8.6 TABLET ORAL at 08:06

## 2022-06-21 RX ADMIN — APIXABAN 10 MG: 2.5 TABLET, FILM COATED ORAL at 08:06

## 2022-06-21 RX ADMIN — LORAZEPAM 0.5 MG: 0.5 TABLET ORAL at 05:06

## 2022-06-21 RX ADMIN — PANTOPRAZOLE SODIUM 40 MG: 40 TABLET, DELAYED RELEASE ORAL at 09:06

## 2022-06-21 RX ADMIN — CHOLECALCIFEROL TAB 25 MCG (1000 UNIT) 1000 UNITS: 25 TAB at 09:06

## 2022-06-21 RX ADMIN — FUROSEMIDE 20 MG: 20 TABLET ORAL at 09:06

## 2022-06-21 RX ADMIN — ACETAMINOPHEN AND CODEINE PHOSPHATE 1 TABLET: 300; 30 TABLET ORAL at 08:06

## 2022-06-21 NOTE — NURSING
Pt arrived to floor for skilled services via wheel chair. Pt required 1 person assistance from wheelchair to bed. Pt is AAOX4, incontinent of B/B. Brief on at arrival to floor. Pt is on a regular diet. Skin assessment done: Patient has bruising to bilateral upper arms, redness to chin, Bilateral lymphedema to lower legs, dry/tan/scaly skin; Stage 2 to sacrum. Daughter at bedside with patient. POC reviewed with patient. Pt denies pain at this time and is educated to use call light and not get OOB w/o assistance

## 2022-06-21 NOTE — PT/OT/SLP EVAL
Physical Therapy Evaluation    Patient Name:  Lupis Murcia   MRN:  [id number]  Admit Date: 6/20/2022  Admitting Diagnosis: Acute pulmonary embolism  Recent Surgeries: N/A    General Precautions: Standard, fall   Orthopedic Precautions:N/A   Braces: N/A     Recommendations:     Discharge Recommendations: home health PT   Level of Assistance Recommended: 24 hours significant assistance  Discharge Equipment Recommendations: bedside commode, grab bar, raised toilet, shower chair, walker, rolling   Barriers to discharge: Decreased caregiver support (Pt's daughter works 8 hours a day during the week.)    Assessment:     Lupis Murcia is a 71 y.o. female admitted with a medical diagnosis of acute pulmonary embolism. Performance deficits affecting function  weakness, impaired endurance, impaired sensation, impaired self care skills, impaired functional mobilty, gait instability, impaired balance, decreased lower extremity function, abnormal tone, decreased ROM, impaired skin, impaired cardiopulmonary response to activity, impaired muscle length. She required MaxA for rolling, totalA for lying<>sitting EOB, Long for STS and bed<>chair transfers, and totalA for gait due to wc follow for safety. Assessed SpO2 levels, at rest with no oxygen, SpO2 98%. After ambulating ~15 feet, SpO2 levels were 94% with oxygen. Oxygen levels stayed between % throughout entire treatment session without oxygen. Patient required skilled physical therapy services to address deficits, reduce burden of care, and return patient to [facility] with no limitations.    Rehab Potential is good     Activity Tolerance: Good    Plan:     Patient to be seen 6 x/week to address the above listed problems via gait training, therapeutic activities, therapeutic exercises, neuromuscular re-education, wheelchair management/training     Plan of Care Expires: 07/12/22  Plan of Care Reviewed with: patient, daughter    Subjective     Chief Complaint: I am feeling  SOB and am scared I will have a PE again  Patient/Family Comments/goals: Return to PLOF  Pain/Comfort:  Pain Rating 1: 0/10  Pain Rating Post-Intervention 1: 0/10    Living Environment:   Pt lives with daughter in 1 story home with 2STE w/ ramp entrance. She has a walk in tub w/ built in seat and walk in shower with shower chair.     Prior to admission, patients level of function was Min/ModA for self care tasks, taking sponge baths vs showering. She sleeps in left chair. Equipment used at home: shower chair, rolling walker, rollator, raised toilet, lift device, bedside commode. DME owned (not currently used): none.  Upon discharge, patient will have assistance from daughter (limited assistance as she works during the day as she works).    Patients cultural, spiritual, Zoroastrian conflicts given the current situation: no    Objective:     Communicated with nursing staff prior to session. Patient found up in chair with oxygen (2 L via nasal canula)  upon PT entry to room.    Exams:  · Cognitive Exam: Patient is oriented to Person, Place, Time and Situation  · Gross Motor Coordination: Heel to shin test, unable to complete due to decreased mobility/flexibility in BLEs.  · Postural Exam: Patient presented with the following abnormalities:     -       Rounded shoulders   -       Forward head  · Sensation:     -       Impaired  light/touch Below bilateral malleoli on BLEs, worse on LLE vs RLE.  · Skin Integrity/Edema:      -       Skin integrity: Bruising of BLEs and Dry   -       Edema: Mild swelling bilaterally  · Oxygen levels:   -       At rest without oxygen: 98%   -       Immediately after exercise without oxygen: 94%, returning to baseline after ~2 minutes.   -      Oxygen levels stayed between % throughout entire treatment session without oxygen.  · AROM/Strength:              -      RLE ROM: Grossly limited actively, able to achieve full PROM              -      RLE Strength: HF 3-/5 > KE 3-/5 > KF 4/5 >  DF 4/5              -      LLE ROM: Grossly limited actively, able to achieve full PROM              -      LLE Strength: HF 3-/5 > KE 2+/5 > KF 2+/5 > DF 0/5    Functional Mobility:   06/21/22 1309   Prior Functioning: Everyday Activities   Self Care Needed some help  (with lower body dressing and bathing her lower body)   Indoor Mobility (Ambulation) Independent   Stairs Unknown   Functional Cognition Independent   Prior Device Use Walker   Roll Left and Right   Assistance Needed Physical assistance   Physical Assistance Level 76% or more   Comment MaxA with large wedge under patient   CARE Score - Roll Left and Right 2   Roll Left and Right Discharge Goal   Discharge Goal 4   Sit to Lying   Assistance Needed Physical assistance   Physical Assistance Level Total assistance   Comment With large wedge under patient   CARE Score - Sit to Lying 1   Lying to Sitting on Side of Bed   Assistance Needed Physical assistance   Physical Assistance Level Total assistance   Comment With large wedge under patient   CARE Score - Lying to Sitting on Side of Bed 1   Sit to Stand   Assistance Needed Physical assistance   Physical Assistance Level 25% or less   Comment To RW, Long from wc without cushion, CGA once 3 inch cushion was added   CARE Score - Sit to Stand 3   Sit to Stand Discharge Goal   Discharge Goal 6   Chair/Bed-to-Chair Transfer   Assistance Needed Physical assistance   Physical Assistance Level 25% or less   Comment Long with RW   CARE Score - Chair/Bed-to-Chair Transfer 3   Car Transfer   Reason if not Attempted Environmental limitations   CARE Score - Car Transfer 10   Walk 10 Feet   Assistance Needed Physical assistance   Physical Assistance Level Total assistance   Comment TotalA due to wc follow for safety. 3 trials to complete x42 feet with RW. Mod/MaxA to advance LLE.   CARE Score - Walk 10 Feet 1   Walk 50 Feet with Two Turns   Reason if not Attempted Safety concerns   CARE Score - Walk 50 Feet with Two  Turns 88   Walk 150 Feet   Reason if not Attempted Safety concerns   CARE Score - Walk 150 Feet 88   Walking 10 Feet on Uneven Surfaces   Reason if not Attempted Safety concerns   CARE Score - Walking 10 Feet on Uneven Surfaces 88   1 Step (Curb)   Reason if not Attempted Safety concerns   CARE Score - 1 Step (Curb) 88   4 Steps   Reason if not Attempted Safety concerns   CARE Score - 4 Steps 88   12 Steps   Reason if not Attempted Safety concerns   CARE Score - 12 Steps 88   Picking Up Object   Reason if not Attempted Safety concerns   CARE Score - Picking Up Object 88   Uses a Wheelchair/Scooter?   Uses a Wheelchair/Scooter? Yes   Wheel 50 Feet with Two Turns   Comment Performed x10 feet with BUEs. She required manual cues for turning R/L.   Reason if not Attempted Safety concerns   CARE Score - Wheel 50 Feet with Two Turns 88   Type of Wheelchair/Scooter Manual   Wheel 50 Feet with Two Turns Discharge Goal   Discharge Goal 6   Wheel 150 Feet   Reason if not Attempted Safety concerns   CARE Score - Wheel 150 Feet 88   Type of Wheelchair/Scooter Manual     Bed Mobility:     · Rolling Left: maximal assistance  · Rolling Right: maximal assistance  · Supine to Sit: total assistance  · Sit to Supine: total assistance    Transfers:     · Sit to Stand x multiple trials: minimum assistance with rolling walker. Added 3 inch wc cushion, progressed to CGA with RW.  · Wc<>mat: minimum assistance with rolling walker using Stand Pivot    Gait: Patient performed 3 trials completing 42 feet with RW and totalA due to wc follow for safety. She required Mod/MaxA to advance LLE and vc to stay within rolling walker parameters.    Wheelchair Propulsion: Pt propelled Standard wheelchair x 10 feet on Level tile with Bilateral upper extremity with Stand-by Assistance and manual cues for turning left and right.     Education:   Patient provided with daily orientation and goals of this PT session.   Patient educated to transfer to bedside  chair/bedside commode/bathroom with RN/PCT present.    Patient educated on bed mobility training, energy conservation, Fall risk, gait training, home safety, plan of care and transfer training by explanation.    Patient Verbalized Understanding.   Time provided for therapeutic counseling and discussion of current health disposition. All questions answered to satisfaction, within scope of PT practice; voiced no other concerns. White board updated in patient's room, RN notified of session.    AM-PAC 6 CLICK MOBILITY  Total Score:14     Patient left up in chair with call button in reach and daughter present.    GOALS:   Multidisciplinary Problems     Physical Therapy Goals        Problem: Physical Therapy    Goal Priority Disciplines Outcome Goal Variances Interventions   Physical Therapy Goal     PT, PT/OT Ongoing, Progressing     Description: Goals to be met by: 2022:    Patient will increase functional independence with mobility by performin. Supine to sit with Maximum Assistance.  2. Sit to supine with Maximum Assistance.  3. Rolling to Left and Right with Moderate Assistance.  4. Sit to stand transfer with Contact Guard Assistance.  5. Bed to chair transfer with Contact Guard Assistance using Rolling Walker.  6. Gait  x 100 feet with Maximum Assistance using Rolling Walker.   7. Wheelchair propulsion x 50 feet with Minimal Assistance using bilateral upper extremities.  8. Ascend/Descend 4 inch curb step with Maximum Assistance using Rolling Walker.  9. Sitting at edge of bed x 20 minutes with Supervision performing dynamic sitting balance activities.  10. Lower extremity exercise program x 30 reps per handout, with supervision.                     History:     Past Medical History:   Diagnosis Date    Lymphedema     MS (multiple sclerosis)     Other pulmonary embolism without acute cor pulmonale     Vitamin B12 deficiency        Past Surgical History:   Procedure Laterality Date    BREAST CYST  EXCISION Left     over 40yrs ago     SECTION      ESOPHAGOGASTRODUODENOSCOPY N/A 2022    Procedure: EGD (ESOPHAGOGASTRODUODENOSCOPY);  Surgeon: Radha Arango MD;  Location: [address]);  Service: Endoscopy;  Laterality: N/A;       Time Tracking:     PT Received On: 22  PT Start Time: 1309     PT Stop Time: 1402  PT Total Time (min): 53 min     Billable Minutes: Evaluation 25, Gait Training 14 and Therapeutic Activity 14      2022

## 2022-06-21 NOTE — PT/OT/SLP EVAL
"Occupational Therapy   Evaluation    Name: Lupis Murcia  MRN: [id number]  Admit Date: 6/20/2022  Admitting Diagnosis:  Acute pulmonary embolism    Recent Surgeries: N/a    General Precautions: Standard, fall   Orthopedic Precautions:N/A   Braces: N/A     Recommendations:     Discharge Recommendations: home health OT  Level of Assistance Recommended: 24 hours significant assistance  Discharge Equipment Recommendations:  walker, rolling (TBD)  Barriers to discharge:  Decreased caregiver support (Pt's daughter works 8 hours a day during the week.)    Assessment:     Lupis Murcia is a 71 y.o. female with a medical diagnosis of acute pulmonary embolism.  Pt had limited tolerance of assessment due to fatigue and wanting to eat her breakfast, but she performed bed to chair transfer.  She requires increased assistance to perform mobility and daily tasks compared to her baseline.  Pt would continue to benefit from skilled OT services at SNF to maximize her gains in functional independence and to ensure her safety upon returning home.  She presents with the following. Performance deficits affecting function: weakness, impaired endurance, impaired self care skills, impaired functional mobilty, gait instability, impaired balance, decreased ROM, decreased lower extremity function, edema, impaired cardiopulmonary response to activity, decreased safety awareness, impaired skin, decreased coordination.      Rehab Potential is good    Activity Tolerance: Fair    Plan:     Patient to be seen 6 x/week to address the above listed problems via self-care/home management, therapeutic activities, therapeutic exercises  · Plan of Care Expires: 07/12/22  · Plan of Care Reviewed with: patient, daughter    Subjective     Chief Complaint: "I have to do this right now?  I haven't eaten yet."  Patient/Family Comments/goals: Pt's daughter said, "I want her to be able to walk around the house by herself like she used to be able to do before " "this happened."     Occupational Profile:  Living Environment: Pt lives with her daughter in a H with a ramp entrance over one step.  She has a tub/shower combo with a tub bench.     Previous level of function: Pt was Mod I with upper body dressing, toileting, feeding, and grooming.  She required assistance for lower body dressing and for bathing her lower body.  Mod I to ambulate in the house with RW prior to May 1st, 2022.   Roles and Routines: mother  Equipment Used at Home:  walker, rolling, bath bench, bedside commode, raised toilet (Pt needs a new RW, per pt and her daughter.)  Assistance upon Discharge: Her daughter, but she has a full time job.     Pain/Comfort:  Pain Rating 1: other (see comments) (none at rest, not rated during mobility)  Location - Side 1: Left  Location - Orientation 1: generalized  Location 1: leg  Pain Addressed 1: Reposition, Distraction  Pain Rating Post-Intervention 1: 0/10 (at rest)    Patients cultural, spiritual, Hoahaoism conflicts given the current situation: no    Objective:     Communicated with: nursing prior to session.  Patient found HOB elevated with oxygen (2 L) with her daughter present upon OT entry to room.    Occupational Performance:     Bed Mobility:    · Patient completed Scooting/Bridging to EOB to place her feet on the floor with contact guard assistance  · Patient completed Supine to Sit to the R with maximal assistance for LE and trunk management    Functional Mobility/Transfers:  · Patient completed Sit <> Stand Transfer from EOB x 1 trial with minimum assistance  with  rolling walker   · Patient completed Bed <> Chair Transfer to her R using Stand Pivot technique with moderate assistance with rolling walker.  Pt has difficulty advancing her LLE.    Activities of Daily Living:  · Feeding:  Setup assistance of tray table to eat her breakfast while sitting in bedside chair  · Lower Body Dressing: total assistance to rosetta non-skid socks    Cognitive/Visual " Perceptual:  Cognitive/Psychosocial Skills:     -       Oriented to: Person, Place, Time and Situation   -       Follows Commands/attention:Follows multistep  commands  -       Communication: clear/fluent    Physical Exam:  Dominant hand:    -       L-handed  Upper Extremity Range of Motion:     -       Right Upper Extremity: WFL  -       Left Upper Extremity: WFL  Upper Extremity Strength:    -       Right Upper Extremity: WFL  -       Left Upper Extremity: WFL   Strength:    -       Right Upper Extremity: WFL  -       Left Upper Extremity: WFL  Fine Motor Coordination:    -       Intact    AMPAC 6 Click ADL:  AMPAC Total Score: 15    Treatment & Education:  Pt and her daughter edu on role of OT, POC, safety when performing self care tasks, benefit of performing OOB activity, and safety when performing functional transfers and mobility.  - White board updated  - Self care tasks completed-- as noted above     Education:    Patient left up in chair with all lines intact, call button in reach, nursing notified and her daughter present    GOALS:   Multidisciplinary Problems     Occupational Therapy Goals        Problem: Occupational Therapy    Goal Priority Disciplines Outcome Interventions   Occupational Therapy Goal     OT, PT/OT Ongoing, Progressing    Description: Goals to be met by: 7/12/2022    Patient will increase functional independence with ADLs by performing:    UE Dressing with Set-up Assistance.  LE Dressing, including donning/doffing pants, with Minimal Assistance using appropriate AE.  Grooming while seated at sink with Set-up Assistance.  Toileting from bedside commode with Minimal Assistance for hygiene and clothing management.   Bathing from sitting at sink with Minimal Assistance.  Supine to sit with Stand-by Assistance.  Sit to stand transfer with Stand-by Assistance with RW.  Toilet transfer to bedside commode with Stand-by Assistance with RW and grab bar.  Upper extremity exercise program with  Supervision.  Caregiver will be educated on level of assist required to safely perform self care tasks and functional transfers.                        History:     Past Medical History:   Diagnosis Date    Lymphedema     MS (multiple sclerosis)     Other pulmonary embolism without acute cor pulmonale     Vitamin B12 deficiency          Past Surgical History:   Procedure Laterality Date    BREAST CYST EXCISION Left     over 40yrs ago     SECTION      ESOPHAGOGASTRODUODENOSCOPY N/A 2022    Procedure: EGD (ESOPHAGOGASTRODUODENOSCOPY);  Surgeon: Radha Arango MD;  Location: [address];  Service: Endoscopy;  Laterality: N/A;       Time Tracking:     OT Date of Treatment: 22  OT Start Time: 826  OT Stop Time: 842  OT Total Time (min): 16 min    Billable Minutes:Evaluation 16 min    2022

## 2022-06-21 NOTE — PLAN OF CARE
Problem: Adult Inpatient Plan of Care  Goal: Plan of Care Review  Outcome: Ongoing, Progressing  Goal: Patient-Specific Goal (Individualized)  Outcome: Ongoing, Progressing  Goal: Absence of Hospital-Acquired Illness or Injury  Outcome: Ongoing, Progressing  Goal: Optimal Comfort and Wellbeing  Outcome: Ongoing, Progressing  Goal: Readiness for Transition of Care  Outcome: Ongoing, Progressing     Problem: Oral Intake Inadequate (Acute Kidney Injury/Impairment)  Goal: Optimal Nutrition Intake  Outcome: Ongoing, Progressing     Problem: Renal Function Impairment (Acute Kidney Injury/Impairment)  Goal: Effective Renal Function  Outcome: Ongoing, Progressing     Problem: Skin Injury Risk Increased  Goal: Skin Health and Integrity  Outcome: Ongoing, Progressing   Pt admitted to Skilled Nursing for Acute pulmonary embolism [I26.99] . Pt is AA0X4. Receiving daily PT/OT for strengthening, balance, gait training and ambulation. Pt currently requiring  1 person assistance,for transfers and ambulation. Patient also requiring 1 person assistance with dressing and set up for grooming and eating. Daily skilled nursing interventions include pain management, medication management,  wound management and ADL assistance. Currently pain is being managed by acetaminophen with codeine and rates pain 8/10, resolved after administration. Pt is on a regular diet, tolerating well. Care plan reviewed and updated. No complaints at this time, will continue to update care and monitor.

## 2022-06-21 NOTE — PROGRESS NOTES
[facility]                                  Skilled Nursing Facility                   Progress Note     Admit Date: 2022  USMAN TBD  Principal Problem:  Acute acute pulmonary embolism  HPI obtained from patient interview and chart review     Chief Complaint: Establish Care/ Initial Visit     HPI:   Lupis Murcia is a 71 y.o. female w/ PMHx of PE during pregnancy requiring heparin drip, chronic lower extremity lymphedema, multiple sclerosis, vitamin D deficiency who presented to the ED 22 due to shortness of breath and dyspnea on exertion that started 22 while working with therapy. In the ED, cardiology was consulted and a bedside ECHO showed  EF 60% a dilated RV with Cox's sign. Due to her allergy for contrast, she underwent a VQ scan showing evidence of a submassive pulmonary embolism. Mildly tachycardic on 6L NC with O2 sats > 90%.  Troponin peaked at 0.790. She was given the option of TPA but refused. She was started on a heparin drip transitioned to Eliquis. She was able to have stable oxygenation at 4L NC when sitting up in bed and trying to stand, and was thus felt stable to SD to HM on . Weaned to 1-2L NC.      Patient will be treated at [facility] with PT and OT to improve functional status and ability to perform ADLs.     Past Medical History: Patient has a past medical history of Lymphedema, MS (multiple sclerosis), Other pulmonary embolism without acute cor pulmonale, and Vitamin B12 deficiency.    Past Surgical History: Patient has a past surgical history that includes  section; Breast cyst excision (Left); and Esophagogastroduodenoscopy (N/A, 2022).    Social History: Patient reports that she has never smoked. She has never used smokeless tobacco. She reports that she does not drink alcohol and does not use drugs.    Family History: family history includes Brain cancer in her brother;  Coronary artery disease in her father; Lung cancer in her father and mother.    Allergies: Patient is allergic to contrast media, pcn [penicillins], celebrex [celecoxib], diazepam, and motrin [ibuprofen].    ROS  Constitutional: Negative for fever   Eyes: Negative for blurred vision, double vision   Respiratory: +SOB on exertion Negative for cough  Cardiovascular: Negative for chest pain, palpitations, and leg swelling.   Gastrointestinal: Negative for abdominal pain, constipation, diarrhea, nausea, vomiting.   Genitourinary: Negative for dysuria, frequency   Musculoskeletal:  + generalized weakness, BLE weakness. Negative for back pain and myalgias.   Skin: Negative for itching and rash.   Neurological: Negative for dizziness, headaches.   Psychiatric/Behavioral: Negative for depression. The patient is not nervous/anxious.      24 hour Vital Sign Range   Temp:  [97.7 °F (36.5 °C)-98.9 °F (37.2 °C)]   Pulse:  [74-88]   Resp:  [16-20]   BP: (121-169)/(58-77)   SpO2:  [96 %-99 %]     PEx  Constitutional: Patient appears debilitated.  No distress noted  HENT:   Head: Normocephalic and atraumatic.   Eyes: Pupils are equal, round  Neck: Normal range of motion. Neck supple.   Cardiovascular: Normal rate, regular rhythm and normal heart sounds.    Pulmonary/Chest: Effort normal and breath sounds are clear  Abdominal: Soft. Bowel sounds are normal.   Musculoskeletal: Normal range of motion.   Neurological: Alert and oriented to person, place, and time.   Psychiatric: Normal mood and affect. Behavior is normal.   Skin: Skin is warm and dry. Full skin assessment to be performed next visit.    No results for input(s): GLUCOSE, NA, K, CL, CO2, BUN, CREATININE, MG in the last 24 hours.    Invalid input(s):  CALCIUM    No results for input(s): WBC, RBC, HGB, HCT, PLT, MCV, MCH, MCHC in the last 24 hours.      Assessment and Plan:  Acute pulmonary embolism with acute cor pulmonale  - continue Eliquis 10mg PO BID til 6/22 followed  by Eliquis 5mg PO BID starting 6/23 indefinitely  - On 2L NC, wean oxygen as tolerated     Acute hypoxemic respiratory failure  - 2/2 PE  - On 2L NC, wean off NC as tolerated  - continue lasix 20 mg daily     Class 2 obesity due to excess calories in adult  - Body mass index is 37.74 kg/m².   - Morbid obesity complicates all aspects of disease management from diagnostic modalities to treatment.   - Weight loss encouraged and health benefits explained to patient.                Lymphedema  - Chronic and stable  - No acute issues     Vitamin D deficiency  - continue vitamin D 1000 units daily      MS (multiple sclerosis)  - PT/OT  - f/u with out pt provider      Muscle weakness of lower extremity  - PT/OT     Anemia of chronic disease  - Monitor for bleeding on Eliquis     Insomnia secondary to chronic pain  - continue melatonin     Anxiety  - continue lorazepam 0.5 mg Q 8 hr prn anxiety    Debility   - Continue with PT/OT for gait training and strengthening and restoration of ADL's   - Encourage mobility, OOB in chair, and early ambulation as appropriate  - Fall precautions   - Monitor for bowel and bladder dysfunction  - Monitor for and prevent skin breakdown and pressure ulcers  - Continue DVT prophylaxis with eliquis    Anticipate disposition:  Home with home health      Follow-up needed during SNF stay-    Follow-up needed after discharge from SNF: PCP,     Future Appointments   Date Time Provider Department Center   7/14/2022  3:00 PM Samir Sahni MD [facility]         I certify that SNF services are required to be given on an inpatient basis because Lupis Murcia needs for skilled nursing care and/or skilled rehabilitation are required on a daily basis and such services can only practically be provided in a skilled nursing facility setting and are for an ongoing condition for which she received inpatient care in the hospital.     Total time of the visit 114 minutes [id number]  Non  physical exam/ non charting time: 64 minutes   Description of non physical exam/non charting time: Time spent in the care of new patient (counseling patient on plan of care, orienting to new facility, coordinating patient care, extensive chart review of previous medical records and summarization, medication reconciliation, initiating consults, reviewing and interpreting labs and any pertinent imaging, reviewing all transferring facility orders, reviewing all transferring facility medications, initiating new orders, and documentation. Discussed patient's clinical course and plan of care with primary team.       Rudolph Hernandez NP  Department of Hospital Medicine   [facility]     DOS: 6/21/2022       Patient note was created using MModal Dictation.  Any errors in syntax or even information may not have been identified and edited on initial review prior to signing this note.

## 2022-06-21 NOTE — PLAN OF CARE
Problem: Adult Inpatient Plan of Care  Goal: Plan of Care Review  Outcome: Ongoing, Progressing  Goal: Patient-Specific Goal (Individualized)  Outcome: Ongoing, Progressing  Goal: Absence of Hospital-Acquired Illness or Injury  Outcome: Ongoing, Progressing  Goal: Optimal Comfort and Wellbeing  Outcome: Ongoing, Progressing     Problem: Fluid and Electrolyte Imbalance (Acute Kidney Injury/Impairment)  Goal: Fluid and Electrolyte Balance  Outcome: Ongoing, Progressing     Problem: Oral Intake Inadequate (Acute Kidney Injury/Impairment)  Goal: Optimal Nutrition Intake  Outcome: Ongoing, Progressing     Problem: Renal Function Impairment (Acute Kidney Injury/Impairment)  Goal: Effective Renal Function  Outcome: Ongoing, Progressing     Problem: Impaired Wound Healing  Goal: Optimal Wound Healing  Outcome: Ongoing, Progressing     Problem: Skin Injury Risk Increased  Goal: Skin Health and Integrity  Outcome: Ongoing, Progressing

## 2022-06-21 NOTE — PLAN OF CARE
[facility] - Tele[facility] Stepdown  Discharge Final Note    Primary Care Provider: Bobby Tran MD    Expected Discharge Date: 6/20/2022       Future Appointments   Date Time Provider Department Center   7/14/2022  3:00 PM Samir Sahni MD [facility] [place]         Final Discharge Note (most recent)     Final Note - 06/21/22 0811        Final Note    Assessment Type Final Discharge Note     Anticipated Discharge Disposition Skilled Nursing Facility     What phone number can be called within the next 1-3 days to see how you are doing after discharge? [phone]     Hospital Resources/Appts/Education Provided Appointments scheduled and added to AVS        Post-Acute Status    Post-Acute Authorization Placement     Post-Acute Placement Status Set-up Complete/Auth obtained   [facility]                Important Message from Medicare  Important Message from Medicare regarding Discharge Appeal Rights: Given to patient/caregiver, Explained to patient/caregiver, Signed/date by patient/caregiver     Date IMM was signed: 06/20/22  Time IMM was signed: 0930    Contact Info     [facility]   Specialty: Skilled Nursing Facility    [address], [address]  [place] [address]   Phone: [phone]       Next Steps: Follow up            Ashley Hardy RN  Ext [phone]

## 2022-06-21 NOTE — PLAN OF CARE
Problem: Physical Therapy  Goal: Physical Therapy Goal  Description: Goals to be met by: 2022:    Patient will increase functional independence with mobility by performin. Supine to sit with Maximum Assistance.  2. Sit to supine with Maximum Assistance.  3. Rolling to Left and Right with Moderate Assistance.  4. Sit to stand transfer with Contact Guard Assistance.  5. Bed to chair transfer with Contact Guard Assistance using Rolling Walker.  6. Gait  x 100 feet with Maximum Assistance using Rolling Walker.   7. Wheelchair propulsion x 50 feet with Minimal Assistance using bilateral upper extremities.  8. Ascend/Descend 4 inch curb step with Maximum Assistance using Rolling Walker.  9. Sitting at edge of bed x 20 minutes with Supervision performing dynamic sitting balance activities.  10. Lower extremity exercise program x 30 reps per handout, with supervision.    Outcome: Ongoing, Progressing

## 2022-06-21 NOTE — NURSING
"POCT Glucose monitoring ordered for patient at admit. Pt states "I Am not a diabetic and I don't want my fingers stuck". Review of patient history there is no current history of DM Type 1 or 2. Informed patient of current orders, patient still refused. Rudolph Hernandez NP made aware, awaiting orders for d/c.   "

## 2022-06-21 NOTE — PLAN OF CARE
Problem: Occupational Therapy  Goal: Occupational Therapy Goal  Description: Goals to be met by: 7/12/2022    Patient will increase functional independence with ADLs by performing:    UE Dressing with Set-up Assistance.  LE Dressing, including donning/doffing pants, with Minimal Assistance using appropriate AE.  Grooming while seated at sink with Set-up Assistance.  Toileting from bedside commode with Minimal Assistance for hygiene and clothing management.   Bathing from sitting at sink with Minimal Assistance.  Supine to sit with Stand-by Assistance.  Sit to stand transfer with Stand-by Assistance with RW.  Toilet transfer to bedside commode with Stand-by Assistance with RW and grab bar.  Upper extremity exercise program with Supervision.  Caregiver will be educated on level of assist required to safely perform self care tasks and functional transfers.       Outcome: Ongoing, Progressing     Pt evaluated and OT goals established.

## 2022-06-22 LAB
ALBUMIN SERPL BCP-MCNC: 3.2 G/DL (ref 3.5–5.2)
ALP SERPL-CCNC: 52 U/L (ref 55–135)
ALT SERPL W/O P-5'-P-CCNC: 10 U/L (ref 10–44)
ANION GAP SERPL CALC-SCNC: 9 MMOL/L (ref 8–16)
AST SERPL-CCNC: 9 U/L (ref 10–40)
BASOPHILS # BLD AUTO: 0.01 K/UL (ref 0–0.2)
BASOPHILS NFR BLD: 0.2 % (ref 0–1.9)
BILIRUB SERPL-MCNC: 0.3 MG/DL (ref 0.1–1)
BUN SERPL-MCNC: 15 MG/DL (ref 8–23)
CALCIUM SERPL-MCNC: 9.4 MG/DL (ref 8.7–10.5)
CHLORIDE SERPL-SCNC: 104 MMOL/L (ref 95–110)
CO2 SERPL-SCNC: 27 MMOL/L (ref 23–29)
CREAT SERPL-MCNC: 0.6 MG/DL (ref 0.5–1.4)
DIFFERENTIAL METHOD: ABNORMAL
EOSINOPHIL # BLD AUTO: 0.2 K/UL (ref 0–0.5)
EOSINOPHIL NFR BLD: 3.3 % (ref 0–8)
ERYTHROCYTE [DISTWIDTH] IN BLOOD BY AUTOMATED COUNT: 15 % (ref 11.5–14.5)
EST. GFR  (AFRICAN AMERICAN): >60 ML/MIN/1.73 M^2
EST. GFR  (NON AFRICAN AMERICAN): >60 ML/MIN/1.73 M^2
GLUCOSE SERPL-MCNC: 111 MG/DL (ref 70–110)
HCT VFR BLD AUTO: 27.3 % (ref 37–48.5)
HGB BLD-MCNC: 8.8 G/DL (ref 12–16)
IMM GRANULOCYTES # BLD AUTO: 0.01 K/UL (ref 0–0.04)
IMM GRANULOCYTES NFR BLD AUTO: 0.2 % (ref 0–0.5)
LYMPHOCYTES # BLD AUTO: 2 K/UL (ref 1–4.8)
LYMPHOCYTES NFR BLD: 41.2 % (ref 18–48)
MCH RBC QN AUTO: 30.8 PG (ref 27–31)
MCHC RBC AUTO-ENTMCNC: 32.2 G/DL (ref 32–36)
MCV RBC AUTO: 96 FL (ref 82–98)
MONOCYTES # BLD AUTO: 0.5 K/UL (ref 0.3–1)
MONOCYTES NFR BLD: 9.8 % (ref 4–15)
NEUTROPHILS # BLD AUTO: 2.2 K/UL (ref 1.8–7.7)
NEUTROPHILS NFR BLD: 45.3 % (ref 38–73)
NRBC BLD-RTO: 0 /100 WBC
PLATELET # BLD AUTO: 239 K/UL (ref 150–450)
PMV BLD AUTO: 10.3 FL (ref 9.2–12.9)
POTASSIUM SERPL-SCNC: 3.1 MMOL/L (ref 3.5–5.1)
PROT SERPL-MCNC: 6.3 G/DL (ref 6–8.4)
RBC # BLD AUTO: 2.86 M/UL (ref 4–5.4)
SODIUM SERPL-SCNC: 140 MMOL/L (ref 136–145)
WBC # BLD AUTO: 4.81 K/UL (ref 3.9–12.7)

## 2022-06-22 PROCEDURE — 25000003 PHARM REV CODE 250: Performed by: HOSPITALIST

## 2022-06-22 PROCEDURE — 85025 COMPLETE CBC W/AUTO DIFF WBC: CPT | Performed by: FAMILY MEDICINE

## 2022-06-22 PROCEDURE — 80053 COMPREHEN METABOLIC PANEL: CPT | Performed by: FAMILY MEDICINE

## 2022-06-22 PROCEDURE — 27000221 HC OXYGEN, UP TO 24 HOURS

## 2022-06-22 PROCEDURE — 97116 GAIT TRAINING THERAPY: CPT | Mod: CQ

## 2022-06-22 PROCEDURE — 97530 THERAPEUTIC ACTIVITIES: CPT | Mod: CQ

## 2022-06-22 PROCEDURE — 94761 N-INVAS EAR/PLS OXIMETRY MLT: CPT

## 2022-06-22 PROCEDURE — 25000003 PHARM REV CODE 250: Performed by: FAMILY MEDICINE

## 2022-06-22 PROCEDURE — 36415 COLL VENOUS BLD VENIPUNCTURE: CPT | Performed by: FAMILY MEDICINE

## 2022-06-22 PROCEDURE — 11000004 HC SNF PRIVATE

## 2022-06-22 RX ORDER — POTASSIUM CHLORIDE 20 MEQ/1
40 TABLET, EXTENDED RELEASE ORAL ONCE
Status: COMPLETED | OUTPATIENT
Start: 2022-06-22 | End: 2022-06-22

## 2022-06-22 RX ADMIN — LORAZEPAM 0.5 MG: 0.5 TABLET ORAL at 09:06

## 2022-06-22 RX ADMIN — ACETAMINOPHEN AND CODEINE PHOSPHATE 1 TABLET: 300; 30 TABLET ORAL at 05:06

## 2022-06-22 RX ADMIN — PANTOPRAZOLE SODIUM 40 MG: 40 TABLET, DELAYED RELEASE ORAL at 08:06

## 2022-06-22 RX ADMIN — POTASSIUM CHLORIDE 40 MEQ: 1500 TABLET, EXTENDED RELEASE ORAL at 01:06

## 2022-06-22 RX ADMIN — ACETAMINOPHEN AND CODEINE PHOSPHATE 1 TABLET: 300; 30 TABLET ORAL at 08:06

## 2022-06-22 RX ADMIN — CHOLECALCIFEROL TAB 25 MCG (1000 UNIT) 1000 UNITS: 25 TAB at 08:06

## 2022-06-22 RX ADMIN — LORAZEPAM 0.5 MG: 0.5 TABLET ORAL at 01:06

## 2022-06-22 RX ADMIN — SENNOSIDES AND DOCUSATE SODIUM 1 TABLET: 50; 8.6 TABLET ORAL at 08:06

## 2022-06-22 RX ADMIN — LORAZEPAM 0.5 MG: 0.5 TABLET ORAL at 03:06

## 2022-06-22 RX ADMIN — AMMONIUM LACTATE: 12 LOTION TOPICAL at 08:06

## 2022-06-22 RX ADMIN — Medication 1 TABLET: at 08:06

## 2022-06-22 RX ADMIN — FUROSEMIDE 20 MG: 20 TABLET ORAL at 08:06

## 2022-06-22 RX ADMIN — APIXABAN 10 MG: 2.5 TABLET, FILM COATED ORAL at 08:06

## 2022-06-22 NOTE — PT/OT/SLP PROGRESS
Physical Therapy Treatment    Patient Name:  Lupis Murcia   MRN:  [id number]  Admit Date: 6/20/2022  Admitting Diagnosis: Acute Pulmonary Embolism  Recent Surgeries: N/A    General Precautions: Standard, fall   Orthopedic Precautions:N/A   Braces: N/A     Recommendations:     Discharge Recommendations:  home health PT   Level of Assistance Recommended at Discharge: 24 hours significant assistance  Discharge Equipment Recommendations: bedside commode, grab bar, raised toilet, shower chair, walker, rolling   Barriers to discharge: Decreased caregiver support (Pt's daughter works 8 hours a day during the week.)    Assessment:     Lupis Murcia is a 71 y.o. female admitted with a medical diagnosis of Acute pulmonary embolism. Pt tolerated therapy session well with focus on increasing independence with gait training, transfers, and safety awareness in order to assist with achieving highest level of function. Pt would continue to benefit from skilled PT services per POC.         Performance deficits affecting function:  weakness, impaired endurance, impaired sensation, impaired self care skills, impaired functional mobilty, gait instability, impaired balance, decreased lower extremity function, abnormal tone, decreased ROM, impaired skin, impaired cardiopulmonary response to activity, impaired muscle length .    Rehab Potential is good    Activity Tolerance: Fair    Plan:     Patient to be seen 6 x/week to address the above listed problems via gait training, therapeutic activities, therapeutic exercises, neuromuscular re-education, wheelchair management/training    · Plan of Care Expires: 07/12/22  · Plan of Care Reviewed with: patient, daughter    Subjective     Pt agreeable to PT on second attempt, first attempt patient finishing eating lunch and changing.     Pain/Comfort:  · Pain Rating 1: 0/10  · Pain Rating Post-Intervention 1: 0/10    Patient's cultural, spiritual, Restorationism conflicts given the current  situation:  · no    Objective:     Patient found up in chair with oxygen (2 L via nasal cannula) upon PT entry to room.       Functional Mobility:  · Transfers:     · Sit to Stand: 2 sets, contact guard assistance with rolling walker  · Recliner chair to wheelchair: minimum assistance with  rolling walker  using  Step Transfer  · Gait: x 25', x 28', RW, min A for steering.   · Pt remained on room air throughout session with O2 sats WFL throughout session and no SOB. Nursing notified at end of session with patient placed back on 2LO2 as she was found.     AM-PAC 6 CLICK MOBILITY  15    Patient left up in chair with all lines intact and call button in reach.    GOALS:   Multidisciplinary Problems     Physical Therapy Goals        Problem: Physical Therapy    Goal Priority Disciplines Outcome Goal Variances Interventions   Physical Therapy Goal     PT, PT/OT Ongoing, Progressing     Description: Goals to be met by: 2022:    Patient will increase functional independence with mobility by performin. Supine to sit with Maximum Assistance.  2. Sit to supine with Maximum Assistance.  3. Rolling to Left and Right with Moderate Assistance.  4. Sit to stand transfer with Contact Guard Assistance.  5. Bed to chair transfer with Contact Guard Assistance using Rolling Walker.  6. Gait  x 100 feet with Maximum Assistance using Rolling Walker.   7. Wheelchair propulsion x 50 feet with Minimal Assistance using bilateral upper extremities.  8. Ascend/Descend 4 inch curb step with Maximum Assistance using Rolling Walker.  9. Sitting at edge of bed x 20 minutes with Supervision performing dynamic sitting balance activities.  10. Lower extremity exercise program x 30 reps per handout, with supervision.                     Time Tracking:     PT Received On: 22  PT Start Time: 1408  PT Stop Time: 1447  PT Total Time (min): 39 min    Billable Minutes: Gait Training 14 and Therapeutic Activity 25    Treatment Type:  Treatment  PT/PTA: PTA     PTA Visit Number: 1 06/22/2022

## 2022-06-22 NOTE — PLAN OF CARE
Problem: Adult Inpatient Plan of Care  Goal: Plan of Care Review  6/22/2022 1044 by Florencia Ahuja RN  Outcome: Ongoing, Progressing  6/22/2022 1043 by Florencia Ahuja RN  Outcome: Ongoing, Progressing  Goal: Patient-Specific Goal (Individualized)  6/22/2022 1044 by Florencia Ahuja RN  Outcome: Ongoing, Progressing  6/22/2022 1043 by Florencia Ahuja RN  Outcome: Ongoing, Progressing     Problem: Fluid and Electrolyte Imbalance (Acute Kidney Injury/Impairment)  Goal: Fluid and Electrolyte Balance  Outcome: Ongoing, Progressing     Problem: Impaired Wound Healing  Goal: Optimal Wound Healing  6/22/2022 1044 by Florencia Ahuja RN  Outcome: Ongoing, Progressing  6/22/2022 1043 by Florencia Ahuja RN  Outcome: Ongoing, Progressing     Problem: Skin Injury Risk Increased  Goal: Skin Health and Integrity  6/22/2022 1044 by Florencia Ahuja RN  Outcome: Ongoing, Progressing  6/22/2022 1043 by Florencia Ahuja RN  Outcome: Ongoing, Progressing

## 2022-06-22 NOTE — PROGRESS NOTES
[facility]                                  Skilled Nursing Facility                   Progress Note     Admit Date: 6/20/2022  USMAN TBD  Principal Problem:  Acute acute pulmonary embolism  HPI obtained from patient interview and chart review     Chief Complaint: Revaluation of medical treatment and therapy status: Lab review, hypokalemia, BG management     HPI:   Lupis Murcia is a 71 y.o. female w/ PMHx of PE during pregnancy requiring heparin drip, chronic lower extremity lymphedema, multiple sclerosis, vitamin D deficiency who presented to the ED 6/14/22 due to shortness of breath and dyspnea on exertion that started 6/12/22 while working with therapy. In the ED, cardiology was consulted and a bedside ECHO showed  EF 60% a dilated RV with Cox's sign. Due to her allergy for contrast, she underwent a VQ scan showing evidence of a submassive pulmonary embolism. Mildly tachycardic on 6L NC with O2 sats > 90%.  Troponin peaked at 0.790. She was given the option of TPA but refused. She was started on a heparin drip transitioned to Eliquis. She was able to have stable oxygenation at 4L NC when sitting up in bed and trying to stand, and was thus felt stable to SD to HM on 6/16. Weaned to 1-2L NC.      Patient will be treated at [facility] with PT and OT to improve functional status and ability to perform ADLs.     Interval History:  - labs reviewed. K 3.1. no critical results. See below   - will replace K. Hypokalemia due to lasix  - Pt refusing BG checks, A1C 6.2 on 5/3/2022, discontinue SSI. Prediabetes education given  - SOB on exertion stable.  - No acute signs of distress noted. No acute events reported over night.   - VSS, afebrile. Tolerating 1-2L NC. O2 sats 97-98%  - Denies CP, abdominal pain, loss of appetite, constipation, diarrhea, nausea or vomiting.       Past Medical History: Patient has a past medical history of  Lymphedema, MS (multiple sclerosis), Other pulmonary embolism without acute cor pulmonale, and Vitamin B12 deficiency.    Past Surgical History: Patient has a past surgical history that includes  section; Breast cyst excision (Left); and Esophagogastroduodenoscopy (N/A, 2022).    Social History: Patient reports that she has never smoked. She has never used smokeless tobacco. She reports that she does not drink alcohol and does not use drugs.    Family History: family history includes Brain cancer in her brother; Coronary artery disease in her father; Lung cancer in her father and mother.    Allergies: Patient is allergic to contrast media, pcn [penicillins], celebrex [celecoxib], diazepam, and motrin [ibuprofen].    ROS  Constitutional: Negative for fever   Eyes: Negative for blurred vision, double vision   Respiratory: +SOB on exertion Negative for cough  Cardiovascular: Negative for chest pain, palpitations, and leg swelling.   Gastrointestinal: Negative for abdominal pain, constipation, diarrhea, nausea, vomiting.   Genitourinary: Negative for dysuria, frequency   Musculoskeletal:  + generalized weakness, BLE weakness. Negative for back pain and myalgias.   Skin: Negative for itching and rash.   Neurological: Negative for dizziness, headaches.   Psychiatric/Behavioral: Negative for depression. The patient is not nervous/anxious.      24 hour Vital Sign Range   Temp:  [98.1 °F (36.7 °C)-98.3 °F (36.8 °C)]   Pulse:  [81-91]   Resp:  [16-18]   BP: (141-159)/(74-77)   SpO2:  [96 %-98 %]     PEx  Constitutional: Patient appears debilitated.  No distress noted  HENT:   Head: Normocephalic and atraumatic.   Eyes: Pupils are equal, round  Neck: Normal range of motion. Neck supple.   Cardiovascular: Normal rate, regular rhythm and normal heart sounds.    Pulmonary/Chest: Effort normal and breath sounds are clear  Abdominal: Soft. Bowel sounds are normal.   Musculoskeletal: Normal range of motion.    Neurological: Alert and oriented to person, place, and time.   Psychiatric: Normal mood and affect. Behavior is normal.   Skin: Skin is warm and dry. Full skin assessment to be performed next visit.    Recent Labs   Lab 06/22/22  0607      K 3.1*      CO2 27   BUN 15   CREATININE 0.6       Recent Labs   Lab 06/22/22  0607   WBC 4.81   RBC 2.86*   HGB 8.8*   HCT 27.3*      MCV 96   MCH 30.8   MCHC 32.2         Assessment and Plan:  Acute pulmonary embolism with acute cor pulmonale  - continue Eliquis 10mg PO BID til 6/22 followed by Eliquis 5mg PO BID starting 6/23 indefinitely  - On 2L NC, wean oxygen as tolerated     Acute hypoxemic respiratory failure  - 2/2 PE  - On 2L NC, wean off NC as tolerated  - continue lasix 20 mg daily    Hypokalemia  - due to lasix  - 6/22 initiated potassium 40 mEq x1 dose    Prediabetes  - Hgb A1C 6.2 on 5/3/22  - Prediabetes education given. Verbalized understanding   - pt refusing BG checks. Initiate discontinue BG checks and SSI  - BG stable. Remains on regular diet per pt request. WCTM.      Class 2 obesity due to excess calories in adult  - Body mass index is 37.74 kg/m².   - Morbid obesity complicates all aspects of disease management from diagnostic modalities to treatment.   - Weight loss encouraged and health benefits explained to patient.                Lymphedema  - Chronic and stable  - No acute issues     Vitamin D deficiency  - continue vitamin D 1000 units daily      MS (multiple sclerosis)  - PT/OT  - f/u with out pt provider      Muscle weakness of lower extremity  - PT/OT     Anemia of chronic disease  - Monitor for bleeding on Eliquis     Insomnia secondary to chronic pain  - continue melatonin     Anxiety  - continue lorazepam 0.5 mg Q 8 hr prn anxiety    Debility   - Continue with PT/OT for gait training and strengthening and restoration of ADL's   - Encourage mobility, OOB in chair, and early ambulation as appropriate  - Fall precautions   -  Monitor for bowel and bladder dysfunction  - Monitor for and prevent skin breakdown and pressure ulcers  - Continue DVT prophylaxis with eliquis    Anticipate disposition:  Home with home health      Follow-up needed during SNF stay-    Follow-up needed after discharge from SNF: PCP,     Future Appointments   Date Time Provider Department Center   7/14/2022  3:00 PM Samir Sahni MD [facility] [place]         I certify that SNF services are required to be given on an inpatient basis because Lupis Murcia needs for skilled nursing care and/or skilled rehabilitation are required on a daily basis and such services can only practically be provided in a skilled nursing facility setting and are for an ongoing condition for which she received inpatient care in the hospital.       Rudolph Hernandez NP  Department of Hospital Medicine   [facility]     DOS: 6/22/2022       Patient note was created using MModal Dictation.  Any errors in syntax or even information may not have been identified and edited on initial review prior to signing this note.

## 2022-06-22 NOTE — PLAN OF CARE
Problem: Adult Inpatient Plan of Care  Goal: Plan of Care Review  Outcome: Ongoing, Progressing  Goal: Patient-Specific Goal (Individualized)  Outcome: Ongoing, Progressing     Problem: Impaired Wound Healing  Goal: Optimal Wound Healing  Outcome: Ongoing, Progressing     Problem: Skin Injury Risk Increased  Goal: Skin Health and Integrity  Outcome: Ongoing, Progressing

## 2022-06-22 NOTE — PT/OT/SLP PROGRESS
"Occupational Therapy      Patient Name:  Lupis Murcia   MRN:  [id number]    Patient not seen today secondary to pt politely declined this date.  She said, "It's not you.  I've had a rough day.  I just want to wait for Yue (PTA) and walk today."  Will follow-up as scheduled per OT POC.    6/22/2022  "

## 2022-06-23 PROBLEM — E87.6 HYPOKALEMIA: Status: ACTIVE | Noted: 2022-06-23

## 2022-06-23 LAB
ANION GAP SERPL CALC-SCNC: 11 MMOL/L (ref 8–16)
BASOPHILS # BLD AUTO: 0.01 K/UL (ref 0–0.2)
BASOPHILS NFR BLD: 0.2 % (ref 0–1.9)
BUN SERPL-MCNC: 15 MG/DL (ref 8–23)
CALCIUM SERPL-MCNC: 10.3 MG/DL (ref 8.7–10.5)
CHLORIDE SERPL-SCNC: 105 MMOL/L (ref 95–110)
CO2 SERPL-SCNC: 27 MMOL/L (ref 23–29)
CREAT SERPL-MCNC: 0.7 MG/DL (ref 0.5–1.4)
DIFFERENTIAL METHOD: ABNORMAL
EOSINOPHIL # BLD AUTO: 0.2 K/UL (ref 0–0.5)
EOSINOPHIL NFR BLD: 2.8 % (ref 0–8)
ERYTHROCYTE [DISTWIDTH] IN BLOOD BY AUTOMATED COUNT: 15.3 % (ref 11.5–14.5)
EST. GFR  (AFRICAN AMERICAN): >60 ML/MIN/1.73 M^2
EST. GFR  (NON AFRICAN AMERICAN): >60 ML/MIN/1.73 M^2
GLUCOSE SERPL-MCNC: 108 MG/DL (ref 70–110)
HCT VFR BLD AUTO: 32.7 % (ref 37–48.5)
HGB BLD-MCNC: 10.3 G/DL (ref 12–16)
IMM GRANULOCYTES # BLD AUTO: 0.01 K/UL (ref 0–0.04)
IMM GRANULOCYTES NFR BLD AUTO: 0.2 % (ref 0–0.5)
LYMPHOCYTES # BLD AUTO: 1.9 K/UL (ref 1–4.8)
LYMPHOCYTES NFR BLD: 32.6 % (ref 18–48)
MAGNESIUM SERPL-MCNC: 1.6 MG/DL (ref 1.6–2.6)
MCH RBC QN AUTO: 31.2 PG (ref 27–31)
MCHC RBC AUTO-ENTMCNC: 31.5 G/DL (ref 32–36)
MCV RBC AUTO: 99 FL (ref 82–98)
MONOCYTES # BLD AUTO: 0.5 K/UL (ref 0.3–1)
MONOCYTES NFR BLD: 8.9 % (ref 4–15)
NEUTROPHILS # BLD AUTO: 3.2 K/UL (ref 1.8–7.7)
NEUTROPHILS NFR BLD: 55.3 % (ref 38–73)
NRBC BLD-RTO: 0 /100 WBC
PHOSPHATE SERPL-MCNC: 3 MG/DL (ref 2.7–4.5)
PLATELET # BLD AUTO: 271 K/UL (ref 150–450)
PMV BLD AUTO: 10.6 FL (ref 9.2–12.9)
POTASSIUM SERPL-SCNC: 3.6 MMOL/L (ref 3.5–5.1)
RBC # BLD AUTO: 3.3 M/UL (ref 4–5.4)
SODIUM SERPL-SCNC: 143 MMOL/L (ref 136–145)
WBC # BLD AUTO: 5.74 K/UL (ref 3.9–12.7)

## 2022-06-23 PROCEDURE — 25000003 PHARM REV CODE 250: Performed by: HOSPITALIST

## 2022-06-23 PROCEDURE — 27000221 HC OXYGEN, UP TO 24 HOURS

## 2022-06-23 PROCEDURE — 94761 N-INVAS EAR/PLS OXIMETRY MLT: CPT

## 2022-06-23 PROCEDURE — 83735 ASSAY OF MAGNESIUM: CPT | Performed by: HOSPITALIST

## 2022-06-23 PROCEDURE — 80048 BASIC METABOLIC PNL TOTAL CA: CPT | Performed by: HOSPITALIST

## 2022-06-23 PROCEDURE — 85025 COMPLETE CBC W/AUTO DIFF WBC: CPT | Performed by: HOSPITALIST

## 2022-06-23 PROCEDURE — 99306 1ST NF CARE HIGH MDM 50: CPT | Mod: AI,,, | Performed by: HOSPITALIST

## 2022-06-23 PROCEDURE — 99900035 HC TECH TIME PER 15 MIN (STAT)

## 2022-06-23 PROCEDURE — 97116 GAIT TRAINING THERAPY: CPT | Mod: CQ

## 2022-06-23 PROCEDURE — 97530 THERAPEUTIC ACTIVITIES: CPT | Mod: CO

## 2022-06-23 PROCEDURE — 36415 COLL VENOUS BLD VENIPUNCTURE: CPT | Performed by: HOSPITALIST

## 2022-06-23 PROCEDURE — 84100 ASSAY OF PHOSPHORUS: CPT | Performed by: HOSPITALIST

## 2022-06-23 PROCEDURE — 99306 PR NURSING FACILITY CARE, INIT, HIGH SEVERITY: ICD-10-PCS | Mod: AI,,, | Performed by: HOSPITALIST

## 2022-06-23 PROCEDURE — 97530 THERAPEUTIC ACTIVITIES: CPT | Mod: CQ

## 2022-06-23 PROCEDURE — 11000004 HC SNF PRIVATE

## 2022-06-23 RX ADMIN — FUROSEMIDE 20 MG: 20 TABLET ORAL at 08:06

## 2022-06-23 RX ADMIN — ACETAMINOPHEN AND CODEINE PHOSPHATE 1 TABLET: 300; 30 TABLET ORAL at 05:06

## 2022-06-23 RX ADMIN — PANTOPRAZOLE SODIUM 40 MG: 40 TABLET, DELAYED RELEASE ORAL at 08:06

## 2022-06-23 RX ADMIN — LORAZEPAM 0.5 MG: 0.5 TABLET ORAL at 12:06

## 2022-06-23 RX ADMIN — APIXABAN 5 MG: 2.5 TABLET, FILM COATED ORAL at 08:06

## 2022-06-23 RX ADMIN — AMMONIUM LACTATE: 12 LOTION TOPICAL at 09:06

## 2022-06-23 RX ADMIN — Medication 1 TABLET: at 08:06

## 2022-06-23 RX ADMIN — CHOLECALCIFEROL TAB 25 MCG (1000 UNIT) 1000 UNITS: 25 TAB at 08:06

## 2022-06-23 RX ADMIN — ACETAMINOPHEN AND CODEINE PHOSPHATE 1 TABLET: 300; 30 TABLET ORAL at 08:06

## 2022-06-23 RX ADMIN — ONDANSETRON 4 MG: 4 TABLET, ORALLY DISINTEGRATING ORAL at 07:06

## 2022-06-23 RX ADMIN — LORAZEPAM 0.5 MG: 0.5 TABLET ORAL at 08:06

## 2022-06-23 NOTE — PLAN OF CARE
Problem: Occupational Therapy  Goal: Occupational Therapy Goal  Description: Goals to be met by: 7/12/2022    Patient will increase functional independence with ADLs by performing:    UE Dressing with Set-up Assistance.  LE Dressing, including donning/doffing pants, with Minimal Assistance using appropriate AE.  Grooming while seated at sink with Set-up Assistance.  Toileting from bedside commode with Minimal Assistance for hygiene and clothing management.   Bathing from sitting at sink with Minimal Assistance.  Supine to sit with Stand-by Assistance.  Sit to stand transfer with Stand-by Assistance with RW.  Toilet transfer to bedside commode with Stand-by Assistance with RW and grab bar.  Upper extremity exercise program with Supervision.  Caregiver will be educated on level of assist required to safely perform self care tasks and functional transfers.       Outcome: Ongoing, Progressing

## 2022-06-23 NOTE — CLINICAL REVIEW
Clinical Pharmacy Chart Review Note      Admit Date: 6/20/2022   LOS: 3 days       Lupis Murcia is a 71 y.o. female re-admitted to SNF for PT/OT after hospitalization for acute acute pulmonary embolism.    Review of patient's allergies indicates:   Allergen Reactions    Contrast media Shortness Of Breath and Rash    Pcn [penicillins] Shortness Of Breath and Rash    Celebrex [celecoxib] Other (See Comments)     Swallowing problems     Diazepam Hives    Motrin [ibuprofen] Rash     Patient Active Problem List    Diagnosis Date Noted    Acute pulmonary embolism with acute cor pulmonale 06/14/2022    Acute hypoxemic respiratory failure 06/14/2022    Class 2 obesity due to excess calories in adult 06/14/2022    Abdominal distension 05/06/2022    Cellulitis of leg 05/04/2022    Hypernatremia 05/04/2022    Urinary retention 05/02/2022    HANDY (acute kidney injury) 05/01/2022    Hyperkalemia 05/01/2022    Lymphedema 05/01/2022    Pressure injury of buttock, unstageable 05/01/2022    Encephalopathy, metabolic 05/01/2022    Leg weakness 11/06/2019    Abnormal muscle tone 11/06/2019    Paraparesis 06/09/2019    MS (multiple sclerosis) 06/09/2019    Vitamin D deficiency 06/09/2019    Decreased ROM of ankle 07/24/2018    Muscle weakness of lower extremity 05/11/2018    Impaired functional mobility, balance, gait, and endurance 05/11/2018    At high risk for falls 05/11/2018    Anemia of chronic disease 08/09/2017    Vitamin B 12 deficiency 08/09/2017    Gait disturbance 07/21/2017    Abnormal brain MRI     Foot drop, left 06/15/2016    Insomnia secondary to chronic pain 06/15/2016    Gait abnormality 03/15/2016    Babinski reflex 03/15/2016    Weakness of left hip 03/15/2016    Negron sign present 03/15/2016       Scheduled Meds:    ammonium lactate   Topical (Top) Daily    apixaban  10 mg Oral BID    apixaban  5 mg Oral BID    B-complex with vitamin C  1 tablet Oral Daily     furosemide  20 mg Oral Daily    pantoprazole  40 mg Oral Daily    senna-docusate 8.6-50 mg  1 tablet Oral BID    vitamin D  1,000 Units Oral Daily     Continuous Infusions:   PRN Meds: acetaminophen, acetaminophen-codeine 300-30mg, calcium carbonate, dextrose 10%, dextrose 10%, LORazepam, melatonin, miconazole NITRATE 2 %, ondansetron    OBJECTIVE:     Vital Signs (Last 24H)  Temp:  [98.1 °F (36.7 °C)-98.3 °F (36.8 °C)]   Pulse:  [75-84]   Resp:  [18]   BP: (147-162)/(75-91)   SpO2:  [96 %-99 %]     Laboratory:  CBC:   Recent Labs   Lab 06/22/22  0607   WBC 4.81   RBC 2.86*   HGB 8.8*   HCT 27.3*      MCV 96   MCH 30.8   MCHC 32.2     BMP:   Recent Labs   Lab 06/17/22  0422 06/22/22  0607    111*    140   K 3.3* 3.1*    104   CO2 22* 27   BUN 17 15   CREATININE 0.8 0.6   CALCIUM 10.0 9.4   MG 1.8  --      CMP:   Recent Labs   Lab 06/17/22  0422 06/22/22  0607    111*   CALCIUM 10.0 9.4   ALBUMIN 3.4* 3.2*   PROT 6.5 6.3    140   K 3.3* 3.1*   CO2 22* 27    104   BUN 17 15   CREATININE 0.8 0.6   ALKPHOS 65 52*   ALT 17 10   AST 15 9*   BILITOT 0.5 0.3     LFTs:   Recent Labs   Lab 06/17/22  0422 06/22/22  0607   ALT 17 10   AST 15 9*   ALKPHOS 65 52*   BILITOT 0.5 0.3   PROT 6.5 6.3   ALBUMIN 3.4* 3.2*         ASSESSMENT/PLAN:     I have reviewed the medications in compliance with CMS Regulation F756 of the [place]. Based on information gathered, the following items may need to be addressed:     **According to PMH and home medication list, patient takes the following medications at home. These medications are not currently ordered at SNF:  · Candesartan-HCTZ daily (non-formulary, currently taking losartan)     **Lorazepam ordered as needed for anxiety. Per CMS guidelines: The timeframe is limited for PRN psychotropic medications, which are not antipsychotic medications, to 14 days, unless a longer timeframe is deemed appropriate by the attending physician or the prescribing  practitioner. This is a home medication and patient is currently taking. Will continue to monitor use and associated side effects. Consider discontinuation if adverse effects observed ie. Dizziness, drowsiness, somnolence       Medications reviewed by PharmD, please re-consult if needed.      Nicolette Morales, Pharm. D.  Clinical Pharmacist  [facility]

## 2022-06-23 NOTE — PT/OT/SLP PROGRESS
Physical Therapy Treatment    Patient Name:  Lupis Murcia   MRN:  [id number]  Admit Date: 6/20/2022  Admitting Diagnosis: Acute Pulmonary Embolism  Recent Surgeries: N/A    General Precautions: Standard, fall   Orthopedic Precautions:N/A   Braces: N/A     Recommendations:     Discharge Recommendations:  home health PT   Level of Assistance Recommended at Discharge: 24 hours significant assistance  Discharge Equipment Recommendations: bedside commode, grab bar, raised toilet, shower chair, walker, rolling   Barriers to discharge: Decreased caregiver support (Pt's daughter works 8 hours a day during the week.)    Assessment:     Lupis Murcia is a 71 y.o. female admitted with a medical diagnosis of Acute Pulmonary Embolism. Pt tolerated therapy session well with focus on increasing independence with gait training, transfers and safety awareness in order to assist with achieving highest level of function. Pt would continue to benefit from skilled PT services per POC.     Performance deficits affecting function:  weakness, impaired endurance, impaired sensation, impaired self care skills, impaired functional mobilty, gait instability, impaired balance, decreased lower extremity function, abnormal tone, decreased ROM, impaired skin, impaired cardiopulmonary response to activity, impaired muscle length .    Rehab Potential is good    Activity Tolerance: Fair    Plan:     Patient to be seen 6 x/week to address the above listed problems via gait training, therapeutic activities, therapeutic exercises, neuromuscular re-education, wheelchair management/training    · Plan of Care Expires: 07/12/22  · Plan of Care Reviewed with: patient, daughter    Subjective     Pt agreeable to PT.     Pain/Comfort:  · Pain Rating 1: 0/10  · Pain Rating Post-Intervention 1: 0/10    Patient's cultural, spiritual, Advent conflicts given the current situation:  · no    Objective:     Communicated with nursing prior to session.  Patient  found up in chair with oxygen (1 L via nasal cannula) upon PT entry to room.     Functional Mobility:  · Transfers:     · Sit to Stand: 2 sets, contact guard assistance with rolling walker  · Recliner chair to w/c: Step transfer, RW, CGA.   · Gait: x 28', x 30', RW, Min A due to assistance required for steering RW. Follow with w/c.   · Pt performed session on RA, maintained o2 sats WFL. Per nursing orders, put patient back on 1LO2 at end of session.     AM-PAC 6 CLICK MOBILITY  15    Patient left up in chair with all lines intact and BOWIE present.    GOALS:   Multidisciplinary Problems     Physical Therapy Goals        Problem: Physical Therapy    Goal Priority Disciplines Outcome Goal Variances Interventions   Physical Therapy Goal     PT, PT/OT Ongoing, Progressing     Description: Goals to be met by: 2022:    Patient will increase functional independence with mobility by performin. Supine to sit with Maximum Assistance.  2. Sit to supine with Maximum Assistance.  3. Rolling to Left and Right with Moderate Assistance.  4. Sit to stand transfer with Contact Guard Assistance.  5. Bed to chair transfer with Contact Guard Assistance using Rolling Walker.  6. Gait  x 100 feet with Maximum Assistance using Rolling Walker.   7. Wheelchair propulsion x 50 feet with Minimal Assistance using bilateral upper extremities.  8. Ascend/Descend 4 inch curb step with Maximum Assistance using Rolling Walker.  9. Sitting at edge of bed x 20 minutes with Supervision performing dynamic sitting balance activities.  10. Lower extremity exercise program x 30 reps per handout, with supervision.                     Time Tracking:     PT Received On: 22  PT Start Time: 929  PT Stop Time: 100  PT Total Time (min): 32 min    Billable Minutes: Gait Training 15 and Therapeutic Activity 17    Treatment Type: Treatment  PT/PTA: PTA     PTA Visit Number: 2     2022

## 2022-06-23 NOTE — H&P
"[facility] Medicine  Skilled Nursing Facility   History and Physical Exam      Date of Service: 06/23/2022      Patient Name: Lupis Murcia  MRN: [id number]  Admission Date: 6/20/2022  Attending Physician: Rogerio Fried MD  Primary Care Provider: Bobby Tran MD  Code Status: Full Code      Principal problem:  Acute pulmonary embolism with acute cor pulmonale      Chief Complaint:   Chief Complaint   Patient presents with    Shortness of Breath     Admitted to [facility] for rehabilitation following hospital stay for pulmonary embolism          HPI:   "Lupis Murcia is a 71 y.o. female w/ PMHx of PE during pregnancy requiring heparin drip, chronic lower extremity lymphedema, multiple sclerosis, vitamin D deficiency who presented to the ED 6/14/22 due to shortness of breath and dyspnea on exertion that started 6/12/22 while working with therapy. In the ED, cardiology was consulted and a bedside ECHO showed  EF 60% a dilated RV with Cox's sign. Due to her allergy for contrast, she underwent a VQ scan showing evidence of a submassive pulmonary embolism. Mildly tachycardic on 6L NC with O2 sats > 90%.  Troponin peaked at 0.790. She was given the option of TPA but refused. She was started on a heparin drip transitioned to Eliquis. She was able to have stable oxygenation at 4L NC when sitting up in bed and trying to stand, and was thus felt stable to SD to HM on 6/16. Weaned to 1-2L NC." per Rudolph Hernandez NP on 6/21/22.     She is doing better today. Denies any shortness of breath or chest pain. She is working well with therapy. Still having some constipation.     Patient admitted with skilled services with PT and OT to improve functional status and ability to perform ADLs.       Past Medical History:   Past Medical History:   Diagnosis Date    Lymphedema     MS (multiple sclerosis)     Other pulmonary embolism without acute cor pulmonale     Vitamin B12 deficiency        Past Surgical History:   Past " Surgical History:   Procedure Laterality Date    BREAST CYST EXCISION Left     over 40yrs ago     SECTION      ESOPHAGOGASTRODUODENOSCOPY N/A 2022    Procedure: EGD (ESOPHAGOGASTRODUODENOSCOPY);  Surgeon: Radha Arango MD;  Location: [address];  Service: Endoscopy;  Laterality: N/A;       Social History:   Tobacco Use    Smoking status: Never Smoker    Smokeless tobacco: Never Used   Substance and Sexual Activity    Alcohol use: No    Drug use: No    Sexual activity: Not on file       Family History:   Family History     Problem Relation (Age of Onset)    Brain cancer Brother    Coronary artery disease Father    Lung cancer Father, Mother          No current facility-administered medications on file prior to encounter.     Current Outpatient Medications on File Prior to Encounter   Medication Sig    [] apixaban (ELIQUIS) 5 mg Tab Take 2 tablets (10 mg total) by mouth 2 (two) times daily. for 3 days    B-complex with vitamin C (Z-BEC OR EQUIV) tablet Take 1 tablet by mouth once daily.    furosemide (LASIX) 20 MG tablet Take 1 tablet (20 mg total) by mouth once daily.    LORazepam (ATIVAN) 0.5 MG tablet Take 1 tablet (0.5 mg total) by mouth every 8 (eight) hours as needed for Anxiety.    ondansetron (ZOFRAN-ODT) 4 MG TbDL Take 1 tablet (4 mg total) by mouth every 6 (six) hours as needed.    pantoprazole (PROTONIX) 40 MG tablet Take 1 tablet (40 mg total) by mouth once daily.    acetaminophen-codeine 300-30mg (TYLENOL #3) 300-30 mg Tab Take 1 tablet by mouth every 6 (six) hours as needed (pain).    apixaban (ELIQUIS) 5 mg Tab Take 1 tablet (5 mg total) by mouth 2 (two) times daily.    vitamin D (VITAMIN D3) 1000 units Tab Take 1 tablet (1,000 Units total) by mouth once daily. (Patient taking differently: Take 5,000 Units by mouth once daily.)    [DISCONTINUED] baclofen (LIORESAL) 10 MG tablet Take 1 tablet (10 mg total) by mouth 2 (two) times daily. (Patient taking  differently: Take 10 mg by mouth 2 (two) times daily as needed.)    [DISCONTINUED] candesartan-hydrochlorothiazide (ATACAND HCT) 16-12.5 mg per tablet Take 1 tablet by mouth Daily.    [DISCONTINUED] hydroCHLOROthiazide (HYDRODIURIL) 12.5 MG Tab Take 1 tablet (12.5 mg total) by mouth once daily.    [DISCONTINUED] miconazole (MICOTIN) 2 % cream Apply topically 2 (two) times daily. Apply to intertriginous areas, lower abdomen groin for 14 days    [DISCONTINUED] tamsulosin (FLOMAX) 0.4 mg Cap Take 1 capsule (0.4 mg total) by mouth daily with lunch.       Allergies:   Review of patient's allergies indicates:   Allergen Reactions    Contrast media Shortness Of Breath and Rash    Pcn [penicillins] Shortness Of Breath and Rash    Celebrex [celecoxib] Other (See Comments)     Swallowing problems     Diazepam Hives    Motrin [ibuprofen] Rash       ROS:  Review of Systems   Constitutional: Positive for appetite change. Negative for chills and fever.   HENT: Negative for congestion and sore throat.    Eyes: Negative for redness and visual disturbance.   Respiratory: Negative for cough and shortness of breath.    Cardiovascular: Positive for leg swelling. Negative for chest pain and palpitations.   Gastrointestinal: Positive for constipation. Negative for abdominal pain, nausea and vomiting.   Genitourinary: Negative for difficulty urinating and dysuria.   Musculoskeletal: Negative for arthralgias and back pain.   Skin: Negative for pallor and rash.   Allergic/Immunologic: Negative for environmental allergies and food allergies.   Neurological: Positive for weakness. Negative for dizziness and light-headedness.   Hematological: Does not bruise/bleed easily.   Psychiatric/Behavioral: Negative for sleep disturbance. The patient is not nervous/anxious.          Objective:  Temp:  [98.1 °F (36.7 °C)-98.3 °F (36.8 °C)]   Pulse:  [75-84]   Resp:  [18]   BP: (147-162)/(75-91)   SpO2:  [96 %-99 %]     Body mass index is 37.74  kg/m².      Physical Exam  Vitals and nursing note reviewed.   Constitutional:       General: She is not in acute distress.     Appearance: She is well-developed.   HENT:      Head: Normocephalic and atraumatic.      Right Ear: External ear normal.      Left Ear: External ear normal.      Nose: No nasal deformity or mucosal edema.      Mouth/Throat:      Mouth: Mucous membranes are moist.      Pharynx: Uvula midline. No oropharyngeal exudate.   Eyes:      General: No scleral icterus.     Conjunctiva/sclera: Conjunctivae normal.      Pupils: Pupils are equal, round, and reactive to light.   Neck:      Trachea: Phonation normal.   Cardiovascular:      Rate and Rhythm: Normal rate and regular rhythm.      Heart sounds: S1 normal and S2 normal. No murmur heard.  Pulmonary:      Effort: Pulmonary effort is normal. No respiratory distress.      Breath sounds: Normal breath sounds. No wheezing or rales.   Chest:   Breasts:      Right: No supraclavicular adenopathy.      Left: No supraclavicular adenopathy.       Abdominal:      General: Bowel sounds are normal. There is no distension.      Palpations: Abdomen is soft.      Tenderness: There is no abdominal tenderness. There is no guarding or rebound.   Musculoskeletal:         General: No tenderness.      Cervical back: Neck supple. No muscular tenderness.      Right lower le+ Edema present.      Left lower le+ Edema present.   Lymphadenopathy:      Cervical:      Right cervical: No superficial cervical adenopathy.     Left cervical: No superficial cervical adenopathy.      Upper Body:      Right upper body: No supraclavicular adenopathy.      Left upper body: No supraclavicular adenopathy.   Skin:     General: Skin is warm and dry.      Findings: Bruising present. No erythema or rash.      Comments: Lymphedema changes present on bilateral legs.    Neurological:      Mental Status: She is alert and oriented to person, place, and time.      Motor: No tremor or  seizure activity.   Psychiatric:         Mood and Affect: Mood normal.         Behavior: Behavior normal. Behavior is cooperative.         Thought Content: Thought content normal.         Significant Labs:   A1C:   Recent Labs   Lab 05/03/22  1256   HGBA1C 6.2*     CBC:  Recent Labs   Lab 06/22/22  0607 06/23/22  0609   WBC 4.81 5.74   HGB 8.8* 10.3*   HCT 27.3* 32.7*    271   MCV 96 99*     BMP:  Recent Labs   Lab 06/22/22  0607 06/23/22  0609   * 108    143   K 3.1* 3.6    105   CO2 27 27   BUN 15 15   CREATININE 0.6 0.7   CALCIUM 9.4 10.3   MG  --  1.6   PHOS  --  3.0     LFTs:  Recent Labs   Lab 06/22/22  0607   ALT 10   AST 9*   ALKPHOS 52*   BILITOT 0.3   PROT 6.3   ALBUMIN 3.2*       Significant Imaging: I have reviewed all pertinent imaging results/findings completed during prior hospitalization.      Assessment and Plan:  Active Diagnoses:    Diagnosis Date Noted POA    PRINCIPAL PROBLEM:  Acute pulmonary embolism with acute cor pulmonale [I26.09] 06/14/2022 Yes    Hypokalemia [E87.6] 06/23/2022 Yes    Acute hypoxemic respiratory failure [J96.01] 06/14/2022 Yes    Class 2 obesity due to excess calories in adult [E66.09] 06/14/2022 Yes    Lymphedema [I89.0] 05/01/2022 Yes    MS (multiple sclerosis) [G35] 06/09/2019 Yes    Vitamin D deficiency [E55.9] 06/09/2019 Yes    Muscle weakness of lower extremity [M62.81] 05/11/2018 Yes    Impaired functional mobility, balance, gait, and endurance [Z74.09] 05/11/2018 Yes    Anemia of chronic disease [D63.8] 08/09/2017 Yes    Vitamin B 12 deficiency [E53.8] 08/09/2017 Yes    Insomnia secondary to chronic pain [G89.29, G47.01] 06/15/2016 Yes      Problems Resolved During this Admission:       Acute pulmonary embolism with acute cor pulmonale  Acute hypoxemic respiratory failure  - Completed apixaban 10mg PO BID on 6/22.   - Continue apixaban 5mg PO BID indefinitely  - On 2L NC, wean oxygen as tolerated  - continue lasix 20 mg  daily     Hypokalemia  - Resolved with KCl replacement yesterday     Prediabetes  - Hgb A1C 6.2 on 5/3/22  - Prediabetes education given. Verbalized understanding   - Remains on regular diet per pt request.      Class 2 obesity due to excess calories in adult  - Body mass index is 37.74 kg/m².   - Morbid obesity complicates all aspects of disease management from diagnostic modalities to treatment.   - Weight loss encouraged and health benefits explained to patient.                Lymphedema  - Chronic and stable  - No acute issues     Vitamin D deficiency  - continue vitamin D 1000 units daily      MS (multiple sclerosis)  - PT/OT  - f/u with out pt provider      Muscle weakness of lower extremity  Debility   - Continue with PT/OT for gait training and strengthening and restoration of ADL's   - Encourage mobility, OOB in chair, and early ambulation as appropriate  - Fall precautions   - Monitor for bowel and bladder dysfunction  - Monitor for and prevent skin breakdown and pressure ulcers  - Continue DVT prophylaxis with apixaban     Anemia of chronic disease  - Hgb is stable.   - Monitor regularly     Insomnia secondary to chronic pain  - continue melatonin      Anxiety  - continue lorazepam 0.5 mg Q 8 hr prn anxiety      Anticipated Disposition:  Home with home health      Future Appointments   Date Time Provider Department Center   7/14/2022  3:00 PM Samir Sahni MD [facility]       I certify that SNF services are required to be given on an inpatient basis because Lupis CORRY Mariejonh needs for skilled nursing care and/or skilled rehabilitation are required on a daily basis and such services can only practically be provided in a skilled nursing facility setting and are for an ongoing condition for which she received inpatient care in the hospital.       Rogerio Fried MD  Department of Hospital Medicine   [facility] - Skilled Nursing

## 2022-06-23 NOTE — PLAN OF CARE
Problem: Adult Inpatient Plan of Care  Goal: Plan of Care Review  Outcome: Ongoing, Progressing  Goal: Patient-Specific Goal (Individualized)  Outcome: Ongoing, Progressing     Problem: Fluid and Electrolyte Imbalance (Acute Kidney Injury/Impairment)  Goal: Fluid and Electrolyte Balance  Outcome: Ongoing, Progressing     Problem: Impaired Wound Healing  Goal: Optimal Wound Healing  Outcome: Ongoing, Progressing     Problem: Skin Injury Risk Increased  Goal: Skin Health and Integrity  Outcome: Ongoing, Progressing

## 2022-06-23 NOTE — PT/OT/SLP PROGRESS
Occupational Therapy   Treatment    Name: Lupis Murcia  MRN: [id number]  Admit Date: 6/20/2022  Admitting Diagnosis:  Acute pulmonary embolism with acute cor pulmonale    General Precautions: Standard, fall   Orthopedic Precautions:N/A   Braces:       Recommendations:     Discharge Recommendations: home health OT  Level of Assistance Recommended at Discharge: 24 hours physical assistance for all ADL's and home management tasks  Discharge Equipment Recommendations:  walker, rolling (TBD)  Barriers to discharge:  Decreased caregiver support (Pt's daughter works 8 hours a day during the week.)    Assessment:     Lupis Murcia is a 71 y.o. female with a medical diagnosis of Acute pulmonary embolism with acute cor pulmonale.  She presents with  Performance deficits affecting function are weakness, impaired endurance, impaired self care skills, impaired functional mobilty, gait instability, impaired balance, decreased ROM, decreased lower extremity function, edema, impaired cardiopulmonary response to activity, decreased safety awareness, impaired skin, decreased coordination    Pt. Was cooperative and participated well with session on this day. Pt  continues to demonstrate levels of physical deficits with  functional indep with daily management activities tasks, selfcare skills with balance,  functional mobility, UB strength and endurance. Pt. Will continue to benefit from continued OT to progress towards goals   Rehab Potential is fair    Activity tolerance:  Fair    Plan:     Patient to be seen 6 x/week to address the above listed problems via self-care/home management, therapeutic activities, therapeutic exercises    · Plan of Care Expires: 07/12/22  · Plan of Care Reviewed with: patient    Subjective     Communicated with: PTA  and Pt prior to session. Hey I am doing well today    Pain/Comfort:  · Pain Rating 1: 0/10  · Pain Rating Post-Intervention 1: 0/10    Patient's cultural, spiritual, Evangelical conflicts  given the current situation:  · no    Objective:     Patient found up in chair with oxygen upon OT entry to room.    Functional Mobility/Transfers:  · Patient completed Sit <> Stand Transfer with contact guard assistance  with  rolling walker     Activities of Daily Living:  Already performed     [facility] 6 Click ADL: 15    OT Exercises: UE Ergometer x 10 min    Treatment & Education:  Pt. With standing act on this day with task. Pt. With CGA/SBA for balance aspects with task with  AD at raised counter Pt with visual perception task with discrimination of various shapes and sizes x 3 min with standing bal and min cues through out with weight shifting and use of BUE's incorporated and crossing mid line and facilitation with posture in prep for home management .     Pt. With 2# dowel activity with 2x20 reps with  shd flex, bicep curls horz adb/add and forward flex motion to increase BUE ROM and strength.    Pt. With therex performed to increase ROM, endurance selfcare task and fxl mobility for independence     Pt edu on role of OT, POC, safety when performing self care tasks , benefit of performing OOB activity, and safety when performing functional transfers and mobility management for preparation with goals to progress towards next level of care    Patient left up in chair with all lines intact and call button in reachEducation:      GOALS:   Multidisciplinary Problems     Occupational Therapy Goals        Problem: Occupational Therapy    Goal Priority Disciplines Outcome Interventions   Occupational Therapy Goal     OT, PT/OT Ongoing, Progressing    Description: Goals to be met by: 7/12/2022    Patient will increase functional independence with ADLs by performing:    UE Dressing with Set-up Assistance.  LE Dressing, including donning/doffing pants, with Minimal Assistance using appropriate AE.  Grooming while seated at sink with Set-up Assistance.  Toileting from bedside commode with Minimal Assistance for hygiene and  clothing management.   Bathing from sitting at sink with Minimal Assistance.  Supine to sit with Stand-by Assistance.  Sit to stand transfer with Stand-by Assistance with RW.  Toilet transfer to bedside commode with Stand-by Assistance with RW and grab bar.  Upper extremity exercise program with Supervision.  Caregiver will be educated on level of assist required to safely perform self care tasks and functional transfers.                        Time Tracking:     OT Date of Treatment: 06/23/22  OT Start Time: 1003    OT Stop Time: 1030  OT Total Time (min): 27 min    Billable Minutes:Therapeutic Activity 27    6/23/2022  A client care conference was performed between the POPEYER and JAMIR, prior to treatment to discuss the patient's status, treatment plan and established goals.

## 2022-06-24 ENCOUNTER — PATIENT MESSAGE (OUTPATIENT)
Dept: PSYCHIATRY | Facility: CLINIC | Age: 72
End: 2022-06-24
Payer: MEDICARE

## 2022-06-24 PROCEDURE — 27000221 HC OXYGEN, UP TO 24 HOURS

## 2022-06-24 PROCEDURE — 97110 THERAPEUTIC EXERCISES: CPT

## 2022-06-24 PROCEDURE — 11000004 HC SNF PRIVATE

## 2022-06-24 PROCEDURE — 97530 THERAPEUTIC ACTIVITIES: CPT

## 2022-06-24 PROCEDURE — 94761 N-INVAS EAR/PLS OXIMETRY MLT: CPT

## 2022-06-24 PROCEDURE — 25000003 PHARM REV CODE 250: Performed by: HOSPITALIST

## 2022-06-24 PROCEDURE — 97116 GAIT TRAINING THERAPY: CPT | Mod: CQ

## 2022-06-24 PROCEDURE — 97530 THERAPEUTIC ACTIVITIES: CPT | Mod: CQ

## 2022-06-24 RX ADMIN — LORAZEPAM 0.5 MG: 0.5 TABLET ORAL at 07:06

## 2022-06-24 RX ADMIN — LORAZEPAM 0.5 MG: 0.5 TABLET ORAL at 10:06

## 2022-06-24 RX ADMIN — APIXABAN 5 MG: 2.5 TABLET, FILM COATED ORAL at 08:06

## 2022-06-24 RX ADMIN — CHOLECALCIFEROL TAB 25 MCG (1000 UNIT) 1000 UNITS: 25 TAB at 09:06

## 2022-06-24 RX ADMIN — PANTOPRAZOLE SODIUM 40 MG: 40 TABLET, DELAYED RELEASE ORAL at 09:06

## 2022-06-24 RX ADMIN — APIXABAN 5 MG: 2.5 TABLET, FILM COATED ORAL at 09:06

## 2022-06-24 RX ADMIN — FUROSEMIDE 20 MG: 20 TABLET ORAL at 09:06

## 2022-06-24 RX ADMIN — ACETAMINOPHEN AND CODEINE PHOSPHATE 1 TABLET: 300; 30 TABLET ORAL at 02:06

## 2022-06-24 RX ADMIN — Medication 1 TABLET: at 09:06

## 2022-06-24 RX ADMIN — ACETAMINOPHEN AND CODEINE PHOSPHATE 1 TABLET: 300; 30 TABLET ORAL at 04:06

## 2022-06-24 NOTE — PT/OT/SLP PROGRESS
"Occupational Therapy   Treatment    Name: Lupis Murcia  MRN: [id number]  Admit Date: 6/20/2022  Admitting Diagnosis:  Acute pulmonary embolism with acute cor pulmonale    General Precautions: Standard, fall   Orthopedic Precautions:N/A   Braces: N/A     Recommendations:     Discharge Recommendations: home health OT  Level of Assistance Recommended at Discharge: 24 hours physical assistance for all ADL's and home management tasks  Discharge Equipment Recommendations:  other (see comments) (TBD)  Barriers to discharge:  Decreased caregiver support    Assessment:     Lupis Murcia is a 71 y.o. female with a medical diagnosis of Acute pulmonary embolism with acute cor pulmonale.  Pt had good tolerance of session, performing standing activity and UE exercises.  She would continue to benefit from skilled OT services at [facility] to maximize her gains in functional independence.  She presents with the following. Performance deficits affecting function are weakness, impaired endurance, impaired sensation, impaired self care skills, impaired functional mobilty, gait instability, impaired balance, decreased lower extremity function, abnormal tone, decreased ROM, impaired cardiopulmonary response to activity, impaired skin.     Rehab Potential is good    Activity tolerance:  Fair    Plan:     Patient to be seen 6 x/week to address the above listed problems via self-care/home management, therapeutic exercises, therapeutic activities    · Plan of Care Expires: 07/12/22  · Plan of Care Reviewed with: patient, daughter    Subjective   "I get nervous standing because that's when I had my pulmonary embolism."  Communicated with: nursing prior to session.     Pain/Comfort:  · Pain Rating 1: 0/10  · Pain Rating Post-Intervention 1: 0/10    Patient's cultural, spiritual, Lutheran conflicts given the current situation:  · no    Objective:     Patient found up in chair with 1 L oxygen with her daughter present upon OT entry to " room.    Bed Mobility:    · N/a due to pt sitting in chair at beginning and end of session     Functional Mobility/Transfers:  · Patient completed Sit <> Stand Transfer from w/c x 2 trials with contact guard assistance  with  rolling walker   · Patient completed Wheelchair <> Bedside Chair Transfer using Stand Pivot technique with minimum assistance with rolling walker  · Pt scooted back into her w/c with Mod A with cueing for leaning forward and using BUE on the arms of the w/c to propel herself back.     Activities of Daily Living:  · Pt deferred due to having already performed    [facility] 6 Click ADL: 15    OT Exercises: AROM RUE/AAROM LUE resistive exercises with 2 lb hand weights for 2 sets of 20 reps each of the following: shoulder flexion, biceps curls, chest presses, and external rotation.  These were done for UE strengthening that's required for daily tasks.     Treatment & Education:  Pt edu on role of OT, POC, safety when performing self care tasks , benefit of performing OOB activity, and safety when performing functional transfers and mobility.  - White board updated  - Self care tasks completed-- as noted above      - Pt performed functional fine motor activity in standing to address her standing endurance.  She stood for 4 min and 8 sec before needing to sit due to back pain.       Patient left up in chair with all lines intact, call button in reach and her daughter presentEducation:      GOALS:   Multidisciplinary Problems     Occupational Therapy Goals        Problem: Occupational Therapy    Goal Priority Disciplines Outcome Interventions   Occupational Therapy Goal     OT, PT/OT Ongoing, Progressing    Description: Goals to be met by: 7/12/2022    Patient will increase functional independence with ADLs by performing:    UE Dressing with Set-up Assistance.  LE Dressing, including donning/doffing pants, with Minimal Assistance using appropriate AE.  Grooming while seated at sink with Set-up  Assistance.  Toileting from bedside commode with Minimal Assistance for hygiene and clothing management.   Bathing from sitting at sink with Minimal Assistance.  Supine to sit with Stand-by Assistance.  Sit to stand transfer with Stand-by Assistance with RW.  Toilet transfer to bedside commode with Stand-by Assistance with RW and grab bar.  Upper extremity exercise program with Supervision.  Caregiver will be educated on level of assist required to safely perform self care tasks and functional transfers.                        Time Tracking:     OT Date of Treatment: 06/24/22  OT Start Time: 1304    OT Stop Time: 1347  OT Total Time (min): 43 min    Billable Minutes:Therapeutic Activity 25 min  Therapeutic Exercise 18 min    6/24/2022

## 2022-06-24 NOTE — CONSULTS
[facility] - Skilled Nursing  Adult Nutrition  Consult Note    SUMMARY   Recommendations  Continue regular diet, consider 1500 calorie diet ,RD following  Goals: PO to meet 85% of EEN with no weight gain by next RD fu  Nutrition Goal Status: new  Communication of RD Recs: other (comment) (POC)    Assessment and Plan    Class 2 obesity due to excess calories in adult  Obesity  Related to (etiology):   Excessive calorie intake over needs, lack of activity  Signs and Symptoms (as evidenced by):   Debility, BMI 37    Interventions/Recommendations (treatment strategy):  General diet  Nutrition education- weight loss  Collaboration with other providers    Nutrition Diagnosis Status:   New           Malnutrition Assessment     Skin (Micronutrient): bruised, cracked, thickened, scaly  Hair/Scalp (Micronutrient): dull  Eyes (Micronutrient): conjunctiva dull  Teeth (Micronutrient): broken dentition  Neck/Chest (Micronutrient): muscle wasting  Musculoskeletal/Lower Extremities: edema       Weight Loss (Malnutrition): greater than 5% in 1 month   Orbital Region (Subcutaneous Fat Loss): well nourished  Upper Arm Region (Subcutaneous Fat Loss): well nourished  Thoracic and Lumbar Region: well nourished   [place] Region (Muscle Loss): mild depletion  Clavicle Bone Region (Muscle Loss): mild depletion  Clavicle and Acromion Bone Region (Muscle Loss): mild depletion  Dorsal Hand (Muscle Loss): moderate depletion  Anterior Thigh Region (Muscle Loss): moderate depletion   Edema (Fluid Accumulation): 3-->moderate             Reason for Assessment    Reason For Assessment: consult  Diagnosis:  (PE)  Relevant Medical History: HANDY, anemia, chronic pain, MS, obesity, lymphedema, Vit B12 deficiency,  General Information Comments: recently here for cellulitis, has outside food, orders her own menu, NFPE pending , PO has improved currently %. Daughter works during the day.  Nutrition Discharge Planning: Dc on regular diet, weight loss  "encouraged    Nutrition Risk Screen    Nutrition Risk Screen: large or nonhealing wound, burn or pressure injury    Nutrition/Diet History    Patient Reported Diet/Restrictions/Preferences: general  Typical Food/Fluid Intake: likes to snack, regular meals  Spiritual, Cultural Beliefs, Rastafari Practices, Values that Affect Care: no  Vitamin/Mineral/Herbal Supplements: Vit D, Vit B12,Vit B complex, Vit C,  Food Allergies: NKFA  Factors Affecting Nutritional Intake: None identified at this time    Anthropometrics    Temp: 98.3 °F (36.8 °C)  Height Method: Stated  Height: 5' 2" (157.5 cm)  Height (inches): 62 in  Weight Method: Bed Scale  Weight: 93.6 kg (206 lb 5.6 oz)  Weight (lb): 206.35 lb  Ideal Body Weight (IBW), Female: 110 lb  % Ideal Body Weight, Female (lb): 187.59 %  BMI (Calculated): 37.7  BMI Grade: 35 - 39.9 - obesity - grade II  Weight Loss: intentional  Usual Body Weight (UBW), k.6 kg  % Usual Body Weight: 94.17  % Weight Change From Usual Weight: -6.02 %       Lab/Procedures/Meds    Pertinent Labs Reviewed: reviewed  Pertinent Labs Comments: Hg 10.3, Hct 32.7, albumin 3.2, HgA1c 6.2  Pertinent Medications Comments: lasix, senna-docusate, Vit D, Vit B complex/Vit C, apixaban, pantoprazole             Estimated/Assessed Needs    Weight Used For Calorie Calculations: 93.6 kg (206 lb 5.6 oz)  Energy Calorie Requirements (kcal): 1400  Energy Need Method: [place]-St Jeor (x 1.0(PAL) 2/2 obesity)  Protein Requirements: 60g  Weight Used For Protein Calculations: 50 kg (110 lb 3.7 oz) (IBW kg 2/2 obesity x 1.2g/kg)  Fluid Requirements (mL): 1400 or per MD  Estimated Fluid Requirement Method: RDA Method  RDA Method (mL): 1400  CHO Requirement: -      Nutrition Prescription Ordered    Current Diet Order: Regular  Nutrition Order Comments: PO %    Evaluation of Received Nutrient/Fluid Intake    I/O: no data  Energy Calories Required: meeting needs  Protein Required: meeting needs  Fluid Required: " meeting needs  Comments: LBM 6/23  Tolerance: tolerating  % Intake of Estimated Energy Needs: 75 - 100 %  % Meal Intake: 75 - 100 %    Nutrition Risk    Level of Risk/Frequency of Follow-up: low (one time per week)       Monitor and Evaluation    Food and Nutrient Adminstration: diet order  Physical Activity and Function: nutrition-related ADLs and IADLs  Anthropometric Measurements: weight change  Biochemical Data, Medical Tests and Procedures: electrolyte and renal panel, gastrointestinal profile, glucose/endocrine profile, inflammatory profile  Nutrition-Focused Physical Findings: skin       Nutrition Follow-Up    RD Follow-up?: Yes

## 2022-06-24 NOTE — PLAN OF CARE
Problem: Adult Inpatient Plan of Care  Goal: Plan of Care Review  Outcome: Ongoing, Progressing     Problem: Impaired Wound Healing  Goal: Optimal Wound Healing  Outcome: Ongoing, Progressing     Problem: Skin Injury Risk Increased  Goal: Skin Health and Integrity  Outcome: Ongoing, Progressing     Problem: Fall Injury Risk  Goal: Absence of Fall and Fall-Related Injury  Outcome: Ongoing, Progressing     Problem: Pain Acute  Goal: Acceptable Pain Control and Functional Ability  Outcome: Ongoing, Progressing     POC reviewed with pt who verbalized understanding. AAOx4. VSS on 1L NC. Remains free of falls and injury. Pain managed. Incont care provided. Call light in reach. Daughter at bedside. Will continue to monitor.

## 2022-06-24 NOTE — PT/OT/SLP PROGRESS
Physical Therapy Treatment    Patient Name:  Lupis Murcia   MRN:  [id number]  Admit Date: 6/20/2022  Admitting Diagnosis: Acute pulmonary embolism with acute cor pulmonale  Recent Surgeries: N/A    General Precautions: Standard, fall   Orthopedic Precautions:N/A   Braces: N/A     Recommendations:     Discharge Recommendations:  home health PT   Level of Assistance Recommended at Discharge: 24 hours significant assistance  Discharge Equipment Recommendations: bedside commode, grab bar, raised toilet, shower chair, walker, rolling   Barriers to discharge: Decreased caregiver support (Pt's daughter works 8 hours a day during the week.)    Assessment:     Lupis Murcia is a 71 y.o. female admitted with a medical diagnosis of Acute pulmonary embolism with acute cor pulmonale. Pt tolerated therapy session well with focus on increasing independence with gait training, transfers and safety awareness in order to assist with achieving highest level of function. Pt would continue to benefit from skilled PT services per POC.     Performance deficits affecting function:  weakness, impaired endurance, impaired sensation, impaired self care skills, impaired functional mobilty, gait instability, impaired balance, decreased lower extremity function, abnormal tone, decreased ROM, impaired skin, impaired cardiopulmonary response to activity, impaired muscle length .    Rehab Potential is good    Activity Tolerance: Fair    Plan:     Patient to be seen 6 x/week to address the above listed problems via gait training, therapeutic activities, therapeutic exercises, neuromuscular re-education, wheelchair management/training    · Plan of Care Expires: 07/12/22  · Plan of Care Reviewed with: patient, daughter    Subjective     Pt agreeable to PT on second attempt. First attempt pt still eating breakfast/waiting for medicine.     Pain/Comfort:  · Pain Rating 1: 0/10  · Pain Rating Post-Intervention 1: 0/10    Patient's cultural,  spiritual, Rastafarian conflicts given the current situation:  · no    Objective:     Patient found up in chair with oxygen upon PT entry to room.     Functional Mobility:  · Transfers:     · Sit to Stand: 2 sets, contact guard assistance with rolling walker  · Bed to Chair: minimum assistance with  rolling walker  using  Step Transfer  · Gait: x 30', x 25', RW, min A to CGA required for steering AD.  · O2 sats WFL throughout session on RA. Returned patient back to 1LO2 at end of session.     AM-PAC 6 CLICK MOBILITY  15    Patient left up in chair with all lines intact and call button in reach.    GOALS:   Multidisciplinary Problems     Physical Therapy Goals        Problem: Physical Therapy    Goal Priority Disciplines Outcome Goal Variances Interventions   Physical Therapy Goal     PT, PT/OT Ongoing, Progressing     Description: Goals to be met by: 2022:    Patient will increase functional independence with mobility by performin. Supine to sit with Maximum Assistance.  2. Sit to supine with Maximum Assistance.  3. Rolling to Left and Right with Moderate Assistance.  4. Sit to stand transfer with Contact Guard Assistance.  5. Bed to chair transfer with Contact Guard Assistance using Rolling Walker.  6. Gait  x 100 feet with Maximum Assistance using Rolling Walker.   7. Wheelchair propulsion x 50 feet with Minimal Assistance using bilateral upper extremities.  8. Ascend/Descend 4 inch curb step with Maximum Assistance using Rolling Walker.  9. Sitting at edge of bed x 20 minutes with Supervision performing dynamic sitting balance activities.  10. Lower extremity exercise program x 30 reps per handout, with supervision.                     Time Tracking:     PT Received On: 22  PT Start Time: 1022  PT Stop Time: 1102  PT Total Time (min): 40 min    Billable Minutes: Gait Training 15 and Therapeutic Activity 25    Treatment Type: Treatment  PT/PTA: PTA     PTA Visit Number: 3     2022

## 2022-06-24 NOTE — PLAN OF CARE
Continue regular diet, consider 1500 calorie diet ,RD following  Goals: PO to meet 85% of EEN with no weight gain by next RD fu  Nutrition Goal Status: new  Communication of RD Recs: other (comment) (POC)     Assessment and Plan     Class 2 obesity due to excess calories in adult  Obesity  Related to (etiology):   Excessive calorie intake over needs, lack of activity  Signs and Symptoms (as evidenced by):   Debility, BMI 37     Interventions/Recommendations (treatment strategy):  General diet  Nutrition education- weight loss  Collaboration with other providers     Nutrition Diagnosis Status:   New

## 2022-06-24 NOTE — ASSESSMENT & PLAN NOTE
Obesity  Related to (etiology):   Excessive calorie intake over needs, lack of activity  Signs and Symptoms (as evidenced by):   Debility, BMI 37    Interventions/Recommendations (treatment strategy):  General diet  Nutrition education- weight loss  Collaboration with other providers    Nutrition Diagnosis Status:   New

## 2022-06-24 NOTE — PLAN OF CARE
Problem: Adult Inpatient Plan of Care  Goal: Plan of Care Review  Outcome: Ongoing, Progressing  Goal: Patient-Specific Goal (Individualized)  Outcome: Ongoing, Progressing  Goal: Absence of Hospital-Acquired Illness or Injury  Outcome: Ongoing, Progressing  Goal: Optimal Comfort and Wellbeing  Outcome: Ongoing, Progressing  Goal: Readiness for Transition of Care  Outcome: Ongoing, Progressing     Problem: Fluid and Electrolyte Imbalance (Acute Kidney Injury/Impairment)  Goal: Fluid and Electrolyte Balance  Outcome: Ongoing, Progressing     Problem: Oral Intake Inadequate (Acute Kidney Injury/Impairment)  Goal: Optimal Nutrition Intake  Outcome: Ongoing, Progressing     Problem: Renal Function Impairment (Acute Kidney Injury/Impairment)  Goal: Effective Renal Function  Outcome: Ongoing, Progressing     Problem: Impaired Wound Healing  Goal: Optimal Wound Healing  Outcome: Ongoing, Progressing     Problem: Skin Injury Risk Increased  Goal: Skin Health and Integrity  Outcome: Ongoing, Progressing     Problem: Fall Injury Risk  Goal: Absence of Fall and Fall-Related Injury  Outcome: Ongoing, Progressing     Problem: Pain Acute  Goal: Acceptable Pain Control and Functional Ability  Outcome: Ongoing, Progressing

## 2022-06-24 NOTE — PLAN OF CARE
SW met with patient in room for Discharge Planning Assessment. Pt lives with adult daughterAmanda. Pt was previously using a rollator for ambulation. Patient will have transportation and daily activity assistance at discharge from daughter.SW is following this Pt for DC planning needs. There are no identified needs at this time.      Patient's preferred pharmacy is [facility] LMS[facility]  Case Management Department  [facility]  [email or web address] - Skilled Nursing  Initial Discharge Assessment       Primary Care Provider: Bobby Tran MD    Admission Diagnosis: Acute pulmonary embolism [I26.99]    Admission Date: 6/20/2022  Expected Discharge Date: 7/12/2022    Discharge Barriers Identified: None    Payor: MEDICARE / Plan: MEDICARE PART A & B / Product Type: Government /     Extended Emergency Contact Information  Primary Emergency Contact: MorgankatarinaAmanda  Address: [address] [place] [address]  Mobile Phone: [phone]  Relation: Daughter    Discharge Plan A: Home with family, Home Health  Discharge Plan B: Home with family, Home Health      [id number] - [place] - [address]  [address]  [place] [address]  Phone: [phone] Fax: [phone]    [id number] - [place] - [address]  [address] [facility] [address]  [place] [address]  Phone: [phone] Fax: [phone]      Initial Assessment (most recent)     Adult Discharge Assessment - 06/22/22 0849        Discharge Assessment    Assessment Type Discharge Planning Assessment     Source of Information patient;family     Contact Name/Number Azalea Lowery      Communicated USMAN with patient/caregiver Date not available/Unable to determine     Lives With child(reinier), adult     Do you expect to return to your current living situation? Yes     Do you have help at home or someone to help you manage your care at  home? Yes     Who are your caregiver(s) and their phone number(s)? Azalea Lowery      Prior to hospitilization cognitive status: Alert/Oriented     Current cognitive status: Alert/Oriented     Home Accessibility wheelchair accessible     Home Layout Able to live on 1st floor     Equipment Currently Used at Home bedside commode;bath bench;raised toilet     Readmission within 30 days? No     Patient currently being followed by outpatient case management? Unable to determine (comments)     Do you currently have service(s) that help you manage your care at home? No     Do you take prescription medications? Yes     Do you have prescription coverage? Yes     Do you have any problems affording any of your prescribed medications? No     Is the patient taking medications as prescribed? yes     Who is going to help you get home at discharge? Azalea Lowery      How do you get to doctors appointments? family or friend will provide     Are you on dialysis? No     Do you take coumadin? No     Discharge Plan A Home with family;Home Health     Discharge Plan B Home with family;Home Health     DME Needed Upon Discharge  other (see comments);anum covarrubias   TBC[facility]    Discharge Barriers Identified None        Relationship/Environment    Name(s) of Who Lives With Patient Azalea Lowery

## 2022-06-25 PROCEDURE — 97530 THERAPEUTIC ACTIVITIES: CPT | Mod: CO

## 2022-06-25 PROCEDURE — 27000221 HC OXYGEN, UP TO 24 HOURS

## 2022-06-25 PROCEDURE — 99900035 HC TECH TIME PER 15 MIN (STAT)

## 2022-06-25 PROCEDURE — 94640 AIRWAY INHALATION TREATMENT: CPT

## 2022-06-25 PROCEDURE — 11000004 HC SNF PRIVATE

## 2022-06-25 PROCEDURE — 94761 N-INVAS EAR/PLS OXIMETRY MLT: CPT

## 2022-06-25 PROCEDURE — 25000003 PHARM REV CODE 250: Performed by: HOSPITALIST

## 2022-06-25 RX ADMIN — APIXABAN 5 MG: 2.5 TABLET, FILM COATED ORAL at 09:06

## 2022-06-25 RX ADMIN — FUROSEMIDE 20 MG: 20 TABLET ORAL at 09:06

## 2022-06-25 RX ADMIN — ACETAMINOPHEN AND CODEINE PHOSPHATE 1 TABLET: 300; 30 TABLET ORAL at 01:06

## 2022-06-25 RX ADMIN — SENNOSIDES AND DOCUSATE SODIUM 1 TABLET: 50; 8.6 TABLET ORAL at 10:06

## 2022-06-25 RX ADMIN — LORAZEPAM 0.5 MG: 0.5 TABLET ORAL at 07:06

## 2022-06-25 RX ADMIN — PANTOPRAZOLE SODIUM 40 MG: 40 TABLET, DELAYED RELEASE ORAL at 09:06

## 2022-06-25 RX ADMIN — LORAZEPAM 0.5 MG: 0.5 TABLET ORAL at 09:06

## 2022-06-25 RX ADMIN — Medication 1 TABLET: at 09:06

## 2022-06-25 RX ADMIN — ACETAMINOPHEN AND CODEINE PHOSPHATE 1 TABLET: 300; 30 TABLET ORAL at 10:06

## 2022-06-25 RX ADMIN — CHOLECALCIFEROL TAB 25 MCG (1000 UNIT) 1000 UNITS: 25 TAB at 09:06

## 2022-06-25 NOTE — PT/OT/SLP PROGRESS
Occupational Therapy   Treatment    Name: Lupis Murcia  MRN: [id number]  Admit Date: 6/20/2022  Admitting Diagnosis:  Acute pulmonary embolism with acute cor pulmonale    General Precautions: Standard, fall   Orthopedic Precautions:N/A   Braces:       Recommendations:     Discharge Recommendations: home health OT  Level of Assistance Recommended at Discharge: 24 hours physical assistance for all ADL's and home management tasks  Discharge Equipment Recommendations:  other (see comments) (TBD)  Barriers to discharge:  Decreased caregiver support    Assessment:     Lupis Murcia is a 71 y.o. female with a medical diagnosis of Acute pulmonary embolism with acute cor pulmonale.  She presents with  Performance deficits affecting function are weakness, impaired endurance, impaired self care skills, impaired functional mobilty, gait instability, impaired balance, decreased ROM, decreased lower extremity function, edema, impaired cardiopulmonary response to activity, decreased safety awareness, impaired skin, decreased coordination    Pt. Was cooperative and participated well with session on this day. Pt  continues to demonstrate levels of physical deficits with  functional indep with daily management activities tasks, selfcare skills with balance,  functional mobility, UB strength and endurance. Pt. Will continue to benefit from continued OT to progress towards goals   Rehab Potential is fair    Activity tolerance:  Fair    Plan:     Patient to be seen 6 x/week to address the above listed problems via self-care/home management, therapeutic exercises, therapeutic activities    · Plan of Care Expires: 07/12/22  · Plan of Care Reviewed with: patient    Subjective     Communicated with: nsg and Pt prior to session. I am doing well today    Pain/Comfort:  Pain Rating 1: 0/10  Pain Rating Post-Intervention 1: 0/10    Patient's cultural, spiritual, Muslim conflicts given the current situation:  no    Objective:     Patient  found up in chair with oxygen upon OT entry to room.    Functional Mobility/Transfers:  · Patient completed Sit <> Stand Transfer with contact guard assistance  with  rolling walker  with SPT from w/c<> bed side chair    Activities of Daily Living:  Already performed with daughter    ДМИТРИЙ 6 Click ADL: 15    OT Exercises: UE Ergometer x 10 min    Treatment & Education:  Pt. Was taken outside on this day for environmental access ability and awareness and to improve mood and focus.    Pt. With 2# dowel activity with 2x10 reps with  shd flex, bicep curls  and forward flex motion to increase BUE ROM and strength.    Pt. With therex performed to increase ROM, endurance selfcare task and fxl mobility for independence     Pt edu on role of OT, POC, safety when performing self care tasks , benefit of performing OOB activity, and safety when performing functional transfers and mobility management for preparation with goals to progress towards next level of care    Patient left up in chair with all lines intact and call button in reachEducation:      GOALS:   Multidisciplinary Problems     Occupational Therapy Goals        Problem: Occupational Therapy    Goal Priority Disciplines Outcome Interventions   Occupational Therapy Goal     OT, PT/OT Ongoing, Progressing    Description: Goals to be met by: 7/12/2022    Patient will increase functional independence with ADLs by performing:    UE Dressing with Set-up Assistance.  LE Dressing, including donning/doffing pants, with Minimal Assistance using appropriate AE.  Grooming while seated at sink with Set-up Assistance.  Toileting from bedside commode with Minimal Assistance for hygiene and clothing management.   Bathing from sitting at sink with Minimal Assistance.  Supine to sit with Stand-by Assistance.  Sit to stand transfer with Stand-by Assistance with RW.  Toilet transfer to bedside commode with Stand-by Assistance with RW and grab bar.  Upper extremity exercise program  with Supervision.  Caregiver will be educated on level of assist required to safely perform self care tasks and functional transfers.                        Time Tracking:     OT Date of Treatment: 06/25/22  OT Start Time: 1115    OT Stop Time: 1155  OT Total Time (min): 40 min    Billable Minutes:Therapeutic Activity 40    6/25/2022

## 2022-06-25 NOTE — PT/OT/SLP PROGRESS
Physical Therapy      Patient Name:  Lupis Murcia   MRN:  [id number]    Patient not seen today secondary to in AM patient was about to eat lunch and in PM, patient was sleeping and requested treatment until tomorrow. Will follow-up on next scheduled visit.

## 2022-06-25 NOTE — PLAN OF CARE
Problem: Adult Inpatient Plan of Care  Goal: Plan of Care Review  Outcome: Ongoing, Progressing  Goal: Patient-Specific Goal (Individualized)  Outcome: Ongoing, Progressing     Problem: Fluid and Electrolyte Imbalance (Acute Kidney Injury/Impairment)  Goal: Fluid and Electrolyte Balance  Outcome: Ongoing, Progressing     Problem: Oral Intake Inadequate (Acute Kidney Injury/Impairment)  Goal: Optimal Nutrition Intake  Outcome: Ongoing, Progressing     Problem: Renal Function Impairment (Acute Kidney Injury/Impairment)  Goal: Effective Renal Function  Outcome: Ongoing, Progressing     Problem: Impaired Wound Healing  Goal: Optimal Wound Healing  Outcome: Ongoing, Progressing     Problem: Skin Injury Risk Increased  Goal: Skin Health and Integrity  Outcome: Ongoing, Progressing     Problem: Fall Injury Risk  Goal: Absence of Fall and Fall-Related Injury  Outcome: Ongoing, Progressing     Problem: Pain Acute  Goal: Acceptable Pain Control and Functional Ability  Outcome: Ongoing, Progressing

## 2022-06-26 PROCEDURE — 97530 THERAPEUTIC ACTIVITIES: CPT | Mod: CQ

## 2022-06-26 PROCEDURE — 11000004 HC SNF PRIVATE

## 2022-06-26 PROCEDURE — 27000221 HC OXYGEN, UP TO 24 HOURS

## 2022-06-26 PROCEDURE — 25000003 PHARM REV CODE 250: Performed by: HOSPITALIST

## 2022-06-26 PROCEDURE — 94761 N-INVAS EAR/PLS OXIMETRY MLT: CPT

## 2022-06-26 PROCEDURE — 99900035 HC TECH TIME PER 15 MIN (STAT)

## 2022-06-26 PROCEDURE — 97116 GAIT TRAINING THERAPY: CPT | Mod: CQ

## 2022-06-26 RX ADMIN — ACETAMINOPHEN AND CODEINE PHOSPHATE 1 TABLET: 300; 30 TABLET ORAL at 09:06

## 2022-06-26 RX ADMIN — Medication 1 TABLET: at 08:06

## 2022-06-26 RX ADMIN — PANTOPRAZOLE SODIUM 40 MG: 40 TABLET, DELAYED RELEASE ORAL at 08:06

## 2022-06-26 RX ADMIN — SENNOSIDES AND DOCUSATE SODIUM 1 TABLET: 50; 8.6 TABLET ORAL at 08:06

## 2022-06-26 RX ADMIN — ACETAMINOPHEN AND CODEINE PHOSPHATE 1 TABLET: 300; 30 TABLET ORAL at 02:06

## 2022-06-26 RX ADMIN — APIXABAN 5 MG: 2.5 TABLET, FILM COATED ORAL at 08:06

## 2022-06-26 RX ADMIN — FUROSEMIDE 20 MG: 20 TABLET ORAL at 08:06

## 2022-06-26 RX ADMIN — CHOLECALCIFEROL TAB 25 MCG (1000 UNIT) 1000 UNITS: 25 TAB at 08:06

## 2022-06-26 RX ADMIN — LORAZEPAM 0.5 MG: 0.5 TABLET ORAL at 06:06

## 2022-06-26 RX ADMIN — LORAZEPAM 0.5 MG: 0.5 TABLET ORAL at 08:06

## 2022-06-26 NOTE — PT/OT/SLP PROGRESS
"Physical Therapy Treatment    Patient Name:  Lupis Murcia   MRN:  [id number]  Admit Date: 6/20/2022  Admitting Diagnosis: Acute pulmonary embolism with acute cor pulmonale  Recent Surgeries:     General Precautions: Standard, fall   Orthopedic Precautions:N/A   Braces: N/A     Recommendations:     Discharge Recommendations:  home health PT   Level of Assistance Recommended at Discharge: 24 hours significant assistance  Discharge Equipment Recommendations: bedside commode, grab bar, raised toilet, shower chair, walker, rolling   Barriers to discharge: Decreased caregiver support    Assessment:     Lupis Murcia is a 71 y.o. female admitted with a medical diagnosis of Acute pulmonary embolism with acute cor pulmonale . . Patient needs assistance to advance the L LE during swing through phase of gait due to L dropped foot. An AFO was ordered according to patient and daughter, but it has not arrived. O2 sat was 99% after gait using just 1L O2    Performance deficits affecting function:  weakness, impaired endurance, impaired functional mobilty, gait instability, impaired balance, decreased lower extremity function, decreased coordination, decreased ROM, impaired skin, edema, impaired cardiopulmonary response to activity, abnormal tone .    Rehab Potential is good and fair    Activity Tolerance: Fair    Plan:     Patient to be seen 6 x/week to address the above listed problems via gait training, therapeutic activities, therapeutic exercises, neuromuscular re-education, wheelchair management/training    · Plan of Care Expires: 07/12/22  · Plan of Care Reviewed with: patient, daughter    Subjective     Patient states " I am just a little scared sometimes when I can't breath because I had a pulmonary embolism".     Pain/Comfort:  · Pain Rating 1: 0/10  · Pain Rating Post-Intervention 1: 0/10    Patient's cultural, spiritual, Jainism conflicts given the current situation:  · no    Objective:     Communicated with nsg " prior to session.  Patient found up in chair with oxygen upon PT entry to room.     Therapeutic Activities and Exercises: Assisted from chair<>wheel chair using RW with min assistance to move the L LE. Patient transported to the gym on w/c to perform gait training.    Functional Mobility:  · Transfers:     · Sit to Stand:  contact guard assistance and minimum assistance with rolling walker  · Bed to Chair: contact guard assistance and minimum assistance with  rolling walker  using  Stand Pivot  · Gait: 32 ft x 2 trials with RW and min assistance to advance the L LE, with w/c follow and cues to stay close to the RW.    AM-PAC 6 CLICK MOBILITY  15    Patient left up in chair with all lines intact and call button in reach.    GOALS:   Multidisciplinary Problems     Physical Therapy Goals        Problem: Physical Therapy    Goal Priority Disciplines Outcome Goal Variances Interventions   Physical Therapy Goal     PT, PT/OT Ongoing, Progressing     Description: Goals to be met by: 2022:    Patient will increase functional independence with mobility by performin. Supine to sit with Maximum Assistance.  2. Sit to supine with Maximum Assistance.  3. Rolling to Left and Right with Moderate Assistance.  4. Sit to stand transfer with Contact Guard Assistance.  5. Bed to chair transfer with Contact Guard Assistance using Rolling Walker.  6. Gait  x 100 feet with Maximum Assistance using Rolling Walker.   7. Wheelchair propulsion x 50 feet with Minimal Assistance using bilateral upper extremities.  8. Ascend/Descend 4 inch curb step with Maximum Assistance using Rolling Walker.  9. Sitting at edge of bed x 20 minutes with Supervision performing dynamic sitting balance activities.  10. Lower extremity exercise program x 30 reps per handout, with supervision.                     Time Tracking:     PT Received On: 22  PT Start Time: 1117  PT Stop Time: 1157  PT Total Time (min): 40 min    Billable Minutes: Gait  Training 24 and Therapeutic Activity 16    Treatment Type: Treatment  PT/PTA: PTA     PTA Visit Number: 4     06/26/2022

## 2022-06-26 NOTE — PLAN OF CARE
Problem: Adult Inpatient Plan of Care  Goal: Plan of Care Review  Outcome: Ongoing, Progressing  Goal: Patient-Specific Goal (Individualized)  Outcome: Ongoing, Progressing  Goal: Absence of Hospital-Acquired Illness or Injury  Outcome: Ongoing, Progressing  Goal: Optimal Comfort and Wellbeing  Outcome: Ongoing, Progressing     Problem: Fluid and Electrolyte Imbalance (Acute Kidney Injury/Impairment)  Goal: Fluid and Electrolyte Balance  Outcome: Ongoing, Progressing     Problem: Oral Intake Inadequate (Acute Kidney Injury/Impairment)  Goal: Optimal Nutrition Intake  Outcome: Ongoing, Progressing     Problem: Impaired Wound Healing  Goal: Optimal Wound Healing  Outcome: Ongoing, Progressing     Problem: Skin Injury Risk Increased  Goal: Skin Health and Integrity  Outcome: Ongoing, Progressing     Problem: Fall Injury Risk  Goal: Absence of Fall and Fall-Related Injury  Outcome: Ongoing, Progressing     Problem: Pain Acute  Goal: Acceptable Pain Control and Functional Ability  Outcome: Ongoing, Progressing

## 2022-06-27 LAB
ANION GAP SERPL CALC-SCNC: 12 MMOL/L (ref 8–16)
BASOPHILS # BLD AUTO: 0.01 K/UL (ref 0–0.2)
BASOPHILS NFR BLD: 0.2 % (ref 0–1.9)
BUN SERPL-MCNC: 19 MG/DL (ref 8–23)
CALCIUM SERPL-MCNC: 10.2 MG/DL (ref 8.7–10.5)
CHLORIDE SERPL-SCNC: 106 MMOL/L (ref 95–110)
CO2 SERPL-SCNC: 25 MMOL/L (ref 23–29)
CREAT SERPL-MCNC: 0.7 MG/DL (ref 0.5–1.4)
DIFFERENTIAL METHOD: ABNORMAL
EOSINOPHIL # BLD AUTO: 0.2 K/UL (ref 0–0.5)
EOSINOPHIL NFR BLD: 2.7 % (ref 0–8)
ERYTHROCYTE [DISTWIDTH] IN BLOOD BY AUTOMATED COUNT: 14.8 % (ref 11.5–14.5)
EST. GFR  (AFRICAN AMERICAN): >60 ML/MIN/1.73 M^2
EST. GFR  (NON AFRICAN AMERICAN): >60 ML/MIN/1.73 M^2
GLUCOSE SERPL-MCNC: 127 MG/DL (ref 70–110)
HCT VFR BLD AUTO: 30.6 % (ref 37–48.5)
HGB BLD-MCNC: 9.9 G/DL (ref 12–16)
IMM GRANULOCYTES # BLD AUTO: 0.02 K/UL (ref 0–0.04)
IMM GRANULOCYTES NFR BLD AUTO: 0.3 % (ref 0–0.5)
LYMPHOCYTES # BLD AUTO: 2 K/UL (ref 1–4.8)
LYMPHOCYTES NFR BLD: 31.2 % (ref 18–48)
MAGNESIUM SERPL-MCNC: 1.6 MG/DL (ref 1.6–2.6)
MCH RBC QN AUTO: 30.9 PG (ref 27–31)
MCHC RBC AUTO-ENTMCNC: 32.4 G/DL (ref 32–36)
MCV RBC AUTO: 96 FL (ref 82–98)
MONOCYTES # BLD AUTO: 0.6 K/UL (ref 0.3–1)
MONOCYTES NFR BLD: 9.8 % (ref 4–15)
NEUTROPHILS # BLD AUTO: 3.6 K/UL (ref 1.8–7.7)
NEUTROPHILS NFR BLD: 55.8 % (ref 38–73)
NRBC BLD-RTO: 0 /100 WBC
PHOSPHATE SERPL-MCNC: 3.7 MG/DL (ref 2.7–4.5)
PLATELET # BLD AUTO: 300 K/UL (ref 150–450)
PMV BLD AUTO: 9.8 FL (ref 9.2–12.9)
POTASSIUM SERPL-SCNC: 3.4 MMOL/L (ref 3.5–5.1)
RBC # BLD AUTO: 3.2 M/UL (ref 4–5.4)
SODIUM SERPL-SCNC: 143 MMOL/L (ref 136–145)
WBC # BLD AUTO: 6.41 K/UL (ref 3.9–12.7)

## 2022-06-27 PROCEDURE — 25000003 PHARM REV CODE 250: Performed by: HOSPITALIST

## 2022-06-27 PROCEDURE — 11000004 HC SNF PRIVATE

## 2022-06-27 PROCEDURE — 94761 N-INVAS EAR/PLS OXIMETRY MLT: CPT

## 2022-06-27 PROCEDURE — 84100 ASSAY OF PHOSPHORUS: CPT | Performed by: HOSPITALIST

## 2022-06-27 PROCEDURE — 85025 COMPLETE CBC W/AUTO DIFF WBC: CPT | Performed by: HOSPITALIST

## 2022-06-27 PROCEDURE — 97116 GAIT TRAINING THERAPY: CPT

## 2022-06-27 PROCEDURE — 83735 ASSAY OF MAGNESIUM: CPT | Performed by: HOSPITALIST

## 2022-06-27 PROCEDURE — 97110 THERAPEUTIC EXERCISES: CPT

## 2022-06-27 PROCEDURE — 99900035 HC TECH TIME PER 15 MIN (STAT)

## 2022-06-27 PROCEDURE — 80048 BASIC METABOLIC PNL TOTAL CA: CPT | Performed by: HOSPITALIST

## 2022-06-27 PROCEDURE — 97530 THERAPEUTIC ACTIVITIES: CPT

## 2022-06-27 PROCEDURE — 25000003 PHARM REV CODE 250: Performed by: NURSE PRACTITIONER

## 2022-06-27 PROCEDURE — 27000221 HC OXYGEN, UP TO 24 HOURS

## 2022-06-27 PROCEDURE — 36415 COLL VENOUS BLD VENIPUNCTURE: CPT | Performed by: HOSPITALIST

## 2022-06-27 RX ORDER — LANOLIN ALCOHOL/MO/W.PET/CERES
400 CREAM (GRAM) TOPICAL 2 TIMES DAILY
Status: COMPLETED | OUTPATIENT
Start: 2022-06-27 | End: 2022-06-29

## 2022-06-27 RX ORDER — POTASSIUM CHLORIDE 20 MEQ/1
40 TABLET, EXTENDED RELEASE ORAL 2 TIMES DAILY
Status: COMPLETED | OUTPATIENT
Start: 2022-06-27 | End: 2022-06-28

## 2022-06-27 RX ADMIN — APIXABAN 5 MG: 2.5 TABLET, FILM COATED ORAL at 09:06

## 2022-06-27 RX ADMIN — PANTOPRAZOLE SODIUM 40 MG: 40 TABLET, DELAYED RELEASE ORAL at 09:06

## 2022-06-27 RX ADMIN — Medication 1 TABLET: at 09:06

## 2022-06-27 RX ADMIN — POTASSIUM CHLORIDE 40 MEQ: 1500 TABLET, EXTENDED RELEASE ORAL at 08:06

## 2022-06-27 RX ADMIN — FUROSEMIDE 20 MG: 20 TABLET ORAL at 09:06

## 2022-06-27 RX ADMIN — LORAZEPAM 0.5 MG: 0.5 TABLET ORAL at 08:06

## 2022-06-27 RX ADMIN — APIXABAN 5 MG: 2.5 TABLET, FILM COATED ORAL at 08:06

## 2022-06-27 RX ADMIN — ACETAMINOPHEN AND CODEINE PHOSPHATE 1 TABLET: 300; 30 TABLET ORAL at 02:06

## 2022-06-27 RX ADMIN — CHOLECALCIFEROL TAB 25 MCG (1000 UNIT) 1000 UNITS: 25 TAB at 09:06

## 2022-06-27 RX ADMIN — Medication 400 MG: at 08:06

## 2022-06-27 NOTE — PT/OT/SLP PROGRESS
Physical Therapy Treatment    Patient Name:  Lupis Murcia   MRN:  [id number]  Admit Date: 6/20/2022  Admitting Diagnosis: Acute pulmonary embolism with acute cor pulmonale  Recent Surgeries: N/A    General Precautions: Standard, fall   Orthopedic Precautions:N/A   Braces: N/A     Recommendations:     Discharge Recommendations: home health PT   Level of Assistance Recommended at Discharge: Intermittent assistance   Discharge Equipment Recommendations: bedside commode, grab bar, raised toilet, shower chair, walker, rolling   Barriers to discharge: Decreased caregiver support (increased assist needed)    Assessment:     Lupis Murcia is a 71 y.o. female admitted with a medical diagnosis of Acute pulmonary embolism with acute cor pulmonale. Patient tolerated today's session well. Patient performed gait training with customized LLE AFO. She was able to advance LLE without assistance with AFO donned, but still dragged LLE due to poor lower extremity strength. Sessions should continue to focus on strengthening hip flexors on LLE to facilitate smoother ambulation. She remains limited in transfers, functional mobility, lower extremity strength, and cardiovascular endurance. Patient continues to require skilled physical therapy services to address deficits to return patient to PLOF with no limtiations.     Performance deficits affecting function:  weakness, impaired endurance, impaired sensation, impaired self care skills, impaired functional mobilty, gait instability, impaired balance, decreased lower extremity function, abnormal tone, decreased ROM, impaired skin, impaired fine motor, edema, impaired cardiopulmonary response to activity, impaired muscle length .    Rehab Potential is good    Activity Tolerance: Fair    Plan:     Patient to be seen 6 x/week to address the above listed problems via gait training, therapeutic activities, therapeutic exercises, neuromuscular re-education, wheelchair  management/training    · Plan of Care Expires: 22  · Plan of Care Reviewed with: patient    Subjective     Patient reports no pain but is agreeable to participate in therapy today.     Pain/Comfort:  Pain Rating 1: 0/10  Pain Rating Post-Intervention 1: 0/10    Patient's cultural, spiritual, Sikhism conflicts given the current situation:  no    Objective:     Communicated with nursing staff prior to session. Patient found up in chair with oxygen (1 L vis nasal canula) upon PT entry to room.     Functional Mobility:  Transfers:     · Sit to Stand x multiple trials: stand by assistance with rolling walker  · Chair<>wc: stand by assistance with rolling walker using Stand Pivot. She required frequent vc to stay within RW for safety.    Gait: Patient ambulated 2 trials for a total of 71 feet with RW and CGA. She demonstrated decreased pako, decreased step length and a step to gait pattern. With AFO donned on LLE, she was able to drag LLE with no assistance to advance forward. To assist with clearing her LLE, sessions should be focused on increase her L hip musculature to facilitate a smoother gait pattern.    Therapeutic Activities and Exercises:   Seated exercises: LAQ > hip flexion x 3 sets of 10 reps. She required ModA for LLE to complete full AROM with both exercises and extended time to perform.  Donned AFO on LLE.    AM-PAC 6 CLICK MOBILITY  15    Patient left up in chair with call button in reach.    GOALS:   Multidisciplinary Problems     Physical Therapy Goals        Problem: Physical Therapy    Goal Priority Disciplines Outcome Goal Variances Interventions   Physical Therapy Goal     PT, PT/OT Ongoing, Progressing     Description: Goals to be met by: 2022:    Patient will increase functional independence with mobility by performin. Supine to sit with Maximum Assistance.  2. Sit to supine with Maximum Assistance.  3. Rolling to Left and Right with Moderate Assistance.  4. Sit to stand  transfer with Contact Guard Assistance. -met  Updated 06/27/2022: Sit to stand transfer with supervision.  5. Bed to chair transfer with Contact Guard Assistance using Rolling Walker.  6. Gait  x 100 feet with Maximum Assistance using Rolling Walker.   7. Wheelchair propulsion x 50 feet with Minimal Assistance using bilateral upper extremities.  8. Ascend/Descend 4 inch curb step with Maximum Assistance using Rolling Walker.  9. Sitting at edge of bed x 20 minutes with Supervision performing dynamic sitting balance activities.  10. Lower extremity exercise program x 30 reps per handout, with supervision.                     Time Tracking:     PT Received On: 06/27/22  PT Start Time: 0924  PT Stop Time: 1011  PT Total Time (min): 47 min    Billable Minutes: Gait Training 18, Therapeutic Activity 11 and Therapeutic Exercise 18    Treatment Type: Treatment  PT/PTA: PT     PTA Visit Number: 0     06/27/2022

## 2022-06-27 NOTE — PLAN OF CARE
Problem: Physical Therapy  Goal: Physical Therapy Goal  Description: Goals to be met by: 2022:    Patient will increase functional independence with mobility by performin. Supine to sit with Maximum Assistance.  2. Sit to supine with Maximum Assistance.  3. Rolling to Left and Right with Moderate Assistance.  4. Sit to stand transfer with Contact Guard Assistance. -met  Updated 2022: Sit to stand transfer with supervision.  5. Bed to chair transfer with Contact Guard Assistance using Rolling Walker.  6. Gait  x 100 feet with Maximum Assistance using Rolling Walker.   7. Wheelchair propulsion x 50 feet with Minimal Assistance using bilateral upper extremities.  8. Ascend/Descend 4 inch curb step with Maximum Assistance using Rolling Walker.  9. Sitting at edge of bed x 20 minutes with Supervision performing dynamic sitting balance activities.  10. Lower extremity exercise program x 30 reps per handout, with supervision.    2022 1350 by KIMBERLY Clark  Outcome: Ongoing, Progressing

## 2022-06-27 NOTE — PLAN OF CARE
Problem: Adult Inpatient Plan of Care  Goal: Plan of Care Review  Outcome: Ongoing, Progressing  Goal: Patient-Specific Goal (Individualized)  Outcome: Ongoing, Progressing  Goal: Absence of Hospital-Acquired Illness or Injury  Outcome: Ongoing, Progressing  Goal: Optimal Comfort and Wellbeing  Outcome: Ongoing, Progressing     Problem: Fluid and Electrolyte Imbalance (Acute Kidney Injury/Impairment)  Goal: Fluid and Electrolyte Balance  Outcome: Ongoing, Progressing     Problem: Oral Intake Inadequate (Acute Kidney Injury/Impairment)  Goal: Optimal Nutrition Intake  Outcome: Ongoing, Progressing     Problem: Renal Function Impairment (Acute Kidney Injury/Impairment)  Goal: Effective Renal Function  Outcome: Ongoing, Progressing     Problem: Impaired Wound Healing  Goal: Optimal Wound Healing  Outcome: Ongoing, Progressing     Problem: Skin Injury Risk Increased  Goal: Skin Health and Integrity  Outcome: Ongoing, Progressing     Problem: Fall Injury Risk  Goal: Absence of Fall and Fall-Related Injury  Outcome: Ongoing, Progressing     Problem: Pain Acute  Goal: Acceptable Pain Control and Functional Ability  Outcome: Ongoing, Progressing

## 2022-06-27 NOTE — PT/OT/SLP PROGRESS
Occupational Therapy   Treatment    Name: Lupis Murcia  MRN: [id number]  Admit Date: 6/20/2022  Admitting Diagnosis:  Acute pulmonary embolism with acute cor pulmonale    General Precautions: Standard, fall   Orthopedic Precautions:N/A   Braces: N/A     Recommendations:     Discharge Recommendations: home health OT  Level of Assistance Recommended at Discharge: 24 hours physical assistance for all ADL's and home management tasks  Discharge Equipment Recommendations:  other (see comments) (TBD)  Barriers to discharge:  Decreased caregiver support    Assessment:     Lupis Murcia is a 71 y.o. female with a medical diagnosis of Acute pulmonary embolism with acute cor pulmonale .  She .remains limited in performance of self-care , functional mobility and ADLs and currently not performing tasks at OF . Currently presenting with performance deficits including weakness, impaired endurance, impaired self care skills, impaired functional mobilty, gait instability, impaired balance, decreased coordination, decreased lower extremity function, decreased safety awareness, pain, decreased ROM, impaired cardiopulmonary response to activity, impaired muscle length.    Pt tolerated Tx without incident and is making progress but continues to require significant assist to perform self care tasks, functional mobility and functional transfers .  She would continue to benefit from OT intervention to further her functional (I)ce and safety.    Rehab Potential is good    Activity tolerance:  Fair    Plan:     Patient to be seen 6 x/week to address the above listed problems via self-care/home management, therapeutic exercises, therapeutic activities    · Plan of Care Expires: 07/12/22  · Plan of Care Reviewed with: patient    Subjective     Communicated with: nurse prior to session. .    Pain/Comfort:  · Pain Rating 1: 0/10  · Pain Rating Post-Intervention 1: 0/10    Patient's cultural, spiritual, Restorationist conflicts given the  current situation:  · no    Objective:     Patient found up in chair with oxygen upon OT entry to room.    Bed Mobility:    · Pt seated in bedside chair at onset of therapy session.    Functional Mobility/Transfers:  · Pt was unwilling to get out of bedside chair    Activities of Daily Living:  Pt refused to do any self care tasks.     [facility] 6 Click ADL: 15    OT Exercises: UE Ergometer performed 12 minutes on UBE with Mod resistance.  UE exercises performed to increase functional endurance and strength in order increase independence when performing self care tasks, functional ambulation, W/C propulsion, and functional standing activities .    Treatment & Education:  Pt edu on POC, safety when performing self care tasks , benefit of performing OOB activity, and safety when performing functional transfers and mobility but was only willing to perform UBE exercises today. Educated Pt on the need to participat in all aspects of therapy to produce best outcomes. .  - White board updated  - Self care tasks completed-- as noted above       Patient left up in chair with all lines intact and call button in reachEducation:      GOALS:   Multidisciplinary Problems     Occupational Therapy Goals        Problem: Occupational Therapy    Goal Priority Disciplines Outcome Interventions   Occupational Therapy Goal     OT, PT/OT Ongoing, Progressing    Description: Goals to be met by: 7/12/2022    Patient will increase functional independence with ADLs by performing:    UE Dressing with Set-up Assistance.  LE Dressing, including donning/doffing pants, with Minimal Assistance using appropriate AE.  Grooming while seated at sink with Set-up Assistance.  Toileting from bedside commode with Minimal Assistance for hygiene and clothing management.   Bathing from sitting at sink with Minimal Assistance.  Supine to sit with Stand-by Assistance.  Sit to stand transfer with Stand-by Assistance with RW.  Toilet transfer to bedside commode with  Stand-by Assistance with RW and grab bar.  Upper extremity exercise program with Supervision.  Caregiver will be educated on level of assist required to safely perform self care tasks and functional transfers.                        Time Tracking:     OT Date of Treatment: 06/27/22  OT Start Time: 1155    OT Stop Time: 1211  OT Total Time (min): 16 min    Billable Minutes:Therapeutic Exercise 16    6/27/2022

## 2022-06-27 NOTE — TREATMENT PLAN
"Rehab Services' DME recommendations    Lupis Murcia  MRN: [id number]    [x] Walker Adult (5'1"-6"6")    Wheels Yes     [x] Wheelchair  Number of hours up in a wheelchair per day >8 hours per day       Style Transport Wheelchair      Justification for light weight w/c: patient can and does self-propel in a lightweight wheelchair, patient has impaired ability to participate in MRADLs, mobility limitations cannot be sifficiently resolved with a cane/walker, a wheelchair will significantly improve the ability to participate in MRADLs and will be used in the home on a regular basis and the patient has a caregiver who is available, willing, and able to provide assistance with the wheelchair    Seat Width 18 (Standard adult)    Seat Depth 18    Back Height Standard    Leg Support Standard, Heel Loops, Elevated leg rest and Swing Away    Arm Height Detachable, Desk, Swing Away and Adjustable Height    Lap Belt Velcro    Accessories Front Brakes, Anti-tippers and Safety belt    Cushion Basic    Justification for Cushion Patient will be in wheelchair >8 hours per day and needs a cushion for pressure relief.    [x] 3 in 1 commode Standard    [x] Shower Chair With back    [x] Hospital bed Electric    Patient requires a bed height different than a fixed height hospital bed to permit transfers to chair, wheelchair or standing. Yes    Accessories alternating pressure pad    [x] Home health PT and OT    Amanda Stewart, PT 6/27/2022      "

## 2022-06-28 PROCEDURE — 99900035 HC TECH TIME PER 15 MIN (STAT)

## 2022-06-28 PROCEDURE — 25000003 PHARM REV CODE 250: Performed by: HOSPITALIST

## 2022-06-28 PROCEDURE — 27000221 HC OXYGEN, UP TO 24 HOURS

## 2022-06-28 PROCEDURE — 94761 N-INVAS EAR/PLS OXIMETRY MLT: CPT

## 2022-06-28 PROCEDURE — 97530 THERAPEUTIC ACTIVITIES: CPT | Mod: CQ

## 2022-06-28 PROCEDURE — 97110 THERAPEUTIC EXERCISES: CPT

## 2022-06-28 PROCEDURE — 11000004 HC SNF PRIVATE

## 2022-06-28 PROCEDURE — 25000003 PHARM REV CODE 250: Performed by: NURSE PRACTITIONER

## 2022-06-28 PROCEDURE — 97530 THERAPEUTIC ACTIVITIES: CPT

## 2022-06-28 PROCEDURE — 97116 GAIT TRAINING THERAPY: CPT | Mod: CQ

## 2022-06-28 RX ADMIN — Medication 1 TABLET: at 09:06

## 2022-06-28 RX ADMIN — APIXABAN 5 MG: 2.5 TABLET, FILM COATED ORAL at 09:06

## 2022-06-28 RX ADMIN — ONDANSETRON 4 MG: 4 TABLET, ORALLY DISINTEGRATING ORAL at 03:06

## 2022-06-28 RX ADMIN — AMMONIUM LACTATE: 12 LOTION TOPICAL at 09:06

## 2022-06-28 RX ADMIN — APIXABAN 5 MG: 2.5 TABLET, FILM COATED ORAL at 08:06

## 2022-06-28 RX ADMIN — PANTOPRAZOLE SODIUM 40 MG: 40 TABLET, DELAYED RELEASE ORAL at 09:06

## 2022-06-28 RX ADMIN — ACETAMINOPHEN AND CODEINE PHOSPHATE 1 TABLET: 300; 30 TABLET ORAL at 06:06

## 2022-06-28 RX ADMIN — LORAZEPAM 0.5 MG: 0.5 TABLET ORAL at 12:06

## 2022-06-28 RX ADMIN — Medication 400 MG: at 09:06

## 2022-06-28 RX ADMIN — FUROSEMIDE 20 MG: 20 TABLET ORAL at 09:06

## 2022-06-28 RX ADMIN — CHOLECALCIFEROL TAB 25 MCG (1000 UNIT) 1000 UNITS: 25 TAB at 09:06

## 2022-06-28 RX ADMIN — ACETAMINOPHEN AND CODEINE PHOSPHATE 1 TABLET: 300; 30 TABLET ORAL at 09:06

## 2022-06-28 RX ADMIN — LORAZEPAM 0.5 MG: 0.5 TABLET ORAL at 08:06

## 2022-06-28 RX ADMIN — SENNOSIDES AND DOCUSATE SODIUM 1 TABLET: 50; 8.6 TABLET ORAL at 09:06

## 2022-06-28 RX ADMIN — POTASSIUM CHLORIDE 40 MEQ: 1500 TABLET, EXTENDED RELEASE ORAL at 09:06

## 2022-06-28 RX ADMIN — Medication 400 MG: at 08:06

## 2022-06-28 NOTE — PROGRESS NOTES
[facility]                                  Skilled Nursing Facility                   Progress Note     Admit Date: 2022  USMAN TBD  Principal Problem:  Acute acute pulmonary embolism  HPI obtained from patient interview and chart review   Principal problem: Acute pulmonary embolism with acute cor pulmonale    Chief Complaint: Revaluation of medical treatment and therapy status: Lab review, hypokalemia, BG management     HPI:   Lupis Murcia is a 71 y.o. female w/ PMHx of PE during pregnancy requiring heparin drip, chronic lower extremity lymphedema, multiple sclerosis, vitamin D deficiency who presented to SNF following hospitalization for pulmonary embolism.  Admission to SNF for secondary weakness and debility.    Interval History:  All of today's labs reviewed and are listed below.  K 3.4, Mag 1.6.  24 hr vital sign ranges listed below.  Patient currently utilizing 1 L nasal cannula.  Oxygen removed during my assessment, SpO2 remained 99%.  Continue weaning efforts.  Patient denies shortness of breath, abdominal discomfort, nausea, or vomiting.  Patient reports an adequate appetite.  Patient denies dysuria.  Patient reports having regular bowel movements.  Patient progessing with PT/OT- 2 trials for a total of 71 feet with RW and CGA. Continuing to follow and treat all acute and chronic conditions.    Past Medical History: Patient has a past medical history of Lymphedema, MS (multiple sclerosis), Other pulmonary embolism without acute cor pulmonale, and Vitamin B12 deficiency.    Past Surgical History: Patient has a past surgical history that includes  section; Breast cyst excision (Left); and Esophagogastroduodenoscopy (N/A, 2022).    Social History: Patient reports that she has never smoked. She has never used smokeless tobacco. She reports that she does not drink alcohol and does not use drugs.    Family History:  family history includes Brain cancer in her brother; Coronary artery disease in her father; Lung cancer in her father and mother.    Allergies: Patient is allergic to contrast media, pcn [penicillins], celebrex [celecoxib], diazepam, and motrin [ibuprofen].    ROS  Constitutional: Negative for fever   Eyes: Negative for blurred vision, double vision   Respiratory: +SOB on exertion.  Negative for cough  Cardiovascular: Negative for chest pain, palpitations, and leg swelling.   Gastrointestinal: Negative for abdominal pain, constipation, diarrhea, nausea, vomiting.   Genitourinary: Negative for dysuria, frequency   Musculoskeletal:  + generalized weakness, BLE weakness. Negative for back pain and myalgias.   Skin: Negative for itching and rash.   Neurological: Negative for dizziness, headaches.   Psychiatric/Behavioral: Negative for depression. The patient is not nervous/anxious.      24 hour Vital Sign Range   Temp:  [98 °F (36.7 °C)-98.5 °F (36.9 °C)]   Pulse:  [74-95]   Resp:  [18-20]   BP: (132-159)/(66-80)   SpO2:  [96 %-99 %]     PEx  Constitutional: Patient appears debilitated.  No distress noted  HENT:   Head: Normocephalic and atraumatic.   Eyes: Pupils are equal, round  Neck: Normal range of motion. Neck supple.   Cardiovascular: Normal rate, regular rhythm and normal heart sounds.    Pulmonary/Chest: Effort normal and breath sounds are clear  Abdominal: Soft. Bowel sounds are normal.   Musculoskeletal: Normal range of motion.   Neurological: Alert and oriented to person, place, and time.   Psychiatric: Normal mood and affect. Behavior is normal.   Skin: Skin is warm and dry.     Recent Labs   Lab 06/27/22  0608      K 3.4*      CO2 25   BUN 19   CREATININE 0.7   MG 1.6       Recent Labs   Lab 06/27/22  0608   WBC 6.41   RBC 3.20*   HGB 9.9*   HCT 30.6*      MCV 96   MCH 30.9   MCHC 32.4         Assessment and Plan:    Acute pulmonary embolism with acute cor pulmonale  - continue Eliquis  10mg PO BID til 6/22 followed by Eliquis 5mg PO BID starting 6/23 indefinitely  - On 1L NC, wean oxygen as tolerated     Acute hypoxemic respiratory failure  - 2/2 PE  - On 2L NC, wean off NC as tolerated  - continue lasix 20 mg daily    Hypokalemia  - due to lasix  - 6/27 initiated potassium 40 mEq BID x2 doses    Hypomagnesemia  - initiated magnesium oxide 400 mg BID x2 days    Prediabetes  - Hgb A1C 6.2 on 5/3/22  - Prediabetes education given. Verbalized understanding   - pt refusing BG checks. discontinue BG checks and SSI  - BG stable. Remains on regular diet per pt request. WCTM.      Class 2 obesity due to excess calories in adult  - Body mass index is 37.74 kg/m².   - Morbid obesity complicates all aspects of disease management from diagnostic modalities to treatment.   - Weight loss encouraged and health benefits explained to patient.                Lymphedema  - Chronic and stable  - No acute issues     Vitamin D deficiency  - continue vitamin D 1000 units daily      MS (multiple sclerosis)  - PT/OT  - f/u with out pt provider      Muscle weakness of lower extremity  - PT/OT     Anemia of chronic disease  - Monitor for bleeding on Eliquis     Insomnia secondary to chronic pain  - continue melatonin     Anxiety  - continue lorazepam 0.5 mg Q 8 hr prn anxiety    Debility   - Continue with PT/OT for gait training and strengthening and restoration of ADL's   - Encourage mobility, OOB in chair, and early ambulation as appropriate  - Fall precautions   - Monitor for bowel and bladder dysfunction  - Monitor for and prevent skin breakdown and pressure ulcers  - Continue DVT prophylaxis with eliquis    Anticipate disposition:  Home with home health      Follow-up needed during SNF stay-    Follow-up needed after discharge from SNF:   - PCP, hospital follow-up, 7/14    Future Appointments   Date Time Provider Department Center   7/14/2022  3:00 PM Samir Sahni MD [facility]  Department of Hospital Medicine   [facility]     DOS: 6/27/2022       Patient note was created using MModal Dictation.  Any errors in syntax or even information may not have been identified and edited on initial review prior to signing this note.

## 2022-06-28 NOTE — PT/OT/SLP PROGRESS
"Occupational Therapy   Treatment    Name: Lupis Murcia  MRN: [id number]  Admit Date: 6/20/2022  Admitting Diagnosis:  Acute pulmonary embolism with acute cor pulmonale    General Precautions: Standard, fall   Orthopedic Precautions:N/A   Braces: N/A     Recommendations:     Discharge Recommendations: home health OT  Level of Assistance Recommended at Discharge: 24 hours physical assistance for all ADL's and home management tasks  Discharge Equipment Recommendations:  bedside commode, grab bar, raised toilet, shower chair, walker, rolling  Barriers to discharge:  Decreased caregiver support (increased assistance required)    Assessment:     Lupis Murcia is a 71 y.o. female with a medical diagnosis of Acute pulmonary embolism with acute cor pulmonale.  Pt had limited tolerance of session this date due to lack of sleep and head pain.  She continues to require assistance to perform daily tasks.  She presents with the following. Performance deficits affecting function are weakness, impaired endurance, impaired self care skills, impaired functional mobilty, gait instability, impaired balance, decreased coordination, decreased lower extremity function, decreased safety awareness, decreased ROM, impaired cardiopulmonary response to activity, edema, impaired skin, pain.     Rehab Potential is good    Activity tolerance:  Fair    Plan:     Patient to be seen 6 x/week to address the above listed problems via self-care/home management, therapeutic activities, therapeutic exercises    · Plan of Care Expires: 07/12/22  · Plan of Care Reviewed with: patient    Subjective   "I was up half the night.  I was nauseated."  Communicated with: nursing prior to session.     Pain/Comfort:  Pain Rating 1: other (see comments) (not rated)  Location - Orientation 1: generalized  Location 1: head (headache)  Pain Addressed 1: Pre-medicate for activity  Pain Rating Post-Intervention 1: other (see comments) (not rated)    Patient's " cultural, spiritual, Sabianist conflicts given the current situation:  no    Objective:     Patient found up in chair with oxygen (0.5 L) upon OT entry to room.    Bed Mobility:    · N/a due to pt sitting in chair at beginning and end of session     Functional Mobility/Transfers:  · Patient completed Sit <> Stand Transfer from bedside chair and w/c x 1 trial each with contact guard assistance  with  rolling walker   · Patient completed Bedside Chair <> Wheelchair Transfer using Stand Pivot technique with contact guard assistance with rolling walker.  She was able to advance her LLE without assistance.  · Pt completed Wheelchair <> Bedside Chair Transfer using Stand Pivot technique with Min A with RW.  She was unable to advance her LLE without assistance.  · Pt required Max A to scoot posteriorly in w/c and required cueing to shift her weight anteriorly and laterally while using her UE to propel herself back into the chair.  She was able to scoot posteriorly in bedside chair with CGA.     Activities of Daily Living:  · Deferred due to having already performed with her daughter    AMPAC 6 Click ADL: 15    OT Exercises: UE Ergometer for 12 minutes set on moderate resistance for UE strengthening that's required for daily tasks.  Pt was fatigued by end of activity.    Treatment & Education:  Pt edu on role of OT, POC, benefit of performing OOB activity, and safety when performing functional transfers and mobility.  - White board updated      Patient left up in chair with all lines intact and call button in reachEducation:      GOALS:   Multidisciplinary Problems     Occupational Therapy Goals        Problem: Occupational Therapy    Goal Priority Disciplines Outcome Interventions   Occupational Therapy Goal     OT, PT/OT Ongoing, Progressing    Description: Goals to be met by: 7/12/2022    Patient will increase functional independence with ADLs by performing:    UE Dressing with Set-up Assistance.  LE Dressing, including  donning/doffing pants, with Minimal Assistance using appropriate AE.  Grooming while seated at sink with Set-up Assistance.  Toileting from bedside commode with Minimal Assistance for hygiene and clothing management.   Bathing from sitting at sink with Minimal Assistance.  Supine to sit with Stand-by Assistance.  Sit to stand transfer with Stand-by Assistance with RW.  Toilet transfer to bedside commode with Stand-by Assistance with RW and grab bar.  Upper extremity exercise program with Supervision.  Caregiver will be educated on level of assist required to safely perform self care tasks and functional transfers.                        Time Tracking:     OT Date of Treatment: 06/28/22  OT Start Time: 0937    OT Stop Time: 1015  OT Total Time (min): 38 min    Billable Minutes:Therapeutic Activity 23 min  Therapeutic Exercise 15 min    6/28/2022

## 2022-06-28 NOTE — PT/OT/SLP PROGRESS
Physical Therapy Treatment    Patient Name:  Lupis Murcia   MRN:  [id number]  Admit Date: 6/20/2022  Admitting Diagnosis: Acute pulmonary embolism with acute cor pulmonale  Recent Surgeries: N/A    General Precautions: Standard, fall   Orthopedic Precautions:N/A   Braces: AFO ((L) LE)     Recommendations:     Discharge Recommendations:  home health PT   Level of Assistance Recommended at Discharge: Intermittent assistance   Discharge Equipment Recommendations: bedside commode, grab bar, raised toilet, shower chair, walker, rolling   Barriers to discharge: Decreased caregiver support (increased assist needed)    Assessment:     Lupis Murcia is a 71 y.o. female admitted with a medical diagnosis of Acute pulmonary embolism with acute cor pulmonale. Pt tolerated therapy session fairly well, but presents with excess edema in B LEs, erythema with nursing notified stating for patient to remain out of AFO today during therapy. PTA also spoke with prosthetist on the phone at beginning of session about pt's LEs, with education on fitting AFO, etc. Session focused on gait training and transfers, in order to assist with achieving highest level of function. Pt would continue to benefit from skilled PT services per POC.     Performance deficits affecting function:  weakness, impaired endurance, impaired sensation, impaired self care skills, impaired functional mobilty, gait instability, impaired balance, decreased lower extremity function, abnormal tone, decreased ROM, impaired skin, impaired fine motor, edema, impaired cardiopulmonary response to activity, impaired muscle length .    Rehab Potential is good and fair    Activity Tolerance: Fair    Plan:     Patient to be seen 6 x/week to address the above listed problems via gait training, therapeutic activities, therapeutic exercises, neuromuscular re-education, wheelchair management/training    · Plan of Care Expires: 07/12/22  · Plan of Care Reviewed with:  "patient    Subjective     Pt agreeable to PT.     Pain/Comfort:  · Pain Rating 1: 0/10  · Pain Rating Post-Intervention 1: 0/10    Patient's cultural, spiritual, Hoahaoism conflicts given the current situation:  · no    Objective:     Communicated with nursing prior to session.  Patient found up in recliner chair with oxygen (.5 L) upon PT entry to room.     Functional Mobility:  · Transfers:     · Sit to Stand: 2 sets, contact guard assistance with rolling walker  · Recliner chair to wheelchair: contact guard assistance and minimum assistance with  rolling walker  using  Step Transfer  · Gait: x 15', x 20', RW, Min A to CGA for steering RW. Pt became SOB but O2 sats remained 95% and above throughout session, on RA.   · Pt placed back on .5 LO2 at end of session.   · Pt educated on elevating LEs in recliner chair to alleviate edema, but pt stated "I don't like my legs up that high."     AM-PAC 6 CLICK MOBILITY  15    Patient left up in chair with all lines intact and call button in reach.    GOALS:   Multidisciplinary Problems     Physical Therapy Goals        Problem: Physical Therapy    Goal Priority Disciplines Outcome Goal Variances Interventions   Physical Therapy Goal     PT, PT/OT Ongoing, Progressing     Description: Goals to be met by: 2022:    Patient will increase functional independence with mobility by performin. Supine to sit with Maximum Assistance.  2. Sit to supine with Maximum Assistance.  3. Rolling to Left and Right with Moderate Assistance.  4. Sit to stand transfer with Contact Guard Assistance. -met  Updated 2022: Sit to stand transfer with supervision.  5. Bed to chair transfer with Contact Guard Assistance using Rolling Walker.  6. Gait  x 100 feet with Maximum Assistance using Rolling Walker.   7. Wheelchair propulsion x 50 feet with Minimal Assistance using bilateral upper extremities.  8. Ascend/Descend 4 inch curb step with Maximum Assistance using Rolling Walker.  9. " Sitting at edge of bed x 20 minutes with Supervision performing dynamic sitting balance activities.  10. Lower extremity exercise program x 30 reps per handout, with supervision.                     Time Tracking:     PT Received On: 06/28/22  PT Start Time: 1106  PT Stop Time: 1149  PT Total Time (min): 43 min    Billable Minutes: Gait Training 15 and Therapeutic Activity 28    Treatment Type: Treatment  PT/PTA: PTA     PTA Visit Number: 1     06/28/2022

## 2022-06-29 PROCEDURE — 99900035 HC TECH TIME PER 15 MIN (STAT)

## 2022-06-29 PROCEDURE — 27000221 HC OXYGEN, UP TO 24 HOURS

## 2022-06-29 PROCEDURE — 25000003 PHARM REV CODE 250: Performed by: HOSPITALIST

## 2022-06-29 PROCEDURE — 11000004 HC SNF PRIVATE

## 2022-06-29 PROCEDURE — 25000003 PHARM REV CODE 250: Performed by: NURSE PRACTITIONER

## 2022-06-29 PROCEDURE — 94761 N-INVAS EAR/PLS OXIMETRY MLT: CPT

## 2022-06-29 RX ADMIN — APIXABAN 5 MG: 2.5 TABLET, FILM COATED ORAL at 08:06

## 2022-06-29 RX ADMIN — LORAZEPAM 0.5 MG: 0.5 TABLET ORAL at 08:06

## 2022-06-29 RX ADMIN — PANTOPRAZOLE SODIUM 40 MG: 40 TABLET, DELAYED RELEASE ORAL at 09:06

## 2022-06-29 RX ADMIN — AMMONIUM LACTATE: 12 LOTION TOPICAL at 09:06

## 2022-06-29 RX ADMIN — ACETAMINOPHEN AND CODEINE PHOSPHATE 1 TABLET: 300; 30 TABLET ORAL at 09:06

## 2022-06-29 RX ADMIN — Medication 400 MG: at 09:06

## 2022-06-29 RX ADMIN — Medication 1 TABLET: at 09:06

## 2022-06-29 RX ADMIN — ACETAMINOPHEN AND CODEINE PHOSPHATE 1 TABLET: 300; 30 TABLET ORAL at 04:06

## 2022-06-29 RX ADMIN — FUROSEMIDE 20 MG: 20 TABLET ORAL at 09:06

## 2022-06-29 RX ADMIN — APIXABAN 5 MG: 2.5 TABLET, FILM COATED ORAL at 09:06

## 2022-06-29 RX ADMIN — LORAZEPAM 0.5 MG: 0.5 TABLET ORAL at 10:06

## 2022-06-29 RX ADMIN — SENNOSIDES AND DOCUSATE SODIUM 1 TABLET: 50; 8.6 TABLET ORAL at 09:06

## 2022-06-29 RX ADMIN — CHOLECALCIFEROL TAB 25 MCG (1000 UNIT) 1000 UNITS: 25 TAB at 09:06

## 2022-06-29 NOTE — PT/OT/SLP PROGRESS
Physical Therapy      Patient Name:  Lupis Murcia   MRN:  [id number]    Patient not seen today secondary to On first attempt Patient requested to allow to use the toilette first and on second attempt Patient requested to hold therapy for today due to Nausea and loose bowels. Will follow-up on next scheduled visit.

## 2022-06-29 NOTE — PROGRESS NOTES
[facility] - Skilled Nursing  Adult Nutrition  Progress Note    SUMMARY       Recommendations     1. Continue regular diet.    Goals: PO to meet >75% of EEN by RD follow up.  Nutrition Goal Status: progressing towards goal  Communication of RD Recs: other (comment) (POC)    Assessment and Plan    Class 2 obesity due to excess calories in adult  Obesity  Related to (etiology):   Excessive calorie intake over needs, lack of activity  Signs and Symptoms (as evidenced by):   Debility, BMI 37     Interventions/Recommendations (treatment strategy):  General diet  Nutrition education- weight loss  Collaboration with other providers     Nutrition Diagnosis Status:   Continues    Malnutrition Assessment     Skin (Micronutrient): bruised, cracked, thickened, scaly  Hair/Scalp (Micronutrient): dull  Eyes (Micronutrient): conjunctiva dull  Teeth (Micronutrient): broken dentition  Neck/Chest (Micronutrient): muscle wasting  Musculoskeletal/Lower Extremities: edema       Weight Loss (Malnutrition): greater than 5% in 1 month   Orbital Region (Subcutaneous Fat Loss): well nourished  Upper Arm Region (Subcutaneous Fat Loss): well nourished  Thoracic and Lumbar Region: well nourished   [place] Region (Muscle Loss): mild depletion  Clavicle Bone Region (Muscle Loss): mild depletion  Clavicle and Acromion Bone Region (Muscle Loss): mild depletion  Dorsal Hand (Muscle Loss): moderate depletion  Anterior Thigh Region (Muscle Loss): moderate depletion   Edema (Fluid Accumulation): 3-->moderate             Reason for Assessment    Reason For Assessment: RD follow-up  Diagnosis:  (PE)  Relevant Medical History: HANDY, anemia, chronic pain, MS, obesity, lymphedema, Vit B12 deficiency,  Interdisciplinary Rounds: attended  General Information Comments: Pt with % intake meals since admission. Noted with 6 lb wt loss; -200 lbs. Pt with muscle wasting per NFPE 6/23. Also noted with stage 2 PI to sacral spine.  Nutrition Discharge  "Planning: general healthy diet, adequate protein    Nutrition Risk Screen    Nutrition Risk Screen: large or nonhealing wound, burn or pressure injury    Nutrition/Diet History    Patient Reported Diet/Restrictions/Preferences: general  Typical Food/Fluid Intake: likes to snack, regular meals  Spiritual, Cultural Beliefs, Hoahaoism Practices, Values that Affect Care: no  Vitamin/Mineral/Herbal Supplements: Vit D, Vit B12,Vit B complex, Vit C,  Food Allergies: NKFA  Factors Affecting Nutritional Intake: None identified at this time    Anthropometrics    Temp: 98.1 °F (36.7 °C)  Height Method: Stated  Height: 5' 2" (157.5 cm)  Height (inches): 62 in  Weight Method: Standard Scale  Weight: 90.9 kg (200 lb 6.4 oz)  Weight (lb): 200.4 lb  Ideal Body Weight (IBW), Female: 110 lb  % Ideal Body Weight, Female (lb): 187.59 %  BMI (Calculated): 36.6  BMI Grade: 35 - 39.9 - obesity - grade II  Weight Loss: intentional  Usual Body Weight (UBW), k.6 kg  % Usual Body Weight: 94.17  % Weight Change From Usual Weight: -6.02 %       Lab/Procedures/Meds    Pertinent Labs Reviewed: reviewed  Pertinent Labs Comments: potassium 3.4, Glu 127, A1dc 6.2% (22)  Pertinent Medications Comments: vitamin B compex/C/D, protonix, senna, colace    Estimated/Assessed Needs    Weight Used For Calorie Calculations: 93.6 kg (206 lb 5.6 oz)  Energy Calorie Requirements (kcal): 1400  Energy Need Method: Foard-St Jeor (x 1.0(PAL) 2/2 obesity)  Protein Requirements: 60g  Weight Used For Protein Calculations: 50 kg (110 lb 3.7 oz) (IBW kg 2/2 obesity x 1.2g/kg)  Fluid Requirements (mL): 1400 or per MD  Estimated Fluid Requirement Method: RDA Method  RDA Method (mL): 1400  CHO Requirement: -      Nutrition Prescription Ordered    Current Diet Order: Regular  Nutrition Order Comments: PO %    Evaluation of Received Nutrient/Fluid Intake    I/O: no data  Energy Calories Required: meeting needs  Protein Required: meeting needs  Fluid " Required: meeting needs  Comments: last BM 6/26  Tolerance: tolerating  % Intake of Estimated Energy Needs: 75 - 100 %  % Meal Intake: 75 - 100 %    Nutrition Risk    Level of Risk/Frequency of Follow-up: low     Monitor and Evaluation    Food and Nutrient Adminstration: diet order  Physical Activity and Function: nutrition-related ADLs and IADLs  Anthropometric Measurements: weight change  Biochemical Data, Medical Tests and Procedures: electrolyte and renal panel, gastrointestinal profile, glucose/endocrine profile, inflammatory profile  Nutrition-Focused Physical Findings: overall appearance, extremities, muscles and bones, head and eyes, skin     Nutrition Follow-Up    RD Follow-up?: Yes

## 2022-06-29 NOTE — PT/OT/SLP PROGRESS
Occupational Therapy      Patient Name:  Lupis Murcia   MRN:  [id number]    Patient not seen today secondary to nausea.  Will follow-up as scheduled per OT POC.    6/29/2022

## 2022-06-29 NOTE — PROGRESS NOTES
[facility]                                  Skilled Nursing Facility                   Progress Note     Admit Date: 2022  USMAN TBD  Principal Problem:  Acute acute pulmonary embolism  HPI obtained from patient interview and chart review   Principal problem: Acute pulmonary embolism with acute cor pulmonale    Chief Complaint: Revaluation of medical treatment and therapy status:  Oxygen weaning    HPI:   Lupis Murcia is a 71 y.o. female w/ PMHx of PE during pregnancy requiring heparin drip, chronic lower extremity lymphedema, multiple sclerosis, vitamin D deficiency who presented to SNF following hospitalization for pulmonary embolism.  Admission to SNF for secondary weakness and debility.    Interval History:  24 hr vital sign ranges listed below.  Patient utilizing 1 L nasal cannula this morning.  Oxygen removed and SpO2 remained 97%.  Patient denies shortness of breath at this time.  Patient endorses abdominal discomfort related to likely need to have a bowel movement.  She states she would like to skip therapy for today to help her stomach feel better, will follow up with patient tomorrow.   Patient denies dysuria. Patient progessing with PT/OT- Gait: x 15', x 20', RW, Min A to CGA for steering RW. Pt became SOB but O2 sats remained 95% and above throughout session, on RA. Continuing to follow and treat all acute and chronic conditions.    Past Medical History: Patient has a past medical history of Lymphedema, MS (multiple sclerosis), Other pulmonary embolism without acute cor pulmonale, and Vitamin B12 deficiency.    Past Surgical History: Patient has a past surgical history that includes  section; Breast cyst excision (Left); and Esophagogastroduodenoscopy (N/A, 2022).    Social History: Patient reports that she has never smoked. She has never used smokeless tobacco. She reports that she does not drink alcohol and  does not use drugs.    Family History: family history includes Brain cancer in her brother; Coronary artery disease in her father; Lung cancer in her father and mother.    Allergies: Patient is allergic to contrast media, pcn [penicillins], celebrex [celecoxib], diazepam, and motrin [ibuprofen].    ROS  Constitutional: Negative for fever   Eyes: Negative for blurred vision, double vision   Respiratory: +SOB on exertion.  Negative for cough  Cardiovascular: Negative for chest pain, palpitations, and leg swelling.   Gastrointestinal: Negative for abdominal pain, constipation, diarrhea, nausea, vomiting.   Genitourinary: Negative for dysuria, frequency   Musculoskeletal:  + generalized weakness, BLE weakness. Negative for back pain and myalgias.   Skin: Negative for itching and rash.   Neurological: Negative for dizziness, headaches.   Psychiatric/Behavioral: Negative for depression. The patient is not nervous/anxious.      24 hour Vital Sign Range   Temp:  [98.1 °F (36.7 °C)]   Pulse:  [80-82]   Resp:  [16-18]   BP: (140)/(63-74)   SpO2:  [95 %-96 %]     PEx  Constitutional: Patient appears debilitated.  No distress noted  HENT:   Head: Normocephalic and atraumatic.   Eyes: Pupils are equal, round  Neck: Normal range of motion. Neck supple.   Cardiovascular: Normal rate, regular rhythm and normal heart sounds.    Pulmonary/Chest: Effort normal and breath sounds are clear  Abdominal: Soft. Bowel sounds are normal.   Musculoskeletal: Normal range of motion.   Neurological: Alert and oriented to person, place, and time.   Psychiatric: Normal mood and affect. Behavior is normal.   Skin: Skin is warm and dry.     No results for input(s): GLUCOSE, NA, K, CL, CO2, BUN, CREATININE, MG in the last 24 hours.    Invalid input(s):  CALCIUM    No results for input(s): WBC, RBC, HGB, HCT, PLT, MCV, MCH, MCHC in the last 24 hours.      Assessment and Plan:    Acute pulmonary embolism with acute cor pulmonale  - continue Eliquis 10mg  PO BID til 6/22 followed by Eliquis 5mg PO BID starting 6/23 indefinitely  - On 1L NC, continue weaning efforts     Acute hypoxemic respiratory failure  - 2/2 PE  - On 2L NC, wean off NC as tolerated  - continue lasix 20 mg daily    Prediabetes  - Hgb A1C 6.2 on 5/3/22  - Prediabetes education given. Verbalized understanding   - pt refusing BG checks. discontinue BG checks and SSI  - BG stable. Remains on regular diet per pt request. WCTM.      Class 2 obesity due to excess calories in adult  - Body mass index is 37.74 kg/m².   - Morbid obesity complicates all aspects of disease management from diagnostic modalities to treatment.   - Weight loss encouraged and health benefits explained to patient.                Lymphedema  - Chronic and stable  - No acute issues     Vitamin D deficiency  - continue vitamin D 1000 units daily      MS (multiple sclerosis)  - PT/OT  - f/u with out pt provider      Muscle weakness of lower extremity  - PT/OT     Anemia of chronic disease  - Monitor for bleeding on Eliquis     Insomnia secondary to chronic pain  - continue melatonin     Anxiety  - continue lorazepam 0.5 mg Q 8 hr prn anxiety    Debility   - Continue with PT/OT for gait training and strengthening and restoration of ADL's   - Encourage mobility, OOB in chair, and early ambulation as appropriate  - Fall precautions   - Monitor for bowel and bladder dysfunction  - Monitor for and prevent skin breakdown and pressure ulcers  - Continue DVT prophylaxis with eliquis    Anticipate disposition:  Home with home health      Follow-up needed during SNF stay-    Follow-up needed after discharge from SNF:   - PCP, hospital follow-up, 7/14    Future Appointments   Date Time Provider Department Center   7/14/2022  3:00 PM Samir Sahni MD [facility]         Amy Conklin NP  Department of Hospital Medicine   [facility]     DOS: 6/29/2022       Patient note was created using MModal  Dictation.  Any errors in syntax or even information may not have been identified and edited on initial review prior to signing this note.

## 2022-06-30 LAB
ANION GAP SERPL CALC-SCNC: 12 MMOL/L (ref 8–16)
BASOPHILS # BLD AUTO: 0.01 K/UL (ref 0–0.2)
BASOPHILS NFR BLD: 0.2 % (ref 0–1.9)
BUN SERPL-MCNC: 16 MG/DL (ref 8–23)
CALCIUM SERPL-MCNC: 9.4 MG/DL (ref 8.7–10.5)
CHLORIDE SERPL-SCNC: 106 MMOL/L (ref 95–110)
CO2 SERPL-SCNC: 27 MMOL/L (ref 23–29)
CREAT SERPL-MCNC: 0.7 MG/DL (ref 0.5–1.4)
DIFFERENTIAL METHOD: ABNORMAL
EOSINOPHIL # BLD AUTO: 0.2 K/UL (ref 0–0.5)
EOSINOPHIL NFR BLD: 2.7 % (ref 0–8)
ERYTHROCYTE [DISTWIDTH] IN BLOOD BY AUTOMATED COUNT: 14.5 % (ref 11.5–14.5)
EST. GFR  (AFRICAN AMERICAN): >60 ML/MIN/1.73 M^2
EST. GFR  (NON AFRICAN AMERICAN): >60 ML/MIN/1.73 M^2
GLUCOSE SERPL-MCNC: 114 MG/DL (ref 70–110)
HCT VFR BLD AUTO: 28.9 % (ref 37–48.5)
HGB BLD-MCNC: 9.2 G/DL (ref 12–16)
IMM GRANULOCYTES # BLD AUTO: 0.03 K/UL (ref 0–0.04)
IMM GRANULOCYTES NFR BLD AUTO: 0.5 % (ref 0–0.5)
LYMPHOCYTES # BLD AUTO: 2 K/UL (ref 1–4.8)
LYMPHOCYTES NFR BLD: 32.5 % (ref 18–48)
MAGNESIUM SERPL-MCNC: 1.8 MG/DL (ref 1.6–2.6)
MCH RBC QN AUTO: 30.9 PG (ref 27–31)
MCHC RBC AUTO-ENTMCNC: 31.8 G/DL (ref 32–36)
MCV RBC AUTO: 97 FL (ref 82–98)
MONOCYTES # BLD AUTO: 0.6 K/UL (ref 0.3–1)
MONOCYTES NFR BLD: 9.3 % (ref 4–15)
NEUTROPHILS # BLD AUTO: 3.3 K/UL (ref 1.8–7.7)
NEUTROPHILS NFR BLD: 54.8 % (ref 38–73)
NRBC BLD-RTO: 0 /100 WBC
PHOSPHATE SERPL-MCNC: 3.5 MG/DL (ref 2.7–4.5)
PLATELET # BLD AUTO: 268 K/UL (ref 150–450)
PMV BLD AUTO: 10.3 FL (ref 9.2–12.9)
POTASSIUM SERPL-SCNC: 3.9 MMOL/L (ref 3.5–5.1)
RBC # BLD AUTO: 2.98 M/UL (ref 4–5.4)
SODIUM SERPL-SCNC: 145 MMOL/L (ref 136–145)
WBC # BLD AUTO: 6.03 K/UL (ref 3.9–12.7)

## 2022-06-30 PROCEDURE — 11000004 HC SNF PRIVATE

## 2022-06-30 PROCEDURE — 80048 BASIC METABOLIC PNL TOTAL CA: CPT | Performed by: HOSPITALIST

## 2022-06-30 PROCEDURE — 94761 N-INVAS EAR/PLS OXIMETRY MLT: CPT

## 2022-06-30 PROCEDURE — 85025 COMPLETE CBC W/AUTO DIFF WBC: CPT | Performed by: HOSPITALIST

## 2022-06-30 PROCEDURE — 83735 ASSAY OF MAGNESIUM: CPT | Performed by: HOSPITALIST

## 2022-06-30 PROCEDURE — 25000003 PHARM REV CODE 250: Performed by: HOSPITALIST

## 2022-06-30 PROCEDURE — 84100 ASSAY OF PHOSPHORUS: CPT | Performed by: HOSPITALIST

## 2022-06-30 PROCEDURE — 97530 THERAPEUTIC ACTIVITIES: CPT | Mod: CO

## 2022-06-30 PROCEDURE — 97116 GAIT TRAINING THERAPY: CPT | Mod: CQ

## 2022-06-30 PROCEDURE — 97530 THERAPEUTIC ACTIVITIES: CPT | Mod: CQ

## 2022-06-30 PROCEDURE — 36415 COLL VENOUS BLD VENIPUNCTURE: CPT | Performed by: HOSPITALIST

## 2022-06-30 RX ADMIN — LORAZEPAM 0.5 MG: 0.5 TABLET ORAL at 08:06

## 2022-06-30 RX ADMIN — APIXABAN 5 MG: 2.5 TABLET, FILM COATED ORAL at 09:06

## 2022-06-30 RX ADMIN — AMMONIUM LACTATE: 12 LOTION TOPICAL at 09:06

## 2022-06-30 RX ADMIN — APIXABAN 5 MG: 2.5 TABLET, FILM COATED ORAL at 08:06

## 2022-06-30 RX ADMIN — CHOLECALCIFEROL TAB 25 MCG (1000 UNIT) 1000 UNITS: 25 TAB at 09:06

## 2022-06-30 RX ADMIN — ACETAMINOPHEN AND CODEINE PHOSPHATE 1 TABLET: 300; 30 TABLET ORAL at 02:06

## 2022-06-30 RX ADMIN — FUROSEMIDE 20 MG: 20 TABLET ORAL at 09:06

## 2022-06-30 RX ADMIN — ACETAMINOPHEN AND CODEINE PHOSPHATE 1 TABLET: 300; 30 TABLET ORAL at 06:06

## 2022-06-30 RX ADMIN — ACETAMINOPHEN AND CODEINE PHOSPHATE 1 TABLET: 300; 30 TABLET ORAL at 12:06

## 2022-06-30 RX ADMIN — LORAZEPAM 0.5 MG: 0.5 TABLET ORAL at 09:06

## 2022-06-30 RX ADMIN — Medication 1 TABLET: at 09:06

## 2022-06-30 RX ADMIN — PANTOPRAZOLE SODIUM 40 MG: 40 TABLET, DELAYED RELEASE ORAL at 09:06

## 2022-06-30 RX ADMIN — SENNOSIDES AND DOCUSATE SODIUM 1 TABLET: 50; 8.6 TABLET ORAL at 09:06

## 2022-06-30 NOTE — PT/OT/SLP PROGRESS
Occupational Therapy   Treatment    Name: Lupis Murcia  MRN: [id number]  Admit Date: 6/20/2022  Admitting Diagnosis:  Acute pulmonary embolism with acute cor pulmonale    General Precautions: Standard, fall   Orthopedic Precautions:N/A   Braces:       Recommendations:     Discharge Recommendations: home health OT  Level of Assistance Recommended at Discharge: 24 hours physical assistance for all ADL's and home management tasks  Discharge Equipment Recommendations:  bedside commode, grab bar, raised toilet, shower chair, walker, rolling  Barriers to discharge:  Decreased caregiver support (increased assistance required)    Assessment:     Lupis Murcia is a 71 y.o. female with a medical diagnosis of Acute pulmonary embolism with acute cor pulmonale.  She presents with  Performance deficits affecting function are weakness, impaired endurance, impaired self care skills, impaired functional mobilty, gait instability, impaired balance, decreased ROM, decreased lower extremity function, edema, impaired cardiopulmonary response to activity, decreased safety awareness, impaired skin, decreased coordination    Pt. Was cooperative and participated well with session on this day. Pt  continues to demonstrate levels of physical deficits with  functional indep with daily management activities tasks, selfcare skills with balance,  functional mobility, UB strength and endurance. Pt. Will continue to benefit from continued OT to progress towards goals   Rehab Potential is fair    Activity tolerance:  Fair    Plan:     Patient to be seen 6 x/week to address the above listed problems via self-care/home management, therapeutic activities, therapeutic exercises    · Plan of Care Expires: 07/12/22  · Plan of Care Reviewed with: patient    Subjective     Communicated with: PTA  and Pt prior to session. I  am doing well today    Pain/Comfort:  Pain Rating 1: 0/10  Pain Rating Post-Intervention 1: 0/10    Patient's cultural, spiritual,  Sikhism conflicts given the current situation:  no    Objective:     Patient found up in chair  In gym with PTA with oxygen upon OT entry to room.    Functional Mobility/Transfers:  · Patient completed Sit <> Stand Transfer with contact guard assistance  with  rolling walker from w/c and then from w/c to bed side chair    Activities of Daily Living:  Already performed     [facility] 6 Click ADL: 15    OT Exercises: UE Ergometer x 10 min    Treatment & Education:  Pt. With standing act on this day with task. Pt. With CGA/SBA for balance aspects with task with  AD at raised counter Pt with visual perception task with discrimination of various shapes and sizes x 4:03 min with standing bal and min cues through out with weight shifting and use of BUE's incorporated and crossing mid line and facilitation with posture in prep for home management .     Pt. With 2# dowel activity with 2x10 reps with  bicep curls and forward flex motion to increase BUE ROM and strength.    Pt. With therex performed to increase ROM, endurance selfcare task and fxl mobility for independence     Pt edu on role of OT, POC, safety when performing self care tasks , benefit of performing OOB activity, and safety when performing functional transfers and mobility management for preparation with goals to progress towards next level of care    Patient left up in chair with all lines intact and call button in reachEducation:      GOALS:   Multidisciplinary Problems     Occupational Therapy Goals        Problem: Occupational Therapy    Goal Priority Disciplines Outcome Interventions   Occupational Therapy Goal     OT, PT/OT Ongoing, Progressing    Description: Goals to be met by: 7/12/2022    Patient will increase functional independence with ADLs by performing:    UE Dressing with Set-up Assistance.  LE Dressing, including donning/doffing pants, with Minimal Assistance using appropriate AE.  Grooming while seated at sink with Set-up Assistance.  Toileting  from bedside commode with Minimal Assistance for hygiene and clothing management.   Bathing from sitting at sink with Minimal Assistance.  Supine to sit with Stand-by Assistance.  Sit to stand transfer with Stand-by Assistance with RW.  Toilet transfer to bedside commode with Stand-by Assistance with RW and grab bar.  Upper extremity exercise program with Supervision.  Caregiver will be educated on level of assist required to safely perform self care tasks and functional transfers.                        Time Tracking:     OT Date of Treatment: 06/30/22  OT Start Time: 1125    OT Stop Time: 1155  OT Total Time (min): 30 min    Billable Minutes:Therapeutic Activity 30    6/30/2022

## 2022-06-30 NOTE — PT/OT/SLP PROGRESS
Physical Therapy Treatment    Patient Name:  Lupis Murcia   MRN:  [id number]  Admit Date: 6/20/2022  Admitting Diagnosis: Acute pulmonary embolism with acute cor pulmonale  Recent Surgeries: N/A    General Precautions: Standard, fall   Orthopedic Precautions:N/A   Braces: AFO (did not properly fit, was hurting pt's toe)     Recommendations:     Discharge Recommendations:  home health PT   Level of Assistance Recommended at Discharge: Intermittent assistance   Discharge Equipment Recommendations: bedside commode, grab bar, raised toilet, shower chair, walker, rolling   Barriers to discharge: Decreased caregiver support (increased assist needed)    Assessment:     Lupis Murcia is a 71 y.o. female admitted with a medical diagnosis of Acute pulmonary embolism with acute cor pulmonale. Pt tolerated therapy session well with focus on increasing independence with gait training, transfers and safety awareness in order to assist with achieving highest level of function. Pt would continue to benefit from skilled PT services per POC.       Performance deficits affecting function:  weakness, impaired endurance, impaired sensation, impaired self care skills, impaired functional mobilty, gait instability, impaired balance, decreased lower extremity function, abnormal tone, decreased ROM, impaired skin, impaired fine motor, edema, impaired cardiopulmonary response to activity, impaired muscle length .    Rehab Potential is good    Activity Tolerance: Fair    Plan:     Patient to be seen 6 x/week to address the above listed problems via gait training, therapeutic activities, therapeutic exercises, neuromuscular re-education, wheelchair management/training    · Plan of Care Expires: 07/12/22  · Plan of Care Reviewed with: patient    Subjective     Pt agreeable to PT.     Pain/Comfort:  · Pain Rating 1:  (not rated)  · Location - Side 1: Left  · Location - Orientation 1: distal  · Location 1: first toe  · Pain Addressed 1:  Other (see comments) (removal of AFO/Shoe)  · Pain Rating Post-Intervention 1: 0/10    Patient's cultural, spiritual, [employer]aritan conflicts given the current situation:  · no    Objective:     Patient found up in recliner chair with oxygen upon PT entry to room.     Functional Mobility:  · Transfers:     · Sit to Stand: 2 sets, contact guard assistance with rolling walker  · Stand pivot: recliner to w/c, RW, CGA.   · Gait: x 38', x 20', RW, CGA x 2 follow with w/c.   · Pt required extra time for fitting AFO and stocking, stating it was too painful on her L big toe from pressure, therefore had to doff pt's AFO and L shoe and ambulate with sock.   · Extra time spent getting patient new oxygen tubing.     AM-PAC 6 CLICK MOBILITY  15    Patient left up in chair with OT  present.    GOALS:   Multidisciplinary Problems     Physical Therapy Goals        Problem: Physical Therapy    Goal Priority Disciplines Outcome Goal Variances Interventions   Physical Therapy Goal     PT, PT/OT Ongoing, Progressing     Description: Goals to be met by: 2022:    Patient will increase functional independence with mobility by performin. Supine to sit with Maximum Assistance.  2. Sit to supine with Maximum Assistance.  3. Rolling to Left and Right with Moderate Assistance.  4. Sit to stand transfer with Contact Guard Assistance. -met  Updated 2022: Sit to stand transfer with supervision.  5. Bed to chair transfer with Contact Guard Assistance using Rolling Walker.  6. Gait  x 100 feet with Maximum Assistance using Rolling Walker.   7. Wheelchair propulsion x 50 feet with Minimal Assistance using bilateral upper extremities.  8. Ascend/Descend 4 inch curb step with Maximum Assistance using Rolling Walker.  9. Sitting at edge of bed x 20 minutes with Supervision performing dynamic sitting balance activities.  10. Lower extremity exercise program x 30 reps per handout, with supervision.                     Time Tracking:     PT  Received On: 06/30/22  PT Start Time: 1034  PT Stop Time: 1123  PT Total Time (min): 49 min    Billable Minutes: Gait Training 13 and Therapeutic Activity 36    Treatment Type: Treatment  PT/PTA: PTA     PTA Visit Number: 2     06/30/2022

## 2022-07-01 PROCEDURE — 11000004 HC SNF PRIVATE

## 2022-07-01 PROCEDURE — 25000003 PHARM REV CODE 250: Performed by: HOSPITALIST

## 2022-07-01 PROCEDURE — 97530 THERAPEUTIC ACTIVITIES: CPT | Mod: CO

## 2022-07-01 PROCEDURE — 97530 THERAPEUTIC ACTIVITIES: CPT | Mod: CQ

## 2022-07-01 PROCEDURE — 97116 GAIT TRAINING THERAPY: CPT | Mod: CQ

## 2022-07-01 RX ADMIN — PANTOPRAZOLE SODIUM 40 MG: 40 TABLET, DELAYED RELEASE ORAL at 08:07

## 2022-07-01 RX ADMIN — FUROSEMIDE 20 MG: 20 TABLET ORAL at 08:07

## 2022-07-01 RX ADMIN — ACETAMINOPHEN AND CODEINE PHOSPHATE 1 TABLET: 300; 30 TABLET ORAL at 06:07

## 2022-07-01 RX ADMIN — LORAZEPAM 0.5 MG: 0.5 TABLET ORAL at 08:07

## 2022-07-01 RX ADMIN — APIXABAN 5 MG: 2.5 TABLET, FILM COATED ORAL at 08:07

## 2022-07-01 RX ADMIN — ACETAMINOPHEN AND CODEINE PHOSPHATE 1 TABLET: 300; 30 TABLET ORAL at 03:07

## 2022-07-01 RX ADMIN — LORAZEPAM 0.5 MG: 0.5 TABLET ORAL at 12:07

## 2022-07-01 RX ADMIN — SENNOSIDES AND DOCUSATE SODIUM 1 TABLET: 50; 8.6 TABLET ORAL at 08:07

## 2022-07-01 RX ADMIN — Medication 1 TABLET: at 08:07

## 2022-07-01 NOTE — PT/OT/SLP PROGRESS
Physical Therapy Treatment    Patient Name:  Lupis Murcia   MRN:  [id number]  Admit Date: 6/20/2022  Admitting Diagnosis: Acute pulmonary embolism with acute cor pulmonale  Recent Surgeries: N/A  General Precautions: Standard, fall   Orthopedic Precautions:N/A   Braces: AFO (doesn't properly fit, hurts patient's toe)     Recommendations:     Discharge Recommendations:  home health PT   Level of Assistance Recommended at Discharge: Intermittent assistance   Discharge Equipment Recommendations: bedside commode, grab bar, raised toilet, shower chair, walker, rolling   Barriers to discharge: Decreased caregiver support (increased assist needed)    Assessment:     Lupis Murcia is a 71 y.o. female admitted with a medical diagnosis of Acute pulmonary embolism with acute cor pulmonale. Pt tolerated therapy session fairly well, with focus on education/speaking with Orthopedic doctor and later with daughter when she was present during session about other options (purchasing a different shoe) for patient until patient is able to see podiatrist about her toe. Session focused on transfers, gait training, standing balance and w/c management in order to assist with achieving highest level of function. Pt would continue to benefit from skilled PT services per POC.       Performance deficits affecting function:  weakness, impaired endurance, impaired sensation, impaired self care skills, impaired functional mobilty, gait instability, impaired balance, decreased lower extremity function, abnormal tone, decreased ROM, impaired skin, impaired fine motor, edema, impaired cardiopulmonary response to activity, impaired muscle length .    Rehab Potential is good    Activity Tolerance: Fair    Plan:     Patient to be seen 6 x/week to address the above listed problems via gait training, therapeutic activities, therapeutic exercises, neuromuscular re-education, wheelchair management/training    · Plan of Care Expires: 07/12/22  · Plan  of Care Reviewed with: patient    Subjective     Pt agreeable to PT.     Pain/Comfort:  · Pain Rating 1: 0/10  · Pain Rating Post-Intervention 1: 0/10    Patient's cultural, spiritual, Roman Catholic conflicts given the current situation:  · no    Objective:     Patient found up in recliner chair with oxygen (on RA during session to wean patient off) upon PT entry to room.     Functional Mobility:  · Scooting: posteriorly in w/c, Max A, with Leg rests for assistance.  · Sit to Stand: 2 sets, contact guard assistance with rolling walker  · Recliner chair to wheelchair: contact guard assistance with  rolling walker  using  Step Transfer  · Gait: x 25', x 18', RW, CGA x 2 (follow with w/c). Became SOB, with O2 sats remaining 96% and above while on room air.  · Balance: standing static balance x 1 min, RW, SBA.     AM-PAC 6 CLICK MOBILITY  15    Patient left up in chair with call button in reach and lines attached.    GOALS:   Multidisciplinary Problems     Physical Therapy Goals        Problem: Physical Therapy    Goal Priority Disciplines Outcome Goal Variances Interventions   Physical Therapy Goal     PT, PT/OT Ongoing, Progressing     Description: Goals to be met by: 2022:    Patient will increase functional independence with mobility by performin. Supine to sit with Maximum Assistance.  2. Sit to supine with Maximum Assistance.  3. Rolling to Left and Right with Moderate Assistance.  4. Sit to stand transfer with Contact Guard Assistance. -met  Updated 2022: Sit to stand transfer with supervision.  5. Bed to chair transfer with Contact Guard Assistance using Rolling Walker.  6. Gait  x 100 feet with Maximum Assistance using Rolling Walker.   7. Wheelchair propulsion x 50 feet with Minimal Assistance using bilateral upper extremities.  8. Ascend/Descend 4 inch curb step with Maximum Assistance using Rolling Walker.  9. Sitting at edge of bed x 20 minutes with Supervision performing dynamic sitting  balance activities.  10. Lower extremity exercise program x 30 reps per handout, with supervision.                     Time Tracking:     PT Received On: 07/01/22  PT Start Time: 1251  PT Stop Time: 1334  PT Total Time (min): 43 min    Billable Minutes: Gait Training 12 and Therapeutic Activity 31    Treatment Type: Treatment  PT/PTA: PTA     PTA Visit Number: 3     07/01/2022

## 2022-07-01 NOTE — PT/OT/SLP PROGRESS
Occupational Therapy   Treatment    Name: Lupis Murcia  MRN: [id number]  Admit Date: 6/20/2022  Admitting Diagnosis:  Acute pulmonary embolism with acute cor pulmonale    General Precautions: Standard, fall   Orthopedic Precautions:N/A   Braces: AFO     Recommendations:     Discharge Recommendations: home health OT  Level of Assistance Recommended at Discharge: 24 hours physical assistance for all ADL's and home management tasks  Discharge Equipment Recommendations:  bedside commode, grab bar, raised toilet, shower chair, walker, rolling  Barriers to discharge:  Decreased caregiver support (increased assistance required)    Assessment:     Lupis Murcia is a 71 y.o. female with a medical diagnosis of Acute pulmonary embolism with acute cor pulmonale.  She presents with  Performance deficits affecting function are weakness, impaired endurance, impaired self care skills, impaired functional mobility, gait instability, impaired balance, decreased ROM, decreased lower extremity function, edema, impaired cardiopulmonary response to activity, decreased safety awareness, impaired skin, decreased coordination    Pt. Was cooperative and participated well with session on this day. Pt  continues to demonstrate levels of physical deficits with  functional indep with daily management activities tasks, selfcare skills with balance,  functional mobility, UB strength and endurance. Pt. Will continue to benefit from continued OT to progress towards goals     Rehab Potential is fair    Activity tolerance:  Fair    Plan:     Patient to be seen 6 x/week to address the above listed problems via self-care/home management, therapeutic activities, therapeutic exercises    · Plan of Care Expires: 07/12/22  · Plan of Care Reviewed with: patient    Subjective     Communicated with: nsg and Pt prior to session.The thoughts of me having a bold clot is still so scary     Pain/Comfort:  Pain Rating 1: 0/10  Pain Rating Post-Intervention 1:  0/10    Patient's cultural, spiritual, Tenriism conflicts given the current situation:  no    Objective:     Patient found up in chair   with oxygen upon OT entry to room.    Functional Mobility/Transfers:  · Patient completed Sit <> Stand Transfer with contact guard assistance  with  rolling walker from w/c and then from w/c to bed side chair    Activities of Daily Living:  · Toileting: Moderated assistance with cleaning and diaper management     [facility] 6 Click ADL: 15    OT Exercises: UE Ergometer x 10 min    Treatment & Education:  Pt. With standing act on this day with task. Pt. With CGA/SBA for balance aspects with task with  AD at raised counter Pt with visual perception task with discrimination of various shapes and sizes x 3 min with standing bal and min cues through out with weight shifting and use of BUE's incorporated and crossing mid line and facilitation with posture in prep for home management .     Pt. With 2# dowel activity with 2x10 reps with  bicep curls and forward flex motion to increase BUE ROM and strength.    Pt. With therex performed to increase ROM, endurance selfcare task and fxl mobility for independence     Pt edu on role of OT, POC, safety when performing self care tasks , benefit of performing OOB activity, and safety when performing functional transfers and mobility management for preparation with goals to progress towards next level of care    Patient left up in chair with all lines intact and call button in reachEducation:      GOALS:   Multidisciplinary Problems     Occupational Therapy Goals        Problem: Occupational Therapy    Goal Priority Disciplines Outcome Interventions   Occupational Therapy Goal     OT, PT/OT Ongoing, Progressing    Description: Goals to be met by: 7/12/2022    Patient will increase functional independence with ADLs by performing:    UE Dressing with Set-up Assistance.  LE Dressing, including donning/doffing pants, with Minimal Assistance using  appropriate AE.  Grooming while seated at sink with Set-up Assistance.  Toileting from bedside commode with Minimal Assistance for hygiene and clothing management.   Bathing from sitting at sink with Minimal Assistance.  Supine to sit with Stand-by Assistance.  Sit to stand transfer with Stand-by Assistance with RW.  Toilet transfer to bedside commode with Stand-by Assistance with RW and grab bar.  Upper extremity exercise program with Supervision.  Caregiver will be educated on level of assist required to safely perform self care tasks and functional transfers.                        Time Tracking:     OT Date of Treatment: 07/01/22  OT Start Time: 1057    OT Stop Time: 1140  OT Total Time (min): 43 min    Billable Minutes:Therapeutic Activity 43    7/1/2022

## 2022-07-01 NOTE — PROGRESS NOTES
[facility]                                  Skilled Nursing Facility                   Progress Note     Admit Date: 2022  USMAN TBD  Principal Problem:  Acute acute pulmonary embolism  HPI obtained from patient interview and chart review   Principal problem: Acute pulmonary embolism with acute cor pulmonale    Chief Complaint: Revaluation of medical treatment and therapy status:  Lab review,  Oxygen weaning     HPI:   Lupis Murcia is a 71 y.o. female w/ PMHx of PE during pregnancy requiring heparin drip, chronic lower extremity lymphedema, multiple sclerosis, vitamin D deficiency who presented to SNF following hospitalization for pulmonary embolism.  Admission to SNF for secondary weakness and debility.    Interval History:  All of  labs reviewed- Na 145, K 3.9, BUN/creatinine 16/0.7, Mag 1.8, WBC 6, H&H 9.2/28, platelets 268. 24 hr vital sign ranges listed below.  Patient has been successfully weaned from supplemental O2.  Patient denies shortness of breath, abdominal discomfort, nausea, or vomiting.  Patient reports an adequate appetite.  Patient denies dysuria.  Patient reports having regular bowel movements.  Patient progessing with PT/OT-Gait: x 38', x 20', RW, CGA x 2 follow with w/c. Continuing to follow and treat all acute and chronic conditions.    Past Medical History: Patient has a past medical history of Lymphedema, MS (multiple sclerosis), Other pulmonary embolism without acute cor pulmonale, and Vitamin B12 deficiency.    Past Surgical History: Patient has a past surgical history that includes  section; Breast cyst excision (Left); and Esophagogastroduodenoscopy (N/A, 2022).    Social History: Patient reports that she has never smoked. She has never used smokeless tobacco. She reports that she does not drink alcohol and does not use drugs.    Family History: family history includes Brain cancer in her  brother; Coronary artery disease in her father; Lung cancer in her father and mother.    Allergies: Patient is allergic to contrast media, pcn [penicillins], celebrex [celecoxib], diazepam, and motrin [ibuprofen].    ROS  Constitutional: Negative for fever   Eyes: Negative for blurred vision, double vision   Respiratory: +SOB on exertion.  Negative for cough  Cardiovascular: Negative for chest pain, palpitations, and leg swelling.   Gastrointestinal: Negative for abdominal pain, constipation, diarrhea, nausea, vomiting.   Genitourinary: Negative for dysuria, frequency   Musculoskeletal:  + generalized weakness, BLE weakness. Negative for back pain and myalgias.   Skin: Negative for itching and rash.   Neurological: Negative for dizziness, headaches.   Psychiatric/Behavioral: Negative for depression. The patient is not nervous/anxious.      24 hour Vital Sign Range   Temp:  [97.2 °F (36.2 °C)-97.6 °F (36.4 °C)]   Pulse:  [82-88]   Resp:  [18]   BP: (132-152)/(70-81)   SpO2:  [94 %-97 %]     PEx  Constitutional: Patient appears debilitated.  No distress noted  HENT:   Head: Normocephalic and atraumatic.   Eyes: Pupils are equal, round  Neck: Normal range of motion. Neck supple.   Cardiovascular: Normal rate, regular rhythm and normal heart sounds.    Pulmonary/Chest: Effort normal and breath sounds are clear  Abdominal: Soft. Bowel sounds are normal.   Musculoskeletal: Normal range of motion.   Neurological: Alert and oriented to person, place, and time.   Psychiatric: Normal mood and affect. Behavior is normal.   Skin: Skin is warm and dry.     No results for input(s): GLUCOSE, NA, K, CL, CO2, BUN, CREATININE, MG in the last 24 hours.    Invalid input(s):  CALCIUM    No results for input(s): WBC, RBC, HGB, HCT, PLT, MCV, MCH, MCHC in the last 24 hours.      Assessment and Plan:    Acute pulmonary embolism with acute cor pulmonale  - continue Eliquis 10mg PO BID til 6/22 followed by Eliquis 5mg PO BID starting 6/23  indefinitely  - 7/1 successfully weaned off of supplemental O2, continue to monitor SpO2 closely    Hypomagnesemia  - initiated magnesium oxide 400 mg BID x2 days    Acute hypoxemic respiratory failure  - 2/2 PE  - On 2L NC, wean off NC as tolerated  - continue lasix 20 mg daily    Prediabetes  - Hgb A1C 6.2 on 5/3/22  - Prediabetes education given. Verbalized understanding   - pt refusing BG checks. discontinue BG checks and SSI  - BG stable. Remains on regular diet per pt request. WCTM.      Class 2 obesity due to excess calories in adult  - Body mass index is 37.74 kg/m².   - Morbid obesity complicates all aspects of disease management from diagnostic modalities to treatment.   - Weight loss encouraged and health benefits explained to patient.                Lymphedema  - Chronic and stable  - No acute issues     Vitamin D deficiency  - continue vitamin D 1000 units daily      MS (multiple sclerosis)  - PT/OT  - f/u with out pt provider      Muscle weakness of lower extremity  - PT/OT     Anemia of chronic disease  - Monitor for bleeding on Eliquis     Insomnia secondary to chronic pain  - continue melatonin     Anxiety  - continue lorazepam 0.5 mg Q 8 hr prn anxiety    Debility   - Continue with PT/OT for gait training and strengthening and restoration of ADL's   - Encourage mobility, OOB in chair, and early ambulation as appropriate  - Fall precautions   - Monitor for bowel and bladder dysfunction  - Monitor for and prevent skin breakdown and pressure ulcers  - Continue DVT prophylaxis with eliquis    Anticipate disposition:  Home with home health      Follow-up needed during SNF stay-    Follow-up needed after discharge from SNF:   - PCP, hospital follow-up, 7/14    Future Appointments   Date Time Provider Department Center   7/14/2022  3:00 PM Samir Sahni MD [facility]         Amy Conklin NP  Department of Hospital Medicine   [facility] Sk[facility]     DOS:  7/1/2022       Patient note was created using MModal Dictation.  Any errors in syntax or even information may not have been identified and edited on initial review prior to signing this note.

## 2022-07-02 PROCEDURE — 97530 THERAPEUTIC ACTIVITIES: CPT | Mod: CQ

## 2022-07-02 PROCEDURE — 11000004 HC SNF PRIVATE

## 2022-07-02 PROCEDURE — 97116 GAIT TRAINING THERAPY: CPT | Mod: CQ

## 2022-07-02 PROCEDURE — 25000003 PHARM REV CODE 250: Performed by: HOSPITALIST

## 2022-07-02 RX ADMIN — CHOLECALCIFEROL TAB 25 MCG (1000 UNIT) 1000 UNITS: 25 TAB at 08:07

## 2022-07-02 RX ADMIN — LORAZEPAM 0.5 MG: 0.5 TABLET ORAL at 10:07

## 2022-07-02 RX ADMIN — APIXABAN 5 MG: 2.5 TABLET, FILM COATED ORAL at 08:07

## 2022-07-02 RX ADMIN — LORAZEPAM 0.5 MG: 0.5 TABLET ORAL at 01:07

## 2022-07-02 RX ADMIN — FUROSEMIDE 20 MG: 20 TABLET ORAL at 08:07

## 2022-07-02 RX ADMIN — Medication 1 TABLET: at 08:07

## 2022-07-02 RX ADMIN — ACETAMINOPHEN AND CODEINE PHOSPHATE 1 TABLET: 300; 30 TABLET ORAL at 04:07

## 2022-07-02 RX ADMIN — PANTOPRAZOLE SODIUM 40 MG: 40 TABLET, DELAYED RELEASE ORAL at 08:07

## 2022-07-02 NOTE — PT/OT/SLP PROGRESS
Physical Therapy Treatment    Patient Name:  Lupis Murcia   MRN:  [id number]  Admit Date: 6/20/2022  Admitting Diagnosis: Acute pulmonary embolism with acute cor pulmonale  Recent Surgeries: N/A    General Precautions: Standard, fall   Orthopedic Precautions:N/A   Braces:       Recommendations:     Discharge Recommendations:  home health PT   Level of Assistance Recommended at Discharge: Intermittent assistance   Discharge Equipment Recommendations: bedside commode, grab bar, raised toilet, shower chair, walker, rolling   Barriers to discharge: Decreased caregiver support (increased assist needed)    Assessment:     Lupis Murcia is a 71 y.o. female admitted with a medical diagnosis of Acute pulmonary embolism with acute cor pulmonale. Pt tolerated therapy session fairly well with focus on increasing independence with gait training, transfers, standing balance and safety awareness in order to assist with achieving highest level of function. Pt would continue to benefit from skilled PT services per POC.      Performance deficits affecting function:  weakness, impaired endurance, impaired sensation, impaired self care skills, impaired functional mobilty, gait instability, impaired balance, decreased lower extremity function, abnormal tone, decreased ROM, impaired skin, impaired fine motor, edema, impaired cardiopulmonary response to activity, impaired muscle length .    Rehab Potential is good    Activity Tolerance: Fair    Plan:     Patient to be seen 6 x/week to address the above listed problems via gait training, therapeutic activities, therapeutic exercises, neuromuscular re-education, wheelchair management/training    · Plan of Care Expires: 07/12/22  · Plan of Care Reviewed with: patient    Subjective     Pt agreeable to PT.     Pain/Comfort:  · Pain Rating 1: 0/10  · Pain Rating Post-Intervention 1: 0/10    Patient's cultural, spiritual, Mandaeism conflicts given the current situation:  · no    Objective:      Patient found up in recliner chair with oxygen (on RA during session in order to wean patient off. Pt only wearing if she becomes anxious/SOB during the day per NP.) upon PT entry to room.     Functional Mobility:  · Transfers:     · Sit to Stand: 2 sets, contact guard assistance with rolling walker  · Recliner chair to wheelchair: contact guard assistance with  rolling walker  using  Step Transfer  · Gait: x 12', x 18', x 3' at CGA with RW. Pt focus on LLE hip flexion/initiation of step. States she is feeling more tired today/her legs are more tired after sitting up in recliner all morning. O2 sats 96% and above throughout session on room air.   · Balance: static standing x 45 seconds, RW, SBA.     AM-PAC 6 CLICK MOBILITY  15    Patient left up in chair with all lines intact and call button in reach.    GOALS:   Multidisciplinary Problems     Physical Therapy Goals        Problem: Physical Therapy    Goal Priority Disciplines Outcome Goal Variances Interventions   Physical Therapy Goal     PT, PT/OT Ongoing, Progressing     Description: Goals to be met by: 2022:    Patient will increase functional independence with mobility by performin. Supine to sit with Maximum Assistance.  2. Sit to supine with Maximum Assistance.  3. Rolling to Left and Right with Moderate Assistance.  4. Sit to stand transfer with Contact Guard Assistance. -met  Updated 2022: Sit to stand transfer with supervision.  5. Bed to chair transfer with Contact Guard Assistance using Rolling Walker.  6. Gait  x 100 feet with Maximum Assistance using Rolling Walker.   7. Wheelchair propulsion x 50 feet with Minimal Assistance using bilateral upper extremities.  8. Ascend/Descend 4 inch curb step with Maximum Assistance using Rolling Walker.  9. Sitting at edge of bed x 20 minutes with Supervision performing dynamic sitting balance activities.  10. Lower extremity exercise program x 30 reps per handout, with supervision.                      Time Tracking:     PT Received On: 07/02/22  PT Start Time: 1337  PT Stop Time: 1416  PT Total Time (min): 39 min    Billable Minutes: Gait Training 14 and Therapeutic Activity 25    Treatment Type: Treatment  PT/PTA: PTA     PTA Visit Number: 4     07/02/2022

## 2022-07-02 NOTE — PLAN OF CARE
Problem: Adult Inpatient Plan of Care  Goal: Plan of Care Review  Outcome: Ongoing, Progressing  Flowsheets (Taken 7/2/2022 6379)  Plan of Care Reviewed With: patient     Problem: Fluid and Electrolyte Imbalance (Acute Kidney Injury/Impairment)  Goal: Fluid and Electrolyte Balance  Outcome: Ongoing, Progressing     Problem: Oral Intake Inadequate (Acute Kidney Injury/Impairment)  Goal: Optimal Nutrition Intake  Outcome: Ongoing, Progressing     Problem: Renal Function Impairment (Acute Kidney Injury/Impairment)  Goal: Effective Renal Function  Outcome: Ongoing, Progressing     Problem: Impaired Wound Healing  Goal: Optimal Wound Healing  Outcome: Ongoing, Progressing     Problem: Skin Injury Risk Increased  Goal: Skin Health and Integrity  Outcome: Ongoing, Progressing     Problem: Fall Injury Risk  Goal: Absence of Fall and Fall-Related Injury  Outcome: Ongoing, Progressing     Problem: Pain Acute  Goal: Acceptable Pain Control and Functional Ability  Outcome: Ongoing, Progressing

## 2022-07-03 PROCEDURE — 11000004 HC SNF PRIVATE

## 2022-07-03 PROCEDURE — 25000003 PHARM REV CODE 250: Performed by: HOSPITALIST

## 2022-07-03 RX ADMIN — LORAZEPAM 0.5 MG: 0.5 TABLET ORAL at 08:07

## 2022-07-03 RX ADMIN — SENNOSIDES AND DOCUSATE SODIUM 1 TABLET: 50; 8.6 TABLET ORAL at 08:07

## 2022-07-03 RX ADMIN — LORAZEPAM 0.5 MG: 0.5 TABLET ORAL at 11:07

## 2022-07-03 RX ADMIN — ACETAMINOPHEN AND CODEINE PHOSPHATE 1 TABLET: 300; 30 TABLET ORAL at 05:07

## 2022-07-03 RX ADMIN — APIXABAN 5 MG: 2.5 TABLET, FILM COATED ORAL at 08:07

## 2022-07-03 RX ADMIN — PANTOPRAZOLE SODIUM 40 MG: 40 TABLET, DELAYED RELEASE ORAL at 08:07

## 2022-07-03 RX ADMIN — FUROSEMIDE 20 MG: 20 TABLET ORAL at 08:07

## 2022-07-03 RX ADMIN — CHOLECALCIFEROL TAB 25 MCG (1000 UNIT) 1000 UNITS: 25 TAB at 08:07

## 2022-07-03 RX ADMIN — ACETAMINOPHEN AND CODEINE PHOSPHATE 1 TABLET: 300; 30 TABLET ORAL at 06:07

## 2022-07-03 NOTE — NURSING
Pt is sitting up in recliner with BLE hanging down. Pt received verbal teaching on how elevating BLE aides in the reduction of swelling. Pt verbalized understanding of information. Pt also received verbal teaching on using the incentive spirometer every hr while awake to strengthen lungs. Return demonstration given. Safety measures maintained. Call light is within reach. No distress noted.

## 2022-07-03 NOTE — PLAN OF CARE
Problem: Adult Inpatient Plan of Care  Goal: Plan of Care Review  Outcome: Ongoing, Progressing  Flowsheets (Taken 7/3/2022 1241)  Plan of Care Reviewed With: patient     Problem: Fluid and Electrolyte Imbalance (Acute Kidney Injury/Impairment)  Goal: Fluid and Electrolyte Balance  Outcome: Ongoing, Progressing     Problem: Oral Intake Inadequate (Acute Kidney Injury/Impairment)  Goal: Optimal Nutrition Intake  Outcome: Ongoing, Progressing     Problem: Renal Function Impairment (Acute Kidney Injury/Impairment)  Goal: Effective Renal Function  Outcome: Ongoing, Progressing     Problem: Impaired Wound Healing  Goal: Optimal Wound Healing  Outcome: Ongoing, Progressing     Problem: Skin Injury Risk Increased  Goal: Skin Health and Integrity  Outcome: Ongoing, Progressing     Problem: Fall Injury Risk  Goal: Absence of Fall and Fall-Related Injury  Outcome: Ongoing, Progressing     Problem: Pain Acute  Goal: Acceptable Pain Control and Functional Ability  Outcome: Ongoing, Progressing

## 2022-07-04 PROCEDURE — 25000003 PHARM REV CODE 250: Performed by: HOSPITALIST

## 2022-07-04 PROCEDURE — 11000004 HC SNF PRIVATE

## 2022-07-04 RX ADMIN — LORAZEPAM 0.5 MG: 0.5 TABLET ORAL at 04:07

## 2022-07-04 RX ADMIN — CHOLECALCIFEROL TAB 25 MCG (1000 UNIT) 1000 UNITS: 25 TAB at 08:07

## 2022-07-04 RX ADMIN — ACETAMINOPHEN AND CODEINE PHOSPHATE 1 TABLET: 300; 30 TABLET ORAL at 09:07

## 2022-07-04 RX ADMIN — AMMONIUM LACTATE: 12 LOTION TOPICAL at 08:07

## 2022-07-04 RX ADMIN — ACETAMINOPHEN AND CODEINE PHOSPHATE 1 TABLET: 300; 30 TABLET ORAL at 11:07

## 2022-07-04 RX ADMIN — PANTOPRAZOLE SODIUM 40 MG: 40 TABLET, DELAYED RELEASE ORAL at 08:07

## 2022-07-04 RX ADMIN — Medication 1 TABLET: at 08:07

## 2022-07-04 RX ADMIN — APIXABAN 5 MG: 2.5 TABLET, FILM COATED ORAL at 08:07

## 2022-07-04 RX ADMIN — APIXABAN 5 MG: 2.5 TABLET, FILM COATED ORAL at 09:07

## 2022-07-04 RX ADMIN — LORAZEPAM 0.5 MG: 0.5 TABLET ORAL at 08:07

## 2022-07-04 RX ADMIN — FUROSEMIDE 20 MG: 20 TABLET ORAL at 08:07

## 2022-07-05 LAB — SARS-COV-2 RNA RESP QL NAA+PROBE: NOT DETECTED

## 2022-07-05 PROCEDURE — 11000004 HC SNF PRIVATE

## 2022-07-05 PROCEDURE — 97110 THERAPEUTIC EXERCISES: CPT

## 2022-07-05 PROCEDURE — 25000003 PHARM REV CODE 250: Performed by: HOSPITALIST

## 2022-07-05 PROCEDURE — 97116 GAIT TRAINING THERAPY: CPT

## 2022-07-05 PROCEDURE — 97530 THERAPEUTIC ACTIVITIES: CPT | Mod: CO

## 2022-07-05 PROCEDURE — U0003 INFECTIOUS AGENT DETECTION BY NUCLEIC ACID (DNA OR RNA); SEVERE ACUTE RESPIRATORY SYNDROME CORONAVIRUS 2 (SARS-COV-2) (CORONAVIRUS DISEASE [COVID-19]), AMPLIFIED PROBE TECHNIQUE, MAKING USE OF HIGH THROUGHPUT TECHNOLOGIES AS DESCRIBED BY CMS-2020-01-R: HCPCS | Performed by: NURSE PRACTITIONER

## 2022-07-05 PROCEDURE — U0005 INFEC AGEN DETEC AMPLI PROBE: HCPCS | Performed by: NURSE PRACTITIONER

## 2022-07-05 RX ADMIN — LORAZEPAM 0.5 MG: 0.5 TABLET ORAL at 01:07

## 2022-07-05 RX ADMIN — LORAZEPAM 0.5 MG: 0.5 TABLET ORAL at 09:07

## 2022-07-05 RX ADMIN — FUROSEMIDE 20 MG: 20 TABLET ORAL at 09:07

## 2022-07-05 RX ADMIN — APIXABAN 5 MG: 2.5 TABLET, FILM COATED ORAL at 08:07

## 2022-07-05 RX ADMIN — APIXABAN 5 MG: 2.5 TABLET, FILM COATED ORAL at 09:07

## 2022-07-05 RX ADMIN — LORAZEPAM 0.5 MG: 0.5 TABLET ORAL at 02:07

## 2022-07-05 RX ADMIN — ACETAMINOPHEN AND CODEINE PHOSPHATE 1 TABLET: 300; 30 TABLET ORAL at 04:07

## 2022-07-05 RX ADMIN — ACETAMINOPHEN AND CODEINE PHOSPHATE 1 TABLET: 300; 30 TABLET ORAL at 06:07

## 2022-07-05 RX ADMIN — PANTOPRAZOLE SODIUM 40 MG: 40 TABLET, DELAYED RELEASE ORAL at 09:07

## 2022-07-05 RX ADMIN — Medication 1 TABLET: at 09:07

## 2022-07-05 RX ADMIN — CHOLECALCIFEROL TAB 25 MCG (1000 UNIT) 1000 UNITS: 25 TAB at 09:07

## 2022-07-05 NOTE — PROGRESS NOTES
[facility] - Skilled Nursing  Wound Care    Patient Name:  Lupis Murcia   MRN:  [id number]  Date: 7/5/2022  Diagnosis: Acute pulmonary embolism with acute cor pulmonale    History:     Past Medical History:   Diagnosis Date    Lymphedema     MS (multiple sclerosis)     Other pulmonary embolism without acute cor pulmonale     Vitamin B12 deficiency        Social History     Socioeconomic History    Marital status:    Tobacco Use    Smoking status: Never Smoker    Smokeless tobacco: Never Used   Substance and Sexual Activity    Alcohol use: No    Drug use: No       Precautions:     Allergies as of 06/20/2022 - Reviewed 06/20/2022   Allergen Reaction Noted    Contrast media Shortness Of Breath and Rash 12/15/2016    Pcn [penicillins] Shortness Of Breath and Rash 07/10/2013    Celebrex [celecoxib] Other (See Comments) 06/12/2017    Diazepam Hives 10/12/2021    Motrin [ibuprofen] Rash 07/10/2013       [facility] Assessment Details/Treatment   Wound care consulted for sacrum and bilateral lower legs  The bilateral lower legs are edematous with soft, dry thickened skin that is easy to remove around the edges. The dry skin is from her ankles to the mid lower leg.   Ms. Murcia is sitting up in a chair at bedside, not able to assess  sacrum at this time.  She reports her sacrum is healed.  We discussed wound care and continuing barrier cream on her sacral area/wheelchair cushion and frequent repositioning.  Verbalized understanding.  She prefers to use the coloplast moisturing cream on her lower legs instead of the lac-hydrin.     Plan:  Sacrum- continue coloplastTriad ointment BID/prn cleansing, then apply foam dressing prn patient's request  BLE- continue coloplast Sween 24 moisturizing cream daily.   Refer to lymphedema clinic when discharged.    Nursing to continue care, pressure prevention measures  Wound care will assess sacral area next visit.        Recommendations made to primary team for above plan  per written report . Orders placed.      07/05/22 0840        Altered Skin Integrity 06/16/22 1110 Right anterior;lower Leg Other (comment)   Date First Assessed/Time First Assessed: 06/16/22 1110   Altered Skin Integrity Present on Admission: yes  Side: Right  Orientation: anterior;lower  Location: Leg  Primary Wound Type: (c) Other (comment)   Dressing Appearance Open to air   Drainage Amount None   Drainage Characteristics/Odor No odor   Appearance Intact;Dry   Tissue loss description Not applicable   Periwound Area Intact;Dry;Edematous;Scar tissue        Altered Skin Integrity 06/16/22 1110 Left anterior;lower Leg Other (comment)   Date First Assessed/Time First Assessed: 06/16/22 1110   Altered Skin Integrity Present on Admission: yes  Side: Left  Orientation: anterior;lower  Location: Leg  Primary Wound Type: (c) Other (comment)   Dressing Appearance Open to air   Drainage Amount None   Drainage Characteristics/Odor No odor   Appearance Intact;Pink;Dry   Tissue loss description Not applicable     07/05/2022

## 2022-07-05 NOTE — PT/OT/SLP PROGRESS
Physical Therapy Treatment    Patient Name:  Lupis Murcia   MRN:  [id number]  Admit Date: 6/20/2022  Admitting Diagnosis: Acute pulmonary embolism with acute cor pulmonale  Recent Surgeries: N/A    General Precautions: Standard, fall   Orthopedic Precautions:N/A   Braces: N/A     Recommendations:     Discharge Recommendations: home health PT   Level of Assistance Recommended at Discharge: Intermittent assistance   Discharge Equipment Recommendations: bedside commode, grab bar, raised toilet, shower chair, walker, rolling   Barriers to discharge: Decreased caregiver support (increase assist needed)    Assessment:     Lupis Murcia is a 71 y.o. female admitted with a medical diagnosis of Acute pulmonary embolism with acute cor pulmonale. Patient tolerated today's session well, ambulating a further combined distance today with no SOB or LOB noted. She was able to activate LLE more today with seated exercises, requiring less assistance indicating an increase in LLE strength. She remains limited in functional mobility, transfers, lower extremity AROM/strength, and cardiovascular endurance. Patient continues to require skilled physical therapy services to address deficits and return patient to [facility].     Performance deficits affecting function: weakness, impaired endurance, impaired sensation, impaired self care skills, impaired functional mobilty, gait instability, impaired balance, decreased lower extremity function, abnormal tone, decreased ROM, impaired skin, edema, impaired cardiopulmonary response to activity, impaired joint extensibility .    Rehab Potential is good    Activity Tolerance: Fair    Plan:     Patient to be seen 6 x/week to address the above listed problems via gait training, therapeutic activities, therapeutic exercises, neuromuscular re-education, wheelchair management/training    · Plan of Care Expires: 07/12/22  · Plan of Care Reviewed with: patient    Subjective     Patient reports no pain and  "is agreeable to participate in therapy today.     Pain/Comfort:  Pain Rating 1: 0/10  Pain Rating Post-Intervention 1: 0/10    Patient's cultural, spiritual, Religion conflicts given the current situation:  no    Objective:     Communicated with nursing staff prior to session. Patient found up in chair with  (no lines) upon PT entry to room.     Functional Mobility:  Bed Mobility: Attempted to perform bed mobility today in hopes to reduce assistance needed when discharged. Patient refused saying "I don't want to lay on those mats, and my daughter just helps me at home anyway's." SPT educated on importance of activity and asked if she would prefer her bed in her room. Patient refused a second time saying, "I don't get in that bed now so there is no need to work on this."    Transfers:     · Sit to Stand x multiple trials: supervision with rolling walker  · Bed<>Chair: supervision with rolling walker using Stand Pivot    Gait: Patient ambulated x 23 + 51 + 44 feet with RW and SBA. She demonstrated decreased pako, decreased velocity of limb motion, decreased step length and reciprocal gait pattern 70% of the time. When getting fatigue, patient would start performing step to gait pattern and reduced clearance with LLE. She required frequent vc to focus lifting LLE using hip musculature in order to clear ground.    Therapeutic Activities and Exercises:   Seated exercises: LAQ > hip flexion x 3 sets of 10 reps. She required Long for LLE during all exercises. She demonstrated increased AROM in both exercises today requiring less assistance to complete motion. She required tactile cues for quad and hamstring activation during respective portions of LAQ.     AM-PAC 6 CLICK MOBILITY  16    Patient left up in chair with call button in reach.    GOALS:   Multidisciplinary Problems     Physical Therapy Goals        Problem: Physical Therapy    Goal Priority Disciplines Outcome Goal Variances Interventions   Physical Therapy " Goal     PT, PT/OT Ongoing, Progressing     Description: Goals to be met by: 2022:    Patient will increase functional independence with mobility by performin. Supine to sit with Maximum Assistance.  2. Sit to supine with Maximum Assistance.  3. Rolling to Left and Right with Moderate Assistance.  4. Sit to stand transfer with Contact Guard Assistance. -met  Updated 2022: Sit to stand transfer with supervision. -met  Updated 2022: Sit to stand transfer with [place].     5. Bed to chair transfer with Contact Guard Assistance using Rolling Walker. -met  Updated 2022: Bed to chair transfer with Dedrick using Rolling Walker.     6. Gait x 100 feet with Maximum Assistance using Rolling Walker.   7. Wheelchair propulsion x 50 feet with Minimal Assistance using bilateral upper extremities.  8. Ascend/Descend 4 inch curb step with Maximum Assistance using Rolling Walker.  9. Sitting at edge of bed x 20 minutes with Supervision performing dynamic sitting balance activities.  10. Lower extremity exercise program x 30 reps per handout, with supervision.                     Time Tracking:     PT Received On: 22  PT Start Time: 1010  PT Stop Time: 1048  PT Total Time (min): 38 min    Billable Minutes: Gait Training 25 and Therapeutic Exercise 13    Treatment Type: Treatment  PT/PTA: PT     PTA Visit Number: 0     2022

## 2022-07-05 NOTE — PLAN OF CARE
Problem: Physical Therapy  Goal: Physical Therapy Goal  Description: Goals to be met by: 2022:    Patient will increase functional independence with mobility by performin. Supine to sit with Maximum Assistance.  2. Sit to supine with Maximum Assistance.  3. Rolling to Left and Right with Moderate Assistance.  4. Sit to stand transfer with Contact Guard Assistance. -met  Updated 2022: Sit to stand transfer with supervision. -met  Updated 2022: Sit to stand transfer with Dedrick.     5. Bed to chair transfer with Contact Guard Assistance using Rolling Walker. -met  Updated 2022: Bed to chair transfer with Dedrick using Rolling Walker.     6. Gait x 100 feet with Maximum Assistance using Rolling Walker.   7. Wheelchair propulsion x 50 feet with Minimal Assistance using bilateral upper extremities.  8. Ascend/Descend 4 inch curb step with Maximum Assistance using Rolling Walker.  9. Sitting at edge of bed x 20 minutes with Supervision performing dynamic sitting balance activities.  10. Lower extremity exercise program x 30 reps per handout, with supervision.    Outcome: Ongoing, Progressing

## 2022-07-05 NOTE — PLAN OF CARE
Interdisciplinary team, Anup Sherman, Unit Director, Harmony Fermin, RN MDS Coordinator, Mary Oh PT, Rehab Supervisor, and Lisbet Solomon LMSW, spoke to patient and patient's daughter for care plan conference, weekly status update, and therapy progress update. Tentative discharge date set for 7/12/22.

## 2022-07-05 NOTE — PT/OT/SLP PROGRESS
Occupational Therapy   Treatment    Name: Lupis Murcia  MRN: [id number]  Admit Date: 6/20/2022  Admitting Diagnosis:  Acute pulmonary embolism with acute cor pulmonale    General Precautions: Standard, fall   Orthopedic Precautions:N/A   Braces:       Recommendations:     Discharge Recommendations: home health OT  Level of Assistance Recommended at Discharge: 24 hours physical assistance for all ADL's and home management tasks  Discharge Equipment Recommendations:  bedside commode, grab bar, raised toilet, shower chair, walker, rolling  Barriers to discharge:  Decreased caregiver support (increased assistance required)    Assessment:     Lupis Murcia is a 71 y.o. female with a medical diagnosis of Acute pulmonary embolism with acute cor pulmonale.  She presents with  Performance deficits affecting function are weakness, impaired endurance, impaired self care skills, impaired functional mobilty, gait instability, impaired balance, decreased ROM, decreased lower extremity function, edema, impaired cardiopulmonary response to activity, decreased safety awareness, impaired skin, decreased coordination    Pt. Was cooperative and participated well with session on this day. Pt  continues to demonstrate levels of physical deficits with  functional indep with daily management activities tasks, selfcare skills with balance,  functional mobility, UB strength and endurance. Pt. Will continue to benefit from continued OT to progress towards goals     Rehab Potential is fair    Activity tolerance:  Fair    Plan:     Patient to be seen 6 x/week to address the above listed problems via self-care/home management, therapeutic activities, therapeutic exercises    · Plan of Care Expires: 07/12/22  · Plan of Care Reviewed with: patient    Subjective     Communicated with: PTA  and Pt prior to session. I  am doing well today    Pain/Comfort:  Pain Rating 1: 0/10  Pain Rating Post-Intervention 1: 0/10    Patient's cultural,  Tacos Acevedo  spiritual, Confucianism conflicts given the current situation:  no    Objective:     Patient found up in chair  with daughter present  upon OT entry to room.    Functional Mobility/Transfers:  Patient completed Sit <> Stand Transfer with contact guard assistance  with  rolling walker   Activities of Daily Living:  Already performed     [facility] 6 Click ADL: 15    OT Exercises: UE Ergometer x 10 min    Treatment & Education:   Pt. With 2# dowel activity with AAROM on LUE  2x10 reps with shoulder flexion, bicep curls and forward flex motion to increase BUE ROM and strength.    Pt. With therex performed to increase ROM, endurance selfcare task and fxl mobility for independence     Pt edu on role of OT, POC, safety when performing self care tasks , benefit of performing OOB activity, and safety when performing functional transfers and mobility management for preparation with goals to progress towards next level of care    Patient left up in chair with all lines intact and call button in reachEducation:      GOALS:   Multidisciplinary Problems     Occupational Therapy Goals        Problem: Occupational Therapy    Goal Priority Disciplines Outcome Interventions   Occupational Therapy Goal     OT, PT/OT Ongoing, Progressing    Description: Goals to be met by: 7/12/2022    Patient will increase functional independence with ADLs by performing:    UE Dressing with Set-up Assistance.  LE Dressing, including donning/doffing pants, with Minimal Assistance using appropriate AE.  Grooming while seated at sink with Set-up Assistance.  Toileting from bedside commode with Minimal Assistance for hygiene and clothing management.   Bathing from sitting at sink with Minimal Assistance.  Supine to sit with Stand-by Assistance.  Sit to stand transfer with Stand-by Assistance with RW.  Toilet transfer to bedside commode with Stand-by Assistance with RW and grab bar.  Upper extremity exercise program with Supervision.  Caregiver will be educated  on level of assist required to safely perform self care tasks and functional transfers.                        Time Tracking:     OT Date of Treatment: 07/05/22  OT Start Time: 1400    OT Stop Time: 1430  OT Total Time (min): 30 min    Billable Minutes:Therapeutic Activity 30    7/5/2022

## 2022-07-06 PROCEDURE — 97116 GAIT TRAINING THERAPY: CPT | Mod: CQ

## 2022-07-06 PROCEDURE — 97530 THERAPEUTIC ACTIVITIES: CPT | Mod: CQ

## 2022-07-06 PROCEDURE — 11000004 HC SNF PRIVATE

## 2022-07-06 PROCEDURE — 25000003 PHARM REV CODE 250: Performed by: HOSPITALIST

## 2022-07-06 PROCEDURE — 97530 THERAPEUTIC ACTIVITIES: CPT | Mod: CO

## 2022-07-06 RX ADMIN — ACETAMINOPHEN AND CODEINE PHOSPHATE 1 TABLET: 300; 30 TABLET ORAL at 06:07

## 2022-07-06 RX ADMIN — LORAZEPAM 0.5 MG: 0.5 TABLET ORAL at 06:07

## 2022-07-06 RX ADMIN — PANTOPRAZOLE SODIUM 40 MG: 40 TABLET, DELAYED RELEASE ORAL at 09:07

## 2022-07-06 RX ADMIN — CHOLECALCIFEROL TAB 25 MCG (1000 UNIT) 1000 UNITS: 25 TAB at 09:07

## 2022-07-06 RX ADMIN — APIXABAN 5 MG: 2.5 TABLET, FILM COATED ORAL at 09:07

## 2022-07-06 RX ADMIN — FUROSEMIDE 20 MG: 20 TABLET ORAL at 09:07

## 2022-07-06 RX ADMIN — APIXABAN 5 MG: 2.5 TABLET, FILM COATED ORAL at 08:07

## 2022-07-06 RX ADMIN — LORAZEPAM 0.5 MG: 0.5 TABLET ORAL at 04:07

## 2022-07-06 RX ADMIN — ACETAMINOPHEN AND CODEINE PHOSPHATE 1 TABLET: 300; 30 TABLET ORAL at 01:07

## 2022-07-06 RX ADMIN — Medication 1 TABLET: at 09:07

## 2022-07-06 RX ADMIN — ACETAMINOPHEN AND CODEINE PHOSPHATE 1 TABLET: 300; 30 TABLET ORAL at 09:07

## 2022-07-06 NOTE — PT/OT/SLP PROGRESS
Physical Therapy Treatment    Patient Name:  Lupis Murcia   MRN:  [id number]  Admit Date: 6/20/2022  Admitting Diagnosis: Acute pulmonary embolism with acute cor pulmonale  Recent Surgeries: N/A    General Precautions: Standard, fall   Orthopedic Precautions:N/A   Braces:       Recommendations:     Discharge Recommendations:  home health PT   Level of Assistance Recommended at Discharge: Intermittent assistance   Discharge Equipment Recommendations: bedside commode, grab bar, raised toilet, shower chair, walker, rolling   Barriers to discharge: Decreased caregiver support (increase assist needed)    Assessment:     Lupis Murcia is a 71 y.o. female admitted with a medical diagnosis of Acute pulmonary embolism with acute cor pulmonale. Pt tolerated therapy session well with focus on gait training, curb training and transfers in order to assist with achieving highest level of function. Pt would continue to benefit from skilled PT services per POC.      Performance deficits affecting function:  weakness, impaired endurance, impaired sensation, impaired self care skills, impaired functional mobilty, gait instability, impaired balance, decreased lower extremity function, abnormal tone, decreased ROM, impaired skin, edema, impaired cardiopulmonary response to activity, impaired joint extensibility .    Rehab Potential is good    Activity Tolerance: Good and Fair    Plan:     Patient to be seen 6 x/week to address the above listed problems via gait training, therapeutic activities, therapeutic exercises, neuromuscular re-education, wheelchair management/training    · Plan of Care Expires: 07/12/22  · Plan of Care Reviewed with: patient    Subjective     Pt agreeable to PT.     Pain/Comfort:  · Pain Rating 1: 8/10  · Location - Side 1: Left  · Location - Orientation 1: generalized  · Location 1: foot  · Pain Addressed 1: Pre-medicate for activity, Distraction, Cessation of Activity  · Pain Rating Post-Intervention 1:  "6/10    Patient's cultural, spiritual, Adventist conflicts given the current situation:  · no    Objective:     Patient found up in chair on RA during session in order to wean patient off. Pt only wearing if she becomes anxious during the day/SOB, upon PT entry to room.     Functional Mobility:  · Transfers:     · Sit to Stand: 2 sets, SBA with rolling walker  · Recliner chair to wheelchair: contact guard assistance with  rolling walker  using  Step Transfer  · Gait: x 46', 53', RW, CGA x 2 to follow with w/c.   · Stairs:  Pt ascended/descended 2" curb step with Rolling Walker with no handrails with Moderate Assistance for lifting LLE up/down step.     AM-PAC 6 CLICK MOBILITY  16    Patient left up in chair with BOWIE present in gym.     GOALS:   Multidisciplinary Problems     Physical Therapy Goals        Problem: Physical Therapy    Goal Priority Disciplines Outcome Goal Variances Interventions   Physical Therapy Goal     PT, PT/OT Ongoing, Progressing     Description: Goals to be met by: 2022:    Patient will increase functional independence with mobility by performin. Supine to sit with Maximum Assistance.  2. Sit to supine with Maximum Assistance.  3. Rolling to Left and Right with Moderate Assistance.  4. Sit to stand transfer with Contact Guard Assistance. -met  Updated 2022: Sit to stand transfer with supervision. -met  Updated 2022: Sit to stand transfer with [place].     5. Bed to chair transfer with Contact Guard Assistance using Rolling Walker. -met  Updated 2022: Bed to chair transfer with [place] using Rolling Walker.     6. Gait x 100 feet with Maximum Assistance using Rolling Walker.   7. Wheelchair propulsion x 50 feet with Minimal Assistance using bilateral upper extremities.  8. Ascend/Descend 4 inch curb step with Maximum Assistance using Rolling Walker.  9. Sitting at edge of bed x 20 minutes with Supervision performing dynamic sitting balance activities.  10. Lower " extremity exercise program x 30 reps per handout, with supervision.                     Time Tracking:     PT Received On: 07/06/22  PT Start Time: 1020  PT Stop Time: 1058  PT Total Time (min): 38 min    Billable Minutes: Gait Training 14 and Therapeutic Activity 24    Treatment Type: Treatment  PT/PTA: PTA     PTA Visit Number: 1     07/06/2022

## 2022-07-06 NOTE — PROGRESS NOTES
[facility] - Skilled Nursing  Adult Nutrition  Progress Note    SUMMARY       Recommendations    1. Continue regular diet.    Goals: PO to meet 85% of EEN with no weight gain by next RD fu  Nutrition Goal Status: progressing towards goal  Communication of RD Recs: other (comment) (POC)    Assessment and Plan  Nutrition Problem:  Obesity    Related to (etiology):   Excessive calorie intake over needs, lack of activity  Signs and Symptoms (as evidenced by):   Debility, BMI 37     Interventions/Recommendations (treatment strategy):  General diet  Nutrition education- weight loss  Collaboration with other providers     Nutrition Diagnosis Status:   Continues    Malnutrition Assessment     Skin (Micronutrient): bruised, cracked, thickened, scaly  Hair/Scalp (Micronutrient): dull  Eyes (Micronutrient): conjunctiva dull  Teeth (Micronutrient): broken dentition  Neck/Chest (Micronutrient): muscle wasting  Musculoskeletal/Lower Extremities: edema       Weight Loss (Malnutrition): greater than 5% in 1 month   Orbital Region (Subcutaneous Fat Loss): well nourished  Upper Arm Region (Subcutaneous Fat Loss): well nourished  Thoracic and Lumbar Region: well nourished   [place] Region (Muscle Loss): mild depletion  Clavicle Bone Region (Muscle Loss): mild depletion  Clavicle and Acromion Bone Region (Muscle Loss): mild depletion  Dorsal Hand (Muscle Loss): moderate depletion  Anterior Thigh Region (Muscle Loss): moderate depletion   Edema (Fluid Accumulation): 3-->moderate             Reason for Assessment    Reason For Assessment: RD follow-up  Diagnosis:  (PE)  Relevant Medical History: HANDY, anemia, chronic pain, MS, obesity, lymphedema, Vit B12 deficiency,  Interdisciplinary Rounds: attended  General Information Comments: Pt continues with % intake meals over last week. -200 lbs, pt closer to UBW; noted with lymphedema. Pt with muscle wasting per NFPE 6/23.  Nutrition Discharge Planning: general healthy diet,  "adequate protein    Nutrition Risk Screen    Nutrition Risk Screen: large or nonhealing wound, burn or pressure injury    Nutrition/Diet History    Patient Reported Diet/Restrictions/Preferences: general  Typical Food/Fluid Intake: likes to snack, regular meals  Spiritual, Cultural Beliefs, Temple Practices, Values that Affect Care: no  Vitamin/Mineral/Herbal Supplements: Vit D, Vit B12,Vit B complex, Vit C,  Food Allergies: NKFA  Factors Affecting Nutritional Intake: None identified at this time    Anthropometrics    Temp: 96.5 °F (35.8 °C)  Height Method: Stated  Height: 5' 2" (157.5 cm)  Height (inches): 62 in  Weight Method: Standard Scale  Weight: 87 kg (191 lb 12.8 oz)  Weight (lb): 191.8 lb  Ideal Body Weight (IBW), Female: 110 lb  % Ideal Body Weight, Female (lb): 187.59 %  BMI (Calculated): 35.1  BMI Grade: 35 - 39.9 - obesity - grade II  Weight Loss: intentional  Usual Body Weight (UBW), k.6 kg  % Usual Body Weight: 94.17  % Weight Change From Usual Weight: -6.02 %       Lab/Procedures/Meds    Pertinent Labs Reviewed: reviewed  Pertinent Labs Comments: H/H 9.2/28.9, A1c 6.2% (5/3/22)  Pertinent Medications Comments: vitamin B complex/D/C, lasix, protonix    Estimated/Assessed Needs    Weight Used For Calorie Calculations: 93.6 kg (206 lb 5.6 oz)  Energy Calorie Requirements (kcal): 1400  Energy Need Method: [place]-St Jeor (x 1.0(PAL) 2/2 obesity)  Protein Requirements: 60g  Weight Used For Protein Calculations: 50 kg (110 lb 3.7 oz) (IBW kg 2/2 obesity x 1.2g/kg)  Fluid Requirements (mL): 1400 or per MD  Estimated Fluid Requirement Method: RDA Method  RDA Method (mL): 1400  CHO Requirement: -      Nutrition Prescription Ordered    Current Diet Order: Regular  Nutrition Order Comments: PO %    Evaluation of Received Nutrient/Fluid Intake    I/O: no data  Energy Calories Required: meeting needs  Protein Required: meeting needs  Fluid Required: meeting needs  Comments: last BM   Tolerance: " tolerating  % Intake of Estimated Energy Needs: 75 - 100 %  % Meal Intake: 75 - 100 %    Nutrition Risk    Level of Risk/Frequency of Follow-up: low     Monitor and Evaluation    Food and Nutrient Adminstration: diet order  Physical Activity and Function: nutrition-related ADLs and IADLs  Anthropometric Measurements: weight change  Biochemical Data, Medical Tests and Procedures: electrolyte and renal panel, gastrointestinal profile, glucose/endocrine profile, inflammatory profile  Nutrition-Focused Physical Findings: overall appearance, extremities, muscles and bones, head and eyes, skin     Nutrition Follow-Up    RD Follow-up?: Yes

## 2022-07-06 NOTE — PROGRESS NOTES
[facility]                                  Skilled Nursing Facility                   Progress Note     Admit Date: 2022  USMAN TBD  Principal Problem:  Acute acute pulmonary embolism  HPI obtained from patient interview and chart review   Principal problem: Acute pulmonary embolism with acute cor pulmonale    Chief Complaint: Revaluation of medical treatment and therapy status: Oxygen weaning     HPI:   Lupis Murcia is a 71 y.o. female w/ PMHx of PE during pregnancy requiring heparin drip, chronic lower extremity lymphedema, multiple sclerosis, vitamin D deficiency who presented to SNF following hospitalization for pulmonary embolism.  Admission to SNF for secondary weakness and debility.    Interval History:.  24 hr vital sign ranges listed below.  Patient has tolerated room air at rest and during therapy.  She still experiences times of shortness of breath typically with exertion/working with therapy. Patient denies shortness of breath, abdominal discomfort, nausea, or vomiting. Patient reports an adequate appetite.  Patient denies dysuria.  Patient reports having regular bowel movements.  Patient progessing with PT/OT-Gait: x 46', 53', RW, CGA x 2 to follow with w/c. Continuing to follow and treat all acute and chronic conditions.    Past Medical History: Patient has a past medical history of Lymphedema, MS (multiple sclerosis), Other pulmonary embolism without acute cor pulmonale, and Vitamin B12 deficiency.    Past Surgical History: Patient has a past surgical history that includes  section; Breast cyst excision (Left); and Esophagogastroduodenoscopy (N/A, 2022).    Social History: Patient reports that she has never smoked. She has never used smokeless tobacco. She reports that she does not drink alcohol and does not use drugs.    Family History: family history includes Brain cancer in her brother; Coronary artery  disease in her father; Lung cancer in her father and mother.    Allergies: Patient is allergic to contrast media, pcn [penicillins], celebrex [celecoxib], diazepam, and motrin [ibuprofen].    ROS  Constitutional: Negative for fever   Eyes: Negative for blurred vision, double vision   Respiratory: +SOB on exertion.  Negative for cough  Cardiovascular: Negative for chest pain, palpitations, and leg swelling.   Gastrointestinal: Negative for abdominal pain, constipation, diarrhea, nausea, vomiting.   Genitourinary: Negative for dysuria, frequency   Musculoskeletal:  + generalized weakness, BLE weakness. Negative for back pain and myalgias.   Skin: Negative for itching and rash.   Neurological: Negative for dizziness, headaches.   Psychiatric/Behavioral: Negative for depression. The patient is not nervous/anxious.      24 hour Vital Sign Range   Temp:  [96.5 °F (35.8 °C)-97.8 °F (36.6 °C)]   Pulse:  [78-98]   Resp:  [18]   BP: (141-144)/(67-72)   SpO2:  [97 %]     PEx  Constitutional: Patient appears debilitated.  No distress noted  HENT:   Head: Normocephalic and atraumatic.   Eyes: Pupils are equal, round  Neck: Normal range of motion. Neck supple.   Cardiovascular: Normal rate, regular rhythm and normal heart sounds.    Pulmonary/Chest: Effort normal and breath sounds are clear  Abdominal: Soft. Bowel sounds are normal.   Musculoskeletal: Normal range of motion.   Neurological: Alert and oriented to person, place, and time.   Psychiatric: Normal mood and affect. Behavior is normal.   Skin: Skin is warm and dry.     No results for input(s): GLUCOSE, NA, K, CL, CO2, BUN, CREATININE, MG in the last 24 hours.    Invalid input(s):  CALCIUM    No results for input(s): WBC, RBC, HGB, HCT, PLT, MCV, MCH, MCHC in the last 24 hours.      Assessment and Plan:    Acute pulmonary embolism with acute cor pulmonale  - continue Eliquis 10mg PO BID til 6/22 followed by Eliquis 5mg PO BID starting 6/23 indefinitely  - 7/6 tolerating  RA, continue to monitor SpO2 closely    Acute hypoxemic respiratory failure  - 2/2 PE  - On 2L NC, wean off NC as tolerated  - continue lasix 20 mg daily    Prediabetes  - Hgb A1C 6.2 on 5/3/22  - Prediabetes education given. Verbalized understanding   - pt refusing BG checks. discontinue BG checks and SSI  - BG stable. Remains on regular diet per pt request. WCTM.      Class 2 obesity due to excess calories in adult  - Body mass index is 37.74 kg/m².   - Morbid obesity complicates all aspects of disease management from diagnostic modalities to treatment.   - Weight loss encouraged and health benefits explained to patient.                Lymphedema  - Chronic and stable  - No acute issues     Vitamin D deficiency  - continue vitamin D 1000 units daily      MS (multiple sclerosis)  - PT/OT  - f/u with out pt provider      Muscle weakness of lower extremity  - PT/OT     Anemia of chronic disease  - Monitor for bleeding on Eliquis     Insomnia secondary to chronic pain  - continue melatonin     Anxiety  - continue lorazepam 0.5 mg Q 8 hr prn anxiety    Debility   - Continue with PT/OT for gait training and strengthening and restoration of ADL's   - Encourage mobility, OOB in chair, and early ambulation as appropriate  - Fall precautions   - Monitor for bowel and bladder dysfunction  - Monitor for and prevent skin breakdown and pressure ulcers  - Continue DVT prophylaxis with eliquis    Anticipate disposition:  Home with home health      Follow-up needed during SNF stay-    Follow-up needed after discharge from SNF:   - PCP, hospital follow-up, 7/14    Future Appointments   Date Time Provider Department Center   7/14/2022  3:00 PM Samir Sahni MD [facility]         Amy Conklin NP  Department of Hospital Medicine   [facility]- Skilled Nursing Facility     DOS: 7/6/2022       Patient note was created using MModal Dictation.  Any errors in syntax or even information may not have been  identified and edited on initial review prior to signing this note.

## 2022-07-06 NOTE — PLAN OF CARE
Problem: Adult Inpatient Plan of Care  Goal: Plan of Care Review  Outcome: Ongoing, Progressing  Goal: Patient-Specific Goal (Individualized)  Outcome: Ongoing, Progressing  Goal: Absence of Hospital-Acquired Illness or Injury  Outcome: Ongoing, Progressing  Goal: Optimal Comfort and Wellbeing  Outcome: Ongoing, Progressing  Goal: Readiness for Transition of Care  Outcome: Ongoing, Progressing     Problem: Fluid and Electrolyte Imbalance (Acute Kidney Injury/Impairment)  Goal: Fluid and Electrolyte Balance  Outcome: Ongoing, Progressing     Problem: Oral Intake Inadequate (Acute Kidney Injury/Impairment)  Goal: Optimal Nutrition Intake  Outcome: Ongoing, Progressing     Problem: Renal Function Impairment (Acute Kidney Injury/Impairment)  Goal: Effective Renal Function  Outcome: Ongoing, Progressing     Problem: Impaired Wound Healing  Goal: Optimal Wound Healing  Outcome: Ongoing, Progressing     Problem: Skin Injury Risk Increased  Goal: Skin Health and Integrity  Outcome: Ongoing, Progressing   Pt admitted to Skilled Nursing for Acute pulmonary embolism [I26.99] . Patient is [id number]. Receiving daily PT/OT for strengthening, balance, gait training and ambulation. Pt currently requiring  2 person assistance,for transfers and ambulation. Patient also requiring 1 person assistance with dressing and set up for grooming and eating. Daily skilled nursing interventions include pain management, medication management, surgical wound management and ADL assistance. Currently pain is being managed by Tylenol with Codeine  q6hr PRN and rates pain 8/10.  Pt is on a Regular diet, tolerating well. Care plan reviewed and updated. Moisturizing  agent added to bilateral lower leg wounds, Severe edema to bilateral lower legs. No complaints at this time, will continue to update care and monitor.

## 2022-07-06 NOTE — PT/OT/SLP PROGRESS
Occupational Therapy   Treatment    Name: Lupis Murcia  MRN: [id number]  Admit Date: 6/20/2022  Admitting Diagnosis:  Acute pulmonary embolism with acute cor pulmonale    General Precautions: Standard, fall   Orthopedic Precautions:N/A   Braces:       Recommendations:     Discharge Recommendations: home health OT  Level of Assistance Recommended at Discharge: 24 hours physical assistance for all ADL's and home management tasks  Discharge Equipment Recommendations:  bedside commode, grab bar, raised toilet, shower chair, walker, rolling  Barriers to discharge:  Decreased caregiver support (increased assistance required)    Assessment:     Lupis Murcia is a 71 y.o. female with a medical diagnosis of Acute pulmonary embolism with acute cor pulmonale.  She presents with  Performance deficits affecting function are weakness, impaired endurance, impaired self care skills, impaired functional mobility, gait instability, impaired balance, decreased ROM, decreased lower extremity function, edema, impaired cardiopulmonary response to activity, decreased safety awareness, impaired skin, decreased coordination    Pt. Was cooperative and participated well with session on this day. Pt  continues to demonstrate levels of physical deficits with  functional indep with daily management activities tasks, selfcare skills with balance,  functional mobility, UB strength and endurance. Pt. Will continue to benefit from continued OT to progress towards goals     Rehab Potential is fair    Activity tolerance:  Fair    Plan:     Patient to be seen 6 x/week to address the above listed problems via self-care/home management, therapeutic activities, therapeutic exercises    · Plan of Care Expires: 07/12/22  · Plan of Care Reviewed with: patient    Subjective     Communicated with: PTA and Pt prior to session. I  am doing well today    Pain/Comfort:  Pain Rating 1: 6/10  Location - Side 1: Left  Location - Orientation 1:  generalized  Location 1: foot  Pain Addressed 1: Reposition, Pre-medicate for activity  Pain Rating Post-Intervention 1: 6/10    Patient's cultural, spiritual, Taoism conflicts given the current situation:  no    Objective:     Patient found up in chair  In gym with PTA  upon OT entry to room.    Functional Mobility/Transfers:  Patient completed Sit <> Stand Transfer with contact guard assistance  with  rolling walker   Activities of Daily Living:  Already performed     AMPAC 6 Click ADL: 15    OT Exercises: UE Ergometer x 10 min    Treatment & Education:  Pt. With standing act on this day with task. Pt. With CGA/SBA for balance aspects with task with  AD at raised counter Pt with visual perception task with discrimination of various shapes and sizes x 3:25 min, 4:00 and 30 sec  with standing bal and min cues through out with weight shifting and use of BUE's incorporated and crossing mid line and facilitation with posture in prep for home management .     Pt. With 2# dowel activity with x10 reps with  shd flex, bicep curls  and forward flex motion to increase BUE ROM and strength,.   Pt. With therex performed to increase ROM, endurance selfcare task and fxl mobility for independence     Pt. With therex performed to increase ROM, endurance selfcare task and fxl mobility for independence     Pt edu on role of OT, POC, safety when performing self care tasks , benefit of performing OOB activity, and safety when performing functional transfers and mobility management for preparation with goals to progress towards next level of care    Patient left up in chair with all lines intact and call button in reachEducation:      GOALS:   Multidisciplinary Problems     Occupational Therapy Goals        Problem: Occupational Therapy    Goal Priority Disciplines Outcome Interventions   Occupational Therapy Goal     OT, PT/OT Ongoing, Progressing    Description: Goals to be met by: 7/12/2022    Patient will increase functional  independence with ADLs by performing:    UE Dressing with Set-up Assistance.  LE Dressing, including donning/doffing pants, with Minimal Assistance using appropriate AE.  Grooming while seated at sink with Set-up Assistance.  Toileting from bedside commode with Minimal Assistance for hygiene and clothing management.   Bathing from sitting at sink with Minimal Assistance.  Supine to sit with Stand-by Assistance.  Sit to stand transfer with Stand-by Assistance with RW.  Toilet transfer to bedside commode with Stand-by Assistance with RW and grab bar.  Upper extremity exercise program with Supervision.  Caregiver will be educated on level of assist required to safely perform self care tasks and functional transfers.                        Time Tracking:     OT Date of Treatment: 07/06/22  OT Start Time: 1100    OT Stop Time: 1130  OT Total Time (min): 30 min    Billable Minutes:Therapeutic Activity 30    7/6/2022

## 2022-07-07 PROCEDURE — 97116 GAIT TRAINING THERAPY: CPT | Mod: CQ

## 2022-07-07 PROCEDURE — 97110 THERAPEUTIC EXERCISES: CPT

## 2022-07-07 PROCEDURE — 97535 SELF CARE MNGMENT TRAINING: CPT

## 2022-07-07 PROCEDURE — 25000003 PHARM REV CODE 250: Performed by: HOSPITALIST

## 2022-07-07 PROCEDURE — 97530 THERAPEUTIC ACTIVITIES: CPT | Mod: CQ

## 2022-07-07 PROCEDURE — 11000004 HC SNF PRIVATE

## 2022-07-07 RX ADMIN — ACETAMINOPHEN AND CODEINE PHOSPHATE 1 TABLET: 300; 30 TABLET ORAL at 05:07

## 2022-07-07 RX ADMIN — LORAZEPAM 0.5 MG: 0.5 TABLET ORAL at 02:07

## 2022-07-07 RX ADMIN — PANTOPRAZOLE SODIUM 40 MG: 40 TABLET, DELAYED RELEASE ORAL at 09:07

## 2022-07-07 RX ADMIN — APIXABAN 5 MG: 2.5 TABLET, FILM COATED ORAL at 09:07

## 2022-07-07 RX ADMIN — Medication 1 TABLET: at 09:07

## 2022-07-07 RX ADMIN — APIXABAN 5 MG: 2.5 TABLET, FILM COATED ORAL at 08:07

## 2022-07-07 RX ADMIN — CHOLECALCIFEROL TAB 25 MCG (1000 UNIT) 1000 UNITS: 25 TAB at 09:07

## 2022-07-07 RX ADMIN — FUROSEMIDE 20 MG: 20 TABLET ORAL at 09:07

## 2022-07-07 RX ADMIN — LORAZEPAM 0.5 MG: 0.5 TABLET ORAL at 12:07

## 2022-07-07 RX ADMIN — ACETAMINOPHEN AND CODEINE PHOSPHATE 1 TABLET: 300; 30 TABLET ORAL at 06:07

## 2022-07-07 RX ADMIN — LORAZEPAM 0.5 MG: 0.5 TABLET ORAL at 10:07

## 2022-07-07 NOTE — PT/OT/SLP PROGRESS
Physical Therapy Treatment    Patient Name:  Lupis Murcia   MRN:  [id number]  Admit Date: 6/20/2022  Admitting Diagnosis: Acute pulmonary embolism with acute cor pulmonale  Recent Surgeries: N/A    General Precautions: Standard, fall   Orthopedic Precautions:N/A   Braces: N/A     Recommendations:     Discharge Recommendations:  home health PT   Level of Assistance Recommended at Discharge: Intermittent assistance   Discharge Equipment Recommendations: bedside commode, grab bar, raised toilet, shower chair, walker, rolling   Barriers to discharge: Decreased caregiver support (increase assist needed)    Assessment:     Lupis Murcia is a 71 y.o. female admitted with a medical diagnosis of Acute pulmonary embolism with acute cor pulmonale. Pt tolerated therapy session fairly well, but stated her LLE had intense pain throughout the foot up to the hip last night with fear of it hurting like that again. Pt declined wearing AFO today due to fear of causing pain in LLE/L foot. Pt session focused on gait training, transfers and standing balance in order to assist with achieving highest level of independence. Pt would continue to benefit from skilled PT services per POC.     Performance deficits affecting function:  weakness, impaired endurance, impaired sensation, impaired self care skills, impaired functional mobilty, gait instability, impaired balance, decreased lower extremity function, abnormal tone, decreased ROM, impaired skin, edema, impaired cardiopulmonary response to activity, impaired joint extensibility .    Rehab Potential is good    Activity Tolerance: Good and Fair    Plan:     Patient to be seen 6 x/week to address the above listed problems via gait training, therapeutic activities, therapeutic exercises, neuromuscular re-education, wheelchair management/training    · Plan of Care Expires: 07/12/22  · Plan of Care Reviewed with: patient    Subjective     Pt agreeable to PT.     Pain/Comfort:  · Pain  "Rating 1: 6/10  · Location - Side 1: Left  · Location - Orientation 1: generalized  · Location 1: foot  · Pain Addressed 1: Pre-medicate for activity, Distraction, Cessation of Activity, Reposition, Other (see comments) (pt did not want to wear AFO yet due to fear of it hurting her foot)  · Pain Rating Post-Intervention 1: 0/10    Patient's cultural, spiritual, Religion conflicts given the current situation:  · no    Objective:     Patient found up in chair without oxygen (on RA during session in order to wean patient off. Pt only wearing if she becomes anxious during the day.) upon PT entry to room.     Functional Mobility:  · Transfers:     · Sit to Stand: 2 sets, contact guard assistance with rolling walker  · Gait: x 58', 2 sets, RW, CGA x 2 (follow with w/c).   · Balance: static standing x 1 min, x 40 seconds, RW, SBA.   · Pt does not feel the need to participate in bed mobility, stating "she has her daughter's assistance with it at home and her bed is much lower than the hospital bed here." Pt educated on training with bed mobility will assist with benefiting ease of getting in and out of bed.     AM-PAC 6 CLICK MOBILITY  16    Patient left up in chair with call button in reach.    GOALS:   Multidisciplinary Problems     Physical Therapy Goals        Problem: Physical Therapy    Goal Priority Disciplines Outcome Goal Variances Interventions   Physical Therapy Goal     PT, PT/OT Ongoing, Progressing     Description: Goals to be met by: 2022:    Patient will increase functional independence with mobility by performin. Supine to sit with Maximum Assistance.  2. Sit to supine with Maximum Assistance.  3. Rolling to Left and Right with Moderate Assistance.  4. Sit to stand transfer with Contact Guard Assistance. -met  Updated 2022: Sit to stand transfer with supervision. -met  Updated 2022: Sit to stand transfer with Dedrick.     5. Bed to chair transfer with Contact Guard Assistance using " Rolling Walker. -met  Updated 07/05/2022: Bed to chair transfer with Dedrick using Rolling Walker.     6. Gait x 100 feet with Maximum Assistance using Rolling Walker.   7. Wheelchair propulsion x 50 feet with Minimal Assistance using bilateral upper extremities.  8. Ascend/Descend 4 inch curb step with Maximum Assistance using Rolling Walker.  9. Sitting at edge of bed x 20 minutes with Supervision performing dynamic sitting balance activities.  10. Lower extremity exercise program x 30 reps per handout, with supervision.                     Time Tracking:     PT Received On: 07/07/22  PT Start Time: 0928  PT Stop Time: 1001  PT Total Time (min): 33 min    Billable Minutes: Gait Training 20 and Therapeutic Activity 13    Treatment Type: Treatment  PT/PTA: PTA     PTA Visit Number: 2     07/07/2022

## 2022-07-07 NOTE — PT/OT/SLP PROGRESS
Occupational Therapy   Treatment    Name: Lupis Murcia  MRN: [id number]  Admit Date: 6/20/2022  Admitting Diagnosis:  Acute pulmonary embolism with acute cor pulmonale    General Precautions: Standard, fall   Orthopedic Precautions:N/A   Braces: N/A     Recommendations:     Discharge Recommendations: home health OT  Level of Assistance Recommended at Discharge: 24 hours light assistance for ADL's and homemaking tasks  Discharge Equipment Recommendations:  bedside commode, grab bar, raised toilet, bath bench  Barriers to discharge:   (Increased caregiver support.)    Assessment:     Lupis Murcia is a 71 y.o. female with a medical diagnosis of Acute pulmonary embolism with acute cor pulmonale. Performance deficits affecting function are impaired endurance, edema, impaired balance, gait instability, impaired functional mobilty, impaired self care skills, impaired joint extensibility.     Patient presents with reduced mobility, UB strength, and stamina deficits, which impact the patient's ability to complete functional tasks & activities safely &  and in a timely manner.   Pt could benefit from a collaborative therapeutic program to improve quality of life and sustain focus on impairment resoltion.  Patient outlook for progression towards goals (+) at this time.     Rehab Potential is good    Activity tolerance:  Good    Plan:     Patient to be seen 6 x/week to address the above listed problems via self-care/home management, therapeutic activities, therapeutic exercises, wheelchair management/training    · Plan of Care Expires: 07/15/22  · Plan of Care Reviewed with: patient    Subjective     Communicated with: RN prior to session.  Pain/Comfort:  Pain Rating 1: 0/10    Patient's cultural, spiritual, Jew conflicts given the current situation:  no    Objective:     Patient found up in chair with  (no lines) upon OT entry to room.    Functional Mobility/Transfers:  · Patient completed Sit <> Stand Transfer  "with contact guard assistance  with  rolling walker as needed for pressure relief.   · Sig BLE edema remains nancy of concern re: stand and LB dressing.     Activities of Daily Living:  · Feeding:  independence    · Grooming: supervision  < mod (I) emergent  · Lower Body Dressing: maximal assistance <> extensive 2* sig BLE edema, Patient states wish for gown vs pants, adult brief in place.   · Toileting: NT, needs untimely    [facility] 6 Click ADL: 16    OT Exercises: UE Ergometer 15 mins, ne rests requested, casaul dialog ongoing throughout Patient <> OT    Treatment & Education:  Collaborative POC discussed re: Patient interpretation of progression toward goals to date. "I think I am doing so much better. You remember when I first got here."       Treatment & Education:  Pt engaged well in OOB ADL t/f training  to improve safety and independence during functional activities, routine ADLs (as noted above) and general in room w/c func mobility training ongoing this session.  Discussed current progression of goals as above, importance of continued OOB activity, cont'd self care skills redevelopment needs and general in room/bedside safety.    Whiteboard updated.    SESSION'S END:  *General in room safety and call button use reviewed/in hand.  Patient left in w/c, begining to dine (I)ly with all lines intact and RN notifiedEducation:      GOALS:   Multidisciplinary Problems     Occupational Therapy Goals        Problem: Occupational Therapy    Goal Priority Disciplines Outcome Interventions   Occupational Therapy Goal     OT, PT/OT Ongoing, Progressing    Description: Goals to be met by: 7/12/2022    Patient will increase functional independence with ADLs by performing:    UE Dressing with Set-up Assistance.  LE Dressing, including donning/doffing pants, with Minimal Assistance using appropriate AE.  Grooming while seated at sink with Set-up Assistance.  Toileting from bedside commode with Minimal Assistance for hygiene and " clothing management.   Bathing from sitting at sink with Minimal Assistance.  Supine to sit with Stand-by Assistance.  Sit to stand transfer with Stand-by Assistance with RW.  Toilet transfer to bedside commode with Stand-by Assistance with RW and grab bar.  Upper extremity exercise program with Supervision.  Caregiver will be educated on level of assist required to safely perform self care tasks and functional transfers.                        Time Tracking:     OT Date of Treatment: 07/07/22  OT Start Time: 1053    OT Stop Time: 1140  OT Total Time (min): 47 min    Billable Minutes:Self Care/Home Management 32  Therapeutic Exercise 15    7/7/2022

## 2022-07-07 NOTE — PROGRESS NOTES
[facility] - Skilled Nursing  Wound Care    Patient Name:  Lupis Murcia   MRN:  [id number]  Date: 7/7/2022  Diagnosis: Acute pulmonary embolism with acute cor pulmonale    History:     Past Medical History:   Diagnosis Date    Lymphedema     MS (multiple sclerosis)     Other pulmonary embolism without acute cor pulmonale     Vitamin B12 deficiency        Social History     Socioeconomic History    Marital status:    Tobacco Use    Smoking status: Never Smoker    Smokeless tobacco: Never Used   Substance and Sexual Activity    Alcohol use: No    Drug use: No       Precautions:     Allergies as of 06/20/2022 - Reviewed 06/20/2022   Allergen Reaction Noted    Contrast media Shortness Of Breath and Rash 12/15/2016    Pcn [penicillins] Shortness Of Breath and Rash 07/10/2013    Celebrex [celecoxib] Other (See Comments) 06/12/2017    Diazepam Hives 10/12/2021    Motrin [ibuprofen] Rash 07/10/2013       [facility] Assessment Details/Treatment     Wound care follow-up for sacral spine- Ms. Divina is sitting up in  a wheelchair with chair cushion in place. No distress voiced. Sacral spine skin is pink/dry intact-   Recommend continuing pressure prevention measures and barrier cream BID to sacral area as preventive measure.    Wound care will follow-up prn, nursing to continue care.    Recommendations made to primary team for above recommendation per written report . Orders in place.      07/07/22 0745   [REMOVED]      Altered Skin Integrity 06/20/22 1700 midline Sacral spine Partial thickness tissue loss. Shallow open ulcer with a red or pink wound bed, without slough. Intact or Open/Ruptured Serum-filled blister.   Final Assessment Date: 07/07/22  Date First Assessed/Time First Assessed: 06/20/22 1700   Altered Skin Integrity Present on Admission: yes  Orientation: midline  Location: Sacral spine  Description of Altered Skin Integrity: Partial thickness tissue l...   Dressing Appearance Open to air   Drainage Amount  None   Drainage Characteristics/Odor No odor   Appearance Intact;Pink;Dry   Tissue loss description Not applicable   Periwound Area Intact;Dry   Wound Edges Rolled/closed   Care   (cleansed skin and barrier cream applied per daughter as preventive measure)     07/07/2022

## 2022-07-08 LAB
ANION GAP SERPL CALC-SCNC: 10 MMOL/L (ref 8–16)
BASOPHILS # BLD AUTO: 0.02 K/UL (ref 0–0.2)
BASOPHILS NFR BLD: 0.3 % (ref 0–1.9)
BUN SERPL-MCNC: 14 MG/DL (ref 8–23)
CALCIUM SERPL-MCNC: 10 MG/DL (ref 8.7–10.5)
CHLORIDE SERPL-SCNC: 104 MMOL/L (ref 95–110)
CO2 SERPL-SCNC: 26 MMOL/L (ref 23–29)
CREAT SERPL-MCNC: 0.7 MG/DL (ref 0.5–1.4)
DIFFERENTIAL METHOD: ABNORMAL
EOSINOPHIL # BLD AUTO: 0.2 K/UL (ref 0–0.5)
EOSINOPHIL NFR BLD: 3.1 % (ref 0–8)
ERYTHROCYTE [DISTWIDTH] IN BLOOD BY AUTOMATED COUNT: 14.2 % (ref 11.5–14.5)
EST. GFR  (AFRICAN AMERICAN): >60 ML/MIN/1.73 M^2
EST. GFR  (NON AFRICAN AMERICAN): >60 ML/MIN/1.73 M^2
GLUCOSE SERPL-MCNC: 138 MG/DL (ref 70–110)
HCT VFR BLD AUTO: 28.9 % (ref 37–48.5)
HGB BLD-MCNC: 9 G/DL (ref 12–16)
IMM GRANULOCYTES # BLD AUTO: 0.02 K/UL (ref 0–0.04)
IMM GRANULOCYTES NFR BLD AUTO: 0.3 % (ref 0–0.5)
LYMPHOCYTES # BLD AUTO: 1.4 K/UL (ref 1–4.8)
LYMPHOCYTES NFR BLD: 21.4 % (ref 18–48)
MAGNESIUM SERPL-MCNC: 1.7 MG/DL (ref 1.6–2.6)
MCH RBC QN AUTO: 30.6 PG (ref 27–31)
MCHC RBC AUTO-ENTMCNC: 31.1 G/DL (ref 32–36)
MCV RBC AUTO: 98 FL (ref 82–98)
MONOCYTES # BLD AUTO: 0.6 K/UL (ref 0.3–1)
MONOCYTES NFR BLD: 9.1 % (ref 4–15)
NEUTROPHILS # BLD AUTO: 4.2 K/UL (ref 1.8–7.7)
NEUTROPHILS NFR BLD: 65.8 % (ref 38–73)
NRBC BLD-RTO: 0 /100 WBC
PHOSPHATE SERPL-MCNC: 3.3 MG/DL (ref 2.7–4.5)
PLATELET # BLD AUTO: 273 K/UL (ref 150–450)
PMV BLD AUTO: 10.1 FL (ref 9.2–12.9)
POTASSIUM SERPL-SCNC: 3.7 MMOL/L (ref 3.5–5.1)
RBC # BLD AUTO: 2.94 M/UL (ref 4–5.4)
SODIUM SERPL-SCNC: 140 MMOL/L (ref 136–145)
WBC # BLD AUTO: 6.37 K/UL (ref 3.9–12.7)

## 2022-07-08 PROCEDURE — 25000003 PHARM REV CODE 250: Performed by: NURSE PRACTITIONER

## 2022-07-08 PROCEDURE — 83735 ASSAY OF MAGNESIUM: CPT | Performed by: NURSE PRACTITIONER

## 2022-07-08 PROCEDURE — 11000004 HC SNF PRIVATE

## 2022-07-08 PROCEDURE — 80048 BASIC METABOLIC PNL TOTAL CA: CPT | Performed by: NURSE PRACTITIONER

## 2022-07-08 PROCEDURE — 97530 THERAPEUTIC ACTIVITIES: CPT | Mod: CQ

## 2022-07-08 PROCEDURE — 97530 THERAPEUTIC ACTIVITIES: CPT | Mod: CO

## 2022-07-08 PROCEDURE — 85025 COMPLETE CBC W/AUTO DIFF WBC: CPT | Performed by: NURSE PRACTITIONER

## 2022-07-08 PROCEDURE — 36415 COLL VENOUS BLD VENIPUNCTURE: CPT | Performed by: NURSE PRACTITIONER

## 2022-07-08 PROCEDURE — 84100 ASSAY OF PHOSPHORUS: CPT | Performed by: NURSE PRACTITIONER

## 2022-07-08 PROCEDURE — 97116 GAIT TRAINING THERAPY: CPT | Mod: CQ

## 2022-07-08 PROCEDURE — 25000003 PHARM REV CODE 250: Performed by: HOSPITALIST

## 2022-07-08 RX ORDER — POTASSIUM CHLORIDE 750 MG/1
30 CAPSULE, EXTENDED RELEASE ORAL ONCE
Status: DISCONTINUED | OUTPATIENT
Start: 2022-07-08 | End: 2022-07-15

## 2022-07-08 RX ORDER — LANOLIN ALCOHOL/MO/W.PET/CERES
400 CREAM (GRAM) TOPICAL 2 TIMES DAILY
Status: COMPLETED | OUTPATIENT
Start: 2022-07-08 | End: 2022-07-10

## 2022-07-08 RX ADMIN — FUROSEMIDE 20 MG: 20 TABLET ORAL at 09:07

## 2022-07-08 RX ADMIN — PANTOPRAZOLE SODIUM 40 MG: 40 TABLET, DELAYED RELEASE ORAL at 09:07

## 2022-07-08 RX ADMIN — ACETAMINOPHEN AND CODEINE PHOSPHATE 1 TABLET: 300; 30 TABLET ORAL at 02:07

## 2022-07-08 RX ADMIN — LORAZEPAM 0.5 MG: 0.5 TABLET ORAL at 06:07

## 2022-07-08 RX ADMIN — ACETAMINOPHEN AND CODEINE PHOSPHATE 1 TABLET: 300; 30 TABLET ORAL at 06:07

## 2022-07-08 RX ADMIN — LORAZEPAM 0.5 MG: 0.5 TABLET ORAL at 09:07

## 2022-07-08 RX ADMIN — APIXABAN 5 MG: 2.5 TABLET, FILM COATED ORAL at 08:07

## 2022-07-08 RX ADMIN — Medication 400 MG: at 08:07

## 2022-07-08 RX ADMIN — Medication 1 TABLET: at 09:07

## 2022-07-08 RX ADMIN — CHOLECALCIFEROL TAB 25 MCG (1000 UNIT) 1000 UNITS: 25 TAB at 09:07

## 2022-07-08 RX ADMIN — APIXABAN 5 MG: 2.5 TABLET, FILM COATED ORAL at 09:07

## 2022-07-08 NOTE — PT/OT/SLP PROGRESS
"Physical Therapy Treatment    Patient Name:  Lupis Murcia   MRN:  [id number]  Admit Date: 6/20/2022  Admitting Diagnosis: Acute pulmonary embolism with acute cor pulmonale  Recent Surgeries: N/A    General Precautions: Standard, fall   Orthopedic Precautions:N/A   Braces: N/A     Recommendations:     Discharge Recommendations:  home health PT   Level of Assistance Recommended at Discharge: Intermittent assistance   Discharge Equipment Recommendations: bedside commode, grab bar, raised toilet, shower chair, walker, rolling   Barriers to discharge: Decreased caregiver support (increase assist needed)    Assessment:     Lupis Murcia is a 71 y.o. female admitted with a medical diagnosis of Acute pulmonary embolism with acute cor pulmonale. Pt tolerated therapy session fairly well, but had c/o 5/10 pain in L lateral knee when standing/ambulating. Nursing present with education on past PT exercises causing muscles to be more sore, potentially causing the pain/discomfort in pt's L knee. Pt session focused on attempt to fit AFO with new shoes, but shoes did not fit over pt's feet due to edema. Session also focused on transfer training and ambulation. Attempted to have patient try a mock car transfer with nustep chair, with pt refusal stating "I am too anxious to try that. I don't want to do that today. Just make a note of it in your notes." Pt would continue to benefit from skilled PT services per POC.         Performance deficits affecting function:  weakness, impaired endurance, impaired sensation, impaired self care skills, impaired functional mobilty, gait instability, impaired balance, decreased lower extremity function, abnormal tone, decreased ROM, impaired skin, edema, impaired cardiopulmonary response to activity, impaired joint extensibility .    Rehab Potential is good    Activity Tolerance: Fair    Plan:     Patient to be seen 6 x/week to address the above listed problems via gait training, therapeutic " "activities, therapeutic exercises, neuromuscular re-education, wheelchair management/training    · Plan of Care Expires: 22  · Plan of Care Reviewed with: patient    Subjective     Pt agreeable to PT. "My left knee/leg has really been hurting."     Pain/Comfort:  · Pain Rating 1: 5/10  · Location - Side 1: Left  · Location - Orientation 1: lateral  · Location 1: knee  · Pain Addressed 1: Pre-medicate for activity, Distraction, Cessation of Activity, Nurse notified  · Pain Rating Post-Intervention 1: 5/10    Patient's cultural, spiritual, Jainism conflicts given the current situation:  · no    Objective:     Patient found up in recliner chair with call light in reach upon PT entry to room.     Functional Mobility:  · Transfers:     · Sit to Stand: 2 sets, contact guard assistance with rolling walker  · Recliner chair to wheelchair: minimum assistance with  rolling walker  using  Step Transfer  · Gait: x 25', x 48', RW, CGA x 2 follow with w/c for fatigue.   · Extra time spend speaking with nursing and don/doff AFO.     AM-PAC 6 CLICK MOBILITY  15    Patient left up in chair with BOWIE present in gym.     GOALS:   Multidisciplinary Problems     Physical Therapy Goals        Problem: Physical Therapy    Goal Priority Disciplines Outcome Goal Variances Interventions   Physical Therapy Goal     PT, PT/OT Ongoing, Progressing     Description: Goals to be met by: 2022:    Patient will increase functional independence with mobility by performin. Supine to sit with Maximum Assistance.  2. Sit to supine with Maximum Assistance.  3. Rolling to Left and Right with Moderate Assistance.  4. Sit to stand transfer with Contact Guard Assistance. -met  Updated 2022: Sit to stand transfer with supervision. -met  Updated 2022: Sit to stand transfer with Dedrick.     5. Bed to chair transfer with Contact Guard Assistance using Rolling Walker. -met  Updated 2022: Bed to chair transfer with Dedrick using " Rolling Walker.     6. Gait x 100 feet with Maximum Assistance using Rolling Walker.   7. Wheelchair propulsion x 50 feet with Minimal Assistance using bilateral upper extremities.  8. Ascend/Descend 4 inch curb step with Maximum Assistance using Rolling Walker.  9. Sitting at edge of bed x 20 minutes with Supervision performing dynamic sitting balance activities.  10. Lower extremity exercise program x 30 reps per handout, with supervision.                     Time Tracking:     PT Received On: 07/08/22  PT Start Time: 0842  PT Stop Time: 0931  PT Total Time (min): 49 min    Billable Minutes: Gait Training 13 and Therapeutic Activity 36    Treatment Type: Treatment  PT/PTA: PTA     PTA Visit Number: 3     07/08/2022

## 2022-07-08 NOTE — PLAN OF CARE
[facility] - Skilled Nursing      HOME HEALTH ORDERS  FACE TO FACE ENCOUNTER    Patient Name: Lupis Murcia  YOB: 1950    PCP: Bobby Tran MD   PCP Address: [address]  PCP Phone Number: [phone]  PCP Fax: [phone]    Encounter Date: 6/20/22    Admit to Home Health    Diagnoses:  Active Hospital Problems    Diagnosis  POA    *Acute pulmonary embolism with acute cor pulmonale [I26.09]  Yes    Hypokalemia [E87.6]  Yes    Acute hypoxemic respiratory failure [J96.01]  Yes    Class 2 obesity due to excess calories in adult [E66.09]  Yes    Lymphedema [I89.0]  Yes    MS (multiple sclerosis) [G35]  Yes    Vitamin D deficiency [E55.9]  Yes    Muscle weakness of lower extremity [M62.81]  Yes    Impaired functional mobility, balance, gait, and endurance [Z74.09]  Yes    Anemia of chronic disease [D63.8]  Yes    Vitamin B 12 deficiency [E53.8]  Yes    Insomnia secondary to chronic pain [G89.29, G47.01]  Yes      Resolved Hospital Problems   No resolved problems to display.       Follow Up Appointments:  Future Appointments   Date Time Provider Department Center   7/14/2022  3:00 PM Samir Sahni MD [facility]       Allergies:  Review of patient's allergies indicates:   Allergen Reactions    Contrast media Shortness Of Breath and Rash    Pcn [penicillins] Shortness Of Breath and Rash    Celebrex [celecoxib] Other (See Comments)     Swallowing problems     Diazepam Hives    Motrin [ibuprofen] Rash       Medications: Review discharge medications with patient and family and provide education.      I have seen and examined this patient within the last 30 days. My clinical findings that support the need for the home health skilled services and home bound status are the following:no   Weakness/numbness causing balance and gait disturbance due to PE making it taxing to leave home.     Referrals/ Consults  Physical Therapy to evaluate and treat.  Evaluate for home safety and equipment needs; Establish/upgrade home exercise program. Perform / instruct on therapeutic exercises, gait training, transfer training, and Range of Motion.  Occupational Therapy to evaluate and treat. Evaluate home environment for safety and equipment needs. Perform/Instruct on transfers, ADL training, ROM, and therapeutic exercises.  Aide to provide assistance with personal care, ADLs, and vital signs.    Activities:   activity as tolerated    Nursing:   Agency to admit patient within 24 hours of hospital discharge unless specified on physician order or at patient request    SN to complete comprehensive assessment including routine vital signs. Instruct on disease process and s/s of complications to report to MD. Review/verify medication list sent home with the patient at time of discharge  and instruct patient/caregiver as needed. Frequency may be adjusted depending on start of care date.     Skilled nurse to perform up to 3 visits PRN for symptoms related to diagnosis    Notify MD if SBP > 160 or < 90; DBP > 90 or < 50; HR > 120 or < 50; Temp > 101; O2 < 88%    Ok to schedule additional visits based on staff availability and patient request on consecutive days within the home health episode.    Home Health Aide:  Nursing Three times weekly, Physical Therapy Three times weekly, Occupational Therapy Three times weekly and Home Health Aide Three times weekly    Wound Care Orders    BLE- to cleanse BLE skin with warm tap water, pat dry and apply coloplast sween cream (pink top) per patients request.  Remove loose skin prn      I certify that this patient is confined to her home and needs intermittent skilled nursing care, physical therapy and occupational therapy.

## 2022-07-08 NOTE — PT/OT/SLP PROGRESS
Occupational Therapy   Treatment    Name: Lupis Murcia  MRN: [id number]  Admit Date: 6/20/2022  Admitting Diagnosis:  Acute pulmonary embolism with acute cor pulmonale    General Precautions: Standard, fall   Orthopedic Precautions:N/A   Braces:       Recommendations:     Discharge Recommendations: home health OT  Level of Assistance Recommended at Discharge: 24 hours physical assistance for all ADL's and home management tasks  Discharge Equipment Recommendations:  bedside commode, grab bar, raised toilet, bath bench  Barriers to discharge:   (Increased caregiver support.)    Assessment:     Lupis Murcia is a 71 y.o. female with a medical diagnosis of Acute pulmonary embolism with acute cor pulmonale.  She presents with  Performance deficits affecting function are weakness, impaired endurance, impaired self care skills, impaired functional mobility, gait instability, impaired balance, decreased ROM, decreased lower extremity function, edema, impaired cardiopulmonary response to activity, decreased safety awareness, impaired skin, decreased coordination    Pt. Was cooperative and participated well with session on this day. Pt  continues to demonstrate levels of physical deficits with  functional indep with daily management activities tasks, selfcare skills with balance,  functional mobility, UB strength and endurance. Pt. Will continue to benefit from continued OT to progress towards goals     Rehab Potential is fair    Activity tolerance:  Fair    Plan:     Patient to be seen 6 x/week to address the above listed problems via self-care/home management, therapeutic activities, therapeutic exercises, wheelchair management/training    · Plan of Care Expires: 07/15/22  · Plan of Care Reviewed with: patient    Subjective     Communicated with: PTA and Pt prior to session.My knee hurts a little bit today    Pain/Comfort:  Pain Rating 1: 5/10  Location - Side 1: Left  Location - Orientation 1: lateral  Location 1:  knee  Pain Addressed 1: Pre-medicate for activity, Reposition  Pain Rating Post-Intervention 1: 5/10    Patient's cultural, spiritual, Mu-ism conflicts given the current situation:  no    Objective:     Patient found up in chair  In gym with PTA  upon OT entry to room.    Functional Mobility/Transfers:  Patient completed Sit <> Stand Transfer with contact guard assistance  with  rolling walker   Activities of Daily Living:  Already performed     [facility] 6 Click ADL: 16    OT Exercises: UE Ergometer x 10 min    Treatment & Education:    Pt. With 2# dowel activity with 2x10 reps with  shd flex, bicep curls  and forward flex motion to increase BUE ROM and strength,.   Pt. With therex performed to increase ROM, endurance selfcare task and fxl mobility for independence     Pt. With therex performed to increase ROM, endurance selfcare task and fxl mobility for independence     Pt edu on role of OT, POC, safety when performing self care tasks , benefit of performing OOB activity, and safety when performing functional transfers and mobility management for preparation with goals to progress towards next level of care    Patient left up in chair with all lines intact and call button in reachEducation:      GOALS:   Multidisciplinary Problems     Occupational Therapy Goals        Problem: Occupational Therapy    Goal Priority Disciplines Outcome Interventions   Occupational Therapy Goal     OT, PT/OT Ongoing, Progressing    Description: Goals to be met by: 7/12/2022    Patient will increase functional independence with ADLs by performing:    UE Dressing with Set-up Assistance.  LE Dressing, including donning/doffing pants, with Minimal Assistance using appropriate AE.  Grooming while seated at sink with Set-up Assistance.  Toileting from bedside commode with Minimal Assistance for hygiene and clothing management.   Bathing from sitting at sink with Minimal Assistance.  Supine to sit with Stand-by Assistance.  Sit to stand  transfer with Stand-by Assistance with RW.  Toilet transfer to bedside commode with Stand-by Assistance with RW and grab bar.  Upper extremity exercise program with Supervision.  Caregiver will be educated on level of assist required to safely perform self care tasks and functional transfers.                        Time Tracking:     OT Date of Treatment: 07/08/22  OT Start Time: 0931    OT Stop Time: 1000  OT Total Time (min): 29 min    Billable Minutes:Therapeutic Activity 29    7/8/2022

## 2022-07-08 NOTE — PROGRESS NOTES
[facility]                                  Skilled Nursing Facility                   Progress Note     Admit Date: 2022  USMAN TBD  Principal Problem:  Acute acute pulmonary embolism  HPI obtained from patient interview and chart review   Principal problem: Acute pulmonary embolism with acute cor pulmonale    Chief Complaint: Revaluation of medical treatment and therapy status:  Lab review, hypomagnesemia, hypokalemia    HPI:   Lupis Murcia is a 71 y.o. female w/ PMHx of PE during pregnancy requiring heparin drip, chronic lower extremity lymphedema, multiple sclerosis, vitamin D deficiency who presented to SNF following hospitalization for pulmonary embolism.  Admission to SNF for secondary weakness and debility.    Interval History:. All of today's labs reviewed and are listed below.  Mag 1.7, K 3.7.  24 hr vital sign ranges listed below.  Patient reports left knee pain due to new exercises completed during therapy.  Patient states pain is better today than it was yesterday.  Patient denies shortness of breath, abdominal discomfort, nausea, or vomiting.  Patient reports an adequate appetite.  Patient denies dysuria.  Patient reports having regular bowel movements.  Patient progessing with PT/OT- Gait: x 25', x 48', RW, CGA x 2 follow with w/c for fatigue. Continuing to follow and treat all acute and chronic conditions.    Past Medical History: Patient has a past medical history of Lymphedema, MS (multiple sclerosis), Other pulmonary embolism without acute cor pulmonale, and Vitamin B12 deficiency.    Past Surgical History: Patient has a past surgical history that includes  section; Breast cyst excision (Left); and Esophagogastroduodenoscopy (N/A, 2022).    Social History: Patient reports that she has never smoked. She has never used smokeless tobacco. She reports that she does not drink alcohol and does not use  drugs.    Family History: family history includes Brain cancer in her brother; Coronary artery disease in her father; Lung cancer in her father and mother.    Allergies: Patient is allergic to contrast media, pcn [penicillins], celebrex [celecoxib], diazepam, and motrin [ibuprofen].    ROS  Constitutional: Negative for fever   Eyes: Negative for blurred vision, double vision   Respiratory: +SOB on exertion.  Negative for cough  Cardiovascular: Negative for chest pain, palpitations, and leg swelling.   Gastrointestinal: Negative for abdominal pain, constipation, diarrhea, nausea, vomiting.   Genitourinary: Negative for dysuria, frequency   Musculoskeletal:  + generalized weakness, BLE weakness. Negative for back pain and myalgias.   Skin: Negative for itching and rash.   Neurological: Negative for dizziness, headaches.   Psychiatric/Behavioral: Negative for depression. The patient is not nervous/anxious.      24 hour Vital Sign Range   Temp:  [96.7 °F (35.9 °C)-98.4 °F (36.9 °C)]   Pulse:  []   Resp:  [17-18]   BP: (133-143)/(66-81)   SpO2:  [95 %]     PEx  Constitutional: Patient appears debilitated.  No distress noted  HENT:   Head: Normocephalic and atraumatic.   Eyes: Pupils are equal, round  Neck: Normal range of motion. Neck supple.   Cardiovascular: Normal rate, regular rhythm and normal heart sounds.    Pulmonary/Chest: Effort normal and breath sounds are clear  Abdominal: Soft. Bowel sounds are normal.   Musculoskeletal: Normal range of motion.   Neurological: Alert and oriented to person, place, and time.   Psychiatric: Normal mood and affect. Behavior is normal.   Skin: Skin is warm and dry.     Recent Labs   Lab 07/08/22  0723      K 3.7      CO2 26   BUN 14   CREATININE 0.7   MG 1.7       Recent Labs   Lab 07/08/22  0723   WBC 6.37   RBC 2.94*   HGB 9.0*   HCT 28.9*      MCV 98   MCH 30.6   MCHC 31.1*         Assessment and Plan:    Hypomagnesemia  Hypokalemia  - initiated  magnesium oxide 400 mg BID x2 days, potassium 30 mEq x1 dose    Acute pulmonary embolism with acute cor pulmonale  - continue Eliquis 10mg PO BID til 6/22 followed by Eliquis 5mg PO BID starting 6/23 indefinitely  - 7/8 tolerating RA, continue to monitor SpO2 closely    Acute hypoxemic respiratory failure  - 2/2 PE  - On 2L NC, wean off NC as tolerated  - continue lasix 20 mg daily    Prediabetes  - Hgb A1C 6.2 on 5/3/22  - Prediabetes education given. Verbalized understanding   - pt refusing BG checks. discontinue BG checks and SSI  - BG stable. Remains on regular diet per pt request. WCTM.      Class 2 obesity due to excess calories in adult  - Body mass index is 37.74 kg/m².   - Morbid obesity complicates all aspects of disease management from diagnostic modalities to treatment.   - Weight loss encouraged and health benefits explained to patient.                Lymphedema  - Chronic and stable  - No acute issues     Vitamin D deficiency  - continue vitamin D 1000 units daily      MS (multiple sclerosis)  - PT/OT  - f/u with out pt provider      Muscle weakness of lower extremity  - PT/OT     Anemia of chronic disease  - Monitor for bleeding on Eliquis     Insomnia secondary to chronic pain  - continue melatonin     Anxiety  - continue lorazepam 0.5 mg Q 8 hr prn anxiety    Debility   - Continue with PT/OT for gait training and strengthening and restoration of ADL's   - Encourage mobility, OOB in chair, and early ambulation as appropriate  - Fall precautions   - Monitor for bowel and bladder dysfunction  - Monitor for and prevent skin breakdown and pressure ulcers  - Continue DVT prophylaxis with eliquis    Anticipate disposition:  Home with home health      Follow-up needed during SNF stay-    Follow-up needed after discharge from SNF:   - PCP, hospital follow-up, 7/14    Future Appointments   Date Time Provider Department Center   7/14/2022  3:00 PM Samir Sahni MD [facility]  Department of Hospital Medicine   [facility]- Skilled Nursing Facility     DOS: 7/8/2022       Patient note was created using MModal Dictation.  Any errors in syntax or even information may not have been identified and edited on initial review prior to signing this note.

## 2022-07-09 PROCEDURE — 25000003 PHARM REV CODE 250: Performed by: NURSE PRACTITIONER

## 2022-07-09 PROCEDURE — 11000004 HC SNF PRIVATE

## 2022-07-09 PROCEDURE — 25000003 PHARM REV CODE 250: Performed by: HOSPITALIST

## 2022-07-09 RX ADMIN — LORAZEPAM 0.5 MG: 0.5 TABLET ORAL at 08:07

## 2022-07-09 RX ADMIN — APIXABAN 5 MG: 2.5 TABLET, FILM COATED ORAL at 08:07

## 2022-07-09 RX ADMIN — PANTOPRAZOLE SODIUM 40 MG: 40 TABLET, DELAYED RELEASE ORAL at 08:07

## 2022-07-09 RX ADMIN — FUROSEMIDE 20 MG: 20 TABLET ORAL at 08:07

## 2022-07-09 RX ADMIN — Medication 400 MG: at 08:07

## 2022-07-09 RX ADMIN — Medication 1 TABLET: at 08:07

## 2022-07-09 RX ADMIN — ACETAMINOPHEN AND CODEINE PHOSPHATE 1 TABLET: 300; 30 TABLET ORAL at 08:07

## 2022-07-09 RX ADMIN — ACETAMINOPHEN AND CODEINE PHOSPHATE 1 TABLET: 300; 30 TABLET ORAL at 02:07

## 2022-07-09 RX ADMIN — ACETAMINOPHEN AND CODEINE PHOSPHATE 1 TABLET: 300; 30 TABLET ORAL at 03:07

## 2022-07-09 RX ADMIN — CHOLECALCIFEROL TAB 25 MCG (1000 UNIT) 1000 UNITS: 25 TAB at 08:07

## 2022-07-09 RX ADMIN — LORAZEPAM 0.5 MG: 0.5 TABLET ORAL at 11:07

## 2022-07-10 PROCEDURE — 25000003 PHARM REV CODE 250: Performed by: HOSPITALIST

## 2022-07-10 PROCEDURE — 25000003 PHARM REV CODE 250: Performed by: NURSE PRACTITIONER

## 2022-07-10 PROCEDURE — 11000004 HC SNF PRIVATE

## 2022-07-10 RX ADMIN — LORAZEPAM 0.5 MG: 0.5 TABLET ORAL at 05:07

## 2022-07-10 RX ADMIN — ACETAMINOPHEN AND CODEINE PHOSPHATE 1 TABLET: 300; 30 TABLET ORAL at 01:07

## 2022-07-10 RX ADMIN — CHOLECALCIFEROL TAB 25 MCG (1000 UNIT) 1000 UNITS: 25 TAB at 08:07

## 2022-07-10 RX ADMIN — ACETAMINOPHEN AND CODEINE PHOSPHATE 1 TABLET: 300; 30 TABLET ORAL at 12:07

## 2022-07-10 RX ADMIN — LORAZEPAM 0.5 MG: 0.5 TABLET ORAL at 08:07

## 2022-07-10 RX ADMIN — Medication 1 TABLET: at 08:07

## 2022-07-10 RX ADMIN — ACETAMINOPHEN AND CODEINE PHOSPHATE 1 TABLET: 300; 30 TABLET ORAL at 07:07

## 2022-07-10 RX ADMIN — FUROSEMIDE 20 MG: 20 TABLET ORAL at 08:07

## 2022-07-10 RX ADMIN — APIXABAN 5 MG: 2.5 TABLET, FILM COATED ORAL at 09:07

## 2022-07-10 RX ADMIN — Medication 400 MG: at 08:07

## 2022-07-10 RX ADMIN — APIXABAN 5 MG: 2.5 TABLET, FILM COATED ORAL at 08:07

## 2022-07-10 RX ADMIN — PANTOPRAZOLE SODIUM 40 MG: 40 TABLET, DELAYED RELEASE ORAL at 08:07

## 2022-07-10 NOTE — PT/OT/SLP PROGRESS
Physical Therapy      Patient Name:  Lupis Murcia   MRN:  [id number]    Attempted to see patient in am/pm. Patient refused saying she did not sleep well last night and was not feeling well. Will follow-up with POC as necessary.

## 2022-07-11 PROCEDURE — 97530 THERAPEUTIC ACTIVITIES: CPT | Mod: CQ

## 2022-07-11 PROCEDURE — 11000004 HC SNF PRIVATE

## 2022-07-11 PROCEDURE — 25000003 PHARM REV CODE 250: Performed by: HOSPITALIST

## 2022-07-11 PROCEDURE — 25000003 PHARM REV CODE 250: Performed by: NURSE PRACTITIONER

## 2022-07-11 PROCEDURE — 97116 GAIT TRAINING THERAPY: CPT | Mod: CQ

## 2022-07-11 PROCEDURE — 97530 THERAPEUTIC ACTIVITIES: CPT | Mod: CO

## 2022-07-11 RX ORDER — DICLOFENAC SODIUM 10 MG/G
4 GEL TOPICAL 3 TIMES DAILY
Status: DISCONTINUED | OUTPATIENT
Start: 2022-07-11 | End: 2022-07-26 | Stop reason: HOSPADM

## 2022-07-11 RX ADMIN — DICLOFENAC SODIUM 4 G: 10 GEL TOPICAL at 08:07

## 2022-07-11 RX ADMIN — ACETAMINOPHEN AND CODEINE PHOSPHATE 1 TABLET: 300; 30 TABLET ORAL at 03:07

## 2022-07-11 RX ADMIN — LORAZEPAM 0.5 MG: 0.5 TABLET ORAL at 06:07

## 2022-07-11 RX ADMIN — ACETAMINOPHEN AND CODEINE PHOSPHATE 1 TABLET: 300; 30 TABLET ORAL at 07:07

## 2022-07-11 RX ADMIN — APIXABAN 5 MG: 2.5 TABLET, FILM COATED ORAL at 09:07

## 2022-07-11 RX ADMIN — APIXABAN 5 MG: 2.5 TABLET, FILM COATED ORAL at 08:07

## 2022-07-11 RX ADMIN — Medication 1 TABLET: at 09:07

## 2022-07-11 RX ADMIN — PANTOPRAZOLE SODIUM 40 MG: 40 TABLET, DELAYED RELEASE ORAL at 09:07

## 2022-07-11 RX ADMIN — LORAZEPAM 0.5 MG: 0.5 TABLET ORAL at 01:07

## 2022-07-11 RX ADMIN — LORAZEPAM 0.5 MG: 0.5 TABLET ORAL at 09:07

## 2022-07-11 RX ADMIN — FUROSEMIDE 20 MG: 20 TABLET ORAL at 09:07

## 2022-07-11 RX ADMIN — CHOLECALCIFEROL TAB 25 MCG (1000 UNIT) 1000 UNITS: 25 TAB at 09:07

## 2022-07-11 RX ADMIN — ACETAMINOPHEN AND CODEINE PHOSPHATE 1 TABLET: 300; 30 TABLET ORAL at 11:07

## 2022-07-11 NOTE — PT/OT/SLP PROGRESS
Occupational Therapy   Treatment    Name: Lupis Murcia  MRN: [id number]  Admit Date: 6/20/2022  Admitting Diagnosis:  Acute pulmonary embolism with acute cor pulmonale    General Precautions: Standard, fall   Orthopedic Precautions:N/A   Braces:       Recommendations:     Discharge Recommendations: home health OT  Level of Assistance Recommended at Discharge: 24 hours physical assistance for all ADL's and home management tasks  Discharge Equipment Recommendations:  bedside commode, grab bar, raised toilet, bath bench  Barriers to discharge:   (Increased caregiver support.)    Assessment:     Lupis Murcia is a 71 y.o. female with a medical diagnosis of Acute pulmonary embolism with acute cor pulmonale.  She presents with  Performance deficits affecting function are weakness, impaired endurance, impaired self care skills, impaired functional mobility, gait instability, impaired balance, decreased ROM, decreased lower extremity function, edema, impaired cardiopulmonary response to activity, decreased safety awareness, impaired skin, decreased coordination    Pt. Was cooperative and participated well with session on this day. Pt  continues to demonstrate levels of physical deficits with  functional indep with daily management activities tasks, selfcare skills with balance,  functional mobility, UB strength and endurance. Pt. Will continue to benefit from continued OT to progress towards goals     Rehab Potential is fair    Activity tolerance:  Fair    Plan:     Patient to be seen 6 x/week to address the above listed problems via self-care/home management, therapeutic activities, therapeutic exercises, wheelchair management/training    · Plan of Care Expires: 07/15/22  · Plan of Care Reviewed with: patient    Subjective     Communicated with: PTA and Pt prior to session.I am just so scared not I don't want my knee to buckle    Pain/Comfort:  Pain Rating 1: 7/10  Location - Side 1: Left  Location - Orientation 1:  lateral  Location 1: knee  Pain Addressed 1: Pre-medicate for activity, Reposition, Distraction  Pain Rating Post-Intervention 1: 7/10    Patient's cultural, spiritual, Quaker conflicts given the current situation:  no    Objective:     Patient found up in chair  In gym with PTA  upon OT entry to room.    Functional Mobility/Transfers:  Patient completed Sit <> Stand Transfer with contact guard assistance  with  rolling walker   Activities of Daily Living:  Already performed with daughter  Daughter assisted Pt. With all ADL's prior to sessions    [facility] 6 Click ADL: 16    OT Exercises: UE Ergometer x 10 min    Treatment & Education:    Pt. With 2# dowel activity with 2x10 reps with  shd flex, bicep curls  and forward flex motion to increase BUE ROM and strength.    Pt. With therex performed to increase ROM, endurance selfcare task and fxl mobility for independence     Pt. With therex performed to increase ROM, endurance selfcare task and fxl mobility for independence     Pt edu on role of OT, POC, safety when performing self care tasks , benefit of performing OOB activity, and safety when performing functional transfers and mobility management for preparation with goals to progress towards next level of care    Patient left up in chair with PTA presentEducation:   in gym    GOALS:   Multidisciplinary Problems     Occupational Therapy Goals        Problem: Occupational Therapy    Goal Priority Disciplines Outcome Interventions   Occupational Therapy Goal     OT, PT/OT Ongoing, Progressing    Description: Goals to be met by: 7/12/2022    Patient will increase functional independence with ADLs by performing:    UE Dressing with Set-up Assistance.-MET  LE Dressing, including donning/doffing pants, with Minimal Assistance using appropriate AE.-Not MET  Grooming while seated at sink with Set-up Assistance.MET  Toileting from bedside commode with Minimal Assistance for hygiene and clothing management. -Not MET  Bathing  from sitting at sink with Minimal Assistance.Not MET  Supine to sit with Stand-by Assistance.Not MET  Sit to stand transfer with Stand-by Assistance with RW.-Not MET  Toilet transfer to bedside commode with Stand-by Assistance with RW and grab bar.-Not MET  Upper extremity exercise program with Supervision.-MET  Caregiver will be educated on level of assist required to safely perform self care tasks and functional transfers.MET                        Time Tracking:     OT Date of Treatment: 07/11/22  OT Start Time: 0922    OT Stop Time: 1000  OT Total Time (min): 38 min    Billable Minutes:Therapeutic Activity 38    7/11/2022

## 2022-07-11 NOTE — PT/OT/SLP PROGRESS
Physical Therapy Treatment    Patient Name:  Lupis Murcia   MRN:  [id number]  Admit Date: 6/20/2022  Admitting Diagnosis: Acute pulmonary embolism with acute cor pulmonale  Recent Surgeries: N/A    General Precautions: Standard, fall   Orthopedic Precautions:N/A   Braces: N/A     Recommendations:     Discharge Recommendations:  home health PT   Level of Assistance Recommended at Discharge: Intermittent assistance   Discharge Equipment Recommendations: bedside commode, grab bar, raised toilet, shower chair, walker, rolling   Barriers to discharge: Decreased caregiver support (increase assist needed)    Assessment:     Lupis Murcia is a 71 y.o. female admitted with a medical diagnosis of Acute pulmonary embolism with acute cor pulmonale. Pt tolerated therapy session fairly well, but limited due to c/o pain/weakness in L knee with focus on increasing independence with gait training, transfers, standing balance and safety awareness in order to assist with achieving highest level of function. Pt would continue to benefit from skilled PT services per POC. Interdisciplinary communication performed between PT and nursing to discuss pt's knee, with nursing looking further into assisting with alleviating pain.       Performance deficits affecting function:  weakness, impaired endurance, impaired sensation, impaired self care skills, impaired functional mobilty, gait instability, impaired balance, decreased lower extremity function, abnormal tone, decreased ROM, impaired skin, edema, impaired cardiopulmonary response to activity, impaired joint extensibility .    Rehab Potential is good and fair    Activity Tolerance: Fair    Plan:     Patient to be seen 6 x/week to address the above listed problems via gait training, therapeutic activities, therapeutic exercises, neuromuscular re-education, wheelchair management/training    · Plan of Care Expires: 07/12/22  · Plan of Care Reviewed with: patient    Subjective     Pt  agreeable to PT.     Pain/Comfort:  · Pain Rating 1: 7/10  · Location - Side 1: Left  · Location - Orientation 1: lateral  · Location 1: knee  · Pain Addressed 1: Pre-medicate for activity, Reposition, Nurse notified, Cessation of Activity  · Pain Rating Post-Intervention 1: 7/10    Patient's cultural, spiritual, Moravian conflicts given the current situation:  · no    Objective:     Patient found up in chair with  (no lines) upon PT entry to room.     Functional Mobility:  · Transfers:     · Sit to Stand: 4 sets, contact guard assistance and minimum assistance with rolling walker.   · Gait: x 2', RW, Min A (for initiation of R foot) to step. Pt limited in gait due to c/o pain and weakness in L knee upon standing.   · Balance: standing dyn balance x 2 minutes, x 30 seconds, RW, CGA.     AM-PAC 6 CLICK MOBILITY  15    Patient left up in chair with call button in reach, nursing notified and daughter present.    GOALS:   Multidisciplinary Problems     Physical Therapy Goals        Problem: Physical Therapy    Goal Priority Disciplines Outcome Goal Variances Interventions   Physical Therapy Goal     PT, PT/OT Ongoing, Progressing     Description: Goals to be met by: 2022:    Patient will increase functional independence with mobility by performin. Supine to sit with Maximum Assistance.  2. Sit to supine with Maximum Assistance.  3. Rolling to Left and Right with Moderate Assistance.  4. Sit to stand transfer with Contact Guard Assistance. -met  Updated 2022: Sit to stand transfer with supervision. -met  Updated 2022: Sit to stand transfer with [place].     5. Bed to chair transfer with Contact Guard Assistance using Rolling Walker. -met  Updated 2022: Bed to chair transfer with [place] using Rolling Walker.     6. Gait x 100 feet with Maximum Assistance using Rolling Walker.   7. Wheelchair propulsion x 50 feet with Minimal Assistance using bilateral upper extremities.  8. Ascend/Descend 4 inch  curb step with Maximum Assistance using Rolling Walker.  9. Sitting at edge of bed x 20 minutes with Supervision performing dynamic sitting balance activities.  10. Lower extremity exercise program x 30 reps per handout, with supervision.                     Time Tracking:     PT Received On: 07/11/22  PT Start Time: 1001  PT Stop Time: 1036  PT Total Time (min): 35 min    Billable Minutes: Gait Training 15 and Therapeutic Activity 20    Treatment Type: Treatment  PT/PTA: PTA     PTA Visit Number: 4     07/11/2022

## 2022-07-11 NOTE — PLAN OF CARE
Interdisciplinary team, Anup Sherman, Unit Director, Harmony Fermin, RN MDS Coordinator, Mary Oh PT, Rehab Supervisor, Lisbet Solomon LMSW, and Lisbet Leary, Dietician, spoke to patient and patient's daugther for care plan conference, weekly status update, and therapy progress update. Tentative discharge date set for 7/20/22.

## 2022-07-12 LAB
ANION GAP SERPL CALC-SCNC: 9 MMOL/L (ref 8–16)
BASOPHILS # BLD AUTO: 0.02 K/UL (ref 0–0.2)
BASOPHILS NFR BLD: 0.3 % (ref 0–1.9)
BUN SERPL-MCNC: 14 MG/DL (ref 8–23)
CALCIUM SERPL-MCNC: 9.2 MG/DL (ref 8.7–10.5)
CHLORIDE SERPL-SCNC: 103 MMOL/L (ref 95–110)
CO2 SERPL-SCNC: 29 MMOL/L (ref 23–29)
CREAT SERPL-MCNC: 0.8 MG/DL (ref 0.5–1.4)
DIFFERENTIAL METHOD: ABNORMAL
EOSINOPHIL # BLD AUTO: 0.2 K/UL (ref 0–0.5)
EOSINOPHIL NFR BLD: 3.2 % (ref 0–8)
ERYTHROCYTE [DISTWIDTH] IN BLOOD BY AUTOMATED COUNT: 14 % (ref 11.5–14.5)
EST. GFR  (AFRICAN AMERICAN): >60 ML/MIN/1.73 M^2
EST. GFR  (NON AFRICAN AMERICAN): >60 ML/MIN/1.73 M^2
GLUCOSE SERPL-MCNC: 118 MG/DL (ref 70–110)
HCT VFR BLD AUTO: 28 % (ref 37–48.5)
HGB BLD-MCNC: 8.8 G/DL (ref 12–16)
IMM GRANULOCYTES # BLD AUTO: 0.02 K/UL (ref 0–0.04)
IMM GRANULOCYTES NFR BLD AUTO: 0.3 % (ref 0–0.5)
LYMPHOCYTES # BLD AUTO: 2 K/UL (ref 1–4.8)
LYMPHOCYTES NFR BLD: 32.1 % (ref 18–48)
MAGNESIUM SERPL-MCNC: 1.7 MG/DL (ref 1.6–2.6)
MCH RBC QN AUTO: 30.9 PG (ref 27–31)
MCHC RBC AUTO-ENTMCNC: 31.4 G/DL (ref 32–36)
MCV RBC AUTO: 98 FL (ref 82–98)
MONOCYTES # BLD AUTO: 0.7 K/UL (ref 0.3–1)
MONOCYTES NFR BLD: 11 % (ref 4–15)
NEUTROPHILS # BLD AUTO: 3.3 K/UL (ref 1.8–7.7)
NEUTROPHILS NFR BLD: 53.1 % (ref 38–73)
NRBC BLD-RTO: 0 /100 WBC
PHOSPHATE SERPL-MCNC: 3.8 MG/DL (ref 2.7–4.5)
PLATELET # BLD AUTO: 284 K/UL (ref 150–450)
PMV BLD AUTO: 9.8 FL (ref 9.2–12.9)
POTASSIUM SERPL-SCNC: 3.9 MMOL/L (ref 3.5–5.1)
RBC # BLD AUTO: 2.85 M/UL (ref 4–5.4)
SODIUM SERPL-SCNC: 141 MMOL/L (ref 136–145)
WBC # BLD AUTO: 6.26 K/UL (ref 3.9–12.7)

## 2022-07-12 PROCEDURE — 83735 ASSAY OF MAGNESIUM: CPT | Performed by: NURSE PRACTITIONER

## 2022-07-12 PROCEDURE — 25000003 PHARM REV CODE 250: Performed by: HOSPITALIST

## 2022-07-12 PROCEDURE — 11000004 HC SNF PRIVATE

## 2022-07-12 PROCEDURE — 97530 THERAPEUTIC ACTIVITIES: CPT | Mod: CO

## 2022-07-12 PROCEDURE — 80048 BASIC METABOLIC PNL TOTAL CA: CPT | Performed by: NURSE PRACTITIONER

## 2022-07-12 PROCEDURE — 97530 THERAPEUTIC ACTIVITIES: CPT | Mod: CQ

## 2022-07-12 PROCEDURE — 97116 GAIT TRAINING THERAPY: CPT | Mod: CQ

## 2022-07-12 PROCEDURE — 36415 COLL VENOUS BLD VENIPUNCTURE: CPT | Performed by: NURSE PRACTITIONER

## 2022-07-12 PROCEDURE — 84100 ASSAY OF PHOSPHORUS: CPT | Performed by: NURSE PRACTITIONER

## 2022-07-12 PROCEDURE — 25000003 PHARM REV CODE 250: Performed by: NURSE PRACTITIONER

## 2022-07-12 PROCEDURE — 85025 COMPLETE CBC W/AUTO DIFF WBC: CPT | Performed by: NURSE PRACTITIONER

## 2022-07-12 RX ORDER — LANOLIN ALCOHOL/MO/W.PET/CERES
400 CREAM (GRAM) TOPICAL 2 TIMES DAILY
Status: DISPENSED | OUTPATIENT
Start: 2022-07-12 | End: 2022-07-14

## 2022-07-12 RX ADMIN — LORAZEPAM 0.5 MG: 0.5 TABLET ORAL at 08:07

## 2022-07-12 RX ADMIN — APIXABAN 5 MG: 2.5 TABLET, FILM COATED ORAL at 08:07

## 2022-07-12 RX ADMIN — ACETAMINOPHEN AND CODEINE PHOSPHATE 1 TABLET: 300; 30 TABLET ORAL at 05:07

## 2022-07-12 RX ADMIN — CHOLECALCIFEROL TAB 25 MCG (1000 UNIT) 1000 UNITS: 25 TAB at 08:07

## 2022-07-12 RX ADMIN — Medication 1 TABLET: at 08:07

## 2022-07-12 RX ADMIN — PANTOPRAZOLE SODIUM 40 MG: 40 TABLET, DELAYED RELEASE ORAL at 08:07

## 2022-07-12 RX ADMIN — DICLOFENAC SODIUM 4 G: 10 GEL TOPICAL at 08:07

## 2022-07-12 RX ADMIN — LORAZEPAM 0.5 MG: 0.5 TABLET ORAL at 02:07

## 2022-07-12 RX ADMIN — LORAZEPAM 0.5 MG: 0.5 TABLET ORAL at 12:07

## 2022-07-12 RX ADMIN — Medication 400 MG: at 08:07

## 2022-07-12 RX ADMIN — FUROSEMIDE 20 MG: 20 TABLET ORAL at 08:07

## 2022-07-12 RX ADMIN — DICLOFENAC SODIUM 4 G: 10 GEL TOPICAL at 03:07

## 2022-07-12 NOTE — PROGRESS NOTES
[facility]                                  Skilled Nursing Facility                   Progress Note     Admit Date: 2022  USMAN TBD  Principal Problem:  Acute acute pulmonary embolism  HPI obtained from patient interview and chart review   Principal problem: Acute pulmonary embolism with acute cor pulmonale    Chief Complaint:  Continue left knee pain    HPI:   Lupis Murcia is a 71 y.o. female w/ PMHx of PE during pregnancy requiring heparin drip, chronic lower extremity lymphedema, multiple sclerosis, vitamin D deficiency who presented to SNF following hospitalization for pulmonary embolism.  Admission to SNF for secondary weakness and debility.    Interval History:.  Last week patient reported new left knee pain due to new exercises completed during therapy.  Pain initially had started to improve but today patient states the pain has returned and she is experiencing instability.  Will order x-ray for further evaluation along with Voltaren gel.    Past Medical History: Patient has a past medical history of Lymphedema, MS (multiple sclerosis), Other pulmonary embolism without acute cor pulmonale, and Vitamin B12 deficiency.    Past Surgical History: Patient has a past surgical history that includes  section; Breast cyst excision (Left); and Esophagogastroduodenoscopy (N/A, 2022).    Social History: Patient reports that she has never smoked. She has never used smokeless tobacco. She reports that she does not drink alcohol and does not use drugs.    Family History: family history includes Brain cancer in her brother; Coronary artery disease in her father; Lung cancer in her father and mother.    Allergies: Patient is allergic to contrast media, pcn [penicillins], celebrex [celecoxib], diazepam, and motrin [ibuprofen].    ROS  Constitutional: Negative for fever   Eyes: Negative for blurred vision, double vision    Respiratory: +SOB on exertion.  Negative for cough  Cardiovascular: Negative for chest pain, palpitations, and leg swelling.   Gastrointestinal: Negative for abdominal pain, constipation, diarrhea, nausea, vomiting.   Genitourinary: Negative for dysuria, frequency   Musculoskeletal:  + generalized weakness, BLE weakness.  +left knee pain  Skin: Negative for itching and rash.   Neurological: Negative for dizziness, headaches.   Psychiatric/Behavioral: Negative for depression. The patient is not nervous/anxious.      24 hour Vital Sign Range   Temp:  [98 °F (36.7 °C)-98.2 °F (36.8 °C)]   Pulse:  [94-95]   Resp:  [18]   BP: (134-161)/(66-79)   SpO2:  [95 %-96 %]     PEx  Constitutional: Patient appears debilitated.  No distress noted  HENT:   Head: Normocephalic and atraumatic.   Eyes: Pupils are equal, round  Neck: Normal range of motion. Neck supple.   Cardiovascular: Normal rate, regular rhythm and normal heart sounds.    Pulmonary/Chest: Effort normal and breath sounds are clear  Abdominal: Soft. Bowel sounds are normal.   Musculoskeletal: Normal range of motion.   Neurological: Alert and oriented to person, place, and time.   Psychiatric: Normal mood and affect. Behavior is normal.   Skin: Skin is warm and dry.     No results for input(s): GLUCOSE, NA, K, CL, CO2, BUN, CREATININE, MG in the last 24 hours.    Invalid input(s):  CALCIUM    No results for input(s): WBC, RBC, HGB, HCT, PLT, MCV, MCH, MCHC in the last 24 hours.      Assessment and Plan:    Left knee pain  - injury from therapy per patient  - initiating left knee x-ray  - initiating Voltaren gel 4 g TID, okay to leave a bedside    Acute pulmonary embolism with acute cor pulmonale  - continue Eliquis 10mg PO BID til 6/22 followed by Eliquis 5mg PO BID starting 6/23 indefinitely  - 7/8 tolerating RA, continue to monitor SpO2 closely    Acute hypoxemic respiratory failure  - 2/2 PE  - On 2L NC, wean off NC as tolerated  - continue lasix 20 mg  daily    Prediabetes  - Hgb A1C 6.2 on 5/3/22  - Prediabetes education given. Verbalized understanding   - pt refusing BG checks. discontinue BG checks and SSI  - BG stable. Remains on regular diet per pt request. WCTM.      Class 2 obesity due to excess calories in adult  - Body mass index is 37.74 kg/m².   - Morbid obesity complicates all aspects of disease management from diagnostic modalities to treatment.   - Weight loss encouraged and health benefits explained to patient.                Lymphedema  - Chronic and stable  - No acute issues     Vitamin D deficiency  - continue vitamin D 1000 units daily      MS (multiple sclerosis)  - PT/OT  - f/u with out pt provider      Muscle weakness of lower extremity  - PT/OT     Anemia of chronic disease  - Monitor for bleeding on Eliquis     Insomnia secondary to chronic pain  - continue melatonin     Anxiety  - continue lorazepam 0.5 mg Q 8 hr prn anxiety    Debility   - Continue with PT/OT for gait training and strengthening and restoration of ADL's   - Encourage mobility, OOB in chair, and early ambulation as appropriate  - Fall precautions   - Monitor for bowel and bladder dysfunction  - Monitor for and prevent skin breakdown and pressure ulcers  - Continue DVT prophylaxis with eliquis    Anticipate disposition:  Home with home health      Follow-up needed during SNF stay-    Follow-up needed after discharge from SNF:   - PCP, hospital follow-up, 7/14    No future appointments.      Amy Conklin NP  Department of Hospital Medicine   [facility]     DOS: 7/11/2022       Patient note was created using MModal Dictation.  Any errors in syntax or even information may not have been identified and edited on initial review prior to signing this note.

## 2022-07-12 NOTE — PT/OT/SLP PROGRESS
Occupational Therapy   Treatment    Name: Lupis Murcia  MRN: [id number]  Admit Date: 6/20/2022  Admitting Diagnosis:  Acute pulmonary embolism with acute cor pulmonale    General Precautions: Standard, fall   Orthopedic Precautions:N/A   Braces:       Recommendations:     Discharge Recommendations: home health OT  Level of Assistance Recommended at Discharge: 24 hours physical assistance for all ADL's and home management tasks  Discharge Equipment Recommendations:  bedside commode, grab bar, raised toilet, bath bench  Barriers to discharge:   (Increased caregiver support.)    Assessment:     Lupis Murcia is a 71 y.o. female with a medical diagnosis of Acute pulmonary embolism with acute cor pulmonale.  She presents with  Performance deficits affecting function are weakness, impaired endurance, impaired self care skills, impaired functional mobility, gait instability, impaired balance, decreased ROM, decreased lower extremity function, edema, impaired cardiopulmonary response to activity, decreased safety awareness, impaired skin, decreased coordination    Pt. Was cooperative and participated well with session on this day. Pt. Mildly anxious on this day due to fear of falling and LLE buckling upon standing. Pt. With complaint of discomfort in BLE's Pt.   continues to demonstrate levels of physical deficits with  functional indep with daily management activities tasks, selfcare skills with balance,  functional mobility, UB strength and endurance. Pt. Will continue to benefit from continued OT to progress towards goals     Rehab Potential is fair    Activity tolerance:  Fair    Plan:     Patient to be seen 6 x/week to address the above listed problems via self-care/home management, therapeutic activities, therapeutic exercises, wheelchair management/training    · Plan of Care Expires: 07/15/22  · Plan of Care Reviewed with: patient    Subjective     Communicated with: nsg and Pt prior to session.I am just so  scared not I don't want my knee to buckle    Pain/Comfort:  Pain Rating 1: 5/10  Location - Side 1: Bilateral  Location - Orientation 1: lateral  Location 1: foot  Pain Addressed 1: Pre-medicate for activity, Reposition, Distraction  Pain Rating Post-Intervention 1: 5/10    Patient's cultural, spiritual, Holiness conflicts given the current situation:  no    Objective:     Patient found up in chair  With daughter present  upon OT entry to room.    Functional Mobility/Transfers:  Patient completed Sit <> Stand Transfer with minimum assistance  with  rolling walker  with lateral side steps from chair to w/c with cues for safety Pt. With LLE slightly buckling during transitional phase     Activities of Daily Living:    · Toileting: minimum assistance for cleaning and diaper management assistance         [facility] 6 Click ADL: 16    OT Exercises: UE Ergometer x 10 min    Treatment & Education:    Pt. With 2# dowel activity with 2x10 reps with  shd flex, bicep curls  and forward flex motion to increase BUE ROM and strength.    Pt. With therex performed to increase ROM, endurance selfcare task and fxl mobility for independence     Pt. With therex performed to increase ROM, endurance selfcare task and fxl mobility for independence     Pt edu on role of OT, POC, safety when performing self care tasks , benefit of performing OOB activity, and safety when performing functional transfers and mobility management for preparation with goals to progress towards next level of care    Patient left up in chair with all lines intact and call button in reachEducation:   and daughter present     GOALS:   Multidisciplinary Problems     Occupational Therapy Goals        Problem: Occupational Therapy    Goal Priority Disciplines Outcome Interventions   Occupational Therapy Goal     OT, PT/OT Ongoing, Progressing    Description: Goals to be met by: 7/12/2022    Patient will increase functional independence with ADLs by performing:    UE  Dressing with Set-up Assistance.-MET  LE Dressing, including donning/doffing pants, with Minimal Assistance using appropriate AE.-Not MET  Grooming while seated at sink with Set-up Assistance.MET  Toileting from bedside commode with Minimal Assistance for hygiene and clothing management. -Not MET  Bathing from sitting at sink with Minimal Assistance.Not MET  Supine to sit with Stand-by Assistance.Not MET  Sit to stand transfer with Stand-by Assistance with RW.-Not MET  Toilet transfer to bedside commode with Stand-by Assistance with RW and grab bar.-Not MET  Upper extremity exercise program with Supervision.-MET  Caregiver will be educated on level of assist required to safely perform self care tasks and functional transfers.MET                        Time Tracking:     OT Date of Treatment: 07/12/22  OT Start Time: 1125    OT Stop Time: 1205  OT Total Time (min): 40 min    Billable Minutes:Therapeutic Activity 38    7/12/2022

## 2022-07-12 NOTE — PROGRESS NOTES
[facility]                                  Skilled Nursing Facility                   Progress Note     Admit Date: 2022  USMAN TBD  Principal Problem:  Acute acute pulmonary embolism  HPI obtained from patient interview and chart review   Principal problem: Acute pulmonary embolism with acute cor pulmonale    Chief Complaint:  Re-evaluation of medical treatment and therapy status: Lab review, evaluation of left knee x-ray    HPI:   Lupis Murcia is a 71 y.o. female w/ PMHx of PE during pregnancy requiring heparin drip, chronic lower extremity lymphedema, multiple sclerosis, vitamin D deficiency who presented to SNF following hospitalization for pulmonary embolism.  Admission to SNF for secondary weakness and debility.    Interval History:. All of today's labs reviewed and are listed below.  Mag 1.7.  24 hr vital sign ranges listed below.  Patient continues to tolerate room air.  Patient with left knee instability and pain for after therapy session last week.  X-ray complete if for acute abnormality.  Patient denies shortness of breath, abdominal discomfort, nausea, or vomiting.  Patient reports an adequate appetite.  Patient denies dysuria.  Patient reports having regular bowel movements.  Patient progessing with PT/OT-Gait: x 2', RW, Min A (for initiation of R foot) to step. Pt limited in gait due to c/o pain and weakness in L knee upon standing. Continuing to follow and treat all acute and chronic conditions.    Past Medical History: Patient has a past medical history of Lymphedema, MS (multiple sclerosis), Other pulmonary embolism without acute cor pulmonale, and Vitamin B12 deficiency.    Past Surgical History: Patient has a past surgical history that includes  section; Breast cyst excision (Left); and Esophagogastroduodenoscopy (N/A, 2022).    Social History: Patient reports that she has never smoked. She has never  used smokeless tobacco. She reports that she does not drink alcohol and does not use drugs.    Family History: family history includes Brain cancer in her brother; Coronary artery disease in her father; Lung cancer in her father and mother.    Allergies: Patient is allergic to contrast media, pcn [penicillins], celebrex [celecoxib], diazepam, and motrin [ibuprofen].    ROS  Constitutional: Negative for fever   Eyes: Negative for blurred vision, double vision   Respiratory: +SOB on exertion.  Negative for cough  Cardiovascular: Negative for chest pain, palpitations, and leg swelling.   Gastrointestinal: Negative for abdominal pain, constipation, diarrhea, nausea, vomiting.   Genitourinary: Negative for dysuria, frequency   Musculoskeletal:  + generalized weakness, BLE weakness.  +left knee pain  Skin: Negative for itching and rash.   Neurological: Negative for dizziness, headaches.   Psychiatric/Behavioral: Negative for depression. The patient is not nervous/anxious.      24 hour Vital Sign Range   Temp:  [98.3 °F (36.8 °C)-98.7 °F (37.1 °C)]   Pulse:  [93]   Resp:  [16-18]   BP: (139-157)/(67-72)   SpO2:  [94 %-95 %]     PEx  Constitutional: Patient appears debilitated.  No distress noted  HENT:   Head: Normocephalic and atraumatic.   Eyes: Pupils are equal, round  Neck: Normal range of motion. Neck supple.   Cardiovascular: Normal rate, regular rhythm and normal heart sounds.    Pulmonary/Chest: Effort normal and breath sounds are clear  Abdominal: Soft. Bowel sounds are normal.   Musculoskeletal: Normal range of motion.   Neurological: Alert and oriented to person, place, and time.   Psychiatric: Normal mood and affect. Behavior is normal.   Skin: Skin is warm and dry.     Recent Labs   Lab 07/12/22  0453      K 3.9      CO2 29   BUN 14   CREATININE 0.8   MG 1.7       Recent Labs   Lab 07/12/22  0453   WBC 6.26   RBC 2.85*   HGB 8.8*   HCT 28.0*      MCV 98   MCH 30.9   MCHC 31.4*          Assessment and Plan:    Left knee pain  - injury from therapy per patient  - initiating Voltaren gel 4 g TID, okay to leave a bedside  - 7/12 left knee x-ray without acute abnormality, pain better today    Hypomagnesemia  - initiated magnesium oxide 400 mg BID x2 days    Acute pulmonary embolism with acute cor pulmonale  - continue Eliquis 10mg PO BID til 6/22 followed by Eliquis 5mg PO BID starting 6/23 indefinitely  - 7/12 tolerating RA, continue to monitor SpO2 closely    Acute hypoxemic respiratory failure  - 2/2 PE  - On 2L NC, wean off NC as tolerated  - continue lasix 20 mg daily    Prediabetes  - Hgb A1C 6.2 on 5/3/22  - Prediabetes education given. Verbalized understanding   - pt refusing BG checks. discontinue BG checks and SSI  - BG stable. Remains on regular diet per pt request. WCTM.      Class 2 obesity due to excess calories in adult  - Body mass index is 37.74 kg/m².   - Morbid obesity complicates all aspects of disease management from diagnostic modalities to treatment.   - Weight loss encouraged and health benefits explained to patient.                Lymphedema  - Chronic and stable  - No acute issues     Vitamin D deficiency  - continue vitamin D 1000 units daily      MS (multiple sclerosis)  - PT/OT  - f/u with out pt provider      Muscle weakness of lower extremity  - PT/OT     Anemia of chronic disease  - Monitor for bleeding on Eliquis     Insomnia secondary to chronic pain  - continue melatonin     Anxiety  - continue lorazepam 0.5 mg Q 8 hr prn anxiety    Debility   - Continue with PT/OT for gait training and strengthening and restoration of ADL's   - Encourage mobility, OOB in chair, and early ambulation as appropriate  - Fall precautions   - Monitor for bowel and bladder dysfunction  - Monitor for and prevent skin breakdown and pressure ulcers  - Continue DVT prophylaxis with eliquis    Anticipate disposition:  Home with home health      Follow-up needed during SNF  stay-    Follow-up needed after discharge from SNF:   - PCP, hospital follow-up, 7/14    Future Appointments   Date Time Provider Department Center   7/28/2022  3:00 PM Samir Sahni MD [facility]         Amy Conklin NP  Department of Hospital Medicine   [facility]     DOS: 7/12/2022       Patient note was created using MModal Dictation.  Any errors in syntax or even information may not have been identified and edited on initial review prior to signing this note.

## 2022-07-12 NOTE — PT/OT/SLP PROGRESS
Physical Therapy Treatment    Patient Name:  Lupis Murcia   MRN:  [id number]  Admit Date: 6/20/2022  Admitting Diagnosis: Acute pulmonary embolism with acute cor pulmonale  Recent Surgeries: N/A    General Precautions: Standard, fall   Orthopedic Precautions:N/A   Braces: N/A     Recommendations:     Discharge Recommendations:  home health PT   Level of Assistance Recommended at Discharge: Intermittent assistance   Discharge Equipment Recommendations: bedside commode, grab bar, raised toilet, shower chair, walker, rolling   Barriers to discharge: Decreased caregiver support (increase assist needed)    Assessment:     Lupis Murcia is a 71 y.o. female admitted with a medical diagnosis of Acute pulmonary embolism with acute cor pulmonale. Pt tolerated therapy session fairly, with c/o pain in B feet near arches/underneath feet. Pt presents with more difficulty standing today and unable to take any steps with RW, secondary to c/o pain in feet and fear of falling. Pt would continue to benefit from skilled PT services per POC.      Performance deficits affecting function:  weakness, impaired endurance, impaired sensation, impaired self care skills, impaired functional mobilty, gait instability, impaired balance, decreased lower extremity function, abnormal tone, decreased ROM, impaired skin, edema, impaired cardiopulmonary response to activity, impaired joint extensibility .    Rehab Potential is fair    Activity Tolerance: Fair    Plan:     Patient to be seen 6 x/week to address the above listed problems via gait training, therapeutic activities, therapeutic exercises, neuromuscular re-education, wheelchair management/training    · Plan of Care Expires: 07/30/22  · Plan of Care Reviewed with: patient    Subjective     Pt agreeable to PT.     Pain/Comfort:  · Pain Rating 1: 7/10  · Location - Side 1: Bilateral  · Location - Orientation 1:  (inferior/arch of feet)  · Location 1: foot  · Pain Addressed 1: Other (see  comments), Cessation of Activity, Reposition  · Pain Rating Post-Intervention 1: 7/10    Patient's cultural, spiritual, Oriental orthodox conflicts given the current situation:  · no    Objective:     Communicated with OT prior to session.  Patient found up in chair with  (no lines) upon PT entry to room.     Therapeutic Activities and Exercises: pt instructed on gastrocs and ant tibialis stretch x 30 seconds, 2 sets in order to assist with heel pain and foot pain. Pt also instructed on toe curls and static hold into DF/PF to improve pt's gastroc/ant tib strength.     Functional Mobility:  · Scooting: posteriorly in w/c, Max A x 2, w/c arm rests and draw sheet  · Sit to Stand: 4 sets, moderate assistance and of 2 persons with rolling walker by pulling up from RW.   · Gait: pt unable to take any steps after several tries at RW, due to c/o pain in B feet and fear of falling. Pt received encouragement to attempt to step forward with verbal/tactile cues applied to RLE to initiate step.  · Balance: static standing x 30 seconds, x 25 seconds, x 35 seconds and x 15 seconds at RW, Mod A x 2 with cues on increasing R knee ext to improve pt's posture.    AM-PAC 6 CLICK MOBILITY  14    Patient left up in chair with call button in reach and daughter present.    GOALS:   Multidisciplinary Problems     Physical Therapy Goals        Problem: Physical Therapy    Goal Priority Disciplines Outcome Goal Variances Interventions   Physical Therapy Goal     PT, PT/OT Ongoing, Progressing     Description: Goals to be met by: 2022:    Patient will increase functional independence with mobility by performin. Supine to sit with Maximum Assistance.  2. Sit to supine with Maximum Assistance.  3. Rolling to Left and Right with Moderate Assistance.  4. Sit to stand transfer with Contact Guard Assistance. -met  Updated 2022: Sit to stand transfer with supervision. -met  Updated 2022: Sit to stand transfer with [place].     5. Bed to  chair transfer with Contact Guard Assistance using Rolling Walker. -met  Updated 07/05/2022: Bed to chair transfer with [place] using Rolling Walker.     6. Gait x 100 feet with Maximum Assistance using Rolling Walker.   7. Wheelchair propulsion x 50 feet with Minimal Assistance using bilateral upper extremities.  8. Ascend/Descend 4 inch curb step with Maximum Assistance using Rolling Walker.  9. Sitting at edge of bed x 20 minutes with Supervision performing dynamic sitting balance activities.  10. Lower extremity exercise program x 30 reps per handout, with supervision.                     Time Tracking:     PT Received On: 07/12/22  PT Start Time: 1339  PT Stop Time: 1422  PT Total Time (min): 43 min    Billable Minutes: Gait Training 13 and Therapeutic Activity 30    Treatment Type: Treatment  PT/PTA: PTA     PTA Visit Number: 5 07/12/2022

## 2022-07-13 PROCEDURE — 25000003 PHARM REV CODE 250: Performed by: NURSE PRACTITIONER

## 2022-07-13 PROCEDURE — 25000003 PHARM REV CODE 250: Performed by: HOSPITALIST

## 2022-07-13 PROCEDURE — 97110 THERAPEUTIC EXERCISES: CPT

## 2022-07-13 PROCEDURE — 97530 THERAPEUTIC ACTIVITIES: CPT

## 2022-07-13 PROCEDURE — 11000004 HC SNF PRIVATE

## 2022-07-13 RX ORDER — GABAPENTIN 100 MG/1
100 CAPSULE ORAL 3 TIMES DAILY
Status: DISCONTINUED | OUTPATIENT
Start: 2022-07-13 | End: 2022-07-26 | Stop reason: HOSPADM

## 2022-07-13 RX ADMIN — DICLOFENAC SODIUM 4 G: 10 GEL TOPICAL at 08:07

## 2022-07-13 RX ADMIN — APIXABAN 5 MG: 2.5 TABLET, FILM COATED ORAL at 09:07

## 2022-07-13 RX ADMIN — LORAZEPAM 0.5 MG: 0.5 TABLET ORAL at 06:07

## 2022-07-13 RX ADMIN — PANTOPRAZOLE SODIUM 40 MG: 40 TABLET, DELAYED RELEASE ORAL at 08:07

## 2022-07-13 RX ADMIN — FUROSEMIDE 20 MG: 20 TABLET ORAL at 08:07

## 2022-07-13 RX ADMIN — DICLOFENAC SODIUM 4 G: 10 GEL TOPICAL at 03:07

## 2022-07-13 RX ADMIN — Medication 1 TABLET: at 08:07

## 2022-07-13 RX ADMIN — GABAPENTIN 100 MG: 100 CAPSULE ORAL at 09:07

## 2022-07-13 RX ADMIN — ACETAMINOPHEN AND CODEINE PHOSPHATE 1 TABLET: 300; 30 TABLET ORAL at 08:07

## 2022-07-13 RX ADMIN — ACETAMINOPHEN AND CODEINE PHOSPHATE 1 TABLET: 300; 30 TABLET ORAL at 01:07

## 2022-07-13 RX ADMIN — LORAZEPAM 0.5 MG: 0.5 TABLET ORAL at 08:07

## 2022-07-13 RX ADMIN — CHOLECALCIFEROL TAB 25 MCG (1000 UNIT) 1000 UNITS: 25 TAB at 08:07

## 2022-07-13 RX ADMIN — APIXABAN 5 MG: 2.5 TABLET, FILM COATED ORAL at 08:07

## 2022-07-13 RX ADMIN — GABAPENTIN 100 MG: 100 CAPSULE ORAL at 03:07

## 2022-07-13 RX ADMIN — Medication 400 MG: at 08:07

## 2022-07-13 NOTE — PLAN OF CARE
Pt tolerated session fairly with maximum encouragement.     Problem: Occupational Therapy  Goal: Occupational Therapy Goal  Description: Goals to be met by: 7/12/2022    Patient will increase functional independence with ADLs by performing:    UE Dressing with Set-up Assistance.  LE Dressing, including donning/doffing pants, with Minimal Assistance using appropriate AE.  Grooming while seated at sink with Set-up Assistance.  Toileting from bedside commode with Minimal Assistance for hygiene and clothing management.   Bathing from sitting at sink with Minimal Assistance.  Supine to sit with Stand-by Assistance.  Sit to stand transfer with Stand-by Assistance with RW.  Toilet transfer to bedside commode with Stand-by Assistance with RW and grab bar.  Upper extremity exercise program with Supervision.  Caregiver will be educated on level of assist required to safely perform self care tasks and functional transfers.       Outcome: Ongoing, Progressing

## 2022-07-13 NOTE — PT/OT/SLP PROGRESS
"Occupational Therapy   Treatment    Name: Lupis Murcia  MRN: [id number]  Admit Date: 6/20/2022  Admitting Diagnosis:  Acute pulmonary embolism with acute cor pulmonale    General Precautions: Standard, fall   Orthopedic Precautions:N/A   Braces:       Recommendations:     Discharge Recommendations: home health OT  Level of Assistance Recommended at Discharge: Intermittent assistance for ADL's and homemaking tasks  Discharge Equipment Recommendations:  bedside commode, grab bar, raised toilet, bath bench  Barriers to discharge:  increased skilled assistance    Assessment:     Lupis Murcia is a 71 y.o. female with a medical diagnosis of Acute pulmonary embolism with acute cor pulmonale.  Pt completed therapy session from upright chair with maximum encouragement to continue with POC, to prevent muscle waste.  Pt completed therapeutic exercises with BUE: chest presses, shoulder flexion/extension, and tricep stretches 10reps.  Pt completed 15min on ergometer with moderate resistance. Pt did not want to move feet due to pain, anticipating medical treatmen - this therapist moved feet with mod A to fit ergometer between knees.     She presents with performance deficits affecting function are weakness, impaired endurance, impaired self care skills, impaired functional mobilty, gait instability, impaired balance, decreased coordination, decreased lower extremity function, decreased safety awareness, pain, decreased ROM, impaired cardiopulmonary response to activity, impaired muscle length.     Rehab Potential is good    Activity tolerance:  Good    Plan:     Patient to be seen 3 x/week (Mon, Wed, Fri) to address the above listed problems via self-care/home management, therapeutic activities, therapeutic exercises, wheelchair management/training    · Plan of Care Expires: 07/15/22  · Plan of Care Reviewed with: patient, daughter    Subjective     "I already told Mo I don't need to do therapy today" (educated on " importance of daily therapy sessions)    Communicated with: LUCIEN Garcia prior to session.     Pain/Comfort:       Patient's cultural, spiritual, Congregational conflicts given the current situation:  no    Objective:     Patient found up in chair with oxygen upon OT entry to room.    Bed Mobility:    · Not completed this date    Functional Mobility/Transfers:  · Not completed this date 2* patient pain    Activities of Daily Living:  · Declined this date    [facility] 6 Click ADL:      OT Exercises:   -AROM - BUE: chest presses, shoulder flexion/extension, and tricep stretches 10reps.  Pt completed 15min on ergometer with moderate resistance.    -UE Ergometer -15min on ergometer with moderate resistance    Treatment & Education:     Pt educated on importance of maintaining routine with therapy to prevent muscle waste     Time provided for therapeutic counseling and discussion of health disposition.    Educated on importance of EOB/OOB mobility, maintaining routine, sitting up in chair, and maximizing independence with ADLs during admission    Pt completed ADLs and functional mobility for treatment session as noted above    Pt/caregiver verbalized understanding and expressed no further concerns/questions.   Updated communication board with level of assist required       Patient left up in chair with call button in reach and RN notifiedEducation:      GOALS:   Multidisciplinary Problems     Occupational Therapy Goals        Problem: Occupational Therapy    Goal Priority Disciplines Outcome Interventions   Occupational Therapy Goal     OT, PT/OT Ongoing, Progressing    Description: Goals to be met by: 8/1/2022    Patient will increase functional independence with ADLs by performing:    UE Dressing with Set-up Assistance.  LE Dressing, including donning/doffing pants, with Minimal Assistance using appropriate AE.  Grooming while seated at sink with Set-up Assistance.  Toileting from bedside commode with Minimal Assistance for  hygiene and clothing management.   Bathing from sitting at sink with Minimal Assistance.  Supine to sit with Stand-by Assistance.  Sit to stand transfer with Stand-by Assistance with RW.  Toilet transfer to bedside commode with Stand-by Assistance with RW and grab bar.  Upper extremity exercise program with Supervision.  Caregiver will be educated on level of assist required to safely perform self care tasks and functional transfers.                        Time Tracking:     OT Date of Treatment: 06/27/22  OT Start Time: 1155    OT Stop Time: 1211  OT Total Time (min): 16 min    Billable Minutes:Therapeutic Exercise 27    7/15/2022

## 2022-07-13 NOTE — PT/OT/SLP PROGRESS
Physical Therapy      Patient Name:  Lupis Murcia   MRN:  [id number]    Patient not seen today secondary to refusing d.t B l/E pain  . Will follow-up 7/14/22.    Amanda Stewart, PT  7/13/2022

## 2022-07-13 NOTE — PROGRESS NOTES
[facility] - Skilled Nursing  Adult Nutrition  Progress Note    SUMMARY       Recommendations    1. Continue regular diet.    Goals: PO to meet 85% of EEN with no weight gain by next RD fu  Nutrition Goal Status: progressing towards goal  Communication of RD Recs: other (comment) (POC)    Assessment and Plan  Nutrition Problem:  Obesity     Related to (etiology):   Excessive calorie intake over needs, lack of activity  Signs and Symptoms (as evidenced by):   Debility, BMI 37     Interventions/Recommendations (treatment strategy):  General diet  Nutrition education- weight loss  Collaboration with other providers     Nutrition Diagnosis Status:   Continues       Malnutrition Assessment     Skin (Micronutrient): bruised, cracked, thickened, scaly  Hair/Scalp (Micronutrient): dull  Eyes (Micronutrient): conjunctiva dull  Teeth (Micronutrient): broken dentition  Neck/Chest (Micronutrient): muscle wasting  Musculoskeletal/Lower Extremities: edema       Weight Loss (Malnutrition): greater than 5% in 1 month   Orbital Region (Subcutaneous Fat Loss): well nourished  Upper Arm Region (Subcutaneous Fat Loss): well nourished  Thoracic and Lumbar Region: well nourished   [place] Region (Muscle Loss): mild depletion  Clavicle Bone Region (Muscle Loss): mild depletion  Clavicle and Acromion Bone Region (Muscle Loss): mild depletion  Dorsal Hand (Muscle Loss): moderate depletion  Anterior Thigh Region (Muscle Loss): moderate depletion   Edema (Fluid Accumulation): 3-->moderate             Reason for Assessment    Reason For Assessment: RD follow-up  Diagnosis:  (PE)  Relevant Medical History: HANDY, anemia, chronic pain, MS, obesity, lymphedema, Vit B12 deficiency,  Interdisciplinary Rounds: attended  General Information Comments: Pt continues with good intake meals. Noted with 15 lb desirable wt loss since admission, most likely fluid-related as pt has lymphedema and reports the fluid has slowly been going down. Pt with mild muscle  "wasting per NFPE .  Nutrition Discharge Planning: general healthy diet, adequate protein    Nutrition Risk Screen    Nutrition Risk Screen: large or nonhealing wound, burn or pressure injury    Nutrition/Diet History    Patient Reported Diet/Restrictions/Preferences: general  Typical Food/Fluid Intake: likes to snack, regular meals  Spiritual, Cultural Beliefs, Taoism Practices, Values that Affect Care: no  Vitamin/Mineral/Herbal Supplements: Vit D, Vit B12,Vit B complex, Vit C,  Food Allergies: NKFA  Factors Affecting Nutritional Intake: None identified at this time    Anthropometrics    Temp: 98.7 °F (37.1 °C)  Height Method: Stated  Height: 5' 2" (157.5 cm)  Height (inches): 62 in  Weight Method: Standard Scale  Weight: 87 kg (191 lb 12.8 oz)  Weight (lb): 191.8 lb  Ideal Body Weight (IBW), Female: 110 lb  % Ideal Body Weight, Female (lb): 187.59 %  BMI (Calculated): 35.1  BMI Grade: 35 - 39.9 - obesity - grade II  Weight Loss: intentional  Usual Body Weight (UBW), k.6 kg  % Usual Body Weight: 94.17  % Weight Change From Usual Weight: -6.02 %       Lab/Procedures/Meds    Pertinent Labs Reviewed: reviewed  Pertinent Labs Comments: H/H 8.8/, Glu 118, A1c 6.2% (22)  Pertinent Medications Comments: vitamin B complex/C/D, lasix, gabapentin, senna, colace    Estimated/Assessed Needs    Weight Used For Calorie Calculations: 93.6 kg (206 lb 5.6 oz)  Energy Calorie Requirements (kcal): 1400  Energy Need Method: Emmons-St Jeor (x 1.0(PAL) 2/2 obesity)  Protein Requirements: 60g  Weight Used For Protein Calculations: 50 kg (110 lb 3.7 oz) (IBW kg 2/2 obesity x 1.2g/kg)  Fluid Requirements (mL): 1400 or per MD  Estimated Fluid Requirement Method: RDA Method  RDA Method (mL): 1400  CHO Requirement: -      Nutrition Prescription Ordered    Current Diet Order: Regular  Nutrition Order Comments: PO 50-75%    Evaluation of Received Nutrient/Fluid Intake    I/O: no data  Energy Calories Required: meeting " needs  Protein Required: meeting needs  Fluid Required: meeting needs  Comments: last BM 7/12  Tolerance: tolerating  % Intake of Estimated Energy Needs: 75 - 100 %  % Meal Intake: 75 - 100 %    Nutrition Risk    Level of Risk/Frequency of Follow-up: low     Monitor and Evaluation    Food and Nutrient Adminstration: diet order  Physical Activity and Function: nutrition-related ADLs and IADLs  Anthropometric Measurements: weight change  Biochemical Data, Medical Tests and Procedures: electrolyte and renal panel, gastrointestinal profile, glucose/endocrine profile, inflammatory profile  Nutrition-Focused Physical Findings: overall appearance, extremities, muscles and bones, head and eyes, skin     Nutrition Follow-Up    RD Follow-up?: Yes

## 2022-07-14 PROCEDURE — 25000003 PHARM REV CODE 250: Performed by: HOSPITALIST

## 2022-07-14 PROCEDURE — 97110 THERAPEUTIC EXERCISES: CPT

## 2022-07-14 PROCEDURE — 25000003 PHARM REV CODE 250: Performed by: NURSE PRACTITIONER

## 2022-07-14 PROCEDURE — 97530 THERAPEUTIC ACTIVITIES: CPT

## 2022-07-14 PROCEDURE — 97116 GAIT TRAINING THERAPY: CPT

## 2022-07-14 PROCEDURE — 11000004 HC SNF PRIVATE

## 2022-07-14 RX ADMIN — DICLOFENAC SODIUM 4 G: 10 GEL TOPICAL at 08:07

## 2022-07-14 RX ADMIN — LORAZEPAM 0.5 MG: 0.5 TABLET ORAL at 08:07

## 2022-07-14 RX ADMIN — APIXABAN 5 MG: 2.5 TABLET, FILM COATED ORAL at 08:07

## 2022-07-14 RX ADMIN — FUROSEMIDE 20 MG: 20 TABLET ORAL at 08:07

## 2022-07-14 RX ADMIN — Medication 400 MG: at 08:07

## 2022-07-14 RX ADMIN — PANTOPRAZOLE SODIUM 40 MG: 40 TABLET, DELAYED RELEASE ORAL at 08:07

## 2022-07-14 RX ADMIN — Medication 1 TABLET: at 08:07

## 2022-07-14 RX ADMIN — CHOLECALCIFEROL TAB 25 MCG (1000 UNIT) 1000 UNITS: 25 TAB at 08:07

## 2022-07-14 RX ADMIN — GABAPENTIN 100 MG: 100 CAPSULE ORAL at 04:07

## 2022-07-14 RX ADMIN — ACETAMINOPHEN AND CODEINE PHOSPHATE 1 TABLET: 300; 30 TABLET ORAL at 06:07

## 2022-07-14 NOTE — PROGRESS NOTES
[facility]                                  Skilled Nursing Facility                   Progress Note     Admit Date: 2022  USMAN TBD  Principal Problem:  Acute acute pulmonary embolism  HPI obtained from patient interview and chart review   Principal problem: Acute pulmonary embolism with acute cor pulmonale    Chief Complaint:  Re-evaluation of medical treatment and therapy status:  Neuropathy    HPI:   Lupis Murcia is a 71 y.o. female w/ PMHx of PE during pregnancy requiring heparin drip, chronic lower extremity lymphedema, multiple sclerosis, vitamin D deficiency who presented to SNF following hospitalization for pulmonary embolism.  Admission to SNF for secondary weakness and debility.    Interval History:A 24 hr vital sign ranges listed below.  Today, patient states her left knee pain is slightly improved but now she is experiencing neuropathy pains in both of her feet.  Patient states she feels pins and needles to the bottoms of both of her feet.  Discussed a trial of gabapentin, patient in agreement.  Patient denies shortness of breath, abdominal discomfort, nausea, or vomiting.  Patient reports an adequate appetite.  Patient denies dysuria.  Patient reports having regular bowel movements.  Patient progessing with PT/OT. Continuing to follow and treat all acute and chronic conditions.      Past Medical History: Patient has a past medical history of Lymphedema, MS (multiple sclerosis), Other pulmonary embolism without acute cor pulmonale, and Vitamin B12 deficiency.    Past Surgical History: Patient has a past surgical history that includes  section; Breast cyst excision (Left); and Esophagogastroduodenoscopy (N/A, 2022).    Social History: Patient reports that she has never smoked. She has never used smokeless tobacco. She reports that she does not drink alcohol and does not use drugs.    Family History: family  history includes Brain cancer in her brother; Coronary artery disease in her father; Lung cancer in her father and mother.    Allergies: Patient is allergic to contrast media, pcn [penicillins], celebrex [celecoxib], diazepam, and motrin [ibuprofen].    ROS  Constitutional: Negative for fever   Eyes: Negative for blurred vision, double vision   Respiratory: +SOB on exertion.  Negative for cough  Cardiovascular: Negative for chest pain, palpitations, and leg swelling.   Gastrointestinal: Negative for abdominal pain, constipation, diarrhea, nausea, vomiting.   Genitourinary: Negative for dysuria, frequency   Musculoskeletal:  + generalized weakness, BLE weakness.  +left knee pain, improving.  Neuropathy pain  Skin: Negative for itching and rash.   Neurological: Negative for dizziness, headaches.   Psychiatric/Behavioral: Negative for depression. The patient is not nervous/anxious.      24 hour Vital Sign Range   Temp:  [98.2 °F (36.8 °C)-98.7 °F (37.1 °C)]   Pulse:  [92-97]   Resp:  [16-20]   BP: (144-151)/(65-76)   SpO2:  [94 %-95 %]     PEx  Constitutional: Patient appears debilitated.  No distress noted  HENT:   Head: Normocephalic and atraumatic.   Eyes: Pupils are equal, round  Neck: Normal range of motion. Neck supple.   Cardiovascular: Normal rate, regular rhythm and normal heart sounds.    Pulmonary/Chest: Effort normal and breath sounds are clear  Abdominal: Soft. Bowel sounds are normal.   Musculoskeletal: Normal range of motion.   Neurological: Alert and oriented to person, place, and time.   Psychiatric: Normal mood and affect. Behavior is normal.   Skin: Skin is warm and dry.     No results for input(s): GLUCOSE, NA, K, CL, CO2, BUN, CREATININE, MG in the last 24 hours.    Invalid input(s):  CALCIUM    No results for input(s): WBC, RBC, HGB, HCT, PLT, MCV, MCH, MCHC in the last 24 hours.      Assessment and Plan:    neuropathy pain  - initiated gabapentin 100 mg TID    Left knee pain  - injury from  therapy per patient  - initiating Voltaren gel 4 g TID, okay to leave a bedside  - 7/12 left knee x-ray without acute abnormality  - 7/13 pain better today    Hypomagnesemia  - initiated magnesium oxide 400 mg BID x2 days    Acute pulmonary embolism with acute cor pulmonale  - continue Eliquis 10mg PO BID til 6/22 followed by Eliquis 5mg PO BID starting 6/23 indefinitely  - 7/12 tolerating RA, continue to monitor SpO2 closely    Acute hypoxemic respiratory failure  - 2/2 PE  - On 2L NC, wean off NC as tolerated  - continue lasix 20 mg daily    Prediabetes  - Hgb A1C 6.2 on 5/3/22  - Prediabetes education given. Verbalized understanding   - pt refusing BG checks. discontinue BG checks and SSI  - BG stable. Remains on regular diet per pt request. WCTM.      Class 2 obesity due to excess calories in adult  - Body mass index is 37.74 kg/m².   - Morbid obesity complicates all aspects of disease management from diagnostic modalities to treatment.   - Weight loss encouraged and health benefits explained to patient.                Lymphedema  - Chronic and stable  - No acute issues     Vitamin D deficiency  - continue vitamin D 1000 units daily      MS (multiple sclerosis)  - PT/OT  - f/u with out pt provider      Muscle weakness of lower extremity  - PT/OT     Anemia of chronic disease  - Monitor for bleeding on Eliquis     Insomnia secondary to chronic pain  - continue melatonin     Anxiety  - continue lorazepam 0.5 mg Q 8 hr prn anxiety    Debility   - Continue with PT/OT for gait training and strengthening and restoration of ADL's   - Encourage mobility, OOB in chair, and early ambulation as appropriate  - Fall precautions   - Monitor for bowel and bladder dysfunction  - Monitor for and prevent skin breakdown and pressure ulcers  - Continue DVT prophylaxis with eliquis    Anticipate disposition:  Home with home health      Follow-up needed during SNF stay-    Follow-up needed after discharge from SNF:   - PCP, hospital  follow-up, 7/14    Future Appointments   Date Time Provider Department Center   7/28/2022  3:00 PM Samir Sahni MD [facility]         Amy Conklin NP  Department of Hospital Medicine   [facility] Sk[facility]     DOS: 7/13/2022       Patient note was created using MModal Dictation.  Any errors in syntax or even information may not have been identified and edited on initial review prior to signing this note.

## 2022-07-14 NOTE — PT/OT/SLP PROGRESS
"Occupational Therapy   Treatment    Name: Lupis Murcia  MRN: [id number]  Admit Date: 6/20/2022  Admitting Diagnosis:  Acute pulmonary embolism with acute cor pulmonale    General Precautions: Standard, fall   Orthopedic Precautions:N/A   Braces: N/A     Recommendations:     Discharge Recommendations: home health OT  Level of Assistance Recommended at Discharge: 24 hours light assistance for ADL's and homemaking tasks  Discharge Equipment Recommendations:  bedside commode, grab bar, raised toilet, bath bench  Barriers to discharge:  Decreased caregiver support (increased skilled assistance needed)    Assessment:     Lupis Murcia is a 71 y.o. female with a medical diagnosis of Acute pulmonary embolism with acute cor pulmonale. Pt emotional this date, requiring therapeutic listening.  Pt tolerated therapy session well with maximum encouragement to complete activity in standing.  Pt completed functional mobility with RW and CGA within hospital room, ~30ft with 1 rest break in upright chair (daughter followed).  Pt able to stand for LBD/toileting with CGA and RW.      She presents with performance deficits affecting function are weakness, impaired endurance, impaired sensation, impaired self care skills, impaired functional mobilty, gait instability, impaired balance, decreased ROM, decreased lower extremity function, decreased coordination, abnormal tone, impaired fine motor, impaired skin, impaired cardiopulmonary response to activity.     Rehab Potential is good    Activity tolerance:  Good    Plan:     Patient to be seen 6 x/week to address the above listed problems via self-care/home management, therapeutic activities, therapeutic exercises, wheelchair management/training    · Plan of Care Expires: 07/15/22  · Plan of Care Reviewed with: patient, daughter    Subjective     Communicated with: Tiana prior to session.     "I'll do it but I'm staying in here"   "I don't want to go in the bathroom because I fell " "in my bathroom at home and I still have trauma"     Pain/Comfort:  · Pain Rating 1: 0/10 (not rated)  · Pain Rating Post-Intervention 1: 0/10    Patient's cultural, spiritual, [employer] conflicts given the current situation:  · no    Objective:     Patient found up in chair with  (no lines) upon OT entry to room. Daughter present. RN present at beginning of session    Bed Mobility:    ·  Not completed this date    Functional Mobility/Transfers:  · Patient completed Sit <> Stand Transfer with across 3 trials  · 1st trial: min A  with  rolling walker   · 2nd trial: min A with RW  · 3rd trial (rest break): CGA with RW  · Functional Mobility: Pt engaging in functional mobility to simulate household/community distances approx 30ft  with Francie/CGA and utilizing RW in order to maximize functional activity tolerance and standing balance required for engagement in occupations of choice.     Activities of Daily Living:  · Upper Body Dressing: set up assistance  donning night gown  · Lower Body Dressing: maximal assistance doffing soiled diaper and donning fresh diaper  · Toileting: maximal assistance posterior/anterior/pavan care    [facility] 6 Click ADL: 16      Treatment & Education:   Therapeutic Listening provided for pt's emotional lability and discussion of anxieties/role of OT   POC was dicussed with patient/caregiver, who was included in its development and is in agreement with the identified goals and treatment plan.    Patient and family aware of patient's deficits and therapy progression.    Time provided for therapeutic counseling and discussion of health disposition.    Educated on importance of EOB/OOB mobility, maintaining routine, sitting up in chair, and maximizing independence with ADLs during admission    Pt completed ADLs and functional mobility for treatment session as noted above    Pt/caregiver verbalized understanding and expressed no further concerns/questions.   Updated communication board with " level of assist required       Patient left up in chair with call button in reach, RN notified and daughter presentEducation:      GOALS:   Multidisciplinary Problems     Occupational Therapy Goals        Problem: Occupational Therapy    Goal Priority Disciplines Outcome Interventions   Occupational Therapy Goal     OT, PT/OT Ongoing, Progressing    Description: Goals to be met by: 7/12/2022    Patient will increase functional independence with ADLs by performing:    UE Dressing with Set-up Assistance.  LE Dressing, including donning/doffing pants, with Minimal Assistance using appropriate AE.  Grooming while seated at sink with Set-up Assistance.  Toileting from bedside commode with Minimal Assistance for hygiene and clothing management.   Bathing from sitting at sink with Minimal Assistance.  Supine to sit with Stand-by Assistance.  Sit to stand transfer with Stand-by Assistance with RW.  Toilet transfer to bedside commode with Stand-by Assistance with RW and grab bar.  Upper extremity exercise program with Supervision.  Caregiver will be educated on level of assist required to safely perform self care tasks and functional transfers.                        Time Tracking:     OT Date of Treatment: 07/14/22  OT Start Time: 1407    OT Stop Time: 1445  OT Total Time (min): 38 min    Billable Minutes:Therapeutic Activity 38    7/14/2022

## 2022-07-14 NOTE — PROGRESS NOTES
[facility]                                  Skilled Nursing Facility                   Progress Note     Admit Date: 6/20/2022  USMAN TBD  Principal Problem:  Acute acute pulmonary embolism  HPI obtained from patient interview and chart review   Principal problem: Acute pulmonary embolism with acute cor pulmonale    Chief Complaint:  Re-evaluation of medical treatment and therapy status: eval  Neuropathy, possible confusion     HPI:   Lupis Murcia is a 71 y.o. female w/ PMHx of PE during pregnancy requiring heparin drip, chronic lower extremity lymphedema, multiple sclerosis, vitamin D deficiency who presented to SNF following hospitalization for pulmonary embolism.  Admission to SNF for secondary weakness and debility.    Interval History:   Message received from patient's daughter of confusion episodes last night.  Patient's daughter is worried is related to new gabapentin medication.  Today during my assessment, patient states that her neuropathy pain is much better with the gabapentin.  I do not appreciate any confusion from the patient.  Patient did state that she had a very poor night sleep the night prior which could have possibly led to the confusion later on in the evening.  Patient states she had a great night sleep last night and is doing much better with therapy as compared to yesterday and day prior.  She states that her left knee pain continues to improve each day.  24 hr vital sign ranges listed below.  Patient denies shortness of breath, abdominal discomfort, nausea, or vomiting.  Patient reports an adequate appetite.  Patient denies dysuria.  Patient reports having regular bowel movements.  Patient progessing with PT/OT- Functional Mobility: Pt engaging in functional mobility to simulate household/community distances approx 30ft  with Francie/CGA and utilizing RW. Continuing to follow and treat all acute and chronic  conditions.    Past Medical History: Patient has a past medical history of Lymphedema, MS (multiple sclerosis), Other pulmonary embolism without acute cor pulmonale, and Vitamin B12 deficiency.    Past Surgical History: Patient has a past surgical history that includes  section; Breast cyst excision (Left); and Esophagogastroduodenoscopy (N/A, 2022).    Social History: Patient reports that she has never smoked. She has never used smokeless tobacco. She reports that she does not drink alcohol and does not use drugs.    Family History: family history includes Brain cancer in her brother; Coronary artery disease in her father; Lung cancer in her father and mother.    Allergies: Patient is allergic to contrast media, pcn [penicillins], celebrex [celecoxib], diazepam, and motrin [ibuprofen].    ROS  Constitutional: Negative for fever   Eyes: Negative for blurred vision, double vision   Respiratory: +SOB on exertion.  Negative for cough  Cardiovascular: Negative for chest pain, palpitations, and leg swelling.   Gastrointestinal: Negative for abdominal pain, constipation, diarrhea, nausea, vomiting.   Genitourinary: Negative for dysuria, frequency   Musculoskeletal:  + generalized weakness, BLE weakness.  +left knee pain, improving.  Neuropathy pain  Skin: Negative for itching and rash.   Neurological: Negative for dizziness, headaches.   Psychiatric/Behavioral: Negative for depression. The patient is not nervous/anxious.      24 hour Vital Sign Range   Temp:  [98.2 °F (36.8 °C)-98.7 °F (37.1 °C)]   Pulse:  [92-97]   Resp:  [16-20]   BP: (144-151)/(65-76)   SpO2:  [94 %-95 %]     PEx  Constitutional: Patient appears debilitated.  No distress noted  HENT:   Head: Normocephalic and atraumatic.   Eyes: Pupils are equal, round  Neck: Normal range of motion. Neck supple.   Cardiovascular: Normal rate, regular rhythm and normal heart sounds.    Pulmonary/Chest: Effort normal and breath sounds are clear  Abdominal:  Soft. Bowel sounds are normal.   Musculoskeletal: Normal range of motion.   Neurological: Alert and oriented to person, place, and time.   Psychiatric: Normal mood and affect. Behavior is normal.   Skin: Skin is warm and dry.     No results for input(s): GLUCOSE, NA, K, CL, CO2, BUN, CREATININE, MG in the last 24 hours.    Invalid input(s):  CALCIUM    No results for input(s): WBC, RBC, HGB, HCT, PLT, MCV, MCH, MCHC in the last 24 hours.      Assessment and Plan:    neuropathy pain  - improved, continue gabapentin 100 mg TID    Left knee pain  - injury from therapy per patient  - initiating Voltaren gel 4 g TID, okay to leave a bedside  - 7/12 left knee x-ray without acute abnormality  - 7/13 pain better today    Acute pulmonary embolism with acute cor pulmonale  - continue Eliquis 10mg PO BID til 6/22 followed by Eliquis 5mg PO BID starting 6/23 indefinitely  - 7/12 tolerating RA, continue to monitor SpO2 closely    Acute hypoxemic respiratory failure  - 2/2 PE  - On 2L NC, wean off NC as tolerated  - continue lasix 20 mg daily    Prediabetes  - Hgb A1C 6.2 on 5/3/22  - Prediabetes education given. Verbalized understanding   - pt refusing BG checks. discontinue BG checks and SSI  - BG stable. Remains on regular diet per pt request. WCTM.      Class 2 obesity due to excess calories in adult  - Body mass index is 37.74 kg/m².   - Morbid obesity complicates all aspects of disease management from diagnostic modalities to treatment.   - Weight loss encouraged and health benefits explained to patient.                Lymphedema  - Chronic and stable  - No acute issues     Vitamin D deficiency  - continue vitamin D 1000 units daily      MS (multiple sclerosis)  - PT/OT  - f/u with out pt provider      Muscle weakness of lower extremity  - PT/OT     Anemia of chronic disease  - Monitor for bleeding on Eliquis     Insomnia secondary to chronic pain  - continue melatonin     Anxiety  - continue lorazepam 0.5 mg Q 8 hr prn  anxiety    Debility   - Continue with PT/OT for gait training and strengthening and restoration of ADL's   - Encourage mobility, OOB in chair, and early ambulation as appropriate  - Fall precautions   - Monitor for bowel and bladder dysfunction  - Monitor for and prevent skin breakdown and pressure ulcers  - Continue DVT prophylaxis with eliquis    Anticipate disposition:  Home with home health      Follow-up needed during SNF stay-    Follow-up needed after discharge from SNF:   - PCP, hospital follow-up, 7/14    Future Appointments   Date Time Provider Department Center   7/28/2022  3:00 PM Samir Sahni MD [facility]         Amy Conklin NP  Department of Hospital Medicine   [facility] Sk[facility] Facility     DOS: 7/14/2022      Patient note was created using MModal Dictation.  Any errors in syntax or even information may not have been identified and edited on initial review prior to signing this note.

## 2022-07-14 NOTE — PT/OT/SLP PROGRESS
Physical Therapy Treatment    Patient Name:  Lupis Murcia   MRN:  [id number]  Admit Date: 6/20/2022  Admitting Diagnosis: Acute pulmonary embolism with acute cor pulmonale  Recent Surgeries: N/A    General Precautions: Standard, fall   Orthopedic Precautions:N/A   Braces: N/A     Recommendations:     Discharge Recommendations:  home health PT   Level of Assistance Recommended at Discharge: Intermittent assistance   Discharge Equipment Recommendations: bedside commode, grab bar, raised toilet, shower chair, walker, rolling   Barriers to discharge: Decreased caregiver support (increased assistance needed)    Assessment:     Lupis Murcia is a 71 y.o. female admitted with a medical diagnosis of Acute pulmonary embolism with acute cor pulmonale. Patient tolerated today's session well, ambulating with RW a total of 85 feet. She required constant motivation and encouragement for gait training due to her appearing anxious with this activity. She remains limited in bed mobility, transfers, functional mobility, lower extremity strength, and cardiovascular endruance. Patient continues to require skilled physical therapy services to address deficits and return patient to [facility].     Performance deficits affecting function:  weakness, impaired endurance, impaired sensation, impaired self care skills, impaired functional mobilty, gait instability, impaired balance, pain, decreased ROM, decreased lower extremity function, decreased coordination, abnormal tone, impaired coordination, impaired fine motor, impaired skin, edema, impaired cardiopulmonary response to activity .    Rehab Potential is fair    Activity Tolerance: Good    Plan:     Patient to be seen 6 x/week to address the above listed problems via gait training, therapeutic activities, therapeutic exercises, neuromuscular re-education, wheelchair management/training    · Plan of Care Expires: 07/20/22  · Plan of Care Reviewed with: patient    Subjective     Patient  reports pain in LLE being 6/10 but is agreeable to participate in therapy today.     Pain/Comfort:  Pain Rating 1: 6/10  Location - Side 1: Left  Location - Orientation 1: generalized  Location 1: leg  Pain Addressed 1: Reposition, Pre-medicate for activity, Distraction, Cessation of Activity  Pain Rating Post-Intervention 1: 5/10    Patient's cultural, spiritual, Pentecostalism conflicts given the current situation:  no    Objective:     Communicated with nursing staff prior to session. Patient found up in chair with  (no lines) upon PT entry to room.     Functional Mobility:  Bed Mobility:     · Scooting: stand by assistance    Transfers:     · Sit to Stand x multiple trials: contact guard assistance with rolling walker    Gait: Patient ambulated x 16 + 69 feet with RW and CGA. She required ModA to advance LLE in a reciprocal gait pattern. She demonstrated decreased pako, decreased velocity of limb motion, decreased stride length, increased lateral weight shift, and step to gait pattern.    STS exercise: She performed STS x 8 reps with CGA using outside of // bars. Once standing, she performed weight shifts to increase weight bearing on LLE and increase confidence in static standing balance. She performed x3 reps per side of step ups on 2 inch // bar platform. She required extended amount of time to complete exercise due to increased fatigue and her appearing to be anxious.    AM-PAC 6 CLICK MOBILITY  14    Patient left up in chair with call button in reach.    GOALS:   Multidisciplinary Problems     Physical Therapy Goals        Problem: Physical Therapy    Goal Priority Disciplines Outcome Goal Variances Interventions   Physical Therapy Goal     PT, PT/OT Ongoing, Progressing     Description: Goals to be met by: 2022:    Patient will increase functional independence with mobility by performin. Supine to sit with Maximum Assistance.  2. Sit to supine with Maximum Assistance.  3. Rolling to Left and  Right with Moderate Assistance.  4. Sit to stand transfer with Contact Guard Assistance. -met  Updated 06/27/2022: Sit to stand transfer with supervision. -met  Updated 07/05/2022: Sit to stand transfer with Dedrick.     5. Bed to chair transfer with Contact Guard Assistance using Rolling Walker. -met  Updated 07/05/2022: Bed to chair transfer with Dedrick using Rolling Walker.     6. Gait x 100 feet with Maximum Assistance using Rolling Walker.   7. Wheelchair propulsion x 50 feet with Minimal Assistance using bilateral upper extremities.  8. Ascend/Descend 4 inch curb step with Maximum Assistance using Rolling Walker.  9. Sitting at edge of bed x 20 minutes with Supervision performing dynamic sitting balance activities.  10. Lower extremity exercise program x 30 reps per handout, with supervision.                     Time Tracking:     PT Received On: 07/14/22  PT Start Time: 0848  PT Stop Time: 0919  PT Total Time (min): 31 min    Billable Minutes: Gait Training 20 and Therapeutic Exercise 11    Treatment Type: Treatment, 6th Visit  PT/PTA: PT     PTA Visit Number: 0     07/14/2022

## 2022-07-15 LAB
ANION GAP SERPL CALC-SCNC: 11 MMOL/L (ref 8–16)
BASOPHILS # BLD AUTO: 0.01 K/UL (ref 0–0.2)
BASOPHILS NFR BLD: 0.2 % (ref 0–1.9)
BUN SERPL-MCNC: 16 MG/DL (ref 8–23)
CALCIUM SERPL-MCNC: 10.2 MG/DL (ref 8.7–10.5)
CHLORIDE SERPL-SCNC: 106 MMOL/L (ref 95–110)
CO2 SERPL-SCNC: 25 MMOL/L (ref 23–29)
CREAT SERPL-MCNC: 0.8 MG/DL (ref 0.5–1.4)
DIFFERENTIAL METHOD: ABNORMAL
EOSINOPHIL # BLD AUTO: 0.2 K/UL (ref 0–0.5)
EOSINOPHIL NFR BLD: 3.5 % (ref 0–8)
ERYTHROCYTE [DISTWIDTH] IN BLOOD BY AUTOMATED COUNT: 14 % (ref 11.5–14.5)
EST. GFR  (AFRICAN AMERICAN): >60 ML/MIN/1.73 M^2
EST. GFR  (NON AFRICAN AMERICAN): >60 ML/MIN/1.73 M^2
GLUCOSE SERPL-MCNC: 122 MG/DL (ref 70–110)
HCT VFR BLD AUTO: 27.9 % (ref 37–48.5)
HGB BLD-MCNC: 8.6 G/DL (ref 12–16)
IMM GRANULOCYTES # BLD AUTO: 0.02 K/UL (ref 0–0.04)
IMM GRANULOCYTES NFR BLD AUTO: 0.4 % (ref 0–0.5)
LYMPHOCYTES # BLD AUTO: 1.4 K/UL (ref 1–4.8)
LYMPHOCYTES NFR BLD: 26.2 % (ref 18–48)
MAGNESIUM SERPL-MCNC: 1.9 MG/DL (ref 1.6–2.6)
MCH RBC QN AUTO: 31.3 PG (ref 27–31)
MCHC RBC AUTO-ENTMCNC: 30.8 G/DL (ref 32–36)
MCV RBC AUTO: 102 FL (ref 82–98)
MONOCYTES # BLD AUTO: 0.5 K/UL (ref 0.3–1)
MONOCYTES NFR BLD: 9.7 % (ref 4–15)
NEUTROPHILS # BLD AUTO: 3.3 K/UL (ref 1.8–7.7)
NEUTROPHILS NFR BLD: 60 % (ref 38–73)
NRBC BLD-RTO: 0 /100 WBC
PHOSPHATE SERPL-MCNC: 3.7 MG/DL (ref 2.7–4.5)
PLATELET # BLD AUTO: 239 K/UL (ref 150–450)
PMV BLD AUTO: 10.5 FL (ref 9.2–12.9)
POTASSIUM SERPL-SCNC: 3.7 MMOL/L (ref 3.5–5.1)
RBC # BLD AUTO: 2.75 M/UL (ref 4–5.4)
SODIUM SERPL-SCNC: 142 MMOL/L (ref 136–145)
WBC # BLD AUTO: 5.46 K/UL (ref 3.9–12.7)

## 2022-07-15 PROCEDURE — 97530 THERAPEUTIC ACTIVITIES: CPT | Mod: CQ

## 2022-07-15 PROCEDURE — 36415 COLL VENOUS BLD VENIPUNCTURE: CPT | Performed by: NURSE PRACTITIONER

## 2022-07-15 PROCEDURE — 25000003 PHARM REV CODE 250: Performed by: HOSPITALIST

## 2022-07-15 PROCEDURE — 97116 GAIT TRAINING THERAPY: CPT | Mod: CQ

## 2022-07-15 PROCEDURE — 83735 ASSAY OF MAGNESIUM: CPT | Performed by: NURSE PRACTITIONER

## 2022-07-15 PROCEDURE — 25000003 PHARM REV CODE 250: Performed by: NURSE PRACTITIONER

## 2022-07-15 PROCEDURE — 80048 BASIC METABOLIC PNL TOTAL CA: CPT | Performed by: NURSE PRACTITIONER

## 2022-07-15 PROCEDURE — 11000004 HC SNF PRIVATE

## 2022-07-15 PROCEDURE — 84100 ASSAY OF PHOSPHORUS: CPT | Performed by: NURSE PRACTITIONER

## 2022-07-15 PROCEDURE — 85025 COMPLETE CBC W/AUTO DIFF WBC: CPT | Performed by: NURSE PRACTITIONER

## 2022-07-15 PROCEDURE — 97530 THERAPEUTIC ACTIVITIES: CPT | Mod: CO

## 2022-07-15 RX ORDER — CYANOCOBALAMIN 1000 UG/ML
1000 INJECTION, SOLUTION INTRAMUSCULAR; SUBCUTANEOUS
Status: DISCONTINUED | OUTPATIENT
Start: 2022-07-16 | End: 2022-07-26 | Stop reason: HOSPADM

## 2022-07-15 RX ADMIN — Medication 1 TABLET: at 08:07

## 2022-07-15 RX ADMIN — CHOLECALCIFEROL TAB 25 MCG (1000 UNIT) 1000 UNITS: 25 TAB at 08:07

## 2022-07-15 RX ADMIN — DICLOFENAC SODIUM 4 G: 10 GEL TOPICAL at 03:07

## 2022-07-15 RX ADMIN — ACETAMINOPHEN AND CODEINE PHOSPHATE 1 TABLET: 300; 30 TABLET ORAL at 06:07

## 2022-07-15 RX ADMIN — PANTOPRAZOLE SODIUM 40 MG: 40 TABLET, DELAYED RELEASE ORAL at 08:07

## 2022-07-15 RX ADMIN — DICLOFENAC SODIUM 4 G: 10 GEL TOPICAL at 09:07

## 2022-07-15 RX ADMIN — APIXABAN 5 MG: 2.5 TABLET, FILM COATED ORAL at 09:07

## 2022-07-15 RX ADMIN — DICLOFENAC SODIUM 4 G: 10 GEL TOPICAL at 08:07

## 2022-07-15 RX ADMIN — GABAPENTIN 100 MG: 100 CAPSULE ORAL at 09:07

## 2022-07-15 RX ADMIN — APIXABAN 5 MG: 2.5 TABLET, FILM COATED ORAL at 08:07

## 2022-07-15 RX ADMIN — GABAPENTIN 100 MG: 100 CAPSULE ORAL at 08:07

## 2022-07-15 RX ADMIN — ACETAMINOPHEN AND CODEINE PHOSPHATE 1 TABLET: 300; 30 TABLET ORAL at 01:07

## 2022-07-15 RX ADMIN — GABAPENTIN 100 MG: 100 CAPSULE ORAL at 03:07

## 2022-07-15 RX ADMIN — LORAZEPAM 0.5 MG: 0.5 TABLET ORAL at 09:07

## 2022-07-15 RX ADMIN — LORAZEPAM 0.5 MG: 0.5 TABLET ORAL at 01:07

## 2022-07-15 RX ADMIN — FUROSEMIDE 20 MG: 20 TABLET ORAL at 08:07

## 2022-07-15 NOTE — PLAN OF CARE
[facility] called to schedule pcp appointment. Dr. Tran office closed on Friday. Will follow up on 7/19/2022.

## 2022-07-15 NOTE — PROGRESS NOTES
[facility]                                  Skilled Nursing Facility                   Progress Note     Admit Date: 2022  USMAN TBD  Principal Problem:  Acute acute pulmonary embolism  HPI obtained from patient interview and chart review   Principal problem: Acute pulmonary embolism with acute cor pulmonale    Chief Complaint:  Re-evaluation of medical treatment and therapy status:  Lab review    HPI:   Lupis Murcia is a 71 y.o. female w/ PMHx of PE during pregnancy requiring heparin drip, chronic lower extremity lymphedema, multiple sclerosis, vitamin D deficiency who presented to SNF following hospitalization for pulmonary embolism.  Admission to SNF for secondary weakness and debility.    Interval History:  All of today's labs reviewed and are listed below.  24 hr vital sign ranges listed below.  Patient states her left knee pain continues to improve.  Her neuropathy pains have also improved with gabapentin.  No confusion noted.  Patient denies shortness of breath, abdominal discomfort, nausea, or vomiting.  Patient reports an adequate appetite.  Patient denies dysuria.  Patient reports having regular bowel movements.  Patient progessing with PT/OT- Gait: x 59', x 12' and x 5', CGA with RW. Continuing to follow and treat all acute and chronic conditions.    Past Medical History: Patient has a past medical history of Lymphedema, MS (multiple sclerosis), Other pulmonary embolism without acute cor pulmonale, and Vitamin B12 deficiency.    Past Surgical History: Patient has a past surgical history that includes  section; Breast cyst excision (Left); and Esophagogastroduodenoscopy (N/A, 2022).    Social History: Patient reports that she has never smoked. She has never used smokeless tobacco. She reports that she does not drink alcohol and does not use drugs.    Family History: family history includes Brain cancer in her  brother; Coronary artery disease in her father; Lung cancer in her father and mother.    Allergies: Patient is allergic to contrast media, pcn [penicillins], celebrex [celecoxib], diazepam, and motrin [ibuprofen].    ROS  Constitutional: Negative for fever   Eyes: Negative for blurred vision, double vision   Respiratory: +SOB on exertion.  Negative for cough  Cardiovascular: Negative for chest pain, palpitations, and leg swelling.   Gastrointestinal: Negative for abdominal pain, constipation, diarrhea, nausea, vomiting.   Genitourinary: Negative for dysuria, frequency   Musculoskeletal:  + generalized weakness, BLE weakness.  +left knee pain, improving.  Neuropathy pain  Skin: Negative for itching and rash.   Neurological: Negative for dizziness, headaches.   Psychiatric/Behavioral: Negative for depression. The patient is not nervous/anxious.      24 hour Vital Sign Range   Temp:  [98 °F (36.7 °C)-98.5 °F (36.9 °C)]   Pulse:  [83-91]   Resp:  [18]   BP: (135-170)/(71-78)   SpO2:  [94 %-97 %]     PEx  Constitutional: Patient appears debilitated.  No distress noted  HENT:   Head: Normocephalic and atraumatic.   Eyes: Pupils are equal, round  Neck: Normal range of motion. Neck supple.   Cardiovascular: Normal rate, regular rhythm and normal heart sounds.    Pulmonary/Chest: Effort normal and breath sounds are clear  Abdominal: Soft. Bowel sounds are normal.   Musculoskeletal: Normal range of motion.   Neurological: Alert and oriented to person, place, and time.   Psychiatric: Normal mood and affect. Behavior is normal.   Skin: Skin is warm and dry.     Recent Labs   Lab 07/15/22  0550      K 3.7      CO2 25   BUN 16   CREATININE 0.8   MG 1.9       Recent Labs   Lab 07/15/22  0550   WBC 5.46   RBC 2.75*   HGB 8.6*   HCT 27.9*      *   MCH 31.3*   MCHC 30.8*         Assessment and Plan:    B12 deficiency  Anemia of chronic disease  - Monitor for bleeding on Eliquis  - 7/15 initiated home  medications cyanocobalamin 1000 mcg IM injection monthly    Neuropathy pain  - improved, continue gabapentin 100 mg TID    Left knee pain  - injury from therapy per patient  - initiating Voltaren gel 4 g TID, okay to leave a bedside  - 7/12 left knee x-ray without acute abnormality  - 7/15 pain better today    Acute pulmonary embolism with acute cor pulmonale  - continue Eliquis 10mg PO BID til 6/22 followed by Eliquis 5mg PO BID starting 6/23 indefinitely  - 7/12 tolerating RA, continue to monitor SpO2 closely    Acute hypoxemic respiratory failure  - 2/2 PE  - On 2L NC, wean off NC as tolerated  - continue lasix 20 mg daily    Prediabetes  - Hgb A1C 6.2 on 5/3/22  - Prediabetes education given. Verbalized understanding   - pt refusing BG checks. discontinue BG checks and SSI  - BG stable. Remains on regular diet per pt request. WCTM.      Class 2 obesity due to excess calories in adult  - Body mass index is 37.74 kg/m².   - Morbid obesity complicates all aspects of disease management from diagnostic modalities to treatment.   - Weight loss encouraged and health benefits explained to patient.                Lymphedema  - Chronic and stable  - No acute issues     Vitamin D deficiency  - continue vitamin D 1000 units daily      MS (multiple sclerosis)  - PT/OT  - f/u with out pt provider      Muscle weakness of lower extremity  - PT/OT     Insomnia secondary to chronic pain  - continue melatonin     Anxiety  - continue lorazepam 0.5 mg Q 8 hr prn anxiety    Debility   - Continue with PT/OT for gait training and strengthening and restoration of ADL's   - Encourage mobility, OOB in chair, and early ambulation as appropriate  - Fall precautions   - Monitor for bowel and bladder dysfunction  - Monitor for and prevent skin breakdown and pressure ulcers  - Continue DVT prophylaxis with eliquis    Anticipate disposition:  Home with home health      Follow-up needed during SNF stay-    Follow-up needed after discharge from  SNF:   - PCP, hospital follow-up, 7/14    Future Appointments   Date Time Provider Department Center   7/28/2022  3:00 PM Samir Sahni MD [facility]         Amy Conklin NP  Department of [facility] Medicine   [facility]     DOS: 7/15/2022      Patient note was created using MModal Dictation.  Any errors in syntax or even information may not have been identified and edited on initial review prior to signing this note.

## 2022-07-15 NOTE — PT/OT/SLP PROGRESS
Occupational Therapy   Treatment    Name: Lupis Murcia  MRN: [id number]  Admit Date: 6/20/2022  Admitting Diagnosis:  Acute pulmonary embolism with acute cor pulmonale    General Precautions: Standard, fall   Orthopedic Precautions:N/A   Braces:       Recommendations:     Discharge Recommendations: home health OT  Level of Assistance Recommended at Discharge: 24 hours physical assistance for all ADL's and home management tasks  Discharge Equipment Recommendations:  bedside commode, grab bar, raised toilet, bath bench  Barriers to discharge:  Decreased caregiver support (increased skilled assistance needed)    Assessment:     Lupis Murcia is a 71 y.o. female with a medical diagnosis of Acute pulmonary embolism with acute cor pulmonale.  She presents with  Performance deficits affecting function are weakness, impaired endurance, impaired self care skills, impaired functional mobility, gait instability, impaired balance, decreased ROM, decreased lower extremity function, edema, impaired cardiopulmonary response to activity, decreased safety awareness, impaired skin, decreased coordination    Pt. Was cooperative and participated well with session on this day.   Pt.with cimprovemdn on this day with standing capabilities and standing tolerance with task.Pt. continues to demonstrate levels of physical deficits with  functional indep with daily management activities tasks, selfcare skills with balance,  functional mobility, UB strength and endurance. Pt. Will continue to benefit from continued OT to progress towards goals     Rehab Potential is fair    Activity tolerance:  Fair    Plan:     Patient to be seen 3 x/week (Mon, Wed, Fri) to address the above listed problems via self-care/home management, therapeutic activities, therapeutic exercises, wheelchair management/training    · Plan of Care Expires: 07/15/22  · Plan of Care Reviewed with: patient    Subjective     Communicated with: PTA  and Pt prior to session.I  think my new medicine is working, I am moving a little better now  Pain/Comfort:  Pain Rating 1: 0/10  Pain Rating Post-Intervention 1: 0/10    Patient's cultural, spiritual, Anabaptist conflicts given the current situation:  no    Objective:     Patient found up in chair  With daughter present  upon OT entry to room.    Functional Mobility/Transfers:  Patient completed Sit <> Stand Transfer with contact guard assistance  with  rolling walker  with lateral side steps from w/c to chair with cues for safety     Activities of Daily Living:    · Already performed      AMPAC 6 Click ADL: 16    OT Exercises: UE Ergometer 15    Treatment & Education:  Pt. With standing act on this day with task. Pt. With CGA/SBA for balance aspects with task with  AD at raised counter Pt with visual perception task with discrimination of various shapes and sizes x 2 trials 3  Min followed by seated break due to back discomfort with standing and then 2:10 to complete with standing bal and min cues through out with weight shifting and use of BUE's incorporated and crossing mid line and facilitation with posture in prep for home management .    Pt. With therex performed to increase ROM, endurance selfcare task and fxl mobility for independence     Pt edu on role of OT, POC, safety when performing self care tasks , benefit of performing OOB activity, and safety when performing functional transfers and mobility management for preparation with goals to progress towards next level of care   independence     Patient left up in chair with all lines intact and call button in reachEducation:       GOALS:   Multidisciplinary Problems     Occupational Therapy Goals        Problem: Occupational Therapy    Goal Priority Disciplines Outcome Interventions   Occupational Therapy Goal     OT, PT/OT Ongoing, Progressing    Description: Goals to be met by: 7/12/2022    Patient will increase functional independence with ADLs by performing:    UE Dressing with  Set-up Assistance.-MET  LE Dressing, including donning/doffing pants, with Minimal Assistance using appropriate AE.-Not MET  Grooming while seated at sink with Set-up Assistance.MET  Toileting from bedside commode with Minimal Assistance for hygiene and clothing management. -Not MET  Bathing from sitting at sink with Minimal Assistance.Not MET  Supine to sit with Stand-by Assistance.Not MET  Sit to stand transfer with Stand-by Assistance with RW.-Not MET  Toilet transfer to bedside commode with Stand-by Assistance with RW and grab bar.-Not MET  Upper extremity exercise program with Supervision.-MET  Caregiver will be educated on level of assist required to safely perform self care tasks and functional transfers.MET                        Time Tracking:     OT Date of Treatment: 07/15/22  OT Start Time: 1020    OT Stop Time: 1100  OT Total Time (min): 40 min    Billable Minutes:Therapeutic Activity 40    7/15/2022

## 2022-07-15 NOTE — PT/OT/SLP PROGRESS
Physical Therapy Treatment    Patient Name:  Lupis Murcia   MRN:  [id number]  Admit Date: 6/20/2022  Admitting Diagnosis: Acute pulmonary embolism with acute cor pulmonale  Recent Surgeries: N/A  General Precautions: Standard, fall   Orthopedic Precautions:N/A   Braces: N/A     Recommendations:     Discharge Recommendations:  home health PT   Level of Assistance Recommended at Discharge: Intermittent assistance   Discharge Equipment Recommendations: bedside commode, grab bar, raised toilet, shower chair, walker, rolling   Barriers to discharge: Decreased caregiver support (increased assistance needed)    Assessment:     Lupis Murcia is a 71 y.o. female admitted with a medical diagnosis of Acute pulmonary embolism with acute cor pulmonale. Pt tolerated therapy session well with focus on increasing independence with gait training, curb training, transfers, standing balance and safety awareness in order to assist with achieving highest level of function. Pt would continue to benefit from skilled PT services per POC.     Performance deficits affecting function:  weakness, impaired endurance, impaired sensation, impaired self care skills, impaired functional mobilty, gait instability, impaired balance, pain, decreased ROM, decreased lower extremity function, decreased coordination, abnormal tone, impaired coordination, impaired fine motor, impaired skin, edema, impaired cardiopulmonary response to activity .    Rehab Potential is good    Activity Tolerance: Fair    Plan:     Patient to be seen 6 x/week to address the above listed problems via gait training, therapeutic activities, therapeutic exercises, neuromuscular re-education, wheelchair management/training    · Plan of Care Expires: 07/20/22  · Plan of Care Reviewed with: patient    Subjective     Pt agreeable to PT.     Pain/Comfort:  · Pain Rating 1: 0/10  · Pain Rating Post-Intervention 1: 0/10    Patient's cultural, spiritual, Zoroastrian conflicts given the  "current situation:  · no    Objective:     Patient found up in chair with  (no lines) upon PT entry to room.     Functional Mobility:  · Transfers:     · Sit to Stand: 6 sets, contact guard assistance with rolling walker  · Recliner chair to wheelchair: minimum assistance with  rolling walker  using  Step Transfer  · Gait: x 59', x 12' and x 5', CGA with RW.   · Attempted to perform 2" curb step, RW but patient anxious to step up due to c/o fear of R knee buckling.     AM-PAC 6 CLICK MOBILITY  15    Patient left up in chair with BOWIE present.    GOALS:   Multidisciplinary Problems     Physical Therapy Goals        Problem: Physical Therapy    Goal Priority Disciplines Outcome Goal Variances Interventions   Physical Therapy Goal     PT, PT/OT Ongoing, Progressing     Description: Goals to be met by: 2022:    Patient will increase functional independence with mobility by performin. Supine to sit with Maximum Assistance.  2. Sit to supine with Maximum Assistance.  3. Rolling to Left and Right with Moderate Assistance.  4. Sit to stand transfer with Contact Guard Assistance. -met  Updated 2022: Sit to stand transfer with supervision. -met  Updated 2022: Sit to stand transfer with Dedrick.     5. Bed to chair transfer with Contact Guard Assistance using Rolling Walker. -met  Updated 2022: Bed to chair transfer with Dedrick using Rolling Walker.     6. Gait x 100 feet with Maximum Assistance using Rolling Walker.   7. Wheelchair propulsion x 50 feet with Minimal Assistance using bilateral upper extremities.  8. Ascend/Descend 4 inch curb step with Maximum Assistance using Rolling Walker.  9. Sitting at edge of bed x 20 minutes with Supervision performing dynamic sitting balance activities.  10. Lower extremity exercise program x 30 reps per handout, with supervision.                     Time Tracking:     PT Received On: 07/15/22  PT Start Time: 937  PT Stop Time: 1019  PT Total Time (min): 42 " min    Billable Minutes: Gait Training 23 and Therapeutic Activity 19    Treatment Type: Treatment  PT/PTA: PTA     PTA Visit Number: 1     07/15/2022

## 2022-07-15 NOTE — PLAN OF CARE
Problem: Adult Inpatient Plan of Care  Goal: Plan of Care Review  7/15/2022 0100 by Thalia Kenney LPN  Outcome: Ongoing, Progressing  7/15/2022 0059 by Thalia Kenney LPN  Outcome: Ongoing, Progressing     Problem: Adult Inpatient Plan of Care  Goal: Patient-Specific Goal (Individualized)  7/15/2022 0100 by Thalia Kenney LPN  Outcome: Ongoing, Progressing  7/15/2022 0059 by Thalia Kenney LPN  Outcome: Ongoing, Progressing     Problem: Adult Inpatient Plan of Care  Goal: Absence of Hospital-Acquired Illness or Injury  7/15/2022 0100 by Thalia Kenney LPN  Outcome: Ongoing, Progressing  7/15/2022 0059 by Thalia Kenney LPN  Outcome: Ongoing, Progressing     Problem: Adult Inpatient Plan of Care  Goal: Optimal Comfort and Wellbeing  7/15/2022 0100 by Thalia Kenney LPN  Outcome: Ongoing, Progressing  7/15/2022 0059 by Thalia Kenney LPN  Outcome: Ongoing, Progressing

## 2022-07-16 PROCEDURE — 63600175 PHARM REV CODE 636 W HCPCS: Performed by: NURSE PRACTITIONER

## 2022-07-16 PROCEDURE — 97116 GAIT TRAINING THERAPY: CPT | Mod: CQ

## 2022-07-16 PROCEDURE — 25000003 PHARM REV CODE 250: Performed by: HOSPITALIST

## 2022-07-16 PROCEDURE — 11000004 HC SNF PRIVATE

## 2022-07-16 PROCEDURE — 97530 THERAPEUTIC ACTIVITIES: CPT | Mod: CQ

## 2022-07-16 PROCEDURE — 25000003 PHARM REV CODE 250: Performed by: NURSE PRACTITIONER

## 2022-07-16 RX ADMIN — ACETAMINOPHEN AND CODEINE PHOSPHATE 1 TABLET: 300; 30 TABLET ORAL at 04:07

## 2022-07-16 RX ADMIN — CYANOCOBALAMIN 1000 MCG: 1000 INJECTION, SOLUTION INTRAMUSCULAR at 02:07

## 2022-07-16 RX ADMIN — APIXABAN 5 MG: 2.5 TABLET, FILM COATED ORAL at 09:07

## 2022-07-16 RX ADMIN — ACETAMINOPHEN AND CODEINE PHOSPHATE 1 TABLET: 300; 30 TABLET ORAL at 12:07

## 2022-07-16 RX ADMIN — DICLOFENAC SODIUM 4 G: 10 GEL TOPICAL at 09:07

## 2022-07-16 RX ADMIN — GABAPENTIN 100 MG: 100 CAPSULE ORAL at 08:07

## 2022-07-16 RX ADMIN — CHOLECALCIFEROL TAB 25 MCG (1000 UNIT) 1000 UNITS: 25 TAB at 09:07

## 2022-07-16 RX ADMIN — PANTOPRAZOLE SODIUM 40 MG: 40 TABLET, DELAYED RELEASE ORAL at 09:07

## 2022-07-16 RX ADMIN — FUROSEMIDE 20 MG: 20 TABLET ORAL at 09:07

## 2022-07-16 RX ADMIN — LORAZEPAM 0.5 MG: 0.5 TABLET ORAL at 08:07

## 2022-07-16 RX ADMIN — LORAZEPAM 0.5 MG: 0.5 TABLET ORAL at 11:07

## 2022-07-16 RX ADMIN — Medication 1 TABLET: at 09:07

## 2022-07-16 RX ADMIN — GABAPENTIN 100 MG: 100 CAPSULE ORAL at 09:07

## 2022-07-16 RX ADMIN — APIXABAN 5 MG: 2.5 TABLET, FILM COATED ORAL at 08:07

## 2022-07-16 NOTE — PT/OT/SLP PROGRESS
Physical Therapy Treatment    Patient Name:  Lupis Murcia   MRN:  [id number]  Admit Date: 6/20/2022  Admitting Diagnosis: Acute pulmonary embolism with acute cor pulmonale  Recent Surgeries: N/A  General Precautions: Standard, fall   Orthopedic Precautions:N/A   Braces: N/A     Recommendations:     Discharge Recommendations:  home health PT   Level of Assistance Recommended at Discharge: Intermittent assistance   Discharge Equipment Recommendations: bedside commode, grab bar, raised toilet, shower chair, walker, rolling   Barriers to discharge: Decreased caregiver support (increased assistance needed)    Assessment:     Lupis Murcia is a 71 y.o. female admitted with a medical diagnosis of Acute pulmonary embolism with acute cor pulmonale. Pt tolerated therapy session well with focus on transfers, gait training and w/c positioning in order to assist with achieving highest level of function. Pt would continue to benefit from skilled PT services per POC.       Performance deficits affecting function:  weakness, impaired endurance, impaired sensation, impaired self care skills, impaired functional mobilty, gait instability, impaired balance, pain, decreased ROM, decreased lower extremity function, decreased coordination, abnormal tone, impaired coordination, impaired fine motor, impaired skin, edema, impaired cardiopulmonary response to activity .    Rehab Potential is good    Activity Tolerance: Fair    Plan:     Patient to be seen 6 x/week to address the above listed problems via gait training, therapeutic activities, therapeutic exercises, neuromuscular re-education, wheelchair management/training    · Plan of Care Expires: 07/20/22  · Plan of Care Reviewed with: patient    Subjective     Pt agreeable to PT.     Pain/Comfort:  · Pain Rating 1: 0/10  · Pain Rating Post-Intervention 1: 0/10    Patient's cultural, spiritual, Hoahaoism conflicts given the current situation:  · no    Objective:     Patient found up  in recliner chair with  (no lines) upon PT entry to room.     Functional Mobility:  · Transfers:     · Sit to Stand: 3 sets, contact guard assistance with rolling walker. Removed pt's cushion to assist with ease of scooting posteriorly in wheelchair.   · Recliner chair to wheelchair: contact guard assistance with  rolling walker  using  Step Transfer.   · Gait: x 25', x 13', RW, CGA with focus on increasing LLE step length.     AM-PAC 6 CLICK MOBILITY  15    Patient left up in chair with call button in reach and daughter present.    GOALS:   Multidisciplinary Problems     Physical Therapy Goals        Problem: Physical Therapy    Goal Priority Disciplines Outcome Goal Variances Interventions   Physical Therapy Goal     PT, PT/OT Ongoing, Progressing     Description: Goals to be met by: 2022:    Patient will increase functional independence with mobility by performin. Supine to sit with Maximum Assistance.  2. Sit to supine with Maximum Assistance.  3. Rolling to Left and Right with Moderate Assistance.  4. Sit to stand transfer with Contact Guard Assistance. -met  Updated 2022: Sit to stand transfer with supervision. -met  Updated 2022: Sit to stand transfer with Dedrick.     5. Bed to chair transfer with Contact Guard Assistance using Rolling Walker. -met  Updated 2022: Bed to chair transfer with Dedrick using Rolling Walker.     6. Gait x 100 feet with Maximum Assistance using Rolling Walker.   7. Wheelchair propulsion x 50 feet with Minimal Assistance using bilateral upper extremities.  8. Ascend/Descend 4 inch curb step with Maximum Assistance using Rolling Walker.  9. Sitting at edge of bed x 20 minutes with Supervision performing dynamic sitting balance activities.  10. Lower extremity exercise program x 30 reps per handout, with supervision.                     Time Tracking:     PT Received On: 22  PT Start Time: 1510  PT Stop Time: 1540  PT Total Time (min): 30 min    Billable  Minutes: Gait Training 12 and Therapeutic Activity 18    Treatment Type: Treatment  PT/PTA: PTA     PTA Visit Number: 2     07/16/2022

## 2022-07-17 PROCEDURE — 25000003 PHARM REV CODE 250: Performed by: HOSPITALIST

## 2022-07-17 PROCEDURE — 11000004 HC SNF PRIVATE

## 2022-07-17 PROCEDURE — 25000003 PHARM REV CODE 250: Performed by: NURSE PRACTITIONER

## 2022-07-17 RX ADMIN — DICLOFENAC SODIUM 4 G: 10 GEL TOPICAL at 08:07

## 2022-07-17 RX ADMIN — APIXABAN 5 MG: 2.5 TABLET, FILM COATED ORAL at 08:07

## 2022-07-17 RX ADMIN — GABAPENTIN 100 MG: 100 CAPSULE ORAL at 08:07

## 2022-07-17 RX ADMIN — Medication 1 TABLET: at 08:07

## 2022-07-17 RX ADMIN — APIXABAN 5 MG: 2.5 TABLET, FILM COATED ORAL at 09:07

## 2022-07-17 RX ADMIN — LORAZEPAM 0.5 MG: 0.5 TABLET ORAL at 01:07

## 2022-07-17 RX ADMIN — LORAZEPAM 0.5 MG: 0.5 TABLET ORAL at 09:07

## 2022-07-17 RX ADMIN — GABAPENTIN 100 MG: 100 CAPSULE ORAL at 10:07

## 2022-07-17 RX ADMIN — ACETAMINOPHEN AND CODEINE PHOSPHATE 1 TABLET: 300; 30 TABLET ORAL at 06:07

## 2022-07-17 RX ADMIN — PANTOPRAZOLE SODIUM 40 MG: 40 TABLET, DELAYED RELEASE ORAL at 08:07

## 2022-07-17 RX ADMIN — FUROSEMIDE 20 MG: 20 TABLET ORAL at 08:07

## 2022-07-17 RX ADMIN — ACETAMINOPHEN AND CODEINE PHOSPHATE 1 TABLET: 300; 30 TABLET ORAL at 04:07

## 2022-07-17 RX ADMIN — CHOLECALCIFEROL TAB 25 MCG (1000 UNIT) 1000 UNITS: 25 TAB at 08:07

## 2022-07-17 NOTE — NURSING
Ms Murcia remains AAO; pt is sitting up in recliner at this time. Scheduled medications explained; pt declined diclofenac cream, and neurontin. Safety measures maintained. Will continue to monitor. Ms Murcia stated the neurontin makes her sleepy, and she only uses the voltaren cream only twice a day.

## 2022-07-17 NOTE — PLAN OF CARE
Problem: Adult Inpatient Plan of Care  Goal: Plan of Care Review  Outcome: Ongoing, Progressing  Flowsheets (Taken 7/17/2022 0327)  Plan of Care Reviewed With:   patient   family  Goal: Patient-Specific Goal (Individualized)  Outcome: Ongoing, Progressing  Flowsheets (Taken 7/17/2022 0327)  Anxieties, Fears or Concerns: worried about getting another PE  Individualized Care Needs: keep pateint informed of medication changes, monitor for SOB, vital changes  Patient-Specific Goals (Include Timeframe): patient will be free of injury or falls during shift  Goal: Absence of Hospital-Acquired Illness or Injury  Outcome: Ongoing, Progressing  Intervention: Identify and Manage Fall Risk  Flowsheets (Taken 7/17/2022 0327)  Safety Promotion/Fall Prevention:   assistive device/personal item within reach   diversional activities provided   Fall Risk reviewed with patient/family   Fall Risk signage in place   family to remain at bedside   instructed to call staff for mobility  Goal: Optimal Comfort and Wellbeing  Outcome: Ongoing, Progressing  Intervention: Provide Person-Centered Care  Flowsheets (Taken 7/17/2022 0327)  Trust Relationship/Rapport:   care explained   questions encouraged   choices provided   reassurance provided   emotional support provided   thoughts/feelings acknowledged   empathic listening provided   questions answered     Problem: Fluid and Electrolyte Imbalance (Acute Kidney Injury/Impairment)  Goal: Fluid and Electrolyte Balance  Outcome: Ongoing, Progressing  Intervention: Monitor and Manage Fluid and Electrolyte Balance  Flowsheets (Taken 7/17/2022 0327)  Fluid/Electrolyte Management: fluids provided     Problem: Oral Intake Inadequate (Acute Kidney Injury/Impairment)  Goal: Optimal Nutrition Intake  Outcome: Ongoing, Progressing  Intervention: Promote and Optimize Nutrition  Flowsheets (Taken 7/17/2022 0327)  Oral Nutrition Promotion:   physical activity promoted   rest periods promoted   safe use of  adaptive equipment encouraged     Problem: Renal Function Impairment (Acute Kidney Injury/Impairment)  Goal: Effective Renal Function  Outcome: Ongoing, Progressing  Intervention: Monitor and Support Renal Function  Flowsheets (Taken 7/17/2022 0327)  Medication Review/Management: medications reviewed     Problem: Impaired Wound Healing  Goal: Optimal Wound Healing  Outcome: Ongoing, Progressing     Problem: Fall Injury Risk  Goal: Absence of Fall and Fall-Related Injury  Outcome: Ongoing, Progressing  Intervention: Identify and Manage Contributors  Flowsheets (Taken 7/17/2022 0327)  Self-Care Promotion:   independence encouraged   BADL personal objects within reach   BADL personal routines maintained   safe use of adaptive equipment encouraged  Medication Review/Management: medications reviewed

## 2022-07-18 PROCEDURE — 97530 THERAPEUTIC ACTIVITIES: CPT | Mod: CQ

## 2022-07-18 PROCEDURE — 25000003 PHARM REV CODE 250: Performed by: NURSE PRACTITIONER

## 2022-07-18 PROCEDURE — 97530 THERAPEUTIC ACTIVITIES: CPT | Mod: CO

## 2022-07-18 PROCEDURE — 25000003 PHARM REV CODE 250: Performed by: HOSPITALIST

## 2022-07-18 PROCEDURE — 11000004 HC SNF PRIVATE

## 2022-07-18 PROCEDURE — 97116 GAIT TRAINING THERAPY: CPT | Mod: CQ

## 2022-07-18 RX ORDER — CANDESARTAN CILEXETIL AND HYDROCHLOROTHIAZIDE 16; 12.5 MG/1; MG/1
1 TABLET ORAL
Status: CANCELLED | COMMUNITY
Start: 2022-07-18

## 2022-07-18 RX ADMIN — GABAPENTIN 100 MG: 100 CAPSULE ORAL at 09:07

## 2022-07-18 RX ADMIN — LORAZEPAM 0.5 MG: 0.5 TABLET ORAL at 06:07

## 2022-07-18 RX ADMIN — LORAZEPAM 0.5 MG: 0.5 TABLET ORAL at 05:07

## 2022-07-18 RX ADMIN — DICLOFENAC SODIUM 4 G: 10 GEL TOPICAL at 09:07

## 2022-07-18 RX ADMIN — DICLOFENAC SODIUM 4 G: 10 GEL TOPICAL at 02:07

## 2022-07-18 RX ADMIN — PANTOPRAZOLE SODIUM 40 MG: 40 TABLET, DELAYED RELEASE ORAL at 09:07

## 2022-07-18 RX ADMIN — ACETAMINOPHEN AND CODEINE PHOSPHATE 1 TABLET: 300; 30 TABLET ORAL at 02:07

## 2022-07-18 RX ADMIN — APIXABAN 5 MG: 2.5 TABLET, FILM COATED ORAL at 09:07

## 2022-07-18 RX ADMIN — FUROSEMIDE 20 MG: 20 TABLET ORAL at 09:07

## 2022-07-18 RX ADMIN — ACETAMINOPHEN AND CODEINE PHOSPHATE 1 TABLET: 300; 30 TABLET ORAL at 03:07

## 2022-07-18 RX ADMIN — CHOLECALCIFEROL TAB 25 MCG (1000 UNIT) 1000 UNITS: 25 TAB at 09:07

## 2022-07-18 NOTE — PLAN OF CARE
[facility] called to schedule appt with Dr. Tran office.  (Sharon) informed [facility] can't schedule appt walk ins only

## 2022-07-18 NOTE — PLAN OF CARE
Interdisciplinary team, Tiana Pérez, RN Nurse Manager, Lizett Palacios, RN Charge Nurse, Mary Oh PT, Rehab Supervisor, Lisbet Solomon LM, and Lisbet Leary, Dietician, spoke to patient and daughter for care plan conference, weekly status update, and therapy progress update. Tentative discharge date set for 7/20/22.

## 2022-07-18 NOTE — PT/OT/SLP PROGRESS
Occupational Therapy   Treatment    Name: Lupis Murcia  MRN: [id number]  Admit Date: 6/20/2022  Admitting Diagnosis:  Acute pulmonary embolism with acute cor pulmonale    General Precautions: Standard, fall   Orthopedic Precautions:N/A   Braces:       Recommendations:     Discharge Recommendations: home health OT  Level of Assistance Recommended at Discharge: 24 hours physical assistance for all ADL's and home management tasks  Discharge Equipment Recommendations:  bedside commode, grab bar, raised toilet, bath bench  Barriers to discharge:  Decreased caregiver support (increased skilled assistance needed)    Assessment:     Lupis Murcia is a 71 y.o. female with a medical diagnosis of Acute pulmonary embolism with acute cor pulmonale.  She presents with  Performance deficits affecting function are weakness, impaired endurance, impaired self care skills, impaired functional mobility, gait instability, impaired balance, decreased ROM, decreased lower extremity function, edema, impaired cardiopulmonary response to activity, decreased safety awareness, impaired skin, decreased coordination    Pt. Was cooperative and participated well with session on this day.   Pt.with improvement  on this day with standing capabilities and standing tolerance with task.Pt. continues to demonstrate levels of physical deficits with  functional indep with daily management activities tasks, selfcare skills with balance,  functional mobility, UB strength and endurance. Pt. Will continue to benefit from continued OT to progress towards goals     Rehab Potential is fair    Activity tolerance:  Fair    Plan:     Patient to be seen 3 x/week (Mon, Wed, Fri) to address the above listed problems via self-care/home management, therapeutic activities, therapeutic exercises, wheelchair management/training    · Plan of Care Expires: 07/15/22  · Plan of Care Reviewed with: patient    Subjective     Communicated with: PTA  and Pt prior to session.  I am moving a little better now    Pain/Comfort:  Pain Rating 1: 0/10  Pain Rating Post-Intervention 1: 0/10    Patient's cultural, spiritual, Restorationist conflicts given the current situation:  no    Objective:     Patient found up in chair  With PTA present in gym with handoff upon OT entry to room.    Functional Mobility/Transfers:  Patient completed Sit <> Stand Transfer with contact guard assistance  with  rolling walker   X 2 trails from w/c with cues for safety and Min A for steadying (A)         Pt.with lateral side steps from w/c to chair with cues for safety     Activities of Daily Living:    · Already performed       AMPAC 6 Click ADL: 16    OT Exercises: UE Ergometer 15    Treatment & Education:  Pt. With standing act on this day with task. Pt. With Min A/to CGA for balance aspects with task with  AD at raised counter Pt with visual perception task with discrimination of various shapes and sizes x 2 trials 1 min 36 sec and 40 sec followed by seated break due to back discomfort with standing and then  complete with standing bal and min cues through out with weight shifting and use of BUE's incorporated and crossing mid line and facilitation with posture in prep for home management .    Pt. With therex performed to increase ROM, endurance selfcare task and fxl mobility for independence     Pt edu on role of OT, POC, safety when performing self care tasks , benefit of performing OOB activity, and safety when performing functional transfers and mobility management for preparation with goals to progress towards next level of care   independence     Patient left up in chair with all lines intact and call button in reachEducation:       GOALS:   Multidisciplinary Problems     Occupational Therapy Goals        Problem: Occupational Therapy    Goal Priority Disciplines Outcome Interventions   Occupational Therapy Goal     OT, PT/OT Ongoing, Progressing    Description: Goals to be met by: 8/1/2022    Patient will  increase functional independence with ADLs by performing:    UE Dressing with Set-up Assistance.-MET  LE Dressing, including donning/doffing pants, with Minimal Assistance using appropriate AE.-Not MET  Grooming while seated at sink with Set-up Assistance.MET  Toileting from bedside commode with Minimal Assistance for hygiene and clothing management. -Not MET  Bathing from sitting at sink with Minimal Assistance.Not MET  Supine to sit with Stand-by Assistance.Not MET  Sit to stand transfer with Stand-by Assistance with RW.-Not MET  Toilet transfer to bedside commode with Stand-by Assistance with RW and grab bar.-Not MET  Upper extremity exercise program with Supervision.-MET  Caregiver will be educated on level of assist required to safely perform self care tasks and functional transfers.MET                        Time Tracking:     OT Date of Treatment: 07/18/22  OT Start Time: 1052    OT Stop Time: 1130  OT Total Time (min): 38 min    Billable Minutes:Therapeutic Activity 38    7/18/2022

## 2022-07-18 NOTE — PT/OT/SLP PROGRESS
Physical Therapy Treatment    Patient Name:  Lupis Murcia   MRN:  [id number]  Admit Date: 6/20/2022  Admitting Diagnosis: Acute pulmonary embolism with acute cor pulmonale  Recent Surgeries: N/A    General Precautions: Standard, fall   Orthopedic Precautions:N/A   Braces: N/A     Recommendations:     Discharge Recommendations:  home health PT   Level of Assistance Recommended at Discharge: Intermittent assistance   Discharge Equipment Recommendations: bedside commode, grab bar, raised toilet, shower chair, walker, rolling   Barriers to discharge: Decreased caregiver support (increased assistance needed)    Assessment:     Lupis Murcia is a 71 y.o. female admitted with a medical diagnosis of Acute pulmonary embolism with acute cor pulmonale. Pt tolerated therapy session well with focus on increasing independence with gait training, curb, transfers and safety awareness in order to assist with achieving highest level of function. Pt would continue to benefit from skilled PT services per POC.    Performance deficits affecting function:  weakness, impaired endurance, impaired sensation, impaired self care skills, impaired functional mobilty, gait instability, impaired balance, pain, decreased ROM, decreased lower extremity function, decreased coordination, abnormal tone, impaired coordination, impaired fine motor, impaired skin, edema, impaired cardiopulmonary response to activity .    Rehab Potential is good    Activity Tolerance: Fair    Plan:     Patient to be seen 6 x/week to address the above listed problems via gait training, therapeutic activities, therapeutic exercises, neuromuscular re-education, wheelchair management/training    · Plan of Care Expires: 07/20/22  · Plan of Care Reviewed with: patient    Subjective     Pt agreeable to PT.     Pain/Comfort:  · Pain Rating 1: 0/10  · Pain Rating Post-Intervention 1: 0/10    Patient's cultural, spiritual, Latter-day conflicts given the current  "situation:  · no    Objective:     Patient found up in recliner chair with  (no lines) upon PT entry to room.     Functional Mobility:  · Transfers:     · Sit to Stand: 2 sets, contact guard assistance with rolling walker  · Recliner chair to wheelchair: minimum assistance with  rolling walker  using  Step Transfer  · Gait: x 91', x 35', RW, CGA.   · 2 " curb step, RW, Min A for AD placement and lifting LLE over step.     AM-PAC 6 CLICK MOBILITY  15    Patient left up in chair with BOWIE present.    GOALS:   Multidisciplinary Problems     Physical Therapy Goals        Problem: Physical Therapy    Goal Priority Disciplines Outcome Goal Variances Interventions   Physical Therapy Goal     PT, PT/OT Ongoing, Progressing     Description: Goals to be met by: 2022:    Patient will increase functional independence with mobility by performin. Supine to sit with Maximum Assistance.  2. Sit to supine with Maximum Assistance.  3. Rolling to Left and Right with Moderate Assistance.  4. Sit to stand transfer with Contact Guard Assistance. -met  Updated 2022: Sit to stand transfer with supervision. -met  Updated 2022: Sit to stand transfer with Dedrick.     5. Bed to chair transfer with Contact Guard Assistance using Rolling Walker. -met  Updated 2022: Bed to chair transfer with Dedrick using Rolling Walker.     6. Gait x 100 feet with Maximum Assistance using Rolling Walker.   7. Wheelchair propulsion x 50 feet with Minimal Assistance using bilateral upper extremities.  8. Ascend/Descend 4 inch curb step with Maximum Assistance using Rolling Walker.  9. Sitting at edge of bed x 20 minutes with Supervision performing dynamic sitting balance activities.  10. Lower extremity exercise program x 30 reps per handout, with supervision.                     Time Tracking:     PT Received On: 22  PT Start Time: 1023  PT Stop Time: 1053  PT Total Time (min): 30 min    Billable Minutes: Gait Training 18 and " Therapeutic Activity 12    Treatment Type: Treatment  PT/PTA: PTA     PTA Visit Number: 3     07/18/2022

## 2022-07-19 LAB
ANION GAP SERPL CALC-SCNC: 8 MMOL/L (ref 8–16)
BASOPHILS # BLD AUTO: 0.02 K/UL (ref 0–0.2)
BASOPHILS NFR BLD: 0.3 % (ref 0–1.9)
BUN SERPL-MCNC: 18 MG/DL (ref 8–23)
CALCIUM SERPL-MCNC: 9.4 MG/DL (ref 8.7–10.5)
CHLORIDE SERPL-SCNC: 104 MMOL/L (ref 95–110)
CO2 SERPL-SCNC: 27 MMOL/L (ref 23–29)
CREAT SERPL-MCNC: 0.9 MG/DL (ref 0.5–1.4)
DIFFERENTIAL METHOD: ABNORMAL
EOSINOPHIL # BLD AUTO: 0.3 K/UL (ref 0–0.5)
EOSINOPHIL NFR BLD: 4.1 % (ref 0–8)
ERYTHROCYTE [DISTWIDTH] IN BLOOD BY AUTOMATED COUNT: 13.7 % (ref 11.5–14.5)
EST. GFR  (AFRICAN AMERICAN): >60 ML/MIN/1.73 M^2
EST. GFR  (NON AFRICAN AMERICAN): >60 ML/MIN/1.73 M^2
GLUCOSE SERPL-MCNC: 124 MG/DL (ref 70–110)
HCT VFR BLD AUTO: 28.7 % (ref 37–48.5)
HGB BLD-MCNC: 9.1 G/DL (ref 12–16)
IMM GRANULOCYTES # BLD AUTO: 0.02 K/UL (ref 0–0.04)
IMM GRANULOCYTES NFR BLD AUTO: 0.3 % (ref 0–0.5)
LYMPHOCYTES # BLD AUTO: 1.6 K/UL (ref 1–4.8)
LYMPHOCYTES NFR BLD: 25.7 % (ref 18–48)
MAGNESIUM SERPL-MCNC: 1.7 MG/DL (ref 1.6–2.6)
MCH RBC QN AUTO: 31.9 PG (ref 27–31)
MCHC RBC AUTO-ENTMCNC: 31.7 G/DL (ref 32–36)
MCV RBC AUTO: 101 FL (ref 82–98)
MONOCYTES # BLD AUTO: 0.5 K/UL (ref 0.3–1)
MONOCYTES NFR BLD: 8.8 % (ref 4–15)
NEUTROPHILS # BLD AUTO: 3.7 K/UL (ref 1.8–7.7)
NEUTROPHILS NFR BLD: 60.8 % (ref 38–73)
NRBC BLD-RTO: 0 /100 WBC
PHOSPHATE SERPL-MCNC: 3.3 MG/DL (ref 2.7–4.5)
PLATELET # BLD AUTO: 301 K/UL (ref 150–450)
PMV BLD AUTO: 10.3 FL (ref 9.2–12.9)
POTASSIUM SERPL-SCNC: 3.7 MMOL/L (ref 3.5–5.1)
RBC # BLD AUTO: 2.85 M/UL (ref 4–5.4)
SODIUM SERPL-SCNC: 139 MMOL/L (ref 136–145)
WBC # BLD AUTO: 6.15 K/UL (ref 3.9–12.7)

## 2022-07-19 PROCEDURE — 80048 BASIC METABOLIC PNL TOTAL CA: CPT | Performed by: NURSE PRACTITIONER

## 2022-07-19 PROCEDURE — 36415 COLL VENOUS BLD VENIPUNCTURE: CPT | Performed by: NURSE PRACTITIONER

## 2022-07-19 PROCEDURE — 97530 THERAPEUTIC ACTIVITIES: CPT

## 2022-07-19 PROCEDURE — 83735 ASSAY OF MAGNESIUM: CPT | Performed by: NURSE PRACTITIONER

## 2022-07-19 PROCEDURE — 84100 ASSAY OF PHOSPHORUS: CPT | Performed by: NURSE PRACTITIONER

## 2022-07-19 PROCEDURE — 25000003 PHARM REV CODE 250: Performed by: NURSE PRACTITIONER

## 2022-07-19 PROCEDURE — 25000003 PHARM REV CODE 250: Performed by: HOSPITALIST

## 2022-07-19 PROCEDURE — 11000004 HC SNF PRIVATE

## 2022-07-19 PROCEDURE — 97110 THERAPEUTIC EXERCISES: CPT

## 2022-07-19 PROCEDURE — 85025 COMPLETE CBC W/AUTO DIFF WBC: CPT | Performed by: NURSE PRACTITIONER

## 2022-07-19 PROCEDURE — 97116 GAIT TRAINING THERAPY: CPT

## 2022-07-19 RX ORDER — LANOLIN ALCOHOL/MO/W.PET/CERES
400 CREAM (GRAM) TOPICAL 2 TIMES DAILY
Status: COMPLETED | OUTPATIENT
Start: 2022-07-19 | End: 2022-07-21

## 2022-07-19 RX ADMIN — GABAPENTIN 100 MG: 100 CAPSULE ORAL at 08:07

## 2022-07-19 RX ADMIN — DICLOFENAC SODIUM 4 G: 10 GEL TOPICAL at 09:07

## 2022-07-19 RX ADMIN — LORAZEPAM 0.5 MG: 0.5 TABLET ORAL at 05:07

## 2022-07-19 RX ADMIN — DICLOFENAC SODIUM 4 G: 10 GEL TOPICAL at 08:07

## 2022-07-19 RX ADMIN — FUROSEMIDE 20 MG: 20 TABLET ORAL at 09:07

## 2022-07-19 RX ADMIN — CHOLECALCIFEROL TAB 25 MCG (1000 UNIT) 1000 UNITS: 25 TAB at 09:07

## 2022-07-19 RX ADMIN — APIXABAN 5 MG: 2.5 TABLET, FILM COATED ORAL at 09:07

## 2022-07-19 RX ADMIN — Medication 1 TABLET: at 09:07

## 2022-07-19 RX ADMIN — LORAZEPAM 0.5 MG: 0.5 TABLET ORAL at 07:07

## 2022-07-19 RX ADMIN — Medication 400 MG: at 08:07

## 2022-07-19 RX ADMIN — GABAPENTIN 100 MG: 100 CAPSULE ORAL at 09:07

## 2022-07-19 RX ADMIN — APIXABAN 5 MG: 2.5 TABLET, FILM COATED ORAL at 08:07

## 2022-07-19 RX ADMIN — PANTOPRAZOLE SODIUM 40 MG: 40 TABLET, DELAYED RELEASE ORAL at 09:07

## 2022-07-19 RX ADMIN — ACETAMINOPHEN AND CODEINE PHOSPHATE 1 TABLET: 300; 30 TABLET ORAL at 02:07

## 2022-07-19 NOTE — PLAN OF CARE
Problem: Physical Therapy  Goal: Physical Therapy Goal  Description: Goals to be met by: 2022:    Patient will increase functional independence with mobility by performin. Supine to sit with Maximum Assistance.  2. Sit to supine with Maximum Assistance.  3. Rolling to Left and Right with Moderate Assistance.  4. Sit to stand transfer with Contact Guard Assistance. -met  Updated 2022: Sit to stand transfer with supervision. -met  Updated 2022: Sit to stand transfer with Dedrick. -not met    5. Bed to chair transfer with Contact Guard Assistance using Rolling Walker. -met  Updated 2022: Bed to chair transfer with Dedrick using Rolling Walker. -not met    6. Gait x 100 feet with Maximum Assistance using Rolling Walker.   Updated 2022 Gait x 100 feet with (S) using Rolling Walker.  7. Wheelchair propulsion x 50 feet with Minimal Assistance using bilateral upper extremities.  8. Ascend/Descend 4 inch curb step with Maximum Assistance using Rolling Walker.  9. Sitting at edge of bed x 20 minutes with Supervision performing dynamic sitting balance activities.  10. Lower extremity exercise program x 30 reps per handout, with supervision.    Outcome: Ongoing, Progressing

## 2022-07-19 NOTE — PROGRESS NOTES
[facility]                                  Skilled Nursing Facility                   Progress Note     Admit Date: 2022  USMAN TBD  Principal Problem:  Acute acute pulmonary embolism  HPI obtained from patient interview and chart review   Principal problem: Acute pulmonary embolism with acute cor pulmonale    Chief Complaint:  Re-evaluation of medical treatment and therapy status:  Lab review    HPI:   Lupis Murcia is a 71 y.o. female w/ PMHx of PE during pregnancy requiring heparin drip, chronic lower extremity lymphedema, multiple sclerosis, vitamin D deficiency who presented to SNF following hospitalization for pulmonary embolism.  Admission to SNF for secondary weakness and debility.    Interval History:  All of today's labs reviewed and are listed below.  Mag 1.7.  24 hr vital sign ranges listed below.  Patient denies shortness of breath, abdominal discomfort, nausea, or vomiting.  Patient reports an adequate appetite.  Patient denies dysuria.  Patient reports having regular bowel movements.  Patient progessing with PT/OT- Gait: x 91', x 35', RW, CGA.  Patient is doing much better with therapy. Continuing to follow and treat all acute and chronic conditions.    Past Medical History: Patient has a past medical history of Lymphedema, MS (multiple sclerosis), Other pulmonary embolism without acute cor pulmonale, and Vitamin B12 deficiency.    Past Surgical History: Patient has a past surgical history that includes  section; Breast cyst excision (Left); and Esophagogastroduodenoscopy (N/A, 2022).    Social History: Patient reports that she has never smoked. She has never used smokeless tobacco. She reports that she does not drink alcohol and does not use drugs.    Family History: family history includes Brain cancer in her brother; Coronary artery disease in her father; Lung cancer in her father and  mother.    Allergies: Patient is allergic to contrast media, pcn [penicillins], celebrex [celecoxib], diazepam, and motrin [ibuprofen].    ROS  Constitutional: Negative for fever   Eyes: Negative for blurred vision, double vision   Respiratory: +SOB on exertion.  Negative for cough  Cardiovascular: Negative for chest pain, palpitations, and leg swelling.   Gastrointestinal: Negative for abdominal pain, constipation, diarrhea, nausea, vomiting.   Genitourinary: Negative for dysuria, frequency   Musculoskeletal:  + generalized weakness, BLE weakness.  +left knee pain, improving.  Neuropathy pain  Skin: Negative for itching and rash.   Neurological: Negative for dizziness, headaches.   Psychiatric/Behavioral: Negative for depression. The patient is not nervous/anxious.      24 hour Vital Sign Range   Temp:  [98 °F (36.7 °C)]   Pulse:  [87-91]   Resp:  [18]   BP: (132-145)/(63-71)   SpO2:  [94 %-95 %]     PEx  Constitutional: Patient appears debilitated.  No distress noted  HENT:   Head: Normocephalic and atraumatic.   Eyes: Pupils are equal, round  Neck: Normal range of motion. Neck supple.   Cardiovascular: Normal rate, regular rhythm and normal heart sounds.    Pulmonary/Chest: Effort normal and breath sounds are clear  Abdominal: Soft. Bowel sounds are normal.   Musculoskeletal: Normal range of motion.   Neurological: Alert and oriented to person, place, and time.   Psychiatric: Normal mood and affect. Behavior is normal.   Skin: Skin is warm and dry.     Recent Labs   Lab 07/19/22 0427      K 3.7      CO2 27   BUN 18   CREATININE 0.9   MG 1.7       Recent Labs   Lab 07/19/22 0427   WBC 6.15   RBC 2.85*   HGB 9.1*   HCT 28.7*      *   MCH 31.9*   MCHC 31.7*         Assessment and Plan:    Hypomagnesemia  - initiated magnesium oxide 400 mg BID x2 days    B12 deficiency  Anemia of chronic disease  - Monitor for bleeding on Eliquis  - home medications cyanocobalamin 1000 mcg IM injection  monthly    Neuropathy pain  - improved, continue gabapentin 100 mg TID    Left knee pain  - injury from therapy per patient  - initiating Voltaren gel 4 g TID, okay to leave a bedside  - 7/12 left knee x-ray without acute abnormality  - 7/15 pain better today    Acute pulmonary embolism with acute cor pulmonale  - continue Eliquis 10mg PO BID til 6/22 followed by Eliquis 5mg PO BID starting 6/23 indefinitely  - 7/12 tolerating RA, continue to monitor SpO2 closely    Acute hypoxemic respiratory failure  - 2/2 PE  - On 2L NC, wean off NC as tolerated  - continue lasix 20 mg daily    Prediabetes  - Hgb A1C 6.2 on 5/3/22  - Prediabetes education given. Verbalized understanding   - pt refusing BG checks. discontinue BG checks and SSI  - BG stable. Remains on regular diet per pt request. WCTM.      Class 2 obesity due to excess calories in adult  - Body mass index is 37.74 kg/m².   - Morbid obesity complicates all aspects of disease management from diagnostic modalities to treatment.   - Weight loss encouraged and health benefits explained to patient.                Lymphedema  - Chronic and stable  - No acute issues     Vitamin D deficiency  - continue vitamin D 1000 units daily      MS (multiple sclerosis)  - PT/OT  - f/u with out pt provider      Muscle weakness of lower extremity  - PT/OT     Insomnia secondary to chronic pain  - continue melatonin     Anxiety  - continue lorazepam 0.5 mg Q 8 hr prn anxiety    Debility   - Continue with PT/OT for gait training and strengthening and restoration of ADL's   - Encourage mobility, OOB in chair, and early ambulation as appropriate  - Fall precautions   - Monitor for bowel and bladder dysfunction  - Monitor for and prevent skin breakdown and pressure ulcers  - Continue DVT prophylaxis with eliquis    Anticipate disposition:  Home with home health      Follow-up needed during SNF stay-    Follow-up needed after discharge from SNF:   - PCP, hospital follow-up, 7/14    Future  Appointments   Date Time Provider Department Center   7/28/2022  3:00 PM Samir Sahni MD [facility]         Amy Conklin NP  Department of Hospital Medicine   [facility]     DOS: 7/19/2022      Patient note was created using MModal Dictation.  Any errors in syntax or even information may not have been identified and edited on initial review prior to signing this note.

## 2022-07-19 NOTE — PT/OT/SLP PROGRESS
Physical Therapy Treatment    Patient Name:  Lupis Murcia   MRN:  [id number]  Admit Date: 6/20/2022  Admitting Diagnosis: Acute pulmonary embolism with acute cor pulmonale  Recent Surgeries: N/a    General Precautions: Standard, fall   Orthopedic Precautions:N/A   Braces:       Recommendations:     Discharge Recommendations:  home health PT   Level of Assistance Recommended at Discharge: 24 hours light assistance  Discharge Equipment Recommendations: bedside commode, grab bar, raised toilet, shower chair, walker, rolling   Barriers to discharge: Decreased caregiver support (increased assistance needed)    Assessment:     Lupis Murcia is a 71 y.o. female admitted with a medical diagnosis of Acute pulmonary embolism with acute cor pulmonale . Pt continues to need SBA/CGA for all transfers and GT due to pt with chronic B L/e edema, L foot drop and pt with decrease safety awareness when stepping back to sit down in BSchair and/or wheelchair despite frequent multiple VCs and reminders. Pt will benefit from continued snf rehab to help pt. Improve her overall functional mobility and safety.      Performance deficits affecting function:  weakness, impaired endurance, impaired sensation, impaired self care skills, impaired functional mobilty, gait instability, impaired balance, pain, decreased ROM, decreased lower extremity function, decreased coordination, abnormal tone, impaired coordination, impaired fine motor, impaired skin, edema, impaired cardiopulmonary response to activity .    Rehab Potential is good    Activity Tolerance: Good    Plan:     Patient to be seen 6 x/week to address the above listed problems via gait training, therapeutic activities, therapeutic exercises, neuromuscular re-education, wheelchair management/training    · Plan of Care Expires: 07/20/22  · Plan of Care Reviewed with: patient    Subjective     Pt. Agreeable to wotk with PT.     Pain/Comfort:  ·  0/10 before and after  "treatment.    Patient's cultural, spiritual, Scientology conflicts given the current situation:  · no    Objective:     Communicated with pt's nurse prior to session.  Patient found up in chair with   upon PT entry to room.     Therapeutic Activities and Exercises: UBE x 10mins set at a Moderate resistance to help improve pt's overall cardiovascular endurance.    Functional Mobility:  · Transfers:     · Sit to Stand x multiple trials:  stand by assistance with rolling walker  · Bed Side chair <>W/Chair: contact guard assistance with  rolling walker  using  Stand Pivot and frequent reminders to step back until the back of her B L/Es feel the w/c before reaching to sit down.  · Gait: ~75ft with RW and SBA for safety as pt "dragging" L foot when advancing LLE due to chronic L foot drop.  · Wheelchair Propulsion:  Pt. Refused due to stating her U/Es are too weak to propel a w/c.    AM-PAC 6 CLICK MOBILITY       Patient left up in chair with all lines intact and call button in reach.    GOALS:   Multidisciplinary Problems     Physical Therapy Goals        Problem: Physical Therapy    Goal Priority Disciplines Outcome Goal Variances Interventions   Physical Therapy Goal     PT, PT/OT Ongoing, Progressing     Description: Goals to be met by: 2022:    Patient will increase functional independence with mobility by performin. Supine to sit with Maximum Assistance.  2. Sit to supine with Maximum Assistance.  3. Rolling to Left and Right with Moderate Assistance.  4. Sit to stand transfer with Contact Guard Assistance. -met  Updated 2022: Sit to stand transfer with supervision. -met  Updated 2022: Sit to stand transfer with [place]. -not met    5. Bed to chair transfer with Contact Guard Assistance using Rolling Walker. -met  Updated 2022: Bed to chair transfer with [place] using Rolling Walker. -not met    6. Gait x 100 feet with Maximum Assistance using Rolling Walker.   Updated 2022 Gait x 100 " feet with (S) using Rolling Walker.  7. Wheelchair propulsion x 50 feet with Minimal Assistance using bilateral upper extremities.  8. Ascend/Descend 4 inch curb step with Maximum Assistance using Rolling Walker.  9. Sitting at edge of bed x 20 minutes with Supervision performing dynamic sitting balance activities.  10. Lower extremity exercise program x 30 reps per handout, with supervision.                     Time Tracking:     PT Received On: 07/19/22  PT Start Time: 0930  PT Stop Time: 1008  PT Total Time (min): 38 min    Billable Minutes: Gait Training 10mins, Therapeutic Activity 18mins and Therapeutic Exercise 10mins    Treatment Type: Treatment  PT/PTA: PT     PTA Visit Number: 3     07/19/2022

## 2022-07-20 PROCEDURE — 25000003 PHARM REV CODE 250: Performed by: HOSPITALIST

## 2022-07-20 PROCEDURE — 11000004 HC SNF PRIVATE

## 2022-07-20 PROCEDURE — 97530 THERAPEUTIC ACTIVITIES: CPT | Mod: CQ

## 2022-07-20 PROCEDURE — 25000003 PHARM REV CODE 250: Performed by: NURSE PRACTITIONER

## 2022-07-20 PROCEDURE — 97530 THERAPEUTIC ACTIVITIES: CPT | Mod: CO

## 2022-07-20 PROCEDURE — 97116 GAIT TRAINING THERAPY: CPT | Mod: CQ

## 2022-07-20 RX ADMIN — FUROSEMIDE 20 MG: 20 TABLET ORAL at 08:07

## 2022-07-20 RX ADMIN — DICLOFENAC SODIUM 4 G: 10 GEL TOPICAL at 03:07

## 2022-07-20 RX ADMIN — CHOLECALCIFEROL TAB 25 MCG (1000 UNIT) 1000 UNITS: 25 TAB at 08:07

## 2022-07-20 RX ADMIN — LORAZEPAM 0.5 MG: 0.5 TABLET ORAL at 06:07

## 2022-07-20 RX ADMIN — Medication 400 MG: at 08:07

## 2022-07-20 RX ADMIN — ACETAMINOPHEN AND CODEINE PHOSPHATE 1 TABLET: 300; 30 TABLET ORAL at 01:07

## 2022-07-20 RX ADMIN — APIXABAN 5 MG: 2.5 TABLET, FILM COATED ORAL at 08:07

## 2022-07-20 RX ADMIN — GABAPENTIN 100 MG: 100 CAPSULE ORAL at 03:07

## 2022-07-20 RX ADMIN — ACETAMINOPHEN AND CODEINE PHOSPHATE 1 TABLET: 300; 30 TABLET ORAL at 12:07

## 2022-07-20 RX ADMIN — PANTOPRAZOLE SODIUM 40 MG: 40 TABLET, DELAYED RELEASE ORAL at 08:07

## 2022-07-20 RX ADMIN — Medication 400 MG: at 09:07

## 2022-07-20 RX ADMIN — GABAPENTIN 100 MG: 100 CAPSULE ORAL at 08:07

## 2022-07-20 RX ADMIN — DICLOFENAC SODIUM 4 G: 10 GEL TOPICAL at 09:07

## 2022-07-20 RX ADMIN — GABAPENTIN 100 MG: 100 CAPSULE ORAL at 09:07

## 2022-07-20 RX ADMIN — APIXABAN 5 MG: 2.5 TABLET, FILM COATED ORAL at 09:07

## 2022-07-20 RX ADMIN — LORAZEPAM 0.5 MG: 0.5 TABLET ORAL at 10:07

## 2022-07-20 RX ADMIN — Medication 1 TABLET: at 08:07

## 2022-07-20 NOTE — PROGRESS NOTES
[facility] - Skilled Nursing  Adult Nutrition  Progress Note    SUMMARY       Recommendations    1. Continue regular diet.   2. Add Lencho BID for wound healing.    Goals: 1. Pt's intake meals >50% by  RD follow up.  Nutrition Goal Status: goal met  Communication of RD Recs: reviewed with physician    Assessment and Plan  Nutrition Problem:  Obesity     Related to (etiology):   Excessive calorie intake over needs, lack of activity  Signs and Symptoms (as evidenced by):   Debility, BMI 37     Interventions/Recommendations (treatment strategy):  General diet  Nutrition education- weight loss  Collaboration with other providers     Nutrition Diagnosis Status:    Continues       Malnutrition Assessment 6/23/22     Skin (Micronutrient): bruised, cracked, thickened, scaly  Hair/Scalp (Micronutrient): dull  Eyes (Micronutrient): conjunctiva dull  Teeth (Micronutrient): broken dentition  Neck/Chest (Micronutrient): muscle wasting  Musculoskeletal/Lower Extremities: edema       Weight Loss (Malnutrition): greater than 5% in 1 month   Orbital Region (Subcutaneous Fat Loss): well nourished  Upper Arm Region (Subcutaneous Fat Loss): well nourished  Thoracic and Lumbar Region: well nourished   [place] Region (Muscle Loss): mild depletion  Clavicle Bone Region (Muscle Loss): mild depletion  Clavicle and Acromion Bone Region (Muscle Loss): mild depletion  Dorsal Hand (Muscle Loss): moderate depletion  Anterior Thigh Region (Muscle Loss): moderate depletion   Edema (Fluid Accumulation): 3-->moderate             Reason for Assessment    Reason For Assessment: RD follow-up  Diagnosis:  (PE)  Relevant Medical History: HANDY, anemia, chronic pain, MS, obesity, lymphedema, Vit B12 deficiency,  Interdisciplinary Rounds: attended  General Information Comments: Pt continues with good intake meals. Noted with 15 lb desirable wt loss since admission, most likely fluid-related as pt has lymphedema and reports the fluid has slowly been going  "down. Pt with mild muscle wasting per NFPE . Pt also noted with stage 2-3 PI to L/R LEs; pt refused Lencho supplement.  Nutrition Discharge Planning: general healthy diet, adequate protein    Nutrition Risk Screen    Nutrition Risk Screen: no indicators present    Nutrition/Diet History    Patient Reported Diet/Restrictions/Preferences: general  Typical Food/Fluid Intake: likes to snack, regular meals  Spiritual, Cultural Beliefs, Presybeterian Practices, Values that Affect Care: no  Vitamin/Mineral/Herbal Supplements: Vit D, Vit B12,Vit B complex, Vit C,  Food Allergies: NKFA  Factors Affecting Nutritional Intake: None identified at this time    Anthropometrics    Temp: 97.9 °F (36.6 °C)  Height Method: Stated  Height: 5' 2" (157.5 cm)  Height (inches): 62 in  Weight Method: Standard Scale  Weight:  (PT REFUSED WEIGHT)  Weight (lb): 191.8 lb  Ideal Body Weight (IBW), Female: 110 lb  % Ideal Body Weight, Female (lb): 187.59 %  BMI (Calculated): 35.1  BMI Grade: 35 - 39.9 - obesity - grade II  Weight Loss: intentional  Usual Body Weight (UBW), k.6 kg  % Usual Body Weight: 94.17  % Weight Change From Usual Weight: -6.02 %       Lab/Procedures/Meds    Pertinent Labs Reviewed: reviewed  Pertinent Labs Comments: H/H 9.1/28.7, A1c 6.2% ()  Pertinent Medications Comments: vitamin B complex/C/D, B12, senna, colace, gabapentin, magnesium oxide, protonix    Estimated/Assessed Needs    Weight Used For Calorie Calculations: 93.6 kg (206 lb 5.6 oz)  Energy Calorie Requirements (kcal): 1400  Energy Need Method: Russell-St Jeor (x 1.0(PAL) 2/2 obesity)  Protein Requirements: 60g  Weight Used For Protein Calculations: 50 kg (110 lb 3.7 oz) (IBW kg 2/2 obesity x 1.2g/kg)  Fluid Requirements (mL): 1400 or per MD  Estimated Fluid Requirement Method: RDA Method  RDA Method (mL): 1400  CHO Requirement: -      Nutrition Prescription Ordered    Current Diet Order: Regular  Nutrition Order Comments: PO 50-75%    Evaluation of " Received Nutrient/Fluid Intake    I/O: no data  Energy Calories Required: meeting needs  Protein Required: meeting needs  Fluid Required: meeting needs  Comments: last BM 7/19  Tolerance: tolerating  % Intake of Estimated Energy Needs: 75 - 100 %  % Meal Intake: 50 - 75 %    Nutrition Risk    Level of Risk/Frequency of Follow-up: low     Monitor and Evaluation    Food and Nutrient Adminstration: diet order  Physical Activity and Function: nutrition-related ADLs and IADLs  Anthropometric Measurements: weight change  Biochemical Data, Medical Tests and Procedures: electrolyte and renal panel, gastrointestinal profile, glucose/endocrine profile, inflammatory profile  Nutrition-Focused Physical Findings: overall appearance, extremities, muscles and bones, head and eyes, skin     Nutrition Follow-Up    RD Follow-up?: Yes

## 2022-07-20 NOTE — PT/OT/SLP PROGRESS
Occupational Therapy   Treatment    Name: Lupis Murcia  MRN: [id number]  Admit Date: 6/20/2022  Admitting Diagnosis:  Acute pulmonary embolism with acute cor pulmonale    General Precautions: Standard, fall   Orthopedic Precautions:N/A   Braces:       Recommendations:     Discharge Recommendations: home health OT  Level of Assistance Recommended at Discharge: 24 hours physical assistance for all ADL's and home management tasks  Discharge Equipment Recommendations:  bedside commode, grab bar, raised toilet, bath bench  Barriers to discharge:  Decreased caregiver support (increased skilled assistance needed)    Assessment:     Lupis Murcia is a 71 y.o. female with a medical diagnosis of Acute pulmonary embolism with acute cor pulmonale.  She presents with  Performance deficits affecting function are weakness, impaired endurance, impaired self care skills, impaired functional mobility, gait instability, impaired balance, decreased ROM, decreased lower extremity function, edema, impaired cardiopulmonary response to activity, decreased safety awareness, impaired skin, decreased coordination    Pt. Was cooperative and participated well with session on this day.   Pt.with improvement  on this day with standing capabilities and standing tolerance with task.Pt. continues to demonstrate levels of physical deficits with  functional indep with daily management activities tasks, selfcare skills with balance,  functional mobility, UB strength and endurance. Pt. Will continue to benefit from continued OT to progress towards goals     Rehab Potential is fair    Activity tolerance:  Fair    Plan:     Patient to be seen 3 x/week (Mon, Wed, Fri) to address the above listed problems via self-care/home management, therapeutic activities, therapeutic exercises, wheelchair management/training    · Plan of Care Expires: 07/15/22  · Plan of Care Reviewed with: patient    Subjective     Communicated with: PTA  and Pt prior to session.  I am moving a little better now    Pain/Comfort:  Pain Rating 1: 0/10  Pain Rating Post-Intervention 1: 0/10    Patient's cultural, spiritual, Evangelical conflicts given the current situation:  no    Objective:     Patient found up in chair  With PTA present in gym with handoff upon OT entry to room.    Functional Mobility/Transfers:  Patient completed Sit <> Stand Transfer with contact guard assistance  with  rolling walker   X 2 trails from w/c with cues for safety and Min A for steadying (A)         Pt.with lateral side steps from w/c to chair with cues for safety     Activities of Daily Living:    · Already performed       [facility] 6 Click ADL: 16    OT Exercises: UE Ergometer 15    Treatment & Education:  Pt. With standing act on this day with task. Pt. With Min A/ to CGA for balance aspects with task with  AD at raised counter Pt with visual perception task with discrimination of various shapes and sizes x 2 trials 1 min  and  55  sec  followed by seated break due to back discomfort with standing and then  complete with standing bal and min cues through out with weight shifting and use of BUE's incorporated and crossing mid line and facilitation with posture in prep for home management .     Pt. With 2# dowel activity with 2x20 reps with  shd flex, bicep curls horz adb/add and forward flex motion to increase BUE ROM and strength,.   Pt. With therex performed to increase ROM, endurance selfcare task and fxl mobility for independence    Pt edu on role of OT, POC, safety when performing self care tasks , benefit of performing OOB activity, and safety when performing functional transfers and mobility management for preparation with goals to progress towards next level of care  Pt. With therex performed to increase ROM, endurance selfcare task and fxl mobility for independence     Pt edu on role of OT, POC, safety when performing self care tasks , benefit of performing OOB activity, and safety when performing  functional transfers and mobility management for preparation with goals to progress towards next level of care   independence     Patient left up in chair with all lines intact and call button in reachEducation:       GOALS:   Multidisciplinary Problems     Occupational Therapy Goals        Problem: Occupational Therapy    Goal Priority Disciplines Outcome Interventions   Occupational Therapy Goal     OT, PT/OT Ongoing, Progressing    Description: Goals to be met by: 8/1/2022    Patient will increase functional independence with ADLs by performing:    UE Dressing with Set-up Assistance.-MET  LE Dressing, including donning/doffing pants, with Minimal Assistance using appropriate AE.-Not MET  Grooming while seated at sink with Set-up Assistance.MET  Toileting from bedside commode with Minimal Assistance for hygiene and clothing management. -Not MET  Bathing from sitting at sink with Minimal Assistance.Not MET  Supine to sit with Stand-by Assistance.Not MET  Sit to stand transfer with Stand-by Assistance with RW.-Not MET  Toilet transfer to bedside commode with Stand-by Assistance with RW and grab bar.-Not MET  Upper extremity exercise program with Supervision.-MET  Caregiver will be educated on level of assist required to safely perform self care tasks and functional transfers.MET                        Time Tracking:     OT Date of Treatment: 07/20/22  OT Start Time: 1105    OT Stop Time: 1136  OT Total Time (min): 31 min    Billable Minutes:Therapeutic Activity 31    7/20/2022

## 2022-07-20 NOTE — PT/OT/SLP PROGRESS
Physical Therapy Treatment    Patient Name:  Lupis Murcia   MRN:  [id number]  Admit Date: 6/20/2022  Admitting Diagnosis: Acute pulmonary embolism with acute cor pulmonale  Recent Surgeries: N/A    General Precautions: Standard, fall   Orthopedic Precautions:N/A   Braces: N/A     Recommendations:     Discharge Recommendations:  home health PT   Level of Assistance Recommended at Discharge: 24 hours light assistance  Discharge Equipment Recommendations: bedside commode, grab bar, raised toilet, shower chair, walker, rolling   Barriers to discharge: Decreased caregiver support (increased assistance needed)    Assessment:     Lupis Murcia is a 71 y.o. female admitted with a medical diagnosis of Acute pulmonary embolism with acute cor pulmonale. Pt tolerated therapy session well with focus on increasing independence with gait training, transfers and safety awareness in order to assist with achieving highest level of function. Pt would continue to benefit from skilled PT services per POC.     Performance deficits affecting function:  weakness, impaired endurance, impaired self care skills, impaired functional mobility, gait instability, impaired balance, decreased lower extremity function, decreased upper extremity function, decreased coordination, impaired skin, edema, impaired coordination, impaired fine motor, decreased ROM, impaired cardiopulmonary response to activity .    Rehab Potential is good    Activity Tolerance: Fair    Plan:     Patient to be seen 6 x/week to address the above listed problems via gait training, therapeutic activities, therapeutic exercises, neuromuscular re-education, wheelchair management/training    · Plan of Care Expires: 07/20/22  · Plan of Care Reviewed with: patient    Subjective     Pt agreeable to PT.     Pain/Comfort:  · Pain Rating 1: 0/10  · Pain Rating Post-Intervention 1: 0/10    Patient's cultural, spiritual, Anabaptism conflicts given the current  situation:  · no    Objective:     Communicated with PCT prior to session.  Patient found up in recliner chair with  (no lines) upon PT entry to room.     Functional Mobility:  · Transfers:  Sit to Stand: 2 sets, contact guard assistance and minimum assistance with rolling walker  · Bed to Chair: moderate assistance and of 2 persons with  rolling walker  using  Step Transfer. Pt's LEs more weak at beginning of session requiring more assistance to get out of recliner.   · Gait: x 20', x 21', RW, CGA with w/c to follow for fatigue.     AM-PAC 6 CLICK MOBILITY  15    Patient left up in chair with BOWIE present.    GOALS:   Multidisciplinary Problems     Physical Therapy Goals        Problem: Physical Therapy    Goal Priority Disciplines Outcome Goal Variances Interventions   Physical Therapy Goal     PT, PT/OT Ongoing, Progressing     Description: Goals to be met by: 2022:    Patient will increase functional independence with mobility by performin. Supine to sit with Maximum Assistance.  2. Sit to supine with Maximum Assistance.  3. Rolling to Left and Right with Moderate Assistance.  4. Sit to stand transfer with Contact Guard Assistance. -met  Updated 2022: Sit to stand transfer with supervision. -met  Updated 2022: Sit to stand transfer with Dedrick. -not met    5. Bed to chair transfer with Contact Guard Assistance using Rolling Walker. -met  Updated 2022: Bed to chair transfer with Dedrick using Rolling Walker. -not met    6. Gait x 100 feet with Maximum Assistance using Rolling Walker.   Updated 2022 Gait x 100 feet with (S) using Rolling Walker.  7. Wheelchair propulsion x 50 feet with Minimal Assistance using bilateral upper extremities.  8. Ascend/Descend 4 inch curb step with Maximum Assistance using Rolling Walker.  9. Sitting at edge of bed x 20 minutes with Supervision performing dynamic sitting balance activities.  10. Lower extremity exercise program x 30 reps per handout,  with supervision.                     Time Tracking:     PT Received On: 07/20/22  PT Start Time: 1016  PT Stop Time: 1051  PT Total Time (min): 35 min    Billable Minutes: Gait Training 19 and Therapeutic Activity 16    Treatment Type: Treatment  PT/PTA: PTA     PTA Visit Number: 1     07/20/2022

## 2022-07-21 PROCEDURE — 11000004 HC SNF PRIVATE

## 2022-07-21 PROCEDURE — 25000003 PHARM REV CODE 250: Performed by: HOSPITALIST

## 2022-07-21 PROCEDURE — 25000003 PHARM REV CODE 250: Performed by: NURSE PRACTITIONER

## 2022-07-21 PROCEDURE — 97116 GAIT TRAINING THERAPY: CPT | Mod: CQ

## 2022-07-21 PROCEDURE — 97530 THERAPEUTIC ACTIVITIES: CPT | Mod: CQ

## 2022-07-21 RX ADMIN — ACETAMINOPHEN AND CODEINE PHOSPHATE 1 TABLET: 300; 30 TABLET ORAL at 02:07

## 2022-07-21 RX ADMIN — GABAPENTIN 100 MG: 100 CAPSULE ORAL at 09:07

## 2022-07-21 RX ADMIN — APIXABAN 5 MG: 2.5 TABLET, FILM COATED ORAL at 09:07

## 2022-07-21 RX ADMIN — CHOLECALCIFEROL TAB 25 MCG (1000 UNIT) 1000 UNITS: 25 TAB at 09:07

## 2022-07-21 RX ADMIN — FUROSEMIDE 20 MG: 20 TABLET ORAL at 09:07

## 2022-07-21 RX ADMIN — DICLOFENAC SODIUM 4 G: 10 GEL TOPICAL at 03:07

## 2022-07-21 RX ADMIN — DICLOFENAC SODIUM 4 G: 10 GEL TOPICAL at 09:07

## 2022-07-21 RX ADMIN — Medication 400 MG: at 09:07

## 2022-07-21 RX ADMIN — LORAZEPAM 0.5 MG: 0.5 TABLET ORAL at 09:07

## 2022-07-21 RX ADMIN — ACETAMINOPHEN AND CODEINE PHOSPHATE 1 TABLET: 300; 30 TABLET ORAL at 03:07

## 2022-07-21 RX ADMIN — PANTOPRAZOLE SODIUM 40 MG: 40 TABLET, DELAYED RELEASE ORAL at 09:07

## 2022-07-21 RX ADMIN — Medication 1 TABLET: at 09:07

## 2022-07-21 RX ADMIN — LORAZEPAM 0.5 MG: 0.5 TABLET ORAL at 07:07

## 2022-07-21 NOTE — PT/OT/SLP PROGRESS
Physical Therapy Treatment    Patient Name:  Lupis Murcia   MRN:  [id number]  Admit Date: 6/20/2022  Admitting Diagnosis: Acute pulmonary embolism with acute cor pulmonale  Recent Surgeries: N/A    General Precautions: Standard, fall   Orthopedic Precautions:N/A   Braces: N/A     Recommendations:     Discharge Recommendations:  home health PT   Level of Assistance Recommended at Discharge: 24 hours light assistance  Discharge Equipment Recommendations: bedside commode, grab bar, raised toilet, shower chair, walker, rolling   Barriers to discharge: Decreased caregiver support (increased assistance needed)    Assessment:     Lupis Murcia is a 71 y.o. female admitted with a medical diagnosis of Acute pulmonary embolism with acute cor pulmonale. Pt tolerated therapy session well with focus on increasing independence with gait training, curb, transfers and safety awareness in order to assist with achieving highest level of function. Pt would continue to benefit from skilled PT services per POC.     Performance deficits affecting function:  weakness, impaired endurance, impaired self care skills, impaired functional mobility, gait instability, impaired balance, decreased lower extremity function, decreased upper extremity function, decreased coordination, impaired skin, edema, impaired coordination, impaired fine motor, decreased ROM, impaired cardiopulmonary response to activity .    Rehab Potential is good    Activity Tolerance: Good and Fair    Plan:     Patient to be seen 6 x/week to address the above listed problems via gait training, therapeutic activities, therapeutic exercises, neuromuscular re-education, wheelchair management/training    · Plan of Care Expires: 07/20/22  · Plan of Care Reviewed with: patient    Subjective     Pt agreeable to PT.     Pain/Comfort:  · Pain Rating 1: 0/10  · Pain Rating Post-Intervention 1: 0/10    Patient's cultural, spiritual, Scientologist conflicts given the current  "situation:  · no    Objective:     Patient found up in recliner chair with  (no lines) upon PT entry to room.     Functional Mobility:  · Transfers:     · Sit to Stand: 3 sets, contact guard assistance with rolling walker  · Recliner chair <> wheelchair: contact guard assistance with  rolling walker  using  Step Transfer  · Gait: x 68', x 55', x 15', RW, CGA x 2 for follow with w/c due to fatigue.   · 2" curb step: pt unable to perform due to fear/anxiety of R knee buckling.     AM-PAC 6 CLICK MOBILITY  15    Patient left up in recliner chair with call button in reach.    GOALS:   Multidisciplinary Problems     Physical Therapy Goals        Problem: Physical Therapy    Goal Priority Disciplines Outcome Goal Variances Interventions   Physical Therapy Goal     PT, PT/OT Ongoing, Progressing     Description: Goals to be met by: 2022:    Patient will increase functional independence with mobility by performin. Supine to sit with Maximum Assistance.  2. Sit to supine with Maximum Assistance.  3. Rolling to Left and Right with Moderate Assistance.  4. Sit to stand transfer with Contact Guard Assistance. -met  Updated 2022: Sit to stand transfer with supervision. -met  Updated 2022: Sit to stand transfer with Dedrick. -not met    5. Bed to chair transfer with Contact Guard Assistance using Rolling Walker. -met  Updated 2022: Bed to chair transfer with Dedrick using Rolling Walker. -not met    6. Gait x 100 feet with Maximum Assistance using Rolling Walker.   Updated 2022 Gait x 100 feet with (S) using Rolling Walker.  7. Wheelchair propulsion x 50 feet with Minimal Assistance using bilateral upper extremities.  8. Ascend/Descend 4 inch curb step with Maximum Assistance using Rolling Walker.  9. Sitting at edge of bed x 20 minutes with Supervision performing dynamic sitting balance activities.  10. Lower extremity exercise program x 30 reps per handout, with supervision.               "       Time Tracking:     PT Received On: 07/21/22  PT Start Time: 0922  PT Stop Time: 1010  PT Total Time (min): 48 min    Billable Minutes: Gait Training 20 and Therapeutic Activity 28    Treatment Type: Treatment  PT/PTA: PTA     PTA Visit Number: 2     07/21/2022

## 2022-07-21 NOTE — CLINICAL REVIEW
Clinical Pharmacy 30 Day Chart Review Note      Admit Date: 6/20/2022   LOS: 31 days       Lupis Murcia is a 71 y.o. female admitted to [facility] for PT/OT after hospitalization for acute pulmonary embolism with acute cor pulmonale.    Active Hospital Problems    Diagnosis  POA    *Acute pulmonary embolism with acute cor pulmonale [I26.09]  Yes    Hypokalemia [E87.6]  Yes    Acute hypoxemic respiratory failure [J96.01]  Yes    Class 2 obesity due to excess calories in adult [E66.09]  Yes    Lymphedema [I89.0]  Yes    MS (multiple sclerosis) [G35]  Yes    Vitamin D deficiency [E55.9]  Yes    Muscle weakness of lower extremity [M62.81]  Yes    Impaired functional mobility, balance, gait, and endurance [Z74.09]  Yes    Anemia of chronic disease [D63.8]  Yes    Vitamin B 12 deficiency [E53.8]  Yes    Insomnia secondary to chronic pain [G89.29, G47.01]  Yes      Resolved Hospital Problems   No resolved problems to display.     Review of patient's allergies indicates:   Allergen Reactions    Contrast media Shortness Of Breath and Rash    Pcn [penicillins] Shortness Of Breath and Rash    Celebrex [celecoxib] Other (See Comments)     Swallowing problems     Diazepam Hives    Motrin [ibuprofen] Rash     Patient Active Problem List    Diagnosis Date Noted    Hypokalemia 06/23/2022    Acute pulmonary embolism with acute cor pulmonale 06/14/2022    Acute hypoxemic respiratory failure 06/14/2022    Class 2 obesity due to excess calories in adult 06/14/2022    Abdominal distension 05/06/2022    Cellulitis of leg 05/04/2022    Hypernatremia 05/04/2022    Urinary retention 05/02/2022    HANDY (acute kidney injury) 05/01/2022    Hyperkalemia 05/01/2022    Lymphedema 05/01/2022    Pressure injury of buttock, unstageable 05/01/2022    Encephalopathy, metabolic 05/01/2022    Leg weakness 11/06/2019    Abnormal muscle tone 11/06/2019    Paraparesis 06/09/2019    MS (multiple sclerosis) 06/09/2019     Vitamin D deficiency 06/09/2019    Decreased ROM of ankle 07/24/2018    Muscle weakness of lower extremity 05/11/2018    Impaired functional mobility, balance, gait, and endurance 05/11/2018    At high risk for falls 05/11/2018    Anemia of chronic disease 08/09/2017    Vitamin B 12 deficiency 08/09/2017    Gait disturbance 07/21/2017    Abnormal brain MRI     Foot drop, left 06/15/2016    Insomnia secondary to chronic pain 06/15/2016    Gait abnormality 03/15/2016    Babinski reflex 03/15/2016    Weakness of left hip 03/15/2016    Negron sign present 03/15/2016       Scheduled Meds:    apixaban  5 mg Oral BID    B-complex with vitamin C  1 tablet Oral Daily    cyanocobalamin  1,000 mcg Intramuscular Q30 Days    diclofenac sodium  4 g Topical (Top) TID    furosemide  20 mg Oral Daily    gabapentin  100 mg Oral TID    pantoprazole  40 mg Oral Daily    senna-docusate 8.6-50 mg  1 tablet Oral BID    vitamin D  1,000 Units Oral Daily     Continuous Infusions:   PRN Meds: acetaminophen, acetaminophen-codeine 300-30mg, calcium carbonate, dextrose 10%, dextrose 10%, LORazepam, melatonin, miconazole NITRATE 2 %, ondansetron    OBJECTIVE:     Vital Signs (Last 24H)  Temp:  [97.3 °F (36.3 °C)-98 °F (36.7 °C)]   Pulse:  [88]   Resp:  [18]   BP: (125-153)/(58-69)   SpO2:  [94 %-95 %]     Laboratory:  CBC:   Recent Labs   Lab 07/15/22  0550 07/19/22 0427   WBC 5.46 6.15   RBC 2.75* 2.85*   HGB 8.6* 9.1*   HCT 27.9* 28.7*    301   * 101*   MCH 31.3* 31.9*   MCHC 30.8* 31.7*     BMP:   Recent Labs   Lab 07/15/22  0550 07/19/22 0427   * 124*    139   K 3.7 3.7    104   CO2 25 27   BUN 16 18   CREATININE 0.8 0.9   CALCIUM 10.2 9.4   MG 1.9 1.7     CMP:   Recent Labs   Lab 07/15/22  0550 07/19/22 0427   * 124*   CALCIUM 10.2 9.4    139   K 3.7 3.7   CO2 25 27    104   BUN 16 18   CREATININE 0.8 0.9     Lab Results   Component Value Date    ALT 10  06/22/2022    AST 9 (L) 06/22/2022    ALKPHOS 52 (L) 06/22/2022    BILITOT 0.3 06/22/2022     Lab Results   Component Value Date    HGBA1C 6.2 (H) 05/03/2022         ASSESSMENT/PLAN:     I have reviewed the medications in compliance with CMS Regulation F756 of the [place]. Based on information gathered, the following items may need to be addressed:    **Lorazepam ordered as needed for anxiety. Per CMS guidelines: The timeframe is limited for PRN psychotropic medications, which are not antipsychotic medications, to 14 days, unless a longer timeframe is deemed appropriate by the attending physician or the prescribing practitioner. This is a home medication and patient is currently taking. Will continue to monitor use and associated side effects. Consider discontinuation if adverse effects observed ie. Dizziness, drowsiness, somnolence     Medications reviewed by PharmD, please re-consult if needed.      Nicolette Morales, Pharm. D.  Clinical Pharmacist  [facility]

## 2022-07-21 NOTE — PT/OT/SLP PROGRESS
"Occupational Therapy      Patient Name:  Lupis Murcia   MRN:  [id number]    Patient not seen today secondary to pt pleasantly declined to participate.  She was fatigued and had worked with PT earlier in the day.  Pt said, "I just want to get out of here."  Will follow-up as scheduled per OT POC.    7/21/2022  " Having painful cramping at night on Friday, none since, happening ~ 3-4 times  GFM  Denies VB/LOF  Rev labs  Rev birth wishes

## 2022-07-22 LAB
ANION GAP SERPL CALC-SCNC: 8 MMOL/L (ref 8–16)
BASOPHILS # BLD AUTO: 0.02 K/UL (ref 0–0.2)
BASOPHILS NFR BLD: 0.3 % (ref 0–1.9)
BUN SERPL-MCNC: 16 MG/DL (ref 8–23)
CALCIUM SERPL-MCNC: 9.6 MG/DL (ref 8.7–10.5)
CHLORIDE SERPL-SCNC: 108 MMOL/L (ref 95–110)
CO2 SERPL-SCNC: 27 MMOL/L (ref 23–29)
CREAT SERPL-MCNC: 0.8 MG/DL (ref 0.5–1.4)
DIFFERENTIAL METHOD: ABNORMAL
EOSINOPHIL # BLD AUTO: 0.2 K/UL (ref 0–0.5)
EOSINOPHIL NFR BLD: 4 % (ref 0–8)
ERYTHROCYTE [DISTWIDTH] IN BLOOD BY AUTOMATED COUNT: 14 % (ref 11.5–14.5)
EST. GFR  (AFRICAN AMERICAN): >60 ML/MIN/1.73 M^2
EST. GFR  (NON AFRICAN AMERICAN): >60 ML/MIN/1.73 M^2
GLUCOSE SERPL-MCNC: 117 MG/DL (ref 70–110)
HCT VFR BLD AUTO: 28.3 % (ref 37–48.5)
HGB BLD-MCNC: 8.8 G/DL (ref 12–16)
IMM GRANULOCYTES # BLD AUTO: 0.02 K/UL (ref 0–0.04)
IMM GRANULOCYTES NFR BLD AUTO: 0.3 % (ref 0–0.5)
LYMPHOCYTES # BLD AUTO: 2 K/UL (ref 1–4.8)
LYMPHOCYTES NFR BLD: 34 % (ref 18–48)
MAGNESIUM SERPL-MCNC: 1.9 MG/DL (ref 1.6–2.6)
MCH RBC QN AUTO: 30.8 PG (ref 27–31)
MCHC RBC AUTO-ENTMCNC: 31.1 G/DL (ref 32–36)
MCV RBC AUTO: 99 FL (ref 82–98)
MONOCYTES # BLD AUTO: 0.6 K/UL (ref 0.3–1)
MONOCYTES NFR BLD: 9.5 % (ref 4–15)
NEUTROPHILS # BLD AUTO: 3.1 K/UL (ref 1.8–7.7)
NEUTROPHILS NFR BLD: 51.9 % (ref 38–73)
NRBC BLD-RTO: 0 /100 WBC
PHOSPHATE SERPL-MCNC: 3.5 MG/DL (ref 2.7–4.5)
PLATELET # BLD AUTO: 287 K/UL (ref 150–450)
PMV BLD AUTO: 10.2 FL (ref 9.2–12.9)
POTASSIUM SERPL-SCNC: 3.8 MMOL/L (ref 3.5–5.1)
RBC # BLD AUTO: 2.86 M/UL (ref 4–5.4)
SODIUM SERPL-SCNC: 143 MMOL/L (ref 136–145)
WBC # BLD AUTO: 6 K/UL (ref 3.9–12.7)

## 2022-07-22 PROCEDURE — 97530 THERAPEUTIC ACTIVITIES: CPT | Mod: CO

## 2022-07-22 PROCEDURE — 83735 ASSAY OF MAGNESIUM: CPT | Performed by: NURSE PRACTITIONER

## 2022-07-22 PROCEDURE — 80048 BASIC METABOLIC PNL TOTAL CA: CPT | Performed by: NURSE PRACTITIONER

## 2022-07-22 PROCEDURE — 97530 THERAPEUTIC ACTIVITIES: CPT | Mod: CQ

## 2022-07-22 PROCEDURE — 84100 ASSAY OF PHOSPHORUS: CPT | Performed by: NURSE PRACTITIONER

## 2022-07-22 PROCEDURE — 97116 GAIT TRAINING THERAPY: CPT | Mod: CQ

## 2022-07-22 PROCEDURE — 25000003 PHARM REV CODE 250: Performed by: HOSPITALIST

## 2022-07-22 PROCEDURE — 85025 COMPLETE CBC W/AUTO DIFF WBC: CPT | Performed by: NURSE PRACTITIONER

## 2022-07-22 PROCEDURE — 25000003 PHARM REV CODE 250: Performed by: NURSE PRACTITIONER

## 2022-07-22 PROCEDURE — 36415 COLL VENOUS BLD VENIPUNCTURE: CPT | Performed by: NURSE PRACTITIONER

## 2022-07-22 PROCEDURE — 97110 THERAPEUTIC EXERCISES: CPT | Mod: CQ

## 2022-07-22 PROCEDURE — 11000004 HC SNF PRIVATE

## 2022-07-22 RX ADMIN — DICLOFENAC SODIUM 4 G: 10 GEL TOPICAL at 09:07

## 2022-07-22 RX ADMIN — APIXABAN 5 MG: 2.5 TABLET, FILM COATED ORAL at 08:07

## 2022-07-22 RX ADMIN — PANTOPRAZOLE SODIUM 40 MG: 40 TABLET, DELAYED RELEASE ORAL at 08:07

## 2022-07-22 RX ADMIN — GABAPENTIN 100 MG: 100 CAPSULE ORAL at 08:07

## 2022-07-22 RX ADMIN — ACETAMINOPHEN AND CODEINE PHOSPHATE 1 TABLET: 300; 30 TABLET ORAL at 11:07

## 2022-07-22 RX ADMIN — ACETAMINOPHEN AND CODEINE PHOSPHATE 1 TABLET: 300; 30 TABLET ORAL at 02:07

## 2022-07-22 RX ADMIN — DICLOFENAC SODIUM 4 G: 10 GEL TOPICAL at 08:07

## 2022-07-22 RX ADMIN — CHOLECALCIFEROL TAB 25 MCG (1000 UNIT) 1000 UNITS: 25 TAB at 08:07

## 2022-07-22 RX ADMIN — LORAZEPAM 0.5 MG: 0.5 TABLET ORAL at 08:07

## 2022-07-22 RX ADMIN — Medication 1 TABLET: at 08:07

## 2022-07-22 RX ADMIN — FUROSEMIDE 20 MG: 20 TABLET ORAL at 08:07

## 2022-07-22 NOTE — PT/OT/SLP PROGRESS
Occupational Therapy   Treatment    Name: Lupis Murcia  MRN: [id number]  Admit Date: 6/20/2022  Admitting Diagnosis:  Acute pulmonary embolism with acute cor pulmonale    General Precautions: Standard, fall   Orthopedic Precautions:N/A   Braces:       Recommendations:     Discharge Recommendations: home health OT  Level of Assistance Recommended at Discharge: 24 hours physical assistance for all ADL's and home management tasks  Discharge Equipment Recommendations:  bedside commode, grab bar, raised toilet, bath bench  Barriers to discharge:  Decreased caregiver support (increased skilled assistance needed)    Assessment:     Lupis Murcia is a 71 y.o. female with a medical diagnosis of Acute pulmonary embolism with acute cor pulmonale.  She presents with  Performance deficits affecting function are weakness, impaired endurance, impaired self care skills, impaired functional mobility, gait instability, impaired balance, decreased ROM, decreased lower extremity function, edema, impaired cardiopulmonary response to activity, decreased safety awareness, impaired skin, decreased coordination    Pt. Was cooperative and participated well with session on this day.   Pt.with improvement  on this day with standing capabilities and standing tolerance with task.Pt. continues to demonstrate levels of physical deficits with  functional indep with daily management activities tasks, selfcare skills with balance,  functional mobility, UB strength and endurance. Pt. Will continue to benefit from continued OT to progress towards goals     Rehab Potential is fair    Activity tolerance:  Fair    Plan:     Patient to be seen 3 x/week (Mon, Wed, Fri) to address the above listed problems via self-care/home management, therapeutic activities, therapeutic exercises, wheelchair management/training    · Plan of Care Expires: 07/15/22  · Plan of Care Reviewed with: patient    Subjective     Communicated with: Pt and nurse  Pt prior to  session. I am moving a little better now    Pain/Comfort:  Pain Rating 1: 0/10  Pain Rating Post-Intervention 1: 0/10    Patient's cultural, spiritual, Restoration conflicts given the current situation:  no    Objective:     Patient found up in chair with daughter present f upon OT entry to room.    Functional Mobility/Transfers:  Patient completed Sit <> Stand Transfer with contact guard assistance  with  rolling walker   X 2 trails from w/c <> chair    Activities of Daily Living:  · Already performed  With daughter (A)     [facility] 6 Click ADL: 16    OT Exercises: UE Ergometer 15    Treatment & Education:  Pt. With standing act on this day with task. Pt. With Min A/ to CGA for balance aspects with task with  AD at raised counter  x 2 trials 2 min and 1 min followed by seated break due to back discomfort with standing and then  complete with standing bal and min cues through out with weight shifting and use of BUE's incorporated and crossing mid line and facilitation with posture in prep for home management .    Pt. With 2# dowel activity with 2x20 reps with  shd flex, bicep curls horz adb/add and forward flex motion to increase BUE ROM and strength.    Pt. With therex performed to increase ROM, endurance selfcare task and fxl mobility for independence    Pt edu on role of OT, POC, safety when performing self care tasks , benefit of performing OOB activity, and safety when performing functional transfers and mobility management for preparation with goals to progress towards next level of care   independence     Patient left up in chair with all lines intact and call button in reachEducation:       GOALS:   Multidisciplinary Problems     Occupational Therapy Goals        Problem: Occupational Therapy    Goal Priority Disciplines Outcome Interventions   Occupational Therapy Goal     OT, PT/OT Ongoing, Progressing    Description: Goals to be met by: 8/1/2022    Patient will increase functional independence with ADLs by  performing:    UE Dressing with Set-up Assistance.-MET  LE Dressing, including donning/doffing pants, with Minimal Assistance using appropriate AE.-Not MET  Grooming while seated at sink with Set-up Assistance.MET  Toileting from bedside commode with Minimal Assistance for hygiene and clothing management. -Not MET  Bathing from sitting at sink with Minimal Assistance.Not MET  Supine to sit with Stand-by Assistance.Not MET  Sit to stand transfer with Stand-by Assistance with RW.-Not MET  Toilet transfer to bedside commode with Stand-by Assistance with RW and grab bar.-Not MET  Upper extremity exercise program with Supervision.-MET  Caregiver will be educated on level of assist required to safely perform self care tasks and functional transfers.MET                        Time Tracking:     OT Date of Treatment: 07/22/22  OT Start Time: 1045    OT Stop Time: 1123  OT Total Time (min): 38 min    Billable Minutes:Therapeutic Activity 38    7/22/2022

## 2022-07-22 NOTE — PT/OT/SLP PROGRESS
Physical Therapy Treatment    Patient Name:  Lupis Murcia   MRN:  [id number]  Admit Date: 6/20/2022  Admitting Diagnosis: Acute pulmonary embolism with acute cor pulmonale  Recent Surgeries: N/A    General Precautions: Standard, fall   Orthopedic Precautions:N/A   Braces: N/A     Recommendations:     Discharge Recommendations:  home health PT   Level of Assistance Recommended at Discharge: 24 hours light assistance  Discharge Equipment Recommendations: bedside commode, grab bar, raised toilet, shower chair, walker, rolling   Barriers to discharge: Decreased caregiver support (increased assistance needed)    Assessment:     Lupis Murcia is a 71 y.o. female admitted with a medical diagnosis of Acute pulmonary embolism with acute cor pulmonale .     Pt was agreeable and tolerated today's therapy session well. Pt required Francie with initial stand from bedside chair, then only required CGA sit to stand from w/c. Pt completed 3 gait trials for total 105ft with RW and CGA. Pt required occasional verbal cues to decrease MEGAN to have BLE within RW with good compliance. Pt is motivated and progressing well. Pt continues to benefit from therapy to improve functional endurance and mobility.     Performance deficits affecting function:  weakness, impaired endurance, impaired self care skills, impaired functional mobility, gait instability, impaired balance, decreased lower extremity function, decreased upper extremity function, decreased coordination, impaired skin, edema, impaired coordination, impaired fine motor, decreased ROM, impaired cardiopulmonary response to activity .    Rehab Potential is good    Activity Tolerance: Good and Fair    Plan:     Patient to be seen 6 x/week to address the above listed problems via gait training, therapeutic activities, therapeutic exercises, neuromuscular re-education, wheelchair management/training    · Plan of Care Expires: 08/21/22  · Plan of Care Reviewed with:  "patient    Subjective     "this [left leg] is my bad leg".     Pain/Comfort:  · Pain Rating 1: 0/10  · Pain Rating Post-Intervention 1: 0/10    Patient's cultural, spiritual, Jehovah's witness conflicts given the current situation:  · no    Objective:     Patient found up in chair with  (no active lines) upon PT entry to room.     Therapeutic Activities and Exercises:   Seated BLE therex x10 reps: LAQ and marching  · Increase assistance with LLE with LAQ   Patient educated on role of therapy, goals of session, and benefits of out of bed mobility.   Instructed on use of call button and importance of calling nursing staff for assistance with mobility   Questions/concerns addressed within PTA scope of practice  Pt verbalized understanding.    Functional Mobility:  · Transfers:     · Sit to Stand:  contact guard assistance and minimum assistance with rolling walker  · 1x from bedside chair with Francie & 4x from w/c with CGA  · Increase time to complete   · Bedside Chair to Wheelchair: contact guard assistance with  rolling walker  using  Stand Pivot  · Gait: pt ambulated 30ft+40ft+35ft with RW and CGA and w/c following. Mostly steady to-gait with wide MEGAN, decrease pako, and decrease step length  · Pt slides L foot, pt reports d/t "foot drop"  · Verbal cues to keep feet inside RW and keep RW close, pt tends to put RW too far.   (attempted wider RW, pt reports prefers the previous RW)  · 3 rest break and no LOB noted.   · Distance limited by fatigued and SOB, SpO2 ~95% after 2nd trial.    AM-PAC 6 CLICK MOBILITY  15    Patient left up in chair with call button in reach and daughter present.    GOALS:   Multidisciplinary Problems     Physical Therapy Goals        Problem: Physical Therapy    Goal Priority Disciplines Outcome Goal Variances Interventions   Physical Therapy Goal     PT, PT/OT Ongoing, Progressing     Description: Goals to be met by: 07/30/2022:    Patient will increase functional independence with mobility by " performin. Supine to sit with Maximum Assistance.  2. Sit to supine with Maximum Assistance.  3. Rolling to Left and Right with Moderate Assistance.  4. Sit to stand transfer with Contact Guard Assistance. -met  Updated 2022: Sit to stand transfer with supervision. -met  Updated 2022: Sit to stand transfer with Dedrick. -not met    5. Bed to chair transfer with Contact Guard Assistance using Rolling Walker. -met  Updated 2022: Bed to chair transfer with Dedrick using Rolling Walker. -not met    6. Gait x 100 feet with Maximum Assistance using Rolling Walker.   Updated 2022 Gait x 100 feet with (S) using Rolling Walker.  7. Wheelchair propulsion x 50 feet with Minimal Assistance using bilateral upper extremities.  8. Ascend/Descend 4 inch curb step with Maximum Assistance using Rolling Walker.  9. Sitting at edge of bed x 20 minutes with Supervision performing dynamic sitting balance activities.  10. Lower extremity exercise program x 30 reps per handout, with supervision.                     Time Tracking:     PT Received On: 22  PT Start Time: 1340  PT Stop Time: 1424  PT Total Time (min): 44 min    Billable Minutes: Gait Training 20., Therapeutic Activity 10 and Therapeutic Exercise 14    Treatment Type: Treatment  PT/PTA: PTA     PTA Visit Number: 3     2022

## 2022-07-22 NOTE — PROGRESS NOTES
[facility]                                  Skilled Nursing Facility                   Progress Note     Admit Date: 2022  USMAN TBD  Principal Problem:  Acute acute pulmonary embolism  HPI obtained from patient interview and chart review   Principal problem: Acute pulmonary embolism with acute cor pulmonale    Chief Complaint:  Re-evaluation of medical treatment and therapy status:  Lab review    HPI:   Lupis Murcia is a 71 y.o. female w/ PMHx of PE during pregnancy requiring heparin drip, chronic lower extremity lymphedema, multiple sclerosis, vitamin D deficiency who presented to SNF following hospitalization for pulmonary embolism.  Admission to SNF for secondary weakness and debility.    Interval History:  All of today's labs reviewed and are listed below. 24 hr vital sign ranges listed below.  Patient denies shortness of breath, abdominal discomfort, nausea, or vomiting.  Patient reports an adequate appetite.  Patient denies dysuria.  Patient reports having regular bowel movements.  Patient progessing with PT/OT- pt ambulated 30ft+40ft+35ft with RW and CGA and w/c following. Mostly steady to-gait with wide MEGAN, decrease pako, and decrease step length    Past Medical History: Patient has a past medical history of Lymphedema, MS (multiple sclerosis), Other pulmonary embolism without acute cor pulmonale, and Vitamin B12 deficiency.    Past Surgical History: Patient has a past surgical history that includes  section; Breast cyst excision (Left); and Esophagogastroduodenoscopy (N/A, 2022).    Social History: Patient reports that she has never smoked. She has never used smokeless tobacco. She reports that she does not drink alcohol and does not use drugs.    Family History: family history includes Brain cancer in her brother; Coronary artery disease in her father; Lung cancer in her father and mother.    Allergies:  Patient is allergic to contrast media, pcn [penicillins], celebrex [celecoxib], diazepam, and motrin [ibuprofen].    ROS  Constitutional: Negative for fever   Eyes: Negative for blurred vision, double vision   Respiratory: +SOB on exertion.  Negative for cough  Cardiovascular: Negative for chest pain, palpitations, and leg swelling.   Gastrointestinal: Negative for abdominal pain, constipation, diarrhea, nausea, vomiting.   Genitourinary: Negative for dysuria, frequency   Musculoskeletal:  + generalized weakness, BLE weakness.  +left knee pain, improving.  Neuropathy pain  Skin: Negative for itching and rash.   Neurological: Negative for dizziness, headaches.   Psychiatric/Behavioral: Negative for depression. The patient is not nervous/anxious.      24 hour Vital Sign Range   Temp:  [98.4 °F (36.9 °C)-98.6 °F (37 °C)]   Pulse:  [80-88]   Resp:  [18]   BP: (144-146)/(72-84)   SpO2:  [96 %]     PEx  Constitutional: Patient appears debilitated.  No distress noted  HENT:   Head: Normocephalic and atraumatic.   Eyes: Pupils are equal, round  Neck: Normal range of motion. Neck supple.   Cardiovascular: Normal rate, regular rhythm and normal heart sounds.    Pulmonary/Chest: Effort normal and breath sounds are clear  Abdominal: Soft. Bowel sounds are normal.   Musculoskeletal: Normal range of motion.   Neurological: Alert and oriented to person, place, and time.   Psychiatric: Normal mood and affect. Behavior is normal.   Skin: Skin is warm and dry.     Recent Labs   Lab 07/22/22  0600      K 3.8      CO2 27   BUN 16   CREATININE 0.8   MG 1.9       Recent Labs   Lab 07/22/22  0600   WBC 6.00   RBC 2.86*   HGB 8.8*   HCT 28.3*      MCV 99*   MCH 30.8   MCHC 31.1*         Assessment and Plan:    Acute pulmonary embolism with acute cor pulmonale  - continue Eliquis 10mg PO BID til 6/22 followed by Eliquis 5mg PO BID starting 6/23 indefinitely  - 7/22 tolerating RA, continue to monitor SpO2 closely    B12  deficiency  Anemia of chronic disease  - Monitor for bleeding on Eliquis  - home medications cyanocobalamin 1000 mcg IM injection monthly    Neuropathy pain  - improved, continue gabapentin 100 mg TID    Left knee pain  - injury from therapy per patient  - initiating Voltaren gel 4 g TID, okay to leave a bedside  - 7/12 left knee x-ray without acute abnormality  - 7/15 pain better today  - 7/22 resolved     Acute hypoxemic respiratory failure  - 2/2 PE  - On 2L NC, wean off NC as tolerated  - continue lasix 20 mg daily    Prediabetes  - Hgb A1C 6.2 on 5/3/22  - Prediabetes education given. Verbalized understanding   - pt refusing BG checks. discontinue BG checks and SSI  - BG stable. Remains on regular diet per pt request. WCTM.      Class 2 obesity due to excess calories in adult  - Body mass index is 37.74 kg/m².   - Morbid obesity complicates all aspects of disease management from diagnostic modalities to treatment.   - Weight loss encouraged and health benefits explained to patient.                Lymphedema  - Chronic and stable  - No acute issues     Vitamin D deficiency  - continue vitamin D 1000 units daily      MS (multiple sclerosis)  - PT/OT  - f/u with out pt provider      Muscle weakness of lower extremity  - PT/OT     Insomnia secondary to chronic pain  - continue melatonin     Anxiety  - continue lorazepam 0.5 mg Q 8 hr prn anxiety    Debility   - Continue with PT/OT for gait training and strengthening and restoration of ADL's   - Encourage mobility, OOB in chair, and early ambulation as appropriate  - Fall precautions   - Monitor for bowel and bladder dysfunction  - Monitor for and prevent skin breakdown and pressure ulcers  - Continue DVT prophylaxis with eliquis    Anticipate disposition:  Home with home health      Follow-up needed during SNF stay-    Follow-up needed after discharge from SNF:   - PCP, hospital follow-up, 7/14    Future Appointments   Date Time Provider Department Center    7/28/2022  3:00 PM Samir Sahni MD [facility]         Amy Conklin NP  Department of [facility]   [facility]     DOS: 7/22/2022      Patient note was created using MModal Dictation.  Any errors in syntax or even information may not have been identified and edited on initial review prior to signing this note.

## 2022-07-23 PROCEDURE — 25000003 PHARM REV CODE 250: Performed by: NURSE PRACTITIONER

## 2022-07-23 PROCEDURE — 11000004 HC SNF PRIVATE

## 2022-07-23 PROCEDURE — 25000003 PHARM REV CODE 250: Performed by: HOSPITALIST

## 2022-07-23 RX ADMIN — LORAZEPAM 0.5 MG: 0.5 TABLET ORAL at 08:07

## 2022-07-23 RX ADMIN — LORAZEPAM 0.5 MG: 0.5 TABLET ORAL at 09:07

## 2022-07-23 RX ADMIN — GABAPENTIN 100 MG: 100 CAPSULE ORAL at 08:07

## 2022-07-23 RX ADMIN — DICLOFENAC SODIUM 4 G: 10 GEL TOPICAL at 02:07

## 2022-07-23 RX ADMIN — PANTOPRAZOLE SODIUM 40 MG: 40 TABLET, DELAYED RELEASE ORAL at 09:07

## 2022-07-23 RX ADMIN — ACETAMINOPHEN AND CODEINE PHOSPHATE 1 TABLET: 300; 30 TABLET ORAL at 02:07

## 2022-07-23 RX ADMIN — DICLOFENAC SODIUM 4 G: 10 GEL TOPICAL at 09:07

## 2022-07-23 RX ADMIN — CHOLECALCIFEROL TAB 25 MCG (1000 UNIT) 1000 UNITS: 25 TAB at 09:07

## 2022-07-23 RX ADMIN — Medication 1 TABLET: at 09:07

## 2022-07-23 RX ADMIN — APIXABAN 5 MG: 2.5 TABLET, FILM COATED ORAL at 09:07

## 2022-07-23 RX ADMIN — APIXABAN 5 MG: 2.5 TABLET, FILM COATED ORAL at 08:07

## 2022-07-23 RX ADMIN — GABAPENTIN 100 MG: 100 CAPSULE ORAL at 09:07

## 2022-07-23 RX ADMIN — FUROSEMIDE 20 MG: 20 TABLET ORAL at 09:07

## 2022-07-24 PROCEDURE — 11000004 HC SNF PRIVATE

## 2022-07-24 PROCEDURE — 97110 THERAPEUTIC EXERCISES: CPT | Mod: CQ

## 2022-07-24 PROCEDURE — 97530 THERAPEUTIC ACTIVITIES: CPT | Mod: CQ

## 2022-07-24 PROCEDURE — 97116 GAIT TRAINING THERAPY: CPT | Mod: CQ

## 2022-07-24 PROCEDURE — 25000003 PHARM REV CODE 250: Performed by: HOSPITALIST

## 2022-07-24 PROCEDURE — 25000003 PHARM REV CODE 250: Performed by: NURSE PRACTITIONER

## 2022-07-24 RX ADMIN — PANTOPRAZOLE SODIUM 40 MG: 40 TABLET, DELAYED RELEASE ORAL at 08:07

## 2022-07-24 RX ADMIN — CHOLECALCIFEROL TAB 25 MCG (1000 UNIT) 1000 UNITS: 25 TAB at 08:07

## 2022-07-24 RX ADMIN — DICLOFENAC SODIUM 4 G: 10 GEL TOPICAL at 08:07

## 2022-07-24 RX ADMIN — LORAZEPAM 0.5 MG: 0.5 TABLET ORAL at 06:07

## 2022-07-24 RX ADMIN — FUROSEMIDE 20 MG: 20 TABLET ORAL at 08:07

## 2022-07-24 RX ADMIN — GABAPENTIN 100 MG: 100 CAPSULE ORAL at 08:07

## 2022-07-24 RX ADMIN — ACETAMINOPHEN AND CODEINE PHOSPHATE 1 TABLET: 300; 30 TABLET ORAL at 01:07

## 2022-07-24 RX ADMIN — DICLOFENAC SODIUM 4 G: 10 GEL TOPICAL at 09:07

## 2022-07-24 RX ADMIN — DICLOFENAC SODIUM 4 G: 10 GEL TOPICAL at 03:07

## 2022-07-24 RX ADMIN — LORAZEPAM 0.5 MG: 0.5 TABLET ORAL at 08:07

## 2022-07-24 RX ADMIN — APIXABAN 5 MG: 2.5 TABLET, FILM COATED ORAL at 08:07

## 2022-07-24 RX ADMIN — ACETAMINOPHEN AND CODEINE PHOSPHATE 1 TABLET: 300; 30 TABLET ORAL at 04:07

## 2022-07-24 NOTE — PT/OT/SLP PROGRESS
Physical Therapy Treatment    Patient Name:  Lupis Murcia   MRN:  [id number]  Admit Date: 6/20/2022  Admitting Diagnosis: Acute pulmonary embolism with acute cor pulmonale  Recent Surgeries: N/A    General Precautions: Standard, fall   Orthopedic Precautions:N/A   Braces: N/A     Recommendations:     Discharge Recommendations:  home health PT   Level of Assistance Recommended at Discharge: 24 hours light assistance  Discharge Equipment Recommendations: bedside commode, grab bar, raised toilet, shower chair, walker, rolling   Barriers to discharge: Decreased caregiver support (increased assistance needed)    Assessment:     Lupis Murcia is a 71 y.o. female admitted with a medical diagnosis of Acute pulmonary embolism with acute cor pulmonale .     Pt was agreeable and tolerated today's therapy session well. Pt report increase stiffness with BLE at the beginning of the session, but reports decrease stiffness by the end of session. Pt completed bed mobility with Francie-modA with HOB flat and no bed rails. Pt then increased total gait training distance with 2 rest breaks and no LOB noted. Pt is progressing well and continues to benefit from therapy to achieve highest level of indepedence prior to discharge    Performance deficits affecting function:  weakness, impaired endurance, impaired self care skills, impaired functional mobility, gait instability, impaired balance, decreased lower extremity function, decreased upper extremity function, decreased coordination, impaired skin, edema, impaired coordination, impaired fine motor, decreased ROM, impaired cardiopulmonary response to activity .    Rehab Potential is good    Activity Tolerance: Fair    Plan:     Patient to be seen 6 x/week to address the above listed problems via gait training, therapeutic activities, therapeutic exercises, neuromuscular re-education, wheelchair management/training    · Plan of Care Expires: 08/21/22  · Plan of Care Reviewed with:  "patient    Subjective     "I have a lift chair that I normally sleep in" "I'm nervous about getting into [my daughter's] car    Pain/Comfort:  · Pain Rating 1: 6/10  · Location - Orientation 1: generalized  · Location 1: knee  · Pain Addressed 1: Reposition, Distraction  · Pain Rating Post-Intervention 1:  (did not rate)    Patient's cultural, spiritual, Hindu conflicts given the current situation:  · no    Objective:     Patient found up in chair with  (no lines) upon PT entry to room.     Therapeutic Activities and Exercises:    Pt concern about car transfer, pt educated about backing up to the car/seat and sitting down first and then swinging BLE into the car.     Seated BLE therex x15 reps: LAQ and marching  o Assistance with LLE for full ROM  Patient educated on role of therapy, goals of session, and benefits of out of bed mobility.   Instructed on use of call button and importance of calling nursing staff for assistance with mobility   Questions/concerns addressed within PTA scope of practice  Pt verbalized understanding.  Whiteboard Updated    Functional Mobility:  · Bed Mobility:     · Rolling Left:  minimum assistance with HOB flat   · Rolling Right: minimum assistance with HOB flat  · Supine to Sit: moderate assistance with HOB flat and no bed rails   · Sit to Supine: moderate assistance with HOB flat and no bed rails.   · Transfers:     · Sit to Stand:  Francie-SBA with rolling walker  · Pt prefers BUE on wheelchair, then transfers to wheelchair  · Increase time to complete   · Francie for initial stand, CGA-closeSBA for stand from wheelchair   · Chair<>Bed: minimun assistance with  rolling walker  using  Stand Pivot  · Pt reports increase stiffness and knee buckling upon initial stand and transfer  · Gait: pt ambulated 55ft+38ft+42ft with RW and CGA and +1 w/c following. Mostly steady gait with wide MEGAN, flexed posture, and decrease step length   ? Pt slides L foot, pt reports d/t "foot drop"  ? Verbal " cues to keep feet inside RW and keep RW close, pt tends to put RW too far.  ? 2 rest break and no LOB/knee buckling noted.     AM-PAC 6 CLICK MOBILITY  15    Patient left up in chair with call button in reach and daughter present.    GOALS:   Multidisciplinary Problems     Physical Therapy Goals        Problem: Physical Therapy    Goal Priority Disciplines Outcome Goal Variances Interventions   Physical Therapy Goal     PT, PT/OT Ongoing, Progressing     Description: Goals to be met by: 2022:    Patient will increase functional independence with mobility by performin. Supine to sit with Maximum Assistance.  2. Sit to supine with Maximum Assistance.  3. Rolling to Left and Right with Moderate Assistance.  4. Sit to stand transfer with Contact Guard Assistance. -met  Updated 2022: Sit to stand transfer with supervision. -met  Updated 2022: Sit to stand transfer with Dedrick. -not met    5. Bed to chair transfer with Contact Guard Assistance using Rolling Walker. -met  Updated 2022: Bed to chair transfer with Dedrick using Rolling Walker. -not met    6. Gait x 100 feet with Maximum Assistance using Rolling Walker.   Updated 2022 Gait x 100 feet with (S) using Rolling Walker.  7. Wheelchair propulsion x 50 feet with Minimal Assistance using bilateral upper extremities.  8. Ascend/Descend 4 inch curb step with Maximum Assistance using Rolling Walker.  9. Sitting at edge of bed x 20 minutes with Supervision performing dynamic sitting balance activities.  10. Lower extremity exercise program x 30 reps per handout, with supervision.                     Time Tracking:     PT Received On: 22  PT Start Time: 0948  PT Stop Time: 1030  PT Total Time (min): 42 min    Billable Minutes: Gait Training 18, Therapeutic Activity 14 and Therapeutic Exercise 10    Treatment Type: Treatment  PT/PTA: PTA     PTA Visit Number: 4     2022

## 2022-07-25 LAB
ANION GAP SERPL CALC-SCNC: 7 MMOL/L (ref 8–16)
BASOPHILS # BLD AUTO: 0.02 K/UL (ref 0–0.2)
BASOPHILS NFR BLD: 0.3 % (ref 0–1.9)
BUN SERPL-MCNC: 16 MG/DL (ref 8–23)
CALCIUM SERPL-MCNC: 9.7 MG/DL (ref 8.7–10.5)
CHLORIDE SERPL-SCNC: 106 MMOL/L (ref 95–110)
CO2 SERPL-SCNC: 29 MMOL/L (ref 23–29)
CREAT SERPL-MCNC: 0.7 MG/DL (ref 0.5–1.4)
DIFFERENTIAL METHOD: ABNORMAL
EOSINOPHIL # BLD AUTO: 0.3 K/UL (ref 0–0.5)
EOSINOPHIL NFR BLD: 4 % (ref 0–8)
ERYTHROCYTE [DISTWIDTH] IN BLOOD BY AUTOMATED COUNT: 13.5 % (ref 11.5–14.5)
EST. GFR  (AFRICAN AMERICAN): >60 ML/MIN/1.73 M^2
EST. GFR  (NON AFRICAN AMERICAN): >60 ML/MIN/1.73 M^2
GLUCOSE SERPL-MCNC: 121 MG/DL (ref 70–110)
HCT VFR BLD AUTO: 28.7 % (ref 37–48.5)
HGB BLD-MCNC: 8.7 G/DL (ref 12–16)
IMM GRANULOCYTES # BLD AUTO: 0.03 K/UL (ref 0–0.04)
IMM GRANULOCYTES NFR BLD AUTO: 0.5 % (ref 0–0.5)
LYMPHOCYTES # BLD AUTO: 1.8 K/UL (ref 1–4.8)
LYMPHOCYTES NFR BLD: 29 % (ref 18–48)
MAGNESIUM SERPL-MCNC: 1.8 MG/DL (ref 1.6–2.6)
MCH RBC QN AUTO: 30.5 PG (ref 27–31)
MCHC RBC AUTO-ENTMCNC: 30.3 G/DL (ref 32–36)
MCV RBC AUTO: 101 FL (ref 82–98)
MONOCYTES # BLD AUTO: 0.6 K/UL (ref 0.3–1)
MONOCYTES NFR BLD: 9.7 % (ref 4–15)
NEUTROPHILS # BLD AUTO: 3.5 K/UL (ref 1.8–7.7)
NEUTROPHILS NFR BLD: 56.5 % (ref 38–73)
NRBC BLD-RTO: 0 /100 WBC
PHOSPHATE SERPL-MCNC: 3.5 MG/DL (ref 2.7–4.5)
PLATELET # BLD AUTO: 283 K/UL (ref 150–450)
PMV BLD AUTO: 10 FL (ref 9.2–12.9)
POTASSIUM SERPL-SCNC: 4 MMOL/L (ref 3.5–5.1)
RBC # BLD AUTO: 2.85 M/UL (ref 4–5.4)
SODIUM SERPL-SCNC: 142 MMOL/L (ref 136–145)
WBC # BLD AUTO: 6.27 K/UL (ref 3.9–12.7)

## 2022-07-25 PROCEDURE — 83735 ASSAY OF MAGNESIUM: CPT | Performed by: NURSE PRACTITIONER

## 2022-07-25 PROCEDURE — 11000004 HC SNF PRIVATE

## 2022-07-25 PROCEDURE — 36415 COLL VENOUS BLD VENIPUNCTURE: CPT | Performed by: NURSE PRACTITIONER

## 2022-07-25 PROCEDURE — 97530 THERAPEUTIC ACTIVITIES: CPT

## 2022-07-25 PROCEDURE — 85025 COMPLETE CBC W/AUTO DIFF WBC: CPT | Performed by: NURSE PRACTITIONER

## 2022-07-25 PROCEDURE — 84100 ASSAY OF PHOSPHORUS: CPT | Performed by: NURSE PRACTITIONER

## 2022-07-25 PROCEDURE — 25000003 PHARM REV CODE 250: Performed by: NURSE PRACTITIONER

## 2022-07-25 PROCEDURE — 25000003 PHARM REV CODE 250: Performed by: HOSPITALIST

## 2022-07-25 PROCEDURE — 97110 THERAPEUTIC EXERCISES: CPT

## 2022-07-25 PROCEDURE — 80048 BASIC METABOLIC PNL TOTAL CA: CPT | Performed by: NURSE PRACTITIONER

## 2022-07-25 PROCEDURE — 97116 GAIT TRAINING THERAPY: CPT

## 2022-07-25 RX ORDER — PANTOPRAZOLE SODIUM 40 MG/1
40 TABLET, DELAYED RELEASE ORAL DAILY
Qty: 30 TABLET | Refills: 3 | Status: SHIPPED | OUTPATIENT
Start: 2022-07-25 | End: 2023-06-07

## 2022-07-25 RX ORDER — CANDESARTAN CILEXETIL AND HYDROCHLOROTHIAZIDE 16; 12.5 MG/1; MG/1
TABLET ORAL
Start: 2022-07-25 | End: 2022-10-12

## 2022-07-25 RX ORDER — FUROSEMIDE 20 MG/1
20 TABLET ORAL DAILY
Qty: 30 TABLET | Refills: 3 | Status: SHIPPED | OUTPATIENT
Start: 2022-07-25 | End: 2023-06-07

## 2022-07-25 RX ORDER — GABAPENTIN 100 MG/1
100 CAPSULE ORAL 3 TIMES DAILY
Qty: 90 CAPSULE | Refills: 11 | Status: SHIPPED | OUTPATIENT
Start: 2022-07-25 | End: 2022-10-12

## 2022-07-25 RX ORDER — CYANOCOBALAMIN 1000 UG/ML
1000 INJECTION, SOLUTION INTRAMUSCULAR; SUBCUTANEOUS
Qty: 1 ML | Refills: 3 | Status: SHIPPED | OUTPATIENT
Start: 2022-08-15

## 2022-07-25 RX ADMIN — ONDANSETRON 4 MG: 4 TABLET, ORALLY DISINTEGRATING ORAL at 04:07

## 2022-07-25 RX ADMIN — LORAZEPAM 0.5 MG: 0.5 TABLET ORAL at 06:07

## 2022-07-25 RX ADMIN — LORAZEPAM 0.5 MG: 0.5 TABLET ORAL at 08:07

## 2022-07-25 RX ADMIN — DICLOFENAC SODIUM 4 G: 10 GEL TOPICAL at 09:07

## 2022-07-25 RX ADMIN — APIXABAN 5 MG: 2.5 TABLET, FILM COATED ORAL at 08:07

## 2022-07-25 RX ADMIN — DICLOFENAC SODIUM 4 G: 10 GEL TOPICAL at 08:07

## 2022-07-25 RX ADMIN — ACETAMINOPHEN AND CODEINE PHOSPHATE 1 TABLET: 300; 30 TABLET ORAL at 10:07

## 2022-07-25 RX ADMIN — ACETAMINOPHEN AND CODEINE PHOSPHATE 1 TABLET: 300; 30 TABLET ORAL at 02:07

## 2022-07-25 RX ADMIN — CHOLECALCIFEROL TAB 25 MCG (1000 UNIT) 1000 UNITS: 25 TAB at 08:07

## 2022-07-25 RX ADMIN — DICLOFENAC SODIUM 4 G: 10 GEL TOPICAL at 02:07

## 2022-07-25 RX ADMIN — FUROSEMIDE 20 MG: 20 TABLET ORAL at 08:07

## 2022-07-25 RX ADMIN — GABAPENTIN 100 MG: 100 CAPSULE ORAL at 08:07

## 2022-07-25 RX ADMIN — PANTOPRAZOLE SODIUM 40 MG: 40 TABLET, DELAYED RELEASE ORAL at 08:07

## 2022-07-25 NOTE — PLAN OF CARE
Interdisciplinary team, Tiana Pérez, RN Nurse Manager, Harmony Fermin, RN MDS Coordinator, Mary Oh PT, Rehab Supervisor, Lisbet Solomon [facility], and Madeline Rivero, Dietician, spoke to patient and patient's daughter for care plan conference, weekly status update, and therapy progress update. Discharge date set for tomorrow, 7/26/22.

## 2022-07-25 NOTE — PLAN OF CARE
Problem: Physical Therapy  Goal: Physical Therapy Goal  Description: Goals to be met by: 2022:    Patient will increase functional independence with mobility by performin. Supine to sit with Maximum Assistance.-not met  2. Sit to supine with Maximum Assistance.-not met  3. Rolling to Left and Right with Moderate Assistance.-not met  4. Sit to stand transfer with Contact Guard Assistance. -met  Updated 2022: Sit to stand transfer with supervision. -met  Updated 2022: Sit to stand transfer with Dedrick. -not met    5. Bed to chair transfer with Contact Guard Assistance using Rolling Walker. -met  Updated 2022: Bed to chair transfer with Dedrick using Rolling Walker. -not met    6. Gait x 100 feet with Maximum Assistance using Rolling Walker. -not met  Updated 2022 Gait x 100 feet with (S) using Rolling Walker.-not met  7. Wheelchair propulsion x 50 feet with Minimal Assistance using bilateral upper extremities.-not met  8. Ascend/Descend 4 inch curb step with Maximum Assistance using Rolling Walker.-not met  9. Sitting at edge of bed x 20 minutes with Supervision performing dynamic sitting balance activities.  10. Lower extremity exercise program x 30 reps per handout, with supervision.-not met    Outcome: Ongoing, Progressing

## 2022-07-25 NOTE — PT/OT/SLP PROGRESS
Physical Therapy Treatment and Discharge Summary    Patient Name:  Lupis Murcia   MRN:  [id number]  Admit Date: 6/20/2022  Admitting Diagnosis: Acute pulmonary embolism with acute cor pulmonale  Recent Surgeries: N/A    General Precautions: Standard, fall   Orthopedic Precautions:N/A   Braces: N/A     Recommendations:     Discharge Recommendations:  home health PT   Level of Assistance Recommended at Discharge: 24 hours light assistance  Discharge Equipment Recommendations: bedside commode, grab bar, raised toilet, shower chair, walker, rolling   Barriers to discharge: Decreased caregiver support (increased assistance needed)    Assessment:     Lupis Murcia is a 71 y.o. female admitted with a medical diagnosis of Acute pulmonary embolism with acute cor pulmonale . Pt is appropriate for dc home tomorrow with continued HHPT and 24hr. Light assistance.      Performance deficits affecting function:  weakness, impaired endurance, impaired self care skills, impaired functional mobility, gait instability, impaired balance, decreased upper extremity function, decreased lower extremity function, impaired cardiopulmonary response to activity .    Rehab Potential is good    Activity Tolerance: Fair    Plan:     Patient to be seen 6 x/week to address the above listed problems via gait training, therapeutic activities, therapeutic exercises, neuromuscular re-education, wheelchair management/training    · Plan of Care Expires: 08/21/22  · Plan of Care Reviewed with: patient    Subjective     Pt. Agreeable to work with PT with pt's daughter present to assist of needed.     Pain/Comfort:  · Pain Rating 1: 0/10  · Pain Rating Post-Intervention 1: 0/10    Patient's cultural, spiritual, Confucianist conflicts given the current situation:  · no    Objective:     Communicated with pt's nurse prior to session.  Patient found up in chair with   upon PT entry to room.     Therapeutic Activities and Exercises: B L/E active/assist therex  especially LLE seated in w/c x 20reps: APs, LAQs, GS, Hip Flex  Pt refused bed mobility training d.t sleeping in the reclining chair.    Functional Mobility:     07/25/22 1400   Sit to Stand   Physical Assistance Level 25% or less   Comment with RW from BSchair and CGA from w/c   CARE Score - Sit to Stand 3   Chair/Bed-to-Chair Transfer   Assistance Needed Incidental touching   Comment with RW   CARE Score - Chair/Bed-to-Chair Transfer 4   Car Transfer   Reason if not Attempted Environmental limitations   CARE Score - Car Transfer 10   Walk 10 Feet   Assistance Needed Incidental touching   Comment with RW, L foot drag d.t chronic L foot drop   CARE Score - Walk 10 Feet 4   Walk 50 Feet with Two Turns   Assistance Needed Incidental touching   Comment with RW, L foot drag d.t chronic L foot drop   CARE Score - Walk 50 Feet with Two Turns 4   Walk 150 Feet   Comment pt needed a seated rest break @@ 79ft and 48ft   Reason if not Attempted Safety concerns   CARE Score - Walk 150 Feet 88   Walking 10 Feet on Uneven Surfaces   Reason if not Attempted Safety concerns   CARE Score - Walking 10 Feet on Uneven Surfaces 88   1 Step (Curb)   Reason if not Attempted Safety concerns   CARE Score - 1 Step (Curb) 88   4 Steps   Reason if not Attempted Safety concerns   CARE Score - 4 Steps 88   12 Steps   Reason if not Attempted Safety concerns   CARE Score - 12 Steps 88   Picking Up Object   Reason if not Attempted Safety concerns   CARE Score - Picking Up Object 88   Wheel 50 Feet with Two Turns   Comment pt too fatigued   Reason if not Attempted Safety concerns   CARE Score - Wheel 50 Feet with Two Turns 88   Type of Wheelchair/Scooter Manual   Wheel 150 Feet   Reason if not Attempted Safety concerns   CARE Score - Wheel 150 Feet 88   Type of Wheelchair/Scooter Manual       AM-PAC 6 CLICK MOBILITY  14    Patient left up in chair with direct hand off to OT in the gym.    GOALS:   Multidisciplinary Problems     Physical Therapy  Goals        Problem: Physical Therapy    Goal Priority Disciplines Outcome Goal Variances Interventions   Physical Therapy Goal     PT, PT/OT Ongoing, Progressing     Description: Goals to be met by: 2022:    Patient will increase functional independence with mobility by performin. Supine to sit with Maximum Assistance.-not met  2. Sit to supine with Maximum Assistance.-not met  3. Rolling to Left and Right with Moderate Assistance.-not met  4. Sit to stand transfer with Contact Guard Assistance. -met  Updated 2022: Sit to stand transfer with supervision. -met  Updated 2022: Sit to stand transfer with Dedrick. -not met    5. Bed to chair transfer with Contact Guard Assistance using Rolling Walker. -met  Updated 2022: Bed to chair transfer with Dedrick using Rolling Walker. -not met    6. Gait x 100 feet with Maximum Assistance using Rolling Walker. -not met  Updated 2022 Gait x 100 feet with (S) using Rolling Walker.-not met  7. Wheelchair propulsion x 50 feet with Minimal Assistance using bilateral upper extremities.-not met  8. Ascend/Descend 4 inch curb step with Maximum Assistance using Rolling Walker.-not met  9. Sitting at edge of bed x 20 minutes with Supervision performing dynamic sitting balance activities.  10. Lower extremity exercise program x 30 reps per handout, with supervision.-not met                     Time Tracking:     PT Received On: 22  PT Start Time: 0950  PT Stop Time: 1015  PT Total Time (min): 25 min    Billable Minutes: Gait Training 15mins and Therapeutic Exercise 10mins    Treatment Type: Treatment  PT/PTA: PT     PTA Visit Number: 0     2022

## 2022-07-25 NOTE — PROGRESS NOTES
[facility]                                  Skilled Nursing Facility                   Progress Note     Admit Date: 2022  USMAN TBD  Principal Problem:  Acute acute pulmonary embolism  HPI obtained from patient interview and chart review   Principal problem: Acute pulmonary embolism with acute cor pulmonale    Chief Complaint:  Re-evaluation of medical treatment and therapy status:  Lab review    HPI:   Lupis Murcia is a 71 y.o. female w/ PMHx of PE during pregnancy requiring heparin drip, chronic lower extremity lymphedema, multiple sclerosis, vitamin D deficiency who presented to SNF following hospitalization for pulmonary embolism.  Admission to SNF for secondary weakness and debility.    Interval History:  All of today's labs reviewed and are listed below. 24 hr vital sign ranges listed below.  Patient denies shortness of breath, abdominal discomfort, nausea, or vomiting.  Patient reports an adequate appetite.  Patient denies dysuria.  Patient reports having regular bowel movements.  Patient progessing with PT/OT-Gait: pt ambulated 55ft+38ft+42ft with RW and CGA and +1 w/c following. Mostly steady gait with wide MEGAN, flexed posture, and decrease step length     Past Medical History: Patient has a past medical history of Lymphedema, MS (multiple sclerosis), Other pulmonary embolism without acute cor pulmonale, and Vitamin B12 deficiency.    Past Surgical History: Patient has a past surgical history that includes  section; Breast cyst excision (Left); and Esophagogastroduodenoscopy (N/A, 2022).    Social History: Patient reports that she has never smoked. She has never used smokeless tobacco. She reports that she does not drink alcohol and does not use drugs.    Family History: family history includes Brain cancer in her brother; Coronary artery disease in her father; Lung cancer in her father and mother.    Allergies:  Patient is allergic to contrast media, pcn [penicillins], celebrex [celecoxib], diazepam, and motrin [ibuprofen].    ROS  Constitutional: Negative for fever   Eyes: Negative for blurred vision, double vision   Respiratory: +SOB on exertion.  Negative for cough  Cardiovascular: Negative for chest pain, palpitations, and leg swelling.   Gastrointestinal: Negative for abdominal pain, constipation, diarrhea, nausea, vomiting.   Genitourinary: Negative for dysuria, frequency   Musculoskeletal:  + generalized weakness, BLE weakness.  +left knee pain, improving.  Neuropathy pain  Skin: Negative for itching and rash.   Neurological: Negative for dizziness, headaches.   Psychiatric/Behavioral: Negative for depression. The patient is not nervous/anxious.      24 hour Vital Sign Range   Temp:  [97.4 °F (36.3 °C)-98.5 °F (36.9 °C)]   Pulse:  [80-88]   Resp:  [18]   BP: (134-142)/(68-72)   SpO2:  [97 %]     PEx  Constitutional: Patient appears debilitated.  No distress noted  HENT:   Head: Normocephalic and atraumatic.   Eyes: Pupils are equal, round  Neck: Normal range of motion. Neck supple.   Cardiovascular: Normal rate, regular rhythm and normal heart sounds.    Pulmonary/Chest: Effort normal and breath sounds are clear  Abdominal: Soft. Bowel sounds are normal.   Musculoskeletal: Normal range of motion.   Neurological: Alert and oriented to person, place, and time.   Psychiatric: Normal mood and affect. Behavior is normal.   Skin: Skin is warm and dry.     Recent Labs   Lab 07/25/22  0533      K 4.0      CO2 29   BUN 16   CREATININE 0.7   MG 1.8       Recent Labs   Lab 07/25/22  0533   WBC 6.27   RBC 2.85*   HGB 8.7*   HCT 28.7*      *   MCH 30.5   MCHC 30.3*         Assessment and Plan:    Acute pulmonary embolism with acute cor pulmonale  - continue Eliquis 10mg PO BID til 6/22 followed by Eliquis 5mg PO BID starting 6/23 indefinitely  - 7/25 tolerating RA, continue to monitor SpO2     Neuropathy  pain  - improved, continue gabapentin 100 mg TID    B12 deficiency  Anemia of chronic disease  - Monitor for bleeding on Eliquis  - home medications cyanocobalamin 1000 mcg IM injection monthly    Left knee pain  - injury from therapy per patient  - initiating Voltaren gel 4 g TID, okay to leave a bedside  - 7/12 left knee x-ray without acute abnormality  - 7/15 pain better today  - 7/22 resolved     Acute hypoxemic respiratory failure  - 2/2 PE  - On 2L NC, wean off NC as tolerated  - continue lasix 20 mg daily    Prediabetes  - Hgb A1C 6.2 on 5/3/22  - Prediabetes education given. Verbalized understanding   - pt refusing BG checks. discontinue BG checks and SSI  - BG stable. Remains on regular diet per pt request. WCTM.      Class 2 obesity due to excess calories in adult  - Body mass index is 37.74 kg/m².   - Morbid obesity complicates all aspects of disease management from diagnostic modalities to treatment.   - Weight loss encouraged and health benefits explained to patient.                Lymphedema  - Chronic and stable  - No acute issues     Vitamin D deficiency  - continue vitamin D 1000 units daily      MS (multiple sclerosis)  - PT/OT  - f/u with out pt provider      Muscle weakness of lower extremity  - PT/OT     Insomnia secondary to chronic pain  - continue melatonin     Anxiety  - continue lorazepam 0.5 mg Q 8 hr prn anxiety    Debility   - Continue with PT/OT for gait training and strengthening and restoration of ADL's   - Encourage mobility, OOB in chair, and early ambulation as appropriate  - Fall precautions   - Monitor for bowel and bladder dysfunction  - Monitor for and prevent skin breakdown and pressure ulcers  - Continue DVT prophylaxis with eliquis    Anticipate disposition:  Home with home health      Follow-up needed during SNF stay-    Follow-up needed after discharge from SNF:   - PCP, hospital follow-up, 7/14    Future Appointments   Date Time Provider Department Center   7/28/2022   3:00 PM Samir Sahni MD [facility]         Amy Conklin NP  Department of Hospital Medicine   [facility]     DOS: 7/25/2022      Patient note was created using MModal Dictation.  Any errors in syntax or even information may not have been identified and edited on initial review prior to signing this note.

## 2022-07-25 NOTE — PLAN OF CARE
Problem: Occupational Therapy  Goal: Occupational Therapy Goal  Description: Goals to be met by: 8/1/2022    Patient will increase functional independence with ADLs by performing:    UE Dressing with Set-up Assistance. - Met on 7/14  LE Dressing, including donning/doffing pants, with Minimal Assistance using appropriate AE. - Not met  Grooming while seated at sink with Set-up Assistance. - Met on 7/7  Toileting from bedside commode with Minimal Assistance for hygiene and clothing management. - Met on 7/12  Bathing from sitting at sink with Minimal Assistance. - Not met  Supine to sit with Stand-by Assistance. - Not met  Sit to stand transfer with Stand-by Assistance with RW. - Not met  Toilet transfer to bedside commode with Stand-by Assistance with RW and grab bar. - Not met  Upper extremity exercise program with Supervision. - Not met  Caregiver will be educated on level of assist required to safely perform self care tasks and functional transfers. - Met       Outcome: Adequate for Care Transition     Pt to d/c from skilled OT services at this SNF.  HHOT recommended post d/c for maximal pt gains in functional independence and to ensure her safety in her home environment.

## 2022-07-25 NOTE — PT/OT/SLP PROGRESS
"Occupational Therapy   Treatment and Discharge Summary    Name: Lupis Murcia  MRN: [id number]  Admit Date: 6/20/2022  Admitting Diagnosis:  Acute pulmonary embolism with acute cor pulmonale    General Precautions: Standard, fall   Orthopedic Precautions:N/A   Braces: N/A     Recommendations:     Discharge Recommendations: home health OT  Level of Assistance Recommended at Discharge: 24 hours physical assistance for all ADL's and home management tasks  Discharge Equipment Recommendations:  bedside commode, bath bench, grab bar  Barriers to discharge:  Decreased caregiver support    Assessment:     Lupis Murcia is a 71 y.o. female with a medical diagnosis of Acute pulmonary embolism with acute cor pulmonale.  Pt had limited tolerance of session due to fatigue from PT session.  She deferred to perform ADLs with her daughter, who was present.  Pt to d/c from skilled OT services at this SNF.  HHOT recommended post d/c for maximal pt gains in functional independence and to ensure her safety in her home environment.  She presents with the following.  Performance deficits affecting function are weakness, impaired endurance, impaired self care skills, impaired functional mobility, gait instability, impaired balance, decreased lower extremity function, decreased ROM, impaired skin, edema, decreased safety awareness, decreased coordination, impaired coordination.     Plan:     Pt to d/c from skilled OT services at this SNF.  HHOT recommended post d/c for maximal pt gains in functional independence and to ensure her safety in her home environment.  · Plan of Care Expires: 07/26/22  · Plan of Care Reviewed with: patient, daughter    Subjective   "I'm exhausted."  Communicated with: nursing prior to session.     Pain/Comfort:  · Pain Rating 1: other (see comments) (none reported)  · Pain Rating Post-Intervention 1: other (see comments) (none reported)    Patient's cultural, spiritual, Sikhism conflicts given the current " situation:  · no    Objective:     Patient found up in chair in rehab gym having concluded PT session with PT present with  (no lines) upon OT entry to room.    Bed Mobility:    · N/a due to pt sitting in w/c at beginning of session and in bedside chair at end of session     Functional Mobility/Transfers:  · Patient completed Sit <> Stand Transfer from w/c x 1 trial with moderate assistance  with rolling walker   · Patient completed Wheelchair <> Bedside Chair Transfer using Step Transfer technique with minimum assistance with rolling walker, requiring cueing for hand and foot placement for safe descent   · Functional Mobility: Pt ambulated ~6 ft from w/c to the bedside chair with Min A with RW.     Activities of Daily Living:  · Pt deferred to perform with her daughter.     [facility]    OT Exercises: UE Ergometer for 10 minutes on moderate resistance for UE strengthening that's required to perform daily tasks.     Treatment & Education:  Pt and her daughter edu on role of OT, POC, benefit of performing OOB activity, and safety when performing functional transfers and mobility.    Patient left up in chair with call button in reach and her daughter presentEducation:      GOALS:   Multidisciplinary Problems     Occupational Therapy Goals        Problem: Occupational Therapy    Goal Priority Disciplines Outcome Interventions   Occupational Therapy Goal     OT, PT/OT Adequate for Care Transition    Description: Goals to be met by: 8/1/2022    Patient will increase functional independence with ADLs by performing:    UE Dressing with Set-up Assistance. - Met on 7/14  LE Dressing, including donning/doffing pants, with Minimal Assistance using appropriate AE. - Not met  Grooming while seated at sink with Set-up Assistance. - Met on 7/7  Toileting from bedside commode with Minimal Assistance for hygiene and clothing management. - Met on 7/12  Bathing from sitting at sink with Minimal Assistance. - Not met  Supine  to sit with Stand-by Assistance. - Not met  Sit to stand transfer with Stand-by Assistance with RW. - Not met  Toilet transfer to bedside commode with Stand-by Assistance with RW and grab bar. - Not met  Upper extremity exercise program with Supervision. - Not met  Caregiver will be educated on level of assist required to safely perform self care tasks and functional transfers. - Met                        Time Tracking:     OT Date of Treatment: 07/25/22  OT Start Time: 1100    OT Stop Time: 1120  OT Total Time (min): 20 min    Billable Minutes:Therapeutic Activity 20 min    7/25/2022

## 2022-07-25 NOTE — PLAN OF CARE
SW met with pt and spoke to daughter to confirm discharge needs. HME will be delivered prior to discharge. Patient's daughter will transport pt home and provide care for her. Home Health has been referred to  Count includes the [facility] and patient was accepted via [facility]. F/U PCP appt scheduled for 7/28.  D/C set for 1 PM.    Lisbet Solomon, [facility]  Case Management Department  [facility]  [email or web address]

## 2022-07-26 VITALS
OXYGEN SATURATION: 96 % | SYSTOLIC BLOOD PRESSURE: 137 MMHG | RESPIRATION RATE: 18 BRPM | TEMPERATURE: 98 F | HEART RATE: 84 BPM | BODY MASS INDEX: 35.3 KG/M2 | HEIGHT: 62 IN | WEIGHT: 191.81 LBS | DIASTOLIC BLOOD PRESSURE: 81 MMHG

## 2022-07-26 PROCEDURE — 25000003 PHARM REV CODE 250: Performed by: HOSPITALIST

## 2022-07-26 PROCEDURE — 25000003 PHARM REV CODE 250: Performed by: NURSE PRACTITIONER

## 2022-07-26 RX ADMIN — PANTOPRAZOLE SODIUM 40 MG: 40 TABLET, DELAYED RELEASE ORAL at 09:07

## 2022-07-26 RX ADMIN — CHOLECALCIFEROL TAB 25 MCG (1000 UNIT) 1000 UNITS: 25 TAB at 09:07

## 2022-07-26 RX ADMIN — ACETAMINOPHEN AND CODEINE PHOSPHATE 1 TABLET: 300; 30 TABLET ORAL at 06:07

## 2022-07-26 RX ADMIN — GABAPENTIN 100 MG: 100 CAPSULE ORAL at 09:07

## 2022-07-26 RX ADMIN — LORAZEPAM 0.5 MG: 0.5 TABLET ORAL at 09:07

## 2022-07-26 RX ADMIN — DICLOFENAC SODIUM 4 G: 10 GEL TOPICAL at 09:07

## 2022-07-26 RX ADMIN — SENNOSIDES AND DOCUSATE SODIUM 1 TABLET: 50; 8.6 TABLET ORAL at 09:07

## 2022-07-26 RX ADMIN — FUROSEMIDE 20 MG: 20 TABLET ORAL at 09:07

## 2022-07-26 RX ADMIN — APIXABAN 5 MG: 2.5 TABLET, FILM COATED ORAL at 09:07

## 2022-07-26 NOTE — PLAN OF CARE
Problem: Adult Inpatient Plan of Care  Goal: Plan of Care Review  Outcome: Met  Goal: Patient-Specific Goal (Individualized)  Outcome: Met  Goal: Absence of Hospital-Acquired Illness or Injury  Outcome: Met  Goal: Optimal Comfort and Wellbeing  Outcome: Met  Goal: Readiness for Transition of Care  Outcome: Met     Problem: Fluid and Electrolyte Imbalance (Acute Kidney Injury/Impairment)  Goal: Fluid and Electrolyte Balance  Outcome: Met     Problem: Oral Intake Inadequate (Acute Kidney Injury/Impairment)  Goal: Optimal Nutrition Intake  Outcome: Met     Problem: Renal Function Impairment (Acute Kidney Injury/Impairment)  Goal: Effective Renal Function  Outcome: Met     Problem: Impaired Wound Healing  Goal: Optimal Wound Healing  Outcome: Met     Problem: Skin Injury Risk Increased  Goal: Skin Health and Integrity  Outcome: Met     Problem: Occupational Therapy  Goal: Occupational Therapy Goal  Description: Goals to be met by: 8/1/2022    Patient will increase functional independence with ADLs by performing:    UE Dressing with Set-up Assistance. - Met on 7/14  LE Dressing, including donning/doffing pants, with Minimal Assistance using appropriate AE. - Not met  Grooming while seated at sink with Set-up Assistance. - Met on 7/7  Toileting from bedside commode with Minimal Assistance for hygiene and clothing management. - Met on 7/12  Bathing from sitting at sink with Minimal Assistance. - Not met  Supine to sit with Stand-by Assistance. - Not met  Sit to stand transfer with Stand-by Assistance with RW. - Not met  Toilet transfer to bedside commode with Stand-by Assistance with RW and grab bar. - Not met  Upper extremity exercise program with Supervision. - Not met  Caregiver will be educated on level of assist required to safely perform self care tasks and functional transfers. - Met       Outcome: Met     Problem: Physical Therapy  Goal: Physical Therapy Goal  Description: Goals to be met by:  2022:    Patient will increase functional independence with mobility by performin. Supine to sit with Maximum Assistance.-not met  2. Sit to supine with Maximum Assistance.-not met  3. Rolling to Left and Right with Moderate Assistance.-not met  4. Sit to stand transfer with Contact Guard Assistance. -met  Updated 2022: Sit to stand transfer with supervision. -met  Updated 2022: Sit to stand transfer with [place]. -not met    5. Bed to chair transfer with Contact Guard Assistance using Rolling Walker. -met  Updated 2022: Bed to chair transfer with Dedrick using Rolling Walker. -not met    6. Gait x 100 feet with Maximum Assistance using Rolling Walker. -not met  Updated 2022 Gait x 100 feet with (S) using Rolling Walker.-not met  7. Wheelchair propulsion x 50 feet with Minimal Assistance using bilateral upper extremities.-not met  8. Ascend/Descend 4 inch curb step with Maximum Assistance using Rolling Walker.-not met  9. Sitting at edge of bed x 20 minutes with Supervision performing dynamic sitting balance activities.  10. Lower extremity exercise program x 30 reps per handout, with supervision.-not met    Outcome: Met     Problem: Fall Injury Risk  Goal: Absence of Fall and Fall-Related Injury  Outcome: Met     Problem: Pain Acute  Goal: Acceptable Pain Control and Functional Ability  Outcome: Met

## 2022-07-26 NOTE — NURSING
Discharge instructions reviewed with patient. Patient verbalize understanding. Answered all patients questions and concerns. Discharged at 1215 with family member via private car, belonging sent  and patient condition stable.

## 2022-07-26 NOTE — PLAN OF CARE
Family Training     Patient Name:  Lupis Murcia   MRN:  [id number]  Admit Date: 6/20/2022      stiven attempted to schedule family training this date. Unable to schedule due to pt refusal of training. Educated pt on benefits of family training prior to discharge. Pt verbalized understanding but continued to decline training session. Daughter had a family training with previous admission.     7/26/2022

## 2022-07-27 ENCOUNTER — PATIENT OUTREACH (OUTPATIENT)
Dept: ADMINISTRATIVE | Facility: CLINIC | Age: 72
End: 2022-07-27
Payer: MEDICARE

## 2022-07-27 ENCOUNTER — PATIENT MESSAGE (OUTPATIENT)
Dept: PRIMARY CARE CLINIC | Facility: CLINIC | Age: 72
End: 2022-07-27
Payer: MEDICARE

## 2022-07-27 PROCEDURE — G0180 PR HOME HEALTH MD CERTIFICATION: ICD-10-PCS | Mod: ,,, | Performed by: HOSPITALIST

## 2022-07-27 PROCEDURE — G0180 MD CERTIFICATION HHA PATIENT: HCPCS | Mod: ,,, | Performed by: HOSPITALIST

## 2022-07-27 NOTE — PROGRESS NOTES
C3 nurse attempted to contact patient. The following occurred:   C3 nurse attempted to contact Lupis CRORY Divina for a TCC post acute discharge follow up call. The patient is unable to conduct the call @ this time. The patient requested a callback.    The patient has a scheduled \[facility]\"" appointment with Samir Shani on 7/28/22 @ 1500. Message sent to Physician staff.

## 2022-07-28 ENCOUNTER — TELEPHONE (OUTPATIENT)
Dept: PRIMARY CARE CLINIC | Facility: CLINIC | Age: 72
End: 2022-07-28
Payer: MEDICARE

## 2022-07-28 NOTE — TELEPHONE ENCOUNTER
----- Message from Katelyn Alejo sent at 7/28/2022  1:06 PM CDT -----  Contact: [phone]  Venus sorin omni is calling for the pt she states the pt had a fall during a transfer yesterday went down on her knees with the help of emts they got her back in the chair and the pt refused to go to er please advise

## 2022-07-29 ENCOUNTER — TELEMEDICINE (OUTPATIENT)
Dept: PRIMARY CARE CLINIC | Facility: CLINIC | Age: 72
End: 2022-07-29
Payer: MEDICARE

## 2022-07-29 RX ORDER — ACETAMINOPHEN AND CODEINE PHOSPHATE 300; 30 MG/1; MG/1
1 TABLET ORAL EVERY 4 HOURS PRN
Qty: 42 TABLET | Refills: 0 | Status: SHIPPED | OUTPATIENT
Start: 2022-07-29 | End: 2022-09-13 | Stop reason: SDUPTHER

## 2022-07-29 RX ORDER — CANDESARTAN CILEXETIL AND HYDROCHLOROTHIAZIDE 16; 12.5 MG/1; MG/1
1 TABLET ORAL DAILY
Qty: 90 TABLET | Refills: 0 | Status: SHIPPED | OUTPATIENT
Start: 2022-07-29 | End: 2023-09-26

## 2022-07-29 RX ORDER — CANDESARTAN CILEXETIL AND HYDROCHLOROTHIAZIDE 16; 12.5 MG/1; MG/1
1 TABLET ORAL DAILY
Qty: 90 TABLET | Refills: 0 | Status: SHIPPED | OUTPATIENT
Start: 2022-07-29 | End: 2022-07-29 | Stop reason: SDUPTHER

## 2022-07-29 RX ORDER — PANTOPRAZOLE SODIUM 40 MG/1
40 TABLET, DELAYED RELEASE ORAL DAILY
Qty: 90 TABLET | Refills: 0 | Status: SHIPPED | OUTPATIENT
Start: 2022-07-29 | End: 2023-06-07

## 2022-07-29 RX ORDER — PANTOPRAZOLE SODIUM 40 MG/1
40 TABLET, DELAYED RELEASE ORAL DAILY
Qty: 90 TABLET | Refills: 0 | Status: SHIPPED | OUTPATIENT
Start: 2022-07-29 | End: 2022-07-29 | Stop reason: SDUPTHER

## 2022-07-29 RX ORDER — ACETAMINOPHEN AND CODEINE PHOSPHATE 300; 30 MG/1; MG/1
1 TABLET ORAL EVERY 4 HOURS PRN
Qty: 42 TABLET | Refills: 0 | Status: SHIPPED | OUTPATIENT
Start: 2022-07-29 | End: 2022-07-29 | Stop reason: SDUPTHER

## 2022-07-29 NOTE — PROGRESS NOTES
"LMP  (LMP Unknown)     Chief Complaint: No chief complaint on file.      HPI  Lupis Murcia is a 71 y.o. female here for    The patient location is: [place]  The chief complaint leading to consultation is: Hospital discharge    Visit type: audiovisual    Face to Face time with patient: 15  30 minutes of total time spent on the encounter, which includes face to face time and non-face to face time preparing to see the patient (eg, review of tests), Obtaining and/or reviewing separately obtained history, Documenting clinical information in the electronic or other health record, Independently interpreting results (not separately reported) and communicating results to the patient/family/caregiver, or Care coordination (not separately reported).         Each patient to whom he or she provides medical services by telemedicine is:  (1) informed of the relationship between the physician and patient and the respective role of any other health care provider with respect to management of the patient; and (2) notified that he or she may decline to receive medical services by telemedicine and may withdraw from such care at any time.    Notes:       Virtual visit for [facility] f/u. Here w her daughter virtually.    [facility] summary from June 20, 2022 as follows:    "   HPI:   71-year-old female with history of PE during pregnancy requiring heparin drip, chronic lower extremity lymphedema, multiple sclerosis, vitamin D deficiency who presented to the ED due to shortness of breath and dyspnea on exertion two days prior to arrival. Patient states she was undergoing physical therapy at her rehabilitation facility and was walking when she suddenly became short of breath. She states since that time, her symptoms have worsened. She stated that 5 days prior, her primary physician noted that she was experiencing abdominal bruising related to heparin subcutaneous injections. She states she started having worsening respiratory symptoms and was " "satting 80% on room air. In the ED, cardiology was consulted and a bedside ECHO showed a dilated RV with Cox's sign. Due to her allergy for contrast, she underwent a VQ scan showing evidence of a submassive pulmonary embolism. She was started on a heparin drip.     Vitals were overall stable other than mild tachycardia and on 6L NC with oxygen saturation above 90%. CXR in ED showed mild cardiomegaly. BNP elevated to 577 with troponin elevated to 0.423. She was admitted to Critical Care Team 2 for further management.         * No surgery found *       Hospital Course:   Ms. Lupis Murcia is a 71 y.o. female who was admitted to [facility] ICU on 6/14/2022 with an acute submassive PE with acute cor pulmonale.  She was started on a Heparin gtt.  Cardiology was consulted.  2D echo showed EF 60% with elevated PAP.  Troponin peaked at 0.790.  BLE US was negative for PE.  She was given the option of TPA but refused.  She was transitioned to [facility].   She was able to have stable oxygenation at 4L NC when sitting up in bed and trying to stand, and was thus felt stable to SD to  on 6/16.  PT/OT say SNF.  She was accepted back to [facility], weaned down to 1-2L NC."        ==========  Hospital discharge follow-up.  Chart reviewed including labs.  Hospital discharge summary reviewed.      Patient perseverates a bit on Tylenol with codeine.  She also asks about benzodiazepine refill.  States she has been on these medications for years.  Indicate that I cannot prescribe both opioid and benzodiazepine.  Indicate that it does not matter if she was on these for years are not, is a bad idea these are not medications are routinely refill regularly She declines the benzodiazepine.  I suggest trying something like hydroxyzine.  She declines    If more than 42 tablets of pain meds needed over a month or 2, she will need to see pain management.  Must follow-up before 4th refill.  Cannot refill again unless seen in " person.      SURGICAL AND MEDICAL HISTORY: updated and reviewed.  ALLERGIES updated and reviewed.  Review of patient's allergies indicates:   Allergen Reactions    Contrast media Shortness Of Breath and Rash    Pcn [penicillins] Shortness Of Breath and Rash    Celebrex [celecoxib] Other (See Comments)     Swallowing problems     Diazepam Hives    Motrin [ibuprofen] Rash     CURRENT OUTPATIENT MEDICATIONS updated and reviewed    Current Outpatient Medications:     acetaminophen-codeine 300-30mg (TYLENOL #3) 300-30 mg Tab, Take 1 tablet by mouth every 6 (six) hours as needed (pain)., Disp: , Rfl:     acetaminophen-codeine 300-30mg (TYLENOL #3) 300-30 mg Tab, Take 1 tablet by mouth every 4 (four) hours as needed (severe pain; to be used sparingly; do not take regularly)., Disp: 42 tablet, Rfl: 0    apixaban (ELIQUIS) 5 mg Tab, Take 1 tablet (5 mg total) by mouth 2 (two) times daily., Disp: 180 tablet, Rfl: 0    B-complex with vitamin C (Z-BEC OR EQUIV) tablet, Take 1 tablet by mouth once daily., Disp: , Rfl:     candesartan-hydrochlorothiazide (ATACAND HCT) 16-12.5 mg per tablet, Hold until follow-up with primary care provider (Patient not taking: Reported on 7/27/2022), Disp: , Rfl:     candesartan-hydrochlorothiazide (ATACAND HCT) 16-12.5 mg per tablet, Take 1 tablet by mouth once daily., Disp: 90 tablet, Rfl: 0    [START ON 8/15/2022] cyanocobalamin 1,000 mcg/mL injection, Inject 1 mL (1,000 mcg total) into the muscle every 30 days., Disp: 1 mL, Rfl: 3    furosemide (LASIX) 20 MG tablet, Take 1 tablet (20 mg total) by mouth once daily., Disp: 30 tablet, Rfl: 3    gabapentin (NEURONTIN) 100 MG capsule, Take 1 capsule (100 mg total) by mouth 3 (three) times daily., Disp: 90 capsule, Rfl: 11    LORazepam (ATIVAN) 0.5 MG tablet, Take 1 tablet (0.5 mg total) by mouth every 8 (eight) hours as needed for Anxiety., Disp: , Rfl:     pantoprazole (PROTONIX) 40 MG tablet, Take 1 tablet (40 mg total) by mouth  once daily., Disp: 30 tablet, Rfl: 3    pantoprazole (PROTONIX) 40 MG tablet, Take 1 tablet (40 mg total) by mouth once daily., Disp: 90 tablet, Rfl: 0    vitamin D (VITAMIN D3) 1000 units Tab, Take 1 tablet (1,000 Units total) by mouth once daily. (Patient taking differently: Take 5,000 Units by mouth once daily.), Disp: , Rfl:     Review of Systems   Constitutional: Positive for fatigue. Negative for activity change, appetite change, chills, diaphoresis, fever and unexpected weight change.   HENT: Negative for congestion, ear discharge, ear pain, facial swelling, hearing loss, nosebleeds, postnasal drip, rhinorrhea, sinus pressure, sneezing, sore throat, tinnitus, trouble swallowing and voice change.    Eyes: Negative for photophobia, pain, discharge, redness, itching and visual disturbance.   Respiratory: Negative for cough, chest tightness, shortness of breath and wheezing.    Cardiovascular: Negative for chest pain, palpitations and leg swelling.   Gastrointestinal: Negative for abdominal distention, abdominal pain, anal bleeding, blood in stool, constipation, diarrhea, nausea, rectal pain and vomiting.   Endocrine: Negative for cold intolerance, heat intolerance, polydipsia, polyphagia and polyuria.   Genitourinary: Negative for difficulty urinating, dysuria and flank pain.   Musculoskeletal: Positive for arthralgias. Negative for back pain, joint swelling, myalgias and neck pain.   Skin: Negative for rash.   Neurological: Negative for dizziness, tremors, seizures, syncope, speech difficulty, weakness, light-headedness, numbness and headaches.   Psychiatric/Behavioral: Negative for behavioral problems, confusion, decreased concentration, dysphoric mood, sleep disturbance and suicidal ideas. The patient is not nervous/anxious and is not hyperactive.        LMP  (LMP Unknown)   Physical Exam  Vitals and nursing note reviewed.   Constitutional:       General: She is not in acute distress.     Appearance: Normal  appearance. She is obese. She is not ill-appearing, toxic-appearing or diaphoretic.   HENT:      Head: Normocephalic and atraumatic.   Eyes:      General: No scleral icterus.        Right eye: No discharge.         Left eye: No discharge.      Conjunctiva/sclera: Conjunctivae normal.   Skin:     General: Skin is warm and dry.   Neurological:      Mental Status: She is alert.   Psychiatric:         Mood and Affect: Mood normal.         Behavior: Behavior normal.       There are no diagnoses linked to this encounter.  No follow-ups on file.        Samir Sahni MD        ===========================================

## 2022-07-29 NOTE — TELEPHONE ENCOUNTER
----- Message from Magdiel Caldwell sent at 7/29/2022  3:17 PM CDT -----  Contact: Pt [phone]  Patient daughter states please double check medication prescribed today [facility] has not received RX.  Please call and advise.    Thank you and have a great day.      
SHIRLEY 7/29/2022  Confirm with pharmacy didn't received.    
Stable.

## 2022-08-01 NOTE — HOSPITAL COURSE
Patient progressed well with PT and OT- last PT note states that patient ambulated 55ft+38ft+42ft with RW and CGA and +1 w/c following. Mostly steady gait with wide MEGAN, flexed posture, and decrease step length. Patient had no significant events during their stay at SNF.  Patient was successfully weaned off of supplemental O2.  Patient suffered with left knee pain during SNF stay that resolved at time of discharge.  Patient also suffered with neuropathy pain that was greatly improved with gabapentin.  Home health was set up. DME was ordered if needed. Follow up appointment to be made by patient within one week. All prescriptions and discharge instructions were ordered to be given to the patient prior to discharge.     PEx  Constitutional: Patient appears debilitated.  No distress noted  HENT:   Head: Normocephalic and atraumatic.   Eyes: Pupils are equal, round  Neck: Normal range of motion. Neck supple.   Cardiovascular: Normal rate, regular rhythm and normal heart sounds.    Pulmonary/Chest: Effort normal and breath sounds are clear  Abdominal: Soft. Bowel sounds are normal.   Musculoskeletal: Normal range of motion.   Neurological: Alert and oriented to person, place, and time.   Psychiatric: Normal mood and affect. Behavior is normal.   Skin: Skin is warm and dry.

## 2022-08-01 NOTE — DISCHARGE SUMMARY
[facility] Medicine  Discharge Summary      Patient Name: Lupis Murcia  MRN: [id number]  Patient Class: IP- SNF  Admission Date: 6/20/2022  Hospital Length of Stay: 36 days  Discharge Date and Time:7/26/2022  Attending Physician: No att. providers found   Discharging Provider: Amy Conklin NP  Primary Care Provider: Bobby Tran MD      HPI:   Lupis Murcia is a 71 y.o. female w/ PMHx of PE during pregnancy requiring heparin drip, chronic lower extremity lymphedema, multiple sclerosis, vitamin D deficiency who presented to the ED 6/14/22 due to shortness of breath and dyspnea on exertion that started 6/12/22 while working with therapy. In the ED, cardiology was consulted and a bedside ECHO showed  EF 60% a dilated RV with Cox's sign. Due to her allergy for contrast, she underwent a VQ scan showing evidence of a submassive pulmonary embolism. Mildly tachycardic on 6L NC with O2 sats > 90%.  Troponin peaked at 0.790. She was given the option of TPA but refused. She was started on a heparin drip transitioned to Eliquis. She was able to have stable oxygenation at 4L NC when sitting up in bed and trying to stand, and was thus felt stable to SD to HM on 6/16. Weaned to 1-2L NC.       Patient will be treated at [facility] with PT and OT to improve functional status and ability to perform ADLs.     Hospital Course:   Patient progressed well with PT and OT- last PT note states that patient ambulated 55ft+38ft+42ft with RW and CGA and +1 w/c following. Mostly steady gait with wide MEGAN, flexed posture, and decrease step length. Patient had no significant events during their stay at [facility].  Patient was successfully weaned off of supplemental O2.  Patient suffered with left knee pain during SNF stay that resolved at time of discharge.  Patient also suffered with neuropathy pain that was greatly improved with gabapentin.  Home health was set up. DME was ordered if needed. Follow up  appointment to be made by patient within one week. All prescriptions and discharge instructions were ordered to be given to the patient prior to discharge.     PEx  Constitutional: Patient appears debilitated.  No distress noted  HENT:   Head: Normocephalic and atraumatic.   Eyes: Pupils are equal, round  Neck: Normal range of motion. Neck supple.   Cardiovascular: Normal rate, regular rhythm and normal heart sounds.    Pulmonary/Chest: Effort normal and breath sounds are clear  Abdominal: Soft. Bowel sounds are normal.   Musculoskeletal: Normal range of motion.   Neurological: Alert and oriented to person, place, and time.   Psychiatric: Normal mood and affect. Behavior is normal.   Skin: Skin is warm and dry.        Goals of Care Treatment Preferences:  Code Status: Full Code          What is most important right now is to focus on remaining as independent as possible.  Accordingly, we have decided that the best plan to meet the patient's goals includes continuing with treatment.      Consults:   Consults (From admission, onward)        Status Ordering Provider     Inpatient consult to Registered Dietitian/Nutritionist  Once        Provider:  (Not yet assigned)    UBALDO Snow          No new Assessment & Plan notes have been filed under this hospital service since the last note was generated.  Service: Hospital Medicine    Final Active Diagnoses:    Diagnosis Date Noted POA    PRINCIPAL PROBLEM:  Acute pulmonary embolism with acute cor pulmonale [I26.09] 06/14/2022 Yes    Hypokalemia [E87.6] 06/23/2022 Yes    Acute hypoxemic respiratory failure [J96.01] 06/14/2022 Yes    Class 2 obesity due to excess calories in adult [E66.09] 06/14/2022 Yes    Lymphedema [I89.0] 05/01/2022 Yes    MS (multiple sclerosis) [G35] 06/09/2019 Yes    Vitamin D deficiency [E55.9] 06/09/2019 Yes    Muscle weakness of lower extremity [M62.81] 05/11/2018 Yes    Impaired functional mobility, balance, gait, and  "endurance [Z74.09] 05/11/2018 Yes    Anemia of chronic disease [D63.8] 08/09/2017 Yes    Vitamin B 12 deficiency [E53.8] 08/09/2017 Yes    Insomnia secondary to chronic pain [G89.29, G47.01] 06/15/2016 Yes      Problems Resolved During this Admission:       Discharged Condition: good    Disposition: Home-Health Care [facility]    Follow Up:   Follow-up Information     Home Health Follow up.    Why: Your Home Health company will continue your Physical and Occupational Therapy treatments in your home and send nursing or aides as needed. If you have not been contacted to schedule your first session within two business days of discharge, please contact the company listed here or Lisbet Solomon [facility] [phone].  Contact information:  [facility]   [phone]                     Patient Instructions:      WALKER FOR HOME USE     Order Specific Question Answer Comments   Type of Walker: Luis Alberto (4'4"-5'6")    With wheels? Yes    Height: 5' 2" (1.575 m)    Weight: 87 kg (191 lb 12.8 oz)    Length of need (1-99 months): 99    Does patient have medical equipment at home? bedside commode    Does patient have medical equipment at home? bath bench    Does patient have medical equipment at home? raised toilet    Please check all that apply: Patient's condition impairs ambulation.    Please check all that apply: Walker will be used for gait training.    Please check all that apply: Patient is unable to safely ambulate without equipment.      BATH/SHOWER CHAIR FOR HOME USE     Order Specific Question Answer Comments   Height: 5' 2" (1.575 m)    Weight: 87 kg (191 lb 12.8 oz)    Does patient have medical equipment at home? bedside commode    Does patient have medical equipment at home? bath bench    Does patient have medical equipment at home? raised toilet    Length of need (1-99 months): 99    Type: With back      HOSPITAL BED FOR HOME USE     Order Specific Question Answer Comments   Type: Semi-electric    Length of need (1-99 " "months): 99    Does patient have medical equipment at home? bedside commode    Does patient have medical equipment at home? bath bench    Does patient have medical equipment at home? raised toilet    Height: 5' 2" (1.575 m)    Weight: 87 kg (191 lb 12.8 oz)    Please check all that apply: Patient requires a bed height different than a fixed height hospital bed to permit transfers to chair, wheelchair, or standing.      WHEELCHAIR FOR HOME USE     Order Specific Question Answer Comments   Hours in W/C per day: 8    Type of Wheelchair: Custom transport WC   Size(Width): 18"(STD adult)    Leg Support: STD footrests    Leg Support: Elevating leg rests    Leg Support: Swing Away    Arm Height: Detachable    Arm Height: Desk length    Arm Height: Swing away    Arm Height: Adjust height    Lap Belt: Velcro    Accessories: Heel loops    Accessories: Front brakes    Accessories: Anti-tippers    Accessories: Safety belt    Cushion: Basic    Justification for cushion: Prevent pressure ulcers    Height: 5' 2" (1.575 m)    Weight: 87 kg (191 lb 12.8 oz)    Does patient have medical equipment at home? bedside commode    Does patient have medical equipment at home? bath bench    Does patient have medical equipment at home? raised toilet    Length of need (1-99 months): 99    Please check all that apply: Caregiver is capable and willing to operate wheelchair safely.    Please check all that apply: The patient requires the use of a w/c for activities of daily living within the Home.    Please check all that apply: Patient mobility limitations cannot be sufficiently resolved by the use of other ambulatory therapies.      No driving until:   Order Comments: Cleared by PCP     Notify your health care provider if you experience any of the following:  temperature >100.4     Notify your health care provider if you experience any of the following:  persistent nausea and vomiting or diarrhea     Notify your health care provider if you " experience any of the following:  severe uncontrolled pain     Notify your health care provider if you experience any of the following:  redness, tenderness, or signs of infection (pain, swelling, redness, odor or green/yellow discharge around incision site)     Notify your health care provider if you experience any of the following:  difficulty breathing or increased cough     Notify your health care provider if you experience any of the following:  severe persistent headache     Notify your health care provider if you experience any of the following:  worsening rash     Notify your health care provider if you experience any of the following:  persistent dizziness, light-headedness, or visual disturbances     Notify your health care provider if you experience any of the following:  increased confusion or weakness     Activity as tolerated           Pending Diagnostic Studies:     None         Medications:  Reconciled Home Medications:      Medication List      START taking these medications    gabapentin 100 MG capsule  Commonly known as: NEURONTIN  Take 1 capsule (100 mg total) by mouth 3 (three) times daily.        CHANGE how you take these medications    apixaban 5 mg Tab  Commonly known as: ELIQUIS  Take 1 tablet (5 mg total) by mouth 2 (two) times daily.  What changed: Another medication with the same name was removed. Continue taking this medication, and follow the directions you see here.     candesartan-hydrochlorothiazide 16-12.5 mg per tablet  Commonly known as: ATACAND HCT  Hold until follow-up with primary care provider  What changed:   · how much to take  · how to take this  · when to take this  · additional instructions     vitamin D 1000 units Tab  Commonly known as: VITAMIN D3  Take 1 tablet (1,000 Units total) by mouth once daily.  What changed: how much to take        CONTINUE taking these medications    acetaminophen-codeine 300-30mg 300-30 mg Tab  Commonly known as: TYLENOL #3  Take 1 tablet by  mouth every 6 (six) hours as needed (pain).     B-complex with vitamin C tablet  Commonly known as: Z-Bec or Equiv  Take 1 tablet by mouth once daily.     cyanocobalamin 1,000 mcg/mL injection  Inject 1 mL (1,000 mcg total) into the muscle every 30 days.  Start taking on: August 15, 2022     furosemide 20 MG tablet  Commonly known as: LASIX  Take 1 tablet (20 mg total) by mouth once daily.     LORazepam 0.5 MG tablet  Commonly known as: ATIVAN  Take 1 tablet (0.5 mg total) by mouth every 8 (eight) hours as needed for Anxiety.     pantoprazole 40 MG tablet  Commonly known as: PROTONIX  Take 1 tablet (40 mg total) by mouth once daily.        STOP taking these medications    baclofen 10 MG tablet  Commonly known as: LIORESAL     hydroCHLOROthiazide 12.5 MG Tab  Commonly known as: HYDRODIURIL     miconazole 2 % cream  Commonly known as: MICOTIN     ondansetron 4 MG Tbdl  Commonly known as: ZOFRAN-ODT     tamsulosin 0.4 mg Cap  Commonly known as: FLOMAX            Indwelling Lines/Drains at time of discharge:   Lines/Drains/Airways     None                 Time spent on the discharge of patient: 39 minutes         Amy Conklin NP  Department of [facility] Medicine  [facility] - Skilled Nursing

## 2022-08-16 ENCOUNTER — EXTERNAL HOSPITAL ADMISSION (OUTPATIENT)
Dept: ADMINISTRATIVE | Facility: CLINIC | Age: 72
End: 2022-08-16
Payer: MEDICARE

## 2022-08-16 ENCOUNTER — PATIENT OUTREACH (OUTPATIENT)
Dept: ADMINISTRATIVE | Facility: CLINIC | Age: 72
End: 2022-08-16
Payer: MEDICARE

## 2022-08-16 NOTE — PROGRESS NOTES
C3 nurse attempted to contact patient. The following occurred:   C3 nurse attempted to contact Lupis WHEATLEY Diivna  for a TCC post hospital discharge follow up call. The patient is unable to conduct the call @ this time. The patient requested a callback.    Non [facility] PCP

## 2022-08-16 NOTE — PROGRESS NOTES
C3 nurse attempted to contact patient. The following occurred:   C3 nurse attempted to contact Lupis WHEATLEY Divina  for a TCC post hospital discharge follow up call. The patient is unable to conduct the call @ this time. The patient requested a callback.    Non [facility] PCP

## 2022-08-18 DIAGNOSIS — R11.0 NAUSEA: Primary | ICD-10-CM

## 2022-08-18 RX ORDER — ONDANSETRON 4 MG/1
4 TABLET, ORALLY DISINTEGRATING ORAL EVERY 6 HOURS PRN
Qty: 20 TABLET | Refills: 1 | Status: SHIPPED | OUTPATIENT
Start: 2022-08-18 | End: 2022-10-12

## 2022-08-22 ENCOUNTER — LAB VISIT (OUTPATIENT)
Dept: LAB | Facility: HOSPITAL | Age: 72
End: 2022-08-22
Attending: HOSPITALIST
Payer: MEDICARE

## 2022-08-22 DIAGNOSIS — M86.072 ACUTE HEMATOGENOUS OSTEOMYELITIS OF LEFT ANKLE: Primary | ICD-10-CM

## 2022-08-22 LAB
ALBUMIN SERPL BCP-MCNC: 3.3 G/DL (ref 3.5–5.2)
ALP SERPL-CCNC: 57 U/L (ref 55–135)
ALT SERPL W/O P-5'-P-CCNC: 8 U/L (ref 10–44)
ANION GAP SERPL CALC-SCNC: 8 MMOL/L (ref 8–16)
AST SERPL-CCNC: 10 U/L (ref 10–40)
BASOPHILS # BLD AUTO: 0.02 K/UL (ref 0–0.2)
BASOPHILS NFR BLD: 0.4 % (ref 0–1.9)
BILIRUB SERPL-MCNC: 0.3 MG/DL (ref 0.1–1)
BUN SERPL-MCNC: 14 MG/DL (ref 8–23)
CALCIUM SERPL-MCNC: 9.6 MG/DL (ref 8.7–10.5)
CHLORIDE SERPL-SCNC: 108 MMOL/L (ref 95–110)
CO2 SERPL-SCNC: 26 MMOL/L (ref 23–29)
CREAT SERPL-MCNC: 0.6 MG/DL (ref 0.5–1.4)
CRP SERPL-MCNC: 13.8 MG/L (ref 0–8.2)
DIFFERENTIAL METHOD: ABNORMAL
EOSINOPHIL # BLD AUTO: 0.2 K/UL (ref 0–0.5)
EOSINOPHIL NFR BLD: 4.3 % (ref 0–8)
ERYTHROCYTE [DISTWIDTH] IN BLOOD BY AUTOMATED COUNT: 14 % (ref 11.5–14.5)
EST. GFR  (NO RACE VARIABLE): >60 ML/MIN/1.73 M^2
GLUCOSE SERPL-MCNC: 96 MG/DL (ref 70–110)
HCT VFR BLD AUTO: 25.7 % (ref 37–48.5)
HGB BLD-MCNC: 8 G/DL (ref 12–16)
IMM GRANULOCYTES # BLD AUTO: 0.01 K/UL (ref 0–0.04)
IMM GRANULOCYTES NFR BLD AUTO: 0.2 % (ref 0–0.5)
LYMPHOCYTES # BLD AUTO: 1.6 K/UL (ref 1–4.8)
LYMPHOCYTES NFR BLD: 32.3 % (ref 18–48)
MCH RBC QN AUTO: 29.6 PG (ref 27–31)
MCHC RBC AUTO-ENTMCNC: 31.1 G/DL (ref 32–36)
MCV RBC AUTO: 95 FL (ref 82–98)
MONOCYTES # BLD AUTO: 0.5 K/UL (ref 0.3–1)
MONOCYTES NFR BLD: 10.6 % (ref 4–15)
NEUTROPHILS # BLD AUTO: 2.6 K/UL (ref 1.8–7.7)
NEUTROPHILS NFR BLD: 52.2 % (ref 38–73)
NRBC BLD-RTO: 0 /100 WBC
PLATELET # BLD AUTO: 222 K/UL (ref 150–450)
PMV BLD AUTO: 10.6 FL (ref 9.2–12.9)
POTASSIUM SERPL-SCNC: 3.8 MMOL/L (ref 3.5–5.1)
PROT SERPL-MCNC: 6 G/DL (ref 6–8.4)
RBC # BLD AUTO: 2.7 M/UL (ref 4–5.4)
SODIUM SERPL-SCNC: 142 MMOL/L (ref 136–145)
VANCOMYCIN TROUGH SERPL-MCNC: 11.3 UG/ML (ref 10–22)
WBC # BLD AUTO: 4.89 K/UL (ref 3.9–12.7)

## 2022-08-22 PROCEDURE — 86140 C-REACTIVE PROTEIN: CPT | Performed by: HOSPITALIST

## 2022-08-22 PROCEDURE — 85025 COMPLETE CBC W/AUTO DIFF WBC: CPT | Performed by: HOSPITALIST

## 2022-08-22 PROCEDURE — 80202 ASSAY OF VANCOMYCIN: CPT | Performed by: HOSPITALIST

## 2022-08-22 PROCEDURE — 80053 COMPREHEN METABOLIC PANEL: CPT | Performed by: HOSPITALIST

## 2022-08-24 ENCOUNTER — EXTERNAL HOME HEALTH (OUTPATIENT)
Dept: HOME HEALTH SERVICES | Facility: HOSPITAL | Age: 72
End: 2022-08-24
Payer: MEDICARE

## 2022-08-29 ENCOUNTER — LAB VISIT (OUTPATIENT)
Dept: LAB | Facility: HOSPITAL | Age: 72
End: 2022-08-29
Attending: INTERNAL MEDICINE
Payer: MEDICARE

## 2022-08-29 DIAGNOSIS — M86.072 ACUTE HEMATOGENOUS OSTEOMYELITIS OF LEFT ANKLE: Primary | ICD-10-CM

## 2022-08-29 LAB
ALBUMIN SERPL BCP-MCNC: 3.5 G/DL (ref 3.5–5.2)
ALP SERPL-CCNC: 55 U/L (ref 55–135)
ALT SERPL W/O P-5'-P-CCNC: 8 U/L (ref 10–44)
ANION GAP SERPL CALC-SCNC: 12 MMOL/L (ref 8–16)
AST SERPL-CCNC: 10 U/L (ref 10–40)
BASOPHILS # BLD AUTO: 0.02 K/UL (ref 0–0.2)
BASOPHILS NFR BLD: 0.4 % (ref 0–1.9)
BILIRUB SERPL-MCNC: 0.3 MG/DL (ref 0.1–1)
BUN SERPL-MCNC: 11 MG/DL (ref 8–23)
CALCIUM SERPL-MCNC: 10 MG/DL (ref 8.7–10.5)
CHLORIDE SERPL-SCNC: 104 MMOL/L (ref 95–110)
CK MB SERPL-MCNC: <1 NG/ML (ref 0.1–6.5)
CK MB SERPL-RTO: NORMAL % (ref 0–5)
CK SERPL-CCNC: 22 U/L (ref 20–180)
CK SERPL-CCNC: 22 U/L (ref 20–180)
CO2 SERPL-SCNC: 27 MMOL/L (ref 23–29)
CREAT SERPL-MCNC: 0.6 MG/DL (ref 0.5–1.4)
CRP SERPL-MCNC: 11.6 MG/L (ref 0–8.2)
DIFFERENTIAL METHOD: ABNORMAL
EOSINOPHIL # BLD AUTO: 0.3 K/UL (ref 0–0.5)
EOSINOPHIL NFR BLD: 5.7 % (ref 0–8)
ERYTHROCYTE [DISTWIDTH] IN BLOOD BY AUTOMATED COUNT: 14.5 % (ref 11.5–14.5)
EST. GFR  (NO RACE VARIABLE): >60 ML/MIN/1.73 M^2
GLUCOSE SERPL-MCNC: 85 MG/DL (ref 70–110)
HCT VFR BLD AUTO: 27 % (ref 37–48.5)
HGB BLD-MCNC: 8.6 G/DL (ref 12–16)
IMM GRANULOCYTES # BLD AUTO: 0.01 K/UL (ref 0–0.04)
IMM GRANULOCYTES NFR BLD AUTO: 0.2 % (ref 0–0.5)
LYMPHOCYTES # BLD AUTO: 1.4 K/UL (ref 1–4.8)
LYMPHOCYTES NFR BLD: 28.6 % (ref 18–48)
MCH RBC QN AUTO: 30.4 PG (ref 27–31)
MCHC RBC AUTO-ENTMCNC: 31.9 G/DL (ref 32–36)
MCV RBC AUTO: 95 FL (ref 82–98)
MONOCYTES # BLD AUTO: 0.5 K/UL (ref 0.3–1)
MONOCYTES NFR BLD: 11 % (ref 4–15)
NEUTROPHILS # BLD AUTO: 2.7 K/UL (ref 1.8–7.7)
NEUTROPHILS NFR BLD: 54.1 % (ref 38–73)
NRBC BLD-RTO: 0 /100 WBC
PLATELET # BLD AUTO: 226 K/UL (ref 150–450)
PMV BLD AUTO: 9.7 FL (ref 9.2–12.9)
POTASSIUM SERPL-SCNC: 3.9 MMOL/L (ref 3.5–5.1)
PROT SERPL-MCNC: 6.5 G/DL (ref 6–8.4)
RBC # BLD AUTO: 2.83 M/UL (ref 4–5.4)
SODIUM SERPL-SCNC: 143 MMOL/L (ref 136–145)
WBC # BLD AUTO: 4.93 K/UL (ref 3.9–12.7)

## 2022-08-29 PROCEDURE — 86140 C-REACTIVE PROTEIN: CPT | Performed by: INTERNAL MEDICINE

## 2022-08-29 PROCEDURE — 85025 COMPLETE CBC W/AUTO DIFF WBC: CPT | Performed by: INTERNAL MEDICINE

## 2022-08-29 PROCEDURE — 82553 CREATINE MB FRACTION: CPT | Performed by: INTERNAL MEDICINE

## 2022-08-29 PROCEDURE — 80053 COMPREHEN METABOLIC PANEL: CPT | Performed by: INTERNAL MEDICINE

## 2022-09-06 ENCOUNTER — LAB VISIT (OUTPATIENT)
Dept: LAB | Facility: HOSPITAL | Age: 72
End: 2022-09-06
Attending: INTERNAL MEDICINE
Payer: MEDICARE

## 2022-09-06 DIAGNOSIS — M86.072 ACUTE HEMATOGENOUS OSTEOMYELITIS OF LEFT ANKLE: Primary | ICD-10-CM

## 2022-09-06 LAB
ALBUMIN SERPL BCP-MCNC: 3.8 G/DL (ref 3.5–5.2)
ALP SERPL-CCNC: 54 U/L (ref 55–135)
ALT SERPL W/O P-5'-P-CCNC: 8 U/L (ref 10–44)
ANION GAP SERPL CALC-SCNC: 14 MMOL/L (ref 8–16)
AST SERPL-CCNC: 14 U/L (ref 10–40)
BASOPHILS # BLD AUTO: 0.01 K/UL (ref 0–0.2)
BASOPHILS NFR BLD: 0.3 % (ref 0–1.9)
BILIRUB SERPL-MCNC: 0.3 MG/DL (ref 0.1–1)
BUN SERPL-MCNC: 21 MG/DL (ref 8–23)
CALCIUM SERPL-MCNC: 10.3 MG/DL (ref 8.7–10.5)
CHLORIDE SERPL-SCNC: 101 MMOL/L (ref 95–110)
CK SERPL-CCNC: 36 U/L (ref 20–180)
CO2 SERPL-SCNC: 26 MMOL/L (ref 23–29)
CREAT SERPL-MCNC: 0.7 MG/DL (ref 0.5–1.4)
CRP SERPL-MCNC: 16.8 MG/L (ref 0–8.2)
DIFFERENTIAL METHOD: ABNORMAL
EOSINOPHIL # BLD AUTO: 0.2 K/UL (ref 0–0.5)
EOSINOPHIL NFR BLD: 5 % (ref 0–8)
ERYTHROCYTE [DISTWIDTH] IN BLOOD BY AUTOMATED COUNT: 14 % (ref 11.5–14.5)
EST. GFR  (NO RACE VARIABLE): >60 ML/MIN/1.73 M^2
GLUCOSE SERPL-MCNC: 91 MG/DL (ref 70–110)
HCT VFR BLD AUTO: 31.6 % (ref 37–48.5)
HGB BLD-MCNC: 9.3 G/DL (ref 12–16)
IMM GRANULOCYTES # BLD AUTO: 0 K/UL (ref 0–0.04)
IMM GRANULOCYTES NFR BLD AUTO: 0 % (ref 0–0.5)
LYMPHOCYTES # BLD AUTO: 1.6 K/UL (ref 1–4.8)
LYMPHOCYTES NFR BLD: 44.6 % (ref 18–48)
MCH RBC QN AUTO: 29.1 PG (ref 27–31)
MCHC RBC AUTO-ENTMCNC: 29.4 G/DL (ref 32–36)
MCV RBC AUTO: 99 FL (ref 82–98)
MONOCYTES # BLD AUTO: 0.4 K/UL (ref 0.3–1)
MONOCYTES NFR BLD: 10 % (ref 4–15)
NEUTROPHILS # BLD AUTO: 1.5 K/UL (ref 1.8–7.7)
NEUTROPHILS NFR BLD: 40.1 % (ref 38–73)
NRBC BLD-RTO: 0 /100 WBC
PLATELET # BLD AUTO: 224 K/UL (ref 150–450)
PMV BLD AUTO: 10.6 FL (ref 9.2–12.9)
POTASSIUM SERPL-SCNC: 4 MMOL/L (ref 3.5–5.1)
PROT SERPL-MCNC: 7.7 G/DL (ref 6–8.4)
RBC # BLD AUTO: 3.2 M/UL (ref 4–5.4)
SODIUM SERPL-SCNC: 141 MMOL/L (ref 136–145)
WBC # BLD AUTO: 3.61 K/UL (ref 3.9–12.7)

## 2022-09-06 PROCEDURE — 80053 COMPREHEN METABOLIC PANEL: CPT | Performed by: INTERNAL MEDICINE

## 2022-09-06 PROCEDURE — 82550 ASSAY OF CK (CPK): CPT | Performed by: INTERNAL MEDICINE

## 2022-09-06 PROCEDURE — 36415 COLL VENOUS BLD VENIPUNCTURE: CPT | Performed by: INTERNAL MEDICINE

## 2022-09-06 PROCEDURE — 85025 COMPLETE CBC W/AUTO DIFF WBC: CPT | Performed by: INTERNAL MEDICINE

## 2022-09-06 PROCEDURE — 86140 C-REACTIVE PROTEIN: CPT | Performed by: INTERNAL MEDICINE

## 2022-09-12 ENCOUNTER — LAB VISIT (OUTPATIENT)
Dept: LAB | Facility: HOSPITAL | Age: 72
End: 2022-09-12
Attending: HOSPITALIST
Payer: MEDICARE

## 2022-09-12 DIAGNOSIS — M86.072 ACUTE HEMATOGENOUS OSTEOMYELITIS OF LEFT ANKLE: Primary | ICD-10-CM

## 2022-09-12 LAB
ALBUMIN SERPL BCP-MCNC: 4 G/DL (ref 3.5–5.2)
ALP SERPL-CCNC: 60 U/L (ref 55–135)
ALT SERPL W/O P-5'-P-CCNC: 10 U/L (ref 10–44)
ANION GAP SERPL CALC-SCNC: 11 MMOL/L (ref 8–16)
AST SERPL-CCNC: 12 U/L (ref 10–40)
BASOPHILS # BLD AUTO: 0.01 K/UL (ref 0–0.2)
BASOPHILS NFR BLD: 0.2 % (ref 0–1.9)
BILIRUB SERPL-MCNC: 0.4 MG/DL (ref 0.1–1)
BUN SERPL-MCNC: 16 MG/DL (ref 8–23)
CALCIUM SERPL-MCNC: 10.7 MG/DL (ref 8.7–10.5)
CHLORIDE SERPL-SCNC: 106 MMOL/L (ref 95–110)
CO2 SERPL-SCNC: 25 MMOL/L (ref 23–29)
CREAT SERPL-MCNC: 0.7 MG/DL (ref 0.5–1.4)
CRP SERPL-MCNC: 6.8 MG/L (ref 0–8.2)
DIFFERENTIAL METHOD: ABNORMAL
EOSINOPHIL # BLD AUTO: 0.3 K/UL (ref 0–0.5)
EOSINOPHIL NFR BLD: 6.5 % (ref 0–8)
ERYTHROCYTE [DISTWIDTH] IN BLOOD BY AUTOMATED COUNT: 13.9 % (ref 11.5–14.5)
EST. GFR  (NO RACE VARIABLE): >60 ML/MIN/1.73 M^2
GLUCOSE SERPL-MCNC: 98 MG/DL (ref 70–110)
HCT VFR BLD AUTO: 30.9 % (ref 37–48.5)
HGB BLD-MCNC: 9.7 G/DL (ref 12–16)
IMM GRANULOCYTES # BLD AUTO: 0.01 K/UL (ref 0–0.04)
IMM GRANULOCYTES NFR BLD AUTO: 0.2 % (ref 0–0.5)
LYMPHOCYTES # BLD AUTO: 1.3 K/UL (ref 1–4.8)
LYMPHOCYTES NFR BLD: 31.7 % (ref 18–48)
MCH RBC QN AUTO: 29.2 PG (ref 27–31)
MCHC RBC AUTO-ENTMCNC: 31.4 G/DL (ref 32–36)
MCV RBC AUTO: 93 FL (ref 82–98)
MONOCYTES # BLD AUTO: 0.4 K/UL (ref 0.3–1)
MONOCYTES NFR BLD: 8.7 % (ref 4–15)
NEUTROPHILS # BLD AUTO: 2.1 K/UL (ref 1.8–7.7)
NEUTROPHILS NFR BLD: 52.7 % (ref 38–73)
NRBC BLD-RTO: 0 /100 WBC
PLATELET # BLD AUTO: 225 K/UL (ref 150–450)
PMV BLD AUTO: 10.2 FL (ref 9.2–12.9)
POTASSIUM SERPL-SCNC: 4 MMOL/L (ref 3.5–5.1)
PROT SERPL-MCNC: 7.9 G/DL (ref 6–8.4)
RBC # BLD AUTO: 3.32 M/UL (ref 4–5.4)
SODIUM SERPL-SCNC: 142 MMOL/L (ref 136–145)
WBC # BLD AUTO: 4.01 K/UL (ref 3.9–12.7)

## 2022-09-12 PROCEDURE — 80053 COMPREHEN METABOLIC PANEL: CPT | Performed by: HOSPITALIST

## 2022-09-12 PROCEDURE — 85025 COMPLETE CBC W/AUTO DIFF WBC: CPT | Performed by: HOSPITALIST

## 2022-09-12 PROCEDURE — 86140 C-REACTIVE PROTEIN: CPT | Performed by: HOSPITALIST

## 2022-09-13 DIAGNOSIS — R19.7 DIARRHEA, UNSPECIFIED TYPE: Primary | ICD-10-CM

## 2022-09-13 RX ORDER — ACETAMINOPHEN AND CODEINE PHOSPHATE 300; 30 MG/1; MG/1
1 TABLET ORAL EVERY 4 HOURS PRN
Qty: 30 TABLET | Refills: 0 | Status: SHIPPED | OUTPATIENT
Start: 2022-09-13 | End: 2022-09-13

## 2022-09-13 RX ORDER — DIPHENOXYLATE HYDROCHLORIDE AND ATROPINE SULFATE 2.5; .025 MG/1; MG/1
1 TABLET ORAL 2 TIMES DAILY PRN
Qty: 20 TABLET | Refills: 0 | Status: SHIPPED | OUTPATIENT
Start: 2022-09-13 | End: 2023-06-07

## 2022-09-16 ENCOUNTER — LAB VISIT (OUTPATIENT)
Dept: LAB | Facility: HOSPITAL | Age: 72
End: 2022-09-16
Attending: INTERNAL MEDICINE
Payer: MEDICARE

## 2022-09-16 DIAGNOSIS — M86.072 ACUTE HEMATOGENOUS OSTEOMYELITIS OF LEFT ANKLE: Primary | ICD-10-CM

## 2022-09-16 LAB
CK MB SERPL-MCNC: <1 NG/ML (ref 0.1–6.5)
CK MB SERPL-RTO: NORMAL % (ref 0–5)
CK SERPL-CCNC: 21 U/L (ref 20–180)
CK SERPL-CCNC: 21 U/L (ref 20–180)

## 2022-09-16 PROCEDURE — 82553 CREATINE MB FRACTION: CPT | Performed by: INTERNAL MEDICINE

## 2022-09-19 ENCOUNTER — TELEPHONE (OUTPATIENT)
Dept: OBSTETRICS AND GYNECOLOGY | Facility: CLINIC | Age: 72
End: 2022-09-19
Payer: MEDICARE

## 2022-09-19 ENCOUNTER — LAB VISIT (OUTPATIENT)
Dept: LAB | Facility: HOSPITAL | Age: 72
End: 2022-09-19
Attending: INTERNAL MEDICINE
Payer: MEDICARE

## 2022-09-19 DIAGNOSIS — Z00.00 ROUTINE GENERAL MEDICAL EXAMINATION AT A HEALTH CARE FACILITY: Primary | ICD-10-CM

## 2022-09-19 LAB
ALBUMIN SERPL BCP-MCNC: 3.7 G/DL (ref 3.5–5.2)
ALP SERPL-CCNC: 60 U/L (ref 55–135)
ALT SERPL W/O P-5'-P-CCNC: 8 U/L (ref 10–44)
ANION GAP SERPL CALC-SCNC: 12 MMOL/L (ref 8–16)
AST SERPL-CCNC: 13 U/L (ref 10–40)
BASOPHILS # BLD AUTO: 0.02 K/UL (ref 0–0.2)
BASOPHILS NFR BLD: 0.4 % (ref 0–1.9)
BILIRUB SERPL-MCNC: 0.3 MG/DL (ref 0.1–1)
BUN SERPL-MCNC: 16 MG/DL (ref 8–23)
CALCIUM SERPL-MCNC: 10.7 MG/DL (ref 8.7–10.5)
CHLORIDE SERPL-SCNC: 103 MMOL/L (ref 95–110)
CK MB SERPL-MCNC: <1 NG/ML (ref 0.1–6.5)
CK MB SERPL-RTO: NORMAL % (ref 0–5)
CK SERPL-CCNC: 24 U/L (ref 20–180)
CK SERPL-CCNC: 24 U/L (ref 20–180)
CO2 SERPL-SCNC: 25 MMOL/L (ref 23–29)
CREAT SERPL-MCNC: 0.7 MG/DL (ref 0.5–1.4)
CRP SERPL-MCNC: 16.3 MG/L (ref 0–8.2)
DIFFERENTIAL METHOD: ABNORMAL
EOSINOPHIL # BLD AUTO: 0.2 K/UL (ref 0–0.5)
EOSINOPHIL NFR BLD: 5.1 % (ref 0–8)
ERYTHROCYTE [DISTWIDTH] IN BLOOD BY AUTOMATED COUNT: 14.7 % (ref 11.5–14.5)
EST. GFR  (NO RACE VARIABLE): >60 ML/MIN/1.73 M^2
GLUCOSE SERPL-MCNC: 109 MG/DL (ref 70–110)
HCT VFR BLD AUTO: 32.5 % (ref 37–48.5)
HGB BLD-MCNC: 10 G/DL (ref 12–16)
IMM GRANULOCYTES # BLD AUTO: 0.01 K/UL (ref 0–0.04)
IMM GRANULOCYTES NFR BLD AUTO: 0.2 % (ref 0–0.5)
LYMPHOCYTES # BLD AUTO: 1.3 K/UL (ref 1–4.8)
LYMPHOCYTES NFR BLD: 28.3 % (ref 18–48)
MCH RBC QN AUTO: 29.1 PG (ref 27–31)
MCHC RBC AUTO-ENTMCNC: 30.8 G/DL (ref 32–36)
MCV RBC AUTO: 95 FL (ref 82–98)
MONOCYTES # BLD AUTO: 0.4 K/UL (ref 0.3–1)
MONOCYTES NFR BLD: 8.9 % (ref 4–15)
NEUTROPHILS # BLD AUTO: 2.6 K/UL (ref 1.8–7.7)
NEUTROPHILS NFR BLD: 57.1 % (ref 38–73)
NRBC BLD-RTO: 0 /100 WBC
PLATELET # BLD AUTO: 205 K/UL (ref 150–450)
PMV BLD AUTO: 11.1 FL (ref 9.2–12.9)
POTASSIUM SERPL-SCNC: 4.5 MMOL/L (ref 3.5–5.1)
PROT SERPL-MCNC: 7.6 G/DL (ref 6–8.4)
RBC # BLD AUTO: 3.44 M/UL (ref 4–5.4)
SODIUM SERPL-SCNC: 140 MMOL/L (ref 136–145)
WBC # BLD AUTO: 4.48 K/UL (ref 3.9–12.7)

## 2022-09-19 PROCEDURE — 80053 COMPREHEN METABOLIC PANEL: CPT | Performed by: INTERNAL MEDICINE

## 2022-09-19 PROCEDURE — 86140 C-REACTIVE PROTEIN: CPT | Performed by: INTERNAL MEDICINE

## 2022-09-19 PROCEDURE — 82553 CREATINE MB FRACTION: CPT | Performed by: INTERNAL MEDICINE

## 2022-09-19 PROCEDURE — 85025 COMPLETE CBC W/AUTO DIFF WBC: CPT | Performed by: INTERNAL MEDICINE

## 2022-09-28 NOTE — TELEPHONE ENCOUNTER
Faxed new order for walker with wheels to [facility] [place]#[phone]. Called pt to update.   
DYSPNEA ON EXERTION/SHORTNESS OF BREATH

## 2022-10-12 ENCOUNTER — TELEPHONE (OUTPATIENT)
Dept: PRIMARY CARE CLINIC | Facility: CLINIC | Age: 72
End: 2022-10-12
Payer: MEDICARE

## 2022-10-12 NOTE — TELEPHONE ENCOUNTER
----- Message from Samir Sahni MD sent at 10/12/2022  1:05 PM CDT -----  Regarding: FW: refill  Contact: patient [phone]  It is the patient's responsibility and not my responsibility to assure that they do not run out of medications     Secondly, I do not even see this medication on her list but I reviewed the hospital discharge summary.  See below     I know that transportation is not easy for this person because of debility, etc but I made it clear to the patient and the patient's daughter at our sole visit which was only virtual that, regardless of debility etc, she must be seen in person before any further refills whatsoever.  I am sending in 10 days only.  She needs to  establish with a provider of her choice in person.      I realize that this is not brought up in the subject but if the subject of Tylenol No. 3 or lorazepam is brought up then I do not prescribe these regularly with or without an office visit             ----- Message -----  From: Salome Pulido  Sent: 10/12/2022  11:09 AM CDT  To: [facility] Staff  Subject: refill                                           Patient is out of medication    Requesting an RX refill or new RX.  Is this a refill or new RX: refill  RX name and strength (apixaban (ELIQUIS) 5 mg Tab :    Is this a 30 day or 90 day RX: 90  Pharmacy name and phone # (  [facility]/pharmacy #[id number] - [place], [place] - [address]  [address] [place] [address]  Phone: [phone] Fax: [phone]    The doctors have asked that we provide their patients with the following 2 reminders -- prescription refills can take up to 72 hours, and a friendly reminder that in the future you can use your [facility] account to request refills: yes

## 2022-10-13 DIAGNOSIS — K20.90 ESOPHAGITIS: Primary | ICD-10-CM

## 2022-10-27 ENCOUNTER — LAB VISIT (OUTPATIENT)
Dept: LAB | Facility: HOSPITAL | Age: 72
End: 2022-10-27
Attending: OBSTETRICS & GYNECOLOGY
Payer: MEDICARE

## 2022-10-27 DIAGNOSIS — R35.0 URINARY FREQUENCY: ICD-10-CM

## 2022-10-27 DIAGNOSIS — R35.0 URINARY FREQUENCY: Primary | ICD-10-CM

## 2022-10-27 PROCEDURE — 87186 SC STD MICRODIL/AGAR DIL: CPT | Mod: 59 | Performed by: OBSTETRICS & GYNECOLOGY

## 2022-10-27 PROCEDURE — 87077 CULTURE AEROBIC IDENTIFY: CPT | Performed by: OBSTETRICS & GYNECOLOGY

## 2022-10-27 PROCEDURE — 87184 SC STD DISK METHOD PER PLATE: CPT | Performed by: OBSTETRICS & GYNECOLOGY

## 2022-10-27 PROCEDURE — 87088 URINE BACTERIA CULTURE: CPT | Performed by: OBSTETRICS & GYNECOLOGY

## 2022-10-27 PROCEDURE — 87086 URINE CULTURE/COLONY COUNT: CPT | Performed by: OBSTETRICS & GYNECOLOGY

## 2022-10-30 ENCOUNTER — PATIENT MESSAGE (OUTPATIENT)
Dept: OBSTETRICS AND GYNECOLOGY | Facility: CLINIC | Age: 72
End: 2022-10-30
Payer: MEDICARE

## 2022-10-30 DIAGNOSIS — N30.00 ACUTE CYSTITIS WITHOUT HEMATURIA: Primary | ICD-10-CM

## 2022-10-30 RX ORDER — SULFAMETHOXAZOLE AND TRIMETHOPRIM 400; 80 MG/1; MG/1
1 TABLET ORAL 2 TIMES DAILY
Qty: 20 TABLET | Refills: 0 | Status: SHIPPED | OUTPATIENT
Start: 2022-10-30 | End: 2022-10-30

## 2022-10-30 RX ORDER — CIPROFLOXACIN 500 MG/1
500 TABLET ORAL 2 TIMES DAILY
Qty: 20 TABLET | Refills: 0 | Status: SHIPPED | OUTPATIENT
Start: 2022-10-30 | End: 2022-11-09

## 2022-10-31 LAB — BACTERIA UR CULT: ABNORMAL

## 2022-11-18 RX ORDER — CLOTRIMAZOLE AND BETAMETHASONE DIPROPIONATE 10; .64 MG/G; MG/G
CREAM TOPICAL
Qty: 30 G | Refills: 1 | Status: SHIPPED | OUTPATIENT
Start: 2022-11-18 | End: 2023-06-07

## 2022-12-01 ENCOUNTER — PATIENT MESSAGE (OUTPATIENT)
Dept: PSYCHIATRY | Facility: CLINIC | Age: 72
End: 2022-12-01
Payer: MEDICARE

## 2023-01-23 ENCOUNTER — TELEPHONE (OUTPATIENT)
Dept: NEUROLOGY | Facility: CLINIC | Age: 73
End: 2023-01-23
Payer: MEDICARE

## 2023-01-23 NOTE — TELEPHONE ENCOUNTER
Spoke to pt to confirm appt with [facility] on 1/26. Pt could not confirm since she said she has her daughter, Amanda, drive her to all appts. Pt asked for our callback number so Amanda could call us back to confirm.

## 2023-01-24 ENCOUNTER — TELEPHONE (OUTPATIENT)
Dept: NEUROLOGY | Facility: CLINIC | Age: 73
End: 2023-01-24
Payer: MEDICARE

## 2023-01-25 ENCOUNTER — PATIENT MESSAGE (OUTPATIENT)
Dept: NEUROLOGY | Facility: CLINIC | Age: 73
End: 2023-01-25
Payer: MEDICARE

## 2023-01-25 NOTE — TELEPHONE ENCOUNTER
----- Message from Tamika Vyas sent at 1/24/2023 10:21 AM CST -----  Contact: [phone]  the patient is calling to get scheduled for a appt.  Pt access tried but no appts are available.  the patient can be reached at.   [phone]

## 2023-01-25 NOTE — TELEPHONE ENCOUNTER
----- Message from Rachel Soto sent at 1/24/2023 11:28 AM CST -----  Lupis Murcia calling regarding wanting to speak to Venus to reschedule the appt because Tee will need to put in for the date ahead of time to be off to bring her, call back to daughter Amanda [phone]- leave message if no answer

## 2023-01-26 NOTE — TELEPHONE ENCOUNTER
Spoke to pt's daughter to let her know that I rescheduled pt's appt to [facility]'s next available in person appt on 3/30 at 9:30 AM. Pt's daughter said she saw that is was changed and was wondering if there was anything sooner. She explained that her mom was hospitalized last year and has not had an in person appt in a year. I offered a VV appt and told her that would be the soonest. Pt said her mom does not know how to work VV. She asked if I could ask [facility] if there was anyway she could be fit in sooner. I let her know I will send her a message but there are no guarantees since AP does a completely full schedule. Pt's daughter verbalized understanding.

## 2023-01-27 NOTE — TELEPHONE ENCOUNTER
Spoke to pt's daughter to let her know that we can move pt's appt up to 3/13 at 3:30 PM. Pt's daughter agreed to appt. I let her know that we will still add pt to the waitlist and AP will be on the lookout if anything earlier comes up. Pt's daughter verbalized understanding. Will get Evita to schedule.

## 2023-02-20 ENCOUNTER — PATIENT MESSAGE (OUTPATIENT)
Dept: PSYCHIATRY | Facility: CLINIC | Age: 73
End: 2023-02-20
Payer: MEDICARE

## 2023-05-15 ENCOUNTER — OFFICE VISIT (OUTPATIENT)
Dept: PODIATRY | Facility: CLINIC | Age: 73
End: 2023-05-15
Payer: MEDICARE

## 2023-05-15 DIAGNOSIS — L97.422 HEEL ULCER, LEFT, WITH FAT LAYER EXPOSED: ICD-10-CM

## 2023-05-15 DIAGNOSIS — L97.312 ANKLE ULCER, RIGHT, WITH FAT LAYER EXPOSED: ICD-10-CM

## 2023-05-15 DIAGNOSIS — G35 MULTIPLE SCLEROSIS: ICD-10-CM

## 2023-05-15 DIAGNOSIS — G60.9 IDIOPATHIC PERIPHERAL NEUROPATHY: ICD-10-CM

## 2023-05-15 DIAGNOSIS — I89.0 LYMPHEDEMA: Primary | ICD-10-CM

## 2023-05-15 PROCEDURE — 99203 PR OFFICE/OUTPT VISIT, NEW, LEVL III, 30-44 MIN: ICD-10-PCS | Mod: S$PBB,,, | Performed by: PODIATRIST

## 2023-05-15 PROCEDURE — 99213 OFFICE O/P EST LOW 20 MIN: CPT | Mod: PBBFAC | Performed by: PODIATRIST

## 2023-05-15 PROCEDURE — 99999 PR PBB SHADOW E&M-EST. PATIENT-LVL III: CPT | Mod: PBBFAC,,, | Performed by: PODIATRIST

## 2023-05-15 PROCEDURE — 99203 OFFICE O/P NEW LOW 30 MIN: CPT | Mod: S$PBB,,, | Performed by: PODIATRIST

## 2023-05-15 PROCEDURE — 99999 PR PBB SHADOW E&M-EST. PATIENT-LVL III: ICD-10-PCS | Mod: PBBFAC,,, | Performed by: PODIATRIST

## 2023-05-15 RX ORDER — TRAMADOL HYDROCHLORIDE 50 MG/1
50 TABLET ORAL EVERY 8 HOURS PRN
Qty: 21 TABLET | Refills: 0 | Status: SHIPPED | OUTPATIENT
Start: 2023-05-15 | End: 2023-05-31 | Stop reason: SDUPTHER

## 2023-05-15 NOTE — PROGRESS NOTES
Subjective:     Patient ID: Lupis Murcia is a 72 y.o. female.    Chief Complaint: Foot Pain (Bilateral)    Lupis is a 72 y.o. female who presents to the clinic for evaluation and treatment of high risk feet. Lupis has a past medical history of Lymphedema, MS (multiple sclerosis), Other pulmonary embolism without acute cor pulmonale, and Vitamin B12 deficiency. The patient's chief complaint is foot ulcer, L heel. This patient has documented high risk feet requiring routine maintenance secondary to peripheral vascular disease and lymphedema as well as MS. Has severe pain in the L heel with wound reopening over the past few days/weeks. Has hx of OM in the heel with previous 6 weeks of IV ABx that ended 12/22 and wound eventually healed. Now that it reopened, she is concerned for another infection and was recommended by [facility] PCP to have it assessed by [facility] or wound clinic.     PCP: Bobby Tran MD    Date Last Seen by PCP: Patient does not have a PCP or has not yet seen their PCP    UNNA boot b/l leg (ankle to knee), slip on shoes b/l.     History of Trauma: negative  Sign of Infection: none    Hemoglobin A1C   Date Value Ref Range Status   05/03/2022 6.2 (H) 4.0 - 5.6 % Final     Comment:     ADA Screening Guidelines:  5.7-6.4%  Consistent with prediabetes  >or=6.5%  Consistent with diabetes    High levels of fetal hemoglobin interfere with the HbA1C  assay. Heterozygous hemoglobin variants (HbS, HgC, etc)do  not significantly interfere with this assay.   However, presence of multiple variants may affect accuracy.     07/14/2018 5.7 (H) 4.0 - 5.6 % Final     Comment:     ADA Screening Guidelines:  5.7-6.4%  Consistent with prediabetes  >or=6.5%  Consistent with diabetes  High levels of fetal hemoglobin interfere with the HbA1C  assay. Heterozygous hemoglobin variants (HbS, HgC, etc)do  not significantly interfere with this assay.   However, presence of multiple variants may affect accuracy.          Review of Systems   Constitutional: Negative for chills and fever.   Cardiovascular:  Positive for leg swelling. Negative for chest pain and claudication.   Respiratory:  Negative for cough and shortness of breath.    Skin:  Positive for dry skin, nail changes, poor wound healing and suspicious lesions.   Musculoskeletal:         MS, lower extremity weakness, wheelchair dependent    Gastrointestinal:  Negative for nausea and vomiting.   Neurological:  Positive for paresthesias and sensory change. Negative for numbness.   Psychiatric/Behavioral:  Negative for altered mental status.       Objective:     Physical Exam  Vitals reviewed.   Constitutional:       Appearance: She is well-developed.   HENT:      Head: Normocephalic.   Cardiovascular:      Pulses:           Dorsalis pedis pulses are 1+ on the right side and 1+ on the left side.        Posterior tibial pulses are 1+ on the right side and 1+ on the left side.      Comments: CRT < 3 sec to tips of toes.    Pulmonary:      Effort: No respiratory distress.   Musculoskeletal:      Right lower leg: 3+ Edema present.      Left lower leg: 3+ Edema present.      Comments: Severe pain with palpation of plantar L heel at location of ulcer, see skin section. Otherwise general LE weakness and wheelchair bound.     Skin:     General: Skin is warm and dry.      Findings: No erythema.      Comments: Nails are dystrophic to R 1-5 and L 1-5. Interdigital spaces clean, dry and intact b/l. No erythema noted to b/l foot. Skin texture atrophic, dry. Pedal hair diminished.     Full thickness ulceration to plantar L heel   Measurement 0.7 x 0.6 x 0.1cm   Base: granular down to suqb layer  Odor: none  Drainage: serosanguinous  Erythema: none  Purulence: none  Undermining/tunneling: none    Neurological:      Mental Status: She is alert and oriented to person, place, and time.      Sensory: Sensory deficit present.      Comments: Light touch, proprioception, and sharp/dull  sensation are all intact bilaterally. Protective threshold with the [place]ien[place] monofilament is intact bilaterally. Subjective paresthesias with no clearly identifiable source or trigger.      Psychiatric:         Behavior: Behavior normal.         Thought Content: Thought content normal.         Judgment: Judgment normal.         Assessment:      Encounter Diagnoses   Name Primary?    Lymphedema Yes    Multiple sclerosis     Idiopathic peripheral neuropathy     Heel ulcer, left, with fat layer exposed     Ankle ulcer, right, with fat layer exposed      Plan:     Lupis was seen today for foot pain.    Diagnoses and all orders for this visit:    Lymphedema  -     SUBSEQUENT HOME HEALTH ORDERS  -     Ambulatory referral/consult to Wound Clinic; Future    Multiple sclerosis    Idiopathic peripheral neuropathy    Heel ulcer, left, with fat layer exposed  -     X-Ray Calcaneus 2 View Left; Future  -     MRI Foot (Hindfoot) Left Without Contrast; Future  -     SUBSEQUENT HOME HEALTH ORDERS  -     Ambulatory referral/consult to Wound Clinic; Future    Ankle ulcer, right, with fat layer exposed  -     SUBSEQUENT HOME HEALTH ORDERS  -     Ambulatory referral/consult to Wound Clinic; Future    Other orders  -     traMADoL (ULTRAM) 50 mg tablet; Take 1 tablet (50 mg total) by mouth every 8 (eight) hours as needed for Pain.      I counseled the patient on her conditions, their implications and medical management.     Wound stable with no SOI. No excisional debridement needed at this time.     L heel and R ankle ulcers. Mepilex Ag foam applied directly to wound bed after cleansing with alcohol. Applied kerlix and secured with tape. Unna boot b/l remained intact for today's visit. Dressing and foot/wound is to stay clean, dry and dressing to stay intact until follow up. Advised not to get dressing wet, remove dressing until follow up or walk directly on football dressing.     Wheelchair dependent.     Referral to Wound  Clinic.     No current SOI but due to hx of L heel OM, recommend follow up imaging.     Xray ordered left heel to assess for any obvious bony deformity, injury.     MRI ordered of left hindfoot to assess for underlying OM    Rx tramadol 50mg q8h for pain.  Advised to take as directed only when pain is severe.     Home health orders updated, see orders.     RTC 2 weeks if not able to get to [facility], sooner PRN

## 2023-05-16 ENCOUNTER — TELEPHONE (OUTPATIENT)
Dept: WOUND CARE | Facility: HOSPITAL | Age: 73
End: 2023-05-16
Payer: MEDICARE

## 2023-05-16 NOTE — TELEPHONE ENCOUNTER
Left voicemail for patient to return call for assistance with scheduling a wound care appointment.

## 2023-05-18 ENCOUNTER — PATIENT MESSAGE (OUTPATIENT)
Dept: WOUND CARE | Facility: HOSPITAL | Age: 73
End: 2023-05-18
Payer: MEDICARE

## 2023-05-31 RX ORDER — TRAMADOL HYDROCHLORIDE 50 MG/1
50 TABLET ORAL EVERY 8 HOURS PRN
Qty: 21 TABLET | Refills: 0 | Status: ON HOLD | OUTPATIENT
Start: 2023-05-31 | End: 2023-06-10

## 2023-06-04 ENCOUNTER — PATIENT MESSAGE (OUTPATIENT)
Dept: PODIATRY | Facility: CLINIC | Age: 73
End: 2023-06-04
Payer: MEDICARE

## 2023-06-06 ENCOUNTER — PATIENT MESSAGE (OUTPATIENT)
Dept: NEUROLOGY | Facility: CLINIC | Age: 73
End: 2023-06-06
Payer: MEDICARE

## 2023-06-07 ENCOUNTER — PATIENT MESSAGE (OUTPATIENT)
Dept: PODIATRY | Facility: CLINIC | Age: 73
End: 2023-06-07
Payer: MEDICARE

## 2023-06-07 ENCOUNTER — HOSPITAL ENCOUNTER (OUTPATIENT)
Facility: HOSPITAL | Age: 73
Discharge: HOME | End: 2023-06-10
Attending: EMERGENCY MEDICINE | Admitting: STUDENT IN AN ORGANIZED HEALTH CARE EDUCATION/TRAINING PROGRAM
Payer: MEDICARE

## 2023-06-07 DIAGNOSIS — L03.119 CELLULITIS OF LOWER EXTREMITY, UNSPECIFIED LATERALITY: Primary | ICD-10-CM

## 2023-06-07 DIAGNOSIS — R07.9 CHEST PAIN: ICD-10-CM

## 2023-06-07 DIAGNOSIS — S91.302A NON HEALING LEFT HEEL WOUND: ICD-10-CM

## 2023-06-07 DIAGNOSIS — Z74.09 IMPAIRED FUNCTIONAL MOBILITY, BALANCE, GAIT, AND ENDURANCE: ICD-10-CM

## 2023-06-07 DIAGNOSIS — G35 MS (MULTIPLE SCLEROSIS): ICD-10-CM

## 2023-06-07 DIAGNOSIS — B99.9 INFECTION: ICD-10-CM

## 2023-06-07 PROBLEM — Z86.711 HISTORY OF PULMONARY EMBOLUS (PE): Status: ACTIVE | Noted: 2023-06-07

## 2023-06-07 PROBLEM — I10 HTN (HYPERTENSION): Status: ACTIVE | Noted: 2023-06-07

## 2023-06-07 PROBLEM — I35.0 AORTIC STENOSIS: Status: ACTIVE | Noted: 2023-06-07

## 2023-06-07 LAB
ALBUMIN SERPL BCP-MCNC: 3.5 G/DL (ref 3.5–5.2)
ALP SERPL-CCNC: 67 U/L (ref 55–135)
ALT SERPL W/O P-5'-P-CCNC: 5 U/L (ref 10–44)
ANION GAP SERPL CALC-SCNC: 10 MMOL/L (ref 8–16)
AST SERPL-CCNC: 8 U/L (ref 10–40)
BILIRUB SERPL-MCNC: 0.3 MG/DL (ref 0.1–1)
BUN SERPL-MCNC: 13 MG/DL (ref 8–23)
CALCIUM SERPL-MCNC: 9.6 MG/DL (ref 8.7–10.5)
CHLORIDE SERPL-SCNC: 105 MMOL/L (ref 95–110)
CO2 SERPL-SCNC: 29 MMOL/L (ref 23–29)
CREAT SERPL-MCNC: 0.7 MG/DL (ref 0.5–1.4)
CRP SERPL-MCNC: 19.4 MG/L (ref 0–8.2)
ERYTHROCYTE [DISTWIDTH] IN BLOOD BY AUTOMATED COUNT: 13.7 % (ref 11.5–14.5)
EST. GFR  (NO RACE VARIABLE): >60 ML/MIN/1.73 M^2
GLUCOSE SERPL-MCNC: 110 MG/DL (ref 70–110)
HCT VFR BLD AUTO: 32.7 % (ref 37–48.5)
HGB BLD-MCNC: 9.9 G/DL (ref 12–16)
MCH RBC QN AUTO: 27.7 PG (ref 27–31)
MCHC RBC AUTO-ENTMCNC: 30.3 G/DL (ref 32–36)
MCV RBC AUTO: 91 FL (ref 82–98)
PLATELET # BLD AUTO: 245 K/UL (ref 150–450)
PMV BLD AUTO: 10.2 FL (ref 9.2–12.9)
POTASSIUM SERPL-SCNC: 3.8 MMOL/L (ref 3.5–5.1)
PROT SERPL-MCNC: 6.9 G/DL (ref 6–8.4)
RBC # BLD AUTO: 3.58 M/UL (ref 4–5.4)
SODIUM SERPL-SCNC: 144 MMOL/L (ref 136–145)
WBC # BLD AUTO: 5.6 K/UL (ref 3.9–12.7)

## 2023-06-07 PROCEDURE — 96367 TX/PROPH/DG ADDL SEQ IV INF: CPT

## 2023-06-07 PROCEDURE — 25000003 PHARM REV CODE 250: Performed by: STUDENT IN AN ORGANIZED HEALTH CARE EDUCATION/TRAINING PROGRAM

## 2023-06-07 PROCEDURE — 96365 THER/PROPH/DIAG IV INF INIT: CPT

## 2023-06-07 PROCEDURE — 80053 COMPREHEN METABOLIC PANEL: CPT | Performed by: EMERGENCY MEDICINE

## 2023-06-07 PROCEDURE — 99222 PR INITIAL HOSPITAL CARE,LEVL II: ICD-10-PCS | Mod: AI,,, | Performed by: STUDENT IN AN ORGANIZED HEALTH CARE EDUCATION/TRAINING PROGRAM

## 2023-06-07 PROCEDURE — 99285 PR EMERGENCY DEPT VISIT,LEVEL V: ICD-10-PCS | Mod: ,,, | Performed by: EMERGENCY MEDICINE

## 2023-06-07 PROCEDURE — 99285 EMERGENCY DEPT VISIT HI MDM: CPT

## 2023-06-07 PROCEDURE — 86140 C-REACTIVE PROTEIN: CPT | Performed by: EMERGENCY MEDICINE

## 2023-06-07 PROCEDURE — 25000003 PHARM REV CODE 250: Performed by: EMERGENCY MEDICINE

## 2023-06-07 PROCEDURE — 87040 BLOOD CULTURE FOR BACTERIA: CPT | Performed by: STUDENT IN AN ORGANIZED HEALTH CARE EDUCATION/TRAINING PROGRAM

## 2023-06-07 PROCEDURE — 99285 EMERGENCY DEPT VISIT HI MDM: CPT | Mod: ,,, | Performed by: EMERGENCY MEDICINE

## 2023-06-07 PROCEDURE — 85027 COMPLETE CBC AUTOMATED: CPT | Performed by: EMERGENCY MEDICINE

## 2023-06-07 PROCEDURE — 99222 1ST HOSP IP/OBS MODERATE 55: CPT | Mod: AI,,, | Performed by: STUDENT IN AN ORGANIZED HEALTH CARE EDUCATION/TRAINING PROGRAM

## 2023-06-07 PROCEDURE — G0378 HOSPITAL OBSERVATION PER HR: HCPCS

## 2023-06-07 PROCEDURE — 63600175 PHARM REV CODE 636 W HCPCS: Performed by: EMERGENCY MEDICINE

## 2023-06-07 RX ORDER — TRAMADOL HYDROCHLORIDE 50 MG/1
50 TABLET ORAL EVERY 8 HOURS PRN
Status: DISCONTINUED | OUTPATIENT
Start: 2023-06-07 | End: 2023-06-10 | Stop reason: HOSPADM

## 2023-06-07 RX ORDER — ACETAMINOPHEN 500 MG
5000 TABLET ORAL DAILY
Status: DISCONTINUED | OUTPATIENT
Start: 2023-06-08 | End: 2023-06-10 | Stop reason: HOSPADM

## 2023-06-07 RX ORDER — TALC
6 POWDER (GRAM) TOPICAL NIGHTLY PRN
Status: DISCONTINUED | OUTPATIENT
Start: 2023-06-07 | End: 2023-06-10 | Stop reason: HOSPADM

## 2023-06-07 RX ORDER — ACETAMINOPHEN AND CODEINE PHOSPHATE 300; 30 MG/1; MG/1
1 TABLET ORAL EVERY 6 HOURS PRN
Status: DISCONTINUED | OUTPATIENT
Start: 2023-06-07 | End: 2023-06-10 | Stop reason: HOSPADM

## 2023-06-07 RX ORDER — INSULIN ASPART 100 [IU]/ML
0-5 INJECTION, SOLUTION INTRAVENOUS; SUBCUTANEOUS
Status: DISCONTINUED | OUTPATIENT
Start: 2023-06-07 | End: 2023-06-10 | Stop reason: HOSPADM

## 2023-06-07 RX ORDER — PROCHLORPERAZINE EDISYLATE 5 MG/ML
5 INJECTION INTRAMUSCULAR; INTRAVENOUS EVERY 6 HOURS PRN
Status: DISCONTINUED | OUTPATIENT
Start: 2023-06-07 | End: 2023-06-10 | Stop reason: HOSPADM

## 2023-06-07 RX ORDER — POLYETHYLENE GLYCOL 3350 17 G/17G
17 POWDER, FOR SOLUTION ORAL DAILY PRN
Status: DISCONTINUED | OUTPATIENT
Start: 2023-06-07 | End: 2023-06-10 | Stop reason: HOSPADM

## 2023-06-07 RX ORDER — BISACODYL 10 MG
10 SUPPOSITORY, RECTAL RECTAL DAILY PRN
Status: DISCONTINUED | OUTPATIENT
Start: 2023-06-07 | End: 2023-06-10 | Stop reason: HOSPADM

## 2023-06-07 RX ORDER — GLUCAGON 1 MG
1 KIT INJECTION
Status: DISCONTINUED | OUTPATIENT
Start: 2023-06-07 | End: 2023-06-10 | Stop reason: HOSPADM

## 2023-06-07 RX ORDER — B-COMPLEX WITH VITAMIN C
1 TABLET ORAL DAILY
Status: DISCONTINUED | OUTPATIENT
Start: 2023-06-08 | End: 2023-06-10 | Stop reason: HOSPADM

## 2023-06-07 RX ORDER — SIMETHICONE 80 MG
1 TABLET,CHEWABLE ORAL 4 TIMES DAILY PRN
Status: DISCONTINUED | OUTPATIENT
Start: 2023-06-07 | End: 2023-06-10 | Stop reason: HOSPADM

## 2023-06-07 RX ORDER — ONDANSETRON 8 MG/1
8 TABLET, ORALLY DISINTEGRATING ORAL EVERY 8 HOURS PRN
Status: DISCONTINUED | OUTPATIENT
Start: 2023-06-07 | End: 2023-06-10 | Stop reason: HOSPADM

## 2023-06-07 RX ORDER — LOSARTAN POTASSIUM AND HYDROCHLOROTHIAZIDE 12.5; 5 MG/1; MG/1
1 TABLET ORAL DAILY
Status: DISCONTINUED | OUTPATIENT
Start: 2023-06-07 | End: 2023-06-10 | Stop reason: HOSPADM

## 2023-06-07 RX ORDER — MAG HYDROX/ALUMINUM HYD/SIMETH 200-200-20
30 SUSPENSION, ORAL (FINAL DOSE FORM) ORAL 4 TIMES DAILY PRN
Status: DISCONTINUED | OUTPATIENT
Start: 2023-06-07 | End: 2023-06-10 | Stop reason: HOSPADM

## 2023-06-07 RX ORDER — NALOXONE HCL 0.4 MG/ML
0.02 VIAL (ML) INJECTION
Status: DISCONTINUED | OUTPATIENT
Start: 2023-06-07 | End: 2023-06-10 | Stop reason: HOSPADM

## 2023-06-07 RX ORDER — LORAZEPAM 0.5 MG/1
0.5 TABLET ORAL EVERY 8 HOURS PRN
Status: DISCONTINUED | OUTPATIENT
Start: 2023-06-07 | End: 2023-06-10 | Stop reason: HOSPADM

## 2023-06-07 RX ORDER — SODIUM CHLORIDE 0.9 % (FLUSH) 0.9 %
5 SYRINGE (ML) INJECTION
Status: DISCONTINUED | OUTPATIENT
Start: 2023-06-07 | End: 2023-06-10 | Stop reason: HOSPADM

## 2023-06-07 RX ADMIN — CEFTRIAXONE 2 G: 2 INJECTION, POWDER, FOR SOLUTION INTRAMUSCULAR; INTRAVENOUS at 05:06

## 2023-06-07 RX ADMIN — VANCOMYCIN HYDROCHLORIDE 1250 MG: 1.25 INJECTION, POWDER, LYOPHILIZED, FOR SOLUTION INTRAVENOUS at 06:06

## 2023-06-07 RX ADMIN — LORAZEPAM 0.5 MG: 0.5 TABLET ORAL at 09:06

## 2023-06-07 NOTE — ED PROVIDER NOTES
Emergency Department Encounter  Provider Note    Lupis Murcia  [id number]  2023    Evaluation:    History:     Chief Complaint   Patient presents with    Wound Check     Wound care came to look at L heal wound reports bleeding EMS states wrapped wound bleeding controlled       History of Present Illness:  Lupis Murcia is a 72 y.o. female who has a past medical history of Lymphedema, MS (multiple sclerosis), Other pulmonary embolism without acute cor pulmonale, and Vitamin B12 deficiency.    The patient presents to the ED due to L heel wound.    Patient presents with daughter, who states she has history of non-healing wound to L heel.  She was previously seen by Podiatry and Wound Care last year, and the wound healed completely after 6 weeks of IV ABX.   However, over the last few months, the wound has returned, and she reports persistent pain to the area.  No associated fever.  Wound care evaluated her today and noticed bleeding from the wound, so she was sent to the ED for evaluation.    She is attempting to establish care with [facility] as she has been dissatisfied with her previous Podiatrist.     Past Medical History:   Diagnosis Date    Lymphedema     MS (multiple sclerosis)     Other pulmonary embolism without acute cor pulmonale     Vitamin B12 deficiency      Past Surgical History:   Procedure Laterality Date    BREAST CYST EXCISION Left     over 40yrs ago     SECTION      ESOPHAGOGASTRODUODENOSCOPY N/A 2022    Procedure: EGD (ESOPHAGOGASTRODUODENOSCOPY);  Surgeon: Radha Arango MD;  Location: [address];  Service: Endoscopy;  Laterality: N/A;     Family History   Problem Relation Age of Onset    Coronary artery disease Father     Lung cancer Father     Lung cancer Mother     Brain cancer Brother      Social History     Socioeconomic History    Marital status:    Tobacco Use    Smoking status: Never    Smokeless tobacco: Never   Substance and Sexual Activity     Alcohol use: No    Drug use: No     Review of patient's allergies indicates:   Allergen Reactions    Contrast media Shortness Of Breath and Rash    Pcn [penicillins] Shortness Of Breath and Rash    Celebrex [celecoxib] Other (See Comments)     Swallowing problems     Diazepam Hives    Motrin [ibuprofen] Rash       Review of Systems   Cardiovascular:  Positive for leg swelling.   Skin:  Positive for color change and wound.     Physical Exam:     Initial Vitals [06/07/23 1329]   BP Pulse Resp Temp SpO2   (!) 160/90 92 18 98.9 °F (37.2 °C) 97 %      MAP       --         Physical Exam    Nursing note and vitals reviewed.  Constitutional: She appears well-developed and well-nourished. She is not diaphoretic. No distress.   HENT:   Head: Normocephalic and atraumatic.   Mouth/Throat: Oropharynx is clear and moist.   Eyes: EOM are normal. Pupils are equal, round, and reactive to light.   Neck: No tracheal deviation present.   Cardiovascular:  Normal rate, regular rhythm, normal heart sounds and intact distal pulses.           Pulmonary/Chest: Breath sounds normal. No stridor. No respiratory distress. She has no wheezes.   Abdominal: Abdomen is soft. Bowel sounds are normal. She exhibits no distension and no mass. There is no abdominal tenderness.   Musculoskeletal:         General: No edema. Normal range of motion.     Neurological: She is alert and oriented to person, place, and time. She has normal strength. No cranial nerve deficit or sensory deficit.   Skin: Skin is warm and dry. Capillary refill takes less than 2 seconds. No pallor.   Psychiatric: She has a normal mood and affect. Her behavior is normal. Thought content normal.               ED Course:   Procedures    Medical Decision Making:    History Acquisition:   Additional historians utilized:  Daughter at bedside, see HPI    Prior medical records were reviewed:   Podiatry visit 5/15 for lymphedema, x-ray/MRI ordered, RX tramadol for pain.    The patient's list  of active medical problems, social history, medications, and allergies as documented has been reviewed.     Differential Diagnoses:   Based on available information and initial assessment, Differential Diagnosis includes, but is not limited to:  Cellulitis, abscess, necrotizing fasciitis, osteomyelitis, septic joint, MRSA, DVT, drug eruption, allergic/contact dermatitis, EM/SJS, viral exanthem, local trauma/contusion        EKG:       Labs:     Labs Reviewed   CBC WITHOUT DIFFERENTIAL - Abnormal; Notable for the following components:       Result Value    RBC 3.58 (*)     Hemoglobin 9.9 (*)     Hematocrit 32.7 (*)     MCHC 30.3 (*)     All other components within normal limits   COMPREHENSIVE METABOLIC PANEL - Abnormal; Notable for the following components:    AST 8 (*)     ALT 5 (*)     All other components within normal limits   C-REACTIVE PROTEIN - Abnormal; Notable for the following components:    CRP 19.4 (*)     All other components within normal limits     Independent review of the labs ordered include:   CBC unremarkable.  CMP unremarkable.  CRP mildly elevated.    Imaging:     Imaging Results              X-Ray Foot Complete Left (Final result)  Result time 06/07/23 15:25:55      Final result by Yonathan Tomas MD (06/07/23 15:25:55)                   Impression:      1. Allowing for exam limitations, no convincing acute displaced fracture or dislocation of the foot noting significant edema.      Electronically signed by: Yonathan Tomas MD  Date:    06/07/2023  Time:    15:25               Narrative:    EXAMINATION:  XR FOOT COMPLETE 3 VIEW LEFT    CLINICAL HISTORY:  .  Unspecified open wound, left foot, initial encounter    TECHNIQUE:  AP, lateral and oblique views of the left foot were performed.    COMPARISON:  None    FINDINGS:  Three views left foot.    There is osteopenia.  There are degenerative changes of the foot.  Allowing for motion blurring, no convincing acute displaced fracture or  dislocation of the foot.  No radiopaque foreign body.  There is significant edema about the lower extremity and foot, particularly along the dorsal aspect of the foot.                                    X-Rays:   Independently Interpreted Readings:   Other Readings:  X-ray foot independently interpreted: no fracture, dislocation, or severe bony erosion.  Agree with radiologist interpretation.       Additional Consideration:   Additional testing considered during clinical course: none    Social determinants of health considered during development of treatment plan include: poor access to care    Current co-morbidities considered which impacted clinical decision making: MS, history of osteomyelitis of L heel    Case discussed with additional provider: ED provider to assume care, f/u results, dispo accordingly. Suspect patient may benefit from admission for IV ABX, further imaging, and treatment of non-healing foot ulcer.     Medications   vancomycin 1,250 mg in dextrose 5 % (D5W) 250 mL IVPB (Vial-Mate) (has no administration in time range)   cefTRIAXone (ROCEPHIN) 2 g in dextrose 5 % in water (D5W) 5 % 100 mL IVPB (MB+) (has no administration in time range)                           Clinical Impression:       ICD-10-CM ICD-9-CM   1. Non healing left heel wound  S91.302A 892.1       Follow-up Information    None                Bernardo Palomo MD  06/07/23 2717

## 2023-06-07 NOTE — ED TRIAGE NOTES
Received via EMS with c/o wound to left heel. Hx of hospital admission for osteomyelitis last year left heel.  Is followed outpatient by podiatry.  Daughter noticed wound bleeding again today. Pt is bed-ridden/ has MS.  Denies pain at this time, does report discomfort to left heel. Diaper changed. Pericare performed. Gown placed on pt    Patient identifiers verified and correct for kathy christensen  LOC: The patient is awake, alert and oriented x 3. Appropriate affect; answers questions appropriately  APPEARANCE: Patient appears comfortable and in no acute distress, patient is clean and well groomed.  SKIN: The skin is warm and dry, color consistent with ethnicity, skin dry to BLE  MUSCULOSKELETAL: Patient moving all extremities spontaneously, swelling noted to BLE Limited ROM to BLE. Able to move BUE independently  RESPIRATORY: Airway is open and patent, respirations are spontaneous, patient has a normal effort and rate, no accessory muscle use noted, pt placed on continuous pulse ox with O2 sats noted at 97% on room air  CARDIAC: denies chest pain  GASTRO: soft and non-tender to palpation. Denies n/v/d/abd pain  : Pt denies any pain or frequency with urination.  NEURO: Pt opens eyes spontaneously, behavior appropriate to situation, follows commands, facial expression symmetrical, bilateral hand grasp equal and even, purposeful motor response noted,clear speech noted.

## 2023-06-07 NOTE — PROVIDER PROGRESS NOTES - EMERGENCY DEPT.
Encounter Date: 6/7/2023    ED Physician Progress Notes        Physician Note:   Patient was signed out to my care pending labs and imaging for evaluation of foot wound.  Patient has a history of chronic wound of the heel for which she has been on IV vancomycin in the past, most recently in December last year.    CRP elevated.  No leukocytosis.  X-ray unrevealing.  Started IV antibiotics for concern for infection.  Plan for observation.  Patient remained stable while in the emergency department.       Imaging Results              X-Ray Foot Complete Left (Final result)  Result time 06/07/23 15:25:55      Final result by Yonathan Tomas MD (06/07/23 15:25:55)                   Impression:      1. Allowing for exam limitations, no convincing acute displaced fracture or dislocation of the foot noting significant edema.      Electronically signed by: Yonathan Tomas MD  Date:    06/07/2023  Time:    15:25               Narrative:    EXAMINATION:  XR FOOT COMPLETE 3 VIEW LEFT    CLINICAL HISTORY:  .  Unspecified open wound, left foot, initial encounter    TECHNIQUE:  AP, lateral and oblique views of the left foot were performed.    COMPARISON:  None    FINDINGS:  Three views left foot.    There is osteopenia.  There are degenerative changes of the foot.  Allowing for motion blurring, no convincing acute displaced fracture or dislocation of the foot.  No radiopaque foreign body.  There is significant edema about the lower extremity and foot, particularly along the dorsal aspect of the foot.

## 2023-06-08 ENCOUNTER — TELEPHONE (OUTPATIENT)
Dept: PODIATRY | Facility: CLINIC | Age: 73
End: 2023-06-08
Payer: MEDICARE

## 2023-06-08 ENCOUNTER — PATIENT MESSAGE (OUTPATIENT)
Dept: NEUROLOGY | Facility: CLINIC | Age: 73
End: 2023-06-08

## 2023-06-08 LAB
ANION GAP SERPL CALC-SCNC: 10 MMOL/L (ref 8–16)
BASOPHILS # BLD AUTO: 0.01 K/UL (ref 0–0.2)
BASOPHILS NFR BLD: 0.2 % (ref 0–1.9)
BUN SERPL-MCNC: 9 MG/DL (ref 8–23)
CALCIUM SERPL-MCNC: 10 MG/DL (ref 8.7–10.5)
CHLORIDE SERPL-SCNC: 103 MMOL/L (ref 95–110)
CO2 SERPL-SCNC: 27 MMOL/L (ref 23–29)
CREAT SERPL-MCNC: 0.6 MG/DL (ref 0.5–1.4)
DIFFERENTIAL METHOD: ABNORMAL
EOSINOPHIL # BLD AUTO: 0.1 K/UL (ref 0–0.5)
EOSINOPHIL NFR BLD: 1.5 % (ref 0–8)
ERYTHROCYTE [DISTWIDTH] IN BLOOD BY AUTOMATED COUNT: 13.6 % (ref 11.5–14.5)
EST. GFR  (NO RACE VARIABLE): >60 ML/MIN/1.73 M^2
ESTIMATED AVG GLUCOSE: 120 MG/DL (ref 68–131)
GLUCOSE SERPL-MCNC: 121 MG/DL (ref 70–110)
HBA1C MFR BLD: 5.8 % (ref 4–5.6)
HCT VFR BLD AUTO: 30.8 % (ref 37–48.5)
HGB BLD-MCNC: 9.8 G/DL (ref 12–16)
IMM GRANULOCYTES # BLD AUTO: 0.02 K/UL (ref 0–0.04)
IMM GRANULOCYTES NFR BLD AUTO: 0.3 % (ref 0–0.5)
LYMPHOCYTES # BLD AUTO: 1.4 K/UL (ref 1–4.8)
LYMPHOCYTES NFR BLD: 21.8 % (ref 18–48)
MCH RBC QN AUTO: 27.8 PG (ref 27–31)
MCHC RBC AUTO-ENTMCNC: 31.8 G/DL (ref 32–36)
MCV RBC AUTO: 87 FL (ref 82–98)
MONOCYTES # BLD AUTO: 0.5 K/UL (ref 0.3–1)
MONOCYTES NFR BLD: 7.2 % (ref 4–15)
NEUTROPHILS # BLD AUTO: 4.5 K/UL (ref 1.8–7.7)
NEUTROPHILS NFR BLD: 69 % (ref 38–73)
NRBC BLD-RTO: 0 /100 WBC
PLATELET # BLD AUTO: 248 K/UL (ref 150–450)
PMV BLD AUTO: 9.9 FL (ref 9.2–12.9)
POCT GLUCOSE: 121 MG/DL (ref 70–110)
POTASSIUM SERPL-SCNC: 3.5 MMOL/L (ref 3.5–5.1)
RBC # BLD AUTO: 3.53 M/UL (ref 4–5.4)
SODIUM SERPL-SCNC: 140 MMOL/L (ref 136–145)
WBC # BLD AUTO: 6.5 K/UL (ref 3.9–12.7)

## 2023-06-08 PROCEDURE — 99232 SBSQ HOSP IP/OBS MODERATE 35: CPT | Mod: ,,, | Performed by: STUDENT IN AN ORGANIZED HEALTH CARE EDUCATION/TRAINING PROGRAM

## 2023-06-08 PROCEDURE — 36415 COLL VENOUS BLD VENIPUNCTURE: CPT | Performed by: STUDENT IN AN ORGANIZED HEALTH CARE EDUCATION/TRAINING PROGRAM

## 2023-06-08 PROCEDURE — 85025 COMPLETE CBC W/AUTO DIFF WBC: CPT | Performed by: STUDENT IN AN ORGANIZED HEALTH CARE EDUCATION/TRAINING PROGRAM

## 2023-06-08 PROCEDURE — 63600175 PHARM REV CODE 636 W HCPCS: Performed by: STUDENT IN AN ORGANIZED HEALTH CARE EDUCATION/TRAINING PROGRAM

## 2023-06-08 PROCEDURE — 80048 BASIC METABOLIC PNL TOTAL CA: CPT | Performed by: STUDENT IN AN ORGANIZED HEALTH CARE EDUCATION/TRAINING PROGRAM

## 2023-06-08 PROCEDURE — 83036 HEMOGLOBIN GLYCOSYLATED A1C: CPT | Performed by: STUDENT IN AN ORGANIZED HEALTH CARE EDUCATION/TRAINING PROGRAM

## 2023-06-08 PROCEDURE — 99214 PR OFFICE/OUTPT VISIT, EST, LEVL IV, 30-39 MIN: ICD-10-PCS | Mod: ,,, | Performed by: PODIATRIST

## 2023-06-08 PROCEDURE — 99232 PR SUBSEQUENT HOSPITAL CARE,LEVL II: ICD-10-PCS | Mod: ,,, | Performed by: STUDENT IN AN ORGANIZED HEALTH CARE EDUCATION/TRAINING PROGRAM

## 2023-06-08 PROCEDURE — 99214 OFFICE O/P EST MOD 30 MIN: CPT | Mod: ,,, | Performed by: PODIATRIST

## 2023-06-08 PROCEDURE — 25000003 PHARM REV CODE 250: Performed by: STUDENT IN AN ORGANIZED HEALTH CARE EDUCATION/TRAINING PROGRAM

## 2023-06-08 PROCEDURE — G0378 HOSPITAL OBSERVATION PER HR: HCPCS

## 2023-06-08 PROCEDURE — 96366 THER/PROPH/DIAG IV INF ADDON: CPT

## 2023-06-08 PROCEDURE — 94761 N-INVAS EAR/PLS OXIMETRY MLT: CPT

## 2023-06-08 PROCEDURE — 99214 OFFICE O/P EST MOD 30 MIN: CPT | Mod: ,,, | Performed by: REGISTERED NURSE

## 2023-06-08 PROCEDURE — 99214 PR OFFICE/OUTPT VISIT, EST, LEVL IV, 30-39 MIN: ICD-10-PCS | Mod: ,,, | Performed by: REGISTERED NURSE

## 2023-06-08 RX ADMIN — LORAZEPAM 0.5 MG: 0.5 TABLET ORAL at 02:06

## 2023-06-08 RX ADMIN — VANCOMYCIN HYDROCHLORIDE 1000 MG: 1 INJECTION, POWDER, LYOPHILIZED, FOR SOLUTION INTRAVENOUS at 06:06

## 2023-06-08 RX ADMIN — CHOLECALCIFEROL TAB 125 MCG (5000 UNIT) 5000 UNITS: 125 TAB at 10:06

## 2023-06-08 RX ADMIN — APIXABAN 5 MG: 5 TABLET, FILM COATED ORAL at 09:06

## 2023-06-08 RX ADMIN — ACETAMINOPHEN AND CODEINE PHOSPHATE 1 TABLET: 300; 30 TABLET ORAL at 06:06

## 2023-06-08 RX ADMIN — TRAMADOL HYDROCHLORIDE 50 MG: 50 TABLET, COATED ORAL at 10:06

## 2023-06-08 RX ADMIN — LORAZEPAM 0.5 MG: 0.5 TABLET ORAL at 04:06

## 2023-06-08 RX ADMIN — Medication 1 TABLET: at 10:06

## 2023-06-08 RX ADMIN — CEFTRIAXONE 1 G: 1 INJECTION, POWDER, FOR SOLUTION INTRAMUSCULAR; INTRAVENOUS at 05:06

## 2023-06-08 RX ADMIN — LORAZEPAM 0.5 MG: 0.5 TABLET ORAL at 10:06

## 2023-06-08 NOTE — ASSESSMENT & PLAN NOTE
- noted on imaging in 6/22, on eliquis  - hold AC for potential intervention with podiatry, will resume asap - resume eliquis per podiatry  - SCDs

## 2023-06-08 NOTE — SUBJECTIVE & OBJECTIVE
Past Medical History:   Diagnosis Date    Lymphedema     MS (multiple sclerosis)     Other pulmonary embolism without acute cor pulmonale     Vitamin B12 deficiency        Past Surgical History:   Procedure Laterality Date    BREAST CYST EXCISION Left     over 40yrs ago     SECTION      ESOPHAGOGASTRODUODENOSCOPY N/A 2022    Procedure: EGD (ESOPHAGOGASTRODUODENOSCOPY);  Surgeon: Radha Arango MD;  Location: [address]);  Service: Endoscopy;  Laterality: N/A;       Review of patient's allergies indicates:   Allergen Reactions    Contrast media Shortness Of Breath and Rash    Pcn [penicillins] Shortness Of Breath and Rash    Celebrex [celecoxib] Other (See Comments)     Swallowing problems     Diazepam Hives    Motrin [ibuprofen] Rash       No current facility-administered medications on file prior to encounter.     Current Outpatient Medications on File Prior to Encounter   Medication Sig    acetaminophen-codeine 300-30mg (TYLENOL #3) 300-30 mg Tab Take 1 tablet by mouth every 6 (six) hours as needed (pain).    apixaban (ELIQUIS ORAL) Take 5 mg by mouth 2 (two) times a day.    candesartan-hydrochlorothiazide (ATACAND HCT) 16-12.5 mg per tablet Take 1 tablet by mouth once daily.    traMADoL (ULTRAM) 50 mg tablet Take 1 tablet (50 mg total) by mouth every 8 (eight) hours as needed for Pain.    B-complex with vitamin C (Z-BEC OR EQUIV) tablet Take 1 tablet by mouth once daily.    clotrimazole-betamethasone 1-0.05% (LOTRISONE) cream Apply to affected area 2 times daily    cyanocobalamin 1,000 mcg/mL injection Inject 1 mL (1,000 mcg total) into the muscle every 30 days.    diphenoxylate-atropine 2.5-0.025 mg (LOMOTIL) 2.5-0.025 mg per tablet Take 1 tablet by mouth 2 (two) times daily as needed for Diarrhea (diarrhea).    furosemide (LASIX) 20 MG tablet Take 1 tablet (20 mg total) by mouth once daily.    LORazepam (ATIVAN) 0.5 MG tablet Take 1 tablet (0.5 mg total) by mouth every 8 (eight) hours as  needed for Anxiety.    pantoprazole (PROTONIX) 40 MG tablet Take 1 tablet (40 mg total) by mouth once daily.    pantoprazole (PROTONIX) 40 MG tablet Take 1 tablet (40 mg total) by mouth once daily.    vitamin D (VITAMIN D3) 1000 units Tab Take 1 tablet (1,000 Units total) by mouth once daily. (Patient taking differently: Take 5,000 Units by mouth once daily.)    [DISCONTINUED] baclofen (LIORESAL) 10 MG tablet Take 1 tablet (10 mg total) by mouth 2 (two) times daily. (Patient taking differently: Take 10 mg by mouth 2 (two) times daily as needed.)    [DISCONTINUED] hydroCHLOROthiazide (HYDRODIURIL) 12.5 MG Tab Take 1 tablet (12.5 mg total) by mouth once daily.    [DISCONTINUED] miconazole (MICOTIN) 2 % cream Apply topically 2 (two) times daily. Apply to intertriginous areas, lower abdomen groin for 14 days    [DISCONTINUED] tamsulosin (FLOMAX) 0.4 mg Cap Take 1 capsule (0.4 mg total) by mouth daily with lunch.     Family History       Problem Relation (Age of Onset)    Brain cancer Brother    Coronary artery disease Father    Lung cancer Father, Mother          Tobacco Use    Smoking status: Never    Smokeless tobacco: Never   Substance and Sexual Activity    Alcohol use: No    Drug use: No    Sexual activity: Not on file     Review of Systems   Constitutional:  Positive for chills. Negative for fever.   HENT:  Negative for trouble swallowing.    Respiratory:  Negative for shortness of breath.    Cardiovascular:  Negative for chest pain.   Gastrointestinal:  Negative for abdominal pain, nausea and vomiting.   Genitourinary:  Negative for dysuria.   Musculoskeletal:  Positive for gait problem.        LLE foot pain   Skin:  Positive for wound.        Chronic skin changes bilateral LE   Neurological:  Negative for light-headedness.   Psychiatric/Behavioral:  Negative for confusion.    All other systems reviewed and are negative.  Objective:     Vital Signs (Most Recent):  Temp: 98.5 °F (36.9 °C) (06/07/23 1700)  Pulse:  81 (06/07/23 1835)  Resp: 16 (06/07/23 1835)  BP: (!) 198/93 (06/07/23 1835)  SpO2: 98 % (06/07/23 1835) Vital Signs (24h Range):  Temp:  [98.5 °F (36.9 °C)-98.9 °F (37.2 °C)] 98.5 °F (36.9 °C)  Pulse:  [79-98] 81  Resp:  [16-18] 16  SpO2:  [93 %-98 %] 98 %  BP: (160-201)/(75-93) 198/93     Weight: 87 kg (191 lb 12.8 oz)  Body mass index is 35.08 kg/m².     Physical Exam  Vitals and nursing note reviewed.   Constitutional:       General: She is not in acute distress.     Appearance: She is obese.   HENT:      Head: Normocephalic and atraumatic.      Nose: Nose normal.      Mouth/Throat:      Mouth: Mucous membranes are moist.      Pharynx: Oropharynx is clear.   Eyes:      Extraocular Movements: Extraocular movements intact.      Conjunctiva/sclera: Conjunctivae normal.      Pupils: Pupils are equal, round, and reactive to light.   Cardiovascular:      Rate and Rhythm: Normal rate and regular rhythm.      Pulses: Normal pulses.      Heart sounds: Murmur heard.      Comments: LUSB systolic murmur  Pulmonary:      Effort: Pulmonary effort is normal.      Breath sounds: Normal breath sounds. No wheezing.   Abdominal:      General: Abdomen is flat. Bowel sounds are normal.      Palpations: Abdomen is soft.      Tenderness: There is no abdominal tenderness.   Musculoskeletal:         General: Swelling present.      Cervical back: Normal range of motion and neck supple.      Right lower leg: Edema present.      Left lower leg: Edema present.        Feet:    Feet:      Comments: Oozing wound LLE plantar heel  Skin:     General: Skin is warm and dry.      Comments: Chronic lymphedema skin changes noted bilaterally    Neurological:      General: No focal deficit present.      Mental Status: She is alert and oriented to person, place, and time.   Psychiatric:         Mood and Affect: Mood normal.         Behavior: Behavior normal.            CRANIAL NERVES     CN III, IV, VI   Pupils are equal, round, and reactive to light.              Significant Labs: All pertinent labs within the past 24 hours have been reviewed.    Significant Imaging: I have reviewed all pertinent imaging results/findings within the past 24 hours.

## 2023-06-08 NOTE — HPI
Ms. Murcia is a 72 y.o. female who has a past medical history of Lymphedema, MS (multiple sclerosis), PE, and vitamin B12 deficiency who presented to [facility] ED with CC of left heel wound.      Per daughter at bedside, pt follows with podiatry/ID at [facility] at [facility]. 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 Daughter states she received x6 weeks of IV abx (vancomycin and Ceftazidime). States that by Dec 2020, her heel was healed. Reports following, she had intermittent pain, but the wound opened recently and began to drain again.     Pt denies fever, or systemic s/s infection. Blood cultures NGTD. MRI left foot without evidence of osteomyelitis, no abscess noted.     Pt is currently on vanc/CTX. ID was cf abx recs.

## 2023-06-08 NOTE — ASSESSMENT & PLAN NOTE
Body mass index is 35.08 kg/m². Morbid obesity complicates all aspects of disease management from diagnostic modalities to treatment. Weight loss encouraged and health benefits explained to patient.

## 2023-06-08 NOTE — HPI
Lupis Murcia is a 72 y.o. female who has a past medical history of Lymphedema, MS (multiple sclerosis), Other pulmonary embolism without acute cor pulmonale, and Vitamin B12 deficiency.     The patient presents to the ED due to L heel wound.     Patient presents with daughter, who states she has history of non-healing wound to L heel.  She was previously seen by Podiatry and Wound Care last year, and the wound healed completely after 6 weeks of IV ABX.   However, over the last few months, the wound has returned, and she reports persistent pain to the area.  No associated fever.  Wound care evaluated her today and noticed bleeding from the wound, so she was sent to the ED for evaluation.     She is attempting to establish care with [facility] as she has been dissatisfied with her previous Podiatrist.    Chronic AC with eliquis due to history of PE.   Denies fever.   Completed 6 weeks vanc/fortaz in 9/22, follows with podiatry and wound care.

## 2023-06-08 NOTE — NURSING
Pt received on unit, care assumed at around 2200. Patient is AAOX4, pt c/o discomfort but did not want pain meds at this time. Hygiene performed and linen changed. Daughter at bedside. Safety measures in place, POC in progress.    Skin intact

## 2023-06-08 NOTE — PLAN OF CARE
"SW met with patient and daughter at bedside. Patient was alert and cooperative. Patient lives with her daughter Sue. Patient's daughter Sue takes care of the patient. Patient is fully dependent on daughter for her health needs and ADLs. Patients clemente that she is compliant with her medications. Patient currently receiving HH with PT.     Arianne Rivero, LISBETH, MSW-[facility]  Medical Social Worker/  ER Department         [facility] - Intensive Care ([place] [address])  Initial Discharge Assessment       Primary Care Provider: Bobby Tran MD    Admission Diagnosis: Non healing left heel wound [S91.302A]  Chest pain [R07.9]    Admission Date: 6/7/2023  Expected Discharge Date:     Transition of Care Barriers: (P) None    Payor: MEDICARE / Plan: MEDICARE PART A & B / Product Type: Government /     Extended Emergency Contact Information  Primary Emergency Contact: PiliAmanda briceno  Address: [address] [place] [address], [address]  Mobile Phone: [phone]  Relation: Daughter    Discharge Plan A: (P) Home, Home with family         [id number] - [place], [place] - [address]  [address]  [place] [address]  Phone: [phone] Fax: [phone]    [id number] - [place], [place] - [address]  [address] [facility]  [place] [address]  Phone: [phone] Fax: [phone]      Initial Assessment (most recent)       Adult Discharge Assessment - 06/08/23 0114          Discharge Assessment    Assessment Type Discharge Planning Assessment (P)      Confirmed/corrected address, phone number and insurance Yes (P)      Confirmed Demographics Correct on Facesheet (P)      Source of Information patient;family (P)      When was your last doctors appointment? -- (P)    "4 weeks ago"    Does patient/caregiver understand observation status Yes (P)      Communicated USMAN with patient/caregiver Yes (P)      Reason For Admission Pain and bleeding in foot (P)      People in Home " child(reinier), adult (P)      Facility Arrived From: Home (P)      Do you expect to return to your current living situation? Yes (P)      Do you have help at home or someone to help you manage your care at home? Yes (P)      Who are your caregiver(s) and their phone number(s)? Daughter (P)      Prior to hospitilization cognitive status: Alert/Oriented (P)      Current cognitive status: Alert/Oriented (P)      Walking or Climbing Stairs ambulation difficulty, dependent;stair climbing difficulty, dependent;transferring difficulty, dependent (P)      Mobility Management walker, wheelchair (P)      Dressing/Bathing bathing difficulty, dependent;dressing difficulty, dependent (P)      Dressing/Bathing Management bedside coomde, shower chair and grab bars (P)      Home Accessibility wheelchair accessible (P)      Home Layout Able to live on 1st floor (P)      Equipment Currently Used at Home bedside commode;walker, standard;wheelchair;shower chair;grab bar (P)      Readmission within 30 days? No (P)      Patient currently being followed by outpatient case management? No (P)      Do you currently have service(s) that help you manage your care at home? Yes (P)      Name and Contact number of agency HH [facility] (P)      Is the pt/caregiver preference to resume services with current agency Yes (P)      Do you take prescription medications? Yes (P)      Do you have prescription coverage? Yes (P)      Coverage Medicaid (P)      Do you have any problems affording any of your prescribed medications? No (P)      Is the patient taking medications as prescribed? yes (P)      Who is going to help you get home at discharge? Daughter Sue (P)      How do you get to doctors appointments? family or friend will provide (P)      Are you on dialysis? No (P)      Do you take coumadin? No (P)      Discharge Plan A Home;Home with family (P)      DME Needed Upon Discharge  none (P)      Discharge Plan discussed with: Adult  children;Patient (P)      Transition of Care Barriers None (P)         Physical Activity    On average, how many days per week do you engage in moderate to strenuous exercise (like a brisk walk)? 2 days (P)      On average, how many minutes do you engage in exercise at this level? 30 min (P)    Patient does PT/OT       Financial Resource Strain    How hard is it for you to pay for the very basics like food, housing, medical care, and heating? Not hard at all (P)         Housing Stability    In the last 12 months, was there a time when you were not able to pay the mortgage or rent on time? No (P)      In the last 12 months, how many places have you lived? 1 (P)      In the last 12 months, was there a time when you did not have a steady place to sleep or slept in a shelter (including now)? No (P)         Transportation Needs    In the past 12 months, has lack of transportation kept you from medical appointments or from getting medications? No (P)      In the past 12 months, has lack of transportation kept you from meetings, work, or from getting things needed for daily living? No (P)         Food Insecurity    Within the past 12 months, you worried that your food would run out before you got the money to buy more. Never true (P)      Within the past 12 months, the food you bought just didn't last and you didn't have money to get more. Never true (P)         Stress    Do you feel stress - tense, restless, nervous, or anxious, or unable to sleep at night because your mind is troubled all the time - these days? To some extent (P)         Social Connections    In a typical week, how many times do you talk on the phone with family, friends, or neighbors? More than three times a week (P)      How often do you get together with friends or relatives? Three times a week (P)      How often do you attend Restorationist or Denominational services? Never (P)      Do you belong to any clubs or organizations such as Restorationist groups, unions,  fraternal or athletic groups, or school groups? No (P)      How often do you attend meetings of the clubs or organizations you belong to? Never (P)      Are you , , , , never , or living with a partner?  (P)         Alcohol Use    Q1: How often do you have a drink containing alcohol? Never (P)      Q2: How many drinks containing alcohol do you have on a typical day when you are drinking? Patient does not drink (P)      Q3: How often do you have six or more drinks on one occasion? Never (P)         OTHER    Name(s) of People in Home Daughter Sue (P)

## 2023-06-08 NOTE — CONSULTS
[facility] - [facility] ([place] [address])  Podiatry  Consult Note    Patient Name: Lupis Murcia  MRN: [id number]  Admission Date: 6/7/2023  Hospital Length of Stay: 0 days  Attending Physician: Marci Sena MD  Primary Care Provider: Bobby Tran MD     Inpatient consult to Podiatry  Consult performed by: Delfin Fay DPM  Consult ordered by: Marci Sena MD      Subjective:     History of Present Illness:  72 year old female Lymphedema, MS, PE on enoxaparin, B12 deficit presented to ED via EMS for L heel wound bleeding/wound check. Patient and daughter are not exactly sure how the wound developed, it was just about closed. 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 Patient notes she tried taking 100 mg gabapentin in the past but had adverse effects, hallucinations. Patient had completed 6 weeks of IV ABx for osteomyelitis of the Left calcaneus ending in Dec of 2022. Patient was seen in podiatry clinic yesterday by Dr. Mccauley who 5/15/23 shortly after heel wound re-opened. The patient notes some redness and edema that has resolved since starting IV Abx inpatient. She has been to a lymph edema clinic in the past, and currently gets wound care at . Patient and daughter were not too happy with there experience at  so they wanted to come to [facility] this admission. Her prior bone/wound cultures from  speciated pseudomonas, enterococcus, and klebsiella.     At time of initial encounter VSS, AF Tmax 99.2 degree F. WBC 6.5, CRP 19.4, glucose 121, HgA1c 5.8. L foot xray clean. Left Hhindfoot MRI without contrast pending. Wound cultures from ED pending.       Scheduled Meds:   apixaban  5 mg Oral BID    B-complex with vitamin C  1 tablet Oral Daily    cefTRIAXone (ROCEPHIN) IVPB  1 g Intravenous Q24H    vitamin D  5,000 Units Oral Daily    losartan-hydrochlorothiazide 50-12.5 mg  1 tablet Oral Daily    vancomycin (VANCOCIN) IVPB   1,000 mg Intravenous Q12H     Continuous Infusions:  PRN Meds:acetaminophen-codeine 300-30mg, aluminum-magnesium hydroxide-simethicone, bisacodyL, glucagon (human recombinant), insulin aspart U-100, LORazepam, melatonin, naloxone, ondansetron, polyethylene glycol, prochlorperazine, simethicone, sodium chloride 0.9%, traMADoL, Pharmacy to dose Vancomycin consult **AND** vancomycin - pharmacy to dose    Review of patient's allergies indicates:   Allergen Reactions    Contrast media Shortness Of Breath and Rash    Pcn [penicillins] Shortness Of Breath and Rash    Celebrex [celecoxib] Other (See Comments)     Swallowing problems     Diazepam Hives    Motrin [ibuprofen] Rash        Past Medical History:   Diagnosis Date    Lymphedema     MS (multiple sclerosis)     Other pulmonary embolism without acute cor pulmonale     Vitamin B12 deficiency      Past Surgical History:   Procedure Laterality Date    BREAST CYST EXCISION Left     over 40yrs ago     SECTION      ESOPHAGOGASTRODUODENOSCOPY N/A 2022    Procedure: EGD (ESOPHAGOGASTRODUODENOSCOPY);  Surgeon: Radha Arango MD;  Location: [address];  Service: Endoscopy;  Laterality: N/A;       Family History       Problem Relation (Age of Onset)    Brain cancer Brother    Coronary artery disease Father    Lung cancer Father, Mother          Tobacco Use    Smoking status: Never    Smokeless tobacco: Never   Substance and Sexual Activity    Alcohol use: No    Drug use: No    Sexual activity: Not on file     Review of Systems   Constitutional:  Negative for chills, diaphoresis, fatigue and fever.   HENT: Negative.     Eyes: Negative.    Respiratory:  Negative for cough and shortness of breath.    Gastrointestinal:  Negative for constipation, nausea and vomiting.   Genitourinary:  Negative for difficulty urinating and dysuria.   Musculoskeletal:  Positive for arthralgias. Negative for myalgias.   Skin:  Positive for rash (stasis dermatitis) and wound.    Neurological:  Negative for dizziness and weakness.   Psychiatric/Behavioral:  Negative for agitation and confusion.    Objective:     Vital Signs (Most Recent):  Temp: 98.9 °F (37.2 °C) (06/08/23 1533)  Pulse: 81 (06/08/23 1533)  Resp: 20 (06/08/23 1839)  BP: (!) 160/94 (06/08/23 1533)  SpO2: 95 % (06/08/23 1533) Vital Signs (24h Range):  Temp:  [97.8 °F (36.6 °C)-99.2 °F (37.3 °C)] 98.9 °F (37.2 °C)  Pulse:  [81-94] 81  Resp:  [18-20] 20  SpO2:  [95 %-96 %] 95 %  BP: (135-190)/(57-94) 160/94     Weight: 87 kg (191 lb 12.8 oz)  Body mass index is 35.08 kg/m².    Foot Exam    General  General Appearance: appears stated age and healthy   Orientation: alert and oriented to person, place, and time       Right Foot/Ankle     Inspection and Palpation  Ecchymosis: none  Tenderness: none   Swelling: (+3 edema LE)  Skin Exam: ulcer; no drainage, skin not intact, no cellulitis, no abnormal color and no erythema     Neurovascular  Dorsalis pedis: 1+  Posterior tibial: 1+  Saphenous nerve sensation: normal  Tibial nerve sensation: normal  Superficial peroneal nerve sensation: normal  Deep peroneal nerve sensation: normal  Sural nerve sensation: normal    Comments  Small stable heel wound now with dry stable scab, no signs of infected, patient and daughter note redness and edema has resolved since starting IV Abx.     Stasis dermatitis     Left Foot/Ankle      Inspection and Palpation  Ecchymosis: none  Tenderness: none   Swelling: (+3 edema LE)  Skin Exam: no drainage, skin not intact, no cellulitis, no abnormal color, no ulcer and no erythema     Neurovascular  Dorsalis pedis: 1+  Posterior tibial: 1+  Saphenous nerve sensation: normal  Tibial nerve sensation: normal  Superficial peroneal nerve sensation: normal  Deep peroneal nerve sensation: normal  Sural nerve sensation: normal    Comments  Stasis dermatitis     Laboratory:  A1C:   Recent Labs   Lab 06/08/23  0549   HGBA1C 5.8*     CBC:   Recent Labs   Lab 06/08/23  0544    WBC 6.50   RBC 3.53*   HGB 9.8*   HCT 30.8*      MCV 87   MCH 27.8   MCHC 31.8*     CMP:   Recent Labs   Lab 06/07/23  1441 06/08/23  0549    121*   CALCIUM 9.6 10.0   ALBUMIN 3.5  --    PROT 6.9  --     140   K 3.8 3.5   CO2 29 27    103   BUN 13 9   CREATININE 0.7 0.6   ALKPHOS 67  --    ALT 5*  --    AST 8*  --    BILITOT 0.3  --      CRP:   Recent Labs   Lab 06/07/23  1441   CRP 19.4*     All pertinent labs reviewed within the last 24 hours.    Diagnostic Results:  I have reviewed all pertinent imaging results/findings within the past 24 hours.    X ray and MRI of the left foot without concerns for osteomyelitis or drainable fluid collection.     Clinical Findings:        Assessment/Plan:     Orthopedic  * Non healing left heel wound  Wound appears to be healing, no bleeding, dry stable scab. Subjective improvement by patient and daughter on IV ABx. Imaging studies without concern for deep space infection. Suspect SSTI.     No surgical intervention indicated   Recommend transition to PO Abx at discharge tailored to prior culture at , discussed with ID agree with doxy and other with pseudomonal coverage   Patient will benefit from compression dressings   Recommend bilateral 2 layer calamine compression dressing initiated at discharge by HH or in clinic   Nursing wound care routine ordered.   Patient okay to d/c once abx plan in place and PT eval   PT/OT   WBAT RLE, PWBAT LLE toe touch with shweta shoes and dressings intact  May consider topical corticosteroid once STTI has resolved for stasis dermatitis  Patient needs to offload heels while in bed and recliner, nurse bedside to obtained heel offloading boots, appreciated   Podiatry will sign off, thank you for your consult. Please do not hesitate to reach out with question or concerns or re consult prn     Discharge recs:   Podiatry will arrange follow up    wound care: Weekly - B/L 2 layer compressive dressing with calamine with  dio blue ready over left heel wound and area weeping areas of b/l lower legs.        Offload heels while in bed and recliner       WBAT RLE, PWBAT LLE toe touch with shweta shoes and dressings intact    Thank you for your consult. I will sign off. Please contact us if you have any additional questions.    Delfin Fay DPM, PGY-2  Podiatry / Foot and Ankle Surgery   Page # [phone]  Secure chat preferred

## 2023-06-08 NOTE — CONSULTS
[facility] ([place] [id number])  Infectious Disease  Consult Note    Patient Name: Lupis Murcia  MRN: [id number]  Admission Date: 6/7/2023  Hospital Length of Stay: 0 days  Attending Physician: Marci Sena MD  Primary Care Provider: Bobby Tran MD     Isolation Status: No active isolations    Patient information was obtained from patient, relative(s), past medical records and ER records.      Inpatient consult to Infectious Diseases  Consult performed by: Ewelina Briceno NP  Consult ordered by: Marci Sean MD        Assessment/Plan:     Orthopedic  * Non healing left heel wound   72 y.o. female who has a past medical history of Lymphedema, MS (multiple sclerosis), PE, and vitamin B12 deficiency who presented to [facility] ED with CC of left heel wound.       History of treated osteomyelitis of left heel. Care received from [facility]. See HPI.      Pt denies fever, or systemic s/s infection. Blood cultures NGTD. MRI left foot without evidence of osteomyelitis, no abscess noted.      Pt is currently on vanc/CTX. ID was cf abx recs.     Recommendations:   - Okay to continue CTX/vanc while inpatient. Inpatient pharmacy to dose vanc.   - Will plan to transition to oral therapy upon discharge for SSTI as imaging is negative for osteo. Rec transitioning to ciprofloxacin 750 mg PO q 12hr and doxycycline 100 mg PO BID upon discharge x14 day for SSTI. Qtc 411. (Updated plan communicated to team)   - Continue wound care.   - Will need ID follow up outpatient. ID will schedule.   - Plan reviewed with ID staff. ID will sign off.         Thank you for your consult. I will follow-up with patient. Please contact us if you have any additional questions.    Ewelina Briceno NP  Infectious Disease  [facility] ([place] [address])    Subjective:     Principal Problem: Non healing left heel wound    HPI: Ms. Murcia is a 72 y.o. female who has a past medical history of Lymphedema, MS (multiple sclerosis), PE, and  vitamin B12 deficiency who presented to [facility] [place] with CC of left heel wound.      Per daughter at bedside, pt follows with podiatry/ID at [facility] at OSH. 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 Daughter states she received x6 weeks of IV abx (vancomycin and Ceftazidime). States that by Dec 2020, her heel was healed. Reports following, she had intermittent pain, but the wound opened recently and began to drain again.     Pt denies fever, or systemic s/s infection. Blood cultures NGTD. MRI left foot without evidence of osteomyelitis, no abscess noted.     Pt is currently on vanc/CTX. ID was cf abx recs.       Past Medical History:   Diagnosis Date    Lymphedema     MS (multiple sclerosis)     Other pulmonary embolism without acute cor pulmonale     Vitamin B12 deficiency        Past Surgical History:   Procedure Laterality Date    BREAST CYST EXCISION Left     over 40yrs ago     SECTION      ESOPHAGOGASTRODUODENOSCOPY N/A 2022    Procedure: EGD (ESOPHAGOGASTRODUODENOSCOPY);  Surgeon: Radha Arango MD;  Location: [address];  Service: Endoscopy;  Laterality: N/A;       Review of patient's allergies indicates:   Allergen Reactions    Contrast media Shortness Of Breath and Rash    Pcn [penicillins] Shortness Of Breath and Rash    Celebrex [celecoxib] Other (See Comments)     Swallowing problems     Diazepam Hives    Motrin [ibuprofen] Rash       Medications:  Medications Prior to Admission   Medication Sig    acetaminophen-codeine 300-30mg (TYLENOL #3) 300-30 mg Tab Take 1 tablet by mouth every 6 (six) hours as needed (pain).    apixaban (ELIQUIS ORAL) Take 5 mg by mouth 2 (two) times a day.    candesartan-hydrochlorothiazide (ATACAND HCT) 16-12.5 mg per tablet Take 1 tablet by mouth once daily.    cyanocobalamin 1,000 mcg/mL injection Inject 1 mL (1,000 mcg total) into the muscle every 30 days.    LORazepam (ATIVAN) 0.5 MG tablet Take 1 tablet (0.5 mg  total) by mouth every 8 (eight) hours as needed for Anxiety.    [] traMADoL (ULTRAM) 50 mg tablet Take 1 tablet (50 mg total) by mouth every 8 (eight) hours as needed for Pain. (Patient taking differently: Take 50 mg by mouth nightly. AS NEEDED FOR PAIN)    vitamin D (VITAMIN D3) 1000 units Tab Take 1 tablet (1,000 Units total) by mouth once daily. (Patient taking differently: Take 5,000 Units by mouth once daily.)    B-complex with vitamin C (Z-BEC OR EQUIV) tablet Take 1 tablet by mouth once daily. (Patient not taking: Reported on 2023)     Antibiotics (From admission, onward)      Start     Stop Route Frequency Ordered    23 1730  cefTRIAXone (ROCEPHIN) 1 g in dextrose 5 % in water (D5W) 5 % 100 mL IVPB (MB+)         -- IV Every 24 hours (non-standard times) 23 1919    23 0630  vancomycin (VANCOCIN) 1,000 mg in dextrose 5 % (D5W) 250 mL IVPB (Vial-Mate)         -- IV Every 12 hours (non-standard times) 23 1903    23 1941  vancomycin - pharmacy to dose  (vancomycin IVPB)        See Hyperspace for full Linked Orders Report.    -- IV pharmacy to manage frequency 23 1843          Antifungals (From admission, onward)      None          Antivirals (From admission, onward)      None             Immunization History   Administered Date(s) Administered    COVID-19, vector-nr, rS-Ad26, PF ([employer]) 2021    PPD Test 2021, 2022       Family History       Problem Relation (Age of Onset)    Brain cancer Brother    Coronary artery disease Father    Lung cancer Father, Mother          Social History     Socioeconomic History    Marital status:    Tobacco Use    Smoking status: Never    Smokeless tobacco: Never   Substance and Sexual Activity    Alcohol use: No    Drug use: No     Social Determinants of Health     Financial Resource Strain: Low Risk     Difficulty of Paying Living Expenses: Not hard at all   Food Insecurity: No Food Insecurity    Worried  About Running Out of Food in the Last Year: Never true    Ran Out of Food in the Last Year: Never true   Transportation Needs: No Transportation Needs    Lack of Transportation (Medical): No    Lack of Transportation (Non-Medical): No   Physical Activity: Insufficiently Active    Days of Exercise per Week: 2 days    Minutes of Exercise per Session: 30 min   Stress: Stress Concern Present    Feeling of Stress : To some extent   Social Connections: Socially Isolated    Frequency of Communication with Friends and Family: More than three times a week    Frequency of Social Gatherings with Friends and Family: Three times a week    Attends Yarsani Services: Never    Active Member of Clubs or Organizations: No    Attends Club or Organization Meetings: Never    Marital Status:    Housing Stability: Low Risk     Unable to Pay for Housing in the Last Year: No    Number of Places Lived in the Last Year: 1    Unstable Housing in the Last Year: No     Review of Systems   Constitutional:  Negative for chills, diaphoresis, fatigue and fever.   HENT: Negative.     Eyes: Negative.    Respiratory:  Negative for cough and shortness of breath.    Gastrointestinal:  Positive for diarrhea. Negative for constipation, nausea and vomiting.   Genitourinary:  Negative for difficulty urinating and dysuria.   Musculoskeletal:  Positive for arthralgias. Negative for myalgias.   Skin:  Positive for rash and wound.   Neurological:  Negative for dizziness and weakness.   Psychiatric/Behavioral:  Negative for agitation and confusion.    Objective:     Vital Signs (Most Recent):  Temp: 99 °F (37.2 °C) (06/08/23 1127)  Pulse: 85 (06/08/23 1127)  Resp: 18 (06/08/23 1127)  BP: (!) 144/71 (06/08/23 1127)  SpO2: 96 % (06/08/23 1127) Vital Signs (24h Range):  Temp:  [97.8 °F (36.6 °C)-99.2 °F (37.3 °C)] 99 °F (37.2 °C)  Pulse:  [79-94] 85  Resp:  [16-20] 18  SpO2:  [93 %-98 %] 96 %  BP: (135-198)/(57-93) 144/71     Weight: 87 kg (191 lb 12.8  oz)  Body mass index is 35.08 kg/m².    Estimated Creatinine Clearance: 85.6 mL/min (based on SCr of 0.6 mg/dL).     Physical Exam  Vitals reviewed.   Constitutional:       General: She is not in acute distress.     Appearance: Normal appearance. She is not ill-appearing.   HENT:      Head: Normocephalic.      Nose: Nose normal. No congestion.      Mouth/Throat:      Mouth: Mucous membranes are moist.   Eyes:      General:         Right eye: No discharge.         Left eye: No discharge.      Conjunctiva/sclera: Conjunctivae normal.   Cardiovascular:      Rate and Rhythm: Normal rate and regular rhythm.   Pulmonary:      Effort: Pulmonary effort is normal. No respiratory distress.      Breath sounds: No stridor.   Abdominal:      General: Abdomen is flat.   Musculoskeletal:         General: Normal range of motion.      Cervical back: Normal range of motion.      Right lower leg: Edema present.      Left lower leg: Edema present.   Skin:     Findings: Erythema, lesion and rash present.      Comments: Lymphedema and venous statis rash bilateral    Neurological:      General: No focal deficit present.      Mental Status: She is alert and oriented to person, place, and time.      Motor: No weakness.      Gait: Gait normal.   Psychiatric:         Mood and Affect: Mood normal.         Behavior: Behavior normal.         Thought Content: Thought content normal.         Judgment: Judgment normal.                        Significant Labs: All pertinent labs within the past 24 hours have been reviewed.    Significant Imaging: I have reviewed all pertinent imaging results/findings within the past 24 hours.

## 2023-06-08 NOTE — HPI
72 year old female Lymphedema, MS, PE on enoxaparin, B12 deficit presented to ED via EMS for L heel wound bleeding/wound check. Patient and daughter are not exactly sure how the wound developed, it was just about closed. 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 Patient notes she tried taking 100 mg gabapentin in the past but had adverse effects, hallucinations. Patient had completed 6 weeks of IV ABx for osteomyelitis of the Left calcaneus ending in Dec of 2022. Patient was seen in podiatry clinic yesterday by Dr. Mccauley who 5/15/23 shortly after heel wound re-opened. The patient notes some redness and edema that has resolved since starting IV Abx inpatient. She has been to a lymph edema clinic in the past, and currently gets wound care at . Patient and daughter were not too happy with there experience at  so they wanted to come to [facility] this admission. Her prior bone/wound cultures from  speciated pseudomonas, enterococcus, and klebsiella.     At time of initial encounter VSS, AF Tmax 99.2 degree F. WBC 6.5, CRP 19.4, glucose 121, HgA1c 5.8. L foot xray clean. Left Hhindfoot MRI without contrast pending. Wound cultures from ED pending.

## 2023-06-08 NOTE — ASSESSMENT & PLAN NOTE
Cont home HCTZ-arb     [Obese (BMI >= 30)] : obese (BMI >= 30) [No Respiratory Distress] : no respiratory distress [Normal] : normal bowel sounds, non-tender, no masses, soft, no no hepato-splenomegaly

## 2023-06-08 NOTE — HOSPITAL COURSE
Admitted to [facility] nonhealing wound. Started on broad spectrum abx. MRI neg for osteo. Podiatry, wound care, ID consulted.   ID recommend cipro/doxy at discharge 14 day course.   Podiatry recommending bilateral 2 layer calamine compression dressing  Wound care requesting GABRIELE prior to application of compression dressings. Difficulty getting GABRIELE done in time for discharge, wound care nurse had already left by the time GABRIELE done and on call wound care reporting has to be done by someone certified to apply.   Talked with podiatry who reported bilateral 2 layer calamine compression dressing can be applied in clinic or by  and patient can be discharged with current dressings applied by nursing.

## 2023-06-08 NOTE — PROGRESS NOTES
Pharmacokinetic Initial Assessment: IV Vancomycin    Assessment/Plan:    Initiate intravenous vancomycin with loading dose of 1250 mg once followed by a maintenance dose of vancomycin 1000mg IV every 12 hours  Desired empiric serum trough concentration is 15 to 20 mcg/mL  Draw vancomycin trough level 30 min prior to fourth dose on 6/9 at approximately 0530  Pharmacy will continue to follow and monitor vancomycin.      Please contact pharmacy at extension [phone] with any questions regarding this assessment.     Thank you for the consult,   Louisa Tobin       Patient brief summary:  Lupis Murcia is a 72 y.o. female initiated on antimicrobial therapy with IV Vancomycin for treatment of suspected skin & soft tissue infection    Drug Allergies:   Review of patient's allergies indicates:   Allergen Reactions    Contrast media Shortness Of Breath and Rash    Pcn [penicillins] Shortness Of Breath and Rash    Celebrex [celecoxib] Other (See Comments)     Swallowing problems     Diazepam Hives    Motrin [ibuprofen] Rash       Actual Body Weight:   87 kg    Renal Function:   Estimated Creatinine Clearance: 73.3 mL/min (based on SCr of 0.7 mg/dL).,     Dialysis Method (if applicable):  N/A    CBC (last 72 hours):  Recent Labs   Lab Result Units 06/07/23  1441   WBC K/uL 5.60   Hemoglobin g/dL 9.9*   Hematocrit % 32.7*   Platelets K/uL 245       Metabolic Panel (last 72 hours):  Recent Labs   Lab Result Units 06/07/23  1441   Sodium mmol/L 144   Potassium mmol/L 3.8   Chloride mmol/L 105   CO2 mmol/L 29   Glucose mg/dL 110   BUN mg/dL 13   Creatinine mg/dL 0.7   Albumin g/dL 3.5   Total Bilirubin mg/dL 0.3   Alkaline Phosphatase U/L 67   AST U/L 8*   ALT U/L 5*       Drug levels (last 3 results):  No results for input(s): VANCOMYCINRA, VANCORANDOM, VANCOMYCINPE, VANCOPEAK, VANCOMYCINTR, VANCOTROUGH in the last 72 hours.    Microbiologic Results:  Microbiology Results (last 7 days)       Procedure Component Value Units  Date/Time    Blood culture (site 1) [[id number]] Collected: 06/07/23 1730    Order Status: Sent Specimen: Blood from Peripheral, Antecubital, Right Updated: 06/07/23 1901    Aerobic culture [[id number]]     Order Status: No result Specimen: Wound from Foot, Left

## 2023-06-08 NOTE — ASSESSMENT & PLAN NOTE
72 y.o. female who has a past medical history of Lymphedema, MS (multiple sclerosis), PE, and vitamin B12 deficiency who [place] [facility] [place] with CC of left heel wound.       History of treated osteomyelitis of left heel. Care received from [facility]. See HPI.      Pt denies fever, or systemic s/s infection. Blood cultures NGTD. MRI left foot without evidence of osteomyelitis, no abscess noted.      Pt is currently on vanc/CTX. ID was cf abx recs.     Recommendations:   - Okay to continue CTX/vanc while inpatient. Inpatient pharmacy to dose vanc.   - Will plan to transition to oral therapy upon discharge for SSTI as imaging is negative for osteo. Will await podiatry eval.   - Plan reviewed with ID staff. ID will follow.

## 2023-06-08 NOTE — PLAN OF CARE
06/08/23 0145   Post-Acute Status   Post-Acute Authorization Home Health   Home Health Status Pending medical clearance/testing   Coverage medicare   Discharge Delays Orders Needed   Discharge Plan   Discharge Plan A Home;Home with family;Home Health     Patient lives with daughter Sue. Patient is total dependent on Daughter for health care and ADLs. Patient does not walk at all.    Patient is now receiving [facility]  with PT.     Patient requesting Long Term Care waiver application.     MERCEDEZ Deleon, MSW-LMSW  Medical Social Worker/  ER Department

## 2023-06-08 NOTE — PHARMACY MED REC
"Admission Medication History     The home medication history was taken by Korin Dumont.    You may go to "Admission" then "Reconcile Home Medications" tabs to review and/or act upon these items.     The home medication list has been updated by the Pharmacy department.   Please read ALL comments highlighted in yellow.   Please address this information as you see fit.    Feel free to contact us if you have any questions or require assistance.      The medications listed below were removed from the home medication list. Please reorder if appropriate:  Patient reports no longer taking the following medication(s):  BACLOFEN 10 MG TABLET  LOTRISONE 1-0.05 % CREAM  LOMOTIL 2.5-0.025 MG TABLET  FUROSEMIDE 20 MG TABLET  HYDROCHLOROTHIAZIDE 12.5 MG TABLET  MICONAZOLE 2 % CREAM  PANTOPRAZOLE 40 MG TABLET  TAMSULOSIN 0.4 MG CAPSULE      Current Outpatient Medications on File Prior to Encounter   Medication Sig    acetaminophen-codeine 300-30mg (TYLENOL #3) 300-30 mg Tab   Take 1 tablet by mouth every 6 (six) hours as needed (pain).    apixaban (ELIQUIS ORAL)   Take 5 mg by mouth 2 (two) times a day.    candesartan-hydrochlorothiazide (ATACAND HCT) 16-12.5 mg per tablet   Take 1 tablet by mouth once daily.    cyanocobalamin 1,000 mcg/mL injection   Inject 1 mL (1,000 mcg total) into the muscle every 30 days.    LORazepam (ATIVAN) 0.5 MG tablet   Take 1 tablet (0.5 mg total) by mouth every 8 (eight) hours as needed for Anxiety.    traMADoL (ULTRAM) 50 mg tablet    Take 50 mg by mouth nightly. AS NEEDED FOR PAIN    vitamin D (VITAMIN D3) 1000 units Tab   Take 5,000 Units by mouth once daily.    B-complex with vitamin C (Z-BEC OR EQUIV) tablet   Take 1 tablet by mouth once daily. (Patient not taking: Reported on 6/7/2023)       Korin Dumont  EXT [id number]                  .        "

## 2023-06-08 NOTE — ASSESSMENT & PLAN NOTE
- noted, H/H at baseline   - bleeding at LLE wound noted, now stable without bleeding, eliquis restarted  - monitor and transfuse Hgb <7

## 2023-06-08 NOTE — PROGRESS NOTES
[facility] ([address] Medicine  Progress Note    Patient Name: Lupis Murcia  MRN: [id number]  Patient Class: OP- Observation   Admission Date: 6/7/2023  Length of Stay: 0 days  Attending Physician: Marci Sena MD  Primary Care Provider: Bobby Tran MD        Subjective:     Principal Problem:Non healing left heel wound        HPI:  Lupis Murcia is a 72 y.o. female who has a past medical history of Lymphedema, MS (multiple sclerosis), Other pulmonary embolism without acute cor pulmonale, and Vitamin B12 deficiency.     The patient presents to the ED due to L heel wound.     Patient presents with daughter, who states she has history of non-healing wound to L heel.  She was previously seen by Podiatry and Wound Care last year, and the wound healed completely after 6 weeks of IV ABX.   However, over the last few months, the wound has returned, and she reports persistent pain to the area.  No associated fever.  Wound care evaluated her today and noticed bleeding from the wound, so she was sent to the ED for evaluation.     She is attempting to establish care with [facility] as she has been dissatisfied with her previous Podiatrist.    Chronic AC with eliquis due to history of PE.   Denies fever.   Completed 6 weeks vanc/fortaz in 9/22, follows with podiatry and wound care.         Overview/Hospital Course:  Admitted to LLE nonhealing wound. Started on broad spectrum abx. MRI neg for osteo. Podiatry, wound care, ID consulted.       Interval History: Patient reporting LLE pain, unchanged.   Afebrile.   Bcx NGTD, Wound culture ordered.    Cont broad spectrum abx, ID consulted for recs.   Podiatry consulted, wound care consulted.   Eliquis restarted per pod recs.     Review of Systems   Constitutional:  Negative for chills and fever.   Respiratory:  Negative for shortness of breath.    Cardiovascular:  Negative for chest pain.   Gastrointestinal:  Negative for abdominal pain.    Genitourinary:  Negative for difficulty urinating.   Musculoskeletal:  Positive for gait problem.        LLE pain   Skin:  Positive for wound.   Psychiatric/Behavioral:  Negative for confusion.    Objective:     Vital Signs (Most Recent):  Temp: 99 °F (37.2 °C) (06/08/23 1127)  Pulse: 85 (06/08/23 1127)  Resp: 18 (06/08/23 1127)  BP: (!) 144/71 (06/08/23 1127)  SpO2: 96 % (06/08/23 1127) Vital Signs (24h Range):  Temp:  [97.8 °F (36.6 °C)-99.2 °F (37.3 °C)] 99 °F (37.2 °C)  Pulse:  [79-98] 85  Resp:  [16-20] 18  SpO2:  [93 %-98 %] 96 %  BP: (135-201)/(57-93) 144/71     Weight: 87 kg (191 lb 12.8 oz)  Body mass index is 35.08 kg/m².    Intake/Output Summary (Last 24 hours) at 6/8/2023 1358  Last data filed at 6/7/2023 1841  Gross per 24 hour   Intake 100 ml   Output --   Net 100 ml         Physical Exam  Vitals and nursing note reviewed.   Constitutional:       General: She is not in acute distress.     Appearance: Normal appearance. She is obese.   HENT:      Head: Normocephalic and atraumatic.      Right Ear: External ear normal.      Left Ear: External ear normal.      Nose: Nose normal.      Mouth/Throat:      Mouth: Mucous membranes are moist.      Pharynx: Oropharynx is clear.   Eyes:      Extraocular Movements: Extraocular movements intact.      Conjunctiva/sclera: Conjunctivae normal.      Pupils: Pupils are equal, round, and reactive to light.   Cardiovascular:      Rate and Rhythm: Normal rate and regular rhythm.      Pulses: Normal pulses.      Heart sounds: Murmur heard.      Comments: LUSB murmur  Pulmonary:      Effort: Pulmonary effort is normal.      Breath sounds: Normal breath sounds.   Abdominal:      General: Abdomen is flat. Bowel sounds are normal.      Palpations: Abdomen is soft.   Musculoskeletal:         General: Swelling present. Normal range of motion.      Cervical back: Normal range of motion and neck supple.      Comments: LLE wound, no significant bleeding noted. Tender to palpation    Skin:     General: Skin is warm and dry.      Comments: Chronic lymphedema skin changes noted bilaterally     Neurological:      General: No focal deficit present.      Mental Status: She is alert and oriented to person, place, and time.   Psychiatric:         Mood and Affect: Mood normal.         Behavior: Behavior normal.           Significant Labs: All pertinent labs within the past 24 hours have been reviewed.    Significant Imaging: I have reviewed all pertinent imaging results/findings within the past 24 hours.  MRI neg for osteo      Assessment/Plan:      * Non healing left heel wound  - patient with history of osteo in same location LLE heel, s/p 6 weeks IV vanc/fortaz completed 9/22 with healing of wound  - worsening of wound with bleeding and pain past few months  - elevated CRP  - follows with podiatry outpt who had ordered a MRI but hasnt been done yet  - Podiatry consulted, appreciate recs  - MRI foot ordered- negative for osteo  - ID consulted for abx recs  - Wound care consulted   - Wound and blood cultures ordered- Bcx NGTD  - cont vanc/ceftriaxone, deescalate based on imaging/culture/ID recs  - pain control PRN      Aortic stenosis  - noted on ECHO 6/22, murmur on exam, monitor      History of pulmonary embolus (PE)  - noted on imaging in 6/22, on eliquis  - hold AC for potential intervention with podiatry, will resume asap - resume eliquis per podiatry  - SCDs       HTN (hypertension)  Cont home HCTZ-arb      Class 2 obesity due to excess calories in adult  Body mass index is 35.08 kg/m². Morbid obesity complicates all aspects of disease management from diagnostic modalities to treatment. Weight loss encouraged and health benefits explained to patient.         Lymphedema  - noted bilateral LE  - wound care consulted, appreciate recs  - elevate LE      Vitamin D deficiency  - cont home dose vit D      MS (multiple sclerosis)  - noted, follows with neurology, no new sxs      Impaired functional  mobility, balance, gait, and endurance  - PT/OT when appropriate       Anemia of chronic disease  - noted, H/H at baseline   - bleeding at LLE wound noted, now stable without bleeding, eliquis restarted  - monitor and transfuse Hgb <7        VTE Risk Mitigation (From admission, onward)         Ordered     apixaban tablet 5 mg  2 times daily         06/08/23 1355     Reason for No Pharmacological VTE Prophylaxis  Once        Question:  Reasons:  Answer:  Risk of Bleeding    06/07/23 1841     IP VTE HIGH RISK PATIENT  Once         06/07/23 1841     Place sequential compression device  Until discontinued         06/07/23 1841                Discharge Planning   USMAN: 6/10/2023     Code Status: Full Code   Is the patient medically ready for discharge?:     Reason for patient still in hospital (select all that apply): Patient trending condition, Treatment and Consult recommendations  Discharge Plan A: Home, Home with family, Home Health   Discharge Delays: Orders Needed              Marci Sena MD  Department of Hospital Medicine   [facility] - Intensive Care ([place]-[facility])

## 2023-06-08 NOTE — ED NOTES
Pt /98. Pt refused earlier med of losartan-hctz. Pt took her own home med of candesartan-hctz 16-12.5mg. Will notify physician and receiving RN.

## 2023-06-08 NOTE — SUBJECTIVE & OBJECTIVE
Interval History: Patient reporting LLE pain, unchanged.   Afebrile.   Bcx NGTD, Wound culture ordered.    Cont broad spectrum abx, ID consulted for recs.   Podiatry consulted, wound care consulted.   Eliquis restarted per pod recs.     Review of Systems   Constitutional:  Negative for chills and fever.   Respiratory:  Negative for shortness of breath.    Cardiovascular:  Negative for chest pain.   Gastrointestinal:  Negative for abdominal pain.   Genitourinary:  Negative for difficulty urinating.   Musculoskeletal:  Positive for gait problem.        LLE pain   Skin:  Positive for wound.   Psychiatric/Behavioral:  Negative for confusion.    Objective:     Vital Signs (Most Recent):  Temp: 99 °F (37.2 °C) (06/08/23 1127)  Pulse: 85 (06/08/23 1127)  Resp: 18 (06/08/23 1127)  BP: (!) 144/71 (06/08/23 1127)  SpO2: 96 % (06/08/23 1127) Vital Signs (24h Range):  Temp:  [97.8 °F (36.6 °C)-99.2 °F (37.3 °C)] 99 °F (37.2 °C)  Pulse:  [79-98] 85  Resp:  [16-20] 18  SpO2:  [93 %-98 %] 96 %  BP: (135-201)/(57-93) 144/71     Weight: 87 kg (191 lb 12.8 oz)  Body mass index is 35.08 kg/m².    Intake/Output Summary (Last 24 hours) at 6/8/2023 1358  Last data filed at 6/7/2023 1841  Gross per 24 hour   Intake 100 ml   Output --   Net 100 ml         Physical Exam  Vitals and nursing note reviewed.   Constitutional:       General: She is not in acute distress.     Appearance: Normal appearance. She is obese.   HENT:      Head: Normocephalic and atraumatic.      Right Ear: External ear normal.      Left Ear: External ear normal.      Nose: Nose normal.      Mouth/Throat:      Mouth: Mucous membranes are moist.      Pharynx: Oropharynx is clear.   Eyes:      Extraocular Movements: Extraocular movements intact.      Conjunctiva/sclera: Conjunctivae normal.      Pupils: Pupils are equal, round, and reactive to light.   Cardiovascular:      Rate and Rhythm: Normal rate and regular rhythm.      Pulses: Normal pulses.      Heart sounds:  Murmur heard.      Comments: LUSB murmur  Pulmonary:      Effort: Pulmonary effort is normal.      Breath sounds: Normal breath sounds.   Abdominal:      General: Abdomen is flat. Bowel sounds are normal.      Palpations: Abdomen is soft.   Musculoskeletal:         General: Swelling present. Normal range of motion.      Cervical back: Normal range of motion and neck supple.      Comments: LLE wound, no significant bleeding noted. Tender to palpation   Skin:     General: Skin is warm and dry.      Comments: Chronic lymphedema skin changes noted bilaterally     Neurological:      General: No focal deficit present.      Mental Status: She is alert and oriented to person, place, and time.   Psychiatric:         Mood and Affect: Mood normal.         Behavior: Behavior normal.           Significant Labs: All pertinent labs within the past 24 hours have been reviewed.    Significant Imaging: I have reviewed all pertinent imaging results/findings within the past 24 hours.  MRI neg for osteo

## 2023-06-08 NOTE — ASSESSMENT & PLAN NOTE
- noted on imaging in 6/22, on eliquis  - hold AC for potential intervention with podiatry, will resume asap   - SCDs

## 2023-06-08 NOTE — ASSESSMENT & PLAN NOTE
- patient with history of osteo in same location LLE heel, s/p 6 weeks IV vanc/fortaz completed 9/22 with healing of wound  - worsening of wound with bleeding and pain past few months  - elevated CRP  - follows with podiatry outpt who had ordered a MRI but hasnt been done yet  - Podiatry consulted, appreciate recs  - MRI foot ordered  - ID consulted for abx recs  - Wound care consulted   - Wound and blood cultures ordered  - cont vanc/ceftriaxone, deescalate based on imaging/culture/ID recs  - pain control PRN

## 2023-06-08 NOTE — H&P
[facility] - Emergency Dept  [facility] Medicine  History & Physical    Patient Name: Lupis Murcia  MRN: [id number]  Patient Class: OP- Observation  Admission Date: 2023  Attending Physician: Marci Sena MD   Primary Care Provider: Bobby Tran MD         Patient information was obtained from patient and ER records.     Subjective:     Principal Problem:Non healing left heel wound    Chief Complaint:   Chief Complaint   Patient presents with    Wound Check     Wound care came to look at L heal wound reports bleeding EMS states wrapped wound bleeding controlled        HPI: Lupis Murcia is a 72 y.o. female who has a past medical history of Lymphedema, MS (multiple sclerosis), Other pulmonary embolism without acute cor pulmonale, and Vitamin B12 deficiency.     The patient presents to the ED due to L heel wound.     Patient presents with daughter, who states she has history of non-healing wound to L heel.  She was previously seen by Podiatry and Wound Care last year, and the wound healed completely after 6 weeks of IV ABX.   However, over the last few months, the wound has returned, and she reports persistent pain to the area.  No associated fever.  Wound care evaluated her today and noticed bleeding from the wound, so she was sent to the ED for evaluation.     She is attempting to establish care with [facility] as she has been dissatisfied with her previous Podiatrist.    Chronic AC with eliquis due to history of PE.   Denies fever.   Completed 6 weeks vanc/fortaz in , follows with podiatry and wound care.         Past Medical History:   Diagnosis Date    Lymphedema     MS (multiple sclerosis)     Other pulmonary embolism without acute cor pulmonale     Vitamin B12 deficiency        Past Surgical History:   Procedure Laterality Date    BREAST CYST EXCISION Left     over 40yrs ago     SECTION      ESOPHAGOGASTRODUODENOSCOPY N/A 2022    Procedure: EGD (ESOPHAGOGASTRODUODENOSCOPY);   Surgeon: Radha Arango MD;  Location: [facility] ([address] [place] [address]);  Service: Endoscopy;  Laterality: N/A;       Review of patient's allergies indicates:   Allergen Reactions    Contrast media Shortness Of Breath and Rash    Pcn [penicillins] Shortness Of Breath and Rash    Celebrex [celecoxib] Other (See Comments)     Swallowing problems     Diazepam Hives    Motrin [ibuprofen] Rash       No current facility-administered medications on file prior to encounter.     Current Outpatient Medications on File Prior to Encounter   Medication Sig    acetaminophen-codeine 300-30mg (TYLENOL #3) 300-30 mg Tab Take 1 tablet by mouth every 6 (six) hours as needed (pain).    apixaban (ELIQUIS ORAL) Take 5 mg by mouth 2 (two) times a day.    candesartan-hydrochlorothiazide (ATACAND HCT) 16-12.5 mg per tablet Take 1 tablet by mouth once daily.    traMADoL (ULTRAM) 50 mg tablet Take 1 tablet (50 mg total) by mouth every 8 (eight) hours as needed for Pain.    B-complex with vitamin C (Z-BEC OR EQUIV) tablet Take 1 tablet by mouth once daily.    clotrimazole-betamethasone 1-0.05% (LOTRISONE) cream Apply to affected area 2 times daily    cyanocobalamin 1,000 mcg/mL injection Inject 1 mL (1,000 mcg total) into the muscle every 30 days.    diphenoxylate-atropine 2.5-0.025 mg (LOMOTIL) 2.5-0.025 mg per tablet Take 1 tablet by mouth 2 (two) times daily as needed for Diarrhea (diarrhea).    furosemide (LASIX) 20 MG tablet Take 1 tablet (20 mg total) by mouth once daily.    LORazepam (ATIVAN) 0.5 MG tablet Take 1 tablet (0.5 mg total) by mouth every 8 (eight) hours as needed for Anxiety.    pantoprazole (PROTONIX) 40 MG tablet Take 1 tablet (40 mg total) by mouth once daily.    pantoprazole (PROTONIX) 40 MG tablet Take 1 tablet (40 mg total) by mouth once daily.    vitamin D (VITAMIN D3) 1000 units Tab Take 1 tablet (1,000 Units total) by mouth once daily. (Patient taking differently: Take 5,000 Units by mouth once daily.)     [DISCONTINUED] baclofen (LIORESAL) 10 MG tablet Take 1 tablet (10 mg total) by mouth 2 (two) times daily. (Patient taking differently: Take 10 mg by mouth 2 (two) times daily as needed.)    [DISCONTINUED] hydroCHLOROthiazide (HYDRODIURIL) 12.5 MG Tab Take 1 tablet (12.5 mg total) by mouth once daily.    [DISCONTINUED] miconazole (MICOTIN) 2 % cream Apply topically 2 (two) times daily. Apply to intertriginous areas, lower abdomen groin for 14 days    [DISCONTINUED] tamsulosin (FLOMAX) 0.4 mg Cap Take 1 capsule (0.4 mg total) by mouth daily with lunch.     Family History       Problem Relation (Age of Onset)    Brain cancer Brother    Coronary artery disease Father    Lung cancer Father, Mother          Tobacco Use    Smoking status: Never    Smokeless tobacco: Never   Substance and Sexual Activity    Alcohol use: No    Drug use: No    Sexual activity: Not on file     Review of Systems   Constitutional:  Positive for chills. Negative for fever.   HENT:  Negative for trouble swallowing.    Respiratory:  Negative for shortness of breath.    Cardiovascular:  Negative for chest pain.   Gastrointestinal:  Negative for abdominal pain, nausea and vomiting.   Genitourinary:  Negative for dysuria.   Musculoskeletal:  Positive for gait problem.        LLE foot pain   Skin:  Positive for wound.        Chronic skin changes bilateral LE   Neurological:  Negative for light-headedness.   Psychiatric/Behavioral:  Negative for confusion.    All other systems reviewed and are negative.  Objective:     Vital Signs (Most Recent):  Temp: 98.5 °F (36.9 °C) (06/07/23 1700)  Pulse: 81 (06/07/23 1835)  Resp: 16 (06/07/23 1835)  BP: (!) 198/93 (06/07/23 1835)  SpO2: 98 % (06/07/23 1835) Vital Signs (24h Range):  Temp:  [98.5 °F (36.9 °C)-98.9 °F (37.2 °C)] 98.5 °F (36.9 °C)  Pulse:  [79-98] 81  Resp:  [16-18] 16  SpO2:  [93 %-98 %] 98 %  BP: (160-201)/(75-93) 198/93     Weight: 87 kg (191 lb 12.8 oz)  Body mass index is 35.08 kg/m².      Physical Exam  Vitals and nursing note reviewed.   Constitutional:       General: She is not in acute distress.     Appearance: She is obese.   HENT:      Head: Normocephalic and atraumatic.      Nose: Nose normal.      Mouth/Throat:      Mouth: Mucous membranes are moist.      Pharynx: Oropharynx is clear.   Eyes:      Extraocular Movements: Extraocular movements intact.      Conjunctiva/sclera: Conjunctivae normal.      Pupils: Pupils are equal, round, and reactive to light.   Cardiovascular:      Rate and Rhythm: Normal rate and regular rhythm.      Pulses: Normal pulses.      Heart sounds: Murmur heard.      Comments: LUSB systolic murmur  Pulmonary:      Effort: Pulmonary effort is normal.      Breath sounds: Normal breath sounds. No wheezing.   Abdominal:      General: Abdomen is flat. Bowel sounds are normal.      Palpations: Abdomen is soft.      Tenderness: There is no abdominal tenderness.   Musculoskeletal:         General: Swelling present.      Cervical back: Normal range of motion and neck supple.      Right lower leg: Edema present.      Left lower leg: Edema present.        Feet:    Feet:      Comments: Oozing wound LLE plantar heel  Skin:     General: Skin is warm and dry.      Comments: Chronic lymphedema skin changes noted bilaterally    Neurological:      General: No focal deficit present.      Mental Status: She is alert and oriented to person, place, and time.   Psychiatric:         Mood and Affect: Mood normal.         Behavior: Behavior normal.            CRANIAL NERVES     CN III, IV, VI   Pupils are equal, round, and reactive to light.             Significant Labs: All pertinent labs within the past 24 hours have been reviewed.    Significant Imaging: I have reviewed all pertinent imaging results/findings within the past 24 hours.    Assessment/Plan:     * Non healing left heel wound  - patient with history of osteo in same location LLE heel, s/p 6 weeks IV vanc/fortaz completed 9/22 with  healing of wound  - worsening of wound with bleeding and pain past few months  - elevated CRP  - follows with podiatry outpt who had ordered a MRI but hasnt been done yet  - Podiatry consulted, appreciate recs  - MRI foot ordered  - ID consulted for abx recs  - Wound care consulted   - Wound and blood cultures ordered  - cont vanc/ceftriaxone, deescalate based on imaging/culture/ID recs  - pain control PRN      Aortic stenosis  - noted on ECHO 6/22, murmur on exam, monitor      History of pulmonary embolus (PE)  - noted on imaging in 6/22, on eliquis  - hold AC for potential intervention with podiatry, will resume asap   - SCDs       HTN (hypertension)  Cont home HCTZ-arb      Class 2 obesity due to excess calories in adult  Body mass index is 35.08 kg/m². Morbid obesity complicates all aspects of disease management from diagnostic modalities to treatment. Weight loss encouraged and health benefits explained to patient.         Lymphedema  - noted bilateral LE  - wound care consulted, appreciate recs  - elevate LE      Vitamin D deficiency  - cont home dose vit D      MS (multiple sclerosis)  - noted, follows with neurology, no new sxs      Impaired functional mobility, balance, gait, and endurance  - PT/OT when appropriate       Anemia of chronic disease  - noted, H/H at baseline   - bleeding at LLE wound noted  - monitor and transfuse Hgb <7        VTE Risk Mitigation (From admission, onward)         Ordered     Reason for No Pharmacological VTE Prophylaxis  Once        Question:  Reasons:  Answer:  Risk of Bleeding    06/07/23 1841     IP VTE HIGH RISK PATIENT  Once         06/07/23 1841     Place sequential compression device  Until discontinued         06/07/23 1841                   On 06/07/2023, patient should be placed in hospital observation services under my care.        Marci Sena MD  Department of Hospital Medicine  [facility] - Emergency Dept

## 2023-06-08 NOTE — SUBJECTIVE & OBJECTIVE
Past Medical History:   Diagnosis Date    Lymphedema     MS (multiple sclerosis)     Other pulmonary embolism without acute cor pulmonale     Vitamin B12 deficiency        Past Surgical History:   Procedure Laterality Date    BREAST CYST EXCISION Left     over 40yrs ago     SECTION      ESOPHAGOGASTRODUODENOSCOPY N/A 2022    Procedure: EGD (ESOPHAGOGASTRODUODENOSCOPY);  Surgeon: Radha Arango MD;  Location: [address]);  Service: Endoscopy;  Laterality: N/A;       Review of patient's allergies indicates:   Allergen Reactions    Contrast media Shortness Of Breath and Rash    Pcn [penicillins] Shortness Of Breath and Rash    Celebrex [celecoxib] Other (See Comments)     Swallowing problems     Diazepam Hives    Motrin [ibuprofen] Rash       Medications:  Medications Prior to Admission   Medication Sig    acetaminophen-codeine 300-30mg (TYLENOL #3) 300-30 mg Tab Take 1 tablet by mouth every 6 (six) hours as needed (pain).    apixaban (ELIQUIS ORAL) Take 5 mg by mouth 2 (two) times a day.    candesartan-hydrochlorothiazide (ATACAND HCT) 16-12.5 mg per tablet Take 1 tablet by mouth once daily.    cyanocobalamin 1,000 mcg/mL injection Inject 1 mL (1,000 mcg total) into the muscle every 30 days.    LORazepam (ATIVAN) 0.5 MG tablet Take 1 tablet (0.5 mg total) by mouth every 8 (eight) hours as needed for Anxiety.    [] traMADoL (ULTRAM) 50 mg tablet Take 1 tablet (50 mg total) by mouth every 8 (eight) hours as needed for Pain. (Patient taking differently: Take 50 mg by mouth nightly. AS NEEDED FOR PAIN)    vitamin D (VITAMIN D3) 1000 units Tab Take 1 tablet (1,000 Units total) by mouth once daily. (Patient taking differently: Take 5,000 Units by mouth once daily.)    B-complex with vitamin C (Z-BEC OR EQUIV) tablet Take 1 tablet by mouth once daily. (Patient not taking: Reported on 2023)     Antibiotics (From admission, onward)      Start     Stop Route Frequency Ordered    23 3607   cefTRIAXone (ROCEPHIN) 1 g in dextrose 5 % in water (D5W) 5 % 100 mL IVPB (MB+)         -- IV Every 24 hours (non-standard times) 06/07/23 1919 06/08/23 0630  vancomycin (VANCOCIN) 1,000 mg in dextrose 5 % (D5W) 250 mL IVPB (Vial-Mate)         -- IV Every 12 hours (non-standard times) 06/07/23 1903    06/07/23 1941  vancomycin - pharmacy to dose  (vancomycin IVPB)        See Hyperspace for full Linked Orders Report.    -- IV pharmacy to manage frequency 06/07/23 1843          Antifungals (From admission, onward)      None          Antivirals (From admission, onward)      None             Immunization History   Administered Date(s) Administered    COVID-19, vector-nr, rS-Ad26, PF ([employer]) 03/31/2021    PPD Test 03/31/2021, 06/17/2022       Family History       Problem Relation (Age of Onset)    Brain cancer Brother    Coronary artery disease Father    Lung cancer Father, Mother          Social History     Socioeconomic History    Marital status:    Tobacco Use    Smoking status: Never    Smokeless tobacco: Never   Substance and Sexual Activity    Alcohol use: No    Drug use: No     Social Determinants of Health     Financial Resource Strain: Low Risk     Difficulty of Paying Living Expenses: Not hard at all   Food Insecurity: No Food Insecurity    Worried About Running Out of Food in the Last Year: Never true    Ran Out of Food in the Last Year: Never true   Transportation Needs: No Transportation Needs    Lack of Transportation (Medical): No    Lack of Transportation (Non-Medical): No   Physical Activity: Insufficiently Active    Days of Exercise per Week: 2 days    Minutes of Exercise per Session: 30 min   Stress: Stress Concern Present    Feeling of Stress : To some extent   Social Connections: Socially Isolated    Frequency of Communication with Friends and Family: More than three times a week    Frequency of Social Gatherings with Friends and Family: Three times a week    Attends Shinto Services:  Never    Active Member of Clubs or Organizations: No    Attends Club or Organization Meetings: Never    Marital Status:    Housing Stability: Low Risk     Unable to Pay for Housing in the Last Year: No    Number of Places Lived in the Last Year: 1    Unstable Housing in the Last Year: No     Review of Systems   Constitutional:  Negative for chills, diaphoresis, fatigue and fever.   HENT: Negative.     Eyes: Negative.    Respiratory:  Negative for cough and shortness of breath.    Gastrointestinal:  Positive for diarrhea. Negative for constipation, nausea and vomiting.   Genitourinary:  Negative for difficulty urinating and dysuria.   Musculoskeletal:  Positive for arthralgias. Negative for myalgias.   Skin:  Positive for rash and wound.   Neurological:  Negative for dizziness and weakness.   Psychiatric/Behavioral:  Negative for agitation and confusion.    Objective:     Vital Signs (Most Recent):  Temp: 99 °F (37.2 °C) (06/08/23 1127)  Pulse: 85 (06/08/23 1127)  Resp: 18 (06/08/23 1127)  BP: (!) 144/71 (06/08/23 1127)  SpO2: 96 % (06/08/23 1127) Vital Signs (24h Range):  Temp:  [97.8 °F (36.6 °C)-99.2 °F (37.3 °C)] 99 °F (37.2 °C)  Pulse:  [79-94] 85  Resp:  [16-20] 18  SpO2:  [93 %-98 %] 96 %  BP: (135-198)/(57-93) 144/71     Weight: 87 kg (191 lb 12.8 oz)  Body mass index is 35.08 kg/m².    Estimated Creatinine Clearance: 85.6 mL/min (based on SCr of 0.6 mg/dL).     Physical Exam  Vitals reviewed.   Constitutional:       General: She is not in acute distress.     Appearance: Normal appearance. She is not ill-appearing.   HENT:      Head: Normocephalic.      Nose: Nose normal. No congestion.      Mouth/Throat:      Mouth: Mucous membranes are moist.   Eyes:      General:         Right eye: No discharge.         Left eye: No discharge.      Conjunctiva/sclera: Conjunctivae normal.   Cardiovascular:      Rate and Rhythm: Normal rate and regular rhythm.   Pulmonary:      Effort: Pulmonary effort is normal. No  respiratory distress.      Breath sounds: No stridor.   Abdominal:      General: Abdomen is flat.   Musculoskeletal:         General: Normal range of motion.      Cervical back: Normal range of motion.      Right lower leg: Edema present.      Left lower leg: Edema present.   Skin:     Findings: Erythema, lesion and rash present.      Comments: Lymphedema and venous statis rash bilateral    Neurological:      General: No focal deficit present.      Mental Status: She is alert and oriented to person, place, and time.      Motor: No weakness.      Gait: Gait normal.   Psychiatric:         Mood and Affect: Mood normal.         Behavior: Behavior normal.         Thought Content: Thought content normal.         Judgment: Judgment normal.                        Significant Labs: All pertinent labs within the past 24 hours have been reviewed.    Significant Imaging: I have reviewed all pertinent imaging results/findings within the past 24 hours.

## 2023-06-09 LAB
ANION GAP SERPL CALC-SCNC: 11 MMOL/L (ref 8–16)
BASOPHILS # BLD AUTO: 0.01 K/UL (ref 0–0.2)
BASOPHILS NFR BLD: 0.2 % (ref 0–1.9)
BUN SERPL-MCNC: 9 MG/DL (ref 8–23)
CALCIUM SERPL-MCNC: 9.6 MG/DL (ref 8.7–10.5)
CHLORIDE SERPL-SCNC: 104 MMOL/L (ref 95–110)
CO2 SERPL-SCNC: 27 MMOL/L (ref 23–29)
CREAT SERPL-MCNC: 0.7 MG/DL (ref 0.5–1.4)
CRP SERPL-MCNC: 34.1 MG/L (ref 0–8.2)
DIFFERENTIAL METHOD: ABNORMAL
EOSINOPHIL # BLD AUTO: 0.1 K/UL (ref 0–0.5)
EOSINOPHIL NFR BLD: 2.2 % (ref 0–8)
ERYTHROCYTE [DISTWIDTH] IN BLOOD BY AUTOMATED COUNT: 13.6 % (ref 11.5–14.5)
EST. GFR  (NO RACE VARIABLE): >60 ML/MIN/1.73 M^2
GLUCOSE SERPL-MCNC: 116 MG/DL (ref 70–110)
HCT VFR BLD AUTO: 32.2 % (ref 37–48.5)
HGB BLD-MCNC: 9.9 G/DL (ref 12–16)
IMM GRANULOCYTES # BLD AUTO: 0.03 K/UL (ref 0–0.04)
IMM GRANULOCYTES NFR BLD AUTO: 0.5 % (ref 0–0.5)
LEFT ABI: 1.07
LEFT ARM BP: 149 MMHG
LEFT DORSALIS PEDIS: 159 MMHG
LEFT POSTERIOR TIBIAL: 147 MMHG
LYMPHOCYTES # BLD AUTO: 1.4 K/UL (ref 1–4.8)
LYMPHOCYTES NFR BLD: 22.3 % (ref 18–48)
MCH RBC QN AUTO: 28 PG (ref 27–31)
MCHC RBC AUTO-ENTMCNC: 30.7 G/DL (ref 32–36)
MCV RBC AUTO: 91 FL (ref 82–98)
MONOCYTES # BLD AUTO: 0.7 K/UL (ref 0.3–1)
MONOCYTES NFR BLD: 10.7 % (ref 4–15)
NEUTROPHILS # BLD AUTO: 4.1 K/UL (ref 1.8–7.7)
NEUTROPHILS NFR BLD: 64.1 % (ref 38–73)
NRBC BLD-RTO: 0 /100 WBC
PLATELET # BLD AUTO: 222 K/UL (ref 150–450)
PMV BLD AUTO: 10.2 FL (ref 9.2–12.9)
POTASSIUM SERPL-SCNC: 3.3 MMOL/L (ref 3.5–5.1)
RBC # BLD AUTO: 3.54 M/UL (ref 4–5.4)
RIGHT ABI: 0.97
RIGHT DORSALIS PEDIS: 109 MMHG
RIGHT POSTERIOR TIBIAL: 145 MMHG
SODIUM SERPL-SCNC: 142 MMOL/L (ref 136–145)
VANCOMYCIN TROUGH SERPL-MCNC: 12.8 UG/ML (ref 10–22)
WBC # BLD AUTO: 6.38 K/UL (ref 3.9–12.7)

## 2023-06-09 PROCEDURE — 97161 PT EVAL LOW COMPLEX 20 MIN: CPT

## 2023-06-09 PROCEDURE — 97530 THERAPEUTIC ACTIVITIES: CPT

## 2023-06-09 PROCEDURE — 86140 C-REACTIVE PROTEIN: CPT | Performed by: STUDENT IN AN ORGANIZED HEALTH CARE EDUCATION/TRAINING PROGRAM

## 2023-06-09 PROCEDURE — 63600175 PHARM REV CODE 636 W HCPCS: Performed by: STUDENT IN AN ORGANIZED HEALTH CARE EDUCATION/TRAINING PROGRAM

## 2023-06-09 PROCEDURE — 25000003 PHARM REV CODE 250: Performed by: STUDENT IN AN ORGANIZED HEALTH CARE EDUCATION/TRAINING PROGRAM

## 2023-06-09 PROCEDURE — 80202 ASSAY OF VANCOMYCIN: CPT | Performed by: STUDENT IN AN ORGANIZED HEALTH CARE EDUCATION/TRAINING PROGRAM

## 2023-06-09 PROCEDURE — 96366 THER/PROPH/DIAG IV INF ADDON: CPT

## 2023-06-09 PROCEDURE — 99214 OFFICE O/P EST MOD 30 MIN: CPT | Mod: ,,, | Performed by: REGISTERED NURSE

## 2023-06-09 PROCEDURE — G0378 HOSPITAL OBSERVATION PER HR: HCPCS

## 2023-06-09 PROCEDURE — 85025 COMPLETE CBC W/AUTO DIFF WBC: CPT | Performed by: STUDENT IN AN ORGANIZED HEALTH CARE EDUCATION/TRAINING PROGRAM

## 2023-06-09 PROCEDURE — 99232 PR SUBSEQUENT HOSPITAL CARE,LEVL II: ICD-10-PCS | Mod: ,,, | Performed by: STUDENT IN AN ORGANIZED HEALTH CARE EDUCATION/TRAINING PROGRAM

## 2023-06-09 PROCEDURE — 99232 SBSQ HOSP IP/OBS MODERATE 35: CPT | Mod: ,,, | Performed by: STUDENT IN AN ORGANIZED HEALTH CARE EDUCATION/TRAINING PROGRAM

## 2023-06-09 PROCEDURE — 80048 BASIC METABOLIC PNL TOTAL CA: CPT | Performed by: STUDENT IN AN ORGANIZED HEALTH CARE EDUCATION/TRAINING PROGRAM

## 2023-06-09 PROCEDURE — 99214 PR OFFICE/OUTPT VISIT, EST, LEVL IV, 30-39 MIN: ICD-10-PCS | Mod: ,,, | Performed by: REGISTERED NURSE

## 2023-06-09 PROCEDURE — 36415 COLL VENOUS BLD VENIPUNCTURE: CPT | Performed by: STUDENT IN AN ORGANIZED HEALTH CARE EDUCATION/TRAINING PROGRAM

## 2023-06-09 RX ORDER — POTASSIUM CHLORIDE 20 MEQ/1
20 TABLET, EXTENDED RELEASE ORAL ONCE
Status: COMPLETED | OUTPATIENT
Start: 2023-06-09 | End: 2023-06-09

## 2023-06-09 RX ADMIN — VANCOMYCIN HYDROCHLORIDE 1000 MG: 1 INJECTION, POWDER, LYOPHILIZED, FOR SOLUTION INTRAVENOUS at 06:06

## 2023-06-09 RX ADMIN — TRAMADOL HYDROCHLORIDE 50 MG: 50 TABLET, COATED ORAL at 06:06

## 2023-06-09 RX ADMIN — CHOLECALCIFEROL TAB 125 MCG (5000 UNIT) 5000 UNITS: 125 TAB at 09:06

## 2023-06-09 RX ADMIN — POTASSIUM CHLORIDE 20 MEQ: 1500 TABLET, EXTENDED RELEASE ORAL at 09:06

## 2023-06-09 RX ADMIN — CEFTRIAXONE 1 G: 1 INJECTION, POWDER, FOR SOLUTION INTRAMUSCULAR; INTRAVENOUS at 07:06

## 2023-06-09 RX ADMIN — ACETAMINOPHEN AND CODEINE PHOSPHATE 1 TABLET: 300; 30 TABLET ORAL at 08:06

## 2023-06-09 RX ADMIN — Medication 1 TABLET: at 09:06

## 2023-06-09 RX ADMIN — APIXABAN 5 MG: 5 TABLET, FILM COATED ORAL at 09:06

## 2023-06-09 RX ADMIN — APIXABAN 5 MG: 5 TABLET, FILM COATED ORAL at 08:06

## 2023-06-09 RX ADMIN — VANCOMYCIN HYDROCHLORIDE 1000 MG: 1 INJECTION, POWDER, LYOPHILIZED, FOR SOLUTION INTRAVENOUS at 08:06

## 2023-06-09 RX ADMIN — ACETAMINOPHEN AND CODEINE PHOSPHATE 1 TABLET: 300; 30 TABLET ORAL at 01:06

## 2023-06-09 RX ADMIN — LORAZEPAM 0.5 MG: 0.5 TABLET ORAL at 06:06

## 2023-06-09 RX ADMIN — Medication 1 CAPSULE: at 08:06

## 2023-06-09 NOTE — SUBJECTIVE & OBJECTIVE
Past Medical History:   Diagnosis Date    Lymphedema     MS (multiple sclerosis)     Other pulmonary embolism without acute cor pulmonale     Vitamin B12 deficiency        Past Surgical History:   Procedure Laterality Date    BREAST CYST EXCISION Left     over 40yrs ago     SECTION      ESOPHAGOGASTRODUODENOSCOPY N/A 2022    Procedure: EGD (ESOPHAGOGASTRODUODENOSCOPY);  Surgeon: Radha Arango MD;  Location: [address]);  Service: Endoscopy;  Laterality: N/A;       Review of patient's allergies indicates:   Allergen Reactions    Contrast media Shortness Of Breath and Rash    Pcn [penicillins] Shortness Of Breath and Rash    Celebrex [celecoxib] Other (See Comments)     Swallowing problems     Diazepam Hives    Motrin [ibuprofen] Rash       Medications:  Medications Prior to Admission   Medication Sig    acetaminophen-codeine 300-30mg (TYLENOL #3) 300-30 mg Tab Take 1 tablet by mouth every 6 (six) hours as needed (pain).    apixaban (ELIQUIS ORAL) Take 5 mg by mouth 2 (two) times a day.    candesartan-hydrochlorothiazide (ATACAND HCT) 16-12.5 mg per tablet Take 1 tablet by mouth once daily.    cyanocobalamin 1,000 mcg/mL injection Inject 1 mL (1,000 mcg total) into the muscle every 30 days.    LORazepam (ATIVAN) 0.5 MG tablet Take 1 tablet (0.5 mg total) by mouth every 8 (eight) hours as needed for Anxiety.    [] traMADoL (ULTRAM) 50 mg tablet Take 1 tablet (50 mg total) by mouth every 8 (eight) hours as needed for Pain. (Patient taking differently: Take 50 mg by mouth nightly. AS NEEDED FOR PAIN)    vitamin D (VITAMIN D3) 1000 units Tab Take 1 tablet (1,000 Units total) by mouth once daily. (Patient taking differently: Take 5,000 Units by mouth once daily.)    B-complex with vitamin C (Z-BEC OR EQUIV) tablet Take 1 tablet by mouth once daily. (Patient not taking: Reported on 2023)     Antibiotics (From admission, onward)      Start     Stop Route Frequency Ordered    23 3952   cefTRIAXone (ROCEPHIN) 1 g in dextrose 5 % in water (D5W) 5 % 100 mL IVPB (MB+)         -- IV Every 24 hours (non-standard times) 06/07/23 1919 06/08/23 0630  vancomycin (VANCOCIN) 1,000 mg in dextrose 5 % (D5W) 250 mL IVPB (Vial-Mate)         -- IV Every 12 hours (non-standard times) 06/07/23 1903    06/07/23 1941  vancomycin - pharmacy to dose  (vancomycin IVPB)        See Hyperspace for full Linked Orders Report.    -- IV pharmacy to manage frequency 06/07/23 1843          Antifungals (From admission, onward)      None          Antivirals (From admission, onward)      None             Immunization History   Administered Date(s) Administered    COVID-19, vector-nr, rS-Ad26, PF ([employer]) 03/31/2021    PPD Test 03/31/2021, 06/17/2022       Family History       Problem Relation (Age of Onset)    Brain cancer Brother    Coronary artery disease Father    Lung cancer Father, Mother          Social History     Socioeconomic History    Marital status:    Tobacco Use    Smoking status: Never    Smokeless tobacco: Never   Substance and Sexual Activity    Alcohol use: No    Drug use: No     Social Determinants of Health     Financial Resource Strain: Low Risk     Difficulty of Paying Living Expenses: Not hard at all   Food Insecurity: No Food Insecurity    Worried About Running Out of Food in the Last Year: Never true    Ran Out of Food in the Last Year: Never true   Transportation Needs: No Transportation Needs    Lack of Transportation (Medical): No    Lack of Transportation (Non-Medical): No   Physical Activity: Insufficiently Active    Days of Exercise per Week: 2 days    Minutes of Exercise per Session: 30 min   Stress: Stress Concern Present    Feeling of Stress : To some extent   Social Connections: Socially Isolated    Frequency of Communication with Friends and Family: More than three times a week    Frequency of Social Gatherings with Friends and Family: Three times a week    Attends Church Services:  Never    Active Member of Clubs or Organizations: No    Attends Club or Organization Meetings: Never    Marital Status:    Housing Stability: Low Risk     Unable to Pay for Housing in the Last Year: No    Number of Places Lived in the Last Year: 1    Unstable Housing in the Last Year: No     Review of Systems   Constitutional:  Negative for chills, diaphoresis, fatigue and fever.   HENT: Negative.     Eyes: Negative.    Respiratory:  Negative for cough and shortness of breath.    Gastrointestinal:  Positive for diarrhea. Negative for constipation, nausea and vomiting.   Genitourinary:  Negative for difficulty urinating and dysuria.   Musculoskeletal:  Positive for arthralgias. Negative for myalgias.   Skin:  Positive for rash and wound.   Neurological:  Negative for dizziness and weakness.   Psychiatric/Behavioral:  Negative for agitation and confusion.    Objective:     Vital Signs (Most Recent):  Temp: 98.7 °F (37.1 °C) (06/09/23 1129)  Pulse: 91 (06/09/23 1129)  Resp: 20 (06/09/23 1336)  BP: (!) 168/79 (06/09/23 1129)  SpO2: 95 % (06/09/23 1129) Vital Signs (24h Range):  Temp:  [98.3 °F (36.8 °C)-99.1 °F (37.3 °C)] 98.7 °F (37.1 °C)  Pulse:  [81-91] 91  Resp:  [18-20] 20  SpO2:  [93 %-95 %] 95 %  BP: (130-169)/(75-94) 168/79     Weight: 100.7 kg (222 lb 0.1 oz)  Body mass index is 40.6 kg/m².    Estimated Creatinine Clearance: 79.4 mL/min (based on SCr of 0.7 mg/dL).     Physical Exam  Vitals reviewed.   Constitutional:       General: She is not in acute distress.     Appearance: Normal appearance. She is not ill-appearing.   HENT:      Head: Normocephalic.      Nose: Nose normal. No congestion.      Mouth/Throat:      Mouth: Mucous membranes are moist.   Eyes:      General:         Right eye: No discharge.         Left eye: No discharge.      Conjunctiva/sclera: Conjunctivae normal.   Cardiovascular:      Rate and Rhythm: Normal rate and regular rhythm.   Pulmonary:      Effort: Pulmonary effort is  normal. No respiratory distress.      Breath sounds: No stridor.   Abdominal:      General: Abdomen is flat.   Musculoskeletal:         General: Normal range of motion.      Cervical back: Normal range of motion.      Right lower leg: Edema present.      Left lower leg: Edema present.   Skin:     Findings: Erythema, lesion and rash present.      Comments: Lymphedema and venous statis rash bilateral    Neurological:      General: No focal deficit present.      Mental Status: She is alert and oriented to person, place, and time.      Motor: No weakness.      Gait: Gait normal.   Psychiatric:         Mood and Affect: Mood normal.         Behavior: Behavior normal.         Thought Content: Thought content normal.         Judgment: Judgment normal.                        Significant Labs: All pertinent labs within the past 24 hours have been reviewed.    Significant Imaging: I have reviewed all pertinent imaging results/findings within the past 24 hours.

## 2023-06-09 NOTE — SUBJECTIVE & OBJECTIVE
Scheduled Meds:   apixaban  5 mg Oral BID    B-complex with vitamin C  1 tablet Oral Daily    cefTRIAXone (ROCEPHIN) IVPB  1 g Intravenous Q24H    vitamin D  5,000 Units Oral Daily    losartan-hydrochlorothiazide 50-12.5 mg  1 tablet Oral Daily    vancomycin (VANCOCIN) IVPB  1,000 mg Intravenous Q12H     Continuous Infusions:  PRN Meds:acetaminophen-codeine 300-30mg, aluminum-magnesium hydroxide-simethicone, bisacodyL, glucagon (human recombinant), insulin aspart U-100, LORazepam, melatonin, naloxone, ondansetron, polyethylene glycol, prochlorperazine, simethicone, sodium chloride 0.9%, traMADoL, Pharmacy to dose Vancomycin consult **AND** vancomycin - pharmacy to dose    Review of patient's allergies indicates:   Allergen Reactions    Contrast media Shortness Of Breath and Rash    Pcn [penicillins] Shortness Of Breath and Rash    Celebrex [celecoxib] Other (See Comments)     Swallowing problems     Diazepam Hives    Motrin [ibuprofen] Rash        Past Medical History:   Diagnosis Date    Lymphedema     MS (multiple sclerosis)     Other pulmonary embolism without acute cor pulmonale     Vitamin B12 deficiency      Past Surgical History:   Procedure Laterality Date    BREAST CYST EXCISION Left     over 40yrs ago     SECTION      ESOPHAGOGASTRODUODENOSCOPY N/A 2022    Procedure: EGD (ESOPHAGOGASTRODUODENOSCOPY);  Surgeon: Radha Arango MD;  Location: [address];  Service: Endoscopy;  Laterality: N/A;       Family History       Problem Relation (Age of Onset)    Brain cancer Brother    Coronary artery disease Father    Lung cancer Father, Mother          Tobacco Use    Smoking status: Never    Smokeless tobacco: Never   Substance and Sexual Activity    Alcohol use: No    Drug use: No    Sexual activity: Not on file     Review of Systems   Constitutional:  Negative for chills, diaphoresis, fatigue and fever.   HENT: Negative.     Eyes: Negative.    Respiratory:  Negative for cough and shortness  of breath.    Gastrointestinal:  Negative for constipation, nausea and vomiting.   Genitourinary:  Negative for difficulty urinating and dysuria.   Musculoskeletal:  Positive for arthralgias. Negative for myalgias.   Skin:  Positive for rash (stasis dermatitis) and wound.   Neurological:  Negative for dizziness and weakness.   Psychiatric/Behavioral:  Negative for agitation and confusion.    Objective:     Vital Signs (Most Recent):  Temp: 98.9 °F (37.2 °C) (06/08/23 1533)  Pulse: 81 (06/08/23 1533)  Resp: 20 (06/08/23 1839)  BP: (!) 160/94 (06/08/23 1533)  SpO2: 95 % (06/08/23 1533) Vital Signs (24h Range):  Temp:  [97.8 °F (36.6 °C)-99.2 °F (37.3 °C)] 98.9 °F (37.2 °C)  Pulse:  [81-94] 81  Resp:  [18-20] 20  SpO2:  [95 %-96 %] 95 %  BP: (135-190)/(57-94) 160/94     Weight: 87 kg (191 lb 12.8 oz)  Body mass index is 35.08 kg/m².    Foot Exam    General  General Appearance: appears stated age and healthy   Orientation: alert and oriented to person, place, and time       Right Foot/Ankle     Inspection and Palpation  Ecchymosis: none  Tenderness: none   Swelling: (+3 edema LE)  Skin Exam: ulcer; no drainage, skin not intact, no cellulitis, no abnormal color and no erythema     Neurovascular  Dorsalis pedis: 1+  Posterior tibial: 1+  Saphenous nerve sensation: normal  Tibial nerve sensation: normal  Superficial peroneal nerve sensation: normal  Deep peroneal nerve sensation: normal  Sural nerve sensation: normal    Comments  Small stable heel wound now with dry stable scab, no signs of infected, patient and daughter note redness and edema has resolved since starting IV Abx.     Stasis dermatitis     Left Foot/Ankle      Inspection and Palpation  Ecchymosis: none  Tenderness: none   Swelling: (+3 edema LE)  Skin Exam: no drainage, skin not intact, no cellulitis, no abnormal color, no ulcer and no erythema     Neurovascular  Dorsalis pedis: 1+  Posterior tibial: 1+  Saphenous nerve sensation: normal  Tibial nerve  sensation: normal  Superficial peroneal nerve sensation: normal  Deep peroneal nerve sensation: normal  Sural nerve sensation: normal    Comments  Stasis dermatitis     Laboratory:  A1C:   Recent Labs   Lab 06/08/23  0549   HGBA1C 5.8*     CBC:   Recent Labs   Lab 06/08/23  0549   WBC 6.50   RBC 3.53*   HGB 9.8*   HCT 30.8*      MCV 87   MCH 27.8   MCHC 31.8*     CMP:   Recent Labs   Lab 06/07/23  1441 06/08/23  0549    121*   CALCIUM 9.6 10.0   ALBUMIN 3.5  --    PROT 6.9  --     140   K 3.8 3.5   CO2 29 27    103   BUN 13 9   CREATININE 0.7 0.6   ALKPHOS 67  --    ALT 5*  --    AST 8*  --    BILITOT 0.3  --      CRP:   Recent Labs   Lab 06/07/23  1441   CRP 19.4*     All pertinent labs reviewed within the last 24 hours.    Diagnostic Results:  I have reviewed all pertinent imaging results/findings within the past 24 hours.    X ray and MRI of the left foot without concerns for osteomyelitis or drainable fluid collection.     Clinical Findings:

## 2023-06-09 NOTE — ASSESSMENT & PLAN NOTE
Wound appears to be healing, no bleeding, dry stable scab. Subjective improvement by patient and daughter on IV ABx. Imaging studies without concern for deep space infection. Suspect SSTI.     No surgical intervention indicated   Recommend transition to PO Abx at discharge tailored to prior culture at , discussed with ID agree with doxy and other with pseudomonal coverage   Patient will benefit from compression dressings   Recommend bilateral 2 layer calamine compression dressing, currently applied Kerlix/cast padding/ and ace with compression. Will defer to wound care, consulted per primary, and assistance greatly appreciated.   Patient okay to d/c once abx plan in place and PT eval   PT/OT   WBAT with b/l shweta shoes   May consider topical corticosteroid once STTI has resolved for stasis dermatitis  Patient needs to offload heels while in bed and recliner, nurse bedside to obtained heel offloading boots, appreciated   Podiatry will sign off, thank you for your consult. Please do not hesitate to reach out with question or concerns or re consult prn

## 2023-06-09 NOTE — CONSULTS
[facility] ([place] [id number])  Wound Care    Patient Name:  Lupis Murcia   MRN:  [id number]  Date: 6/9/2023  Diagnosis: Non healing left heel wound    History:     Past Medical History:   Diagnosis Date    Lymphedema     MS (multiple sclerosis)     Other pulmonary embolism without acute cor pulmonale     Vitamin B12 deficiency        Social History     Socioeconomic History    Marital status:    Tobacco Use    Smoking status: Never    Smokeless tobacco: Never   Substance and Sexual Activity    Alcohol use: No    Drug use: No     Social Determinants of Health     Financial Resource Strain: Low Risk     Difficulty of Paying Living Expenses: Not hard at all   Food Insecurity: No Food Insecurity    Worried About Running Out of Food in the Last Year: Never true    Ran Out of Food in the Last Year: Never true   Transportation Needs: No Transportation Needs    Lack of Transportation (Medical): No    Lack of Transportation (Non-Medical): No   Physical Activity: Insufficiently Active    Days of Exercise per Week: 2 days    Minutes of Exercise per Session: 30 min   Stress: Stress Concern Present    Feeling of Stress : To some extent   Social Connections: Socially Isolated    Frequency of Communication with Friends and Family: More than three times a week    Frequency of Social Gatherings with Friends and Family: Three times a week    Attends Sikhism Services: Never    Active Member of Clubs or Organizations: No    Attends Club or Organization Meetings: Never    Marital Status:    Housing Stability: Low Risk     Unable to Pay for Housing in the Last Year: No    Number of Places Lived in the Last Year: 1    Unstable Housing in the Last Year: No       Precautions:     Allergies as of 06/07/2023 - Reviewed 06/07/2023   Allergen Reaction Noted    Contrast media Shortness Of Breath and Rash 12/15/2016    Pcn [penicillins] Shortness Of Breath and Rash 07/10/2013    Celebrex [celecoxib] Other (See  Comments) 06/12/2017    Diazepam Hives 10/12/2021    Motrin [ibuprofen] Rash 07/10/2013       [facility] Assessment Details/Treatment   Wound care consult received for the BLE.  The BLE are intact, and dry with swelling due to lymphedema. No open skin noted at this time. Podiatry following for the left heel. Unable to apply compression wraps due to pending ABIMD niles aware. Will follow-up.     06/09/2023

## 2023-06-09 NOTE — PLAN OF CARE
Problem: Physical Therapy  Goal: Physical Therapy Goal  Description: PT goals to be met by: 6/29/23    Patient will perform rolling each way with min A.   Patient will perform supine <> sitting with min A.  Patient will perform sit <> stand transitions with max A using LRAD.  Patient will perform transfers from bed <> chair or BSC with max A using LRAD.  Outcome: Ongoing, Progressing

## 2023-06-09 NOTE — PT/OT/SLP EVAL
Physical Therapy Evaluation    Patient Name:  Lupis Murcia   MRN:  [id number]  Admit Date: 6/7/2023  Admitting Diagnosis:  Non healing left heel wound  Length of Stay: 0 days  Recent Surgery: * No surgery found *      Recommendations:     Discharge Recommendations:  other (see comments)   Discharge Equipment Recommendations: hospital bed   Barriers to discharge: None    Highest Level of Mobility: bed mobility   Assistance Needed: maximum assistance    Assessment:     Lupis Murcia is a 72 y.o. female admitted with a medical diagnosis of Non healing left heel wound. Medical history includes MS and lymphedema. She is most limited by weakness. Today, she was able to perform bed mobility and sitting edge of bed. Based on clinical presentation, co-morbidities, and today's performance, patient would benefit from acute skilled physical therapy services to improve functional mobility and return to max capacity prior level of function. See detailed evaluation below:    Problem List: weakness, impaired endurance, impaired self care skills, impaired functional mobility, gait instability, impaired balance, decreased lower extremity function, pain, decreased ROM, impaired skin  Rehab Prognosis: Good; patient would benefit from acute skilled PT services to address these deficits and reach maximum level of function.      Plan:     During this hospitalization, patient to be seen 3 x/week to address the identified rehab impairments via gait training, therapeutic activities, therapeutic exercises, neuromuscular re-education and progress towards the established goals.    Plan of Care Expires:  07/09/23    Subjective   Communicated with RN prior to session.  Patient found HOB elevated upon PT entry to room, agreeable to evaluation.     Chief Complaint: Wound Check (Wound care came to look at L heal wound reports bleeding EMS states wrapped wound bleeding controlled)    Patient/Family Comments/goals: to get  "better  Pain/Comfort:  Pain Rating 1:  (did not rate)  Location - Side 1: Right  Location - Orientation 1: generalized  Location 1: flank  Pain Addressed 1: Reposition, Distraction, Cessation of Activity, Nurse notified  Pain Rating Post-Intervention 1:  (did not rate)    Living Environment:  Patient lives with her daughter in a single story home with a ramped entrance. She has had a functional decline over the past few months - prior to decline she was able to walk short distances. Recently, she has been able to perform bed mobility and standing transfers.     Prior Level of Function:   Patient requires with mobility & with ADLs. Patient owns DME as follows: wheelchair, walker, rolling, shower chair, bedside commode, grab bar (lift chair).     Patient reports they will have assistance from her daughter upon discharge.    Objective:   Patient found with: peripheral IV     General Precautions: Standard, fall   Orthopedic Precautions:N/A   Braces: N/A   Oxygen Device: Room Air  Vitals: BP (!) 159/77 (BP Location: Left arm, Patient Position: Lying)   Pulse 88   Temp 99.1 °F (37.3 °C) (Oral)   Resp 18   Ht 5' 2" (1.575 m)   Wt 100.7 kg (222 lb 0.1 oz)   LMP  (LMP Unknown)   SpO2 (!) 94%   Breastfeeding No   BMI 40.60 kg/m²     Exams:  Cognition:   Alert and Cooperative  AxOx4  Command following: Follows multistep  commands  Fluency: clear/fluent  Hearing: Intact  Vision:  Intact visual fields  Skin Integrity: bilateral lower leges wrapped; L heel wound  Sensation: intact  Coordination: intact  LE Strength:  L Lower Extremity: grossly 1/5 + foot drop  R Lower Extremity: grossly 2+/5  LE ROM:  L Lower Extremity: WFL  R Lower Extremity: WFL      Outcome Measures:  AM-PAC 6 CLICK MOBILITY  Turning over in bed (including adjusting bedclothes, sheets and blankets)?: 2  Sitting down on and standing up from a chair with arms (e.g., wheelchair, bedside commode, etc.): 1  Moving from lying on back to sitting on the side " of the bed?: 2  Moving to and from a bed to a chair (including a wheelchair)?: 1  Need to walk in hospital room?: 1  Climbing 3-5 steps with a railing?: 1  Basic Mobility Total Score: 8     Functional Mobility:    Bed Mobility:  Supine to Sit: maximal assistance  Scooting anteriorly to EOB to have both feet planted on floor: maximal assistance  Sit to Supine: maximal assistance    Sitting Balance at Edge of Bed:  Assistance Level Required: minimal - stand by assistance  Postural deviations noted: rounded shoulders, forward head, posterior pelvic tilt, posterior lean    Therapeutic Exercises:   Patient educated on and demonstrated lower extremity strengthening exercises to perform throughout the day.    Education:   Patient was educated on the following:  Role of PT, plan of care, and goals  In room safety and use of call button  Importance of continued upright mobility and exercise  Balancing rest and activity      Patient left HOB elevated with all lines intact, call button in reach, and RN notified.    GOALS:   Multidisciplinary Problems       Physical Therapy Goals          Problem: Physical Therapy    Goal Priority Disciplines Outcome Goal Variances Interventions   Physical Therapy Goal     PT, PT/OT Ongoing, Progressing     Description: PT goals to be met by: 23    Patient will perform rolling each way with min A.   Patient will perform supine <> sitting with min A.  Patient will perform sit <> stand transitions with max A using LRAD.  Patient will perform transfers from bed <> chair or BSC with max A using LRAD.                       History:     Past Medical History:   Diagnosis Date    Lymphedema     MS (multiple sclerosis)     Other pulmonary embolism without acute cor pulmonale     Vitamin B12 deficiency        Past Surgical History:   Procedure Laterality Date    BREAST CYST EXCISION Left     over 40yrs ago     SECTION      ESOPHAGOGASTRODUODENOSCOPY N/A 2022    Procedure: EGD  (ESOPHAGOGASTRODUODENOSCOPY);  Surgeon: Radha Arango MD;  Location: [facility] ([address] [place] [address]);  Service: Endoscopy;  Laterality: N/A;       Time Tracking:     PT Received On: 06/09/23  PT Start Time: 1000     PT Stop Time: 1020  PT Total Time (min): 20 min     Billable Minutes: Evaluation 10 and Therapeutic Activity 10    Zeina Bosch, PT, DPT  6/9/2023

## 2023-06-09 NOTE — PROGRESS NOTES
[facility] ([place] [address])  Infectious Disease  Progress Note    Patient Name: Lupis Murcia  MRN: [id number]  Admission Date: 6/7/2023  Length of Stay: 0 days  Attending Physician: Marci Sena MD  Primary Care Provider: Bobby Tran MD    Isolation Status: No active isolations  Assessment/Plan:      Orthopedic  * Non healing left heel wound   72 y.o. female who has a past medical history of Lymphedema, MS (multiple sclerosis), PE, and vitamin B12 deficiency who presented to [facility] [place] with CC of left heel wound.       History of treated osteomyelitis of left heel. Care received from [facility]. See HPI.      Pt denies fever, or systemic s/s infection. Blood cultures NGTD. MRI left foot without evidence of osteomyelitis, no abscess noted.      Pt is currently on vanc/CTX. ID was cf abx recs.     Recommendations:   - Okay to continue CTX/vanc while inpatient. Inpatient pharmacy to dose vanc.   - Upon discharge for, transition to ciprofloxacin/doxycycline x14d for SSTI   - will need ID follow up. ID will schedule   - Plan reviewed with ID staff. ID will sign off.      Thank you for your consult. I will sign off. Please contact us if you have any additional questions.    Ewelina Briceno NP  Infectious Disease  [facility] - Intensive Care ([place] [address])    Subjective:     Principal Problem:Non healing left heel wound    HPI: Ms. Murcia is a 72 y.o. female who has a past medical history of Lymphedema, MS (multiple sclerosis), PE, and vitamin B12 deficiency who presented to [facility] [place] with CC of left heel wound.      Per daughter at bedside, pt follows with podiatry/ID at [facility] at OSH. 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 Daughter states she received x6 weeks of IV abx (vancomycin and Ceftazidime). States that by Dec 2020, her heel was healed. Reports following, she had intermittent pain, but the wound opened recently and began to drain again.     Pt denies fever, or  systemic s/s infection. Blood cultures NGTD. MRI left foot without evidence of osteomyelitis, no abscess noted.     Pt is currently on vanc/CTX. ID was cf abx recs.     Past Medical History:   Diagnosis Date    Lymphedema     MS (multiple sclerosis)     Other pulmonary embolism without acute cor pulmonale     Vitamin B12 deficiency        Past Surgical History:   Procedure Laterality Date    BREAST CYST EXCISION Left     over 40yrs ago     SECTION      ESOPHAGOGASTRODUODENOSCOPY N/A 2022    Procedure: EGD (ESOPHAGOGASTRODUODENOSCOPY);  Surgeon: Radha Arango MD;  Location: [address]);  Service: Endoscopy;  Laterality: N/A;       Review of patient's allergies indicates:   Allergen Reactions    Contrast media Shortness Of Breath and Rash    Pcn [penicillins] Shortness Of Breath and Rash    Celebrex [celecoxib] Other (See Comments)     Swallowing problems     Diazepam Hives    Motrin [ibuprofen] Rash       Medications:  Medications Prior to Admission   Medication Sig    acetaminophen-codeine 300-30mg (TYLENOL #3) 300-30 mg Tab Take 1 tablet by mouth every 6 (six) hours as needed (pain).    apixaban (ELIQUIS ORAL) Take 5 mg by mouth 2 (two) times a day.    candesartan-hydrochlorothiazide (ATACAND HCT) 16-12.5 mg per tablet Take 1 tablet by mouth once daily.    cyanocobalamin 1,000 mcg/mL injection Inject 1 mL (1,000 mcg total) into the muscle every 30 days.    LORazepam (ATIVAN) 0.5 MG tablet Take 1 tablet (0.5 mg total) by mouth every 8 (eight) hours as needed for Anxiety.    [] traMADoL (ULTRAM) 50 mg tablet Take 1 tablet (50 mg total) by mouth every 8 (eight) hours as needed for Pain. (Patient taking differently: Take 50 mg by mouth nightly. AS NEEDED FOR PAIN)    vitamin D (VITAMIN D3) 1000 units Tab Take 1 tablet (1,000 Units total) by mouth once daily. (Patient taking differently: Take 5,000 Units by mouth once daily.)    B-complex with vitamin C (Z-BEC OR EQUIV)  tablet Take 1 tablet by mouth once daily. (Patient not taking: Reported on 6/7/2023)     Antibiotics (From admission, onward)      Start     Stop Route Frequency Ordered    06/08/23 1730  cefTRIAXone (ROCEPHIN) 1 g in dextrose 5 % in water (D5W) 5 % 100 mL IVPB (MB+)         -- IV Every 24 hours (non-standard times) 06/07/23 1919    06/08/23 0630  vancomycin (VANCOCIN) 1,000 mg in dextrose 5 % (D5W) 250 mL IVPB (Vial-Mate)         -- IV Every 12 hours (non-standard times) 06/07/23 1903    06/07/23 1941  vancomycin - pharmacy to dose  (vancomycin IVPB)        See Hyperspace for full Linked Orders Report.    -- IV pharmacy to manage frequency 06/07/23 1843          Antifungals (From admission, onward)      None          Antivirals (From admission, onward)      None             Immunization History   Administered Date(s) Administered    COVID-19, vector-nr, rS-Ad26, PF ([employer]) 03/31/2021    PPD Test 03/31/2021, 06/17/2022       Family History       Problem Relation (Age of Onset)    Brain cancer Brother    Coronary artery disease Father    Lung cancer Father, Mother          Social History     Socioeconomic History    Marital status:    Tobacco Use    Smoking status: Never    Smokeless tobacco: Never   Substance and Sexual Activity    Alcohol use: No    Drug use: No     Social Determinants of Health     Financial Resource Strain: Low Risk     Difficulty of Paying Living Expenses: Not hard at all   Food Insecurity: No Food Insecurity    Worried About Running Out of Food in the Last Year: Never true    Ran Out of Food in the Last Year: Never true   Transportation Needs: No Transportation Needs    Lack of Transportation (Medical): No    Lack of Transportation (Non-Medical): No   Physical Activity: Insufficiently Active    Days of Exercise per Week: 2 days    Minutes of Exercise per Session: 30 min   Stress: Stress Concern Present    Feeling of Stress : To some extent   Social Connections: Socially  Isolated    Frequency of Communication with Friends and Family: More than three times a week    Frequency of Social Gatherings with Friends and Family: Three times a week    Attends [employer] Services: Never    Active Member of Clubs or Organizations: No    Attends Club or Organization Meetings: Never    Marital Status:    Housing Stability: Low Risk     Unable to Pay for Housing in the Last Year: No    Number of Places Lived in the Last Year: 1    Unstable Housing in the Last Year: No     Review of Systems   Constitutional:  Negative for chills, diaphoresis, fatigue and fever.   HENT: Negative.     Eyes: Negative.    Respiratory:  Negative for cough and shortness of breath.    Gastrointestinal:  Positive for diarrhea. Negative for constipation, nausea and vomiting.   Genitourinary:  Negative for difficulty urinating and dysuria.   Musculoskeletal:  Positive for arthralgias. Negative for myalgias.   Skin:  Positive for rash and wound.   Neurological:  Negative for dizziness and weakness.   Psychiatric/Behavioral:  Negative for agitation and confusion.    Objective:     Vital Signs (Most Recent):  Temp: 98.7 °F (37.1 °C) (06/09/23 1129)  Pulse: 91 (06/09/23 1129)  Resp: 20 (06/09/23 1336)  BP: (!) 168/79 (06/09/23 1129)  SpO2: 95 % (06/09/23 1129) Vital Signs (24h Range):  Temp:  [98.3 °F (36.8 °C)-99.1 °F (37.3 °C)] 98.7 °F (37.1 °C)  Pulse:  [81-91] 91  Resp:  [18-20] 20  SpO2:  [93 %-95 %] 95 %  BP: (130-169)/(75-94) 168/79     Weight: 100.7 kg (222 lb 0.1 oz)  Body mass index is 40.6 kg/m².    Estimated Creatinine Clearance: 79.4 mL/min (based on SCr of 0.7 mg/dL).     Physical Exam  Vitals reviewed.   Constitutional:       General: She is not in acute distress.     Appearance: Normal appearance. She is not ill-appearing.   HENT:      Head: Normocephalic.      Nose: Nose normal. No congestion.      Mouth/Throat:      Mouth: Mucous membranes are moist.   Eyes:      General:         Right eye: No  discharge.         Left eye: No discharge.      Conjunctiva/sclera: Conjunctivae normal.   Cardiovascular:      Rate and Rhythm: Normal rate and regular rhythm.   Pulmonary:      Effort: Pulmonary effort is normal. No respiratory distress.      Breath sounds: No stridor.   Abdominal:      General: Abdomen is flat.   Musculoskeletal:         General: Normal range of motion.      Cervical back: Normal range of motion.      Right lower leg: Edema present.      Left lower leg: Edema present.   Skin:     Findings: Erythema, lesion and rash present.      Comments: Lymphedema and venous statis rash bilateral    Neurological:      General: No focal deficit present.      Mental Status: She is alert and oriented to person, place, and time.      Motor: No weakness.      Gait: Gait normal.   Psychiatric:         Mood and Affect: Mood normal.         Behavior: Behavior normal.         Thought Content: Thought content normal.         Judgment: Judgment normal.                        Significant Labs: All pertinent labs within the past 24 hours have been reviewed.    Significant Imaging: I have reviewed all pertinent imaging results/findings within the past 24 hours.

## 2023-06-09 NOTE — PLAN OF CARE
Problem: Adult Inpatient Plan of Care  Goal: Plan of Care Review  Outcome: Ongoing, Progressing     Patient remained in stable condition through shift. Remained free of falls and other injuries. Assisted in repositioning per orders. Denied any pain or discomfort. PRN ativan given overnight per patient's request. Daughter has remained at the bedside through shift, very involved in care and assists with any needs and questions. Bed in locked and lowest position, call light in reach, all questions answered, declines any further needs at this time. Frequent monitoring maintained.

## 2023-06-09 NOTE — ASSESSMENT & PLAN NOTE
72 y.o. female who has a past medical history of Lymphedema, MS (multiple sclerosis), PE, and vitamin B12 deficiency who presented to [facility] ED with CC of left heel wound.       History of treated osteomyelitis of left heel. Care received from [facility]. See HPI.      Pt denies fever, or systemic s/s infection. Blood cultures NGTD. MRI left foot without evidence of osteomyelitis, no abscess noted.      Pt is currently on vanc/CTX. ID was cf abx recs.     Recommendations:   - Okay to continue CTX/vanc while inpatient. Inpatient pharmacy to dose vanc.   - Upon discharge for, transition to ciprofloxacin/doxycycline x14d for SSTI   - will need ID follow up. ID will schedule   - Plan reviewed with ID staff. ID will sign off.

## 2023-06-10 VITALS
HEIGHT: 62 IN | SYSTOLIC BLOOD PRESSURE: 124 MMHG | TEMPERATURE: 99 F | BODY MASS INDEX: 40.85 KG/M2 | WEIGHT: 222 LBS | OXYGEN SATURATION: 93 % | DIASTOLIC BLOOD PRESSURE: 81 MMHG | HEART RATE: 84 BPM | RESPIRATION RATE: 18 BRPM

## 2023-06-10 LAB
ANION GAP SERPL CALC-SCNC: 10 MMOL/L (ref 8–16)
BASOPHILS # BLD AUTO: 0.01 K/UL (ref 0–0.2)
BASOPHILS NFR BLD: 0.2 % (ref 0–1.9)
BUN SERPL-MCNC: 10 MG/DL (ref 8–23)
CALCIUM SERPL-MCNC: 9.6 MG/DL (ref 8.7–10.5)
CHLORIDE SERPL-SCNC: 102 MMOL/L (ref 95–110)
CO2 SERPL-SCNC: 30 MMOL/L (ref 23–29)
CREAT SERPL-MCNC: 0.9 MG/DL (ref 0.5–1.4)
DIFFERENTIAL METHOD: ABNORMAL
EOSINOPHIL # BLD AUTO: 0.2 K/UL (ref 0–0.5)
EOSINOPHIL NFR BLD: 3.6 % (ref 0–8)
ERYTHROCYTE [DISTWIDTH] IN BLOOD BY AUTOMATED COUNT: 13.9 % (ref 11.5–14.5)
EST. GFR  (NO RACE VARIABLE): >60 ML/MIN/1.73 M^2
GLUCOSE SERPL-MCNC: 121 MG/DL (ref 70–110)
HCT VFR BLD AUTO: 31.9 % (ref 37–48.5)
HGB BLD-MCNC: 9.6 G/DL (ref 12–16)
IMM GRANULOCYTES # BLD AUTO: 0.01 K/UL (ref 0–0.04)
IMM GRANULOCYTES NFR BLD AUTO: 0.2 % (ref 0–0.5)
LYMPHOCYTES # BLD AUTO: 1.3 K/UL (ref 1–4.8)
LYMPHOCYTES NFR BLD: 21.6 % (ref 18–48)
MCH RBC QN AUTO: 27.7 PG (ref 27–31)
MCHC RBC AUTO-ENTMCNC: 30.1 G/DL (ref 32–36)
MCV RBC AUTO: 92 FL (ref 82–98)
MONOCYTES # BLD AUTO: 0.7 K/UL (ref 0.3–1)
MONOCYTES NFR BLD: 12.1 % (ref 4–15)
NEUTROPHILS # BLD AUTO: 3.6 K/UL (ref 1.8–7.7)
NEUTROPHILS NFR BLD: 62.3 % (ref 38–73)
NRBC BLD-RTO: 0 /100 WBC
PLATELET # BLD AUTO: 228 K/UL (ref 150–450)
PMV BLD AUTO: 10.6 FL (ref 9.2–12.9)
POTASSIUM SERPL-SCNC: 3.2 MMOL/L (ref 3.5–5.1)
RBC # BLD AUTO: 3.47 M/UL (ref 4–5.4)
SODIUM SERPL-SCNC: 142 MMOL/L (ref 136–145)
WBC # BLD AUTO: 5.78 K/UL (ref 3.9–12.7)

## 2023-06-10 PROCEDURE — 63600175 PHARM REV CODE 636 W HCPCS: Performed by: STUDENT IN AN ORGANIZED HEALTH CARE EDUCATION/TRAINING PROGRAM

## 2023-06-10 PROCEDURE — 99239 PR HOSPITAL DISCHARGE DAY,>30 MIN: ICD-10-PCS | Mod: ,,, | Performed by: STUDENT IN AN ORGANIZED HEALTH CARE EDUCATION/TRAINING PROGRAM

## 2023-06-10 PROCEDURE — 80048 BASIC METABOLIC PNL TOTAL CA: CPT | Performed by: STUDENT IN AN ORGANIZED HEALTH CARE EDUCATION/TRAINING PROGRAM

## 2023-06-10 PROCEDURE — 25000003 PHARM REV CODE 250: Performed by: STUDENT IN AN ORGANIZED HEALTH CARE EDUCATION/TRAINING PROGRAM

## 2023-06-10 PROCEDURE — 85025 COMPLETE CBC W/AUTO DIFF WBC: CPT | Performed by: STUDENT IN AN ORGANIZED HEALTH CARE EDUCATION/TRAINING PROGRAM

## 2023-06-10 PROCEDURE — 99239 HOSP IP/OBS DSCHRG MGMT >30: CPT | Mod: ,,, | Performed by: STUDENT IN AN ORGANIZED HEALTH CARE EDUCATION/TRAINING PROGRAM

## 2023-06-10 PROCEDURE — 36415 COLL VENOUS BLD VENIPUNCTURE: CPT | Performed by: STUDENT IN AN ORGANIZED HEALTH CARE EDUCATION/TRAINING PROGRAM

## 2023-06-10 PROCEDURE — G0378 HOSPITAL OBSERVATION PER HR: HCPCS

## 2023-06-10 RX ORDER — CHOLECALCIFEROL (VITAMIN D3) 25 MCG
5000 TABLET ORAL DAILY
Start: 2023-06-10

## 2023-06-10 RX ORDER — TRAMADOL HYDROCHLORIDE 50 MG/1
50 TABLET ORAL NIGHTLY
Qty: 7 TABLET | Refills: 0 | Status: SHIPPED | OUTPATIENT
Start: 2023-06-10 | End: 2023-06-17

## 2023-06-10 RX ORDER — POTASSIUM CHLORIDE 20 MEQ/1
20 TABLET, EXTENDED RELEASE ORAL ONCE
Status: DISCONTINUED | OUTPATIENT
Start: 2023-06-10 | End: 2023-06-10 | Stop reason: HOSPADM

## 2023-06-10 RX ORDER — DOXYCYCLINE HYCLATE 100 MG
100 TABLET ORAL EVERY 12 HOURS
Qty: 28 TABLET | Refills: 0 | Status: SHIPPED | OUTPATIENT
Start: 2023-06-10 | End: 2023-06-24

## 2023-06-10 RX ORDER — CIPROFLOXACIN 500 MG/1
750 TABLET ORAL 2 TIMES DAILY
Qty: 42 TABLET | Refills: 0 | Status: SHIPPED | OUTPATIENT
Start: 2023-06-10 | End: 2023-06-24

## 2023-06-10 RX ADMIN — CHOLECALCIFEROL TAB 125 MCG (5000 UNIT) 5000 UNITS: 125 TAB at 08:06

## 2023-06-10 RX ADMIN — TRAMADOL HYDROCHLORIDE 50 MG: 50 TABLET, COATED ORAL at 12:06

## 2023-06-10 RX ADMIN — APIXABAN 5 MG: 5 TABLET, FILM COATED ORAL at 08:06

## 2023-06-10 RX ADMIN — Medication 1 CAPSULE: at 08:06

## 2023-06-10 RX ADMIN — Medication 1 TABLET: at 08:06

## 2023-06-10 RX ADMIN — TRAMADOL HYDROCHLORIDE 50 MG: 50 TABLET, COATED ORAL at 08:06

## 2023-06-10 NOTE — ASSESSMENT & PLAN NOTE
- patient with history of osteo in same location LLE heel, s/p 6 weeks IV vanc/fortaz completed 9/22 with healing of wound  - worsening of wound with bleeding and pain past few months  - elevated CRP  - follows with podiatry outpt who had ordered a MRI but hasnt been done yet  - Podiatry consulted, appreciate recs  - MRI foot ordered- negative for osteo  - ID consulted for abx recs  - Wound care consulted   - Wound culture not done due to wound scabbing over and blood cultures ordered- Bcx NGTD  - cont vanc/ceftriaxone, deescalate based on imaging/culture/ID recs  - pain control PRN  - PT assessment done  - ID recommending cipro/doxy at discharge 14 day course   - ID and Podiatry f/u at discharge  - wound care with HH

## 2023-06-10 NOTE — PLAN OF CARE
[facility] - [facility] ([facility]  Discharge Final Note    Primary Care Provider: Bobby Tran MD    Expected Discharge Date: 6/10/2023    CM reviewed chart for discharge needs. Therapy recommended  and home hospital bed. Patient is current with [facility]. CM arranged stretcher transportation home via [facility]. Patient is ready to discharge from case management standpoint.    Final Discharge Note (most recent)       Final Note - 06/10/23 1329          Final Note    Assessment Type Final Discharge Note     Anticipated Discharge Disposition Home-Health Care [facility]     What phone number can be called within the next 1-3 days to see how you are doing after discharge? [phone]     Hospital Resources/Appts/Education Provided Provided patient/caregiver with written discharge plan information;Post-Acute resouces added to AVS        Post-Acute Status    Post-Acute Authorization Home Health;HME     HME Status Referrals Sent   to [facility] for hospital bed    Home Health Status Set-up Complete/Auth obtained     Discharge Delays None known at this time                   Important Message from Medicare         Contact Info       Ewelina Briceno NP   Specialty: Infectious Diseases    [address] [facility] [address] [address]   Phone: [phone]       Next Steps: Go on 6/16/2023    Instructions: Hospital follow up 6/16 at 1:30pm    [facility]   Specialty: Home Health Services    [address]  [address]  [place] [address]   Phone: [phone]       Next Steps: Follow up          Gabi Carreno RN  Weekend  - [facility]  Spectralink: [phone]

## 2023-06-10 NOTE — ASSESSMENT & PLAN NOTE
- noted bilateral LE  - wound care consulted, appreciate recs  - elevate LE  - bilateral 2 layer calamine compression dressing recommended by podiatry, to be placed in clinic or with HH nursing   - GABRIELE done and >0.6 requirement for dressing

## 2023-06-10 NOTE — SUBJECTIVE & OBJECTIVE
Interval History: Patient with LLE pain, unchanged.   Afebrile.   Bcx NGTD, Wound culture ordered but wound reported to be scabbed over so culture not done.   Cont broad spectrum abx, ID consulted for recs. CRP elevated from admission, ID aware and recommend cipro and doxy at discharge.  Podiatry consulted, wound care consulted. Podiatry recommending bilateral 2 layer calamine compression dressing, wound care requesting GABRIELE to be done prior to dressing. Difficulty with getting GABRIELE done by cards or vascular due to scheduling, eventually done by radiology but by that time wound care had left for the day. Requested on call wound care nurse to apply dressing but she reported must be done by someone certified to apply special compression dressings.   Reached back out to podiatry regarding the complications in getting the dressings placed. They recommended that dressing can be applied in clinic or by HH nurse. By the time this was all coordinated is was late in the afternoon and patient does not feel comfortable discharging so late so agreeable to discharge in the morning. Declining post acute placement, wants to go home with HH.   Eliquis restarted per pod recs.     Review of Systems   Constitutional:  Negative for chills and fever.   Respiratory:  Negative for shortness of breath.    Cardiovascular:  Negative for chest pain.   Gastrointestinal:  Negative for abdominal pain.   Genitourinary:  Negative for difficulty urinating.   Musculoskeletal:  Positive for gait problem.        LLE pain   Skin:  Positive for wound.   Psychiatric/Behavioral:  Negative for confusion.    Objective:     Vital Signs (Most Recent):  Temp: 99 °F (37.2 °C) (06/09/23 1927)  Pulse: 86 (06/09/23 1927)  Resp: 18 (06/09/23 2019)  BP: (!) 145/75 (06/09/23 1927)  SpO2: 96 % (06/09/23 2100) Vital Signs (24h Range):  Temp:  [98.3 °F (36.8 °C)-99.1 °F (37.3 °C)] 99 °F (37.2 °C)  Pulse:  [82-91] 86  Resp:  [18-20] 18  SpO2:  [92 %-96 %] 96 %  BP:  (130-169)/(75-83) 145/75     Weight: 100.7 kg (222 lb 0.1 oz)  Body mass index is 40.6 kg/m².    Intake/Output Summary (Last 24 hours) at 6/9/2023 2218  Last data filed at 6/9/2023 0507  Gross per 24 hour   Intake 350 ml   Output --   Net 350 ml           Physical Exam  Vitals and nursing note reviewed.   Constitutional:       General: She is not in acute distress.     Appearance: Normal appearance. She is obese.   HENT:      Head: Normocephalic and atraumatic.      Right Ear: External ear normal.      Left Ear: External ear normal.      Nose: Nose normal.      Mouth/Throat:      Mouth: Mucous membranes are moist.      Pharynx: Oropharynx is clear.   Eyes:      Extraocular Movements: Extraocular movements intact.      Conjunctiva/sclera: Conjunctivae normal.      Pupils: Pupils are equal, round, and reactive to light.   Cardiovascular:      Rate and Rhythm: Normal rate and regular rhythm.      Pulses: Normal pulses.      Heart sounds: Murmur heard.      Comments: LUSB murmur  Pulmonary:      Effort: Pulmonary effort is normal.      Breath sounds: Normal breath sounds.   Abdominal:      General: Abdomen is flat. Bowel sounds are normal.      Palpations: Abdomen is soft.   Musculoskeletal:         General: Swelling present. Normal range of motion.      Cervical back: Normal range of motion and neck supple.      Comments: LLE wound, no significant bleeding noted. Tender to palpation   Skin:     General: Skin is warm and dry.      Comments: Chronic lymphedema skin changes noted bilaterally     Neurological:      General: No focal deficit present.      Mental Status: She is alert and oriented to person, place, and time.   Psychiatric:         Mood and Affect: Mood normal.         Behavior: Behavior normal.           Significant Labs: All pertinent labs within the past 24 hours have been reviewed.    Significant Imaging: I have reviewed all pertinent imaging results/findings within the past 24 hours.  MRI neg for osteo

## 2023-06-10 NOTE — PROGRESS NOTES
Pharmacokinetic Assessment Follow Up: IV Vancomycin    Vancomycin serum concentration assessment/plan(s):    -Vancomycin trough yesterday 12.8 within goal of 10 - 20 for SSTI  -Scr increased to 0.9 this morning from 0.6  -Will obtain trough tomorrow morning at 0730 to assess for accumulation with increasing scr  -Continue vancomycin 1g IVPB Q12H for now    Drug levels (last 3 results):  Recent Labs   Lab Result Units 06/09/23  0521   Vancomycin-Trough ug/mL 12.8       Pharmacy will continue to follow and monitor vancomycin.    Please contact pharmacy at extension [phone] for questions regarding this assessment.    Thank you for the consult,   Khalif Pierre       Patient brief summary:  Lupis Murcia is a 72 y.o. female initiated on antimicrobial therapy with IV Vancomycin for treatment of skin & soft tissue infection    The patient's current regimen is vancomycin 1g Q12H    Drug Allergies:   Review of patient's allergies indicates:   Allergen Reactions    Contrast media Shortness Of Breath and Rash    Pcn [penicillins] Shortness Of Breath and Rash    Celebrex [celecoxib] Other (See Comments)     Swallowing problems     Diazepam Hives    Motrin [ibuprofen] Rash       Actual Body Weight:   101 kg    Renal Function:   Estimated Creatinine Clearance: 61.8 mL/min (based on SCr of 0.9 mg/dL).,     Dialysis Method (if applicable):  N/A    CBC (last 72 hours):  Recent Labs   Lab Result Units 06/07/23  1441 06/08/23  0549 06/09/23  0521   WBC K/uL 5.60 6.50 6.38   Hemoglobin g/dL 9.9* 9.8* 9.9*   Hemoglobin A1C %  --  5.8*  --    Hematocrit % 32.7* 30.8* 32.2*   Platelets K/uL 245 248 222   Gran % %  --  69.0 64.1   Lymph % %  --  21.8 22.3   Mono % %  --  7.2 10.7   Eosinophil % %  --  1.5 2.2   Basophil % %  --  0.2 0.2   Differential Method   --  Automated Automated       Metabolic Panel (last 72 hours):  Recent Labs   Lab Result Units 06/07/23  1441 06/08/23  0549 06/09/23  0521 06/10/23  0608   Sodium mmol/L 144 140  142 142   Potassium mmol/L 3.8 3.5 3.3* 3.2*   Chloride mmol/L 105 103 104 102   CO2 mmol/L 29 27 27 30*   Glucose mg/dL 110 121* 116* 121*   BUN mg/dL 13 9 9 10   Creatinine mg/dL 0.7 0.6 0.7 0.9   Albumin g/dL 3.5  --   --   --    Total Bilirubin mg/dL 0.3  --   --   --    Alkaline Phosphatase U/L 67  --   --   --    AST U/L 8*  --   --   --    ALT U/L 5*  --   --   --        Vancomycin Administrations:  vancomycin given in the last 96 hours                     vancomycin (VANCOCIN) 1,000 mg in dextrose 5 % (D5W) 250 mL IVPB (Vial-Mate) (mg) 1,000 mg New Bag 06/09/23 2030     1,000 mg New Bag  0626     1,000 mg New Bag 06/08/23 1837     1,000 mg New Bag  0626    vancomycin 1,250 mg in dextrose 5 % (D5W) 250 mL IVPB (Vial-Mate) (mg) 1,250 mg New Bag 06/07/23 1827                    Microbiologic Results:  Microbiology Results (last 7 days)       Procedure Component Value Units Date/Time    Blood culture (site 1) [[id number]] Collected: 06/07/23 1730    Order Status: Completed Specimen: Blood from Peripheral, Antecubital, Right Updated: 06/09/23 2012     Blood Culture, Routine No Growth to date      No Growth to date      No Growth to date    Narrative:      Site # 1, aerobic and anaerobic    Culture, Anaerobe [[id number]]     Order Status: No result Specimen: Wound     Gram stain [[id number]]     Order Status: No result Specimen: Wound     Aerobic culture [[id number]]     Order Status: No result Specimen: Wound from Foot, Left

## 2023-06-10 NOTE — ASSESSMENT & PLAN NOTE
Body mass index is 40.6 kg/m². Morbid obesity complicates all aspects of disease management from diagnostic modalities to treatment. Weight loss encouraged and health benefits explained to patient.

## 2023-06-10 NOTE — PROGRESS NOTES
[facility] ([address] Medicine  Progress Note    Patient Name: Lupis Murcia  MRN: [id number]  Patient Class: OP- Observation   Admission Date: 6/7/2023  Length of Stay: 0 days  Attending Physician: Marci Sena MD  Primary Care Provider: Bobby Tran MD        Subjective:     Principal Problem:Non healing left heel wound        HPI:  Lupis Murcia is a 72 y.o. female who has a past medical history of Lymphedema, MS (multiple sclerosis), Other pulmonary embolism without acute cor pulmonale, and Vitamin B12 deficiency.     The patient presents to the ED due to L heel wound.     Patient presents with daughter, who states she has history of non-healing wound to L heel.  She was previously seen by Podiatry and Wound Care last year, and the wound healed completely after 6 weeks of IV ABX.   However, over the last few months, the wound has returned, and she reports persistent pain to the area.  No associated fever.  Wound care evaluated her today and noticed bleeding from the wound, so she was sent to the ED for evaluation.     She is attempting to establish care with [facility] as she has been dissatisfied with her previous Podiatrist.    Chronic AC with eliquis due to history of PE.   Denies fever.   Completed 6 weeks vanc/fortaz in 9/22, follows with podiatry and wound care.         Overview/Hospital Course:  Admitted to E nonhealing wound. Started on broad spectrum abx. MRI neg for osteo. Podiatry, wound care, ID consulted.   ID recommend cipro/doxy at discharge 14 day course.   Podiatry recommending bilateral 2 layer calamine compression dressing  Wound care requesting GABRIELE prior to application of compression dressings. Difficulty getting GABRIELE done in time for discharge, wound care nurse had already left by the time GABRIELE done and on call wound care reporting has to be done by someone certified to apply.   Talked with podiatry who reported bilateral 2 layer calamine compression  dressing can be applied in clinic or by HH and patient can be discharged with current dressings applied by nursing.         Interval History: Patient with LLE pain, unchanged.   Afebrile.   Bcx NGTD, Wound culture ordered but wound reported to be scabbed over so culture not done.   Cont broad spectrum abx, ID consulted for recs. CRP elevated from admission, ID aware and recommend cipro and doxy at discharge.  Podiatry consulted, wound care consulted. Podiatry recommending bilateral 2 layer calamine compression dressing, wound care requesting GABRIELE to be done prior to dressing. Difficulty with getting GABRIELE done by cards or vascular due to scheduling, eventually done by radiology but by that time wound care had left for the day. Requested on call wound care nurse to apply dressing but she reported must be done by someone certified to apply special compression dressings.   Reached back out to podiatry regarding the complications in getting the dressings placed. They recommended that dressing can be applied in clinic or by HH nurse. By the time this was all coordinated is was late in the afternoon and patient does not feel comfortable discharging so late so agreeable to discharge in the morning. Declining post acute placement, wants to go home with HH.   Eliquis restarted per pod recs.     Review of Systems   Constitutional:  Negative for chills and fever.   Respiratory:  Negative for shortness of breath.    Cardiovascular:  Negative for chest pain.   Gastrointestinal:  Negative for abdominal pain.   Genitourinary:  Negative for difficulty urinating.   Musculoskeletal:  Positive for gait problem.        LLE pain   Skin:  Positive for wound.   Psychiatric/Behavioral:  Negative for confusion.    Objective:     Vital Signs (Most Recent):  Temp: 99 °F (37.2 °C) (06/09/23 1927)  Pulse: 86 (06/09/23 1927)  Resp: 18 (06/09/23 2019)  BP: (!) 145/75 (06/09/23 1927)  SpO2: 96 % (06/09/23 2100) Vital Signs (24h Range):  Temp:  [98.3  °F (36.8 °C)-99.1 °F (37.3 °C)] 99 °F (37.2 °C)  Pulse:  [82-91] 86  Resp:  [18-20] 18  SpO2:  [92 %-96 %] 96 %  BP: (130-169)/(75-83) 145/75     Weight: 100.7 kg (222 lb 0.1 oz)  Body mass index is 40.6 kg/m².    Intake/Output Summary (Last 24 hours) at 6/9/2023 2218  Last data filed at 6/9/2023 0507  Gross per 24 hour   Intake 350 ml   Output --   Net 350 ml           Physical Exam  Vitals and nursing note reviewed.   Constitutional:       General: She is not in acute distress.     Appearance: Normal appearance. She is obese.   HENT:      Head: Normocephalic and atraumatic.      Right Ear: External ear normal.      Left Ear: External ear normal.      Nose: Nose normal.      Mouth/Throat:      Mouth: Mucous membranes are moist.      Pharynx: Oropharynx is clear.   Eyes:      Extraocular Movements: Extraocular movements intact.      Conjunctiva/sclera: Conjunctivae normal.      Pupils: Pupils are equal, round, and reactive to light.   Cardiovascular:      Rate and Rhythm: Normal rate and regular rhythm.      Pulses: Normal pulses.      Heart sounds: Murmur heard.      Comments: LUSB murmur  Pulmonary:      Effort: Pulmonary effort is normal.      Breath sounds: Normal breath sounds.   Abdominal:      General: Abdomen is flat. Bowel sounds are normal.      Palpations: Abdomen is soft.   Musculoskeletal:         General: Swelling present. Normal range of motion.      Cervical back: Normal range of motion and neck supple.      Comments: LLE wound, no significant bleeding noted. Tender to palpation   Skin:     General: Skin is warm and dry.      Comments: Chronic lymphedema skin changes noted bilaterally     Neurological:      General: No focal deficit present.      Mental Status: She is alert and oriented to person, place, and time.   Psychiatric:         Mood and Affect: Mood normal.         Behavior: Behavior normal.           Significant Labs: All pertinent labs within the past 24 hours have been  reviewed.    Significant Imaging: I have reviewed all pertinent imaging results/findings within the past 24 hours.  MRI neg for osteo      Assessment/Plan:      * Non healing left heel wound  - patient with history of osteo in same location LLE heel, s/p 6 weeks IV vanc/fortaz completed 9/22 with healing of wound  - worsening of wound with bleeding and pain past few months  - elevated CRP  - follows with podiatry outpt who had ordered a MRI but hasnt been done yet  - Podiatry consulted, appreciate recs  - MRI foot ordered- negative for osteo  - ID consulted for abx recs  - Wound care consulted   - Wound culture not done due to wound scabbing over and blood cultures ordered- Bcx NGTD  - cont vanc/ceftriaxone, deescalate based on imaging/culture/ID recs  - pain control PRN  - PT assessment done  - ID recommending cipro/doxy at discharge 14 day course   - ID and Podiatry f/u at discharge  - wound care with        Aortic stenosis  - noted on ECHO 6/22, murmur on exam, monitor      History of pulmonary embolus (PE)  - noted on imaging in 6/22, on eliquis  - hold AC for potential intervention with podiatry, will resume asap - resume eliquis per podiatry  - SCDs       HTN (hypertension)  Cont home HCTZ-arb      Class 2 obesity due to excess calories in adult  Body mass index is 40.6 kg/m². Morbid obesity complicates all aspects of disease management from diagnostic modalities to treatment. Weight loss encouraged and health benefits explained to patient.         Lymphedema  - noted bilateral LE  - wound care consulted, appreciate recs  - elevate LE  - bilateral 2 layer calamine compression dressing recommended by podiatry, to be placed in clinic or with  nursing   - GABRIELE done and >0.6 requirement for dressing       Vitamin D deficiency  - cont home dose vit D      MS (multiple sclerosis)  - noted, follows with neurology, no new sxs      Impaired functional mobility, balance, gait, and endurance  - PT/OT  - to go home  with       Anemia of chronic disease  - noted, H/H at baseline   - bleeding at LLE wound noted, now stable without bleeding, eliquis restarted  - monitor and transfuse Hgb <7        VTE Risk Mitigation (From admission, onward)         Ordered     apixaban tablet 5 mg  2 times daily         06/08/23 1355     Reason for No Pharmacological VTE Prophylaxis  Once        Question:  Reasons:  Answer:  Risk of Bleeding    06/07/23 1841     IP VTE HIGH RISK PATIENT  Once         06/07/23 1841     Place sequential compression device  Until discontinued         06/07/23 1841                Discharge Planning   USMAN: 6/10/2023     Code Status: Full Code   Is the patient medically ready for discharge?:     Reason for patient still in hospital (select all that apply): Patient trending condition   Discharge Plan A: Home, Home with family, Home Health   Discharge Delays: Orders Needed              Marci Sena MD  Department of Hospital Medicine   [facility] - Intensive Care ([place])

## 2023-06-10 NOTE — PLAN OF CARE
[facility] - [facility] ([place] [address])      HOME HEALTH ORDERS  FACE TO FACE ENCOUNTER    Patient Name: Lupis Murcia  YOB: 1950    PCP: Bobby Tran MD   PCP Address: [address] / [place] [address]  PCP Phone Number: [phone]  PCP Fax: [phone]    Encounter Date: 6/7/23    Admit to Home Health    Diagnoses:  Active Hospital Problems    Diagnosis  POA    *Non healing left heel wound [S91.302A]  Yes    HTN (hypertension) [I10]  Yes    History of pulmonary embolus (PE) [Z86.711]  Yes    Aortic stenosis [I35.0]  Yes    Class 2 obesity due to excess calories in adult [E66.09]  Yes    Lymphedema [I89.0]  Yes    MS (multiple sclerosis) [G35]  Yes    Vitamin D deficiency [E55.9]  Yes    Impaired functional mobility, balance, gait, and endurance [Z74.09]  Yes    Anemia of chronic disease [D63.8]  Yes      Resolved Hospital Problems   No resolved problems to display.       Follow Up Appointments:  Future Appointments   Date Time Provider Department Center   6/16/2023  1:30 PM Ewelina Vivar NP [facility]       Allergies:  Review of patient's allergies indicates:   Allergen Reactions    Contrast media Shortness Of Breath and Rash    Pcn [penicillins] Shortness Of Breath and Rash    Celebrex [celecoxib] Other (See Comments)     Swallowing problems     Diazepam Hives    Motrin [ibuprofen] Rash       Medications: Review discharge medications with patient and family and provide education.    Current Facility-Administered Medications   Medication Dose Route Frequency Provider Last Rate Last Admin    acetaminophen-codeine 300-30mg per tablet 1 tablet  1 tablet Oral Q6H PRN Marci Sena MD   1 tablet at 06/09/23 2019    aluminum-magnesium hydroxide-simethicone 200-200-20 mg/5 mL suspension 30 mL  30 mL Oral QID PRN Marci Sena MD        apixaban tablet 5 mg  5 mg Oral BID Marci Sena MD   5 mg at 06/09/23 2030    B-complex with vitamin C tablet 1 tablet  1 tablet Oral Daily Marci  LIAM Sena MD   1 tablet at 06/09/23 0940    bisacodyL suppository 10 mg  10 mg Rectal Daily PRN Marci Sena MD        cefTRIAXone (ROCEPHIN) 1 g in dextrose 5 % in water (D5W) 5 % 100 mL IVPB (MB+)  1 g Intravenous Q24H Marci Sena MD   Stopped at 06/09/23 1934    cholecalciferol (vitamin D3) 125 mcg (5,000 unit) tablet 5,000 Units  5,000 Units Oral Daily Marci Sena MD   5,000 Units at 06/09/23 0939    glucagon (human recombinant) injection 1 mg  1 mg Intramuscular PRN Marci Sena MD        insulin aspart U-100 pen 0-5 Units  0-5 Units Subcutaneous QID (AC + HS) PRN Marci Sena MD        Lactobacillus rhamnosus GG capsule 1 capsule  1 capsule Oral Daily Marci Sena MD   1 capsule at 06/09/23 2030    LORazepam tablet 0.5 mg  0.5 mg Oral Q8H PRN Marci Sena MD   0.5 mg at 06/09/23 1833    losartan-hydrochlorothiazide 50-12.5 mg per tablet 1 tablet  1 tablet Oral Daily Marci Sena MD        melatonin tablet 6 mg  6 mg Oral Nightly PRN Marci Sena MD        naloxone 0.4 mg/mL injection 0.02 mg  0.02 mg Intravenous PRN Marci Sena MD        ondansetron disintegrating tablet 8 mg  8 mg Oral Q8H PRN Marci Sena MD        polyethylene glycol packet 17 g  17 g Oral Daily PRN Marci Sena MD        potassium chloride SA CR tablet 20 mEq  20 mEq Oral Once Marci Sena MD        prochlorperazine injection Soln 5 mg  5 mg Intravenous Q6H PRN Marci Sena MD        simethicone chewable tablet 80 mg  1 tablet Oral QID PRN Marci Sena MD        sodium chloride 0.9% flush 5 mL  5 mL Intravenous PRN Marci Sena MD        traMADoL tablet 50 mg  50 mg Oral Q8H PRN Marci Sena MD   50 mg at 06/10/23 0021    vancomycin (VANCOCIN) 1,000 mg in dextrose 5 % (D5W) 250 mL IVPB (Vial-Mate)  1,000 mg Intravenous Q12H Marci Sena MD   Stopped at 06/09/23 2200    vancomycin - pharmacy to dose   Intravenous pharmacy to manage frequency Marci Sena MD         Current  Discharge Medication List        START taking these medications    Details   ciprofloxacin HCl (CIPRO) 500 MG tablet Take 1.5 tablets (750 mg total) by mouth 2 (two) times daily. for 14 days  Qty: 42 tablet, Refills: 0      doxycycline (VIBRA-TABS) 100 MG tablet Take 1 tablet (100 mg total) by mouth every 12 (twelve) hours. for 14 days  Qty: 28 tablet, Refills: 0      Lactobacillus rhamnosus GG (CULTURELLE) 10 billion cell capsule Take 1 capsule by mouth once daily.  Qty: 30 capsule, Refills: 0           CONTINUE these medications which have CHANGED    Details   traMADoL (ULTRAM) 50 mg tablet Take 1 tablet (50 mg total) by mouth nightly. AS NEEDED FOR PAIN for 7 days  Qty: 7 tablet, Refills: 0    Comments: Quantity prescribed more than 7 day supply? No      vitamin D (VITAMIN D3) 1000 units Tab Take 5 tablets (5,000 Units total) by mouth once daily.           CONTINUE these medications which have NOT CHANGED    Details   acetaminophen-codeine 300-30mg (TYLENOL #3) 300-30 mg Tab Take 1 tablet by mouth every 6 (six) hours as needed (pain).      apixaban (ELIQUIS ORAL) Take 5 mg by mouth 2 (two) times a day.      candesartan-hydrochlorothiazide (ATACAND HCT) 16-12.5 mg per tablet Take 1 tablet by mouth once daily.  Qty: 90 tablet, Refills: 0      cyanocobalamin 1,000 mcg/mL injection Inject 1 mL (1,000 mcg total) into the muscle every 30 days.  Qty: 1 mL, Refills: 3      LORazepam (ATIVAN) 0.5 MG tablet Take 1 tablet (0.5 mg total) by mouth every 8 (eight) hours as needed for Anxiety.           STOP taking these medications       B-complex with vitamin C (Z-BEC OR EQUIV) tablet Comments:   Reason for Stopping:                 I have seen and examined this patient within the last 30 days. My clinical findings that support the need for the home health skilled services and home bound status are the following:no   Weakness/numbness causing balance and gait disturbance due to Infection and Weakness/Debility making it taxing  to leave home.     Diet:   regular diet    Labs:  Nonoe    Referrals/ Consults  Physical Therapy to evaluate and treat. Evaluate for home safety and equipment needs; Establish/upgrade home exercise program. Perform / instruct on therapeutic exercises, gait training, transfer training, and Range of Motion.  Occupational Therapy to evaluate and treat. Evaluate home environment for safety and equipment needs. Perform/Instruct on transfers, ADL training, ROM, and therapeutic exercises.  Aide to provide assistance with personal care, ADLs, and vital signs.    Activities:   activity as tolerated  WBAT RLE, PWBAT LLE toe touch with shweta shoes and dressings intact    Nursing:   Agency to admit patient within 24 hours of hospital discharge unless specified on physician order or at patient request    SN to complete comprehensive assessment including routine vital signs. Instruct on disease process and s/s of complications to report to MD. Review/verify medication list sent home with the patient at time of discharge  and instruct patient/caregiver as needed. Frequency may be adjusted depending on start of care date.     Skilled nurse to perform up to 3 visits PRN for symptoms related to diagnosis    Notify MD if SBP > 160 or < 90; DBP > 90 or < 50; HR > 120 or < 50; Temp > 101; O2 < 88%; Other:       Ok to schedule additional visits based on staff availability and patient request on consecutive days within the home health episode.    When multiple disciplines ordered:    Start of Care occurs on Sunday - Wednesday schedule remaining discipline evaluations as ordered on separate consecutive days following the start of care.    Thursday SOC -schedule subsequent evaluations Friday and Monday the following week.     Friday - Saturday SOC - schedule subsequent discipline evaluations on consecutive days starting Monday of the following week.    For all post-discharge communication and subsequent orders please contact patient's  primary care physician. If unable to reach primary care physician or do not receive response within 30 minutes, please contact PCP for clinical staff order clarification    Miscellaneous   Routine Skin for Bedridden Patients: Instruct patient/caregiver to apply moisture barrier cream to all skin folds and wet areas in perineal area daily and after baths and all bowel movements.    Home Health Aide:  Nursing Three times weekly, Physical Therapy Three times weekly, Occupational Therapy Three times weekly, and Home Health Aide Three times weekly    Wound Care Orders  yes:  LLE heel wound and BLE lymphedema   HH wound care: Weekly - B/L 2 layer compressive dressing with calamine with hydrafera blue ready over left heel wound and area weeping areas of b/l lower legs.        Offload heels while in bed and recliner       WBAT RLE, PWBAT LLE toe touch with shweta shoes and dressings intact    I certify that this patient is confined to her home and needs intermittent skilled nursing care, physical therapy, and occupational therapy.

## 2023-06-11 NOTE — DISCHARGE SUMMARY
[facility] - Intensive Care ([place] [address])  [facility] Medicine  Discharge Summary      Patient Name: Lupis Murcia  MRN: [id number]  LEONORA: [id number]  Patient Class: OP- Observation  Admission Date: 6/7/2023  Hospital Length of Stay: 0 days  Discharge Date and Time: No discharge date for patient encounter.  Attending Physician: Marci Sena MD   Discharging Provider: Marci Sena MD  Primary Care Provider: Bobby Tran MD  [facility] Medicine Team: [facility] MED [facility] Marci Sena MD  Primary Care Team: [facility] MED Q    HPI:   Lupis Murcia is a 72 y.o. female who has a past medical history of Lymphedema, MS (multiple sclerosis), Other pulmonary embolism without acute cor pulmonale, and Vitamin B12 deficiency.     The patient presents to the ED due to L heel wound.     Patient presents with daughter, who states she has history of non-healing wound to L heel.  She was previously seen by Podiatry and Wound Care last year, and the wound healed completely after 6 weeks of IV ABX.   However, over the last few months, the wound has returned, and she reports persistent pain to the area.  No associated fever.  Wound care evaluated her today and noticed bleeding from the wound, so she was sent to the ED for evaluation.     She is attempting to establish care with [facility] as she has been dissatisfied with her previous Podiatrist.    Chronic AC with eliquis due to history of PE.   Denies fever.   Completed 6 weeks vanc/fortaz in 9/22, follows with podiatry and wound care.         * No surgery found *      Hospital Course:   Admitted to E nonhealing wound. Started on broad spectrum abx. MRI neg for osteo. Podiatry, wound care, ID consulted.   ID recommend cipro/doxy at discharge 14 day course.   Podiatry recommending bilateral 2 layer calamine compression dressing  Wound care requesting GABRIELE prior to application of compression dressings. Difficulty getting GABRIELE done in time for discharge, wound care nurse had already left by  the time GABRIELE done and on call wound care reporting has to be done by someone certified to apply.   Talked with podiatry who reported bilateral 2 layer calamine compression dressing can be applied in clinic or by  and patient can be discharged with current dressings applied by nursing.          Goals of Care Treatment Preferences:  Code Status: Full Code      Consults:   Consults (From admission, onward)        Status Ordering Provider     Inpatient consult to Infectious Diseases  Once        Provider:  (Not yet assigned)    Completed VARSHA CHRISTIAN     Inpatient consult to Podiatry  Once        Provider:  (Not yet assigned)    Completed VARSHA CHRISTIAN     Pharmacy to dose Vancomycin consult  Once        Provider:  (Not yet assigned)   See [facility] for full Linked Orders Report.    Acknowledged VARSHA CHRISTIAN          No new Assessment & Plan notes have been filed under this hospital service since the last note was generated.  Service: Hospital Medicine    Final Active Diagnoses:    Diagnosis Date Noted POA    PRINCIPAL PROBLEM:  Non healing left heel wound [S91.302A] 06/07/2023 Yes    HTN (hypertension) [I10] 06/07/2023 Yes    History of pulmonary embolus (PE) [Z86.711] 06/07/2023 Yes    Aortic stenosis [I35.0] 06/07/2023 Yes    Class 2 obesity due to excess calories in adult [E66.09] 06/14/2022 Yes    Lymphedema [I89.0] 05/01/2022 Yes    MS (multiple sclerosis) [G35] 06/09/2019 Yes    Vitamin D deficiency [E55.9] 06/09/2019 Yes    Impaired functional mobility, balance, gait, and endurance [Z74.09] 05/11/2018 Yes    Anemia of chronic disease [D63.8] 08/09/2017 Yes      Problems Resolved During this Admission:       Discharged Condition: stable    Disposition: Home or Self Care    Follow Up:   Follow-up Information     Ewelina Briceno NP. Go on 6/16/2023.    Specialty: Infectious Diseases  Why: Hospital follow up 6/16 at 1:30pm  Contact information:  [address] [facility]  "[id number]  [phone]             [facility] Follow up.    Specialty: Home Health Services  Contact information:  [address]  [address]  [phone]                       Patient Instructions:      HOSPITAL BED FOR HOME USE     Order Specific Question Answer Comments   Type: Semi-electric    Length of need (1-99 months): 99    Does patient have medical equipment at home? wheelchair lift chair / lift chair / lift chair / lift chair / lift chair   Does patient have medical equipment at home? walker, rolling    Does patient have medical equipment at home? shower chair    Does patient have medical equipment at home? bedside commode    Does patient have medical equipment at home? grab bar    Height: 5' 2" (1.575 m)    Weight: 100.7 kg (222 lb 0.1 oz)    Please check all that apply: Patient requires, for the alleviation of pain, positioning of the body in ways not feasible in an ordinary bed.    Please check all that apply: Patient requires positioning of the body in ways not feasible in an ordinary bed due to a medical condition which is expected to last at least one month.      Ambulatory referral/consult to Infectious Disease   Standing Status: Future   Referral Priority: Urgent Referral Type: Consultation   Referral Reason: Specialty Services Required   Requested Specialty: Infectious Diseases   Number of Visits Requested: 1     Ambulatory referral/consult to Podiatry   Standing Status: Future   Referral Priority: Urgent Referral Type: Consultation   Referral Reason: Specialty Services Required   Requested Specialty: Podiatry   Number of Visits Requested: 1     Ambulatory referral/consult to Wound Clinic   Standing Status: Future   Referral Priority: Urgent Referral Type: Consultation   Referral Reason: Specialty Services Required   Requested Specialty: Wound Care   Number of Visits Requested: 1     Diet Adult Regular     Notify your health care provider if you " experience any of the following:  temperature >100.4     Notify your health care provider if you experience any of the following:  severe uncontrolled pain     Notify your health care provider if you experience any of the following:  redness, tenderness, or signs of infection (pain, swelling, redness, odor or green/yellow discharge around incision site)     Leave dressing on - Keep it clean, dry, and intact until clinic visit     Activity as tolerated       Significant Diagnostic Studies: Labs:   CMP   Recent Labs   Lab 06/09/23  0521 06/10/23  0608    142   K 3.3* 3.2*    102   CO2 27 30*   * 121*   BUN 9 10   CREATININE 0.7 0.9   CALCIUM 9.6 9.6   ANIONGAP 11 10    and CBC   Recent Labs   Lab 06/09/23  0521 06/10/23  0608   WBC 6.38 5.78   HGB 9.9* 9.6*   HCT 32.2* 31.9*    228       MRI: Examination is significantly degraded by motion artifact.  No evidence of osteomyelitis as clinically questioned.  Soft tissue swelling and most prominent involving the dorsal foot with ulceration plantar aspect of the foot, calcaneus level..  No identifiable organized fluid collections suggestive to suggest abscess formation.      Bcx NGTD    Pending Diagnostic Studies:     None         Medications:  Reconciled Home Medications:      Medication List      START taking these medications    ciprofloxacin HCl 500 MG tablet  Commonly known as: CIPRO  Take 1.5 tablets (750 mg total) by mouth 2 (two) times daily. for 14 days     doxycycline 100 MG tablet  Commonly known as: VIBRA-TABS  Take 1 tablet (100 mg total) by mouth every 12 (twelve) hours. for 14 days     Lactobacillus rhamnosus GG 10 billion cell capsule  Commonly known as: CULTURELLE  Take 1 capsule by mouth once daily.        CONTINUE taking these medications    acetaminophen-codeine 300-30mg 300-30 mg Tab  Commonly known as: TYLENOL #3  Take 1 tablet by mouth every 6 (six) hours as needed (pain).     candesartan-hydrochlorothiazide 16-12.5 mg per  tablet  Commonly known as: ATACAND HCT  Take 1 tablet by mouth once daily.     cyanocobalamin 1,000 mcg/mL injection  Inject 1 mL (1,000 mcg total) into the muscle every 30 days.     ELIQUIS ORAL  Take 5 mg by mouth 2 (two) times a day.     LORazepam 0.5 MG tablet  Commonly known as: ATIVAN  Take 1 tablet (0.5 mg total) by mouth every 8 (eight) hours as needed for Anxiety.     traMADoL 50 mg tablet  Commonly known as: ULTRAM  Take 1 tablet (50 mg total) by mouth nightly. AS NEEDED FOR PAIN for 7 days     vitamin D 1000 units Tab  Commonly known as: VITAMIN D3  Take 5 tablets (5,000 Units total) by mouth once daily.        STOP taking these medications    B-complex with vitamin C tablet  Commonly known as: Z-Bec or Equiv            Indwelling Lines/Drains at time of discharge:   Lines/Drains/Airways     None                 Time spent on the discharge of patient: 40 minutes         Marci Sena MD  Department of Hospital Medicine  [facility] - Intensive Care ([place]-[address])

## 2023-06-12 ENCOUNTER — TELEPHONE (OUTPATIENT)
Dept: PODIATRY | Facility: CLINIC | Age: 73
End: 2023-06-12
Payer: MEDICARE

## 2023-06-12 ENCOUNTER — PATIENT MESSAGE (OUTPATIENT)
Dept: WOUND CARE | Facility: HOSPITAL | Age: 73
End: 2023-06-12
Payer: MEDICARE

## 2023-06-12 LAB — BACTERIA BLD CULT: NORMAL

## 2023-06-12 NOTE — ASSESSMENT & PLAN NOTE
- patient with history of osteo in same location LLE heel, s/p 6 weeks IV vanc/fortaz completed 9/22 with healing of wound  - worsening of wound with bleeding and pain past few months  - elevated CRP  - follows with podiatry outpt who had ordered a MRI but hasnt been done yet  - Podiatry consulted, appreciate recs  - MRI foot ordered- negative for osteo  - ID consulted for abx recs  - Wound care consulted   - Wound and blood cultures ordered- Bcx NGTD  - cont vanc/ceftriaxone, deescalate based on imaging/culture/ID recs  - pain control PRN     Rituxan Counseling:  I discussed with the patient the risks of Rituxan infusions. Side effects can include infusion reactions, severe drug rashes including mucocutaneous reactions, reactivation of latent hepatitis and other infections and rarely progressive multifocal leukoencephalopathy.  All of the patient's questions and concerns were addressed.

## 2023-06-12 NOTE — TELEPHONE ENCOUNTER
Left voice message for patient to give our office a call back at [phone]. Help with scheduling for Dr. Mccauley

## 2023-06-12 NOTE — TELEPHONE ENCOUNTER
Left voice message for patient to give our office a call back at [phone]. Help with scheduling wound care two week Dr. Mccauley

## 2023-06-14 PROCEDURE — G0179 PR HOME HEALTH MD RECERTIFICATION: ICD-10-PCS | Mod: ,,, | Performed by: PODIATRIST

## 2023-06-14 PROCEDURE — G0179 MD RECERTIFICATION HHA PT: HCPCS | Mod: ,,, | Performed by: PODIATRIST

## 2023-06-15 ENCOUNTER — PATIENT MESSAGE (OUTPATIENT)
Dept: PODIATRY | Facility: CLINIC | Age: 73
End: 2023-06-15
Payer: MEDICARE

## 2023-06-15 DIAGNOSIS — G35 MULTIPLE SCLEROSIS: Primary | ICD-10-CM

## 2023-06-19 ENCOUNTER — TELEPHONE (OUTPATIENT)
Dept: PODIATRY | Facility: CLINIC | Age: 73
End: 2023-06-19
Payer: MEDICARE

## 2023-06-27 DIAGNOSIS — R19.7 DIARRHEA, UNSPECIFIED TYPE: Primary | ICD-10-CM

## 2023-06-27 RX ORDER — DIPHENOXYLATE HYDROCHLORIDE AND ATROPINE SULFATE 2.5; .025 MG/1; MG/1
1 TABLET ORAL 4 TIMES DAILY PRN
Qty: 30 TABLET | Refills: 1 | Status: SHIPPED | OUTPATIENT
Start: 2023-06-27 | End: 2024-06-26

## 2023-06-29 ENCOUNTER — EXTERNAL HOME HEALTH (OUTPATIENT)
Dept: HOME HEALTH SERVICES | Facility: HOSPITAL | Age: 73
End: 2023-06-29
Payer: MEDICARE

## 2023-07-21 ENCOUNTER — PATIENT MESSAGE (OUTPATIENT)
Dept: PSYCHIATRY | Facility: CLINIC | Age: 73
End: 2023-07-21
Payer: MEDICARE

## 2023-08-02 ENCOUNTER — PATIENT MESSAGE (OUTPATIENT)
Dept: PODIATRY | Facility: CLINIC | Age: 73
End: 2023-08-02
Payer: MEDICARE

## 2023-08-07 DIAGNOSIS — M25.373 INSTABILITY OF ANKLE, UNSPECIFIED LATERALITY: ICD-10-CM

## 2023-08-07 DIAGNOSIS — G35 MULTIPLE SCLEROSIS: ICD-10-CM

## 2023-08-07 DIAGNOSIS — I89.0 LYMPHEDEMA: Primary | ICD-10-CM

## 2023-08-13 PROCEDURE — G0179 MD RECERTIFICATION HHA PT: HCPCS | Mod: ,,, | Performed by: PODIATRIST

## 2023-08-13 PROCEDURE — G0179 PR HOME HEALTH MD RECERTIFICATION: ICD-10-PCS | Mod: ,,, | Performed by: PODIATRIST

## 2023-08-28 ENCOUNTER — EXTERNAL HOME HEALTH (OUTPATIENT)
Dept: HOME HEALTH SERVICES | Facility: HOSPITAL | Age: 73
End: 2023-08-28
Payer: MEDICARE

## 2023-09-17 ENCOUNTER — PATIENT MESSAGE (OUTPATIENT)
Dept: NEUROLOGY | Facility: CLINIC | Age: 73
End: 2023-09-17
Payer: MEDICARE

## 2023-09-21 ENCOUNTER — TELEPHONE (OUTPATIENT)
Dept: PODIATRY | Facility: CLINIC | Age: 73
End: 2023-09-21
Payer: MEDICARE

## 2023-09-21 NOTE — TELEPHONE ENCOUNTER
----- Message from Ignacia Louise sent at 9/21/2023  4:10 PM CDT -----  Regarding: Orders  Contact: Neal [phone]  Neal/ [facility] faxed over orders for pt please call

## 2023-09-26 ENCOUNTER — PATIENT MESSAGE (OUTPATIENT)
Dept: NEUROLOGY | Facility: CLINIC | Age: 73
End: 2023-09-26

## 2023-09-26 ENCOUNTER — OFFICE VISIT (OUTPATIENT)
Dept: NEUROLOGY | Facility: CLINIC | Age: 73
End: 2023-09-26
Payer: MEDICARE

## 2023-09-26 DIAGNOSIS — M79.2 NERVE PAIN: ICD-10-CM

## 2023-09-26 DIAGNOSIS — D64.9 ANEMIA, UNSPECIFIED TYPE: Primary | ICD-10-CM

## 2023-09-26 DIAGNOSIS — G35 MULTIPLE SCLEROSIS: Primary | ICD-10-CM

## 2023-09-26 DIAGNOSIS — Z71.89 COUNSELING REGARDING GOALS OF CARE: ICD-10-CM

## 2023-09-26 DIAGNOSIS — Z78.9 IMPAIRED MOBILITY AND ADLS: ICD-10-CM

## 2023-09-26 DIAGNOSIS — Z74.09 IMPAIRED MOBILITY AND ADLS: ICD-10-CM

## 2023-09-26 DIAGNOSIS — Z79.899 OTHER LONG TERM (CURRENT) DRUG THERAPY: ICD-10-CM

## 2023-09-26 PROCEDURE — 99214 OFFICE O/P EST MOD 30 MIN: CPT | Mod: 95,,, | Performed by: CLINICAL NURSE SPECIALIST

## 2023-09-26 PROCEDURE — 99214 PR OFFICE/OUTPT VISIT, EST, LEVL IV, 30-39 MIN: ICD-10-PCS | Mod: 95,,, | Performed by: CLINICAL NURSE SPECIALIST

## 2023-09-26 NOTE — PROGRESS NOTES
Subjective:          Patient ID: Lupis Murcia is a 73 y.o. female who presents today for a routine virtual visit for MS.  She was last seen in October 2021. The history has been provided by the patient. Her daughter is also present.     The patient location is: her home   The chief complaint leading to consultation is: MS     Visit type: audiovisual    Face to Face time with patient: 23 minutes   35 minutes of total time spent on the encounter, which includes face to face time and non-face to face time preparing to see the patient (eg, review of tests), Obtaining and/or reviewing separately obtained history, Documenting clinical information in the electronic or other health record, Independently interpreting results (not separately reported) and communicating results to the patient/family/caregiver, or Care coordination (not separately reported).     Each patient to whom he or she provides medical services by telemedicine is:  (1) informed of the relationship between the physician and patient and the respective role of any other health care provider with respect to management of the patient; and (2) notified that he or she may decline to receive medical services by telemedicine and may withdraw from such care at any time.      MS HPI:  DMT: No   Taking vitamin D3 as recommended? Yes--5000 units   In May 2022, she was hospitalized with encephalopathy, HANDY, and lymphedema. After 10 days, she was transferred to SNF. While in SNF and working out at the gym, she developed shortness of breath and went back to the ER and was found to have bilateral pulmonary emboli. She was admitted to the ICU.  In total, she was in skilled nursing for a total of 70 days. She is on Eliquis twice a day now.   In August 2022, she went to the ER at [facility] after a wound was identified on her left heel and was diagnosed with osteomyelitis. She did not need surgery, but did get a PICC line and had 6 weeks of antibiotics. It took a long time  to heal.   In June 2023, she was hospitalized for a non-healing left heel wound, and she was again admitted and treated with antibiotics.   She is doing home health PT right now. She thinks it will be hard to get to outpatient therapy because she cannot get in the car.  She also has a home health nurse that comes. She tries to exercise on her off days from PT.   She denies any other types of infections.   Bladder has been ok. She is wearing Depends, and she has a bedside commode just a few steps away.   She feels depressed because she wants to walk, and she cries intermittently about this. She defers anti-depressant treatment.   She has nerve pain in her feet.     Medications:  Current Outpatient Medications   Medication Sig    acetaminophen-codeine 300-30mg (TYLENOL #3) 300-30 mg Tab Take 1 tablet by mouth every 6 (six) hours as needed (pain).    apixaban (ELIQUIS ORAL) Take 5 mg by mouth 2 (two) times a day.    candesartan-hydrochlorothiazide (ATACAND HCT) 16-12.5 mg per tablet Take 1 tablet by mouth once daily.    cyanocobalamin 1,000 mcg/mL injection Inject 1 mL (1,000 mcg total) into the muscle every 30 days.    diphenoxylate-atropine 2.5-0.025 mg (LOMOTIL) 2.5-0.025 mg per tablet Take 1 tablet by mouth 4 (four) times daily as needed for Diarrhea.    Lactobacillus rhamnosus GG (CULTURELLE) 10 billion cell capsule Take 1 capsule by mouth once daily.    LORazepam (ATIVAN) 0.5 MG tablet Take 1 tablet (0.5 mg total) by mouth every 8 (eight) hours as needed for Anxiety.    vitamin D (VITAMIN D3) 1000 units Tab Take 5 tablets (5,000 Units total) by mouth once daily.         SOCIAL HISTORY  Social History     Tobacco Use    Smoking status: Never    Smokeless tobacco: Never   Substance Use Topics    Alcohol use: No    Drug use: No       Living arrangements - the patient lives with her daughter     ROS:      9/26/23    REVIEW OF SYMPTOMS   Do you feel abnormally tired on most days? No   Do you feel you generally sleep  well? Yes   Do you have difficulty controlling your bladder?  Wears Depends    Do you have difficulty controlling your bowels?  No--she has a bowel movement 1-2 times a day    Do you have frequent muscle cramps, tightness or spasms in your limbs?  Yes--she has spasms in her left hand; she has numbness and tingling in her left hand; worse in the morning when she gets up; wakes her up in the middle of the night. She also has some pain on the outer left arm. She is using magnalife cream on her feet.    Do you have new visual symptoms?  No--has a cataract    Do you have worsening difficulty with your memory or thinking? No   Do you have worsening symptoms of anxiety or depression?  Yes--as above    For patients who walk, Do you have more difficulty walking?  No   Have you fallen since your last visit?  She denies any recent falls   For patients who use wheelchairs: Do you have any skin wounds or breakdown? Not Applicable   Do you have difficulty using your hands?  No--left hand, as above    Do you have shooting or burning pain? Yes-in feet    Do you have difficulty with sexual function?  Not assessed    If you are sexually active, are you using birth control? Y/N  N/A Not Applicable   Do you often choke when swallowing liquids or solid food?  No   Do you experience worsening symptoms when overheated? No   Do you need any new equipment such as a wheelchair, walker or shower chair? No--she has a walker, wheelchair, a lift chair, bedside commode. She sleeps in her recliner.    Do you receive co-pay financial assistance for your principal MS medicine? Not Applicable   Would you be interested in participating in an MS research trial in the future? Not Applicable   Do you feel you have adequate family/friend support?  No--a son lives in [place], and a daughter lives in [place]. Her daughter, Amanda, lives at home with her.    Do you have health insurance?   Yes   Are you currently employed? No   Do you receive SSDI/SSI?  No   Do you  use marijuana or cannabis products? No   Have you been diagnosed with a urinary tract infection since your last visit here? No   Have you been diagnosed with a respiratory tract infection since your last visit here? No   Have you been to the emergency room since your last visit here? Yes    Have you been hospitalized since your last visit here?  Yes             Objective:        1. 25 foot timed walk:      3/3/2020    12:00 AM   Timed 25 Foot Walk:   Did patient wear an AFO? Yes   Was assistive device used? Yes   Assistive device used (mauricio one): Bilateral Assistance   Bilateral device used Walker/Rollator   Time for 25 Foot Walk (seconds) 31.6   Time for 25 Foot Walk (seconds) 31.3     Neurologic Exam    Deferred   Imaging:     Results for orders placed during the hospital encounter of 10/24/20    MRI Brain Demyelinating Without Contrast    Impression  Stable distribution of white matter lesions in the supratentorial brain, in keeping with patient's known multiple sclerosis.    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      Electronically signed by: Domenico Alfred MD  Date:    10/24/2020  Time:    15:53    Labs:     Lab Results   Component Value Date    LIHMJQNG68JV 39 12/18/2021    CEIMUYNB43YK 30 10/24/2020    [id number] 34 06/04/2019     Lab Results   Component Value Date    WBC 5.78 06/10/2023    RBC 3.47 (L) 06/10/2023    HGB 9.6 (L) 06/10/2023    HCT 31.9 (L) 06/10/2023    MCV 92 06/10/2023    MCH 27.7 06/10/2023    MCHC 30.1 (L) 06/10/2023    RDW 13.9 06/10/2023     06/10/2023    MPV 10.6 06/10/2023    GRAN 3.6 06/10/2023    GRAN 62.3 06/10/2023    LYMPH 1.3 06/10/2023    LYMPH 21.6 06/10/2023    MONO 0.7 06/10/2023    MONO 12.1 06/10/2023    EOS 0.2 06/10/2023    BASO 0.01 06/10/2023    EOSINOPHIL 3.6 06/10/2023    BASOPHIL 0.2 06/10/2023     Sodium   Date Value Ref Range Status   06/10/2023 142 136 - 145 mmol/L Final     Potassium   Date Value Ref Range Status   06/10/2023 3.2 (L) 3.5 - 5.1  mmol/L Final     Chloride   Date Value Ref Range Status   06/10/2023 102 95 - 110 mmol/L Final     CO2   Date Value Ref Range Status   06/10/2023 30 (H) 23 - 29 mmol/L Final     Glucose   Date Value Ref Range Status   06/10/2023 121 (H) 70 - 110 mg/dL Final     BUN   Date Value Ref Range Status   06/10/2023 10 8 - 23 mg/dL Final     Creatinine   Date Value Ref Range Status   06/10/2023 0.9 0.5 - 1.4 mg/dL Final     Calcium   Date Value Ref Range Status   06/10/2023 9.6 8.7 - 10.5 mg/dL Final     Total Protein   Date Value Ref Range Status   06/07/2023 6.9 6.0 - 8.4 g/dL Final     Albumin   Date Value Ref Range Status   06/07/2023 3.5 3.5 - 5.2 g/dL Final     Total Bilirubin   Date Value Ref Range Status   06/07/2023 0.3 0.1 - 1.0 mg/dL Final     Comment:     For infants and newborns, interpretation of results should be based  on gestational age, weight and in agreement with clinical  observations.    Premature Infant recommended reference ranges:  Up to 24 hours.............<8.0 mg/dL  Up to 48 hours............<12.0 mg/dL  3-5 days..................<15.0 mg/dL  6-29 days.................<15.0 mg/dL       Alkaline Phosphatase   Date Value Ref Range Status   06/07/2023 67 55 - 135 U/L Final     AST   Date Value Ref Range Status   06/07/2023 8 (L) 10 - 40 U/L Final     ALT   Date Value Ref Range Status   06/07/2023 5 (L) 10 - 44 U/L Final     Anion Gap   Date Value Ref Range Status   06/10/2023 10 8 - 16 mmol/L Final     eGFR if    Date Value Ref Range Status   07/25/2022 >60.0 >60 mL/min/1.73 m^2 Final     eGFR if non    Date Value Ref Range Status   07/25/2022 >60.0 >60 mL/min/1.73 m^2 Final     Comment:     Calculation used to obtain the estimated glomerular filtration  rate (eGFR) is the CKD-EPI equation.            MS Impression and Plan:     NEURO MULTIPLE SCLEROSIS IMPRESSION:   MS Status:     Clinical Progression:  Worsened    Clinical Progression comment:  Multiple  hospitalizations have left her very deconditioned. We will prioritize ongoing PT at this time.  Plan:     Symptom Management:  Implement change in symptom management    Implement Change in Symptom Management:  Gait (New Rx sent to [facility] for home health PT and nursing.)     We will check labs for CBC, CMP, Vit D.     We will hold off on an MRI until she is able to leave the home.   We discussed [facility] for counseling.   She will follow up with Dr. Henley in 4-5 months virtually.         SURINDER Ortiz, CNS    Problem List Items Addressed This Visit    None  Visit Diagnoses       Multiple sclerosis    -  Primary    Relevant Orders    CBC auto differential    Comprehensive Metabolic Panel    Vitamin D    Ambulatory referral/consult to Home Health    Impaired mobility and ADLs        Counseling regarding goals of care        Other long term (current) drug therapy        Relevant Orders    Vitamin D    Nerve pain

## 2023-09-28 ENCOUNTER — DOCUMENT SCAN (OUTPATIENT)
Dept: HOME HEALTH SERVICES | Facility: HOSPITAL | Age: 73
End: 2023-09-28
Payer: MEDICARE

## 2023-09-28 ENCOUNTER — TELEPHONE (OUTPATIENT)
Dept: NEUROLOGY | Facility: CLINIC | Age: 73
End: 2023-09-28
Payer: MEDICARE

## 2023-09-28 ENCOUNTER — LAB VISIT (OUTPATIENT)
Dept: LAB | Facility: HOSPITAL | Age: 73
End: 2023-09-28
Attending: CLINICAL NURSE SPECIALIST
Payer: MEDICARE

## 2023-09-28 DIAGNOSIS — G35 MULTIPLE SCLEROSIS: ICD-10-CM

## 2023-09-28 DIAGNOSIS — Z79.01 LONG TERM (CURRENT) USE OF ANTICOAGULANTS: ICD-10-CM

## 2023-09-28 DIAGNOSIS — E86.0 DEHYDRATION: Primary | ICD-10-CM

## 2023-09-28 PROCEDURE — 80053 COMPREHEN METABOLIC PANEL: CPT | Performed by: CLINICAL NURSE SPECIALIST

## 2023-09-28 PROCEDURE — 82306 VITAMIN D 25 HYDROXY: CPT | Performed by: CLINICAL NURSE SPECIALIST

## 2023-09-28 PROCEDURE — 85027 COMPLETE CBC AUTOMATED: CPT | Performed by: CLINICAL NURSE SPECIALIST

## 2023-09-28 PROCEDURE — 36415 COLL VENOUS BLD VENIPUNCTURE: CPT | Mod: PO | Performed by: CLINICAL NURSE SPECIALIST

## 2023-09-28 NOTE — TELEPHONE ENCOUNTER
----- Message from Blanca Virgen, SURINDER, CNS sent at 9/26/2023  6:22 PM CDT -----  VV with BB in 4 months

## 2023-09-28 NOTE — TELEPHONE ENCOUNTER
Called pt to schedule 4 mo VV with BB. Pt said her daughter normally handles all of her appts and requested that I speak with her instead. I asked pt if her or her daughter were active on the portal. Pt said that her daughter communicates through the portal. I told pt I would send a message through there. Pt agreed.

## 2023-09-29 LAB
25(OH)D3+25(OH)D2 SERPL-MCNC: 37 NG/ML (ref 30–96)
ALBUMIN SERPL BCP-MCNC: 3.5 G/DL (ref 3.5–5.2)
ALP SERPL-CCNC: 63 U/L (ref 55–135)
ALT SERPL W/O P-5'-P-CCNC: 7 U/L (ref 10–44)
ANION GAP SERPL CALC-SCNC: 11 MMOL/L (ref 8–16)
AST SERPL-CCNC: 9 U/L (ref 10–40)
BILIRUB SERPL-MCNC: 0.2 MG/DL (ref 0.1–1)
BUN SERPL-MCNC: 18 MG/DL (ref 8–23)
CALCIUM SERPL-MCNC: 9.6 MG/DL (ref 8.7–10.5)
CHLORIDE SERPL-SCNC: 106 MMOL/L (ref 95–110)
CO2 SERPL-SCNC: 24 MMOL/L (ref 23–29)
CREAT SERPL-MCNC: 0.7 MG/DL (ref 0.5–1.4)
ERYTHROCYTE [DISTWIDTH] IN BLOOD BY AUTOMATED COUNT: 14.8 % (ref 11.5–14.5)
EST. GFR  (NO RACE VARIABLE): >60 ML/MIN/1.73 M^2
GLUCOSE SERPL-MCNC: 107 MG/DL (ref 70–110)
HCT VFR BLD AUTO: 30.6 % (ref 37–48.5)
HGB BLD-MCNC: 9 G/DL (ref 12–16)
MCH RBC QN AUTO: 27.7 PG (ref 27–31)
MCHC RBC AUTO-ENTMCNC: 29.4 G/DL (ref 32–36)
MCV RBC AUTO: 94 FL (ref 82–98)
PLATELET # BLD AUTO: 263 K/UL (ref 150–450)
PMV BLD AUTO: 10.8 FL (ref 9.2–12.9)
POTASSIUM SERPL-SCNC: 3.9 MMOL/L (ref 3.5–5.1)
PROT SERPL-MCNC: 7.3 G/DL (ref 6–8.4)
RBC # BLD AUTO: 3.25 M/UL (ref 4–5.4)
SODIUM SERPL-SCNC: 141 MMOL/L (ref 136–145)
WBC # BLD AUTO: 5.06 K/UL (ref 3.9–12.7)

## 2023-10-11 NOTE — TELEPHONE ENCOUNTER
LVM telling pt's daughter, Amanda, to call us back to schedule pt's VV with BB. I also told Amanda she or Lupis can respond to the message I previously sent through portal.

## 2023-10-31 ENCOUNTER — OFFICE VISIT (OUTPATIENT)
Dept: HEMATOLOGY/ONCOLOGY | Facility: CLINIC | Age: 73
End: 2023-10-31
Payer: MEDICARE

## 2023-10-31 DIAGNOSIS — D64.9 ANEMIA, UNSPECIFIED TYPE: Primary | ICD-10-CM

## 2023-10-31 DIAGNOSIS — I26.01 ACUTE SEPTIC PULMONARY EMBOLISM WITH ACUTE COR PULMONALE: ICD-10-CM

## 2023-10-31 DIAGNOSIS — J96.01 ACUTE HYPOXEMIC RESPIRATORY FAILURE: ICD-10-CM

## 2023-10-31 PROCEDURE — 99205 OFFICE O/P NEW HI 60 MIN: CPT | Mod: 95,,, | Performed by: INTERNAL MEDICINE

## 2023-10-31 PROCEDURE — 99205 PR OFFICE/OUTPT VISIT, NEW, LEVL V, 60-74 MIN: ICD-10-PCS | Mod: 95,,, | Performed by: INTERNAL MEDICINE

## 2023-10-31 NOTE — PROGRESS NOTES
Hematology and Medical Oncology   New Patient Consult     10/31/2023  Referred by:  Blanca Virgen    Reason For Referral: Anemia of chronic disease    Telemedicine Documentation:  The patient location is: home  The chief complaint leading to consultation is: anemia of chronic disease    Visit type: audiovisual    Face to Face time with patient: 25  30 minutes of total time spent on the encounter, which includes face to face time and non-face to face time preparing to see the patient (eg, review of tests), Obtaining and/or reviewing separately obtained history, Documenting clinical information in the electronic or other health record, Independently interpreting results (not separately reported) and communicating results to the patient/family/caregiver, or Care coordination (not separately reported).     Each patient to whom he or she provides medical services by telemedicine is:  (1) informed of the relationship between the physician and patient and the respective role of any other health care provider with respect to management of the patient; and (2) notified that he or she may decline to receive medical services by telemedicine and may withdraw from such care at any time.    History of Present Ilness:   Lupis Murcia (Lupis) is a pleasant 73 y.o.female who presents virtually to discuss progressive anemia along with known MS.    Overall doing fair. Has home health come draw labs as needed and do PT exercises.    Fairly recent past is significant for May 2022, she was hospitalized with encephalopathy, HANDY, and lymphedema. After 10 days, she was transferred to SNF. While in SNF and working out at the gym, she developed shortness of breath and went back to the ER and was found to have bilateral pulmonary emboli. She was admitted to the ICU.  In total, she was in skilled nursing for a total of 70 days. She is on Eliquis twice a day now.     Daughter was there during the virtual visit to assist Ms. Murcia and  answer questions as needed.    PAST MEDICAL HISTORY:   Past Medical History:   Diagnosis Date    Lymphedema     MS (multiple sclerosis)     Other pulmonary embolism without acute cor pulmonale     Vitamin B12 deficiency        PAST SURGICAL HISTORY:   Past Surgical History:   Procedure Laterality Date    BREAST CYST EXCISION Left     over 40yrs ago     SECTION      ESOPHAGOGASTRODUODENOSCOPY N/A 2022    Procedure: EGD (ESOPHAGOGASTRODUODENOSCOPY);  Surgeon: Radha Arango MD;  Location: [address];  Service: Endoscopy;  Laterality: N/A;       PAST SOCIAL HISTORY:   reports that she has never smoked. She has never used smokeless tobacco. She reports that she does not drink alcohol and does not use drugs.    FAMILY HISTORY:  Family History   Problem Relation Age of Onset    Coronary artery disease Father     Lung cancer Father     Lung cancer Mother     Brain cancer Brother        CURRENT MEDICATIONS:   Current Outpatient Medications   Medication Sig    acetaminophen-codeine 300-30mg (TYLENOL #3) 300-30 mg Tab Take 1 tablet by mouth every 6 (six) hours as needed (pain).    apixaban (ELIQUIS ORAL) Take 5 mg by mouth 2 (two) times a day.    candesartan-hydrochlorothiazide (ATACAND HCT) 16-12.5 mg per tablet Take 1 tablet by mouth once daily.    cyanocobalamin 1,000 mcg/mL injection Inject 1 mL (1,000 mcg total) into the muscle every 30 days.    diphenoxylate-atropine 2.5-0.025 mg (LOMOTIL) 2.5-0.025 mg per tablet Take 1 tablet by mouth 4 (four) times daily as needed for Diarrhea.    Lactobacillus rhamnosus GG (CULTURELLE) 10 billion cell capsule Take 1 capsule by mouth once daily.    LORazepam (ATIVAN) 0.5 MG tablet Take 1 tablet (0.5 mg total) by mouth every 8 (eight) hours as needed for Anxiety.    vitamin D (VITAMIN D3) 1000 units Tab Take 5 tablets (5,000 Units total) by mouth once daily.     No current facility-administered medications for this visit.     ALLERGIES:    Review of patient's allergies indicates:   Allergen Reactions    Contrast media Shortness Of Breath and Rash    Pcn [penicillins] Shortness Of Breath and Rash    Celebrex [celecoxib] Other (See Comments)     Swallowing problems     Diazepam Hives    Gabapentin Other (See Comments)     Confusion     Motrin [ibuprofen] Rash         Review of Systems:     Review of Systems   Constitutional:  Positive for fatigue. Negative for appetite change, chills, diaphoresis, fever and unexpected weight change.   HENT:   Negative for hearing loss, mouth sores, nosebleeds, sore throat, trouble swallowing and voice change.    Eyes:  Negative for eye problems and icterus.   Respiratory:  Positive for shortness of breath. Negative for chest tightness, cough, hemoptysis and wheezing.    Cardiovascular:  Positive for leg swelling. Negative for chest pain and palpitations.   Gastrointestinal:  Negative for abdominal distention, abdominal pain, blood in stool, diarrhea, nausea and vomiting.   Endocrine: Negative for hot flashes.   Genitourinary:  Negative for bladder incontinence, difficulty urinating, dysuria and hematuria.    Musculoskeletal:  Positive for gait problem. Negative for arthralgias, back pain, flank pain, myalgias, neck pain and neck stiffness.   Skin:  Negative for itching, rash and wound.   Neurological:  Positive for gait problem. Negative for dizziness, extremity weakness, headaches, numbness, seizures and speech difficulty.   Hematological:  Negative for adenopathy. Does not bruise/bleed easily.   Psychiatric/Behavioral:  Negative for confusion, depression and sleep disturbance. The patient is not nervous/anxious.           Physical Exam:     Limited Secondary to virtual visit    ECOG Performance Status: (foot note - ECOG PS provided by Eastern Cooperative Oncology Group) 2 - Symptomatic, <50% confined to bed    Karnofsky Performance Score:  80%- Normal Activity with Effort: Some Symptoms of Disease    Labs:    Lab Results   Component Value Date    WBC 5.06 09/28/2023    HGB 9.0 (L) 09/28/2023    HCT 30.6 (L) 09/28/2023     09/28/2023    CHOL 191 12/18/2021    TRIG 120 12/18/2021    HDL 48 12/18/2021    ALT 7 (L) 09/28/2023    AST 9 (L) 09/28/2023     09/28/2023    K 3.9 09/28/2023     09/28/2023    CREATININE 0.7 09/28/2023    BUN 18 09/28/2023    CO2 24 09/28/2023    TSH 1.863 03/15/2016    INR 1.1 06/14/2022    HGBA1C 5.8 (H) 06/08/2023         Imaging: Previous imaging has been reviewed     Assessment and Plan:     Ms. Murcia is pleasant 73 year old female with anemia of chronic disease.    Anemia  --Largely home bound  --Will request labs through [facility] in 1 month  --Return to clinic virtually to discuss results and possible interventions    Multiple Sclerosis  --Managed as per neurology    30 minutes were spent face to face with the patient and her  family to discuss the disease, natural history, and possible treatment options. I have provided the patient with an opportunity to ask questions and have all questions answered to her satisfaction.       she will return to clinic in 6-8 weeks, but knows to call in the interim if symptoms change or should a problem arise.        Amy Blanco MD  Hematology and Medical Oncology  Bone Marrow Transplant  [facility]      BMT Chart Routing      Follow up with physician 2 months. 1. labs with [facility] [all orders entered under my name] 2. see me virtually 1 week or so after labs   Follow up with DMITRIY    Provider visit type    Infusion scheduling note    Injection scheduling note    Labs    Imaging    Pharmacy appointment    Other referrals

## 2023-11-10 ENCOUNTER — EXTERNAL HOME HEALTH (OUTPATIENT)
Dept: HOME HEALTH SERVICES | Facility: HOSPITAL | Age: 73
End: 2023-11-10
Payer: MEDICARE

## 2023-11-29 DIAGNOSIS — G35 MULTIPLE SCLEROSIS: Primary | ICD-10-CM

## 2023-11-29 DIAGNOSIS — Z78.9 IMPAIRED MOBILITY AND ADLS: ICD-10-CM

## 2023-11-29 DIAGNOSIS — Z74.09 IMPAIRED MOBILITY AND ADLS: ICD-10-CM

## 2023-12-04 ENCOUNTER — PATIENT MESSAGE (OUTPATIENT)
Dept: HEMATOLOGY/ONCOLOGY | Facility: CLINIC | Age: 73
End: 2023-12-04
Payer: MEDICARE

## 2023-12-05 DIAGNOSIS — E53.8 DEFICIENCY OF OTHER SPECIFIED B GROUP VITAMINS: ICD-10-CM

## 2023-12-05 DIAGNOSIS — D64.9 ANEMIA, UNSPECIFIED TYPE: Primary | ICD-10-CM

## 2023-12-08 ENCOUNTER — LAB VISIT (OUTPATIENT)
Dept: LAB | Facility: HOSPITAL | Age: 73
End: 2023-12-08
Attending: INTERNAL MEDICINE
Payer: MEDICARE

## 2023-12-08 DIAGNOSIS — M85.072: Primary | ICD-10-CM

## 2023-12-08 LAB
ALBUMIN SERPL BCP-MCNC: 3.9 G/DL (ref 3.5–5.2)
ALP SERPL-CCNC: 85 U/L (ref 55–135)
ALT SERPL W/O P-5'-P-CCNC: 5 U/L (ref 10–44)
ANION GAP SERPL CALC-SCNC: 12 MMOL/L (ref 8–16)
AST SERPL-CCNC: 13 U/L (ref 10–40)
BASOPHILS # BLD AUTO: 0.01 K/UL (ref 0–0.2)
BASOPHILS NFR BLD: 0.2 % (ref 0–1.9)
BILIRUB SERPL-MCNC: 0.3 MG/DL (ref 0.1–1)
BUN SERPL-MCNC: 19 MG/DL (ref 8–23)
CALCIUM SERPL-MCNC: 10.3 MG/DL (ref 8.7–10.5)
CHLORIDE SERPL-SCNC: 105 MMOL/L (ref 95–110)
CO2 SERPL-SCNC: 23 MMOL/L (ref 23–29)
CREAT SERPL-MCNC: 0.8 MG/DL (ref 0.5–1.4)
CRP SERPL-MCNC: 31.8 MG/L (ref 0–8.2)
DIFFERENTIAL METHOD: ABNORMAL
EOSINOPHIL # BLD AUTO: 0.1 K/UL (ref 0–0.5)
EOSINOPHIL NFR BLD: 2.3 % (ref 0–8)
ERYTHROCYTE [DISTWIDTH] IN BLOOD BY AUTOMATED COUNT: 14.8 % (ref 11.5–14.5)
ERYTHROCYTE [SEDIMENTATION RATE] IN BLOOD BY PHOTOMETRIC METHOD: 83 MM/HR (ref 0–36)
EST. GFR  (NO RACE VARIABLE): >60 ML/MIN/1.73 M^2
FERRITIN SERPL-MCNC: 74 NG/ML (ref 20–300)
FOLATE SERPL-MCNC: 6.3 NG/ML (ref 4–24)
GLUCOSE SERPL-MCNC: 143 MG/DL (ref 70–110)
HCT VFR BLD AUTO: 30.7 % (ref 37–48.5)
HGB BLD-MCNC: 9.8 G/DL (ref 12–16)
IMM GRANULOCYTES # BLD AUTO: 0.01 K/UL (ref 0–0.04)
IMM GRANULOCYTES NFR BLD AUTO: 0.2 % (ref 0–0.5)
IRON SERPL-MCNC: 35 UG/DL (ref 30–160)
LYMPHOCYTES # BLD AUTO: 1.8 K/UL (ref 1–4.8)
LYMPHOCYTES NFR BLD: 33.8 % (ref 18–48)
MCH RBC QN AUTO: 26.8 PG (ref 27–31)
MCHC RBC AUTO-ENTMCNC: 31.9 G/DL (ref 32–36)
MCV RBC AUTO: 84 FL (ref 82–98)
MONOCYTES # BLD AUTO: 0.4 K/UL (ref 0.3–1)
MONOCYTES NFR BLD: 7.5 % (ref 4–15)
NEUTROPHILS # BLD AUTO: 2.9 K/UL (ref 1.8–7.7)
NEUTROPHILS NFR BLD: 56 % (ref 38–73)
NRBC BLD-RTO: 0 /100 WBC
PLATELET # BLD AUTO: 251 K/UL (ref 150–450)
PMV BLD AUTO: 9.9 FL (ref 9.2–12.9)
POTASSIUM SERPL-SCNC: 4.6 MMOL/L (ref 3.5–5.1)
PROT SERPL-MCNC: 8.5 G/DL (ref 6–8.4)
RBC # BLD AUTO: 3.65 M/UL (ref 4–5.4)
SATURATED IRON: 8 % (ref 20–50)
SODIUM SERPL-SCNC: 140 MMOL/L (ref 136–145)
TOTAL IRON BINDING CAPACITY: 437 UG/DL (ref 250–450)
TRANSFERRIN SERPL-MCNC: 295 MG/DL (ref 200–375)
VIT B12 SERPL-MCNC: 492 PG/ML (ref 210–950)
WBC # BLD AUTO: 5.2 K/UL (ref 3.9–12.7)

## 2023-12-08 PROCEDURE — 84466 ASSAY OF TRANSFERRIN: CPT | Performed by: INTERNAL MEDICINE

## 2023-12-08 PROCEDURE — 83540 ASSAY OF IRON: CPT | Performed by: INTERNAL MEDICINE

## 2023-12-08 PROCEDURE — 80053 COMPREHEN METABOLIC PANEL: CPT | Performed by: INTERNAL MEDICINE

## 2023-12-08 PROCEDURE — 82746 ASSAY OF FOLIC ACID SERUM: CPT | Performed by: INTERNAL MEDICINE

## 2023-12-08 PROCEDURE — 82607 VITAMIN B-12: CPT | Performed by: INTERNAL MEDICINE

## 2023-12-08 PROCEDURE — 82728 ASSAY OF FERRITIN: CPT | Performed by: INTERNAL MEDICINE

## 2023-12-08 PROCEDURE — 85025 COMPLETE CBC W/AUTO DIFF WBC: CPT | Performed by: INTERNAL MEDICINE

## 2023-12-08 PROCEDURE — 85652 RBC SED RATE AUTOMATED: CPT | Performed by: INTERNAL MEDICINE

## 2023-12-08 PROCEDURE — 86140 C-REACTIVE PROTEIN: CPT | Performed by: INTERNAL MEDICINE

## 2023-12-11 PROCEDURE — G0179 MD RECERTIFICATION HHA PT: HCPCS | Mod: ,,, | Performed by: PODIATRIST

## 2023-12-26 ENCOUNTER — PATIENT MESSAGE (OUTPATIENT)
Dept: HEMATOLOGY/ONCOLOGY | Facility: CLINIC | Age: 73
End: 2023-12-26
Payer: MEDICARE

## 2024-01-03 ENCOUNTER — DOCUMENT SCAN (OUTPATIENT)
Dept: HOME HEALTH SERVICES | Facility: HOSPITAL | Age: 74
End: 2024-01-03
Payer: MEDICARE

## 2024-01-12 ENCOUNTER — TELEPHONE (OUTPATIENT)
Dept: NEUROLOGY | Facility: CLINIC | Age: 74
End: 2024-01-12
Payer: MEDICARE

## 2024-01-12 NOTE — TELEPHONE ENCOUNTER
Spoke to pt. Pt stated that she saw Blanca recently for a virtual and AP wanted pt to have a virtual f/u appt with BB in February. I told pt that the last time she had a virtual with AP was in September. I explained to pt I spoke with pt shortly after that appt and attempted to schedule the virtual f/u with BB. Pt advised me to call her daughter, Amanda, to schedule the appt since Amanda manages all of pt's appts. I was never able to get in touch with Amanda; therefore, appt was not scheduled. Pt kept insisting that she had her virtual with Blanca sometime sooner than September, but I told pt the last appt on her record was 9/26. I told pt I could either schedule her for a VV with BB at her next available in May or schedule her for a VV with AP sometime sooner. Pt agreed to VV with AP. I scheduled pt on 2/6 at 3:10 PM.

## 2024-01-12 NOTE — TELEPHONE ENCOUNTER
----- Message from Eliana Oliveros RN sent at 1/12/2024  1:49 PM CST -----  Regarding: FW: Rajesh  Contact: [phone]    ----- Message -----  From: Joyce Zee  Sent: 1/12/2024   1:14 PM CST  To: [facility] Staff  Subject: Appt                                             Patient is calling to speak with dea about an appointment she was supposed have. Please contact pt

## 2024-01-18 ENCOUNTER — EXTERNAL HOME HEALTH (OUTPATIENT)
Dept: HOME HEALTH SERVICES | Facility: HOSPITAL | Age: 74
End: 2024-01-18
Payer: MEDICARE

## 2024-01-22 ENCOUNTER — PATIENT MESSAGE (OUTPATIENT)
Dept: NEUROLOGY | Facility: CLINIC | Age: 74
End: 2024-01-22
Payer: MEDICARE

## 2024-01-22 ENCOUNTER — PATIENT MESSAGE (OUTPATIENT)
Dept: PSYCHIATRY | Facility: CLINIC | Age: 74
End: 2024-01-22
Payer: MEDICARE

## 2024-01-26 NOTE — TELEPHONE ENCOUNTER
FELICE contacted [facility]  to inquire about referral to inpatient rehab. Vee, , stated that she would have the Pt's physical therapist, Neal, call me at his earliest convenience.

## 2024-01-26 NOTE — TELEPHONE ENCOUNTER
FELICE received call from [place] with [facility] to discuss reason for rehab referral and intended goals of admission. FELICE documented and will review with provider. FELICE then called [facility] and LVM to discuss Pt's eligibility.

## 2024-01-29 NOTE — TELEPHONE ENCOUNTER
SW received call from Pt stating that her daughter had asked for Pt to let me know that she would call me at her earliest chance. SW and Pt discussed that the rehab referral was being worked on. Pt expressed gratitude and understanding.

## 2024-01-29 NOTE — TELEPHONE ENCOUNTER
FELICE spoke with Vee at [facility] [phone] to request notes/clinicals from Pt's physical therapist, Neal, to support rehab referral. Documents to be faxed to FELICE.

## 2024-01-29 NOTE — TELEPHONE ENCOUNTER
FELICE spoke with Harmony in admissions at [facility] to discuss Pt's eligibility. Harmony confirmed that pt was eligible for admission from home and to include demographics, current clinicals, therapy notes and printed order in the referral. Harmony stated that Tricia Fuller would be the case coordinator for the referral for any future questions. FELICE attempted to reach Pt's daughter again, LVM and sent portal message. FELICE to discuss with treatment team.

## 2024-02-03 NOTE — PROGRESS NOTES
"Subjective:          Patient ID: Lupis Murcia is a 73 y.o. female who presents today for a routine virtual visit for MS.  She was last seen in September. The history has been provided by the patient and her daughter.     The patient location is: her home   The chief complaint leading to consultation is: MS     Visit type: audiovisual    Face to Face time with patient: 18 minutes   30 minutes of total time spent on the encounter, which includes face to face time and non-face to face time preparing to see the patient (eg, review of tests), Obtaining and/or reviewing separately obtained history, Documenting clinical information in the electronic or other health record, Independently interpreting results (not separately reported) and communicating results to the patient/family/caregiver, or Care coordination (not separately reported).     Each patient to whom he or she provides medical services by telemedicine is:  (1) informed of the relationship between the physician and patient and the respective role of any other health care provider with respect to management of the patient; and (2) notified that he or she may decline to receive medical services by telemedicine and may withdraw from such care at any time.      MS HPI:  DMT: None   Taking vitamin D3 as recommended? Yes  She has been doing home health physical therapy twice a week. She does not feel like she has made much progress with home health PT. She did skilled nursing after the last hospitalization, and this deconditioned her and made her mobility worse. She has not been walking with a walker for over a year. Transfers are very difficult, so she is not currently doing them. Her daughter, Amanda, is helping for bathing and urine/bowel cleanup. She is sleeping in the recliner chair, wearing a Depends (daughter changes her), and getting "bed baths."   There is no skin breakdown.   She denies any equipment needs at this time.   Her goals for inpatient rehab " would be to stand and pivot, take some steps with the walker.   At this time, her daughter is not able to get her in the car.     She denies any recent infections.   She denies any significant fatigue. She gets good quality sleep when she sleeps. She is emptying completely for bowels and bladder. She occasionally has some tightness in her legs, has some clonus in the left leg.   She is able to feed herself and hold a cup independently.    Her daughter comes home over lunch to make sure she is eating and change her. Her daughter leaves her a tray with food in the morning and replenishes it as needed when she comes home from lunch and in the evenings.   She denies any issues with swallowing. She denies any challenges with her speech.   She denies any pain, except for some wrist pain on the left that she thinks is carpal tunnel.   She denies any falls.     Medications:  Current Outpatient Medications   Medication Sig    acetaminophen-codeine 300-30mg (TYLENOL #3) 300-30 mg Tab Take 1 tablet by mouth every 6 (six) hours as needed (pain).    apixaban (ELIQUIS) 5 mg Tab Take 1 tablet (5 mg total) by mouth 2 (two) times a day.    candesartan-hydrochlorothiazide (ATACAND HCT) 16-12.5 mg per tablet Take 1 tablet by mouth once daily.    cyanocobalamin 1,000 mcg/mL injection Inject 1 mL (1,000 mcg total) into the muscle every 30 days.    diphenoxylate-atropine 2.5-0.025 mg (LOMOTIL) 2.5-0.025 mg per tablet Take 1 tablet by mouth 4 (four) times daily as needed for Diarrhea.    Lactobacillus rhamnosus GG (CULTURELLE) 10 billion cell capsule Take 1 capsule by mouth once daily.    LORazepam (ATIVAN) 0.5 MG tablet Take 1 tablet (0.5 mg total) by mouth every 8 (eight) hours as needed for Anxiety.    vitamin D (VITAMIN D3) 1000 units Tab Take 5 tablets (5,000 Units total) by mouth once daily.         SOCIAL HISTORY  Social History     Tobacco Use    Smoking status: Never    Smokeless tobacco: Never   Substance Use Topics    Alcohol  use: No    Drug use: No       Living arrangements - the patient lives with her daughter            Objective:        1. 25 foot timed walk:      3/3/2020    12:00 AM   Timed 25 Foot Walk:   Did patient wear an AFO? Yes   Was assistive device used? Yes   Assistive device used (mauricio one): Bilateral Assistance   Bilateral device used Walker/Rollator   Time for 25 Foot Walk (seconds) 31.6   Time for 25 Foot Walk (seconds) 31.3       Neurologic Exam    Deferred   Imaging:     Results for orders placed during the hospital encounter of 10/24/20    MRI Brain Demyelinating Without Contrast    Impression  Stable distribution of white matter lesions in the supratentorial brain, in keeping with patient's known multiple sclerosis.    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      Electronically signed by: Domenico Alfred MD  Date:    10/24/2020  Time:    15:53    Labs:     Lab Results   Component Value Date    OADDDWVC00LR 37 09/28/2023    JXGNAMDC44LP 39 12/18/2021    PXCVAVZT34FJ 30 10/24/2020       Lab Results   Component Value Date    WBC 5.20 12/08/2023    RBC 3.65 (L) 12/08/2023    HGB 9.8 (L) 12/08/2023    HCT 30.7 (L) 12/08/2023    MCV 84 12/08/2023    MCH 26.8 (L) 12/08/2023    MCHC 31.9 (L) 12/08/2023    RDW 14.8 (H) 12/08/2023     12/08/2023    MPV 9.9 12/08/2023    GRAN 2.9 12/08/2023    GRAN 56.0 12/08/2023    LYMPH 1.8 12/08/2023    LYMPH 33.8 12/08/2023    MONO 0.4 12/08/2023    MONO 7.5 12/08/2023    EOS 0.1 12/08/2023    BASO 0.01 12/08/2023    EOSINOPHIL 2.3 12/08/2023    BASOPHIL 0.2 12/08/2023     Sodium   Date Value Ref Range Status   12/08/2023 140 136 - 145 mmol/L Final     Potassium   Date Value Ref Range Status   12/08/2023 4.6 3.5 - 5.1 mmol/L Final     Chloride   Date Value Ref Range Status   12/08/2023 105 95 - 110 mmol/L Final     CO2   Date Value Ref Range Status   12/08/2023 23 23 - 29 mmol/L Final     Glucose   Date Value Ref Range Status   12/08/2023 143 (H) 70 - 110 mg/dL Final     BUN    Date Value Ref Range Status   12/08/2023 19 8 - 23 mg/dL Final     Creatinine   Date Value Ref Range Status   12/08/2023 0.8 0.5 - 1.4 mg/dL Final     Calcium   Date Value Ref Range Status   12/08/2023 10.3 8.7 - 10.5 mg/dL Final     Total Protein   Date Value Ref Range Status   12/08/2023 8.5 (H) 6.0 - 8.4 g/dL Final     Albumin   Date Value Ref Range Status   12/08/2023 3.9 3.5 - 5.2 g/dL Final     Total Bilirubin   Date Value Ref Range Status   12/08/2023 0.3 0.1 - 1.0 mg/dL Final     Comment:     For infants and newborns, interpretation of results should be based  on gestational age, weight and in agreement with clinical  observations.    Premature Infant recommended reference ranges:  Up to 24 hours.............<8.0 mg/dL  Up to 48 hours............<12.0 mg/dL  3-5 days..................<15.0 mg/dL  6-29 days.................<15.0 mg/dL       Alkaline Phosphatase   Date Value Ref Range Status   12/08/2023 85 55 - 135 U/L Final     AST   Date Value Ref Range Status   12/08/2023 13 10 - 40 U/L Final     ALT   Date Value Ref Range Status   12/08/2023 5 (L) 10 - 44 U/L Final     Anion Gap   Date Value Ref Range Status   12/08/2023 12 8 - 16 mmol/L Final     eGFR if    Date Value Ref Range Status   07/25/2022 >60.0 >60 mL/min/1.73 m^2 Final     eGFR if non    Date Value Ref Range Status   07/25/2022 >60.0 >60 mL/min/1.73 m^2 Final     Comment:     Calculation used to obtain the estimated glomerular filtration  rate (eGFR) is the CKD-EPI equation.            MS Impression and Plan:     NEURO MULTIPLE SCLEROSIS IMPRESSION:   MS Status:     Number of relapses in the past year?:  0    Clinical Progression:  Worsened    Clinical Progression comment:  Lupis is very deconditioned and is interested in pursuing inpatient rehab. This was also recommended by her home health physical therapists. At this time, I think that it is reasonable for Lupis to go to rehab to have more hours of  therapy per day to help her get closer to her goal of at least standing and pivoting with the walker. Her ultimate goal is to take steps with the walker. This would help to make her ADLs easier and also make cleaning/bathing, etc easier on her daughter, who is her caretaker.     I appreciate the input from her home therapists who have worked with her over the past several months.   Plan:     Symptom Management:  Implement change in symptom management    : We will sign off on referral to inpatient rehab.          SURINDER Ortiz, CNS

## 2024-02-06 ENCOUNTER — OFFICE VISIT (OUTPATIENT)
Dept: NEUROLOGY | Facility: CLINIC | Age: 74
End: 2024-02-06
Payer: MEDICARE

## 2024-02-06 DIAGNOSIS — Z71.89 COUNSELING REGARDING GOALS OF CARE: ICD-10-CM

## 2024-02-06 DIAGNOSIS — Z78.9 IMPAIRED MOBILITY AND ADLS: ICD-10-CM

## 2024-02-06 DIAGNOSIS — G35 MULTIPLE SCLEROSIS: Primary | ICD-10-CM

## 2024-02-06 DIAGNOSIS — Z74.09 IMPAIRED MOBILITY AND ADLS: ICD-10-CM

## 2024-02-06 PROCEDURE — 99214 OFFICE O/P EST MOD 30 MIN: CPT | Mod: 95,,, | Performed by: CLINICAL NURSE SPECIALIST

## 2024-02-09 PROCEDURE — G0179 MD RECERTIFICATION HHA PT: HCPCS | Mod: ,,, | Performed by: PODIATRIST

## 2024-02-21 ENCOUNTER — TELEPHONE (OUTPATIENT)
Dept: PODIATRY | Facility: CLINIC | Age: 74
End: 2024-02-21
Payer: MEDICARE

## 2024-02-21 NOTE — TELEPHONE ENCOUNTER
Marissa is calling from [facility] regarding a wound the pt has. Please call Vee pt  to further discuss.     Attempted to call to discuss wound further with nurse. Answer  states office is gone for the day will relate message to staff.

## 2024-03-12 ENCOUNTER — HOSPITAL ENCOUNTER (OUTPATIENT)
Dept: RADIOLOGY | Facility: HOSPITAL | Age: 74
Discharge: HOME | End: 2024-03-12
Attending: FAMILY MEDICINE
Payer: MEDICARE

## 2024-03-12 PROCEDURE — 71045 X-RAY EXAM CHEST 1 VIEW: CPT | Mod: 26,,, | Performed by: RADIOLOGY

## 2024-03-12 NOTE — HIM RECORD RETIREMENT NOTE
half-way of Incomplete Medical Record    3/12/24    Patient Name: Lupis Murcia  Contact Serial # CSN): [id number]  Patient Medical Record # (MRN): [id number]  Date of Service: Telephone on 1/10/2020  Physician Name: Samir Pearson,    This record has been reviewed and is being retired as incomplete by the approval of the  Medical Staff Operating Committee (MSOC)     On 9/7/2022., due to:  Unavailability of Provider     Missing Information/Comments:  []    Discharge Summary   []    DC Note/Short Stay Summary   []    ED Provider Note   []    Delivery Note   []    History & Physical   []   Operative Note   []     Procedure Note   []     Physician Order   [x]     Verbal Order   []       Other, specify:

## 2024-03-16 ENCOUNTER — HOSPITAL ENCOUNTER (OUTPATIENT)
Dept: RADIOLOGY | Facility: HOSPITAL | Age: 74
Discharge: HOME | End: 2024-03-16
Attending: PHYSICAL MEDICINE & REHABILITATION
Payer: MEDICARE

## 2024-03-16 PROCEDURE — 74018 RADEX ABDOMEN 1 VIEW: CPT | Mod: 26,,, | Performed by: STUDENT IN AN ORGANIZED HEALTH CARE EDUCATION/TRAINING PROGRAM

## 2024-03-18 ENCOUNTER — EXTERNAL HOME HEALTH (OUTPATIENT)
Dept: HOME HEALTH SERVICES | Facility: HOSPITAL | Age: 74
End: 2024-03-18
Payer: MEDICARE

## 2024-03-23 PROCEDURE — G0180 MD CERTIFICATION HHA PATIENT: HCPCS | Mod: ,,, | Performed by: NURSE PRACTITIONER

## 2024-04-23 DIAGNOSIS — M79.2 NEUROGENIC PAIN OF LEFT FOOT: Primary | ICD-10-CM

## 2024-04-23 RX ORDER — DEXAMETHASONE 4 MG/1
4 TABLET ORAL DAILY
Qty: 14 TABLET | Refills: 0 | Status: SHIPPED | OUTPATIENT
Start: 2024-04-23 | End: 2024-04-24

## 2024-04-23 NOTE — PROGRESS NOTES
Subjective:     Patient    Lupis Murcia is a 73 y.o. female.    Problem    Presents with left foot pain of months duration. Primarily at dorsal and lateral left foot. Also with numbness, tingling, burning, shooting, cramping sensations. Previously attempted gabapentin but it made her confused. Does not sleep well.     History    History obtained from patient and review of medical records.     Past Medical History:   Diagnosis Date    Lymphedema     MS (multiple sclerosis)     Other pulmonary embolism without acute cor pulmonale     Vitamin B12 deficiency        Past Surgical History:   Procedure Laterality Date    BREAST CYST EXCISION Left     over 40yrs ago     SECTION      ESOPHAGOGASTRODUODENOSCOPY N/A 2022    Procedure: EGD (ESOPHAGOGASTRODUODENOSCOPY);  Surgeon: Radha Arango MD;  Location: [address]);  Service: Endoscopy;  Laterality: N/A;        Objective:       Dermatological Exam    Skin:  Pedal hair growth diminished and Pedal skin thin and shiny on right  Pedal hair growth diminished and Pedal skin thin and shiny on left    Nails:  10 nail(s) elongated, 10 nail(s) thickened, and 10 nail(s) discolored    Vascular Exam    Arteries:  Posterior tibial artery nonpalpable on right  Dorsalis pedis artery palpable on right  Posterior tibial artery nonpalpable on left  Dorsalis pedis artery palpable on left    Veins:  Venous stasis changes on right  Venous stasis changes on left    Swelling:  3+ nonpitting on right  3+ nonpitting on left    Neurological Exam    Pelham touch test:  6/6 sites sensed, light touch intact    Provocation Sign:  + at all major ankle and foot nerves on left     Musculoskeletal Exam    Footwear:  Casual on right  Casual on left    Gait Exam:   Ambulatory Status: Ambulatory  Gait: Left foot drop  Assistive Devices: Walker    Foot Progression Angle:  Normal on right  Normal on left    Right Lower Extremity Additional Findings:  Right foot and ankle  function, strength, and range of motion unremarkable except as noted above.     Left Lower Extremity Additional Findings:  Left foot and ankle function, strength, and range of motion unremarkable except as noted above.    Imaging and Other Tests    Imaging:  Independently reviewed and interpreted imaging, findings are as follows: N/A     Assessment:     Encounter Diagnosis   Name Primary?    Neurogenic pain of left foot Yes        Plan:     I counseled the patient on her conditions, their implications and medical management.    Neurogenic pain of left foot: chronic stable  -Dexamethasone.  -If not improving then consider amitriptyline or similar.       Return to clinic PRN.          Alert and oriented to person, place and time

## 2024-04-24 DIAGNOSIS — M79.2 NEUROGENIC PAIN OF LEFT FOOT: Primary | ICD-10-CM

## 2024-04-24 RX ORDER — PREDNISONE 20 MG/1
20 TABLET ORAL DAILY
Qty: 14 TABLET | Refills: 0 | Status: SHIPPED | OUTPATIENT
Start: 2024-04-24 | End: 2024-05-29

## 2024-05-02 ENCOUNTER — PATIENT MESSAGE (OUTPATIENT)
Dept: NEUROLOGY | Facility: CLINIC | Age: 74
End: 2024-05-02
Payer: MEDICARE

## 2024-05-02 ENCOUNTER — PATIENT MESSAGE (OUTPATIENT)
Dept: PSYCHIATRY | Facility: CLINIC | Age: 74
End: 2024-05-02
Payer: MEDICARE

## 2024-05-02 DIAGNOSIS — Z74.09 IMPAIRED MOBILITY AND ADLS: ICD-10-CM

## 2024-05-02 DIAGNOSIS — G35 MULTIPLE SCLEROSIS: Primary | ICD-10-CM

## 2024-05-02 DIAGNOSIS — Z78.9 IMPAIRED MOBILITY AND ADLS: ICD-10-CM

## 2024-05-03 ENCOUNTER — EXTERNAL HOME HEALTH (OUTPATIENT)
Dept: HOME HEALTH SERVICES | Facility: HOSPITAL | Age: 74
End: 2024-05-03
Payer: MEDICARE

## 2024-05-06 ENCOUNTER — DOCUMENTATION ONLY (OUTPATIENT)
Dept: NEUROLOGY | Facility: CLINIC | Age: 74
End: 2024-05-06
Payer: MEDICARE

## 2024-05-06 NOTE — PROGRESS NOTES
Faxed home health referral to [facility] at [phone].      Bilobed Transposition Flap Text: The defect edges were debeveled with a #15 scalpel blade.  Given the location of the defect and the proximity to free margins a bilobed transposition flap was deemed most appropriate.  Using a sterile surgical marker, an appropriate bilobe flap drawn around the defect.    The area thus outlined was incised deep to adipose tissue with a #15 scalpel blade.  The skin margins were undermined to an appropriate distance in all directions utilizing iris scissors.

## 2024-05-09 NOTE — TELEPHONE ENCOUNTER
MESERETM telling pt's daughter, Amanda, to call us back to schedule pt for a VV with AP in June. I will also send a message via portal.

## 2024-05-29 DIAGNOSIS — M79.2 NEUROGENIC PAIN OF LEFT FOOT: Primary | ICD-10-CM

## 2024-05-29 RX ORDER — PREDNISONE 20 MG/1
20 TABLET ORAL DAILY PRN
Qty: 60 TABLET | Refills: 0 | Status: SHIPPED | OUTPATIENT
Start: 2024-05-29

## 2024-06-03 ENCOUNTER — TELEPHONE (OUTPATIENT)
Dept: NEUROLOGY | Facility: CLINIC | Age: 74
End: 2024-06-03
Payer: MEDICARE

## 2024-06-03 NOTE — TELEPHONE ENCOUNTER
----- Message from Margaret Parra sent at 6/3/2024  1:12 PM CDT -----  Regarding: pt advice  Contact: [phone]  Pt is returning a missed call from someone in the office and is asking for a return call back soon. Thanks.     Reason for call:miss call message left      Patient's DX:     Patient requesting call back or [facility] msg: [phone]

## 2024-06-07 NOTE — PROGRESS NOTES
"Subjective:          Patient ID: Lupis Murcia is a 73 y.o. female who presents today for a routine virtual visit for MS.  She was last seen in February 2024. The history has been provided by the patient. Her daughter is also present on the visit.     The patient location is: her home   The chief complaint leading to consultation is: MS     Visit type: audiovisual    Face to Face time with patient: 24 minutes   40 minutes of total time spent on the encounter, which includes face to face time and non-face to face time preparing to see the patient (eg, review of tests), Obtaining and/or reviewing separately obtained history, Documenting clinical information in the electronic or other health record, Independently interpreting results (not separately reported) and communicating results to the patient/family/caregiver, or Care coordination (not separately reported).       Each patient to whom he or she provides medical services by telemedicine is:  (1) informed of the relationship between the physician and patient and the respective role of any other health care provider with respect to management of the patient; and (2) notified that he or she may decline to receive medical services by telemedicine and may withdraw from such care at any time.    MS HPI:  DMT: None   Taking vitamin D3 as recommended? Yes =  She did go to [facility] inpatient rehab after the last visit. She was there for a little over 2 weeks. Her daughter reports that "it didn't do very much." She got sick while she was there and had some vomiting. It was determined that she was constipated and was given laxatives and stool softeners.   She has been evaluated for a powerchair. It is currently in the approval process. It is coming through [employer].   Her daughter comes home every day on lunch break to check on her.   She "lives" in her lift chair.    There are no obvious pressure ulcers.   She is getting home health services with " [facility]--PT twice a week and nursing twice a week. The nurse comes to wrap her legs to treat lymphedema.   She has stood with the walker a few times. Her PT will be working on standing with her at the kitchen counter later this week.   She took 2 weeks of prednisone for nerve pain in her feet--20mg once a day.   She has had itching all over her body.   She denies any equipment needs.  She is able to hold a cup and feed herself.   She denies any falls.   Bowels are regular.   She has no cognitive concerns. She keeps track of taking her own medications.   She takes lorazepam for anxiety.    Her daughter is her primary caregiver. She does not get help from other family members.     Medications:  Current Outpatient Medications   Medication Sig    acetaminophen-codeine 300-30mg (TYLENOL #3) 300-30 mg Tab Take 1 tablet by mouth every 6 (six) hours as needed (pain).    apixaban (ELIQUIS) 5 mg Tab Take 1 tablet (5 mg total) by mouth 2 (two) times a day.    candesartan-hydrochlorothiazide (ATACAND HCT) 16-12.5 mg per tablet Take 1 tablet by mouth once daily.    cyanocobalamin 1,000 mcg/mL injection Inject 1 mL (1,000 mcg total) into the muscle every 30 days.    diphenoxylate-atropine 2.5-0.025 mg (LOMOTIL) 2.5-0.025 mg per tablet Take 1 tablet by mouth 4 (four) times daily as needed for Diarrhea.    Lactobacillus rhamnosus GG (CULTURELLE) 10 billion cell capsule Take 1 capsule by mouth once daily.    LORazepam (ATIVAN) 0.5 MG tablet Take 1 tablet (0.5 mg total) by mouth every 8 (eight) hours as needed for Anxiety.    predniSONE (DELTASONE) 20 MG tablet Take 1 tablet (20 mg total) by mouth daily as needed (LLE nerve pain).    vitamin D (VITAMIN D3) 1000 units Tab Take 5 tablets (5,000 Units total) by mouth once daily.     No current facility-administered medications for this visit.       SOCIAL HISTORY  Social History     Tobacco Use    Smoking status: Never    Smokeless tobacco: Never   Substance Use Topics    Alcohol use:  No    Drug use: No       Living arrangements - the patient lives with her daughter              Objective:        1. 25 foot timed walk:  Neurologic Exam    Deferred   Imaging:     Results for orders placed during the hospital encounter of 10/24/20    MRI Brain Demyelinating Without Contrast    Impression  Stable distribution of white matter lesions in the supratentorial brain, in keeping with patient's known multiple sclerosis.    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      Electronically signed by: Domenico Alfred MD  Date:    10/24/2020  Time:    15:53          Labs:     Lab Results   Component Value Date    PDSTCRFT42VL 37 09/28/2023    [id number] 39 12/18/2021    [id number] 30 10/24/2020     Lab Results   Component Value Date    WBC 5.20 12/08/2023    RBC 3.65 (L) 12/08/2023    HGB 9.8 (L) 12/08/2023    HCT 30.7 (L) 12/08/2023    MCV 84 12/08/2023    MCH 26.8 (L) 12/08/2023    MCHC 31.9 (L) 12/08/2023    RDW 14.8 (H) 12/08/2023     12/08/2023    MPV 9.9 12/08/2023    GRAN 2.9 12/08/2023    GRAN 56.0 12/08/2023    LYMPH 1.8 12/08/2023    LYMPH 33.8 12/08/2023    MONO 0.4 12/08/2023    MONO 7.5 12/08/2023    EOS 0.1 12/08/2023    BASO 0.01 12/08/2023    EOSINOPHIL 2.3 12/08/2023    BASOPHIL 0.2 12/08/2023     Sodium   Date Value Ref Range Status   12/08/2023 140 136 - 145 mmol/L Final     Potassium   Date Value Ref Range Status   12/08/2023 4.6 3.5 - 5.1 mmol/L Final     Chloride   Date Value Ref Range Status   12/08/2023 105 95 - 110 mmol/L Final     CO2   Date Value Ref Range Status   12/08/2023 23 23 - 29 mmol/L Final     Glucose   Date Value Ref Range Status   12/08/2023 143 (H) 70 - 110 mg/dL Final     BUN   Date Value Ref Range Status   12/08/2023 19 8 - 23 mg/dL Final     Creatinine   Date Value Ref Range Status   12/08/2023 0.8 0.5 - 1.4 mg/dL Final     Calcium   Date Value Ref Range Status   12/08/2023 10.3 8.7 - 10.5 mg/dL Final     Total Protein   Date Value Ref Range Status   12/08/2023  8.5 (H) 6.0 - 8.4 g/dL Final     Albumin   Date Value Ref Range Status   12/08/2023 3.9 3.5 - 5.2 g/dL Final     Total Bilirubin   Date Value Ref Range Status   12/08/2023 0.3 0.1 - 1.0 mg/dL Final     Comment:     For infants and newborns, interpretation of results should be based  on gestational age, weight and in agreement with clinical  observations.    Premature Infant recommended reference ranges:  Up to 24 hours.............<8.0 mg/dL  Up to 48 hours............<12.0 mg/dL  3-5 days..................<15.0 mg/dL  6-29 days.................<15.0 mg/dL       Alkaline Phosphatase   Date Value Ref Range Status   12/08/2023 85 55 - 135 U/L Final     AST   Date Value Ref Range Status   12/08/2023 13 10 - 40 U/L Final     ALT   Date Value Ref Range Status   12/08/2023 5 (L) 10 - 44 U/L Final     Anion Gap   Date Value Ref Range Status   12/08/2023 12 8 - 16 mmol/L Final     eGFR if    Date Value Ref Range Status   07/25/2022 >60.0 >60 mL/min/1.73 m^2 Final     eGFR if non    Date Value Ref Range Status   07/25/2022 >60.0 >60 mL/min/1.73 m^2 Final     Comment:     Calculation used to obtain the estimated glomerular filtration  rate (eGFR) is the CKD-EPI equation.                MS Impression and Plan:     NEURO MULTIPLE SCLEROSIS IMPRESSION:   Number of relapses in the past year?:  0  Clinical Progression comment:  She has advanced disability. She does not walk and spends her day in the lift chair. She is working with PT to get stronger and work on standing. Power chair is pending, and this would allow her more independence around the house. Reality of independent transfers is not clear to me at this time, so I appreciate PT's evaluation and recommendations.   Symptom Management:  Implement change in symptom management  Implement Change in Symptom Management:  Pain and None (She inquired about continued use of oral prednisone for nerve pain in feet. Discussed risks of chronic steroid  use, but I will discuss with Dr. Henley.)     Will check some basic labs with home health. She has had a lot of itching--perhaps neuropathic; could possibly be due to anemia/iron/B12 deficiency.   Will send new order to [facility] for PT and nursing.     She will follow up with Dr. Henley in 3 months.   The visit today is associated with current or anticipated ongoing medical care related to this patient's single serious condition/complex condition of multiple sclerosis.        SURINDER Ortiz, CNS    Problem List Items Addressed This Visit    None  Visit Diagnoses       Multiple sclerosis    -  Primary    Relevant Orders    CBC auto differential    Comprehensive Metabolic Panel    Ferritin    IRON AND TIBC    Vitamin D    Vitamin B12    Ambulatory referral/consult to Home Health    Other abnormality of red blood cells        Relevant Orders    Ferritin    IRON AND TIBC    Other long term (current) drug therapy        Relevant Orders    Vitamin D    Vitamin B12

## 2024-06-11 ENCOUNTER — OFFICE VISIT (OUTPATIENT)
Dept: NEUROLOGY | Facility: CLINIC | Age: 74
End: 2024-06-11
Payer: MEDICARE

## 2024-06-11 DIAGNOSIS — Z74.09 IMPAIRED MOBILITY AND ADLS: ICD-10-CM

## 2024-06-11 DIAGNOSIS — G35 MULTIPLE SCLEROSIS: Primary | ICD-10-CM

## 2024-06-11 DIAGNOSIS — Z79.899 OTHER LONG TERM (CURRENT) DRUG THERAPY: ICD-10-CM

## 2024-06-11 DIAGNOSIS — M79.2 NERVE PAIN: ICD-10-CM

## 2024-06-11 DIAGNOSIS — Z71.89 COUNSELING REGARDING GOALS OF CARE: ICD-10-CM

## 2024-06-11 DIAGNOSIS — Z78.9 IMPAIRED MOBILITY AND ADLS: ICD-10-CM

## 2024-06-11 DIAGNOSIS — R71.8 OTHER ABNORMALITY OF RED BLOOD CELLS: ICD-10-CM

## 2024-06-11 PROCEDURE — 99215 OFFICE O/P EST HI 40 MIN: CPT | Mod: 95,,, | Performed by: CLINICAL NURSE SPECIALIST

## 2024-06-11 PROCEDURE — G2211 COMPLEX E/M VISIT ADD ON: HCPCS | Mod: 95,,, | Performed by: CLINICAL NURSE SPECIALIST

## 2024-06-11 NOTE — Clinical Note
She was prescribed oral prednisone 20mg daily from podiatry to help with nerve pain in her feet. She took about 10 days of it and felt great, and her pain was better. She is asking our opinion about taking this regularly. I told her it's probably not a terrific idea, but what are your thoughts?

## 2024-06-14 ENCOUNTER — DOCUMENTATION ONLY (OUTPATIENT)
Dept: NEUROLOGY | Facility: CLINIC | Age: 74
End: 2024-06-14
Payer: MEDICARE

## 2024-06-14 NOTE — PROGRESS NOTES
Faxed home health referral along with external lab orders to [facility] at [phone].

## 2024-06-23 ENCOUNTER — PATIENT MESSAGE (OUTPATIENT)
Dept: NEUROLOGY | Facility: CLINIC | Age: 74
End: 2024-06-23
Payer: MEDICARE

## 2024-06-26 ENCOUNTER — TELEPHONE (OUTPATIENT)
Dept: HEMATOLOGY/ONCOLOGY | Facility: CLINIC | Age: 74
End: 2024-06-26
Payer: MEDICARE

## 2024-07-09 ENCOUNTER — HOSPITAL ENCOUNTER (INPATIENT)
Facility: HOSPITAL | Age: 74
LOS: 5 days | Discharge: HOME HEALTH | DRG: 871 | End: 2024-07-14
Attending: EMERGENCY MEDICINE | Admitting: INTERNAL MEDICINE
Payer: MEDICARE

## 2024-07-09 DIAGNOSIS — A41.9 SEPSIS: ICD-10-CM

## 2024-07-09 DIAGNOSIS — R41.82 ALTERED MENTAL STATUS: ICD-10-CM

## 2024-07-09 DIAGNOSIS — R07.9 CHEST PAIN: ICD-10-CM

## 2024-07-09 DIAGNOSIS — J81.1 PULMONARY EDEMA: ICD-10-CM

## 2024-07-09 LAB
ALBUMIN SERPL BCP-MCNC: 3.2 G/DL (ref 3.5–5.2)
ALLENS TEST: ABNORMAL
ALLENS TEST: ABNORMAL
ALLENS TEST: NORMAL
ALP SERPL-CCNC: 66 U/L (ref 55–135)
ALT SERPL W/O P-5'-P-CCNC: 7 U/L (ref 10–44)
ANION GAP SERPL CALC-SCNC: 13 MMOL/L (ref 8–16)
AST SERPL-CCNC: 18 U/L (ref 10–40)
BACTERIA #/AREA URNS AUTO: ABNORMAL /HPF
BASOPHILS # BLD AUTO: 0.01 K/UL (ref 0–0.2)
BASOPHILS NFR BLD: 0.1 % (ref 0–1.9)
BILIRUB SERPL-MCNC: 0.6 MG/DL (ref 0.1–1)
BILIRUB UR QL STRIP: NEGATIVE
BNP SERPL-MCNC: 122 PG/ML (ref 0–99)
BUN SERPL-MCNC: 16 MG/DL (ref 8–23)
CALCIUM SERPL-MCNC: 9.1 MG/DL (ref 8.7–10.5)
CHLORIDE SERPL-SCNC: 108 MMOL/L (ref 95–110)
CLARITY UR REFRACT.AUTO: ABNORMAL
CO2 SERPL-SCNC: 18 MMOL/L (ref 23–29)
COLOR UR AUTO: ABNORMAL
CREAT SERPL-MCNC: 0.9 MG/DL (ref 0.5–1.4)
DIFFERENTIAL METHOD BLD: ABNORMAL
EOSINOPHIL # BLD AUTO: 0.1 K/UL (ref 0–0.5)
EOSINOPHIL NFR BLD: 0.7 % (ref 0–8)
ERYTHROCYTE [DISTWIDTH] IN BLOOD BY AUTOMATED COUNT: 15 % (ref 11.5–14.5)
EST. GFR  (NO RACE VARIABLE): >60 ML/MIN/1.73 M^2
GLUCOSE SERPL-MCNC: 157 MG/DL (ref 70–110)
GLUCOSE UR QL STRIP: NEGATIVE
HCO3 UR-SCNC: 22.9 MMOL/L (ref 24–28)
HCT VFR BLD AUTO: 30.5 % (ref 37–48.5)
HCV AB SERPL QL IA: NORMAL
HGB BLD-MCNC: 8.9 G/DL (ref 12–16)
HGB UR QL STRIP: ABNORMAL
HIV 1+2 AB+HIV1 P24 AG SERPL QL IA: NORMAL
HYALINE CASTS UR QL AUTO: 0 /LPF
IMM GRANULOCYTES # BLD AUTO: 0.15 K/UL (ref 0–0.04)
IMM GRANULOCYTES NFR BLD AUTO: 1.7 % (ref 0–0.5)
INFLUENZA A, MOLECULAR: NOT DETECTED
INFLUENZA B, MOLECULAR: NOT DETECTED
KETONES UR QL STRIP: NEGATIVE
LDH SERPL L TO P-CCNC: 1.36 MMOL/L (ref 0.5–2.2)
LDH SERPL L TO P-CCNC: 2.38 MMOL/L (ref 0.5–2.2)
LEUKOCYTE ESTERASE UR QL STRIP: ABNORMAL
LYMPHOCYTES # BLD AUTO: 0.9 K/UL (ref 1–4.8)
LYMPHOCYTES NFR BLD: 9.5 % (ref 18–48)
MAGNESIUM SERPL-MCNC: 1.8 MG/DL (ref 1.6–2.6)
MCH RBC QN AUTO: 25.7 PG (ref 27–31)
MCHC RBC AUTO-ENTMCNC: 29.2 G/DL (ref 32–36)
MCV RBC AUTO: 88 FL (ref 82–98)
MICROSCOPIC COMMENT: ABNORMAL
MONOCYTES # BLD AUTO: 0.8 K/UL (ref 0.3–1)
MONOCYTES NFR BLD: 8.7 % (ref 4–15)
NEUTROPHILS # BLD AUTO: 7.1 K/UL (ref 1.8–7.7)
NEUTROPHILS NFR BLD: 79.3 % (ref 38–73)
NITRITE UR QL STRIP: NEGATIVE
NRBC BLD-RTO: 0 /100 WBC
PCO2 BLDA: 36.4 MMHG (ref 35–45)
PH SMN: 7.41 [PH] (ref 7.35–7.45)
PH UR STRIP: 6 [PH] (ref 5–8)
PLATELET # BLD AUTO: 243 K/UL (ref 150–450)
PMV BLD AUTO: 9.7 FL (ref 9.2–12.9)
PO2 BLDA: 72 MMHG (ref 40–60)
POC BE: -2 MMOL/L
POC SATURATED O2: 95 % (ref 95–100)
POC TCO2: 24 MMOL/L (ref 24–29)
POTASSIUM SERPL-SCNC: 4 MMOL/L (ref 3.5–5.1)
PROT SERPL-MCNC: 7.4 G/DL (ref 6–8.4)
PROT UR QL STRIP: ABNORMAL
RBC # BLD AUTO: 3.46 M/UL (ref 4–5.4)
RBC #/AREA URNS AUTO: 52 /HPF (ref 0–4)
RSV AG BY MOLECULAR METHOD: NOT DETECTED
SAMPLE: ABNORMAL
SAMPLE: ABNORMAL
SAMPLE: NORMAL
SARS-COV-2 RNA RESP QL NAA+PROBE: NOT DETECTED
SITE: ABNORMAL
SITE: ABNORMAL
SITE: NORMAL
SODIUM SERPL-SCNC: 139 MMOL/L (ref 136–145)
SP GR UR STRIP: 1.01 (ref 1–1.03)
SQUAMOUS #/AREA URNS AUTO: 1 /HPF
TROPONIN I SERPL DL<=0.01 NG/ML-MCNC: 0.06 NG/ML (ref 0–0.03)
URN SPEC COLLECT METH UR: ABNORMAL
WBC # BLD AUTO: 8.94 K/UL (ref 3.9–12.7)
WBC #/AREA URNS AUTO: >100 /HPF (ref 0–5)
WBC CLUMPS UR QL AUTO: ABNORMAL

## 2024-07-09 PROCEDURE — 81001 URINALYSIS AUTO W/SCOPE: CPT | Performed by: EMERGENCY MEDICINE

## 2024-07-09 PROCEDURE — 83880 ASSAY OF NATRIURETIC PEPTIDE: CPT | Performed by: EMERGENCY MEDICINE

## 2024-07-09 PROCEDURE — 96361 HYDRATE IV INFUSION ADD-ON: CPT

## 2024-07-09 PROCEDURE — 25000003 PHARM REV CODE 250

## 2024-07-09 PROCEDURE — 96365 THER/PROPH/DIAG IV INF INIT: CPT

## 2024-07-09 PROCEDURE — 83605 ASSAY OF LACTIC ACID: CPT

## 2024-07-09 PROCEDURE — 99900035 HC TECH TIME PER 15 MIN (STAT)

## 2024-07-09 PROCEDURE — 80053 COMPREHEN METABOLIC PANEL: CPT | Performed by: EMERGENCY MEDICINE

## 2024-07-09 PROCEDURE — 83735 ASSAY OF MAGNESIUM: CPT | Performed by: EMERGENCY MEDICINE

## 2024-07-09 PROCEDURE — 93010 ELECTROCARDIOGRAM REPORT: CPT | Mod: ,,, | Performed by: INTERNAL MEDICINE

## 2024-07-09 PROCEDURE — 99285 EMERGENCY DEPT VISIT HI MDM: CPT | Mod: 25

## 2024-07-09 PROCEDURE — 87077 CULTURE AEROBIC IDENTIFY: CPT | Performed by: EMERGENCY MEDICINE

## 2024-07-09 PROCEDURE — 87040 BLOOD CULTURE FOR BACTERIA: CPT | Mod: 59 | Performed by: EMERGENCY MEDICINE

## 2024-07-09 PROCEDURE — 25000003 PHARM REV CODE 250: Performed by: EMERGENCY MEDICINE

## 2024-07-09 PROCEDURE — 86803 HEPATITIS C AB TEST: CPT | Performed by: EMERGENCY MEDICINE

## 2024-07-09 PROCEDURE — 87389 HIV-1 AG W/HIV-1&-2 AB AG IA: CPT | Performed by: EMERGENCY MEDICINE

## 2024-07-09 PROCEDURE — 85025 COMPLETE CBC W/AUTO DIFF WBC: CPT | Performed by: EMERGENCY MEDICINE

## 2024-07-09 PROCEDURE — 87088 URINE BACTERIA CULTURE: CPT | Performed by: EMERGENCY MEDICINE

## 2024-07-09 PROCEDURE — 93005 ELECTROCARDIOGRAM TRACING: CPT

## 2024-07-09 PROCEDURE — 63600175 PHARM REV CODE 636 W HCPCS: Performed by: EMERGENCY MEDICINE

## 2024-07-09 PROCEDURE — 82803 BLOOD GASES ANY COMBINATION: CPT

## 2024-07-09 PROCEDURE — 87186 SC STD MICRODIL/AGAR DIL: CPT | Mod: 59 | Performed by: EMERGENCY MEDICINE

## 2024-07-09 PROCEDURE — 87086 URINE CULTURE/COLONY COUNT: CPT | Performed by: EMERGENCY MEDICINE

## 2024-07-09 PROCEDURE — 12000002 HC ACUTE/MED SURGE SEMI-PRIVATE ROOM

## 2024-07-09 PROCEDURE — 0241U SARS-COV2 (COVID) WITH FLU/RSV BY PCR: CPT | Performed by: EMERGENCY MEDICINE

## 2024-07-09 PROCEDURE — 84484 ASSAY OF TROPONIN QUANT: CPT | Performed by: EMERGENCY MEDICINE

## 2024-07-09 RX ORDER — NALOXONE HCL 0.4 MG/ML
0.02 VIAL (ML) INJECTION
Status: DISCONTINUED | OUTPATIENT
Start: 2024-07-10 | End: 2024-07-14 | Stop reason: HOSPADM

## 2024-07-09 RX ORDER — ACETAMINOPHEN 500 MG
1000 TABLET ORAL
Status: DISCONTINUED | OUTPATIENT
Start: 2024-07-09 | End: 2024-07-09

## 2024-07-09 RX ORDER — IBUPROFEN 200 MG
16 TABLET ORAL
Status: DISCONTINUED | OUTPATIENT
Start: 2024-07-10 | End: 2024-07-14 | Stop reason: HOSPADM

## 2024-07-09 RX ORDER — IBUPROFEN 200 MG
24 TABLET ORAL
Status: DISCONTINUED | OUTPATIENT
Start: 2024-07-10 | End: 2024-07-14 | Stop reason: HOSPADM

## 2024-07-09 RX ORDER — ACETAMINOPHEN AND CODEINE PHOSPHATE 300; 30 MG/1; MG/1
1 TABLET ORAL
Status: COMPLETED | OUTPATIENT
Start: 2024-07-09 | End: 2024-07-09

## 2024-07-09 RX ORDER — ONDANSETRON HYDROCHLORIDE 2 MG/ML
4 INJECTION, SOLUTION INTRAVENOUS
Status: COMPLETED | OUTPATIENT
Start: 2024-07-09 | End: 2024-07-09

## 2024-07-09 RX ORDER — AZITHROMYCIN 250 MG/1
500 TABLET, FILM COATED ORAL DAILY
Status: DISCONTINUED | OUTPATIENT
Start: 2024-07-10 | End: 2024-07-10

## 2024-07-09 RX ORDER — ACETAMINOPHEN AND CODEINE PHOSPHATE 300; 30 MG/1; MG/1
1 TABLET ORAL EVERY 8 HOURS PRN
Status: DISCONTINUED | OUTPATIENT
Start: 2024-07-10 | End: 2024-07-14 | Stop reason: HOSPADM

## 2024-07-09 RX ORDER — ACETAMINOPHEN 650 MG/1
650 SUPPOSITORY RECTAL
Status: DISCONTINUED | OUTPATIENT
Start: 2024-07-09 | End: 2024-07-09

## 2024-07-09 RX ORDER — ACETAMINOPHEN 325 MG/1
650 TABLET ORAL EVERY 4 HOURS PRN
Status: DISCONTINUED | OUTPATIENT
Start: 2024-07-10 | End: 2024-07-09

## 2024-07-09 RX ORDER — LORAZEPAM 0.5 MG/1
0.5 TABLET ORAL EVERY 12 HOURS PRN
Status: DISCONTINUED | OUTPATIENT
Start: 2024-07-10 | End: 2024-07-14 | Stop reason: HOSPADM

## 2024-07-09 RX ORDER — SODIUM CHLORIDE 0.9 % (FLUSH) 0.9 %
10 SYRINGE (ML) INJECTION EVERY 12 HOURS PRN
Status: DISCONTINUED | OUTPATIENT
Start: 2024-07-10 | End: 2024-07-14 | Stop reason: HOSPADM

## 2024-07-09 RX ORDER — ACETAMINOPHEN 325 MG/1
650 TABLET ORAL EVERY 6 HOURS PRN
Status: DISCONTINUED | OUTPATIENT
Start: 2024-07-10 | End: 2024-07-10

## 2024-07-09 RX ORDER — ACETAMINOPHEN 500 MG
5000 TABLET ORAL DAILY
Status: DISCONTINUED | OUTPATIENT
Start: 2024-07-10 | End: 2024-07-14 | Stop reason: HOSPADM

## 2024-07-09 RX ORDER — GLUCAGON 1 MG
1 KIT INJECTION
Status: DISCONTINUED | OUTPATIENT
Start: 2024-07-10 | End: 2024-07-14 | Stop reason: HOSPADM

## 2024-07-09 RX ORDER — ACETAMINOPHEN 650 MG/1
650 SUPPOSITORY RECTAL
Status: COMPLETED | OUTPATIENT
Start: 2024-07-09 | End: 2024-07-09

## 2024-07-09 RX ADMIN — ONDANSETRON 4 MG: 2 INJECTION INTRAMUSCULAR; INTRAVENOUS at 10:07

## 2024-07-09 RX ADMIN — ACETAMINOPHEN AND CODEINE PHOSPHATE 1 TABLET: 300; 30 TABLET ORAL at 10:07

## 2024-07-09 RX ADMIN — VANCOMYCIN HYDROCHLORIDE 2000 MG: 500 INJECTION, POWDER, LYOPHILIZED, FOR SOLUTION INTRAVENOUS at 10:07

## 2024-07-09 RX ADMIN — ACETAMINOPHEN 650 MG: 650 SUPPOSITORY RECTAL at 06:07

## 2024-07-09 RX ADMIN — AZITHROMYCIN MONOHYDRATE 500 MG: 500 INJECTION, POWDER, LYOPHILIZED, FOR SOLUTION INTRAVENOUS at 09:07

## 2024-07-09 RX ADMIN — SODIUM CHLORIDE, POTASSIUM CHLORIDE, SODIUM LACTATE AND CALCIUM CHLORIDE 1000 ML: 600; 310; 30; 20 INJECTION, SOLUTION INTRAVENOUS at 11:07

## 2024-07-09 RX ADMIN — SODIUM CHLORIDE, POTASSIUM CHLORIDE, SODIUM LACTATE AND CALCIUM CHLORIDE 1000 ML: 600; 310; 30; 20 INJECTION, SOLUTION INTRAVENOUS at 06:07

## 2024-07-09 RX ADMIN — APIXABAN 5 MG: 5 TABLET, FILM COATED ORAL at 11:07

## 2024-07-09 RX ADMIN — CEFEPIME 1 G: 1 INJECTION, POWDER, FOR SOLUTION INTRAMUSCULAR; INTRAVENOUS at 08:07

## 2024-07-09 NOTE — ED TRIAGE NOTES
Patient comes into the emergency department by EMS with complaints of fever. Patient's daughter states that patient has been running high temps at home in the 100s, 103.3 rectal upon arrival, with HTN episodes and low o2 sats around the upper 80s.  With wounds to buttocks and swelling to BLE.    LOC: The patient is awake, alert and aware of environment with an appropriate affect, the patient is oriented x 3 and speaking appropriately.   APPEARANCE: Patient appears comfortable and in no acute distress, patient is clean and well groomed.  SKIN: The skin is warm and diaphoretic, color consistent with ethnicity, patient has normal skin turgor and moist mucus membranes, skin intact, breakdown noted to buttocks and right thigh, and BLE.   MUSCULOSKELETAL: Patient moving all extremities spontaneously, no swelling noted.  RESPIRATORY: Airway is open and patent, respirations are spontaneous, patient has a normal effort and rate, 80s sat on RA and currently satting at 94 on 6L, no accessory muscle.  CARDIAC: Pt placed on cardiac monitor. Patient has a normal rate and regular rhythm, no edema noted, capillary refill < 3 seconds.   GASTRO: Soft and non tender to palpation, no distention noted.  : Pt denies any pain or frequency with urination.  NEURO: Pt opens eyes spontaneously, behavior appropriate to situation, follows commands, facial expression symmetrical, bilateral hand grasp equal and even, purposeful motor response noted, normal sensation in all extremities when touched with a finger.

## 2024-07-09 NOTE — ED PROVIDER NOTES
Encounter Date: 2024       History     Chief Complaint   Patient presents with    Multiple complaints      Arrives via EMS with c/o fever, hypertensive, hypoxic - 86% on RA, wound to buttocks      74 yo F with PMHx of Multiple Sclerosis, lymphedema, prior PE (on Eliquis 2x day), and B12 deficiency who presents to ED for fever and altered mental state. Patient's daughter reports she came home from work to find her mother shaking with chills and complaining of dizziness. Per EMS, pt was febrile, hypertensive, and hypoxic to 86%. Pt had 1x episode of spontaneous vomiting. She denies any chest pain, SOB, abdominal pain, new rashes, diarrhea, cough, or urinary symptoms. Patient has bilateral lymphedema and wounds to buttock area, she was seen by home health earlier today.      Review of patient's allergies indicates:   Allergen Reactions    Contrast media Shortness Of Breath and Rash    Pcn [penicillins] Shortness Of Breath and Rash    Celebrex [celecoxib] Other (See Comments)     Swallowing problems     Diazepam Hives    Gabapentin Other (See Comments)     Confusion     Motrin [ibuprofen] Rash     Past Medical History:   Diagnosis Date    Lymphedema     MS (multiple sclerosis)     Other pulmonary embolism without acute cor pulmonale     Vitamin B12 deficiency      Past Surgical History:   Procedure Laterality Date    BREAST CYST EXCISION Left     over 40yrs ago     SECTION      ESOPHAGOGASTRODUODENOSCOPY N/A 2022    Procedure: EGD (ESOPHAGOGASTRODUODENOSCOPY);  Surgeon: Radha Arango MD;  Location: [address]);  Service: Endoscopy;  Laterality: N/A;     Family History   Problem Relation Name Age of Onset    Coronary artery disease Father      Lung cancer Father      Lung cancer Mother      Brain cancer Brother       Social History     Tobacco Use    Smoking status: Never    Smokeless tobacco: Never   Substance Use Topics    Alcohol use: No    Drug use: No     Review of Systems    Constitutional:  Positive for fever.       Physical Exam     Initial Vitals [07/09/24 1820]   BP Pulse Resp Temp SpO2   106/75 (!) 116 (!) 35 (!) 103.1 °F (39.5 °C) (!) 94 %      MAP       --         Physical Exam    Nursing note and vitals reviewed.  Constitutional: Vital signs are normal. She appears well-nourished.   Elderly, ill appearing, pale, lethargic   HENT:   Head: Normocephalic and atraumatic.   Eyes: EOM are normal.   Pale conj   Neck: Neck supple. No thyroid mass present.   Normal range of motion.  Cardiovascular:  Normal rate.           Tachy and reg   Pulmonary/Chest: She has no wheezes. She has rales.   Abdominal: Abdomen is soft. Bowel sounds are normal. There is no abdominal tenderness.   Musculoskeletal:         General: Edema present.      Cervical back: Normal range of motion and neck supple.      Comments: Severe BL lymphedema with skin cracking predom at prox heels, no lawrence abscess, erythematous patches     Neurological: She is oriented to person, place, and time. She has normal strength. No cranial nerve deficit or sensory deficit. GCS score is 15. GCS eye subscore is 4. GCS verbal subscore is 5. GCS motor subscore is 6.   Skin: Skin is warm and dry. There is erythema. There is pallor.   Psychiatric: She has a normal mood and affect. Her speech is normal. Cognition and memory are normal.         ED Course   Procedures  Labs Reviewed   CBC W/ AUTO DIFFERENTIAL - Abnormal; Notable for the following components:       Result Value    RBC 3.46 (*)     Hemoglobin 8.9 (*)     Hematocrit 30.5 (*)     MCH 25.7 (*)     MCHC 29.2 (*)     RDW 15.0 (*)     Immature Granulocytes 1.7 (*)     Immature Grans (Abs) 0.15 (*)     Lymph # 0.9 (*)     Gran % 79.3 (*)     Lymph % 9.5 (*)     All other components within normal limits    Narrative:     Release to patient->Immediate   COMPREHENSIVE METABOLIC PANEL - Abnormal; Notable for the following components:    CO2 18 (*)     Glucose 157 (*)     Albumin 3.2  (*)     ALT 7 (*)     All other components within normal limits    Narrative:     Release to patient->Immediate   URINALYSIS, REFLEX TO URINE CULTURE - Abnormal; Notable for the following components:    Color, UA Orange (*)     Appearance, UA Cloudy (*)     Protein, UA 2+ (*)     Occult Blood UA 2+ (*)     Leukocytes, UA 3+ (*)     All other components within normal limits    Narrative:     Specimen Source->Urine   TROPONIN I - Abnormal; Notable for the following components:    Troponin I 0.059 (*)     All other components within normal limits    Narrative:     Release to patient->Immediate   B-TYPE NATRIURETIC PEPTIDE - Abnormal; Notable for the following components:     (*)     All other components within normal limits    Narrative:     Release to patient->Immediate   URINALYSIS MICROSCOPIC - Abnormal; Notable for the following components:    RBC, UA 52 (*)     WBC, UA >100 (*)     WBC Clumps, UA Moderate (*)     Bacteria Many (*)     All other components within normal limits    Narrative:     Specimen Source->Urine   ISTAT LACTATE - Abnormal; Notable for the following components:    POC Lactate 2.38 (*)     All other components within normal limits   ISTAT PROCEDURE - Abnormal; Notable for the following components:    POC PO2 72 (*)     POC HCO3 22.9 (*)     All other components within normal limits   CULTURE, URINE   CULTURE, RESPIRATORY   HIV 1 / 2 ANTIBODY    Narrative:     Release to patient->Immediate   HEPATITIS C ANTIBODY    Narrative:     Release to patient->Immediate   MAGNESIUM    Narrative:     Release to patient->Immediate   SARS-COV2 (COVID) WITH FLU/RSV BY PCR   LACTIC ACID, PLASMA   ISTAT LACTATE        ECG Results              EKG 12-lead (Final result)        Collection Time Result Time QRS Duration OHS QTC Calculation    07/09/24 18:36:15 07/10/24 09:22:09 70 427                     Final result by Interface, Lab In [facility] [place] (07/10/24 09:22:14)                   Narrative:    Test Reason :  R41.82,    Vent. Rate : 101 BPM     Atrial Rate : 101 BPM     P-R Int : 166 ms          QRS Dur : 070 ms      QT Int : 330 ms       P-R-T Axes : 029 008 -79 degrees     QTc Int : 427 ms    Sinus tachycardia  Minimal voltage criteria for LVH, may be normal variant  T wave abnormality, consider inferolateral ischemia  Abnormal ECG  When compared with ECG of 14-JUN-2022 23:44,  Significant changes have occurred  Confirmed by Jeovany GIBBS MD ([id number]) on 7/10/2024 9:22:03 AM    Referred By: [email or web address]   SELF           Confirmed By:Jeovany GIBBS MD                                  Imaging Results               X-Ray Chest AP Portable (Final result)  Result time 07/09/24 20:11:57      Final result by nOel Hedrick MD (07/09/24 20:11:57)                   Impression:      Radiographic findings compatible with CHF, with pulmonary venous hypertension and interstitial pulmonary edema.  Other underlying pathology, such as interstitial pneumonia, not fully excluded.    Correlate clinically, and with follow-up films.    This report was flagged in Epic as abnormal.      Electronically signed by: Onel Hedrick  Date:    07/09/2024  Time:    20:11               Narrative:    EXAMINATION:  XR CHEST AP PORTABLE    CLINICAL HISTORY:  Sepsis;    TECHNIQUE:  Single frontal view of the chest was performed.    COMPARISON:  Frontal chest x-ray 03/12/2024    FINDINGS:  Rightward patient rotation.    Suboptimal inspiratory result.    Allowing for the above, the thoracic trachea is midline and the cardiopericardial silhouette remains enlarged.    New prominence of the right paratracheal soft tissues, favored to be related to effects of patient rotation.    Pulmonary vascular engorgement, and bilateral parahilar predominant reticulonodular pulmonary opacities, both increased from the comparison exam.    No discrete consolidation, pneumothorax, or blunting of either lateral costophrenic angle is demonstrated radiographically.    Levocurvature of the  thoracic spine, possibly related to patient positioning.  Degenerative changes in the suboptimally visualized spine, and in both AC joints.    EKG leads project upon the torso.    Visualized upper abdomen: Intraluminal gas within the presumed stomach and transverse colon.                                       Medications   apixaban tablet 5 mg (5 mg Oral Given 7/10/24 0946)   LORazepam tablet 0.5 mg (0.5 mg Oral Given 7/10/24 0946)   cholecalciferol (vitamin D3) 125 mcg (5,000 unit) tablet 5,000 Units (5,000 Units Oral Given 7/10/24 0847)   sodium chloride 0.9% flush 10 mL (has no administration in time range)   naloxone 0.4 mg/mL injection 0.02 mg (has no administration in time range)   glucose chewable tablet 16 g (has no administration in time range)   glucose chewable tablet 24 g (has no administration in time range)   glucagon (human recombinant) injection 1 mg (has no administration in time range)   dextrose 10% bolus 125 mL 125 mL (has no administration in time range)   dextrose 10% bolus 250 mL 250 mL (has no administration in time range)   acetaminophen-codeine 300-30mg per tablet 1 tablet (1 tablet Oral Given 7/10/24 1344)   ceFEPIme (MAXIPIME) 2 g in D5W 100 mL IVPB (MB+) (0 g Intravenous Stopped 7/10/24 1357)   acetaminophen tablet 650 mg (has no administration in time range)   ondansetron disintegrating tablet 4 mg (has no administration in time range)   acetaminophen suppository 650 mg (650 mg Rectal Given 7/9/24 1836)   lactated ringers bolus 1,000 mL (0 mLs Intravenous Stopped 7/9/24 1959)   vancomycin 2 g in dextrose 5 % 500 mL IVPB (0 mg Intravenous Stopped 7/10/24 0052)   ceFEPIme (MAXIPIME) 1 g in D5W 100 mL IVPB (MB+) (0 g Intravenous Stopped 7/9/24 2112)   azithromycin (ZITHROMAX) 500 mg in D5W 250 mL IVPB (Vial-Mate) (0 mg Intravenous Stopped 7/9/24 2232)   acetaminophen-codeine 300-30mg per tablet 1 tablet (1 tablet Oral Given 7/9/24 2206)   ondansetron injection 4 mg (4 mg Intravenous Given  7/9/24 2206)   lactated ringers bolus 1,000 mL (0 mLs Intravenous Stopped 7/10/24 0048)     Medical Decision Making  Pt rapidly assessed. VS sig for fever and tachycardia. Appears lethargic but with intact mentation. Airway stable but req low flow NC for normal sats. Rec tylenol, LR provided. BSA. Concern for CAP, cellulitis, UTI. Improvement after ED obs and stable for tele admit. A/b HM. Pt and daughter updated multiple times on POC. Stable period of ED obs.     This patient does have evidence of infective focus  My overall impression is sepsis.  Source: Respiratory and Urinary Tract  Antibiotics given- Antibiotics (72h ago, onward)    Start     Stop Route Frequency Ordered    07/10/24 1300  ceFEPIme (MAXIPIME) 2 g in D5W 100 mL IVPB (MB+)         -- IV Every 8 hours (non-standard times) 07/10/24 0818      Latest lactate reviewed-  Lab             07/09/24 07/09/24                       2138          2349          LACTATE       --          2.1           POCLAC       1.36          --           Organ dysfunction indicated by Encephalopathy    Fluid challenge Other- Patient to receive 1 L LR volume other than 30cc/kg due to Congestive Heart Failure     Post- resuscitation assessment Yes Perfusion exam was performed within 6 hours of septic shock presentation after bolus shows Adequate tissue perfusion assessed by non-invasive monitoring       Will Not start Pressors- Levophed for MAP of 65  Source control achieved by: BSA      Amount and/or Complexity of Data Reviewed  Labs: ordered.  Radiology: ordered.    Risk  OTC drugs.  Prescription drug management.  Decision regarding hospitalization.                                      Clinical Impression:  Final diagnoses:  [R41.82] Altered mental status  [A41.9] Sepsis          ED Disposition Condition    Admit                 Bernardo Ferrari MD  07/11/24 3176

## 2024-07-10 PROBLEM — R78.81 BACTEREMIA: Status: ACTIVE | Noted: 2024-07-10

## 2024-07-10 LAB
ACINETOBACTER CALCOACETICUS/BAUMANNII COMPLEX: NOT DETECTED
ALBUMIN SERPL BCP-MCNC: 2.8 G/DL (ref 3.5–5.2)
ALP SERPL-CCNC: 55 U/L (ref 55–135)
ALT SERPL W/O P-5'-P-CCNC: 7 U/L (ref 10–44)
ANION GAP SERPL CALC-SCNC: 9 MMOL/L (ref 8–16)
ASCENDING AORTA: 3.55 CM
AST SERPL-CCNC: 15 U/L (ref 10–40)
AV INDEX (PROSTH): 0.45
AV MEAN GRADIENT: 27 MMHG
AV PEAK GRADIENT: 49 MMHG
AV VALVE AREA BY VELOCITY RATIO: 1.35 CM²
AV VALVE AREA: 1.47 CM²
AV VELOCITY RATIO: 0.41
BACTEROIDES FRAGILIS: NOT DETECTED
BASOPHILS # BLD AUTO: 0.01 K/UL (ref 0–0.2)
BASOPHILS NFR BLD: 0.1 % (ref 0–1.9)
BILIRUB SERPL-MCNC: 0.4 MG/DL (ref 0.1–1)
BSA FOR ECHO PROCEDURE: 2.08 M2
BUN SERPL-MCNC: 14 MG/DL (ref 8–23)
CALCIUM SERPL-MCNC: 8.8 MG/DL (ref 8.7–10.5)
CANDIDA ALBICANS: NOT DETECTED
CANDIDA AURIS: NOT DETECTED
CANDIDA GLABRATA: NOT DETECTED
CANDIDA KRUSEI: NOT DETECTED
CANDIDA PARAPSILOSIS: NOT DETECTED
CANDIDA TROPICALIS: NOT DETECTED
CHLORIDE SERPL-SCNC: 107 MMOL/L (ref 95–110)
CO2 SERPL-SCNC: 24 MMOL/L (ref 23–29)
CREAT SERPL-MCNC: 0.9 MG/DL (ref 0.5–1.4)
CRYPTOCOCCUS NEOFORMANS/GATTII: NOT DETECTED
CTX-M GENE (ESBL PRODUCER): NOT DETECTED
CV ECHO LV RWT: 0.42 CM
DIFFERENTIAL METHOD BLD: ABNORMAL
DOP CALC AO PEAK VEL: 3.5 M/S
DOP CALC AO VTI: 61 CM
DOP CALC LVOT AREA: 3.3 CM2
DOP CALC LVOT DIAMETER: 2.04 CM
DOP CALC LVOT PEAK VEL: 1.45 M/S
DOP CALC LVOT STROKE VOLUME: 89.84 CM3
DOP CALCLVOT PEAK VEL VTI: 27.5 CM
E WAVE DECELERATION TIME: 239.34 MSEC
E/A RATIO: 1.56
E/E' RATIO: 15.38 M/S
ECHO LV POSTERIOR WALL: 0.91 CM (ref 0.6–1.1)
ENTEROBACTER CLOACAE COMPLEX: NOT DETECTED
ENTEROBACTERALES: ABNORMAL
ENTEROCOCCUS FAECALIS: NOT DETECTED
ENTEROCOCCUS FAECIUM: NOT DETECTED
EOSINOPHIL # BLD AUTO: 0.1 K/UL (ref 0–0.5)
EOSINOPHIL NFR BLD: 1.1 % (ref 0–8)
ERYTHROCYTE [DISTWIDTH] IN BLOOD BY AUTOMATED COUNT: 15 % (ref 11.5–14.5)
ESCHERICHIA COLI: DETECTED
EST. GFR  (NO RACE VARIABLE): >60 ML/MIN/1.73 M^2
FERRITIN SERPL-MCNC: 107 NG/ML (ref 20–300)
FRACTIONAL SHORTENING: 42 % (ref 28–44)
GLUCOSE SERPL-MCNC: 109 MG/DL (ref 70–110)
HAEMOPHILUS INFLUENZAE: NOT DETECTED
HCT VFR BLD AUTO: 27 % (ref 37–48.5)
HGB BLD-MCNC: 7.7 G/DL (ref 12–16)
IMM GRANULOCYTES # BLD AUTO: 0.06 K/UL (ref 0–0.04)
IMM GRANULOCYTES NFR BLD AUTO: 0.9 % (ref 0–0.5)
IMP GENE (CARBAPENEM RESISTANT): NOT DETECTED
INTERVENTRICULAR SEPTUM: 0.83 CM (ref 0.6–1.1)
IRON SERPL-MCNC: <10 UG/DL (ref 30–160)
KLEBSIELLA AEROGENES: NOT DETECTED
KLEBSIELLA OXYTOCA: NOT DETECTED
KLEBSIELLA PNEUMONIAE GROUP: NOT DETECTED
KPC RESISTANCE GENE (CARBAPENEM): NOT DETECTED
LA MAJOR: 5.66 CM
LA MINOR: 5.09 CM
LA WIDTH: 4.25 CM
LACTATE SERPL-SCNC: 2.1 MMOL/L (ref 0.5–2.2)
LEFT ATRIUM SIZE: 3.88 CM
LEFT ATRIUM VOLUME INDEX MOD: 35 ML/M2
LEFT ATRIUM VOLUME INDEX: 38.1 ML/M2
LEFT ATRIUM VOLUME MOD: 69 CM3
LEFT ATRIUM VOLUME: 75.13 CM3
LEFT INTERNAL DIMENSION IN SYSTOLE: 2.49 CM (ref 2.1–4)
LEFT VENTRICLE DIASTOLIC VOLUME INDEX: 42.95 ML/M2
LEFT VENTRICLE DIASTOLIC VOLUME: 84.62 ML
LEFT VENTRICLE MASS INDEX: 60 G/M2
LEFT VENTRICLE SYSTOLIC VOLUME INDEX: 11.2 ML/M2
LEFT VENTRICLE SYSTOLIC VOLUME: 22.07 ML
LEFT VENTRICULAR INTERNAL DIMENSION IN DIASTOLE: 4.33 CM (ref 3.5–6)
LEFT VENTRICULAR MASS: 119.13 G
LISTERIA MONOCYTOGENES: NOT DETECTED
LV LATERAL E/E' RATIO: 12.3 M/S
LV SEPTAL E/E' RATIO: 20.5 M/S
LYMPHOCYTES # BLD AUTO: 1.5 K/UL (ref 1–4.8)
LYMPHOCYTES NFR BLD: 21.6 % (ref 18–48)
MAGNESIUM SERPL-MCNC: 1.9 MG/DL (ref 1.6–2.6)
MCH RBC QN AUTO: 26.4 PG (ref 27–31)
MCHC RBC AUTO-ENTMCNC: 28.5 G/DL (ref 32–36)
MCR-1: NOT DETECTED
MCV RBC AUTO: 93 FL (ref 82–98)
MEC A/C AND MREJ (MRSA): ABNORMAL
MEC A/C: ABNORMAL
MONOCYTES # BLD AUTO: 1 K/UL (ref 0.3–1)
MONOCYTES NFR BLD: 14.6 % (ref 4–15)
MV PEAK A VEL: 0.79 M/S
MV PEAK E VEL: 1.23 M/S
MV STENOSIS PRESSURE HALF TIME: 69.41 MS
MV VALVE AREA P 1/2 METHOD: 3.17 CM2
NDM GENE (CARBAPENEM RESISTANT): NOT DETECTED
NEISSERIA MENINGITIDIS: NOT DETECTED
NEUTROPHILS # BLD AUTO: 4.4 K/UL (ref 1.8–7.7)
NEUTROPHILS NFR BLD: 61.7 % (ref 38–73)
NRBC BLD-RTO: 0 /100 WBC
OHS QRS DURATION: 70 MS
OHS QTC CALCULATION: 427 MS
OXA-48-LIKE (CARBAPENEM RESISTANT): NOT DETECTED
PHOSPHATE SERPL-MCNC: 3.4 MG/DL (ref 2.7–4.5)
PLATELET # BLD AUTO: 184 K/UL (ref 150–450)
PMV BLD AUTO: 9.7 FL (ref 9.2–12.9)
POTASSIUM SERPL-SCNC: 3.9 MMOL/L (ref 3.5–5.1)
PROT SERPL-MCNC: 6.1 G/DL (ref 6–8.4)
PROTEUS SPECIES: DETECTED
PSEUDOMONAS AERUGINOSA: NOT DETECTED
RA MAJOR: 5.47 CM
RA PRESSURE ESTIMATED: 8 MMHG
RA WIDTH: 3.62 CM
RBC # BLD AUTO: 2.92 M/UL (ref 4–5.4)
RIGHT VENTRICLE DIASTOLIC BASEL DIMENSION: 2.5 CM
SALMONELLA SP: NOT DETECTED
SATURATED IRON: ABNORMAL % (ref 20–50)
SERRATIA MARCESCENS: NOT DETECTED
SINUS: 2.93 CM
SODIUM SERPL-SCNC: 140 MMOL/L (ref 136–145)
STAPHYLOCOCCUS AUREUS: NOT DETECTED
STAPHYLOCOCCUS EPIDERMIDIS: NOT DETECTED
STAPHYLOCOCCUS LUGDUNESIS: NOT DETECTED
STAPHYLOCOCCUS SPECIES: NOT DETECTED
STENOTROPHOMONAS MALTOPHILIA: NOT DETECTED
STJ: 2.45 CM
STREPTOCOCCUS AGALACTIAE: NOT DETECTED
STREPTOCOCCUS PNEUMONIAE: NOT DETECTED
STREPTOCOCCUS PYOGENES: NOT DETECTED
STREPTOCOCCUS SPECIES: NOT DETECTED
TDI LATERAL: 0.1 M/S
TDI SEPTAL: 0.06 M/S
TDI: 0.08 M/S
TOTAL IRON BINDING CAPACITY: 342 UG/DL (ref 250–450)
TRANSFERRIN SERPL-MCNC: 231 MG/DL (ref 200–375)
TRICUSPID ANNULAR PLANE SYSTOLIC EXCURSION: 2.17 CM
TROPONIN I SERPL DL<=0.01 NG/ML-MCNC: 0.12 NG/ML (ref 0–0.03)
TROPONIN I SERPL DL<=0.01 NG/ML-MCNC: 0.16 NG/ML (ref 0–0.03)
VAN A/B (VRE GENE): ABNORMAL
VIM GENE (CARBAPENEM RESISTANT): NOT DETECTED
VIT B12 SERPL-MCNC: 261 PG/ML (ref 210–950)
WBC # BLD AUTO: 7.05 K/UL (ref 3.9–12.7)
Z-SCORE OF LEFT VENTRICULAR DIMENSION IN END DIASTOLE: -2.68
Z-SCORE OF LEFT VENTRICULAR DIMENSION IN END SYSTOLE: -2.65

## 2024-07-10 PROCEDURE — 63600175 PHARM REV CODE 636 W HCPCS

## 2024-07-10 PROCEDURE — 84484 ASSAY OF TROPONIN QUANT: CPT

## 2024-07-10 PROCEDURE — 82728 ASSAY OF FERRITIN: CPT | Performed by: STUDENT IN AN ORGANIZED HEALTH CARE EDUCATION/TRAINING PROGRAM

## 2024-07-10 PROCEDURE — 36415 COLL VENOUS BLD VENIPUNCTURE: CPT | Mod: XB | Performed by: STUDENT IN AN ORGANIZED HEALTH CARE EDUCATION/TRAINING PROGRAM

## 2024-07-10 PROCEDURE — 83540 ASSAY OF IRON: CPT | Performed by: STUDENT IN AN ORGANIZED HEALTH CARE EDUCATION/TRAINING PROGRAM

## 2024-07-10 PROCEDURE — 87154 CUL TYP ID BLD PTHGN 6+ TRGT: CPT | Performed by: EMERGENCY MEDICINE

## 2024-07-10 PROCEDURE — 84484 ASSAY OF TROPONIN QUANT: CPT | Mod: 91 | Performed by: STUDENT IN AN ORGANIZED HEALTH CARE EDUCATION/TRAINING PROGRAM

## 2024-07-10 PROCEDURE — 97166 OT EVAL MOD COMPLEX 45 MIN: CPT

## 2024-07-10 PROCEDURE — 63700000 PHARM REV CODE 250 ALT 637 W/O HCPCS

## 2024-07-10 PROCEDURE — 92610 EVALUATE SWALLOWING FUNCTION: CPT

## 2024-07-10 PROCEDURE — 83735 ASSAY OF MAGNESIUM: CPT

## 2024-07-10 PROCEDURE — 36415 COLL VENOUS BLD VENIPUNCTURE: CPT

## 2024-07-10 PROCEDURE — 97530 THERAPEUTIC ACTIVITIES: CPT

## 2024-07-10 PROCEDURE — 25000003 PHARM REV CODE 250: Performed by: STUDENT IN AN ORGANIZED HEALTH CARE EDUCATION/TRAINING PROGRAM

## 2024-07-10 PROCEDURE — 82607 VITAMIN B-12: CPT | Performed by: STUDENT IN AN ORGANIZED HEALTH CARE EDUCATION/TRAINING PROGRAM

## 2024-07-10 PROCEDURE — 84100 ASSAY OF PHOSPHORUS: CPT

## 2024-07-10 PROCEDURE — 25000003 PHARM REV CODE 250

## 2024-07-10 PROCEDURE — 85025 COMPLETE CBC W/AUTO DIFF WBC: CPT

## 2024-07-10 PROCEDURE — 63600175 PHARM REV CODE 636 W HCPCS: Performed by: INTERNAL MEDICINE

## 2024-07-10 PROCEDURE — 80053 COMPREHEN METABOLIC PANEL: CPT

## 2024-07-10 PROCEDURE — 97162 PT EVAL MOD COMPLEX 30 MIN: CPT

## 2024-07-10 PROCEDURE — 97112 NEUROMUSCULAR REEDUCATION: CPT

## 2024-07-10 PROCEDURE — 25000003 PHARM REV CODE 250: Performed by: INTERNAL MEDICINE

## 2024-07-10 PROCEDURE — 20600001 HC STEP DOWN PRIVATE ROOM

## 2024-07-10 RX ORDER — ONDANSETRON 4 MG/1
4 TABLET, ORALLY DISINTEGRATING ORAL EVERY 6 HOURS PRN
Status: DISCONTINUED | OUTPATIENT
Start: 2024-07-10 | End: 2024-07-14 | Stop reason: HOSPADM

## 2024-07-10 RX ORDER — AMMONIUM LACTATE 12 G/100G
LOTION TOPICAL 2 TIMES DAILY
Status: DISCONTINUED | OUTPATIENT
Start: 2024-07-10 | End: 2024-07-14 | Stop reason: HOSPADM

## 2024-07-10 RX ORDER — ACETAMINOPHEN 325 MG/1
650 TABLET ORAL EVERY 6 HOURS PRN
Status: DISCONTINUED | OUTPATIENT
Start: 2024-07-10 | End: 2024-07-14 | Stop reason: HOSPADM

## 2024-07-10 RX ORDER — LORAZEPAM 1 MG/1
1 TABLET ORAL EVERY 8 HOURS PRN
COMMUNITY

## 2024-07-10 RX ADMIN — AMMONIUM LACTATE: 12 LOTION TOPICAL at 08:07

## 2024-07-10 RX ADMIN — AZITHROMYCIN DIHYDRATE 500 MG: 250 TABLET ORAL at 08:07

## 2024-07-10 RX ADMIN — APIXABAN 5 MG: 5 TABLET, FILM COATED ORAL at 08:07

## 2024-07-10 RX ADMIN — CEFEPIME 2 G: 2 INJECTION, POWDER, FOR SOLUTION INTRAVENOUS at 08:07

## 2024-07-10 RX ADMIN — LORAZEPAM 0.5 MG: 0.5 TABLET ORAL at 09:07

## 2024-07-10 RX ADMIN — APIXABAN 5 MG: 5 TABLET, FILM COATED ORAL at 09:07

## 2024-07-10 RX ADMIN — CHOLECALCIFEROL TAB 125 MCG (5000 UNIT) 5000 UNITS: 125 TAB at 08:07

## 2024-07-10 RX ADMIN — CEFEPIME 1 G: 1 INJECTION, POWDER, FOR SOLUTION INTRAMUSCULAR; INTRAVENOUS at 05:07

## 2024-07-10 RX ADMIN — CEFEPIME 2 G: 2 INJECTION, POWDER, FOR SOLUTION INTRAVENOUS at 01:07

## 2024-07-10 RX ADMIN — LORAZEPAM 0.5 MG: 0.5 TABLET ORAL at 06:07

## 2024-07-10 RX ADMIN — ACETAMINOPHEN AND CODEINE PHOSPHATE 1 TABLET: 300; 30 TABLET ORAL at 01:07

## 2024-07-10 NOTE — PROGRESS NOTES
[facility] - [facility]  Progress Note    Patient Name: Lupis Murcia  MRN: [id number]  Patient Class: IP- Inpatient   Admission Date: 7/9/2024  Length of Stay: 1 days  Attending Physician: Neil Wilks MD  Primary Care Provider: Bobby Tran MD        Subjective:     Principal Problem:Sepsis        HPI:  Ms. Murcia is a 73 year old female with a PMH of MS, debility, chronic lymphedema, HTN, left foot drop,  prior PE (currently on Eliquis), and B12 deficiency who [place] [facility] [place], weakness, and mild confusion. Daughter Amanda is at bedside and is assisting with the history. Of note, patient was alert and oriented x3. Patient reports that yesterday she slid down her chair but was not able to get up even with the help of her daughter 2/2 to chronic debility. EMS came over and helped her up and patient reports she felt weak since then. This morning her daughter went to work (she is a nurse), and the patient reports she was fine watching tv at the time. Some time around 4 PM when the daughter came back she found her mom reporting severe chills and weakness. She also had an episode of non bloody emesis and mild confusion. EMS was called and brought her into the ED. Daughter reports HH come to the house 2x a week for PT/OT associated with her MS. They both live together. Patient is essentially bed bound and spends most of her time in the chair. HH nurse reported normal vitals today. Patient denies fevers, abdominal pain, n/d, falls, dysuria, purulent drainage from b/t LE's, pain in the LE's, confusion aside from today, and recent sick contacts that she is aware of.     In the ED, patient was febrile and tachycardic. UA notable for > 100 WBC and many bacteria. HIV/Hep/Covid panel negative. BNP mildly elevated at 122 but likely 2/2 to obesity no reported HF. Trop mildly elevated likely 2/2 to demand ischemia. No noted leukocytosis. Patient was given broad spectrum  "abx and Bcx's were drawn. CXR notable for "Radiographic findings compatible with CHF, with pulmonary venous hypertension and interstitial pulmonary edema. Other underlying pathology, such as interstitial pneumonia, not fully excluded"    Overview/Hospital Course:  Admitted for sepsis and hypoxic respiratory failure likely 2/2 UTI. Improved with IVF+vanc/cefepime/azithro. Bcx returned proteus and e.coli likely from urinary source. Antibiotics de-escalated to cefepime. There was also some initial concerns of coughing when swallowing, speech cleared for diet. Pending urine cultures and echo.      Interval History: Patient A/Ox3 and at baseline w/ treatments. Pending further micro. PT/OT eval, low intensity.     Objective:     Vital Signs (Most Recent):  Temp: 98.6 °F (37 °C) (07/10/24 1229)  Pulse: 86 (07/10/24 1229)  Resp: 18 (07/10/24 1344)  BP: 125/69 (07/10/24 1229)  SpO2: 96 % (07/10/24 1229) Vital Signs (24h Range):  Temp:  [97.8 °F (36.6 °C)-103.3 °F (39.6 °C)] 98.6 °F (37 °C)  Pulse:  [] 86  Resp:  [13-35] 18  SpO2:  [93 %-98 %] 96 %  BP: (100-139)/(56-81) 125/69     Weight: 100.7 kg (222 lb)  Body mass index is 41.95 kg/m².    Intake/Output Summary (Last 24 hours) at 7/10/2024 1437  Last data filed at 7/10/2024 1147  Gross per 24 hour   Intake 350.25 ml   Output 501 ml   Net -150.75 ml         Physical Exam  Constitutional:       General: She is not in acute distress.     Appearance: She is obese. She is not ill-appearing.   HENT:      Head: Normocephalic.      Nose: Nose normal.      Mouth/Throat:      Mouth: Mucous membranes are moist.   Eyes:      Pupils: Pupils are equal, round, and reactive to light.   Cardiovascular:      Rate and Rhythm: Normal rate.   Pulmonary:      Effort: Pulmonary effort is normal.   Abdominal:      General: Abdomen is flat. There is distension.      Tenderness: There is no abdominal tenderness.   Musculoskeletal:      Cervical back: Normal range of motion.      Right lower " leg: Edema present.      Left lower leg: Edema present.   Skin:     General: Skin is warm.      Comments: Chronic skin hardening in LE without erythema   Neurological:      General: No focal deficit present.      Mental Status: She is alert and oriented to person, place, and time.             Significant Labs: All pertinent labs within the past 24 hours have been reviewed.    Significant Imaging: I have reviewed all pertinent imaging results/findings within the past 24 hours.    Assessment/Plan:      * Sepsis  This patient does have evidence of infective focus  My overall impression is sepsis.  Source: Respiratory, Urinary Tract, and Skin and Soft Tissue (location b/t LE's)  Antibiotics given-   Antibiotics (72h ago, onward)      Start     Stop Route Frequency Ordered    07/10/24 1300  ceFEPIme (MAXIPIME) 2 g in D5W 100 mL IVPB (MB+)         -- IV Every 8 hours (non-standard times) 07/10/24 0818          Latest lactate reviewed-  Recent Labs   Lab 07/09/24  2138 07/09/24  2349   LACTATE  --  2.1   POCLAC 1.36  --        Organ dysfunction indicated by Encephalopathy and hypotension     Will Not start Pressors- Levophed for MAP of 65  Source control achieved by: Broad spectrum Abx    Patient presenting to the ED with mild confusion. It was noted that the patient was febrile with T max of 103 and tachycardic. Concerning for sepsis. Patient UA notable for > 100 WBC and many bacteria, although patient denies urinary symptoms. Urine Cx pending. CXR also noted possible congestion vs. Interstitial PNA. Patient denies recent aspiration events but daughter reports episodes of intermittent choking at times. Bcx's X2 collected in the ED. Patient denies any recent sick contacts. No noted purulence at this time with b/t LE associated with lymphedema. Likely source Lung/Urine.     PLAN:  - BCX with gnr, repeat pending  - continue cefepime  - obtain echo  - Wound care consult for b/t LE  - De escalate Abx as needed pending Bcx's  and Urine culture   - Daily CBC, CMP, Mg, Phos   - Pt/OT consult for chronic debility associated with MS     Bacteremia  GNR bacteremia. PCR with proteus and e.coli. Likely urinary in source, will confirm w/ urine cultures.  - continue cefepime      History of pulmonary embolus (PE)  Chronic and stable.     PLAN:  - Continue Eliquis    HTN (hypertension)  Chronic, controlled. Latest blood pressure and vitals reviewed-     Temp:  [97.8 °F (36.6 °C)-103.3 °F (39.6 °C)]   Pulse:  []   Resp:  [13-35]   BP: (100-139)/(56-81)   SpO2:  [93 %-98 %] .   Home meds for hypertension were reviewed and noted below.   Hypertension Medications               candesartan-hydrochlorothiazide (ATACAND HCT) 16-12.5 mg per tablet Take 1 tablet by mouth once daily.            While in the hospital, will manage blood pressure as follows; Adjust home antihypertensive regimen as follows- Hold all meds in setting of sepsis     Will utilize p.r.n. blood pressure medication only if patient's blood pressure greater than 180/110 and she develops symptoms such as worsening chest pain or shortness of breath.    Lymphedema  Wound care consulted. Low concern for active cellulitis at this time.       Leg weakness  2/2 to MS. Has HH PT/OT working with her 2x a week. She reports she is essentially confined to a chair unable to complete her own ADL's    PLAN:  - PT/OT consult       MS (multiple sclerosis)  Chronic.    PLAN:  - Continue Vitamin D while admitted   - F/u with outpatient neurology   - PT/OT consulted       Vitamin B 12 deficiency  Takes monthly B12 injections         VTE Risk Mitigation (From admission, onward)           Ordered     apixaban tablet 5 mg  2 times daily         07/09/24 8128                    Discharge Planning   USMAN: 7/12/2024     Code Status: Partial Code   Is the patient medically ready for discharge?:     Reason for patient still in hospital (select all that apply): Treatment  Discharge Plan A: Home, Home with  family, Home Health                  Nic Snow DO  Department of [facility] Medicine   [facility] - Telemetry Stepdown

## 2024-07-10 NOTE — PLAN OF CARE
Problem: Adult Inpatient Plan of Care  Goal: Plan of Care Review  Outcome: Progressing  Goal: Patient-Specific Goal (Individualized)  Outcome: Progressing  Goal: Absence of Hospital-Acquired Illness or Injury  Outcome: Progressing  Goal: Optimal Comfort and Wellbeing  Outcome: Progressing  Goal: Readiness for Transition of Care  Outcome: Progressing     Problem: Bariatric Environmental Safety  Goal: Safety Maintained with Care  Outcome: Progressing     Problem: Sepsis/Septic Shock  Goal: Optimal Coping  Outcome: Progressing  Goal: Absence of Bleeding  Outcome: Progressing  Goal: Blood Glucose Level Within Targeted Range  Outcome: Progressing  Goal: Absence of Infection Signs and Symptoms  Outcome: Progressing  Goal: Optimal Nutrition Intake  Outcome: Progressing     Problem: Acute Kidney Injury/Impairment  Goal: Fluid and Electrolyte Balance  Outcome: Progressing  Goal: Improved Oral Intake  Outcome: Progressing  Goal: Effective Renal Function  Outcome: Progressing     Problem: Wound  Goal: Optimal Coping  Outcome: Progressing  Goal: Optimal Functional Ability  Outcome: Progressing  Goal: Absence of Infection Signs and Symptoms  Outcome: Progressing  Goal: Improved Oral Intake  Outcome: Progressing  Goal: Optimal Pain Control and Function  Outcome: Progressing  Goal: Skin Health and Integrity  Outcome: Progressing  Goal: Optimal Wound Healing  Outcome: Progressing     Problem: Skin Injury Risk Increased  Goal: Skin Health and Integrity  Outcome: Progressing   Pt is currently in bed with call light within reach, safety measures in place, vs stable, AAOx4, bed alarm active and audible, and family is at bedside. Will continue to monitor for changes in care.

## 2024-07-10 NOTE — H&P
[facility] - Emergency Dept  [facility] Medicine  History & Physical    Patient Name: Lupis Murcia  MRN: [id number]  Patient Class: IP- Inpatient  Admission Date: 7/9/2024  Attending Physician: Neil Wilks MD   Primary Care Provider: Bobby Tran MD         Patient information was obtained from patient, past medical records, and ER records.     Subjective:     Principal Problem:Sepsis    Chief Complaint:   Chief Complaint   Patient presents with    Multiple complaints      Arrives via EMS with c/o fever, hypertensive, hypoxic - 86% on RA, wound to buttocks         HPI: Ms. Murcia is a 73 year old female with a PMH of MS, debility, chronic lymphedema, HTN, left foot drop,  prior PE (currently on Eliquis), and B12 deficiency who is presenting to [facility] [place] due to reported chills, weakness, and mild confusion. Daughter Amanda is at bedside and is assisting with the history. Of note, patient was alert and oriented x3. Patient reports that yesterday she slid down her chair but was not able to get up even with the help of her daughter 2/2 to chronic debility. EMS came over and helped her up and patient reports she felt weak since then. This morning her daughter went to work (she is a nurse), and the patient reports she was fine watching tv at the time. Some time around 4 PM when the daughter came back she found her mom reporting severe chills and weakness. She also had an episode of non bloody emesis and mild confusion. EMS was called and brought her into the ED. Daughter reports HH come to the house 2x a week for PT/OT associated with her MS. They both live together. Patient is essentially bed bound and spends most of her time in the chair.  nurse reported normal vitals today. Patient denies fevers, abdominal pain, n/d, falls, dysuria, purulent drainage from b/t LE's, pain in the LE's, confusion aside from today, and recent sick contacts that she is aware of.     In the ED, patient was febrile and tachycardic. UA  "notable for > 100 WBC and many bacteria. HIV/Hep/Covid panel negative. BNP mildly elevated at 122 but likely 2/2 to obesity no reported HF. Trop mildly elevated likely 2/2 to demand ischemia. No noted leukocytosis. Patient was given broad spectrum abx and Bcx's were drawn. CXR notable for "Radiographic findings compatible with CHF, with pulmonary venous hypertension and interstitial pulmonary edema. Other underlying pathology, such as interstitial pneumonia, not fully excluded"    Past Medical History:   Diagnosis Date    Lymphedema     MS (multiple sclerosis)     Other pulmonary embolism without acute cor pulmonale     Vitamin B12 deficiency        Past Surgical History:   Procedure Laterality Date    BREAST CYST EXCISION Left     over 40yrs ago     SECTION      ESOPHAGOGASTRODUODENOSCOPY N/A 2022    Procedure: EGD (ESOPHAGOGASTRODUODENOSCOPY);  Surgeon: Radha Arango MD;  Location: [address]);  Service: Endoscopy;  Laterality: N/A;       Review of patient's allergies indicates:   Allergen Reactions    Contrast media Shortness Of Breath and Rash    Pcn [penicillins] Shortness Of Breath and Rash    Celebrex [celecoxib] Other (See Comments)     Swallowing problems     Diazepam Hives    Gabapentin Other (See Comments)     Confusion     Motrin [ibuprofen] Rash       No current facility-administered medications on file prior to encounter.     Current Outpatient Medications on File Prior to Encounter   Medication Sig    acetaminophen-codeine 300-30mg (TYLENOL #3) 300-30 mg Tab Take 1 tablet by mouth every 6 (six) hours as needed (pain).    apixaban (ELIQUIS) 5 mg Tab Take 1 tablet (5 mg total) by mouth 2 (two) times a day.    candesartan-hydrochlorothiazide (ATACAND HCT) 16-12.5 mg per tablet Take 1 tablet by mouth once daily. (Patient taking differently: Take 1 tablet by mouth once daily. Every other day)    LORazepam (ATIVAN) 0.5 MG tablet Take 1 tablet (0.5 mg total) by mouth every 8 (eight) " hours as needed for Anxiety.    predniSONE (DELTASONE) 20 MG tablet Take 1 tablet (20 mg total) by mouth daily as needed (LLE nerve pain).    vitamin D (VITAMIN D3) 1000 units Tab Take 5 tablets (5,000 Units total) by mouth once daily.    [DISCONTINUED] baclofen (LIORESAL) 10 MG tablet Take 1 tablet (10 mg total) by mouth 2 (two) times daily. (Patient taking differently: Take 10 mg by mouth 2 (two) times daily as needed.)    [DISCONTINUED] cyanocobalamin 1,000 mcg/mL injection Inject 1 mL (1,000 mcg total) into the muscle every 30 days.    [DISCONTINUED] hydroCHLOROthiazide (HYDRODIURIL) 12.5 MG Tab Take 1 tablet (12.5 mg total) by mouth once daily.    [DISCONTINUED] miconazole (MICOTIN) 2 % cream Apply topically 2 (two) times daily. Apply to intertriginous areas, lower abdomen groin for 14 days    [DISCONTINUED] tamsulosin (FLOMAX) 0.4 mg Cap Take 1 capsule (0.4 mg total) by mouth daily with lunch.     Family History       Problem Relation (Age of Onset)    Brain cancer Brother    Coronary artery disease Father    Lung cancer Father, Mother          Tobacco Use    Smoking status: Never    Smokeless tobacco: Never   Substance and Sexual Activity    Alcohol use: No    Drug use: No    Sexual activity: Not on file     Review of Systems  Objective:     Vital Signs (Most Recent):  Temp: 98.9 °F (37.2 °C) (07/09/24 1958)  Pulse: 79 (07/09/24 2315)  Resp: 20 (07/09/24 2315)  BP: (!) 113/57 (07/09/24 2315)  SpO2: 95 % (07/09/24 2315) Vital Signs (24h Range):  Temp:  [98.9 °F (37.2 °C)-103.3 °F (39.6 °C)] 98.9 °F (37.2 °C)  Pulse:  [] 79  Resp:  [13-35] 20  SpO2:  [93 %-98 %] 95 %  BP: (100-139)/(56-75) 113/57     Weight: 100.7 kg (222 lb 0.1 oz)  Body mass index is 40.6 kg/m².     Physical Exam  Constitutional:       Appearance: She is obese. She is ill-appearing.   HENT:      Head: Normocephalic and atraumatic.      Nose: Nose normal.      Mouth/Throat:      Mouth: Mucous membranes are moist.   Eyes:      Extraocular  "Movements: Extraocular movements intact.   Cardiovascular:      Rate and Rhythm: Regular rhythm. Tachycardia present.      Pulses: Normal pulses.      Heart sounds: Normal heart sounds. No murmur heard.  Pulmonary:      Effort: Pulmonary effort is normal. No respiratory distress.      Breath sounds: Normal breath sounds. No wheezing.   Abdominal:      General: Bowel sounds are normal. There is no distension.      Palpations: Abdomen is soft.      Tenderness: There is no abdominal tenderness.   Musculoskeletal:         General: Deformity present. No tenderness.      Cervical back: Normal range of motion.      Right lower leg: Edema present.      Left lower leg: Edema present.      Comments: Noted b/t weakness in the LE's. Patient noted that this is chronic relating to her MS.    Left foot drop also noted   Skin:     Findings: Erythema and lesion present.      Comments: Patient has extensive b/t lymphedema.    Neurological:      General: No focal deficit present.      Mental Status: She is alert and oriented to person, place, and time.                Significant Labs: All pertinent labs within the past 24 hours have been reviewed.    Significant Imaging: I have reviewed all pertinent imaging results/findings within the past 24 hours.  Assessment/Plan:     * Sepsis  This patient does have evidence of infective focus  My overall impression is sepsis.  Source: Respiratory, Urinary Tract, and Skin and Soft Tissue (location b/t LE's)  Antibiotics given-   Antibiotics (72h ago, onward)      Start     Stop Route Frequency Ordered    07/10/24 0900  azithromycin tablet 500 mg         07/12/24 0859 Oral Daily 07/09/24 2328    07/10/24 0030  ceFEPIme (MAXIPIME) 1 g in D5W 100 mL IVPB (MB+)         -- IV Every 8 hours (non-standard times) 07/09/24 2328    07/10/24 0028  vancomycin - pharmacy to dose  (vancomycin IVPB (PEDS and ADULTS))        Placed in "And" Linked Group    -- IV pharmacy to manage frequency 07/09/24 2328    " 07/09/24 2200  vancomycin 2 g in dextrose 5 % 500 mL IVPB         07/10/24 0959 IV ED 1 Time 07/09/24 2001          Latest lactate reviewed-  Recent Labs   Lab 07/09/24 2138   POCLAC 1.36     Organ dysfunction indicated by Encephalopathy and hypotension     Will Not start Pressors- Levophed for MAP of 65  Source control achieved by: Broad spectrum Abx    Patient presenting to the ED with mild confusion. It was noted that the patient was febrile with T max of 103 and tachycardic. Concerning for sepsis. Patient UA notable for > 100 WBC and many bacteria, although patient denies urinary symptoms. Urine Cx pending. CXR also noted possible congestion vs. Interstitial PNA. Patient denies recent aspiration events but daughter reports episodes of intermittent choking at times. Bcx's X2 collected in the ED. Patient denies any recent sick contacts. No noted purulence at this time with b/t LE associated with lymphedema. Likely source Lung/Urine.     PLAN:  - Broad spectrum Abx. Vanc and Cefepime (tolerated well with no Rxn) for coverage of PNA and Urine, and Azithromycin for atypical PNA  - F/u with Bcx's   - F/u with resp gram stain   - Wound care consult for b/t LE  - De escalate Abx as needed pending Bcx's and Urine culture   - Daily CBC, CMP, Mg, Phos   - Pt/OT consult for chronic debility associated with MS     History of pulmonary embolus (PE)  Chronic and stable.     PLAN:  - Continue Eliquis  - IF she develops SOB low threshold for CTA but at this time likely 2/2 to sepsis. Trops also elevated likely 2/2 to demand in setting of sepsis      HTN (hypertension)  Chronic, controlled. Latest blood pressure and vitals reviewed-     Temp:  [98.9 °F (37.2 °C)-103.3 °F (39.6 °C)]   Pulse:  []   Resp:  [13-35]   BP: (100-139)/(56-75)   SpO2:  [93 %-98 %] .   Home meds for hypertension were reviewed and noted below.   Hypertension Medications               candesartan-hydrochlorothiazide (ATACAND HCT) 16-12.5 mg per tablet  Take 1 tablet by mouth once daily.            While in the hospital, will manage blood pressure as follows; Adjust home antihypertensive regimen as follows- Hold all meds in setting of sepsis     Will utilize p.r.n. blood pressure medication only if patient's blood pressure greater than 180/110 and she develops symptoms such as worsening chest pain or shortness of breath.    Lymphedema  Wound care consulted. Low concern for active cellulitis at this time.       Leg weakness  2/2 to MS. Has HH PT/OT working with her 2x a week. She reports she is essentially confined to a chair unable to complete her own ADL's    PLAN:  - PT/OT consult       MS (multiple sclerosis)  Chronic.    PLAN:  - Continue Vitamin D while admitted   - F/u with outpatient neurology   - PT/OT consulted       Vitamin B 12 deficiency  Takes monthly B12 injections         VTE Risk Mitigation (From admission, onward)           Ordered     apixaban tablet 5 mg  2 times daily         07/09/24 4498                                    Samuel Stanton DO  Department of Hospital Medicine  [facility] - Emergency Dept

## 2024-07-10 NOTE — ED NOTES
Telemetry Verification   Patient placed on Telemetry Box  Verified with War Room  Box # [id number]   Monitor Tech    Rate    Rhythm

## 2024-07-10 NOTE — ASSESSMENT & PLAN NOTE
GNR bacteremia. PCR with proteus and e.coli. Likely urinary in source, will confirm w/ urine cultures.  - continue cefepime

## 2024-07-10 NOTE — ED NOTES
Received report from Kehinde RN and LUCIEN Bach. Pt care assumed by this nurse and LUCIEN Villalpando.    The patient is resting quietly in ED stretcher, and is awake and alert at this time, with no distress noted. The patient remains on continuous cardiac monitor, automated BP cuff cycling Q15 minutes, and continuous pulse oximeter. The patient and visitor at bedside are aware of POC and all questions and concerns addressed at this time. SR raised x2, bed locked and in low position with brake engaged. Call bell within reach. Patient has a visitor at bedside.

## 2024-07-10 NOTE — PT/OT/SLP EVAL
Physical Therapy Evaluation    Patient Name:  Lupis Murcia   MRN:  [id number]    Recommendations:     Discharge Recommendations: Low Intensity Therapy (increased caregiver assist)   Discharge Equipment Recommendations: lift device   Barriers to discharge: Decreased caregiver support    Assessment:     Lupis Murcia is a 73 y.o. female admitted with a medical diagnosis of Sepsis.  She presents with the following impairments/functional limitations: weakness, impaired balance, impaired endurance, impaired self care skills, impaired functional mobility, gait instability, decreased coordination, decreased lower extremity function, decreased upper extremity function, edema, impaired skin, decreased safety awareness, impaired coordination, impaired fine motor, impaired joint extensibility. The patient has a history of MS L>R UE/LE weakness (unable to move L LE against gravity). At baseline, she typically sleeps in a lift chair and is bound to the chair during the day. She is able to stand with her daughter's assist using the [employer] lift chair for pericare and brief changes. 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 She has not ambulated in >1.5 years, she was performing slideboard transfers with maximum assistance x2 people with HH. She is motivated to sit edge of bed with therapy.   She required total assistance x2 supine <> sit. She required minimum assistance to moderate assistance for static sitting balance due to posteior and L lean. The patient is anxious and fearful with mobility. She performed partial stand (~75% of stand) with height of bed elevated and maximum assistance x2 WILFREDO feet and knees blocked. The patient appears near her functional baseline with limited prognosis to progress with therapy. The patient would continue to benefit from low intensity therapy on discharge to maximize their independence with mobility.      Rehab Prognosis: Fair; patient would benefit from acute  "skilled PT services to address these deficits and reach maximum level of function.    Recent Surgery: * No surgery found *      Plan:     During this hospitalization, patient to be seen 3 x/week to address the identified rehab impairments via gait training, therapeutic activities, therapeutic exercises, neuromuscular re-education and progress toward the following goals:    Plan of Care Expires:  08/09/24    Subjective     Chief Complaint: "I want to get up out of bed and sit up"  Patient/Family Comments/goals: return home  Pain/Comfort:  Pain Rating 1: 0/10    Patients cultural, spiritual, Mormonism conflicts given the current situation: no    Living Environment:  The patient lives with her daughter in a [facility], ramp to enter, T/S.  Patient is bound to her lift chair, she receives sponge baths from daughter, relies on mechanical lift of chair to stand for pericare and brief changes. The patient recently went to SNF in March with no change in her functional baseline. She has been working with HH therapy on sit to stand transfers from lift chair with maximum assistance using RW, maximum assistance x2 for slideboard transfer. Her daughter assists her at home, but she works during the day as an RN.   Prior to admission, patients level of function was maximum assistance for sit to stand from mechanical lift chair, maximum assistance x2 for slideboard transfers, spongebathes, wears briefs.  Equipment used at home: walker, rolling, slide board, wheelchair, bath bench, bedside commode (lift chair).  DME owned (not currently used): none.  Upon discharge, patient will have assistance from daughter.    Objective:     Communicated with RN prior to session.  Patient found HOB elevated with PureWick, pulse ox (continuous), telemetry, peripheral IV  upon PT entry to room.Daughter at bedside and involved in care.     General Precautions: Standard, fall  Orthopedic Precautions:N/A   Braces: N/A  Respiratory Status: Room " air    Exams:    Cognitive Exam  Patient is A&O x4 and follows 100% of one -step commands    Fine Motor Coordination   -       WNL     Postural Exam Patient presented with the following abnormalities:    -       Rounded shoulders  -       Forward head  -       Kyphosis  -       Posterior pelvic tilt  -       Weight shift posterior and L lean   Sensation    -       Light touch decreased sensation in feet and hands   Skin Integrity/Edema     -       Skin integrity: skin is think and scaly, redness; pressure sore to buttocks  -       Edema: edematous to LE   R LE ROM WNL PROM   R LE Strength 2-/5 hip flexion, 2+/5 knee ext/flex, and ankle DF/PF   L LE ROM WNL PROM   L LE Strength  1/5 hip flexion, 2-/5 knee ext/flex, and 0/5 ankle DF, 2-/5 ankle PF       Balance   Static Sitting minimum assistance to moderate assistance    Dynamic Sitting moderate assistance    Static Standing maximum assistance x2 HHA   Dynamic Standing       NA        Functional Mobility:    Bed Mobility  Rolling to L and R: maximum assistance   Supine to Sit on the L side:  total assistance x2, assist at LE and trunk   Sit to supine: total assistance x2  Scoot to HOB in supine: total assistance x2 drawsheet  Scoot to EOB in sitting: maximum assistance to scoot to edge of bed   Transfers Sit to Stand from elevated bed height:  maximum assistance x2, partial stand 75% WILFREDO feet and knees blocked, facilitation for anterior weight shift, L lean           AM-PAC 6 CLICK MOBILITY  Total Score:9       Treatment & Education:  Patient and daughter educated on:  -role of therapy  -goals of session  -PT POC  -benefits of out of bed mobility and consequences of immobility  -calling for staff assist to mobilize safely  Patient agreeable to mobilize with therapy.      Sitting edge of bed ~15 minutes, minimum assistance to moderate assistance , weight shift posterior and L  -Posterior pelvic tilt, kyphosis, cervical flexion  -Visual/verbal/manual cues for midline  orientation, thoracic and cervical extension  -Patient shifting weight within MEGAN for seated UE ROM and LE ROM, increasing L lean and patient requiring increased assist for balance with fatigue, moderate assistance for milding alignment and balance    Standing balance, maximum assistance x2, for 6 seconds  -Demonstrates trunk/hip/knee flexion in standing with posterior and L lateral lean  -Manual/verbal cues and facilitation for hip extension, trunk extension, cues to look up  -Manual/verbal cues for quad and glute engagement  -Blocking WILFREDO knees and feet     Educated patient and daughter on pressure relief strategies and positioning (keeping hips supported in neutral rotation, floating heels).     Patient left HOB elevated, heels floating, towel rolls for neutral hip rotation with all lines intact, call button in reach, RN notified, and daughter present.    GOALS:   Multidisciplinary Problems       Physical Therapy Goals          Problem: Physical Therapy    Goal Priority Disciplines Outcome Goal Variances Interventions   Physical Therapy Goal     PT, PT/OT Progressing     Description: Goals to be met by:      Patient will increase functional independence with mobility by performin. Supine to sit with Maximum Assistance  2. Sit to supine with Maximum Assistance  3. Sit to stand transfer with Maximum Assistance  4. Bed to chair transfer with Maximum Assistance using LRAD  5. Sitting at edge of bed x10 minutes with Stand-by Assistance                         History:     Past Medical History:   Diagnosis Date    Lymphedema     MS (multiple sclerosis)     Other pulmonary embolism without acute cor pulmonale     Vitamin B12 deficiency        Past Surgical History:   Procedure Laterality Date    BREAST CYST EXCISION Left     over 40yrs ago     SECTION      ESOPHAGOGASTRODUODENOSCOPY N/A 2022    Procedure: EGD (ESOPHAGOGASTRODUODENOSCOPY);  Surgeon: Radha Arango MD;  Location: [address]  [facility]);  Service: Endoscopy;  Laterality: N/A;       Time Tracking:     PT Received On: 07/10/24  PT Start Time: 0910     PT Stop Time: 0940  PT Total Time (min): 30 min     Billable Minutes: Evaluation 7 and Neuromuscular Re-education 23      07/10/2024

## 2024-07-10 NOTE — ASSESSMENT & PLAN NOTE
This patient does have evidence of infective focus  My overall impression is sepsis.  Source: Respiratory, Urinary Tract, and Skin and Soft Tissue (location b/t LE's)  Antibiotics given-   Antibiotics (72h ago, onward)      Start     Stop Route Frequency Ordered    07/10/24 1300  ceFEPIme (MAXIPIME) 2 g in D5W 100 mL IVPB (MB+)         -- IV Every 8 hours (non-standard times) 07/10/24 0818          Latest lactate reviewed-  Recent Labs   Lab 07/09/24  2138 07/09/24  2349   LACTATE  --  2.1   POCLAC 1.36  --        Organ dysfunction indicated by Encephalopathy and hypotension     Will Not start Pressors- Levophed for MAP of 65  Source control achieved by: Broad spectrum Abx    Patient presenting to the ED with mild confusion. It was noted that the patient was febrile with T max of 103 and tachycardic. Concerning for sepsis. Patient UA notable for > 100 WBC and many bacteria, although patient denies urinary symptoms. Urine Cx pending. CXR also noted possible congestion vs. Interstitial PNA. Patient denies recent aspiration events but daughter reports episodes of intermittent choking at times. Bcx's X2 collected in the ED. Patient denies any recent sick contacts. No noted purulence at this time with b/t LE associated with lymphedema. Likely source Lung/Urine.     PLAN:  - BCX with gnr, repeat pending  - continue cefepime  - obtain echo  - Wound care consult for b/t LE  - De escalate Abx as needed pending Bcx's and Urine culture   - Daily CBC, CMP, Mg, Phos   - Pt/OT consult for chronic debility associated with MS

## 2024-07-10 NOTE — PROGRESS NOTES
"Pharmacokinetic Initial Assessment & Plan: IV Vancomycin    IV Vancomycin 2000 mg x once given in the ED on 07/09 @ 2235  Then Vancomycin 1000 mg every 12 hours  Obtain a Vancomycin trough 30 mins prior to the 4th dose on 07/11 @ 1130  Desired empiric serum trough concentration is 15 to 20 mcg/mL    Pharmacy will continue to follow and monitor vancomycin.    [id number] with any questions regarding this assessment.     Thank you for the consult,   Abdulaziz Moreno        Patient brief summary:  Lupis Murcia is a 73 y.o. female initiated on antimicrobial therapy with IV Vancomycin for treatment of suspected sepsis    Drug Allergies:   Review of patient's allergies indicates:   Allergen Reactions    Contrast media Shortness Of Breath and Rash    Pcn [penicillins] Shortness Of Breath and Rash    Celebrex [celecoxib] Other (See Comments)     Swallowing problems     Diazepam Hives    Gabapentin Other (See Comments)     Confusion     Motrin [ibuprofen] Rash       Actual Body Weight:   100.7 kg    Renal Function:   Estimated Creatinine Clearance: 60.9 mL/min (based on SCr of 0.9 mg/dL).,     CBC (last 72 hours):  Recent Labs   Lab Result Units 07/09/24  1832   WBC K/uL 8.94   Hemoglobin g/dL 8.9*   Hematocrit % 30.5*   Platelets K/uL 243   Gran % % 79.3*   Lymph % % 9.5*   Mono % % 8.7   Eosinophil % % 0.7   Basophil % % 0.1   Differential Method  Automated       Metabolic Panel (last 72 hours):  Recent Labs   Lab Result Units 07/09/24  1832 07/09/24  1849   Sodium mmol/L 139  --    Potassium mmol/L 4.0  --    Chloride mmol/L 108  --    CO2 mmol/L 18*  --    Glucose mg/dL 157*  --    Glucose, UA   --  Negative   BUN mg/dL 16  --    Creatinine mg/dL 0.9  --    Albumin g/dL 3.2*  --    Total Bilirubin mg/dL 0.6  --    Alkaline Phosphatase U/L 66  --    AST U/L 18  --    ALT U/L 7*  --    Magnesium mg/dL 1.8  --        Drug levels (last 3 results):  No results for input(s): "VANCOMYCINRA", "VANCORANDOM", "VANCOMYCINPE", " ""VANCOPEAK", "VANCOMYCINTR", "VANCOTROUGH" in the last 72 hours.    Microbiologic Results:  Microbiology Results (last 7 days)       Procedure Component Value Units Date/Time    Culture, Respiratory with Gram Stain [[id number]]     Order Status: No result Specimen: Sputum, Expectorated     Urine culture [[id number]] Collected: 07/09/24 1849    Order Status: No result Specimen: Urine Updated: 07/09/24 2126    Blood culture x two cultures. Draw prior to antibiotics. [[id number]] Collected: 07/09/24 1832    Order Status: Sent Specimen: Blood from Peripheral, Forearm, Right Updated: 07/09/24 1911    Blood culture x two cultures. Draw prior to antibiotics. [[id number]] Collected: 07/09/24 1832    Order Status: Sent Specimen: Blood from Peripheral, Hand, Left Updated: 07/09/24 1911            "

## 2024-07-10 NOTE — ASSESSMENT & PLAN NOTE
2/2 to MS. Has HH PT/OT working with her 2x a week. She reports she is essentially confined to a chair unable to complete her own ADL's    PLAN:  - PT/OT consult

## 2024-07-10 NOTE — CONSULTS
[facility] - Telemetry Stepdown  Wound Care    Patient Name:  Lupis Murcia   MRN:  [id number]  Date: 7/10/2024  Diagnosis: Sepsis    History:     Past Medical History:   Diagnosis Date    Lymphedema     MS (multiple sclerosis)     Other pulmonary embolism without acute cor pulmonale     Vitamin B12 deficiency        Social History     Socioeconomic History    Marital status:    Tobacco Use    Smoking status: Never    Smokeless tobacco: Never   Substance and Sexual Activity    Alcohol use: No    Drug use: No     Social Determinants of Health     Financial Resource Strain: Low Risk  (7/10/2024)    Overall Financial Resource Strain (CARDIA)     Difficulty of Paying Living Expenses: Not hard at all   Food Insecurity: No Food Insecurity (7/10/2024)    Hunger Vital Sign     Worried About Running Out of Food in the Last Year: Never true     Ran Out of Food in the Last Year: Never true   Transportation Needs: No Transportation Needs (7/10/2024)    TRANSPORTATION NEEDS     Transportation : No   Physical Activity: Inactive (7/10/2024)    Exercise Vital Sign     Days of Exercise per Week: 0 days     Minutes of Exercise per Session: 0 min   Stress: No Stress Concern Present (7/10/2024)    [employer] [place] [employer] - Occupational Stress Questionnaire     Feeling of Stress : Only a little   Housing Stability: Low Risk  (7/10/2024)    Housing Stability Vital Sign     Unable to Pay for Housing in the Last Year: No     Homeless in the Last Year: No       Precautions:     Allergies as of 07/09/2024 - Reviewed 07/09/2024   Allergen Reaction Noted    Contrast media Shortness Of Breath and Rash 12/15/2016    Pcn [penicillins] Shortness Of Breath and Rash 07/10/2013    Celebrex [celecoxib] Other (See Comments) 06/12/2017    Diazepam Hives 10/12/2021    Gabapentin Other (See Comments) 09/26/2023    Motrin [ibuprofen] Rash 07/10/2013       WO Assessment Details/Treatment     Wound consult received on  patient.  Moisture associated dermatitis noted to buttocks, recommend barrier cream.  Dry hyperkeratotic skin noted to bilateral legs and feet, recommend lac hydrin lotion.  Updated Dr. Snow. Approved recommendations.      07/10/24 1201        Wound 07/09/24 1855 Moisture associated dermatitis Buttocks   Date First Assessed/Time First Assessed: 07/09/24 1855   Present on Original Admission: Yes  Primary Wound Type: Moisture associated dermatitis  Location: Buttocks   Appearance Red  (hyperpigmented tissue)   Periwound Area Moist        Wound 07/09/24 1930 Other (comment) Right anterior;lower Leg   Date First Assessed/Time First Assessed: 07/09/24 1930   Present on Original Admission: Yes  Primary Wound Type: (c) Other (comment)  Side: Right  Orientation: anterior;lower  Location: Leg   Appearance   (dry hyperkeratotic skin)        Wound 07/09/24 1930 Other (comment) Left anterior;lower Leg   Date First Assessed/Time First Assessed: 07/09/24 1930   Present on Original Admission: Yes  Primary Wound Type: (c) Other (comment)  Side: Left  Orientation: anterior;lower  Location: Leg   Appearance   (dry hyperkeratotic skin)

## 2024-07-10 NOTE — PROGRESS NOTES
Therapy with vancomycin complete and/or consult discontinued by provider.  Pharmacy will sign off, please re-consult as needed.    Helga Reeder, PharmD, BCPS  Clinical Pharmacist - Internal Medicine   [id number]

## 2024-07-10 NOTE — PLAN OF CARE
Problem: Physical Therapy  Goal: Physical Therapy Goal  Description: Goals to be met by:      Patient will increase functional independence with mobility by performin. Supine to sit with Maximum Assistance  2. Sit to supine with Maximum Assistance  3. Sit to stand transfer with Maximum Assistance  4. Bed to chair transfer with Maximum Assistance using LRAD  5. Sitting at edge of bed x10 minutes with Stand-by Assistance    Outcome: Progressing   Evaluation completed, initiated plan of care.   Martha Kay, PT  7/10/2024

## 2024-07-10 NOTE — ASSESSMENT & PLAN NOTE
Chronic and stable.     PLAN:  - Continue Eliquis  - IF she develops SOB low threshold for CTA but at this time likely 2/2 to sepsis. Trops also elevated likely 2/2 to demand in setting of sepsis

## 2024-07-10 NOTE — SUBJECTIVE & OBJECTIVE
Interval History: Patient A/Ox3 and at baseline w/ treatments. Pending further micro. PT/OT eval, low intensity.     Objective:     Vital Signs (Most Recent):  Temp: 98.6 °F (37 °C) (07/10/24 1229)  Pulse: 86 (07/10/24 1229)  Resp: 18 (07/10/24 1344)  BP: 125/69 (07/10/24 1229)  SpO2: 96 % (07/10/24 1229) Vital Signs (24h Range):  Temp:  [97.8 °F (36.6 °C)-103.3 °F (39.6 °C)] 98.6 °F (37 °C)  Pulse:  [] 86  Resp:  [13-35] 18  SpO2:  [93 %-98 %] 96 %  BP: (100-139)/(56-81) 125/69     Weight: 100.7 kg (222 lb)  Body mass index is 41.95 kg/m².    Intake/Output Summary (Last 24 hours) at 7/10/2024 1437  Last data filed at 7/10/2024 1147  Gross per 24 hour   Intake 350.25 ml   Output 501 ml   Net -150.75 ml         Physical Exam  Constitutional:       General: She is not in acute distress.     Appearance: She is obese. She is not ill-appearing.   HENT:      Head: Normocephalic.      Nose: Nose normal.      Mouth/Throat:      Mouth: Mucous membranes are moist.   Eyes:      Pupils: Pupils are equal, round, and reactive to light.   Cardiovascular:      Rate and Rhythm: Normal rate.   Pulmonary:      Effort: Pulmonary effort is normal.   Abdominal:      General: Abdomen is flat. There is distension.      Tenderness: There is no abdominal tenderness.   Musculoskeletal:      Cervical back: Normal range of motion.      Right lower leg: Edema present.      Left lower leg: Edema present.   Skin:     General: Skin is warm.      Comments: Chronic skin hardening in LE without erythema   Neurological:      General: No focal deficit present.      Mental Status: She is alert and oriented to person, place, and time.             Significant Labs: All pertinent labs within the past 24 hours have been reviewed.    Significant Imaging: I have reviewed all pertinent imaging results/findings within the past 24 hours.

## 2024-07-10 NOTE — PLAN OF CARE
[facility] - Telemetry Stepdown  Initial Discharge Assessment       Primary Care Provider: Bobby Tran MD    Admission Diagnosis: Altered mental status [R41.82]  Chest pain [R07.9]  Sepsis [A41.9]    Admission Date: 7/9/2024  Expected Discharge Date:     Transition of Care Barriers: None    Payor: MEDICARE / Plan: MEDICARE PART A & B / Product Type: Government /     Extended Emergency Contact Information  Primary Emergency Contact: MorganAmanda bray  Address: [address] [place], [place] [address] [place]  Mobile Phone: [phone]  Relation: Daughter    Discharge Plan A: Home, Home with family, Home Health  Discharge Plan B: Home, Home with family, Home Health      [id number] [place] - [address]  [address]  [place] [address]  Phone: [phone] Fax: [phone]    [facility] [place] - [address] [facility] [address]  [address] [facility] [address]  [place] [address]  Phone: [phone] Fax: [phone]      Initial Assessment (most recent)       Adult Discharge Assessment - 07/10/24 0906          Discharge Assessment    Assessment Type Discharge Planning Assessment     Confirmed/corrected address, phone number and insurance Yes     Confirmed Demographics Correct on Facesheet     Source of Information patient;family     Communicated USMAN with patient/caregiver Yes     Reason For Admission AMS     People in Home child(reinier), adult     Do you expect to return to your current living situation? Yes     Do you have help at home or someone to help you manage your care at home? Yes     Who are your caregiver(s) and their phone number(s)? Amanda Lowery - daughter - [phone]     Prior to hospitilization cognitive status: Alert/Oriented     Current cognitive status: Alert/Oriented     Walking or Climbing Stairs Difficulty yes     Walking or Climbing Stairs ambulation difficulty, requires equipment;stair climbing difficulty, requires  equipment;transferring difficulty, assistance 1 person     Dressing/Bathing Difficulty yes     Dressing/Bathing bathing difficulty, requires equipment     Home Accessibility wheelchair accessible     Home Layout Able to live on 1st floor     Equipment Currently Used at Home walker, rolling;slide board;wheelchair;bath bench;bedside commode;other (see comments)   Lift chair    Readmission within 30 days? No     Patient currently being followed by outpatient case management? No     Do you currently have service(s) that help you manage your care at home? Yes     Name and Contact number of agency [facility]     Is the pt/caregiver preference to resume services with current agency Yes     Do you take prescription medications? Yes     Do you have prescription coverage? Yes     Do you have any problems affording any of your prescribed medications? No     Is the patient taking medications as prescribed? yes     Who is going to help you get home at discharge? Amanda Lowery - [facility] - [phone]     How do you get to doctors appointments? other (see comments)   Ambulance due to MS    Are you on dialysis? No     Do you take coumadin? No     Discharge Plan A Home;Home with family;Home Health     Discharge Plan B Home;Home with family;Home Health     DME Needed Upon Discharge  other (see comments)   TBD    Discharge Plan discussed with: Patient;Adult children     Transition of Care Barriers None        Physical Activity    On average, how many days per week do you engage in moderate to strenuous exercise (like a brisk walk)? 0 days     On average, how many minutes do you engage in exercise at this level? 0 min        Financial Resource Strain    How hard is it for you to pay for the very basics like food, housing, medical care, and heating? Not hard at all        Housing Stability    In the last 12 months, was there a time when you were not able to pay the mortgage or rent on time? No     At any time in the past 12  months, were you homeless or living in a shelter (including now)? No        Transportation Needs    Has the lack of transportation kept you from medical appointments, meetings, work or from getting things needed for daily living? No        Food Insecurity    Within the past 12 months, you worried that your food would run out before you got the money to buy more. Never true     Within the past 12 months, the food you bought just didn't last and you didn't have money to get more. Never true        Stress    Do you feel stress - tense, restless, nervous, or anxious, or unable to sleep at night because your mind is troubled all the time - these days? Only a little        Social Isolation    How often do you feel lonely or isolated from those around you?  Never        Alcohol Use    Q1: How often do you have a drink containing alcohol? Patient declined     Q2: How many drinks containing alcohol do you have on a typical day when you are drinking? Patient declined     Q3: How often do you have six or more drinks on one occasion? Patient declined        Utilities    In the past 12 months has the electric, gas, oil, or water company threatened to shut off services in your home? No        Health Literacy    How often do you need to have someone help you when you read instructions, pamphlets, or other written material from your doctor or pharmacy? Never        OTHER    Name(s) of People in Home Amanda Borges [phone]                  Met with patient and Amanda Borges [phone] to complete initial assessment. Pt lives with daughter. She has MS so she uses ambulance to get back home from hospital. She was active with [facility] prior to being admitted. Referral made through [facility] for them. No other needs noted upon admission. Will continue to follow and offer support as needed. Discharge Plan A and Plan B have been determined by review of patient's clinical status, future medical and  therapeutic needs, and coverage/benefits for post-acute care in coordination with multidisciplinary team members.  [facility]    [facility]  [phone]

## 2024-07-10 NOTE — ASSESSMENT & PLAN NOTE
Chronic, controlled. Latest blood pressure and vitals reviewed-     Temp:  [97.8 °F (36.6 °C)-103.3 °F (39.6 °C)]   Pulse:  []   Resp:  [13-35]   BP: (100-139)/(56-81)   SpO2:  [93 %-98 %] .   Home meds for hypertension were reviewed and noted below.   Hypertension Medications               candesartan-hydrochlorothiazide (ATACAND HCT) 16-12.5 mg per tablet Take 1 tablet by mouth once daily.            While in the hospital, will manage blood pressure as follows; Adjust home antihypertensive regimen as follows- Hold all meds in setting of sepsis     Will utilize p.r.n. blood pressure medication only if patient's blood pressure greater than 180/110 and she develops symptoms such as worsening chest pain or shortness of breath.

## 2024-07-10 NOTE — ASSESSMENT & PLAN NOTE
"This patient does have evidence of infective focus  My overall impression is sepsis.  Source: Respiratory, Urinary Tract, and Skin and Soft Tissue (location b/t LE's)  Antibiotics given-   Antibiotics (72h ago, onward)      Start     Stop Route Frequency Ordered    07/10/24 0900  azithromycin tablet 500 mg         07/12/24 0859 Oral Daily 07/09/24 2328    07/10/24 0030  ceFEPIme (MAXIPIME) 1 g in D5W 100 mL IVPB (MB+)         -- IV Every 8 hours (non-standard times) 07/09/24 2328    07/10/24 0028  vancomycin - pharmacy to dose  (vancomycin IVPB (PEDS and ADULTS))        Placed in "And" Linked Group    -- IV pharmacy to manage frequency 07/09/24 2328 07/09/24 2200  vancomycin 2 g in dextrose 5 % 500 mL IVPB         07/10/24 0959 IV ED 1 Time 07/09/24 2001          Latest lactate reviewed-  Recent Labs   Lab 07/09/24 2138   POCLAC 1.36     Organ dysfunction indicated by Encephalopathy and hypotension     Will Not start Pressors- Levophed for MAP of 65  Source control achieved by: Broad spectrum Abx    Patient presenting to the ED with mild confusion. It was noted that the patient was febrile with T max of 103 and tachycardic. Concerning for sepsis. Patient UA notable for > 100 WBC and many bacteria, although patient denies urinary symptoms. Urine Cx pending. CXR also noted possible congestion vs. Interstitial PNA. Patient denies recent aspiration events but daughter reports episodes of intermittent choking at times. Bcx's X2 collected in the ED. Patient denies any recent sick contacts. No noted purulence at this time with b/t LE associated with lymphedema. Likely source Lung/Urine.     PLAN:  - Broad spectrum Abx. Vanc and Cefepime (tolerated well with no Rxn) for coverage of PNA and Urine, and Azithromycin for atypical PNA  - F/u with Bcx's   - F/u with resp gram stain   - Wound care consult for b/t LE  - De escalate Abx as needed pending Bcx's and Urine culture   - Daily CBC, CMP, Mg, Phos   - Pt/OT consult for " chronic debility associated with MS

## 2024-07-10 NOTE — SUBJECTIVE & OBJECTIVE
Past Medical History:   Diagnosis Date    Lymphedema     MS (multiple sclerosis)     Other pulmonary embolism without acute cor pulmonale     Vitamin B12 deficiency        Past Surgical History:   Procedure Laterality Date    BREAST CYST EXCISION Left     over 40yrs ago     SECTION      ESOPHAGOGASTRODUODENOSCOPY N/A 2022    Procedure: EGD (ESOPHAGOGASTRODUODENOSCOPY);  Surgeon: Radha Arango MD;  Location: [address]);  Service: Endoscopy;  Laterality: N/A;       Review of patient's allergies indicates:   Allergen Reactions    Contrast media Shortness Of Breath and Rash    Pcn [penicillins] Shortness Of Breath and Rash    Celebrex [celecoxib] Other (See Comments)     Swallowing problems     Diazepam Hives    Gabapentin Other (See Comments)     Confusion     Motrin [ibuprofen] Rash       No current facility-administered medications on file prior to encounter.     Current Outpatient Medications on File Prior to Encounter   Medication Sig    acetaminophen-codeine 300-30mg (TYLENOL #3) 300-30 mg Tab Take 1 tablet by mouth every 6 (six) hours as needed (pain).    apixaban (ELIQUIS) 5 mg Tab Take 1 tablet (5 mg total) by mouth 2 (two) times a day.    candesartan-hydrochlorothiazide (ATACAND HCT) 16-12.5 mg per tablet Take 1 tablet by mouth once daily. (Patient taking differently: Take 1 tablet by mouth once daily. Every other day)    LORazepam (ATIVAN) 0.5 MG tablet Take 1 tablet (0.5 mg total) by mouth every 8 (eight) hours as needed for Anxiety.    predniSONE (DELTASONE) 20 MG tablet Take 1 tablet (20 mg total) by mouth daily as needed (LLE nerve pain).    vitamin D (VITAMIN D3) 1000 units Tab Take 5 tablets (5,000 Units total) by mouth once daily.    [DISCONTINUED] baclofen (LIORESAL) 10 MG tablet Take 1 tablet (10 mg total) by mouth 2 (two) times daily. (Patient taking differently: Take 10 mg by mouth 2 (two) times daily as needed.)    [DISCONTINUED] cyanocobalamin 1,000 mcg/mL injection  Inject 1 mL (1,000 mcg total) into the muscle every 30 days.    [DISCONTINUED] hydroCHLOROthiazide (HYDRODIURIL) 12.5 MG Tab Take 1 tablet (12.5 mg total) by mouth once daily.    [DISCONTINUED] miconazole (MICOTIN) 2 % cream Apply topically 2 (two) times daily. Apply to intertriginous areas, lower abdomen groin for 14 days    [DISCONTINUED] tamsulosin (FLOMAX) 0.4 mg Cap Take 1 capsule (0.4 mg total) by mouth daily with lunch.     Family History       Problem Relation (Age of Onset)    Brain cancer Brother    Coronary artery disease Father    Lung cancer Father, Mother          Tobacco Use    Smoking status: Never    Smokeless tobacco: Never   Substance and Sexual Activity    Alcohol use: No    Drug use: No    Sexual activity: Not on file     Review of Systems  Objective:     Vital Signs (Most Recent):  Temp: 98.9 °F (37.2 °C) (07/09/24 1958)  Pulse: 79 (07/09/24 2315)  Resp: 20 (07/09/24 2315)  BP: (!) 113/57 (07/09/24 2315)  SpO2: 95 % (07/09/24 2315) Vital Signs (24h Range):  Temp:  [98.9 °F (37.2 °C)-103.3 °F (39.6 °C)] 98.9 °F (37.2 °C)  Pulse:  [] 79  Resp:  [13-35] 20  SpO2:  [93 %-98 %] 95 %  BP: (100-139)/(56-75) 113/57     Weight: 100.7 kg (222 lb 0.1 oz)  Body mass index is 40.6 kg/m².     Physical Exam  Constitutional:       Appearance: She is obese. She is ill-appearing.   HENT:      Head: Normocephalic and atraumatic.      Nose: Nose normal.      Mouth/Throat:      Mouth: Mucous membranes are moist.   Eyes:      Extraocular Movements: Extraocular movements intact.   Cardiovascular:      Rate and Rhythm: Regular rhythm. Tachycardia present.      Pulses: Normal pulses.      Heart sounds: Normal heart sounds. No murmur heard.  Pulmonary:      Effort: Pulmonary effort is normal. No respiratory distress.      Breath sounds: Normal breath sounds. No wheezing.   Abdominal:      General: Bowel sounds are normal. There is no distension.      Palpations: Abdomen is soft.      Tenderness: There is no  abdominal tenderness.   Musculoskeletal:         General: Deformity present. No tenderness.      Cervical back: Normal range of motion.      Right lower leg: Edema present.      Left lower leg: Edema present.      Comments: Noted b/t weakness in the LE's. Patient noted that this is chronic relating to her MS.    Left foot drop also noted   Skin:     Findings: Erythema and lesion present.      Comments: Patient has extensive b/t lymphedema.    Neurological:      General: No focal deficit present.      Mental Status: She is alert and oriented to person, place, and time.                Significant Labs: All pertinent labs within the past 24 hours have been reviewed.    Significant Imaging: I have reviewed all pertinent imaging results/findings within the past 24 hours.

## 2024-07-10 NOTE — ASSESSMENT & PLAN NOTE
Chronic, controlled. Latest blood pressure and vitals reviewed-     Temp:  [98.9 °F (37.2 °C)-103.3 °F (39.6 °C)]   Pulse:  []   Resp:  [13-35]   BP: (100-139)/(56-75)   SpO2:  [93 %-98 %] .   Home meds for hypertension were reviewed and noted below.   Hypertension Medications               candesartan-hydrochlorothiazide (ATACAND HCT) 16-12.5 mg per tablet Take 1 tablet by mouth once daily.            While in the hospital, will manage blood pressure as follows; Adjust home antihypertensive regimen as follows- Hold all meds in setting of sepsis     Will utilize p.r.n. blood pressure medication only if patient's blood pressure greater than 180/110 and she develops symptoms such as worsening chest pain or shortness of breath.

## 2024-07-10 NOTE — HPI
"Ms. Murcia is a 73 year old female with a PMH of MS, debility, chronic lymphedema, HTN, left foot drop,  prior PE (currently on Eliquis), and B12 deficiency who is presenting to [facility] due to reported chills, weakness, and mild confusion. Daughter Amanda is at bedside and is assisting with the history. Of note, patient was alert and oriented x3. Patient reports that yesterday she slid down her chair but was not able to get up even with the help of her daughter 2/2 to chronic debility. EMS came over and helped her up and patient reports she felt weak since then. This morning her daughter went to work (she is a nurse), and the patient reports she was fine watching tv at the time. Some time around 4 PM when the daughter came back she found her mom reporting severe chills and weakness. She also had an episode of non bloody emesis and mild confusion. EMS was called and brought her into the ED. Daughter reports HH come to the house 2x a week for PT/OT associated with her MS. They both live together. Patient is essentially bed bound and spends most of her time in the chair. HH nurse reported normal vitals today. Patient denies fevers, abdominal pain, n/d, falls, dysuria, purulent drainage from b/t LE's, pain in the LE's, confusion aside from today, and recent sick contacts that she is aware of.     In the ED, patient was febrile and tachycardic. UA notable for > 100 WBC and many bacteria. HIV/Hep/Covid panel negative. BNP mildly elevated at 122 but likely 2/2 to obesity no reported HF. Trop mildly elevated likely 2/2 to demand ischemia. No noted leukocytosis. Patient was given broad spectrum abx and Bcx's were drawn. CXR notable for "Radiographic findings compatible with CHF, with pulmonary venous hypertension and interstitial pulmonary edema. Other underlying pathology, such as interstitial pneumonia, not fully excluded"  "

## 2024-07-10 NOTE — PT/OT/SLP EVAL
Speech Language Pathology Evaluation  Bedside Swallow  Discharge    Patient Name:  Lupis Murcia   MRN:  [id number]  Admitting Diagnosis: Sepsis    Recommendations:                 General Recommendations:  Follow-up not indicated  Diet recommendations:  Regular Diet - IDDSI Level 7, Thin liquids - IDDSI Level 0   Aspiration Precautions: Standard aspiration precautions   General Precautions: Standard,    Communication strategies:  none    Assessment:     Lupis Murcia is a 73 y.o. female with oropharyngeal swallow deemed WFL. No further ST warranted     History:     Past Medical History:   Diagnosis Date    Lymphedema     MS (multiple sclerosis)     Other pulmonary embolism without acute cor pulmonale     Vitamin B12 deficiency        Past Surgical History:   Procedure Laterality Date    BREAST CYST EXCISION Left     over 40yrs ago     SECTION      ESOPHAGOGASTRODUODENOSCOPY N/A 2022    Procedure: EGD (ESOPHAGOGASTRODUODENOSCOPY);  Surgeon: Radha Arango MD;  Location: [address];  Service: Endoscopy;  Laterality: N/A;           Prior Intubation HX:  none this admit    Modified Barium Swallow: none    Chest X-Rays:  mpression:     Radiographic findings compatible with CHF, with pulmonary venous hypertension and interstitial pulmonary edema.  Other underlying pathology, such as interstitial pneumonia, not fully excluded.     Prior diet: regular/thin.    Subjective     Awake/alert    Pain/Comfort:  Pain Rating 1: 0/10  Pain Rating Post-Intervention 1: 0/10    Respiratory Status: Room air    Objective:     Oral Musculature Evaluation  Oral Musculature: WFL  Dentition: scattered dentition  Oral Labial Strength and Mobility: WFL  Lingual Strength and Mobility: WFL  Voice Prior to PO Intake: clear    Bedside Swallow Eval:   Consistencies Assessed:  Thin liquids x5 straw  Solids x4      Oral Phase:   WFL    Pharyngeal Phase:   no overt clinical signs/symptoms of aspiration      Goals:    Multidisciplinary Problems       SLP Goals       Not on file                    Plan:       Plan of Care reviewed with:  patient   SLP Follow-Up:  No       Discharge recommendations:    no further ST    Time Tracking:     SLP Treatment Date:   07/10/24  Speech Start Time:  1053  Speech Stop Time:  1100     Speech Total Time (min):  7 min    Billable Minutes: Eval Swallow and Oral Function 7    07/10/2024

## 2024-07-10 NOTE — HOSPITAL COURSE
Admitted for sepsis and hypoxic respiratory failure likely 2/2 UTI. Improved with IVF+vanc/cefepime/azithro. Bcx returned proteus and e.coli likely from urinary source. Blood and urine cultures positive for proteus and ecoli pansensitive. Repeat blood cultures negative. Antibiotics de-escalated to ceftriaxone and then oral levaquin. CXR with interstitial infiltrates, but no productive cough, treated with 2 days of IV diuresis. Echo EF 60%, G2DD, 8 IVC. Patient improved on antibiotics and diuretics. Will complete 4 more days of antibiotics starting 7/15/24. Recommend follow-up with PCP. HH ordered.

## 2024-07-10 NOTE — PT/OT/SLP EVAL
Occupational Therapy   Co-Evaluation    Name: Lupis Murcia  MRN: [id number]  Admitting Diagnosis: Sepsis  Recent Surgery: * No surgery found *      Recommendations:     Discharge Recommendations: Low Intensity Therapy  Discharge Equipment Recommendations:  lift device  Barriers to discharge:   (increased (A) required)    Assessment:     Lupis Murcia is a 73 y.o. female with a medical diagnosis of Sepsis.  She presents with decreased independence with ADL's. Performance deficits affecting function: weakness, impaired endurance, impaired sensation, impaired self care skills, impaired functional mobility, impaired balance, decreased safety awareness, decreased lower extremity function, decreased upper extremity function, decreased coordination.  Co-evaluation/treatment performed due to patient's multiple deficits requiring two skilled therapists to safely assess patient's ability to complete ADLs and functional mobility in addition to accommodating for patient's activity tolerance while in acute care setting.      Rehab Prognosis: limited; patient would benefit from acute skilled OT services to address these deficits and reach maximum level of function.       Plan:     Patient to be seen 3 x/week to address the above listed problems via self-care/home management, therapeutic activities, therapeutic exercises, neuromuscular re-education  Plan of Care Expires: 08/09/24  Plan of Care Reviewed with: patient, daughter    Subjective     Chief Complaint: decreased balance  Patient/Family Comments/goals: Pt & daughter reported that pt always leans to the left.    Occupational Profile:  Living Environment: Pt resides with daughter in 1 story house with ramp access.  Pt is able to self feed & groom & dons nightgowns for clothing with minimal (A). Pt sleeps in the lift chair.  Pt performs sponge bathes from sitting in lift chair.  Pt wears depends & does hygiene while standing with (A) from daughter for stand & for hygiene.  Pt uses lift chair to stand her with daughter's (A). Pt has been working on standing with PT sessions at home. Pt is a retired  & enjoys reading & TV. Pt has a bathtub for bathing. Pt has not walked in ~ 1.5 years. Pt is waiting on her motorized w/c to come in.  Equipment Used at Home: walker, rolling, bedside commode, slide board, wheelchair (lift chair)  Assistance upon Discharge: daughter    Pain/Comfort:  Pain Rating 1: 0/10  Pain Rating Post-Intervention 1: 0/10    Patients cultural, spiritual, Sikh conflicts given the current situation: no    Objective:     Communicated with: RN prior to session.  Patient found supine with pulse ox (continuous), telemetry, peripheral IV, PureWick (daughter present during session) upon OT entry to room.    General Precautions: Standard, fall (partial code)  Orthopedic Precautions: N/A  Braces: N/A    Occupational Performance:    Bed Mobility:    Patient completed Rolling/Turning to Right with total assistance  Patient completed Scooting/Bridging with total assistance and 2 persons scooting forward on EOB & up HOB while supine  Patient completed Supine to Sit with total assistance and 2 persons  Patient completed Sit to Supine with total assistance and 2 persons    Functional Mobility/Transfers:  Patient completed Sit <> Stand Transfer with maximal assistance and of 2 persons  with  no assistive device  with (A) provided to block feet & knees as well as at trunk for elevation    Activities of Daily Living:  Grooming: maximal assistance while seated EOB with (A) for balance  Lower Body Dressing: total assistance donning socks    Cognitive/Visual Perceptual:  Cognitive/Psychosocial Skills:     -       Oriented to: Person, Place, Time, and Situation   -       Follows Commands/attention:Follows one-step commands  -       Safety awareness/insight to disability: impaired     Physical Exam:  Sensation: numbness in (B) hands due to MS  Dominant hand:    -        left  Upper Extremity Range of Motion:  BUE WFL except 90* left shoulder flexion  Upper Extremity Strength: BUE WFL except 3-/5 shoulder flexion   Strength: BUE WFL    AMPAC 6 Click ADL:  AMPAC Total Score: 8    Treatment & Education:  Pt required Min-Mod (A) for postural control while seated EOB due to posterior & leftward leaning.  Provided verbal & physical cues to facilitate postural control. Provided education on safety during transfer trial.  Provided education regarding role of OT, POC, & discharge recommendations with pt & daughter verbalizing understanding.  Pt had no further questions & when asked whether there were any concerns pt reported none.        Patient left supine with all lines intact, call button in reach, RN notified, and daughter present    GOALS:   Multidisciplinary Problems       Occupational Therapy Goals          Problem: Occupational Therapy    Goal Priority Disciplines Outcome Interventions   Occupational Therapy Goal     OT, PT/OT Progressing    Description: Goals to be met by: 24     Patient will increase functional independence with ADLs by performing:    UE Dressing with Moderate Assistance.  LE Dressing with Maximum Assistance.  Grooming while seated with Stand-by Assistance.  Sitting at edge of bed x15 minutes with Stand-by Assistance.  Rolling to Bilateral with Minimal Assistance.   Supine to sit with Minimal Assistance.  Stand pivot transfers with Minimal Assistance.  Upper extremity exercise program x15 reps per handout, with independence.                         History:     Past Medical History:   Diagnosis Date    Lymphedema     MS (multiple sclerosis)     Other pulmonary embolism without acute cor pulmonale     Vitamin B12 deficiency          Past Surgical History:   Procedure Laterality Date    BREAST CYST EXCISION Left     over 40yrs ago     SECTION      ESOPHAGOGASTRODUODENOSCOPY N/A 2022    Procedure: EGD (ESOPHAGOGASTRODUODENOSCOPY);  Surgeon:  Radha Arango MD;  Location: [facility] ([address] [place] [address]);  Service: Endoscopy;  Laterality: N/A;       Time Tracking:     OT Date of Treatment: 07/10/24  OT Start Time: 0910  OT Stop Time: 0940  OT Total Time (min): 30 min    Billable Minutes:Evaluation 20  Therapeutic Activity 10    7/10/2024

## 2024-07-10 NOTE — NURSING
Nurses Note -- 4 Eyes      7/10/2024   8:17 AM      Skin assessed during: Q Shift Change      [] No Altered Skin Integrity Present    []Prevention Measures Documented      [x] Yes- Altered Skin Integrity Present or Discovered   [x] LDA Added if Not in Epic (Describe Wound)   [] New Altered Skin Integrity was Present on Admit and Documented in LDA   [] Wound Image Taken    Wound Care Consulted? Yes    Attending Nurse:  LUCIEN Benites    Second RN/Staff Member:  LUCIEN Ceron

## 2024-07-10 NOTE — PLAN OF CARE
Bedside swallow study completed. Recommend regular diet/thin liquids. No further ST warranted.   7/10/2024

## 2024-07-10 NOTE — ED NOTES
Nurses Note -- 4 Eyes      7/9/2024   7:53 PM      Skin assessed during: Daily Assessment      [] No Altered Skin Integrity Present    []Prevention Measures Documented      [] Yes- Altered Skin Integrity Present or Discovered   [] LDA Added if Not in Epic (Describe Wound)   [] New Altered Skin Integrity was Present on Admit and Documented in LDA   [] Wound Image Taken    Wound Care Consulted? Yes    Attending Nurse:  Irasema Acevedo RN     Second RN/Staff Member:  Dr. Ferrari

## 2024-07-10 NOTE — ASSESSMENT & PLAN NOTE
Chronic.    PLAN:  - Continue Vitamin D while admitted   - F/u with outpatient neurology   - PT/OT consulted

## 2024-07-10 NOTE — PLAN OF CARE
Problem: Occupational Therapy  Goal: Occupational Therapy Goal  Description: Goals to be met by: 8/9/24     Patient will increase functional independence with ADLs by performing:    UE Dressing with Moderate Assistance.  LE Dressing with Maximum Assistance.  Grooming while seated with Stand-by Assistance.  Sitting at edge of bed x15 minutes with Stand-by Assistance.  Rolling to Bilateral with Minimal Assistance.   Supine to sit with Minimal Assistance.  Stand pivot transfers with Minimal Assistance.  Upper extremity exercise program x15 reps per handout, with independence.    Outcome: Progressing     OT rayna completed.

## 2024-07-10 NOTE — NURSING
Nurses Note -- 4 Eyes      7/10/2024   3:05 AM      Skin assessed during: Admit      [] No Altered Skin Integrity Present    []Prevention Measures Documented      [x] Yes- Altered Skin Integrity Present or Discovered   [x] LDA Added if Not in Epic (Describe Wound)   [x] New Altered Skin Integrity was Present on Admit and Documented in LDA   [x] Wound Image Taken    Wound Care Consulted? Yes    Attending Nurse:  Jie Aleman RN/Staff Member:   Gisselle MCGRAW

## 2024-07-11 LAB
ALBUMIN SERPL BCP-MCNC: 2.7 G/DL (ref 3.5–5.2)
ALP SERPL-CCNC: 54 U/L (ref 55–135)
ALT SERPL W/O P-5'-P-CCNC: 5 U/L (ref 10–44)
ANION GAP SERPL CALC-SCNC: 7 MMOL/L (ref 8–16)
AST SERPL-CCNC: 9 U/L (ref 10–40)
BASOPHILS # BLD AUTO: 0.02 K/UL (ref 0–0.2)
BASOPHILS NFR BLD: 0.3 % (ref 0–1.9)
BILIRUB SERPL-MCNC: 0.3 MG/DL (ref 0.1–1)
BUN SERPL-MCNC: 15 MG/DL (ref 8–23)
CALCIUM SERPL-MCNC: 8.6 MG/DL (ref 8.7–10.5)
CHLORIDE SERPL-SCNC: 109 MMOL/L (ref 95–110)
CO2 SERPL-SCNC: 25 MMOL/L (ref 23–29)
CREAT SERPL-MCNC: 0.9 MG/DL (ref 0.5–1.4)
DIFFERENTIAL METHOD BLD: ABNORMAL
EOSINOPHIL # BLD AUTO: 0.2 K/UL (ref 0–0.5)
EOSINOPHIL NFR BLD: 2.5 % (ref 0–8)
ERYTHROCYTE [DISTWIDTH] IN BLOOD BY AUTOMATED COUNT: 15.2 % (ref 11.5–14.5)
EST. GFR  (NO RACE VARIABLE): >60 ML/MIN/1.73 M^2
GLUCOSE SERPL-MCNC: 114 MG/DL (ref 70–110)
HCT VFR BLD AUTO: 26 % (ref 37–48.5)
HGB BLD-MCNC: 7.6 G/DL (ref 12–16)
IMM GRANULOCYTES # BLD AUTO: 0.04 K/UL (ref 0–0.04)
IMM GRANULOCYTES NFR BLD AUTO: 0.6 % (ref 0–0.5)
LYMPHOCYTES # BLD AUTO: 1.4 K/UL (ref 1–4.8)
LYMPHOCYTES NFR BLD: 21.5 % (ref 18–48)
MAGNESIUM SERPL-MCNC: 1.9 MG/DL (ref 1.6–2.6)
MCH RBC QN AUTO: 26.8 PG (ref 27–31)
MCHC RBC AUTO-ENTMCNC: 29.2 G/DL (ref 32–36)
MCV RBC AUTO: 92 FL (ref 82–98)
MONOCYTES # BLD AUTO: 0.9 K/UL (ref 0.3–1)
MONOCYTES NFR BLD: 13.6 % (ref 4–15)
NEUTROPHILS # BLD AUTO: 3.9 K/UL (ref 1.8–7.7)
NEUTROPHILS NFR BLD: 61.5 % (ref 38–73)
NRBC BLD-RTO: 0 /100 WBC
PHOSPHATE SERPL-MCNC: 3.1 MG/DL (ref 2.7–4.5)
PLATELET # BLD AUTO: 212 K/UL (ref 150–450)
PMV BLD AUTO: 10.2 FL (ref 9.2–12.9)
POTASSIUM SERPL-SCNC: 4 MMOL/L (ref 3.5–5.1)
PROT SERPL-MCNC: 6.1 G/DL (ref 6–8.4)
RBC # BLD AUTO: 2.84 M/UL (ref 4–5.4)
RETICS/RBC NFR AUTO: 1.4 % (ref 0.5–2.5)
SODIUM SERPL-SCNC: 141 MMOL/L (ref 136–145)
WBC # BLD AUTO: 6.41 K/UL (ref 3.9–12.7)

## 2024-07-11 PROCEDURE — 85045 AUTOMATED RETICULOCYTE COUNT: CPT | Performed by: STUDENT IN AN ORGANIZED HEALTH CARE EDUCATION/TRAINING PROGRAM

## 2024-07-11 PROCEDURE — 63600175 PHARM REV CODE 636 W HCPCS

## 2024-07-11 PROCEDURE — 25000003 PHARM REV CODE 250: Performed by: INTERNAL MEDICINE

## 2024-07-11 PROCEDURE — 80053 COMPREHEN METABOLIC PANEL: CPT

## 2024-07-11 PROCEDURE — 63600175 PHARM REV CODE 636 W HCPCS: Performed by: INTERNAL MEDICINE

## 2024-07-11 PROCEDURE — 87040 BLOOD CULTURE FOR BACTERIA: CPT | Performed by: STUDENT IN AN ORGANIZED HEALTH CARE EDUCATION/TRAINING PROGRAM

## 2024-07-11 PROCEDURE — 94761 N-INVAS EAR/PLS OXIMETRY MLT: CPT

## 2024-07-11 PROCEDURE — 84100 ASSAY OF PHOSPHORUS: CPT

## 2024-07-11 PROCEDURE — 83735 ASSAY OF MAGNESIUM: CPT

## 2024-07-11 PROCEDURE — 85025 COMPLETE CBC W/AUTO DIFF WBC: CPT

## 2024-07-11 PROCEDURE — 25000003 PHARM REV CODE 250

## 2024-07-11 PROCEDURE — 20600001 HC STEP DOWN PRIVATE ROOM

## 2024-07-11 RX ORDER — FUROSEMIDE 10 MG/ML
40 INJECTION INTRAMUSCULAR; INTRAVENOUS ONCE
Status: COMPLETED | OUTPATIENT
Start: 2024-07-11 | End: 2024-07-11

## 2024-07-11 RX ADMIN — CEFEPIME 2 G: 2 INJECTION, POWDER, FOR SOLUTION INTRAVENOUS at 05:07

## 2024-07-11 RX ADMIN — ACETAMINOPHEN AND CODEINE PHOSPHATE 1 TABLET: 300; 30 TABLET ORAL at 08:07

## 2024-07-11 RX ADMIN — AMMONIUM LACTATE: 12 LOTION TOPICAL at 08:07

## 2024-07-11 RX ADMIN — CEFTRIAXONE 2 G: 2 INJECTION, POWDER, FOR SOLUTION INTRAMUSCULAR; INTRAVENOUS at 10:07

## 2024-07-11 RX ADMIN — APIXABAN 5 MG: 5 TABLET, FILM COATED ORAL at 09:07

## 2024-07-11 RX ADMIN — CHOLECALCIFEROL TAB 125 MCG (5000 UNIT) 5000 UNITS: 125 TAB at 08:07

## 2024-07-11 RX ADMIN — AMMONIUM LACTATE: 12 LOTION TOPICAL at 09:07

## 2024-07-11 RX ADMIN — LORAZEPAM 0.5 MG: 0.5 TABLET ORAL at 11:07

## 2024-07-11 RX ADMIN — APIXABAN 5 MG: 5 TABLET, FILM COATED ORAL at 08:07

## 2024-07-11 RX ADMIN — FUROSEMIDE 40 MG: 10 INJECTION, SOLUTION INTRAVENOUS at 10:07

## 2024-07-11 RX ADMIN — ACETAMINOPHEN AND CODEINE PHOSPHATE 1 TABLET: 300; 30 TABLET ORAL at 09:07

## 2024-07-11 NOTE — NURSING
Nurses Note -- 4 Eyes      7/10/2024   7:56 PM      Skin assessed during: Admit      [] No Altered Skin Integrity Present    []Prevention Measures Documented      [x] Yes- Altered Skin Integrity Present or Discovered   [] LDA Added if Not in Epic (Describe Wound)   [] New Altered Skin Integrity was Present on Admit and Documented in LDA   [] Wound Image Taken    Wound Care Consulted? Yes    Attending Nurse:  Gisselle Shaw      Second RN/Staff Member:  Jie Shaw

## 2024-07-11 NOTE — PLAN OF CARE
Problem: Adult Inpatient Plan of Care  Goal: Plan of Care Review  Outcome: Progressing  Flowsheets (Taken 7/11/2024 0223)  Plan of Care Reviewed With:   patient   child  Goal: Patient-Specific Goal (Individualized)  Outcome: Progressing  Goal: Absence of Hospital-Acquired Illness or Injury  Outcome: Progressing  Goal: Optimal Comfort and Wellbeing  Outcome: Progressing  Goal: Readiness for Transition of Care  Outcome: Progressing     Problem: Bariatric Environmental Safety  Goal: Safety Maintained with Care  Outcome: Progressing     Problem: Sepsis/Septic Shock  Goal: Optimal Coping  Outcome: Progressing  Goal: Absence of Bleeding  Outcome: Progressing  Goal: Blood Glucose Level Within Targeted Range  Outcome: Progressing  Goal: Absence of Infection Signs and Symptoms  Outcome: Progressing  Goal: Optimal Nutrition Intake  Outcome: Progressing     Problem: Acute Kidney Injury/Impairment  Goal: Fluid and Electrolyte Balance  Outcome: Progressing  Goal: Improved Oral Intake  Outcome: Progressing  Goal: Effective Renal Function  Outcome: Progressing     Problem: Wound  Goal: Optimal Coping  Outcome: Progressing  Goal: Optimal Functional Ability  Outcome: Progressing  Goal: Absence of Infection Signs and Symptoms  Outcome: Progressing  Goal: Improved Oral Intake  Outcome: Progressing  Goal: Optimal Pain Control and Function  Outcome: Progressing  Goal: Skin Health and Integrity  Outcome: Progressing  Goal: Optimal Wound Healing  Outcome: Progressing     Problem: Skin Injury Risk Increased  Goal: Skin Health and Integrity  Outcome: Progressing

## 2024-07-11 NOTE — PROGRESS NOTES
[facility] - Tele[facility] Medicine  Progress Note    Patient Name: Luips Murcia  MRN: [id number]  Patient Class: IP- Inpatient   Admission Date: 7/9/2024  Length of Stay: 2 days  Attending Physician: Neil Wilks MD  Primary Care Provider: Bobby Tran MD        Subjective:     Principal Problem:Sepsis        HPI:  Ms. Murcia is a 73 year old female with a PMH of MS, debility, chronic lymphedema, HTN, left foot drop,  prior PE (currently on Eliquis), and B12 deficiency who is [place] [facility] due to reported chills, weakness, and mild confusion. Daughter mAanda is at bedside and is assisting with the history. Of note, patient was alert and oriented x3. Patient reports that yesterday she slid down her chair but was not able to get up even with the help of her daughter 2/2 to chronic debility. EMS came over and helped her up and patient reports she felt weak since then. This morning her daughter went to work (she is a nurse), and the patient reports she was fine watching tv at the time. Some time around 4 PM when the daughter came back she found her mom reporting severe chills and weakness. She also had an episode of non bloody emesis and mild confusion. EMS was called and brought her into the ED. Daughter reports HH come to the house 2x a week for PT/OT associated with her MS. They both live together. Patient is essentially bed bound and spends most of her time in the chair. HH nurse reported normal vitals today. Patient denies fevers, abdominal pain, n/d, falls, dysuria, purulent drainage from b/t LE's, pain in the LE's, confusion aside from today, and recent sick contacts that she is aware of.     In the ED, patient was febrile and tachycardic. UA notable for > 100 WBC and many bacteria. HIV/Hep/Covid panel negative. BNP mildly elevated at 122 but likely 2/2 to obesity no reported HF. Trop mildly elevated likely 2/2 to demand ischemia. No noted leukocytosis. Patient was given broad spectrum  "abx and Bcx's were drawn. CXR notable for "Radiographic findings compatible with CHF, with pulmonary venous hypertension and interstitial pulmonary edema. Other underlying pathology, such as interstitial pneumonia, not fully excluded"    Overview/Hospital Course:  Admitted for sepsis and hypoxic respiratory failure likely 2/2 UTI. Improved with IVF+vanc/cefepime/azithro. Bcx returned proteus and e.coli likely from urinary source. Blood and urine cultures positive for GNR, identification and susceptibility pending. Repeat blood cultures drawn. Antibiotics de-escalated to ceftriaxone. CXR with interstitial infiltrates, but no productive cough. There were also some initial concerns of coughing when swallowing, speech cleared for diet. Echo EF 60%, G2DD, intermediate IVC/SVC pressure. , troponin leveled at 0.121, low suspicion for ACS. Diuresing for pulmonary edema.     Interval History: No overnight events. Pt alert this morning, feeling well. Daughter at bedside. Had some questions regarding diagnosis of sepsis, education provided. No other concerns.     Review of Systems   Constitutional:  Negative for chills, fatigue and fever.   HENT:  Negative for trouble swallowing.    Respiratory:  Negative for cough and shortness of breath.    Cardiovascular:  Positive for leg swelling. Negative for chest pain.   Gastrointestinal:  Negative for abdominal distention, diarrhea, nausea and vomiting.   Genitourinary:  Negative for difficulty urinating and dysuria.   Neurological:  Negative for dizziness and light-headedness.     Objective:     Vital Signs (Most Recent):  Temp: 99 °F (37.2 °C) (07/11/24 1100)  Pulse: 85 (07/11/24 1100)  Resp: 15 (07/11/24 1100)  BP: (!) 152/88 (07/11/24 1100)  SpO2: (!) 94 % (07/11/24 1100) Vital Signs (24h Range):  Temp:  [99 °F (37.2 °C)-100.2 °F (37.9 °C)] 99 °F (37.2 °C)  Pulse:  [] 85  Resp:  [15-19] 15  SpO2:  [92 %-99 %] 94 %  BP: (114-153)/(60-88) 152/88     Weight: 100.7 kg " (222 lb)  Body mass index is 41.95 kg/m².    Intake/Output Summary (Last 24 hours) at 7/11/2024 1353  Last data filed at 7/11/2024 1231  Gross per 24 hour   Intake --   Output 1300 ml   Net -1300 ml         Physical Exam  Constitutional:       General: She is not in acute distress.     Appearance: She is obese. She is not toxic-appearing.   HENT:      Head: Normocephalic and atraumatic.   Cardiovascular:      Rate and Rhythm: Normal rate and regular rhythm.      Heart sounds: Normal heart sounds.   Pulmonary:      Effort: Pulmonary effort is normal. No respiratory distress.      Breath sounds: Normal breath sounds.   Abdominal:      General: Bowel sounds are normal. There is no distension.      Palpations: Abdomen is soft.   Musculoskeletal:      Right lower leg: Edema (bilateral lymphedema with scaling) present.      Left lower leg: Edema (bilateral lymphedema with scaling) present.   Skin:     General: Skin is warm and dry.      Capillary Refill: Capillary refill takes less than 2 seconds.      Coloration: Skin is not jaundiced.   Neurological:      Mental Status: She is alert and oriented to person, place, and time.             Significant Labs: All pertinent labs within the past 24 hours have been reviewed.  Blood Culture:   Recent Labs   Lab 07/09/24  1832 07/11/24  0506 07/11/24  0507   LABBLOO Gram stain aer bottle: Gram negative rods  Gram stain lemuel bottle: Gram negative rods  Results called to and read back by:Delmis Man RN 07/10/2024 09:42  GRAM NEGATIVE CAT  Identification pending  For susceptibility see order #[id number]  *  Gram stain lemuel bottle: Gram negative rods  Results called to and read back by:Delmis Man RN 07/10/2024 09:42  GRAM NEGATIVE CAT  Identification and susceptibility pending  *  GRAM NEGATIVE CAT  Identification and susceptibility pending  * No Growth to date No Growth to date     CBC:   Recent Labs   Lab 07/09/24  1832 07/10/24  0427 07/11/24  0507   WBC 8.94 7.05  6.41   HGB 8.9* 7.7* 7.6*   HCT 30.5* 27.0* 26.0*    184 212     CMP:   Recent Labs   Lab 07/09/24  1832 07/10/24  0427 07/11/24  0507    140 141   K 4.0 3.9 4.0    107 109   CO2 18* 24 25   * 109 114*   BUN 16 14 15   CREATININE 0.9 0.9 0.9   CALCIUM 9.1 8.8 8.6*   PROT 7.4 6.1 6.1   ALBUMIN 3.2* 2.8* 2.7*   BILITOT 0.6 0.4 0.3   ALKPHOS 66 55 54*   AST 18 15 9*   ALT 7* 7* 5*   ANIONGAP 13 9 7*     Troponin:   Recent Labs   Lab 07/09/24  1832 07/10/24  0427 07/10/24  0818   TROPONINI 0.059* 0.156* 0.121*     Urine Culture:   Recent Labs   Lab 07/09/24  1849   LABURIN GRAM NEGATIVE CAT  > 100,000 cfu/ml  Identification and susceptibility pending  *     Urine Studies:   Recent Labs   Lab 07/09/24 1849   COLORU Orange*   APPEARANCEUA Cloudy*   PHUR 6.0   SPECGRAV 1.015   PROTEINUA 2+*   GLUCUA Negative   KETONESU Negative   BILIRUBINUA Negative   OCCULTUA 2+*   NITRITE Negative   LEUKOCYTESUR 3+*   RBCUA 52*   WBCUA >100*   BACTERIA Many*   SQUAMEPITHEL 1   HYALINECASTS 0       Significant Imaging: I have reviewed all pertinent imaging results/findings within the past 24 hours.    Assessment/Plan:      * Sepsis  This patient does have evidence of infective focus  My overall impression is sepsis.  Source: Respiratory, Urinary Tract, and Skin and Soft Tissue (location b/t LE's)  Antibiotics given-   Antibiotics (72h ago, onward)      Start     Stop Route Frequency Ordered    07/11/24 1100  cefTRIAXone (ROCEPHIN) 2 g in D5W 100 mL IVPB (MB+)         07/21/24 1059 IV Every 24 hours (non-standard times) 07/11/24 0949          Latest lactate reviewed-  Recent Labs   Lab 07/09/24  2138 07/09/24  2349   LACTATE  --  2.1   POCLAC 1.36  --        Organ dysfunction indicated by Encephalopathy and hypotension     Will Not start Pressors- Levophed for MAP of 65  Source control achieved by: Broad spectrum Abx    Patient presenting to the ED with mild confusion. It was noted that the patient was febrile  with T max of 103 and tachycardic. Concerning for sepsis. Patient UA notable for > 100 WBC and many bacteria, although patient denies urinary symptoms. Urine and Blood cultures positive for GNR, rapid PCR ID for E coli and proteus. CXR also noted possible congestion vs. Interstitial PNA. Patient denies recent aspiration events but daughter reports episodes of intermittent choking at times. Passed swallow test by SLP. Likely pulmonary edema 2/2 G2DD in the setting of sepsis. Patient denies any recent sick contacts. No noted purulence at this time with b/t LE associated with lymphedema. Likely urosepsis.     PLAN:  - BCX & Ucx with GNR, repeat pending  - Rapid Bcx PCR ID positive for E coli and Proteus  - Deescalated abx, now on ceftriaxone  - Wound care consult for b/t LE  - De escalate Abx as needed pending Bcx's and Urine culture   - Daily CBC, CMP, Mg, Phos   - Pt/OT consult for chronic debility associated with MS     Bacteremia  GNR bacteremia. PCR with proteus and e.coli. Likely urinary in source, will confirm w/ urine cultures.  - continue ceftriaxone      History of pulmonary embolus (PE)  Chronic and stable.     PLAN:  - Continue Eliquis    HTN (hypertension)  Chronic, controlled. Latest blood pressure and vitals reviewed-     Temp:  [97.8 °F (36.6 °C)-103.3 °F (39.6 °C)]   Pulse:  []   Resp:  [13-35]   BP: (100-139)/(56-81)   SpO2:  [93 %-98 %] .   Home meds for hypertension were reviewed and noted below.   Hypertension Medications               candesartan-hydrochlorothiazide (ATACAND HCT) 16-12.5 mg per tablet Take 1 tablet by mouth once daily.            While in the hospital, will manage blood pressure as follows; Adjust home antihypertensive regimen as follows- Hold all meds in setting of sepsis     Will utilize p.r.n. blood pressure medication only if patient's blood pressure greater than 180/110 and she develops symptoms such as worsening chest pain or shortness of breath.    Lymphedema  Wound  care consulted. Low concern for active cellulitis at this time.     Plan:  -continue ammonium lactate lotion      Leg weakness  2/2 to MS. Has HH PT/OT working with her 2x a week. She reports she is essentially confined to a chair unable to complete her own ADL's    PLAN:  - PT/OT consult       MS (multiple sclerosis)  Chronic.    PLAN:  - Continue Vitamin D while admitted   - F/u with outpatient neurology   - PT/OT consulted       Vitamin B 12 deficiency  Takes monthly B12 injections         VTE Risk Mitigation (From admission, onward)           Ordered     apixaban tablet 5 mg  2 times daily         07/09/24 4289                    Discharge Planning   USMAN: 7/12/2024     Code Status: Partial Code   Is the patient medically ready for discharge?:     Reason for patient still in hospital (select all that apply): Patient trending condition, Laboratory test, Treatment, and PT / OT recommendations  Discharge Plan A: Home, Home with family, Home Health              Bassem Fischer MD  Department of Hospital Medicine   [facility] - Telemetry Stepdown

## 2024-07-11 NOTE — ASSESSMENT & PLAN NOTE
This patient does have evidence of infective focus  My overall impression is sepsis.  Source: Respiratory, Urinary Tract, and Skin and Soft Tissue (location b/t LE's)  Antibiotics given-   Antibiotics (72h ago, onward)      Start     Stop Route Frequency Ordered    07/11/24 1100  cefTRIAXone (ROCEPHIN) 2 g in D5W 100 mL IVPB (MB+)         07/21/24 1059 IV Every 24 hours (non-standard times) 07/11/24 0949          Latest lactate reviewed-  Recent Labs   Lab 07/09/24  2138 07/09/24  2349   LACTATE  --  2.1   POCLAC 1.36  --        Organ dysfunction indicated by Encephalopathy and hypotension     Will Not start Pressors- Levophed for MAP of 65  Source control achieved by: Broad spectrum Abx    Patient presenting to the ED with mild confusion. It was noted that the patient was febrile with T max of 103 and tachycardic. Concerning for sepsis. Patient UA notable for > 100 WBC and many bacteria, although patient denies urinary symptoms. Urine and Blood cultures positive for GNR, rapid PCR ID for E coli and proteus. CXR also noted possible congestion vs. Interstitial PNA. Patient denies recent aspiration events but daughter reports episodes of intermittent choking at times. Passed swallow test by SLP. Likely pulmonary edema 2/2 G2DD in the setting of sepsis. Patient denies any recent sick contacts. No noted purulence at this time with b/t LE associated with lymphedema. Likely urosepsis.     PLAN:  - BCX & Ucx with GNR, repeat pending  - Rapid Bcx PCR ID positive for E coli and Proteus  - Deescalated abx, now on ceftriaxone  - Wound care consult for b/t LE  - De escalate Abx as needed pending Bcx's and Urine culture   - Daily CBC, CMP, Mg, Phos   - Pt/OT consult for chronic debility associated with MS

## 2024-07-11 NOTE — PLAN OF CARE
Lupis Murcia is a pleasant 74 yo female patient with a history of MS, PE (Eliquis) chronic lymphedema (Podiatry), Anemia, who presents with rather sudden onset of lethargy and AMS and Nausea / Vomiting. Evaluation in the ED was significant for high fever, elevated inflammatory markers and UA c/w Urinary tract infection. XR findings were suggestive of pulmonary vascular congestion vs. Infiltrate.   Admitted for evaluation.      1: Severe sepsis due to UTI  - has evidence of Bacteremia (2/2 BCX GNR - PCR Proteous / E.Coli)  - no history / evidence of urinary outlet obstruction - PVR pending  - on broad spectrum ABX - will deescalate to targeted therapy.   - clinically improved rapidly with supportive measures.   - awaiting ID and sensitivity for deescalation and determination of ABX treatment course.      2: HFpEF - decompensated in setting of Sepsis with tachycardia  - Pulmonary vascular congestion evident per CXR  - no history of CHF - Echo suggests preserved EF and diastolic dysfunction Grade II, IVC 8 mmHg  - diuresing.      3: MS - managed by Dr. Gunter  - bed /wheelchair bound at baseline     4: Anemia  - iron panel normal   - plans to see Dr. Blanco (hematology) in August  - Retic count normal (inappropriately)     5: Lymphedema  - appears at baseline, no ssx. Of superinfection / cellulitis.      6: H/P PE  - continue with eliquis

## 2024-07-11 NOTE — PLAN OF CARE
Problem: Adult Inpatient Plan of Care  Goal: Plan of Care Review  Outcome: Progressing  Goal: Patient-Specific Goal (Individualized)  Outcome: Progressing     Problem: Sepsis/Septic Shock  Goal: Optimal Coping  Outcome: Progressing  Goal: Absence of Bleeding  Outcome: Progressing     Problem: Acute Kidney Injury/Impairment  Goal: Fluid and Electrolyte Balance  Outcome: Progressing  Goal: Improved Oral Intake  Outcome: Progressing     Problem: Wound  Goal: Optimal Functional Ability  Outcome: Progressing  Goal: Optimal Pain Control and Function  Outcome: Progressing  Goal: Skin Health and Integrity  Outcome: Progressing     Problem: Skin Injury Risk Increased  Goal: Skin Health and Integrity  Outcome: Progressing     Patient remains free from falls and injuries through out shift. Patient AAO and VSS. Patient denies chest pain and SOB. Lasix given patient diuresing well. Pain treated as ordered.  Patient's daughter at bedside. Plan of care reviewed with patient. Patient verbalizes understanding of plan.

## 2024-07-11 NOTE — ASSESSMENT & PLAN NOTE
Wound care consulted. Low concern for active cellulitis at this time.     Plan:  -continue ammonium lactate lotion

## 2024-07-11 NOTE — ASSESSMENT & PLAN NOTE
GNR bacteremia. PCR with proteus and e.coli. Likely urinary in source, will confirm w/ urine cultures.  - continue ceftriaxone

## 2024-07-12 PROBLEM — I50.30 (HFPEF) HEART FAILURE WITH PRESERVED EJECTION FRACTION: Chronic | Status: ACTIVE | Noted: 2024-07-12

## 2024-07-12 LAB
ALBUMIN SERPL BCP-MCNC: 2.7 G/DL (ref 3.5–5.2)
ALP SERPL-CCNC: 53 U/L (ref 55–135)
ALT SERPL W/O P-5'-P-CCNC: 6 U/L (ref 10–44)
ANION GAP SERPL CALC-SCNC: 10 MMOL/L (ref 8–16)
AST SERPL-CCNC: 8 U/L (ref 10–40)
BACTERIA BLD CULT: ABNORMAL
BASOPHILS # BLD AUTO: 0.01 K/UL (ref 0–0.2)
BASOPHILS NFR BLD: 0.2 % (ref 0–1.9)
BILIRUB SERPL-MCNC: 0.3 MG/DL (ref 0.1–1)
BUN SERPL-MCNC: 14 MG/DL (ref 8–23)
CALCIUM SERPL-MCNC: 8.9 MG/DL (ref 8.7–10.5)
CHLORIDE SERPL-SCNC: 103 MMOL/L (ref 95–110)
CO2 SERPL-SCNC: 28 MMOL/L (ref 23–29)
CREAT SERPL-MCNC: 0.8 MG/DL (ref 0.5–1.4)
DIFFERENTIAL METHOD BLD: ABNORMAL
EOSINOPHIL # BLD AUTO: 0.2 K/UL (ref 0–0.5)
EOSINOPHIL NFR BLD: 3.6 % (ref 0–8)
ERYTHROCYTE [DISTWIDTH] IN BLOOD BY AUTOMATED COUNT: 15.2 % (ref 11.5–14.5)
EST. GFR  (NO RACE VARIABLE): >60 ML/MIN/1.73 M^2
GLUCOSE SERPL-MCNC: 110 MG/DL (ref 70–110)
HCT VFR BLD AUTO: 25.5 % (ref 37–48.5)
HGB BLD-MCNC: 7.7 G/DL (ref 12–16)
IMM GRANULOCYTES # BLD AUTO: 0.03 K/UL (ref 0–0.04)
IMM GRANULOCYTES NFR BLD AUTO: 0.6 % (ref 0–0.5)
LYMPHOCYTES # BLD AUTO: 1.2 K/UL (ref 1–4.8)
LYMPHOCYTES NFR BLD: 22.4 % (ref 18–48)
MAGNESIUM SERPL-MCNC: 2 MG/DL (ref 1.6–2.6)
MCH RBC QN AUTO: 26.3 PG (ref 27–31)
MCHC RBC AUTO-ENTMCNC: 30.2 G/DL (ref 32–36)
MCV RBC AUTO: 87 FL (ref 82–98)
MONOCYTES # BLD AUTO: 0.7 K/UL (ref 0.3–1)
MONOCYTES NFR BLD: 12.5 % (ref 4–15)
NEUTROPHILS # BLD AUTO: 3.2 K/UL (ref 1.8–7.7)
NEUTROPHILS NFR BLD: 60.7 % (ref 38–73)
NRBC BLD-RTO: 0 /100 WBC
PHOSPHATE SERPL-MCNC: 3.5 MG/DL (ref 2.7–4.5)
PLATELET # BLD AUTO: 225 K/UL (ref 150–450)
PMV BLD AUTO: 10.3 FL (ref 9.2–12.9)
POTASSIUM SERPL-SCNC: 3.4 MMOL/L (ref 3.5–5.1)
PROT SERPL-MCNC: 6.4 G/DL (ref 6–8.4)
RBC # BLD AUTO: 2.93 M/UL (ref 4–5.4)
SODIUM SERPL-SCNC: 141 MMOL/L (ref 136–145)
WBC # BLD AUTO: 5.26 K/UL (ref 3.9–12.7)

## 2024-07-12 PROCEDURE — 85025 COMPLETE CBC W/AUTO DIFF WBC: CPT

## 2024-07-12 PROCEDURE — 63600175 PHARM REV CODE 636 W HCPCS: Performed by: INTERNAL MEDICINE

## 2024-07-12 PROCEDURE — 83735 ASSAY OF MAGNESIUM: CPT

## 2024-07-12 PROCEDURE — 97110 THERAPEUTIC EXERCISES: CPT | Mod: CQ

## 2024-07-12 PROCEDURE — 25000003 PHARM REV CODE 250: Performed by: INTERNAL MEDICINE

## 2024-07-12 PROCEDURE — 84100 ASSAY OF PHOSPHORUS: CPT

## 2024-07-12 PROCEDURE — 25000003 PHARM REV CODE 250

## 2024-07-12 PROCEDURE — 80053 COMPREHEN METABOLIC PANEL: CPT

## 2024-07-12 PROCEDURE — 63600175 PHARM REV CODE 636 W HCPCS

## 2024-07-12 PROCEDURE — 36415 COLL VENOUS BLD VENIPUNCTURE: CPT

## 2024-07-12 PROCEDURE — 20600001 HC STEP DOWN PRIVATE ROOM

## 2024-07-12 RX ORDER — FUROSEMIDE 10 MG/ML
40 INJECTION INTRAMUSCULAR; INTRAVENOUS 2 TIMES DAILY
Status: COMPLETED | OUTPATIENT
Start: 2024-07-12 | End: 2024-07-12

## 2024-07-12 RX ORDER — POTASSIUM CHLORIDE 20 MEQ/1
40 TABLET, EXTENDED RELEASE ORAL
Status: COMPLETED | OUTPATIENT
Start: 2024-07-12 | End: 2024-07-12

## 2024-07-12 RX ADMIN — AMMONIUM LACTATE: 12 LOTION TOPICAL at 09:07

## 2024-07-12 RX ADMIN — FUROSEMIDE 40 MG: 10 INJECTION, SOLUTION INTRAMUSCULAR; INTRAVENOUS at 11:07

## 2024-07-12 RX ADMIN — APIXABAN 5 MG: 5 TABLET, FILM COATED ORAL at 09:07

## 2024-07-12 RX ADMIN — CEFTRIAXONE 2 G: 2 INJECTION, POWDER, FOR SOLUTION INTRAMUSCULAR; INTRAVENOUS at 11:07

## 2024-07-12 RX ADMIN — AMMONIUM LACTATE: 12 LOTION TOPICAL at 08:07

## 2024-07-12 RX ADMIN — CHOLECALCIFEROL TAB 125 MCG (5000 UNIT) 5000 UNITS: 125 TAB at 08:07

## 2024-07-12 RX ADMIN — POTASSIUM CHLORIDE 40 MEQ: 1500 TABLET, EXTENDED RELEASE ORAL at 09:07

## 2024-07-12 RX ADMIN — LORAZEPAM 0.5 MG: 0.5 TABLET ORAL at 06:07

## 2024-07-12 RX ADMIN — FUROSEMIDE 40 MG: 10 INJECTION, SOLUTION INTRAMUSCULAR; INTRAVENOUS at 09:07

## 2024-07-12 RX ADMIN — POTASSIUM CHLORIDE 40 MEQ: 1500 TABLET, EXTENDED RELEASE ORAL at 11:07

## 2024-07-12 RX ADMIN — APIXABAN 5 MG: 5 TABLET, FILM COATED ORAL at 08:07

## 2024-07-12 RX ADMIN — ACETAMINOPHEN AND CODEINE PHOSPHATE 1 TABLET: 300; 30 TABLET ORAL at 09:07

## 2024-07-12 NOTE — SUBJECTIVE & OBJECTIVE
Interval History: Overnight complaints of leg twitching and new O2 requirement. Feels worse overall yesterday but can't say exactly how. Denies feeling SOB, chest pain no cough but some scant white sputum. K 3.4 this morning, repleted PO.     Review of Systems   Constitutional:  Positive for activity change. Negative for chills, fatigue and fever.   HENT:  Negative for trouble swallowing.    Respiratory:  Negative for cough and shortness of breath.    Cardiovascular:  Positive for leg swelling. Negative for chest pain and palpitations.   Gastrointestinal:  Negative for abdominal distention, constipation, diarrhea, nausea and vomiting.   Genitourinary:  Negative for difficulty urinating and dysuria.   Neurological:  Negative for dizziness and light-headedness.     Objective:     Vital Signs (Most Recent):  Temp: 99 °F (37.2 °C) (07/12/24 1122)  Pulse: 90 (07/12/24 1122)  Resp: 19 (07/12/24 1122)  BP: (!) 156/81 (07/12/24 1122)  SpO2: (!) 93 % (07/12/24 1122) Vital Signs (24h Range):  Temp:  [98.4 °F (36.9 °C)-100.3 °F (37.9 °C)] 99 °F (37.2 °C)  Pulse:  [] 90  Resp:  [14-19] 19  SpO2:  [89 %-97 %] 93 %  BP: (122-156)/(59-85) 156/81     Weight: 100.7 kg (222 lb)  Body mass index is 41.95 kg/m².    Intake/Output Summary (Last 24 hours) at 7/12/2024 1202  Last data filed at 7/12/2024 0443  Gross per 24 hour   Intake --   Output 1200 ml   Net -1200 ml         Physical Exam  Constitutional:       General: She is not in acute distress.     Appearance: She is obese. She is not toxic-appearing.   HENT:      Head: Normocephalic and atraumatic.   Cardiovascular:      Rate and Rhythm: Normal rate and regular rhythm.      Heart sounds: Normal heart sounds.   Pulmonary:      Effort: Pulmonary effort is normal. No respiratory distress.      Breath sounds: No stridor. No wheezing.      Comments: Faint bibasilar crackles  Chest:      Chest wall: No tenderness.   Abdominal:      General: Bowel sounds are normal. There is no  distension.      Palpations: Abdomen is soft.   Musculoskeletal:      Right lower leg: Edema (bilateral lymphedema with scaling) present.      Left lower leg: Edema (bilateral lymphedema with scaling) present.   Skin:     General: Skin is warm and dry.      Capillary Refill: Capillary refill takes less than 2 seconds.      Coloration: Skin is not jaundiced.   Neurological:      Mental Status: She is alert and oriented to person, place, and time.           Significant Labs: All pertinent labs within the past 24 hours have been reviewed.  CBC:   Recent Labs   Lab 07/11/24  0507 07/12/24  0513   WBC 6.41 5.26   HGB 7.6* 7.7*   HCT 26.0* 25.5*    225     CMP:   Recent Labs   Lab 07/11/24  0507 07/12/24 0513    141   K 4.0 3.4*    103   CO2 25 28   * 110   BUN 15 14   CREATININE 0.9 0.8   CALCIUM 8.6* 8.9   PROT 6.1 6.4   ALBUMIN 2.7* 2.7*   BILITOT 0.3 0.3   ALKPHOS 54* 53*   AST 9* 8*   ALT 5* 6*   ANIONGAP 7* 10     Microbiology Results (last 7 days)       Procedure Component Value Units Date/Time    Blood culture x two cultures. Draw prior to antibiotics. [[id number]]  (Abnormal) Collected: 07/09/24 1832    Order Status: Completed Specimen: Blood from Peripheral, Hand, Left Updated: 07/12/24 1027     Blood Culture, Routine Gram stain aer bottle: Gram negative rods      Gram stain lemuel bottle: Gram negative rods      Results called to and read back by:Delmis Man RN 07/10/2024 09:42      ESCHERICHIA COLI  For susceptibility see order #[id number]      Narrative:      Aerobic and anaerobic    Blood culture x two cultures. Draw prior to antibiotics. [[id number]]  (Abnormal)  (Susceptibility) Collected: 07/09/24 1832    Order Status: Completed Specimen: Blood from Peripheral, Forearm, Right Updated: 07/12/24 1027     Blood Culture, Routine Gram stain lemuel bottle: Gram negative rods      Results called to and read back by:Delmis Man RN 07/10/2024 09:42      ESCHERICHIA COLI      PROTEUS  MIRABILIS    Narrative:      Aerobic and anaerobic    Blood culture [[id number]] Collected: 07/11/24 0506    Order Status: Completed Specimen: Blood Updated: 07/12/24 0812     Blood Culture, Routine No Growth to date      No Growth to date    Blood culture [[id number]] Collected: 07/11/24 0507    Order Status: Completed Specimen: Blood Updated: 07/12/24 0812     Blood Culture, Routine No Growth to date      No Growth to date    Urine culture [[id number]]  (Abnormal) Collected: 07/09/24 1849    Order Status: Completed Specimen: Urine Updated: 07/12/24 0350     Urine Culture, Routine GRAM NEGATIVE CAT  > 100,000 cfu/ml  Identification and susceptibility pending        PRESUMPTIVE PROTEUS SPECIES  50,000 - 99,999 cfu/ml  Identification and susceptibility pending      Narrative:      Specimen Source->Urine    Rapid Organism ID by PCR (from Blood culture) [[id number]]  (Abnormal) Collected: 07/10/24 0943    Order Status: Completed Updated: 07/10/24 1157     Enterococcus faecalis Not Detected     Enterococcus faecium Not Detected     Listeria monocytogenes Not Detected     Staphylococcus spp. Not Detected     Staphylococcus aureus Not Detected     Staphylococcus epidermidis Not Detected     Staphylococcus lugdunensis Not Detected     Streptococcus species Not Detected     Streptococcus agalactiae Not Detected     Streptococcus pneumoniae Not Detected     Streptococcus pyogenes Not Detected     Acinetobacter calcoaceticus/baumannii complex Not Detected     Bacteroides fragilis Not Detected     Enterobacterales See species for ID     Enterobacter cloacae complex Not Detected     Escherichia coli Detected     Klebsiella aerogenes Not Detected     Klebsiella oxytoca Not Detected     Klebsiella pneumoniae group Not Detected     Proteus Detected     Salmonella sp Not Detected     Serratia marcescens Not Detected     Haemophilus influenzae Not Detected     Neisseria meningtidis Not Detected     Pseudomonas aeruginosa Not  Detected     Stenotrophomonas maltophilia Not Detected     Candida albicans Not Detected     Candida auris Not Detected     Candida glabrata Not Detected     Candida krusei Not Detected     Candida parapsilosis Not Detected     Candida tropicalis Not Detected     Cryptococcus neoformans/gattii Not Detected     CTX-M (ESBL ) Not Detected     IMP (Carbapenem resistant) Not Detected     KPC resistance gene (Carbapenem resistant) Not Detected     mcr-1  Not Detected     mec A/C  Test Not Applicable     mec A/C and MREJ (MRSA) gene Test Not Applicable     NDM (Carbapenem resistant) Not Detected     OXA-48-like (Carbapenem resistant) Not Detected     van A/B (VRE gene) Test Not Applicable     VIM (Carbapenem resistant) Not Detected    Narrative:      Aerobic and anaerobic    Culture, Respiratory with Gram Stain [[id number]]     Order Status: No result Specimen: Sputum, Expectorated           Imaging Results               X-Ray Chest AP Portable (Final result)  Result time 07/09/24 20:11:57      Final result by Onel Hedrick MD (07/09/24 20:11:57)                   Impression:      Radiographic findings compatible with CHF, with pulmonary venous hypertension and interstitial pulmonary edema.  Other underlying pathology, such as interstitial pneumonia, not fully excluded.    Correlate clinically, and with follow-up films.    This report was flagged in Epic as abnormal.      Electronically signed by: Onel Hedrick  Date:    07/09/2024  Time:    20:11               Narrative:    EXAMINATION:  XR CHEST AP PORTABLE    CLINICAL HISTORY:  Sepsis;    TECHNIQUE:  Single frontal view of the chest was performed.    COMPARISON:  Frontal chest x-ray 03/12/2024    FINDINGS:  Rightward patient rotation.    Suboptimal inspiratory result.    Allowing for the above, the thoracic trachea is midline and the cardiopericardial silhouette remains enlarged.    New prominence of the right paratracheal soft tissues, favored to be  related to effects of patient rotation.    Pulmonary vascular engorgement, and bilateral parahilar predominant reticulonodular pulmonary opacities, both increased from the comparison exam.    No discrete consolidation, pneumothorax, or blunting of either lateral costophrenic angle is demonstrated radiographically.    Levocurvature of the thoracic spine, possibly related to patient positioning.  Degenerative changes in the suboptimally visualized spine, and in both AC joints.    EKG leads project upon the torso.    Visualized upper abdomen: Intraluminal gas within the presumed stomach and transverse colon.                                          Significant Imaging: I have reviewed all pertinent imaging results/findings within the past 24 hours.

## 2024-07-12 NOTE — NURSING
Nurses Note -- 4 Eyes      7/12/2024   3:22 AM      Skin assessed during: Q Shift Change      [] No Altered Skin Integrity Present    []Prevention Measures Documented      [x] Yes- Altered Skin Integrity Present or Discovered   [] LDA Added if Not in Epic (Describe Wound)   [] New Altered Skin Integrity was Present on Admit and Documented in LDA   [] Wound Image Taken    Wound Care Consulted? Yes    Attending Nurse:  Ban Aleman RN/Staff Member:   LUCIEN Pitts

## 2024-07-12 NOTE — PROGRESS NOTES
[facility] - Telemetry [facility] Medicine  Progress Note    Patient Name: Lupis Murcia  MRN: [id number]  Patient Class: IP- Inpatient   Admission Date: 7/9/2024  Length of Stay: 3 days  Attending Physician: Neil Wilks MD  Primary Care Provider: Bobby Tran MD        Subjective:     Principal Problem:Sepsis        HPI:  Ms. Murcia is a 73 year old female with a PMH of MS, debility, chronic lymphedema, HTN, left foot drop,  prior PE (currently on Eliquis), and B12 deficiency who [place] [facility] [place] chills, weakness, and mild confusion. Daughter Amanda is at bedside and is assisting with the history. Of note, patient was alert and oriented x3. Patient reports that yesterday she slid down her chair but was not able to get up even with the help of her daughter 2/2 to chronic debility. EMS came over and helped her up and patient reports she felt weak since then. This morning her daughter went to work (she is a nurse), and the patient reports she was fine watching tv at the time. Some time around 4 PM when the daughter came back she found her mom reporting severe chills and weakness. She also had an episode of non bloody emesis and mild confusion. EMS was called and brought her into the ED. Daughter reports HH come to the house 2x a week for PT/OT associated with her MS. They both live together. Patient is essentially bed bound and spends most of her time in the chair. HH nurse reported normal vitals today. Patient denies fevers, abdominal pain, n/d, falls, dysuria, purulent drainage from b/t LE's, pain in the LE's, confusion aside from today, and recent sick contacts that she is aware of.     In the ED, patient was febrile and tachycardic. UA notable for > 100 WBC and many bacteria. HIV/Hep/Covid panel negative. BNP mildly elevated at 122 but likely 2/2 to obesity no reported HF. Trop mildly elevated likely 2/2 to demand ischemia. No noted leukocytosis. Patient was given broad spectrum  "abx and Bcx's were drawn. CXR notable for "Radiographic findings compatible with CHF, with pulmonary venous hypertension and interstitial pulmonary edema. Other underlying pathology, such as interstitial pneumonia, not fully excluded"    Overview/Hospital Course:  Admitted for sepsis and hypoxic respiratory failure likely 2/2 UTI. Improved with IVF+vanc/cefepime/azithro. Bcx returned proteus and e.coli likely from urinary source. Blood and urine cultures positive for GNR, identification and susceptibility pending. Repeat blood cultures drawn. Antibiotics de-escalated to ceftriaxone. CXR with interstitial infiltrates, but no productive cough. There were also some initial concerns of coughing when swallowing, speech cleared for diet. Echo EF 60%, G2DD, intermediate IVC/SVC pressure. , troponin leveled at 0.121, low suspicion for ACS, PE. Repeating CXR. Diuresing for pulmonary edema.     Interval History: Overnight complaints of leg twitching and new O2 requirement. Feels worse overall yesterday but can't say exactly how. Denies feeling SOB, chest pain no cough but some scant white sputum. K 3.4 this morning, repleted PO.     Review of Systems   Constitutional:  Positive for activity change. Negative for chills, fatigue and fever.   HENT:  Negative for trouble swallowing.    Respiratory:  Negative for cough and shortness of breath.    Cardiovascular:  Positive for leg swelling. Negative for chest pain and palpitations.   Gastrointestinal:  Negative for abdominal distention, constipation, diarrhea, nausea and vomiting.   Genitourinary:  Negative for difficulty urinating and dysuria.   Neurological:  Negative for dizziness and light-headedness.     Objective:     Vital Signs (Most Recent):  Temp: 99 °F (37.2 °C) (07/12/24 1122)  Pulse: 90 (07/12/24 1122)  Resp: 19 (07/12/24 1122)  BP: (!) 156/81 (07/12/24 1122)  SpO2: (!) 93 % (07/12/24 1122) Vital Signs (24h Range):  Temp:  [98.4 °F (36.9 °C)-100.3 °F (37.9 °C)] " 99 °F (37.2 °C)  Pulse:  [] 90  Resp:  [14-19] 19  SpO2:  [89 %-97 %] 93 %  BP: (122-156)/(59-85) 156/81     Weight: 100.7 kg (222 lb)  Body mass index is 41.95 kg/m².    Intake/Output Summary (Last 24 hours) at 7/12/2024 1202  Last data filed at 7/12/2024 0443  Gross per 24 hour   Intake --   Output 1200 ml   Net -1200 ml         Physical Exam  Constitutional:       General: She is not in acute distress.     Appearance: She is obese. She is not toxic-appearing.   HENT:      Head: Normocephalic and atraumatic.   Cardiovascular:      Rate and Rhythm: Normal rate and regular rhythm.      Heart sounds: Normal heart sounds.   Pulmonary:      Effort: Pulmonary effort is normal. No respiratory distress.      Breath sounds: No stridor. No wheezing.      Comments: Faint bibasilar crackles  Chest:      Chest wall: No tenderness.   Abdominal:      General: Bowel sounds are normal. There is no distension.      Palpations: Abdomen is soft.   Musculoskeletal:      Right lower leg: Edema (bilateral lymphedema with scaling) present.      Left lower leg: Edema (bilateral lymphedema with scaling) present.   Skin:     General: Skin is warm and dry.      Capillary Refill: Capillary refill takes less than 2 seconds.      Coloration: Skin is not jaundiced.   Neurological:      Mental Status: She is alert and oriented to person, place, and time.           Significant Labs: All pertinent labs within the past 24 hours have been reviewed.  CBC:   Recent Labs   Lab 07/11/24  0507 07/12/24  0513   WBC 6.41 5.26   HGB 7.6* 7.7*   HCT 26.0* 25.5*    225     CMP:   Recent Labs   Lab 07/11/24  0507 07/12/24  0513    141   K 4.0 3.4*    103   CO2 25 28   * 110   BUN 15 14   CREATININE 0.9 0.8   CALCIUM 8.6* 8.9   PROT 6.1 6.4   ALBUMIN 2.7* 2.7*   BILITOT 0.3 0.3   ALKPHOS 54* 53*   AST 9* 8*   ALT 5* 6*   ANIONGAP 7* 10     Microbiology Results (last 7 days)       Procedure Component Value Units Date/Time    Blood  culture x two cultures. Draw prior to antibiotics. [[id number]]  (Abnormal) Collected: 07/09/24 1832    Order Status: Completed Specimen: Blood from Peripheral, Hand, Left Updated: 07/12/24 1027     Blood Culture, Routine Gram stain aer bottle: Gram negative rods      Gram stain lemuel bottle: Gram negative rods      Results called to and read back by:Delmis Man RN 07/10/2024 09:42      ESCHERICHIA COLI  For susceptibility see order #[id number]      Narrative:      Aerobic and anaerobic    Blood culture x two cultures. Draw prior to antibiotics. [[id number]]  (Abnormal)  (Susceptibility) Collected: 07/09/24 1832    Order Status: Completed Specimen: Blood from Peripheral, Forearm, Right Updated: 07/12/24 1027     Blood Culture, Routine Gram stain lemuel bottle: Gram negative rods      Results called to and read back by:Delmis Man RN 07/10/2024 09:42      ESCHERICHIA COLI      PROTEUS MIRABILIS    Narrative:      Aerobic and anaerobic    Blood culture [[id number]] Collected: 07/11/24 0506    Order Status: Completed Specimen: Blood Updated: 07/12/24 0812     Blood Culture, Routine No Growth to date      No Growth to date    Blood culture [[id number]] Collected: 07/11/24 0507    Order Status: Completed Specimen: Blood Updated: 07/12/24 0812     Blood Culture, Routine No Growth to date      No Growth to date    Urine culture [[id number]]  (Abnormal) Collected: 07/09/24 1849    Order Status: Completed Specimen: Urine Updated: 07/12/24 0350     Urine Culture, Routine GRAM NEGATIVE CAT  > 100,000 cfu/ml  Identification and susceptibility pending        PRESUMPTIVE PROTEUS SPECIES  50,000 - 99,999 cfu/ml  Identification and susceptibility pending      Narrative:      Specimen Source->Urine    Rapid Organism ID by PCR (from Blood culture) [[id number]]  (Abnormal) Collected: 07/10/24 0943    Order Status: Completed Updated: 07/10/24 1157     Enterococcus faecalis Not Detected     Enterococcus faecium Not Detected      Listeria monocytogenes Not Detected     Staphylococcus spp. Not Detected     Staphylococcus aureus Not Detected     Staphylococcus epidermidis Not Detected     Staphylococcus lugdunensis Not Detected     Streptococcus species Not Detected     Streptococcus agalactiae Not Detected     Streptococcus pneumoniae Not Detected     Streptococcus pyogenes Not Detected     Acinetobacter calcoaceticus/baumannii complex Not Detected     Bacteroides fragilis Not Detected     Enterobacterales See species for ID     Enterobacter cloacae complex Not Detected     Escherichia coli Detected     Klebsiella aerogenes Not Detected     Klebsiella oxytoca Not Detected     Klebsiella pneumoniae group Not Detected     Proteus Detected     Salmonella sp Not Detected     Serratia marcescens Not Detected     Haemophilus influenzae Not Detected     Neisseria meningtidis Not Detected     Pseudomonas aeruginosa Not Detected     Stenotrophomonas maltophilia Not Detected     Candida albicans Not Detected     Candida auris Not Detected     Candida glabrata Not Detected     Candida krusei Not Detected     Candida parapsilosis Not Detected     Candida tropicalis Not Detected     Cryptococcus neoformans/gattii Not Detected     CTX-M (ESBL ) Not Detected     IMP (Carbapenem resistant) Not Detected     KPC resistance gene (Carbapenem resistant) Not Detected     mcr-1  Not Detected     mec A/C  Test Not Applicable     mec A/C and MREJ (MRSA) gene Test Not Applicable     NDM (Carbapenem resistant) Not Detected     OXA-48-like (Carbapenem resistant) Not Detected     van A/B (VRE gene) Test Not Applicable     VIM (Carbapenem resistant) Not Detected    Narrative:      Aerobic and anaerobic    Culture, Respiratory with Gram Stain [[id number]]     Order Status: No result Specimen: Sputum, Expectorated           Imaging Results               X-Ray Chest AP Portable (Final result)  Result time 07/09/24 20:11:57      Final result by Onel Hedrick MD  (07/09/24 20:11:57)                   Impression:      Radiographic findings compatible with CHF, with pulmonary venous hypertension and interstitial pulmonary edema.  Other underlying pathology, such as interstitial pneumonia, not fully excluded.    Correlate clinically, and with follow-up films.    This report was flagged in Epic as abnormal.      Electronically signed by: Onel Hedrick  Date:    07/09/2024  Time:    20:11               Narrative:    EXAMINATION:  XR CHEST AP PORTABLE    CLINICAL HISTORY:  Sepsis;    TECHNIQUE:  Single frontal view of the chest was performed.    COMPARISON:  Frontal chest x-ray 03/12/2024    FINDINGS:  Rightward patient rotation.    Suboptimal inspiratory result.    Allowing for the above, the thoracic trachea is midline and the cardiopericardial silhouette remains enlarged.    New prominence of the right paratracheal soft tissues, favored to be related to effects of patient rotation.    Pulmonary vascular engorgement, and bilateral parahilar predominant reticulonodular pulmonary opacities, both increased from the comparison exam.    No discrete consolidation, pneumothorax, or blunting of either lateral costophrenic angle is demonstrated radiographically.    Levocurvature of the thoracic spine, possibly related to patient positioning.  Degenerative changes in the suboptimally visualized spine, and in both AC joints.    EKG leads project upon the torso.    Visualized upper abdomen: Intraluminal gas within the presumed stomach and transverse colon.                                          Significant Imaging: I have reviewed all pertinent imaging results/findings within the past 24 hours.    Assessment/Plan:      * Sepsis  This patient does have evidence of infective focus  My overall impression is sepsis.  Source: Respiratory, Urinary Tract, and Skin and Soft Tissue (location b/t LE's)  Antibiotics given-   Antibiotics (72h ago, onward)      Start     Stop Route Frequency Ordered     07/11/24 1100  cefTRIAXone (ROCEPHIN) 2 g in D5W 100 mL IVPB (MB+)         07/21/24 1059 IV Every 24 hours (non-standard times) 07/11/24 0949          Latest lactate reviewed-  Recent Labs   Lab 07/09/24  2138 07/09/24  2349   LACTATE  --  2.1   POCLAC 1.36  --        Organ dysfunction indicated by Encephalopathy and hypotension     Will Not start Pressors- Levophed for MAP of 65  Source control achieved by: Broad spectrum Abx    Patient presenting to the ED with mild confusion. It was noted that the patient was febrile with T max of 103 and tachycardic. Concerning for sepsis. Patient UA notable for > 100 WBC and many bacteria, although patient denies urinary symptoms. Urine and Blood cultures positive for GNR, rapid PCR ID for E coli and proteus. CXR also noted possible congestion vs. Interstitial PNA. Patient denies recent aspiration events but daughter reports episodes of intermittent choking at times. Passed swallow test by SLP. Likely pulmonary edema 2/2 G2DD in the setting of sepsis. Patient denies any recent sick contacts. No noted purulence at this time with b/t LE associated with lymphedema. Likely urosepsis.     PLAN:  - BCX & Ucx with GNR, repeat pending  - Rapid Bcx PCR ID positive for E coli and Proteus  - awaiting sensitivities  - Deescalated abx, now on ceftriaxone  - Wound care consult for b/t LE  - De escalate Abx as needed pending Bcx's and Urine culture   - Daily CBC, CMP, Mg, Phos   - Pt/OT consult for chronic debility associated with MS     Bacteremia  GNR bacteremia. PCR with proteus and e.coli. Likely urinary in source, will confirm w/ urine cultures.  - continue ceftriaxone  - see sepsis      (HFpEF) heart failure with preserved ejection fraction  Echo 7/10/24: Concentric LV remodeling, EF 60-65%, G2DD w/ elevated LV filling pressure. Mild LA dilation. Moderate AS with moderate annular calcification. IVC/SVC venous pressure intermediate (8mmHg).      in ED, troponin peaked at 0.156.  G2DD in the setting of urosepsis likely leading to inadequate filling and pulmonary edema. CXRs suggestive of pulmonary infiltrates. Increasing O2 requirement but without sx. Likely pulmonary edema, ddx includes aspiration pneumonitis, multifocal PNA. On apixaban for prior PE. Spot diuresis with net -2L.    Plan:  - repeat CXR today  - 40mg IV lasix x2   - monitor electrolytes  - I/Os  - daily weights       History of pulmonary embolus (PE)  Chronic and stable.     PLAN:  - Continue Eliquis    HTN (hypertension)  Chronic, controlled. Latest blood pressure and vitals reviewed-     Temp:  [97.8 °F (36.6 °C)-103.3 °F (39.6 °C)]   Pulse:  []   Resp:  [13-35]   BP: (100-139)/(56-81)   SpO2:  [93 %-98 %] .   Home meds for hypertension were reviewed and noted below.   Hypertension Medications               candesartan-hydrochlorothiazide (ATACAND HCT) 16-12.5 mg per tablet Take 1 tablet by mouth once daily.            While in the hospital, will manage blood pressure as follows; Adjust home antihypertensive regimen as follows- Hold all meds in setting of sepsis     Will utilize p.r.n. blood pressure medication only if patient's blood pressure greater than 180/110 and she develops symptoms such as worsening chest pain or shortness of breath.    Lymphedema  Wound care consulted. Low concern for active cellulitis at this time.     Plan:  -continue ammonium lactate lotion      Leg weakness  2/2 to MS. Has HH PT/OT working with her 2x a week. She reports she is essentially confined to a chair unable to complete her own ADL's    PLAN:  - PT/OT consult       MS (multiple sclerosis)  Chronic.    PLAN:  - Continue Vitamin D while admitted   - F/u with outpatient neurology   - PT/OT consulted       Vitamin B 12 deficiency  Takes monthly B12 injections         VTE Risk Mitigation (From admission, onward)           Ordered     apixaban tablet 5 mg  2 times daily         07/09/24 2013                    Discharge Planning   USMAN:  7/12/2024     Code Status: Partial Code   Is the patient medically ready for discharge?:     Reason for patient still in hospital (select all that apply): Patient trending condition, Laboratory test, and Treatment  Discharge Plan A: Home, Home with family, Home Health   Discharge Delays: None known at this time          Bassem Fischer MD  Department of Hospital Medicine   [facility] - Telemetry Stepdown

## 2024-07-12 NOTE — PLAN OF CARE
[facility] - Telemetry Stepdown  Discharge Reassessment    Primary Care Provider: Bobby Tran MD    Expected Discharge Date: 7/12/2024    Reassessment (most recent)       Discharge Reassessment - 07/12/24 0957          Discharge Reassessment    Assessment Type Discharge Planning Reassessment     Did the patient's condition or plan change since previous assessment? No     Discharge Plan discussed with: Patient;Adult children     Communicated USMAN with patient/caregiver Yes     Discharge Plan A Home;Home with family;Home Health     Discharge Plan B Home;Home with family     DME Needed Upon Discharge  none     Transition of Care Barriers None     Why the patient remains in the hospital Requires continued medical care        Post-Acute Status    Post-Acute Authorization Home Health     Home Health Status Set-up Complete/Auth obtained   [facility]    Discharge Delays None known at this time                   Completed re-assessment. No new needs noted at this time. Pt lives with Amanda Lowery - daughter - [phone]. She needs ambulance transport home due to MS. Current with [facility]. Referral made through [facility].  Will continue to follow and offer support as needed. Discharge Plan A and Plan B have been determined by review of patient's clinical status, future medical and therapeutic needs, and coverage/benefits for post-acute care in coordination with multidisciplinary team members.  Jason Lobo, [facility] [place]    [facility]  [phone]

## 2024-07-12 NOTE — ASSESSMENT & PLAN NOTE
This patient does have evidence of infective focus  My overall impression is sepsis.  Source: Respiratory, Urinary Tract, and Skin and Soft Tissue (location b/t LE's)  Antibiotics given-   Antibiotics (72h ago, onward)      Start     Stop Route Frequency Ordered    07/11/24 1100  cefTRIAXone (ROCEPHIN) 2 g in D5W 100 mL IVPB (MB+)         07/21/24 1059 IV Every 24 hours (non-standard times) 07/11/24 0949          Latest lactate reviewed-  Recent Labs   Lab 07/09/24  2138 07/09/24  2349   LACTATE  --  2.1   POCLAC 1.36  --        Organ dysfunction indicated by Encephalopathy and hypotension     Will Not start Pressors- Levophed for MAP of 65  Source control achieved by: Broad spectrum Abx    Patient presenting to the ED with mild confusion. It was noted that the patient was febrile with T max of 103 and tachycardic. Concerning for sepsis. Patient UA notable for > 100 WBC and many bacteria, although patient denies urinary symptoms. Urine and Blood cultures positive for GNR, rapid PCR ID for E coli and proteus. CXR also noted possible congestion vs. Interstitial PNA. Patient denies recent aspiration events but daughter reports episodes of intermittent choking at times. Passed swallow test by SLP. Likely pulmonary edema 2/2 G2DD in the setting of sepsis. Patient denies any recent sick contacts. No noted purulence at this time with b/t LE associated with lymphedema. Likely urosepsis.     PLAN:  - BCX & Ucx with GNR, repeat pending  - Rapid Bcx PCR ID positive for E coli and Proteus  - awaiting sensitivities  - Deescalated abx, now on ceftriaxone  - Wound care consult for b/t LE  - De escalate Abx as needed pending Bcx's and Urine culture   - Daily CBC, CMP, Mg, Phos   - Pt/OT consult for chronic debility associated with MS

## 2024-07-12 NOTE — ASSESSMENT & PLAN NOTE
in ED, troponin peaked at 0.156. G2DD in the setting of urosepsis likely leading to inadequate filling and pulmonary edema. CXRs suggestive of pulmonary infiltrates. Increasing O2 requirement but without sx. Likely pulmonary edema, ddx includes aspiration pneumonitis, multifocal PNA. On apixaban for prior PE. Spot diuresis with net -2L.    Plan:  - repeat CXR today  - 40mg IV lasix x2   - monitor electrolytes  - I/Os  - daily weights

## 2024-07-12 NOTE — ASSESSMENT & PLAN NOTE
GNR bacteremia. PCR with proteus and e.coli. Likely urinary in source, will confirm w/ urine cultures.  - continue ceftriaxone  - see sepsis

## 2024-07-12 NOTE — PLAN OF CARE
Problem: Adult Inpatient Plan of Care  Goal: Plan of Care Review  Outcome: Progressing  Goal: Patient-Specific Goal (Individualized)  Outcome: Progressing  Goal: Absence of Hospital-Acquired Illness or Injury  Outcome: Progressing  Goal: Optimal Comfort and Wellbeing  Outcome: Progressing  Goal: Readiness for Transition of Care  Outcome: Progressing     Problem: Bariatric Environmental Safety  Goal: Safety Maintained with Care  Outcome: Progressing     Problem: Sepsis/Septic Shock  Goal: Optimal Coping  Outcome: Progressing  Goal: Absence of Bleeding  Outcome: Progressing  Goal: Blood Glucose Level Within Targeted Range  Outcome: Progressing  Goal: Absence of Infection Signs and Symptoms  Outcome: Progressing  Goal: Optimal Nutrition Intake  Outcome: Progressing     Problem: Acute Kidney Injury/Impairment  Goal: Fluid and Electrolyte Balance  Outcome: Progressing  Goal: Improved Oral Intake  Outcome: Progressing  Goal: Effective Renal Function  Outcome: Progressing     Problem: Wound  Goal: Optimal Coping  Outcome: Progressing  Goal: Optimal Functional Ability  Outcome: Progressing  Goal: Absence of Infection Signs and Symptoms  Outcome: Progressing  Goal: Improved Oral Intake  Outcome: Progressing  Goal: Optimal Pain Control and Function  Outcome: Progressing  Goal: Skin Health and Integrity  Outcome: Progressing  Goal: Optimal Wound Healing  Outcome: Progressing     Problem: Skin Injury Risk Increased  Goal: Skin Health and Integrity  Outcome: Progressing

## 2024-07-12 NOTE — PT/OT/SLP PROGRESS
"Physical Therapy Treatment    Patient Name:  Lupis Murcia   MRN:  [id number]    Recommendations:     Discharge Recommendations: Low Intensity Therapy  Discharge Equipment Recommendations: lift device  Barriers to discharge: Decreased caregiver support    Assessment:     Lupis Murcia is a 73 y.o. female admitted with a medical diagnosis of Sepsis.  She presents with the following impairments/functional limitations: weakness, impaired endurance, impaired self care skills, impaired functional mobility, impaired balance, decreased upper extremity function, decreased lower extremity function, decreased safety awareness, impaired cardiopulmonary response to activity requiring max/total assistance and verbal cues for bed mob due to weakness, pain, and body habitus.   Pt will cont to benefit from skilled PT intervention to address deficits and improve functional mobility.   .  .    Rehab Prognosis: Fair; patient would benefit from acute skilled PT services to address these deficits and reach maximum level of function.    Recent Surgery: * No surgery found *      Plan:     During this hospitalization, patient to be seen 3 x/week to address the identified rehab impairments via gait training, therapeutic activities, therapeutic exercises, neuromuscular re-education and progress toward the following goals:    Plan of Care Expires:  08/09/24    Subjective     Chief Complaint: "I don't well enough to do this"  Pain/Comfort:  Pain Rating 1: 0/10  Pain Rating Post-Intervention 1: 0/10      Objective:     Communicated with nurse (Norma LONG) prior to session.  Patient found  sup in bed with HOB at 40* angle  with telemetry, pulse ox (continuous), PureWick (daughter present) upon PT entry to room.     General Precautions: Standard, fall (Partial code)  Orthopedic Precautions: N/A  Braces: N/A  Respiratory Status: RA     Functional Mobility:  Bed Mobility:     Supine to Sit: NP this session due to pt declines (see subjective)  Pt " performs B LE AA/PROM ther ex's while sup in bed x12 reps with vc's        AM-PAC 6 CLICK MOBILITY  Turning over in bed (including adjusting bedclothes, sheets and blankets)?: 2  Sitting down on and standing up from a chair with arms (e.g., wheelchair, bedside commode, etc.): 2  Moving from lying on back to sitting on the side of the bed?: 2  Moving to and from a bed to a chair (including a wheelchair)?: 1  Need to walk in hospital room?: 1  Climbing 3-5 steps with a railing?: 1  Basic Mobility Total Score: 9       Treatment & Education:  Patient provided with daily orientation and goals of this PT session. They were educated to call for assistance and to transfer with hospital staff only.  Also, pt was educated on the effects of prolonged immobility and the importance of performing OOB activity and exercises to promote healing and reduce recovery time    Patient left HOB elevated with all lines intact, call button in reach, nurse notified, and daughter present..    GOALS:   Multidisciplinary Problems       Physical Therapy Goals          Problem: Physical Therapy    Goal Priority Disciplines Outcome Goal Variances Interventions   Physical Therapy Goal     PT, PT/OT Progressing     Description: Goals to be met by:      Patient will increase functional independence with mobility by performin. Supine to sit with Maximum Assistance  2. Sit to supine with Maximum Assistance  3. Sit to stand transfer with Maximum Assistance  4. Bed to chair transfer with Maximum Assistance using LRAD  5. Sitting at edge of bed x10 minutes with Stand-by Assistance                         Time Tracking:     PT Received On: 24  PT Start Time: 1604     PT Stop Time: 1616  PT Total Time (min): 12 min     Billable Minutes: Therapeutic Exercise 12    Treatment Type: Treatment  PT/PTA: PTA     Number of PTA visits since last PT visit: 2024

## 2024-07-13 LAB
ALBUMIN SERPL BCP-MCNC: 3 G/DL (ref 3.5–5.2)
ALP SERPL-CCNC: 59 U/L (ref 55–135)
ALT SERPL W/O P-5'-P-CCNC: 6 U/L (ref 10–44)
ANION GAP SERPL CALC-SCNC: 11 MMOL/L (ref 8–16)
ANION GAP SERPL CALC-SCNC: 13 MMOL/L (ref 8–16)
AST SERPL-CCNC: 8 U/L (ref 10–40)
BASOPHILS # BLD AUTO: 0.02 K/UL (ref 0–0.2)
BASOPHILS NFR BLD: 0.4 % (ref 0–1.9)
BILIRUB SERPL-MCNC: 0.2 MG/DL (ref 0.1–1)
BUN SERPL-MCNC: 13 MG/DL (ref 8–23)
BUN SERPL-MCNC: 15 MG/DL (ref 8–23)
CALCIUM SERPL-MCNC: 9.5 MG/DL (ref 8.7–10.5)
CALCIUM SERPL-MCNC: 9.8 MG/DL (ref 8.7–10.5)
CHLORIDE SERPL-SCNC: 101 MMOL/L (ref 95–110)
CHLORIDE SERPL-SCNC: 102 MMOL/L (ref 95–110)
CO2 SERPL-SCNC: 28 MMOL/L (ref 23–29)
CO2 SERPL-SCNC: 29 MMOL/L (ref 23–29)
CREAT SERPL-MCNC: 0.8 MG/DL (ref 0.5–1.4)
CREAT SERPL-MCNC: 1 MG/DL (ref 0.5–1.4)
DIFFERENTIAL METHOD BLD: ABNORMAL
EOSINOPHIL # BLD AUTO: 0.2 K/UL (ref 0–0.5)
EOSINOPHIL NFR BLD: 3.8 % (ref 0–8)
ERYTHROCYTE [DISTWIDTH] IN BLOOD BY AUTOMATED COUNT: 14.8 % (ref 11.5–14.5)
EST. GFR  (NO RACE VARIABLE): 59.1 ML/MIN/1.73 M^2
EST. GFR  (NO RACE VARIABLE): >60 ML/MIN/1.73 M^2
GLUCOSE SERPL-MCNC: 105 MG/DL (ref 70–110)
GLUCOSE SERPL-MCNC: 114 MG/DL (ref 70–110)
HCT VFR BLD AUTO: 28.5 % (ref 37–48.5)
HGB BLD-MCNC: 8.3 G/DL (ref 12–16)
IMM GRANULOCYTES # BLD AUTO: 0.02 K/UL (ref 0–0.04)
IMM GRANULOCYTES NFR BLD AUTO: 0.4 % (ref 0–0.5)
LYMPHOCYTES # BLD AUTO: 1.5 K/UL (ref 1–4.8)
LYMPHOCYTES NFR BLD: 27.2 % (ref 18–48)
MAGNESIUM SERPL-MCNC: 1.8 MG/DL (ref 1.6–2.6)
MCH RBC QN AUTO: 25.6 PG (ref 27–31)
MCHC RBC AUTO-ENTMCNC: 29.1 G/DL (ref 32–36)
MCV RBC AUTO: 88 FL (ref 82–98)
MONOCYTES # BLD AUTO: 0.8 K/UL (ref 0.3–1)
MONOCYTES NFR BLD: 13.8 % (ref 4–15)
NEUTROPHILS # BLD AUTO: 3 K/UL (ref 1.8–7.7)
NEUTROPHILS NFR BLD: 54.4 % (ref 38–73)
NRBC BLD-RTO: 0 /100 WBC
PHOSPHATE SERPL-MCNC: 3.7 MG/DL (ref 2.7–4.5)
PLATELET # BLD AUTO: 266 K/UL (ref 150–450)
PMV BLD AUTO: 10 FL (ref 9.2–12.9)
POTASSIUM SERPL-SCNC: 3.4 MMOL/L (ref 3.5–5.1)
POTASSIUM SERPL-SCNC: 3.8 MMOL/L (ref 3.5–5.1)
PROT SERPL-MCNC: 7 G/DL (ref 6–8.4)
RBC # BLD AUTO: 3.24 M/UL (ref 4–5.4)
SODIUM SERPL-SCNC: 141 MMOL/L (ref 136–145)
SODIUM SERPL-SCNC: 143 MMOL/L (ref 136–145)
WBC # BLD AUTO: 5.51 K/UL (ref 3.9–12.7)

## 2024-07-13 PROCEDURE — 94761 N-INVAS EAR/PLS OXIMETRY MLT: CPT

## 2024-07-13 PROCEDURE — 36415 COLL VENOUS BLD VENIPUNCTURE: CPT

## 2024-07-13 PROCEDURE — 63600175 PHARM REV CODE 636 W HCPCS

## 2024-07-13 PROCEDURE — 80048 BASIC METABOLIC PNL TOTAL CA: CPT | Mod: XB

## 2024-07-13 PROCEDURE — 80053 COMPREHEN METABOLIC PANEL: CPT

## 2024-07-13 PROCEDURE — 25000003 PHARM REV CODE 250

## 2024-07-13 PROCEDURE — 63600175 PHARM REV CODE 636 W HCPCS: Performed by: INTERNAL MEDICINE

## 2024-07-13 PROCEDURE — 85025 COMPLETE CBC W/AUTO DIFF WBC: CPT

## 2024-07-13 PROCEDURE — 83735 ASSAY OF MAGNESIUM: CPT

## 2024-07-13 PROCEDURE — 84100 ASSAY OF PHOSPHORUS: CPT

## 2024-07-13 PROCEDURE — 20600001 HC STEP DOWN PRIVATE ROOM

## 2024-07-13 PROCEDURE — 25000003 PHARM REV CODE 250: Performed by: INTERNAL MEDICINE

## 2024-07-13 RX ORDER — FUROSEMIDE 10 MG/ML
40 INJECTION INTRAMUSCULAR; INTRAVENOUS ONCE
Status: COMPLETED | OUTPATIENT
Start: 2024-07-13 | End: 2024-07-13

## 2024-07-13 RX ORDER — IPRATROPIUM BROMIDE AND ALBUTEROL SULFATE 2.5; .5 MG/3ML; MG/3ML
3 SOLUTION RESPIRATORY (INHALATION) EVERY 6 HOURS PRN
Status: DISCONTINUED | OUTPATIENT
Start: 2024-07-13 | End: 2024-07-14 | Stop reason: HOSPADM

## 2024-07-13 RX ORDER — POTASSIUM CHLORIDE 20 MEQ/1
40 TABLET, EXTENDED RELEASE ORAL ONCE
Status: COMPLETED | OUTPATIENT
Start: 2024-07-13 | End: 2024-07-13

## 2024-07-13 RX ADMIN — AMMONIUM LACTATE: 12 LOTION TOPICAL at 08:07

## 2024-07-13 RX ADMIN — ACETAMINOPHEN AND CODEINE PHOSPHATE 1 TABLET: 300; 30 TABLET ORAL at 12:07

## 2024-07-13 RX ADMIN — FUROSEMIDE 40 MG: 10 INJECTION, SOLUTION INTRAVENOUS at 02:07

## 2024-07-13 RX ADMIN — APIXABAN 5 MG: 5 TABLET, FILM COATED ORAL at 09:07

## 2024-07-13 RX ADMIN — AMMONIUM LACTATE: 12 LOTION TOPICAL at 09:07

## 2024-07-13 RX ADMIN — CHOLECALCIFEROL TAB 125 MCG (5000 UNIT) 5000 UNITS: 125 TAB at 08:07

## 2024-07-13 RX ADMIN — LORAZEPAM 0.5 MG: 0.5 TABLET ORAL at 09:07

## 2024-07-13 RX ADMIN — POTASSIUM CHLORIDE 40 MEQ: 1500 TABLET, EXTENDED RELEASE ORAL at 08:07

## 2024-07-13 RX ADMIN — ACETAMINOPHEN AND CODEINE PHOSPHATE 1 TABLET: 300; 30 TABLET ORAL at 01:07

## 2024-07-13 RX ADMIN — CEFTRIAXONE 2 G: 2 INJECTION, POWDER, FOR SOLUTION INTRAMUSCULAR; INTRAVENOUS at 11:07

## 2024-07-13 RX ADMIN — APIXABAN 5 MG: 5 TABLET, FILM COATED ORAL at 08:07

## 2024-07-13 RX ADMIN — LORAZEPAM 0.5 MG: 0.5 TABLET ORAL at 11:07

## 2024-07-13 NOTE — PLAN OF CARE
Problem: Adult Inpatient Plan of Care  Goal: Plan of Care Review  Outcome: Progressing  Goal: Patient-Specific Goal (Individualized)  Outcome: Progressing  Goal: Absence of Hospital-Acquired Illness or Injury  Outcome: Progressing  Intervention: Identify and Manage Fall Risk  Flowsheets (Taken 7/13/2024 0248)  Safety Promotion/Fall Prevention:   assistive device/personal item within reach   bed alarm set   commode/urinal/bedpan at bedside   Fall Risk reviewed with patient/family   family to remain at bedside   medications reviewed   lighting adjusted   nonskid shoes/socks when out of bed   room near unit station   side rails raised x 3   side rails raised x 2   instructed to call staff for mobility  Intervention: Prevent Skin Injury  Flowsheets (Taken 7/13/2024 0248)  Body Position: turned  Skin Protection: incontinence pads utilized  Device Skin Pressure Protection:   absorbent pad utilized/changed   adhesive use limited  Intervention: Prevent and Manage VTE (Venous Thromboembolism) Risk  Flowsheets (Taken 7/13/2024 0248)  VTE Prevention/Management:   bleeding precations maintained   bleeding risk assessed  Intervention: Prevent Infection  Flowsheets (Taken 7/13/2024 0248)  Infection Prevention:   environmental surveillance performed   hand hygiene promoted   rest/sleep promoted   single patient room provided  Goal: Optimal Comfort and Wellbeing  Outcome: Progressing  Intervention: Monitor Pain and Promote Comfort  Flowsheets (Taken 7/13/2024 0248)  Pain Management Interventions:   care clustered   medication offered   quiet environment facilitated  Intervention: Provide Person-Centered Care  Flowsheets (Taken 7/13/2024 0248)  Trust Relationship/Rapport:   care explained   choices provided   empathic listening provided   questions answered   questions encouraged   reassurance provided    Patient awake, alert , oriented.  Pain assessed, PRN pain medication administered as ordered.  pt educated on safety, and  medication use. Pt verbalized understanding.  Plan of care discussed with patient, patient verbalized understanding.  Call light within reach.  Bed alarm on.  Bed  low and locked.

## 2024-07-13 NOTE — ASSESSMENT & PLAN NOTE
in ED, troponin peaked at 0.156. G2DD in the setting of urosepsis likely leading to inadequate filling and pulmonary edema. CXRs suggestive of pulmonary infiltrates. Increasing O2 requirement but without sx. Likely pulmonary edema, ddx includes aspiration pneumonitis, multifocal PNA. On apixaban for prior PE. Diuresis with cumulative out of -4.9L. Has lost 2 lbs since admission.    Plan:  - continue 40mg IV lasix x2   - monitor electrolytes, replete as needed  - I/Os  - daily weights      Heart failure

## 2024-07-13 NOTE — ASSESSMENT & PLAN NOTE
"This patient does have evidence of infective focus  My overall impression is sepsis.  Source: Respiratory, Urinary Tract, and Skin and Soft Tissue (location b/t LE's)  Antibiotics given-   Antibiotics (72h ago, onward)      Start     Stop Route Frequency Ordered    07/11/24 1100  cefTRIAXone (ROCEPHIN) 2 g in D5W 100 mL IVPB (MB+)         07/21/24 1059 IV Every 24 hours (non-standard times) 07/11/24 0949          Latest lactate reviewed-  No results for input(s): "LACTATE", "POCLAC" in the last 72 hours.    Organ dysfunction indicated by Encephalopathy and hypotension     Will Not start Pressors- Levophed for MAP of 65  Source control achieved by: Broad spectrum Abx    Patient presenting to the ED with mild confusion. It was noted that the patient was febrile with T max of 103 and tachycardic. Concerning for sepsis. Patient UA notable for > 100 WBC and many bacteria, although patient denies urinary symptoms. Urine and Blood cultures positive for GNR, rapid PCR ID for E coli and proteus. CXR also noted possible congestion vs. Interstitial PNA. Patient denies recent aspiration events but daughter reports episodes of intermittent choking at times. Passed swallow test by SLP. Likely pulmonary edema 2/2 G2DD in the setting of sepsis. Patient denies any recent sick contacts. No noted purulence at this time with b/t LE associated with lymphedema. Being treated for urosepsis.     PLAN:  - BCX & Ucx with proteus and e coli, pan-sensitive   - Deescalated abx, now on ceftriaxone. Will transition to PO for d/c  - Wound care consult for b/t LE  - Daily CBC, CMP, Mg, Phos   - Pt/OT consult for chronic debility associated with MS   "

## 2024-07-13 NOTE — NURSING
Report received from [facility].  Patient remains free from fall and injury. NAD noted, VSS, Questions encouraged and answered.  Patient verbalized understanding. Bed locked and in low position, Safety maintained. Call light in reach, white board updated and explained, no c/o pain n/v, diarrhea or sob, will cont with POC

## 2024-07-13 NOTE — PROGRESS NOTES
[facility] Medicine  Progress Note    Patient Name: Lupis Murcia  MRN: [id number]  Patient Class: IP- Inpatient   Admission Date: 7/9/2024  Length of Stay: 4 days  Attending Physician: Neil Wilks MD  Primary Care Provider: Bobby Tran MD        Subjective:     Principal Problem:Sepsis        HPI:  Ms. Murcia is a 73 year old female with a PMH of MS, debility, chronic lymphedema, HTN, left foot drop,  prior PE (currently on Eliquis), and B12 deficiency who is presenting to [facility] due to reported chills, weakness, and mild confusion. Daughter Amanda is at bedside and is assisting with the history. Of note, patient was alert and oriented x3. Patient reports that yesterday she slid down her chair but was not able to get up even with the help of her daughter 2/2 to chronic debility. EMS came over and helped her up and patient reports she felt weak since then. This morning her daughter went to work (she is a nurse), and the patient reports she was fine watching tv at the time. Some time around 4 PM when the daughter came back she found her mom reporting severe chills and weakness. She also had an episode of non bloody emesis and mild confusion. EMS was called and brought her into the ED. Daughter reports HH come to the house 2x a week for PT/OT associated with her MS. They both live together. Patient is essentially bed bound and spends most of her time in the chair. HH nurse reported normal vitals today. Patient denies fevers, abdominal pain, n/d, falls, dysuria, purulent drainage from b/t LE's, pain in the LE's, confusion aside from today, and recent sick contacts that she is aware of.     In the ED, patient was febrile and tachycardic. UA notable for > 100 WBC and many bacteria. HIV/Hep/Covid panel negative. BNP mildly elevated at 122 but likely 2/2 to obesity no reported HF. Trop mildly elevated likely 2/2 to demand ischemia. No noted leukocytosis. Patient was given broad spectrum  "abx and Bcx's were drawn. CXR notable for "Radiographic findings compatible with CHF, with pulmonary venous hypertension and interstitial pulmonary edema. Other underlying pathology, such as interstitial pneumonia, not fully excluded"    Overview/Hospital Course:  Admitted for sepsis and hypoxic respiratory failure likely 2/2 UTI. Improved with IVF+vanc/cefepime/azithro. Bcx returned proteus and e.coli likely from urinary source. Blood and urine cultures positive for GNR, identification and susceptibility pending. Repeat blood cultures drawn. Antibiotics de-escalated to ceftriaxone. CXR with interstitial infiltrates, but no productive cough. There were also some initial concerns of coughing when swallowing, speech cleared for diet. Echo EF 60%, G2DD, intermediate IVC/SVC pressure. , troponin leveled at 0.121, low suspicion for ACS, PE. Diuresing for pulmonary edema.     Interval History: No overnight events. Required some supplemental O2 for sats in the low 90s. Feeling about the same overall today. No other concerns or new complaints.     Review of Systems   Constitutional:  Positive for activity change. Negative for chills, fatigue and fever.   HENT:  Negative for trouble swallowing.    Respiratory:  Negative for cough and shortness of breath.    Cardiovascular:  Positive for leg swelling. Negative for chest pain and palpitations.   Gastrointestinal:  Negative for abdominal distention, constipation, diarrhea, nausea and vomiting.   Genitourinary:  Negative for difficulty urinating and dysuria.   Neurological:  Negative for dizziness and light-headedness.     Objective:     Vital Signs (Most Recent):  Temp: 98.6 °F (37 °C) (07/13/24 1159)  Pulse: 84 (07/13/24 1159)  Resp: 18 (07/13/24 1317)  BP: 115/73 (07/13/24 1159)  SpO2: (!) 93 % (07/13/24 1159) Vital Signs (24h Range):  Temp:  [97.9 °F (36.6 °C)-99.6 °F (37.6 °C)] 98.6 °F (37 °C)  Pulse:  [67-94] 84  Resp:  [16-20] 18  SpO2:  [90 %-98 %] 93 %  BP: " (105-160)/(66-89) 115/73     Weight: 99.7 kg (219 lb 12.8 oz)  Body mass index is 41.53 kg/m².    Intake/Output Summary (Last 24 hours) at 7/13/2024 1319  Last data filed at 7/13/2024 0514  Gross per 24 hour   Intake 220 ml   Output 2600 ml   Net -2380 ml         Physical Exam  Constitutional:       General: She is not in acute distress.     Appearance: She is obese. She is not toxic-appearing.   HENT:      Head: Normocephalic and atraumatic.   Cardiovascular:      Rate and Rhythm: Normal rate and regular rhythm.      Heart sounds: Normal heart sounds.   Pulmonary:      Effort: Pulmonary effort is normal. No respiratory distress.      Breath sounds: No stridor. No wheezing.      Comments: Chest clear  Chest:      Chest wall: No tenderness.   Abdominal:      General: Bowel sounds are normal. There is no distension.      Palpations: Abdomen is soft.   Musculoskeletal:      Right lower leg: Edema (bilateral lymphedema with scaling. Wrinkles present) present.      Left lower leg: Edema (bilateral lymphedema with scaling. Wrinkles present) present.   Skin:     General: Skin is warm and dry.      Capillary Refill: Capillary refill takes less than 2 seconds.      Coloration: Skin is not jaundiced.   Neurological:      Mental Status: She is alert and oriented to person, place, and time.           Significant Labs: All pertinent labs within the past 24 hours have been reviewed.  CBC:   Recent Labs   Lab 07/12/24  0513 07/13/24  0436   WBC 5.26 5.51   HGB 7.7* 8.3*   HCT 25.5* 28.5*    266     CMP:   Recent Labs   Lab 07/12/24  0513 07/13/24  0436    143   K 3.4* 3.8    102   CO2 28 28    105   BUN 14 13   CREATININE 0.8 0.8   CALCIUM 8.9 9.5   PROT 6.4 7.0   ALBUMIN 2.7* 3.0*   BILITOT 0.3 0.2   ALKPHOS 53* 59   AST 8* 8*   ALT 6* 6*   ANIONGAP 10 13       Significant Imaging: I have reviewed all pertinent imaging results/findings within the past 24 hours.    Assessment/Plan:      * Sepsis  This  "patient does have evidence of infective focus  My overall impression is sepsis.  Source: Respiratory, Urinary Tract, and Skin and Soft Tissue (location b/t LE's)  Antibiotics given-   Antibiotics (72h ago, onward)      Start     Stop Route Frequency Ordered    07/11/24 1100  cefTRIAXone (ROCEPHIN) 2 g in D5W 100 mL IVPB (MB+)         07/21/24 1059 IV Every 24 hours (non-standard times) 07/11/24 0949          Latest lactate reviewed-  No results for input(s): "LACTATE", "POCLAC" in the last 72 hours.    Organ dysfunction indicated by Encephalopathy and hypotension     Will Not start Pressors- Levophed for MAP of 65  Source control achieved by: Broad spectrum Abx    Patient presenting to the ED with mild confusion. It was noted that the patient was febrile with T max of 103 and tachycardic. Concerning for sepsis. Patient UA notable for > 100 WBC and many bacteria, although patient denies urinary symptoms. Urine and Blood cultures positive for GNR, rapid PCR ID for E coli and proteus. CXR also noted possible congestion vs. Interstitial PNA. Patient denies recent aspiration events but daughter reports episodes of intermittent choking at times. Passed swallow test by SLP. Likely pulmonary edema 2/2 G2DD in the setting of sepsis. Patient denies any recent sick contacts. No noted purulence at this time with b/t LE associated with lymphedema. Being treated for urosepsis.     PLAN:  - BCX & Ucx with proteus and e coli, pan-sensitive   - Deescalated abx, now on ceftriaxone. Will transition to PO for d/c  - Wound care consult for b/t LE  - Daily CBC, CMP, Mg, Phos   - Pt/OT consult for chronic debility associated with MS     Bacteremia  Pan-sensitive proteus and E coli from urinary source.  - continue ceftriaxone  - see sepsis      (HFpEF) heart failure with preserved ejection fraction  Echo 7/10/24: Concentric LV remodeling, EF 60-65%, G2DD w/ elevated LV filling pressure. Mild LA dilation. Moderate AS with moderate annular " calcification. IVC/SVC venous pressure intermediate (8mmHg).      in ED, troponin peaked at 0.156. G2DD in the setting of urosepsis likely leading to inadequate filling and pulmonary edema. CXRs suggestive of pulmonary infiltrates. Increasing O2 requirement but without sx. Likely pulmonary edema, ddx includes aspiration pneumonitis, multifocal PNA. On apixaban for prior PE. Diuresis with cumulative out of -4.9L. Has lost 2 lbs since admission.    Plan:  - continue 40mg IV lasix x2   - monitor electrolytes, replete as needed  - I/Os  - daily weights       History of pulmonary embolus (PE)  Chronic and stable.     PLAN:  - Continue Eliquis    HTN (hypertension)  Chronic, controlled. Latest blood pressure and vitals reviewed-     Temp:  [97.8 °F (36.6 °C)-103.3 °F (39.6 °C)]   Pulse:  []   Resp:  [13-35]   BP: (100-139)/(56-81)   SpO2:  [93 %-98 %] .   Home meds for hypertension were reviewed and noted below.   Hypertension Medications               candesartan-hydrochlorothiazide (ATACAND HCT) 16-12.5 mg per tablet Take 1 tablet by mouth once daily.            While in the hospital, will manage blood pressure as follows; Adjust home antihypertensive regimen as follows- Hold all meds in setting of sepsis     Will utilize p.r.n. blood pressure medication only if patient's blood pressure greater than 180/110 and she develops symptoms such as worsening chest pain or shortness of breath.    Lymphedema  Wound care consulted. Low concern for active cellulitis at this time.     Plan:  -continue ammonium lactate lotion      Leg weakness  2/2 to MS. Has HH PT/OT working with her 2x a week. She reports she is essentially confined to a chair unable to complete her own ADL's    PLAN:  - PT/OT consult       MS (multiple sclerosis)  Chronic.    PLAN:  - Continue Vitamin D while admitted   - F/u with outpatient neurology   - PT/OT consulted       Vitamin B 12 deficiency  Takes monthly B12 injections   Follow up heme appt  8/6/24        VTE Risk Mitigation (From admission, onward)           Ordered     apixaban tablet 5 mg  2 times daily         07/09/24 3833                    Discharge Planning   UMSAN: 7/14/2024     Code Status: Partial Code   Is the patient medically ready for discharge?:     Reason for patient still in hospital (select all that apply): Patient trending condition and Treatment  Discharge Plan A: Home, Home with family, Home Health   Discharge Delays: None known at this time        Bassem Fischer MD  Department of Hospital Medicine   [facility] - Telemetry Stepdown

## 2024-07-13 NOTE — NURSING
Nurses Note -- 4 Eyes      7/13/2024   12:45 AM      Skin assessed during: Q Shift Change      [] No Altered Skin Integrity Present    []Prevention Measures Documented      [x] Yes- Altered Skin Integrity Present or Discovered   [] LDA Added if Not in Epic (Describe Wound)   [] New Altered Skin Integrity was Present on Admit and Documented in LDA   [] Wound Image Taken    Wound Care Consulted? No    Attending Nurse:  Edith Aleman RN/Staff Member: Rose          Wound care already following.

## 2024-07-13 NOTE — SUBJECTIVE & OBJECTIVE
Interval History: No overnight events. Required some supplemental O2 for sats in the low 90s. Feeling about the same overall today. No other concerns or new complaints.     Review of Systems   Constitutional:  Positive for activity change. Negative for chills, fatigue and fever.   HENT:  Negative for trouble swallowing.    Respiratory:  Negative for cough and shortness of breath.    Cardiovascular:  Positive for leg swelling. Negative for chest pain and palpitations.   Gastrointestinal:  Negative for abdominal distention, constipation, diarrhea, nausea and vomiting.   Genitourinary:  Negative for difficulty urinating and dysuria.   Neurological:  Negative for dizziness and light-headedness.     Objective:     Vital Signs (Most Recent):  Temp: 98.6 °F (37 °C) (07/13/24 1159)  Pulse: 84 (07/13/24 1159)  Resp: 18 (07/13/24 1317)  BP: 115/73 (07/13/24 1159)  SpO2: (!) 93 % (07/13/24 1159) Vital Signs (24h Range):  Temp:  [97.9 °F (36.6 °C)-99.6 °F (37.6 °C)] 98.6 °F (37 °C)  Pulse:  [67-94] 84  Resp:  [16-20] 18  SpO2:  [90 %-98 %] 93 %  BP: (105-160)/(66-89) 115/73     Weight: 99.7 kg (219 lb 12.8 oz)  Body mass index is 41.53 kg/m².    Intake/Output Summary (Last 24 hours) at 7/13/2024 1319  Last data filed at 7/13/2024 0514  Gross per 24 hour   Intake 220 ml   Output 2600 ml   Net -2380 ml         Physical Exam  Constitutional:       General: She is not in acute distress.     Appearance: She is obese. She is not toxic-appearing.   HENT:      Head: Normocephalic and atraumatic.   Cardiovascular:      Rate and Rhythm: Normal rate and regular rhythm.      Heart sounds: Normal heart sounds.   Pulmonary:      Effort: Pulmonary effort is normal. No respiratory distress.      Breath sounds: No stridor. No wheezing.      Comments: Chest clear  Chest:      Chest wall: No tenderness.   Abdominal:      General: Bowel sounds are normal. There is no distension.      Palpations: Abdomen is soft.   Musculoskeletal:      Right lower  leg: Edema (bilateral lymphedema with scaling. Wrinkles present) present.      Left lower leg: Edema (bilateral lymphedema with scaling. Wrinkles present) present.   Skin:     General: Skin is warm and dry.      Capillary Refill: Capillary refill takes less than 2 seconds.      Coloration: Skin is not jaundiced.   Neurological:      Mental Status: She is alert and oriented to person, place, and time.           Significant Labs: All pertinent labs within the past 24 hours have been reviewed.  CBC:   Recent Labs   Lab 07/12/24  0513 07/13/24  0436   WBC 5.26 5.51   HGB 7.7* 8.3*   HCT 25.5* 28.5*    266     CMP:   Recent Labs   Lab 07/12/24  0513 07/13/24  0436    143   K 3.4* 3.8    102   CO2 28 28    105   BUN 14 13   CREATININE 0.8 0.8   CALCIUM 8.9 9.5   PROT 6.4 7.0   ALBUMIN 2.7* 3.0*   BILITOT 0.3 0.2   ALKPHOS 53* 59   AST 8* 8*   ALT 6* 6*   ANIONGAP 10 13       Significant Imaging: I have reviewed all pertinent imaging results/findings within the past 24 hours.

## 2024-07-13 NOTE — PLAN OF CARE
Problem: Adult Inpatient Plan of Care  Goal: Plan of Care Review  7/13/2024 1527 by Gracie Rodriguez RN  Outcome: Progressing  7/13/2024 1014 by Gracie Rodriguez RN  Outcome: Progressing  Goal: Patient-Specific Goal (Individualized)  7/13/2024 1527 by Gracie Rodriguez RN  Outcome: Progressing  7/13/2024 1014 by Gracie Rodriguez RN  Outcome: Progressing  Goal: Absence of Hospital-Acquired Illness or Injury  7/13/2024 1527 by Gracie Rodriguez RN  Outcome: Progressing  7/13/2024 1014 by Gracie Rodriguez RN  Outcome: Progressing  Goal: Optimal Comfort and Wellbeing  7/13/2024 1527 by Gracie Rodriguez RN  Outcome: Progressing  7/13/2024 1014 by Gracie Rodriguez RN  Outcome: Progressing  Goal: Readiness for Transition of Care  7/13/2024 1527 by Gracie Rodriguez RN  Outcome: Progressing  7/13/2024 1014 by Gracie Rodriguez RN  Outcome: Progressing     Problem: Bariatric Environmental Safety  Goal: Safety Maintained with Care  7/13/2024 1527 by Gracie Rodriguez RN  Outcome: Progressing  7/13/2024 1014 by Gracie Rodriguez RN  Outcome: Progressing     Problem: Sepsis/Septic Shock  Goal: Optimal Coping  7/13/2024 1527 by Gracie Rodriguez RN  Outcome: Progressing  7/13/2024 1014 by Gracie Rodriguez RN  Outcome: Progressing  Goal: Absence of Bleeding  7/13/2024 1527 by Gracie Rodriguez RN  Outcome: Progressing  7/13/2024 1014 by Gracie Rodriguez RN  Outcome: Progressing  Goal: Blood Glucose Level Within Targeted Range  7/13/2024 1527 by Gracie Rodriguez RN  Outcome: Progressing  7/13/2024 1014 by Gracie Rodriguez RN  Outcome: Progressing  Goal: Absence of Infection Signs and Symptoms  7/13/2024 1527 by Gracie Rodriguez RN  Outcome: Progressing  7/13/2024 1014 by Gracie Rodriguez RN  Outcome: Progressing  Goal: Optimal Nutrition Intake  7/13/2024 1527 by Gracie Rodriguez RN  Outcome: Progressing  7/13/2024 1014 by Gracie Rodriguez RN  Outcome: Progressing     Problem: Acute Kidney Injury/Impairment  Goal: Fluid and Electrolyte Balance  7/13/2024 1527 by Gracie Rodriguez,  RN  Outcome: Progressing  7/13/2024 1014 by Gracie Rodriguez RN  Outcome: Progressing  Goal: Improved Oral Intake  7/13/2024 1527 by Gracie Rodriguez RN  Outcome: Progressing  7/13/2024 1014 by Gracie Rodriguez RN  Outcome: Progressing  Goal: Effective Renal Function  7/13/2024 1527 by Gracie Rodriguez, RN  Outcome: Progressing  7/13/2024 1014 by Gracie Rodriguez RN  Outcome: Progressing     Problem: Wound  Goal: Optimal Coping  7/13/2024 1527 by Gracie Rodriguez RN  Outcome: Progressing  7/13/2024 1014 by Gracie Rodriguez RN  Outcome: Progressing  Goal: Optimal Functional Ability  7/13/2024 1527 by Gracie Rodriguez RN  Outcome: Progressing  7/13/2024 1014 by Gracie Rodriguez RN  Outcome: Progressing  Goal: Absence of Infection Signs and Symptoms  7/13/2024 1527 by Gracie Rodriguez RN  Outcome: Progressing  7/13/2024 1014 by Gracie Rodriguez RN  Outcome: Progressing  Goal: Improved Oral Intake  7/13/2024 1527 by Gracie Rodriguez RN  Outcome: Progressing  7/13/2024 1014 by Gracie Rodriguez RN  Outcome: Progressing  Goal: Optimal Pain Control and Function  7/13/2024 1527 by Gracie Rodriguez RN  Outcome: Progressing  7/13/2024 1014 by Gracie Rodriguez RN  Outcome: Progressing  Goal: Skin Health and Integrity  7/13/2024 1527 by Gracie Rodriguez RN  Outcome: Progressing  7/13/2024 1014 by Gracie Rodriguez RN  Outcome: Progressing  Goal: Optimal Wound Healing  7/13/2024 1527 by Gracie Rodriguez RN  Outcome: Progressing  7/13/2024 1014 by Gracie Rodriguez RN  Outcome: Progressing     Problem: Skin Injury Risk Increased  Goal: Skin Health and Integrity  7/13/2024 1527 by Gracie Rodriguez RN  Outcome: Progressing  7/13/2024 1014 by Gracie Rodriguez RN  Outcome: Progressing

## 2024-07-14 VITALS
TEMPERATURE: 99 F | DIASTOLIC BLOOD PRESSURE: 74 MMHG | BODY MASS INDEX: 40.87 KG/M2 | HEIGHT: 61 IN | HEART RATE: 95 BPM | RESPIRATION RATE: 19 BRPM | WEIGHT: 216.5 LBS | OXYGEN SATURATION: 94 % | SYSTOLIC BLOOD PRESSURE: 157 MMHG

## 2024-07-14 LAB
ALBUMIN SERPL BCP-MCNC: 3 G/DL (ref 3.5–5.2)
ALP SERPL-CCNC: 54 U/L (ref 55–135)
ALT SERPL W/O P-5'-P-CCNC: 5 U/L (ref 10–44)
ANION GAP SERPL CALC-SCNC: 12 MMOL/L (ref 8–16)
AST SERPL-CCNC: 10 U/L (ref 10–40)
BASOPHILS # BLD AUTO: 0.01 K/UL (ref 0–0.2)
BASOPHILS NFR BLD: 0.2 % (ref 0–1.9)
BILIRUB SERPL-MCNC: 0.3 MG/DL (ref 0.1–1)
BUN SERPL-MCNC: 14 MG/DL (ref 8–23)
CALCIUM SERPL-MCNC: 9.5 MG/DL (ref 8.7–10.5)
CHLORIDE SERPL-SCNC: 101 MMOL/L (ref 95–110)
CO2 SERPL-SCNC: 29 MMOL/L (ref 23–29)
CREAT SERPL-MCNC: 0.8 MG/DL (ref 0.5–1.4)
DIFFERENTIAL METHOD BLD: ABNORMAL
EOSINOPHIL # BLD AUTO: 0.2 K/UL (ref 0–0.5)
EOSINOPHIL NFR BLD: 3.3 % (ref 0–8)
ERYTHROCYTE [DISTWIDTH] IN BLOOD BY AUTOMATED COUNT: 14.6 % (ref 11.5–14.5)
EST. GFR  (NO RACE VARIABLE): >60 ML/MIN/1.73 M^2
GLUCOSE SERPL-MCNC: 117 MG/DL (ref 70–110)
HCT VFR BLD AUTO: 28.7 % (ref 37–48.5)
HGB BLD-MCNC: 8.4 G/DL (ref 12–16)
IMM GRANULOCYTES # BLD AUTO: 0.03 K/UL (ref 0–0.04)
IMM GRANULOCYTES NFR BLD AUTO: 0.6 % (ref 0–0.5)
LYMPHOCYTES # BLD AUTO: 1.5 K/UL (ref 1–4.8)
LYMPHOCYTES NFR BLD: 29.4 % (ref 18–48)
MAGNESIUM SERPL-MCNC: 1.6 MG/DL (ref 1.6–2.6)
MCH RBC QN AUTO: 25.4 PG (ref 27–31)
MCHC RBC AUTO-ENTMCNC: 29.3 G/DL (ref 32–36)
MCV RBC AUTO: 87 FL (ref 82–98)
MONOCYTES # BLD AUTO: 0.7 K/UL (ref 0.3–1)
MONOCYTES NFR BLD: 12.6 % (ref 4–15)
NEUTROPHILS # BLD AUTO: 2.8 K/UL (ref 1.8–7.7)
NEUTROPHILS NFR BLD: 53.9 % (ref 38–73)
NRBC BLD-RTO: 0 /100 WBC
PHOSPHATE SERPL-MCNC: 2.8 MG/DL (ref 2.7–4.5)
PLATELET # BLD AUTO: 280 K/UL (ref 150–450)
PMV BLD AUTO: 10.1 FL (ref 9.2–12.9)
POTASSIUM SERPL-SCNC: 3.2 MMOL/L (ref 3.5–5.1)
PROT SERPL-MCNC: 7 G/DL (ref 6–8.4)
RBC # BLD AUTO: 3.31 M/UL (ref 4–5.4)
SODIUM SERPL-SCNC: 142 MMOL/L (ref 136–145)
WBC # BLD AUTO: 5.17 K/UL (ref 3.9–12.7)

## 2024-07-14 PROCEDURE — 25000003 PHARM REV CODE 250

## 2024-07-14 PROCEDURE — 36415 COLL VENOUS BLD VENIPUNCTURE: CPT

## 2024-07-14 PROCEDURE — 80053 COMPREHEN METABOLIC PANEL: CPT

## 2024-07-14 PROCEDURE — 83735 ASSAY OF MAGNESIUM: CPT

## 2024-07-14 PROCEDURE — 84100 ASSAY OF PHOSPHORUS: CPT

## 2024-07-14 PROCEDURE — 25000003 PHARM REV CODE 250: Performed by: STUDENT IN AN ORGANIZED HEALTH CARE EDUCATION/TRAINING PROGRAM

## 2024-07-14 PROCEDURE — 63600175 PHARM REV CODE 636 W HCPCS: Performed by: INTERNAL MEDICINE

## 2024-07-14 PROCEDURE — 85025 COMPLETE CBC W/AUTO DIFF WBC: CPT

## 2024-07-14 RX ORDER — MAGNESIUM SULFATE HEPTAHYDRATE 40 MG/ML
2 INJECTION, SOLUTION INTRAVENOUS ONCE
Status: COMPLETED | OUTPATIENT
Start: 2024-07-14 | End: 2024-07-14

## 2024-07-14 RX ORDER — AMMONIUM LACTATE 12 G/100G
LOTION TOPICAL 2 TIMES DAILY
Qty: 396 G | Refills: 0 | Status: SHIPPED | OUTPATIENT
Start: 2024-07-14

## 2024-07-14 RX ORDER — POTASSIUM CHLORIDE 750 MG/1
30 CAPSULE, EXTENDED RELEASE ORAL
Status: COMPLETED | OUTPATIENT
Start: 2024-07-14 | End: 2024-07-14

## 2024-07-14 RX ORDER — LEVOFLOXACIN 750 MG/1
750 TABLET ORAL
Status: DISCONTINUED | OUTPATIENT
Start: 2024-07-14 | End: 2024-07-14 | Stop reason: HOSPADM

## 2024-07-14 RX ORDER — ONDANSETRON 4 MG/1
4 TABLET, ORALLY DISINTEGRATING ORAL EVERY 6 HOURS PRN
Qty: 30 TABLET | Refills: 0 | Status: SHIPPED | OUTPATIENT
Start: 2024-07-14

## 2024-07-14 RX ORDER — LEVOFLOXACIN 750 MG/1
750 TABLET ORAL DAILY
Qty: 4 TABLET | Refills: 0 | Status: SHIPPED | OUTPATIENT
Start: 2024-07-14 | End: 2024-07-18

## 2024-07-14 RX ORDER — MAGNESIUM SULFATE HEPTAHYDRATE 40 MG/ML
2 INJECTION, SOLUTION INTRAVENOUS ONCE
Status: DISCONTINUED | OUTPATIENT
Start: 2024-07-14 | End: 2024-07-14

## 2024-07-14 RX ADMIN — POTASSIUM CHLORIDE 30 MEQ: 750 CAPSULE, EXTENDED RELEASE ORAL at 09:07

## 2024-07-14 RX ADMIN — AMMONIUM LACTATE: 12 LOTION TOPICAL at 09:07

## 2024-07-14 RX ADMIN — MAGNESIUM SULFATE HEPTAHYDRATE 2 G: 40 INJECTION, SOLUTION INTRAVENOUS at 09:07

## 2024-07-14 RX ADMIN — APIXABAN 5 MG: 5 TABLET, FILM COATED ORAL at 09:07

## 2024-07-14 RX ADMIN — LEVOFLOXACIN 750 MG: 750 TABLET, FILM COATED ORAL at 11:07

## 2024-07-14 RX ADMIN — LORAZEPAM 0.5 MG: 0.5 TABLET ORAL at 01:07

## 2024-07-14 RX ADMIN — ACETAMINOPHEN AND CODEINE PHOSPHATE 1 TABLET: 300; 30 TABLET ORAL at 06:07

## 2024-07-14 RX ADMIN — CHOLECALCIFEROL TAB 125 MCG (5000 UNIT) 5000 UNITS: 125 TAB at 09:07

## 2024-07-14 RX ADMIN — POTASSIUM CHLORIDE 30 MEQ: 750 CAPSULE, EXTENDED RELEASE ORAL at 11:07

## 2024-07-14 RX ADMIN — ONDANSETRON 4 MG: 4 TABLET, ORALLY DISINTEGRATING ORAL at 12:07

## 2024-07-14 NOTE — PLAN OF CARE
[facility]      HOME HEALTH ORDERS  FACE TO FACE ENCOUNTER    Patient Name: Lupis Murcia  YOB: 1950    PCP: Bobby Tran MD   PCP Address: [id number]  PCP Phone Number: [phone]  PCP Fax: [phone]    Encounter Date: 7/9/24    Admit to Home Health    Diagnoses:  Active Hospital Problems    Diagnosis  POA    *Sepsis [A41.9]  Yes    (HFpEF) heart failure with preserved ejection fraction [I50.30]  Yes     Chronic     Echo 7/10/24: Concentric LV remodeling, EF 60-65%, G2DD w/ elevated LV filling pressure. Mild LA dilation. Moderate AS with moderate annular calcification. IVC/SVC venous pressure intermediate (8mmHg).       Bacteremia [R78.81]  Yes    HTN (hypertension) [I10]  Yes    History of pulmonary embolus (PE) [Z86.711]  Yes    Lymphedema [I89.0]  Yes    Leg weakness [R29.898]  Yes    MS (multiple sclerosis) [G35]  Yes    Vitamin B 12 deficiency [E53.8]  Yes      Resolved Hospital Problems   No resolved problems to display.       Follow Up Appointments:  Future Appointments   Date Time Provider Department Center   8/6/2024  4:00 PM Amy Blanco MD [facility]       Allergies:  Review of patient's allergies indicates:   Allergen Reactions    Contrast media Shortness Of Breath and Rash    Pcn [penicillins] Shortness Of Breath and Rash    Celebrex [celecoxib] Other (See Comments)     Swallowing problems     Diazepam Hives    Gabapentin Other (See Comments)     Confusion     Motrin [ibuprofen] Rash       Medications: Review discharge medications with patient and family and provide education.    Current Facility-Administered Medications   Medication Dose Route Frequency Provider Last Rate Last Admin    acetaminophen tablet 650 mg  650 mg Oral Q6H PRN Nic Snow DO        acetaminophen-codeine 300-30mg per tablet 1 tablet  1 tablet Oral Q8H PRN Samuel Stanton DO   1 tablet at 07/14/24 0629    albuterol-ipratropium 2.5 mg-0.5  mg/3 mL nebulizer solution 3 mL  3 mL Nebulization Q6H PRN Nic Snwo, DO        ammonium lactate 12 % lotion   Topical (Top) BID Nic Snow, DO   Given at 07/14/24 0912    apixaban tablet 5 mg  5 mg Oral BID Farconstantined, Samer, DO   5 mg at 07/14/24 0912    cholecalciferol (vitamin D3) 125 mcg (5,000 unit) tablet 5,000 Units  5,000 Units Oral Daily Farhud, Samer, DO   5,000 Units at 07/14/24 0912    dextrose 10% bolus 125 mL 125 mL  12.5 g Intravenous PRN Farhud, Samer, DO        dextrose 10% bolus 250 mL 250 mL  25 g Intravenous PRN Farhud, Samer, DO        glucagon (human recombinant) injection 1 mg  1 mg Intramuscular PRN Farhud, Samer, DO        glucose chewable tablet 16 g  16 g Oral PRN Farhud, Samer, DO        glucose chewable tablet 24 g  24 g Oral PRN Farhud, Samer, DO        levoFLOXacin tablet 750 mg  750 mg Oral Q24H Nic Snow, DO   750 mg at 07/14/24 1119    LORazepam tablet 0.5 mg  0.5 mg Oral Q12H PRN Farconstantined, Samer, DO   0.5 mg at 07/13/24 2348    naloxone 0.4 mg/mL injection 0.02 mg  0.02 mg Intravenous PRN Fard, Samer, DO        ondansetron disintegrating tablet 4 mg  4 mg Oral Q6H PRN Nic Snow, DO   4 mg at 07/14/24 1235    sodium chloride 0.9% flush 10 mL  10 mL Intravenous Q12H PRN Farconstantined, Samer, DO         Current Discharge Medication List        START taking these medications    Details   ammonium lactate (LAC-HYDRIN) 12 % lotion Apply topically 2 (two) times daily.  Qty: 225 each, Refills: 0      levoFLOXacin (LEVAQUIN) 750 MG tablet Take 1 tablet (750 mg total) by mouth once daily. for 4 doses  Qty: 4 tablet, Refills: 0      ondansetron (ZOFRAN-ODT) 4 MG TbDL Take 1 tablet (4 mg total) by mouth every 6 (six) hours as needed (Nausea).  Qty: 30 tablet, Refills: 0           CONTINUE these medications which have NOT CHANGED    Details   acetaminophen-codeine 300-30mg (TYLENOL #3) 300-30 mg Tab Take 1 tablet by mouth every 6 (six) hours as needed (pain).      apixaban (ELIQUIS) 5 mg  Tab Take 1 tablet (5 mg total) by mouth 2 (two) times a day.  Qty: 60 tablet, Refills: 0      candesartan-hydrochlorothiazide (ATACAND HCT) 16-12.5 mg per tablet Take 1 tablet by mouth once daily.  Qty: 90 tablet, Refills: 0      LORazepam (ATIVAN) 1 MG tablet Take 1 mg by mouth every 8 (eight) hours as needed for Anxiety.      vitamin D (VITAMIN D3) 1000 units Tab Take 5 tablets (5,000 Units total) by mouth once daily.           STOP taking these medications       predniSONE (DELTASONE) 20 MG tablet Comments:   Reason for Stopping:                 I have seen and examined this patient within the last 30 days. My clinical findings that support the need for the home health skilled services and home bound status are the following:no   Weakness/numbness causing balance and gait disturbance due to MS making it taxing to leave home.     Diet:   regular diet    Labs:  Report Lab results to PCP.    Referrals/ Consults  Physical Therapy to evaluate and treat. Evaluate for home safety and equipment needs; Establish/upgrade home exercise program. Perform / instruct on therapeutic exercises, gait training, transfer training, and Range of Motion.  Occupational Therapy to evaluate and treat. Evaluate home environment for safety and equipment needs. Perform/Instruct on transfers, ADL training, ROM, and therapeutic exercises.    Activities:   activity as tolerated    Nursing:   Agency to admit patient within 24 hours of hospital discharge unless specified on physician order or at patient request    SN to complete comprehensive assessment including routine vital signs. Instruct on disease process and s/s of complications to report to MD. Review/verify medication list sent home with the patient at time of discharge  and instruct patient/caregiver as needed. Frequency may be adjusted depending on start of care date.     Skilled nurse to perform up to 3 visits PRN for symptoms related to diagnosis    Notify MD if SBP > 160 or < 90;  DBP > 90 or < 50; HR > 120 or < 50; Temp > 101; O2 < 88%; Other:       Ok to schedule additional visits based on staff availability and patient request on consecutive days within the home health episode.    When multiple disciplines ordered:    Start of Care occurs on Sunday - Wednesday schedule remaining discipline evaluations as ordered on separate consecutive days following the start of care.    Thursday SOC -schedule subsequent evaluations Friday and Monday the following week.     Friday - Saturday SOC - schedule subsequent discipline evaluations on consecutive days starting Monday of the following week.    For all post-discharge communication and subsequent orders please contact patient's primary care physician. If unable to reach primary care physician or do not receive response within 30 minutes, please contact  for clinical staff order clarification    Miscellaneous   Routine Skin for Bedridden Patients: Instruct patient/caregiver to apply moisture barrier cream to all skin folds and wet areas in perineal area daily and after baths and all bowel movements.    Home Health Aide:  Physical Therapy Three times weekly and Occupational Therapy Three times weekly    Wound Care Orders  Yes  Moisture associated dermatitis noted to buttocks, recommend barrier cream.  Dry hyperkeratotic skin noted to bilateral legs and feet, recommend lac hydrin lotion.    I certify that this patient is confined to her home and needs physical therapy and occupational therapy.

## 2024-07-14 NOTE — DISCHARGE SUMMARY
[facility] - Tele[facility] Medicine  Discharge Summary      Patient Name: Lupis Murcia  MRN: [id number]  LEONORA: [id number]  Patient Class: IP- Inpatient  Admission Date: 7/9/2024  Hospital Length of Stay: 5 days  Discharge Date and Time:  07/14/2024 12:54 PM  Attending Physician: Neil Wilks MD   Discharging Provider: Nic Snow DO  Primary Care Provider: Bobby Tran MD  [facility] Medicine Team: [facility] 3 Nic Snow DO  Primary Care Team: [facility]    HPI:   Ms. Murcia is a 73 year old female with a PMH of MS, debility, chronic lymphedema, HTN, left foot drop,  prior PE (currently on Eliquis), and B12 deficiency who is presenting to [facility] due to reported chills, weakness, and mild confusion. Daughter Amanda is at bedside and is assisting with the history. Of note, patient was alert and oriented x3. Patient reports that yesterday she slid down her chair but was not able to get up even with the help of her daughter 2/2 to chronic debility. EMS came over and helped her up and patient reports she felt weak since then. This morning her daughter went to work (she is a nurse), and the patient reports she was fine watching tv at the time. Some time around 4 PM when the daughter came back she found her mom reporting severe chills and weakness. She also had an episode of non bloody emesis and mild confusion. EMS was called and brought her into the ED. Daughter reports HH come to the house 2x a week for PT/OT associated with her MS. They both live together. Patient is essentially bed bound and spends most of her time in the chair.  nurse reported normal vitals today. Patient denies fevers, abdominal pain, n/d, falls, dysuria, purulent drainage from b/t LE's, pain in the LE's, confusion aside from today, and recent sick contacts that she is aware of.     In the ED, patient was febrile and tachycardic. UA notable for > 100 WBC and many bacteria. HIV/Hep/Covid panel negative. BNP mildly  "elevated at 122 but likely 2/2 to obesity no reported HF. Trop mildly elevated likely 2/2 to demand ischemia. No noted leukocytosis. Patient was given broad spectrum abx and Bcx's were drawn. CXR notable for "Radiographic findings compatible with CHF, with pulmonary venous hypertension and interstitial pulmonary edema. Other underlying pathology, such as interstitial pneumonia, not fully excluded"    * No surgery found *      Hospital Course:   Admitted for sepsis and hypoxic respiratory failure likely 2/2 UTI. Improved with IVF+vanc/cefepime/azithro. Bcx returned proteus and e.coli likely from urinary source. Blood and urine cultures positive for proteus and ecoli pansensitive. Repeat blood cultures negative. Antibiotics de-escalated to ceftriaxone and then oral levaquin. CXR with interstitial infiltrates, but no productive cough, treated with 2 days of IV diuresis. Echo EF 60%, G2DD, 8 IVC. Patient improved on antibiotics and diuretics. Will complete 4 more days of antibiotics starting 7/15/24. Recommend follow-up with PCP. HH ordered.     Goals of Care Treatment Preferences:  Code Status: Partial Code    Vitals:    07/14/24 0900 07/14/24 1006 07/14/24 1133 07/14/24 1239   BP:  (!) 114/53  133/66   BP Location:  Right arm  Left arm   Patient Position:  Lying  Lying   Pulse:  83 81 89   Resp:  18     Temp:  99.1 °F (37.3 °C)  98.4 °F (36.9 °C)   TempSrc:  Oral  Axillary   SpO2: (!) 94% (!) 91%  (!) 94%   Weight:       Height:         Physical Exam  Constitutional:       Appearance: She is obese.   HENT:      Head: Normocephalic.      Nose: Nose normal.   Eyes:      Pupils: Pupils are equal, round, and reactive to light.   Cardiovascular:      Rate and Rhythm: Normal rate.   Pulmonary:      Effort: Pulmonary effort is normal. No respiratory distress.      Breath sounds: No wheezing or rales.   Abdominal:      General: Abdomen is flat.   Musculoskeletal:      Cervical back: Normal range of motion.      Right lower " leg: Edema present.      Left lower leg: Edema present.   Skin:     General: Skin is dry.      Comments: Chronic skin thickening in LE   Neurological:      General: No focal deficit present.      Mental Status: She is alert and oriented to person, place, and time.   Psychiatric:         Mood and Affect: Mood normal.           Consults:     No new Assessment & Plan notes have been filed under this hospital service since the last note was generated.  Service: Hospital Medicine    Final Active Diagnoses:    Diagnosis Date Noted POA    PRINCIPAL PROBLEM:  Sepsis [A41.9] 07/09/2024 Yes    (HFpEF) heart failure with preserved ejection fraction [I50.30] 07/12/2024 Yes     Chronic    Bacteremia [R78.81] 07/10/2024 Yes    HTN (hypertension) [I10] 06/07/2023 Yes    History of pulmonary embolus (PE) [Z86.711] 06/07/2023 Yes    Lymphedema [I89.0] 05/01/2022 Yes    Leg weakness [R29.898] 11/06/2019 Yes    MS (multiple sclerosis) [G35] 06/09/2019 Yes    Vitamin B 12 deficiency [E53.8] 08/09/2017 Yes      Problems Resolved During this Admission:       Discharged Condition: fair    Disposition:     Follow Up:   Follow-up Information       Bobby Tran MD Follow up.    Specialty: Internal Medicine  Contact information:  [address] [place] [address] [phone]                           Patient Instructions:   No discharge procedures on file.    Significant Diagnostic Studies: Labs: CMP   Recent Labs   Lab 07/13/24  0436 07/13/24  1555 07/14/24  0448    141 142   K 3.8 3.4* 3.2*    101 101   CO2 28 29 29    114* 117*   BUN 13 15 14   CREATININE 0.8 1.0 0.8   CALCIUM 9.5 9.8 9.5   PROT 7.0  --  7.0   ALBUMIN 3.0*  --  3.0*   BILITOT 0.2  --  0.3   ALKPHOS 59  --  54*   AST 8*  --  10   ALT 6*  --  5*   ANIONGAP 13 11 12    and CBC   Recent Labs   Lab 07/13/24  0436 07/14/24  0448   WBC 5.51 5.17   HGB 8.3* 8.4*   HCT 28.5* 28.7*    280       Pending Diagnostic Studies:       None            Medications:  Reconciled Home Medications:      Medication List        START taking these medications      ammonium lactate 12 % lotion  Commonly known as: LAC-HYDRIN  Apply topically 2 (two) times daily.     levoFLOXacin 750 MG tablet  Commonly known as: LEVAQUIN  Take 1 tablet (750 mg total) by mouth once daily. for 4 doses     ondansetron 4 MG Tbdl  Commonly known as: ZOFRAN-ODT  Take 1 tablet (4 mg total) by mouth every 6 (six) hours as needed (Nausea).            CONTINUE taking these medications      acetaminophen-codeine 300-30mg 300-30 mg Tab  Commonly known as: TYLENOL #3  Take 1 tablet by mouth every 6 (six) hours as needed (pain).     apixaban 5 mg Tab  Commonly known as: ELIQUIS  Take 1 tablet (5 mg total) by mouth 2 (two) times a day.     candesartan-hydrochlorothiazide 16-12.5 mg per tablet  Commonly known as: ATACAND HCT  Take 1 tablet by mouth once daily.     LORazepam 1 MG tablet  Commonly known as: ATIVAN  Take 1 mg by mouth every 8 (eight) hours as needed for Anxiety.     vitamin D 1000 units Tab  Commonly known as: VITAMIN D3  Take 5 tablets (5,000 Units total) by mouth once daily.            STOP taking these medications      predniSONE 20 MG tablet  Commonly known as: DELTASONE              Indwelling Lines/Drains at time of discharge:   Lines/Drains/Airways       None                   Time spent on the discharge of patient: 45 minutes         Nic Snow DO  Department of Hospital Medicine  [facility] - Tele[facility] Stepdown

## 2024-07-14 NOTE — PLAN OF CARE
[facility] to resume care on discharge. HH orders added to [facility]. ADT 30 placed to transport patient home.     Patient refused lift device stating she does not sleep in bed, sleeps in lift chair.Patient advised to call PCP office to schedule seven day hospital follow up appointment.    07/14/24 1313   Final Note   Assessment Type Final Discharge Note   Anticipated Discharge Disposition Home-[facility]   Hospital Resources/Appts/Education Provided Provided patient/caregiver with written discharge plan information   Post-Acute Status   Post-Acute Authorization Home Health   Home Health Status Set-up Complete/Auth obtained   Discharge Delays None known at this time     [facility] - Tele[facility] Stepdown  Discharge Final Note    Primary Care Provider: Bobby Tran MD    Expected Discharge Date: 7/14/2024    Final Discharge Note (most recent)       Final Note - 07/14/24 1313          Final Note    Assessment Type Final Discharge Note (P)      Anticipated Discharge Disposition Home-[facility] (P)      Hospital Resources/Appts/Education Provided Provided patient/caregiver with written discharge plan information (P)         Post-Acute Status    Post-Acute Authorization Home Health (P)      Home Health Status Set-up Complete/Auth obtained (P)      Discharge Delays None known at this time (P)                      Important Message from Medicare             Contact Info       Bobby Tran MD   Specialty: Internal Medicine   Relationship: PCP - General    [address] [place] [address]   Phone: [phone]       Next Steps: Follow up

## 2024-07-14 NOTE — PLAN OF CARE
Problem: Adult Inpatient Plan of Care  Goal: Plan of Care Review  Outcome: Progressing  Goal: Patient-Specific Goal (Individualized)  Outcome: Progressing  Goal: Absence of Hospital-Acquired Illness or Injury  Outcome: Progressing  Intervention: Identify and Manage Fall Risk  Flowsheets (Taken 7/14/2024 0159)  Safety Promotion/Fall Prevention:   assistive device/personal item within reach   bed alarm set   Fall Risk reviewed with patient/family   medications reviewed   lighting adjusted   room near unit station   nonskid shoes/socks when out of bed   side rails raised x 3   side rails raised x 2   family to remain at bedside   instructed to call staff for mobility  Intervention: Prevent Skin Injury  Flowsheets (Taken 7/14/2024 0159)  Body Position: (pt refused turns) position maintained  Skin Protection: incontinence pads utilized  Device Skin Pressure Protection: absorbent pad utilized/changed  Intervention: Prevent and Manage VTE (Venous Thromboembolism) Risk  Flowsheets (Taken 7/14/2024 0159)  VTE Prevention/Management:   bleeding risk factor(s) identified, provider notified   bleeding risk assessed  Intervention: Prevent Infection  Flowsheets (Taken 7/14/2024 0159)  Infection Prevention:   environmental surveillance performed   hand hygiene promoted   single patient room provided   rest/sleep promoted    Patient awake, alert , oriented.  Pain assessed, pt denies having pain.  pt educated on safety, and medication use. Pt verbalized understanding.  Plan of care discussed with patient and daughter, both verbalized understanding.  Call light within reach.  Bed alarm on.  Bed  low and locked.

## 2024-07-14 NOTE — DISCHARGE INSTRUCTIONS
You were admitted for UTI and bacteremia. You were treated with antibiotics for bacteria in your blood and diuretics for retaining fluid.Please complete your antibiotic regimen. Follow-up with your PCP.

## 2024-07-14 NOTE — PLAN OF CARE
I certify I provided patient choice and a list to the patient/family of CMS rated Home Health, SNF, IRF, [facility]ACH, [facility] Nursing Homes  Patient/Family signed Patient's Choice Disclosure Form choosing the following    [facility]        07/14/24 1310   Post-Acute Status   Post-Acute Authorization Home Health   Home Health Status Set-up Complete/Auth obtained   Patient choice form signed by patient/caregiver List with quality metrics by geographic area provided;List from CMS Compare;List from System Post-Acute Care   Discharge Delays None known at this time   Discharge Plan   Discharge Plan A Home Health   Discharge Plan B Home with family

## 2024-07-14 NOTE — NURSING
Report received from [facility].  Patient remains free from fall and injury. NAD noted, VSS, Questions encouraged and answered.  Patient verbalized understanding. Bed locked and in low position, Safety maintained. Call light in reach, white board updated and explained, no c/o pain n/v, diarrhea or sob, will cont with POC

## 2024-07-15 LAB
BACTERIA UR CULT: ABNORMAL
BACTERIA UR CULT: ABNORMAL

## 2024-07-16 ENCOUNTER — EXTERNAL HOME HEALTH (OUTPATIENT)
Dept: HOME HEALTH SERVICES | Facility: HOSPITAL | Age: 74
End: 2024-07-16
Payer: MEDICARE

## 2024-07-16 LAB
BACTERIA BLD CULT: NORMAL
BACTERIA BLD CULT: NORMAL

## 2024-07-21 PROCEDURE — G0179 MD RECERTIFICATION HHA PT: HCPCS | Mod: ,,, | Performed by: NURSE PRACTITIONER

## 2024-07-25 ENCOUNTER — DOCUMENT SCAN (OUTPATIENT)
Dept: HOME HEALTH SERVICES | Facility: HOSPITAL | Age: 74
End: 2024-07-25
Payer: MEDICARE

## 2024-07-25 ENCOUNTER — PATIENT MESSAGE (OUTPATIENT)
Dept: PSYCHIATRY | Facility: CLINIC | Age: 74
End: 2024-07-25
Payer: MEDICARE

## 2024-08-05 ENCOUNTER — PATIENT MESSAGE (OUTPATIENT)
Dept: PSYCHIATRY | Facility: CLINIC | Age: 74
End: 2024-08-05
Payer: MEDICARE

## 2024-08-06 ENCOUNTER — OFFICE VISIT (OUTPATIENT)
Dept: HEMATOLOGY/ONCOLOGY | Facility: CLINIC | Age: 74
End: 2024-08-06
Payer: MEDICARE

## 2024-08-06 DIAGNOSIS — D50.0 ANEMIA DUE TO CHRONIC BLOOD LOSS: ICD-10-CM

## 2024-08-06 DIAGNOSIS — D50.0 IRON DEFICIENCY ANEMIA DUE TO CHRONIC BLOOD LOSS: Primary | ICD-10-CM

## 2024-08-06 PROCEDURE — 99215 OFFICE O/P EST HI 40 MIN: CPT | Mod: 95,,, | Performed by: INTERNAL MEDICINE

## 2024-08-06 NOTE — PROGRESS NOTES
Hematology and Medical Oncology   Follow Up      08/06/2024      Reason For Referral: Anemia of chronic disease    Telemedicine Documentation:  The patient location is: home  The chief complaint leading to consultation is: anemia of chronic disease    Visit type: audiovisual    Face to Face time with patient: 25  40 minutes of total time spent on the encounter, which includes face to face time and non-face to face time preparing to see the patient (eg, review of tests), Obtaining and/or reviewing separately obtained history, Documenting clinical information in the electronic or other health record, Independently interpreting results (not separately reported) and communicating results to the patient/family/caregiver, or Care coordination (not separately reported).     Each patient to whom he or she provides medical services by telemedicine is:  (1) informed of the relationship between the physician and patient and the respective role of any other health care provider with respect to management of the patient; and (2) notified that he or she may decline to receive medical services by telemedicine and may withdraw from such care at any time.    History of Present Ilness:   Lupis Murcia (Lupis) is a pleasant 74 y.o.female who presents virtually to discuss progressive anemia along with known MS.    Overall doing fair. Has home health come draw labs as needed and do PT exercises.    Fairly recent past is significant for May 2022, she was hospitalized with encephalopathy, HANDY, and lymphedema. After 10 days, she was transferred to SNF. While in SNF and working out at the gym, she developed shortness of breath and went back to the ER and was found to have bilateral pulmonary emboli. She was admitted to the ICU.  In total, she was in skilled nursing for a total of 70 days. She is on Eliquis twice a day now.     Interval History:  Daughter was there during the virtual visit to assist Ms. Murcia and answer questions as  needed. Hospitalized again in early July for altered mental status. Now resolved and returning to baseline.    PAST MEDICAL HISTORY:   Past Medical History:   Diagnosis Date    Lymphedema     MS (multiple sclerosis)     Other pulmonary embolism without acute cor pulmonale     Vitamin B12 deficiency        PAST SURGICAL HISTORY:   Past Surgical History:   Procedure Laterality Date    BREAST CYST EXCISION Left     over 40yrs ago     SECTION      ESOPHAGOGASTRODUODENOSCOPY N/A 2022    Procedure: EGD (ESOPHAGOGASTRODUODENOSCOPY);  Surgeon: Radha Arango MD;  Location: [address];  Service: Endoscopy;  Laterality: N/A;       PAST SOCIAL HISTORY:   reports that she has never smoked. She has never used smokeless tobacco. She reports that she does not drink alcohol and does not use drugs.    FAMILY HISTORY:  Family History   Problem Relation Name Age of Onset    Coronary artery disease Father      Lung cancer Father      Lung cancer Mother      Brain cancer Brother         CURRENT MEDICATIONS:   Current Outpatient Medications   Medication Sig    acetaminophen-codeine 300-30mg (TYLENOL #3) 300-30 mg Tab Take 1 tablet by mouth every 6 (six) hours as needed (pain).    ammonium lactate (LAC-HYDRIN) 12 % lotion Apply topically 2 (two) times daily.    apixaban (ELIQUIS) 5 mg Tab Take 1 tablet (5 mg total) by mouth 2 (two) times a day.    candesartan-hydrochlorothiazide (ATACAND HCT) 16-12.5 mg per tablet Take 1 tablet by mouth once daily.    LORazepam (ATIVAN) 1 MG tablet Take 1 mg by mouth every 8 (eight) hours as needed for Anxiety.    ondansetron (ZOFRAN-ODT) 4 MG TbDL Dissolve 1 tablet (4 mg total) by mouth every 6 (six) hours as needed (Nausea).    vitamin D (VITAMIN D3) 1000 units Tab Take 5 tablets (5,000 Units total) by mouth once daily.     No current facility-administered medications for this visit.     ALLERGIES:   Review of patient's allergies indicates:   Allergen Reactions    Contrast media  Shortness Of Breath and Rash    Pcn [penicillins] Shortness Of Breath and Rash    Celebrex [celecoxib] Other (See Comments)     Swallowing problems     Diazepam Hives    Gabapentin Other (See Comments)     Confusion     Motrin [ibuprofen] Rash         Review of Systems:     Review of Systems   Constitutional:  Positive for fatigue. Negative for appetite change, chills, diaphoresis, fever and unexpected weight change.   HENT:   Negative for hearing loss, mouth sores, nosebleeds, sore throat, trouble swallowing and voice change.    Eyes:  Negative for eye problems and icterus.   Respiratory:  Positive for shortness of breath. Negative for chest tightness, cough, hemoptysis and wheezing.    Cardiovascular:  Positive for leg swelling. Negative for chest pain and palpitations.   Gastrointestinal:  Negative for abdominal distention, abdominal pain, blood in stool, diarrhea, nausea and vomiting.   Endocrine: Negative for hot flashes.   Genitourinary:  Negative for bladder incontinence, difficulty urinating, dysuria, frequency and hematuria.    Musculoskeletal:  Positive for gait problem. Negative for arthralgias, back pain, flank pain, myalgias, neck pain and neck stiffness.   Skin:  Negative for itching, rash and wound.   Neurological:  Positive for gait problem. Negative for dizziness, extremity weakness, headaches, numbness, seizures and speech difficulty.   Hematological:  Negative for adenopathy. Does not bruise/bleed easily.   Psychiatric/Behavioral:  Negative for confusion, depression and sleep disturbance. The patient is not nervous/anxious.           Physical Exam:     Limited Secondary to virtual visit    ECOG Performance Status: (foot note - ECOG PS provided by Eastern Cooperative Oncology Group) 2 - Symptomatic, <50% confined to bed    Karnofsky Performance Score:  80%- Normal Activity with Effort: Some Symptoms of Disease    Labs:   Lab Results   Component Value Date    WBC 5.17 07/14/2024    HGB 8.4 (L)  07/14/2024    HCT 28.7 (L) 07/14/2024     07/14/2024    CHOL 191 12/18/2021    TRIG 120 12/18/2021    HDL 48 12/18/2021    ALT 5 (L) 07/14/2024    AST 10 07/14/2024     07/14/2024    K 3.2 (L) 07/14/2024     07/14/2024    CREATININE 0.8 07/14/2024    BUN 14 07/14/2024    CO2 29 07/14/2024    TSH 1.863 03/15/2016    INR 1.1 06/14/2022    HGBA1C 5.8 (H) 06/08/2023         Imaging: Previous imaging has been reviewed     Assessment and Plan:     Ms. Murcia is pleasant 74 year old female with anemia of chronic disease.    Anemia  --Largely home bound  --Will request labs through home health in 1 month  --Return to clinic virtually to discuss results and possible interventions    Multiple Sclerosis  --Managed as per neurology    I have provided the patient with an opportunity to ask questions and have all questions answered to her satisfaction.       she will return to clinic in 6-8 weeks, but knows to call in the interim if symptoms change or should a problem arise.        Amy Blanco MD  Hematology and Medical Oncology  Bone Marrow Transplant  [facility]      BMT Chart Routing      Follow up with physician . 1. labs with home health next week and in 6 weeks. 2. see me virtually in benign heme clinic after 6 week labs   Follow up with DMITRIY    Provider visit type    Infusion scheduling note    Injection scheduling note    Labs    Imaging    Pharmacy appointment    Other referrals

## 2024-08-12 PROBLEM — D50.0 ANEMIA DUE TO CHRONIC BLOOD LOSS: Status: ACTIVE | Noted: 2024-08-12

## 2024-08-14 ENCOUNTER — PATIENT MESSAGE (OUTPATIENT)
Dept: HEMATOLOGY/ONCOLOGY | Facility: CLINIC | Age: 74
End: 2024-08-14
Payer: MEDICARE

## 2024-08-21 ENCOUNTER — DOCUMENT SCAN (OUTPATIENT)
Dept: HOME HEALTH SERVICES | Facility: HOSPITAL | Age: 74
End: 2024-08-21
Payer: MEDICARE

## 2024-09-12 ENCOUNTER — PATIENT MESSAGE (OUTPATIENT)
Dept: HEMATOLOGY/ONCOLOGY | Facility: CLINIC | Age: 74
End: 2024-09-12
Payer: MEDICARE

## 2024-09-13 ENCOUNTER — DOCUMENT SCAN (OUTPATIENT)
Dept: HOME HEALTH SERVICES | Facility: HOSPITAL | Age: 74
End: 2024-09-13
Payer: MEDICARE

## 2024-09-15 NOTE — ASSESSMENT & PLAN NOTE
[facility]  [address]  [facility] [place] MEDICINE PROGRESS NOTE   Patient: Avril Arvizu  Today's Date: 9/15/2024    YOB: 1931  Admission Date: 9/13/2024    MRN: [id number]  Inpatient LOS: 1    Attending: Olayinka Childers MD  Hospital Day: Hospital Day: 3    Subjective   HISTORY AND SUBJECTIVE COMPLAINTS     Chief Complaint:   AMS    Interval History / Subjective:   Patient sitting in chair watching TV. She is pleasant though confused. Denies nausea, vomiting, diarrhea, constipation, chest pain, sob, or any pain. Sleeping and eating well. Eager to get home.     Hospital Course:  Avril Arvizu is a 89 year old female who presented on 9/13/2024 with complaints of Fever and Altered Mental Status  .    ROS:  Pertinent systems negative except as above.    Objective   PHYSICAL EXAMINATION     Vital 24 Hour Range Most Recent Value   Temperature Temp  Min: 97.7 °F (36.5 °C)  Max: 98.8 °F (37.1 °C) 97.7 °F (36.5 °C)   Pulse Pulse  Min: 71  Max: 76 76   Respiratory Resp  Min: 18  Max: 18 18   Blood Pressure BP  Min: 125/86  Max: 166/86 125/86   Pulse Oximetry SpO2  Min: 94 %  Max: 96 % 95 %   Arterial BP No data recorded     O2 No data recorded       Recorded Intake and Output:  Intake/Output Summary (Last 24 hours) at 9/15/2024 0818  Last data filed at 9/14/2024 2121  Gross per 24 hour   Intake 200 ml   Output --   Net 200 ml      Recorded Last Stool Occurrence:       Vital Most Recent Value First Value   Weight 57.7 kg (127 lb 3.3 oz) Weight: 60.8 kg (134 lb 0.6 oz)   Height 5' 2\" (157.5 cm) Height: 5' 2\" (157.5 cm)   BMI 23.27 N/A      Physical Exam  Vitals and nursing note reviewed.   Constitutional:       General: She is not in acute distress.     Appearance: She is not ill-appearing, toxic-appearing or diaphoretic.   Cardiovascular:      Rate and Rhythm: Normal rate and regular rhythm.   Pulmonary:      Effort: Pulmonary effort is normal. No respiratory distress.      Breath sounds: Normal breath  Secondary to uremia vs infection   -fall  -aspiration precautions  - HANDY resolved with IVF  - now rising Na so encephalopathy could also be associated with hypernatremia  - changed to D5W  - nephro following   - BMP q4h     sounds. No stridor. No wheezing, rhonchi or rales.   Chest:      Chest wall: No tenderness.   Abdominal:      General: Bowel sounds are normal.      Palpations: Abdomen is soft.      Tenderness: There is no abdominal tenderness. There is no guarding or rebound.   Musculoskeletal:         General: No swelling or tenderness.      Right lower leg: No edema.      Left lower leg: No edema.   Skin:     General: Skin is warm.   Neurological:      Mental Status: She is disoriented.   Psychiatric:         Mood and Affect: Mood normal.     TEST RESULTS     Labs: The Laboratory values listed below have been reviewed and pertinent findings discussed in the Assessment and Plan.    Laboratory values:   Recent Labs   Lab 09/14/24  0610 09/13/24  1317   WBC 8.6 8.4   HGB 13.3 14.7   HCT 40.0 44.8    164         Recent Labs   Lab 09/14/24  1559 09/14/24  0610 09/13/24  1325 09/13/24  1317   SODIUM  --  143  --  145   POTASSIUM 4.1 3.5  --  3.3*   CHLORIDE  --  110  --  109   CO2  --  24  --  24   CALCIUM  --  8.5  --  9.0   GLUCOSE  --  82  --  90   BUN  --  11  --  12   CREATININE  --  0.64 0.60 0.69   MG  --  1.8  --   --           Radiology: Imaging studies have been reviewed and pertinent findings discussed in the Assessment and Plan.  Results for orders placed or performed during the hospital encounter of 09/13/24 (from the past 48 hour(s))   XR CHEST PA OR AP 1 VIEW    Impression    IMPRESSION:    Poor ventilation without definite acute pulmonary findings.           Electronically Signed by: Austin Calix MD  Signed on: 9/13/2024 2:03 PM  Created on Workstation ID: [id number]  Signed on Workstation ID: [id number]        ANCILLARY ORDERS     Diet:  Regular Diet  Telemetry:    Consults:    IP CONSULT TO SOCIAL WORK  Therapy Orders:   PT and OT Orders Placed this Encounter   Procedures    Occupational Therapy Evaluation    Occupational Therapy Treatment    Physical Therapy Evaluation    Physical Therapy Treatment        ADVANCED DIRECTIVES     Code Status: Selective Treatment/DNR           ASSESSMENT AND PLAN     Active conditions     Acute encephalopathy w/ hx of dementia  Urinary tract infection  Elevated procalcitionin  Blood cxs x 2 NGTD  Urine culture growing Klebsiella, await sensitivities and continue Rocephin until tomorrow  Plan to transition to oral tomorrow and discharge to home  PT no addition therapy recommended, OT recommending home therapy  POA is activated, plan to call and update about discharge plan tomorrow  Bed alarm     Hypokalemia, resolved  Supplement and recheck in AM     Elevated liver enzymes, resolved  Alk phos and t bili w/ elevation  Trend     Chronic     Hypertension  Continue home meds  Montior     Essential tremor  Lumbar radiculopathy  Continue home meds     Malignant melanoma LUE  Continue outpt surveillance      Smoking status: non smoker    Nutrition status: appropriate  Body mass index is 23.27 kg/m². - appropriate weight BMI 18.5-24  DVT Prophylaxis: Lovenox prophylactic dosing (dose adjusted as per renal function)       DISCHARGE PLANNING     The patient's treatment plans were discussed with patient, Activated POA, RN, pharmacist, and  and the attending physician Olayinka Childers MD.     25 minutes spent reviewing the patient's chart data (labs, imaging, vitals, history) and plan of care.    Discharge Planning    Barriers to discharge:  IV to PO abx, PT/OT  Anticipated discharge destination: TBD  Expected Discharge Date: 1-3 days          Joann [facility]  9/15/2024  8:18 AM    Patient seen, examined, and discussed with the PA. I have personally repeated the pertinent parts of the history and physical examination. I have personally reviewed patient's vitals, lab findings, and radiologic images. Plan as noted and discussed with the patient, PA, as well as any consultants necessary. I agree with the note as written with changes made within the note and exceptions,  if any, noted above. Overall time spent was less than that of the PA.    Olayinka Childers MD

## 2024-09-17 NOTE — PLAN OF CARE
[facility]  [address]  [facility] [address]  Phone: [phone]    Nursing Home Orders      06/20/2022      Admit To Nursing Home:   Skilled Bed        Diagnoses:  Active Hospital Problems    Diagnosis  POA    *Acute pulmonary embolism with acute cor pulmonale [I26.09]  Yes     Priority: 1 - High    Acute hypoxemic respiratory failure [J96.01]  Yes     Priority: 2     Class 2 obesity due to excess calories in adult [E66.09]  Yes    Lymphedema [I89.0]  Yes    Vitamin D deficiency [E55.9]  Yes    MS (multiple sclerosis) [G35]  Yes    Muscle weakness of lower extremity [M62.81]  Yes    Anemia of chronic disease [D63.8]  Yes    Insomnia secondary to chronic pain [G89.29, G47.01]  Yes      Resolved Hospital Problems   No resolved problems to display.         Patient is homebound due to:  Acute pulmonary embolism with acute cor pulmonale      Allergies:  Review of patient's allergies indicates:   Allergen Reactions    Contrast media Shortness Of Breath and Rash    Pcn [penicillins] Shortness Of Breath and Rash    Celebrex [celecoxib] Other (See Comments)     Swallowing problems     Diazepam Hives    Motrin [ibuprofen] Rash         Code Status:    Code Status: Full Code      Vitals:  Every shift (Skilled Nursing patients)      Diet: Regular      Referrals/ Consults     Physical Therapy to evaluate and treat     Occupational Therapy to evaluate and treat      Activities:   activity as tolerated      Wound Care Orders:  Bilateral lower extremity - Nursing to cleanse with NS, pat dry and apply ammonium lactate daily. Can alternate ammonium lactate with sween cream (pink top) per patients request.    Coccyx - Nursing to cleanse with NS or wound cleanser, pat dry and apply triad to coccyx BID and PRN. Cover with foam per patient's request.      Nursing Precautions:    Fall and Pressure ulcer prevention      Miscellaneous:   Home Oxygen:  Oxygen at 2 L/min nasal canula to be used:   The right coronary artery was selectively engaged and injected. JRMelecio Continuously., Assess oxygen saturation via pulse oximeter as needed for increase in SOB. and Notify physician if oxygen saturation less than 88%      Diabetes Care:       Check blood sugar:        Fingerstick blood sugar AC and HS    Fingerstick blood sugar every 6 hours if unable to eat       Report CBG < 60 or > 400 to physician.                                          Insulin Sliding Scale          Glucose  Novolog Insulin Subcutaneous        0 - 60   Orange juice or glucose tablet, hold insulin      No insulin   201-250  2 units   251-300  4 units   301-350  6 units   351-400  8 units   >400   10 units then call physician      Medications: Discontinue all previous medication orders, if any. See new list below.     Eliquis 5mg PO BID to start 6/23/22    Current Discharge Medication List      START taking these medications    Details   !! apixaban (ELIQUIS) 5 mg Tab Take 2 tablets (10 mg total) by mouth 2 (two) times daily. for 3 days  Qty: 12 tablet, Refills: 0      !! apixaban (ELIQUIS) 5 mg Tab Take 1 tablet (5 mg total) by mouth 2 (two) times daily.  Qty: 180 tablet, Refills: 0       !! - Potential duplicate medications found. Please discuss with provider.      CONTINUE these medications which have CHANGED    Details   furosemide (LASIX) 20 MG tablet Take 1 tablet (20 mg total) by mouth once daily.         CONTINUE these medications which have NOT CHANGED    Details   acetaminophen-codeine 300-30mg (TYLENOL #3) 300-30 mg Tab Take 1 tablet by mouth every 6 (six) hours as needed (pain).      B-complex with vitamin C (Z-BEC OR EQUIV) tablet Take 1 tablet by mouth once daily.      LORazepam (ATIVAN) 0.5 MG tablet Take 1 tablet (0.5 mg total) by mouth every 8 (eight) hours as needed for Anxiety.      ondansetron (ZOFRAN-ODT) 4 MG TbDL Take 1 tablet (4 mg total) by mouth every 6 (six) hours as needed.      pantoprazole (PROTONIX) 40 MG tablet Take 1 tablet (40 mg total) by mouth once daily.  Qty: 90 tablet,  Refills: 3      vitamin D (VITAMIN D3) 1000 units Tab Take 1 tablet (1,000 Units total) by mouth once daily.         STOP taking these medications       cyanocobalamin 1,000 mcg/mL injection Comments:   Reason for Stopping:         hydroCHLOROthiazide (HYDRODIURIL) 12.5 MG Tab Comments:   Reason for Stopping:         ammonium lactate (LAC-HYDRIN) 12 % lotion Comments:   Reason for Stopping:         baclofen (LIORESAL) 10 MG tablet Comments:   Reason for Stopping:         bisacodyL (DULCOLAX) 10 mg Supp Comments:   Reason for Stopping:         calcium carbonate (TUMS) 200 mg calcium (500 mg) chewable tablet Comments:   Reason for Stopping:         candesartan-hydrochlorothiazide (ATACAND HCT) 16-12.5 mg per tablet Comments:   Reason for Stopping:         miconazole (MICOTIN) 2 % cream Comments:   Reason for Stopping:         polyethylene glycol (GLYCOLAX) 17 gram PwPk Comments:   Reason for Stopping:         senna-docusate 8.6-50 mg (PERICOLACE) 8.6-50 mg per tablet Comments:   Reason for Stopping:         tamsulosin (FLOMAX) 0.4 mg Cap Comments:   Reason for Stopping:                 Future Appointments   Date Time Provider Department Center   7/14/2022  3:00 PM Samir Sahni MD [facility]           Helga Early MD  [facility] Medicine

## 2024-09-24 ENCOUNTER — TELEPHONE (OUTPATIENT)
Dept: HEMATOLOGY/ONCOLOGY | Facility: CLINIC | Age: 74
End: 2024-09-24
Payer: MEDICARE

## 2024-09-24 ENCOUNTER — OFFICE VISIT (OUTPATIENT)
Dept: HEMATOLOGY/ONCOLOGY | Facility: CLINIC | Age: 74
End: 2024-09-24
Payer: MEDICARE

## 2024-09-24 ENCOUNTER — PATIENT MESSAGE (OUTPATIENT)
Dept: NEUROLOGY | Facility: CLINIC | Age: 74
End: 2024-09-24
Payer: MEDICARE

## 2024-09-24 ENCOUNTER — EXTERNAL HOME HEALTH (OUTPATIENT)
Dept: HOME HEALTH SERVICES | Facility: HOSPITAL | Age: 74
End: 2024-09-24
Payer: MEDICARE

## 2024-09-24 DIAGNOSIS — R14.0 ABDOMINAL DISTENSION: ICD-10-CM

## 2024-09-24 DIAGNOSIS — D50.0 ANEMIA DUE TO CHRONIC BLOOD LOSS: Primary | ICD-10-CM

## 2024-09-24 PROCEDURE — 99215 OFFICE O/P EST HI 40 MIN: CPT | Mod: 95,,, | Performed by: INTERNAL MEDICINE

## 2024-09-24 NOTE — TELEPHONE ENCOUNTER
Rosalind from [employer] informed pt refusal for weekly labs. Advised will inform MD. Requested labs from 9/4. Rosalind from [employer] agreeable to fax lab results to [phone].

## 2024-09-24 NOTE — PROGRESS NOTES
Hematology and Medical Oncology   Follow Up      09/24/2024      Reason For Referral: Anemia of chronic disease    Telemedicine Documentation:  The patient location is: home  The chief complaint leading to consultation is: anemia of chronic disease    Visit type: audiovisual    Face to Face time with patient: 25  40 minutes of total time spent on the encounter, which includes face to face time and non-face to face time preparing to see the patient (eg, review of tests), Obtaining and/or reviewing separately obtained history, Documenting clinical information in the electronic or other health record, Independently interpreting results (not separately reported) and communicating results to the patient/family/caregiver, or Care coordination (not separately reported).     Each patient to whom he or she provides medical services by telemedicine is:  (1) informed of the relationship between the physician and patient and the respective role of any other health care provider with respect to management of the patient; and (2) notified that he or she may decline to receive medical services by telemedicine and may withdraw from such care at any time.    History of Present Ilness:   Lupis Murcia (Lupis) is a pleasant 74 y.o.female who presents virtually to discuss progressive anemia along with known MS.    Overall doing fair. Has home health come draw labs as needed and do PT exercises.    Fairly recent past is significant for May 2022, she was hospitalized with encephalopathy, HANDY, and lymphedema. After 10 days, she was transferred to SNF. While in SNF and working out at the gym, she developed shortness of breath and went back to the ER and was found to have bilateral pulmonary emboli. She was admitted to the ICU.  In total, she was in skilled nursing for a total of 70 days. She is on Eliquis twice a day now.     Interval History:  Daughter was there during the virtual visit to assist Ms. Murcia and answer questions as  needed. Has not been hospitalized since early July for altered mental status. Now resolved and returning to baseline.    Nurse coming once a week to wrap the legs. Feels this is helping with the lower extremity swelling. PT comes to the house several times a week, staying between 10 minutes and 1 hour.      PAST MEDICAL HISTORY:   Past Medical History:   Diagnosis Date    Lymphedema     MS (multiple sclerosis)     Other pulmonary embolism without acute cor pulmonale     Vitamin B12 deficiency        PAST SURGICAL HISTORY:   Past Surgical History:   Procedure Laterality Date    BREAST CYST EXCISION Left     over 40yrs ago     SECTION      ESOPHAGOGASTRODUODENOSCOPY N/A 2022    Procedure: EGD (ESOPHAGOGASTRODUODENOSCOPY);  Surgeon: Radha Arango MD;  Location: [address]);  Service: Endoscopy;  Laterality: N/A;       PAST SOCIAL HISTORY:   reports that she has never smoked. She has never used smokeless tobacco. She reports that she does not drink alcohol and does not use drugs.    FAMILY HISTORY:  Family History   Problem Relation Name Age of Onset    Coronary artery disease Father      Lung cancer Father      Lung cancer Mother      Brain cancer Brother         CURRENT MEDICATIONS:   Current Outpatient Medications   Medication Sig    acetaminophen-codeine 300-30mg (TYLENOL #3) 300-30 mg Tab Take 1 tablet by mouth every 6 (six) hours as needed (pain).    ammonium lactate (LAC-HYDRIN) 12 % lotion Apply topically 2 (two) times daily.    apixaban (ELIQUIS) 5 mg Tab Take 1 tablet (5 mg total) by mouth 2 (two) times a day.    candesartan-hydrochlorothiazide (ATACAND HCT) 16-12.5 mg per tablet Take 1 tablet by mouth once daily.    LORazepam (ATIVAN) 1 MG tablet Take 1 mg by mouth every 8 (eight) hours as needed for Anxiety.    ondansetron (ZOFRAN-ODT) 4 MG TbDL Dissolve 1 tablet (4 mg total) by mouth every 6 (six) hours as needed (Nausea).    vitamin D (VITAMIN D3) 1000 units Tab Take 5 tablets  (5,000 Units total) by mouth once daily.     No current facility-administered medications for this visit.     ALLERGIES:   Review of patient's allergies indicates:   Allergen Reactions    Contrast media Shortness Of Breath and Rash    Pcn [penicillins] Shortness Of Breath and Rash    Celebrex [celecoxib] Other (See Comments)     Swallowing problems     Diazepam Hives    Gabapentin Other (See Comments)     Confusion     Motrin [ibuprofen] Rash         Review of Systems:     Review of Systems   Constitutional:  Positive for fatigue. Negative for appetite change, chills, diaphoresis, fever and unexpected weight change.   HENT:   Negative for hearing loss, mouth sores, nosebleeds, sore throat, trouble swallowing and voice change.    Eyes:  Negative for eye problems and icterus.   Respiratory:  Positive for shortness of breath. Negative for chest tightness, cough, hemoptysis and wheezing.    Cardiovascular:  Positive for leg swelling. Negative for chest pain and palpitations.   Gastrointestinal:  Negative for abdominal distention, abdominal pain, blood in stool, diarrhea, nausea and vomiting.   Endocrine: Negative for hot flashes.   Genitourinary:  Negative for bladder incontinence, difficulty urinating, dysuria, frequency and hematuria.    Musculoskeletal:  Positive for gait problem. Negative for arthralgias, back pain, flank pain, myalgias, neck pain and neck stiffness.   Skin:  Negative for itching, rash and wound.   Neurological:  Positive for gait problem. Negative for dizziness, extremity weakness, headaches, numbness, seizures and speech difficulty.   Hematological:  Negative for adenopathy. Does not bruise/bleed easily.   Psychiatric/Behavioral:  Negative for confusion, depression and sleep disturbance. The patient is not nervous/anxious.           Physical Exam:     Limited Secondary to virtual visit    ECOG Performance Status: (foot note - ECOG PS provided by Eastern Cooperative Oncology Group) 2 - Symptomatic,  <50% confined to bed    Karnofsky Performance Score:  80%- Normal Activity with Effort: Some Symptoms of Disease    Labs:   Lab Results   Component Value Date    WBC 5.17 07/14/2024    HGB 8.4 (L) 07/14/2024    HCT 28.7 (L) 07/14/2024     07/14/2024    CHOL 191 12/18/2021    TRIG 120 12/18/2021    HDL 48 12/18/2021    ALT 5 (L) 07/14/2024    AST 10 07/14/2024     07/14/2024    K 3.2 (L) 07/14/2024     07/14/2024    CREATININE 0.8 07/14/2024    BUN 14 07/14/2024    CO2 29 07/14/2024    TSH 1.863 03/15/2016    INR 1.1 06/14/2022    HGBA1C 5.8 (H) 06/08/2023         Imaging: Previous imaging has been reviewed     Assessment and Plan:     Ms. Murcia is pleasant 74 year old female with anemia of chronic disease.    Anemia  --Largely home bound  --Will request labs through home health in 3 months  --Return to clinic virtually to discuss results and possible interventions    Multiple Sclerosis  --Managed as per neurology    I have provided the patient with an opportunity to ask questions and have all questions answered to her satisfaction.       she will return to clinic in 12 weeks, but knows to call in the interim if symptoms change or should a problem arise.        Amy Blanco MD  Hematology and Medical Oncology  Bone Marrow Transplant  [facility]      BMT Chart Routing      Follow up with physician 3 months. 1. see me in 3 months [virtually]  with a cbc,cmp,iron, ferritin, b12, folate 2.she is requesting labs be done by home health   Follow up with DMITRIY    Provider visit type    Infusion scheduling note    Injection scheduling note    Labs    Imaging    Pharmacy appointment    Other referrals

## 2024-09-24 NOTE — TELEPHONE ENCOUNTER
----- Message from Crystal Armando sent at 9/24/2024 11:59 AM CDT -----  Regarding: Patient advice  Contact: Selena  [phone]            Name of Caller:  Sel[facility]      Contact Preference: [phone]     Nature of Call:  Pt has refused weekly labs for the past two week the will extend pt for wound care not sure if you want them to continue to try to draw labs are not

## 2024-09-24 NOTE — TELEPHONE ENCOUNTER
----- Message from Yasir Winters sent at 9/24/2024  3:56 PM CDT -----  Regarding: Consult/Advisory  Contact: [phone]  Consult/Advisory     Name Of Caller: Rosalind home health nurse         Contact Preference: [phone]     Nature of call: Rosalind requesting to speak diamond

## 2024-09-24 NOTE — TELEPHONE ENCOUNTER
Advised pt will send lab results via portal if unable to mail lab results. Pt verbalized understanding and has no further questions.

## 2024-09-24 NOTE — TELEPHONE ENCOUNTER
----- Message from Lizett Cobos sent at 9/24/2024 10:51 AM CDT -----  Regarding: Consult/Advisory  Contact: pt @ [phone]  Consult/Advisory     Name Of Caller: Lupis Murcia    Contact Preference:[phone]     Nature of call: pt is calling to get a copy of Labs that was done 9/3. Asking if nurse can mail copy. Asking for a call back

## 2024-09-29 ENCOUNTER — HOSPITAL ENCOUNTER (INPATIENT)
Facility: HOSPITAL | Age: 74
LOS: 6 days | Discharge: HOME HEALTH | DRG: 872 | End: 2024-10-05
Attending: STUDENT IN AN ORGANIZED HEALTH CARE EDUCATION/TRAINING PROGRAM | Admitting: STUDENT IN AN ORGANIZED HEALTH CARE EDUCATION/TRAINING PROGRAM
Payer: MEDICARE

## 2024-09-29 DIAGNOSIS — R78.81 BACTEREMIA DUE TO GRAM-POSITIVE BACTERIA: Primary | ICD-10-CM

## 2024-09-29 DIAGNOSIS — R07.9 CHEST PAIN: ICD-10-CM

## 2024-09-29 DIAGNOSIS — R50.9 FEVER: ICD-10-CM

## 2024-09-29 DIAGNOSIS — R53.83 FATIGUE, UNSPECIFIED TYPE: ICD-10-CM

## 2024-09-29 DIAGNOSIS — D72.829 LEUKOCYTOSIS, UNSPECIFIED TYPE: ICD-10-CM

## 2024-09-29 DIAGNOSIS — R31.9 URINARY TRACT INFECTION WITH HEMATURIA, SITE UNSPECIFIED: ICD-10-CM

## 2024-09-29 DIAGNOSIS — N39.0 URINARY TRACT INFECTION WITH HEMATURIA, SITE UNSPECIFIED: ICD-10-CM

## 2024-09-29 DIAGNOSIS — R00.0 TACHYCARDIA: ICD-10-CM

## 2024-09-29 DIAGNOSIS — A41.9 SEPSIS: ICD-10-CM

## 2024-09-29 PROBLEM — R79.89 ELEVATED TROPONIN: Status: ACTIVE | Noted: 2024-09-29

## 2024-09-29 LAB
ALBUMIN SERPL BCP-MCNC: 3.4 G/DL (ref 3.5–5.2)
ALLENS TEST: NORMAL
ALP SERPL-CCNC: 64 U/L (ref 55–135)
ALT SERPL W/O P-5'-P-CCNC: 8 U/L (ref 10–44)
ANION GAP SERPL CALC-SCNC: 12 MMOL/L (ref 8–16)
AST SERPL-CCNC: 13 U/L (ref 10–40)
BACTERIA #/AREA URNS AUTO: ABNORMAL /HPF
BASOPHILS # BLD AUTO: 0.02 K/UL (ref 0–0.2)
BASOPHILS NFR BLD: 0.2 % (ref 0–1.9)
BILIRUB SERPL-MCNC: 0.8 MG/DL (ref 0.1–1)
BILIRUB UR QL STRIP: NEGATIVE
BNP SERPL-MCNC: 357 PG/ML (ref 0–99)
BUN SERPL-MCNC: 22 MG/DL (ref 8–23)
CALCIUM SERPL-MCNC: 9.7 MG/DL (ref 8.7–10.5)
CHLORIDE SERPL-SCNC: 102 MMOL/L (ref 95–110)
CLARITY UR REFRACT.AUTO: ABNORMAL
CO2 SERPL-SCNC: 22 MMOL/L (ref 23–29)
COLOR UR AUTO: COLORLESS
CREAT SERPL-MCNC: 1.4 MG/DL (ref 0.5–1.4)
DIFFERENTIAL METHOD BLD: ABNORMAL
EOSINOPHIL # BLD AUTO: 0 K/UL (ref 0–0.5)
EOSINOPHIL NFR BLD: 0 % (ref 0–8)
ERYTHROCYTE [DISTWIDTH] IN BLOOD BY AUTOMATED COUNT: 16.1 % (ref 11.5–14.5)
EST. GFR  (NO RACE VARIABLE): 39.2 ML/MIN/1.73 M^2
GLUCOSE SERPL-MCNC: 158 MG/DL (ref 70–110)
GLUCOSE UR QL STRIP: NEGATIVE
HCT VFR BLD AUTO: 29.5 % (ref 37–48.5)
HGB BLD-MCNC: 9.3 G/DL (ref 12–16)
HGB UR QL STRIP: ABNORMAL
IMM GRANULOCYTES # BLD AUTO: 0.12 K/UL (ref 0–0.04)
IMM GRANULOCYTES NFR BLD AUTO: 0.9 % (ref 0–0.5)
INFLUENZA A, MOLECULAR: NEGATIVE
INFLUENZA B, MOLECULAR: NEGATIVE
KETONES UR QL STRIP: NEGATIVE
LACTATE SERPL-SCNC: 1.5 MMOL/L (ref 0.5–2.2)
LDH SERPL L TO P-CCNC: 2.2 MMOL/L (ref 0.5–2.2)
LEUKOCYTE ESTERASE UR QL STRIP: NEGATIVE
LYMPHOCYTES # BLD AUTO: 1 K/UL (ref 1–4.8)
LYMPHOCYTES NFR BLD: 7.6 % (ref 18–48)
MAGNESIUM SERPL-MCNC: 1.7 MG/DL (ref 1.6–2.6)
MCH RBC QN AUTO: 26.6 PG (ref 27–31)
MCHC RBC AUTO-ENTMCNC: 31.5 G/DL (ref 32–36)
MCV RBC AUTO: 84 FL (ref 82–98)
MICROSCOPIC COMMENT: ABNORMAL
MONOCYTES # BLD AUTO: 0.6 K/UL (ref 0.3–1)
MONOCYTES NFR BLD: 4.3 % (ref 4–15)
NEUTROPHILS # BLD AUTO: 11.1 K/UL (ref 1.8–7.7)
NEUTROPHILS NFR BLD: 87 % (ref 38–73)
NITRITE UR QL STRIP: NEGATIVE
NRBC BLD-RTO: 0 /100 WBC
PH UR STRIP: 6 [PH] (ref 5–8)
PLATELET # BLD AUTO: 233 K/UL (ref 150–450)
PLATELET BLD QL SMEAR: ABNORMAL
PMV BLD AUTO: ABNORMAL FL (ref 9.2–12.9)
POCT GLUCOSE: 173 MG/DL (ref 70–110)
POTASSIUM SERPL-SCNC: 4 MMOL/L (ref 3.5–5.1)
PROCALCITONIN SERPL IA-MCNC: 0.23 NG/ML
PROT SERPL-MCNC: 7.8 G/DL (ref 6–8.4)
PROT UR QL STRIP: ABNORMAL
RBC # BLD AUTO: 3.5 M/UL (ref 4–5.4)
RBC #/AREA URNS AUTO: 43 /HPF (ref 0–4)
SAMPLE: NORMAL
SARS-COV-2 RDRP RESP QL NAA+PROBE: NEGATIVE
SITE: NORMAL
SODIUM SERPL-SCNC: 136 MMOL/L (ref 136–145)
SP GR UR STRIP: 1.01 (ref 1–1.03)
SPECIMEN SOURCE: NORMAL
SQUAMOUS #/AREA URNS AUTO: 1 /HPF
TROPONIN I SERPL DL<=0.01 NG/ML-MCNC: 0.15 NG/ML (ref 0–0.03)
TROPONIN I SERPL DL<=0.01 NG/ML-MCNC: 0.16 NG/ML (ref 0–0.03)
URN SPEC COLLECT METH UR: ABNORMAL
WBC # BLD AUTO: 12.79 K/UL (ref 3.9–12.7)
WBC #/AREA URNS AUTO: 3 /HPF (ref 0–5)

## 2024-09-29 PROCEDURE — 84484 ASSAY OF TROPONIN QUANT: CPT | Performed by: STUDENT IN AN ORGANIZED HEALTH CARE EDUCATION/TRAINING PROGRAM

## 2024-09-29 PROCEDURE — 81001 URINALYSIS AUTO W/SCOPE: CPT | Performed by: STUDENT IN AN ORGANIZED HEALTH CARE EDUCATION/TRAINING PROGRAM

## 2024-09-29 PROCEDURE — 83605 ASSAY OF LACTIC ACID: CPT

## 2024-09-29 PROCEDURE — 20600001 HC STEP DOWN PRIVATE ROOM

## 2024-09-29 PROCEDURE — 84145 PROCALCITONIN (PCT): CPT | Performed by: STUDENT IN AN ORGANIZED HEALTH CARE EDUCATION/TRAINING PROGRAM

## 2024-09-29 PROCEDURE — 93005 ELECTROCARDIOGRAM TRACING: CPT

## 2024-09-29 PROCEDURE — 99900035 HC TECH TIME PER 15 MIN (STAT)

## 2024-09-29 PROCEDURE — 96367 TX/PROPH/DG ADDL SEQ IV INF: CPT

## 2024-09-29 PROCEDURE — 87040 BLOOD CULTURE FOR BACTERIA: CPT | Performed by: STUDENT IN AN ORGANIZED HEALTH CARE EDUCATION/TRAINING PROGRAM

## 2024-09-29 PROCEDURE — 85025 COMPLETE CBC W/AUTO DIFF WBC: CPT | Performed by: STUDENT IN AN ORGANIZED HEALTH CARE EDUCATION/TRAINING PROGRAM

## 2024-09-29 PROCEDURE — 96365 THER/PROPH/DIAG IV INF INIT: CPT

## 2024-09-29 PROCEDURE — 83880 ASSAY OF NATRIURETIC PEPTIDE: CPT | Performed by: STUDENT IN AN ORGANIZED HEALTH CARE EDUCATION/TRAINING PROGRAM

## 2024-09-29 PROCEDURE — 87154 CUL TYP ID BLD PTHGN 6+ TRGT: CPT | Performed by: STUDENT IN AN ORGANIZED HEALTH CARE EDUCATION/TRAINING PROGRAM

## 2024-09-29 PROCEDURE — 93010 ELECTROCARDIOGRAM REPORT: CPT | Mod: ,,, | Performed by: INTERNAL MEDICINE

## 2024-09-29 PROCEDURE — 87186 SC STD MICRODIL/AGAR DIL: CPT | Mod: 59 | Performed by: STUDENT IN AN ORGANIZED HEALTH CARE EDUCATION/TRAINING PROGRAM

## 2024-09-29 PROCEDURE — U0002 COVID-19 LAB TEST NON-CDC: HCPCS | Performed by: STUDENT IN AN ORGANIZED HEALTH CARE EDUCATION/TRAINING PROGRAM

## 2024-09-29 PROCEDURE — 25000003 PHARM REV CODE 250: Performed by: STUDENT IN AN ORGANIZED HEALTH CARE EDUCATION/TRAINING PROGRAM

## 2024-09-29 PROCEDURE — 99285 EMERGENCY DEPT VISIT HI MDM: CPT | Mod: 25

## 2024-09-29 PROCEDURE — 63600175 PHARM REV CODE 636 W HCPCS: Performed by: STUDENT IN AN ORGANIZED HEALTH CARE EDUCATION/TRAINING PROGRAM

## 2024-09-29 PROCEDURE — 80053 COMPREHEN METABOLIC PANEL: CPT | Performed by: STUDENT IN AN ORGANIZED HEALTH CARE EDUCATION/TRAINING PROGRAM

## 2024-09-29 PROCEDURE — 87502 INFLUENZA DNA AMP PROBE: CPT | Performed by: STUDENT IN AN ORGANIZED HEALTH CARE EDUCATION/TRAINING PROGRAM

## 2024-09-29 PROCEDURE — 84484 ASSAY OF TROPONIN QUANT: CPT | Mod: 91

## 2024-09-29 PROCEDURE — 83735 ASSAY OF MAGNESIUM: CPT | Performed by: STUDENT IN AN ORGANIZED HEALTH CARE EDUCATION/TRAINING PROGRAM

## 2024-09-29 PROCEDURE — 87077 CULTURE AEROBIC IDENTIFY: CPT | Mod: 59 | Performed by: STUDENT IN AN ORGANIZED HEALTH CARE EDUCATION/TRAINING PROGRAM

## 2024-09-29 PROCEDURE — 25000003 PHARM REV CODE 250

## 2024-09-29 RX ORDER — ONDANSETRON 4 MG/1
4 TABLET, ORALLY DISINTEGRATING ORAL EVERY 6 HOURS PRN
Status: DISCONTINUED | OUTPATIENT
Start: 2024-09-29 | End: 2024-09-30

## 2024-09-29 RX ORDER — ACETAMINOPHEN 325 MG/1
650 TABLET ORAL EVERY 4 HOURS PRN
Status: DISCONTINUED | OUTPATIENT
Start: 2024-09-29 | End: 2024-10-05 | Stop reason: HOSPADM

## 2024-09-29 RX ORDER — GLUCAGON 1 MG
1 KIT INJECTION
Status: DISCONTINUED | OUTPATIENT
Start: 2024-09-29 | End: 2024-10-05 | Stop reason: HOSPADM

## 2024-09-29 RX ORDER — LORAZEPAM 1 MG/1
1 TABLET ORAL EVERY 8 HOURS PRN
Status: DISCONTINUED | OUTPATIENT
Start: 2024-09-29 | End: 2024-10-01

## 2024-09-29 RX ORDER — ACETAMINOPHEN AND CODEINE PHOSPHATE 300; 30 MG/1; MG/1
1 TABLET ORAL EVERY 6 HOURS PRN
Status: DISCONTINUED | OUTPATIENT
Start: 2024-09-29 | End: 2024-10-05 | Stop reason: HOSPADM

## 2024-09-29 RX ORDER — IBUPROFEN 200 MG
24 TABLET ORAL
Status: DISCONTINUED | OUTPATIENT
Start: 2024-09-29 | End: 2024-10-05 | Stop reason: HOSPADM

## 2024-09-29 RX ORDER — ACETAMINOPHEN 500 MG
5000 TABLET ORAL DAILY
Status: DISCONTINUED | OUTPATIENT
Start: 2024-09-30 | End: 2024-10-05 | Stop reason: HOSPADM

## 2024-09-29 RX ORDER — ACETAMINOPHEN 500 MG
1000 TABLET ORAL
Status: COMPLETED | OUTPATIENT
Start: 2024-09-29 | End: 2024-09-29

## 2024-09-29 RX ORDER — SODIUM CHLORIDE 0.9 % (FLUSH) 0.9 %
10 SYRINGE (ML) INJECTION EVERY 12 HOURS PRN
Status: DISCONTINUED | OUTPATIENT
Start: 2024-09-29 | End: 2024-10-05 | Stop reason: HOSPADM

## 2024-09-29 RX ORDER — IBUPROFEN 200 MG
16 TABLET ORAL
Status: DISCONTINUED | OUTPATIENT
Start: 2024-09-29 | End: 2024-10-05 | Stop reason: HOSPADM

## 2024-09-29 RX ORDER — NALOXONE HCL 0.4 MG/ML
0.02 VIAL (ML) INJECTION
Status: DISCONTINUED | OUTPATIENT
Start: 2024-09-29 | End: 2024-10-05 | Stop reason: HOSPADM

## 2024-09-29 RX ADMIN — SODIUM CHLORIDE, POTASSIUM CHLORIDE, SODIUM LACTATE AND CALCIUM CHLORIDE 1000 ML: 600; 310; 30; 20 INJECTION, SOLUTION INTRAVENOUS at 05:09

## 2024-09-29 RX ADMIN — APIXABAN 5 MG: 5 TABLET, FILM COATED ORAL at 09:09

## 2024-09-29 RX ADMIN — CEFEPIME 2 G: 2 INJECTION, POWDER, FOR SOLUTION INTRAVENOUS at 05:09

## 2024-09-29 RX ADMIN — ACETAMINOPHEN 1000 MG: 500 TABLET ORAL at 05:09

## 2024-09-29 RX ADMIN — VANCOMYCIN HYDROCHLORIDE 2000 MG: 10 INJECTION, POWDER, LYOPHILIZED, FOR SOLUTION INTRAVENOUS at 06:09

## 2024-09-29 NOTE — ED TRIAGE NOTES
Fatigue (Weakness; strong urine odor. Pt reports she has not felt well and was too weak to get up. )

## 2024-09-29 NOTE — ED NOTES
Patient identifiers verified and correct for Lupis Mariejonh  LOC: The patient is awake, alert and aware of environment with an appropriate affect, the patient is oriented x 3 and speaking appropriately. Pt has decreased appetite  APPEARANCE: Patient appears uncomfortable but in no acute distress. Pt smells of urine  SKIN: The skin is warm and dry, color consistent with ethnicity, patient has normal skin turgor and moist mucus membranes, skin intact, no breakdown or bruising noted.   MUSCULOSKELETAL: Patient moving all extremities spontaneously, no swelling noted.  RESPIRATORY: Airway is open and patent, respirations are spontaneous, patient has a normal effort and rate, no accessory muscle use noted, O2 Sat 96% on room air.  CARDIAC: Patient has a normal rate and regular rhythm, no edema noted, capillary refill < 3 seconds.   GASTRO: Soft and non tender to palpation, no distention noted, Pt states bowel movements have been regular.  : Pt has strong urine odor.  NEURO: Pt opens eyes spontaneously, behavior appropriate to situation, follows commands, facial expression symmetrical, bilateral hand grasp equal and even, purposeful motor response noted, normal sensation in all extremities when touched with a finger.

## 2024-09-29 NOTE — ED PROVIDER NOTES
Encounter Date: 2024       History     Chief Complaint   Patient presents with    Fatigue     Weakness; strong urine odor. Pt reports she has not felt well and was too weak to get up.      This 74-year-old female with a past medical history of lymphedema, MS that presents to the emergency department for fatigue.  Starting yesterday has been harder to get out of her chair and she has felt more tired than usual.  She slept through the [employer] game which is abnormal for her per family.  Family also states that when they changed her diaper, she had malodorous urine.  Reports subjective fevers.  She denies Chest pain, shortness of breath, abdominal pain, nausea vomiting diarrhea.  She had a similar episode in July, diagnosed with urosepsis.        Review of patient's allergies indicates:   Allergen Reactions    Contrast media Shortness Of Breath and Rash    Pcn [penicillins] Shortness Of Breath and Rash    Celebrex [celecoxib] Other (See Comments)     Swallowing problems     Diazepam Hives    Gabapentin Other (See Comments)     Confusion     Motrin [ibuprofen] Rash     Past Medical History:   Diagnosis Date    Lymphedema     MS (multiple sclerosis)     Other pulmonary embolism without acute cor pulmonale     Vitamin B12 deficiency      Past Surgical History:   Procedure Laterality Date    BREAST CYST EXCISION Left     over 40yrs ago     SECTION      ESOPHAGOGASTRODUODENOSCOPY N/A 2022    Procedure: EGD (ESOPHAGOGASTRODUODENOSCOPY);  Surgeon: Radha Arango MD;  Location: [address]);  Service: Endoscopy;  Laterality: N/A;     Family History   Problem Relation Name Age of Onset    Coronary artery disease Father      Lung cancer Father      Lung cancer Mother      Brain cancer Brother       Social History     Tobacco Use    Smoking status: Never    Smokeless tobacco: Never   Substance Use Topics    Alcohol use: No    Drug use: No     Review of Systems    Physical Exam     Initial Vitals [24  1637]   BP Pulse Resp Temp SpO2   126/80 90 17 99.5 °F (37.5 °C) 96 %      MAP       --         Physical Exam    Constitutional: She is not diaphoretic. No distress.   HENT:   Head: Normocephalic and atraumatic.   Eyes: EOM are normal. Pupils are equal, round, and reactive to light.   Neck: Neck supple.   Normal range of motion.  Cardiovascular:  Regular rhythm.           Well perfused   Pulmonary/Chest: Breath sounds normal. No respiratory distress. She has no wheezes. She has no rhonchi. She has no rales.   Abdominal: Abdomen is soft. She exhibits no distension. There is no abdominal tenderness. There is no guarding.   Genitourinary:    Genitourinary Comments: External vagina is normal and no signs of soft tissue infection in the area.     Musculoskeletal:         General: Tenderness (Left heel to palpation) and edema (B/L LE) present.      Cervical back: Normal range of motion and neck supple.     Neurological: She is alert and oriented to person, place, and time.   Moves all 4 extremities spontaneously   Skin:   See photos for chronic leg lymphedema. She has no acute wounds of B/L LE.                       ED Course   Procedures  Labs Reviewed   CBC W/ AUTO DIFFERENTIAL - Abnormal       Result Value    WBC 12.79 (*)     RBC 3.50 (*)     Hemoglobin 9.3 (*)     Hematocrit 29.5 (*)     MCV 84      MCH 26.6 (*)     MCHC 31.5 (*)     RDW 16.1 (*)     Platelets 233      MPV SEE COMMENT      Immature Granulocytes 0.9 (*)     Gran # (ANC) 11.1 (*)     Immature Grans (Abs) 0.12 (*)     Lymph # 1.0      Mono # 0.6      Eos # 0.0      Baso # 0.02      nRBC 0      Gran % 87.0 (*)     Lymph % 7.6 (*)     Mono % 4.3      Eosinophil % 0.0      Basophil % 0.2      Platelet Estimate Clumped (*)     Differential Method Automated     COMPREHENSIVE METABOLIC PANEL - Abnormal    Sodium 136      Potassium 4.0      Chloride 102      CO2 22 (*)     Glucose 158 (*)     BUN 22      Creatinine 1.4      Calcium 9.7      Total Protein 7.8       Albumin 3.4 (*)     Total Bilirubin 0.8      Alkaline Phosphatase 64      AST 13      ALT 8 (*)     eGFR 39.2 (*)     Anion Gap 12     URINALYSIS, REFLEX TO URINE CULTURE - Abnormal    Specimen UA Urine, Catheterized      Color, UA Colorless (*)     Appearance, UA Hazy (*)     pH, UA 6.0      Specific Gravity, UA 1.010      Protein, UA Trace (*)     Glucose, UA Negative      Ketones, UA Negative      Bilirubin (UA) Negative      Occult Blood UA 3+ (*)     Nitrite, UA Negative      Leukocytes, UA Negative      Narrative:     Specimen Source->Urine   B-TYPE NATRIURETIC PEPTIDE - Abnormal     (*)    TROPONIN I - Abnormal    Troponin I 0.153 (*)    URINALYSIS MICROSCOPIC - Abnormal    RBC, UA 43 (*)     WBC, UA 3      Bacteria Rare      Squam Epithel, UA 1      Microscopic Comment SEE COMMENT      Narrative:     Specimen Source->Urine   TROPONIN I - Abnormal    Troponin I 0.163 (*)    POCT GLUCOSE - Abnormal    POCT Glucose 173 (*)    INFLUENZA A & B BY MOLECULAR    Influenza A, Molecular Negative      Influenza B, Molecular Negative      Flu A & B Source Nasal swab     CULTURE, BLOOD   CULTURE, BLOOD   CULTURE, RESPIRATORY   MAGNESIUM    Magnesium 1.7     PROCALCITONIN    Procalcitonin 0.23     SARS-COV-2 RNA AMPLIFICATION, QUAL    SARS-CoV-2 RNA, Amplification, Qual Negative     LACTIC ACID, PLASMA    Lactate (Lactic Acid) 1.5     TROPONIN I   ISTAT LACTATE    POC Lactate 2.20      Sample VENOUS      Site Other      Allens Test N/A            Imaging Results              X-Ray Chest AP Portable (Final result)  Result time 09/29/24 18:46:46      Final result by Domenico Alfred MD (09/29/24 18:46:46)                   Impression:      Stable prominent interstitial markings.  No focal consolidation.    Electronically signed by resident: Shanelle Paz  Date:    09/29/2024  Time:    18:17    Electronically signed by: Domneico Alfred MD  Date:    09/29/2024  Time:    18:46               Narrative:    EXAMINATION:  XR  CHEST AP PORTABLE    CLINICAL HISTORY:  Sepsis;    TECHNIQUE:  Single frontal view of the chest was performed.    COMPARISON:  Chest radiograph 07/12/2024, 07/09/2024    FINDINGS:  Cardiac monitoring leads overlie the chest.    The cardiomediastinal silhouette is enlarged.  The mediastinal structures are midline.  The hilar and mediastinal contours are unremarkable.    The lungs are hypoexpanded without focal or lobar consolidation.  Prominence of the central pulmonary vasculature.    No pneumothorax. No large pleural effusion.    Soft tissues are within normal limits.                                       Medications   sodium chloride 0.9% flush 10 mL (has no administration in time range)   naloxone 0.4 mg/mL injection 0.02 mg (has no administration in time range)   glucose chewable tablet 16 g (has no administration in time range)   glucose chewable tablet 24 g (has no administration in time range)   dextrose 10% bolus 125 mL 125 mL (has no administration in time range)   dextrose 10% bolus 250 mL 250 mL (has no administration in time range)   glucagon (human recombinant) injection 1 mg (has no administration in time range)   acetaminophen tablet 650 mg (has no administration in time range)   acetaminophen-codeine 300-30mg per tablet 1 tablet (has no administration in time range)   apixaban tablet 5 mg (5 mg Oral Given 9/29/24 2117)   LORazepam tablet 1 mg (has no administration in time range)   cholecalciferol (vitamin D3) 125 mcg (5,000 unit) tablet 5,000 Units (has no administration in time range)   ondansetron disintegrating tablet 4 mg (has no administration in time range)   vancomycin - pharmacy to dose (has no administration in time range)   ceFEPIme (MAXIPIME) 2 g in D5W 100 mL IVPB (MB+) (has no administration in time range)   lactated ringers bolus 1,000 mL (0 mLs Intravenous Stopped 9/29/24 1809)   acetaminophen tablet 1,000 mg (1,000 mg Oral Given 9/29/24 1717)   vancomycin 2 g in dextrose 5 % 500 mL  IVPB (0 mg Intravenous Stopped 9/29/24 2017)   ceFEPIme (MAXIPIME) 2 g in D5W 100 mL IVPB (MB+) (0 g Intravenous Stopped 9/29/24 1751)     Medical Decision Making  Lupis Murcia is a 74 y.o. female with a past medical history of lymphedema, MS presenting to the ED with sepsis of unknown source. History and physical exam as above. Initial vital signs concerning for fever, tylenol given. Initial work-up to include: Labs (CBC, CMP, Troponin, BNP, Lactate, VBG, UA, Blood Cultures, POC Covid-19 Swab), Imaging (CXR,), EKG, IV Fluids (1 L LR)    Differential diagnosis considered for this patient includes, but is not limited to: sepsis 2/2 UTI, pneumonia, Soft tissue infection, ACS, electrolyte derangements.     Sirs positive and likely has a UTI so most likely sepsis.  See ED course for MDM          This patient does not have evidence of infective focus  My overall impression is sepsis.  Source: Unknown  Antibiotics given- Antibiotics (72h ago, onward)    Start     Stop Route Frequency Ordered    09/29/24 1730  vancomycin 2 g in dextrose 5 % 500 mL IVPB  (ED Adult   Sepsis Treatment)         -- IV Once 09/29/24 1655      Latest lactate reviewed-  Lab             09/29/24                       1710          POCLAC       2.20          Organ dysfunction indicated by troponin bump and altered mental status    Fluid challenge Other- Patient to receive 1 L LR volume other than 30cc/kg due to not clinically dehydrated.  Stable blood pressures with us    Post- resuscitation assessment Yes Perfusion exam was performed within 6 hours of septic shock presentation after bolus shows Adequate tissue perfusion assessed by non-invasive monitoring       Will Not start Pressors- Levophed for MAP of 65  Source control achieved by:  Broad-spectrum antibiotics      Amount and/or Complexity of Data Reviewed  Labs: ordered. Decision-making details documented in ED Course.  Radiology: ordered.    Risk  OTC drugs.  Prescription drug  management.  Decision regarding hospitalization.              Attending Attestation:   Physician Attestation Statement for Resident:  As the supervising MD   Physician Attestation Statement: I have personally seen and examined this patient.   I agree with the above history.  -:   As the supervising MD I agree with the above PE.     As the supervising MD I agree with the above treatment, course, plan, and disposition.   -: See my documentation in the ED course.    Critical Care  Performed by: Ashley Chauhan MD   Authorized by: Ashley Chauhan MD    Total critical care time (exclusive of procedural time) : 32 minutes  Critical care was necessary to treat or prevent imminent or life-threatening deterioration of the following conditions:  severe infection requiring iv abx                      ED Course as of 09/30/24 0000   Sun Sep 29, 2024   1652 Temp 101.5F oral on my check [NN]   1654 ·  Left Ventricle: The left ventricle is normal in size. Normal wall thickness. There is concentric remodeling. Normal wall motion. There is normal systolic function with a visually estimated ejection fraction of 60 - 65%. Grade II diastolic dysfunction. Elevated left ventricular filling pressure.  ·  Right Ventricle: Normal right ventricular cavity size. Wall thickness is normal. Right ventricle wall motion  is normal. Systolic function is normal.  ·  Left Atrium: Left atrium is mildly dilated.  ·  Right Atrium: Normal right atrial size.  ·  Aortic Valve: The aortic valve is a trileaflet valve. There is moderate aortic valve sclerosis. There is moderate annular calcification present. Moderately restricted motion. There is mild to moderate stenosis. Aortic valve area by VTI is 1.47 cm². Aortic valve peak velocity is 3.5 m/s. Mean gradient is 27 mmHg. The dimensionless index is 0.45. There is no significant regurgitation. Hemodynamics are unchanged compared to prior study.  ·  Aorta: Aortic root is normal in size measuring 2.93  cm. Ascending aorta is normal measuring 3.55 cm.  ·  IVC/SVC: Intermediate venous pressure at 8 mmHg.   [NN]   1700 I evaluated this patient with the resident.  Briefly, patient is a 74-year-old female with history heart failure with preserved EF, anemia, lymphedema followed by wound care, MS, PE on Eliquis, hypertension who presents for generalized weakness.  History obtained from both the patient and the daughter at bedside.  Her daughter reports that the patient has had decreased appetite and generalized weakness since yesterday.  The patient's daughter has been trying to get her to hydrate at home.  She has had minimal p.o. intake.  The patient has a lift chair to help her with her ADLs in his usually able to help assist by standing for her daughter to help care for her however she has been generally weak and has been unable to get up.  She denies chest pain, shortness of breath, abdominal pain, nausea, vomiting, diarrhea.  Her daughter reports she noticed malodorous urine today.  The patient denies dysuria, hematuria, flank pain.  She was felt children home but no fevers.  No cough, congestion, rhinorrhea, sore throat.  No headache or new neurologic changes. [NN]   1702 On exam, the patient is resting comfortably.  She is awake alert and oriented.  She has a normal neurologic exam.  Abdomen is soft, nontender, nondistended.  Lungs clear.  Nonlabored.  Borderline tachycardic, regular rhythm.  Well perfused. [NN]   1702 Patient did receive approximately 100 cc IV fluid EN route via EMS. [NN]   1703 Will hold off on the 30 cc/kg IV fluid bolus as patient was hemodynamically stable and has a history of heart failure with preserved EF.  Will start with 1 L of IV fluids.  Broad-spectrum antibiotics ordered. [NN]   1730 ISTAT Lactate [HJ]   1731 POC Lactate: 2.20 [NN]   1737 EKG with sinus tachycardia with occasional PVC, rate 101, no STEMI [NN]   1826 Troponin I(!): 0.153 [HJ]   1826 BNP(!): 357 [HJ]   1826 WBC(!):  12.79 [HJ]   1827 Hemoglobin(!): 9.3  Higher than baseline [HJ]   1827 Urinalysis Microscopic(!) [HJ]   1832 Troponin I(!): 0.153  Similar to prior.  No current chest pain.  Will continue to trend.  Repeat troponin ordered.  Will hold off on ACS protocol. [NN]   1832 WBC(!): 12.79 [NN]   1832 Hemoglobin(!): 9.3  Stay [NN]   1833 No obvious focal infiltrate on my independent interpretation of the patient's chest x-ray. [NN]   1833 Final chest x-ray read by Radiology pending at time of admission.  No obvious source of infection at this time but will admit for generalized weakness and concerns for severe infection [NN]      ED Course User Index  [HJ] Neal Dumont MD  [NN] Ashley Chauhan MD               Medical Decision Making:   Clinical Tests:   Sepsis Perfusion Assessment: "I attest a sepsis perfusion exam was performed within 6 hours of sepsis, severe sepsis, or septic shock presentation, following fluid resuscitation."             Clinical Impression:  Final diagnoses:  [R50.9] Fever  [N39.0, R31.9] Urinary tract infection with hematuria, site unspecified (Primary)  [R00.0] Tachycardia  [D72.829] Leukocytosis, unspecified type  [R53.83] Fatigue, unspecified type          ED Disposition Condition    Admit Stable                Neal Dumont MD  Resident  09/30/24 0000       Ashley Chauhan MD  09/30/24 1201

## 2024-09-29 NOTE — Clinical Note
Diagnosis: Fever [186662]   Future Attending Provider: INGRIS VELAZQUEZ [[id number]]   Reason for IP Medical Treatment  (Clinical interventions that can only be accomplished in the IP setting? ) :: Sepsis   Plans for Post-Acute care--if anticipated (pick the single best option):: A. No post acute care anticipated at this time   Special Needs:: No Special Needs [1]

## 2024-09-30 ENCOUNTER — PATIENT MESSAGE (OUTPATIENT)
Dept: NEUROLOGY | Facility: CLINIC | Age: 74
End: 2024-09-30
Payer: MEDICARE

## 2024-09-30 LAB
ACINETOBACTER CALCOACETICUS/BAUMANNII COMPLEX: NOT DETECTED
ALBUMIN SERPL BCP-MCNC: 3 G/DL (ref 3.5–5.2)
ALP SERPL-CCNC: 57 U/L (ref 55–135)
ALT SERPL W/O P-5'-P-CCNC: 6 U/L (ref 10–44)
ANION GAP SERPL CALC-SCNC: 9 MMOL/L (ref 8–16)
AST SERPL-CCNC: 13 U/L (ref 10–40)
BACTEROIDES FRAGILIS: NOT DETECTED
BASOPHILS # BLD AUTO: 0.02 K/UL (ref 0–0.2)
BASOPHILS NFR BLD: 0.2 % (ref 0–1.9)
BILIRUB SERPL-MCNC: 0.4 MG/DL (ref 0.1–1)
BUN SERPL-MCNC: 26 MG/DL (ref 8–23)
CALCIUM SERPL-MCNC: 9.2 MG/DL (ref 8.7–10.5)
CANDIDA ALBICANS: NOT DETECTED
CANDIDA AURIS: NOT DETECTED
CANDIDA GLABRATA: NOT DETECTED
CANDIDA KRUSEI: NOT DETECTED
CANDIDA PARAPSILOSIS: NOT DETECTED
CANDIDA TROPICALIS: NOT DETECTED
CHLORIDE SERPL-SCNC: 106 MMOL/L (ref 95–110)
CO2 SERPL-SCNC: 23 MMOL/L (ref 23–29)
CREAT SERPL-MCNC: 1.3 MG/DL (ref 0.5–1.4)
CRYPTOCOCCUS NEOFORMANS/GATTII: NOT DETECTED
CTX-M GENE (ESBL PRODUCER): ABNORMAL
DIFFERENTIAL METHOD BLD: ABNORMAL
ENTEROBACTER CLOACAE COMPLEX: NOT DETECTED
ENTEROBACTERALES: NOT DETECTED
ENTEROCOCCUS FAECALIS: DETECTED
ENTEROCOCCUS FAECIUM: NOT DETECTED
EOSINOPHIL # BLD AUTO: 0.1 K/UL (ref 0–0.5)
EOSINOPHIL NFR BLD: 0.7 % (ref 0–8)
ERYTHROCYTE [DISTWIDTH] IN BLOOD BY AUTOMATED COUNT: 16 % (ref 11.5–14.5)
ESCHERICHIA COLI: NOT DETECTED
EST. GFR  (NO RACE VARIABLE): 42.9 ML/MIN/1.73 M^2
GLUCOSE SERPL-MCNC: 112 MG/DL (ref 70–110)
HAEMOPHILUS INFLUENZAE: NOT DETECTED
HCT VFR BLD AUTO: 25.8 % (ref 37–48.5)
HGB BLD-MCNC: 8.3 G/DL (ref 12–16)
IMM GRANULOCYTES # BLD AUTO: 0.16 K/UL (ref 0–0.04)
IMM GRANULOCYTES NFR BLD AUTO: 1.4 % (ref 0–0.5)
IMP GENE (CARBAPENEM RESISTANT): ABNORMAL
KLEBSIELLA AEROGENES: NOT DETECTED
KLEBSIELLA OXYTOCA: NOT DETECTED
KLEBSIELLA PNEUMONIAE GROUP: NOT DETECTED
KPC RESISTANCE GENE (CARBAPENEM): ABNORMAL
LACTATE SERPL-SCNC: 1.5 MMOL/L (ref 0.5–2.2)
LISTERIA MONOCYTOGENES: NOT DETECTED
LYMPHOCYTES # BLD AUTO: 1.7 K/UL (ref 1–4.8)
LYMPHOCYTES NFR BLD: 14.4 % (ref 18–48)
MAGNESIUM SERPL-MCNC: 1.9 MG/DL (ref 1.6–2.6)
MCH RBC QN AUTO: 26.8 PG (ref 27–31)
MCHC RBC AUTO-ENTMCNC: 32.2 G/DL (ref 32–36)
MCR-1: ABNORMAL
MCV RBC AUTO: 83 FL (ref 82–98)
MEC A/C AND MREJ (MRSA): ABNORMAL
MEC A/C: ABNORMAL
MONOCYTES # BLD AUTO: 1.4 K/UL (ref 0.3–1)
MONOCYTES NFR BLD: 11.5 % (ref 4–15)
NDM GENE (CARBAPENEM RESISTANT): ABNORMAL
NEISSERIA MENINGITIDIS: NOT DETECTED
NEUTROPHILS # BLD AUTO: 8.5 K/UL (ref 1.8–7.7)
NEUTROPHILS NFR BLD: 71.8 % (ref 38–73)
NRBC BLD-RTO: 0 /100 WBC
OHS QRS DURATION: 72 MS
OHS QTC CALCULATION: 440 MS
OXA-48-LIKE (CARBAPENEM RESISTANT): ABNORMAL
PHOSPHATE SERPL-MCNC: 3.2 MG/DL (ref 2.7–4.5)
PLATELET # BLD AUTO: 226 K/UL (ref 150–450)
PMV BLD AUTO: 10.4 FL (ref 9.2–12.9)
POTASSIUM SERPL-SCNC: 4.2 MMOL/L (ref 3.5–5.1)
PROT SERPL-MCNC: 7 G/DL (ref 6–8.4)
PROTEUS SPECIES: NOT DETECTED
PSEUDOMONAS AERUGINOSA: NOT DETECTED
RBC # BLD AUTO: 3.1 M/UL (ref 4–5.4)
SALMONELLA SP: NOT DETECTED
SERRATIA MARCESCENS: NOT DETECTED
SODIUM SERPL-SCNC: 138 MMOL/L (ref 136–145)
STAPHYLOCOCCUS AUREUS: NOT DETECTED
STAPHYLOCOCCUS EPIDERMIDIS: NOT DETECTED
STAPHYLOCOCCUS LUGDUNESIS: NOT DETECTED
STAPHYLOCOCCUS SPECIES: NOT DETECTED
STENOTROPHOMONAS MALTOPHILIA: NOT DETECTED
STREPTOCOCCUS AGALACTIAE: NOT DETECTED
STREPTOCOCCUS PNEUMONIAE: NOT DETECTED
STREPTOCOCCUS PYOGENES: NOT DETECTED
STREPTOCOCCUS SPECIES: NOT DETECTED
TROPONIN I SERPL DL<=0.01 NG/ML-MCNC: 0.14 NG/ML (ref 0–0.03)
TROPONIN I SERPL DL<=0.01 NG/ML-MCNC: 0.15 NG/ML (ref 0–0.03)
VAN A/B (VRE GENE): NOT DETECTED
VANCOMYCIN SERPL-MCNC: 14.7 UG/ML
VIM GENE (CARBAPENEM RESISTANT): ABNORMAL
WBC # BLD AUTO: 11.8 K/UL (ref 3.9–12.7)

## 2024-09-30 PROCEDURE — 87040 BLOOD CULTURE FOR BACTERIA: CPT

## 2024-09-30 PROCEDURE — 63600175 PHARM REV CODE 636 W HCPCS

## 2024-09-30 PROCEDURE — 80053 COMPREHEN METABOLIC PANEL: CPT

## 2024-09-30 PROCEDURE — 20600001 HC STEP DOWN PRIVATE ROOM

## 2024-09-30 PROCEDURE — 25000003 PHARM REV CODE 250

## 2024-09-30 PROCEDURE — 36415 COLL VENOUS BLD VENIPUNCTURE: CPT

## 2024-09-30 PROCEDURE — 83735 ASSAY OF MAGNESIUM: CPT

## 2024-09-30 PROCEDURE — 83605 ASSAY OF LACTIC ACID: CPT

## 2024-09-30 PROCEDURE — 97530 THERAPEUTIC ACTIVITIES: CPT

## 2024-09-30 PROCEDURE — 80202 ASSAY OF VANCOMYCIN: CPT

## 2024-09-30 PROCEDURE — 97161 PT EVAL LOW COMPLEX 20 MIN: CPT

## 2024-09-30 PROCEDURE — 36415 COLL VENOUS BLD VENIPUNCTURE: CPT | Mod: XB

## 2024-09-30 PROCEDURE — 84484 ASSAY OF TROPONIN QUANT: CPT | Mod: 91

## 2024-09-30 PROCEDURE — 84100 ASSAY OF PHOSPHORUS: CPT

## 2024-09-30 PROCEDURE — 85025 COMPLETE CBC W/AUTO DIFF WBC: CPT

## 2024-09-30 PROCEDURE — 97110 THERAPEUTIC EXERCISES: CPT

## 2024-09-30 PROCEDURE — 97165 OT EVAL LOW COMPLEX 30 MIN: CPT

## 2024-09-30 PROCEDURE — 84484 ASSAY OF TROPONIN QUANT: CPT

## 2024-09-30 RX ORDER — AMMONIUM LACTATE 12 G/100G
LOTION TOPICAL 2 TIMES DAILY PRN
Status: DISCONTINUED | OUTPATIENT
Start: 2024-09-30 | End: 2024-09-30

## 2024-09-30 RX ORDER — AMMONIUM LACTATE 12 G/100G
LOTION TOPICAL 2 TIMES DAILY
Status: DISCONTINUED | OUTPATIENT
Start: 2024-09-30 | End: 2024-10-05 | Stop reason: HOSPADM

## 2024-09-30 RX ORDER — ONDANSETRON HYDROCHLORIDE 2 MG/ML
4 INJECTION, SOLUTION INTRAVENOUS EVERY 8 HOURS PRN
Status: DISCONTINUED | OUTPATIENT
Start: 2024-09-30 | End: 2024-10-05 | Stop reason: HOSPADM

## 2024-09-30 RX ADMIN — APIXABAN 5 MG: 5 TABLET, FILM COATED ORAL at 09:09

## 2024-09-30 RX ADMIN — ACETAMINOPHEN 650 MG: 325 TABLET ORAL at 11:09

## 2024-09-30 RX ADMIN — ACETAMINOPHEN 650 MG: 325 TABLET ORAL at 03:09

## 2024-09-30 RX ADMIN — CEFEPIME 2 G: 2 INJECTION, POWDER, FOR SOLUTION INTRAVENOUS at 05:09

## 2024-09-30 RX ADMIN — CHOLECALCIFEROL TAB 125 MCG (5000 UNIT) 5000 UNITS: 125 TAB at 09:09

## 2024-09-30 RX ADMIN — ONDANSETRON 4 MG: 2 INJECTION INTRAMUSCULAR; INTRAVENOUS at 12:09

## 2024-09-30 RX ADMIN — SODIUM CHLORIDE, POTASSIUM CHLORIDE, SODIUM LACTATE AND CALCIUM CHLORIDE 500 ML: 600; 310; 30; 20 INJECTION, SOLUTION INTRAVENOUS at 02:09

## 2024-09-30 RX ADMIN — LORAZEPAM 1 MG: 1 TABLET ORAL at 07:09

## 2024-09-30 RX ADMIN — AMMONIUM LACTATE: 12 LOTION TOPICAL at 08:09

## 2024-09-30 RX ADMIN — VANCOMYCIN HYDROCHLORIDE 1500 MG: 1.5 INJECTION, POWDER, LYOPHILIZED, FOR SOLUTION INTRAVENOUS at 08:09

## 2024-09-30 RX ADMIN — LORAZEPAM 1 MG: 1 TABLET ORAL at 08:09

## 2024-09-30 RX ADMIN — APIXABAN 5 MG: 5 TABLET, FILM COATED ORAL at 08:09

## 2024-09-30 NOTE — ED NOTES
Telemetry Verification   Patient placed on Telemetry Box  Verified with War Room  Tech    Box # [id number]   Rate    Rhythm

## 2024-09-30 NOTE — SUBJECTIVE & OBJECTIVE
Past Medical History:   Diagnosis Date    Lymphedema     MS (multiple sclerosis)     Other pulmonary embolism without acute cor pulmonale     Vitamin B12 deficiency        Past Surgical History:   Procedure Laterality Date    BREAST CYST EXCISION Left     over 40yrs ago     SECTION      ESOPHAGOGASTRODUODENOSCOPY N/A 2022    Procedure: EGD (ESOPHAGOGASTRODUODENOSCOPY);  Surgeon: Radha Arango MD;  Location: [address]);  Service: Endoscopy;  Laterality: N/A;       Review of patient's allergies indicates:   Allergen Reactions    Contrast media Shortness Of Breath and Rash    Pcn [penicillins] Shortness Of Breath and Rash    Celebrex [celecoxib] Other (See Comments)     Swallowing problems     Diazepam Hives    Gabapentin Other (See Comments)     Confusion     Motrin [ibuprofen] Rash       No current facility-administered medications on file prior to encounter.     Current Outpatient Medications on File Prior to Encounter   Medication Sig    acetaminophen-codeine 300-30mg (TYLENOL #3) 300-30 mg Tab Take 1 tablet by mouth every 6 (six) hours as needed (pain).    ammonium lactate (LAC-HYDRIN) 12 % lotion Apply topically 2 (two) times daily.    apixaban (ELIQUIS) 5 mg Tab Take 1 tablet (5 mg total) by mouth 2 (two) times a day.    candesartan-hydrochlorothiazide (ATACAND HCT) 16-12.5 mg per tablet Take 1 tablet by mouth once daily.    LORazepam (ATIVAN) 1 MG tablet Take 1 mg by mouth every 8 (eight) hours as needed for Anxiety.    ondansetron (ZOFRAN-ODT) 4 MG TbDL Dissolve 1 tablet (4 mg total) by mouth every 6 (six) hours as needed (Nausea).    vitamin D (VITAMIN D3) 1000 units Tab Take 5 tablets (5,000 Units total) by mouth once daily.    [DISCONTINUED] baclofen (LIORESAL) 10 MG tablet Take 1 tablet (10 mg total) by mouth 2 (two) times daily. (Patient taking differently: Take 10 mg by mouth 2 (two) times daily as needed.)    [DISCONTINUED] hydroCHLOROthiazide (HYDRODIURIL) 12.5 MG Tab Take 1  tablet (12.5 mg total) by mouth once daily.    [DISCONTINUED] miconazole (MICOTIN) 2 % cream Apply topically 2 (two) times daily. Apply to intertriginous areas, lower abdomen groin for 14 days    [DISCONTINUED] tamsulosin (FLOMAX) 0.4 mg Cap Take 1 capsule (0.4 mg total) by mouth daily with lunch.     Family History       Problem Relation (Age of Onset)    Brain cancer Brother    Coronary artery disease Father    Lung cancer Father, Mother          Tobacco Use    Smoking status: Never    Smokeless tobacco: Never   Substance and Sexual Activity    Alcohol use: No    Drug use: No    Sexual activity: Not on file     Review of Systems   Constitutional:  Positive for activity change, appetite change and fever. Negative for chills.   Respiratory:  Negative for cough, shortness of breath and wheezing.    Cardiovascular:  Positive for leg swelling. Negative for chest pain and palpitations.   Gastrointestinal:  Negative for abdominal distention and abdominal pain.   Genitourinary:  Negative for dysuria and urgency.   Neurological:  Positive for weakness and headaches. Negative for light-headedness.   Objective:     Vital Signs (Most Recent):  Temp: (!) 100.6 °F (38.1 °C) (09/29/24 1900)  Pulse: 83 (09/29/24 2002)  Resp: 18 (09/29/24 2002)  BP: (!) 127/57 (09/29/24 2002)  SpO2: (!) 94 % (09/29/24 2002) Vital Signs (24h Range):  Temp:  [99.5 °F (37.5 °C)-101.5 °F (38.6 °C)] 100.6 °F (38.1 °C)  Pulse:  [82-94] 83  Resp:  [17-23] 18  SpO2:  [94 %-96 %] 94 %  BP: (115-135)/(56-80) 127/57     Weight: 95.3 kg (210 lb)  Body mass index is 39.68 kg/m².     Physical Exam  Constitutional:       Appearance: She is obese. She is ill-appearing.   HENT:      Head: Normocephalic and atraumatic.   Eyes:      Pupils: Pupils are equal, round, and reactive to light.   Cardiovascular:      Rate and Rhythm: Normal rate and regular rhythm.      Pulses: Normal pulses.      Heart sounds: Normal heart sounds.   Pulmonary:      Effort: Pulmonary  effort is normal.      Breath sounds: Normal breath sounds.   Abdominal:      General: Abdomen is flat. There is no distension.      Tenderness: There is no abdominal tenderness.   Musculoskeletal:      Right lower leg: Edema present.      Left lower leg: Edema present.      Comments: Chronic skin thickening and lower extremities   Skin:     General: Skin is warm and dry.   Neurological:      General: No focal deficit present.   Psychiatric:         Mood and Affect: Mood normal.          CRANIAL NERVES     CN III, IV, VI   Pupils are equal, round, and reactive to light.     Significant Labs: All pertinent labs within the past 24 hours have been reviewed.  CBC:   Recent Labs   Lab 09/29/24  1706   WBC 12.79*   HGB 9.3*   HCT 29.5*        CMP:   Recent Labs   Lab 09/29/24  1706      K 4.0      CO2 22*   *   BUN 22   CREATININE 1.4   CALCIUM 9.7   PROT 7.8   ALBUMIN 3.4*   BILITOT 0.8   ALKPHOS 64   AST 13   ALT 8*   ANIONGAP 12     Troponin:   Recent Labs   Lab 09/29/24  1706 09/29/24  1956   TROPONINI 0.153* 0.163*       Significant Imaging: I have reviewed all pertinent imaging results/findings within the past 24 hours.

## 2024-09-30 NOTE — CONSULTS
[facility] - Cardiology Stepdown  Wound Care    Patient Name:  Lupis Murcia   MRN:  [id number]  Date: 9/30/2024  Diagnosis: Sepsis    History:     Past Medical History:   Diagnosis Date    Lymphedema     MS (multiple sclerosis)     Other pulmonary embolism without acute cor pulmonale     Vitamin B12 deficiency        Social History     Socioeconomic History    Marital status:    Tobacco Use    Smoking status: Never    Smokeless tobacco: Never   Substance and Sexual Activity    Alcohol use: No    Drug use: No     Social Drivers of Health     Financial Resource Strain: Low Risk  (7/10/2024)    Overall Financial Resource Strain (CARDIA)     Difficulty of Paying Living Expenses: Not hard at all   Food Insecurity: No Food Insecurity (7/10/2024)    Hunger Vital Sign     Worried About Running Out of Food in the Last Year: Never true     Ran Out of Food in the Last Year: Never true   Transportation Needs: No Transportation Needs (7/10/2024)    TRANSPORTATION NEEDS     Transportation : No   Physical Activity: Inactive (7/10/2024)    Exercise Vital Sign     Days of Exercise per Week: 0 days     Minutes of Exercise per Session: 0 min   Stress: No Stress Concern Present (7/10/2024)    [employer] - Occupational Stress Questionnaire     Feeling of Stress : Only a little   Housing Stability: Low Risk  (7/10/2024)    Housing Stability Vital Sign     Unable to Pay for Housing in the Last Year: No     Homeless in the Last Year: No       Precautions:     Allergies as of 09/29/2024 - Reviewed 09/29/2024   Allergen Reaction Noted    Contrast media Shortness Of Breath and Rash 12/15/2016    Pcn [penicillins] Shortness Of Breath and Rash 07/10/2013    Celebrex [celecoxib] Other (See Comments) 06/12/2017    Diazepam Hives 10/12/2021    Gabapentin Other (See Comments) 09/26/2023    Motrin [ibuprofen] Rash 07/10/2013       WO Assessment Details/Treatment   Patient seen for wound care consultation.  Chart  reviewed for this encounter.  See flow sheet for findings.    Pt in bed and agreeable to care. Pt positioned on the right side maximum assistance and cleansed due to incontinent stool episode. Partial thickness skin losses to the buttocks with pink and moist wound beds. The wounds are likely related to moisture, incontinence and friction. Triad applied. The BLE are intact and dry with lumpy, raised projections. No drainage noted. The BLE were cleansed with vashe and left open to air. Pt and pt's family educated on the wound care and the importance of turning every 2 hours.    Recommendations:  - Buttocks: Bedside nursing to cleanse with bath wipes, pat dry and apply triad BID and PRN; no diapers nor dressings   - BLE: bedside nursing to cleanse with soap and water, pat dry and apply ammonium lactate lotion bid  - Turning every 2 hours  - Heel lift boots  - Waffle mattress overlay        09/30/24 1021        Wound 07/09/24 1855 Moisture associated dermatitis Buttocks   Date First Assessed/Time First Assessed: 07/09/24 1855   Present on Original Admission: Yes  Primary Wound Type: Moisture associated dermatitis  Location: Buttocks   Wound Image    Dressing Appearance Open to air   Drainage Amount Scant   Appearance Pink;Moist   Tissue loss description Partial thickness   Periwound Area Intact;Moist;Pink   Care Cleansed with:;Other (see comments)  (bath wipes)   Dressing Applied;Other (comment)  (triad)   Periwound Care Moisture barrier applied     RLE          LLE            Recommendations made to primary team for above plan via secure chat. Wound care will follow-up as needed.     09/30/2024

## 2024-09-30 NOTE — ASSESSMENT & PLAN NOTE
"This patient does not have evidence of infective focus  My overall impression is sepsis.  Source: Unknown  Antibiotics given-   Antibiotics (72h ago, onward)      Start     Stop Route Frequency Ordered    09/30/24 0500  ceFEPIme (MAXIPIME) 2 g in D5W 100 mL IVPB (MB+)         -- IV Every 12 hours (non-standard times) 09/29/24 2008 09/29/24 2107  vancomycin - pharmacy to dose  (vancomycin IVPB (PEDS and ADULTS))        Placed in "And" Linked Group    -- IV pharmacy to manage frequency 09/29/24 2008          Latest lactate reviewed-  Recent Labs   Lab 09/29/24  1710 09/29/24  1956   LACTATE  --  1.5   POCLAC 2.20  --        Fluid challenge Not needed - patient is not hypotensive      Post- resuscitation assessment No - Post resuscitation assessment not needed       Will Not start Pressors- Levophed for MAP of 65  Source control achieved by: ABX    74-year-old patient presented to the ED with generalized weakness.  T-max found to be 101°.  Daughter reports malodorous urine, but UA negative. CXR negative.  Blood cultures and respiratory cultures pending.  Received 1 L bolus of LR while in the ED.    Plan:   - broad-spectrum antibiotics with vanc/cefepime.  - follow-up blood cultures  - follow-up respiratory cultures  - wound care consult for bilateral lower extremity lymphedema  - deescalate antibiotics pending culture results  "

## 2024-09-30 NOTE — ASSESSMENT & PLAN NOTE
Chronic, controlled. Latest blood pressure and vitals reviewed-     Temp:  [99.5 °F (37.5 °C)-101.5 °F (38.6 °C)]   Pulse:  [82-94]   Resp:  [17-23]   BP: (115-135)/(56-80)   SpO2:  [94 %-96 %] .   Home meds for hypertension were reviewed and noted below.   Hypertension Medications               candesartan-hydrochlorothiazide (ATACAND HCT) 16-12.5 mg per tablet Take 1 tablet by mouth once daily.            While in the hospital, will manage blood pressure as follows; Adjust home antihypertensive regimen as follows- hold at this time 2/2 possible sepsis    Will utilize p.r.n. blood pressure medication only if patient's blood pressure greater than 180/110 and she develops symptoms such as worsening chest pain or shortness of breath.

## 2024-09-30 NOTE — PLAN OF CARE
Plan of care reviewed with patient. Call light within reach, fall precautions maintained bed alarm set. Patient made aware. Nurse instructed patient to call for assistance. Patient verbalized understanding. Telemetry monitor throughout shift. NAD noted. Will continue to monitor and continue plan of care.       Problem: Adult Inpatient Plan of Care  Goal: Plan of Care Review  Outcome: Progressing  Goal: Patient-Specific Goal (Individualized)  Outcome: Progressing  Goal: Absence of Hospital-Acquired Illness or Injury  Outcome: Progressing  Goal: Optimal Comfort and Wellbeing  Outcome: Progressing  Goal: Readiness for Transition of Care  Outcome: Progressing     Problem: Sepsis/Septic Shock  Goal: Optimal Coping  Outcome: Progressing  Goal: Absence of Bleeding  Outcome: Progressing  Goal: Blood Glucose Level Within Targeted Range  Outcome: Progressing  Goal: Absence of Infection Signs and Symptoms  Outcome: Progressing  Goal: Optimal Nutrition Intake  Outcome: Progressing     Problem: Acute Kidney Injury/Impairment  Goal: Fluid and Electrolyte Balance  Outcome: Progressing  Goal: Improved Oral Intake  Outcome: Progressing  Goal: Effective Renal Function  Outcome: Progressing     Problem: Wound  Goal: Optimal Coping  Outcome: Progressing  Goal: Optimal Functional Ability  Outcome: Progressing  Goal: Absence of Infection Signs and Symptoms  Outcome: Progressing  Goal: Improved Oral Intake  Outcome: Progressing  Goal: Optimal Pain Control and Function  Outcome: Progressing  Goal: Skin Health and Integrity  Outcome: Progressing  Goal: Optimal Wound Healing  Outcome: Progressing     Problem: Fall Injury Risk  Goal: Absence of Fall and Fall-Related Injury  Outcome: Progressing     Problem: Skin Injury Risk Increased  Goal: Skin Health and Integrity  Outcome: Progressing

## 2024-09-30 NOTE — PLAN OF CARE
Problem: Physical Therapy  Goal: Physical Therapy Goal  Description: Goals to be met by: 10/15/2024    Patient will increase functional independence with mobility by performin. Supine to sit with Moderate Assistance  2. Sit to supine with Moderate Assistance  3. Rolling to Left and Right with Moderate Assistance.  4. Sit to stand transfer with Moderate Assistance using RW  5. Gait  x 5 feet with Maximal Assistance using Rolling Walker.     Outcome: Progressing    Eval completed. Goals appropriate.

## 2024-09-30 NOTE — HOSPITAL COURSE
Patient here with generalized weakness and decreased apetite over the past several days. Was noticed to have malodorous urine by Daughter. She found to be febrile in the ED and labs revealed leukocytosis, Cr1.4, BUN 22, and procal 0.23. Troponins were flat.  . EKG with sinus tachycardia and occasional PVCs. Infectious work-up initiated and patient started on broad spectrum antibiotics with vancomycin and cefepime. Of note, patient had similar presentation back in July, though without malodorous urine, and was treated with the same antibiotics for UTI (+) for E.coli and Proteus. CXR unremarkable. UA negative for bacteria and leukocytes. Blood cultures positive for enterococcus faecalis. Patient fevering with Tmax of 103F. Unclear source of infection. Patient does have chronic lymphedema in bilateral lower extremities and an open sacral wound, though both without overt signs of evidence of infection. Tylenol and ice packs for temperature control. Infectious Disease consulted for further recommendations. TTE completed, no evidence of vegetation. Of note, patient did present with complaints of nausea, had multiple episodes of vomiting on day 2 of admission in addition to 4-5 episodes of diarrhea on day 3. Updated (2/2) blood culture speciation positive for staph epidermidis. Abx deescalated to IV daptomycin. Repeat Bcs have been NGTD.  Picc line in place with plans to continue IV antibiotics outpatient.

## 2024-09-30 NOTE — PT/OT/SLP EVAL
Occupational Therapy  Co-Evaluation and Co-Treatment with PT    Name: Lupis Murcia  MRN: [id number]  Admitting Diagnosis: Sepsis  Recent Surgery: * No surgery found *      Recommendations:     Discharge Recommendations: Low Intensity Therapy  Discharge Equipment Recommendations:  none  Barriers to discharge:  Other (Comment) (requires increased skilled assistance)    Assessment:     Lupis Murcia is a 74 y.o. female with a medical diagnosis of Sepsis. She presents with the following performance deficits affecting function: weakness, impaired endurance, impaired self care skills, impaired functional mobility, impaired balance, decreased coordination, decreased upper extremity function, decreased lower extremity function, decreased safety awareness. Pt agreeable to participate in therapy evaluation this AM. Pt requires increased A with ADLs and transfers at this time, as compared to reported PLOF. Pt would benefit from continued skilled acute OT services in order to maximize IND with ADLs and functional mobility to ensure safe return to PLOF in the least restrictive environment. OT recommending low intensity therapy once pt is medically appropriate for d/c.    Rehab Prognosis: Good; patient would benefit from acute skilled OT services to address these deficits and reach maximum level of function.       Plan:     Patient to be seen 3 x/week to address the above listed problems via self-care/home management, therapeutic activities, therapeutic exercises, neuromuscular re-education, wheelchair management/training  Plan of Care Expires: 10/30/24  Plan of Care Reviewed with: patient, daughter    Subjective     Chief Complaint: None stated. Pt agreeable to participate in therapy this AM.  Patient/Family Comments/goals: Return home.    Occupational Profile:  Living Environment: Pt lives with daughter in a H with 1 SERINA with R handrail, however ramp entry also present.  Previous level of function: PTA, pt reports she  typically sits up in lift chair during the day. Pt reports she is able to stand with lift chair and daughter to A. Pt reports wearing depends and performing sponge baths with A from daughter PTA. Additionally, pt reports requiring A from daughter with dressing tasks. Pt reports she has not walked with RW in > 1 year.  Roles and Routines: Enjoys watching soap operas on television  Equipment Used at Home: other (see comments) (using lift chair; owns a rolling walker, power wheelchair, bedside commode, shower chair, and slideboard, however reports does not use)  Assistance upon Discharge: Daughter to A as needed. However per pt and daughter report, daughter works during the day, able to come home on lunch break to check on pt.    Pain/Comfort:  Pain Rating 1: 0/10  Pain Rating Post-Intervention 1:  (unrated)    Patients cultural, spiritual, [employer]alism conflicts given the current situation: no    Objective:     Communicated with: RN prior to session. Patient cleared to participate in therapy at this time. Patient found HOB elevated with telemetry, Pure[employer] and no visitors present upon OT entry to room.    General Precautions: Standard, fall  Orthopedic Precautions: N/A  Braces: N/A  Respiratory Status: Room air    Occupational Performance:    Bed Mobility:    Patient completed Supine to Sit with total assistance and 2 persons  Patient completed anterior Scooting towards the EOB with total assistance and 2 persons  Patient completed Sit to Supine with total assistance and 2 persons    Functional Mobility/Transfers:  Patient completed 3 trials of Sit <> Stand Transfer from EOB with maximal assistance and of 2 persons with rolling walker     Activities of Daily Living:  Upper Body Dressing: moderate assistance to rosetta gown in a robe like fashion seated EOB  Lower Body Dressing: total assistance to rosetta B socks at bed-level    Cognitive/Visual Perceptual:  Cognitive/Psychosocial Skills:     -       Oriented to: Person,  Place, Time, and Situation   -       Follows Commands/attention: Follows multistep commands  -       Communication: clear/fluent  -       Memory: No Deficits noted  -       Safety awareness/insight to disability: impaired  -       Mood/Affect/Coping skills/emotional control: Appropriate to situation and Pleasant  Visual/Perceptual:      -       Intact visual field as noted during functional tasks    Physical Exam:  Balance:    -       Static sitting balance: mod A-CGA, pt noted with L lateral lean  -       Dynamic sitting balance: mod A-CGA, pt noted with L lateral lean  -       Static standing balance: max A - max A x 2  Sensation:    -       Patient reports no numbness/tingling throughout BUEs.  Upper Extremity Range of Motion:     -       Right Upper Extremity: WFL  -       Left Upper Extremity: WFL  Upper Extremity Strength:    -       Right Upper Extremity:  4-/5 MMT throughout UE  -       Left Upper Extremity:  3+/5 MMT throughout UE   Strength:    -       Right Upper Extremity: WFL  -       Left Upper Extremity: WFL, however noted slightly weaker as compared to RUE    AMPA 6 Click ADL:  AMPA Total Score: 14    Treatment & Education:  Pt and daughter educated on:   Role of OT, OT POC, and discharge planning.  Safe transfer techniques and proper body mechanics for fall prevention and improved independence with functional transfers.  Importance of EOB activities to increase endurance and tolerance for increased participation in daily ADLs.  Utilizing the call bell to request for assistance with all functional mobility to ensure safety during hospital stay.  Whiteboard updated.    Pt and daughter verbalized understanding and all questions were addressed within the scope of OT.     Patient left HOB elevated with all lines intact, call button in reach, RN notified, and daughter present.    GOALS:   Multidisciplinary Problems       Occupational Therapy Goals          Problem: Occupational Therapy    Goal  Priority Disciplines Outcome Interventions   Occupational Therapy Goal     OT, PT/OT Progressing    Description: Goals to be met by: 10/30/2024     Patient will increase functional independence with ADLs by performing:    UE Dressing with Stand-by Assistance.  LE Dressing with Moderate Assistance.  Grooming while seated with Stand-by Assistance.  Toileting from bedside commode with Moderate Assistance for hygiene and clothing management.   Toilet transfer to bedside commode with Moderate Assistance.  Supine to sit with Moderate Assistance.  Sit to stand transfer with Moderate Assistance.                         History:     Past Medical History:   Diagnosis Date    Lymphedema     MS (multiple sclerosis)     Other pulmonary embolism without acute cor pulmonale     Vitamin B12 deficiency          Past Surgical History:   Procedure Laterality Date    BREAST CYST EXCISION Left     over 40yrs ago     SECTION      ESOPHAGOGASTRODUODENOSCOPY N/A 2022    Procedure: EGD (ESOPHAGOGASTRODUODENOSCOPY);  Surgeon: Radha Arango MD;  Location: [address];  Service: Endoscopy;  Laterality: N/A;       Time Tracking:     OT Date of Treatment: 24  OT Start Time: 935  OT Stop Time: 1001  OT Total Time (min): 26 min    Billable Minutes: Evaluation 10  Therapeutic Activity 16    Co-evaluation/treatment performed with PT due to patient's multiple deficits requiring two skilled therapists to appropriately and safely assess patient's strength, endurance, functional mobility, and ADL performance while facilitating functional tasks in addition to accommodating for patient's activity tolerance and medical acuity.     2024

## 2024-09-30 NOTE — NURSING
Nurses Note -- 4 Eyes      9/30/2024   2:57 AM      Skin assessed during: Admit      [] No Altered Skin Integrity Present    []Prevention Measures Documented      [x] Yes- Altered Skin Integrity Present or Discovered   [] LDA Added if Not in Epic (Describe Wound)   [x] New Altered Skin Integrity was Present on Admit and Documented in LDA   [x] Wound Image Taken    Wound Care Consulted? Yes    Attending Nurse:  Cinthia Haile RN     Second RN/Staff Member:    Yeimi MCGRAW

## 2024-09-30 NOTE — PROGRESS NOTES
Pharmacokinetic Initial Assessment: IV Vancomycin    Assessment/Plan:    Initiate intravenous vancomycin with loading dose of 2000 mg once with subsequent doses when random concentrations are less than 20 mcg/mL  Desired empiric serum trough concentration is 10 to 20 mcg/mL  Draw vancomycin random level on 9/30 at 1700 or sooner if clinically necessary.   Pharmacy will continue to follow and monitor vancomycin.      Please contact pharmacy at extension [phone] with any questions regarding this assessment.     Thank you for the consult,   Elvis Pool       Patient brief summary:  Lupis Murcia is a 74 y.o. female initiated on antimicrobial therapy with IV Vancomycin for treatment of suspected bacteremia    Drug Allergies:   Review of patient's allergies indicates:   Allergen Reactions    Contrast media Shortness Of Breath and Rash    Pcn [penicillins] Shortness Of Breath and Rash    Celebrex [celecoxib] Other (See Comments)     Swallowing problems     Diazepam Hives    Gabapentin Other (See Comments)     Confusion     Motrin [ibuprofen] Rash       Actual Body Weight:   95.3 kg    Renal Function:   Estimated Creatinine Clearance: 36.6 mL/min (based on SCr of 1.4 mg/dL).,     Dialysis Method (if applicable):  N/A

## 2024-09-30 NOTE — ASSESSMENT & PLAN NOTE
BNP elevated to 357.  Patient denies any shortness of breath.  CXR negative for fluid.  Patient appears euvolemic on exam.  Lower extremities chronically edematous.    Echo from 7/10:    Summary      Left Ventricle: The left ventricle is normal in size. Normal wall thickness. There is concentric remodeling. Normal wall motion. There is normal systolic function with a visually estimated ejection fraction of 60 - 65%. Grade II diastolic dysfunction. Elevated left ventricular filling pressure.    Right Ventricle: Normal right ventricular cavity size. Wall thickness is normal. Right ventricle wall motion  is normal. Systolic function is normal.    Left Atrium: Left atrium is mildly dilated.    Right Atrium: Normal right atrial size.    Aortic Valve: The aortic valve is a trileaflet valve. There is moderate aortic valve sclerosis. There is moderate annular calcification present. Moderately restricted motion. There is mild to moderate stenosis. Aortic valve area by VTI is 1.47 cm². Aortic valve peak velocity is 3.5 m/s. Mean gradient is 27 mmHg. The dimensionless index is 0.45. There is no significant regurgitation. Hemodynamics are unchanged compared to prior study.    Aorta: Aortic root is normal in size measuring 2.93 cm. Ascending aorta is normal measuring 3.55 cm.    IVC/SVC: Intermediate venous pressure at 8 mmHg.      Plan:    - Will hold off on diuretics for now; consider IV Lasix x1  - repeat CXR if respiratory status acutely changes  - I/Os

## 2024-09-30 NOTE — PLAN OF CARE
Problem: Occupational Therapy  Goal: Occupational Therapy Goal  Description: Goals to be met by: 10/30/2024     Patient will increase functional independence with ADLs by performing:    UE Dressing with Stand-by Assistance.  LE Dressing with Moderate Assistance.  Grooming while seated with Stand-by Assistance.  Toileting from bedside commode with Moderate Assistance for hygiene and clothing management.   Toilet transfer to bedside commode with Moderate Assistance.  Supine to sit with Moderate Assistance.  Sit to stand transfer with Moderate Assistance.    Outcome: Progressing

## 2024-09-30 NOTE — H&P
[facility] Emergency Dept  [facility]  History & Physical    Patient Name: Lupis Murcia  MRN: [id number]  Patient Class: IP- Inpatient  Admission Date: 9/29/2024  Attending Physician: Steve Fox MD   Primary Care Provider: Bobby Tran MD         Patient information was obtained from patient, relative(s), and ER records.     Subjective:     Principal Problem:Sepsis    Chief Complaint:   Chief Complaint   Patient presents with    Fatigue     Weakness; strong urine odor. Pt reports she has not felt well and was too weak to get up.         HPI: 74 year old female with a PMH of MS, debility, chronic lymphedema, HFpEF, HTN, left foot drop, prior PE (currently on Eliquis), and B12 deficiency who [place] [facility] [place] and decreased apetite over the past several days. Daughter is at bedside and is assisting with the history. Reports that patient has been more lethargic and quiet over the last couple of days.  States that her mother usually has a big appetite, but has been eating very little recently.  Daughter also reports that while changing patient's Depends, she noticed a very malodorous urine.  She took the patient's temperature, which read 101°, and she decided to call EMS to bring patient to the ED. Home health comes to the patient's home 2 times per week for PT/OT.  Wound care visits there home once a week to treat patient's lower extremity lymphedema.  Patient's daughter states that she has noticed increased weeping fluid from patient's bilateral lower extremities.  Patient denies headache, nausea, vomiting, abdominal pain, falls, dysuria, pain in lower extremities, and any recent sick contacts.    Of note, patient was recently admitted 7/9-7/14 for sepsis and hypoxic respiratory failure secondary to UTI.  Blood cultures were positive for Proteus and E coli.  Patient was started on vanc/cefepime/azithromycin.  She improved on IV antibiotics and was discharged on a 4 day  course of oral levofloxacin.    While in the ED, patient is afebrile (T-max 101°).  Blood pressure 127/57.  Oxygen saturation 95% on room air.  WBC elevated to 12.79.  Hemoglobin 9.3.  Creatinine 1.4.  BUN 22.  Protocol 0.23.  UA negative for bacteria and leukocytes but +3 occult blood.  Troponins 0.153.  .  EKG sinus tachycardia with occasional PVCs.      Patient to be admitted to Hospital Medicine for further evaluation.    Past Medical History:   Diagnosis Date    Lymphedema     MS (multiple sclerosis)     Other pulmonary embolism without acute cor pulmonale     Vitamin B12 deficiency        Past Surgical History:   Procedure Laterality Date    BREAST CYST EXCISION Left     over 40yrs ago     SECTION      ESOPHAGOGASTRODUODENOSCOPY N/A 2022    Procedure: EGD (ESOPHAGOGASTRODUODENOSCOPY);  Surgeon: Radha Arango MD;  Location: [address]);  Service: Endoscopy;  Laterality: N/A;       Review of patient's allergies indicates:   Allergen Reactions    Contrast media Shortness Of Breath and Rash    Pcn [penicillins] Shortness Of Breath and Rash    Celebrex [celecoxib] Other (See Comments)     Swallowing problems     Diazepam Hives    Gabapentin Other (See Comments)     Confusion     Motrin [ibuprofen] Rash       No current facility-administered medications on file prior to encounter.     Current Outpatient Medications on File Prior to Encounter   Medication Sig    acetaminophen-codeine 300-30mg (TYLENOL #3) 300-30 mg Tab Take 1 tablet by mouth every 6 (six) hours as needed (pain).    ammonium lactate (LAC-HYDRIN) 12 % lotion Apply topically 2 (two) times daily.    apixaban (ELIQUIS) 5 mg Tab Take 1 tablet (5 mg total) by mouth 2 (two) times a day.    candesartan-hydrochlorothiazide (ATACAND HCT) 16-12.5 mg per tablet Take 1 tablet by mouth once daily.    LORazepam (ATIVAN) 1 MG tablet Take 1 mg by mouth every 8 (eight) hours as needed for Anxiety.    ondansetron (ZOFRAN-ODT) 4 MG TbDL  Dissolve 1 tablet (4 mg total) by mouth every 6 (six) hours as needed (Nausea).    vitamin D (VITAMIN D3) 1000 units Tab Take 5 tablets (5,000 Units total) by mouth once daily.    [DISCONTINUED] baclofen (LIORESAL) 10 MG tablet Take 1 tablet (10 mg total) by mouth 2 (two) times daily. (Patient taking differently: Take 10 mg by mouth 2 (two) times daily as needed.)    [DISCONTINUED] hydroCHLOROthiazide (HYDRODIURIL) 12.5 MG Tab Take 1 tablet (12.5 mg total) by mouth once daily.    [DISCONTINUED] miconazole (MICOTIN) 2 % cream Apply topically 2 (two) times daily. Apply to intertriginous areas, lower abdomen groin for 14 days    [DISCONTINUED] tamsulosin (FLOMAX) 0.4 mg Cap Take 1 capsule (0.4 mg total) by mouth daily with lunch.     Family History       Problem Relation (Age of Onset)    Brain cancer Brother    Coronary artery disease Father    Lung cancer Father, Mother          Tobacco Use    Smoking status: Never    Smokeless tobacco: Never   Substance and Sexual Activity    Alcohol use: No    Drug use: No    Sexual activity: Not on file     Review of Systems   Constitutional:  Positive for activity change, appetite change and fever. Negative for chills.   Respiratory:  Negative for cough, shortness of breath and wheezing.    Cardiovascular:  Positive for leg swelling. Negative for chest pain and palpitations.   Gastrointestinal:  Negative for abdominal distention and abdominal pain.   Genitourinary:  Negative for dysuria and urgency.   Neurological:  Positive for weakness and headaches. Negative for light-headedness.   Objective:     Vital Signs (Most Recent):  Temp: (!) 100.6 °F (38.1 °C) (09/29/24 1900)  Pulse: 83 (09/29/24 2002)  Resp: 18 (09/29/24 2002)  BP: (!) 127/57 (09/29/24 2002)  SpO2: (!) 94 % (09/29/24 2002) Vital Signs (24h Range):  Temp:  [99.5 °F (37.5 °C)-101.5 °F (38.6 °C)] 100.6 °F (38.1 °C)  Pulse:  [82-94] 83  Resp:  [17-23] 18  SpO2:  [94 %-96 %] 94 %  BP: (115-135)/(56-80) 127/57     Weight:  95.3 kg (210 lb)  Body mass index is 39.68 kg/m².     Physical Exam  Constitutional:       Appearance: She is obese. She is ill-appearing.   HENT:      Head: Normocephalic and atraumatic.   Eyes:      Pupils: Pupils are equal, round, and reactive to light.   Cardiovascular:      Rate and Rhythm: Normal rate and regular rhythm.      Pulses: Normal pulses.      Heart sounds: Normal heart sounds.   Pulmonary:      Effort: Pulmonary effort is normal.      Breath sounds: Normal breath sounds.   Abdominal:      General: Abdomen is flat. There is no distension.      Tenderness: There is no abdominal tenderness.   Musculoskeletal:      Right lower leg: Edema present.      Left lower leg: Edema present.      Comments: Chronic skin thickening and lower extremities   Skin:     General: Skin is warm and dry.   Neurological:      General: No focal deficit present.   Psychiatric:         Mood and Affect: Mood normal.          CRANIAL NERVES     CN III, IV, VI   Pupils are equal, round, and reactive to light.     Significant Labs: All pertinent labs within the past 24 hours have been reviewed.  CBC:   Recent Labs   Lab 09/29/24  1706   WBC 12.79*   HGB 9.3*   HCT 29.5*        CMP:   Recent Labs   Lab 09/29/24  1706      K 4.0      CO2 22*   *   BUN 22   CREATININE 1.4   CALCIUM 9.7   PROT 7.8   ALBUMIN 3.4*   BILITOT 0.8   ALKPHOS 64   AST 13   ALT 8*   ANIONGAP 12     Troponin:   Recent Labs   Lab 09/29/24  1706 09/29/24  1956   TROPONINI 0.153* 0.163*       Significant Imaging: I have reviewed all pertinent imaging results/findings within the past 24 hours.  Assessment/Plan:     * Sepsis  This patient does not have evidence of infective focus  My overall impression is sepsis.  Source: Unknown  Antibiotics given-   Antibiotics (72h ago, onward)      Start     Stop Route Frequency Ordered    09/30/24 0500  ceFEPIme (MAXIPIME) 2 g in D5W 100 mL IVPB (MB+)         -- IV Every 12 hours (non-standard times)  "09/29/24 2008 09/29/24 2107  vancomycin - pharmacy to dose  (vancomycin IVPB (PEDS and ADULTS))        Placed in "And" Linked Group    -- IV pharmacy to manage frequency 09/29/24 2008          Latest lactate reviewed-  Recent Labs   Lab 09/29/24  1710 09/29/24 1956   LACTATE  --  1.5   POCLAC 2.20  --        Fluid challenge Not needed - patient is not hypotensive      Post- resuscitation assessment No - Post resuscitation assessment not needed       Will Not start Pressors- Levophed for MAP of 65  Source control achieved by: ABX    74-year-old patient presented to the ED with generalized weakness.  T-max found to be 101°.  Daughter reports malodorous urine, but UA negative. CXR negative.  Blood cultures and respiratory cultures pending.  Received 1 L bolus of LR while in the ED.    Plan:   - broad-spectrum antibiotics with vanc/cefepime.  - follow-up blood cultures  - follow-up respiratory cultures  - wound care consult for bilateral lower extremity lymphedema  - deescalate antibiotics pending culture results    Elevated troponin  Troponins elevated 0.153>> 0.163.  Patient denies chest pain.  EKG with sinus tachycardia and PVCs.    Plan:    - No concern for ACS at this time  - Likely secondary to infection.  - continue to trend troponins to peak  - continuous cardiac monitoring  - EKG PRN      (HFpEF) heart failure with preserved ejection fraction  BNP elevated to 357.  Patient denies any shortness of breath.  CXR negative for fluid.  Patient appears euvolemic on exam.  Lower extremities chronically edematous.    Echo from 7/10:    Summary      Left Ventricle: The left ventricle is normal in size. Normal wall thickness. There is concentric remodeling. Normal wall motion. There is normal systolic function with a visually estimated ejection fraction of 60 - 65%. Grade II diastolic dysfunction. Elevated left ventricular filling pressure.    Right Ventricle: Normal right ventricular cavity size. Wall thickness is " normal. Right ventricle wall motion  is normal. Systolic function is normal.    Left Atrium: Left atrium is mildly dilated.    Right Atrium: Normal right atrial size.    Aortic Valve: The aortic valve is a trileaflet valve. There is moderate aortic valve sclerosis. There is moderate annular calcification present. Moderately restricted motion. There is mild to moderate stenosis. Aortic valve area by VTI is 1.47 cm². Aortic valve peak velocity is 3.5 m/s. Mean gradient is 27 mmHg. The dimensionless index is 0.45. There is no significant regurgitation. Hemodynamics are unchanged compared to prior study.    Aorta: Aortic root is normal in size measuring 2.93 cm. Ascending aorta is normal measuring 3.55 cm.    IVC/SVC: Intermediate venous pressure at 8 mmHg.      Plan:    - Will hold off on diuretics for now; consider IV Lasix x1  - repeat CXR if respiratory status acutely changes  - I/Os    History of pulmonary embolus (PE)  - continue Eliquis 5 mg BID      HTN (hypertension)  Chronic, controlled. Latest blood pressure and vitals reviewed-     Temp:  [99.5 °F (37.5 °C)-101.5 °F (38.6 °C)]   Pulse:  [82-94]   Resp:  [17-23]   BP: (115-135)/(56-80)   SpO2:  [94 %-96 %] .   Home meds for hypertension were reviewed and noted below.   Hypertension Medications               candesartan-hydrochlorothiazide (ATACAND HCT) 16-12.5 mg per tablet Take 1 tablet by mouth once daily.            While in the hospital, will manage blood pressure as follows; Adjust home antihypertensive regimen as follows- hold at this time 2/2 possible sepsis    Will utilize p.r.n. blood pressure medication only if patient's blood pressure greater than 180/110 and she develops symptoms such as worsening chest pain or shortness of breath.    Lymphedema  - wound care consulted      Leg weakness  - PT/OT consulted      MS (multiple sclerosis)  - continue vitamin-D while inpatient  - PT/OT        VTE Risk Mitigation (From admission, onward)            Ordered     apixaban tablet 5 mg  2 times daily         09/29/24 2000     Reason for No Pharmacological VTE Prophylaxis  Once        Question:  Reasons:  Answer:  Already adequately anticoagulated on oral Anticoagulants    09/29/24 1931     IP VTE HIGH RISK PATIENT  Once         09/29/24 1931     Place sequential compression device  Until discontinued         09/29/24 1931                               Pharmacokinetic Initial Assessment: IV Vancomycin    Assessment/Plan:    Initiate intravenous vancomycin with loading dose of 2000 mg once with subsequent doses when random concentrations are less than 20 mcg/mL  Desired empiric serum trough concentration is 10 to 20 mcg/mL  Draw vancomycin random level on 9/30 at 1700 or sooner if clinically necessary.   Pharmacy will continue to follow and monitor vancomycin.      Please contact pharmacy at extension [phone] with any questions regarding this assessment.     Thank you for the consult,   Elvis Pool       Patient brief summary:  Lupis Murcia is a 74 y.o. female initiated on antimicrobial therapy with IV Vancomycin for treatment of suspected bacteremia    Drug Allergies:   Review of patient's allergies indicates:   Allergen Reactions    Contrast media Shortness Of Breath and Rash    Pcn [penicillins] Shortness Of Breath and Rash    Celebrex [celecoxib] Other (See Comments)     Swallowing problems     Diazepam Hives    Gabapentin Other (See Comments)     Confusion     Motrin [ibuprofen] Rash       Actual Body Weight:   95.3 kg    Renal Function:   Estimated Creatinine Clearance: 36.6 mL/min (based on SCr of 1.4 mg/dL).,     Dialysis Method (if applicable):  N/A      Jaylen Fowler MD  Department of Hospital Medicine  [facility] - Emergency Dept

## 2024-09-30 NOTE — RESPIRATORY THERAPY
RAPID RESPONSE RESPIRATORY CHART CHECK       Chart check completed. Please call [phone] for further concerns or assistance.

## 2024-09-30 NOTE — NURSING
Admitted to room 345, alert and oriented, denies pain, no s/s of distress, comfort measures given, pictures of skin taken, oriented to call light and room, plan  of care explained to patient and daughter, VS WNL, safety instructions given, verbalized understanding, call light within reach, will continue to monitor.

## 2024-09-30 NOTE — PT/OT/SLP EVAL
Physical Therapy Evaluation    Patient Name:  Lupis Murcia   MRN:  [id number]  Admit Date: 9/29/2024  Admitting Diagnosis:  Sepsis  Length of Stay: 1 days  Recent Surgery: * No surgery found *      Recommendations:     Discharge Recommendations:  Low Intensity Therapy   Discharge Equipment Recommendations: none   Barriers to discharge: None    Assessment:     Lupis Murcia is a 74 y.o. female admitted with a medical diagnosis of Sepsis. For ~ 1 year, pt has been living in her lift chair. Pt stands using a RW and daughter's assistance multiple times a day. Pt has also been non ambulatory x 1 year. Pt and daughter's goal is to get patient back to ambulation. Pt was able to stand with 2 person assistance today. Will continue to progress mobility per patient's tolerance      Problem List: weakness, impaired functional mobility, impaired endurance, impaired self care skills, impaired balance, impaired coordination, decreased lower extremity function, impaired cardiopulmonary response to activity, edema  Rehab Prognosis: Fair; patient would benefit from acute skilled PT services to address these deficits and reach maximum level of function.      Plan:     During this hospitalization, patient to be seen 3 x/week to address the identified rehab impairments via gait training, therapeutic activities, therapeutic exercises, neuromuscular re-education and progress towards the established goals.    Plan of Care Expires:  10/29/24    Subjective   Communicated with RN prior to session.  Patient found HOB elevated upon PT entry to room, agreeable to evaluation. Lupis Murcia's daughter present during session.    Chief Complaint: Fatigue (Weakness; strong urine odor. Pt reports she has not felt well and was too weak to get up. )    Patient/Family Comments/goals: to get better   Pain/Comfort:  Pain Rating 1: 0/10  Pain Rating Post-Intervention 1: 0/10    Living Environment:  Living Environment: Pt lives with daughter in a  "SSH with 1 SERINA with R handrail, however ramp entry also present.  Previous level of function: PTA, pt reports she typically sits up in lift chair during the day. Pt reports she is able to stand with lift chair and daughter to A. Pt reports wearing depends and performing sponge baths with A from daughter PTA. Additionally, pt reports requiring A from daughter with dressing tasks. Pt reports she has not walked with RW in > 1 year.  Roles and Routines: Enjoys watching soap operas on television  Equipment Used at Home: other (see comments) (using lift chair; owns a rolling walker, power wheelchair, bedside commode, shower chair, and slideboard, however reports does not use)  Assistance upon Discharge: Daughter to A as needed. However per pt and daughter report, daughter works during the day, able to come home on lunch break to check on pt.    Objective:   Patient found with: telemetry, peripheral IV, PureWick     General Precautions: Standard, Cardiac fall   Orthopedic Precautions:N/A   Braces: N/A   Oxygen Device: Room Air  Vitals: BP (!) 114/53 (BP Location: Right arm, Patient Position: Lying)   Pulse 106   Temp (!) 102 °F (38.9 °C)   Resp 18   Ht 5' 1" (1.549 m)   Wt 95.2 kg (209 lb 14.1 oz)   LMP  (LMP Unknown)   SpO2 (!) 90%   BMI 39.66 kg/m²     Exams:  Cognition:   Alert and Cooperative  Ox4  Command following: Follows multistep  commands  Fluency: clear/fluent    Pt with chronic B LE lymphedema  B LE ROM WFL passively  R LE stronger than L LE due to MS  R LE strength: grossly 3-/5  L LE strength: grossly 2+/5       Outcome Measures:  AM-PAC 6 CLICK MOBILITY  Turning over in bed (including adjusting bedclothes, sheets and blankets)?: 2  Sitting down on and standing up from a chair with arms (e.g., wheelchair, bedside commode, etc.): 2  Moving from lying on back to sitting on the side of the bed?: 2  Moving to and from a bed to a chair (including a wheelchair)?: 1  Need to walk in hospital room?: " 1  Climbing 3-5 steps with a railing?: 1  Basic Mobility Total Score: 9     Functional Mobility:  Additional staff present: OT  Bed Mobility:  Supine to Sit: total assistance and 2 persons; HOB elevated  Scooting anteriorly to EOB to have both feet planted on floor: total assistance and 2 persons  Sit to Supine: total assistance and 2 persons; HOB flat    Sitting Balance at Edge of Bed:  Assistance Level Required: SBA-mod A  Time: 10 minutes   Comments:   Pt with chronic L lateral lean due to L side weaker than R side (MS)  Worked on activity tolerance sitting EOB, worked on tolerance to positional change, and worked on sitting balance     Transfers:   Sit <> Stand Transfer: maximal assistance and of 2 persons with rolling walker from raised EOB  Facilitation of hip and thoracic extension  Facilitation of RW management  Verbal cueing for hand placement and upright posture       Gait:   Not performed 2nd to pt still working on standing     Therapeutic Activities, Exercises, & Education:   Educated pt on PT role/POC  Pt verbalized understanding     Therapeutic Exercise  Mobility for generalized strengthening  Initiation and use of core and postural muscles  Sit to stand x 3 reps to increase B LE strength and increase standing tolerance     Patient left HOB elevated with all lines intact, call button in reach, bed alarm on, RN notified, and daughter present.    GOALS:   Multidisciplinary Problems       Physical Therapy Goals          Problem: Physical Therapy    Goal Priority Disciplines Outcome Interventions   Physical Therapy Goal     PT, PT/OT Progressing    Description: Goals to be met by: 10/15/2024    Patient will increase functional independence with mobility by performin. Supine to sit with Moderate Assistance  2. Sit to supine with Moderate Assistance  3. Rolling to Left and Right with Moderate Assistance.  4. Sit to stand transfer with Moderate Assistance using RW  5. Gait  x 5 feet with Maximal  Assistance using Rolling Walker.                          History:     Past Medical History:   Diagnosis Date    Lymphedema     MS (multiple sclerosis)     Other pulmonary embolism without acute cor pulmonale     Vitamin B12 deficiency        Past Surgical History:   Procedure Laterality Date    BREAST CYST EXCISION Left     over 40yrs ago     SECTION      ESOPHAGOGASTRODUODENOSCOPY N/A 2022    Procedure: EGD (ESOPHAGOGASTRODUODENOSCOPY);  Surgeon: Radha Arango MD;  Location: [address]);  Service: Endoscopy;  Laterality: N/A;       Time Tracking:     PT Received On: 24  PT Start Time: 935     PT Stop Time: 1001  PT Total Time (min): 26 min     Billable Minutes: Evaluation 8 and Therapeutic Exercise 10

## 2024-09-30 NOTE — ASSESSMENT & PLAN NOTE
Troponins elevated 0.153>> 0.163.  Patient denies chest pain.  EKG with sinus tachycardia and PVCs.    Plan:    - No concern for ACS at this time  - Likely secondary to infection.  - continue to trend troponins to peak  - continuous cardiac monitoring  - EKG PRN

## 2024-10-01 PROBLEM — R29.898 LEG WEAKNESS: Status: RESOLVED | Noted: 2019-11-06 | Resolved: 2024-10-01

## 2024-10-01 PROBLEM — I51.89 DIASTOLIC DYSFUNCTION: Status: ACTIVE | Noted: 2024-10-01

## 2024-10-01 PROBLEM — B95.2 ENTEROCOCCAL BACTEREMIA: Status: ACTIVE | Noted: 2024-10-01

## 2024-10-01 PROBLEM — R78.81 ENTEROCOCCAL BACTEREMIA: Status: ACTIVE | Noted: 2024-10-01

## 2024-10-01 PROBLEM — I50.30 (HFPEF) HEART FAILURE WITH PRESERVED EJECTION FRACTION: Chronic | Status: RESOLVED | Noted: 2024-07-12 | Resolved: 2024-10-01

## 2024-10-01 LAB
ALBUMIN SERPL BCP-MCNC: 2.7 G/DL (ref 3.5–5.2)
ALP SERPL-CCNC: 49 U/L (ref 55–135)
ALT SERPL W/O P-5'-P-CCNC: <5 U/L (ref 10–44)
ANION GAP SERPL CALC-SCNC: 7 MMOL/L (ref 8–16)
ASCENDING AORTA: 3.8 CM
AST SERPL-CCNC: 7 U/L (ref 10–40)
AV AREA BY CONTINUOUS VTI: 1 CM2
AV INDEX (PROSTH): 0.44
AV LVOT MEAN GRADIENT: 4 MMHG
AV LVOT PEAK GRADIENT: 6 MMHG
AV MEAN GRADIENT: 34 MMHG
AV PEAK GRADIENT: 60.8 MMHG
AV VALVE AREA BY VELOCITY RATIO: 0.9 CM²
AV VALVE AREA: 1.3 CM²
AV VELOCITY RATIO: 0.31
BASOPHILS # BLD AUTO: 0.02 K/UL (ref 0–0.2)
BASOPHILS NFR BLD: 0.2 % (ref 0–1.9)
BILIRUB SERPL-MCNC: 0.3 MG/DL (ref 0.1–1)
BSA FOR ECHO PROCEDURE: 2.02 M2
BUN SERPL-MCNC: 29 MG/DL (ref 8–23)
CALCIUM SERPL-MCNC: 8.9 MG/DL (ref 8.7–10.5)
CHLORIDE SERPL-SCNC: 105 MMOL/L (ref 95–110)
CO2 SERPL-SCNC: 28 MMOL/L (ref 23–29)
CREAT SERPL-MCNC: 1.2 MG/DL (ref 0.5–1.4)
CV ECHO LV RWT: 0.48 CM
DIFFERENTIAL METHOD BLD: ABNORMAL
DOP CALC AO PEAK VEL: 3.9 M/S
DOP CALC AO VTI: 54.2 CM
DOP CALC LVOT AREA: 2.8 CM2
DOP CALC LVOT DIAMETER: 1.9 CM
DOP CALC LVOT PEAK VEL: 1.2 M/S
DOP CALC LVOT STROKE VOLUME: 68 CM3
DOP CALCLVOT PEAK VEL VTI: 24 CM
E WAVE DECELERATION TIME: 141.74 MS
E/A RATIO: 0.88
E/E' RATIO: 13.82 M/S
ECHO EF ESTIMATED: 62 %
ECHO LV POSTERIOR WALL: 1.1 CM (ref 0.6–1.1)
EOSINOPHIL # BLD AUTO: 0.1 K/UL (ref 0–0.5)
EOSINOPHIL NFR BLD: 0.8 % (ref 0–8)
ERYTHROCYTE [DISTWIDTH] IN BLOOD BY AUTOMATED COUNT: 15.9 % (ref 11.5–14.5)
EST. GFR  (NO RACE VARIABLE): 47.2 ML/MIN/1.73 M^2
FRACTIONAL SHORTENING: 32.6 % (ref 28–44)
GLUCOSE SERPL-MCNC: 105 MG/DL (ref 70–110)
HCT VFR BLD AUTO: 26.3 % (ref 37–48.5)
HGB BLD-MCNC: 7.6 G/DL (ref 12–16)
IMM GRANULOCYTES # BLD AUTO: 0.05 K/UL (ref 0–0.04)
IMM GRANULOCYTES NFR BLD AUTO: 0.5 % (ref 0–0.5)
INTERVENTRICULAR SEPTUM: 1.1 CM (ref 0.6–1.1)
IVRT: 108.47 MS
LA MAJOR: 5.61 CM
LA MINOR: 5.59 CM
LA WIDTH: 3.83 CM
LEFT ATRIUM SIZE: 4.26 CM
LEFT ATRIUM VOLUME INDEX MOD: 35.5 ML/M2
LEFT ATRIUM VOLUME INDEX: 40.2 ML/M2
LEFT ATRIUM VOLUME MOD: 68.46 ML
LEFT ATRIUM VOLUME: 77.66 CM3
LEFT INTERNAL DIMENSION IN SYSTOLE: 3.1 CM (ref 2.1–4)
LEFT VENTRICLE DIASTOLIC VOLUME INDEX: 51.59 ML/M2
LEFT VENTRICLE DIASTOLIC VOLUME: 99.57 ML
LEFT VENTRICLE MASS INDEX: 93.9 G/M2
LEFT VENTRICLE SYSTOLIC VOLUME INDEX: 19.6 ML/M2
LEFT VENTRICLE SYSTOLIC VOLUME: 37.86 ML
LEFT VENTRICULAR INTERNAL DIMENSION IN DIASTOLE: 4.6 CM (ref 3.5–6)
LEFT VENTRICULAR MASS: 181.2 G
LV LATERAL E/E' RATIO: 10.86
LV SEPTAL E/E' RATIO: 19
LYMPHOCYTES # BLD AUTO: 1.4 K/UL (ref 1–4.8)
LYMPHOCYTES NFR BLD: 14.4 % (ref 18–48)
MAGNESIUM SERPL-MCNC: 1.9 MG/DL (ref 1.6–2.6)
MCH RBC QN AUTO: 25.4 PG (ref 27–31)
MCHC RBC AUTO-ENTMCNC: 28.9 G/DL (ref 32–36)
MCV RBC AUTO: 88 FL (ref 82–98)
MONOCYTES # BLD AUTO: 1.3 K/UL (ref 0.3–1)
MONOCYTES NFR BLD: 13.3 % (ref 4–15)
MV A" WAVE DURATION": 94.2 MS
MV PEAK A VEL: 0.86 M/S
MV PEAK E VEL: 0.76 M/S
NEUTROPHILS # BLD AUTO: 7 K/UL (ref 1.8–7.7)
NEUTROPHILS NFR BLD: 70.8 % (ref 38–73)
NRBC BLD-RTO: 0 /100 WBC
OHS CV RV/LV RATIO: 1 CM
PHOSPHATE SERPL-MCNC: 3.5 MG/DL (ref 2.7–4.5)
PISA TR MAX VEL: 2.7 M/S
PLATELET # BLD AUTO: 204 K/UL (ref 150–450)
PMV BLD AUTO: 10.3 FL (ref 9.2–12.9)
POTASSIUM SERPL-SCNC: 3 MMOL/L (ref 3.5–5.1)
PROT SERPL-MCNC: 6.1 G/DL (ref 6–8.4)
PULM VEIN A" WAVE DURATION": 94.2 MS
PULM VEIN S/D RATIO: 1.36
PULMONIC VEIN PEAK A VELOCITY: 0.3 M/S
PV PEAK D VEL: 0.47 M/S
PV PEAK S VEL: 0.64 M/S
RA MAJOR: 5.17 CM
RA PRESSURE ESTIMATED: 3 MMHG
RA WIDTH: 4.73 CM
RBC # BLD AUTO: 2.99 M/UL (ref 4–5.4)
RIGHT VENTRICLE DIASTOLIC BASEL DIMENSION: 4.6 CM
RV TB RVSP: 6 MMHG
RV TISSUE DOPPLER FREE WALL SYSTOLIC VELOCITY 1 (APICAL 4 CHAMBER VIEW): 16.17 CM/S
SINUS: 3.01 CM
SODIUM SERPL-SCNC: 140 MMOL/L (ref 136–145)
STJ: 2.29 CM
TDI LATERAL: 0.07 M/S
TDI SEPTAL: 0.04 M/S
TDI: 0.06 M/S
TR MAX PG: 29 MMHG
TRICUSPID ANNULAR PLANE SYSTOLIC EXCURSION: 1.82 CM
TV PEAK GRADIENT: 29 MMHG
TV REST PULMONARY ARTERY PRESSURE: 32 MMHG
VANCOMYCIN SERPL-MCNC: 13.7 UG/ML
WBC # BLD AUTO: 9.82 K/UL (ref 3.9–12.7)
Z-SCORE OF LEFT VENTRICULAR DIMENSION IN END DIASTOLE: -1.72
Z-SCORE OF LEFT VENTRICULAR DIMENSION IN END SYSTOLE: -0.64

## 2024-10-01 PROCEDURE — 25000003 PHARM REV CODE 250

## 2024-10-01 PROCEDURE — 99223 1ST HOSP IP/OBS HIGH 75: CPT | Mod: ,,, | Performed by: PHYSICIAN ASSISTANT

## 2024-10-01 PROCEDURE — 63600175 PHARM REV CODE 636 W HCPCS

## 2024-10-01 PROCEDURE — 27000207 HC ISOLATION

## 2024-10-01 PROCEDURE — 36415 COLL VENOUS BLD VENIPUNCTURE: CPT

## 2024-10-01 PROCEDURE — 85025 COMPLETE CBC W/AUTO DIFF WBC: CPT

## 2024-10-01 PROCEDURE — 83735 ASSAY OF MAGNESIUM: CPT

## 2024-10-01 PROCEDURE — 80053 COMPREHEN METABOLIC PANEL: CPT

## 2024-10-01 PROCEDURE — 20600001 HC STEP DOWN PRIVATE ROOM

## 2024-10-01 PROCEDURE — 80202 ASSAY OF VANCOMYCIN: CPT

## 2024-10-01 PROCEDURE — 84100 ASSAY OF PHOSPHORUS: CPT

## 2024-10-01 RX ORDER — LORAZEPAM 1 MG/1
1 TABLET ORAL EVERY 12 HOURS PRN
Status: DISCONTINUED | OUTPATIENT
Start: 2024-10-01 | End: 2024-10-01

## 2024-10-01 RX ORDER — LORAZEPAM 1 MG/1
1 TABLET ORAL EVERY 8 HOURS PRN
Status: DISCONTINUED | OUTPATIENT
Start: 2024-10-01 | End: 2024-10-05 | Stop reason: HOSPADM

## 2024-10-01 RX ORDER — ACETAMINOPHEN 500 MG
5000 TABLET ORAL DAILY
COMMUNITY

## 2024-10-01 RX ORDER — MAGNESIUM SULFATE HEPTAHYDRATE 40 MG/ML
2 INJECTION, SOLUTION INTRAVENOUS ONCE
Status: COMPLETED | OUTPATIENT
Start: 2024-10-01 | End: 2024-10-01

## 2024-10-01 RX ORDER — POTASSIUM CHLORIDE 750 MG/1
30 CAPSULE, EXTENDED RELEASE ORAL 3 TIMES DAILY
Status: COMPLETED | OUTPATIENT
Start: 2024-10-01 | End: 2024-10-01

## 2024-10-01 RX ADMIN — LORAZEPAM 1 MG: 1 TABLET ORAL at 11:10

## 2024-10-01 RX ADMIN — APIXABAN 5 MG: 5 TABLET, FILM COATED ORAL at 08:10

## 2024-10-01 RX ADMIN — CHOLECALCIFEROL TAB 125 MCG (5000 UNIT) 5000 UNITS: 125 TAB at 08:10

## 2024-10-01 RX ADMIN — LORAZEPAM 1 MG: 1 TABLET ORAL at 08:10

## 2024-10-01 RX ADMIN — POTASSIUM CHLORIDE 30 MEQ: 750 CAPSULE, EXTENDED RELEASE ORAL at 09:10

## 2024-10-01 RX ADMIN — MAGNESIUM SULFATE HEPTAHYDRATE 2 G: 40 INJECTION, SOLUTION INTRAVENOUS at 07:10

## 2024-10-01 RX ADMIN — AMMONIUM LACTATE: 12 LOTION TOPICAL at 08:10

## 2024-10-01 RX ADMIN — ACETAMINOPHEN 650 MG: 325 TABLET ORAL at 03:10

## 2024-10-01 RX ADMIN — APIXABAN 5 MG: 5 TABLET, FILM COATED ORAL at 09:10

## 2024-10-01 RX ADMIN — AMMONIUM LACTATE: 12 LOTION TOPICAL at 09:10

## 2024-10-01 RX ADMIN — POTASSIUM CHLORIDE 30 MEQ: 750 CAPSULE, EXTENDED RELEASE ORAL at 03:10

## 2024-10-01 RX ADMIN — VANCOMYCIN HYDROCHLORIDE 1500 MG: 1.5 INJECTION, POWDER, LYOPHILIZED, FOR SOLUTION INTRAVENOUS at 09:10

## 2024-10-01 NOTE — ASSESSMENT & PLAN NOTE
"This patient does not have evidence of infective focus  My overall impression is sepsis.  Source: Unknown  Antibiotics given-   Antibiotics (72h ago, onward)      Start     Stop Route Frequency Ordered    09/29/24 2107  vancomycin - pharmacy to dose  (vancomycin IVPB (PEDS and ADULTS))        Placed in "And" Linked Group    -- IV pharmacy to manage frequency 09/29/24 2008          Latest lactate reviewed-  Recent Labs   Lab 09/29/24  1710 09/29/24  1956 09/30/24  1716   LACTATE  --    < > 1.5   POCLAC 2.20  --   --     < > = values in this interval not displayed.         Fluid challenge Not needed - patient is not hypotensive      Post- resuscitation assessment No - Post resuscitation assessment not needed       Will Not start Pressors- Levophed for MAP of 65  Source control achieved by: ABX    74-year-old patient presented to the ED with generalized weakness.  T-max found to be 101°.  Daughter reports malodorous urine, but UA negative. CXR negative. Received 1 L bolus of LR while in the ED.     Blood clx (+) E. Faecalis, pending susceptibility result    Patient noted to fever throughout the day following admission overnight with Tmax of 103.5F. Unable to relieve with ice pack and tylenol, though improved after 500 cc's LR given. Afebrile since.      Plan:   -- Broad-spectrum antibiotics with vanc/cefepime   - Deescalated down to vancomycin  -- F/u blood clx susceptibility  -- Wound care consult for bilateral lower extremity lymphedema  -- Ctm fever curve   - Tylenol and ice packs as needed  -- Infectious Disease consult, appreciate recs  "

## 2024-10-01 NOTE — ASSESSMENT & PLAN NOTE
Chronic, controlled. Latest blood pressure and vitals reviewed-     Temp:  [98.8 °F (37.1 °C)-103.1 °F (39.5 °C)]   Pulse:  []   Resp:  [18]   BP: ()/(53-57)   SpO2:  [75 %-94 %] .   Home meds for hypertension were reviewed and noted below.   Hypertension Medications               candesartan-hydrochlorothiazide (ATACAND HCT) 16-12.5 mg per tablet Take 1 tablet by mouth once daily.            While in the hospital, will manage blood pressure as follows; Adjust home antihypertensive regimen as follows- hold at this time 2/2 possible sepsis    Will utilize p.r.n. blood pressure medication only if patient's blood pressure greater than 180/110 and she develops symptoms such as worsening chest pain or shortness of breath.

## 2024-10-01 NOTE — ASSESSMENT & PLAN NOTE
BNP elevated to 357.  Patient denies any shortness of breath.  CXR negative for fluid.  Patient appears euvolemic on exam.  Lower extremities chronically edematous.    Echo from 7/10:    Summary      Left Ventricle: The left ventricle is normal in size. Normal wall thickness. There is concentric remodeling. Normal wall motion. There is normal systolic function with a visually estimated ejection fraction of 60 - 65%. Grade II diastolic dysfunction. Elevated left ventricular filling pressure.    Right Ventricle: Normal right ventricular cavity size. Wall thickness is normal. Right ventricle wall motion  is normal. Systolic function is normal.    Left Atrium: Left atrium is mildly dilated.    Right Atrium: Normal right atrial size.    Aortic Valve: The aortic valve is a trileaflet valve. There is moderate aortic valve sclerosis. There is moderate annular calcification present. Moderately restricted motion. There is mild to moderate stenosis. Aortic valve area by VTI is 1.47 cm². Aortic valve peak velocity is 3.5 m/s. Mean gradient is 27 mmHg. The dimensionless index is 0.45. There is no significant regurgitation. Hemodynamics are unchanged compared to prior study.    Aorta: Aortic root is normal in size measuring 2.93 cm. Ascending aorta is normal measuring 3.55 cm.    IVC/SVC: Intermediate venous pressure at 8 mmHg.      Plan:    -- Diuretics not indicated at this time  -- Repeat CXR if respiratory status acutely changes  -- Strict I/Os

## 2024-10-01 NOTE — PROGRESS NOTES
[facility]  Progress Note    Patient Name: Lupis Murcia  MRN: [id number]  Patient Class: IP- Inpatient   Admission Date: 9/29/2024  Length of Stay: 2 days  Attending Physician: Steve Fox MD  Primary Care Provider: Bobby Tran MD        Subjective:     Principal Problem:Sepsis        HPI:  74 year old female with a PMH of MS, debility, chronic lymphedema, HFpEF, HTN, left foot drop, prior PE (currently on Eliquis), and B12 deficiency who is presenting [place] [facility] with generalized weakness and decreased apetite over the past several days. Daughter is at bedside and is assisting with the history. Reports that patient has been more lethargic and quiet over the last couple of days.  States that her mother usually has a big appetite, but has been eating very little recently.  Daughter also reports that while changing patient's Depends, she noticed a very malodorous urine.  She took the patient's temperature, which read 101°, and she decided to call EMS to bring patient to the ED. Home health comes to the patient's home 2 times per week for PT/OT.  Wound care visits there home once a week to treat patient's lower extremity lymphedema.  Patient's daughter states that she has noticed increased weeping fluid from patient's bilateral lower extremities.  Patient denies headache, nausea, vomiting, abdominal pain, falls, dysuria, pain in lower extremities, and any recent sick contacts.    Of note, patient was recently admitted 7/9-7/14 for sepsis and hypoxic respiratory failure secondary to UTI.  Blood cultures were positive for Proteus and E coli.  Patient was started on vanc/cefepime/azithromycin.  She improved on IV antibiotics and was discharged on a 4 day course of oral levofloxacin.    While in the ED, patient is febrile (T-max 101°).  Blood pressure 127/57.  Oxygen saturation 95% on room air.  WBC elevated to 12.79.  Hemoglobin 9.3.  Creatinine 1.4.  BUN 22.  Protocol 0.23.  UA  negative for bacteria and leukocytes but +3 occult blood.  Troponins 0.153.  .  EKG sinus tachycardia with occasional PVCs.      Patient to be admitted to Hospital Medicine for further evaluation.    Overview/Hospital Course:  Patient here with generalized weakness and decreased apetite over the past several days. Was noticed to have malodorous urine by Daughter. She found to be febrile in the ED and labs revealed leukocytosis, Cr1.4, BUN 22, and procal 0.23. Troponins were flat.  . EKG with sinus tachycardia and occasional PVCs. Infectious work-up initiated and patient started on broad spectrum antibiotics with vancomycin and cefepime. Of note, patient had similar presentation back in July, though without malodorous urine, and was treated with the same antibiotics for UTI (+) for E.coli and Proteus. CXR unremarkable. UA negative for bacteria and leukocytes. Blood cultures positive for enterococcus faecalis. Patient fevering with Tmax of 103F. Unclear source of infection. Patient does have chronic lymphedema in bilateral lower extremities that look like they've progressed since her last hospitalization. Also with stage I sacral wound. Tylenol and ice packs for temperature control. Infectious Disease consulted for further recommendations.     Interval History: No acute overnight events. Pt was fevering throughout the day yesterday. Last recorded to be febrile at 8p with temp of 100.4F. Feeling better this AM. No CP, SOB, rigors, n/v, abdominal pain. No urinary complaints. Blood cultures (+) E.faecalis, and patient currently on vancomycin. Susceptibility still pending. ID consulted. Pt is c/o some diarrhea after starting the vanc.     Review of Systems  Objective:     Vital Signs (Most Recent):  Temp: 98.8 °F (37.1 °C) (10/01/24 0800)  Pulse: 82 (10/01/24 1124)  Resp: 18 (10/01/24 0800)  BP: (!) 109/57 (10/01/24 0800)  SpO2: (!) 93 % (10/01/24 0800) Vital Signs (24h Range):  Temp:  [98.8 °F (37.1  °C)-103.1 °F (39.5 °C)] 98.8 °F (37.1 °C)  Pulse:  [] 82  Resp:  [18] 18  SpO2:  [75 %-94 %] 93 %  BP: ()/(53-57) 109/57     Weight: 95.2 kg (209 lb 14.1 oz)  Body mass index is 39.66 kg/m².    Intake/Output Summary (Last 24 hours) at 10/1/2024 1402  Last data filed at 10/1/2024 0547  Gross per 24 hour   Intake 220 ml   Output 500 ml   Net -280 ml         Physical Exam  Constitutional:       Appearance: She is obese.   HENT:      Head: Normocephalic and atraumatic.      Mouth/Throat:      Pharynx: Oropharynx is clear.   Eyes:      Pupils: Pupils are equal, round, and reactive to light.   Cardiovascular:      Rate and Rhythm: Normal rate and regular rhythm.      Pulses: Normal pulses.      Heart sounds: Normal heart sounds.   Pulmonary:      Effort: Pulmonary effort is normal.      Breath sounds: Normal breath sounds.   Abdominal:      General: Abdomen is flat. There is no distension.      Tenderness: There is no abdominal tenderness.   Musculoskeletal:      Right lower leg: Edema present.      Left lower leg: Edema present.      Comments: Chronic skin thickening of the lower extremities   Skin:     General: Skin is warm and dry.   Neurological:      General: No focal deficit present.      Mental Status: She is alert.   Psychiatric:         Mood and Affect: Mood normal.             Significant Labs: All pertinent labs within the past 24 hours have been reviewed.  CBC:   Recent Labs   Lab 09/29/24  1706 09/30/24  0657 10/01/24  0400   WBC 12.79* 11.80 9.82   HGB 9.3* 8.3* 7.6*   HCT 29.5* 25.8* 26.3*    226 204     CMP:   Recent Labs   Lab 09/29/24  1706 09/30/24  0657 10/01/24  0400    138 140   K 4.0 4.2 3.0*    106 105   CO2 22* 23 28   * 112* 105   BUN 22 26* 29*   CREATININE 1.4 1.3 1.2   CALCIUM 9.7 9.2 8.9   PROT 7.8 7.0 6.1   ALBUMIN 3.4* 3.0* 2.7*   BILITOT 0.8 0.4 0.3   ALKPHOS 64 57 49*   AST 13 13 7*   ALT 8* 6* <5*   ANIONGAP 12 9 7*       Significant Imaging: I have  "reviewed all pertinent imaging results/findings within the past 24 hours.    Assessment/Plan:      * Sepsis  This patient does not have evidence of infective focus  My overall impression is sepsis.  Source: Unknown  Antibiotics given-   Antibiotics (72h ago, onward)      Start     Stop Route Frequency Ordered    09/29/24 2107  vancomycin - pharmacy to dose  (vancomycin IVPB (PEDS and ADULTS))        Placed in "And" Linked Group    -- IV pharmacy to manage frequency 09/29/24 2008          Latest lactate reviewed-  Recent Labs   Lab 09/29/24  1710 09/29/24 1956 09/30/24  1716   LACTATE  --    < > 1.5   POCLAC 2.20  --   --     < > = values in this interval not displayed.         Fluid challenge Not needed - patient is not hypotensive      Post- resuscitation assessment No - Post resuscitation assessment not needed       Will Not start Pressors- Levophed for MAP of 65  Source control achieved by: ABX    74-year-old patient presented to the ED with generalized weakness.  T-max found to be 101°.  Daughter reports malodorous urine, but UA negative. CXR negative. Received 1 L bolus of LR while in the ED.     Blood clx (+) E. Faecalis, pending susceptibility result    Patient noted to fever throughout the day following admission overnight with Tmax of 103.5F. Unable to relieve with ice pack and tylenol, though improved after 500 cc's LR given. Afebrile since.      Plan:   -- Broad-spectrum antibiotics with vanc/cefepime   - Deescalated down to vancomycin  -- F/u blood clx susceptibility  -- Wound care consult for bilateral lower extremity lymphedema  -- Ctm fever curve   - Tylenol and ice packs as needed  -- Infectious Disease consult, appreciate recs    Diastolic dysfunction  BNP elevated to 357.  Patient denies any shortness of breath.  CXR negative for fluid.  Patient appears euvolemic on exam.  Lower extremities chronically edematous.    Echo from 7/10:    Summary      Left Ventricle: The left ventricle is normal in " size. Normal wall thickness. There is concentric remodeling. Normal wall motion. There is normal systolic function with a visually estimated ejection fraction of 60 - 65%. Grade II diastolic dysfunction. Elevated left ventricular filling pressure.    Right Ventricle: Normal right ventricular cavity size. Wall thickness is normal. Right ventricle wall motion  is normal. Systolic function is normal.    Left Atrium: Left atrium is mildly dilated.    Right Atrium: Normal right atrial size.    Aortic Valve: The aortic valve is a trileaflet valve. There is moderate aortic valve sclerosis. There is moderate annular calcification present. Moderately restricted motion. There is mild to moderate stenosis. Aortic valve area by VTI is 1.47 cm². Aortic valve peak velocity is 3.5 m/s. Mean gradient is 27 mmHg. The dimensionless index is 0.45. There is no significant regurgitation. Hemodynamics are unchanged compared to prior study.    Aorta: Aortic root is normal in size measuring 2.93 cm. Ascending aorta is normal measuring 3.55 cm.    IVC/SVC: Intermediate venous pressure at 8 mmHg.      Plan:    -- Diuretics not indicated at this time  -- Repeat CXR if respiratory status acutely changes  -- Strict I/Os      Elevated troponin  Troponins elevated but flat 0.153 -> 0.163 -> 0.141.  Patient denies chest pain.  EKG with sinus tachycardia and PVCs.    No concern for ACS at this time. Likely secondary to infection and/or diastolic dysfunction.    -- Continuous cardiac monitoring  -- EKG PRN - get repeat if pt c/o chest pain      History of pulmonary embolus (PE)  Continue home Eliquis 5 mg BID      HTN (hypertension)  Chronic, controlled. Latest blood pressure and vitals reviewed-     Temp:  [98.8 °F (37.1 °C)-103.1 °F (39.5 °C)]   Pulse:  []   Resp:  [18]   BP: ()/(53-57)   SpO2:  [75 %-94 %] .   Home meds for hypertension were reviewed and noted below.   Hypertension Medications                candesartan-hydrochlorothiazide (ATACAND HCT) 16-12.5 mg per tablet Take 1 tablet by mouth once daily.            While in the hospital, will manage blood pressure as follows; Adjust home antihypertensive regimen as follows- hold at this time 2/2 possible sepsis    Will utilize p.r.n. blood pressure medication only if patient's blood pressure greater than 180/110 and she develops symptoms such as worsening chest pain or shortness of breath.    Lymphedema  Chronic. Daughter reports increase in oozing in bilateral LE over last few days.    -- Wound care following      MS (multiple sclerosis)  Continue vitamin-D while inpatient  -- PT/OT: low-intensity therapy recommended        VTE Risk Mitigation (From admission, onward)           Ordered     apixaban tablet 5 mg  2 times daily         09/29/24 2000     Reason for No Pharmacological VTE Prophylaxis  Once        Question:  Reasons:  Answer:  Already adequately anticoagulated on oral Anticoagulants    09/29/24 1931     IP VTE HIGH RISK PATIENT  Once         09/29/24 1931     Place sequential compression device  Until discontinued         09/29/24 1931                    Discharge Planning   USMAN: 10/4/2024     Code Status: Full Code   Is the patient medically ready for discharge?:     Reason for patient still in hospital (select all that apply): Patient trending condition, Treatment, and Consult recommendations             Beatrice Crowley DO  Department of Hospital Medicine   [facility]

## 2024-10-01 NOTE — ASSESSMENT & PLAN NOTE
Chronic. Daughter reports increase in oozing in bilateral LE over last few days.    -- Wound care following

## 2024-10-01 NOTE — ASSESSMENT & PLAN NOTE
Troponins elevated but flat 0.153 -> 0.163 -> 0.141.  Patient denies chest pain.  EKG with sinus tachycardia and PVCs.    No concern for ACS at this time. Likely secondary to infection and/or diastolic dysfunction.    -- Continuous cardiac monitoring  -- EKG PRN - get repeat if pt c/o chest pain

## 2024-10-01 NOTE — PROGRESS NOTES
"Admission Medication History     The home medication history was taken by Colby Lovelace.    You may go to "Admission" then "Reconcile Home Medications" tabs to review and/or act upon these items.     The home medication list has been updated by the Pharmacy department.   Please read ALL comments highlighted in yellow.   Please address this information as you see fit.    Feel free to contact us if you have any questions or require assistance.      The medications listed below were removed from the home medication list. Please reorder if appropriate:  Patient reports no longer taking the following medication(s):          Medications listed below were obtained from: Patient/family and Analytic software- [employer] [facility]   Medication Sig    apixaban (ELIQUIS) 5 mg Tab Take 1 tablet (5 mg total) by mouth 2 (two) times a day.    cholecalciferol, vitamin D3, 125 mcg (5,000 unit) Tab Take 5,000 Units by mouth once daily.    acetaminophen-codeine 300-30mg (TYLENOL #3) 300-30 mg Tab Take 1 tablet by mouth daily as needed (pain).    ammonium lactate (LAC-HYDRIN) 12 % lotion Apply topically 2 (two) times daily.    candesartan-hydrochlorothiazide (ATACAND HCT) 16-12.5 mg per tablet Take 1 tablet by mouth once daily. (Patient taking differently: Take 1 tablet by mouth daily as needed (blood pressure > 140 systolic or >90 diastolic).)    LORazepam (ATIVAN) 1 MG tablet Take 1 mg by mouth every 12 (twelve) hours as needed for Anxiety.    ondansetron (ZOFRAN-ODT) 4 MG TbDL Dissolve 1 tablet (4 mg total) by mouth every 6 (six) hours as needed (Nausea).       Potential issues to be addressed PRIOR TO DISCHARGE      Colby Lovelace  EXT [phone]    "

## 2024-10-01 NOTE — PROGRESS NOTES
Pharmacokinetic Assessment Follow Up: IV Vancomycin    Vancomycin serum concentration assessment(s):    The random level was drawn correctly and can be used to guide therapy at this time. The measurement is within the desired definitive target range of 10 to 20 mcg/mL.    Vancomycin Regimen Plan:    Continue pulse dosing. Give 1500 mg dose.     Drug levels (last 3 results):  Recent Labs   Lab Result Units 09/30/24  1716   Vancomycin, Random ug/mL 14.7       Pharmacy will continue to follow and monitor vancomycin.    Please contact pharmacy at extension [phone] for questions regarding this assessment.    Thank you for the consult,   Guillermo Duque       Patient brief summary:  Lupis Murcia is a 74 y.o. female initiated on antimicrobial therapy with IV Vancomycin for treatment of bacteremia    The patient's current regimen is pulse dosing.    Drug Allergies:   Review of patient's allergies indicates:   Allergen Reactions    Contrast media Shortness Of Breath and Rash    Pcn [penicillins] Shortness Of Breath and Rash    Celebrex [celecoxib] Other (See Comments)     Swallowing problems     Diazepam Hives    Gabapentin Other (See Comments)     Confusion     Motrin [ibuprofen] Rash       Actual Body Weight:   95.2 kg    Renal Function:   Estimated Creatinine Clearance: 39.4 mL/min (based on SCr of 1.3 mg/dL).,     Dialysis Method (if applicable):  N/A    CBC (last 72 hours):  Recent Labs   Lab Result Units 09/29/24  1706 09/30/24  0657   WBC K/uL 12.79* 11.80   Hemoglobin g/dL 9.3* 8.3*   Hematocrit % 29.5* 25.8*   Platelets K/uL 233 226   Gran % % 87.0* 71.8   Lymph % % 7.6* 14.4*   Mono % % 4.3 11.5   Eosinophil % % 0.0 0.7   Basophil % % 0.2 0.2   Differential Method  Automated Automated       Metabolic Panel (last 72 hours):  Recent Labs   Lab Result Units 09/29/24  1706 09/29/24  1747 09/30/24  0657   Sodium mmol/L 136  --  138   Potassium mmol/L 4.0  --  4.2   Chloride mmol/L 102  --  106   CO2 mmol/L 22*  --  23    Glucose mg/dL 158*  --  112*   Glucose, UA   --  Negative  --    BUN mg/dL 22  --  26*   Creatinine mg/dL 1.4  --  1.3   Albumin g/dL 3.4*  --  3.0*   Total Bilirubin mg/dL 0.8  --  0.4   Alkaline Phosphatase U/L 64  --  57   AST U/L 13  --  13   ALT U/L 8*  --  6*   Magnesium mg/dL 1.7  --  1.9   Phosphorus mg/dL  --   --  3.2       Vancomycin Administrations:  vancomycin given in the last 96 hours                     vancomycin 1,500 mg in D5W 250 mL IVPB (admixture device) (mg) 1,500 mg New Bag 09/30/24 2010    vancomycin 2 g in dextrose 5 % 500 mL IVPB (mg) 2,000 mg New Bag 09/29/24 1809                    Microbiologic Results:  Microbiology Results (last 7 days)       Procedure Component Value Units Date/Time    Rapid Organism ID by PCR (from Blood culture) [[id number]]  (Abnormal) Collected: 09/29/24 1706    Order Status: Completed Updated: 09/30/24 1126     Enterococcus faecalis Detected     Enterococcus faecium Not Detected     Listeria monocytogenes Not Detected     Staphylococcus spp. Not Detected     Staphylococcus aureus Not Detected     Staphylococcus epidermidis Not Detected     Staphylococcus lugdunensis Not Detected     Streptococcus species Not Detected     Streptococcus agalactiae Not Detected     Streptococcus pneumoniae Not Detected     Streptococcus pyogenes Not Detected     Acinetobacter calcoaceticus/baumannii complex Not Detected     Bacteroides fragilis Not Detected     Enterobacterales Not Detected     Enterobacter cloacae complex Not Detected     Escherichia coli Not Detected     Klebsiella aerogenes Not Detected     Klebsiella oxytoca Not Detected     Klebsiella pneumoniae group Not Detected     Proteus Not Detected     Salmonella sp Not Detected     Serratia marcescens Not Detected     Haemophilus influenzae Not Detected     Neisseria meningtidis Not Detected     Pseudomonas aeruginosa Not Detected     Stenotrophomonas maltophilia Not Detected     Candida albicans Not Detected      Candida auris Not Detected     Candida glabrata Not Detected     Candida krusei Not Detected     Candida parapsilosis Not Detected     Candida tropicalis Not Detected     Cryptococcus neoformans/gattii Not Detected     CTX-M (ESBL ) Test Not Applicable     IMP (Carbapenem resistant) Test Not Applicable     KPC resistance gene (Carbapenem resistant) Test Not Applicable     mcr-1  Test Not Applicable     mec A/C  Test Not Applicable     mec A/C and MREJ (MRSA) gene Test Not Applicable     NDM (Carbapenem resistant) Test Not Applicable     OXA-48-like (Carbapenem resistant) Test Not Applicable     van A/B (VRE gene) Not Detected     VIM (Carbapenem resistant) Test Not Applicable    Narrative:      Aerobic and anaerobic    Blood culture x two cultures. Draw prior to antibiotics. [[id number]] Collected: 09/29/24 1706    Order Status: Completed Specimen: Blood from Peripheral, Forearm, Left Updated: 09/30/24 0922     Blood Culture, Routine Gram stain aer bottle: Gram positive cocci      Gram stain lemuel bottle: Gram positive cocci      Positive results previously called 09/30/2024  09:22    Narrative:      Aerobic and anaerobic    Blood culture x two cultures. Draw prior to antibiotics. [[id number]] Collected: 09/29/24 1706    Order Status: Completed Specimen: Blood from Peripheral, Antecubital, Right Updated: 09/30/24 0922     Blood Culture, Routine Gram stain aer bottle: Gram positive cocci      Gram stain lemuel bottle: Gram positive cocci      Results called to and read back by: Mitchell Pabon RN  09/30/2024  09:21    Narrative:      Aerobic and anaerobic    Culture, Respiratory with Gram Stain [[id number]]     Order Status: No result Specimen: Respiratory     Influenza A & B by Molecular [[id number]] Collected: 09/29/24 1659    Order Status: Completed Specimen: Nasopharyngeal Swab Updated: 09/29/24 1749     Influenza A, Molecular Negative     Influenza B, Molecular Negative     Flu A & B Source Nasal swab

## 2024-10-01 NOTE — PLAN OF CARE
[facility]  Discharge Assessment    Primary Care Provider: Bobby Tran MD     Discharge Assessment (most recent)       BRIEF DISCHARGE ASSESSMENT - 10/01/24 0170          Discharge Planning    Assessment Type Discharge Planning Brief Assessment     Resource/Environmental Concerns none     Support Systems Children;Friends/neighbors     Assistance Needed Transportation to home ambulance     Equipment Currently Used at Home lift device;wheelchair;shower chair     Current Living Arrangements home     Care Facility Name n/a     Patient/Family Anticipates Transition to home with family     Patient/Family Anticipated Services at Transition none     DME Needed Upon Discharge  none     Discharge Plan A Home with family;Home Health   Resume with [facility]    Discharge Plan B Home with family;Home Health        Health Literacy    How often do you need to have someone help you when you read instructions, pamphlets, or other written material from your doctor or pharmacy? Never                     SW spoke with patient/daughter in 345 for Discharge Planning Assessment. Per patient, Pt lives daughter in a single family home on a slab foundation with zero steps to porch and point of entry.  Patient was dependent with ADLS and DID use DME or in-home assistive equipment. Pt is not on dialysis  or coumadin,  takes medications as prescribed / keeps refilled / has resources for all daily and prescriptive needs. Preferred pharmacy is [facility] - Agreeable to bedside delivery / Wants any necessary medication sent to preferred pharmacy.   Will have help from  daughter and other immediate family upon discharge - Pt will require ambulance transportation to home.  All questions addressed. Unit and SW direct numbers provided. Will continue to follow for course of hospitalization    9/29/2024  4:40 PM  Tachycardia [R00.0]  Fever [R50.9]  Chest pain [R07.9]  Urinary tract infection with hematuria, site  unspecified [N39.0, R31.9]  Fatigue, unspecified type [R53.83]  Leukocytosis, unspecified type [D72.829]    PCP: Bobby Tran MD    PHARMACY:   [facility]/pharm[facility] #[id number] - [place] - [address]  [address] [facility] [place]  [place] [address]  Phone: [phone] Fax: [phone]    [facility] - [place] - [address]  [address]  [place] [address]  Phone: [phone] Fax: [phone]      Payor: MEDICARE / Plan: MEDICARE PART A & B / Product Type: Government /       Heena Mendoza LMSW   - [facility]

## 2024-10-02 ENCOUNTER — DOCUMENT SCAN (OUTPATIENT)
Dept: HOME HEALTH SERVICES | Facility: HOSPITAL | Age: 74
End: 2024-10-02
Payer: MEDICARE

## 2024-10-02 LAB
ALBUMIN SERPL BCP-MCNC: 2.8 G/DL (ref 3.5–5.2)
ALP SERPL-CCNC: 53 U/L (ref 55–135)
ALT SERPL W/O P-5'-P-CCNC: <5 U/L (ref 10–44)
ANION GAP SERPL CALC-SCNC: 10 MMOL/L (ref 8–16)
AST SERPL-CCNC: 8 U/L (ref 10–40)
BASOPHILS # BLD AUTO: 0.01 K/UL (ref 0–0.2)
BASOPHILS NFR BLD: 0.2 % (ref 0–1.9)
BILIRUB SERPL-MCNC: 0.3 MG/DL (ref 0.1–1)
BUN SERPL-MCNC: 20 MG/DL (ref 8–23)
CALCIUM SERPL-MCNC: 9 MG/DL (ref 8.7–10.5)
CHLORIDE SERPL-SCNC: 106 MMOL/L (ref 95–110)
CO2 SERPL-SCNC: 24 MMOL/L (ref 23–29)
CREAT SERPL-MCNC: 0.8 MG/DL (ref 0.5–1.4)
DIFFERENTIAL METHOD BLD: ABNORMAL
EOSINOPHIL # BLD AUTO: 0.2 K/UL (ref 0–0.5)
EOSINOPHIL NFR BLD: 3.1 % (ref 0–8)
ERYTHROCYTE [DISTWIDTH] IN BLOOD BY AUTOMATED COUNT: 15.9 % (ref 11.5–14.5)
EST. GFR  (NO RACE VARIABLE): >60 ML/MIN/1.73 M^2
GLUCOSE SERPL-MCNC: 105 MG/DL (ref 70–110)
HCT VFR BLD AUTO: 25.8 % (ref 37–48.5)
HGB BLD-MCNC: 7.7 G/DL (ref 12–16)
IMM GRANULOCYTES # BLD AUTO: 0.03 K/UL (ref 0–0.04)
IMM GRANULOCYTES NFR BLD AUTO: 0.5 % (ref 0–0.5)
LYMPHOCYTES # BLD AUTO: 1.1 K/UL (ref 1–4.8)
LYMPHOCYTES NFR BLD: 16.4 % (ref 18–48)
MAGNESIUM SERPL-MCNC: 2.1 MG/DL (ref 1.6–2.6)
MCH RBC QN AUTO: 26.2 PG (ref 27–31)
MCHC RBC AUTO-ENTMCNC: 29.8 G/DL (ref 32–36)
MCV RBC AUTO: 88 FL (ref 82–98)
MONOCYTES # BLD AUTO: 0.9 K/UL (ref 0.3–1)
MONOCYTES NFR BLD: 13.5 % (ref 4–15)
NEUTROPHILS # BLD AUTO: 4.3 K/UL (ref 1.8–7.7)
NEUTROPHILS NFR BLD: 66.3 % (ref 38–73)
NRBC BLD-RTO: 0 /100 WBC
PHOSPHATE SERPL-MCNC: 2.8 MG/DL (ref 2.7–4.5)
PLATELET # BLD AUTO: 203 K/UL (ref 150–450)
PMV BLD AUTO: 10.9 FL (ref 9.2–12.9)
POTASSIUM SERPL-SCNC: 3 MMOL/L (ref 3.5–5.1)
PROT SERPL-MCNC: 6.6 G/DL (ref 6–8.4)
RBC # BLD AUTO: 2.94 M/UL (ref 4–5.4)
SODIUM SERPL-SCNC: 140 MMOL/L (ref 136–145)
VANCOMYCIN SERPL-MCNC: 15.9 UG/ML
WBC # BLD AUTO: 6.51 K/UL (ref 3.9–12.7)

## 2024-10-02 PROCEDURE — 80053 COMPREHEN METABOLIC PANEL: CPT

## 2024-10-02 PROCEDURE — 25000003 PHARM REV CODE 250

## 2024-10-02 PROCEDURE — 36415 COLL VENOUS BLD VENIPUNCTURE: CPT

## 2024-10-02 PROCEDURE — 20600001 HC STEP DOWN PRIVATE ROOM

## 2024-10-02 PROCEDURE — 84100 ASSAY OF PHOSPHORUS: CPT

## 2024-10-02 PROCEDURE — 97110 THERAPEUTIC EXERCISES: CPT

## 2024-10-02 PROCEDURE — 85025 COMPLETE CBC W/AUTO DIFF WBC: CPT

## 2024-10-02 PROCEDURE — 99232 SBSQ HOSP IP/OBS MODERATE 35: CPT | Mod: ,,, | Performed by: PHYSICIAN ASSISTANT

## 2024-10-02 PROCEDURE — 97530 THERAPEUTIC ACTIVITIES: CPT

## 2024-10-02 PROCEDURE — 80202 ASSAY OF VANCOMYCIN: CPT

## 2024-10-02 PROCEDURE — 83735 ASSAY OF MAGNESIUM: CPT

## 2024-10-02 RX ORDER — POTASSIUM CHLORIDE 750 MG/1
30 CAPSULE, EXTENDED RELEASE ORAL 3 TIMES DAILY
Status: COMPLETED | OUTPATIENT
Start: 2024-10-02 | End: 2024-10-03

## 2024-10-02 RX ORDER — SODIUM,POTASSIUM PHOSPHATES 280-250MG
1 POWDER IN PACKET (EA) ORAL ONCE
Status: DISCONTINUED | OUTPATIENT
Start: 2024-10-02 | End: 2024-10-02

## 2024-10-02 RX ADMIN — CHOLECALCIFEROL TAB 125 MCG (5000 UNIT) 5000 UNITS: 125 TAB at 09:10

## 2024-10-02 RX ADMIN — AMMONIUM LACTATE: 12 LOTION TOPICAL at 10:10

## 2024-10-02 RX ADMIN — POTASSIUM CHLORIDE 30 MEQ: 750 CAPSULE, EXTENDED RELEASE ORAL at 09:10

## 2024-10-02 RX ADMIN — LORAZEPAM 1 MG: 1 TABLET ORAL at 10:10

## 2024-10-02 RX ADMIN — POTASSIUM CHLORIDE 30 MEQ: 750 CAPSULE, EXTENDED RELEASE ORAL at 03:10

## 2024-10-02 RX ADMIN — APIXABAN 5 MG: 5 TABLET, FILM COATED ORAL at 09:10

## 2024-10-02 RX ADMIN — LORAZEPAM 1 MG: 1 TABLET ORAL at 07:10

## 2024-10-02 RX ADMIN — ACETAMINOPHEN 650 MG: 325 TABLET ORAL at 03:10

## 2024-10-02 RX ADMIN — AMMONIUM LACTATE: 12 LOTION TOPICAL at 09:10

## 2024-10-02 NOTE — MEDICAL/APP STUDENT
Progress Note  Patient: Lupis Murcia    Subjective:  This AM Ms. Murcia is comfortably eating breakfast reporting sleeping well least night and denying any pain or concerns. She denies consitutional symptoms, sob, diarrhea, cp, n/v, or dysuria.   She states she has not had a bm yet this am.    Objective  Vitals:    10/02/24 0803   BP: 135/67   Pulse: 76   Resp: 18   Temp: 98.3 °F (36.8 °C)     Physical Exam  Constitutional:       General: She is not in acute distress.     Appearance: Normal appearance. She is not ill-appearing, toxic-appearing or diaphoretic.   HENT:      Head: Normocephalic and atraumatic.   Eyes:      Extraocular Movements: Extraocular movements intact.   Cardiovascular:      Rate and Rhythm: Normal rate and regular rhythm.      Heart sounds: Murmur heard.   Pulmonary:      Effort: Pulmonary effort is normal. No respiratory distress.      Breath sounds: Normal breath sounds. No stridor. No wheezing.   Abdominal:      General: Abdomen is flat. There is no distension.      Palpations: Abdomen is soft.      Tenderness: There is no abdominal tenderness. There is no guarding or rebound.   Musculoskeletal:         General: No tenderness.      Right lower leg: Edema (chronic bilateral lymphedema) present.      Left lower leg: Edema present.   Skin:     General: Skin is warm.      Findings: Lesion (lichenification of bilateral LE 2/2 chronic lymphedema) present.   Neurological:      Mental Status: She is alert and oriented to person, place, and time.   Psychiatric:         Mood and Affect: Mood normal.         Behavior: Behavior normal.         All labs within 24 hours have been reviewed.     Micro  C diff collection - pending  BC 9/29 - E. faecalis  BC 9/30 - NGTD    Imaging  No new imaging    Assessment  73yo F with PMH MS, diastolic dysfunction, chronic lymphedema, prior PE on eliquis presenting from home with increased weakness, lethargy, decreased appetite, and malodorous urine. Patient was  febrile with tmax of 103.5, wbc 12.7 on admission and admitted with c/f sepsis c/b lucia. Her BC grew E. Faecalis but repeat has been NGTD and other infectious workup (UA/Echo/CXR) has been negative so far.

## 2024-10-02 NOTE — ASSESSMENT & PLAN NOTE
"This patient does not have evidence of infective focus  My overall impression is sepsis.  Source: Unknown  Antibiotics given-   Antibiotics (72h ago, onward)      Start     Stop Route Frequency Ordered    09/29/24 2107  vancomycin - pharmacy to dose  (vancomycin IVPB (PEDS and ADULTS))        Placed in "And" Linked Group    -- IV pharmacy to manage frequency 09/29/24 2008          Latest lactate reviewed-  Recent Labs   Lab 09/29/24  1710 09/29/24  1956 09/30/24  1716   LACTATE  --    < > 1.5   POCLAC 2.20  --   --     < > = values in this interval not displayed.         Fluid challenge Not needed - patient is not hypotensive      Post- resuscitation assessment No - Post resuscitation assessment not needed       Will Not start Pressors- Levophed for MAP of 65  Source control achieved by: ABX    74-year-old patient presented to the ED with generalized weakness.  T-max found to be 101°.  Daughter reports malodorous urine, but UA negative. CXR negative. Received 1 L bolus of LR while in the ED.     9/29 Blood clx (2/2) positive for E. Faecalis, susceptible to vancomycin  -> Updated result with positive findings for Staph epidermidis, pending susceptibility result    10/1 TTE without evidence of vegetations    Patient noted to fever the day following admission with Tmax of 103.5F. Unable to relieve with ice pack and tylenol, though improved after 500 cc's LR given. Afebrile since. Suspect sepsis source to be from sacral wound exposure to gastric content as patient reported multiple episodes of diarrhea from 9/30-10/1 and intermittently sitting in her stool prior to being cleaned.    Plan:   -- Broad-spectrum antibiotics with vanc/cefepime   - Deescalated down to vancomycin  -- F/u blood clx susceptibility  -- Wound care consult for bilateral lower extremity lymphedema  -- Ctm fever curve   - Tylenol and ice packs as needed  -- Infectious Disease consult, appreciate recs   - Continue vanc   - F/u blood clx susceptibility "

## 2024-10-02 NOTE — CONSULTS
[facility]  Infectious Disease  Consult Note    Patient Name: Lupis Murcia  MRN: [id number]  Admission Date: 9/29/2024  Hospital Length of Stay: 2 days  Attending Physician: Steve Fox MD  Primary Care Provider: Bobby Tran MD     Isolation Status: Special Contact    Patient information was obtained from patient, past medical records, and ER records.      Inpatient consult to Infectious Diseases  Consult performed by: Khalif Shook Jr., PA  Consult ordered by: Beatrice Crowley DO        Assessment/Plan:     ID  Enterococcal bacteremia  74-year-old female with a history of multiple sclerosis not on immunosuppression, lymphedema of bilateral lower extremities, and pulmonary embolism admitted with fever and weakness found to have enterococcal bacteremia of and unclear source.  Possible source could be presacral skin irritation with fecal contamination. Patient has been treated with vancomycin and has improved.  Repeat blood cultures are no growth to date.  2D echo obtained showing no vegetation but pulmonic valve not well visualized.    .  Plan  Continue vancomycin  Follow up Enterococcus speciation and sensitivities  Follow up repeat blood cultures  Id will follow        Thank you for your consult. I will follow-up with patient. Please contact us if you have any additional questions.    LIZA Geiger  Infectious Disease  [facility]    Subjective:     Principal Problem: Sepsis    HPI: 74 year old female with a PMH of MS (not on immunotherapy), debility, chronic lymphedema, HFpEF, HTN, left foot drop, prior PE (currently on Eliquis) presented to [facility] with generalized weakness and decreased apetite over the past several days. Daughter is at bedside provides history. Reports that patient has been more lethargic and quiet over the last couple of days.  States that her mother usually has a big appetite, but has been eating very little recently.  Daughter also  reports that while changing patient's Depends, she noticed a very malodorous urine.  She took the patient's temperature, which read 101°, and she decided to call EMS to bring patient to the ED. Home health comes to the patient's home 2 times per week for PT/OT.  Wound care visits there home once a week to treat patient's lower extremity lymphedema.       Of note, patient was recently admitted - for sepsis and hypoxic respiratory failure secondary to UTI.  Blood cultures were positive for Proteus and E coli.  Patient was started on vanc/cefepime/azithromycin.  She improved on IV antibiotics and was discharged on a 4 day course of oral levofloxacin.     While in the ED, patient is afebrile (T-max 101°).  UA negative for rare bacteria, WBC 3, and leukocytes esterace negative.  CXR showed:The lungs are hypoexpanded without focal or lobar consolidation.  Blood cultures now positive for E faecalis - Source unclear. ID now consulted for bacteremia - remains on Vanc.       Past Medical History:   Diagnosis Date    Lymphedema     MS (multiple sclerosis)     Other pulmonary embolism without acute cor pulmonale     Vitamin B12 deficiency        Past Surgical History:   Procedure Laterality Date    BREAST CYST EXCISION Left     over 40yrs ago     SECTION      ESOPHAGOGASTRODUODENOSCOPY N/A 2022    Procedure: EGD (ESOPHAGOGASTRODUODENOSCOPY);  Surgeon: Radha Arango MD;  Location: [address]);  Service: Endoscopy;  Laterality: N/A;       Review of patient's allergies indicates:   Allergen Reactions    Contrast media Shortness Of Breath and Rash    Pcn [penicillins] Shortness Of Breath and Rash    Celebrex [celecoxib] Other (See Comments)     Swallowing problems     Diazepam Hives    Gabapentin Other (See Comments)     Confusion     Motrin [ibuprofen] Rash       Medications:  Medications Prior to Admission   Medication Sig    acetaminophen-codeine 300-30mg (TYLENOL #3) 300-30 mg Tab Take 1 tablet by  "mouth daily as needed (pain).    ammonium lactate (LAC-HYDRIN) 12 % lotion Apply topically 2 (two) times daily.    apixaban (ELIQUIS) 5 mg Tab Take 1 tablet (5 mg total) by mouth 2 (two) times a day.    candesartan-hydrochlorothiazide (ATACAND HCT) 16-12.5 mg per tablet Take 1 tablet by mouth once daily. (Patient taking differently: Take 1 tablet by mouth daily as needed (blood pressure > 140 systolic or >90 diastolic).)    cholecalciferol, vitamin D3, 125 mcg (5,000 unit) Tab Take 5,000 Units by mouth once daily.    LORazepam (ATIVAN) 1 MG tablet Take 1 mg by mouth every 12 (twelve) hours as needed for Anxiety.    ondansetron (ZOFRAN-ODT) 4 MG TbDL Dissolve 1 tablet (4 mg total) by mouth every 6 (six) hours as needed (Nausea).     Antibiotics (From admission, onward)      Start     Stop Route Frequency Ordered    10/01/24 2015  vancomycin 1,500 mg in D5W 250 mL IVPB (admixture device)         -- IV Once 10/01/24 1903    09/29/24 2107  vancomycin - pharmacy to dose  (vancomycin IVPB (PEDS and ADULTS))        Placed in "And" Linked Group    -- IV pharmacy to manage frequency 09/29/24 2008          Antifungals (From admission, onward)      None          Antivirals (From admission, onward)      None             Immunization History   Administered Date(s) Administered    COVID-19, vector-nr, rS-Ad26, PF (Paul) 03/31/2021    PPD Test 03/31/2021, 06/17/2022       Family History       Problem Relation (Age of Onset)    Brain cancer Brother    Coronary artery disease Father    Lung cancer Father, Mother          Social History     Socioeconomic History    Marital status:    Tobacco Use    Smoking status: Never    Smokeless tobacco: Never   Substance and Sexual Activity    Alcohol use: No    Drug use: No     Social Drivers of Health     Financial Resource Strain: Low Risk  (7/10/2024)    Overall Financial Resource Strain (CARDIA)     Difficulty of Paying Living Expenses: Not hard at all   Food Insecurity: No Food " Insecurity (7/10/2024)    Hunger Vital Sign     Worried About Running Out of Food in the Last Year: Never true     Ran Out of Food in the Last Year: Never true   Transportation Needs: No Transportation Needs (7/10/2024)    TRANSPORTATION NEEDS     Transportation : No   Physical Activity: Inactive (7/10/2024)    Exercise Vital Sign     Days of Exercise per Week: 0 days     Minutes of Exercise per Session: 0 min   Stress: No Stress Concern Present (7/10/2024)    [employer] - Occupational Stress Questionnaire     Feeling of Stress : Only a little   Housing Stability: Low Risk  (7/10/2024)    Housing Stability Vital Sign     Unable to Pay for Housing in the Last Year: No     Homeless in the Last Year: No     Review of Systems   Constitutional:  Negative for appetite change, chills, diaphoresis, fatigue, fever and unexpected weight change.   HENT:  Negative for congestion, ear pain, hearing loss, sore throat and tinnitus.    Eyes:  Negative for pain, redness and visual disturbance.   Respiratory:  Negative for cough, chest tightness, shortness of breath and wheezing.    Cardiovascular:  Negative for chest pain.   Gastrointestinal:  Negative for abdominal pain, constipation, diarrhea, nausea and vomiting.   Endocrine: Negative for cold intolerance and heat intolerance.   Genitourinary:  Negative for decreased urine volume, difficulty urinating, dysuria, flank pain, frequency, hematuria and urgency.   Musculoskeletal:  Negative for arthralgias, back pain, myalgias and neck pain.   Skin:  Positive for color change and wound. Negative for rash.   Allergic/Immunologic: Negative for environmental allergies, food allergies and immunocompromised state.   Neurological:  Positive for weakness. Negative for dizziness, facial asymmetry, light-headedness, numbness and headaches.   Hematological:  Negative for adenopathy. Does not bruise/bleed easily.   Psychiatric/Behavioral:  Negative for agitation,  behavioral problems and confusion.      Objective:     Vital Signs (Most Recent):  Temp: 98.7 °F (37.1 °C) (10/01/24 1925)  Pulse: 88 (10/01/24 1925)  Resp: 18 (10/01/24 1925)  BP: 128/70 (10/01/24 1925)  SpO2: 96 % (10/01/24 1925) Vital Signs (24h Range):  Temp:  [98.5 °F (36.9 °C)-99.2 °F (37.3 °C)] 98.7 °F (37.1 °C)  Pulse:  [73-88] 88  Resp:  [18] 18  SpO2:  [75 %-96 %] 96 %  BP: (105-128)/(56-70) 128/70     Weight: 95.2 kg (209 lb 14.1 oz)  Body mass index is 39.66 kg/m².    Estimated Creatinine Clearance: 42.7 mL/min (based on SCr of 1.2 mg/dL).     Physical Exam  Constitutional:       General: She is not in acute distress.     Appearance: She is well-developed. She is obese. She is not ill-appearing, toxic-appearing or diaphoretic.       HENT:      Head: Normocephalic and atraumatic.   Cardiovascular:      Rate and Rhythm: Normal rate and regular rhythm.      Heart sounds: Normal heart sounds. No murmur heard.     No friction rub. No gallop.   Pulmonary:      Effort: Pulmonary effort is normal. No respiratory distress.      Breath sounds: Normal breath sounds. No wheezing or rales.   Abdominal:      General: Bowel sounds are normal. There is no distension.      Palpations: Abdomen is soft. There is no mass.      Tenderness: There is no abdominal tenderness. There is no guarding or rebound.   Skin:     General: Skin is warm and dry.   Neurological:      Mental Status: She is alert and oriented to person, place, and time.   Psychiatric:         Behavior: Behavior normal.        Significant Labs: Blood Culture:   Recent Labs   Lab 07/11/24  0506 07/11/24  0507 09/29/24  1706 09/30/24  2348 09/30/24  2349   LABBLOO No growth after 5 days. No growth after 5 days. Gram stain aer bottle: Gram positive cocci  Gram stain lemuel bottle: Gram positive cocci  Positive results previously called 09/30/2024  09:22  ENTEROCOCCUS FAECALIS  For susceptibility see order # [id number]  *  Gram stain aer bottle: Gram positive  cocci  Gram stain lemuel bottle: Gram positive cocci  Results called to and read back by: Mitchell Pabon RN  09/30/2024  09:21  ENTEROCOCCUS FAECALIS  Susceptibility pending  * No Growth to date No Growth to date     CBC:   Recent Labs   Lab 09/30/24  0657 10/01/24  0400   WBC 11.80 9.82   HGB 8.3* 7.6*   HCT 25.8* 26.3*    204     CMP:   Recent Labs   Lab 09/30/24  0657 10/01/24  0400    140   K 4.2 3.0*    105   CO2 23 28   * 105   BUN 26* 29*   CREATININE 1.3 1.2   CALCIUM 9.2 8.9   PROT 7.0 6.1   ALBUMIN 3.0* 2.7*   BILITOT 0.4 0.3   ALKPHOS 57 49*   AST 13 7*   ALT 6* <5*   ANIONGAP 9 7*     All pertinent labs within the past 24 hours have been reviewed.    Significant Imaging: I have reviewed all pertinent imaging results/findings within the past 24 hours.  X-Ray Chest AP Portable [[id number]] Resulted: 09/29/24 1846   Order Status: Completed Updated: 09/29/24 1849   Narrative:     EXAMINATION:  XR CHEST AP PORTABLE    CLINICAL HISTORY:  Sepsis;    TECHNIQUE:  Single frontal view of the chest was performed.    COMPARISON:  Chest radiograph 07/12/2024, 07/09/2024    FINDINGS:  Cardiac monitoring leads overlie the chest.    The cardiomediastinal silhouette is enlarged.  The mediastinal structures are midline.  The hilar and mediastinal contours are unremarkable.    The lungs are hypoexpanded without focal or lobar consolidation.  Prominence of the central pulmonary vasculature.    No pneumothorax. No large pleural effusion.    Soft tissues are within normal limits.   Impression:       Stable prominent interstitial markings.  No focal consolidation.    Electronically signed by resident: Shanelle Paz  Date: 09/29/2024  Time: 18:17    Electronically signed by: Domenico Alfred MD  Date: 09/29/2024  Time: 18:46      Imaging History     2024    Date Procedure Name Study Review Link PACS Link Status Accession Number Location   10/01/24 03:25 PM Echo Study Review  Images Final [id number] [facility]    10/01/24 04:37 AM Cardiac monitoring strips Study Review  Final     09/30/24 01:09 AM Cardiac monitoring strips Study Review  Final     09/29/24 09:23 PM Cardiac monitoring strips Study Review  Final     09/29/24 05:38 PM X-Ray Chest AP Portable Study Review  Images Final [id number] [facility]

## 2024-10-02 NOTE — ASSESSMENT & PLAN NOTE
74-year-old female with a history of multiple sclerosis not on immunosuppression, lymphedema of bilateral lower extremities, and pulmonary embolism admitted with fever and weakness found to have enterococcal bacteremia of and unclear source.  Possible source could be presacral skin irritation with fecal contamination. Patient has been treated with vancomycin and has improved.  Repeat blood cultures are no growth to date.  2D echo obtained showing no vegetation but pulmonic valve not well visualized.    .  Plan  Continue vancomycin  Follow up Enterococcus speciation and sensitivities  Follow up repeat blood cultures  Id will follow

## 2024-10-02 NOTE — PT/OT/SLP PROGRESS
Physical Therapy Treatment    Patient Name:  Lupis Murcia   MRN:  [id number]  Admit Date: 2024  Admitting Diagnosis:  Sepsis   Length of Stay: 3 days  Recent Surgery: * No surgery found *      Recommendations:     Discharge Recommendations:  Low Intensity Therapy (Simultaneous filing. User may not have seen previous data.)   Discharge Equipment Recommendations: lift device       Plan:     During this hospitalization, patient to be seen 3 x/week to address the listed problems via gait training, therapeutic activities, therapeutic exercises, neuromuscular re-education  Plan of Care Expires:  10/29/24  Plan of Care Reviewed with: patient, daughter    Assessment:     Lupis Murcia is a 74 y.o. female admitted with a medical diagnosis of Sepsis. Prior to admission, pt spent all of her time in a lift chair, but pt's daughter was able to help her stand with 1 person assistance and a RW to assist with toileting, dressing, and bathing. Now, pt can only stand with 2 person assistance and a RW, but can only get 50-75% upright. Pt is a high fall risk at this time and would significantly benefit from a lift device to increase the safety of the patient and the patient's daughter. The lift device would also assist with safely transferring the patient in her motorized WC, so she can better participate in life and within the family.     Problem List: weakness, impaired endurance, impaired functional mobility, impaired balance, gait instability, decreased upper extremity function, decreased lower extremity function, impaired cardiopulmonary response to activity.  Rehab Prognosis: Poor     GOALS:   Multidisciplinary Problems       Physical Therapy Goals          Problem: Physical Therapy    Goal Priority Disciplines Outcome Interventions   Physical Therapy Goal     PT, PT/OT Progressing    Description: Goals to be met by: 10/15/2024    Patient will increase functional independence with mobility by performin. Supine to  "sit with Moderate Assistance  2. Sit to supine with Moderate Assistance  3. Rolling to Left and Right with Moderate Assistance.  4. Sit to stand transfer with Moderate Assistance using RW  5. Gait  x 5 feet with Maximal Assistance using Rolling Walker.                          Subjective   Communicated with RN prior to session.  Patient found HOB elevated upon PT entry to room, agreeable to evaluation. Lupis Murcia's daughter present during session.    Chief Complaint: debility; fear of falling  Patient/Family Comments/goals: to get better   Pain/Comfort:  Pain Rating 1: 0/10 (Simultaneous filing. User may not have seen previous data.)  Pain Rating Post-Intervention 1: 0/10    Objective:   Patient found with: telemetry, [employer] (Simultaneous filing. User may not have seen previous data.)   General Precautions: Standard, Cardiac fall (Simultaneous filing. User may not have seen previous data.)   Orthopedic Precautions:N/A   Braces: N/A (Simultaneous filing. User may not have seen previous data.)   Oxygen Device: Room Air  Vitals: /67 (BP Location: Left arm, Patient Position: Sitting)   Pulse 76   Temp 98.3 °F (36.8 °C) (Oral)   Resp 18   Ht 5' 1" (1.549 m)   Wt 95.2 kg (209 lb 14.1 oz)   LMP  (LMP Unknown)   SpO2 95%   BMI 39.66 kg/m²     Outcome Measures:  AM-PAC 6 CLICK MOBILITY  Turning over in bed (including adjusting bedclothes, sheets and blankets)?: 2  Sitting down on and standing up from a chair with arms (e.g., wheelchair, bedside commode, etc.): 2  Moving from lying on back to sitting on the side of the bed?: 2  Moving to and from a bed to a chair (including a wheelchair)?: 1  Need to walk in hospital room?: 1  Climbing 3-5 steps with a railing?: 1  Basic Mobility Total Score: 9     Functional Mobility:  Additional staff present: OT - due to pt requiring 2 skilled therapists to safely perform functional mobility and to accommodate pt's activity tolerance  Bed Mobility:   Rolling/Turning " to Left: total assistance and 2 persons  Rolling/Turning to Right: total assistance and 2 persons  Supine to Sit: total assistance and 2 persons; HOB elevated  Scooting anteriorly to EOB to have both feet planted on floor: total assistance and 2 persons  Sit to Supine: total assistance and 2 persons; HOB flat    Sitting Balance at Edge of Bed:  Assistance Level Required: SBA-min A  Time: 15 minutes   Postural deviations noted: slouched posture, rounded shoulders, trunk deviated right, and posterior pelvic tilt  Comments:   Worked on activity tolerance sitting EOB, worked on tolerance to positional change, and worked on sitting balance   Worked on postural exercises with OT    Transfers:   Sit <> Stand Transfer: total assistance and of 2 persons with rolling walker from EOB x 2 trials   Pt only able to get 50-75% upright  Facilitation of hand placement, hip extension, and anterior weight shift       Gait:  Not performed 2nd to pt still working on standing    Therapeutic Activities, Exercises, and Education:   Educated pt on PT role/POC  Pt verbalized understanding     Therapeutic Exercise  Mobility for generalized strengthening  Initiation and use of core and postural muscles during sitting and standing trials   Sit to stand x 2 reps to increase B LE strength and increase standing tolerance        Patient left HOB elevated with all lines intact, call button in reach, RN notified, and daughter present..    Time Tracking:     PT Received On: 10/02/24  PT Start Time: 0907     PT Stop Time: 0940  PT Total Time (min): 33 min       Billable Minutes:   Therapeutic Exercise 23    Treatment Type: Treatment  PT/PTA: PT

## 2024-10-02 NOTE — PROGRESS NOTES
Pharmacokinetic Assessment Follow Up: IV Vancomycin    Vancomycin serum concentration assessment(s):    The random level was drawn 16 hours after last dose. The measurement is within the desired definitive target range of 15 to 20 mcg/mL.    Vancomycin Regimen Plan:    Change regimen to Vancomycin 1000 mg IV every 12 hours with next serum trough concentration measured at 0400 prior to 4th dose on 10/4  May draw level sooner if Scr changes significantly  Drug levels (last 3 results):  Recent Labs   Lab Result Units 09/30/24  1716 10/01/24  1837 10/02/24  1329   Vancomycin, Random ug/mL 14.7 13.7 15.9       Pharmacy will continue to follow and monitor vancomycin.    Please contact pharmacy at extension [phone] for questions regarding this assessment.    Thank you for the consult,   Colby Lovelace       Patient brief summary:  Lupis Murcia is a 74 y.o. female initiated on antimicrobial therapy with IV Vancomycin for treatment of bacteremia    The patient's current regimen is 1000 mg Q12H    Drug Allergies:   Review of patient's allergies indicates:   Allergen Reactions    Contrast media Shortness Of Breath and Rash    Pcn [penicillins] Shortness Of Breath and Rash    Celebrex [celecoxib] Other (See Comments)     Swallowing problems     Diazepam Hives    Gabapentin Other (See Comments)     Confusion     Motrin [ibuprofen] Rash       Actual Body Weight:   95.2 kg    Renal Function:   Estimated Creatinine Clearance: 64.1 mL/min (based on SCr of 0.8 mg/dL).,     Dialysis Method (if applicable):  N/A    CBC (last 72 hours):  Recent Labs   Lab Result Units 09/29/24  1706 09/30/24  0657 10/01/24  0400 10/02/24  0446   WBC K/uL 12.79* 11.80 9.82 6.51   Hemoglobin g/dL 9.3* 8.3* 7.6* 7.7*   Hematocrit % 29.5* 25.8* 26.3* 25.8*   Platelets K/uL 233 226 204 203   Gran % % 87.0* 71.8 70.8 66.3   Lymph % % 7.6* 14.4* 14.4* 16.4*   Mono % % 4.3 11.5 13.3 13.5   Eosinophil % % 0.0 0.7 0.8 3.1   Basophil % % 0.2 0.2 0.2 0.2    Differential Method  Automated Automated Automated Automated       Metabolic Panel (last 72 hours):  Recent Labs   Lab Result Units 09/29/24  1706 09/29/24  1747 09/30/24  0657 10/01/24  0400 10/02/24  0446   Sodium mmol/L 136  --  138 140 140   Potassium mmol/L 4.0  --  4.2 3.0* 3.0*   Chloride mmol/L 102  --  106 105 106   CO2 mmol/L 22*  --  23 28 24   Glucose mg/dL 158*  --  112* 105 105   Glucose, UA   --  Negative  --   --   --    BUN mg/dL 22  --  26* 29* 20   Creatinine mg/dL 1.4  --  1.3 1.2 0.8   Albumin g/dL 3.4*  --  3.0* 2.7* 2.8*   Total Bilirubin mg/dL 0.8  --  0.4 0.3 0.3   Alkaline Phosphatase U/L 64  --  57 49* 53*   AST U/L 13  --  13 7* 8*   ALT U/L 8*  --  6* <5* <5*   Magnesium mg/dL 1.7  --  1.9 1.9 2.1   Phosphorus mg/dL  --   --  3.2 3.5 2.8       Vancomycin Administrations:  vancomycin given in the last 96 hours                     vancomycin 1,500 mg in D5W 250 mL IVPB (admixture device) (mg) 1,500 mg New Bag 10/01/24 2106    vancomycin 1,500 mg in D5W 250 mL IVPB (admixture device) (mg) 1,500 mg New Bag 09/30/24 2010    vancomycin 2 g in dextrose 5 % 500 mL IVPB (mg) 2,000 mg New Bag 09/29/24 1809                    Microbiologic Results:  Microbiology Results (last 7 days)       Procedure Component Value Units Date/Time    Blood culture x two cultures. Draw prior to antibiotics. [[id number]]  (Abnormal)  (Susceptibility) Collected: 09/29/24 1706    Order Status: Completed Specimen: Blood from Peripheral, Antecubital, Right Updated: 10/02/24 1214     Blood Culture, Routine Gram stain aer bottle: Gram positive cocci      Gram stain lemuel bottle: Gram positive cocci      Results called to and read back by: Mitchell Pabon RN  09/30/2024  09:21      ENTEROCOCCUS FAECALIS      STAPHYLOCOCCUS EPIDERMIDIS  Susceptibility pending      Narrative:      Aerobic and anaerobic    Blood culture x two cultures. Draw prior to antibiotics. [[id number]]  (Abnormal) Collected: 09/29/24 6399    Order Status:  Completed Specimen: Blood from Peripheral, Forearm, Left Updated: 10/02/24 1122     Blood Culture, Routine Gram stain aer bottle: Gram positive cocci      Gram stain lemuel bottle: Gram positive cocci      Positive results previously called 09/30/2024  09:22      ENTEROCOCCUS FAECALIS  For susceptibility see order # [id number]        STAPHYLOCOCCUS EPIDERMIDIS  Susceptibility pending      Narrative:      Aerobic and anaerobic    Blood culture [[id number]] Collected: 09/30/24 2348    Order Status: Completed Specimen: Blood from Peripheral, Hand, Left Updated: 10/02/24 0613     Blood Culture, Routine No Growth to date      No Growth to date    Blood culture [[id number]] Collected: 09/30/24 2349    Order Status: Completed Specimen: Blood Updated: 10/02/24 0613     Blood Culture, Routine No Growth to date      No Growth to date    Clostridium difficile EIA [[id number]]     Order Status: Canceled Specimen: Stool     Rapid Organism ID by PCR (from Blood culture) [[id number]]  (Abnormal) Collected: 09/29/24 1706    Order Status: Completed Updated: 09/30/24 1126     Enterococcus faecalis Detected     Enterococcus faecium Not Detected     Listeria monocytogenes Not Detected     Staphylococcus spp. Not Detected     Staphylococcus aureus Not Detected     Staphylococcus epidermidis Not Detected     Staphylococcus lugdunensis Not Detected     Streptococcus species Not Detected     Streptococcus agalactiae Not Detected     Streptococcus pneumoniae Not Detected     Streptococcus pyogenes Not Detected     Acinetobacter calcoaceticus/baumannii complex Not Detected     Bacteroides fragilis Not Detected     Enterobacterales Not Detected     Enterobacter cloacae complex Not Detected     Escherichia coli Not Detected     Klebsiella aerogenes Not Detected     Klebsiella oxytoca Not Detected     Klebsiella pneumoniae group Not Detected     Proteus Not Detected     Salmonella sp Not Detected     Serratia marcescens Not Detected      Haemophilus influenzae Not Detected     Neisseria meningtidis Not Detected     Pseudomonas aeruginosa Not Detected     Stenotrophomonas maltophilia Not Detected     Candida albicans Not Detected     Candida auris Not Detected     Candida glabrata Not Detected     Candida krusei Not Detected     Candida parapsilosis Not Detected     Candida tropicalis Not Detected     Cryptococcus neoformans/gattii Not Detected     CTX-M (ESBL ) Test Not Applicable     IMP (Carbapenem resistant) Test Not Applicable     KPC resistance gene (Carbapenem resistant) Test Not Applicable     mcr-1  Test Not Applicable     mec A/C  Test Not Applicable     mec A/C and MREJ (MRSA) gene Test Not Applicable     NDM (Carbapenem resistant) Test Not Applicable     OXA-48-like (Carbapenem resistant) Test Not Applicable     van A/B (VRE gene) Not Detected     VIM (Carbapenem resistant) Test Not Applicable    Narrative:      Aerobic and anaerobic    Culture, Respiratory with Gram Stain [[id number]]     Order Status: No result Specimen: Respiratory     Influenza A & B by Molecular [[id number]] Collected: 09/29/24 1659    Order Status: Completed Specimen: Nasopharyngeal Swab Updated: 09/29/24 5258     Influenza A, Molecular Negative     Influenza B, Molecular Negative     Flu A & B Source Nasal swab

## 2024-10-02 NOTE — PROGRESS NOTES
[facility]  Progress Note    Patient Name: Lupis Murcia  MRN: [id number]  Patient Class: IP- Inpatient   Admission Date: 9/29/2024  Length of Stay: 3 days  Attending Physician: Steve Fox MD  Primary Care Provider: Bobby Tran MD        Subjective:     Principal Problem:Sepsis        HPI:  74 year old female with a PMH of MS, debility, chronic lymphedema, HFpEF, HTN, left foot drop, prior PE (currently on Eliquis), and B12 deficiency who [place] [facility] [place] and decreased apetite over the past several days. Daughter is at bedside and is assisting with the history. Reports that patient has been more lethargic and quiet over the last couple of days.  States that her mother usually has a big appetite, but has been eating very little recently.  Daughter also reports that while changing patient's Depends, she noticed a very malodorous urine.  She took the patient's temperature, which read 101°, and she decided to call EMS to bring patient to the ED. Home health comes to the patient's home 2 times per week for PT/OT.  Wound care visits there home once a week to treat patient's lower extremity lymphedema.  Patient's daughter states that she has noticed increased weeping fluid from patient's bilateral lower extremities.  Patient denies headache, nausea, vomiting, abdominal pain, falls, dysuria, pain in lower extremities, and any recent sick contacts.    Of note, patient was recently admitted 7/9-7/14 for sepsis and hypoxic respiratory failure secondary to UTI.  Blood cultures were positive for Proteus and E coli.  Patient was started on vanc/cefepime/azithromycin.  She improved on IV antibiotics and was discharged on a 4 day course of oral levofloxacin.    While in the ED, patient is febrile (T-max 101°).  Blood pressure 127/57.  Oxygen saturation 95% on room air.  WBC elevated to 12.79.  Hemoglobin 9.3.  Creatinine 1.4.  BUN 22.  Protocol 0.23.  UA  negative for bacteria and leukocytes but +3 occult blood.  Troponins 0.153.  .  EKG sinus tachycardia with occasional PVCs.      Patient to be admitted to Hospital Medicine for further evaluation.    Overview/Hospital Course:  Patient here with generalized weakness and decreased apetite over the past several days. Was noticed to have malodorous urine by Daughter. She found to be febrile in the ED and labs revealed leukocytosis, Cr1.4, BUN 22, and procal 0.23. Troponins were flat.  . EKG with sinus tachycardia and occasional PVCs. Infectious work-up initiated and patient started on broad spectrum antibiotics with vancomycin and cefepime. Of note, patient had similar presentation back in July, though without malodorous urine, and was treated with the same antibiotics for UTI (+) for E.coli and Proteus. CXR unremarkable. UA negative for bacteria and leukocytes. Blood cultures positive for enterococcus faecalis. Patient fevering with Tmax of 103F. Unclear source of infection. Patient does have chronic lymphedema in bilateral lower extremities that look like they've progressed since her last hospitalization. Also with stage I sacral wound. Tylenol and ice packs for temperature control. Infectious Disease consulted for further recommendations. TTE completed, no evidence of vegetation. Updated (2/2) blood culture speciation positive for staph epidermidis.     Interval History: No acute overnight events. No diarrhea since yesterday. Pt afebrile and VSS. Denies CP, SOB, chills, abdominal pain, n/v at this time.    Review of Systems  Objective:     Vital Signs (Most Recent):  Temp: 98.3 °F (36.8 °C) (10/02/24 0803)  Pulse: 76 (10/02/24 0803)  Resp: 18 (10/02/24 0803)  BP: 135/67 (10/02/24 0803)  SpO2: 95 % (10/02/24 0803) Vital Signs (24h Range):  Temp:  [98.3 °F (36.8 °C)-99.7 °F (37.6 °C)] 98.3 °F (36.8 °C)  Pulse:  [76-88] 76  Resp:  [18] 18  SpO2:  [95 %-96 %] 95 %  BP: (124-135)/(67-70) 135/67     Weight:  95.2 kg (209 lb 14.1 oz)  Body mass index is 39.66 kg/m².    Intake/Output Summary (Last 24 hours) at 10/2/2024 0823  Last data filed at 10/2/2024 0429  Gross per 24 hour   Intake 300 ml   Output 100 ml   Net 200 ml         Physical Exam  Constitutional:       Appearance: She is obese.   HENT:      Head: Normocephalic and atraumatic.      Mouth/Throat:      Pharynx: Oropharynx is clear.   Eyes:      Pupils: Pupils are equal, round, and reactive to light.   Cardiovascular:      Rate and Rhythm: Normal rate and regular rhythm.      Pulses: Normal pulses.      Heart sounds: Normal heart sounds.   Pulmonary:      Effort: Pulmonary effort is normal.      Breath sounds: Normal breath sounds.   Abdominal:      General: Abdomen is flat. There is no distension.      Tenderness: There is no abdominal tenderness.   Musculoskeletal:      Right lower leg: Edema present.      Left lower leg: Edema present.      Comments: Chronic skin thickening of the lower extremities   Skin:     General: Skin is warm and dry.   Neurological:      General: No focal deficit present.      Mental Status: She is alert.   Psychiatric:         Mood and Affect: Mood normal.             Significant Labs: All pertinent labs within the past 24 hours have been reviewed.  Blood Culture:   Recent Labs   Lab 09/30/24  2348 09/30/24  2349   LABBLOO No Growth to date  No Growth to date No Growth to date  No Growth to date     CBC:   Recent Labs   Lab 10/01/24  0400 10/02/24  0446   WBC 9.82 6.51   HGB 7.6* 7.7*   HCT 26.3* 25.8*    203     CMP:   Recent Labs   Lab 10/01/24  0400 10/02/24  0446    140   K 3.0* 3.0*    106   CO2 28 24    105   BUN 29* 20   CREATININE 1.2 0.8   CALCIUM 8.9 9.0   PROT 6.1 6.6   ALBUMIN 2.7* 2.8*   BILITOT 0.3 0.3   ALKPHOS 49* 53*   AST 7* 8*   ALT <5* <5*   ANIONGAP 7* 10       Significant Imaging: I have reviewed all pertinent imaging results/findings within the past 24 hours.    Assessment/Plan:     "  * Sepsis  This patient does not have evidence of infective focus  My overall impression is sepsis.  Source: Unknown  Antibiotics given-   Antibiotics (72h ago, onward)      Start     Stop Route Frequency Ordered    09/29/24 2107  vancomycin - pharmacy to dose  (vancomycin IVPB (PEDS and ADULTS))        Placed in "And" Linked Group    -- IV pharmacy to manage frequency 09/29/24 2008          Latest lactate reviewed-  Recent Labs   Lab 09/29/24  1710 09/29/24  1956 09/30/24  1716   LACTATE  --    < > 1.5   POCLAC 2.20  --   --     < > = values in this interval not displayed.         Fluid challenge Not needed - patient is not hypotensive      Post- resuscitation assessment No - Post resuscitation assessment not needed       Will Not start Pressors- Levophed for MAP of 65  Source control achieved by: ABX    74-year-old patient presented to the ED with generalized weakness.  T-max found to be 101°.  Daughter reports malodorous urine, but UA negative. CXR negative. Received 1 L bolus of LR while in the ED.     9/29 Blood clx (2/2) positive for E. Faecalis, susceptible to vancomycin  -> Updated result with positive findings for Staph epidermidis, pending susceptibility result    10/1 TTE without evidence of vegetations    Patient noted to fever the day following admission with Tmax of 103.5F. Unable to relieve with ice pack and tylenol, though improved after 500 cc's LR given. Afebrile since. Suspect sepsis source to be from sacral wound exposure to gastric content as patient reported multiple episodes of diarrhea from 9/30-10/1 and intermittently sitting in her stool prior to being cleaned.    Plan:   -- Broad-spectrum antibiotics with vanc/cefepime   - Deescalated down to vancomycin  -- F/u blood clx susceptibility  -- Wound care consult for bilateral lower extremity lymphedema  -- Ctm fever curve   - Tylenol and ice packs as needed  -- Infectious Disease consult, appreciate recs   - Continue vanc   - F/u blood clx " susceptibility     Enterococcal bacteremia  Refer to 'Sepsis' for plan      Diastolic dysfunction  BNP elevated to 357.  Patient denies any shortness of breath.  CXR negative for fluid.  Patient appears euvolemic on exam.  Lower extremities chronically edematous.    Echo from 7/10:    Summary      Left Ventricle: The left ventricle is normal in size. Normal wall thickness. There is concentric remodeling. Normal wall motion. There is normal systolic function with a visually estimated ejection fraction of 60 - 65%. Grade II diastolic dysfunction. Elevated left ventricular filling pressure.    Right Ventricle: Normal right ventricular cavity size. Wall thickness is normal. Right ventricle wall motion  is normal. Systolic function is normal.    Left Atrium: Left atrium is mildly dilated.    Right Atrium: Normal right atrial size.    Aortic Valve: The aortic valve is a trileaflet valve. There is moderate aortic valve sclerosis. There is moderate annular calcification present. Moderately restricted motion. There is mild to moderate stenosis. Aortic valve area by VTI is 1.47 cm². Aortic valve peak velocity is 3.5 m/s. Mean gradient is 27 mmHg. The dimensionless index is 0.45. There is no significant regurgitation. Hemodynamics are unchanged compared to prior study.    Aorta: Aortic root is normal in size measuring 2.93 cm. Ascending aorta is normal measuring 3.55 cm.    IVC/SVC: Intermediate venous pressure at 8 mmHg.      Plan:    -- Diuretics not indicated at this time  -- Repeat CXR if respiratory status acutely changes  -- Strict I/Os      Elevated troponin  Troponins elevated but flat 0.153 -> 0.163 -> 0.141.  Patient denies chest pain.  EKG with sinus tachycardia and PVCs.    No concern for ACS at this time. Likely secondary to infection and/or diastolic dysfunction.    -- Continuous cardiac monitoring  -- EKG PRN - get repeat if pt c/o chest pain      History of pulmonary embolus (PE)  Continue home Eliquis 5 mg  BID      HTN (hypertension)  Chronic, controlled. Latest blood pressure and vitals reviewed-     Temp:  [98.8 °F (37.1 °C)-103.1 °F (39.5 °C)]   Pulse:  []   Resp:  [18]   BP: ()/(53-57)   SpO2:  [75 %-94 %] .   Home meds for hypertension were reviewed and noted below.   Hypertension Medications               candesartan-hydrochlorothiazide (ATACAND HCT) 16-12.5 mg per tablet Take 1 tablet by mouth once daily.            While in the hospital, will manage blood pressure as follows; Adjust home antihypertensive regimen as follows- hold at this time 2/2 possible sepsis    Will utilize p.r.n. blood pressure medication only if patient's blood pressure greater than 180/110 and she develops symptoms such as worsening chest pain or shortness of breath.    Lymphedema  Chronic. Daughter reports increase in oozing in bilateral LE over last few days.    -- Wound care following      MS (multiple sclerosis)  Continue vitamin-D while inpatient  -- PT/OT: low-intensity therapy recommended        VTE Risk Mitigation (From admission, onward)           Ordered     apixaban tablet 5 mg  2 times daily         09/29/24 2000     Reason for No Pharmacological VTE Prophylaxis  Once        Question:  Reasons:  Answer:  Already adequately anticoagulated on oral Anticoagulants    09/29/24 1931     IP VTE HIGH RISK PATIENT  Once         09/29/24 1931     Place sequential compression device  Until discontinued         09/29/24 1931                    Discharge Planning   USMAN: 10/4/2024     Code Status: Full Code   Is the patient medically ready for discharge?:     Reason for patient still in hospital (select all that apply): Patient trending condition, Treatment, and Consult recommendations  Discharge Plan A: Home with family, Home Health (Resume with Replaced by [facility])          Beatrice Crowley DO  Department of Hospital Medicine   [facility] - [facility]

## 2024-10-02 NOTE — SUBJECTIVE & OBJECTIVE
Interval History: No acute overnight events. No diarrhea since yesterday. Pt afebrile and VSS. Denies CP, SOB, chills, abdominal pain, n/v at this time.    Review of Systems  Objective:     Vital Signs (Most Recent):  Temp: 98.3 °F (36.8 °C) (10/02/24 0803)  Pulse: 76 (10/02/24 0803)  Resp: 18 (10/02/24 0803)  BP: 135/67 (10/02/24 0803)  SpO2: 95 % (10/02/24 0803) Vital Signs (24h Range):  Temp:  [98.3 °F (36.8 °C)-99.7 °F (37.6 °C)] 98.3 °F (36.8 °C)  Pulse:  [76-88] 76  Resp:  [18] 18  SpO2:  [95 %-96 %] 95 %  BP: (124-135)/(67-70) 135/67     Weight: 95.2 kg (209 lb 14.1 oz)  Body mass index is 39.66 kg/m².    Intake/Output Summary (Last 24 hours) at 10/2/2024 0825  Last data filed at 10/2/2024 0429  Gross per 24 hour   Intake 300 ml   Output 100 ml   Net 200 ml         Physical Exam  Constitutional:       Appearance: She is obese.   HENT:      Head: Normocephalic and atraumatic.      Mouth/Throat:      Pharynx: Oropharynx is clear.   Eyes:      Pupils: Pupils are equal, round, and reactive to light.   Cardiovascular:      Rate and Rhythm: Normal rate and regular rhythm.      Pulses: Normal pulses.      Heart sounds: Normal heart sounds.   Pulmonary:      Effort: Pulmonary effort is normal.      Breath sounds: Normal breath sounds.   Abdominal:      General: Abdomen is flat. There is no distension.      Tenderness: There is no abdominal tenderness.   Musculoskeletal:      Right lower leg: Edema present.      Left lower leg: Edema present.      Comments: Chronic skin thickening of the lower extremities   Skin:     General: Skin is warm and dry.   Neurological:      General: No focal deficit present.      Mental Status: She is alert.   Psychiatric:         Mood and Affect: Mood normal.             Significant Labs: All pertinent labs within the past 24 hours have been reviewed.  Blood Culture:   Recent Labs   Lab 09/30/24 2348 09/30/24 2349   LABBLOO No Growth to date  No Growth to date No Growth to date  No  Growth to date     CBC:   Recent Labs   Lab 10/01/24  0400 10/02/24  0446   WBC 9.82 6.51   HGB 7.6* 7.7*   HCT 26.3* 25.8*    203     CMP:   Recent Labs   Lab 10/01/24  0400 10/02/24  0446    140   K 3.0* 3.0*    106   CO2 28 24    105   BUN 29* 20   CREATININE 1.2 0.8   CALCIUM 8.9 9.0   PROT 6.1 6.6   ALBUMIN 2.7* 2.8*   BILITOT 0.3 0.3   ALKPHOS 49* 53*   AST 7* 8*   ALT <5* <5*   ANIONGAP 7* 10       Significant Imaging: I have reviewed all pertinent imaging results/findings within the past 24 hours.

## 2024-10-02 NOTE — PT/OT/SLP PROGRESS
Occupational Therapy  co Treatment    Name: Lupis Murcia  MRN: [id number]  Admitting Diagnosis:  Sepsis       Recommendations:     Discharge Recommendations: Low Intensity Therapy  Discharge Equipment Recommendations:  none  Barriers to discharge:       Assessment:     Lupis Murcia is a 74 y.o. female with a medical diagnosis of Sepsis. Performance deficits affecting function are weakness, impaired endurance, impaired self care skills, impaired functional mobility, impaired balance, decreased coordination, decreased upper extremity function, decreased lower extremity function, decreased safety awareness. Pt agreeable to therapy session today but stated she does not need OT services due to her daughter doing everything for her. Pt very nervous with mobility and requires lots of encouragement.     Rehab Prognosis:  Fair; patient would benefit from acute skilled OT services to address these deficits and reach maximum level of function.       Plan:     Patient to be seen 3 x/week to address the above listed problems via self-care/home management, therapeutic activities, therapeutic exercises, neuromuscular re-education, wheelchair management/training  Plan of Care Expires: 10/30/24  Plan of Care Reviewed with: patient, daughter    Subjective     Patient/Family Comments/goals: daughter wants to take Pt home after hospital stay   Pain/Comfort:  Pain Rating 1: 0/10    Objective:     Communicated with: tato prior to session.  Patient found supine with telemetry, PureWick upon OT entry to room.  CO treatment to optimize pt safety and functional performance   General Precautions: Standard, fall    Orthopedic Precautions:N/A  Braces: N/A  Respiratory Status: Room air     Occupational Performance:     Bed Mobility:    Patient completed Supine to Sit with total assistance     Functional Mobility/Transfers:  Patient completed Sit <> Stand Transfer with total assistance  with  rolling walker   Functional Mobility: pt required  SBA for dynamic and static sitting balance    Pt sat EOB for 15 minutes.   Pt required MAX verbal and tactile cues for midline orientation while seated EOB.     Therapeutic exercises:   Pt completed 5 posterior shoulder rolls sitting EOB to optimize pt posture and functional performance in sit to stand.   Pt completed 5 shoulder retraction exercises sitting EOB to optimize pt posture and functional performance in sit to stand.     [facility] 6 Click ADL: 14    Treatment & Education:  Pt and daughter educated on benefit of lift device at home for ADLs and safe transferred including motorized chair.   Pt and daughter educated on role of OT to optimize independence for self-care.     Patient left supine with all lines intact and call button in reach    GOALS:   Multidisciplinary Problems       Occupational Therapy Goals          Problem: Occupational Therapy    Goal Priority Disciplines Outcome Interventions   Occupational Therapy Goal     OT, PT/OT Progressing    Description: Goals to be met by: 10/30/2024     Patient will increase functional independence with ADLs by performing:    UE Dressing with Stand-by Assistance.  LE Dressing with Moderate Assistance.  Grooming while seated with Stand-by Assistance.  Toileting from bedside commode with Moderate Assistance for hygiene and clothing management.   Toilet transfer to bedside commode with Moderate Assistance.  Supine to sit with Moderate Assistance.  Sit to stand transfer with Moderate Assistance.                         Time Tracking:     OT Date of Treatment: 10/02/24  OT Start Time: 0905  OT Stop Time: 0938  OT Total Time (min): 33 min    Billable Minutes:Therapeutic Activity 33    OT/LOW: OT     Number of LOW visits since last OT visit: 0    10/2/2024

## 2024-10-02 NOTE — SUBJECTIVE & OBJECTIVE
Past Medical History:   Diagnosis Date    Lymphedema     MS (multiple sclerosis)     Other pulmonary embolism without acute cor pulmonale     Vitamin B12 deficiency        Past Surgical History:   Procedure Laterality Date    BREAST CYST EXCISION Left     over 40yrs ago     SECTION      ESOPHAGOGASTRODUODENOSCOPY N/A 2022    Procedure: EGD (ESOPHAGOGASTRODUODENOSCOPY);  Surgeon: Radha Arango MD;  Location: [address]);  Service: Endoscopy;  Laterality: N/A;       Review of patient's allergies indicates:   Allergen Reactions    Contrast media Shortness Of Breath and Rash    Pcn [penicillins] Shortness Of Breath and Rash    Celebrex [celecoxib] Other (See Comments)     Swallowing problems     Diazepam Hives    Gabapentin Other (See Comments)     Confusion     Motrin [ibuprofen] Rash       Medications:  Medications Prior to Admission   Medication Sig    acetaminophen-codeine 300-30mg (TYLENOL #3) 300-30 mg Tab Take 1 tablet by mouth daily as needed (pain).    ammonium lactate (LAC-HYDRIN) 12 % lotion Apply topically 2 (two) times daily.    apixaban (ELIQUIS) 5 mg Tab Take 1 tablet (5 mg total) by mouth 2 (two) times a day.    candesartan-hydrochlorothiazide (ATACAND HCT) 16-12.5 mg per tablet Take 1 tablet by mouth once daily. (Patient taking differently: Take 1 tablet by mouth daily as needed (blood pressure > 140 systolic or >90 diastolic).)    cholecalciferol, vitamin D3, 125 mcg (5,000 unit) Tab Take 5,000 Units by mouth once daily.    LORazepam (ATIVAN) 1 MG tablet Take 1 mg by mouth every 12 (twelve) hours as needed for Anxiety.    ondansetron (ZOFRAN-ODT) 4 MG TbDL Dissolve 1 tablet (4 mg total) by mouth every 6 (six) hours as needed (Nausea).     Antibiotics (From admission, onward)      Start     Stop Route Frequency Ordered    10/01/24 2015  vancomycin 1,500 mg in D5W 250 mL IVPB (admixture device)         -- IV Once 10/01/24 1903    09/29/24 2107  vancomycin -  "pharmacy to dose  (vancomycin IVPB (PEDS and ADULTS))        Placed in "And" Linked Group    -- IV pharmacy to manage frequency 09/29/24 2008          Antifungals (From admission, onward)      None          Antivirals (From admission, onward)      None             Immunization History   Administered Date(s) Administered    COVID-19, vector-nr, rS-Ad26, PF (Paul) 03/31/2021    PPD Test 03/31/2021, 06/17/2022       Family History       Problem Relation (Age of Onset)    Brain cancer Brother    Coronary artery disease Father    Lung cancer Father, Mother          Social History     Socioeconomic History    Marital status:    Tobacco Use    Smoking status: Never    Smokeless tobacco: Never   Substance and Sexual Activity    Alcohol use: No    Drug use: No     Social Drivers of Health     Financial Resource Strain: Low Risk  (7/10/2024)    Overall Financial Resource Strain (CARDIA)     Difficulty of Paying Living Expenses: Not hard at all   Food Insecurity: No Food Insecurity (7/10/2024)    Hunger Vital Sign     Worried About Running Out of Food in the Last Year: Never true     Ran Out of Food in the Last Year: Never true   Transportation Needs: No Transportation Needs (7/10/2024)    TRANSPORTATION NEEDS     Transportation : No   Physical Activity: Inactive (7/10/2024)    Exercise Vital Sign     Days of Exercise per Week: 0 days     Minutes of Exercise per Session: 0 min   Stress: No Stress Concern Present (7/10/2024)    [employer] [place] [employer] - Occupational Stress Questionnaire     Feeling of Stress : Only a little   Housing Stability: Low Risk  (7/10/2024)    Housing Stability Vital Sign     Unable to Pay for Housing in the Last Year: No     Homeless in the Last Year: No     Review of Systems   Constitutional:  Negative for appetite change, chills, diaphoresis, fatigue, fever and unexpected weight change.   HENT:  Negative for congestion, ear pain, hearing loss, sore throat " and tinnitus.    Eyes:  Negative for pain, redness and visual disturbance.   Respiratory:  Negative for cough, chest tightness, shortness of breath and wheezing.    Cardiovascular:  Negative for chest pain.   Gastrointestinal:  Negative for abdominal pain, constipation, diarrhea, nausea and vomiting.   Endocrine: Negative for cold intolerance and heat intolerance.   Genitourinary:  Negative for decreased urine volume, difficulty urinating, dysuria, flank pain, frequency, hematuria and urgency.   Musculoskeletal:  Negative for arthralgias, back pain, myalgias and neck pain.   Skin:  Positive for color change and wound. Negative for rash.   Allergic/Immunologic: Negative for environmental allergies, food allergies and immunocompromised state.   Neurological:  Positive for weakness. Negative for dizziness, facial asymmetry, light-headedness, numbness and headaches.   Hematological:  Negative for adenopathy. Does not bruise/bleed easily.   Psychiatric/Behavioral:  Negative for agitation, behavioral problems and confusion.      Objective:     Vital Signs (Most Recent):  Temp: 98.7 °F (37.1 °C) (10/01/24 1925)  Pulse: 88 (10/01/24 1925)  Resp: 18 (10/01/24 1925)  BP: 128/70 (10/01/24 1925)  SpO2: 96 % (10/01/24 1925) Vital Signs (24h Range):  Temp:  [98.5 °F (36.9 °C)-99.2 °F (37.3 °C)] 98.7 °F (37.1 °C)  Pulse:  [73-88] 88  Resp:  [18] 18  SpO2:  [75 %-96 %] 96 %  BP: (105-128)/(56-70) 128/70     Weight: 95.2 kg (209 lb 14.1 oz)  Body mass index is 39.66 kg/m².    Estimated Creatinine Clearance: 42.7 mL/min (based on SCr of 1.2 mg/dL).     Physical Exam  Constitutional:       General: She is not in acute distress.     Appearance: She is well-developed. She is obese. She is not ill-appearing, toxic-appearing or diaphoretic.       HENT:      Head: Normocephalic and atraumatic.   Cardiovascular:      Rate and Rhythm: Normal rate and regular rhythm.      Heart sounds: Normal heart sounds. No murmur heard.     No friction  rub. No gallop.   Pulmonary:      Effort: Pulmonary effort is normal. No respiratory distress.      Breath sounds: Normal breath sounds. No wheezing or rales.   Abdominal:      General: Bowel sounds are normal. There is no distension.      Palpations: Abdomen is soft. There is no mass.      Tenderness: There is no abdominal tenderness. There is no guarding or rebound.   Skin:     General: Skin is warm and dry.   Neurological:      Mental Status: She is alert and oriented to person, place, and time.   Psychiatric:         Behavior: Behavior normal.        Significant Labs: Blood Culture:   Recent Labs   Lab 07/11/24  0506 07/11/24  0507 09/29/24  1706 09/30/24  2348 09/30/24  2349   LABBLOO No growth after 5 days. No growth after 5 days. Gram stain aer bottle: Gram positive cocci  Gram stain lemuel bottle: Gram positive cocci  Positive results previously called 09/30/2024  09:22  ENTEROCOCCUS FAECALIS  For susceptibility see order # [id number]  *  Gram stain aer bottle: Gram positive cocci  Gram stain lemuel bottle: Gram positive cocci  Results called to and read back by: Mitchell Pabon RN  09/30/2024  09:21  ENTEROCOCCUS FAECALIS  Susceptibility pending  * No Growth to date No Growth to date     CBC:   Recent Labs   Lab 09/30/24  0657 10/01/24  0400   WBC 11.80 9.82   HGB 8.3* 7.6*   HCT 25.8* 26.3*    204     CMP:   Recent Labs   Lab 09/30/24  0657 10/01/24  0400    140   K 4.2 3.0*    105   CO2 23 28   * 105   BUN 26* 29*   CREATININE 1.3 1.2   CALCIUM 9.2 8.9   PROT 7.0 6.1   ALBUMIN 3.0* 2.7*   BILITOT 0.4 0.3   ALKPHOS 57 49*   AST 13 7*   ALT 6* <5*   ANIONGAP 9 7*     All pertinent labs within the past 24 hours have been reviewed.    Significant Imaging: I have reviewed all pertinent imaging results/findings within the past 24 hours.  X-Ray Chest AP Portable [[id number]] Resulted: 09/29/24 1846   Order Status: Completed Updated: 09/29/24 1849   Narrative:     EXAMINATION:  XR CHEST  AP PORTABLE    CLINICAL HISTORY:  Sepsis;    TECHNIQUE:  Single frontal view of the chest was performed.    COMPARISON:  Chest radiograph 07/12/2024, 07/09/2024    FINDINGS:  Cardiac monitoring leads overlie the chest.    The cardiomediastinal silhouette is enlarged.  The mediastinal structures are midline.  The hilar and mediastinal contours are unremarkable.    The lungs are hypoexpanded without focal or lobar consolidation.  Prominence of the central pulmonary vasculature.    No pneumothorax. No large pleural effusion.    Soft tissues are within normal limits.   Impression:       Stable prominent interstitial markings.  No focal consolidation.    Electronically signed by resident: Shanelle Paz  Date: 09/29/2024  Time: 18:17    Electronically signed by: Domenico Alfred MD  Date: 09/29/2024  Time: 18:46      Imaging History     2024    Date Procedure Name Study Review Link PACS Link Status Accession Number Location   10/01/24 03:25 PM Echo Study Review  Images Final [id number] [facility]   10/01/24 04:37 AM Cardiac monitoring strips Study Review  Final     09/30/24 01:09 AM Cardiac monitoring strips Study Review  Final     09/29/24 09:23 PM Cardiac monitoring strips Study Review  Final     09/29/24 05:38 PM X-Ray Chest AP Portable Study Review  Images Final [id number] [facility]

## 2024-10-03 LAB
ALBUMIN SERPL BCP-MCNC: 2.7 G/DL (ref 3.5–5.2)
ALP SERPL-CCNC: 46 U/L (ref 55–135)
ALT SERPL W/O P-5'-P-CCNC: 5 U/L (ref 10–44)
ANION GAP SERPL CALC-SCNC: 10 MMOL/L (ref 8–16)
AST SERPL-CCNC: 8 U/L (ref 10–40)
BASOPHILS # BLD AUTO: 0.01 K/UL (ref 0–0.2)
BASOPHILS # BLD AUTO: 0.01 K/UL (ref 0–0.2)
BASOPHILS NFR BLD: 0.2 % (ref 0–1.9)
BASOPHILS NFR BLD: 0.2 % (ref 0–1.9)
BILIRUB SERPL-MCNC: 0.2 MG/DL (ref 0.1–1)
BUN SERPL-MCNC: 15 MG/DL (ref 8–23)
CALCIUM SERPL-MCNC: 8.7 MG/DL (ref 8.7–10.5)
CHLORIDE SERPL-SCNC: 106 MMOL/L (ref 95–110)
CO2 SERPL-SCNC: 25 MMOL/L (ref 23–29)
CREAT SERPL-MCNC: 0.8 MG/DL (ref 0.5–1.4)
DIFFERENTIAL METHOD BLD: ABNORMAL
DIFFERENTIAL METHOD BLD: ABNORMAL
EOSINOPHIL # BLD AUTO: 0.2 K/UL (ref 0–0.5)
EOSINOPHIL # BLD AUTO: 0.2 K/UL (ref 0–0.5)
EOSINOPHIL NFR BLD: 3 % (ref 0–8)
EOSINOPHIL NFR BLD: 3.6 % (ref 0–8)
ERYTHROCYTE [DISTWIDTH] IN BLOOD BY AUTOMATED COUNT: 15.4 % (ref 11.5–14.5)
ERYTHROCYTE [DISTWIDTH] IN BLOOD BY AUTOMATED COUNT: 15.4 % (ref 11.5–14.5)
EST. GFR  (NO RACE VARIABLE): >60 ML/MIN/1.73 M^2
GLUCOSE SERPL-MCNC: 121 MG/DL (ref 70–110)
HCT VFR BLD AUTO: 24.6 % (ref 37–48.5)
HCT VFR BLD AUTO: 28.8 % (ref 37–48.5)
HGB BLD-MCNC: 7.3 G/DL (ref 12–16)
HGB BLD-MCNC: 8.4 G/DL (ref 12–16)
IMM GRANULOCYTES # BLD AUTO: 0.01 K/UL (ref 0–0.04)
IMM GRANULOCYTES # BLD AUTO: 0.01 K/UL (ref 0–0.04)
IMM GRANULOCYTES NFR BLD AUTO: 0.2 % (ref 0–0.5)
IMM GRANULOCYTES NFR BLD AUTO: 0.2 % (ref 0–0.5)
LYMPHOCYTES # BLD AUTO: 1.2 K/UL (ref 1–4.8)
LYMPHOCYTES # BLD AUTO: 1.6 K/UL (ref 1–4.8)
LYMPHOCYTES NFR BLD: 23.4 % (ref 18–48)
LYMPHOCYTES NFR BLD: 29.9 % (ref 18–48)
MAGNESIUM SERPL-MCNC: 1.8 MG/DL (ref 1.6–2.6)
MCH RBC QN AUTO: 25.3 PG (ref 27–31)
MCH RBC QN AUTO: 26.1 PG (ref 27–31)
MCHC RBC AUTO-ENTMCNC: 29.2 G/DL (ref 32–36)
MCHC RBC AUTO-ENTMCNC: 29.7 G/DL (ref 32–36)
MCV RBC AUTO: 87 FL (ref 82–98)
MCV RBC AUTO: 88 FL (ref 82–98)
MONOCYTES # BLD AUTO: 0.7 K/UL (ref 0.3–1)
MONOCYTES # BLD AUTO: 0.8 K/UL (ref 0.3–1)
MONOCYTES NFR BLD: 12.2 % (ref 4–15)
MONOCYTES NFR BLD: 14.5 % (ref 4–15)
NEUTROPHILS # BLD AUTO: 2.9 K/UL (ref 1.8–7.7)
NEUTROPHILS # BLD AUTO: 3.1 K/UL (ref 1.8–7.7)
NEUTROPHILS NFR BLD: 54.5 % (ref 38–73)
NEUTROPHILS NFR BLD: 58.1 % (ref 38–73)
NRBC BLD-RTO: 0 /100 WBC
NRBC BLD-RTO: 0 /100 WBC
PHOSPHATE SERPL-MCNC: 2.5 MG/DL (ref 2.7–4.5)
PLATELET # BLD AUTO: 197 K/UL (ref 150–450)
PLATELET # BLD AUTO: 201 K/UL (ref 150–450)
PMV BLD AUTO: 10.3 FL (ref 9.2–12.9)
PMV BLD AUTO: 10.6 FL (ref 9.2–12.9)
POTASSIUM SERPL-SCNC: 3.4 MMOL/L (ref 3.5–5.1)
PROT SERPL-MCNC: 6.3 G/DL (ref 6–8.4)
RBC # BLD AUTO: 2.8 M/UL (ref 4–5.4)
RBC # BLD AUTO: 3.32 M/UL (ref 4–5.4)
SODIUM SERPL-SCNC: 141 MMOL/L (ref 136–145)
VANCOMYCIN TROUGH SERPL-MCNC: 15.3 UG/ML (ref 10–22)
VANCOMYCIN TROUGH SERPL-MCNC: 19.4 UG/ML (ref 10–22)
WBC # BLD AUTO: 5.31 K/UL (ref 3.9–12.7)
WBC # BLD AUTO: 5.39 K/UL (ref 3.9–12.7)

## 2024-10-03 PROCEDURE — 20600001 HC STEP DOWN PRIVATE ROOM

## 2024-10-03 PROCEDURE — 36415 COLL VENOUS BLD VENIPUNCTURE: CPT | Mod: XB

## 2024-10-03 PROCEDURE — 63600175 PHARM REV CODE 636 W HCPCS

## 2024-10-03 PROCEDURE — 36415 COLL VENOUS BLD VENIPUNCTURE: CPT

## 2024-10-03 PROCEDURE — 85025 COMPLETE CBC W/AUTO DIFF WBC: CPT | Mod: 91

## 2024-10-03 PROCEDURE — 80202 ASSAY OF VANCOMYCIN: CPT

## 2024-10-03 PROCEDURE — 25000003 PHARM REV CODE 250

## 2024-10-03 PROCEDURE — 99232 SBSQ HOSP IP/OBS MODERATE 35: CPT | Mod: ,,, | Performed by: PHYSICIAN ASSISTANT

## 2024-10-03 PROCEDURE — 83735 ASSAY OF MAGNESIUM: CPT

## 2024-10-03 PROCEDURE — 84100 ASSAY OF PHOSPHORUS: CPT

## 2024-10-03 PROCEDURE — 80053 COMPREHEN METABOLIC PANEL: CPT

## 2024-10-03 RX ORDER — MAGNESIUM SULFATE HEPTAHYDRATE 40 MG/ML
2 INJECTION, SOLUTION INTRAVENOUS ONCE
Status: COMPLETED | OUTPATIENT
Start: 2024-10-03 | End: 2024-10-03

## 2024-10-03 RX ORDER — POTASSIUM CHLORIDE 20 MEQ/1
20 TABLET, EXTENDED RELEASE ORAL 3 TIMES DAILY
Status: DISCONTINUED | OUTPATIENT
Start: 2024-10-03 | End: 2024-10-04

## 2024-10-03 RX ORDER — SODIUM,POTASSIUM PHOSPHATES 280-250MG
1 POWDER IN PACKET (EA) ORAL ONCE
Status: COMPLETED | OUTPATIENT
Start: 2024-10-03 | End: 2024-10-03

## 2024-10-03 RX ADMIN — POTASSIUM CHLORIDE 20 MEQ: 1500 TABLET, EXTENDED RELEASE ORAL at 09:10

## 2024-10-03 RX ADMIN — APIXABAN 5 MG: 5 TABLET, FILM COATED ORAL at 08:10

## 2024-10-03 RX ADMIN — VANCOMYCIN HYDROCHLORIDE 1000 MG: 1 INJECTION, POWDER, LYOPHILIZED, FOR SOLUTION INTRAVENOUS at 08:10

## 2024-10-03 RX ADMIN — CHOLECALCIFEROL TAB 125 MCG (5000 UNIT) 5000 UNITS: 125 TAB at 08:10

## 2024-10-03 RX ADMIN — POTASSIUM & SODIUM PHOSPHATES POWDER PACK 280-160-250 MG 1 PACKET: 280-160-250 PACK at 11:10

## 2024-10-03 RX ADMIN — POTASSIUM CHLORIDE 30 MEQ: 750 CAPSULE, EXTENDED RELEASE ORAL at 01:10

## 2024-10-03 RX ADMIN — APIXABAN 5 MG: 5 TABLET, FILM COATED ORAL at 09:10

## 2024-10-03 RX ADMIN — LORAZEPAM 1 MG: 1 TABLET ORAL at 08:10

## 2024-10-03 RX ADMIN — AMMONIUM LACTATE: 12 LOTION TOPICAL at 08:10

## 2024-10-03 RX ADMIN — MAGNESIUM SULFATE HEPTAHYDRATE 2 G: 40 INJECTION, SOLUTION INTRAVENOUS at 11:10

## 2024-10-03 RX ADMIN — POTASSIUM CHLORIDE 20 MEQ: 1500 TABLET, EXTENDED RELEASE ORAL at 03:10

## 2024-10-03 RX ADMIN — AMMONIUM LACTATE: 12 LOTION TOPICAL at 11:10

## 2024-10-03 RX ADMIN — ACETAMINOPHEN AND CODEINE PHOSPHATE 1 TABLET: 300; 30 TABLET ORAL at 03:10

## 2024-10-03 RX ADMIN — LORAZEPAM 1 MG: 1 TABLET ORAL at 03:10

## 2024-10-03 NOTE — SUBJECTIVE & OBJECTIVE
Interval History:   No acute events  Afebrile and WBC WNL  Blood cultures with amp sensitive E faecalis and Staph EPi 2/4 each  Feels back to normal.  The patient denies any recent fever, chills, or sweats.      Review of Systems  Objective:     Vital Signs (Most Recent):  Temp: 98.7 °F (37.1 °C) (10/02/24 1926)  Pulse: 81 (10/02/24 1926)  Resp: 18 (10/02/24 1926)  BP: 131/66 (10/02/24 1926)  SpO2: 95 % (10/02/24 1926) Vital Signs (24h Range):  Temp:  [98.3 °F (36.8 °C)-99.7 °F (37.6 °C)] 98.7 °F (37.1 °C)  Pulse:  [73-84] 81  Resp:  [18] 18  SpO2:  [91 %-95 %] 95 %  BP: (124-143)/(60-69) 131/66     Weight: 95.2 kg (209 lb 14.1 oz)  Body mass index is 39.66 kg/m².    Estimated Creatinine Clearance: 64.1 mL/min (based on SCr of 0.8 mg/dL).     Physical Exam  Constitutional:       General: She is not in acute distress.     Appearance: Normal appearance. She is well-developed. She is obese. She is not ill-appearing, toxic-appearing or diaphoretic.   HENT:      Head: Normocephalic and atraumatic.   Cardiovascular:      Rate and Rhythm: Normal rate and regular rhythm.      Heart sounds: Murmur (reports from childhood) heard.      No friction rub. No gallop.   Pulmonary:      Effort: Pulmonary effort is normal. No respiratory distress.      Breath sounds: Normal breath sounds. No wheezing or rales.   Abdominal:      General: Bowel sounds are normal. There is no distension.      Palpations: Abdomen is soft. There is no mass.      Tenderness: There is no abdominal tenderness. There is no guarding or rebound.   Skin:     General: Skin is warm and dry.   Neurological:      Mental Status: She is alert and oriented to person, place, and time.   Psychiatric:         Behavior: Behavior normal.          Significant Labs: Blood Culture:   Recent Labs   Lab 07/11/24  0506 07/11/24  0507 09/29/24  1706 09/30/24  2348 09/30/24  2349   [facility]. No growth after 5 days. Gram stain aer bottle: Gram positive cocci   "Gram stain lemuel bottle: Gram positive cocci  Results called to and read back by: Mitchell Pabon RN  09/30/2024  09:21  ENTEROCOCCUS FAECALIS*  STAPHYLOCOCCUS EPIDERMIDIS  Susceptibility pending  *  Gram stain aer bottle: Gram positive cocci  Gram stain lemuel bottle: Gram positive cocci  Positive results previously called 09/30/2024  09:22  ENTEROCOCCUS FAECALIS  For susceptibility see order # [id number]  *  STAPHYLOCOCCUS EPIDERMIDIS  Susceptibility pending  * No Growth to date  No Growth to date No Growth to date  No Growth to date     CBC:   Recent Labs   Lab 10/01/24  0400 10/02/24  0446   WBC 9.82 6.51   HGB 7.6* 7.7*   HCT 26.3* 25.8*    203     CMP:   Recent Labs   Lab 10/01/24  0400 10/02/24  0446    140   K 3.0* 3.0*    106   CO2 28 24    105   BUN 29* 20   CREATININE 1.2 0.8   CALCIUM 8.9 9.0   PROT 6.1 6.6   ALBUMIN 2.7* 2.8*   BILITOT 0.3 0.3   ALKPHOS 49* 53*   AST 7* 8*   ALT <5* <5*   ANIONGAP 7* 10     Wound Culture: No results for input(s): "LABAERO" in the last 4320 hours.  All pertinent labs within the past 24 hours have been reviewed.    Significant Imaging: I have reviewed all pertinent imaging results/findings within the past 24 hours.  X-Ray Chest AP Portable [[id number]] Resulted: 09/29/24 1846   Order Status: Completed Updated: 09/29/24 1849   Narrative:     EXAMINATION:  XR CHEST AP PORTABLE    CLINICAL HISTORY:  Sepsis;    TECHNIQUE:  Single frontal view of the chest was performed.    COMPARISON:  Chest radiograph 07/12/2024, 07/09/2024    FINDINGS:  Cardiac monitoring leads overlie the chest.    The cardiomediastinal silhouette is enlarged.  The mediastinal structures are midline.  The hilar and mediastinal contours are unremarkable.    The lungs are hypoexpanded without focal or lobar consolidation.  Prominence of the central pulmonary vasculature.    No pneumothorax. No large pleural effusion.    Soft tissues are within normal limits.   Impression:   "     Stable prominent interstitial markings.  No focal consolidation.    Electronically signed by resident: Shanelle Paz  Date: 09/29/2024  Time: 18:17    Electronically signed by: Domenico Alfred MD  Date: 09/29/2024  Time: 18:46      Imaging History     2024    Date Procedure Name Study Review Link PACS Link Status Accession Number Location   10/01/24 10:40 PM Cardiac monitoring strips Study Review  Final     10/01/24 03:25 PM Echo Study Review  Images Final [id number] [facility]   10/01/24 04:37 AM Cardiac monitoring strips Study Review  Final     09/30/24 01:09 AM Cardiac monitoring strips Study Review  Final     09/29/24 09:23 PM Cardiac monitoring strips Study Review  Final     09/29/24 05:38 PM X-Ray Chest AP Portable Study Review  Images Final [id number] [facility]

## 2024-10-03 NOTE — SUBJECTIVE & OBJECTIVE
Interval History: No acute overnight events. 9/29 blood clx updated speciation to also show staph epidermidis, pending specificity. Hb down trending. Pt denies CP, SOB, fevers, chills, urinary symptoms, dizziness, or increase in weakness.    Review of Systems  Objective:     Vital Signs (Most Recent):  Temp: 98.4 °F (36.9 °C) (10/03/24 0854)  Pulse: 89 (10/03/24 1119)  Resp: 18 (10/03/24 0854)  BP: 130/68 (10/03/24 0854)  SpO2: 96 % (10/03/24 0854) Vital Signs (24h Range):  Temp:  [98.3 °F (36.8 °C)-99.2 °F (37.3 °C)] 98.4 °F (36.9 °C)  Pulse:  [73-95] 89  Resp:  [18] 18  SpO2:  [91 %-96 %] 96 %  BP: (123-143)/(60-68) 130/68     Weight: 95.2 kg (209 lb 14.1 oz)  Body mass index is 39.66 kg/m².    Intake/Output Summary (Last 24 hours) at 10/3/2024 1123  Last data filed at 10/3/2024 0155  Gross per 24 hour   Intake --   Output 450 ml   Net -450 ml         Physical Exam  Constitutional:       Appearance: She is obese.   HENT:      Head: Normocephalic and atraumatic.      Mouth/Throat:      Pharynx: Oropharynx is clear.   Eyes:      Pupils: Pupils are equal, round, and reactive to light.   Cardiovascular:      Rate and Rhythm: Normal rate and regular rhythm.      Pulses: Normal pulses.      Heart sounds: Normal heart sounds.   Pulmonary:      Effort: Pulmonary effort is normal.      Breath sounds: Normal breath sounds.   Abdominal:      General: Abdomen is flat. There is no distension.      Tenderness: There is no abdominal tenderness.   Musculoskeletal:      Right lower leg: Edema present.      Left lower leg: Edema present.      Comments: Chronic skin thickening of the lower extremities   Skin:     General: Skin is warm and dry.   Neurological:      General: No focal deficit present.      Mental Status: She is alert.   Psychiatric:         Mood and Affect: Mood normal.             Significant Labs: All pertinent labs within the past 24 hours have been reviewed.  CBC:   Recent Labs   Lab 10/02/24  0446 10/03/24  0302    WBC 6.51 5.31   HGB 7.7* 7.3*   HCT 25.8* 24.6*    197     CMP:   Recent Labs   Lab 10/02/24  0446 10/03/24  0305    141   K 3.0* 3.4*    106   CO2 24 25    121*   BUN 20 15   CREATININE 0.8 0.8   CALCIUM 9.0 8.7   PROT 6.6 6.3   ALBUMIN 2.8* 2.7*   BILITOT 0.3 0.2   ALKPHOS 53* 46*   AST 8* 8*   ALT <5* 5*   ANIONGAP 10 10       Significant Imaging: I have reviewed all pertinent imaging results/findings within the past 24 hours.

## 2024-10-03 NOTE — ASSESSMENT & PLAN NOTE
Chronic, controlled. Latest blood pressure and vitals reviewed-     Temp:  [98.3 °F (36.8 °C)-98.7 °F (37.1 °C)]   Pulse:  [75-95]   Resp:  [18]   BP: (123-138)/(66-75)   SpO2:  [95 %-96 %] .   Home meds for hypertension were reviewed and noted below.   Hypertension Medications               candesartan-hydrochlorothiazide (ATACAND HCT) 16-12.5 mg per tablet Take 1 tablet by mouth once daily.            While in the hospital, will manage blood pressure as follows; Adjust home antihypertensive regimen as follows- hold at this time 2/2 possible sepsis    Will utilize p.r.n. blood pressure medication only if patient's blood pressure greater than 180/110 and she develops symptoms such as worsening chest pain or shortness of breath.

## 2024-10-03 NOTE — ASSESSMENT & PLAN NOTE
74-year-old female with a history of multiple sclerosis not on immunosuppression, lymphedema of bilateral lower extremities, and pulmonary embolism admitted with fever and weakness found to have enterococcal bacteremia of and unclear source.  Enterococcus - ampicillin sensitive. Possible source could be presacral skin irritation with fecal contamination. Patient has been treated with vancomycin and has improved.  Repeat blood cultures are no growth to date.  2D echo obtained showing no vegetation but pulmonic valve not well visualized.  Has murmur but reports from childhood.  Blood cultures now show staph epi 2/4 as well.  .  Plan  Continue vancomycin  Follow up Staph epi sensitivities  Follow up repeat blood cultures  Id will follow

## 2024-10-03 NOTE — PROGRESS NOTES
Pharmacokinetic Assessment Follow Up: IV Vancomycin    Vancomycin serum concentration assessment(s):    The trough level was drawn incorrectly and cannot be used to guide therapy at this time.    Vancomycin Regimen Plan:    Continue regimen to Vancomycin 1000 mg IV every 12 hours with next serum trough concentration measured at 1830 prior to 3rd dose on 10/3    Drug levels (last 3 results):  Recent Labs   Lab Result Units 09/30/24  1716 10/01/24  1837 10/02/24  1329 10/03/24  0906   Vancomycin, Random ug/mL 14.7 13.7 15.9  --    Vancomycin-Trough ug/mL  --   --   --  15.3       Pharmacy will continue to follow and monitor vancomycin.    Please contact pharmacy at extension [phone] for questions regarding this assessment.    Thank you for the consult,   Colby Lovelace       Patient brief summary:  Lupis Murcia is a 74 y.o. female initiated on antimicrobial therapy with IV Vancomycin for treatment of bacteremia    The patient's current regimen is 1000 mg q12h    Drug Allergies:   Review of patient's allergies indicates:   Allergen Reactions    Contrast media Shortness Of Breath and Rash    Pcn [penicillins] Shortness Of Breath and Rash    Celebrex [celecoxib] Other (See Comments)     Swallowing problems     Diazepam Hives    Gabapentin Other (See Comments)     Confusion     Motrin [ibuprofen] Rash       Actual Body Weight:   95.2 kg    Renal Function:   Estimated Creatinine Clearance: 64.1 mL/min (based on SCr of 0.8 mg/dL).,     Dialysis Method (if applicable):  N/A    CBC (last 72 hours):  Recent Labs   Lab Result Units 10/01/24  0400 10/02/24  0446 10/03/24  0305 10/03/24  1147   WBC K/uL 9.82 6.51 5.31 5.39   Hemoglobin g/dL 7.6* 7.7* 7.3* 8.4*   Hematocrit % 26.3* 25.8* 24.6* 28.8*   Platelets K/uL 204 203 197 201   Gran % % 70.8 66.3 58.1 54.5   Lymph % % 14.4* 16.4* 23.4 29.9   Mono % % 13.3 13.5 14.5 12.2   Eosinophil % % 0.8 3.1 3.6 3.0   Basophil % % 0.2 0.2 0.2 0.2   Differential Method  Automated Automated  Automated Automated       Metabolic Panel (last 72 hours):  Recent Labs   Lab Result Units 10/01/24  0400 10/02/24  0446 10/03/24  0305   Sodium mmol/L 140 140 141   Potassium mmol/L 3.0* 3.0* 3.4*   Chloride mmol/L 105 106 106   CO2 mmol/L 28 24 25   Glucose mg/dL 105 105 121*   BUN mg/dL 29* 20 15   Creatinine mg/dL 1.2 0.8 0.8   Albumin g/dL 2.7* 2.8* 2.7*   Total Bilirubin mg/dL 0.3 0.3 0.2   Alkaline Phosphatase U/L 49* 53* 46*   AST U/L 7* 8* 8*   ALT U/L <5* <5* 5*   Magnesium mg/dL 1.9 2.1 1.8   Phosphorus mg/dL 3.5 2.8 2.5*       Vancomycin Administrations:  vancomycin given in the last 96 hours                     vancomycin (VANCOCIN) 1,000 mg in D5W 250 mL IVPB (admixture device) (mg) 1,000 mg New Bag 10/03/24 0834      Restarted 10/02/24 1934    vancomycin 1,500 mg in D5W 250 mL IVPB (admixture device) (mg) 1,500 mg New Bag 10/01/24 2106    vancomycin 1,500 mg in D5W 250 mL IVPB (admixture device) (mg) 1,500 mg New Bag 09/30/24 2010    vancomycin 2 g in dextrose 5 % 500 mL IVPB (mg) 2,000 mg New Bag 09/29/24 1809                    Microbiologic Results:  Microbiology Results (last 7 days)       Procedure Component Value Units Date/Time    Blood culture x two cultures. Draw prior to antibiotics. [[id number]]  (Abnormal) Collected: 09/29/24 1706    Order Status: Completed Specimen: Blood from Peripheral, Forearm, Left Updated: 10/03/24 0933     Blood Culture, Routine Gram stain aer bottle: Gram positive cocci      Gram stain lemuel bottle: Gram positive cocci      Positive results previously called 09/30/2024  09:22      ENTEROCOCCUS FAECALIS  For susceptibility see order # [id number]        STAPHYLOCOCCUS EPIDERMIDIS  Susceptibility pending      Narrative:      Aerobic and anaerobic    Blood culture [[id number]] Collected: 09/30/24 2348    Order Status: Completed Specimen: Blood from Peripheral, Hand, Left Updated: 10/03/24 0613     Blood Culture, Routine No Growth to date      No Growth to date      No  Growth to date    Blood culture [[id number]] Collected: 09/30/24 2349    Order Status: Completed Specimen: Blood Updated: 10/03/24 0613     Blood Culture, Routine No Growth to date      No Growth to date      No Growth to date    Blood culture x two cultures. Draw prior to antibiotics. [[id number]]  (Abnormal)  (Susceptibility) Collected: 09/29/24 1706    Order Status: Completed Specimen: Blood from Peripheral, Antecubital, Right Updated: 10/02/24 1214     Blood Culture, Routine Gram stain aer bottle: Gram positive cocci      Gram stain lemuel bottle: Gram positive cocci      Results called to and read back by: Mitchell Pabon RN  09/30/2024  09:21      ENTEROCOCCUS FAECALIS      STAPHYLOCOCCUS EPIDERMIDIS  Susceptibility pending      Narrative:      Aerobic and anaerobic    Clostridium difficile EIA [[id number]]     Order Status: Canceled Specimen: Stool     Rapid Organism ID by PCR (from Blood culture) [[id number]]  (Abnormal) Collected: 09/29/24 1706    Order Status: Completed Updated: 09/30/24 1126     Enterococcus faecalis Detected     Enterococcus faecium Not Detected     Listeria monocytogenes Not Detected     Staphylococcus spp. Not Detected     Staphylococcus aureus Not Detected     Staphylococcus epidermidis Not Detected     Staphylococcus lugdunensis Not Detected     Streptococcus species Not Detected     Streptococcus agalactiae Not Detected     Streptococcus pneumoniae Not Detected     Streptococcus pyogenes Not Detected     Acinetobacter calcoaceticus/baumannii complex Not Detected     Bacteroides fragilis Not Detected     Enterobacterales Not Detected     Enterobacter cloacae complex Not Detected     Escherichia coli Not Detected     Klebsiella aerogenes Not Detected     Klebsiella oxytoca Not Detected     Klebsiella pneumoniae group Not Detected     Proteus Not Detected     Salmonella sp Not Detected     Serratia marcescens Not Detected     Haemophilus influenzae Not Detected     Neisseria meningtidis  Not Detected     Pseudomonas aeruginosa Not Detected     Stenotrophomonas maltophilia Not Detected     Candida albicans Not Detected     Candida auris Not Detected     Candida glabrata Not Detected     Candida krusei Not Detected     Candida parapsilosis Not Detected     Candida tropicalis Not Detected     Cryptococcus neoformans/gattii Not Detected     CTX-M (ESBL ) Test Not Applicable     IMP (Carbapenem resistant) Test Not Applicable     KPC resistance gene (Carbapenem resistant) Test Not Applicable     mcr-1  Test Not Applicable     mec A/C  Test Not Applicable     mec A/C and MREJ (MRSA) gene Test Not Applicable     NDM (Carbapenem resistant) Test Not Applicable     OXA-48-like (Carbapenem resistant) Test Not Applicable     van A/B (VRE gene) Not Detected     VIM (Carbapenem resistant) Test Not Applicable    Narrative:      Aerobic and anaerobic    Culture, Respiratory with Gram Stain [[id number]]     Order Status: No result Specimen: Respiratory     Influenza A & B by Molecular [[id number]] Collected: 09/29/24 1659    Order Status: Completed Specimen: Nasopharyngeal Swab Updated: 09/29/24 8249     Influenza A, Molecular Negative     Influenza B, Molecular Negative     Flu A & B Source Nasal swab

## 2024-10-03 NOTE — PLAN OF CARE
SW assigned to pt today.   SW not medically ready.   SW was informed by pt MD that pt seeking SNF.   SW Met with pt at bedside to review discharge recommendation of SNF and is pt agreeable to plan.     Pt stated she only has one preference.     Notified that referral sent to below listed facilities from in-network list based on proximity to home/family support:   [facility] (referral sent via careport).   2.  3.  4.  5. (can send more than 5)    Patient/family instructed to identify preference.    Preferred Facility: (if more than 1, listed in order of descending preference)  1.[facility]    SW informed pt [facility] currently full per [facility] (unsure when they will have any openings). Pt stated she doesn't have any other preference (SW suggested she talk w/ daughter regarding possible additional facilities). Alternative plan also was discuss w/ pt.     If an additional preferred facility not listed above is identified, additional referral to be sent. If above facilities unable to accept, will send additional referrals to in-network providers.      Discharge Plan A and Plan B have been determined by review of patient's clinical status, future medical and therapeutic needs, and coverage/benefits for post-acute care in coordination with multidisciplinary team members.    FILIBERTO Griffin   [facility]    Case Management Dept  [phone]

## 2024-10-03 NOTE — ASSESSMENT & PLAN NOTE
Anemia is likely due to anemia of chronic disease. Most recent hemoglobin and hematocrit are listed below.  Recent Labs     10/02/24  0446 10/03/24  0305 10/03/24  1147   HGB 7.7* 7.3* 8.4*   HCT 25.8* 24.6* 28.8*     Plan  - Monitor serial CBC: Daily  - Transfuse PRBC if patient becomes hemodynamically unstable, symptomatic or H/H drops below 7/21.  - Patient has not received any PRBC transfusions to date  - Patient's anemia is currently stable

## 2024-10-03 NOTE — ASSESSMENT & PLAN NOTE
"This patient does not have evidence of infective focus  My overall impression is sepsis.  Source: Unknown  Antibiotics given-   Antibiotics (72h ago, onward)      Start     Stop Route Frequency Ordered    10/02/24 1700  vancomycin (VANCOCIN) 1,000 mg in D5W 250 mL IVPB (admixture device)         -- IV Every 12 hours (non-standard times) 10/02/24 1556    09/29/24 2107  vancomycin - pharmacy to dose  (vancomycin IVPB (PEDS and ADULTS))        Placed in "And" Linked Group    -- IV pharmacy to manage frequency 09/29/24 2008          Latest lactate reviewed-  Recent Labs   Lab 09/30/24  1716   LACTATE 1.5         Fluid challenge Not needed - patient is not hypotensive      Post- resuscitation assessment No - Post resuscitation assessment not needed       Will Not start Pressors- Levophed for MAP of 65  Source control achieved by: ABX    74-year-old patient presented to the ED with generalized weakness.  T-max found to be 101°.  Daughter reports malodorous urine, but UA negative. CXR negative. Received 1 L bolus of LR while in the ED.     9/29 Blood clx (2/2) positive for E. Faecalis, susceptible to vancomycin  -> Updated result with positive findings for Staph epidermidis, pending susceptibility result    10/1 TTE without evidence of vegetations    Patient noted to fever the day following admission with Tmax of 103.5F. Unable to relieve with ice pack and tylenol, though improved after 500 cc's LR given. Afebrile since. Suspect sepsis source to be from sacral wound exposure to gastric content as patient reported multiple episodes of diarrhea from 9/30-10/1 and intermittently sitting in her stool prior to being cleaned.    Plan:   -- Broad-spectrum antibiotics with vanc/cefepime   - Deescalated down to vancomycin  -- F/u blood clx susceptibility  -- Wound care consult for bilateral lower extremity lymphedema  -- Ctm fever curve   - Tylenol and ice packs as needed  -- Infectious Disease consult, appreciate recs   - Continue " vanc   - F/u blood clx susceptibility

## 2024-10-03 NOTE — MEDICAL/APP STUDENT
Progress Note  Patient: Lupis Murcia    Subjective:  This AM Ms. Murcia states she she slept little last night due to thoughts about her infection. She denies any pain or any positive ros. She denies fevers, chills, dizziness, n/v, sob or any diarrhea. She feels her appetite has returned to baseline; however, she is unsure if her strength is back.     Objective  Vitals:    10/03/24 0854   BP: 130/68   Pulse: 95   Resp: 18   Temp: 98.4 °F (36.9 °C)     Physical Exam  Constitutional:       General: She is not in acute distress.     Appearance: Normal appearance. She is not ill-appearing, toxic-appearing or diaphoretic.   HENT:      Head: Normocephalic and atraumatic.   Eyes:      Extraocular Movements: Extraocular movements intact.   Cardiovascular:      Rate and Rhythm: Normal rate and regular rhythm.      Heart sounds: Murmur heard.   Pulmonary:      Effort: Pulmonary effort is normal. No respiratory distress.      Breath sounds: Normal breath sounds. No stridor. No wheezing.   Abdominal:      General: Abdomen is flat. There is no distension.      Palpations: Abdomen is soft.      Tenderness: There is no abdominal tenderness. There is no guarding or rebound.   Musculoskeletal:         General: No tenderness.      Right lower leg: Edema (chronic bilateral lymphedema) present.      Left lower leg: Edema present.   Skin:     General: Skin is warm.      Findings: Lesion (lichenification of bilateral LE 2/2 chronic lymphedema) present.   Neurological:      Mental Status: She is alert and oriented to person, place, and time.   Psychiatric:         Mood and Affect: Mood normal.         Behavior: Behavior normal.         All labs within 24 hours have been reviewed.     Micro  C diff collection - pending  BC 9/29 - E. Faecalis, Staph epidermidis  BC 9/30 - NGTD    Imaging  No new imaging    Assessment  73yo F with PMH MS, diastolic dysfunction, chronic lymphedema, prior PE on eliquis presenting from home with increased  weakness, lethargy, decreased appetite, and malodorous urine. Patient was febrile with tmax of 103.5, wbc 12.7 on admission and admitted with c/f sepsis c/b lucia. Her BC grew E. Faecalis and now staph epidermidis but repeat has been NGTD and other infectious workup (UA/Echo/CXR) has been negative so far and ID is on board.    Sepsis  Patient came in febrile but has been afebrile with stable vitals overnight. Her BC grew E.faecalis and S.epidermidis with repeats showing NGTD  TTE - no vegetations  -continue vancomycin  -f/u staph epi susceptibilities  -appreciate ID recs    Anemia  H/H on admission was 9.3/29.5 and has trended down during admission with today's reading 7.3/24.6.  Mcv 88  RDW is elevated 15.4  -no signs of bleeding   -transfuse if below 7  -trending CBCs    Diastolic dysfunction   on admission  Patient denies SOB  CXR negative  Patient euvolemic on PE  LE chronically edemadous  Echo with preseved EF and grade II diastolic dysfunction  -diuretics not indicated at this time  -repeat CXR if respiratory status changes  -strict I/Os    Elevated troponins   Peaked at 0/153 and trended down  No CP  EKG with sinus tach and PVCs  No concern for ACS at this time  Likely secondary to infection   -continuous cardiac monitoring  -stat EKG is patient complains of CP    Hx of PE  Continue home eliquis 5mgBID    HTN  Holding antihtn at this time  Takes candesartan-hydrochlorothiazide at home    Lymphedema  Chronic  Wound care following    MS  Continue Vit D while inpatientt  PT/OT recommending low intensity therapy and that patient would benefit from acute skilled OT services, recommending lift device

## 2024-10-03 NOTE — ASSESSMENT & PLAN NOTE
Continue vitamin-D while inpatient    -- PT/OT: low-intensity therapy with lift device equipment recommended

## 2024-10-03 NOTE — PROGRESS NOTES
[facility]  Infectious Disease  Progress Note    Patient Name: Lupis Murcia  MRN: [id number]  Admission Date: 9/29/2024  Length of Stay: 3 days  Attending Physician: Steve Fox MD  Primary Care Provider: Bobby Tran MD    Isolation Status: No active isolations  Assessment/Plan:      ID  Enterococcal bacteremia  74-year-old female with a history of multiple sclerosis not on immunosuppression, lymphedema of bilateral lower extremities, and pulmonary embolism admitted with fever and weakness found to have enterococcal bacteremia of and unclear source.  Enterococcus - ampicillin sensitive. Possible source could be presacral skin irritation with fecal contamination. Patient has been treated with vancomycin and has improved.  Repeat blood cultures are no growth to date.  2D echo obtained showing no vegetation but pulmonic valve not well visualized.  Has murmur but reports from childhood.  Blood cultures now show staph epi 2/4 as well.  .  Plan  Continue vancomycin  Follow up Staph epi sensitivities  Follow up repeat blood cultures  Id will follow        Anticipated Disposition: tbd    Thank you for your consult. I will follow-up with patient. Please contact us if you have any additional questions.    LIZA Geiger  Infectious Disease  [facility]    Subjective:     Principal Problem:Sepsis    HPI: 74 year old female with a PMH of MS (not on immunotherapy), debility, chronic lymphedema, HFpEF, HTN, left foot drop, prior PE (currently on Eliquis) presented to [facility] with generalized weakness and decreased apetite over the past several days. Daughter is at bedside provides history. Reports that patient has been more lethargic and quiet over the last couple of days.  States that her mother usually has a big appetite, but has been eating very little recently.  Daughter also reports that while changing patient's Depends, she noticed a very malodorous urine.  She took the  patient's temperature, which read 101°, and she decided to call EMS to bring patient to the ED. Home health comes to the patient's home 2 times per week for PT/OT.  Wound care visits there home once a week to treat patient's lower extremity lymphedema.       Of note, patient was recently admitted 7/9-7/14 for sepsis and hypoxic respiratory failure secondary to UTI.  Blood cultures were positive for Proteus and E coli.  Patient was started on vanc/cefepime/azithromycin.  She improved on IV antibiotics and was discharged on a 4 day course of oral levofloxacin.     While in the ED, patient is afebrile (T-max 101°).  UA negative for rare bacteria, WBC 3, and leukocytes esterace negative.  CXR showed:The lungs are hypoexpanded without focal or lobar consolidation.  Blood cultures now positive for E faecalis - Source unclear. ID now consulted for bacteremia - remains on Vanc.     Interval History:   No acute events  Afebrile and WBC WNL  Blood cultures with amp sensitive E faecalis and Staph EPi 2/4 each  Feels back to normal.  The patient denies any recent fever, chills, or sweats.      Review of Systems  Objective:     Vital Signs (Most Recent):  Temp: 98.7 °F (37.1 °C) (10/02/24 1926)  Pulse: 81 (10/02/24 1926)  Resp: 18 (10/02/24 1926)  BP: 131/66 (10/02/24 1926)  SpO2: 95 % (10/02/24 1926) Vital Signs (24h Range):  Temp:  [98.3 °F (36.8 °C)-99.7 °F (37.6 °C)] 98.7 °F (37.1 °C)  Pulse:  [73-84] 81  Resp:  [18] 18  SpO2:  [91 %-95 %] 95 %  BP: (124-143)/(60-69) 131/66     Weight: 95.2 kg (209 lb 14.1 oz)  Body mass index is 39.66 kg/m².    Estimated Creatinine Clearance: 64.1 mL/min (based on SCr of 0.8 mg/dL).     Physical Exam  Constitutional:       General: She is not in acute distress.     Appearance: Normal appearance. She is well-developed. She is obese. She is not ill-appearing, toxic-appearing or diaphoretic.   HENT:      Head: Normocephalic and atraumatic.   Cardiovascular:      Rate and Rhythm: Normal rate  "and regular rhythm.      Heart sounds: Murmur (reports from childhood) heard.      No friction rub. No gallop.   Pulmonary:      Effort: Pulmonary effort is normal. No respiratory distress.      Breath sounds: Normal breath sounds. No wheezing or rales.   Abdominal:      General: Bowel sounds are normal. There is no distension.      Palpations: Abdomen is soft. There is no mass.      Tenderness: There is no abdominal tenderness. There is no guarding or rebound.   Skin:     General: Skin is warm and dry.   Neurological:      Mental Status: She is alert and oriented to person, place, and time.   Psychiatric:         Behavior: Behavior normal.          Significant Labs: Blood Culture:   Recent Labs   Lab 07/11/24  0506 07/11/24  0507 09/29/24  1706 09/30/24  2348 09/30/24  2349   LABBLOO No growth after 5 days. No growth after 5 days. Gram stain aer bottle: Gram positive cocci  Gram stain lemuel bottle: Gram positive cocci  Results called to and read back by: Mitchell Pabon RN  09/30/2024  09:21  ENTEROCOCCUS FAECALIS*  STAPHYLOCOCCUS EPIDERMIDIS  Susceptibility pending  *  Gram stain aer bottle: Gram positive cocci  Gram stain lemuel bottle: Gram positive cocci  Positive results previously called 09/30/2024  09:22  ENTEROCOCCUS FAECALIS  For susceptibility see order # [id number]  *  STAPHYLOCOCCUS EPIDERMIDIS  Susceptibility pending  * No Growth to date  No Growth to date No Growth to date  No Growth to date     CBC:   Recent Labs   Lab 10/01/24  0400 10/02/24  0446   WBC 9.82 6.51   HGB 7.6* 7.7*   HCT 26.3* 25.8*    203     CMP:   Recent Labs   Lab 10/01/24  0400 10/02/24  0446    140   K 3.0* 3.0*    106   CO2 28 24    105   BUN 29* 20   CREATININE 1.2 0.8   CALCIUM 8.9 9.0   PROT 6.1 6.6   ALBUMIN 2.7* 2.8*   BILITOT 0.3 0.3   ALKPHOS 49* 53*   AST 7* 8*   ALT <5* <5*   ANIONGAP 7* 10     Wound Culture: No results for input(s): "LABAERO" in the last 4320 hours.  All pertinent labs " within the past 24 hours have been reviewed.    Significant Imaging: I have reviewed all pertinent imaging results/findings within the past 24 hours.  X-Ray Chest AP Portable [[id number]] Resulted: 09/29/24 1846   Order Status: Completed Updated: 09/29/24 1849   Narrative:     EXAMINATION:  XR CHEST AP PORTABLE    CLINICAL HISTORY:  Sepsis;    TECHNIQUE:  Single frontal view of the chest was performed.    COMPARISON:  Chest radiograph 07/12/2024, 07/09/2024    FINDINGS:  Cardiac monitoring leads overlie the chest.    The cardiomediastinal silhouette is enlarged.  The mediastinal structures are midline.  The hilar and mediastinal contours are unremarkable.    The lungs are hypoexpanded without focal or lobar consolidation.  Prominence of the central pulmonary vasculature.    No pneumothorax. No large pleural effusion.    Soft tissues are within normal limits.   Impression:       Stable prominent interstitial markings.  No focal consolidation.    Electronically signed by resident: Shanelle Paz  Date: 09/29/2024  Time: 18:17    Electronically signed by: Domenico Alfred MD  Date: 09/29/2024  Time: 18:46      Imaging History     2024    Date Procedure Name Study Review Link PACS Link Status Accession Number Location   10/01/24 10:40 PM Cardiac monitoring strips Study Review  Final     10/01/24 03:25 PM Echo Study Review  Images Final [id number] [facility]   10/01/24 04:37 AM Cardiac monitoring strips Study Review  Final     09/30/24 01:09 AM Cardiac monitoring strips Study Review  Final     09/29/24 09:23 PM Cardiac monitoring strips Study Review  Final     09/29/24 05:38 PM X-Ray Chest AP Portable Study Review  Images Final [id number] [facility]

## 2024-10-03 NOTE — PLAN OF CARE
AAOX4,VSS. Plan of care discussed with patient. Patient has no complaints of chest pain/SOB/palpitations. Pt remains a complete assist, fall precautions in place,no falls/injuries through the shift.Discussed medications and care.Patient has no questions at this time.Pt resting comfortably with no acute distress.Call light within reach,bed at lowest position.  Problem: Adult Inpatient Plan of Care  Goal: Plan of Care Review  Outcome: Progressing  Goal: Patient-Specific Goal (Individualized)  Outcome: Progressing  Goal: Absence of Hospital-Acquired Illness or Injury  Outcome: Progressing  Goal: Optimal Comfort and Wellbeing  Outcome: Progressing  Goal: Readiness for Transition of Care  Outcome: Progressing     Problem: Sepsis/Septic Shock  Goal: Optimal Coping  Outcome: Progressing  Goal: Absence of Bleeding  Outcome: Progressing  Goal: Blood Glucose Level Within Targeted Range  Outcome: Progressing  Goal: Absence of Infection Signs and Symptoms  Outcome: Progressing  Goal: Optimal Nutrition Intake  Outcome: Progressing     Problem: Acute Kidney Injury/Impairment  Goal: Fluid and Electrolyte Balance  Outcome: Progressing  Goal: Improved Oral Intake  Outcome: Progressing  Goal: Effective Renal Function  Outcome: Progressing     Problem: Wound  Goal: Optimal Coping  Outcome: Progressing  Goal: Optimal Functional Ability  Outcome: Progressing  Goal: Absence of Infection Signs and Symptoms  Outcome: Progressing  Goal: Improved Oral Intake  Outcome: Progressing  Goal: Optimal Pain Control and Function  Outcome: Progressing  Goal: Skin Health and Integrity  Outcome: Progressing  Goal: Optimal Wound Healing  Outcome: Progressing     Problem: Fall Injury Risk  Goal: Absence of Fall and Fall-Related Injury  Outcome: Progressing     Problem: Skin Injury Risk Increased  Goal: Skin Health and Integrity  Outcome: Progressing     Problem: Infection  Goal: Absence of Infection Signs and Symptoms  Outcome: Progressing

## 2024-10-03 NOTE — PLAN OF CARE
Problem: Adult Inpatient Plan of Care  Goal: Absence of Hospital-Acquired Illness or Injury  Outcome: Progressing  Goal: Optimal Comfort and Wellbeing  Outcome: Progressing     Problem: Sepsis/Septic Shock  Goal: Optimal Coping  Outcome: Progressing  Goal: Optimal Nutrition Intake  Outcome: Progressing     Problem: Acute Kidney Injury/Impairment  Goal: Fluid and Electrolyte Balance  Outcome: Progressing     Problem: Wound  Goal: Improved Oral Intake  Outcome: Progressing

## 2024-10-03 NOTE — PLAN OF CARE
Problem: Adult Inpatient Plan of Care  Goal: Plan of Care Review  Outcome: Progressing  Goal: Patient-Specific Goal (Individualized)  Outcome: Progressing  Goal: Absence of Hospital-Acquired Illness or Injury  Outcome: Progressing  Goal: Optimal Comfort and Wellbeing  Outcome: Progressing  Goal: Readiness for Transition of Care  Outcome: Progressing     Problem: Sepsis/Septic Shock  Goal: Optimal Coping  Outcome: Progressing  Goal: Absence of Bleeding  Outcome: Progressing  Goal: Blood Glucose Level Within Targeted Range  Outcome: Progressing  Goal: Absence of Infection Signs and Symptoms  Outcome: Progressing  Goal: Optimal Nutrition Intake  Outcome: Progressing     Problem: Acute Kidney Injury/Impairment  Goal: Fluid and Electrolyte Balance  Outcome: Progressing  Goal: Improved Oral Intake  Outcome: Progressing  Goal: Effective Renal Function  Outcome: Progressing     Problem: Wound  Goal: Optimal Coping  Outcome: Progressing  Goal: Optimal Functional Ability  Outcome: Progressing  Goal: Absence of Infection Signs and Symptoms  Outcome: Progressing  Goal: Improved Oral Intake  Outcome: Progressing  Goal: Optimal Pain Control and Function  Outcome: Progressing  Goal: Skin Health and Integrity  Outcome: Progressing  Goal: Optimal Wound Healing  Outcome: Progressing     Problem: Fall Injury Risk  Goal: Absence of Fall and Fall-Related Injury  Outcome: Progressing     Problem: Skin Injury Risk Increased  Goal: Skin Health and Integrity  Outcome: Progressing     Problem: Infection  Goal: Absence of Infection Signs and Symptoms  Outcome: Progressing

## 2024-10-03 NOTE — PROGRESS NOTES
[facility] Medicine  Progress Note    Patient Name: Lupis Murcia  MRN: [id number]  Patient Class: IP- Inpatient   Admission Date: 9/29/2024  Length of Stay: 4 days  Attending Physician: Steve Fox MD  Primary Care Provider: Bobby Tran MD        Subjective:     Principal Problem:Sepsis    HPI:  74 year old female with a PMH of MS, debility, chronic lymphedema, HFpEF, HTN, left foot drop, prior PE (currently on Eliquis), and B12 deficiency who is presenting to [facility] with generalized weakness and decreased apetite over the past several days. Daughter is at bedside and is assisting with the history. Reports that patient has been more lethargic and quiet over the last couple of days.  States that her mother usually has a big appetite, but has been eating very little recently.  Daughter also reports that while changing patient's Depends, she noticed a very malodorous urine.  She took the patient's temperature, which read 101°, and she decided to call EMS to bring patient to the ED. Home health comes to the patient's home 2 times per week for PT/OT.  Wound care visits there home once a week to treat patient's lower extremity lymphedema.  Patient's daughter states that she has noticed increased weeping fluid from patient's bilateral lower extremities.  Patient denies headache, nausea, vomiting, abdominal pain, falls, dysuria, pain in lower extremities, and any recent sick contacts.    Of note, patient was recently admitted 7/9-7/14 for sepsis and hypoxic respiratory failure secondary to UTI.  Blood cultures were positive for Proteus and E coli.  Patient was started on vanc/cefepime/azithromycin.  She improved on IV antibiotics and was discharged on a 4 day course of oral levofloxacin.    While in the ED, patient is febrile (T-max 101°).  Blood pressure 127/57.  Oxygen saturation 95% on room air.  WBC elevated to 12.79.  Hemoglobin 9.3.  Creatinine 1.4.  BUN 22.  Protocol 0.23.  UA  negative for bacteria and leukocytes but +3 occult blood.  Troponins 0.153.  .  EKG sinus tachycardia with occasional PVCs.      Patient to be admitted to Hospital Medicine for further evaluation.    Overview/Hospital Course:  Patient here with generalized weakness and decreased apetite over the past several days. Was noticed to have malodorous urine by Daughter. She found to be febrile in the ED and labs revealed leukocytosis, Cr1.4, BUN 22, and procal 0.23. Troponins were flat.  . EKG with sinus tachycardia and occasional PVCs. Infectious work-up initiated and patient started on broad spectrum antibiotics with vancomycin and cefepime. Of note, patient had similar presentation back in July, though without malodorous urine, and was treated with the same antibiotics for UTI (+) for E.coli and Proteus. CXR unremarkable. UA negative for bacteria and leukocytes. Blood cultures positive for enterococcus faecalis. Patient fevering with Tmax of 103F. Unclear source of infection. Patient does have chronic lymphedema in bilateral lower extremities that look like they've progressed since her last hospitalization. Also with stage I sacral wound. Tylenol and ice packs for temperature control. Infectious Disease consulted for further recommendations. TTE completed, no evidence of vegetation. Updated (2/2) blood culture speciation positive for staph epidermidis.     Interval History: No acute overnight events. 9/29 blood clx updated speciation to also show staph epidermidis, pending specificity. Hb down trending. Pt denies CP, SOB, fevers, chills, urinary symptoms, dizziness, or increase in weakness.    Review of Systems  Objective:     Vital Signs (Most Recent):  Temp: 98.4 °F (36.9 °C) (10/03/24 0854)  Pulse: 89 (10/03/24 1119)  Resp: 18 (10/03/24 0854)  BP: 130/68 (10/03/24 0854)  SpO2: 96 % (10/03/24 0854) Vital Signs (24h Range):  Temp:  [98.3 °F (36.8 °C)-99.2 °F (37.3 °C)] 98.4 °F (36.9 °C)  Pulse:  [73-95]  89  Resp:  [18] 18  SpO2:  [91 %-96 %] 96 %  BP: (123-143)/(60-68) 130/68     Weight: 95.2 kg (209 lb 14.1 oz)  Body mass index is 39.66 kg/m².    Intake/Output Summary (Last 24 hours) at 10/3/2024 1123  Last data filed at 10/3/2024 0155  Gross per 24 hour   Intake --   Output 450 ml   Net -450 ml         Physical Exam  Constitutional:       Appearance: She is obese.   HENT:      Head: Normocephalic and atraumatic.      Mouth/Throat:      Pharynx: Oropharynx is clear.   Eyes:      Pupils: Pupils are equal, round, and reactive to light.   Cardiovascular:      Rate and Rhythm: Normal rate and regular rhythm.      Pulses: Normal pulses.      Heart sounds: Normal heart sounds.   Pulmonary:      Effort: Pulmonary effort is normal.      Breath sounds: Normal breath sounds.   Abdominal:      General: Abdomen is flat. There is no distension.      Tenderness: There is no abdominal tenderness.   Musculoskeletal:      Right lower leg: Edema present.      Left lower leg: Edema present.      Comments: Chronic skin thickening of the lower extremities   Skin:     General: Skin is warm and dry.   Neurological:      General: No focal deficit present.      Mental Status: She is alert.   Psychiatric:         Mood and Affect: Mood normal.             Significant Labs: All pertinent labs within the past 24 hours have been reviewed.  CBC:   Recent Labs   Lab 10/02/24  0446 10/03/24  0305   WBC 6.51 5.31   HGB 7.7* 7.3*   HCT 25.8* 24.6*    197     CMP:   Recent Labs   Lab 10/02/24  0446 10/03/24  0305    141   K 3.0* 3.4*    106   CO2 24 25    121*   BUN 20 15   CREATININE 0.8 0.8   CALCIUM 9.0 8.7   PROT 6.6 6.3   ALBUMIN 2.8* 2.7*   BILITOT 0.3 0.2   ALKPHOS 53* 46*   AST 8* 8*   ALT <5* 5*   ANIONGAP 10 10       Significant Imaging: I have reviewed all pertinent imaging results/findings within the past 24 hours.    Assessment/Plan:      * Sepsis  This patient does not have evidence of infective focus  My  "overall impression is sepsis.  Source: Unknown  Antibiotics given-   Antibiotics (72h ago, onward)      Start     Stop Route Frequency Ordered    10/02/24 1700  vancomycin (VANCOCIN) 1,000 mg in D5W 250 mL IVPB (admixture device)         -- IV Every 12 hours (non-standard times) 10/02/24 1556    09/29/24 2107  vancomycin - pharmacy to dose  (vancomycin IVPB (PEDS and ADULTS))        Placed in "And" Linked Group    -- IV pharmacy to manage frequency 09/29/24 2008          Latest lactate reviewed-  Recent Labs   Lab 09/30/24  1716   LACTATE 1.5         Fluid challenge Not needed - patient is not hypotensive      Post- resuscitation assessment No - Post resuscitation assessment not needed       Will Not start Pressors- Levophed for MAP of 65  Source control achieved by: ABX    74-year-old patient presented to the ED with generalized weakness.  T-max found to be 101°.  Daughter reports malodorous urine, but UA negative. CXR negative. Received 1 L bolus of LR while in the ED.     9/29 Blood clx (2/2) positive for E. Faecalis, susceptible to vancomycin  -> Updated result with positive findings for Staph epidermidis, pending susceptibility result    10/1 TTE without evidence of vegetations    Patient noted to fever the day following admission with Tmax of 103.5F. Unable to relieve with ice pack and tylenol, though improved after 500 cc's LR given. Afebrile since. Suspect sepsis source to be from sacral wound exposure to gastric content as patient reported multiple episodes of diarrhea from 9/30-10/1 and intermittently sitting in her stool prior to being cleaned.    Plan:   -- Broad-spectrum antibiotics with vanc/cefepime   - Deescalated down to vancomycin  -- F/u blood clx susceptibility  -- Wound care consult for bilateral lower extremity lymphedema  -- Ctm fever curve   - Tylenol and ice packs as needed  -- Infectious Disease consult, appreciate recs   - Continue vanc   - F/u blood clx susceptibility     Enterococcal " bacteremia  Refer to 'Sepsis' for plan      Diastolic dysfunction  BNP elevated to 357.  Patient denies any shortness of breath.  CXR negative for fluid.  Patient appears euvolemic on exam.  Lower extremities chronically edematous.    Echo from 7/10:    Summary      Left Ventricle: The left ventricle is normal in size. Normal wall thickness. There is concentric remodeling. Normal wall motion. There is normal systolic function with a visually estimated ejection fraction of 60 - 65%. Grade II diastolic dysfunction. Elevated left ventricular filling pressure.    Right Ventricle: Normal right ventricular cavity size. Wall thickness is normal. Right ventricle wall motion  is normal. Systolic function is normal.    Left Atrium: Left atrium is mildly dilated.    Right Atrium: Normal right atrial size.    Aortic Valve: The aortic valve is a trileaflet valve. There is moderate aortic valve sclerosis. There is moderate annular calcification present. Moderately restricted motion. There is mild to moderate stenosis. Aortic valve area by VTI is 1.47 cm². Aortic valve peak velocity is 3.5 m/s. Mean gradient is 27 mmHg. The dimensionless index is 0.45. There is no significant regurgitation. Hemodynamics are unchanged compared to prior study.    Aorta: Aortic root is normal in size measuring 2.93 cm. Ascending aorta is normal measuring 3.55 cm.    IVC/SVC: Intermediate venous pressure at 8 mmHg.      Plan:    -- Diuretics not indicated at this time  -- Repeat CXR if respiratory status acutely changes  -- Strict I/Os      Elevated troponin  Troponins elevated but flat 0.153 -> 0.163 -> 0.141.  Patient denies chest pain.  EKG with sinus tachycardia and PVCs.    No concern for ACS at this time. Likely secondary to infection and/or diastolic dysfunction.    -- Continuous cardiac monitoring  -- EKG PRN - get repeat if pt c/o chest pain      History of pulmonary embolus (PE)  Continue home Eliquis 5 mg BID      HTN  (hypertension)  Chronic, controlled. Latest blood pressure and vitals reviewed-     Temp:  [98.3 °F (36.8 °C)-98.7 °F (37.1 °C)]   Pulse:  [75-95]   Resp:  [18]   BP: (123-138)/(66-75)   SpO2:  [95 %-96 %] .   Home meds for hypertension were reviewed and noted below.   Hypertension Medications               candesartan-hydrochlorothiazide (ATACAND HCT) 16-12.5 mg per tablet Take 1 tablet by mouth once daily.            While in the hospital, will manage blood pressure as follows; Adjust home antihypertensive regimen as follows- hold at this time 2/2 possible sepsis    Will utilize p.r.n. blood pressure medication only if patient's blood pressure greater than 180/110 and she develops symptoms such as worsening chest pain or shortness of breath.    Lymphedema  Chronic. Daughter reports increase in oozing in bilateral LE over last few days.    -- Wound care following      MS (multiple sclerosis)  Continue vitamin-D while inpatient    -- PT/OT: low-intensity therapy with lift device equipment recommended      Anemia  Anemia is likely due to anemia of chronic disease. Most recent hemoglobin and hematocrit are listed below.  Recent Labs     10/02/24  0446 10/03/24  0305 10/03/24  1147   HGB 7.7* 7.3* 8.4*   HCT 25.8* 24.6* 28.8*     Plan  - Monitor serial CBC: Daily  - Transfuse PRBC if patient becomes hemodynamically unstable, symptomatic or H/H drops below 7/21.  - Patient has not received any PRBC transfusions to date  - Patient's anemia is currently stable      VTE Risk Mitigation (From admission, onward)           Ordered     apixaban tablet 5 mg  2 times daily         09/29/24 2000     Reason for No Pharmacological VTE Prophylaxis  Once        Question:  Reasons:  Answer:  Already adequately anticoagulated on oral Anticoagulants    09/29/24 1931     IP VTE HIGH RISK PATIENT  Once         09/29/24 1931     Place sequential compression device  Until discontinued         09/29/24 1931                    Discharge  Planning   USMAN: 10/4/2024     Code Status: Full Code   Is the patient medically ready for discharge?:     Reason for patient still in hospital (select all that apply): Laboratory test, Treatment, and Consult recommendations  Discharge Plan A: Home with family, Home Health (Resume with [facility])          Beatrice Crowley DO  Department of Hospital Medicine   [facility] - [facility] Stepdown

## 2024-10-04 PROBLEM — L24.A2 IRRITANT CONTACT DERMATITIS DUE TO FECAL, URINARY OR DUAL INCONTINENCE: Status: ACTIVE | Noted: 2024-10-04

## 2024-10-04 LAB
ALBUMIN SERPL BCP-MCNC: 3 G/DL (ref 3.5–5.2)
ALP SERPL-CCNC: 46 U/L (ref 55–135)
ALT SERPL W/O P-5'-P-CCNC: 9 U/L (ref 10–44)
ANION GAP SERPL CALC-SCNC: 8 MMOL/L (ref 8–16)
AST SERPL-CCNC: 10 U/L (ref 10–40)
BACTERIA BLD CULT: ABNORMAL
BASOPHILS # BLD AUTO: 0.01 K/UL (ref 0–0.2)
BASOPHILS NFR BLD: 0.2 % (ref 0–1.9)
BILIRUB SERPL-MCNC: 0.2 MG/DL (ref 0.1–1)
BUN SERPL-MCNC: 11 MG/DL (ref 8–23)
CALCIUM SERPL-MCNC: 9.1 MG/DL (ref 8.7–10.5)
CHLORIDE SERPL-SCNC: 108 MMOL/L (ref 95–110)
CK SERPL-CCNC: 23 U/L (ref 20–180)
CO2 SERPL-SCNC: 24 MMOL/L (ref 23–29)
CREAT SERPL-MCNC: 0.7 MG/DL (ref 0.5–1.4)
DIFFERENTIAL METHOD BLD: ABNORMAL
EOSINOPHIL # BLD AUTO: 0.2 K/UL (ref 0–0.5)
EOSINOPHIL NFR BLD: 3.1 % (ref 0–8)
ERYTHROCYTE [DISTWIDTH] IN BLOOD BY AUTOMATED COUNT: 15.3 % (ref 11.5–14.5)
EST. GFR  (NO RACE VARIABLE): >60 ML/MIN/1.73 M^2
GLUCOSE SERPL-MCNC: 104 MG/DL (ref 70–110)
HCT VFR BLD AUTO: 25.7 % (ref 37–48.5)
HGB BLD-MCNC: 7.7 G/DL (ref 12–16)
IMM GRANULOCYTES # BLD AUTO: 0.03 K/UL (ref 0–0.04)
IMM GRANULOCYTES NFR BLD AUTO: 0.5 % (ref 0–0.5)
LYMPHOCYTES # BLD AUTO: 1.8 K/UL (ref 1–4.8)
LYMPHOCYTES NFR BLD: 31.7 % (ref 18–48)
MAGNESIUM SERPL-MCNC: 1.8 MG/DL (ref 1.6–2.6)
MCH RBC QN AUTO: 26 PG (ref 27–31)
MCHC RBC AUTO-ENTMCNC: 30 G/DL (ref 32–36)
MCV RBC AUTO: 87 FL (ref 82–98)
MONOCYTES # BLD AUTO: 0.6 K/UL (ref 0.3–1)
MONOCYTES NFR BLD: 10.3 % (ref 4–15)
NEUTROPHILS # BLD AUTO: 3 K/UL (ref 1.8–7.7)
NEUTROPHILS NFR BLD: 54.2 % (ref 38–73)
NRBC BLD-RTO: 0 /100 WBC
PHOSPHATE SERPL-MCNC: 2.9 MG/DL (ref 2.7–4.5)
PLATELET # BLD AUTO: 222 K/UL (ref 150–450)
PMV BLD AUTO: 10.5 FL (ref 9.2–12.9)
POTASSIUM SERPL-SCNC: 3.6 MMOL/L (ref 3.5–5.1)
PROT SERPL-MCNC: 6.6 G/DL (ref 6–8.4)
RBC # BLD AUTO: 2.96 M/UL (ref 4–5.4)
SODIUM SERPL-SCNC: 140 MMOL/L (ref 136–145)
WBC # BLD AUTO: 5.55 K/UL (ref 3.9–12.7)

## 2024-10-04 PROCEDURE — A4216 STERILE WATER/SALINE, 10 ML: HCPCS

## 2024-10-04 PROCEDURE — 82550 ASSAY OF CK (CPK): CPT | Performed by: PHYSICIAN ASSISTANT

## 2024-10-04 PROCEDURE — 25000003 PHARM REV CODE 250

## 2024-10-04 PROCEDURE — 80053 COMPREHEN METABOLIC PANEL: CPT

## 2024-10-04 PROCEDURE — 76937 US GUIDE VASCULAR ACCESS: CPT

## 2024-10-04 PROCEDURE — 85025 COMPLETE CBC W/AUTO DIFF WBC: CPT

## 2024-10-04 PROCEDURE — 63600175 PHARM REV CODE 636 W HCPCS: Performed by: PHYSICIAN ASSISTANT

## 2024-10-04 PROCEDURE — 99233 SBSQ HOSP IP/OBS HIGH 50: CPT | Mod: ,,, | Performed by: PHYSICIAN ASSISTANT

## 2024-10-04 PROCEDURE — 36415 COLL VENOUS BLD VENIPUNCTURE: CPT | Performed by: PHYSICIAN ASSISTANT

## 2024-10-04 PROCEDURE — 99222 1ST HOSP IP/OBS MODERATE 55: CPT | Mod: ,,, | Performed by: NURSE PRACTITIONER

## 2024-10-04 PROCEDURE — C1751 CATH, INF, PER/CENT/MIDLINE: HCPCS

## 2024-10-04 PROCEDURE — 83735 ASSAY OF MAGNESIUM: CPT

## 2024-10-04 PROCEDURE — 25000003 PHARM REV CODE 250: Performed by: PHYSICIAN ASSISTANT

## 2024-10-04 PROCEDURE — 02HV33Z INSERTION OF INFUSION DEVICE INTO SUPERIOR VENA CAVA, PERCUTANEOUS APPROACH: ICD-10-PCS | Performed by: INTERNAL MEDICINE

## 2024-10-04 PROCEDURE — 84100 ASSAY OF PHOSPHORUS: CPT

## 2024-10-04 PROCEDURE — 63600175 PHARM REV CODE 636 W HCPCS

## 2024-10-04 PROCEDURE — 36573 INSJ PICC RS&I 5 YR+: CPT

## 2024-10-04 PROCEDURE — 36415 COLL VENOUS BLD VENIPUNCTURE: CPT

## 2024-10-04 PROCEDURE — 20600001 HC STEP DOWN PRIVATE ROOM

## 2024-10-04 RX ORDER — POTASSIUM CHLORIDE 20 MEQ/1
20 TABLET, EXTENDED RELEASE ORAL 2 TIMES DAILY
Status: DISPENSED | OUTPATIENT
Start: 2024-10-04 | End: 2024-10-05

## 2024-10-04 RX ORDER — SODIUM CHLORIDE 0.9 % (FLUSH) 0.9 %
10 SYRINGE (ML) INJECTION
Status: DISCONTINUED | OUTPATIENT
Start: 2024-10-04 | End: 2024-10-05 | Stop reason: HOSPADM

## 2024-10-04 RX ORDER — MAGNESIUM SULFATE HEPTAHYDRATE 40 MG/ML
2 INJECTION, SOLUTION INTRAVENOUS ONCE
Status: COMPLETED | OUTPATIENT
Start: 2024-10-04 | End: 2024-10-04

## 2024-10-04 RX ORDER — SODIUM CHLORIDE 0.9 % (FLUSH) 0.9 %
10 SYRINGE (ML) INJECTION EVERY 6 HOURS
Status: DISCONTINUED | OUTPATIENT
Start: 2024-10-04 | End: 2024-10-05 | Stop reason: HOSPADM

## 2024-10-04 RX ADMIN — APIXABAN 5 MG: 5 TABLET, FILM COATED ORAL at 09:10

## 2024-10-04 RX ADMIN — CHOLECALCIFEROL TAB 125 MCG (5000 UNIT) 5000 UNITS: 125 TAB at 09:10

## 2024-10-04 RX ADMIN — DAPTOMYCIN 760 MG: 350 INJECTION, POWDER, LYOPHILIZED, FOR SOLUTION INTRAVENOUS at 01:10

## 2024-10-04 RX ADMIN — LORAZEPAM 1 MG: 1 TABLET ORAL at 07:10

## 2024-10-04 RX ADMIN — ACETAMINOPHEN AND CODEINE PHOSPHATE 1 TABLET: 300; 30 TABLET ORAL at 04:10

## 2024-10-04 RX ADMIN — APIXABAN 5 MG: 5 TABLET, FILM COATED ORAL at 07:10

## 2024-10-04 RX ADMIN — LORAZEPAM 1 MG: 1 TABLET ORAL at 11:10

## 2024-10-04 RX ADMIN — ACETAMINOPHEN AND CODEINE PHOSPHATE 1 TABLET: 300; 30 TABLET ORAL at 06:10

## 2024-10-04 RX ADMIN — Medication 10 ML: at 06:10

## 2024-10-04 RX ADMIN — POTASSIUM CHLORIDE 20 MEQ: 1500 TABLET, EXTENDED RELEASE ORAL at 07:10

## 2024-10-04 RX ADMIN — AMMONIUM LACTATE: 12 LOTION TOPICAL at 10:10

## 2024-10-04 RX ADMIN — MAGNESIUM SULFATE HEPTAHYDRATE 2 G: 40 INJECTION, SOLUTION INTRAVENOUS at 10:10

## 2024-10-04 NOTE — PROGRESS NOTES
[facility] - Cardiology [facility] Medicine  Progress Note    Patient Name: Lupis Murcia  MRN: [id number]  Patient Class: IP- Inpatient   Admission Date: 9/29/2024  Length of Stay: 5 days  Attending Physician: Steve Fox MD  Primary Care Provider: Bobby Tran MD        Subjective:     Principal Problem:Sepsis        HPI:  74 year old female with a PMH of MS, debility, chronic lymphedema, HFpEF, HTN, left foot drop, prior PE (currently on Eliquis), and B12 deficiency who is presenting to [facility] with generalized weakness and decreased apetite over the past several days. Daughter is at bedside and is assisting with the history. Reports that patient has been more lethargic and quiet over the last couple of days.  States that her mother usually has a big appetite, but has been eating very little recently.  Daughter also reports that while changing patient's Depends, she noticed a very malodorous urine.  She took the patient's temperature, which read 101°, and she decided to call EMS to bring patient to the ED. Home health comes to the patient's home 2 times per week for PT/OT.  Wound care visits there home once a week to treat patient's lower extremity lymphedema.  Patient's daughter states that she has noticed increased weeping fluid from patient's bilateral lower extremities.  Patient denies headache, nausea, vomiting, abdominal pain, falls, dysuria, pain in lower extremities, and any recent sick contacts.    Of note, patient was recently admitted 7/9-7/14 for sepsis and hypoxic respiratory failure secondary to UTI.  Blood cultures were positive for Proteus and E coli.  Patient was started on vanc/cefepime/azithromycin.  She improved on IV antibiotics and was discharged on a 4 day course of oral levofloxacin.    While in the ED, patient is febrile (T-max 101°).  Blood pressure 127/57.  Oxygen saturation 95% on room air.  WBC elevated to 12.79.  Hemoglobin 9.3.  Creatinine 1.4.  BUN 22.  Protocol 0.23.  UA  negative for bacteria and leukocytes but +3 occult blood.  Troponins 0.153.  .  EKG sinus tachycardia with occasional PVCs.      Patient to be admitted to Hospital Medicine for further evaluation.    Overview/Hospital Course:  Patient here with generalized weakness and decreased apetite over the past several days. Was noticed to have malodorous urine by Daughter. She found to be febrile in the ED and labs revealed leukocytosis, Cr1.4, BUN 22, and procal 0.23. Troponins were flat.  . EKG with sinus tachycardia and occasional PVCs. Infectious work-up initiated and patient started on broad spectrum antibiotics with vancomycin and cefepime. Of note, patient had similar presentation back in July, though without malodorous urine, and was treated with the same antibiotics for UTI (+) for E.coli and Proteus. CXR unremarkable. UA negative for bacteria and leukocytes. Blood cultures positive for enterococcus faecalis. Patient fevering with Tmax of 103F. Unclear source of infection. Patient does have chronic lymphedema in bilateral lower extremities and an open sacral wound, though both without overt signs of evidence of infection. Tylenol and ice packs for temperature control. Infectious Disease consulted for further recommendations. TTE completed, no evidence of vegetation. Of note, patient did present with complaints of nausea, had multiple episodes of vomiting on day 2 of admission in addition to 4-5 episodes of diarrhea on day 3. Updated (2/2) blood culture speciation positive for staph epidermidis. Abx deescalated to IV daptomycin. Plan to have picc line placed for patient to continue IV abx in outpatient setting.    Interval History: No acute overnight events. Blood cultures with updated susceptibilities, E. Faecalis and S. Epidermidis both sensitive to vanc. Pt afebrile and HDS. SNF, pending placement.    Review of Systems  Objective:     Vital Signs (Most Recent):  Temp: 97.8 °F (36.6 °C) (10/04/24  0742)  Pulse: 84 (10/04/24 0742)  Resp: 18 (10/04/24 0742)  BP: 131/60 (10/04/24 0742)  SpO2: 98 % (10/04/24 0742) Vital Signs (24h Range):  Temp:  [97.8 °F (36.6 °C)-98.7 °F (37.1 °C)] 97.8 °F (36.6 °C)  Pulse:  [72-97] 84  Resp:  [16-18] 18  SpO2:  [94 %-98 %] 98 %  BP: (129-143)/(60-77) 131/60     Weight: 95.2 kg (209 lb 14.1 oz)  Body mass index is 39.66 kg/m².    Intake/Output Summary (Last 24 hours) at 10/4/2024 1130  Last data filed at 10/4/2024 0800  Gross per 24 hour   Intake 200 ml   Output 0 ml   Net 200 ml         Physical Exam  Constitutional:       Appearance: She is obese.   HENT:      Head: Normocephalic and atraumatic.      Mouth/Throat:      Pharynx: Oropharynx is clear.   Eyes:      Pupils: Pupils are equal, round, and reactive to light.   Cardiovascular:      Rate and Rhythm: Normal rate and regular rhythm.      Pulses: Normal pulses.      Heart sounds: Normal heart sounds.   Pulmonary:      Effort: Pulmonary effort is normal.      Breath sounds: Normal breath sounds.   Abdominal:      General: Abdomen is flat. There is no distension.      Tenderness: There is no abdominal tenderness.   Musculoskeletal:      Right lower leg: Edema present.      Left lower leg: Edema present.      Comments: Chronic skin thickening of the lower extremities   Skin:     General: Skin is warm and dry.   Neurological:      General: No focal deficit present.      Mental Status: She is alert.   Psychiatric:         Mood and Affect: Mood normal.             Significant Labs: All pertinent labs within the past 24 hours have been reviewed.  CBC:   Recent Labs   Lab 10/03/24  0305 10/03/24  1147 10/04/24  0445   WBC 5.31 5.39 5.55   HGB 7.3* 8.4* 7.7*   HCT 24.6* 28.8* 25.7*    201 222     CMP:   Recent Labs   Lab 10/03/24  0305 10/04/24  0445    140   K 3.4* 3.6    108   CO2 25 24   * 104   BUN 15 11   CREATININE 0.8 0.7   CALCIUM 8.7 9.1   PROT 6.3 6.6   ALBUMIN 2.7* 3.0*   BILITOT 0.2 0.2  "  ALKPHOS 46* 46*   AST 8* 10   ALT 5* 9*   ANIONGAP 10 8       Significant Imaging: I have reviewed all pertinent imaging results/findings within the past 24 hours.    Assessment/Plan:      * Sepsis  This patient does not have evidence of infective focus  My overall impression is sepsis.  Source: Unknown  Antibiotics given-   Antibiotics (72h ago, onward)      Start     Stop Route Frequency Ordered    10/04/24 1000  DAPTOmycin (CUBICIN) 760 mg in 0.9% NaCl SolP 50 mL IVPB         -- IV Every 24 hours (non-standard times) 10/04/24 0854          Latest lactate reviewed-  No results for input(s): "LACTATE", "POCLAC" in the last 72 hours.      Fluid challenge Not needed - patient is not hypotensive      Post- resuscitation assessment No - Post resuscitation assessment not needed       Will Not start Pressors- Levophed for MAP of 65  Source control achieved by: ABX    74-year-old patient presented to the ED with generalized weakness.  T-max found to be 101°.  Daughter reports malodorous urine, but UA negative. CXR negative. Received 1 L bolus of LR while in the ED.     9/29 Blood clx (2/2) positive for E. Faecalis, pan sensitive  -> Updated result with positive findings for Staph epidermidis, sensitive to daptomycin, vancomycin, and linezolid    10/1 TTE without evidence of vegetations    Patient noted to fever the day following admission with Tmax of 103.5F. Unable to relieve with ice pack and tylenol, though improved after 500 cc's LR given. Afebrile since. Suspect sepsis source to be from sacral wound exposure to gastric content as patient reported multiple episodes of diarrhea from 9/30-10/1 and intermittently sitting in her stool prior to being cleaned.    Plan:   -- Broad-spectrum antibiotics with vanc/cefepime   - Deescalated down to vancomycin  -- F/u blood clx susceptibility  -- Wound care consult for bilateral lower extremity lymphedema  -- Ctm fever curve   - Tylenol and ice packs as needed  -- Infectious " Disease consult, appreciate recs   - Abx deescalated to daptomycin  -- Picc line to be placed for pt to continue IV abx outpatient    Enterococcal bacteremia  Refer to 'Sepsis' for plan      Diastolic dysfunction  BNP elevated to 357.  Patient denies any shortness of breath.  CXR negative for fluid.  Patient appears euvolemic on exam.  Lower extremities chronically edematous.    Echo from 7/10:    Summary      Left Ventricle: The left ventricle is normal in size. Normal wall thickness. There is concentric remodeling. Normal wall motion. There is normal systolic function with a visually estimated ejection fraction of 60 - 65%. Grade II diastolic dysfunction. Elevated left ventricular filling pressure.    Right Ventricle: Normal right ventricular cavity size. Wall thickness is normal. Right ventricle wall motion  is normal. Systolic function is normal.    Left Atrium: Left atrium is mildly dilated.    Right Atrium: Normal right atrial size.    Aortic Valve: The aortic valve is a trileaflet valve. There is moderate aortic valve sclerosis. There is moderate annular calcification present. Moderately restricted motion. There is mild to moderate stenosis. Aortic valve area by VTI is 1.47 cm². Aortic valve peak velocity is 3.5 m/s. Mean gradient is 27 mmHg. The dimensionless index is 0.45. There is no significant regurgitation. Hemodynamics are unchanged compared to prior study.    Aorta: Aortic root is normal in size measuring 2.93 cm. Ascending aorta is normal measuring 3.55 cm.    IVC/SVC: Intermediate venous pressure at 8 mmHg.      Plan:    -- Diuretics not indicated at this time  -- Repeat CXR if respiratory status acutely changes  -- Strict I/Os      Elevated troponin  Troponins elevated but flat 0.153 -> 0.163 -> 0.141.  Patient denies chest pain.  EKG with sinus tachycardia and PVCs.    No concern for ACS at this time. Likely secondary to infection and/or diastolic dysfunction.    -- Continuous cardiac  monitoring  -- EKG PRN - get repeat if pt c/o chest pain      History of pulmonary embolus (PE)  Continue home Eliquis 5 mg BID      HTN (hypertension)  Chronic, controlled. Latest blood pressure and vitals reviewed-     Temp:  [98.3 °F (36.8 °C)-98.7 °F (37.1 °C)]   Pulse:  [75-95]   Resp:  [18]   BP: (123-138)/(66-75)   SpO2:  [95 %-96 %] .   Home meds for hypertension were reviewed and noted below.   Hypertension Medications               candesartan-hydrochlorothiazide (ATACAND HCT) 16-12.5 mg per tablet Take 1 tablet by mouth once daily.            While in the hospital, will manage blood pressure as follows; Adjust home antihypertensive regimen as follows- hold at this time 2/2 possible sepsis    Will utilize p.r.n. blood pressure medication only if patient's blood pressure greater than 180/110 and she develops symptoms such as worsening chest pain or shortness of breath.    Lymphedema  Chronic. Daughter reports increase in oozing in bilateral LE over last few days.    -- Wound care following      MS (multiple sclerosis)  Continue vitamin-D while inpatient    -- PT/OT: low-intensity therapy with lift device equipment recommended      Anemia  Anemia is likely due to anemia of chronic disease. Most recent hemoglobin and hematocrit are listed below.  Recent Labs     10/03/24  0305 10/03/24  1147 10/04/24  0445   HGB 7.3* 8.4* 7.7*   HCT 24.6* 28.8* 25.7*       Plan  - Monitor serial CBC: Daily  - Transfuse PRBC if patient becomes hemodynamically unstable, symptomatic or H/H drops below 7/21.  - Patient has not received any PRBC transfusions to date  - Patient's anemia is currently stable  - Likely anemia of chronic disease, pt will f/u with her hematologist outpatient      VTE Risk Mitigation (From admission, onward)           Ordered     apixaban tablet 5 mg  2 times daily         09/29/24 2000     Reason for No Pharmacological VTE Prophylaxis  Once        Question:  Reasons:  Answer:  Already adequately  anticoagulated on oral Anticoagulants    09/29/24 1931     IP VTE HIGH RISK PATIENT  Once         09/29/24 1931     Place sequential compression device  Until discontinued         09/29/24 1931                    Discharge Planning   USMAN: 10/4/2024     Code Status: Full Code   Is the patient medically ready for discharge?:     Reason for patient still in hospital (select all that apply): Treatment and Pending disposition  Discharge Plan A: Home with family, Home Health (Resume with [facility])          Beatrice Crowley DO  Department of Hospital Medicine   [facility] - Cardiology Stepdown

## 2024-10-04 NOTE — SUBJECTIVE & OBJECTIVE
Interval History: No acute overnight events. Blood cultures with updated susceptibilities, E. Faecalis and S. Epidermidis both sensitive to vanc. Pt afebrile and HDS. SNF, pending placement.    Review of Systems  Objective:     Vital Signs (Most Recent):  Temp: 97.8 °F (36.6 °C) (10/04/24 0742)  Pulse: 84 (10/04/24 0742)  Resp: 18 (10/04/24 0742)  BP: 131/60 (10/04/24 0742)  SpO2: 98 % (10/04/24 0742) Vital Signs (24h Range):  Temp:  [97.8 °F (36.6 °C)-98.7 °F (37.1 °C)] 97.8 °F (36.6 °C)  Pulse:  [72-97] 84  Resp:  [16-18] 18  SpO2:  [94 %-98 %] 98 %  BP: (129-143)/(60-77) 131/60     Weight: 95.2 kg (209 lb 14.1 oz)  Body mass index is 39.66 kg/m².    Intake/Output Summary (Last 24 hours) at 10/4/2024 1130  Last data filed at 10/4/2024 0800  Gross per 24 hour   Intake 200 ml   Output 0 ml   Net 200 ml         Physical Exam  Constitutional:       Appearance: She is obese.   HENT:      Head: Normocephalic and atraumatic.      Mouth/Throat:      Pharynx: Oropharynx is clear.   Eyes:      Pupils: Pupils are equal, round, and reactive to light.   Cardiovascular:      Rate and Rhythm: Normal rate and regular rhythm.      Pulses: Normal pulses.      Heart sounds: Normal heart sounds.   Pulmonary:      Effort: Pulmonary effort is normal.      Breath sounds: Normal breath sounds.   Abdominal:      General: Abdomen is flat. There is no distension.      Tenderness: There is no abdominal tenderness.   Musculoskeletal:      Right lower leg: Edema present.      Left lower leg: Edema present.      Comments: Chronic skin thickening of the lower extremities   Skin:     General: Skin is warm and dry.   Neurological:      General: No focal deficit present.      Mental Status: She is alert.   Psychiatric:         Mood and Affect: Mood normal.             Significant Labs: All pertinent labs within the past 24 hours have been reviewed.  CBC:   Recent Labs   Lab 10/03/24  0305 10/03/24  1147 10/04/24  0445   WBC 5.31 5.39 5.55   HGB  7.3* 8.4* 7.7*   HCT 24.6* 28.8* 25.7*    201 222     CMP:   Recent Labs   Lab 10/03/24  0305 10/04/24  0445    140   K 3.4* 3.6    108   CO2 25 24   * 104   BUN 15 11   CREATININE 0.8 0.7   CALCIUM 8.7 9.1   PROT 6.3 6.6   ALBUMIN 2.7* 3.0*   BILITOT 0.2 0.2   ALKPHOS 46* 46*   AST 8* 10   ALT 5* 9*   ANIONGAP 10 8       Significant Imaging: I have reviewed all pertinent imaging results/findings within the past 24 hours.

## 2024-10-04 NOTE — PROGRESS NOTES
[facility] - Cardiology Stepdown  Infectious Disease  Progress Note    Patient Name: Lupis Murcia  MRN: [id number]  Admission Date: 9/29/2024  Length of Stay: 5 days  Attending Physician: Steve Fox MD  Primary Care Provider: Bobby Tran MD    Isolation Status: No active isolations  Assessment/Plan:      ID  Enterococcal bacteremia  74-year-old female with a history of multiple sclerosis not on immunosuppression, lymphedema of bilateral lower extremities, and pulmonary embolism admitted with fever and weakness found to have enterococcal bacteremia of and unclear source.  Enterococcus - ampicillin sensitive. Possible source could be presacral skin irritation with fecal contamination but also there would be hightened conscern for endocarditis.  Patient defers PETE. Patient has been treated with vancomycin and has improved.  Repeat blood cultures are no growth to date.  2D echo obtained showing no vegetation but pulmonic valve not well visualized.  Has murmur but reports from childhood.  Blood cultures now show staph epi 2/4 as well  - sensitive to Daptomycin.  Baseline CPK 23.    .  Plan  DC vancomycin   Start Daptomycin 8mg/kg IV q24h for dc with baseline CPK  PICC placement and plan for 6 weeks IV abx out of concern for possible endocarditis as no clear source available.  On Mondays - Weekly CBC, CMP and CPK  Id will sign off        Outpatient Antibiotic Therapy Plan:    Please send referral to [facility].    1) Infection: Bacteremia with E faecalis and Staph epi    2) Discharge Antibiotics:    Intravenous antibiotics:  Daptomycin 8mg/kg IV q24h     Oral antibiotics:  None    3) Therapy Duration:  6 weeks from 1st neg BCX 9/30    Estimated end date of IV antibiotics: 11/11/24    4) Outpatient Weekly Labs:    Order the following labs to be drawn on Mondays:   CBC  CMP   CRP    CPK (when on Daptomycin)    5) Fax Lab Results to Infectious Diseases Provider: Khalif Shook  TESSIE    [facility] Fax Number: [phone]    6) Outpatient Infectious Diseases Follow-up    Follow-up appointment will be arranged by the ID clinic and will be found in the patient's appointments tab.    Prior to discharge, please ensure the patient's follow-up has been scheduled.    If there is still no follow-up scheduled prior to discharge, please send an EPIC message to [facility] Clinical Pool or Call Infectious Diseases Dept.                Anticipated Disposition: tbd    Thank you for your consult. I will sign off. Please contact us if you have any additional questions.    LIZA Geiger  Infectious Disease  [facility] - Cardiology Stepdown    Subjective:     Principal Problem:Sepsis    HPI: 74 year old female with a PMH of MS (not on immunotherapy), debility, chronic lymphedema, HFpEF, HTN, left foot drop, prior PE (currently on Eliquis) presented to [facility] with generalized weakness and decreased apetite over the past several days. Daughter is at bedside provides history. Reports that patient has been more lethargic and quiet over the last couple of days.  States that her mother usually has a big appetite, but has been eating very little recently.  Daughter also reports that while changing patient's Depends, she noticed a very malodorous urine.  She took the patient's temperature, which read 101°, and she decided to call EMS to bring patient to the ED. Home health comes to the patient's home 2 times per week for PT/OT.  Wound care visits there home once a week to treat patient's lower extremity lymphedema.       Of note, patient was recently admitted 7/9-7/14 for sepsis and hypoxic respiratory failure secondary to UTI.  Blood cultures were positive for Proteus and E coli.  Patient was started on vanc/cefepime/azithromycin.  She improved on IV antibiotics and was discharged on a 4 day course of oral levofloxacin.     While in the ED, patient is afebrile (T-max 101°).  UA negative for rare bacteria, WBC 3,  and leukocytes esterace negative.  CXR showed:The lungs are hypoexpanded without focal or lobar consolidation.  Blood cultures now positive for E faecalis - Source unclear. ID now consulted for bacteremia - remains on Vanc.     Interval History:   No acute events  Afebrile and WBC WNL  Blood cultures with amp sensitive E faecalis and Staph EPi 2/4 each - all sensitve to daptomycin - repeats NGTD  Feels good but nervous about PICC line placement.  The patient denies any recent fever, chills, or sweats.      Review of Systems   Constitutional:  Negative for activity change, chills, diaphoresis and fever.   Respiratory:  Negative for cough, shortness of breath and wheezing.    Cardiovascular:  Negative for chest pain.   Gastrointestinal:  Negative for abdominal pain, constipation, diarrhea, nausea and vomiting.   Genitourinary:  Negative for dysuria, frequency and urgency.   Skin:  Positive for wound.   Neurological:  Negative for dizziness.   Hematological:  Does not bruise/bleed easily.     Objective:     Vital Signs (Most Recent):  Temp: 98.4 °F (36.9 °C) (10/04/24 1520)  Pulse: 83 (10/04/24 1520)  Resp: 18 (10/04/24 1615)  BP: (!) 148/66 (10/04/24 1520)  SpO2: 95 % (10/04/24 1520) Vital Signs (24h Range):  Temp:  [97.8 °F (36.6 °C)-98.7 °F (37.1 °C)] 98.4 °F (36.9 °C)  Pulse:  [72-97] 83  Resp:  [16-18] 18  SpO2:  [94 %-98 %] 95 %  BP: (129-148)/(60-79) 148/66     Weight: 95.2 kg (209 lb 14.1 oz)  Body mass index is 39.66 kg/m².    Estimated Creatinine Clearance: 73.2 mL/min (based on SCr of 0.7 mg/dL).     Physical Exam  Constitutional:       General: She is not in acute distress.     Appearance: Normal appearance. She is well-developed. She is obese. She is not ill-appearing, toxic-appearing or diaphoretic.   HENT:      Head: Normocephalic and atraumatic.   Cardiovascular:      Rate and Rhythm: Normal rate and regular rhythm.      Heart sounds: Murmur (reports from childhood) heard.      No friction rub. No  "gallop.   Pulmonary:      Effort: Pulmonary effort is normal. No respiratory distress.      Breath sounds: Normal breath sounds. No wheezing or rales.   Abdominal:      General: Bowel sounds are normal. There is no distension.      Palpations: Abdomen is soft. There is no mass.      Tenderness: There is no abdominal tenderness. There is no guarding or rebound.   Skin:     General: Skin is warm and dry.   Neurological:      Mental Status: She is alert and oriented to person, place, and time.   Psychiatric:         Behavior: Behavior normal.                        Significant Labs: Blood Culture:   Recent Labs   Lab 07/11/24  0506 07/11/24  0507 09/29/24  1706 09/30/24  2348 09/30/24  2349   LABBLOO No growth after 5 days. No growth after 5 days. Gram stain aer bottle: Gram positive cocci  Gram stain lemuel bottle: Gram positive cocci  Results called to and read back by: Mitchell Pabon RN  09/30/2024  09:21  ENTEROCOCCUS FAECALIS*  STAPHYLOCOCCUS EPIDERMIDIS*  Gram stain aer bottle: Gram positive cocci  Gram stain lemuel bottle: Gram positive cocci  Positive results previously called 09/30/2024  09:22  ENTEROCOCCUS FAECALIS  For susceptibility see order # [id number]  *  STAPHYLOCOCCUS EPIDERMIDIS* No Growth to date  No Growth to date  No Growth to date  No Growth to date No Growth to date  No Growth to date  No Growth to date  No Growth to date     CBC:   Recent Labs   Lab 10/03/24  0305 10/03/24  1147 10/04/24  0445   WBC 5.31 5.39 5.55   HGB 7.3* 8.4* 7.7*   HCT 24.6* 28.8* 25.7*    201 222     CMP:   Recent Labs   Lab 10/03/24  0305 10/04/24  0445    140   K 3.4* 3.6    108   CO2 25 24   * 104   BUN 15 11   CREATININE 0.8 0.7   CALCIUM 8.7 9.1   PROT 6.3 6.6   ALBUMIN 2.7* 3.0*   BILITOT 0.2 0.2   ALKPHOS 46* 46*   AST 8* 10   ALT 5* 9*   ANIONGAP 10 8     Wound Culture: No results for input(s): "LABAERO" in the last 4320 hours.  All pertinent labs within the past 24 hours have " been reviewed.    Significant Imaging: I have reviewed all pertinent imaging results/findings within the past 24 hours.  X-Ray Chest AP Portable [[id number]] Resulted: 09/29/24 1846   Order Status: Completed Updated: 09/29/24 1849   Narrative:     EXAMINATION:  XR CHEST AP PORTABLE    CLINICAL HISTORY:  Sepsis;    TECHNIQUE:  Single frontal view of the chest was performed.    COMPARISON:  Chest radiograph 07/12/2024, 07/09/2024    FINDINGS:  Cardiac monitoring leads overlie the chest.    The cardiomediastinal silhouette is enlarged.  The mediastinal structures are midline.  The hilar and mediastinal contours are unremarkable.    The lungs are hypoexpanded without focal or lobar consolidation.  Prominence of the central pulmonary vasculature.    No pneumothorax. No large pleural effusion.    Soft tissues are within normal limits.   Impression:       Stable prominent interstitial markings.  No focal consolidation.    Electronically signed by resident: Shanelle Paz  Date: 09/29/2024  Time: 18:17    Electronically signed by: Domenico Alfred MD  Date: 09/29/2024  Time: 18:46      Imaging History     2024    Date Procedure Name Study Review Link PACS Link Status Accession Number Location   10/01/24 10:40 PM Cardiac monitoring strips Study Review  Final     10/01/24 03:25 PM Echo Study Review  Images Final [id number] [facility]   10/01/24 04:37 AM Cardiac monitoring strips Study Review  Final     09/30/24 01:09 AM Cardiac monitoring strips Study Review  Final     09/29/24 09:23 PM Cardiac monitoring strips Study Review  Final     09/29/24 05:38 PM X-Ray Chest AP Portable Study Review  Images Final [id number] [facility]

## 2024-10-04 NOTE — SUBJECTIVE & OBJECTIVE
Scheduled Meds:   ammonium lactate   Topical (Top) BID    apixaban  5 mg Oral BID    vitamin D  5,000 Units Oral Daily    DAPTOmycin (CUBICIN) IV (PEDS and ADULTS)  8 mg/kg Intravenous Q24H    potassium chloride  20 mEq Oral BID     Continuous Infusions:  PRN Meds:  Current Facility-Administered Medications:     acetaminophen, 650 mg, Oral, Q4H PRN    acetaminophen-codeine 300-30mg, 1 tablet, Oral, Q6H PRN    dextrose 10%, 12.5 g, Intravenous, PRN    dextrose 10%, 25 g, Intravenous, PRN    glucagon (human recombinant), 1 mg, Intramuscular, PRN    glucose, 16 g, Oral, PRN    glucose, 24 g, Oral, PRN    LORazepam, 1 mg, Oral, Q8H PRN    naloxone, 0.02 mg, Intravenous, PRN    ondansetron, 4 mg, Intravenous, Q8H PRN    sodium chloride 0.9%, 10 mL, Intravenous, Q12H PRN    Review of patient's allergies indicates:   Allergen Reactions    Contrast media Shortness Of Breath and Rash    Pcn [penicillins] Shortness Of Breath and Rash    Celebrex [celecoxib] Other (See Comments)     Swallowing problems     Diazepam Hives    Gabapentin Other (See Comments)     Confusion     Motrin [ibuprofen] Rash        Past Medical History:   Diagnosis Date    Lymphedema     MS (multiple sclerosis)     Other pulmonary embolism without acute cor pulmonale     Vitamin B12 deficiency      Past Surgical History:   Procedure Laterality Date    BREAST CYST EXCISION Left     over 40yrs ago     SECTION      ESOPHAGOGASTRODUODENOSCOPY N/A 2022    Procedure: EGD (ESOPHAGOGASTRODUODENOSCOPY);  Surgeon: Radha Arango MD;  Location: [address];  Service: Endoscopy;  Laterality: N/A;       Family History       Problem Relation (Age of Onset)    Brain cancer Brother    Coronary artery disease Father    Lung cancer Father, Mother          Tobacco Use    Smoking status: Never    Smokeless tobacco: Never   Substance and Sexual Activity    Alcohol use: No    Drug use: No    Sexual activity: Not on file     Review of Systems   Skin:   Positive for wound.       Objective:     Vital Signs (Most Recent):  Temp: 98.6 °F (37 °C) (10/04/24 1159)  Pulse: 87 (10/04/24 1400)  Resp: 18 (10/04/24 1159)  BP: (!) 144/79 (10/04/24 1159)  SpO2: 96 % (10/04/24 1159) Vital Signs (24h Range):  Temp:  [97.8 °F (36.6 °C)-98.7 °F (37.1 °C)] 98.6 °F (37 °C)  Pulse:  [72-97] 87  Resp:  [16-18] 18  SpO2:  [94 %-98 %] 96 %  BP: (129-144)/(60-79) 144/79     Weight: 95.2 kg (209 lb 14.1 oz)  Body mass index is 39.66 kg/m².     Physical Exam  Constitutional:       Appearance: Normal appearance.   Skin:     General: Skin is warm and dry.      Findings: Lesion present.   Neurological:      Mental Status: She is alert.          Laboratory:  All pertinent labs reviewed within the last 24 hours.    Diagnostic Results:  None

## 2024-10-04 NOTE — HPI
74 year old female with a PMH of MS (not on immunotherapy), debility, chronic lymphedema, HFpEF, HTN, left foot drop, prior PE (currently on Eliquis) [place] [facility] [place] and decreased apetite over the past several days. Daughter is at bedside provides history. Reports that patient has been more lethargic and quiet over the last couple of days.  States that her mother usually has a big appetite, but has been eating very little recently.  Daughter also reports that while changing patient's Depends, she noticed a very malodorous urine.  She took the patient's temperature, which read 101°, and she decided to call EMS to bring patient to the ED. Home health comes to the patient's home 2 times per week for PT/OT.  Wound care visits there home once a week to treat patient's lower extremity lymphedema.       Of note, patient was recently admitted 7/9-7/14 for sepsis and hypoxic respiratory failure secondary to UTI.  Blood cultures were positive for Proteus and E coli.  Patient was started on vanc/cefepime/azithromycin.  She improved on IV antibiotics and was discharged on a 4 day course of oral levofloxacin.     While in the ED, patient is afebrile (T-max 101°).  UA negative for rare bacteria, WBC 3, and leukocytes esterace negative.  CXR showed:The lungs are hypoexpanded without focal or lobar consolidation.  Blood cultures now positive for E faecalis - Source unclear. ID now consulted for bacteremia - remains on Vanc.     
74 year old female with a PMH of MS, debility, chronic lymphedema, HFpEF, HTN, left foot drop, prior PE (currently on Eliquis), and B12 deficiency who [place] [facility] [place] and decreased apetite over the past several days. Daughter is at bedside and is assisting with the history. Reports that patient has been more lethargic and quiet over the last couple of days.  States that her mother usually has a big appetite, but has been eating very little recently.  Daughter also reports that while changing patient's Depends, she noticed a very malodorous urine.  She took the patient's temperature, which read 101°, and she decided to call EMS to bring patient to the ED. Home health comes to the patient's home 2 times per week for PT/OT.  Wound care visits there home once a week to treat patient's lower extremity lymphedema.  Patient's daughter states that she has noticed increased weeping fluid from patient's bilateral lower extremities.  Patient denies headache, nausea, vomiting, abdominal pain, falls, dysuria, pain in lower extremities, and any recent sick contacts.    Of note, patient was recently admitted 7/9-7/14 for sepsis and hypoxic respiratory failure secondary to UTI.  Blood cultures were positive for Proteus and E coli.  Patient was started on vanc/cefepime/azithromycin.  She improved on IV antibiotics and was discharged on a 4 day course of oral levofloxacin.    While in the ED, patient is febrile (T-max 101°).  Blood pressure 127/57.  Oxygen saturation 95% on room air.  WBC elevated to 12.79.  Hemoglobin 9.3.  Creatinine 1.4.  BUN 22.  Protocol 0.23.  UA negative for bacteria and leukocytes but +3 occult blood.  Troponins 0.153.  .  EKG sinus tachycardia with occasional PVCs.      Patient to be admitted to Hospital Medicine for further evaluation.  
Lupis Murcia is a 74 year old female with a PMH of MS, debility, chronic lymphedema, HFpEF, HTN, left foot drop, prior PE (currently on Eliquis), and B12 deficiency who is [place] [facility] [place] generalized weakness and decreased apetite over the past several days. Daughter is at bedside and is assisting with the history. Reports that patient has been more lethargic and quiet over the last couple of days.  States that her mother usually has a big appetite, but has been eating very little recently.  Daughter also reports that while changing patient's Depends, she noticed a very malodorous urine.  She took the patient's temperature, which read 101°, and she decided to call EMS to bring patient to the ED. Home health comes to the patient's home 2 times per week for PT/OT.  Wound care visits there home once a week to treat patient's lower extremity lymphedema.  Patient's daughter states that she has noticed increased weeping fluid from patient's bilateral lower extremities.  Patient denies headache, nausea, vomiting, abdominal pain, falls, dysuria, pain in lower extremities, and any recent sick contacts.     Of note, patient was recently admitted 7/9-7/14 for sepsis and hypoxic respiratory failure secondary to UTI.  Blood cultures were positive for Proteus and E coli.  Patient was started on vanc/cefepime/azithromycin.  She improved on IV antibiotics and was discharged on a 4 day course of oral levofloxacin.     While in the ED, patient is afebrile (T-max 101°).  Blood pressure 127/57.  Oxygen saturation 95% on room air.  WBC elevated to 12.79.  Hemoglobin 9.3.  Creatinine 1.4.  BUN 22.  Protocol 0.23.  UA negative for bacteria and leukocytes but +3 occult blood.  Troponins 0.153.  .  EKG sinus tachycardia with occasional PVCs.     Patient to be admitted to Hospital Medicine for further evaluation. Skin integrity DMITRIY consulted for evaluation of skin injury.    
breast and formula  feeding…

## 2024-10-04 NOTE — PT/OT/SLP PROGRESS
Physical Therapy      Patient Name:  Lupis Murcia   MRN:  [id number]    Patient not seen today. Pt politely declined due to eating lunch. Daughter in agreement. Will follow-up per POC.

## 2024-10-04 NOTE — PLAN OF CARE
Pt stable for dc, will follow with other SNF referrals as [facility] has no beds and no other referrals were sent, will follow and send referrals, Pt will need IV abx with SNF at dc. IM team updated.

## 2024-10-04 NOTE — ASSESSMENT & PLAN NOTE
74-year-old female with a history of multiple sclerosis not on immunosuppression, lymphedema of bilateral lower extremities, and pulmonary embolism admitted with fever and weakness found to have enterococcal bacteremia of and unclear source.  Enterococcus - ampicillin sensitive. Possible source could be presacral skin irritation with fecal contamination but also there would be hightened conscern for endocarditis.  Patient defers PETE. Patient has been treated with vancomycin and has improved.  Repeat blood cultures are no growth to date.  2D echo obtained showing no vegetation but pulmonic valve not well visualized.  Has murmur but reports from childhood.  Blood cultures now show staph epi 2/4 as well  - sensitive to Daptomycin.  Baseline CPK 23.    .  Plan  DC vancomycin   Start Daptomycin 8mg/kg IV q24h for dc with baseline CPK  PICC placement and plan for 6 weeks IV abx out of concern for possible endocarditis as no clear source available.  On Mondays - Weekly CBC, CMP and CPK  Id will sign off        Outpatient Antibiotic Therapy Plan:    Please send referral to [facility].    1) Infection: Bacteremia with E faecalis and Staph epi    2) Discharge Antibiotics:    Intravenous antibiotics:  Daptomycin 8mg/kg IV q24h     Oral antibiotics:  None    3) Therapy Duration:  6 weeks from 1st neg BCX 9/30    Estimated end date of IV antibiotics: 11/11/24    4) Outpatient Weekly Labs:    Order the following labs to be drawn on Mondays:   CBC  CMP   CRP    CPK (when on Daptomycin)    5) Fax Lab Results to Infectious Diseases Provider: Khalif Shook PA-C    [facility] Fax Number: [phone]    6) Outpatient Infectious Diseases Follow-up    Follow-up appointment will be arranged by the ID clinic and will be found in the patient's appointments tab.    Prior to discharge, please ensure the patient's follow-up has been scheduled.    If there is still no follow-up scheduled prior to  discharge, please send an [facility] message to [facility] Clinical Pool or Call Infectious Diseases Dept.

## 2024-10-04 NOTE — PLAN OF CARE
Problem: Adult Inpatient Plan of Care  Goal: Absence of Hospital-Acquired Illness or Injury  Outcome: Progressing  Goal: Optimal Comfort and Wellbeing  Outcome: Progressing     Problem: Sepsis/Septic Shock  Goal: Absence of Bleeding  Outcome: Progressing  Goal: Optimal Nutrition Intake  Outcome: Progressing     Problem: Acute Kidney Injury/Impairment  Goal: Fluid and Electrolyte Balance  Outcome: Progressing     Problem: Wound  Goal: Absence of Infection Signs and Symptoms  Outcome: Progressing  Goal: Improved Oral Intake  Outcome: Progressing  Goal: Skin Health and Integrity  Outcome: Progressing     Problem: Fall Injury Risk  Goal: Absence of Fall and Fall-Related Injury  Outcome: Progressing     Problem: Skin Injury Risk Increased  Goal: Skin Health and Integrity  Outcome: Progressing

## 2024-10-04 NOTE — CONSULTS
[facility] - Cardiology Stepdown  Skin Integrity DMITRIY  Consult Note    Patient Name: Lupis Murcia  MRN: [id number]  Admission Date: 9/29/2024  Hospital Length of Stay: 5 days  Attending Physician: Steve Fox MD  Primary Care Provider: Bobby Tran MD     Inpatient consult to Skin Integrity  Practitioner  Consult performed by: Jennifer Griffin, DIEGO  Consult ordered by: Jennifer Griffin NP        Subjective:     History of Present Illness:  Lupis Murcia is a 74 year old female with a PMH of MS, debility, chronic lymphedema, HFpEF, HTN, left foot drop, prior PE (currently on Eliquis), and B12 deficiency who is presenting to [facility] [place] with generalized weakness and decreased apetite over the past several days. Daughter is at bedside and is assisting with the history. Reports that patient has been more lethargic and quiet over the last couple of days.  States that her mother usually has a big appetite, but has been eating very little recently.  Daughter also reports that while changing patient's Depends, she noticed a very malodorous urine.  She took the patient's temperature, which read 101°, and she decided to call EMS to bring patient to the ED. Home health comes to the patient's home 2 times per week for PT/OT.  Wound care visits there home once a week to treat patient's lower extremity lymphedema.  Patient's daughter states that she has noticed increased weeping fluid from patient's bilateral lower extremities.  Patient denies headache, nausea, vomiting, abdominal pain, falls, dysuria, pain in lower extremities, and any recent sick contacts.     Of note, patient was recently admitted 7/9-7/14 for sepsis and hypoxic respiratory failure secondary to UTI.  Blood cultures were positive for Proteus and E coli.  Patient was started on vanc/cefepime/azithromycin.  She improved on IV antibiotics and was discharged on a 4 day course of oral levofloxacin.     While in the ED, patient is afebrile (T-max 101°).  Blood pressure  127/57.  Oxygen saturation 95% on room air.  WBC elevated to 12.79.  Hemoglobin 9.3.  Creatinine 1.4.  BUN 22.  Protocol 0.23.  UA negative for bacteria and leukocytes but +3 occult blood.  Troponins 0.153.  .  EKG sinus tachycardia with occasional PVCs.     Patient to be admitted to Hospital Medicine for further evaluation. Skin integrity DMITRIY consulted for evaluation of skin injury.      Scheduled Meds:   ammonium lactate   Topical (Top) BID    apixaban  5 mg Oral BID    vitamin D  5,000 Units Oral Daily    DAPTOmycin (CUBICIN) IV (PEDS and ADULTS)  8 mg/kg Intravenous Q24H    potassium chloride  20 mEq Oral BID     Continuous Infusions:  PRN Meds:  Current Facility-Administered Medications:     acetaminophen, 650 mg, Oral, Q4H PRN    acetaminophen-codeine 300-30mg, 1 tablet, Oral, Q6H PRN    dextrose 10%, 12.5 g, Intravenous, PRN    dextrose 10%, 25 g, Intravenous, PRN    glucagon (human recombinant), 1 mg, Intramuscular, PRN    glucose, 16 g, Oral, PRN    glucose, 24 g, Oral, PRN    LORazepam, 1 mg, Oral, Q8H PRN    naloxone, 0.02 mg, Intravenous, PRN    ondansetron, 4 mg, Intravenous, Q8H PRN    sodium chloride 0.9%, 10 mL, Intravenous, Q12H PRN    Review of patient's allergies indicates:   Allergen Reactions    Contrast media Shortness Of Breath and Rash    Pcn [penicillins] Shortness Of Breath and Rash    Celebrex [celecoxib] Other (See Comments)     Swallowing problems     Diazepam Hives    Gabapentin Other (See Comments)     Confusion     Motrin [ibuprofen] Rash        Past Medical History:   Diagnosis Date    Lymphedema     MS (multiple sclerosis)     Other pulmonary embolism without acute cor pulmonale     Vitamin B12 deficiency      Past Surgical History:   Procedure Laterality Date    BREAST CYST EXCISION Left     over 40yrs ago     SECTION      ESOPHAGOGASTRODUODENOSCOPY N/A 2022    Procedure: EGD (ESOPHAGOGASTRODUODENOSCOPY);  Surgeon: Radha Arango MD;  Location: [address]  [facility]);  Service: Endoscopy;  Laterality: N/A;       Family History       Problem Relation (Age of Onset)    Brain cancer Brother    Coronary artery disease Father    Lung cancer Father, Mother          Tobacco Use    Smoking status: Never    Smokeless tobacco: Never   Substance and Sexual Activity    Alcohol use: No    Drug use: No    Sexual activity: Not on file     Review of Systems   Skin:  Positive for wound.       Objective:     Vital Signs (Most Recent):  Temp: 98.6 °F (37 °C) (10/04/24 1159)  Pulse: 87 (10/04/24 1400)  Resp: 18 (10/04/24 1159)  BP: (!) 144/79 (10/04/24 1159)  SpO2: 96 % (10/04/24 1159) Vital Signs (24h Range):  Temp:  [97.8 °F (36.6 °C)-98.7 °F (37.1 °C)] 98.6 °F (37 °C)  Pulse:  [72-97] 87  Resp:  [16-18] 18  SpO2:  [94 %-98 %] 96 %  BP: (129-144)/(60-79) 144/79     Weight: 95.2 kg (209 lb 14.1 oz)  Body mass index is 39.66 kg/m².     Physical Exam  Constitutional:       Appearance: Normal appearance.   Skin:     General: Skin is warm and dry.      Findings: Lesion present.   Neurological:      Mental Status: She is alert.          Laboratory:  All pertinent labs reviewed within the last 24 hours.    Diagnostic Results:  None      Assessment/Plan:              DMITRIY Skin Integrity Evaluation      Skin Integrity DMITRIY evaluation of patient as part of the comprehensive skin care team.     She has been admitted for 5 days. Skin injury was noted on 7/9/24. POA yes.    Sacrum/buttocks        Derm  Irritant contact dermatitis due to fecal, urinary or dual incontinence  - consult received for evaluation of skin injury.  - [place] [facility] [place] and decreased apetite over the past several days.   - sacral and buttocks regions with partial thickness tissue loss and red/purple discoloration.  - likely related to moisture and friction with a component of pressure.  - pt is incontinent of urine. External urinary catheter in place.  - urinary incontinence episode cleaned during  assessment.  - continue Triad bid/prn for moisture management.  - pt reluctant to turn. Educated on the importance of frequent repositioning for pressure injury prevention.  - pillows used for offloading.  - ashley surface.  - waffle mattress and heel boots at bedside. Pt had a bad experience with waffle previously and declined placement to surface.  - will order chair cushion for home. Pt complains of pain when sitting in lift chair for long periods.  - fidel score documented at 16 with a nutrition sub scale score of 4.  - nursing to maintain pressure injury prevention measures and continue wound care per orders.  - tentative dc scheduled this weekend per daughter at bedside.        Thank you for your consult. I will follow-up with patient. Please contact us if you have any additional questions.      Jennifer Griffin NP  Skin Integrity DMITRIY  [facility] - Cardiology Stepdown

## 2024-10-04 NOTE — ASSESSMENT & PLAN NOTE
"This patient does not have evidence of infective focus  My overall impression is sepsis.  Source: Unknown  Antibiotics given-   Antibiotics (72h ago, onward)      Start     Stop Route Frequency Ordered    10/04/24 1000  DAPTOmycin (CUBICIN) 760 mg in 0.9% NaCl SolP 50 mL IVPB         -- IV Every 24 hours (non-standard times) 10/04/24 0854          Latest lactate reviewed-  No results for input(s): "LACTATE", "POCLAC" in the last 72 hours.      Fluid challenge Not needed - patient is not hypotensive      Post- resuscitation assessment No - Post resuscitation assessment not needed       Will Not start Pressors- Levophed for MAP of 65  Source control achieved by: ABX    74-year-old patient presented to the ED with generalized weakness.  T-max found to be 101°.  Daughter reports malodorous urine, but UA negative. CXR negative. Received 1 L bolus of LR while in the ED.     9/29 Blood clx (2/2) positive for E. Faecalis, pan sensitive  -> Updated result with positive findings for Staph epidermidis, sensitive to daptomycin, vancomycin, and linezolid    10/1 TTE without evidence of vegetations    Patient noted to fever the day following admission with Tmax of 103.5F. Unable to relieve with ice pack and tylenol, though improved after 500 cc's LR given. Afebrile since. Suspect sepsis source to be from sacral wound exposure to gastric content as patient reported multiple episodes of diarrhea from 9/30-10/1 and intermittently sitting in her stool prior to being cleaned.    Plan:   -- Broad-spectrum antibiotics with vanc/cefepime   - Deescalated down to vancomycin  -- F/u blood clx susceptibility  -- Wound care consult for bilateral lower extremity lymphedema  -- Ctm fever curve   - Tylenol and ice packs as needed  -- Infectious Disease consult, appreciate recs   - Abx deescalated to daptomycin  -- Picc line to be placed for pt to continue IV abx outpatient  "

## 2024-10-04 NOTE — ASSESSMENT & PLAN NOTE
74-year-old female with a history of multiple sclerosis not on immunosuppression, lymphedema of bilateral lower extremities, and pulmonary embolism admitted with fever and weakness found to have enterococcal bacteremia of and unclear source.  Enterococcus - ampicillin sensitive. Possible source could be presacral skin irritation with fecal contamination. Patient has been treated with vancomycin and has improved.  Repeat blood cultures are no growth to date.  2D echo obtained showing no vegetation but pulmonic valve not well visualized.  Has murmur but reports from childhood.  Blood cultures now show staph epi 2/4 as well  - sensitivities pending..  Vanc trough 19.  .  Plan  Continue vancomycin as inpt  Changed to Daptomycin 8mg/kg IV q24h for dc with baseline CPK  PICC placement and plan for 6 weeks IV abx  Follow up Staph epi sensitivities  Follow up repeat blood cultures  Id will follow

## 2024-10-04 NOTE — ASSESSMENT & PLAN NOTE
- consult received for evaluation of skin injury.  - pt presenting to [facility] [place] with generalized weakness and decreased apetite over the past several days.   - sacral and buttocks regions with partial thickness tissue loss and red/purple discoloration.  - likely related to moisture and friction with a component of pressure.  - pt is incontinent of urine. External urinary catheter in place.  - urinary incontinence episode cleaned during assessment.  - continue Triad bid/prn for moisture management.  - pt reluctant to turn. Educated on the importance of frequent repositioning for pressure injury prevention.  - pillows used for offloading.  - ashley surface.  - waffle mattress and heel boots at bedside. Pt had a bad experience with waffle previously and declined placement to surface.  - will order chair cushion for home. Pt complains of pain when sitting in lift chair for long periods.  - fidel score documented at 16 with a nutrition sub scale score of 4.  - nursing to maintain pressure injury prevention measures and continue wound care per orders.  - tentative dc scheduled this weekend per daughter at bedside.

## 2024-10-04 NOTE — PROCEDURES
"Lupis Murcia is a 74 y.o. female patient.    Temp: 98.4 °F (36.9 °C) (10/04/24 1520)  Pulse: 83 (10/04/24 1520)  Resp: 18 (10/04/24 1615)  BP: (!) 148/66 (10/04/24 1520)  SpO2: 95 % (10/04/24 1520)  Weight: 95.2 kg (209 lb 14.1 oz) (10/01/24 1520)  Height: 5' 1" (154.9 cm) (10/01/24 1520)    PICC  Date/Time: 10/4/2024 4:38 PM  Location procedure was performed: [facility] PICC LINE PLACEMENT  Performed by: William Aguero, RN  Assisting provider: Marysol Ibarra LPN  Consent Done: Yes  Time out: Immediately prior to procedure a time out was called to verify the correct patient, procedure, equipment, support staff and site/side marked as required  Indications: med administration  Anesthesia: local infiltration  Local anesthetic: lidocaine 1% without epinephrine  Anesthetic Total (mL): 4  Preparation: skin prepped with ChloraPrep  Skin prep agent dried: skin prep agent completely dried prior to procedure  Sterile barriers: all five maximum sterile barriers used - cap, mask, sterile gown, sterile gloves, and large sterile sheet  Hand hygiene: hand hygiene performed prior to central venous catheter insertion  Location details: right brachial  Catheter type: double lumen  Catheter size: 5 Fr  Catheter Length: 38cm    Ultrasound guidance: yes  Vessel Caliber: medium and patent, compressibility normal  Vascular Doppler: not done  Needle advanced into vessel with real time Ultrasound guidance.  Guidewire confirmed in vessel.  Image recorded and saved.  Sterile sheath used.  no esophageal manometryNumber of attempts: 1  Post-procedure: blood return through all ports, sterile dressing applied and chlorhexidine patch  Technical procedures used: 3CG  Specimens: No  Implants: No  Assessment: placement verified by x-ray          Name Marysol Ibarra LPN   10/4/2024    "

## 2024-10-04 NOTE — SUBJECTIVE & OBJECTIVE
Interval History:   No acute events  Afebrile and WBC WNL  Blood cultures with amp sensitive E faecalis and Staph EPi 2/4 each  Feels bgood.  The patient denies any recent fever, chills, or sweats.      Review of Systems   Constitutional:  Negative for activity change, chills, diaphoresis and fever.   Respiratory:  Negative for cough, shortness of breath and wheezing.    Cardiovascular:  Negative for chest pain.   Gastrointestinal:  Negative for abdominal pain, constipation, diarrhea, nausea and vomiting.   Genitourinary:  Negative for dysuria, frequency and urgency.   Skin:  Positive for wound.   Neurological:  Negative for dizziness.   Hematological:  Does not bruise/bleed easily.     Objective:     Vital Signs (Most Recent):  Temp: 98.7 °F (37.1 °C) (10/03/24 1952)  Pulse: 86 (10/03/24 2000)  Resp: 17 (10/03/24 1952)  BP: (!) 143/77 (10/03/24 1952)  SpO2: (!) 94 % (10/03/24 1952) Vital Signs (24h Range):  Temp:  [98.3 °F (36.8 °C)-98.7 °F (37.1 °C)] 98.7 °F (37.1 °C)  Pulse:  [75-97] 86  Resp:  [17-18] 17  SpO2:  [94 %-96 %] 94 %  BP: (123-143)/(66-77) 143/77     Weight: 95.2 kg (209 lb 14.1 oz)  Body mass index is 39.66 kg/m².    Estimated Creatinine Clearance: 64.1 mL/min (based on SCr of 0.8 mg/dL).     Physical Exam  Constitutional:       General: She is not in acute distress.     Appearance: Normal appearance. She is well-developed. She is obese. She is not ill-appearing, toxic-appearing or diaphoretic.   HENT:      Head: Normocephalic and atraumatic.   Cardiovascular:      Rate and Rhythm: Normal rate and regular rhythm.      Heart sounds: Murmur (reports from childhood) heard.      No friction rub. No gallop.   Pulmonary:      Effort: Pulmonary effort is normal. No respiratory distress.      Breath sounds: Normal breath sounds. No wheezing or rales.   Abdominal:      General: Bowel sounds are normal. There is no distension.      Palpations: Abdomen is soft. There is no mass.      Tenderness: There is no  "abdominal tenderness. There is no guarding or rebound.   Skin:     General: Skin is warm and dry.   Neurological:      Mental Status: She is alert and oriented to person, place, and time.   Psychiatric:         Behavior: Behavior normal.                        Significant Labs: Blood Culture:   Recent Labs   Lab 07/11/24  0506 07/11/24  0507 09/29/24  1706 09/30/24  2348 09/30/24  2349   LABBLOO No growth after 5 days. No growth after 5 days. Gram stain aer bottle: Gram positive cocci  Gram stain lemuel bottle: Gram positive cocci  Positive results previously called 09/30/2024  09:22  ENTEROCOCCUS FAECALIS  For susceptibility see order # [id number]  *  STAPHYLOCOCCUS EPIDERMIDIS  Susceptibility pending  *  Gram stain aer bottle: Gram positive cocci  Gram stain lemuel bottle: Gram positive cocci  Results called to and read back by: Mitchell Pabon RN  09/30/2024  09:21  ENTEROCOCCUS FAECALIS*  STAPHYLOCOCCUS EPIDERMIDIS  Susceptibility pending  * No Growth to date  No Growth to date  No Growth to date No Growth to date  No Growth to date  No Growth to date     CBC:   Recent Labs   Lab 10/02/24  0446 10/03/24  0305 10/03/24  1147   WBC 6.51 5.31 5.39   HGB 7.7* 7.3* 8.4*   HCT 25.8* 24.6* 28.8*    197 201     CMP:   Recent Labs   Lab 10/02/24  0446 10/03/24  0305    141   K 3.0* 3.4*    106   CO2 24 25    121*   BUN 20 15   CREATININE 0.8 0.8   CALCIUM 9.0 8.7   PROT 6.6 6.3   ALBUMIN 2.8* 2.7*   BILITOT 0.3 0.2   ALKPHOS 53* 46*   AST 8* 8*   ALT <5* 5*   ANIONGAP 10 10     Wound Culture: No results for input(s): "LABAERO" in the last 4320 hours.  All pertinent labs within the past 24 hours have been reviewed.    Significant Imaging: I have reviewed all pertinent imaging results/findings within the past 24 hours.  X-Ray Chest AP Portable [[id number]] Resulted: 09/29/24 1846   Order Status: Completed Updated: 09/29/24 1849   Narrative:     EXAMINATION:  XR CHEST AP PORTABLE    CLINICAL " HISTORY:  Sepsis;    TECHNIQUE:  Single frontal view of the chest was performed.    COMPARISON:  Chest radiograph 07/12/2024, 07/09/2024    FINDINGS:  Cardiac monitoring leads overlie the chest.    The cardiomediastinal silhouette is enlarged.  The mediastinal structures are midline.  The hilar and mediastinal contours are unremarkable.    The lungs are hypoexpanded without focal or lobar consolidation.  Prominence of the central pulmonary vasculature.    No pneumothorax. No large pleural effusion.    Soft tissues are within normal limits.   Impression:       Stable prominent interstitial markings.  No focal consolidation.    Electronically signed by resident: Shanelle Paz  Date: 09/29/2024  Time: 18:17    Electronically signed by: Domenico Alfred MD  Date: 09/29/2024  Time: 18:46      Imaging History     2024    Date Procedure Name Study Review Link PACS Link Status Accession Number Location   10/01/24 10:40 PM Cardiac monitoring strips Study Review  Final     10/01/24 03:25 PM Echo Study Review  Images Final [id number] [facility]   10/01/24 04:37 AM Cardiac monitoring strips Study Review  Final     09/30/24 01:09 AM Cardiac monitoring strips Study Review  Final     09/29/24 09:23 PM Cardiac monitoring strips Study Review  Final     09/29/24 05:38 PM X-Ray Chest AP Portable Study Review  Images Final [id number] [facility]

## 2024-10-04 NOTE — ASSESSMENT & PLAN NOTE
Anemia is likely due to anemia of chronic disease. Most recent hemoglobin and hematocrit are listed below.  Recent Labs     10/03/24  0305 10/03/24  1147 10/04/24  0445   HGB 7.3* 8.4* 7.7*   HCT 24.6* 28.8* 25.7*       Plan  - Monitor serial CBC: Daily  - Transfuse PRBC if patient becomes hemodynamically unstable, symptomatic or H/H drops below 7/21.  - Patient has not received any PRBC transfusions to date  - Patient's anemia is currently stable  - Likely anemia of chronic disease, pt will f/u with her hematologist outpatient

## 2024-10-04 NOTE — PROGRESS NOTES
[facility] - [facility]iology Stepdown  Infectious Disease  Progress Note    Patient Name: Lupis Murcia  MRN: [id number]  Admission Date: 9/29/2024  Length of Stay: 4 days  Attending Physician: Steve Fox MD  Primary Care Provider: Bobby Tran MD    Isolation Status: No active isolations  Assessment/Plan:      ID  Enterococcal bacteremia  74-year-old female with a history of multiple sclerosis not on immunosuppression, lymphedema of bilateral lower extremities, and pulmonary embolism admitted with fever and weakness found to have enterococcal bacteremia of and unclear source.  Enterococcus - ampicillin sensitive. Possible source could be presacral skin irritation with fecal contamination. Patient has been treated with vancomycin and has improved.  Repeat blood cultures are no growth to date.  2D echo obtained showing no vegetation but pulmonic valve not well visualized.  Has murmur but reports from childhood.  Blood cultures now show staph epi 2/4 as well  - sensitivities pending..  Vanc trough 19.  .  Plan  Continue vancomycin as inpt  Changed to Daptomycin 8mg/kg IV q24h for dc with baseline CPK  PICC placement and plan for 6 weeks IV abx  Follow up Staph epi sensitivities  Follow up repeat blood cultures  Id will follow        Anticipated Disposition: tbd    Thank you for your consult. I will follow-up with patient. Please contact us if you have any additional questions.    LIZA Geiger  Infectious Disease  [facility] - [facility] Step[facility]    Subjective:     Principal Problem:Sepsis    HPI: 74 year old female with a PMH of MS (not on immunotherapy), debility, chronic lymphedema, HFpEF, HTN, left foot drop, prior PE (currently on Eliquis) presented to [facility] [place] with generalized weakness and decreased apetite over the past several days. Daughter is at bedside provides history. Reports that patient has been more lethargic and quiet over the last couple of days.  States that her mother usually has a big appetite,  but has been eating very little recently.  Daughter also reports that while changing patient's Depends, she noticed a very malodorous urine.  She took the patient's temperature, which read 101°, and she decided to call EMS to bring patient to the ED. Home health comes to the patient's home 2 times per week for PT/OT.  Wound care visits there home once a week to treat patient's lower extremity lymphedema.       Of note, patient was recently admitted 7/9-7/14 for sepsis and hypoxic respiratory failure secondary to UTI.  Blood cultures were positive for Proteus and E coli.  Patient was started on vanc/cefepime/azithromycin.  She improved on IV antibiotics and was discharged on a 4 day course of oral levofloxacin.     While in the ED, patient is afebrile (T-max 101°).  UA negative for rare bacteria, WBC 3, and leukocytes esterace negative.  CXR showed:The lungs are hypoexpanded without focal or lobar consolidation.  Blood cultures now positive for E faecalis - Source unclear. ID now consulted for bacteremia - remains on Vanc.     Interval History:   No acute events  Afebrile and WBC WNL  Blood cultures with amp sensitive E faecalis and Staph EPi 2/4 each  Feels bgood.  The patient denies any recent fever, chills, or sweats.      Review of Systems   Constitutional:  Negative for activity change, chills, diaphoresis and fever.   Respiratory:  Negative for cough, shortness of breath and wheezing.    Cardiovascular:  Negative for chest pain.   Gastrointestinal:  Negative for abdominal pain, constipation, diarrhea, nausea and vomiting.   Genitourinary:  Negative for dysuria, frequency and urgency.   Skin:  Positive for wound.   Neurological:  Negative for dizziness.   Hematological:  Does not bruise/bleed easily.     Objective:     Vital Signs (Most Recent):  Temp: 98.7 °F (37.1 °C) (10/03/24 1952)  Pulse: 86 (10/03/24 2000)  Resp: 17 (10/03/24 1952)  BP: (!) 143/77 (10/03/24 1952)  SpO2: (!) 94 % (10/03/24 1952) Vital Signs  (24h Range):  Temp:  [98.3 °F (36.8 °C)-98.7 °F (37.1 °C)] 98.7 °F (37.1 °C)  Pulse:  [75-97] 86  Resp:  [17-18] 17  SpO2:  [94 %-96 %] 94 %  BP: (123-143)/(66-77) 143/77     Weight: 95.2 kg (209 lb 14.1 oz)  Body mass index is 39.66 kg/m².    Estimated Creatinine Clearance: 64.1 mL/min (based on SCr of 0.8 mg/dL).     Physical Exam  Constitutional:       General: She is not in acute distress.     Appearance: Normal appearance. She is well-developed. She is obese. She is not ill-appearing, toxic-appearing or diaphoretic.   HENT:      Head: Normocephalic and atraumatic.   Cardiovascular:      Rate and Rhythm: Normal rate and regular rhythm.      Heart sounds: Murmur (reports from childhood) heard.      No friction rub. No gallop.   Pulmonary:      Effort: Pulmonary effort is normal. No respiratory distress.      Breath sounds: Normal breath sounds. No wheezing or rales.   Abdominal:      General: Bowel sounds are normal. There is no distension.      Palpations: Abdomen is soft. There is no mass.      Tenderness: There is no abdominal tenderness. There is no guarding or rebound.   Skin:     General: Skin is warm and dry.   Neurological:      Mental Status: She is alert and oriented to person, place, and time.   Psychiatric:         Behavior: Behavior normal.                        Significant Labs: Blood Culture:   Recent Labs   Lab 07/11/24  0506 07/11/24  0507 09/29/24  1706 09/30/24  2348 09/30/24  2349   LABBLOO No growth after 5 days. No growth after 5 days. Gram stain aer bottle: Gram positive cocci  Gram stain lemuel bottle: Gram positive cocci  Positive results previously called 09/30/2024  09:22  ENTEROCOCCUS FAECALIS  For susceptibility see order # [id number]  *  STAPHYLOCOCCUS EPIDERMIDIS  Susceptibility pending  *  Gram stain aer bottle: Gram positive cocci  Gram stain lemuel bottle: Gram positive cocci  Results called to and read back by: Mitchell Pabon RN  09/30/2024  09:21  ENTEROCOCCUS FAECALIS*   "STAPHYLOCOCCUS EPIDERMIDIS  Susceptibility pending  * No Growth to date  No Growth to date  No Growth to date No Growth to date  No Growth to date  No Growth to date     CBC:   Recent Labs   Lab 10/02/24  0446 10/03/24  0305 10/03/24  1147   WBC 6.51 5.31 5.39   HGB 7.7* 7.3* 8.4*   HCT 25.8* 24.6* 28.8*    197 201     CMP:   Recent Labs   Lab 10/02/24  0446 10/03/24  0305    141   K 3.0* 3.4*    106   CO2 24 25    121*   BUN 20 15   CREATININE 0.8 0.8   CALCIUM 9.0 8.7   PROT 6.6 6.3   ALBUMIN 2.8* 2.7*   BILITOT 0.3 0.2   ALKPHOS 53* 46*   AST 8* 8*   ALT <5* 5*   ANIONGAP 10 10     Wound Culture: No results for input(s): "LABAERO" in the last 4320 hours.  All pertinent labs within the past 24 hours have been reviewed.    Significant Imaging: I have reviewed all pertinent imaging results/findings within the past 24 hours.  X-Ray Chest AP Portable [[id number]] Resulted: 09/29/24 1846   Order Status: Completed Updated: 09/29/24 1849   Narrative:     EXAMINATION:  XR CHEST AP PORTABLE    CLINICAL HISTORY:  Sepsis;    TECHNIQUE:  Single frontal view of the chest was performed.    COMPARISON:  Chest radiograph 07/12/2024, 07/09/2024    FINDINGS:  Cardiac monitoring leads overlie the chest.    The cardiomediastinal silhouette is enlarged.  The mediastinal structures are midline.  The hilar and mediastinal contours are unremarkable.    The lungs are hypoexpanded without focal or lobar consolidation.  Prominence of the central pulmonary vasculature.    No pneumothorax. No large pleural effusion.    Soft tissues are within normal limits.   Impression:       Stable prominent interstitial markings.  No focal consolidation.    Electronically signed by resident: Shanelle Paz  Date: 09/29/2024  Time: 18:17    Electronically signed by: Domenico Alfred MD  Date: 09/29/2024  Time: 18:46      Imaging History     2024    Date Procedure Name Study Review Link PACS Link Status Accession Number Location "   10/01/24 10:40 PM Cardiac monitoring strips Study Review  Final     10/01/24 03:25 PM Echo Study Review  Images Final [id number] [email or web address]   10/01/24 04:37 AM Cardiac monitoring strips Study Review  Final     09/30/24 01:09 AM Cardiac monitoring strips Study Review  Final     09/29/24 09:23 PM Cardiac monitoring strips Study Review  Final     09/29/24 05:38 PM X-Ray Chest AP Portable Study Review  Images Final [id number] [facility]

## 2024-10-04 NOTE — CONSULTS
D/L PICC placed in right brachial vein, 38 cm in length with 0 cm exposed. Arm circumference 39 cm. Lot#[id number]

## 2024-10-04 NOTE — PLAN OF CARE
Plan of care discussed with patient. Patient is free of fall/trauma/injury. Denies CP, SOB, or pain/discomfort. All questions addressed.   Problem: Adult Inpatient Plan of Care  Goal: Plan of Care Review  Outcome: Progressing  Goal: Patient-Specific Goal (Individualized)  Outcome: Progressing  Goal: Absence of Hospital-Acquired Illness or Injury  Outcome: Progressing  Goal: Optimal Comfort and Wellbeing  Outcome: Progressing  Goal: Readiness for Transition of Care  Outcome: Progressing     Problem: Sepsis/Septic Shock  Goal: Optimal Coping  Outcome: Progressing  Goal: Absence of Bleeding  Outcome: Progressing  Goal: Blood Glucose Level Within Targeted Range  Outcome: Progressing  Goal: Absence of Infection Signs and Symptoms  Outcome: Progressing  Goal: Optimal Nutrition Intake  Outcome: Progressing     Problem: Acute Kidney Injury/Impairment  Goal: Fluid and Electrolyte Balance  Outcome: Progressing  Goal: Improved Oral Intake  Outcome: Progressing  Goal: Effective Renal Function  Outcome: Progressing     Problem: Wound  Goal: Optimal Coping  Outcome: Progressing  Goal: Optimal Functional Ability  Outcome: Progressing  Goal: Absence of Infection Signs and Symptoms  Outcome: Progressing  Goal: Improved Oral Intake  Outcome: Progressing  Goal: Optimal Pain Control and Function  Outcome: Progressing  Goal: Skin Health and Integrity  Outcome: Progressing  Goal: Optimal Wound Healing  Outcome: Progressing     Problem: Fall Injury Risk  Goal: Absence of Fall and Fall-Related Injury  Outcome: Progressing     Problem: Skin Injury Risk Increased  Goal: Skin Health and Integrity  Outcome: Progressing     Problem: Infection  Goal: Absence of Infection Signs and Symptoms  Outcome: Progressing

## 2024-10-04 NOTE — PROGRESS NOTES
VANCOMYCIN DOSING BY PHARMACY DISCONTINUATION NOTE    Lupis Murcia is a 74 y.o. female who had been consulted for vancomycin dosing.    The pharmacy consult for vancomycin dosing has been discontinued.     Vancomycin Dosing by Pharmacy Consult will sign-off. Please reconsult if necessary. Thank you for allowing us to participate in this patient's care.       Kirit Mireles Pharm.D.  [id number]

## 2024-10-04 NOTE — PROGRESS NOTES
Pharmacokinetic Assessment Follow Up: IV Vancomycin    Vancomycin serum concentration assessment(s):    The random level was drawn ~10 hours post dose and can be used to guide therapy at this time. The measurement is within the desired definitive target range of 15 to 20 mcg/mL.    Vancomycin Regimen Plan:    Level taken after just 1 dose of vancomycin, therefore we are not at steady state.   Continue regimen to Vancomycin 1000 mg IV every 12 hours with next serum trough concentration measured at 1930 prior to 4th dose on 10/4.    Drug levels (last 3 results):  Recent Labs   Lab Result Units 10/01/24  1837 10/02/24  1329 10/03/24  0906 10/03/24  1830   Vancomycin, Random ug/mL 13.7 15.9  --   --    Vancomycin-Trough ug/mL  --   --  15.3 19.4       Pharmacy will continue to follow and monitor vancomycin.    Please contact pharmacy at extension [phone] for questions regarding this assessment.    Thank you for the consult,   Irasema Guerrero       Patient brief summary:  Lupis Murcia is a 74 y.o. female initiated on antimicrobial therapy with IV Vancomycin for treatment of bacteremia    The patient's current regimen is 1000 mg q12h    Drug Allergies:   Review of patient's allergies indicates:   Allergen Reactions    Contrast media Shortness Of Breath and Rash    Pcn [penicillins] Shortness Of Breath and Rash    Celebrex [celecoxib] Other (See Comments)     Swallowing problems     Diazepam Hives    Gabapentin Other (See Comments)     Confusion     Motrin [ibuprofen] Rash       Actual Body Weight:   95.2 kg    Renal Function:   Estimated Creatinine Clearance: 64.1 mL/min (based on SCr of 0.8 mg/dL).,     Dialysis Method (if applicable):  N/A    CBC (last 72 hours):  Recent Labs   Lab Result Units 10/01/24  0400 10/02/24  0446 10/03/24  0305 10/03/24  1147   WBC K/uL 9.82 6.51 5.31 5.39   Hemoglobin g/dL 7.6* 7.7* 7.3* 8.4*   Hematocrit % 26.3* 25.8* 24.6* 28.8*   Platelets K/uL 204 203 197 201   Gran % % 70.8 66.3  58.1 54.5   Lymph % % 14.4* 16.4* 23.4 29.9   Mono % % 13.3 13.5 14.5 12.2   Eosinophil % % 0.8 3.1 3.6 3.0   Basophil % % 0.2 0.2 0.2 0.2   Differential Method  Automated Automated Automated Automated       Metabolic Panel (last 72 hours):  Recent Labs   Lab Result Units 10/01/24  0400 10/02/24  0446 10/03/24  0305   Sodium mmol/L 140 140 141   Potassium mmol/L 3.0* 3.0* 3.4*   Chloride mmol/L 105 106 106   CO2 mmol/L 28 24 25   Glucose mg/dL 105 105 121*   BUN mg/dL 29* 20 15   Creatinine mg/dL 1.2 0.8 0.8   Albumin g/dL 2.7* 2.8* 2.7*   Total Bilirubin mg/dL 0.3 0.3 0.2   Alkaline Phosphatase U/L 49* 53* 46*   AST U/L 7* 8* 8*   ALT U/L <5* <5* 5*   Magnesium mg/dL 1.9 2.1 1.8   Phosphorus mg/dL 3.5 2.8 2.5*       Vancomycin Administrations:  vancomycin given in the last 96 hours                     vancomycin (VANCOCIN) 1,000 mg in D5W 250 mL IVPB (admixture device) (mg) 1,000 mg New Bag 10/03/24 0834      Restarted 10/02/24 1934    vancomycin 1,500 mg in D5W 250 mL IVPB (admixture device) (mg) 1,500 mg New Bag 10/01/24 2106    vancomycin 1,500 mg in D5W 250 mL IVPB (admixture device) (mg) 1,500 mg New Bag 09/30/24 2010                    Microbiologic Results:  Microbiology Results (last 7 days)       Procedure Component Value Units Date/Time    Blood culture x two cultures. Draw prior to antibiotics. [[id number]]  (Abnormal) Collected: 09/29/24 1706    Order Status: Completed Specimen: Blood from Peripheral, Forearm, Left Updated: 10/03/24 0933     Blood Culture, Routine Gram stain aer bottle: Gram positive cocci      Gram stain lemuel bottle: Gram positive cocci      Positive results previously called 09/30/2024  09:22      ENTEROCOCCUS FAECALIS  For susceptibility see order # [id number]        STAPHYLOCOCCUS EPIDERMIDIS  Susceptibility pending      Narrative:      Aerobic and anaerobic    Blood culture [[id number]] Collected: 09/30/24 9915    Order Status: Completed Specimen: Blood from Peripheral, Hand, Left  Updated: 10/03/24 0613     Blood Culture, Routine No Growth to date      No Growth to date      No Growth to date    Blood culture [[id number]] Collected: 09/30/24 2349    Order Status: Completed Specimen: Blood Updated: 10/03/24 0613     Blood Culture, Routine No Growth to date      No Growth to date      No Growth to date    Blood culture x two cultures. Draw prior to antibiotics. [[id number]]  (Abnormal)  (Susceptibility) Collected: 09/29/24 1706    Order Status: Completed Specimen: Blood from Peripheral, Antecubital, Right Updated: 10/02/24 1214     Blood Culture, Routine Gram stain aer bottle: Gram positive cocci      Gram stain lemuel bottle: Gram positive cocci      Results called to and read back by: Mitchell Pabon RN  09/30/2024  09:21      ENTEROCOCCUS FAECALIS      STAPHYLOCOCCUS EPIDERMIDIS  Susceptibility pending      Narrative:      Aerobic and anaerobic    Clostridium difficile EIA [[id number]]     Order Status: Canceled Specimen: Stool     Rapid Organism ID by PCR (from Blood culture) [[id number]]  (Abnormal) Collected: 09/29/24 1706    Order Status: Completed Updated: 09/30/24 1126     Enterococcus faecalis Detected     Enterococcus faecium Not Detected     Listeria monocytogenes Not Detected     Staphylococcus spp. Not Detected     Staphylococcus aureus Not Detected     Staphylococcus epidermidis Not Detected     Staphylococcus lugdunensis Not Detected     Streptococcus species Not Detected     Streptococcus agalactiae Not Detected     Streptococcus pneumoniae Not Detected     Streptococcus pyogenes Not Detected     Acinetobacter calcoaceticus/baumannii complex Not Detected     Bacteroides fragilis Not Detected     Enterobacterales Not Detected     Enterobacter cloacae complex Not Detected     Escherichia coli Not Detected     Klebsiella aerogenes Not Detected     Klebsiella oxytoca Not Detected     Klebsiella pneumoniae group Not Detected     Proteus Not Detected     Salmonella sp Not Detected      Serratia marcescens Not Detected     Haemophilus influenzae Not Detected     Neisseria meningtidis Not Detected     Pseudomonas aeruginosa Not Detected     Stenotrophomonas maltophilia Not Detected     Candida albicans Not Detected     Candida auris Not Detected     Candida glabrata Not Detected     Candida krusei Not Detected     Candida parapsilosis Not Detected     Candida tropicalis Not Detected     Cryptococcus neoformans/gattii Not Detected     CTX-M (ESBL ) Test Not Applicable     IMP (Carbapenem resistant) Test Not Applicable     KPC resistance gene (Carbapenem resistant) Test Not Applicable     mcr-1  Test Not Applicable     mec A/C  Test Not Applicable     mec A/C and MREJ (MRSA) gene Test Not Applicable     NDM (Carbapenem resistant) Test Not Applicable     OXA-48-like (Carbapenem resistant) Test Not Applicable     van A/B (VRE gene) Not Detected     VIM (Carbapenem resistant) Test Not Applicable    Narrative:      Aerobic and anaerobic    Culture, Respiratory with Gram Stain [[id number]]     Order Status: No result Specimen: Respiratory     Influenza A & B by Molecular [[id number]] Collected: 09/29/24 8644    Order Status: Completed Specimen: Nasopharyngeal Swab Updated: 09/29/24 2795     Influenza A, Molecular Negative     Influenza B, Molecular Negative     Flu A & B Source Nasal swab

## 2024-10-04 NOTE — SUBJECTIVE & OBJECTIVE
Interval History:   No acute events  Afebrile and WBC WNL  Blood cultures with amp sensitive E faecalis and Staph EPi 2/4 each - all sensitve to daptomycin - repeats NGTD  Feels good but nervous about PICC line placement.  The patient denies any recent fever, chills, or sweats.      Review of Systems   Constitutional:  Negative for activity change, chills, diaphoresis and fever.   Respiratory:  Negative for cough, shortness of breath and wheezing.    Cardiovascular:  Negative for chest pain.   Gastrointestinal:  Negative for abdominal pain, constipation, diarrhea, nausea and vomiting.   Genitourinary:  Negative for dysuria, frequency and urgency.   Skin:  Positive for wound.   Neurological:  Negative for dizziness.   Hematological:  Does not bruise/bleed easily.     Objective:     Vital Signs (Most Recent):  Temp: 98.4 °F (36.9 °C) (10/04/24 1520)  Pulse: 83 (10/04/24 1520)  Resp: 18 (10/04/24 1615)  BP: (!) 148/66 (10/04/24 1520)  SpO2: 95 % (10/04/24 1520) Vital Signs (24h Range):  Temp:  [97.8 °F (36.6 °C)-98.7 °F (37.1 °C)] 98.4 °F (36.9 °C)  Pulse:  [72-97] 83  Resp:  [16-18] 18  SpO2:  [94 %-98 %] 95 %  BP: (129-148)/(60-79) 148/66     Weight: 95.2 kg (209 lb 14.1 oz)  Body mass index is 39.66 kg/m².    Estimated Creatinine Clearance: 73.2 mL/min (based on SCr of 0.7 mg/dL).     Physical Exam  Constitutional:       General: She is not in acute distress.     Appearance: Normal appearance. She is well-developed. She is obese. She is not ill-appearing, toxic-appearing or diaphoretic.   HENT:      Head: Normocephalic and atraumatic.   Cardiovascular:      Rate and Rhythm: Normal rate and regular rhythm.      Heart sounds: Murmur (reports from childhood) heard.      No friction rub. No gallop.   Pulmonary:      Effort: Pulmonary effort is normal. No respiratory distress.      Breath sounds: Normal breath sounds. No wheezing or rales.   Abdominal:      General: Bowel sounds are normal. There is no distension.       "Palpations: Abdomen is soft. There is no mass.      Tenderness: There is no abdominal tenderness. There is no guarding or rebound.   Skin:     General: Skin is warm and dry.   Neurological:      Mental Status: She is alert and oriented to person, place, and time.   Psychiatric:         Behavior: Behavior normal.                        Significant Labs: Blood Culture:   Recent Labs   Lab 07/11/24  0506 07/11/24  0507 09/29/24  1706 09/30/24  2348 09/30/24  2349   LABBLOO No growth after 5 days. No growth after 5 days. Gram stain aer bottle: Gram positive cocci  Gram stain lemuel bottle: Gram positive cocci  Results called to and read back by: Mitchell Pabon RN  09/30/2024  09:21  ENTEROCOCCUS FAECALIS*  STAPHYLOCOCCUS EPIDERMIDIS*  Gram stain aer bottle: Gram positive cocci  Gram stain lemuel bottle: Gram positive cocci  Positive results previously called 09/30/2024  09:22  ENTEROCOCCUS FAECALIS  For susceptibility see order # [id number]  *  STAPHYLOCOCCUS EPIDERMIDIS* No Growth to date  No Growth to date  No Growth to date  No Growth to date No Growth to date  No Growth to date  No Growth to date  No Growth to date     CBC:   Recent Labs   Lab 10/03/24  0305 10/03/24  1147 10/04/24  0445   WBC 5.31 5.39 5.55   HGB 7.3* 8.4* 7.7*   HCT 24.6* 28.8* 25.7*    201 222     CMP:   Recent Labs   Lab 10/03/24  0305 10/04/24  0445    140   K 3.4* 3.6    108   CO2 25 24   * 104   BUN 15 11   CREATININE 0.8 0.7   CALCIUM 8.7 9.1   PROT 6.3 6.6   ALBUMIN 2.7* 3.0*   BILITOT 0.2 0.2   ALKPHOS 46* 46*   AST 8* 10   ALT 5* 9*   ANIONGAP 10 8     Wound Culture: No results for input(s): "LABAERO" in the last 4320 hours.  All pertinent labs within the past 24 hours have been reviewed.    Significant Imaging: I have reviewed all pertinent imaging results/findings within the past 24 hours.  X-Ray Chest AP Portable [[id number]] Resulted: 09/29/24 1846   Order Status: Completed Updated: 09/29/24 1849 "   Narrative:     EXAMINATION:  XR CHEST AP PORTABLE    CLINICAL HISTORY:  Sepsis;    TECHNIQUE:  Single frontal view of the chest was performed.    COMPARISON:  Chest radiograph 07/12/2024, 07/09/2024    FINDINGS:  Cardiac monitoring leads overlie the chest.    The cardiomediastinal silhouette is enlarged.  The mediastinal structures are midline.  The hilar and mediastinal contours are unremarkable.    The lungs are hypoexpanded without focal or lobar consolidation.  Prominence of the central pulmonary vasculature.    No pneumothorax. No large pleural effusion.    Soft tissues are within normal limits.   Impression:       Stable prominent interstitial markings.  No focal consolidation.    Electronically signed by resident: Shnaelle Paz  Date: 09/29/2024  Time: 18:17    Electronically signed by: Domenico Alfred MD  Date: 09/29/2024  Time: 18:46      Imaging History     2024    Date Procedure Name Study Review Link PACS Link Status Accession Number Location   10/01/24 10:40 PM Cardiac monitoring strips Study Review  Final     10/01/24 03:25 PM Echo Study Review  Images Final [id number] [facility]   10/01/24 04:37 AM Cardiac monitoring strips Study Review  Final     09/30/24 01:09 AM Cardiac monitoring strips Study Review  Final     09/29/24 09:23 PM Cardiac monitoring strips Study Review  Final     09/29/24 05:38 PM X-Ray Chest AP Portable Study Review  Images Final [id number] [facility]

## 2024-10-04 NOTE — PT/OT/SLP PROGRESS
Occupational Therapy      Patient Name:  Lupis Murcia   MRN:  [id number]    Patient not seen today secondary to Patient unwilling to participate d/t Meal time on attempt 11:29am. Encouraged to participate for skilled functional task participation for eating in unsupported sit on bedside, but pt deferred. Writer unable to return for re-attempt. Will follow-up as appropriate.    10/4/2024

## 2024-10-05 VITALS
DIASTOLIC BLOOD PRESSURE: 77 MMHG | HEART RATE: 87 BPM | WEIGHT: 209.88 LBS | OXYGEN SATURATION: 94 % | SYSTOLIC BLOOD PRESSURE: 166 MMHG | HEIGHT: 61 IN | RESPIRATION RATE: 17 BRPM | TEMPERATURE: 99 F | BODY MASS INDEX: 39.63 KG/M2

## 2024-10-05 LAB
ALBUMIN SERPL BCP-MCNC: 2.9 G/DL (ref 3.5–5.2)
ALP SERPL-CCNC: 48 U/L (ref 55–135)
ALT SERPL W/O P-5'-P-CCNC: 8 U/L (ref 10–44)
ANION GAP SERPL CALC-SCNC: 7 MMOL/L (ref 8–16)
AST SERPL-CCNC: 10 U/L (ref 10–40)
BASOPHILS # BLD AUTO: 0.01 K/UL (ref 0–0.2)
BASOPHILS NFR BLD: 0.2 % (ref 0–1.9)
BILIRUB SERPL-MCNC: 0.3 MG/DL (ref 0.1–1)
BUN SERPL-MCNC: 10 MG/DL (ref 8–23)
CALCIUM SERPL-MCNC: 9.1 MG/DL (ref 8.7–10.5)
CHLORIDE SERPL-SCNC: 109 MMOL/L (ref 95–110)
CO2 SERPL-SCNC: 25 MMOL/L (ref 23–29)
CREAT SERPL-MCNC: 0.8 MG/DL (ref 0.5–1.4)
DIFFERENTIAL METHOD BLD: ABNORMAL
EOSINOPHIL # BLD AUTO: 0.2 K/UL (ref 0–0.5)
EOSINOPHIL NFR BLD: 2.8 % (ref 0–8)
ERYTHROCYTE [DISTWIDTH] IN BLOOD BY AUTOMATED COUNT: 15.3 % (ref 11.5–14.5)
EST. GFR  (NO RACE VARIABLE): >60 ML/MIN/1.73 M^2
GLUCOSE SERPL-MCNC: 107 MG/DL (ref 70–110)
HCT VFR BLD AUTO: 25.7 % (ref 37–48.5)
HGB BLD-MCNC: 7.8 G/DL (ref 12–16)
IMM GRANULOCYTES # BLD AUTO: 0.03 K/UL (ref 0–0.04)
IMM GRANULOCYTES NFR BLD AUTO: 0.5 % (ref 0–0.5)
LYMPHOCYTES # BLD AUTO: 2 K/UL (ref 1–4.8)
LYMPHOCYTES NFR BLD: 32.5 % (ref 18–48)
MAGNESIUM SERPL-MCNC: 1.8 MG/DL (ref 1.6–2.6)
MCH RBC QN AUTO: 26.3 PG (ref 27–31)
MCHC RBC AUTO-ENTMCNC: 30.4 G/DL (ref 32–36)
MCV RBC AUTO: 87 FL (ref 82–98)
MONOCYTES # BLD AUTO: 0.5 K/UL (ref 0.3–1)
MONOCYTES NFR BLD: 9 % (ref 4–15)
NEUTROPHILS # BLD AUTO: 3.3 K/UL (ref 1.8–7.7)
NEUTROPHILS NFR BLD: 55 % (ref 38–73)
NRBC BLD-RTO: 0 /100 WBC
PHOSPHATE SERPL-MCNC: 3.5 MG/DL (ref 2.7–4.5)
PLATELET # BLD AUTO: 248 K/UL (ref 150–450)
PMV BLD AUTO: 10.1 FL (ref 9.2–12.9)
POTASSIUM SERPL-SCNC: 3.5 MMOL/L (ref 3.5–5.1)
PROT SERPL-MCNC: 6.5 G/DL (ref 6–8.4)
RBC # BLD AUTO: 2.97 M/UL (ref 4–5.4)
SODIUM SERPL-SCNC: 141 MMOL/L (ref 136–145)
WBC # BLD AUTO: 6 K/UL (ref 3.9–12.7)

## 2024-10-05 PROCEDURE — 80053 COMPREHEN METABOLIC PANEL: CPT

## 2024-10-05 PROCEDURE — 63600175 PHARM REV CODE 636 W HCPCS

## 2024-10-05 PROCEDURE — A4216 STERILE WATER/SALINE, 10 ML: HCPCS

## 2024-10-05 PROCEDURE — 63600175 PHARM REV CODE 636 W HCPCS: Performed by: PHYSICIAN ASSISTANT

## 2024-10-05 PROCEDURE — 83735 ASSAY OF MAGNESIUM: CPT

## 2024-10-05 PROCEDURE — 25000003 PHARM REV CODE 250

## 2024-10-05 PROCEDURE — 25000003 PHARM REV CODE 250: Performed by: PHYSICIAN ASSISTANT

## 2024-10-05 PROCEDURE — 85025 COMPLETE CBC W/AUTO DIFF WBC: CPT

## 2024-10-05 PROCEDURE — 84100 ASSAY OF PHOSPHORUS: CPT

## 2024-10-05 RX ORDER — MAGNESIUM SULFATE HEPTAHYDRATE 40 MG/ML
2 INJECTION, SOLUTION INTRAVENOUS ONCE
Status: COMPLETED | OUTPATIENT
Start: 2024-10-05 | End: 2024-10-05

## 2024-10-05 RX ADMIN — DAPTOMYCIN 760 MG: 350 INJECTION, POWDER, LYOPHILIZED, FOR SOLUTION INTRAVENOUS at 01:10

## 2024-10-05 RX ADMIN — Medication 10 ML: at 05:10

## 2024-10-05 RX ADMIN — CHOLECALCIFEROL TAB 125 MCG (5000 UNIT) 5000 UNITS: 125 TAB at 08:10

## 2024-10-05 RX ADMIN — MAGNESIUM SULFATE HEPTAHYDRATE 2 G: 40 INJECTION, SOLUTION INTRAVENOUS at 08:10

## 2024-10-05 RX ADMIN — ACETAMINOPHEN AND CODEINE PHOSPHATE 1 TABLET: 300; 30 TABLET ORAL at 02:10

## 2024-10-05 RX ADMIN — AMMONIUM LACTATE: 12 LOTION TOPICAL at 08:10

## 2024-10-05 RX ADMIN — LORAZEPAM 1 MG: 1 TABLET ORAL at 05:10

## 2024-10-05 RX ADMIN — Medication 10 ML: at 01:10

## 2024-10-05 RX ADMIN — APIXABAN 5 MG: 5 TABLET, FILM COATED ORAL at 08:10

## 2024-10-05 NOTE — PLAN OF CARE
PLAN OF CARE NOTE     Fall bundle in place. Bed alarm refused. Pt. Remained free from falls/trauma/injuries. No complaints of CP/ SOB/discomfort. VSS. A&Ox4.RA. Afebrile. Tele, NSR. Pt. reports pain this shift, given PRN Tylenol-Codeine. Pt. Received PRN Ativan for anxiety prior to bed. Barrier paste & foam dressing applied to sacrum. Total UOP this shift was 550 cc, kelley Bms. Pt. Awaiting SNF placement to d/c w/ Dapto 6 week course. The POC reviewed w/ pt. & pts' daughter, questions were answered & encouraged. No further concerns at this time.

## 2024-10-05 NOTE — NURSING
Patient is ready for discharge. Patient stable alert and oriented. PIV removed, PICC line in place. Discussed discharge plan. Reviewed medications and side effects, appointments, and answered questions with patient and family.  Pending ambulance transportation arrangement.     1713- Patient picked up by [facility] transportation service.

## 2024-10-05 NOTE — DISCHARGE SUMMARY
[facility] - Cardiology [facility] Medicine  Discharge Summary      Patient Name: Lupis Murcia  MRN: [id number]  [facility]: [id number]  Patient Class: IP- Inpatient  Admission Date: 9/29/2024  Hospital Length of Stay: 6 days  Discharge Date and Time:  10/05/2024 3:15 PM  Attending Physician: Autumn Rand MD   Discharging Provider: Jos Irwin MD  Primary Care Provider: Bobby Tran MD  Hospital Medicine Team: [facility] HOSP MED 5 Jos Irwin MD  Primary Care Team: [facility] HOSP MED 5    HPI:   74 year old female with a PMH of MS, debility, chronic lymphedema, HFpEF, HTN, left foot drop, prior PE (currently on Eliquis), and B12 deficiency who is presenting to [facility] with generalized weakness and decreased apetite over the past several days. Daughter is at bedside and is assisting with the history. Reports that patient has been more lethargic and quiet over the last couple of days.  States that her mother usually has a big appetite, but has been eating very little recently.  Daughter also reports that while changing patient's Depends, she noticed a very malodorous urine.  She took the patient's temperature, which read 101°, and she decided to call EMS to bring patient to the ED. Home health comes to the patient's home 2 times per week for PT/OT.  Wound care visits there home once a week to treat patient's lower extremity lymphedema.  Patient's daughter states that she has noticed increased weeping fluid from patient's bilateral lower extremities.  Patient denies headache, nausea, vomiting, abdominal pain, falls, dysuria, pain in lower extremities, and any recent sick contacts.    Of note, patient was recently admitted 7/9-7/14 for sepsis and hypoxic respiratory failure secondary to UTI.  Blood cultures were positive for Proteus and E coli.  Patient was started on vanc/cefepime/azithromycin.  She improved on IV antibiotics and was discharged on a 4 day course of oral levofloxacin.    While  in the ED, patient is febrile (T-max 101°).  Blood pressure 127/57.  Oxygen saturation 95% on room air.  WBC elevated to 12.79.  Hemoglobin 9.3.  Creatinine 1.4.  BUN 22.  Protocol 0.23.  UA negative for bacteria and leukocytes but +3 occult blood.  Troponins 0.153.  .  EKG sinus tachycardia with occasional PVCs.      Patient to be admitted to Hospital Medicine for further evaluation.    * No surgery found *      Hospital Course:   Patient here with generalized weakness and decreased apetite over the past several days. Was noticed to have malodorous urine by Daughter. She found to be febrile in the ED and labs revealed leukocytosis, Cr1.4, BUN 22, and procal 0.23. Troponins were flat.  . EKG with sinus tachycardia and occasional PVCs. Infectious work-up initiated and patient started on broad spectrum antibiotics with vancomycin and cefepime. Of note, patient had similar presentation back in July, though without malodorous urine, and was treated with the same antibiotics for UTI (+) for E.coli and Proteus. CXR unremarkable. UA negative for bacteria and leukocytes. Blood cultures positive for enterococcus faecalis. Patient fevering with Tmax of 103F. Unclear source of infection. Patient does have chronic lymphedema in bilateral lower extremities and an open sacral wound, though both without overt signs of evidence of infection. Tylenol and ice packs for temperature control. Infectious Disease consulted for further recommendations. TTE completed, no evidence of vegetation. Of note, patient did present with complaints of nausea, had multiple episodes of vomiting on day 2 of admission in addition to 4-5 episodes of diarrhea on day 3. Updated (2/2) blood culture speciation positive for staph epidermidis. Abx deescalated to IV daptomycin. Repeat Bcs have been NGTD.  Picc line in place with plans to continue IV antibiotics outpatient.      Goals of Care Treatment Preferences:  Code Status: Full Code        "  Consults:   Consults (From admission, onward)          Status Ordering Provider     Inpatient consult to Skin Integrity  Practitioner  Once        Provider:  (Not yet assigned)    Completed JOSHUA MONTIEL     [facility]  Once        Provider:  (Not yet assigned)    Completed GIA MENCHACA     Inpatient consult to Midline team  Once        Provider:  (Not yet assigned)    Completed INGRIS VELAZQUEZ     Inpatient consult to Infectious Diseases  Once        Provider:  (Not yet assigned)    Completed GIA MENCHACA            ID  * Sepsis  This patient does not have evidence of infective focus  My overall impression is sepsis.  Source: Unknown  Antibiotics given-   Antibiotics (72h ago, onward)      Start     Stop Route Frequency Ordered    10/04/24 1000  DAPTOmycin (CUBICIN) 760 mg in 0.9% NaCl SolP 50 mL IVPB         -- IV Every 24 hours (non-standard times) 10/04/24 0854          Latest lactate reviewed-  No results for input(s): "LACTATE", "POCLAC" in the last 72 hours.      Fluid challenge Not needed - patient is not hypotensive      Post- resuscitation assessment No - Post resuscitation assessment not needed       Will Not start Pressors- Levophed for MAP of 65  Source control achieved by: ABX    74-year-old patient presented to the ED with generalized weakness.  T-max found to be 101°.  Daughter reports malodorous urine, but UA negative. CXR negative. Received 1 L bolus of LR while in the ED.     9/29 Blood clx (2/2) positive for E. Faecalis, pan sensitive  -> Updated result with positive findings for Staph epidermidis, sensitive to daptomycin, vancomycin, and linezolid    10/1 TTE without evidence of vegetations    Patient noted to fever the day following admission with Tmax of 103.5F. Unable to relieve with ice pack and tylenol, though improved after 500 cc's LR given. Afebrile since. Suspect sepsis source to be from sacral wound exposure to gastric content as patient reported multiple " episodes of diarrhea from 9/30-10/1 and intermittently sitting in her stool prior to being cleaned.    Plan:   -- Broad-spectrum antibiotics with vanc/cefepime   - Deescalated down to vancomycin   - Now on daptomycin for 6 weeks.   -- F/u blood clx susceptibility  -- Wound care consult for bilateral lower extremity lymphedema  -- Ctm fever curve   - Tylenol and ice packs as needed  -- Infectious Disease consult, appreciate recs   - Abx deescalated to daptomycin  -- Picc line to be placed for pt to continue IV abx outpatient    Oncology  Anemia  Anemia is likely due to anemia of chronic disease. Most recent hemoglobin and hematocrit are listed below.  Recent Labs     10/03/24  1147 10/04/24  0445 10/05/24  0453   HGB 8.4* 7.7* 7.8*   HCT 28.8* 25.7* 25.7*       Plan  - Monitor serial CBC: Daily  - Transfuse PRBC if patient becomes hemodynamically unstable, symptomatic or H/H drops below 7/21.  - Patient has not received any PRBC transfusions to date  - Patient's anemia is currently stable  - Likely anemia of chronic disease, pt will f/u with her hematologist outpatient      Final Active Diagnoses:    Diagnosis Date Noted POA    PRINCIPAL PROBLEM:  Sepsis [A41.9] 07/09/2024 Yes    Irritant contact dermatitis due to fecal, urinary or dual incontinence [L24.A2] 10/04/2024 Yes    Diastolic dysfunction [I51.89] 10/01/2024 Unknown    Enterococcal bacteremia [R78.81, B95.2] 10/01/2024 Yes    Elevated troponin [R79.89] 09/29/2024 Unknown    History of pulmonary embolus (PE) [Z86.711] 06/07/2023 Yes    HTN (hypertension) [I10] 06/07/2023 Yes    Lymphedema [I89.0] 05/01/2022 Yes    MS (multiple sclerosis) [G35] 06/09/2019 Yes    Anemia [D64.9] 08/09/2017 Yes      Problems Resolved During this Admission:    Diagnosis Date Noted Date Resolved POA    (HFpEF) heart failure with preserved ejection fraction [I50.30] 07/12/2024 10/01/2024 Yes     Chronic    Leg weakness [R29.898] 11/06/2019 10/01/2024 Yes       Discharged Condition:  stable    Disposition: Home-Health Care [facility]    Follow Up:    Patient Instructions:      ACCEPT - Ambulatory referral/consult to Interventional Cardiology   Standing Status: Future   Referral Priority: Routine Referral Type: Consultation   Referral Reason: Specialty Services Required   Requested Specialty: Interventional Cardiology   Number of Visits Requested: 1     Diet diabetic     Diet Cardiac     Notify your health care provider if you experience any of the following:  temperature >100.4     Notify your health care provider if you experience any of the following:  persistent nausea and vomiting or diarrhea     Notify your health care provider if you experience any of the following:  severe uncontrolled pain     Notify your health care provider if you experience any of the following:  persistent dizziness, light-headedness, or visual disturbances     Notify your health care provider if you experience any of the following:  increased confusion or weakness     Activity as tolerated       Significant Diagnostic Studies: N/A    Pending Diagnostic Studies:       None           Medications:  Reconciled Home Medications:      Medication List        START taking these medications      0.9% NaCl SolP 50 mL with DAPTOmycin 500 mg SolR 761.5 mg  Inject 761.5 mg into the vein once daily.            CHANGE how you take these medications      candesartan-hydrochlorothiazide 16-12.5 mg per tablet  Commonly known as: ATACAND HCT  Take 1 tablet by mouth once daily.  What changed:   when to take this  reasons to take this            CONTINUE taking these medications      acetaminophen-codeine 300-30mg 300-30 mg Tab  Commonly known as: TYLENOL #3  Take 1 tablet by mouth daily as needed (pain).     ammonium lactate 12 % lotion  Commonly known as: LAC-HYDRIN  Apply topically 2 (two) times daily.     apixaban 5 mg Tab  Commonly known as: ELIQUIS  Take 1 tablet (5 mg total) by mouth 2 (two) times a day.     cholecalciferol (vitamin D3)  125 mcg (5,000 unit) Tab  Take 5,000 Units by mouth once daily.     LORazepam 1 MG tablet  Commonly known as: ATIVAN  Take 1 mg by mouth every 12 (twelve) hours as needed for Anxiety.     ondansetron 4 MG Tbdl  Commonly known as: ZOFRAN-ODT  Dissolve 1 tablet (4 mg total) by mouth every 6 (six) hours as needed (Nausea).              Indwelling Lines/Drains at time of discharge:   Lines/Drains/Airways       Peripherally Inserted Central Catheter Line  Duration             PICC Double Lumen 10/04/24 1639 right brachial <1 day              Drain  Duration             Female External Urinary Catheter w/ Suction 09/30/24 0000 5 days                    Time spent on the discharge of patient: 45 minutes         Jos Irwin MD  Department of Hospital Medicine  [facility] - Cardiology Stepdown

## 2024-10-05 NOTE — PLAN OF CARE
[facility] - Cardiology Stepdown  Discharge Final Note    Primary Care Provider: Bobby Tran MD    Expected Discharge Date: 10/5/2024    Final Discharge Note (most recent)       Final Note - 10/05/24 1714          Final Note    Assessment Type Final Discharge Note (P)      Anticipated Discharge Disposition IV Therapy Provider (P)      Hospital Resources/Appts/Education Provided Provided patient/caregiver with written discharge plan information;Provided education on problems/symptoms using teachback;Appointments scheduled and added to AVS (P)         Post-Acute Status    Post-Acute Authorization Home Health;IV Infusion (P)      Home Health Status Set-up Complete/Auth obtained (P)    [facility]    IV Infusion Status Set-up Complete/Auth obtained (P)    [facility]    Patient choice form signed by patient/caregiver List with quality metrics by geographic area provided;List from System Post-Acute Care (P)      Discharge Delays None known at this time (P)                      Important Message from Medicare  Important Message from Medicare regarding Discharge Appeal Rights: Explained to patient/caregiver, Signed/date by patient/caregiver     Date IMM was signed: 10/04/24  Time IMM was signed: 1004    Pt. discharged home with [facility]. Daughter, Amanda at bedside for IV abx teaching. Pt. States Amanda is primary caregiver and is a nurse. Patient Choice Form signed. [facility] for Continuity of Care. Ambulance Transportation home arranged via [facility] to pt's home in [place], [place].     Cinda Graves LMSW

## 2024-10-05 NOTE — PLAN OF CARE
10/05/24 1631   Post-Acute Status   Post-Acute Authorization Home Health;IV Infusion   Home Health Status Referrals Sent  (Pt. Choice ([facility]))   IV Infusion Status Set-up Complete/Auth obtained  ([facility])   Discharge Plan   Discharge Plan A Home Health   Discharge Plan B Home with family     Pt. discharging home with IV abx through [facility]. Pt. choosing [facility] for Continuity of Care. Pt. Choice Form presented and is on file in pt's medical record.     Discharge Plan A and Plan B have been determined by review of patient's clinical status, future medical and therapeutic needs, and coverage/benefits for post-acute care in coordination with multidisciplinary team members.     Cinda Graves LMSW

## 2024-10-05 NOTE — PLAN OF CARE
Met with pt. & daughter, Amanda to review discharge recommendation of home health & Home IV abx. and is agreeable to plan.    Patient/family provided list of facilities in-network with patient's payor plan. Providers that are owned, operated, or affiliated with [facility] are included on the list.     Notified that referral sent to below listed facilities from in-network list based on proximity to home/family support:   1.[facility]   2.  3.  4.  5. (can send more than 5)    Patient/family instructed to identify preference.    Preferred Facility: (if more than 1, listed in order of descending preference)  1.[facility]     If an additional preferred facility not listed above is identified, additional referral to be sent. If above facilities unable to accept, will send additional referrals to in-network providers.      Cinda Graves LMSW

## 2024-10-05 NOTE — PROGRESS NOTES
[facility] Home IV ATB Education/Discharge Planning Note:     [facility] educator met with the patient and her daughter (Amanda) and discussed discharge plan for home IV ATB. Lupis Murcia will discharge home with family support but will not self infuse. Patient's IV medication will infuse via IV Push (over 4-5 min per dose). The patient & her daughter were educated on S.A.S.H procedure & [facility] S.A.S.H mat provided. Patient education checklist and [facility] consent to treatment form reviewed and acknowledged by the patient and her daughter and they are agreeable to discharge with home infusion plan of care. IV administration process using aspetic technique was reviewed with successful return demonstration by the patient's daughter. The patient's daughter feels comfortable with home infusion process after bedside education provided. The patient's daughter is also a nurse, so the purpose of the bedside education was more to discuss the MOA. The patient will discharge home on IV Daptomycin 760mg Q24 hours (8mg/kg) for estimated end of therapy date of 11/1/2024. Dosing schedule time will be TBD by the patient and her daughter daily, beginning with first home dose due on 10/6/2024; I confirmed that the patient received a dose prior to discharge on 10/5/2024. The patient & her daughter weren't quite sure of the desired home dosing time, but are aware that the designated time they land on should remain consistent for the duration of therapy. Extension set not placed on DL PICC, as patient will not be self infusing. Patient accepted to care by [facility] for weekly dressing changes and lab draws.     Time allotted for questions; questions addressed appropriately. The patient's home IV ATB & supplies will be delivered to the patient's home on the afternoon of 10/5/2024. Provided patient with [facility] support number [phone] & [facility] number [phone].  Patient is ready for discharge home from [facility] perspective. Patient's discharge planning team and bedside nurse updated with the information above.      -Patient has been accepted to care by [facility] and report called to Maryan. She confirmed patient has been accepted onto services by the agency.  -Phone number [phone]     Please do not hesitate to reach out for any additional needs.    Jose Thomas MS, RDN, LDN  Clinical Liaison & Dietitian  [facility]   Phone: [phone]  [email or web address]          As a note, fall risk precautions & preventions discussed with patient.

## 2024-10-05 NOTE — PLAN OF CARE
[facility] - [facility]iology Stepdown      HOME HEALTH ORDERS  FACE TO FACE ENCOUNTER    Patient Name: Lupis Murcia  YOB: 1950    PCP: Bobby Tran MD   PCP Address: [address]  PCP Phone Number: [phone]  PCP Fax: [phone]    Encounter Date: 9/29/24    Admit to Home Health    Diagnoses:  Active Hospital Problems    Diagnosis  POA    *Sepsis [A41.9]  Yes    Irritant contact dermatitis due to fecal, urinary or dual incontinence [L24.A2]  Yes    Diastolic dysfunction [I51.89]  Unknown    Enterococcal bacteremia [R78.81, B95.2]  Yes    Elevated troponin [R79.89]  Unknown    History of pulmonary embolus (PE) [Z86.711]  Yes    HTN (hypertension) [I10]  Yes    Lymphedema [I89.0]  Yes    MS (multiple sclerosis) [G35]  Yes    Anemia [D64.9]  Yes      Resolved Hospital Problems    Diagnosis Date Resolved POA    (HFpEF) heart failure with preserved ejection fraction [I50.30] 10/01/2024 Yes     Chronic     Echo 7/10/24: Concentric LV remodeling, EF 60-65%, G2DD w/ elevated LV filling pressure. Mild LA dilation. Moderate AS with moderate annular calcification. IVC/SVC venous pressure intermediate (8mmHg).       Leg weakness [R29.898] 10/01/2024 Yes       Follow Up Appointments:  Future Appointments   Date Time Provider Department Center   10/9/2024 11:00 AM [facility]       Allergies:  Review of patient's allergies indicates:   Allergen Reactions    Contrast media Shortness Of Breath and Rash    Pcn [penicillins] Shortness Of Breath and Rash    Celebrex [celecoxib] Other (See Comments)     Swallowing problems     Diazepam Hives    Gabapentin Other (See Comments)     Confusion     Motrin [ibuprofen] Rash       Medications: Review discharge medications with patient and family and provide education.    Current Facility-Administered Medications   Medication Dose Route Frequency Provider Last Rate Last Admin    acetaminophen tablet 650 mg  650 mg Oral Q4H  PRN Jaylen Fowler MD   650 mg at 10/02/24 1533    acetaminophen-codeine 300-30mg per tablet 1 tablet  1 tablet Oral Q6H PRN Jaylen Fowler MD   1 tablet at 10/05/24 0202    ammonium lactate 12 % lotion   Topical (Top) BID Jaylen Fowler MD   Given at 10/05/24 0822    apixaban tablet 5 mg  5 mg Oral BID Jaylen Fowler MD   5 mg at 10/05/24 0821    cholecalciferol (vitamin D3) 125 mcg (5,000 unit) tablet 5,000 Units  5,000 Units Oral Daily Jaylen Fowler MD   5,000 Units at 10/05/24 0821    DAPTOmycin (CUBICIN) 760 mg in 0.9% NaCl SolP 50 mL IVPB  8 mg/kg Intravenous Q24H Khalif Shook Jr., PA   Stopped at 10/04/24 1407    dextrose 10% bolus 125 mL 125 mL  12.5 g Intravenous PRN Jaylen Fowler MD        dextrose 10% bolus 250 mL 250 mL  25 g Intravenous PRN Jaylen Fowler MD        glucagon (human recombinant) injection 1 mg  1 mg Intramuscular PRN Jaylen Fowler MD        glucose chewable tablet 16 g  16 g Oral PRN Jaylen Fowler MD        glucose chewable tablet 24 g  24 g Oral PRN Jaylen Fowler MD        LORazepam tablet 1 mg  1 mg Oral Q8H PRN Nely Gayle, DO   1 mg at 10/05/24 0536    naloxone 0.4 mg/mL injection 0.02 mg  0.02 mg Intravenous PRN Jaylen Fowler MD        ondansetron injection 4 mg  4 mg Intravenous Q8H PRN Steve Fox MD   4 mg at 09/30/24 1219    sodium chloride 0.9% flush 10 mL  10 mL Intravenous Q12H PRN Jaylen Fowler MD        sodium chloride 0.9% flush 10 mL  10 mL Intravenous Q6H Steve Fox MD   10 mL at 10/05/24 0501    And    sodium chloride 0.9% flush 10 mL  10 mL Intravenous PRN Steve Fox MD            Medication List        START taking these medications      0.9% NaCl SolP 50 mL with DAPTOmycin 500 mg SolR 761.5 mg  Inject 761.5 mg into the vein once daily.            CHANGE how you take these medications      candesartan-hydrochlorothiazide 16-12.5 mg per tablet  Commonly known as: ATACAND HCT  Take 1 tablet by mouth once daily.  What changed:    when to take this  reasons to take this            CONTINUE taking these medications      acetaminophen-codeine 300-30mg 300-30 mg Tab  Commonly known as: TYLENOL #3  Take 1 tablet by mouth daily as needed (pain).     ammonium lactate 12 % lotion  Commonly known as: LAC-HYDRIN  Apply topically 2 (two) times daily.     apixaban 5 mg Tab  Commonly known as: ELIQUIS  Take 1 tablet (5 mg total) by mouth 2 (two) times a day.     cholecalciferol (vitamin D3) 125 mcg (5,000 unit) Tab  Take 5,000 Units by mouth once daily.     LORazepam 1 MG tablet  Commonly known as: ATIVAN  Take 1 mg by mouth every 12 (twelve) hours as needed for Anxiety.     ondansetron 4 MG Tbdl  Commonly known as: ZOFRAN-ODT  Dissolve 1 tablet (4 mg total) by mouth every 6 (six) hours as needed (Nausea).                I have seen and examined this patient within the last 30 days. My clinical findings that support the need for the home health skilled services and home bound status are the following:no   Weakness/numbness causing balance and gait disturbance due to Weakness/Debility making it taxing to leave home.     Diet:   cardiac diet    Labs:  SN to perform labs:  CBC: Weekly; 6 week(s), CMP: Weekly; 6 week(s), CRP: Weekly; 6 week(s), and CPK : Weekly, 6 weeks    Referrals/ Consults  Physical Therapy to evaluate and treat. Evaluate for home safety and equipment needs; Establish/upgrade home exercise program. Perform / instruct on therapeutic exercises, gait training, transfer training, and Range of Motion.  Occupational Therapy to evaluate and treat. Evaluate home environment for safety and equipment needs. Perform/Instruct on transfers, ADL training, ROM, and therapeutic exercises.    Activities:   activity as tolerated    Nursing:   Agency to admit patient within 24 hours of hospital discharge unless specified on physician order or at patient request    SN to complete comprehensive assessment including routine vital signs. Instruct on disease  process and s/s of complications to report to MD. Review/verify medication list sent home with the patient at time of discharge  and instruct patient/caregiver as needed. Frequency may be adjusted depending on start of care date.     Skilled nurse to perform up to 3 visits PRN for symptoms related to diagnosis    Notify MD if SBP > 160 or < 90; DBP > 90 or < 50; HR > 120 or < 50; Temp > 101; O2 < 88%;     Ok to schedule additional visits based on staff availability and patient request on consecutive days within the home health episode.    When multiple disciplines ordered:    Start of Care occurs on Sunday - Wednesday schedule remaining discipline evaluations as ordered on separate consecutive days following the start of care.    Thursday SOC -schedule subsequent evaluations Friday and Monday the following week.     Friday - Saturday SOC - schedule subsequent discipline evaluations on consecutive days starting Monday of the following week.    For all post-discharge communication and subsequent orders please contact patient's primary care physician.     Miscellaneous   Home Infusion Therapy:   SN to perform Infusion Therapy/Central Line Care.  Review Central Line Care & Central Line Flush with patient.    Administer (drug and dose): Daptomycin 8 mg/kg IV q24hr     Last dose given: 10/05/24                          Home dose due: 10/06/24    Scrub the Hub: Prior to accessing the line, always perform a 30 second alcohol scrub  Each lumen of the central line is to be flushed at least daily with 10 mL Normal Saline and 3 mL Heparin flush (10 units/mL)  Skilled Nurse (SN) may draw blood from IV access  Blood Draw Procedure:   - Aspirate at least 5 mL of blood   - Discard   - Obtain specimen   - Change injection cap   - Flush with 20 mL Normal Saline followed by a                 3-5 mL Heparin flush (10 units/mL)  Central :   - Sterile dressing changes are done weekly and as needed.   - Use chlor-hexadine  scrub to cleanse site, apply Biopatch to insertion site,       apply securement device dressing   - Injection caps are changed weekly and after EVERY lab draw.   - If sterile gauze is under dressing to control oozing,                 dressing change must be performed every 24 hours until gauze is not needed.    Outpatient Antibiotic Therapy Plan:     Please send referral to [facility].     1) Infection: Bacteremia with E faecalis and Staph epi     2) Discharge Antibiotics:     Intravenous antibiotics:  Daptomycin 8mg/kg IV q24h      Oral antibiotics:  None     3) Therapy Duration:  6 weeks from 1st neg BCX 9/30     Estimated end date of IV antibiotics: 11/11/24     4) Outpatient Weekly Labs:     Order the following labs to be drawn on Mondays:   CBC  CMP   CRP     CPK (when on Daptomycin)     5) Fax Lab Results to Infectious Diseases Provider: Khalif Shook PA-C     [facility] Fax Number: [phone]     6) Outpatient Infectious Diseases Follow-up     Follow-up appointment will be arranged by the ID clinic and will be found in the patient's appointments tab.     Prior to discharge, please ensure the patient's follow-up has been scheduled.    If there is still no follow-up scheduled prior to discharge, please send an FONU2 message to [facility] [place] or Call Infectious Diseases Dept.    Home Health Aide:  Physical Therapy Three times weekly and Occupational Therapy Three times weekly    Wound Care Orders  yes:  Pressure Ulcer(s) Stage II :   Location: Sacral  Apply Miconzazole:  2% cream                       Frequency:  Daily                             If incontinent of stool or urine, apply thin layer Barrier cream                   twice daily and PRN to wound         Pressure relief measure:  for pressure redistribution             I certify that this patient is confined to her home and needs physical therapy and occupational therapy.

## 2024-10-05 NOTE — MEDICAL/APP STUDENT
Discharge    Hospital course    Overview/Hospital Course:  Patient here with generalized weakness and decreased apetite over the past several days. Was noticed to have malodorous urine by Daughter. She found to be febrile in the ED and labs revealed leukocytosis, Cr1.4, BUN 22, and procal 0.23. Troponins were flat.  . EKG with sinus tachycardia and occasional PVCs. Infectious work-up initiated and patient started on broad spectrum antibiotics with vancomycin and cefepime. Of note, patient had similar presentation back in July, though without malodorous urine, and was treated with the same antibiotics for UTI (+) for E.coli and Proteus. CXR unremarkable. UA negative for bacteria and leukocytes. Blood cultures positive for enterococcus faecalis. Patient fevering with Tmax of 103F. Unclear source of infection. Patient does have chronic lymphedema in bilateral lower extremities and an open sacral wound, though both without overt signs of evidence of infection. Tylenol and ice packs for temperature control. Infectious Disease consulted for further recommendations. TTE completed, no evidence of vegetation. Of note, patient did present with complaints of nausea, had multiple episodes of vomiting on day 2 of admission in addition to 4-5 episodes of diarrhea on day 3. Updated (2/2) blood culture speciation positive for staph epidermidis. Abx deescalated to IV daptomycin. Repeat Bcs have been NGTD.  Picc line in place with plans to continue IV antibiotics outpatient.

## 2024-10-05 NOTE — ASSESSMENT & PLAN NOTE
Anemia is likely due to anemia of chronic disease. Most recent hemoglobin and hematocrit are listed below.  Recent Labs     10/03/24  1147 10/04/24  0445 10/05/24  0453   HGB 8.4* 7.7* 7.8*   HCT 28.8* 25.7* 25.7*       Plan  - Monitor serial CBC: Daily  - Transfuse PRBC if patient becomes hemodynamically unstable, symptomatic or H/H drops below 7/21.  - Patient has not received any PRBC transfusions to date  - Patient's anemia is currently stable  - Likely anemia of chronic disease, pt will f/u with her hematologist outpatient

## 2024-10-05 NOTE — ASSESSMENT & PLAN NOTE
"This patient does not have evidence of infective focus  My overall impression is sepsis.  Source: Unknown  Antibiotics given-   Antibiotics (72h ago, onward)      Start     Stop Route Frequency Ordered    10/04/24 1000  DAPTOmycin (CUBICIN) 760 mg in 0.9% NaCl SolP 50 mL IVPB         -- IV Every 24 hours (non-standard times) 10/04/24 0854          Latest lactate reviewed-  No results for input(s): "LACTATE", "POCLAC" in the last 72 hours.      Fluid challenge Not needed - patient is not hypotensive      Post- resuscitation assessment No - Post resuscitation assessment not needed       Will Not start Pressors- Levophed for MAP of 65  Source control achieved by: ABX    74-year-old patient presented to the ED with generalized weakness.  T-max found to be 101°.  Daughter reports malodorous urine, but UA negative. CXR negative. Received 1 L bolus of LR while in the ED.     9/29 Blood clx (2/2) positive for E. Faecalis, pan sensitive  -> Updated result with positive findings for Staph epidermidis, sensitive to daptomycin, vancomycin, and linezolid    10/1 TTE without evidence of vegetations    Patient noted to fever the day following admission with Tmax of 103.5F. Unable to relieve with ice pack and tylenol, though improved after 500 cc's LR given. Afebrile since. Suspect sepsis source to be from sacral wound exposure to gastric content as patient reported multiple episodes of diarrhea from 9/30-10/1 and intermittently sitting in her stool prior to being cleaned.    Plan:   -- Broad-spectrum antibiotics with vanc/cefepime   - Deescalated down to vancomycin   - Now on daptomycin for 6 weeks.   -- F/u blood clx susceptibility  -- Wound care consult for bilateral lower extremity lymphedema  -- Ctm fever curve   - Tylenol and ice packs as needed  -- Infectious Disease consult, appreciate recs   - Abx deescalated to daptomycin  -- Picc line to be placed for pt to continue IV abx outpatient  "

## 2024-10-06 LAB
BACTERIA BLD CULT: NORMAL
BACTERIA BLD CULT: NORMAL

## 2024-10-08 ENCOUNTER — TELEPHONE (OUTPATIENT)
Dept: PODIATRY | Facility: CLINIC | Age: 74
End: 2024-10-08
Payer: MEDICARE

## 2024-10-08 ENCOUNTER — LAB VISIT (OUTPATIENT)
Dept: LAB | Facility: HOSPITAL | Age: 74
End: 2024-10-08
Payer: MEDICARE

## 2024-10-08 DIAGNOSIS — I89.0 LYMPHEDEMA: Primary | ICD-10-CM

## 2024-10-08 DIAGNOSIS — L97.921 SKIN ULCER OF LEFT LOWER LEG, LIMITED TO BREAKDOWN OF SKIN: ICD-10-CM

## 2024-10-08 DIAGNOSIS — L97.911: ICD-10-CM

## 2024-10-08 DIAGNOSIS — A41.9 SEPTICEMIA DURING LABOR: Primary | ICD-10-CM

## 2024-10-08 LAB
ALBUMIN SERPL BCP-MCNC: 3.4 G/DL (ref 3.5–5.2)
ALP SERPL-CCNC: 53 U/L (ref 55–135)
ALT SERPL W/O P-5'-P-CCNC: 10 U/L (ref 10–44)
ANION GAP SERPL CALC-SCNC: 13 MMOL/L (ref 8–16)
AST SERPL-CCNC: 15 U/L (ref 10–40)
BASOPHILS # BLD AUTO: 0.01 K/UL (ref 0–0.2)
BASOPHILS NFR BLD: 0.2 % (ref 0–1.9)
BILIRUB SERPL-MCNC: 0.3 MG/DL (ref 0.1–1)
BUN SERPL-MCNC: 21 MG/DL (ref 8–23)
CALCIUM SERPL-MCNC: 9.7 MG/DL (ref 8.7–10.5)
CHLORIDE SERPL-SCNC: 106 MMOL/L (ref 95–110)
CK SERPL-CCNC: 195 U/L (ref 20–180)
CO2 SERPL-SCNC: 21 MMOL/L (ref 23–29)
CREAT SERPL-MCNC: 0.9 MG/DL (ref 0.5–1.4)
CRP SERPL-MCNC: 88.3 MG/L (ref 0–8.2)
DIFFERENTIAL METHOD BLD: ABNORMAL
EOSINOPHIL # BLD AUTO: 0.2 K/UL (ref 0–0.5)
EOSINOPHIL NFR BLD: 2.9 % (ref 0–8)
ERYTHROCYTE [DISTWIDTH] IN BLOOD BY AUTOMATED COUNT: 15.9 % (ref 11.5–14.5)
EST. GFR  (NO RACE VARIABLE): >60 ML/MIN/1.73 M^2
GLUCOSE SERPL-MCNC: 115 MG/DL (ref 70–110)
HCT VFR BLD AUTO: 29 % (ref 37–48.5)
HGB BLD-MCNC: 8.5 G/DL (ref 12–16)
IMM GRANULOCYTES # BLD AUTO: 0.06 K/UL (ref 0–0.04)
IMM GRANULOCYTES NFR BLD AUTO: 1.1 % (ref 0–0.5)
LYMPHOCYTES # BLD AUTO: 1.8 K/UL (ref 1–4.8)
LYMPHOCYTES NFR BLD: 32.9 % (ref 18–48)
MCH RBC QN AUTO: 25.2 PG (ref 27–31)
MCHC RBC AUTO-ENTMCNC: 29.3 G/DL (ref 32–36)
MCV RBC AUTO: 86 FL (ref 82–98)
MONOCYTES # BLD AUTO: 0.6 K/UL (ref 0.3–1)
MONOCYTES NFR BLD: 10.4 % (ref 4–15)
NEUTROPHILS # BLD AUTO: 2.9 K/UL (ref 1.8–7.7)
NEUTROPHILS NFR BLD: 52.5 % (ref 38–73)
NRBC BLD-RTO: 0 /100 WBC
PLATELET # BLD AUTO: 301 K/UL (ref 150–450)
PMV BLD AUTO: 10.1 FL (ref 9.2–12.9)
POTASSIUM SERPL-SCNC: 3.8 MMOL/L (ref 3.5–5.1)
PROT SERPL-MCNC: 7.6 G/DL (ref 6–8.4)
RBC # BLD AUTO: 3.37 M/UL (ref 4–5.4)
SODIUM SERPL-SCNC: 140 MMOL/L (ref 136–145)
WBC # BLD AUTO: 5.56 K/UL (ref 3.9–12.7)

## 2024-10-08 PROCEDURE — 85025 COMPLETE CBC W/AUTO DIFF WBC: CPT | Performed by: PHYSICIAN ASSISTANT

## 2024-10-08 PROCEDURE — 86140 C-REACTIVE PROTEIN: CPT | Performed by: PHYSICIAN ASSISTANT

## 2024-10-08 PROCEDURE — 82550 ASSAY OF CK (CPK): CPT | Performed by: PHYSICIAN ASSISTANT

## 2024-10-08 PROCEDURE — 80053 COMPREHEN METABOLIC PANEL: CPT | Performed by: PHYSICIAN ASSISTANT

## 2024-10-09 ENCOUNTER — TELEPHONE (OUTPATIENT)
Dept: NEUROLOGY | Facility: CLINIC | Age: 74
End: 2024-10-09
Payer: MEDICARE

## 2024-10-09 ENCOUNTER — OFFICE VISIT (OUTPATIENT)
Dept: NEUROLOGY | Facility: CLINIC | Age: 74
End: 2024-10-09
Payer: MEDICARE

## 2024-10-09 DIAGNOSIS — Z71.89 COUNSELING REGARDING GOALS OF CARE: ICD-10-CM

## 2024-10-09 DIAGNOSIS — Z78.9 IMPAIRED MOBILITY AND ADLS: ICD-10-CM

## 2024-10-09 DIAGNOSIS — G35 MULTIPLE SCLEROSIS: Primary | ICD-10-CM

## 2024-10-09 DIAGNOSIS — Z74.09 IMPAIRED MOBILITY AND ADLS: ICD-10-CM

## 2024-10-09 DIAGNOSIS — G82.20 PARAPARESIS: ICD-10-CM

## 2024-10-09 PROCEDURE — 99214 OFFICE O/P EST MOD 30 MIN: CPT | Mod: 95,,, | Performed by: PSYCHIATRY & NEUROLOGY

## 2024-10-11 ENCOUNTER — TELEPHONE (OUTPATIENT)
Dept: PSYCHIATRY | Facility: CLINIC | Age: 74
End: 2024-10-11

## 2024-10-11 NOTE — TELEPHONE ENCOUNTER
Received referral from ANNABELLE Henley MD to assist Pt and Pt's daughter with caregiver FMLA process and education on waiver programs for in-home support. Messaged family via portal.

## 2024-10-14 ENCOUNTER — LAB VISIT (OUTPATIENT)
Dept: LAB | Facility: HOSPITAL | Age: 74
End: 2024-10-14
Attending: PHYSICIAN ASSISTANT
Payer: MEDICARE

## 2024-10-14 DIAGNOSIS — A41.9 SYSTEMIC INFECTION: Primary | ICD-10-CM

## 2024-10-14 DIAGNOSIS — A41.9 SYSTEMIC INFECTION: ICD-10-CM

## 2024-10-14 LAB
ALBUMIN SERPL BCP-MCNC: 3.4 G/DL (ref 3.5–5.2)
ALP SERPL-CCNC: 63 U/L (ref 55–135)
ALT SERPL W/O P-5'-P-CCNC: 11 U/L (ref 10–44)
ANION GAP SERPL CALC-SCNC: 12 MMOL/L (ref 8–16)
AST SERPL-CCNC: 13 U/L (ref 10–40)
BILIRUB SERPL-MCNC: 0.2 MG/DL (ref 0.1–1)
BUN SERPL-MCNC: 16 MG/DL (ref 8–23)
CALCIUM SERPL-MCNC: 9.5 MG/DL (ref 8.7–10.5)
CHLORIDE SERPL-SCNC: 108 MMOL/L (ref 95–110)
CK SERPL-CCNC: 61 U/L (ref 20–180)
CO2 SERPL-SCNC: 22 MMOL/L (ref 23–29)
CREAT SERPL-MCNC: 0.8 MG/DL (ref 0.5–1.4)
CRP SERPL-MCNC: 21 MG/L (ref 0–8.2)
ERYTHROCYTE [DISTWIDTH] IN BLOOD BY AUTOMATED COUNT: 15.9 % (ref 11.5–14.5)
EST. GFR  (NO RACE VARIABLE): >60 ML/MIN/1.73 M^2
GLUCOSE SERPL-MCNC: 123 MG/DL (ref 70–110)
HCT VFR BLD AUTO: 29.5 % (ref 37–48.5)
HGB BLD-MCNC: 8.3 G/DL (ref 12–16)
MCH RBC QN AUTO: 25.3 PG (ref 27–31)
MCHC RBC AUTO-ENTMCNC: 28.1 G/DL (ref 32–36)
MCV RBC AUTO: 90 FL (ref 82–98)
PLATELET # BLD AUTO: 255 K/UL (ref 150–450)
PMV BLD AUTO: 10.6 FL (ref 9.2–12.9)
POTASSIUM SERPL-SCNC: 3.7 MMOL/L (ref 3.5–5.1)
PROT SERPL-MCNC: 7.2 G/DL (ref 6–8.4)
RBC # BLD AUTO: 3.28 M/UL (ref 4–5.4)
SODIUM SERPL-SCNC: 142 MMOL/L (ref 136–145)
WBC # BLD AUTO: 6.44 K/UL (ref 3.9–12.7)

## 2024-10-14 PROCEDURE — 80053 COMPREHEN METABOLIC PANEL: CPT | Performed by: PHYSICIAN ASSISTANT

## 2024-10-14 PROCEDURE — 85027 COMPLETE CBC AUTOMATED: CPT | Performed by: PHYSICIAN ASSISTANT

## 2024-10-14 PROCEDURE — 82550 ASSAY OF CK (CPK): CPT | Performed by: PHYSICIAN ASSISTANT

## 2024-10-14 PROCEDURE — 86140 C-REACTIVE PROTEIN: CPT | Performed by: PHYSICIAN ASSISTANT

## 2024-10-21 ENCOUNTER — LAB VISIT (OUTPATIENT)
Dept: LAB | Facility: HOSPITAL | Age: 74
End: 2024-10-21
Attending: PHYSICIAN ASSISTANT
Payer: MEDICARE

## 2024-10-21 ENCOUNTER — OFFICE VISIT (OUTPATIENT)
Dept: INFECTIOUS DISEASES | Facility: CLINIC | Age: 74
End: 2024-10-21
Payer: MEDICARE

## 2024-10-21 DIAGNOSIS — B95.2 ENTEROCOCCAL BACTEREMIA: ICD-10-CM

## 2024-10-21 DIAGNOSIS — R78.81 BACTEREMIA: Primary | ICD-10-CM

## 2024-10-21 DIAGNOSIS — R78.81 ENTEROCOCCAL BACTEREMIA: ICD-10-CM

## 2024-10-21 DIAGNOSIS — A41.81 SEPTICEMIA DUE TO ENTEROCOCCUS: Primary | ICD-10-CM

## 2024-10-21 DIAGNOSIS — A41.81 SEPTICEMIA DUE TO ENTEROCOCCUS: ICD-10-CM

## 2024-10-21 LAB
ERYTHROCYTE [DISTWIDTH] IN BLOOD BY AUTOMATED COUNT: 16.4 % (ref 11.5–14.5)
HCT VFR BLD AUTO: 27.6 % (ref 37–48.5)
HGB BLD-MCNC: 8 G/DL (ref 12–16)
MCH RBC QN AUTO: 25.9 PG (ref 27–31)
MCHC RBC AUTO-ENTMCNC: 29 G/DL (ref 32–36)
MCV RBC AUTO: 89 FL (ref 82–98)
PLATELET # BLD AUTO: 193 K/UL (ref 150–450)
PMV BLD AUTO: 10.9 FL (ref 9.2–12.9)
RBC # BLD AUTO: 3.09 M/UL (ref 4–5.4)
WBC # BLD AUTO: 4.68 K/UL (ref 3.9–12.7)

## 2024-10-21 PROCEDURE — 80053 COMPREHEN METABOLIC PANEL: CPT | Performed by: PHYSICIAN ASSISTANT

## 2024-10-21 PROCEDURE — 85027 COMPLETE CBC AUTOMATED: CPT | Performed by: PHYSICIAN ASSISTANT

## 2024-10-21 PROCEDURE — 82550 ASSAY OF CK (CPK): CPT | Performed by: PHYSICIAN ASSISTANT

## 2024-10-21 PROCEDURE — 86140 C-REACTIVE PROTEIN: CPT | Performed by: PHYSICIAN ASSISTANT

## 2024-10-21 PROCEDURE — 99213 OFFICE O/P EST LOW 20 MIN: CPT | Mod: 95,,, | Performed by: PHYSICIAN ASSISTANT

## 2024-10-21 NOTE — PROGRESS NOTES
The patient location is:Home  The chief complaint leading to consultation is: Bacteremia cf endocarditis    Visit type: audiovisual    Face to Face time with patient: 15  25 minutes of total time spent on the encounter, which includes face to face time and non-face to face time preparing to see the patient (eg, review of tests), Obtaining and/or reviewing separately obtained history, Documenting clinical information in the electronic or other health record, Independently interpreting results (not separately reported) and communicating results to the patient/family/caregiver, or Care coordination (not separately reported).         Each patient to whom he or she provides medical services by telemedicine is:  (1) informed of the relationship between the physician and patient and the respective role of any other health care provider with respect to management of the patient; and (2) notified that he or she may decline to receive medical services by telemedicine and may withdraw from such care at any time.    Notes:       Subjective     Patient ID: Lupis Murcia is a 74 y.o. female.    Chief Complaint:Blood Infection      History of Present Illness    74-year-old female with a history of multiple sclerosis not on immunosuppression, lymphedema of bilateral lower extremities, and pulmonary embolism admitted with fever and weakness found to have enterococcal/Staph Epi bacteremia of and unclear source.  Enterococcus - ampicillin sensitive. Possible source could be presacral skin irritation with fecal contamination but also there would be hightened conscern for endocarditis.  Patient defers PETE. Patient was treated with vancomycin and has improved.  Repeat blood cultures were no growth to date and cleared on 9/30/24.  2D echo obtained showing no vegetation but pulmonic valve not well visualized.  Has murmur but reports from childhood.    Baseline CPK 23.    .  DC Plan  Daptomycin 8mg/kg IV q24h for dc with baseline  CPK  PICC placement   Plan for 6 weeks IV abx out of concern for possible endocarditis as no clear source available.EOC 11/4/24  On Mondays - Weekly CBC, CMP and CPK    10/21/24: Follows up today virtually with daughter.  Reports doing well.  Tolerating Daptomycin without issue. PICC without  issue.  The patient denies any recent fever, chills, spine pain/joint pain or sweats.      Review of Systems   Constitutional: Negative for chills, fever, malaise/fatigue and night sweats.   Cardiovascular:  Negative for chest pain.   Respiratory:  Negative for cough, hemoptysis, shortness of breath, sputum production and wheezing.    Skin:  Negative for rash and suspicious lesions.   Gastrointestinal:  Negative for abdominal pain, constipation, diarrhea, heartburn, nausea and vomiting.   Genitourinary:  Negative for dysuria and hematuria.        Objective   Physical Exam  Constitutional:       General: She is not in acute distress.     Appearance: Normal appearance. She is not ill-appearing, toxic-appearing or diaphoretic.   HENT:      Head: Normocephalic and atraumatic.   Neurological:      Mental Status: She is alert.   Psychiatric:         Mood and Affect: Mood normal.         Behavior: Behavior normal.         Thought Content: Thought content normal.         Judgment: Judgment normal.        Latest Reference Range & Units 10/08/24 12:41 10/14/24 11:30   CPK 20 - 180 U/L 195 (H) 61   (H): Data is abnormally high       Latest Reference Range & Units 10/08/24 12:41 10/14/24 11:30 10/21/24 12:20   WBC 3.90 - 12.70 K/uL  6.44 4.68   RBC 4.00 - 5.40 M/uL  3.28 (L) 3.09 (L)   Hemoglobin 12.0 - 16.0 g/dL  8.3 (L) 8.0 (L)   Hematocrit 37.0 - 48.5 %  29.5 (L) 27.6 (L)   MCV 82 - 98 fL  90 89   MCH 27.0 - 31.0 pg  25.3 (L) 25.9 (L)   MCHC 32.0 - 36.0 g/dL  28.1 (L) 29.0 (L)   RDW 11.5 - 14.5 %  15.9 (H) 16.4 (H)   Platelet Count 150 - 450 K/uL  255 193   MPV 9.2 - 12.9 fL  10.6 10.9   Sodium 136 - 145 mmol/L 140 142    Potassium 3.5  - 5.1 mmol/L 3.8 3.7    Chloride 95 - 110 mmol/L 106 108    CO2 23 - 29 mmol/L 21 (L) 22 (L)    Anion Gap 8 - 16 mmol/L 13 12    BUN 8 - 23 mg/dL 21 16    Creatinine 0.5 - 1.4 mg/dL 0.9 0.8    eGFR >60 mL/min/1.73 m^2 >60.0 >60.0    Glucose 70 - 110 mg/dL 115 (H) 123 (H)    Calcium 8.7 - 10.5 mg/dL 9.7 9.5    (L): Data is abnormally low  (H): Data is abnormally high       Assessment and Plan     1. Bacteremia    2. Enterococcal bacteremia      73 yo female with recent Enterococcal and Staph Epi bacteremia of unclear source.  2d echo negative but still concern for endocarditis.  Patient deferred PETE so under treatment for 6 weeks with Daptomycin out of cf endocarditis though other suspected source possible sacral irritation and fecal contamination.  Doing well and CRP almost normalized.  CPK wnl.    Plan:  Continue Daptomycin to complete 6 weeks  Weekly labs - CRP, CBC, CMP and CPk  FU at [facility] 11/11/24   The patient was encouraged to call the office for further concerns or complaints.

## 2024-10-22 LAB
ALBUMIN SERPL BCP-MCNC: 3.4 G/DL (ref 3.5–5.2)
ALP SERPL-CCNC: 60 U/L (ref 40–150)
ALT SERPL W/O P-5'-P-CCNC: 10 U/L (ref 10–44)
ANION GAP SERPL CALC-SCNC: 12 MMOL/L (ref 8–16)
AST SERPL-CCNC: 15 U/L (ref 10–40)
BILIRUB SERPL-MCNC: 0.2 MG/DL (ref 0.1–1)
BUN SERPL-MCNC: 11 MG/DL (ref 8–23)
CALCIUM SERPL-MCNC: 9.4 MG/DL (ref 8.7–10.5)
CHLORIDE SERPL-SCNC: 108 MMOL/L (ref 95–110)
CK SERPL-CCNC: 33 U/L (ref 20–180)
CO2 SERPL-SCNC: 22 MMOL/L (ref 23–29)
CREAT SERPL-MCNC: 0.8 MG/DL (ref 0.5–1.4)
CRP SERPL-MCNC: 31.5 MG/L (ref 0–8.2)
EST. GFR  (NO RACE VARIABLE): >60 ML/MIN/1.73 M^2
GLUCOSE SERPL-MCNC: 131 MG/DL (ref 70–110)
POTASSIUM SERPL-SCNC: 3.3 MMOL/L (ref 3.5–5.1)
PROT SERPL-MCNC: 7 G/DL (ref 6–8.4)
SODIUM SERPL-SCNC: 142 MMOL/L (ref 136–145)

## 2024-10-28 ENCOUNTER — TELEPHONE (OUTPATIENT)
Dept: PODIATRY | Facility: CLINIC | Age: 74
End: 2024-10-28
Payer: MEDICARE

## 2024-10-28 ENCOUNTER — LAB VISIT (OUTPATIENT)
Dept: LAB | Facility: HOSPITAL | Age: 74
End: 2024-10-28
Attending: PHYSICIAN ASSISTANT
Payer: MEDICARE

## 2024-10-28 DIAGNOSIS — A41.81 SEPTICEMIA DUE TO ENTEROCOCCUS: Primary | ICD-10-CM

## 2024-10-28 DIAGNOSIS — A41.81 SEPTICEMIA DUE TO ENTEROCOCCUS: ICD-10-CM

## 2024-10-28 LAB
ALBUMIN SERPL BCP-MCNC: 3.4 G/DL (ref 3.5–5.2)
ALP SERPL-CCNC: 63 U/L (ref 40–150)
ALT SERPL W/O P-5'-P-CCNC: 9 U/L (ref 10–44)
ANION GAP SERPL CALC-SCNC: 9 MMOL/L (ref 8–16)
AST SERPL-CCNC: 11 U/L (ref 10–40)
BASOPHILS # BLD AUTO: 0.01 K/UL (ref 0–0.2)
BASOPHILS NFR BLD: 0.2 % (ref 0–1.9)
BILIRUB SERPL-MCNC: 0.2 MG/DL (ref 0.1–1)
BUN SERPL-MCNC: 13 MG/DL (ref 8–23)
CALCIUM SERPL-MCNC: 9.4 MG/DL (ref 8.7–10.5)
CHLORIDE SERPL-SCNC: 106 MMOL/L (ref 95–110)
CK SERPL-CCNC: 24 U/L (ref 20–180)
CO2 SERPL-SCNC: 25 MMOL/L (ref 23–29)
CREAT SERPL-MCNC: 0.7 MG/DL (ref 0.5–1.4)
CRP SERPL-MCNC: 23.8 MG/L (ref 0–8.2)
DIFFERENTIAL METHOD BLD: ABNORMAL
EOSINOPHIL # BLD AUTO: 0.2 K/UL (ref 0–0.5)
EOSINOPHIL NFR BLD: 4 % (ref 0–8)
ERYTHROCYTE [DISTWIDTH] IN BLOOD BY AUTOMATED COUNT: 16 % (ref 11.5–14.5)
EST. GFR  (NO RACE VARIABLE): >60 ML/MIN/1.73 M^2
GLUCOSE SERPL-MCNC: 100 MG/DL (ref 70–110)
HCT VFR BLD AUTO: 27.6 % (ref 37–48.5)
HGB BLD-MCNC: 8.2 G/DL (ref 12–16)
IMM GRANULOCYTES # BLD AUTO: 0.02 K/UL (ref 0–0.04)
IMM GRANULOCYTES NFR BLD AUTO: 0.4 % (ref 0–0.5)
LYMPHOCYTES # BLD AUTO: 1.7 K/UL (ref 1–4.8)
LYMPHOCYTES NFR BLD: 37.6 % (ref 18–48)
MCH RBC QN AUTO: 26 PG (ref 27–31)
MCHC RBC AUTO-ENTMCNC: 29.7 G/DL (ref 32–36)
MCV RBC AUTO: 88 FL (ref 82–98)
MONOCYTES # BLD AUTO: 0.5 K/UL (ref 0.3–1)
MONOCYTES NFR BLD: 11.2 % (ref 4–15)
NEUTROPHILS # BLD AUTO: 2.1 K/UL (ref 1.8–7.7)
NEUTROPHILS NFR BLD: 46.6 % (ref 38–73)
NRBC BLD-RTO: 0 /100 WBC
PLATELET # BLD AUTO: 221 K/UL (ref 150–450)
PMV BLD AUTO: 11.1 FL (ref 9.2–12.9)
POTASSIUM SERPL-SCNC: 3.6 MMOL/L (ref 3.5–5.1)
PROT SERPL-MCNC: 7.1 G/DL (ref 6–8.4)
RBC # BLD AUTO: 3.15 M/UL (ref 4–5.4)
SODIUM SERPL-SCNC: 140 MMOL/L (ref 136–145)
WBC # BLD AUTO: 4.55 K/UL (ref 3.9–12.7)

## 2024-10-28 PROCEDURE — 82550 ASSAY OF CK (CPK): CPT | Performed by: PHYSICIAN ASSISTANT

## 2024-10-28 PROCEDURE — 80053 COMPREHEN METABOLIC PANEL: CPT | Performed by: PHYSICIAN ASSISTANT

## 2024-10-28 PROCEDURE — 86140 C-REACTIVE PROTEIN: CPT | Performed by: PHYSICIAN ASSISTANT

## 2024-10-28 PROCEDURE — 85025 COMPLETE CBC W/AUTO DIFF WBC: CPT | Performed by: PHYSICIAN ASSISTANT

## 2024-10-29 ENCOUNTER — EXTERNAL HOME HEALTH (OUTPATIENT)
Dept: HOME HEALTH SERVICES | Facility: HOSPITAL | Age: 74
End: 2024-10-29
Payer: MEDICARE

## 2024-11-04 ENCOUNTER — LAB VISIT (OUTPATIENT)
Dept: LAB | Facility: HOSPITAL | Age: 74
End: 2024-11-04
Attending: PHYSICIAN ASSISTANT
Payer: MEDICARE

## 2024-11-04 DIAGNOSIS — A41.81 SEPTICEMIA DUE TO ENTEROCOCCUS: Primary | ICD-10-CM

## 2024-11-04 LAB
ALBUMIN SERPL BCP-MCNC: 3.5 G/DL (ref 3.5–5.2)
ALP SERPL-CCNC: 63 U/L (ref 40–150)
ALT SERPL W/O P-5'-P-CCNC: 8 U/L (ref 10–44)
ANION GAP SERPL CALC-SCNC: 11 MMOL/L (ref 8–16)
AST SERPL-CCNC: 11 U/L (ref 10–40)
BASOPHILS # BLD AUTO: 0.02 K/UL (ref 0–0.2)
BASOPHILS NFR BLD: 0.4 % (ref 0–1.9)
BILIRUB SERPL-MCNC: 0.3 MG/DL (ref 0.1–1)
BUN SERPL-MCNC: 16 MG/DL (ref 8–23)
CALCIUM SERPL-MCNC: 9.9 MG/DL (ref 8.7–10.5)
CHLORIDE SERPL-SCNC: 105 MMOL/L (ref 95–110)
CK SERPL-CCNC: 28 U/L (ref 20–180)
CO2 SERPL-SCNC: 23 MMOL/L (ref 23–29)
CREAT SERPL-MCNC: 0.8 MG/DL (ref 0.5–1.4)
CRP SERPL-MCNC: 12 MG/L (ref 0–8.2)
DIFFERENTIAL METHOD BLD: ABNORMAL
EOSINOPHIL # BLD AUTO: 0.2 K/UL (ref 0–0.5)
EOSINOPHIL NFR BLD: 3.3 % (ref 0–8)
ERYTHROCYTE [DISTWIDTH] IN BLOOD BY AUTOMATED COUNT: 15.9 % (ref 11.5–14.5)
EST. GFR  (NO RACE VARIABLE): >60 ML/MIN/1.73 M^2
GLUCOSE SERPL-MCNC: 109 MG/DL (ref 70–110)
HCT VFR BLD AUTO: 28.3 % (ref 37–48.5)
HGB BLD-MCNC: 8.4 G/DL (ref 12–16)
IMM GRANULOCYTES # BLD AUTO: 0.01 K/UL (ref 0–0.04)
IMM GRANULOCYTES NFR BLD AUTO: 0.2 % (ref 0–0.5)
LYMPHOCYTES # BLD AUTO: 1.6 K/UL (ref 1–4.8)
LYMPHOCYTES NFR BLD: 29.6 % (ref 18–48)
MCH RBC QN AUTO: 26.2 PG (ref 27–31)
MCHC RBC AUTO-ENTMCNC: 29.7 G/DL (ref 32–36)
MCV RBC AUTO: 88 FL (ref 82–98)
MONOCYTES # BLD AUTO: 0.5 K/UL (ref 0.3–1)
MONOCYTES NFR BLD: 8.5 % (ref 4–15)
NEUTROPHILS # BLD AUTO: 3.2 K/UL (ref 1.8–7.7)
NEUTROPHILS NFR BLD: 58 % (ref 38–73)
NRBC BLD-RTO: 0 /100 WBC
PLATELET # BLD AUTO: 243 K/UL (ref 150–450)
PMV BLD AUTO: 10 FL (ref 9.2–12.9)
POTASSIUM SERPL-SCNC: 3.7 MMOL/L (ref 3.5–5.1)
PROT SERPL-MCNC: 7.3 G/DL (ref 6–8.4)
RBC # BLD AUTO: 3.21 M/UL (ref 4–5.4)
SODIUM SERPL-SCNC: 139 MMOL/L (ref 136–145)
WBC # BLD AUTO: 5.5 K/UL (ref 3.9–12.7)

## 2024-11-04 PROCEDURE — 80053 COMPREHEN METABOLIC PANEL: CPT | Performed by: PHYSICIAN ASSISTANT

## 2024-11-04 PROCEDURE — 85025 COMPLETE CBC W/AUTO DIFF WBC: CPT | Performed by: PHYSICIAN ASSISTANT

## 2024-11-04 PROCEDURE — 82550 ASSAY OF CK (CPK): CPT | Performed by: PHYSICIAN ASSISTANT

## 2024-11-04 PROCEDURE — 86140 C-REACTIVE PROTEIN: CPT | Performed by: PHYSICIAN ASSISTANT

## 2024-11-05 ENCOUNTER — PATIENT MESSAGE (OUTPATIENT)
Dept: INFECTIOUS DISEASES | Facility: CLINIC | Age: 74
End: 2024-11-05
Payer: MEDICARE

## 2024-11-06 ENCOUNTER — TELEPHONE (OUTPATIENT)
Dept: INFECTIOUS DISEASES | Facility: CLINIC | Age: 74
End: 2024-11-06
Payer: MEDICARE

## 2024-11-06 NOTE — TELEPHONE ENCOUNTER
----- Message from LIZA Travis sent at 11/6/2024 10:13 AM CST -----  Regarding: Please schedule fu EOC virtual on 11/12  Please schedule fu EOC virtual on 11/12

## 2024-11-14 ENCOUNTER — OFFICE VISIT (OUTPATIENT)
Dept: INFECTIOUS DISEASES | Facility: CLINIC | Age: 74
End: 2024-11-14
Payer: MEDICARE

## 2024-11-14 DIAGNOSIS — R78.81 BACTEREMIA: Primary | ICD-10-CM

## 2024-11-14 DIAGNOSIS — R78.81 ENTEROCOCCAL BACTEREMIA: ICD-10-CM

## 2024-11-14 DIAGNOSIS — B95.2 ENTEROCOCCAL BACTEREMIA: ICD-10-CM

## 2024-11-15 ENCOUNTER — DOCUMENT SCAN (OUTPATIENT)
Dept: HOME HEALTH SERVICES | Facility: HOSPITAL | Age: 74
End: 2024-11-15
Payer: MEDICARE

## 2024-11-15 ENCOUNTER — PATIENT MESSAGE (OUTPATIENT)
Dept: NEUROLOGY | Facility: CLINIC | Age: 74
End: 2024-11-15
Payer: MEDICARE

## 2024-11-15 NOTE — PROGRESS NOTES
The patient location is:Home  The chief complaint leading to consultation is: Bacteremia cf endocarditis    Visit type: audiovisual    Face to Face time with patient: 15  25 minutes of total time spent on the encounter, which includes face to face time and non-face to face time preparing to see the patient (eg, review of tests), Obtaining and/or reviewing separately obtained history, Documenting clinical information in the electronic or other health record, Independently interpreting results (not separately reported) and communicating results to the patient/family/caregiver, or Care coordination (not separately reported).         Each patient to whom he or she provides medical services by telemedicine is:  (1) informed of the relationship between the physician and patient and the respective role of any other health care provider with respect to management of the patient; and (2) notified that he or she may decline to receive medical services by telemedicine and may withdraw from such care at any time.    Notes:       Subjective     Patient ID: Lupis Murcia is a 74 y.o. female.    Chief Complaint:No chief complaint on file.      History of Present Illness    74-year-old female with a history of multiple sclerosis not on immunosuppression, lymphedema of bilateral lower extremities, and pulmonary embolism admitted with fever and weakness found to have enterococcal/Staph Epi bacteremia of and unclear source.  Enterococcus - ampicillin sensitive. Possible source could be presacral skin irritation with fecal contamination but also there would be hightened conscern for endocarditis.  Patient defers PETE. Patient was treated with vancomycin and has improved.  Repeat blood cultures were no growth to date and cleared on 9/30/24.  2D echo obtained showing no vegetation but pulmonic valve not well visualized.  Has murmur but reports from childhood.    Baseline CPK 23.    .  DC Plan  Daptomycin 8mg/kg IV q24h for dc with  baseline CPK  PICC placement   Plan for 6 weeks IV abx out of concern for possible endocarditis as no clear source available.EOC 11/4/24  On Mondays - Weekly CBC, CMP and CPK    10/21/24: Follows up today virtually with daughter.  Reports doing well.  Tolerating Daptomycin without issue. PICC without  issue.  The patient denies any recent fever, chills, spine pain/joint pain or sweats.    11/14/24:  Follows up virtually with daughter.  Completed 6 weeks IV daptomycin on 11/11/24 and PICC removed.  Tolerated abx without side effects. The patient denies any back pain, joint pain. Neck pain or recent fever, chills, or sweats.    Review of Systems   Constitutional: Negative for chills, fever, malaise/fatigue and night sweats.   Cardiovascular:  Negative for chest pain.   Respiratory:  Negative for cough, hemoptysis, shortness of breath, sputum production and wheezing.    Skin:  Negative for rash and suspicious lesions.   Gastrointestinal:  Negative for abdominal pain, constipation, diarrhea, heartburn, nausea and vomiting.   Genitourinary:  Negative for dysuria and hematuria.        Objective   Physical Exam  Constitutional:       General: She is not in acute distress.     Appearance: Normal appearance. She is not ill-appearing, toxic-appearing or diaphoretic.   HENT:      Head: Normocephalic and atraumatic.   Neurological:      Mental Status: She is alert.   Psychiatric:         Mood and Affect: Mood normal.         Behavior: Behavior normal.         Thought Content: Thought content normal.         Judgment: Judgment normal.        Latest Reference Range & Units 10/21/24 12:20 10/28/24 11:30 11/04/24 13:03   WBC 3.90 - 12.70 K/uL 4.68 4.55 5.50   RBC 4.00 - 5.40 M/uL 3.09 (L) 3.15 (L) 3.21 (L)   Hemoglobin 12.0 - 16.0 g/dL 8.0 (L) 8.2 (L) 8.4 (L)   Hematocrit 37.0 - 48.5 % 27.6 (L) 27.6 (L) 28.3 (L)   MCV 82 - 98 fL 89 88 88   MCH 27.0 - 31.0 pg 25.9 (L) 26.0 (L) 26.2 (L)   MCHC 32.0 - 36.0 g/dL 29.0 (L) 29.7 (L) 29.7  (L)   RDW 11.5 - 14.5 % 16.4 (H) 16.0 (H) 15.9 (H)   Platelet Count 150 - 450 K/uL 193 221 243   MPV 9.2 - 12.9 fL 10.9 11.1 10.0   Gran % 38.0 - 73.0 %  46.6 58.0   Lymph % 18.0 - 48.0 %  37.6 29.6   Mono % 4.0 - 15.0 %  11.2 8.5   Eos % 0.0 - 8.0 %  4.0 3.3   Basophil % 0.0 - 1.9 %  0.2 0.4   Immature Granulocytes 0.0 - 0.5 %  0.4 0.2   Gran # (ANC) 1.8 - 7.7 K/uL  2.1 3.2   Lymph # 1.0 - 4.8 K/uL  1.7 1.6   Mono # 0.3 - 1.0 K/uL  0.5 0.5   Eos # 0.0 - 0.5 K/uL  0.2 0.2   Baso # 0.00 - 0.20 K/uL  0.01 0.02   Immature Grans (Abs) 0.00 - 0.04 K/uL  0.02 0.01   nRBC 0 /100 WBC  0 0   Differential Method   Automated Automated   Sodium 136 - 145 mmol/L 142 140 139   Potassium 3.5 - 5.1 mmol/L 3.3 (L) 3.6 3.7   Chloride 95 - 110 mmol/L 108 106 105   CO2 23 - 29 mmol/L 22 (L) 25 23   Anion Gap 8 - 16 mmol/L 12 9 11   BUN 8 - 23 mg/dL 11 13 16   Creatinine 0.5 - 1.4 mg/dL 0.8 0.7 0.8   eGFR >60 mL/min/1.73 m^2 >60.0 >60.0 >60.0   Glucose 70 - 110 mg/dL 131 (H) 100 109   Calcium 8.7 - 10.5 mg/dL 9.4 9.4 9.9   ALP 40 - 150 U/L 60 63 63   PROTEIN TOTAL 6.0 - 8.4 g/dL 7.0 7.1 7.3   Albumin 3.5 - 5.2 g/dL 3.4 (L) 3.4 (L) 3.5   BILIRUBIN TOTAL 0.1 - 1.0 mg/dL 0.2 0.2 0.3   AST 10 - 40 U/L 15 11 11   ALT 10 - 44 U/L 10 9 (L) 8 (L)   CRP 0.0 - 8.2 mg/L 31.5 (H) 23.8 (H) 12.0 (H)   CPK 20 - 180 U/L 33 24 28   (L): Data is abnormally low  (H): Data is abnormally high     Assessment and Plan     1. Bacteremia    2. Enterococcal bacteremia      73 yo female with recent Enterococcal and Staph Epi bacteremia of unclear source.  2d echo negative but still concern for endocarditis.  Patient deferred PETE so under treatment for 6 weeks with Daptomycin out of cf endocarditis though other suspected source possible sacral irritation and fecal contamination.  Doing well and CRP almost normalized.  CPK wnl.  Completed 6 weeks IV dapto and PICC now removed but before labs drawn.  No sign of infection.    Plan:  Monitor off abx.    Instructed for  patient to contact me asap for any sign of infection which was reviewed with her and her daughter.  FU PRN   The patient was encouraged to call the office for further concerns or complaints.

## 2024-11-19 ENCOUNTER — TELEPHONE (OUTPATIENT)
Dept: PODIATRY | Facility: CLINIC | Age: 74
End: 2024-11-19
Payer: MEDICARE

## 2024-11-19 NOTE — TELEPHONE ENCOUNTER
----- Message from Deirdre sent at 11/19/2024  2:41 PM CST -----  Regarding:  Nurse called states she wld like to speak with someone regarding drainage in both of Pt's Legs  Contact: [phone]  Name of Who is Calling:Franklin PARKER Nurse        What is the request in detail: Nurse called states she wld like to speak with someone regarding drainage in both of Pt's Legs. Please advise         Can the clinic reply by [facility]:No        What Number to Call Back if not in [facility]: [phone]

## 2024-11-19 NOTE — TELEPHONE ENCOUNTER
Spoke with patient daughter regarding message to be sent to Dr Cox for new wounds on her mom leg.   Informed patient that her request was sent over to Dr Cox after conversation with [facility] nurse regarding new orders for her leg wounds just allow provider some time to respond to her request.

## 2024-11-19 NOTE — TELEPHONE ENCOUNTER
What is the request in detail:HH Nurse called states she wld like to speak with someone regarding drainage in both of Pt's Legs. Please advise         Can the clinic reply by [facility]:No        What Number to Call Back [facility]: [phone]      Spoke with nurse to inform her that I would forward her request over to Dr Cox, just allow provider some time to respond.     In the meantime, I was able to get patient scheduled for re-evaluation for new wounds.

## 2024-11-20 ENCOUNTER — EXTERNAL HOME HEALTH (OUTPATIENT)
Dept: HOME HEALTH SERVICES | Facility: HOSPITAL | Age: 74
End: 2024-11-20
Payer: MEDICARE

## 2024-11-22 ENCOUNTER — TELEPHONE (OUTPATIENT)
Dept: PODIATRY | Facility: CLINIC | Age: 74
End: 2024-11-22
Payer: MEDICARE

## 2024-11-22 DIAGNOSIS — I89.0 LYMPHEDEMA: Primary | ICD-10-CM

## 2024-11-22 DIAGNOSIS — L97.911: ICD-10-CM

## 2024-11-22 DIAGNOSIS — L97.921 SKIN ULCER OF LEFT LOWER LEG, LIMITED TO BREAKDOWN OF SKIN: ICD-10-CM

## 2024-11-22 NOTE — TELEPHONE ENCOUNTER
----- Message from Med Assistant Teresa sent at 11/19/2024  4:10 PM CST -----  Regarding: FW:  Nurse called states she wld like to speak with someone regarding drainage in both of Pt's Legs  Contact: [phone]  Patient has new leg wounds with drainage and nurse requesting new orders. I was able to get patient scheduled for evaluation in the meantime.  ----- Message -----  From: Deirdre Soriano  Sent: 11/19/2024   2:42 PM CST  To: Brooke STILES Staff  Subject:  Nurse called states she wld like to speak#    Name of Who is Calling:Franklin  Nurse        What is the request in detail: Nurse called states she wld like to speak with someone regarding drainage in both of Pt's Legs. Please advise         Can the clinic reply by [facility]:No        What [facility]: [phone]

## 2024-12-09 ENCOUNTER — DOCUMENT SCAN (OUTPATIENT)
Dept: HOME HEALTH SERVICES | Facility: HOSPITAL | Age: 74
End: 2024-12-09
Payer: MEDICARE

## 2024-12-16 ENCOUNTER — PATIENT MESSAGE (OUTPATIENT)
Dept: PSYCHIATRY | Facility: CLINIC | Age: 74
End: 2024-12-16
Payer: MEDICARE

## 2024-12-24 ENCOUNTER — PATIENT MESSAGE (OUTPATIENT)
Dept: NEUROLOGY | Facility: CLINIC | Age: 74
End: 2024-12-24
Payer: MEDICARE

## 2024-12-27 NOTE — PROGRESS NOTES
Subjective:          Patient ID: Lupis Murcia is a 74 y.o. female who presents today for a routine virtual visit for MS.  She was last seen in October by Dr. Henley. The history has been provided by the patient. She is accompanied by her daughter.     The patient location is: her home   The chief complaint leading to consultation is: MS     Visit type: audiovisual    Face to Face time with patient: 28 minutes   36 minutes of total time spent on the encounter, which includes face to face time and non-face to face time preparing to see the patient (eg, review of tests), Obtaining and/or reviewing separately obtained history, Documenting clinical information in the electronic or other health record, Independently interpreting results (not separately reported) and communicating results to the patient/family/caregiver, or Care coordination (not separately reported).         Each patient to whom he or she provides medical services by telemedicine is:  (1) informed of the relationship between the physician and patient and the respective role of any other health care provider with respect to management of the patient; and (2) notified that he or she may decline to receive medical services by telemedicine and may withdraw from such care at any time.      MS HPI:  DMT: None   Taking vitamin D3 as recommended? Discussed importance of Vit D supplementation   She wears Depends and does not transfer to the toilet.  FMLA is in process for her daughter.   She completed 6 weeks of antibiotics for bacteremia. She denies any infections since then.   She has nerve pain on the left heel at the site of former osteomyelitis. She has some skin erosion on the left toes. She is following with podiatry for treatment of this.  She denies any sacral wounds.   She is getting home health therapy, but she does not feel like this is helping much. She wants to do more. She is doing home exercises. Some lymphedema treatment is also done with the  therapist.     Medications:  Current Outpatient Medications   Medication Sig    acetaminophen-codeine 300-30mg (TYLENOL #3) 300-30 mg Tab Take 1 tablet by mouth daily as needed (pain).    ammonium lactate (LAC-HYDRIN) 12 % lotion Apply topically 2 (two) times daily.    apixaban (ELIQUIS) 5 mg Tab Take 1 tablet (5 mg total) by mouth 2 (two) times a day.    candesartan-hydrochlorothiazide (ATACAND HCT) 16-12.5 mg per tablet Take 1 tablet by mouth once daily.    cholecalciferol, vitamin D3, 125 mcg (5,000 unit) Tab Take 5,000 Units by mouth once daily.    cyanocobalamin 1,000 mcg/mL injection Inject 1,000 mcg into the muscle every 30 days.    LORazepam (ATIVAN) 1 MG tablet Take 1 mg by mouth every 12 (twelve) hours as needed for Anxiety.    ondansetron (ZOFRAN-ODT) 4 MG TbDL Dissolve 1 tablet (4 mg total) by mouth every 6 (six) hours as needed (Nausea).     No current facility-administered medications for this visit.       SOCIAL HISTORY  Social History     Tobacco Use    Smoking status: Never    Smokeless tobacco: Never   Substance Use Topics    Alcohol use: No    Drug use: No       Living arrangements - the patient lives with her daughter.           Objective:        1. 25 foot timed walk:      3/3/2020    11:30 AM   Timed 25 Foot Walk:   Did patient wear an AFO? Yes   Was assistive device used? Yes   Assistive device used (mauricio one): Bilateral Assistance   Bilateral device used Walker/Rollator   Time for 25 Foot Walk (seconds) 31.6   Time for 25 Foot Walk (seconds) 31.3     Neurological Exam    Deferred   Imaging:     Results for orders placed during the hospital encounter of 10/24/20    MRI Brain Demyelinating Without Contrast    Impression  Stable distribution of white matter lesions in the supratentorial brain, in keeping with patient's known multiple sclerosis.    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      Electronically signed by: Domenico Alfred MD  Date:    10/24/2020  Time:    15:53    Labs:     Lab  Results   Component Value Date    BMQUUUBA85AQ 37 09/28/2023    [id number] 39 12/18/2021    HRDXNILQ66NI 30 10/24/2020     Lab Results   Component Value Date    WBC 5.50 11/04/2024    RBC 3.21 (L) 11/04/2024    HGB 8.4 (L) 11/04/2024    HCT 28.3 (L) 11/04/2024    MCV 88 11/04/2024    MCH 26.2 (L) 11/04/2024    MCHC 29.7 (L) 11/04/2024    RDW 15.9 (H) 11/04/2024     11/04/2024    MPV 10.0 11/04/2024    GRAN 3.2 11/04/2024    GRAN 58.0 11/04/2024    LYMPH 1.6 11/04/2024    LYMPH 29.6 11/04/2024    MONO 0.5 11/04/2024    MONO 8.5 11/04/2024    EOS 0.2 11/04/2024    BASO 0.02 11/04/2024    EOSINOPHIL 3.3 11/04/2024    BASOPHIL 0.4 11/04/2024     Sodium   Date Value Ref Range Status   11/04/2024 139 136 - 145 mmol/L Final     Potassium   Date Value Ref Range Status   11/04/2024 3.7 3.5 - 5.1 mmol/L Final     Chloride   Date Value Ref Range Status   11/04/2024 105 95 - 110 mmol/L Final     CO2   Date Value Ref Range Status   11/04/2024 23 23 - 29 mmol/L Final     Glucose   Date Value Ref Range Status   11/04/2024 109 70 - 110 mg/dL Final     BUN   Date Value Ref Range Status   11/04/2024 16 8 - 23 mg/dL Final     Creatinine   Date Value Ref Range Status   11/04/2024 0.8 0.5 - 1.4 mg/dL Final     Calcium   Date Value Ref Range Status   11/04/2024 9.9 8.7 - 10.5 mg/dL Final     Total Protein   Date Value Ref Range Status   11/04/2024 7.3 6.0 - 8.4 g/dL Final     Albumin   Date Value Ref Range Status   11/04/2024 3.5 3.5 - 5.2 g/dL Final     Total Bilirubin   Date Value Ref Range Status   11/04/2024 0.3 0.1 - 1.0 mg/dL Final     Comment:     For infants and newborns, interpretation of results should be based  on gestational age, weight and in agreement with clinical  observations.    Premature Infant recommended reference ranges:  Up to 24 hours.............<8.0 mg/dL  Up to 48 hours............<12.0 mg/dL  3-5 days..................<15.0 mg/dL  6-29 days.................<15.0 mg/dL       Alkaline Phosphatase   Date  Value Ref Range Status   11/04/2024 63 40 - 150 U/L Final     AST   Date Value Ref Range Status   11/04/2024 11 10 - 40 U/L Final     ALT   Date Value Ref Range Status   11/04/2024 8 (L) 10 - 44 U/L Final     Anion Gap   Date Value Ref Range Status   11/04/2024 11 8 - 16 mmol/L Final     eGFR if    Date Value Ref Range Status   07/25/2022 >60.0 >60 mL/min/1.73 m^2 Final     eGFR if non    Date Value Ref Range Status   07/25/2022 >60.0 >60 mL/min/1.73 m^2 Final     Comment:     Calculation used to obtain the estimated glomerular filtration  rate (eGFR) is the CKD-EPI equation.          MS Impression and Plan:     NEURO MULTIPLE SCLEROSIS IMPRESSION:   Number of relapses in the past year?:  0  Clinical Progression:  Worsened  Type:  Progressive  Type comment:  She is confined to her recliner and depends on her daughter for ADLs. She has a powerchair at home, but is not able to transfer to it. She is working with PT once a week, but wishes they could come out more often. She would like to work more on standing with an ultimate goal of being able to transfer to the powerchair and have more independence in the home. She is due for new home health PT orders, so these will be sent today.   MS Classification:  Secondarily Progressive MS  Current DMT: none    May consider Mariposa lift; will message her daughter to see if they want to try this. This could allow for transfers from recliner to power chair.   I will see her back for a virtual visit in 2 months or sooner if needed.         SURINDER Ortiz, CNS    Problem List Items Addressed This Visit    None  Visit Diagnoses       Multiple sclerosis    -  Primary    Relevant Orders    Ambulatory referral/consult to Home Health    Impaired mobility and ADLs        Relevant Orders    Ambulatory referral/consult to Home Health    Counseling regarding goals of care              Addendum--1/3/25, 10:35am. Discussed need for Mariposa lift with the  patient and her daughter.  Patient requires a lift to transfer from the reclining chair where she sleeps and spends all her time to wheelchair and/or from the chair to a toilet.  Without the lift, this patient will be confined to her reclining chair, putting her at risk for wounds/skin breakdown.

## 2024-12-30 ENCOUNTER — OFFICE VISIT (OUTPATIENT)
Dept: NEUROLOGY | Facility: CLINIC | Age: 74
End: 2024-12-30
Payer: MEDICARE

## 2024-12-30 DIAGNOSIS — Z74.09 IMPAIRED MOBILITY AND ADLS: ICD-10-CM

## 2024-12-30 DIAGNOSIS — Z71.89 COUNSELING REGARDING GOALS OF CARE: ICD-10-CM

## 2024-12-30 DIAGNOSIS — G35 MULTIPLE SCLEROSIS: Primary | ICD-10-CM

## 2024-12-30 DIAGNOSIS — Z78.9 IMPAIRED MOBILITY AND ADLS: ICD-10-CM

## 2024-12-30 PROCEDURE — G2211 COMPLEX E/M VISIT ADD ON: HCPCS | Mod: 95,,, | Performed by: CLINICAL NURSE SPECIALIST

## 2024-12-30 PROCEDURE — 99214 OFFICE O/P EST MOD 30 MIN: CPT | Mod: 95,,, | Performed by: CLINICAL NURSE SPECIALIST

## 2024-12-30 RX ORDER — CYANOCOBALAMIN 1000 UG/ML
1000 INJECTION, SOLUTION INTRAMUSCULAR; SUBCUTANEOUS
COMMUNITY
Start: 2024-10-07

## 2024-12-31 ENCOUNTER — PATIENT MESSAGE (OUTPATIENT)
Dept: NEUROLOGY | Facility: CLINIC | Age: 74
End: 2024-12-31
Payer: MEDICARE

## 2024-12-31 DIAGNOSIS — Z74.09 IMPAIRED MOBILITY AND ADLS: Primary | ICD-10-CM

## 2024-12-31 DIAGNOSIS — Z78.9 IMPAIRED MOBILITY AND ADLS: Primary | ICD-10-CM

## 2025-01-02 ENCOUNTER — TELEPHONE (OUTPATIENT)
Dept: NEUROLOGY | Facility: CLINIC | Age: 75
End: 2025-01-02
Payer: MEDICARE

## 2025-01-02 NOTE — TELEPHONE ENCOUNTER
Patient requires a lift to transfer from the reclining chair where she sleeps and spends all her time to wheelchair and/or from the chair to a toilet.  Without the lift, this patient will be confined to her reclining chair, putting her at risk for wounds/skin breakdown.

## 2025-01-02 NOTE — TELEPHONE ENCOUNTER
Spoke with pt in regards to getting her scheduled for a VV with [facility] in 2 months, pt is scheduled 3/14/25.

## 2025-01-03 NOTE — TELEPHONE ENCOUNTER
Faxed order for Mariposa Lift to [facility] [phone].  Notified pt/family in portal.

## 2025-01-28 ENCOUNTER — EXTERNAL HOME HEALTH (OUTPATIENT)
Dept: HOME HEALTH SERVICES | Facility: HOSPITAL | Age: 75
End: 2025-01-28
Payer: MEDICARE

## 2025-02-12 ENCOUNTER — PATIENT MESSAGE (OUTPATIENT)
Dept: HEMATOLOGY/ONCOLOGY | Facility: CLINIC | Age: 75
End: 2025-02-12
Payer: MEDICARE

## 2025-02-13 ENCOUNTER — PATIENT MESSAGE (OUTPATIENT)
Dept: NEUROLOGY | Facility: CLINIC | Age: 75
End: 2025-02-13
Payer: MEDICARE

## 2025-02-13 DIAGNOSIS — G35 MS (MULTIPLE SCLEROSIS): Primary | ICD-10-CM

## 2025-02-13 DIAGNOSIS — D50.0 ANEMIA DUE TO CHRONIC BLOOD LOSS: Primary | ICD-10-CM

## 2025-02-13 DIAGNOSIS — Z74.09 IMPAIRED FUNCTIONAL MOBILITY, BALANCE, GAIT, AND ENDURANCE: ICD-10-CM

## 2025-02-18 NOTE — ASSESSMENT & PLAN NOTE
· Chronic issue  · Meds prn     [FreeTextEntry8] : Pt is c/o left shoulder pain for 6 months. She reports having a negative LUE venous doppler at [facility] on 12/6/24. Pt gets some relief with Advil or tylenol

## 2025-02-24 ENCOUNTER — LAB VISIT (OUTPATIENT)
Dept: LAB | Facility: HOSPITAL | Age: 75
End: 2025-02-24
Attending: INTERNAL MEDICINE
Payer: MEDICARE

## 2025-02-24 DIAGNOSIS — I11.0 HYPERTENSIVE HEART DISEASE WITH CONGESTIVE HEART FAILURE: ICD-10-CM

## 2025-02-24 DIAGNOSIS — G35 MULTIPLE SCLEROSIS: Primary | ICD-10-CM

## 2025-02-24 DIAGNOSIS — E53.8 BIOTIN-(PROPIONYL-COA-CARBOXYLASE) LIGASE DEFICIENCY: Primary | ICD-10-CM

## 2025-02-24 DIAGNOSIS — M79.2 NEUROGENIC PAIN OF LEFT FOOT: ICD-10-CM

## 2025-02-24 DIAGNOSIS — D64.9 ANEMIA, UNSPECIFIED: ICD-10-CM

## 2025-02-24 PROCEDURE — 82607 VITAMIN B-12: CPT | Performed by: INTERNAL MEDICINE

## 2025-02-24 PROCEDURE — 85027 COMPLETE CBC AUTOMATED: CPT | Performed by: INTERNAL MEDICINE

## 2025-02-24 PROCEDURE — 80053 COMPREHEN METABOLIC PANEL: CPT | Performed by: INTERNAL MEDICINE

## 2025-02-24 PROCEDURE — 83540 ASSAY OF IRON: CPT | Performed by: INTERNAL MEDICINE

## 2025-02-24 PROCEDURE — 82746 ASSAY OF FOLIC ACID SERUM: CPT | Performed by: INTERNAL MEDICINE

## 2025-02-24 PROCEDURE — 82728 ASSAY OF FERRITIN: CPT | Performed by: INTERNAL MEDICINE

## 2025-02-24 RX ORDER — PREDNISONE 20 MG/1
20 TABLET ORAL DAILY PRN
Qty: 30 TABLET | Refills: 0 | Status: SHIPPED | OUTPATIENT
Start: 2025-02-24

## 2025-02-24 NOTE — TELEPHONE ENCOUNTER
"FELICE called [facility] to see if orders were received. Spoke to Mandie, RN Case Manager, who stated that the orders were received but Pt is only able to receive therapy 1x/week because she is not "showing enough improvement". Mandie also stated that they are not able to keep her on maintenance therapy for much longer. Currently, Pt is certified for therapy 1x/week til 4/1/2025. FELICE emailed Carly Acharya, Lashonda Ingram, and Jasmin Cheatham with [facility] to discuss likelihood of increasing Pt's therapy plan, and what the barriers would be if not.  "

## 2025-02-25 DIAGNOSIS — G35 MULTIPLE SCLEROSIS: Primary | ICD-10-CM

## 2025-02-25 DIAGNOSIS — Z78.9 IMPAIRED MOBILITY AND ADLS: ICD-10-CM

## 2025-02-25 DIAGNOSIS — Z74.09 IMPAIRED MOBILITY AND ADLS: ICD-10-CM

## 2025-02-25 LAB
ALBUMIN SERPL BCP-MCNC: 3.3 G/DL (ref 3.5–5.2)
ALP SERPL-CCNC: 60 U/L (ref 40–150)
ALT SERPL W/O P-5'-P-CCNC: 9 U/L (ref 10–44)
ANION GAP SERPL CALC-SCNC: 11 MMOL/L (ref 8–16)
AST SERPL-CCNC: 19 U/L (ref 10–40)
BILIRUB SERPL-MCNC: 0.2 MG/DL (ref 0.1–1)
BUN SERPL-MCNC: 20 MG/DL (ref 8–23)
CALCIUM SERPL-MCNC: 9.5 MG/DL (ref 8.7–10.5)
CHLORIDE SERPL-SCNC: 107 MMOL/L (ref 95–110)
CO2 SERPL-SCNC: 25 MMOL/L (ref 23–29)
CREAT SERPL-MCNC: 0.7 MG/DL (ref 0.5–1.4)
ERYTHROCYTE [DISTWIDTH] IN BLOOD BY AUTOMATED COUNT: 16.2 % (ref 11.5–14.5)
EST. GFR  (NO RACE VARIABLE): >60 ML/MIN/1.73 M^2
FERRITIN SERPL-MCNC: 53 NG/ML (ref 20–300)
FOLATE SERPL-MCNC: 6.3 NG/ML (ref 4–24)
GLUCOSE SERPL-MCNC: 116 MG/DL (ref 70–110)
HCT VFR BLD AUTO: 31.4 % (ref 37–48.5)
HGB BLD-MCNC: 9 G/DL (ref 12–16)
IRON SERPL-MCNC: 21 UG/DL (ref 30–160)
MCH RBC QN AUTO: 25 PG (ref 27–31)
MCHC RBC AUTO-ENTMCNC: 28.7 G/DL (ref 32–36)
MCV RBC AUTO: 87 FL (ref 82–98)
PLATELET # BLD AUTO: 209 K/UL (ref 150–450)
PMV BLD AUTO: 10.6 FL (ref 9.2–12.9)
POTASSIUM SERPL-SCNC: 4.2 MMOL/L (ref 3.5–5.1)
PROT SERPL-MCNC: 7.2 G/DL (ref 6–8.4)
RBC # BLD AUTO: 3.6 M/UL (ref 4–5.4)
SATURATED IRON: 5 % (ref 20–50)
SODIUM SERPL-SCNC: 143 MMOL/L (ref 136–145)
TOTAL IRON BINDING CAPACITY: 423 UG/DL (ref 250–450)
TRANSFERRIN SERPL-MCNC: 286 MG/DL (ref 200–375)
VIT B12 SERPL-MCNC: 344 PG/ML (ref 210–950)
WBC # BLD AUTO: 4.68 K/UL (ref 3.9–12.7)

## 2025-03-06 ENCOUNTER — TELEPHONE (OUTPATIENT)
Dept: NEUROLOGY | Facility: CLINIC | Age: 75
End: 2025-03-06
Payer: MEDICARE

## 2025-03-06 NOTE — TELEPHONE ENCOUNTER
"----- Message from Coretta sent at 3/6/2025  1:11 PM CST -----  Regarding: pt  Contact: [phone]  .Name Of Caller: Self Contact Preference?: [phone] What is the nature of the call?: in reference to needing a virtual sooner  or different time than  3/14/24, attempted nothing available. Pls call  Additional Notes:"Thank you for all that you do for our patients"  "

## 2025-03-07 ENCOUNTER — PATIENT MESSAGE (OUTPATIENT)
Dept: PSYCHIATRY | Facility: CLINIC | Age: 75
End: 2025-03-07
Payer: MEDICARE

## 2025-03-07 NOTE — TELEPHONE ENCOUNTER
Pt states she will check with her daughter to see if March 24th at 4PM or March 27th at 11:30AM works for them to r/s VV with [facility].

## 2025-03-24 ENCOUNTER — OFFICE VISIT (OUTPATIENT)
Dept: NEUROLOGY | Facility: CLINIC | Age: 75
End: 2025-03-24
Payer: MEDICARE

## 2025-03-24 DIAGNOSIS — G82.20 PARAPARESIS: ICD-10-CM

## 2025-03-24 DIAGNOSIS — Z71.89 COUNSELING REGARDING GOALS OF CARE: ICD-10-CM

## 2025-03-24 DIAGNOSIS — Z74.09 IMPAIRED MOBILITY AND ADLS: ICD-10-CM

## 2025-03-24 DIAGNOSIS — Z78.9 IMPAIRED MOBILITY AND ADLS: ICD-10-CM

## 2025-03-24 DIAGNOSIS — Z79.899 OTHER LONG TERM (CURRENT) DRUG THERAPY: ICD-10-CM

## 2025-03-24 DIAGNOSIS — G35 MULTIPLE SCLEROSIS: Primary | ICD-10-CM

## 2025-03-24 PROCEDURE — G2211 COMPLEX E/M VISIT ADD ON: HCPCS | Mod: 95,,, | Performed by: CLINICAL NURSE SPECIALIST

## 2025-03-24 PROCEDURE — 98006 SYNCH AUDIO-VIDEO EST MOD 30: CPT | Mod: 95,,, | Performed by: CLINICAL NURSE SPECIALIST

## 2025-03-24 NOTE — PROGRESS NOTES
Subjective:          Patient ID: Lupis Murcia is a 74 y.o. female who presents today for a routine virtual visit for MS.  She was last seen in December 2024. The history has been provided by the patient. Her daughter is also present during thev isit.     The patient location is: her home   The chief complaint leading to consultation is: MS     Visit type: audiovisual    Face to Face time with patient: 23 minutes   35 minutes of total time spent on the encounter, which includes face to face time and non-face to face time preparing to see the patient (eg, review of tests), Obtaining and/or reviewing separately obtained history, Documenting clinical information in the electronic or other health record, Independently interpreting results (not separately reported) and communicating results to the patient/family/caregiver, or Care coordination (not separately reported).       Each patient to whom he or she provides medical services by telemedicine is:  (1) informed of the relationship between the physician and patient and the respective role of any other health care provider with respect to management of the patient; and (2) notified that he or she may decline to receive medical services by telemedicine and may withdraw from such care at any time.      MS HPI:  DMT: None  Taking vitamin D3 as recommended? Yes---5000 units daily  She has not yet received the Mariposa lift. They are hoping it will be delivered this week. She has some fear about using it.   She is getting home health PT twice a week. She stands with PT and with her daughter to change, but is otherwise in her recliner chair.   She denies any recent infections.   Her feet seem to be doing better. She denies any new areas of skin breakdown.   She is sleeping in her recliner. She does not wake up often at night to urinate.   Bowel and bladder are emptying completely. She denies any obvious UTIs.   She is getting left leg spasms--shoots straight out and is hard  to bend. This is not painful.   She denies any changes in vision. Her memory is intact. She admits to feeling depressed and anxious. She discusses that she thought she would be walking by now.   She has a ramp outside her home.   Hand function is normal.   Her pain level is 5/10--manageable; mostly left foot.   She denies any trouble with speech/swallowing. Her appetite is normal.   She denies any ER visits or hospitalization since the last visit.         Medications:  Current Outpatient Medications   Medication Sig    acetaminophen-codeine 300-30mg (TYLENOL #3) 300-30 mg Tab Take 1 tablet by mouth daily as needed (pain).    ammonium lactate (LAC-HYDRIN) 12 % lotion Apply topically 2 (two) times daily.    apixaban (ELIQUIS) 5 mg Tab Take 1 tablet (5 mg total) by mouth 2 (two) times a day.    candesartan-hydrochlorothiazide (ATACAND HCT) 16-12.5 mg per tablet Take 1 tablet by mouth once daily.    cholecalciferol, vitamin D3, 125 mcg (5,000 unit) Tab Take 5,000 Units by mouth once daily.    cyanocobalamin 1,000 mcg/mL injection Inject 1,000 mcg into the muscle every 30 days.    LORazepam (ATIVAN) 1 MG tablet Take 1 mg by mouth every 12 (twelve) hours as needed for Anxiety.    ondansetron (ZOFRAN-ODT) 4 MG TbDL Dissolve 1 tablet (4 mg total) by mouth every 6 (six) hours as needed (Nausea).    predniSONE (DELTASONE) 20 MG tablet Take 1 tablet (20 mg total) by mouth daily as needed (foot/leg pain).     No current facility-administered medications for this visit.       SOCIAL HISTORY  Social History[1]    Living arrangements - the patient lives with her daughter             Objective:        1. 25 foot timed walk:      3/3/2020    11:30 AM   Timed 25 Foot Walk:   Did patient wear an AFO? Yes   Was assistive device used? Yes   Assistive device used (mauricio one): Bilateral Assistance   Bilateral device used Walker/Rollator   Time for 25 Foot Walk (seconds) 31.6   Time for 25 Foot Walk (seconds) 31.3     Neurological  Exam    Deferred   Imaging:     Results for orders placed during the hospital encounter of 10/24/20    MRI Brain Demyelinating Without Contrast    Impression  Stable distribution of white matter lesions in the supratentorial brain, in keeping with patient's known multiple sclerosis.    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      Electronically signed by: Domenico Alfred MD  Date:    10/24/2020  Time:    15:53    Labs:     Lab Results   Component Value Date    BJCUTDKV16OQ 37 09/28/2023    MFKQJSSL42GO 39 12/18/2021    KZDTDKJP05CS 30 10/24/2020       Lab Results   Component Value Date    WBC 4.68 02/24/2025    RBC 3.60 (L) 02/24/2025    HGB 9.0 (L) 02/24/2025    HCT 31.4 (L) 02/24/2025    MCV 87 02/24/2025    MCH 25.0 (L) 02/24/2025    MCHC 28.7 (L) 02/24/2025    RDW 16.2 (H) 02/24/2025     02/24/2025    MPV 10.6 02/24/2025    GRAN 3.2 11/04/2024    GRAN 58.0 11/04/2024    LYMPH 1.6 11/04/2024    LYMPH 29.6 11/04/2024    MONO 0.5 11/04/2024    MONO 8.5 11/04/2024    EOS 0.2 11/04/2024    BASO 0.02 11/04/2024    EOSINOPHIL 3.3 11/04/2024    BASOPHIL 0.4 11/04/2024     Sodium   Date Value Ref Range Status   02/24/2025 143 136 - 145 mmol/L Final     Potassium   Date Value Ref Range Status   02/24/2025 4.2 3.5 - 5.1 mmol/L Final     Chloride   Date Value Ref Range Status   02/24/2025 107 95 - 110 mmol/L Final     CO2   Date Value Ref Range Status   02/24/2025 25 23 - 29 mmol/L Final     Glucose   Date Value Ref Range Status   02/24/2025 116 (H) 70 - 110 mg/dL Final     BUN   Date Value Ref Range Status   02/24/2025 20 8 - 23 mg/dL Final     Creatinine   Date Value Ref Range Status   02/24/2025 0.7 0.5 - 1.4 mg/dL Final     Calcium   Date Value Ref Range Status   02/24/2025 9.5 8.7 - 10.5 mg/dL Final     Total Protein   Date Value Ref Range Status   02/24/2025 7.2 6.0 - 8.4 g/dL Final     Albumin   Date Value Ref Range Status   02/24/2025 3.3 (L) 3.5 - 5.2 g/dL Final     Total Bilirubin   Date Value Ref Range  Status   02/24/2025 0.2 0.1 - 1.0 mg/dL Final     Comment:     For infants and newborns, interpretation of results should be based  on gestational age, weight and in agreement with clinical  observations.    Premature Infant recommended reference ranges:  Up to 24 hours.............<8.0 mg/dL  Up to 48 hours............<12.0 mg/dL  3-5 days..................<15.0 mg/dL  6-29 days.................<15.0 mg/dL       Alkaline Phosphatase   Date Value Ref Range Status   02/24/2025 60 40 - 150 U/L Final     AST   Date Value Ref Range Status   02/24/2025 19 10 - 40 U/L Final     ALT   Date Value Ref Range Status   02/24/2025 9 (L) 10 - 44 U/L Final     Anion Gap   Date Value Ref Range Status   02/24/2025 11 8 - 16 mmol/L Final     eGFR if    Date Value Ref Range Status   07/25/2022 >60.0 >60 mL/min/1.73 m^2 Final     eGFR if non    Date Value Ref Range Status   07/25/2022 >60.0 >60 mL/min/1.73 m^2 Final     Comment:     Calculation used to obtain the estimated glomerular filtration  rate (eGFR) is the CKD-EPI equation.          MS Impression and Plan:     NEURO MULTIPLE SCLEROSIS IMPRESSION:   Clinical Progression comment:  She has advanced disability, likely a combination of MS and significant deconditioning. She is confined to her recliner chair. She continues to get physical therapy, but feels she would benefit from additional sessions. She wants to work on pivoting/transferring so that she can get into her powerchair. She is expecting the Mariposa lift delivery this week, but has some fear about using it. We discussed that use of the Mariposa lift will likely allow her to be transferred into the power wheelchair and have more independence around the house, as well as go outside of the home via her ramp.   MS Classification:  Secondarily Progressive MS  Current DMT: none  Current DMT comment: Will check Vit D through [facility]   DMT:  No change in management  Symptom Management:  No  change in symptom management      She will follow up with Dr. Kale banerjee in 4 months.   The visit today is associated with current or anticipated ongoing medical care related to this patient's single serious condition/complex condition of multiple sclerosis.        SURINDER Ortiz, CNS    Problem List Items Addressed This Visit       Paraparesis     Other Visit Diagnoses         Multiple sclerosis    -  Primary    Relevant Orders    Vitamin D      Other long term (current) drug therapy        Relevant Orders    Vitamin D      Counseling regarding goals of care          Impaired mobility and ADLs                       [1]   Social History  Tobacco Use    Smoking status: Never    Smokeless tobacco: Never   Substance Use Topics    Alcohol use: No    Drug use: No

## 2025-03-25 ENCOUNTER — TELEPHONE (OUTPATIENT)
Dept: NEUROLOGY | Facility: CLINIC | Age: 75
End: 2025-03-25
Payer: MEDICARE

## 2025-03-28 ENCOUNTER — EXTERNAL HOME HEALTH (OUTPATIENT)
Dept: HOME HEALTH SERVICES | Facility: HOSPITAL | Age: 75
End: 2025-03-28
Payer: MEDICARE

## 2025-03-29 ENCOUNTER — DOCUMENT SCAN (OUTPATIENT)
Dept: HOME HEALTH SERVICES | Facility: HOSPITAL | Age: 75
End: 2025-03-29
Payer: MEDICARE

## 2025-04-03 ENCOUNTER — OFFICE VISIT (OUTPATIENT)
Dept: HEMATOLOGY/ONCOLOGY | Facility: CLINIC | Age: 75
End: 2025-04-03
Payer: MEDICARE

## 2025-04-03 DIAGNOSIS — E66.01 SEVERE OBESITY (BMI 35.0-39.9) WITH COMORBIDITY: ICD-10-CM

## 2025-04-03 DIAGNOSIS — D50.0 IRON DEFICIENCY ANEMIA DUE TO CHRONIC BLOOD LOSS: Primary | ICD-10-CM

## 2025-04-03 NOTE — PROGRESS NOTES
Hematology and Medical Oncology   Follow Up      04/03/2025      Reason For Referral: Anemia of chronic disease    Telemedicine Documentation:  The patient location is: home  The chief complaint leading to consultation is: anemia of chronic disease    Visit type: audiovisual    Face to Face time with patient: 25  40 minutes of total time spent on the encounter, which includes face to face time and non-face to face time preparing to see the patient (eg, review of tests), Obtaining and/or reviewing separately obtained history, Documenting clinical information in the electronic or other health record, Independently interpreting results (not separately reported) and communicating results to the patient/family/caregiver, or Care coordination (not separately reported).     Each patient to whom he or she provides medical services by telemedicine is:  (1) informed of the relationship between the physician and patient and the respective role of any other health care provider with respect to management of the patient; and (2) notified that he or she may decline to receive medical services by telemedicine and may withdraw from such care at any time.    History of Present Ilness:   Lupis Murcia (Lupis) is a pleasant 74 y.o.female who presents virtually to discuss progressive anemia along with known MS.    Overall doing fair. Has home health come draw labs as needed and do PT exercises.    Fairly recent past is significant for May 2022, she was hospitalized with encephalopathy, HANDY, and lymphedema. After 10 days, she was transferred to SNF. While in SNF and working out at the gym, she developed shortness of breath and went back to the ER and was found to have bilateral pulmonary emboli. She was admitted to the ICU.  In total, she was in skilled nursing for a total of 70 days. She is on Eliquis twice a day now.     Interval History:  Daughter was there during the virtual visit to assist Ms. Murcia and answer questions as  needed. Has not been hospitalized since early July for altered mental status. Now resolved and returning to baseline.     Will increase b12 injection from monthly to weekly.     PT comes to the house now coming once a week. Not able to walk or pivot at this time. Continues to work on regaining strength.      PAST MEDICAL HISTORY:   Past Medical History:   Diagnosis Date    Lymphedema     MS (multiple sclerosis)     Other pulmonary embolism without acute cor pulmonale     Vitamin B12 deficiency        PAST SURGICAL HISTORY:   Past Surgical History:   Procedure Laterality Date    BREAST CYST EXCISION Left     over 40yrs ago     SECTION      ESOPHAGOGASTRODUODENOSCOPY N/A 2022    Procedure: EGD (ESOPHAGOGASTRODUODENOSCOPY);  Surgeon: Radha Arango MD;  Location: [address]);  Service: Endoscopy;  Laterality: N/A;       PAST SOCIAL HISTORY:   reports that she has never smoked. She has never used smokeless tobacco. She reports that she does not drink alcohol and does not use drugs.    FAMILY HISTORY:  Family History   Problem Relation Name Age of Onset    Coronary artery disease Father      Lung cancer Father      Lung cancer Mother      Brain cancer Brother         CURRENT MEDICATIONS:   Current Outpatient Medications   Medication Sig    acetaminophen-codeine 300-30mg (TYLENOL #3) 300-30 mg Tab Take 1 tablet by mouth daily as needed (pain).    ammonium lactate (LAC-HYDRIN) 12 % lotion Apply topically 2 (two) times daily.    apixaban (ELIQUIS) 5 mg Tab Take 1 tablet (5 mg total) by mouth 2 (two) times a day.    candesartan-hydrochlorothiazide (ATACAND HCT) 16-12.5 mg per tablet Take 1 tablet by mouth once daily.    cholecalciferol, vitamin D3, 125 mcg (5,000 unit) Tab Take 5,000 Units by mouth once daily.    cyanocobalamin 1,000 mcg/mL injection Inject 1,000 mcg into the muscle every 30 days.    LORazepam (ATIVAN) 1 MG tablet Take 1 mg by mouth every 12 (twelve) hours as needed for Anxiety.     ondansetron (ZOFRAN-ODT) 4 MG TbDL Dissolve 1 tablet (4 mg total) by mouth every 6 (six) hours as needed (Nausea).    predniSONE (DELTASONE) 20 MG tablet Take 1 tablet (20 mg total) by mouth daily as needed (foot/leg pain).     No current facility-administered medications for this visit.     ALLERGIES:   Review of patient's allergies indicates:   Allergen Reactions    Contrast media Shortness Of Breath and Rash    Pcn [penicillins] Shortness Of Breath and Rash    Celebrex [celecoxib] Other (See Comments)     Swallowing problems     Diazepam Hives    Gabapentin Other (See Comments)     Confusion     Motrin [ibuprofen] Rash         Review of Systems:     Review of Systems   Constitutional:  Positive for fatigue. Negative for appetite change, chills, diaphoresis, fever and unexpected weight change.   HENT:   Negative for hearing loss, mouth sores, nosebleeds, sore throat, trouble swallowing and voice change.    Eyes:  Negative for eye problems and icterus.   Respiratory:  Positive for shortness of breath. Negative for chest tightness, cough, hemoptysis and wheezing.    Cardiovascular:  Positive for leg swelling. Negative for chest pain and palpitations.   Gastrointestinal:  Negative for abdominal distention, abdominal pain, blood in stool, diarrhea, nausea and vomiting.   Endocrine: Negative for hot flashes.   Genitourinary:  Negative for bladder incontinence, difficulty urinating, dysuria, frequency and hematuria.    Musculoskeletal:  Positive for gait problem. Negative for arthralgias, back pain, flank pain, myalgias, neck pain and neck stiffness.   Skin:  Negative for itching, rash and wound.   Neurological:  Positive for gait problem. Negative for dizziness, extremity weakness, headaches, numbness, seizures and speech difficulty.   Hematological:  Negative for adenopathy. Does not bruise/bleed easily.   Psychiatric/Behavioral:  Negative for confusion, depression and sleep disturbance. The patient is not  nervous/anxious.           Physical Exam:     Limited Secondary to virtual visit    ECOG Performance Status: (foot note - ECOG PS provided by Eastern Cooperative Oncology Group) 2 - Symptomatic, <50% confined to bed    Karnofsky Performance Score:  80%- Normal Activity with Effort: Some Symptoms of Disease    Labs:   Lab Results   Component Value Date    WBC 4.68 02/24/2025    HGB 9.0 (L) 02/24/2025    HCT 31.4 (L) 02/24/2025     02/24/2025    CHOL 191 12/18/2021    TRIG 120 12/18/2021    HDL 48 12/18/2021    ALT 9 (L) 02/24/2025    AST 19 02/24/2025     02/24/2025    K 4.2 02/24/2025     02/24/2025    CREATININE 0.7 02/24/2025    BUN 20 02/24/2025    CO2 25 02/24/2025    TSH 1.863 03/15/2016    INR 1.1 06/14/2022    HGBA1C 5.8 (H) 06/08/2023         Imaging: Previous imaging has been reviewed     Assessment and Plan:     Ms. Murcia is pleasant 74 year old female with anemia of chronic disease.    Anemia  --Largely home bound  --Will request labs through home health in 3 months  --Return to clinic virtually to discuss results and possible interventions  --Plan to increase B12 injection to weekly  --Will start oral iron supplement every other day    Multiple Sclerosis  --Managed as per neurology    I have provided the patient with an opportunity to ask questions and have all questions answered to her satisfaction.       she will return to clinic in 12 weeks, but knows to call in the interim if symptoms change or should a problem arise.        Amy Blanco MD  Hematology and Medical Oncology  Bone Marrow Transplant  [facility]      BMT Chart Routing      Follow up with physician 3 months. 1. labs with home health: cbc,cmp, b12, iron, ferritin 2. see me virtually the follow thursday am   Follow up with DMITRIY    Provider visit type    Infusion scheduling note    Injection scheduling note    Labs    Imaging    Pharmacy appointment    Other referrals

## 2025-04-06 ENCOUNTER — PATIENT MESSAGE (OUTPATIENT)
Dept: HEMATOLOGY/ONCOLOGY | Facility: CLINIC | Age: 75
End: 2025-04-06
Payer: MEDICARE

## 2025-04-06 ENCOUNTER — PATIENT MESSAGE (OUTPATIENT)
Dept: NEUROLOGY | Facility: CLINIC | Age: 75
End: 2025-04-06
Payer: MEDICARE

## 2025-04-09 ENCOUNTER — LAB REQUISITION (OUTPATIENT)
Dept: LAB | Facility: HOSPITAL | Age: 75
End: 2025-04-09
Payer: MEDICARE

## 2025-04-09 DIAGNOSIS — G35 MULTIPLE SCLEROSIS: ICD-10-CM

## 2025-04-09 LAB — 25(OH)D3+25(OH)D2 SERPL-MCNC: 35 NG/ML (ref 30–96)

## 2025-04-09 PROCEDURE — 82306 VITAMIN D 25 HYDROXY: CPT | Performed by: CLINICAL NURSE SPECIALIST

## 2025-04-10 ENCOUNTER — DOCUMENT SCAN (OUTPATIENT)
Dept: HOME HEALTH SERVICES | Facility: HOSPITAL | Age: 75
End: 2025-04-10
Payer: MEDICARE

## 2025-04-12 ENCOUNTER — RESULTS FOLLOW-UP (OUTPATIENT)
Dept: NEUROLOGY | Facility: CLINIC | Age: 75
End: 2025-04-12

## 2025-04-15 DIAGNOSIS — D50.0 IRON DEFICIENCY ANEMIA DUE TO CHRONIC BLOOD LOSS: Primary | ICD-10-CM

## 2025-05-13 ENCOUNTER — PATIENT MESSAGE (OUTPATIENT)
Dept: PSYCHIATRY | Facility: CLINIC | Age: 75
End: 2025-05-13
Payer: MEDICARE

## 2025-05-16 DIAGNOSIS — M79.2 NEUROGENIC PAIN OF LEFT FOOT: Primary | ICD-10-CM

## 2025-05-16 DIAGNOSIS — L97.921 SKIN ULCER OF LEFT LOWER LEG, LIMITED TO BREAKDOWN OF SKIN: ICD-10-CM

## 2025-05-16 DIAGNOSIS — L97.911: ICD-10-CM

## 2025-05-16 DIAGNOSIS — L03.119 CELLULITIS OF LOWER EXTREMITY, UNSPECIFIED LATERALITY: ICD-10-CM

## 2025-05-16 DIAGNOSIS — G35 MULTIPLE SCLEROSIS: ICD-10-CM

## 2025-05-16 DIAGNOSIS — I89.0 LYMPHEDEMA: ICD-10-CM

## 2025-05-16 RX ORDER — DOXYCYCLINE 100 MG/1
100 CAPSULE ORAL 2 TIMES DAILY
Qty: 14 CAPSULE | Refills: 0 | Status: SHIPPED | OUTPATIENT
Start: 2025-05-16

## 2025-05-16 RX ORDER — DULOXETIN HYDROCHLORIDE 30 MG/1
30 CAPSULE, DELAYED RELEASE ORAL NIGHTLY
Qty: 30 CAPSULE | Refills: 11 | Status: SHIPPED | OUTPATIENT
Start: 2025-05-16 | End: 2026-05-16

## 2025-05-20 NOTE — PLAN OF CARE
Problem: Adult Inpatient Plan of Care  Goal: Plan of Care Review  Outcome: Progressing  Goal: Absence of Hospital-Acquired Illness or Injury  Outcome: Progressing  Goal: Optimal Comfort and Wellbeing  Outcome: Progressing  Goal: Readiness for Transition of Care  Outcome: Progressing     Problem: Sepsis/Septic Shock  Goal: Absence of Infection Signs and Symptoms  Outcome: Progressing     Problem: Wound  Goal: Skin Health and Integrity  Outcome: Progressing     Problem: Fall Injury Risk  Goal: Absence of Fall and Fall-Related Injury  Outcome: Progressing      Jessica Tran MD

## 2025-05-30 ENCOUNTER — EXTERNAL HOME HEALTH (OUTPATIENT)
Dept: HOME HEALTH SERVICES | Facility: HOSPITAL | Age: 75
End: 2025-05-30
Payer: MEDICARE

## 2025-06-05 PROCEDURE — G0179 MD RECERTIFICATION HHA PT: HCPCS | Mod: ,,, | Performed by: STUDENT IN AN ORGANIZED HEALTH CARE EDUCATION/TRAINING PROGRAM

## 2025-06-10 ENCOUNTER — TELEPHONE (OUTPATIENT)
Dept: PODIATRY | Facility: CLINIC | Age: 75
End: 2025-06-10
Payer: MEDICARE

## 2025-06-10 ENCOUNTER — HOSPITAL ENCOUNTER (INPATIENT)
Facility: HOSPITAL | Age: 75
LOS: 8 days | Discharge: HOME HEALTH | DRG: 872 | End: 2025-06-18
Attending: EMERGENCY MEDICINE | Admitting: STUDENT IN AN ORGANIZED HEALTH CARE EDUCATION/TRAINING PROGRAM
Payer: MEDICARE

## 2025-06-10 DIAGNOSIS — L08.9 WOUND INFECTION: ICD-10-CM

## 2025-06-10 DIAGNOSIS — E11.65 TYPE 2 DIABETES MELLITUS WITH HYPERGLYCEMIA, WITHOUT LONG-TERM CURRENT USE OF INSULIN: ICD-10-CM

## 2025-06-10 DIAGNOSIS — Z13.6 SCREENING FOR CARDIOVASCULAR CONDITION: ICD-10-CM

## 2025-06-10 DIAGNOSIS — Z73.6 LIMITATION OF ACTIVITIES DUE TO DISABILITY: Primary | ICD-10-CM

## 2025-06-10 DIAGNOSIS — R00.0 TACHYCARDIA: ICD-10-CM

## 2025-06-10 DIAGNOSIS — E66.01 SEVERE OBESITY (BMI 35.0-39.9) WITH COMORBIDITY: ICD-10-CM

## 2025-06-10 DIAGNOSIS — Z87.898 HISTORY OF BACTEREMIA: ICD-10-CM

## 2025-06-10 DIAGNOSIS — T14.8XXA WOUND INFECTION: ICD-10-CM

## 2025-06-10 DIAGNOSIS — I87.2 CHRONIC VENOUS STASIS DERMATITIS: ICD-10-CM

## 2025-06-10 DIAGNOSIS — R06.02 SOB (SHORTNESS OF BREATH): ICD-10-CM

## 2025-06-10 DIAGNOSIS — E55.9 VITAMIN D DEFICIENCY: ICD-10-CM

## 2025-06-10 DIAGNOSIS — L03.119 CELLULITIS OF LOWER EXTREMITY, UNSPECIFIED LATERALITY: ICD-10-CM

## 2025-06-10 LAB
ABSOLUTE EOSINOPHIL (OHS): 0.06 K/UL
ABSOLUTE MONOCYTE (OHS): 0.45 K/UL (ref 0.3–1)
ABSOLUTE NEUTROPHIL COUNT (OHS): 7.99 K/UL (ref 1.8–7.7)
ALBUMIN SERPL BCP-MCNC: 3.5 G/DL (ref 3.5–5.2)
ALP SERPL-CCNC: 68 UNIT/L (ref 40–150)
ALT SERPL W/O P-5'-P-CCNC: 5 UNIT/L (ref 10–44)
ANION GAP (OHS): 11 MMOL/L (ref 8–16)
AST SERPL-CCNC: 9 UNIT/L (ref 11–45)
BASOPHILS # BLD AUTO: 0.01 K/UL
BASOPHILS NFR BLD AUTO: 0.1 %
BILIRUB SERPL-MCNC: 0.3 MG/DL (ref 0.1–1)
BIPAP: 0
BUN SERPL-MCNC: 13 MG/DL (ref 8–23)
CALCIUM SERPL-MCNC: 8.8 MG/DL (ref 8.7–10.5)
CHLORIDE SERPL-SCNC: 106 MMOL/L (ref 95–110)
CO2 SERPL-SCNC: 24 MMOL/L (ref 23–29)
CORRECTED TEMPERATURE (PCO2): 36.5 MMHG
CORRECTED TEMPERATURE (PH): 7.48
CORRECTED TEMPERATURE (PO2): 47.2 MMHG
CREAT SERPL-MCNC: 0.8 MG/DL (ref 0.5–1.4)
CRP SERPL-MCNC: 34.9 MG/L
ERYTHROCYTE [DISTWIDTH] IN BLOOD BY AUTOMATED COUNT: 16.1 % (ref 11.5–14.5)
FIO2: 21 %
FLUAV AG UPPER RESP QL IA.RAPID: NEGATIVE
FLUBV AG UPPER RESP QL IA.RAPID: NEGATIVE
GFR SERPLBLD CREATININE-BSD FMLA CKD-EPI: >60 ML/MIN/1.73/M2
GLUCOSE SERPL-MCNC: 138 MG/DL (ref 70–110)
HCT VFR BLD AUTO: 29 % (ref 37–48.5)
HCT VFR BLD CALC: 27.8 % (ref 36–54)
HCV AB SERPL QL IA: NORMAL
HGB BLD-MCNC: 8.5 GM/DL (ref 12–16)
HIV 1+2 AB+HIV1 P24 AG SERPL QL IA: NORMAL
HOLD SPECIMEN: NORMAL
IMM GRANULOCYTES # BLD AUTO: 0.05 K/UL (ref 0–0.04)
IMM GRANULOCYTES NFR BLD AUTO: 0.5 % (ref 0–0.5)
LDH SERPL L TO P-CCNC: 1.8 MMOL/L (ref 0.5–2.2)
LYMPHOCYTES # BLD AUTO: 0.96 K/UL (ref 1–4.8)
MAGNESIUM SERPL-MCNC: 1.6 MG/DL (ref 1.6–2.6)
MCH RBC QN AUTO: 24.6 PG (ref 27–31)
MCHC RBC AUTO-ENTMCNC: 29.3 G/DL (ref 32–36)
MCV RBC AUTO: 84 FL (ref 82–98)
NUCLEATED RBC (/100WBC) (OHS): 0 /100 WBC
PCO2 BLDA: 36.5 MMHG (ref 35–45)
PH SMN: 7.48 [PH] (ref 7.35–7.45)
PLATELET # BLD AUTO: 271 K/UL (ref 150–450)
PMV BLD AUTO: 10.1 FL (ref 9.2–12.9)
PO2 BLDA: 47.2 MMHG (ref 40–60)
POC BASE DEFICIT: 3.2 MMOL/L
POC HCO3: 27.1 MMOL/L (ref 24–28)
POC PERFORMED BY: ABNORMAL
POC TEMPERATURE: 37 C
POTASSIUM SERPL-SCNC: 3.8 MMOL/L (ref 3.5–5.1)
PROCALCITONIN SERPL-MCNC: 0.06 NG/ML
PROT SERPL-MCNC: 7.3 GM/DL (ref 6–8.4)
RBC # BLD AUTO: 3.46 M/UL (ref 4–5.4)
RELATIVE EOSINOPHIL (OHS): 0.6 %
RELATIVE LYMPHOCYTE (OHS): 10.1 % (ref 18–48)
RELATIVE MONOCYTE (OHS): 4.7 % (ref 4–15)
RELATIVE NEUTROPHIL (OHS): 84 % (ref 38–73)
RSV A 5' UTR RNA NPH QL NAA+PROBE: NEGATIVE
SARS-COV-2 RNA RESP QL NAA+PROBE: NEGATIVE
SODIUM SERPL-SCNC: 141 MMOL/L (ref 136–145)
SPECIMEN SOURCE: ABNORMAL
TROPONIN I SERPL HS-MCNC: 92 NG/L
WBC # BLD AUTO: 9.52 K/UL (ref 3.9–12.7)

## 2025-06-10 PROCEDURE — 87040 BLOOD CULTURE FOR BACTERIA: CPT | Performed by: NURSE PRACTITIONER

## 2025-06-10 PROCEDURE — 82803 BLOOD GASES ANY COMBINATION: CPT

## 2025-06-10 PROCEDURE — 83036 HEMOGLOBIN GLYCOSYLATED A1C: CPT | Performed by: STUDENT IN AN ORGANIZED HEALTH CARE EDUCATION/TRAINING PROGRAM

## 2025-06-10 PROCEDURE — 93005 ELECTROCARDIOGRAM TRACING: CPT

## 2025-06-10 PROCEDURE — 99900035 HC TECH TIME PER 15 MIN (STAT)

## 2025-06-10 PROCEDURE — 96375 TX/PRO/DX INJ NEW DRUG ADDON: CPT

## 2025-06-10 PROCEDURE — 82550 ASSAY OF CK (CPK): CPT | Performed by: STUDENT IN AN ORGANIZED HEALTH CARE EDUCATION/TRAINING PROGRAM

## 2025-06-10 PROCEDURE — 0241U SARS-COV2 (COVID) WITH FLU/RSV BY PCR: CPT | Performed by: PHYSICIAN ASSISTANT

## 2025-06-10 PROCEDURE — 96365 THER/PROPH/DIAG IV INF INIT: CPT

## 2025-06-10 PROCEDURE — 93010 ELECTROCARDIOGRAM REPORT: CPT | Mod: ,,, | Performed by: INTERNAL MEDICINE

## 2025-06-10 PROCEDURE — 96361 HYDRATE IV INFUSION ADD-ON: CPT

## 2025-06-10 PROCEDURE — 87389 HIV-1 AG W/HIV-1&-2 AB AG IA: CPT | Performed by: PHYSICIAN ASSISTANT

## 2025-06-10 PROCEDURE — 63600175 PHARM REV CODE 636 W HCPCS: Performed by: PHYSICIAN ASSISTANT

## 2025-06-10 PROCEDURE — 86140 C-REACTIVE PROTEIN: CPT | Performed by: PHYSICIAN ASSISTANT

## 2025-06-10 PROCEDURE — 85025 COMPLETE CBC W/AUTO DIFF WBC: CPT | Performed by: NURSE PRACTITIONER

## 2025-06-10 PROCEDURE — 99285 EMERGENCY DEPT VISIT HI MDM: CPT | Mod: 25

## 2025-06-10 PROCEDURE — 25000003 PHARM REV CODE 250: Performed by: PHYSICIAN ASSISTANT

## 2025-06-10 PROCEDURE — 83605 ASSAY OF LACTIC ACID: CPT

## 2025-06-10 PROCEDURE — 84145 PROCALCITONIN (PCT): CPT | Performed by: NURSE PRACTITIONER

## 2025-06-10 PROCEDURE — 12000002 HC ACUTE/MED SURGE SEMI-PRIVATE ROOM

## 2025-06-10 PROCEDURE — 82040 ASSAY OF SERUM ALBUMIN: CPT | Performed by: NURSE PRACTITIONER

## 2025-06-10 PROCEDURE — 93010 ELECTROCARDIOGRAM REPORT: CPT | Mod: 76,,, | Performed by: INTERNAL MEDICINE

## 2025-06-10 PROCEDURE — 83735 ASSAY OF MAGNESIUM: CPT | Performed by: NURSE PRACTITIONER

## 2025-06-10 PROCEDURE — 86803 HEPATITIS C AB TEST: CPT | Performed by: PHYSICIAN ASSISTANT

## 2025-06-10 PROCEDURE — 83540 ASSAY OF IRON: CPT | Performed by: STUDENT IN AN ORGANIZED HEALTH CARE EDUCATION/TRAINING PROGRAM

## 2025-06-10 PROCEDURE — 84484 ASSAY OF TROPONIN QUANT: CPT | Performed by: PHYSICIAN ASSISTANT

## 2025-06-10 PROCEDURE — 82728 ASSAY OF FERRITIN: CPT | Performed by: STUDENT IN AN ORGANIZED HEALTH CARE EDUCATION/TRAINING PROGRAM

## 2025-06-10 RX ORDER — AZTREONAM 2 G/1
2000 INJECTION, POWDER, LYOPHILIZED, FOR SOLUTION INTRAMUSCULAR; INTRAVENOUS
Status: DISCONTINUED | OUTPATIENT
Start: 2025-06-10 | End: 2025-06-10

## 2025-06-10 RX ORDER — METRONIDAZOLE 500 MG/100ML
500 INJECTION, SOLUTION INTRAVENOUS
Status: DISCONTINUED | OUTPATIENT
Start: 2025-06-10 | End: 2025-06-10

## 2025-06-10 RX ORDER — ACETAMINOPHEN 500 MG
5000 TABLET ORAL DAILY
Status: DISCONTINUED | OUTPATIENT
Start: 2025-06-11 | End: 2025-06-18 | Stop reason: HOSPADM

## 2025-06-10 RX ORDER — CEFEPIME HYDROCHLORIDE 2 G/1
2 INJECTION, POWDER, FOR SOLUTION INTRAVENOUS
Status: COMPLETED | OUTPATIENT
Start: 2025-06-10 | End: 2025-06-10

## 2025-06-10 RX ORDER — ACETAMINOPHEN 500 MG
1000 TABLET ORAL
Status: COMPLETED | OUTPATIENT
Start: 2025-06-10 | End: 2025-06-10

## 2025-06-10 RX ORDER — LORAZEPAM 0.5 MG/1
1 TABLET ORAL EVERY 12 HOURS PRN
Status: DISCONTINUED | OUTPATIENT
Start: 2025-06-11 | End: 2025-06-18

## 2025-06-10 RX ORDER — AMMONIUM LACTATE 12 G/100G
LOTION TOPICAL 2 TIMES DAILY
Status: DISCONTINUED | OUTPATIENT
Start: 2025-06-11 | End: 2025-06-13

## 2025-06-10 RX ADMIN — CEFEPIME 2 G: 2 INJECTION, POWDER, FOR SOLUTION INTRAVENOUS at 09:06

## 2025-06-10 RX ADMIN — SODIUM CHLORIDE 2500 MG: 9 INJECTION, SOLUTION INTRAVENOUS at 09:06

## 2025-06-10 RX ADMIN — ACETAMINOPHEN 1000 MG: 500 TABLET ORAL at 07:06

## 2025-06-10 RX ADMIN — SODIUM CHLORIDE, SODIUM LACTATE, POTASSIUM CHLORIDE, AND CALCIUM CHLORIDE 1434 ML: .6; .31; .03; .02 INJECTION, SOLUTION INTRAVENOUS at 08:06

## 2025-06-10 RX ADMIN — APIXABAN 5 MG: 5 TABLET, FILM COATED ORAL at 09:06

## 2025-06-10 NOTE — TELEPHONE ENCOUNTER
Copied from CRM #[id number]. Topic: General Inquiry - Patient Advice  >> Mj 10, 2025  3:25 PM Heena wrote:  Franklin from [facility] is calling to let doctor know pt has timmy's on legs again and trying to see what will doctor will like to do about it, it was suggested to pt to go to ER, asking for call back

## 2025-06-11 PROBLEM — E87.0 HYPERNATREMIA: Status: RESOLVED | Noted: 2022-05-04 | Resolved: 2025-06-11

## 2025-06-11 PROBLEM — N17.9 AKI (ACUTE KIDNEY INJURY): Status: RESOLVED | Noted: 2022-05-01 | Resolved: 2025-06-11

## 2025-06-11 PROBLEM — I87.2 CHRONIC VENOUS STASIS DERMATITIS: Status: ACTIVE | Noted: 2025-06-11

## 2025-06-11 PROBLEM — E11.65 TYPE 2 DIABETES MELLITUS WITH HYPERGLYCEMIA, WITHOUT LONG-TERM CURRENT USE OF INSULIN: Status: ACTIVE | Noted: 2025-06-11

## 2025-06-11 PROBLEM — Z74.09 OTHER REDUCED MOBILITY: Status: ACTIVE | Noted: 2025-06-11

## 2025-06-11 PROBLEM — Z87.39 HISTORY OF OSTEOMYELITIS: Status: ACTIVE | Noted: 2025-06-11

## 2025-06-11 PROBLEM — F41.9 ANXIETY: Status: ACTIVE | Noted: 2025-06-11

## 2025-06-11 PROBLEM — E87.5 HYPERKALEMIA: Status: RESOLVED | Noted: 2022-05-01 | Resolved: 2025-06-11

## 2025-06-11 LAB
ABSOLUTE EOSINOPHIL (OHS): 0.15 K/UL
ABSOLUTE MONOCYTE (OHS): 0.71 K/UL (ref 0.3–1)
ABSOLUTE NEUTROPHIL COUNT (OHS): 4.39 K/UL (ref 1.8–7.7)
ALBUMIN SERPL BCP-MCNC: 3 G/DL (ref 3.5–5.2)
ALP SERPL-CCNC: 56 UNIT/L (ref 40–150)
ALT SERPL W/O P-5'-P-CCNC: 6 UNIT/L (ref 10–44)
ANION GAP (OHS): 9 MMOL/L (ref 8–16)
AST SERPL-CCNC: 9 UNIT/L (ref 11–45)
BACTERIA #/AREA URNS AUTO: ABNORMAL /HPF
BASOPHILS # BLD AUTO: 0.01 K/UL
BASOPHILS NFR BLD AUTO: 0.2 %
BILIRUB SERPL-MCNC: 0.4 MG/DL (ref 0.1–1)
BILIRUB UR QL STRIP.AUTO: NEGATIVE
BUN SERPL-MCNC: 11 MG/DL (ref 8–23)
CALCIUM SERPL-MCNC: 8.4 MG/DL (ref 8.7–10.5)
CHLORIDE SERPL-SCNC: 108 MMOL/L (ref 95–110)
CK SERPL-CCNC: 31 U/L (ref 20–180)
CLARITY UR: ABNORMAL
CO2 SERPL-SCNC: 23 MMOL/L (ref 23–29)
COLOR UR AUTO: YELLOW
CREAT SERPL-MCNC: 0.7 MG/DL (ref 0.5–1.4)
EAG (OHS): 140 MG/DL (ref 68–131)
ERYTHROCYTE [DISTWIDTH] IN BLOOD BY AUTOMATED COUNT: 16.2 % (ref 11.5–14.5)
ERYTHROCYTE [SEDIMENTATION RATE] IN BLOOD BY PHOTOMETRIC METHOD: 62 MM/HR
FERRITIN SERPL-MCNC: 38 NG/ML (ref 20–300)
GFR SERPLBLD CREATININE-BSD FMLA CKD-EPI: >60 ML/MIN/1.73/M2
GLUCOSE SERPL-MCNC: 135 MG/DL (ref 70–110)
GLUCOSE UR QL STRIP: NEGATIVE
HBA1C MFR BLD: 6.5 % (ref 4–5.6)
HCT VFR BLD AUTO: 26.7 % (ref 37–48.5)
HGB BLD-MCNC: 7.7 GM/DL (ref 12–16)
HGB UR QL STRIP: ABNORMAL
HOLD SPECIMEN: NORMAL
HYALINE CASTS UR QL AUTO: 0 /LPF (ref 0–1)
IMM GRANULOCYTES # BLD AUTO: 0.02 K/UL (ref 0–0.04)
IMM GRANULOCYTES NFR BLD AUTO: 0.3 % (ref 0–0.5)
IRON SATN MFR SERPL: 3 % (ref 20–50)
IRON SERPL-MCNC: 15 UG/DL (ref 30–160)
KETONES UR QL STRIP: NEGATIVE
LEUKOCYTE ESTERASE UR QL STRIP: ABNORMAL
LYMPHOCYTES # BLD AUTO: 1.25 K/UL (ref 1–4.8)
MAGNESIUM SERPL-MCNC: 2.6 MG/DL (ref 1.6–2.6)
MCH RBC QN AUTO: 24.7 PG (ref 27–31)
MCHC RBC AUTO-ENTMCNC: 28.8 G/DL (ref 32–36)
MCV RBC AUTO: 86 FL (ref 82–98)
MICROSCOPIC COMMENT: ABNORMAL
MRSA PCR SCRN (OHS): NOT DETECTED
NITRITE UR QL STRIP: POSITIVE
NUCLEATED RBC (/100WBC) (OHS): 0 /100 WBC
OHS QRS DURATION: 82 MS
OHS QRS DURATION: 90 MS
OHS QTC CALCULATION: 428 MS
OHS QTC CALCULATION: 456 MS
PH UR STRIP: 8 [PH]
PHOSPHATE SERPL-MCNC: 2.7 MG/DL (ref 2.7–4.5)
PLATELET # BLD AUTO: 231 K/UL (ref 150–450)
PMV BLD AUTO: 10 FL (ref 9.2–12.9)
POTASSIUM SERPL-SCNC: 3.4 MMOL/L (ref 3.5–5.1)
PROT SERPL-MCNC: 6.3 GM/DL (ref 6–8.4)
PROT UR QL STRIP: ABNORMAL
RBC # BLD AUTO: 3.12 M/UL (ref 4–5.4)
RBC #/AREA URNS AUTO: 80 /HPF (ref 0–4)
RELATIVE EOSINOPHIL (OHS): 2.3 %
RELATIVE LYMPHOCYTE (OHS): 19.1 % (ref 18–48)
RELATIVE MONOCYTE (OHS): 10.9 % (ref 4–15)
RELATIVE NEUTROPHIL (OHS): 67.2 % (ref 38–73)
SODIUM SERPL-SCNC: 140 MMOL/L (ref 136–145)
SP GR UR STRIP: 1.02
SQUAMOUS #/AREA URNS AUTO: 3 /HPF
TIBC SERPL-MCNC: 441 UG/DL (ref 250–450)
TRANSFERRIN SERPL-MCNC: 298 MG/DL (ref 200–375)
UROBILINOGEN UR STRIP-ACNC: NEGATIVE EU/DL
WBC # BLD AUTO: 6.53 K/UL (ref 3.9–12.7)
WBC #/AREA URNS AUTO: >100 /HPF (ref 0–5)

## 2025-06-11 PROCEDURE — 25000003 PHARM REV CODE 250: Performed by: STUDENT IN AN ORGANIZED HEALTH CARE EDUCATION/TRAINING PROGRAM

## 2025-06-11 PROCEDURE — 80053 COMPREHEN METABOLIC PANEL: CPT | Performed by: STUDENT IN AN ORGANIZED HEALTH CARE EDUCATION/TRAINING PROGRAM

## 2025-06-11 PROCEDURE — 85652 RBC SED RATE AUTOMATED: CPT | Performed by: STUDENT IN AN ORGANIZED HEALTH CARE EDUCATION/TRAINING PROGRAM

## 2025-06-11 PROCEDURE — 87641 MR-STAPH DNA AMP PROBE: CPT | Performed by: STUDENT IN AN ORGANIZED HEALTH CARE EDUCATION/TRAINING PROGRAM

## 2025-06-11 PROCEDURE — 87077 CULTURE AEROBIC IDENTIFY: CPT | Performed by: STUDENT IN AN ORGANIZED HEALTH CARE EDUCATION/TRAINING PROGRAM

## 2025-06-11 PROCEDURE — 11000001 HC ACUTE MED/SURG PRIVATE ROOM

## 2025-06-11 PROCEDURE — 97161 PT EVAL LOW COMPLEX 20 MIN: CPT

## 2025-06-11 PROCEDURE — 63600175 PHARM REV CODE 636 W HCPCS: Performed by: STUDENT IN AN ORGANIZED HEALTH CARE EDUCATION/TRAINING PROGRAM

## 2025-06-11 PROCEDURE — 97166 OT EVAL MOD COMPLEX 45 MIN: CPT

## 2025-06-11 PROCEDURE — 21400001 HC TELEMETRY ROOM

## 2025-06-11 PROCEDURE — 99283 EMERGENCY DEPT VISIT LOW MDM: CPT | Mod: ,,, | Performed by: PODIATRIST

## 2025-06-11 PROCEDURE — 84100 ASSAY OF PHOSPHORUS: CPT | Performed by: STUDENT IN AN ORGANIZED HEALTH CARE EDUCATION/TRAINING PROGRAM

## 2025-06-11 PROCEDURE — 81003 URINALYSIS AUTO W/O SCOPE: CPT | Performed by: STUDENT IN AN ORGANIZED HEALTH CARE EDUCATION/TRAINING PROGRAM

## 2025-06-11 PROCEDURE — 83735 ASSAY OF MAGNESIUM: CPT | Performed by: STUDENT IN AN ORGANIZED HEALTH CARE EDUCATION/TRAINING PROGRAM

## 2025-06-11 PROCEDURE — 85025 COMPLETE CBC W/AUTO DIFF WBC: CPT | Performed by: STUDENT IN AN ORGANIZED HEALTH CARE EDUCATION/TRAINING PROGRAM

## 2025-06-11 PROCEDURE — 97535 SELF CARE MNGMENT TRAINING: CPT

## 2025-06-11 RX ORDER — ACETAMINOPHEN AND CODEINE PHOSPHATE 300; 30 MG/1; MG/1
1 TABLET ORAL EVERY 4 HOURS PRN
Status: DISCONTINUED | OUTPATIENT
Start: 2025-06-11 | End: 2025-06-12

## 2025-06-11 RX ORDER — HYDROCODONE BITARTRATE AND ACETAMINOPHEN 10; 325 MG/1; MG/1
1 TABLET ORAL EVERY 6 HOURS PRN
Refills: 0 | Status: DISCONTINUED | OUTPATIENT
Start: 2025-06-11 | End: 2025-06-11

## 2025-06-11 RX ORDER — SODIUM CHLORIDE 0.9 % (FLUSH) 0.9 %
10 SYRINGE (ML) INJECTION EVERY 12 HOURS PRN
Status: DISCONTINUED | OUTPATIENT
Start: 2025-06-11 | End: 2025-06-18 | Stop reason: HOSPADM

## 2025-06-11 RX ORDER — ACETAMINOPHEN 500 MG
1000 TABLET ORAL EVERY 8 HOURS PRN
Status: DISCONTINUED | OUTPATIENT
Start: 2025-06-11 | End: 2025-06-11

## 2025-06-11 RX ORDER — DOXYLAMINE SUCCINATE 25 MG
TABLET ORAL 2 TIMES DAILY
Status: DISCONTINUED | OUTPATIENT
Start: 2025-06-11 | End: 2025-06-18 | Stop reason: HOSPADM

## 2025-06-11 RX ORDER — METRONIDAZOLE 500 MG/1
500 TABLET ORAL EVERY 8 HOURS
Status: DISCONTINUED | OUTPATIENT
Start: 2025-06-11 | End: 2025-06-11

## 2025-06-11 RX ORDER — CEFTRIAXONE 1 G/1
1 INJECTION, POWDER, FOR SOLUTION INTRAMUSCULAR; INTRAVENOUS
Status: DISCONTINUED | OUTPATIENT
Start: 2025-06-11 | End: 2025-06-11

## 2025-06-11 RX ORDER — CEFEPIME HYDROCHLORIDE 2 G/1
2 INJECTION, POWDER, FOR SOLUTION INTRAVENOUS
Status: DISCONTINUED | OUTPATIENT
Start: 2025-06-11 | End: 2025-06-11

## 2025-06-11 RX ORDER — ONDANSETRON 8 MG/1
8 TABLET, ORALLY DISINTEGRATING ORAL EVERY 8 HOURS PRN
Status: DISCONTINUED | OUTPATIENT
Start: 2025-06-11 | End: 2025-06-18 | Stop reason: HOSPADM

## 2025-06-11 RX ORDER — MAGNESIUM SULFATE HEPTAHYDRATE 40 MG/ML
2 INJECTION, SOLUTION INTRAVENOUS ONCE
Status: COMPLETED | OUTPATIENT
Start: 2025-06-11 | End: 2025-06-11

## 2025-06-11 RX ORDER — CEFEPIME HYDROCHLORIDE 2 G/1
2 INJECTION, POWDER, FOR SOLUTION INTRAVENOUS
Status: DISCONTINUED | OUTPATIENT
Start: 2025-06-11 | End: 2025-06-16

## 2025-06-11 RX ORDER — POTASSIUM CHLORIDE 20 MEQ/1
20 TABLET, EXTENDED RELEASE ORAL ONCE
Status: COMPLETED | OUTPATIENT
Start: 2025-06-11 | End: 2025-06-11

## 2025-06-11 RX ORDER — METRONIDAZOLE 500 MG/100ML
500 INJECTION, SOLUTION INTRAVENOUS
Status: DISCONTINUED | OUTPATIENT
Start: 2025-06-11 | End: 2025-06-11

## 2025-06-11 RX ADMIN — APIXABAN 5 MG: 5 TABLET, FILM COATED ORAL at 08:06

## 2025-06-11 RX ADMIN — MAGNESIUM SULFATE 2 G: 2 INJECTION INTRAVENOUS at 03:06

## 2025-06-11 RX ADMIN — MICONAZOLE NITRATE: 20 CREAM TOPICAL at 09:06

## 2025-06-11 RX ADMIN — MICONAZOLE NITRATE: 20 CREAM TOPICAL at 06:06

## 2025-06-11 RX ADMIN — POTASSIUM CHLORIDE 20 MEQ: 1500 TABLET, EXTENDED RELEASE ORAL at 10:06

## 2025-06-11 RX ADMIN — LORAZEPAM 1 MG: 1 TABLET ORAL at 06:06

## 2025-06-11 RX ADMIN — CEFEPIME 2 G: 2 INJECTION, POWDER, FOR SOLUTION INTRAVENOUS at 02:06

## 2025-06-11 RX ADMIN — APIXABAN 5 MG: 5 TABLET, FILM COATED ORAL at 09:06

## 2025-06-11 RX ADMIN — VANCOMYCIN HYDROCHLORIDE 1500 MG: 1.5 INJECTION, POWDER, LYOPHILIZED, FOR SOLUTION INTRAVENOUS at 10:06

## 2025-06-11 RX ADMIN — METRONIDAZOLE 500 MG: 5 INJECTION, SOLUTION INTRAVENOUS at 03:06

## 2025-06-11 RX ADMIN — CEFEPIME 2 G: 2 INJECTION, POWDER, FOR SOLUTION INTRAVENOUS at 09:06

## 2025-06-11 RX ADMIN — DIBASIC SODIUM PHOSPHATE, MONOBASIC POTASSIUM PHOSPHATE AND MONOBASIC SODIUM PHOSPHATE 1 TABLET: 852; 155; 130 TABLET ORAL at 06:06

## 2025-06-11 RX ADMIN — DIBASIC SODIUM PHOSPHATE, MONOBASIC POTASSIUM PHOSPHATE AND MONOBASIC SODIUM PHOSPHATE 1 TABLET: 852; 155; 130 TABLET ORAL at 10:06

## 2025-06-11 RX ADMIN — CEFEPIME 2 G: 2 INJECTION, POWDER, FOR SOLUTION INTRAVENOUS at 07:06

## 2025-06-11 NOTE — CONSULTS
[facility] - Emergency Dept  Podiatry  Consult Note    Patient Name: Lupis Murcia  MRN: [id number]  Admission Date: 6/10/2025  Hospital Length of Stay: 1 days  Attending Physician: Irasema Ruiz MD  Primary Care Provider: Bobby Tran MD     Podiatry  Consult performed by: Davis Cerda MD  Consult ordered by: Melva Selby DO        Subjective:     History of Present Illness:  74-year-old female with PMH multiple sclerosis, aortic stenosis, HX of pulmonary emboli on Eliquis, lymphedema presents to ED 6/10/25 on concerns for generalized weakness. Podiatry consulted for concerns of bilateral lower extremity ulcerations. Her wound care nurse had noticed maggots within the wounds.  She has chronic weakness secondary to MS with left foot drop. On encounter, patient endorses pain to the left lower extremity.  Daughter at bedside, providing partial history, states that yesterday home health nurse had noticed maggots in the wounds, and she had cleaned it out.  Patient follows Dr. Scott Cox D.P.M. in the outpatient setting, who has applied Hydrofera foam as part of the wound care routine orders.  She otherwise appears to have no other lower extremity complaints at this time.  She denies any wounds nor drainage.  MRI of the left foot obtained, demonstrating normal marrow signal; negative for acute osteomyelitis, no evidence of focal fluid collection. XR tib-fib bilateral negative for acute osseous process.  Labs on encounter showing negative for concern for leukocytosis WBC 9.52, RBC, H&H diminished.  CRP elevated 34.9.  Hemoglobin A1c in prediabetic range 6.5%.  /76; otherwise afebrile and vital signs stable on encounter.    Scheduled Meds:   ammonium lactate   Topical (Top) BID    apixaban  5 mg Oral BID    ceFEPime IV (PEDS and ADULTS)  2 g Intravenous Q8H    cholecalciferol (vitamin D3)  5,000 Units Oral Daily    potassium bicarbonate  50 mEq Oral Once    vancomycin (VANCOCIN) IV (PEDS and ADULTS)   1,500 mg Intravenous Q12H     Continuous Infusions:  PRN Meds:  Current Facility-Administered Medications:     acetaminophen, 1,000 mg, Oral, Q8H PRN    HYDROcodone-acetaminophen, 1 tablet, Oral, Q6H PRN    LORazepam, 1 mg, Oral, Q12H PRN    ondansetron, 8 mg, Oral, Q8H PRN    sodium chloride 0.9%, 10 mL, Intravenous, Q12H PRN    Pharmacy to dose Vancomycin consult, , , Once **AND** vancomycin - pharmacy to dose, , Intravenous, pharmacy to manage frequency    Review of patient's allergies indicates:   Allergen Reactions    Contrast media Shortness Of Breath and Rash    Pcn [penicillins] Shortness Of Breath and Rash    Celebrex [celecoxib] Other (See Comments)     Swallowing problems     Diazepam Hives    Gabapentin Other (See Comments)     Confusion     Motrin [ibuprofen] Rash        Past Medical History:   Diagnosis Date    Lymphedema     MS (multiple sclerosis)     Other pulmonary embolism without acute cor pulmonale     Vitamin B12 deficiency      Past Surgical History:   Procedure Laterality Date    BREAST CYST EXCISION Left     over 40yrs ago     SECTION      ESOPHAGOGASTRODUODENOSCOPY N/A 2022    Procedure: EGD (ESOPHAGOGASTRODUODENOSCOPY);  Surgeon: Radha Arango MD;  Location: [address]);  Service: Endoscopy;  Laterality: N/A;       Family History       Problem Relation (Age of Onset)    Brain cancer Brother    Coronary artery disease Father    Lung cancer Father, Mother          Tobacco Use    Smoking status: Never    Smokeless tobacco: Never   Substance and Sexual Activity    Alcohol use: No    Drug use: No    Sexual activity: Not on file     Review of Systems   Constitutional:  Positive for fever.   Eyes:  Negative for visual disturbance.   Respiratory:  Negative for cough and shortness of breath.    Cardiovascular:  Positive for leg swelling (chronic). Negative for chest pain.   Gastrointestinal:  Negative for abdominal pain and vomiting.   Musculoskeletal:  Positive for  arthralgias, gait problem and joint swelling.   Skin:  Positive for rash and wound.   Neurological:  Positive for weakness.   Psychiatric/Behavioral:  Negative for confusion.      Objective:     Vital Signs (Most Recent):  Temp: 98.2 °F (36.8 °C) (06/11/25 0400)  Pulse: 95 (06/11/25 0800)  Resp: 18 (06/11/25 0400)  BP: 137/68 (06/11/25 0800)  SpO2: 95 % (06/11/25 0800) Vital Signs (24h Range):  Temp:  [98.2 °F (36.8 °C)-103.2 °F (39.6 °C)] 98.2 °F (36.8 °C)  Pulse:  [] 95  Resp:  [16-24] 18  SpO2:  [92 %-99 %] 95 %  BP: (112-160)/() 137/68     Weight: 95.2 kg (209 lb 12.8 oz)  Body mass index is 39.64 kg/m².    Foot Exam    General  General Appearance: appears stated age and healthy   Orientation: alert and oriented to person, place, and time       Right Foot/Ankle     Inspection and Palpation  Ecchymosis: none  Tenderness: none   Swelling: (+3 edema LE)  Skin Exam: cellulitis and erythema; no drainage, no abnormal color and no ulcer     Neurovascular  Dorsalis pedis: absent  Posterior tibial: absent  Saphenous nerve sensation: normal  Tibial nerve sensation: normal  Superficial peroneal nerve sensation: normal  Deep peroneal nerve sensation: normal  Sural nerve sensation: normal    Comments  Venous stasis dermatitis present in RLE; diffuse RLE swelling. No discernible open wounds in the RLE.     Stasis dermatitis     Left Foot/Ankle      Inspection and Palpation  Ecchymosis: none  Tenderness: (diffuse LLE)  Swelling: (+3 edema LE)  Skin Exam: cellulitis and erythema; no drainage, no abnormal color and no ulcer     Neurovascular  Dorsalis pedis: absent  Posterior tibial: absent  Saphenous nerve sensation: normal  Tibial nerve sensation: normal  Superficial peroneal nerve sensation: normal  Deep peroneal nerve sensation: normal  Sural nerve sensation: normal    Comments  Venous stasis dermatitis present in LLE; diffuse LLE swelling. No discernible open wounds in the LLE.     Stasis dermatitis        Laboratory:  CBC:   Recent Labs   Lab 06/11/25  0659   WBC 6.53   RBC 3.12*   HGB 7.7*   HCT 26.7*      MCV 86   MCH 24.7*   MCHC 28.8*     CMP:   Recent Labs   Lab 06/11/25  0659   *   CALCIUM 8.4*   ALBUMIN 3.0*   PROT 6.3      K 3.4*   CO2 23      BUN 11   CREATININE 0.7   ALKPHOS 56   ALT 6*   AST 9*   BILITOT 0.4     Microbiology Results (last 7 days)       Procedure Component Value Units Date/Time    Urine culture [[id number]] Collected: 06/11/25 0705    Order Status: Sent Specimen: Urine Updated: 06/11/25 0809    MRSA Screen by PCR [[id number]] Collected: 06/11/25 0707    Order Status: Sent Specimen: Nasal Swab Updated: 06/11/25 0747    Blood culture x two cultures. Draw prior to antibiotics. [[id number]]  (Normal) Collected: 06/10/25 2015    Order Status: Completed Specimen: Blood from Peripheral, Antecubital, Right Updated: 06/11/25 0501     Blood Culture No Growth After 6 Hours    Blood culture x two cultures. Draw prior to antibiotics. [[id number]]  (Normal) Collected: 06/10/25 2032    Order Status: Completed Specimen: Blood from Peripheral, Hand, Left Updated: 06/11/25 0501     Blood Culture No Growth After 6 Hours    Culture, Respiratory [[id number]]     Order Status: Sent Specimen: Respiratory from Sputum, Expectorated     Sputum gram stain [[id number]]     Order Status: Sent Specimen: Respiratory           All pertinent labs reviewed within the last 24 hours.    Diagnostic Results:  X-Ray: I have reviewed all pertinent results/findings within the past 24 hours.     I have reviewed all pertinent imaging results/findings within the past 24 hours.    Clinical Findings:  Right leg  Left leg          Assessment/Plan:     Cardiac/Vascular  Chronic venous stasis dermatitis  74-year-old female with PMH multiple sclerosis, aortic stenosis, HX of pulmonary emboli on Eliquis, lymphedema presents to ED 6/10/25 on concerns for generalized weakness. Podiatry consulted for concerns of  bilateral lower extremity ulcerations. HH nurse had noticed maggots along the legs.     Assessment: Chronic venous stasis dermatitis in BLLE; MRI of the left foot obtained, demonstrating normal marrow signal; negative for acute osteomyelitis, no evidence of focal fluid collection. XR tib-fib bilateral negative for acute osseous process.      Plan:   - Patient seen and evaluated bedside by Podiatry.  Imaging and examination discussed  - Applied [id number] gauze to BLLE, secured in biohazard bags. Leave for 24 hours. Applied 6/11 at 0800.   - Recommend wound care consult  - After this time period, scrub BLLE with saline soaked gauze and pat dry. Apply gentian violet to legs at areas of dermatitis (may substitute with multiple Hydrafera blue foam). DSD compressive dressings pending venous US results; otherwise kerlix with tape to secure.   - May consider topical corticosteroid for stasis dermatitis  - Venous US ordered, rule out DVT, assess for indications for compressive dressings.   - PT/OT eval, appreciate recommendations. Patient known to ambulate with assistance of walker. Left foot drop 2/2 MS.   - Heel offloading boots while at bed rest.   - No discernible open wound to culture  - Will follow with Dr. Scott Cox DPM.   - Podiatry will sign off. No surgical intervention indicated. Please reach out for concerns of new or worsening changes.     ID  History of osteomyelitis  Per MRI, left heel appears with no marrow signal uptake. No discernible wound in the area.         Thank you for your consult. I will follow-up with patient. Please contact us if you have any additional questions.    Davis Cerda DPM PGY-2  Podiatric Medicine & Surgery  [facility]  Secure Chat Preferred  Pager: [phone]    [facility] - Emergency Dept

## 2025-06-11 NOTE — ASSESSMENT & PLAN NOTE
This patient does not have evidence of infective focus  My overall impression is sepsis without organ dysfunction.  Source: Unknown  Antibiotics given: Vanc / cefepime  Latest lactate reviewed: 1.8    Fluid challenge Ideal Body Weight- The patient is obese (BMI>30) and their ideal body weight of Ideal body weight: 47.8 kg (105 lb 6.1 oz) will be used to calculate fluid bolus of 30 ml/kg.     Post- resuscitation assessment Yes - I attest a sepsis perfusion exam was performed within 6 hours of sepsis, severe sepsis, or septic shock presentation, following fluid resuscitation.    Differential includes soft tissue infection, viral etiology (flu/COVID/RSV, hepatitis, common cold virus, EBV), sacral wound (given severe pain) / abdominopelvic abscess, osteomyelitis, UTI, pneumonia. Less likely VTE/PE, endocarditis (given given history of bacteremia), vasculitis, malignancy.    PLAN:  - continue antibiotics   - vancomycin for enterococcus and staph coverage   - cefepime for pseudomonas and GNR coverage (unable to give Zosyn due to penicillin allergy)   - Flagyl for anaerobic coverage   - follow up blood cultures  - follow UA / culture  - obtain MRSA PCR  - CXR without acute pathology, obtain respiratory culture  - CT a/p to assess for abdominal etiology (given patients buttocks pain, rule out abscess)  - MRI left foot given severe lymphedema limits assessment, and prior h/o osteomyelitis

## 2025-06-11 NOTE — ED NOTES
Lupis Murcia, a 74 y.o. female presents to the ED w/ complaint of fatigue, tmax 103, wounds to BLE and vaginal area.    Triage note:  Chief Complaint   Patient presents with    Weakness     Temp of 103, generalized weakness, denies pain     Review of patient's allergies indicates:   Allergen Reactions    Contrast media Shortness Of Breath and Rash    Pcn [penicillins] Shortness Of Breath and Rash    Celebrex [celecoxib] Other (See Comments)     Swallowing problems     Diazepam Hives    Gabapentin Other (See Comments)     Confusion     Motrin [ibuprofen] Rash     Past Medical History:   Diagnosis Date    Lymphedema     MS (multiple sclerosis)     Other pulmonary embolism without acute cor pulmonale     Vitamin B12 deficiency

## 2025-06-11 NOTE — ASSESSMENT & PLAN NOTE
Patient's blood pressure range in the last 24 hours was: BP  Min: 112/55  Max: 160/78.The patient's inpatient anti-hypertensive regimen is listed below:  Current Antihypertensives       Plan  - BP is controlled, no changes needed to their regimen  - hold antihypertensives in setting of sepsis

## 2025-06-11 NOTE — PLAN OF CARE
Problem: Occupational Therapy  Goal: Occupational Therapy Goal  Description: Goals to be met by: 7/11/2025     Patient will increase functional independence with ADLs by performing:    Feeding with [place].  UE Dressing with Moderate Assistance.  LE Dressing with Moderate Assistance.  Grooming while seated with Minimal Assistance.  Toileting from bed level with Moderate Assistance for hygiene and clothing management.   Rolling to Bilateral with Minimal Assistance.   Squat pivot transfers with Maximum Assistance.  Toilet transfer to bedside commode with Maximum Assistance.    Outcome: Progressing

## 2025-06-11 NOTE — ED NOTES
Patient refusing espinal and wants to keep using purewick. Patient and daughter educated that the purewick could make her dermatitis to vaginal area and buttocks worse. Patient wants to keep using purewick at this time despite education.

## 2025-06-11 NOTE — HPI
74-year-old female with PMH multiple sclerosis, aortic stenosis, HX of pulmonary emboli on Eliquis, lymphedema presents to ED 6/10/25 on concerns for generalized weakness. Podiatry consulted for concerns of bilateral lower extremity ulcerations. Her wound care nurse had noticed maggots within the wounds.  She has chronic weakness secondary to MS with left foot drop. On encounter, patient endorses pain to the left lower extremity.  Daughter at bedside, providing partial history, states that yesterday home health nurse had noticed maggots in the wounds, and she had cleaned it out.  Patient follows Dr. Scott Cox D.P.M. in the outpatient setting, who has applied Hydrofera foam as part of the wound care routine orders.  She otherwise appears to have no other lower extremity complaints at this time.  She denies any wounds nor drainage.  MRI of the left foot obtained, demonstrating normal marrow signal; negative for acute osteomyelitis, no evidence of focal fluid collection. XR tib-fib bilateral negative for acute osseous process.  Labs on encounter showing negative for concern for leukocytosis WBC 9.52, RBC, H&H diminished.  CRP elevated 34.9.  Hemoglobin A1c in prediabetic range 6.5%.  /76; otherwise afebrile and vital signs stable on encounter.

## 2025-06-11 NOTE — PROGRESS NOTES
"Pharmacokinetic Initial Assessment & Plan: IV Vancomycin    IV Vancomycin 2500 mg x once given in the ED on 6/10 @ 2200  Then Vancomycin 1500 mg every 12 hours,   Obtain a Vancomycin trough 30 mins prior to the 4 th dose on 6/12 @ 0930   Desired empiric serum trough concentration is 15 to 20 mcg/mL    Pharmacy will continue to follow and monitor vancomycin.    [id number] with any questions regarding this assessment.     Thank you for the consult,   Abdulaziz Moreno       Patient brief summary:  Lupis Murcia is a 74 y.o. female initiated on antimicrobial therapy with IV Vancomycin for treatment of suspected sepsis    Drug Allergies:   Review of patient's allergies indicates:   Allergen Reactions    Contrast media Shortness Of Breath and Rash    Pcn [penicillins] Shortness Of Breath and Rash    Celebrex [celecoxib] Other (See Comments)     Swallowing problems     Diazepam Hives    Gabapentin Other (See Comments)     Confusion     Motrin [ibuprofen] Rash       Actual Body Weight:   95.2 kg    Renal Function:   Estimated Creatinine Clearance: 64.1 mL/min (based on SCr of 0.8 mg/dL).,     CBC (last 72 hours):  Recent Labs   Lab Result Units 06/10/25  2015   WBC K/uL 9.52   HGB gm/dL 8.5*   HCT % 29.0*   Platelet Count K/uL 271   Lymph % % 10.1*   Mono % % 4.7   Eos % % 0.6   Basophil % % 0.1       Metabolic Panel (last 72 hours):  Recent Labs   Lab Result Units 06/10/25  2015   Sodium mmol/L 141   Potassium mmol/L 3.8   Chloride mmol/L 106   CO2 mmol/L 24   Glucose mg/dL 138*   BUN mg/dL 13   Creatinine mg/dL 0.8   Albumin g/dL 3.5   Bilirubin Total mg/dL 0.3   ALP unit/L 68   AST unit/L 9*   ALT unit/L 5*   Magnesium  mg/dL 1.6       Drug levels (last 3 results):  No results for input(s): "VANCOMYCINRA", "VANCORANDOM", "VANCOMYCINPE", "VANCOPEAK", "VANCOMYCINTR", "VANCOTROUGH" in the last 72 hours.    Microbiologic Results:  Microbiology Results (last 7 days)       Procedure Component Value Units Date/Time    Culture, " Respiratory [[id number]]     Order Status: Sent Specimen: Respiratory from Sputum, Expectorated     Sputum gram stain [[id number]]     Order Status: Sent Specimen: Respiratory     MRSA Screen by PCR [[id number]]     Order Status: Sent Specimen: Nasal Swab     Blood culture x two cultures. Draw prior to antibiotics. [[id number]] Collected: 06/10/25 2032    Order Status: Resulted Specimen: Blood from Peripheral, Hand, Left Updated: 06/10/25 2222    Blood culture x two cultures. Draw prior to antibiotics. [[id number]] Collected: 06/10/25 2015    Order Status: Resulted Specimen: Blood from Peripheral, Antecubital, Right Updated: 06/10/25 2034

## 2025-06-11 NOTE — ASSESSMENT & PLAN NOTE
74-year-old female with PMH multiple sclerosis, aortic stenosis, HX of pulmonary emboli on Eliquis, lymphedema presents to ED 6/10/25 on concerns for generalized weakness. Podiatry consulted for concerns of bilateral lower extremity ulcerations. HH nurse had noticed maggots along the legs.     Assessment: Chronic venous stasis dermatitis in BLLE; MRI of the left foot obtained, demonstrating normal marrow signal; negative for acute osteomyelitis, no evidence of focal fluid collection. XR tib-fib bilateral negative for acute osseous process.      Plan:   - Patient seen and evaluated bedside by Podiatry.  Imaging and examination discussed  - Applied [id number] gauze to BLLE, secured in biohazard bags. Leave for 24 hours. Applied 6/11 at 0800.   - Recommend wound care consult  - After this time period, scrub BLLE with saline soaked gauze and pat dry. Apply gentian violet to legs at areas of dermatitis. DSD compressive dressings pending venous US results.   - May consider topical corticosteroid for stasis dermatitis  - Venous US ordered, rule out DVT, assess for indications for compressive dressings.   - PT/OT eval, appreciate recommendations. Patient known to ambulate with assistance of walker. Left foot drop 2/2 MS.   - Heel offloading boots while at bed rest.   - No discernible open wound to culture.  - No surgical intervention indicated   - Podiatry will follow peripherally, await ultrasound results for wound care.

## 2025-06-11 NOTE — ED PROVIDER NOTES
Encounter Date: 6/10/2025       History     Chief Complaint   Patient presents with    Weakness     Temp of 103, generalized weakness, denies pain     74-year-old female with multiple or sclerosis, aortic stenosis, lymphedema, history of pulmonary embolus on Eliquis sense for generalized weakness.  She called her daughter and stated that she was very weak and unable to walk with her walker.  She denies any other specific complaints.  Her wound care nurse was dressing her leg wounds today and did see some small magnets.  She denies increased pain in her legs, purulent discharge, cough, shortness of breath, chest pain, abdominal pain, vomiting, urinary symptoms or other complaints.  No sick contacts or recent travel.      Review of patient's allergies indicates:   Allergen Reactions    Contrast media Shortness Of Breath and Rash    Pcn [penicillins] Shortness Of Breath and Rash    Celebrex [celecoxib] Other (See Comments)     Swallowing problems     Diazepam Hives    Gabapentin Other (See Comments)     Confusion     Motrin [ibuprofen] Rash     Past Medical History:   Diagnosis Date    Lymphedema     MS (multiple sclerosis)     Other pulmonary embolism without acute cor pulmonale     Vitamin B12 deficiency      Past Surgical History:   Procedure Laterality Date    BREAST CYST EXCISION Left     over 40yrs ago     SECTION      ESOPHAGOGASTRODUODENOSCOPY N/A 2022    Procedure: EGD (ESOPHAGOGASTRODUODENOSCOPY);  Surgeon: Radha Arango MD;  Location: [address]);  Service: Endoscopy;  Laterality: N/A;     Family History   Problem Relation Name Age of Onset    Coronary artery disease Father      Lung cancer Father      Lung cancer Mother      Brain cancer Brother       Social History[1]  Review of Systems    Physical Exam     Initial Vitals [06/10/25 1835]   BP Pulse Resp Temp SpO2   (!) 147/103 (!) 120 (!) 24 (!) 103.2 °F (39.6 °C) 98 %      MAP       --         Physical Exam    Nursing note and  vitals reviewed.  Constitutional: She is not diaphoretic. She is Obese . She appears ill. No distress.   HENT:   Head: Normocephalic and atraumatic.   Cardiovascular:  Regular rhythm and intact distal pulses.     Exam reveals no gallop and no friction rub.       Murmur heard.  Tachycardic   Pulmonary/Chest: Breath sounds normal. No respiratory distress. She has no wheezes. She has no rhonchi. She has no rales. She exhibits no tenderness.   Abdominal: Abdomen is soft. Bowel sounds are normal. She exhibits no distension and no mass. There is no abdominal tenderness. There is no rebound and no guarding.   Musculoskeletal:         General: Normal range of motion.     Neurological: She is alert and oriented to person, place, and time.   Skin: Skin is warm and dry.   Psychiatric: She has a normal mood and affect.                       ED Course   Procedures  Labs Reviewed   COMPREHENSIVE METABOLIC PANEL - Abnormal       Result Value    Sodium 141      Potassium 3.8      Chloride 106      CO2 24      Glucose 138 (*)     BUN 13      Creatinine 0.8      Calcium 8.8      Protein Total 7.3      Albumin 3.5      Bilirubin Total 0.3      ALP 68      AST 9 (*)     ALT 5 (*)     Anion Gap 11      eGFR >60     CBC WITH DIFFERENTIAL - Abnormal    WBC 9.52      RBC 3.46 (*)     HGB 8.5 (*)     HCT 29.0 (*)     MCV 84      MCH 24.6 (*)     MCHC 29.3 (*)     RDW 16.1 (*)     Platelet Count 271      MPV 10.1      Nucleated RBC 0      Neut % 84.0 (*)     Lymph % 10.1 (*)     Mono % 4.7      Eos % 0.6      Basophil % 0.1      Imm Grans % 0.5      Neut # 7.99 (*)     Lymph # 0.96 (*)     Mono # 0.45      Eos # 0.06      Baso # 0.01      Imm Grans # 0.05 (*)    HEPATITIS C ANTIBODY - Normal    Hep C Ab Interp Non-Reactive     HIV 1 / 2 ANTIBODY - Normal    HIV 1/2 Ag/Ab Non-Reactive     MAGNESIUM - Normal    Magnesium  1.6     PROCALCITONIN - Normal    Procalcitonin 0.06     CULTURE, BLOOD   CULTURE, BLOOD   CBC W/ AUTO DIFFERENTIAL     Narrative:     The following orders were created for panel order CBC auto differential.  Procedure                               Abnormality         Status                     ---------                               -----------         ------                     CBC with Differential[[id number]]       Abnormal            Final result                 Please view results for these tests on the individual orders.   HEP C VIRUS HOLD SPECIMEN   URINALYSIS, REFLEX TO URINE CULTURE   SARS-COV2 (COVID) WITH FLU/RSV BY PCR   TROPONIN I HIGH SENSITIVITY   C-REACTIVE PROTEIN     EKG Readings: (Independently Interpreted)   Initial Reading: No STEMI. Rhythm: Sinus Tachycardia. Heart Rate: 111. Ectopy: No Ectopy. ST Segments: Non-Specific ST Segment Depression. Clinical Impression: Sinus Tachycardia       Imaging Results              XR Tibia Fibula 2 View Bilateral (Final result)  Result time 06/10/25 21:33:20      Final result by Faheem Villalpando DO (06/10/25 21:33:20)                   Impression:      No acute osseous abnormality of the bilateral tibia and fibula.      Electronically signed by: Faheem Villalpando  Date:    06/10/2025  Time:    21:33               Narrative:    EXAMINATION:  XR TIBIA FIBULA 2 VIEW BILATERAL    CLINICAL HISTORY:  Other injury of unspecified body region, initial encounter    TECHNIQUE:  AP and lateral radiographs of the bilateral tibia and fibula.    COMPARISON:  None    FINDINGS:  There is diffuse osteopenia.    Right tibia and fibula: There is no acute fracture or dislocation.  Alignment is normal.  There is no radiographic evidence of acute osteomyelitis.  There is mild joint space narrowing of the medial and lateral tibiofemoral compartments with adjacent osteophytes.  Remaining joint spaces are preserved.  There is diffuse soft tissue edema.  Soft tissue irregularities of the ankle may represent ulcerations.  There is no soft tissue gas.  There is no radiopaque foreign body in the field of  view.    Left tibia and fibula: There is no acute fracture or dislocation. Alignment is normal. There is no radiographic evidence of acute osteomyelitis. There is mild joint space narrowing of the medial and lateral tibiofemoral compartments with adjacent osteophytes. Remaining joint spaces are preserved. There is diffuse soft tissue edema. Soft tissue irregularities of the ankle may represent ulcerations. There is no soft tissue gas. There is no radiopaque foreign body in the field of view.                                       X-Ray Chest AP Portable (Final result)  Result time 06/10/25 21:28:04      Final result by Faheem Villalpando DO (06/10/25 21:28:04)                   Impression:      Questionable right basilar opacity, may represent atelectasis, pneumonia or, or aspiration      Electronically signed by: Faheem Villalpando  Date:    06/10/2025  Time:    21:28               Narrative:    EXAMINATION:  XR CHEST AP PORTABLE    CLINICAL HISTORY:  Sepsis;    TECHNIQUE:  Single frontal view of the chest was performed.    COMPARISON:  10/04/2024.    FINDINGS:  The lungs are hypoexpanded.  Questionable right basilar opacity, may represent atelectasis, pneumonia or, or aspiration.  There is coarse interstitial prominence, unchanged.  The remaining lungs are clear.  The pleural spaces are clear the cardiac silhouette is enlarged.  There are calcifications of the aortic arch.  Osseous structures demonstrate degenerative changes.                                       Medications   vancomycin (VANCOCIN) 2,500 mg in 0.9% NaCl 500 mL IVPB (2,500 mg Intravenous New Bag 6/10/25 2152)   acetaminophen tablet 1,000 mg (1,000 mg Oral Given 6/10/25 1944)   lactated ringers bolus 1,434 mL (0 mLs Intravenous Stopped 6/10/25 2135)   ceFEPIme injection 2 g (2 g Intravenous Given 6/10/25 2134)   apixaban tablet 5 mg (5 mg Oral Given 6/10/25 2132)     Medical Decision Making  74-year-old female presenting for generalized weakness.  She is  febrile at 103.2° F, tachycardic, tachypneic and hypertensive.  She appears ill.    Differential diagnosis:  Sepsis, unclear source but possibly due to skin infection   UTI   Bacteremia   Dehydration  Influenza  COVID 19    Will do septic workup, give broad-spectrum antibiotics, fluids, antipyretics and reassess.    This is a a pending remainder of workup.  I discussed this patient with my supervising physician who also evaluated this patient and I am signing out to Dee Hurley PA-C.    9:58 PM      This patient does have evidence of infective focus  My overall impression is sepsis without organ dysfunction.  Source: Unknown  Antibiotics given- Antibiotics (72h ago, onward)    Start     Stop Route Frequency Ordered    06/10/25 2100  vancomycin (VANCOCIN) 2,500 mg in 0.9% NaCl 500 mL IVPB    (ED Adult Sepsis Treatment)         -- IV Once 06/10/25 2011      Latest lactate reviewed-  Lab             06/10/25                       2016          POCLAC       1.8           Organ dysfunction indicated by N/A    Fluid challenge Ideal Body Weight- The patient is obese (BMI>30) and their ideal body weight of Ideal body weight: 47.8 kg (105 lb 6.1 oz) will be used to calculate fluid bolus of 30 ml/kg.     Post- resuscitation assessment Yes - I attest a sepsis perfusion exam was performed within 6 hours of sepsis, severe sepsis, or septic shock presentation, following fluid resuscitation.      Will Start Pressors- Levophed for MAP of 65  Source control achieved by: N/A      Amount and/or Complexity of Data Reviewed  Labs: ordered. Decision-making details documented in ED Course.  Radiology: ordered and independent interpretation performed. Decision-making details documented in ED Course.  ECG/medicine tests: ordered and independent interpretation performed.    Risk  OTC drugs.  Prescription drug management.  Decision regarding hospitalization.               ED Course as of 06/10/25 2158   Tue Mj 10, 2025   2059 WBC:  9.52 [CC]   2059 Hemoglobin(!): 8.5 [CC]   2059 POC Lactate: 1.8 [CC]   2112 X-Ray Chest AP Portable  Per my independent interpretation, increased interstitial markings, cardiomegaly, similar compared to prior.  No focal consolidation [CC]      ED Course User Index  [CC] Shara Banks PA-C                           Clinical Impression:  Final diagnoses:  [R00.0] Tachycardia  [Z13.6] Screening for cardiovascular condition  [T14.8XXA, L08.9] Wound infection          ED Disposition Condition    Admit                       [1]   Social History  Tobacco Use    Smoking status: Never    Smokeless tobacco: Never   Substance Use Topics    Alcohol use: No    Drug use: No        Shara Banks PA-C  06/10/25 [id number]

## 2025-06-11 NOTE — PT/OT/SLP EVAL
Occupational Therapy   Co-Evaluation  Co-evaluation/treatment performed due to patient's multiple deficits requiring two skilled therapists to appropriately and safely assess patient's strength, endurance, functional mobility, and ADL performance while facilitating functional tasks in addition to accommodating for patient's activity tolerance and medical acuity.   Name: Lupis Murcia  MRN: [id number]  Admitting Diagnosis: Sepsis  Recent Surgery: * No surgery found *      Recommendations:     Discharge Recommendations: Low Intensity Therapy  Discharge Equipment Recommendations:  lift device, hospital bed  Barriers to discharge:  Decreased caregiver support    Assessment:     Lupis Murcia is a 74 y.o. female with a medical diagnosis of Sepsis.  She presents with limited motivation with presented therapeutic assessments this date. Pt reporting severe pain in BLE and pain, wishing to perform therapy eval at a later time. Pt's daughter at bedside reporting pt has been working with  PT on functional transfers and has recently received a power wheelchair.  Pt and pt's daughter provided with education regarding benefits of lift divide upon DC in regards to pt's occupational justice and to reduce caregiver burden. Pt requires significant assistance for all occupational needs at her PLOF. Pt would benefit from follow up OT session in efforts to maintain/restore functional strength required to engage with preferred occupations of choice.  Performance deficits affecting function: weakness, impaired endurance, impaired self care skills, impaired functional mobility, gait instability, impaired balance, decreased upper extremity function, decreased lower extremity function, impaired skin, decreased safety awareness, pain, edema, decreased ROM.      Rehab Prognosis: Poor; patient would benefit from acute skilled OT services to address these deficits and reach maximum level of function.       Plan:     Patient to be seen 3  "x/week to address the above listed problems via self-care/home management, therapeutic activities, therapeutic exercises, neuromuscular re-education, wheelchair management/training  Plan of Care Expires: 07/11/25  Plan of Care Reviewed with: patient, daughter    Subjective     Chief Complaint: "I have been here and haven't gotten any rest. I have the right to refuse can we just do this tomorrow?"  Patient/Family Comments/goals: pt's daughter at bedside, yelling at pt to participate.     Occupational Profile:  Living Environment: pt lives with her daughter in  [facility] with ramp entrance. Pt has tub/shower combo. Previous level of function: Pt receives sponge baths in lift chair and is in her lift chair for the majority of the day. Pt uses briefs for toileting needs, which dtr provides A for. Pt requires MOD A for dressing needs, and is IND with feeding.  Roles and Routines: none stated  Equipment Used at Home: power chair, walker, rolling, drop arm commode, shower chair, slide board (lift chair)  Assistance upon Discharge: daughter, however daughter works during the day during week days    Pain/Comfort:  Pain Rating 1: other (see comments) (not rated)  Location - Side 1: Left  Location 1: leg  Pain Addressed 1: Reposition, Distraction, Cessation of Activity  Pain Rating Post-Intervention 1: other (see comments) (not rated)    Patients cultural, spiritual, Yazdanism conflicts given the current situation: no    Objective:     Communicated with: RN prior to session.  Patient found supine with telemetry, peripheral IV upon OT entry to room.    General Precautions: Standard, fall, diabetic  Orthopedic Precautions: N/A  Braces: N/A  Respiratory Status: Room air    Occupational Performance:    Bed Mobility:    Patient completed Supine to Sit with total assistance and 2 persons, unable to perform full sit this date 2/2 pt refusing further mobility    Cognitive/Visual Perceptual:  Cognitive/Psychosocial Skills:     -       " Oriented to: Person, Place, Time, and Situation   -       Follows Commands/attention:Follows multistep  commands  -       Communication: clear/fluent  -       Memory: No Deficits noted  -       Safety awareness/insight to disability: impaired   -       Mood/Affect/Coping skills/emotional control: Agitated    Physical Exam:  Skin integrity: Wound BLE  Edema:  Severe BLE  Upper Extremity Range of Motion:     -       Right Upper Extremity: WFL  -       Left Upper Extremity: Deficits: 90* shd flexion  Upper Extremity Strength:    -       Right Upper Extremity: Deficits: 4/5  -       Left Upper Extremity: Deficits: 4/5   Strength:    -       Right Upper Extremity: WFL  -       Left Upper Extremity: WFL    AMPAC 6 Click ADL:  AMPAC Total Score: 6    Treatment & Education:  Pt educated on the following:  - role of OT and OT POC, including DC from OT. Pt verbalized understanding.  - importance of continued mobilization  - Safe transfer techniques and proper body mechanics for fall prevention and improved independence with functional transfers   - Importance of OOB activities to increase endurance and tolerance for increased participation in daily ADLs.    - All pt questions within OT scope of practice addressed, pt verbalized understanding.     .    Patient left HOB elevated with all lines intact, call button in reach, RN notified, and dtr present    GOALS:   Multidisciplinary Problems       Occupational Therapy Goals          Problem: Occupational Therapy    Goal Priority Disciplines Outcome Interventions   Occupational Therapy Goal     OT, PT/OT Progressing    Description: Goals to be met by: 7/11/2025     Patient will increase functional independence with ADLs by performing:    Feeding with [place].  UE Dressing with Moderate Assistance.  LE Dressing with Moderate Assistance.  Grooming while seated with Minimal Assistance.  Toileting from bed level with Moderate Assistance for hygiene and clothing management.    Rolling to Bilateral with Minimal Assistance.   Squat pivot transfers with Maximum Assistance.  Toilet transfer to bedside commode with Maximum Assistance.                         DME Justifications:  No DME recommended requiring DME justifications    History:     Past Medical History:   Diagnosis Date    Lymphedema     MS (multiple sclerosis)     Other pulmonary embolism without acute cor pulmonale     Vitamin B12 deficiency          Past Surgical History:   Procedure Laterality Date    BREAST CYST EXCISION Left     over 40yrs ago     SECTION      ESOPHAGOGASTRODUODENOSCOPY N/A 2022    Procedure: EGD (ESOPHAGOGASTRODUODENOSCOPY);  Surgeon: Radha Arango MD;  Location: [address];  Service: Endoscopy;  Laterality: N/A;       Time Tracking:     OT Date of Treatment: 25  OT Start Time: 0957  OT Stop Time: 1020  OT Total Time (min): 23 min    Billable Minutes:Evaluation 10  Self Care/Home Management 13    2025

## 2025-06-11 NOTE — ASSESSMENT & PLAN NOTE
Pictures in media tab. Severe swelling with chronic skin scaling, with scaling and crustings.    - podiatry consulted  - wound care consulted  - continue abx

## 2025-06-11 NOTE — ASSESSMENT & PLAN NOTE
Previously admitted in 2024 for fever and weakness found to have enterococcal/Staph Epi bacteremia of unclear source. Thought potential source to be presacral skin irritation with fecal contamination. Patient deferred PETE at that time, though TTE without vegetation. Patient was treated with vancomycin and has improved.

## 2025-06-11 NOTE — PHARMACY MED REC
"        Admission Medication History     The home medication history was taken by Zaina Griffin.    You may go to "Admission" then "Reconcile Home Medications" tabs to review and/or act upon these items.     The home medication list has been updated by the Pharmacy department.   Please read ALL comments highlighted in yellow.   Please address this information as you see fit.    Feel free to contact us if you have any questions or require assistance.      The medications listed below were removed from the home medication list. Please reorder if appropriate:  Patient reports no longer taking the following medication(s):  ATACAND HCT)    Medications listed below were obtained from: Patient/family and Analytic software- [email or web address]  Current Outpatient Medications on File Prior to Encounter   Medication Sig    acetaminophen-codeine 300-30mg (TYLENOL #3) 300-30 mg Tab Take 1 tablet by mouth daily as needed for pain.      ammonium lactate (LAC-HYDRIN) 12 % lotion Apply topically 2 (two) times daily.      apixaban (ELIQUIS) 5 mg Tab Take 1 tablet (5 mg total) by mouth 2 (two) times a day.      cholecalciferol, vitamin D3, 125 mcg (5,000 unit) Tab Take 5,000 Units by mouth once daily.      cyanocobalamin 1,000 mcg/mL injection Inject 1,000 mcg into the muscle every 30 days.      LORazepam (ATIVAN) 1 MG tablet Take 1 mg by mouth every 12 (twelve) hours as needed for Anxiety.      ondansetron (ZOFRAN-ODT) 4 MG TbDL Dissolve 1 tablet (4 mg total) by mouth every 6 (six) hours as needed for Nausea      predniSONE (DELTASONE) 20 MG tablet Take 1 tablet (20 mg total) by mouth daily as needed for foot/leg pain.         Zaina Griffin  [id number]          .          "

## 2025-06-11 NOTE — ASSESSMENT & PLAN NOTE
Body mass index is 39.64 kg/m². Morbid obesity complicates all aspects of disease management from diagnostic modalities to treatment. Weight loss encouraged and health benefits explained to patient.

## 2025-06-11 NOTE — ASSESSMENT & PLAN NOTE
Reports feeling worsening numbness and tingling at time prior to diagnosis, then with weakness of her lower extremities. Underwent LP which confirmed diagnosis. Has not been on treatment. Denies worsening of numbness/tingling or focal weakness.

## 2025-06-11 NOTE — PROVIDER PROGRESS NOTES - EMERGENCY DEPT.
Encounter Date: 6/10/2025    ED Physician Progress Notes     Patient signed out to me by my colleague with instructions to follow-up pending work-up. Please see main ED note for previous ED stay documentation.      Patient signed out to me pending final disposition.  Briefly, this is a 74-year-old female with multiple or sclerosis, aortic stenosis, lymphedema, history of pulmonary embolus on Eliquis sense for generalized weakness. She called her daughter and stated that she was very weak and unable to walk with her walker. She denies any other specific complaints. Her wound care nurse was dressing her leg wounds today and did see some small maggots. She denies increased pain in her legs, purulent discharge, cough, shortness of breath, chest pain, abdominal pain, vomiting, urinary symptoms or other complaints.    Presented febrile, tachycardic, and tachypneic.  Was ill-appearing.  Sepsis workup was initiated.  CBC with no leukocytosis.  CMP unremarkable.  Lactate normal.  CXR Questionable right basilar opacity, may represent atelectasis, pneumonia or, or aspiration.  Troponin elevated at 92 though no priors in for comparison.  EKG sinus tachycardia with nonspecific ST-T depressions.  Admitted to hospital medicine for further workup and treatment of sepsis.

## 2025-06-11 NOTE — PLAN OF CARE
PT Evaluation completed and PT POC established.    Problem: Physical Therapy  Goal: Physical Therapy Goal  Description: Goals to be met by: 2025     Patient will increase functional independence with mobility by performin. Supine to sit with Maximum Assistance  2. Sit to supine with Maximum Assistance  3. Sit to stand transfer with Maximum Assistance  4. Bed to chair transfer with Maximum Assistance using LRAD  5. Sitting at edge of bed x10 minutes with Supervision  Outcome: Progressing     Patient requires a hospital bed for positioning of the body in ways that are not feasible with an ordinary bed. The patient requires special positioning for pain relief, limited mobility, and/or being unable to independently make changes in body position without the use of a hospital bed. Pillows and wedges will not be adequate for resolving these positional issues.

## 2025-06-11 NOTE — SUBJECTIVE & OBJECTIVE
Scheduled Meds:   ammonium lactate   Topical (Top) BID    apixaban  5 mg Oral BID    ceFEPime IV (PEDS and ADULTS)  2 g Intravenous Q8H    cholecalciferol (vitamin D3)  5,000 Units Oral Daily    potassium bicarbonate  50 mEq Oral Once    vancomycin (VANCOCIN) IV (PEDS and ADULTS)  1,500 mg Intravenous Q12H     Continuous Infusions:  PRN Meds:  Current Facility-Administered Medications:     acetaminophen, 1,000 mg, Oral, Q8H PRN    HYDROcodone-acetaminophen, 1 tablet, Oral, Q6H PRN    LORazepam, 1 mg, Oral, Q12H PRN    ondansetron, 8 mg, Oral, Q8H PRN    sodium chloride 0.9%, 10 mL, Intravenous, Q12H PRN    Pharmacy to dose Vancomycin consult, , , Once **AND** vancomycin - pharmacy to dose, , Intravenous, pharmacy to manage frequency    Review of patient's allergies indicates:   Allergen Reactions    Contrast media Shortness Of Breath and Rash    Pcn [penicillins] Shortness Of Breath and Rash    Celebrex [celecoxib] Other (See Comments)     Swallowing problems     Diazepam Hives    Gabapentin Other (See Comments)     Confusion     Motrin [ibuprofen] Rash        Past Medical History:   Diagnosis Date    Lymphedema     MS (multiple sclerosis)     Other pulmonary embolism without acute cor pulmonale     Vitamin B12 deficiency      Past Surgical History:   Procedure Laterality Date    BREAST CYST EXCISION Left     over 40yrs ago     SECTION      ESOPHAGOGASTRODUODENOSCOPY N/A 2022    Procedure: EGD (ESOPHAGOGASTRODUODENOSCOPY);  Surgeon: Radha Arango MD;  Location: [address]);  Service: Endoscopy;  Laterality: N/A;       Family History       Problem Relation (Age of Onset)    Brain cancer Brother    Coronary artery disease Father    Lung cancer Father, Mother          Tobacco Use    Smoking status: Never    Smokeless tobacco: Never   Substance and Sexual Activity    Alcohol use: No    Drug use: No    Sexual activity: Not on file     Review of Systems   Constitutional:  Positive for fever.    Eyes:  Negative for visual disturbance.   Respiratory:  Negative for cough and shortness of breath.    Cardiovascular:  Positive for leg swelling (chronic). Negative for chest pain.   Gastrointestinal:  Negative for abdominal pain and vomiting.   Musculoskeletal:  Positive for arthralgias, gait problem and joint swelling.   Skin:  Positive for rash and wound.   Neurological:  Positive for weakness.   Psychiatric/Behavioral:  Negative for confusion.      Objective:     Vital Signs (Most Recent):  Temp: 98.2 °F (36.8 °C) (06/11/25 0400)  Pulse: 95 (06/11/25 0800)  Resp: 18 (06/11/25 0400)  BP: 137/68 (06/11/25 0800)  SpO2: 95 % (06/11/25 0800) Vital Signs (24h Range):  Temp:  [98.2 °F (36.8 °C)-103.2 °F (39.6 °C)] 98.2 °F (36.8 °C)  Pulse:  [] 95  Resp:  [16-24] 18  SpO2:  [92 %-99 %] 95 %  BP: (112-160)/() 137/68     Weight: 95.2 kg (209 lb 12.8 oz)  Body mass index is 39.64 kg/m².    Foot Exam    General  General Appearance: appears stated age and healthy   Orientation: alert and oriented to person, place, and time       Right Foot/Ankle     Inspection and Palpation  Ecchymosis: none  Tenderness: none   Swelling: (+3 edema LE)  Skin Exam: cellulitis and erythema; no drainage, no abnormal color and no ulcer     Neurovascular  Dorsalis pedis: absent  Posterior tibial: absent  Saphenous nerve sensation: normal  Tibial nerve sensation: normal  Superficial peroneal nerve sensation: normal  Deep peroneal nerve sensation: normal  Sural nerve sensation: normal    Comments  Venous stasis dermatitis present in RLE; diffuse RLE swelling. No discernible open wounds in the RLE.     Stasis dermatitis     Left Foot/Ankle      Inspection and Palpation  Ecchymosis: none  Tenderness: (diffuse LLE)  Swelling: (+3 edema LE)  Skin Exam: cellulitis and erythema; no drainage, no abnormal color and no ulcer     Neurovascular  Dorsalis pedis: absent  Posterior tibial: absent  Saphenous nerve sensation: normal  Tibial nerve  sensation: normal  Superficial peroneal nerve sensation: normal  Deep peroneal nerve sensation: normal  Sural nerve sensation: normal    Comments  Venous stasis dermatitis present in LLE; diffuse LLE swelling. No discernible open wounds in the LLE.     Stasis dermatitis       Laboratory:  CBC:   Recent Labs   Lab 06/11/25  0659   WBC 6.53   RBC 3.12*   HGB 7.7*   HCT 26.7*      MCV 86   MCH 24.7*   MCHC 28.8*     CMP:   Recent Labs   Lab 06/11/25  0659   *   CALCIUM 8.4*   ALBUMIN 3.0*   PROT 6.3      K 3.4*   CO2 23      BUN 11   CREATININE 0.7   ALKPHOS 56   ALT 6*   AST 9*   BILITOT 0.4     Microbiology Results (last 7 days)       Procedure Component Value Units Date/Time    Urine culture [[id number]] Collected: 06/11/25 0705    Order Status: Sent Specimen: Urine Updated: 06/11/25 0809    MRSA Screen by PCR [[id number]] Collected: 06/11/25 0707    Order Status: Sent Specimen: Nasal Swab Updated: 06/11/25 0747    Blood culture x two cultures. Draw prior to antibiotics. [[id number]]  (Normal) Collected: 06/10/25 2015    Order Status: Completed Specimen: Blood from Peripheral, Antecubital, Right Updated: 06/11/25 0501     Blood Culture No Growth After 6 Hours    Blood culture x two cultures. Draw prior to antibiotics. [[id number]]  (Normal) Collected: 06/10/25 2032    Order Status: Completed Specimen: Blood from Peripheral, Hand, Left Updated: 06/11/25 0501     Blood Culture No Growth After 6 Hours    Culture, Respiratory [[id number]]     Order Status: Sent Specimen: Respiratory from Sputum, Expectorated     Sputum gram stain [[id number]]     Order Status: Sent Specimen: Respiratory           All pertinent labs reviewed within the last 24 hours.    Diagnostic Results:  X-Ray: I have reviewed all pertinent results/findings within the past 24 hours.     I have reviewed all pertinent imaging results/findings within the past 24 hours.    Clinical Findings:  Right leg  Left leg

## 2025-06-11 NOTE — HPI
Lupis Murcia is a 74-year-old female with a history of Multiple Sclerosis not on immunosuppression, Lymphedema of bilateral lower extremities c/b prior osteomyelitis of the left heel, and h/o PE on apixaban who presented with acute on chronic weakness. Daughter at bedside provides additional history. Patient reports needing to call her daughter earlier in the day as she felt herself sliding out of her chair. She has chronic weakness from MS with left foot drop, reports being able to sit up in her chair. She otherwise does not have new complaints. She reports chronic, intermittent lower extremity pain related to her lymphedema. She denies cough, congestion, dyspnea, chest pain, abdominal pain, worsening lower extremity pain.    Since arrival, she reports severe buttocks pain which she relates to being in a new and uncomfortable bed. In the ED, she was found to be tachycardic and febrile to 103.2 for which she was received 1.5L IVF and vanc/cefepime (h/o penicillin allergy).    Of note, she was seen by her podiatry recently who prescribed doxycycline and duloxetine, however, she did not start these medications. She also has history of staph epi and enterococcus bacteremia without known source, though thought likely related to sacral skin irritation with fecal contamination.

## 2025-06-11 NOTE — H&P
[facility] - Emergency Dept  [facility] Medicine  History & Physical    Patient Name: Lupis Murcia  MRN: [id number]  Patient Class: IP- Inpatient  Admission Date: 6/10/2025  Attending Physician: Irasema Ruiz MD   Primary Care Provider: Bobby Tran MD         Patient information was obtained from patient, relative(s), past medical records, and ER records.     Subjective:     Principal Problem:Sepsis    Chief Complaint:   Chief Complaint   Patient presents with    Weakness     Temp of 103, generalized weakness, denies pain        HPI: Lupis Murcia is a 74-year-old female with a history of Multiple Sclerosis not on immunosuppression, Lymphedema of bilateral lower extremities c/b prior osteomyelitis of the left heel, and h/o PE on apixaban who presented with acute on chronic weakness. Daughter at bedside provides additional history. Patient reports needing to call her daughter earlier in the day as she felt herself sliding out of her chair. She has chronic weakness from MS with left foot drop, reports being able to sit up in her chair. She otherwise does not have new complaints. She reports chronic, intermittent lower extremity pain related to her lymphedema. She denies cough, congestion, dyspnea, chest pain, abdominal pain, worsening lower extremity pain.    Since arrival, she reports severe buttocks pain which she relates to being in a new and uncomfortable bed. In the ED, she was found to be tachycardic and febrile to 103.2 for which she was received 1.5L IVF and vanc/cefepime (h/o penicillin allergy).    Of note, she was seen by her podiatry recently who prescribed doxycycline and duloxetine, however, she did not start these medications. She also has history of staph epi and enterococcus bacteremia without known source, though thought likely related to sacral skin irritation with fecal contamination.    Past Medical History:   Diagnosis Date    Lymphedema      MS (multiple sclerosis)      Other  pulmonary embolism without acute cor pulmonale      Vitamin B12 deficiency                 Past Surgical History:   Procedure Laterality Date    BREAST CYST EXCISION Left       over 40yrs ago     SECTION        ESOPHAGOGASTRODUODENOSCOPY N/A 2022     Procedure: EGD (ESOPHAGOGASTRODUODENOSCOPY);  Surgeon: Radha Arango MD;  Location: [address]);  Service: Endoscopy;  Laterality: N/A;               Review of patient's allergies indicates:   Allergen Reactions    Contrast media Shortness Of Breath and Rash    Pcn [penicillins] Shortness Of Breath and Rash    Celebrex [celecoxib] Other (See Comments)       Swallowing problems     Diazepam Hives    Gabapentin Other (See Comments)       Confusion     Motrin [ibuprofen] Rash         No current facility-administered medications on file prior to encounter.           Current Outpatient Medications on File Prior to Encounter   Medication Sig    acetaminophen-codeine 300-30mg (TYLENOL #3) 300-30 mg Tab Take 1 tablet by mouth daily as needed (pain).    ammonium lactate (LAC-HYDRIN) 12 % lotion Apply topically 2 (two) times daily.    apixaban (ELIQUIS) 5 mg Tab Take 1 tablet (5 mg total) by mouth 2 (two) times a day.    candesartan-hydrochlorothiazide (ATACAND HCT) 16-12.5 mg per tablet Take 1 tablet by mouth once daily.    cholecalciferol, vitamin D3, 125 mcg (5,000 unit) Tab Take 5,000 Units by mouth once daily.    cyanocobalamin 1,000 mcg/mL injection Inject 1,000 mcg into the muscle every 30 days.    LORazepam (ATIVAN) 1 MG tablet Take 1 mg by mouth every 12 (twelve) hours as needed for Anxiety.    ondansetron (ZOFRAN-ODT) 4 MG TbDL Dissolve 1 tablet (4 mg total) by mouth every 6 (six) hours as needed (Nausea).    predniSONE (DELTASONE) 20 MG tablet Take 1 tablet (20 mg total) by mouth daily as needed (foot/leg pain).    [DISCONTINUED] baclofen (LIORESAL) 10 MG tablet Take 1 tablet (10 mg total) by mouth 2 (two) times daily. (Patient taking  differently: Take 10 mg by mouth 2 (two) times daily as needed.)    [DISCONTINUED] hydroCHLOROthiazide (HYDRODIURIL) 12.5 MG Tab Take 1 tablet (12.5 mg total) by mouth once daily.    [DISCONTINUED] miconazole (MICOTIN) 2 % cream Apply topically 2 (two) times daily. Apply to intertriginous areas, lower abdomen groin for 14 days    [DISCONTINUED] tamsulosin (FLOMAX) 0.4 mg Cap Take 1 capsule (0.4 mg total) by mouth daily with lunch.      Family History         Problem Relation (Age of Onset)     Brain cancer Brother     Coronary artery disease Father     Lung cancer Father, Mother                  Tobacco Use    Smoking status: Never    Smokeless tobacco: Never   Substance and Sexual Activity    Alcohol use: No    Drug use: No    Sexual activity: Not on file      Review of Systems   Constitutional:  Positive for fever.   Eyes:  Negative for visual disturbance.   Respiratory:  Negative for cough and shortness of breath.    Cardiovascular:  Positive for leg swelling (chronic). Negative for chest pain.   Gastrointestinal:  Negative for abdominal pain and vomiting.   Musculoskeletal:  Positive for arthralgias, gait problem and joint swelling.   Skin:  Positive for rash and wound.   Neurological:  Positive for weakness.   Psychiatric/Behavioral:  Negative for confusion.       Objective:      Vital Signs (Most Recent):  Temp: (!) 100.9 °F (38.3 °C) (06/10/25 2132)  Pulse: 101 (06/11/25 0000)  Resp: 17 (06/10/25 2132)  BP: (!) 112/55 (06/11/25 0000)  SpO2: 95 % (06/11/25 0000) Vital Signs (24h Range):  Temp:  [100.9 °F (38.3 °C)-103.2 °F (39.6 °C)] 100.9 °F (38.3 °C)  Pulse:  [101-120] 101  Resp:  [17-24] 17  SpO2:  [92 %-99 %] 95 %  BP: (112-160)/() 112/55      Weight: 95.2 kg (209 lb 12.8 oz)  Body mass index is 39.64 kg/m².     Physical Exam  Vitals and nursing note reviewed.   Constitutional:       General: She is not in acute distress.     Appearance: She is obese. She is not toxic-appearing.   HENT:      Head:  Normocephalic and atraumatic.      Mouth/Throat:      Mouth: Mucous membranes are dry.   Eyes:      Extraocular Movements: Extraocular movements intact.      Pupils: Pupils are equal, round, and reactive to light.   Cardiovascular:      Rate and Rhythm: Regular rhythm. Tachycardia present.      Heart sounds: Murmur (RUSB) heard.      Systolic murmur is present with a grade of 2/6.   Pulmonary:      Effort: Pulmonary effort is normal. No respiratory distress.      Breath sounds: No stridor. No wheezing or rhonchi.   Abdominal:      General: Abdomen is protuberant. There is no distension.      Tenderness: There is no abdominal tenderness.   Musculoskeletal:      Cervical back: Normal range of motion and neck supple.      Right lower leg: Edema (nonpitting) present.      Left lower leg: Edema (nonpitting) present.   Skin:     General: Skin is warm and dry.      Findings: Rash (BLE) present. Rash is crusting and scaling.   Neurological:      Mental Status: She is alert and oriented to person, place, and time.      GCS: GCS eye subscore is 4. GCS verbal subscore is 5. GCS motor subscore is 6.      Motor: Weakness present.   Psychiatric:         Mood and Affect: Mood is anxious.         Behavior: Behavior is cooperative.                  CRANIAL NERVES      CN III, IV, VI   Pupils are equal, round, and reactive to light.        Significant Labs: All pertinent labs within the past 24 hours have been reviewed.     Significant Imaging: I have reviewed all pertinent imaging results/findings within the past 24 hours.    Assessment/Plan:     Assessment & Plan  MS (multiple sclerosis)  Reports feeling worsening numbness and tingling at time prior to diagnosis, then with weakness of her lower extremities. Underwent LP which confirmed diagnosis. Has not been on treatment. Denies worsening of numbness/tingling or focal weakness.  Lymphedema  Pictures in media tab. Severe swelling with chronic skin scaling, with scaling and  crustings.    - podiatry consulted  - wound care consulted  - continue abx    HTN (hypertension)  Patient's blood pressure range in the last 24 hours was: BP  Min: 112/55  Max: 160/78.The patient's inpatient anti-hypertensive regimen is listed below:  Current Antihypertensives       Plan  - BP is controlled, no changes needed to their regimen  - hold antihypertensives in setting of sepsis  History of pulmonary embolus (PE)  Diagnosed during admission for bacteremia.     - continue apixaban 5mg BID    Sepsis  This patient does not have evidence of infective focus  My overall impression is sepsis without organ dysfunction.  Source: Unknown  Antibiotics given: Vanc / cefepime  Latest lactate reviewed: 1.8    Fluid challenge Ideal Body Weight- The patient is obese (BMI>30) and their ideal body weight of Ideal body weight: 47.8 kg (105 lb 6.1 oz) will be used to calculate fluid bolus of 30 ml/kg.     Post- resuscitation assessment Yes - I attest a sepsis perfusion exam was performed within 6 hours of sepsis, severe sepsis, or septic shock presentation, following fluid resuscitation.    Differential includes soft tissue infection, viral etiology (flu/COVID/RSV, hepatitis, common cold virus, EBV), sacral wound (given severe pain) / abdominopelvic abscess, osteomyelitis, UTI, pneumonia. Less likely VTE/PE, endocarditis (given given history of bacteremia), vasculitis, malignancy.    PLAN:  - continue antibiotics   - vancomycin for enterococcus and staph coverage   - cefepime for pseudomonas and GNR coverage (unable to give Zosyn due to penicillin allergy)   - Flagyl for anaerobic coverage   - follow up blood cultures  - follow UA / culture  - obtain MRSA PCR  - CXR without acute pathology, obtain respiratory culture  - CT a/p to assess for abdominal etiology (given patients buttocks pain, rule out abscess)  - MRI left foot given severe lymphedema limits assessment, and prior h/o osteomyelitis  History of  bacteremia  Previously admitted in 2024 for fever and weakness found to have enterococcal/Staph Epi bacteremia of unclear source. Thought potential source to be presacral skin irritation with fecal contamination. Patient deferred PETE at that time, though TTE without vegetation. Patient was treated with vancomycin and has improved.   Irritant contact dermatitis due to fecal, urinary or dual incontinence  Wound care consulted    Severe obesity (BMI 35.0-39.9) with comorbidity  Body mass index is 39.64 kg/m². Morbid obesity complicates all aspects of disease management from diagnostic modalities to treatment. Weight loss encouraged and health benefits explained to patient.       Type 2 diabetes mellitus with hyperglycemia, without long-term current use of insulin  Hgb A1c 6.5% on 6/11/25    - LDSSI  - QID glucose POCT    Anxiety  Home ativan 1mg BID prn    VTE Risk Mitigation (From admission, onward)           Ordered     apixaban tablet 5 mg  2 times daily         06/10/25 5717                               Pharmacokinetic Initial Assessment & Plan: IV Vancomycin    IV Vancomycin 2500 mg x once given in the ED on 6/10 @ 2200  Then Vancomycin 1500 mg every 12 hours,   Obtain a Vancomycin trough 30 mins prior to the 4 th dose on 6/12 @ 0930   Desired empiric serum trough concentration is 15 to 20 mcg/mL    Pharmacy will continue to follow and monitor vancomycin.    [id number] with any questions regarding this assessment.     Thank you for the consult,   Abdulaziz Moreno       Patient brief summary:  Lupis Murcia is a 74 y.o. female initiated on antimicrobial therapy with IV Vancomycin for treatment of suspected sepsis    Drug Allergies:   Review of patient's allergies indicates:   Allergen Reactions    Contrast media Shortness Of Breath and Rash    Pcn [penicillins] Shortness Of Breath and Rash    Celebrex [celecoxib] Other (See Comments)     Swallowing problems     Diazepam Hives    Gabapentin Other (See Comments)      "Confusion     Motrin [ibuprofen] Rash       Actual Body Weight:   95.2 kg    Renal Function:   Estimated Creatinine Clearance: 64.1 mL/min (based on SCr of 0.8 mg/dL).,     CBC (last 72 hours):  Recent Labs   Lab Result Units 06/10/25  2015   WBC K/uL 9.52   HGB gm/dL 8.5*   HCT % 29.0*   Platelet Count K/uL 271   Lymph % % 10.1*   Mono % % 4.7   Eos % % 0.6   Basophil % % 0.1       Metabolic Panel (last 72 hours):  Recent Labs   Lab Result Units 06/10/25  2015   Sodium mmol/L 141   Potassium mmol/L 3.8   Chloride mmol/L 106   CO2 mmol/L 24   Glucose mg/dL 138*   BUN mg/dL 13   Creatinine mg/dL 0.8   Albumin g/dL 3.5   Bilirubin Total mg/dL 0.3   ALP unit/L 68   AST unit/L 9*   ALT unit/L 5*   Magnesium  mg/dL 1.6       Drug levels (last 3 results):  No results for input(s): "VANCOMYCINRA", "VANCORANDOM", "VANCOMYCINPE", "VANCOPEAK", "VANCOMYCINTR", "VANCOTROUGH" in the last 72 hours.    Microbiologic Results:  Microbiology Results (last 7 days)       Procedure Component Value Units Date/Time    Culture, Respiratory [[id number]]     Order Status: Sent Specimen: Respiratory from Sputum, Expectorated     Sputum gram stain [[id number]]     Order Status: Sent Specimen: Respiratory     MRSA Screen by PCR [[id number]]     Order Status: Sent Specimen: Nasal Swab     Blood culture x two cultures. Draw prior to antibiotics. [[id number]] Collected: 06/10/25 2032    Order Status: Resulted Specimen: Blood from Peripheral, Hand, Left Updated: 06/10/25 2222    Blood culture x two cultures. Draw prior to antibiotics. [[id number]] Collected: 06/10/25 2015    Order Status: Resulted Specimen: Blood from Peripheral, Antecubital, Right Updated: 06/10/25 2034              Melva Selby DO  Department of Hospital Medicine  [facility] - Emergency Dept          "

## 2025-06-11 NOTE — SUBJECTIVE & OBJECTIVE
Past Medical History:   Diagnosis Date    Lymphedema     MS (multiple sclerosis)     Other pulmonary embolism without acute cor pulmonale     Vitamin B12 deficiency        Past Surgical History:   Procedure Laterality Date    BREAST CYST EXCISION Left     over 40yrs ago     SECTION      ESOPHAGOGASTRODUODENOSCOPY N/A 2022    Procedure: EGD (ESOPHAGOGASTRODUODENOSCOPY);  Surgeon: Radha Arango MD;  Location: [address]);  Service: Endoscopy;  Laterality: N/A;       Review of patient's allergies indicates:   Allergen Reactions    Contrast media Shortness Of Breath and Rash    Pcn [penicillins] Shortness Of Breath and Rash    Celebrex [celecoxib] Other (See Comments)     Swallowing problems     Diazepam Hives    Gabapentin Other (See Comments)     Confusion     Motrin [ibuprofen] Rash       No current facility-administered medications on file prior to encounter.     Current Outpatient Medications on File Prior to Encounter   Medication Sig    acetaminophen-codeine 300-30mg (TYLENOL #3) 300-30 mg Tab Take 1 tablet by mouth daily as needed (pain).    ammonium lactate (LAC-HYDRIN) 12 % lotion Apply topically 2 (two) times daily.    apixaban (ELIQUIS) 5 mg Tab Take 1 tablet (5 mg total) by mouth 2 (two) times a day.    candesartan-hydrochlorothiazide (ATACAND HCT) 16-12.5 mg per tablet Take 1 tablet by mouth once daily.    cholecalciferol, vitamin D3, 125 mcg (5,000 unit) Tab Take 5,000 Units by mouth once daily.    cyanocobalamin 1,000 mcg/mL injection Inject 1,000 mcg into the muscle every 30 days.    LORazepam (ATIVAN) 1 MG tablet Take 1 mg by mouth every 12 (twelve) hours as needed for Anxiety.    ondansetron (ZOFRAN-ODT) 4 MG TbDL Dissolve 1 tablet (4 mg total) by mouth every 6 (six) hours as needed (Nausea).    predniSONE (DELTASONE) 20 MG tablet Take 1 tablet (20 mg total) by mouth daily as needed (foot/leg pain).    [DISCONTINUED] baclofen (LIORESAL) 10 MG tablet Take 1 tablet (10 mg total)  by mouth 2 (two) times daily. (Patient taking differently: Take 10 mg by mouth 2 (two) times daily as needed.)    [DISCONTINUED] hydroCHLOROthiazide (HYDRODIURIL) 12.5 MG Tab Take 1 tablet (12.5 mg total) by mouth once daily.    [DISCONTINUED] miconazole (MICOTIN) 2 % cream Apply topically 2 (two) times daily. Apply to intertriginous areas, lower abdomen groin for 14 days    [DISCONTINUED] tamsulosin (FLOMAX) 0.4 mg Cap Take 1 capsule (0.4 mg total) by mouth daily with lunch.     Family History       Problem Relation (Age of Onset)    Brain cancer Brother    Coronary artery disease Father    Lung cancer Father, Mother          Tobacco Use    Smoking status: Never    Smokeless tobacco: Never   Substance and Sexual Activity    Alcohol use: No    Drug use: No    Sexual activity: Not on file     Review of Systems   Constitutional:  Positive for fever.   Eyes:  Negative for visual disturbance.   Respiratory:  Negative for cough and shortness of breath.    Cardiovascular:  Positive for leg swelling (chronic). Negative for chest pain.   Gastrointestinal:  Negative for abdominal pain and vomiting.   Musculoskeletal:  Positive for arthralgias, gait problem and joint swelling.   Skin:  Positive for rash and wound.   Neurological:  Positive for weakness.   Psychiatric/Behavioral:  Negative for confusion.      Objective:     Vital Signs (Most Recent):  Temp: (!) 100.9 °F (38.3 °C) (06/10/25 2132)  Pulse: 101 (06/11/25 0000)  Resp: 17 (06/10/25 2132)  BP: (!) 112/55 (06/11/25 0000)  SpO2: 95 % (06/11/25 0000) Vital Signs (24h Range):  Temp:  [100.9 °F (38.3 °C)-103.2 °F (39.6 °C)] 100.9 °F (38.3 °C)  Pulse:  [101-120] 101  Resp:  [17-24] 17  SpO2:  [92 %-99 %] 95 %  BP: (112-160)/() 112/55     Weight: 95.2 kg (209 lb 12.8 oz)  Body mass index is 39.64 kg/m².     Physical Exam  Vitals and nursing note reviewed.   Constitutional:       General: She is not in acute distress.     Appearance: She is obese. She is not  toxic-appearing.   HENT:      Head: Normocephalic and atraumatic.      Mouth/Throat:      Mouth: Mucous membranes are dry.   Eyes:      Extraocular Movements: Extraocular movements intact.      Pupils: Pupils are equal, round, and reactive to light.   Cardiovascular:      Rate and Rhythm: Regular rhythm. Tachycardia present.      Heart sounds: Murmur (RUSB) heard.      Systolic murmur is present with a grade of 2/6.   Pulmonary:      Effort: Pulmonary effort is normal. No respiratory distress.      Breath sounds: No stridor. No wheezing or rhonchi.   Abdominal:      General: Abdomen is protuberant. There is no distension.      Tenderness: There is no abdominal tenderness.   Musculoskeletal:      Cervical back: Normal range of motion and neck supple.      Right lower leg: Edema (nonpitting) present.      Left lower leg: Edema (nonpitting) present.   Skin:     General: Skin is warm and dry.      Findings: Rash (BLE) present. Rash is crusting and scaling.   Neurological:      Mental Status: She is alert and oriented to person, place, and time.      GCS: GCS eye subscore is 4. GCS verbal subscore is 5. GCS motor subscore is 6.      Motor: Weakness present.   Psychiatric:         Mood and Affect: Mood is anxious.         Behavior: Behavior is cooperative.              CRANIAL NERVES     CN III, IV, VI   Pupils are equal, round, and reactive to light.       Significant Labs: All pertinent labs within the past 24 hours have been reviewed.    Significant Imaging: I have reviewed all pertinent imaging results/findings within the past 24 hours.

## 2025-06-12 PROBLEM — Z87.898 HISTORY OF BACTEREMIA: Status: RESOLVED | Noted: 2024-07-10 | Resolved: 2025-06-12

## 2025-06-12 PROBLEM — R79.89 ELEVATED TROPONIN: Status: RESOLVED | Noted: 2024-09-29 | Resolved: 2025-06-12

## 2025-06-12 PROBLEM — Z73.6 LIMITATION OF ACTIVITIES DUE TO DISABILITY: Status: ACTIVE | Noted: 2025-06-12

## 2025-06-12 LAB
ABSOLUTE EOSINOPHIL (OHS): 0.19 K/UL
ABSOLUTE MONOCYTE (OHS): 0.58 K/UL (ref 0.3–1)
ABSOLUTE NEUTROPHIL COUNT (OHS): 4.1 K/UL (ref 1.8–7.7)
ANION GAP (OHS): 10 MMOL/L (ref 8–16)
BASOPHILS # BLD AUTO: 0.01 K/UL
BASOPHILS NFR BLD AUTO: 0.2 %
BUN SERPL-MCNC: 10 MG/DL (ref 8–23)
CALCIUM SERPL-MCNC: 8.3 MG/DL (ref 8.7–10.5)
CHLORIDE SERPL-SCNC: 107 MMOL/L (ref 95–110)
CO2 SERPL-SCNC: 23 MMOL/L (ref 23–29)
CREAT SERPL-MCNC: 0.7 MG/DL (ref 0.5–1.4)
ERYTHROCYTE [DISTWIDTH] IN BLOOD BY AUTOMATED COUNT: 16.4 % (ref 11.5–14.5)
GFR SERPLBLD CREATININE-BSD FMLA CKD-EPI: >60 ML/MIN/1.73/M2
GLUCOSE SERPL-MCNC: 122 MG/DL (ref 70–110)
HCT VFR BLD AUTO: 24.5 % (ref 37–48.5)
HGB BLD-MCNC: 7.1 GM/DL (ref 12–16)
IMM GRANULOCYTES # BLD AUTO: 0.02 K/UL (ref 0–0.04)
IMM GRANULOCYTES NFR BLD AUTO: 0.3 % (ref 0–0.5)
LYMPHOCYTES # BLD AUTO: 0.98 K/UL (ref 1–4.8)
MAGNESIUM SERPL-MCNC: 1.8 MG/DL (ref 1.6–2.6)
MCH RBC QN AUTO: 24.7 PG (ref 27–31)
MCHC RBC AUTO-ENTMCNC: 29 G/DL (ref 32–36)
MCV RBC AUTO: 85 FL (ref 82–98)
NUCLEATED RBC (/100WBC) (OHS): 0 /100 WBC
PHOSPHATE SERPL-MCNC: 2.9 MG/DL (ref 2.7–4.5)
PLATELET # BLD AUTO: 221 K/UL (ref 150–450)
PMV BLD AUTO: 10.4 FL (ref 9.2–12.9)
POTASSIUM SERPL-SCNC: 3.5 MMOL/L (ref 3.5–5.1)
RBC # BLD AUTO: 2.88 M/UL (ref 4–5.4)
RELATIVE EOSINOPHIL (OHS): 3.2 %
RELATIVE LYMPHOCYTE (OHS): 16.7 % (ref 18–48)
RELATIVE MONOCYTE (OHS): 9.9 % (ref 4–15)
RELATIVE NEUTROPHIL (OHS): 69.7 % (ref 38–73)
SODIUM SERPL-SCNC: 140 MMOL/L (ref 136–145)
VANCOMYCIN TROUGH SERPL-MCNC: 23.2 UG/ML (ref 10–22)
WBC # BLD AUTO: 5.88 K/UL (ref 3.9–12.7)

## 2025-06-12 PROCEDURE — 85025 COMPLETE CBC W/AUTO DIFF WBC: CPT | Performed by: STUDENT IN AN ORGANIZED HEALTH CARE EDUCATION/TRAINING PROGRAM

## 2025-06-12 PROCEDURE — 63600175 PHARM REV CODE 636 W HCPCS: Performed by: STUDENT IN AN ORGANIZED HEALTH CARE EDUCATION/TRAINING PROGRAM

## 2025-06-12 PROCEDURE — 21400001 HC TELEMETRY ROOM

## 2025-06-12 PROCEDURE — 25000003 PHARM REV CODE 250

## 2025-06-12 PROCEDURE — 83735 ASSAY OF MAGNESIUM: CPT | Performed by: STUDENT IN AN ORGANIZED HEALTH CARE EDUCATION/TRAINING PROGRAM

## 2025-06-12 PROCEDURE — 80202 ASSAY OF VANCOMYCIN: CPT | Performed by: STUDENT IN AN ORGANIZED HEALTH CARE EDUCATION/TRAINING PROGRAM

## 2025-06-12 PROCEDURE — 25000003 PHARM REV CODE 250: Performed by: STUDENT IN AN ORGANIZED HEALTH CARE EDUCATION/TRAINING PROGRAM

## 2025-06-12 PROCEDURE — 36415 COLL VENOUS BLD VENIPUNCTURE: CPT | Performed by: STUDENT IN AN ORGANIZED HEALTH CARE EDUCATION/TRAINING PROGRAM

## 2025-06-12 PROCEDURE — 80048 BASIC METABOLIC PNL TOTAL CA: CPT | Performed by: STUDENT IN AN ORGANIZED HEALTH CARE EDUCATION/TRAINING PROGRAM

## 2025-06-12 PROCEDURE — 84100 ASSAY OF PHOSPHORUS: CPT | Performed by: STUDENT IN AN ORGANIZED HEALTH CARE EDUCATION/TRAINING PROGRAM

## 2025-06-12 RX ORDER — ACETAMINOPHEN 325 MG/1
650 TABLET ORAL EVERY 8 HOURS PRN
Status: DISCONTINUED | OUTPATIENT
Start: 2025-06-13 | End: 2025-06-18 | Stop reason: HOSPADM

## 2025-06-12 RX ORDER — ACETAMINOPHEN AND CODEINE PHOSPHATE 300; 30 MG/1; MG/1
1 TABLET ORAL EVERY 6 HOURS PRN
Status: DISCONTINUED | OUTPATIENT
Start: 2025-06-13 | End: 2025-06-18 | Stop reason: HOSPADM

## 2025-06-12 RX ORDER — POTASSIUM CHLORIDE 20 MEQ/1
20 TABLET, EXTENDED RELEASE ORAL ONCE
Status: COMPLETED | OUTPATIENT
Start: 2025-06-12 | End: 2025-06-12

## 2025-06-12 RX ADMIN — MICONAZOLE NITRATE: 20 CREAM TOPICAL at 09:06

## 2025-06-12 RX ADMIN — ACETAMINOPHEN AND CODEINE PHOSPHATE 1 TABLET: 300; 30 TABLET ORAL at 11:06

## 2025-06-12 RX ADMIN — POTASSIUM CHLORIDE 20 MEQ: 1500 TABLET, EXTENDED RELEASE ORAL at 09:06

## 2025-06-12 RX ADMIN — CEFEPIME 2 G: 2 INJECTION, POWDER, FOR SOLUTION INTRAVENOUS at 06:06

## 2025-06-12 RX ADMIN — APIXABAN 5 MG: 5 TABLET, FILM COATED ORAL at 08:06

## 2025-06-12 RX ADMIN — LORAZEPAM 1 MG: 1 TABLET ORAL at 09:06

## 2025-06-12 RX ADMIN — VANCOMYCIN HYDROCHLORIDE 1500 MG: 1.5 INJECTION, POWDER, LYOPHILIZED, FOR SOLUTION INTRAVENOUS at 11:06

## 2025-06-12 RX ADMIN — CEFEPIME 2 G: 2 INJECTION, POWDER, FOR SOLUTION INTRAVENOUS at 09:06

## 2025-06-12 RX ADMIN — CHOLECALCIFEROL TAB 125 MCG (5000 UNIT) 5000 UNITS: 125 TAB at 08:06

## 2025-06-12 RX ADMIN — APIXABAN 5 MG: 5 TABLET, FILM COATED ORAL at 09:06

## 2025-06-12 RX ADMIN — CEFEPIME 2 G: 2 INJECTION, POWDER, FOR SOLUTION INTRAVENOUS at 01:06

## 2025-06-12 RX ADMIN — MICONAZOLE NITRATE: 20 CREAM TOPICAL at 11:06

## 2025-06-12 NOTE — HOSPITAL COURSE
74-year-old female admitted with progressively worsening lower extremity swelling and erythema consistent with cellulitis, on a background of chronic lymphedema. She was started on broad spectrum antibiotics with Vancomycin and Cefepime. Requested ID input. Despite CT changes including Mild urothelial thickening of the left renal pelvis and proximal ureter with stranding, per ID consider positive urine culture contamination in patient with no urinary symptoms. They recommended 7 day total antibiotics treatment with Vanc and Zosyn. Over the course of her stay, her skin exam improved notably with drying wounds, reduced erythema, and improved drainage control. Blood cx remained negative. Urine cultures grew enterococcus faecalis, proteus, and corynebacterium; susceptibilities not defined.  TTE showed EF 60-65% with grade 2 diastolic dysfunction and moderate Pulm HTN w/ PAP 52 mmHg.  IVC pressure 3 mm Hg.  PO lasix PRN for LE edema. Antibiotics ended on 6/16 per ID recs. Stay complicated by HANDY noted yesterday. Patient was treated with IV fluids. Cr has stabilized but not yet at baseline.   Wound Care was consulted and followed her closely throughout admission. They provided daily dressing changes and offloading instructions. Physical Therapy evaluated the patient for mobility support, and her functional status was optimized for safe discharge with home PT in place.   Of note, routine lab testing revealed an HbA1c of 6.5%, concerning for a new diagnosis of diabetes mellitus. Endocrinology referral and outpatient follow-up arranged for further metabolic workup and management.   Discharge Condition:Improved and stable. Tolerating oral diet. Ambulating with assistance. Wound dry and healing  Discharge Disposition:  Home with home health services  Discharge Plan:  · Medications: Discharged on home medication regimen. New diabetic medications not initiated at this time; pending outpatient endocrine evaluation.  · Wound Care:  Continue wound care at home as coordinated by Home Health Services.  · Home Health Services:   o Physical Therapy   o Wound Care follow-up and dressing management  · Follow-Up Appointments:   o Primary Care Physician: Within 1 week   o Endocrinology (Outpatient): Appointment scheduled  Instructions to Patient:  · Monitor for signs of worsening infection (increased redness, swelling, drainage, or fever).  · Maintain limb elevation as tolerated.  · Adhere to home health schedule and physical therapy.  · Maintain skin hygiene and follow Wound Care instructions closely.

## 2025-06-12 NOTE — ASSESSMENT & PLAN NOTE
This patient does not have evidence of infective focus  My overall impression is sepsis without organ dysfunction.  Source: Unknown  Antibiotics given: Vanc / cefepime  Latest lactate reviewed: 1.8    Fluid challenge Ideal Body Weight- The patient is obese (BMI>30) and their ideal body weight of Ideal body weight: 47.8 kg (105 lb 6.1 oz) will be used to calculate fluid bolus of 30 ml/kg.     Post- resuscitation assessment Yes - I attest a sepsis perfusion exam was performed within 6 hours of sepsis, severe sepsis, or septic shock presentation, following fluid resuscitation.    Differential includes soft tissue infection, viral etiology (flu/COVID/RSV, hepatitis, common cold virus, EBV), sacral wound (given severe pain) / abdominopelvic abscess, osteomyelitis, UTI, pneumonia. Less likely VTE/PE, endocarditis (given given history of bacteremia), vasculitis, malignancy.    PLAN:  - continue antibiotics   - vancomycin for enterococcus and staph coverage   - cefepime for pseudomonas and GNR coverage (unable to give Zosyn due to penicillin allergy)   - follow up blood cultures; NGTD  - follow UA / culture  - obtain MRSA PCR; Negative  - CXR without acute pathology, obtain respiratory culture  - CT a/p to assess for abdominal; left renal pelvis stranding  - MRI left foot ; No acute osteomyelitis. diffuse soft tissue edema, correlate for cellulitis

## 2025-06-12 NOTE — SUBJECTIVE & OBJECTIVE
Interval Update:NAEON. Stable vitals with no fever.     Review of Systems   Constitutional:  Negative for activity change.   Eyes:  Negative for visual disturbance.   Respiratory:  Negative for cough and shortness of breath.    Cardiovascular:  Positive for leg swelling (chronic). Negative for chest pain.   Gastrointestinal:  Negative for abdominal pain and vomiting.   Musculoskeletal:  Positive for arthralgias, gait problem and joint swelling.   Skin:  Positive for rash and wound.   Neurological:  Positive for weakness.   Psychiatric/Behavioral:  Negative for confusion.      Objective:     Vital Signs (Most Recent):  Temp: 99.1 °F (37.3 °C) (06/12/25 1516)  Pulse: 100 (06/12/25 1516)  Resp: 18 (06/12/25 1516)  BP: (!) 150/83 (06/12/25 1516)  SpO2: (!) 93 % (06/12/25 1516) Vital Signs (24h Range):  Temp:  [98.8 °F (37.1 °C)-99.5 °F (37.5 °C)] 99.1 °F (37.3 °C)  Pulse:  [] 100  Resp:  [16-19] 18  SpO2:  [92 %-96 %] 93 %  BP: (138-168)/(76-90) 150/83     Weight: 95.2 kg (209 lb 12.8 oz)  Body mass index is 39.64 kg/m².    Intake/Output Summary (Last 24 hours) at 6/12/2025 1756  Last data filed at 6/12/2025 1457  Gross per 24 hour   Intake 360 ml   Output 350 ml   Net 10 ml         Physical Exam  Vitals and nursing note reviewed.   Constitutional:       General: She is not in acute distress.     Appearance: She is obese. She is not toxic-appearing.   HENT:      Head: Normocephalic and atraumatic.      Mouth/Throat:      Mouth: Mucous membranes are dry.   Eyes:      Extraocular Movements: Extraocular movements intact.      Pupils: Pupils are equal, round, and reactive to light.   Cardiovascular:      Rate and Rhythm: Regular rhythm. Tachycardia present.      Heart sounds: Murmur (Sytolic Right sternal border murmur and radiating to the carotid) heard.      Systolic murmur is present with a grade of 2/6.   Pulmonary:      Effort: Pulmonary effort is normal. No respiratory distress.      Breath sounds: No stridor.  No wheezing or rhonchi.   Abdominal:      General: Abdomen is protuberant. There is no distension.      Tenderness: There is no abdominal tenderness.   Musculoskeletal:      Cervical back: Normal range of motion and neck supple.      Right lower leg: Edema (nonpitting) present.      Left lower leg: Edema (nonpitting) present.   Skin:     General: Skin is warm and dry.      Findings: Rash (Bilateral escoriating rash on lower limb) present. Rash is crusting and scaling.   Neurological:      Mental Status: She is alert and oriented to person, place, and time.      GCS: GCS eye subscore is 4. GCS verbal subscore is 5. GCS motor subscore is 6.      Motor: Weakness present.      Comments: Functionality unclear but close to baseline   Psychiatric:         Mood and Affect: Mood normal.         Behavior: Behavior is cooperative.               Significant Labs: All pertinent labs within the past 24 hours have been reviewed.  CBC:   Recent Labs   Lab 06/10/25  2015 06/10/25  2016 06/11/25  0659 06/12/25  0542   WBC 9.52  --  6.53 5.88   HGB 8.5*  --  7.7* 7.1*   HCT 29.0* 27.8 26.7* 24.5*     --  231 221     CMP:   Recent Labs   Lab 06/10/25  2015 06/11/25  0659 06/12/25  0542    140 140   K 3.8 3.4* 3.5    108 107   CO2 24 23 23   * 135* 122*   BUN 13 11 10   CREATININE 0.8 0.7 0.7   CALCIUM 8.8 8.4* 8.3*   PROT 7.3 6.3  --    ALBUMIN 3.5 3.0*  --    BILITOT 0.3 0.4  --    ALKPHOS 68 56  --    AST 9* 9*  --    ALT 5* 6*  --    ANIONGAP 11 9 10       Significant Imaging: I have reviewed all pertinent imaging results/findings within the past 24 hours.

## 2025-06-12 NOTE — NURSING
"   06/12/25 1800   Vital Signs   Temp 99.7 °F (37.6 °C)   Temp Source Oral   Pulse 101   Heart Rate Source Monitor   SpO2 (!) 94 %   BP (!) 149/74   MAP (mmHg) 99     Pt complaints of jittery feeling and stated " I don't feel well, I think is the abx that cause me feeling like this". Pt denies pain, sob and any other symptoms at this.  Med 4 made aware. Pt daughter remains at bedside  "

## 2025-06-12 NOTE — PROGRESS NOTES
Addendum: trough 06/14 @ 1000    Pharmacokinetic Assessment Follow Up: IV Vancomycin    Vancomycin serum concentration assessment/plan(s):    -Vancomycin trough 23.2, supratherapeutic and above goal of 10 - 20 for SSTI, trough also drawn ~1 hour late, true trough likely higher  -Change vancomycin to 1500 mg IVPB Q24H  -Next trough 06/14 @ 1200  -Scr 0.7, stable and at baseline    Drug levels (last 3 results):  Recent Labs   Lab Result Units 06/12/25  1112   Vancomycin Trough ug/ml 23.2*       Pharmacy will continue to follow and monitor vancomycin.    Please contact pharmacy at extension [phone] for questions regarding this assessment.    Thank you for the consult,   Khalif Pierre       Patient brief summary:  Lupis Murcia is a 74 y.o. female initiated on antimicrobial therapy with IV Vancomycin for treatment of skin & soft tissue infection    The patient's current regimen is vancomycin 1500 mg IVPB Q12H    Drug Allergies:   Review of patient's allergies indicates:   Allergen Reactions    Contrast media Shortness Of Breath and Rash    Pcn [penicillins] Shortness Of Breath and Rash    Celebrex [celecoxib] Other (See Comments)     Swallowing problems     Diazepam Hives    Gabapentin Other (See Comments)     Confusion     Motrin [ibuprofen] Rash       Actual Body Weight:   95 kg    Renal Function:   Estimated Creatinine Clearance: 73.2 mL/min (based on SCr of 0.7 mg/dL).,     Dialysis Method (if applicable):  N/A    CBC (last 72 hours):  Recent Labs   Lab Result Units 06/10/25  2015 06/11/25  0659 06/12/25  0542   WBC K/uL 9.52 6.53 5.88   HGB gm/dL 8.5* 7.7* 7.1*   Hemoglobin A1c % 6.5*  --   --    HCT % 29.0* 26.7* 24.5*   Platelet Count K/uL 271 231 221   Lymph % % 10.1* 19.1 16.7*   Mono % % 4.7 10.9 9.9   Eos % % 0.6 2.3 3.2   Basophil % % 0.1 0.2 0.2       Metabolic Panel (last 72 hours):  Recent Labs   Lab Result Units 06/10/25  2015 06/11/25  0659 06/11/25  0705 06/12/25  0542   Sodium mmol/L 141 140  --  140    Potassium mmol/L 3.8 3.4*  --  3.5   Chloride mmol/L 106 108  --  107   CO2 mmol/L 24 23  --  23   Glucose mg/dL 138* 135*  --  122*   Glucose, UA   --   --  Negative  --    BUN mg/dL 13 11  --  10   Creatinine mg/dL 0.8 0.7  --  0.7   Albumin g/dL 3.5 3.0*  --   --    Bilirubin Total mg/dL 0.3 0.4  --   --    ALP unit/L 68 56  --   --    AST unit/L 9* 9*  --   --    ALT unit/L 5* 6*  --   --    Magnesium  mg/dL 1.6 2.6  --  1.8   Phosphorus Level mg/dL  --  2.7  --  2.9       Vancomycin Administrations:  vancomycin given in the last 96 hours                     vancomycin 1,500 mg in 0.9% NaCl 250 mL IVPB (admixture device) (mg) 1,500 mg New Bag 06/12/25 1127     1,500 mg New Bag 06/11/25 2226     1,500 mg New Bag  1011    vancomycin (VANCOCIN) 2,500 mg in 0.9% NaCl 500 mL IVPB (mg) 2,500 mg New Bag 06/10/25 2152                    Microbiologic Results:  Microbiology Results (last 7 days)       Procedure Component Value Units Date/Time    Blood culture x two cultures. Draw prior to antibiotics. [[id number]]  (Normal) Collected: 06/10/25 2032    Order Status: Completed Specimen: Blood from Peripheral, Hand, Left Updated: 06/12/25 1101     Blood Culture No Growth After 36 Hours    Blood culture x two cultures. Draw prior to antibiotics. [[id number]]  (Normal) Collected: 06/10/25 2015    Order Status: Completed Specimen: Blood from Peripheral, Antecubital, Right Updated: 06/12/25 1101     Blood Culture No Growth After 36 Hours    MRSA Screen by PCR [[id number]]  (Normal) Collected: 06/11/25 0707    Order Status: Completed Specimen: Nasal Swab Updated: 06/11/25 0939     MRSA PCR Screen Not Detected    Urine culture [[id number]] Collected: 06/11/25 0705    Order Status: Sent Specimen: Urine Updated: 06/11/25 0809    Culture, Respiratory [[id number]]     Order Status: Sent Specimen: Respiratory from Sputum, Expectorated     Sputum gram stain [[id number]]     Order Status: Sent Specimen: Respiratory

## 2025-06-12 NOTE — CONSULTS
[facility] - Internal Medicine Telemetry  Wound Care    Patient Name:  Lupis Murcia   MRN:  [id number]  Date: 6/12/2025  Diagnosis: Sepsis    History:     Past Medical History:   Diagnosis Date    Lymphedema     MS (multiple sclerosis)     Other pulmonary embolism without acute cor pulmonale     Vitamin B12 deficiency        Social History[1]    Precautions:     Allergies as of 06/10/2025 - Reviewed 06/10/2025   Allergen Reaction Noted    Contrast media Shortness Of Breath and Rash 12/15/2016    Pcn [penicillins] Shortness Of Breath and Rash 07/10/2013    Celebrex [celecoxib] Other (See Comments) 06/12/2017    Diazepam Hives 10/12/2021    Gabapentin Other (See Comments) 09/26/2023    Motrin [ibuprofen] Rash 07/10/2013       [facility] Assessment Details/Treatment   Patient seen for wound care consultation.  Chart reviewed for this encounter.  See flow sheet for findings.    Pt in bed and agreeable to care. Family at the bedside. The BLE are intact, pink and dry with raised, pebbled skin. Drainage not observed at this time. The wounds were cleansed with vashe, abd pads applied and secured with rolled gauze and elastic bandages. Pt positioned on her left side with maximum assistance and cleansed due to pt having a bowel movement. Partial thickness skin loss to the buttocks/perineum with a red and moist wound bed - likely related to moisture and friction. Recommend triad for protection, prevention and moist wound healing. Pt and pt's family educated on the wound care and the importance of turning every 2 hours.     Recommendations:  - BLE: cleanse with vashe, pat dry, apply ABD pads and secure with kerlix/rolled gauze and elastic bandages every shift and prn drainage  - Buttocks and perineum: cleanse with bath wipes and apply triad bid and prn soilage  - Turning every 2 hours   - Heel lift boots  - Waffle mattress overlay - refused      06/12/25 1456        Wound 06/11/25 1242 Moisture associated dermatitis Perineum   Date  First Assessed/Time First Assessed: 06/11/25 1242   Present on Original Admission: Yes  Primary Wound Type: Moisture associated dermatitis  Location: Perineum   Wound Image     Dressing Appearance Open to air   Drainage Amount Scant   Appearance Red;Moist   Tissue loss description Partial thickness   Periwound Area Intact;Dry        Wound Other (comment) Right anterior;dorsal Leg   No Date First Assessed or Time First Assessed found.   Primary Wound Type: Other (comment)  Side: Right  Orientation: anterior;dorsal  Location: Leg   Wound Image    Dressing Appearance Dry   Drainage Amount None   Appearance Intact;Dry;Tan;Pink   Periwound Area Intact;Dry   Care Cleansed with:;Wound cleanser;Other (see comments)  (vashe)   Dressing Applied;Other (comment);Rolled gauze;Elastic bandage  (ABD pads)        Wound 06/11/25 1243 Other (comment) Left anterior;dorsal Leg   Date First Assessed/Time First Assessed: 06/11/25 1243   Present on Original Admission: Yes  Primary Wound Type: Other (comment)  Side: Left  Orientation: anterior;dorsal  Location: Leg   Wound Image     Dressing Appearance Dry;Intact   Drainage Amount None   Appearance Intact;Pink;Dry;Tan   Periwound Area Intact;Dry   Care Cleansed with:;Wound cleanser;Other (see comments)  (vashe)   Dressing Applied;Other (comment);Rolled gauze;Elastic bandage  (ABD pads)     Recommendations made to primary team for above plan via secure chat. Wound care will follow-up as needed.       06/12/2025         [1]   Social History  Socioeconomic History    Marital status:    Tobacco Use    Smoking status: Never    Smokeless tobacco: Never   Substance and Sexual Activity    Alcohol use: No    Drug use: No     Social Drivers of Health     Financial Resource Strain: High Risk (3/24/2025)    Overall Financial Resource Strain (CARDIA)     Difficulty of Paying Living Expenses: Hard   Food Insecurity: No Food Insecurity (3/24/2025)    Hunger Vital Sign     Worried About Running  Out of Food in the Last Year: Never true     Ran Out of Food in the Last Year: Never true   Transportation Needs: No Transportation Needs (3/24/2025)    PRAPARE - Transportation     Lack of Transportation (Medical): No     Lack of Transportation (Non-Medical): No   Physical Activity: Insufficiently Active (3/24/2025)    Exercise Vital Sign     Days of Exercise per Week: 2 days     Minutes of Exercise per Session: 20 min   Stress: No Stress Concern Present (3/24/2025)    [employer] [employer] - Occupational Stress Questionnaire     Feeling of Stress : Only a little   Housing Stability: Low Risk  (3/24/2025)    Housing Stability Vital Sign     Unable to Pay for Housing in the Last Year: No     Number of Times Moved in the Last Year: 0     Homeless in the Last Year: No

## 2025-06-12 NOTE — ASSESSMENT & PLAN NOTE
Reports feeling worsening numbness and tingling at time prior to diagnosis, then with weakness of her lower extremities. Underwent LP which confirmed diagnosis. Has not been on treatment.   No recent history of worsening numbness

## 2025-06-12 NOTE — ASSESSMENT & PLAN NOTE
Pictures in media tab. Severe swelling with chronic skin scaling, with scaling and crustings.    - podiatry on board; appreciate help  - wound care consulted; appreciate input in patient care  - continue abx and wound dressing  - Lower extremity USS: With no evidence of DVT

## 2025-06-12 NOTE — NURSING
Patient states she takes acetaminophen-codeine 300-30 tabs at home and has them at bedside and wants to take those instead of the ordered Norco. STEVAN Henry MD notified. New orders placed. Patient and daughter updated on plan of care; daughter notified to take patient's home supply back home.

## 2025-06-12 NOTE — ASSESSMENT & PLAN NOTE
Anemia is likely due to chronic disease. Most recent hemoglobin and hematocrit are listed below.  Recent Labs     06/10/25  2015 06/10/25  2016 06/11/25  0659 06/12/25  0542   HGB 8.5*  --  7.7* 7.1*   HCT 29.0* 27.8 26.7* 24.5*     Plan  - Monitor serial CBC: Daily  - Transfuse PRBC if patient becomes hemodynamically unstable, symptomatic or H/H drops below 7/21.  - Patient has not received any PRBC transfusions to date  - Patient's anemia is currently worsening.  -Low threshold for blood transfusion

## 2025-06-12 NOTE — ASSESSMENT & PLAN NOTE
Patient's blood pressure range in the last 24 hours was: BP  Min: 143/78  Max: 168/90.The patient's inpatient anti-hypertensive regimen is listed below:  Current Antihypertensives       Plan  - BP is controlled, no changes needed to their regimen  - hold antihypertensives in setting of sepsis

## 2025-06-12 NOTE — PROGRESS NOTES
[facility] - Internal Medicine [facility] Medicine  Progress Note    Patient Name: Lupis Murcia  MRN: [id number]  Patient Class: IP- Inpatient   Admission Date: 6/10/2025  Length of Stay: 2 days  Attending Physician: Irasema Ruiz MD  Primary Care Provider: Bobby Tran MD        Subjective     Principal Problem:Sepsis        HPI:  Lupis Murcia is a 74-year-old female with a history of Multiple Sclerosis not on immunosuppression, Lymphedema of bilateral lower extremities c/b prior osteomyelitis of the left heel, and h/o PE on apixaban who presented with acute on chronic weakness. Daughter at bedside provides additional history. Patient reports needing to call her daughter earlier in the day as she felt herself sliding out of her chair. She has chronic weakness from MS with left foot drop, reports being able to sit up in her chair. She otherwise does not have new complaints. She reports chronic, intermittent lower extremity pain related to her lymphedema. She denies cough, congestion, dyspnea, chest pain, abdominal pain, worsening lower extremity pain.    Since arrival, she reports severe buttocks pain which she relates to being in a new and uncomfortable bed. In the ED, she was found to be tachycardic and febrile to 103.2 for which she was received 1.5L IVF and vanc/cefepime (h/o penicillin allergy).    Of note, she was seen by her podiatry recently who prescribed doxycycline and duloxetine, however, she did not start these medications. She also has history of staph epi and enterococcus bacteremia without known source, though thought likely related to sacral skin irritation with fecal contamination.    Overview/Hospital Course:  Admitted for management of suspected sepsis 2/2 cellulitis under a background of lower limb lymphedema and a possible UTI. On Cefepime and Vancomycin.     Interval Update:NAEON. Stable vitals with no fever. Nutrition consulted for new onset HbA1C of 6.5  for nutrition  management. Cultures all negative so far but patient clinically a low threshold for infective process. Will continue to follow up labs    Review of Systems   Constitutional:  Negative for activity change.   Eyes:  Negative for visual disturbance.   Respiratory:  Negative for cough and shortness of breath.    Cardiovascular:  Positive for leg swelling (chronic). Negative for chest pain.   Gastrointestinal:  Negative for abdominal pain and vomiting.   Musculoskeletal:  Positive for arthralgias, gait problem and joint swelling.   Skin:  Positive for rash and wound.   Neurological:  Positive for weakness.   Psychiatric/Behavioral:  Negative for confusion.      Objective:     Vital Signs (Most Recent):  Temp: 99.1 °F (37.3 °C) (06/12/25 1516)  Pulse: 100 (06/12/25 1516)  Resp: 18 (06/12/25 1516)  BP: (!) 150/83 (06/12/25 1516)  SpO2: (!) 93 % (06/12/25 1516) Vital Signs (24h Range):  Temp:  [98.8 °F (37.1 °C)-99.5 °F (37.5 °C)] 99.1 °F (37.3 °C)  Pulse:  [] 100  Resp:  [16-19] 18  SpO2:  [92 %-96 %] 93 %  BP: (138-168)/(76-90) 150/83     Weight: 95.2 kg (209 lb 12.8 oz)  Body mass index is 39.64 kg/m².    Intake/Output Summary (Last 24 hours) at 6/12/2025 1756  Last data filed at 6/12/2025 1457  Gross per 24 hour   Intake 360 ml   Output 350 ml   Net 10 ml         Physical Exam  Vitals and nursing note reviewed.   Constitutional:       General: She is not in acute distress.     Appearance: She is obese. She is not toxic-appearing.   HENT:      Head: Normocephalic and atraumatic.      Mouth/Throat:      Mouth: Mucous membranes are dry.   Eyes:      Extraocular Movements: Extraocular movements intact.      Pupils: Pupils are equal, round, and reactive to light.   Cardiovascular:      Rate and Rhythm: Regular rhythm. Tachycardia present.      Heart sounds: Murmur (Sytolic Right sternal border murmur and radiating to the carotid) heard.      Systolic murmur is present with a grade of 2/6.   Pulmonary:      Effort:  Pulmonary effort is normal. No respiratory distress.      Breath sounds: No stridor. No wheezing or rhonchi.   Abdominal:      General: Abdomen is protuberant. There is no distension.      Tenderness: There is no abdominal tenderness.   Musculoskeletal:      Cervical back: Normal range of motion and neck supple.      Right lower leg: Edema (nonpitting) present.      Left lower leg: Edema (nonpitting) present.   Skin:     General: Skin is warm and dry.      Findings: Rash (Bilateral escoriating rash on lower limb) present. Rash is crusting and scaling.   Neurological:      Mental Status: She is alert and oriented to person, place, and time.      GCS: GCS eye subscore is 4. GCS verbal subscore is 5. GCS motor subscore is 6.      Motor: Weakness present.      Comments: Functionality unclear but close to baseline   Psychiatric:         Mood and Affect: Mood normal.         Behavior: Behavior is cooperative.               Significant Labs: All pertinent labs within the past 24 hours have been reviewed.  CBC:   Recent Labs   Lab 06/10/25  2015 06/10/25  2016 06/11/25  0659 06/12/25  0542   WBC 9.52  --  6.53 5.88   HGB 8.5*  --  7.7* 7.1*   HCT 29.0* 27.8 26.7* 24.5*     --  231 221     CMP:   Recent Labs   Lab 06/10/25  2015 06/11/25  0659 06/12/25  0542    140 140   K 3.8 3.4* 3.5    108 107   CO2 24 23 23   * 135* 122*   BUN 13 11 10   CREATININE 0.8 0.7 0.7   CALCIUM 8.8 8.4* 8.3*   PROT 7.3 6.3  --    ALBUMIN 3.5 3.0*  --    BILITOT 0.3 0.4  --    ALKPHOS 68 56  --    AST 9* 9*  --    ALT 5* 6*  --    ANIONGAP 11 9 10       Significant Imaging: I have reviewed all pertinent imaging results/findings within the past 24 hours.      Assessment & Plan  MS (multiple sclerosis)  Reports feeling worsening numbness and tingling at time prior to diagnosis, then with weakness of her lower extremities. Underwent LP which confirmed diagnosis. Has not been on treatment.   No recent history of worsening  numbness  Lymphedema  Pictures in media tab. Severe swelling with chronic skin scaling, with scaling and crustings.    - podiatry on board; appreciate help  - wound care consulted; appreciate input in patient care  - continue abx and wound dressing  - Lower extremity USS: With no evidence of DVT  HTN (hypertension)  Patient's blood pressure range in the last 24 hours was: BP  Min: 143/78  Max: 168/90.The patient's inpatient anti-hypertensive regimen is listed below:  Current Antihypertensives       Plan  - BP is controlled, no changes needed to their regimen  - hold antihypertensives in setting of sepsis  History of pulmonary embolus (PE)  Diagnosed during admission for bacteremia.     - continue apixaban 5mg BID    Sepsis  This patient does not have evidence of infective focus  My overall impression is sepsis without organ dysfunction.  Source: Unknown  Antibiotics given: Vanc / cefepime  Latest lactate reviewed: 1.8    Fluid challenge Ideal Body Weight- The patient is obese (BMI>30) and their ideal body weight of Ideal body weight: 47.8 kg (105 lb 6.1 oz) will be used to calculate fluid bolus of 30 ml/kg.     Post- resuscitation assessment Yes - I attest a sepsis perfusion exam was performed within 6 hours of sepsis, severe sepsis, or septic shock presentation, following fluid resuscitation.    Differential includes soft tissue infection, viral etiology (flu/COVID/RSV, hepatitis, common cold virus, EBV), sacral wound (given severe pain) / abdominopelvic abscess, osteomyelitis, UTI, pneumonia. Less likely VTE/PE, endocarditis (given given history of bacteremia), vasculitis, malignancy.    PLAN:  - continue antibiotics   - vancomycin for enterococcus and staph coverage   - cefepime for pseudomonas and GNR coverage (unable to give Zosyn due to penicillin allergy)   - follow up blood cultures; NGTD  - follow UA / culture  - obtain MRSA PCR; Negative  - CXR without acute pathology, obtain respiratory culture  - CT a/p  to assess for abdominal; left renal pelvis stranding  - MRI left foot ; No acute osteomyelitis. diffuse soft tissue edema, correlate for cellulitis  History of bacteremia (Resolved: 6/12/2025)  Previously admitted in 2024 for fever and weakness found to have enterococcal/Staph Epi bacteremia of unclear source. Thought potential source to be presacral skin irritation with fecal contamination. Patient deferred PETE at that time, though TTE without vegetation. Patient was treated with vancomycin and has improved.   Irritant contact dermatitis due to fecal, urinary or dual incontinence  Wound care following    Severe obesity (BMI 35.0-39.9) with comorbidity  Body mass index is 39.64 kg/m². Morbid obesity complicates all aspects of disease management from diagnostic modalities to treatment. Weight loss encouraged and health benefits explained to patient.       Type 2 diabetes mellitus with hyperglycemia, without long-term current use of insulin  Hgb A1c 6.5% on 6/11/25    - LDSSI  - QID glucose POCT  Nutrition consulted; appreciate recs    Anxiety  Home ativan 1mg BID prn  Normocytic anemia  Anemia is likely due to chronic disease. Most recent hemoglobin and hematocrit are listed below.  Recent Labs     06/10/25  2015 06/10/25  2016 06/11/25  0659 06/12/25  0542   HGB 8.5*  --  7.7* 7.1*   HCT 29.0* 27.8 26.7* 24.5*     Plan  - Monitor serial CBC: Daily  - Transfuse PRBC if patient becomes hemodynamically unstable, symptomatic or H/H drops below 7/21.  - Patient has not received any PRBC transfusions to date  - Patient's anemia is currently worsening.  -Low threshold for blood transfusion  Hypokalemia  Patient's most recent potassium results are listed below.   Recent Labs     06/10/25  2015 06/11/25  0659 06/12/25  0542   K 3.8 3.4* 3.5     Plan  - Replete potassium per protocol  - Monitor potassium Daily  - Patient's hypokalemia is stable  -  Limitation of activities due to disability  PT/OT consulted; appreciate  recs    VTE Risk Mitigation (From admission, onward)           Ordered     apixaban tablet 5 mg  2 times daily         06/10/25 5144                    Discharge Planning   USMAN: 6/15/2025     Code Status: Full Code   Medical Readiness for Discharge Date:   Discharge Plan A: Home with family, Home Health            Adri Baer MD  Department of Hospital Medicine   [facility] - Internal Medicine Telemetry

## 2025-06-12 NOTE — PLAN OF CARE
Problem: Adult Inpatient Plan of Care  Goal: Plan of Care Review  Outcome: Progressing  Goal: Patient-Specific Goal (Individualized)  Outcome: Progressing  Goal: Absence of Hospital-Acquired Illness or Injury  Outcome: Progressing  Intervention: Identify and Manage Fall Risk  Flowsheets (Taken 6/12/2025 0502)  Safety Promotion/Fall Prevention:   assistive device/personal item within reach   bed alarm set  Goal: Optimal Comfort and Wellbeing  Outcome: Progressing  Intervention: Provide Person-Centered Care  Flowsheets (Taken 6/12/2025 0502)  Trust Relationship/Rapport:   care explained   choices provided   emotional support provided     Problem: Sepsis/Septic Shock  Goal: Absence of Bleeding  Outcome: Progressing  Intervention: Monitor and Manage Bleeding  Flowsheets (Taken 6/12/2025 0502)  Bleeding Precautions: blood pressure closely monitored  Goal: Blood Glucose Level Within Targeted Range  Outcome: Progressing

## 2025-06-12 NOTE — ASSESSMENT & PLAN NOTE
Patient's most recent potassium results are listed below.   Recent Labs     06/10/25  2015 06/11/25  0659 06/12/25  0542   K 3.8 3.4* 3.5     Plan  - Replete potassium per protocol  - Monitor potassium Daily  - Patient's hypokalemia is stable  -

## 2025-06-12 NOTE — PROGRESS NOTES
[facility] - Internal Medicine Telemetry  Wound Care    Patient Name:  Lupis Murcia   MRN:  [id number]  Date: 6/12/2025  Diagnosis: Sepsis    History:     Past Medical History:   Diagnosis Date    Lymphedema     MS (multiple sclerosis)     Other pulmonary embolism without acute cor pulmonale     Vitamin B12 deficiency        Social History[1]    Precautions:     Allergies as of 06/10/2025 - Reviewed 06/10/2025   Allergen Reaction Noted    Contrast media Shortness Of Breath and Rash 12/15/2016    Pcn [penicillins] Shortness Of Breath and Rash 07/10/2013    Celebrex [celecoxib] Other (See Comments) 06/12/2017    Diazepam Hives 10/12/2021    Gabapentin Other (See Comments) 09/26/2023    Motrin [ibuprofen] Rash 07/10/2013       [facility] Assessment Details/Treatment   Patient seen for wound care consultation for the BLE.   Pt in bed and agreeable to care. Family at the bedside. BLE dressings gently removed. The legs were cleansed with vashe and left open to air due to pending ultrasound evaluation. Will follow-up post ultrasound.     06/12/2025         [1]   Social History  Socioeconomic History    Marital status:    Tobacco Use    Smoking status: Never    Smokeless tobacco: Never   Substance and Sexual Activity    Alcohol use: No    Drug use: No     Social Drivers of Health     Financial Resource Strain: High Risk (3/24/2025)    Overall Financial Resource Strain (CARDIA)     Difficulty of Paying Living Expenses: Hard   Food Insecurity: No Food Insecurity (3/24/2025)    Hunger Vital Sign     Worried About Running Out of Food in the Last Year: Never true     Ran Out of Food in the Last Year: Never true   Transportation Needs: No Transportation Needs (3/24/2025)    PRAPARE - Transportation     Lack of Transportation (Medical): No     Lack of Transportation (Non-Medical): No   Physical Activity: Insufficiently Active (3/24/2025)    Exercise Vital Sign     Days of Exercise per Week: 2 days     Minutes of Exercise per  Session: 20 min   Stress: No Stress Concern Present (3/24/2025)    [employer] - Occupational Stress Questionnaire     Feeling of Stress : Only a little   Housing Stability: Low Risk  (3/24/2025)    Housing Stability Vital Sign     Unable to Pay for Housing in the Last Year: No     Number of Times Moved in the Last Year: 0     Homeless in the Last Year: No

## 2025-06-12 NOTE — PLAN OF CARE
Patient arrived to the unit via ED PCT on stretcher. Patient aaox4. No acute distress noted. Vitals WNL. On room air. Bilateral lower extremities wrapped in biohazard bags per MD recommendation for possible maggot infestation; per Podiatry MD, extremities are to continue to be wrapped until tomorrow 0800. Patient and daughter oriented to room and call light system. Needs/concerns addressed at this time. Plan of care ongoing.

## 2025-06-12 NOTE — PLAN OF CARE
[facility] - Internal Medicine Telemetry  Initial Discharge Assessment       Primary Care Provider: Bobby Tran MD    Admission Diagnosis: Screening for cardiovascular condition [Z13.6]  Tachycardia [R00.0]  Wound infection [T14.8XXA, L08.9]    Admission Date: 6/10/2025  Expected Discharge Date: 6/15/2025    Transition of Care Barriers: (P) None    Payor: MEDICARE / Plan: MEDICARE PART A & B / Product Type: Government /     Extended Emergency Contact Information  Primary Emergency Contact: Amanda Lowery  Address: [address] [place] [address]           [place], [facility] [place]  Mobile Phone: [phone]  Relation: Daughter    Discharge Plan A: (P) Home with family, Home Health  Discharge Plan B: (P) Skilled Nursing Facility      [id number] [place] - [address]  [address]  [place] [address]  Phone: [phone] Fax: [phone]    [facility] - [place] - [address]  [address]  [place] [address]  Phone: [phone] Fax: [phone]      Initial Assessment (most recent)       Adult Discharge Assessment - 06/12/25 1559          Discharge Assessment    Assessment Type Discharge Planning Assessment     Confirmed/corrected address, phone number and insurance Yes     Confirmed Demographics Correct on Facesheet     Source of Information patient     Communicated USMAN with patient/caregiver Yes     People in Home child(reinier), adult     Name(s) of People in Home Amanda Lowery (Daughter)  [phone]     Do you expect to return to your current living situation? Other (see comments)   TBD    Do you have help at home or someone to help you manage your care at home? Yes     Who are your caregiver(s) and their phone number(s)? Amanda Lowery (Daughter)  [phone]     Prior to hospitilization cognitive status: Alert/Oriented     Current cognitive status: Alert/Oriented     Walking or Climbing Stairs Difficulty yes     Walking  or Climbing Stairs ambulation difficulty, requires equipment;stair climbing difficulty, requires equipment;transferring difficulty, requires equipment     Mobility Management Walker and wheelchair     Dressing/Bathing Difficulty yes     Dressing/Bathing bathing difficulty, assistance 1 person     Equipment Currently Used at Home power chair;walker, rolling;bedside commode;shower chair     Readmission within 30 days? No     Patient currently being followed by outpatient case management? Unable to determine (comments)     Do you currently have service(s) that help you manage your care at home? Yes     Name and Contact number of agency Patient unsure of the name of the agency (P)      Is the pt/caregiver preference to resume services with current agency Yes (P)      Do you take prescription medications? Yes (P)      Do you have prescription coverage? Yes (P)      Do you have any problems affording any of your prescribed medications? No (P)      Is the patient taking medications as prescribed? yes (P)      Who is going to help you get home at discharge? TBD (P)      How do you get to doctors appointments? family or friend will provide (P)      Are you on dialysis? No (P)      Do you take coumadin? No (P)      Discharge Plan A Home with family;Home Health (P)      Discharge Plan B Skilled Nursing Facility (P)      DME Needed Upon Discharge  none (P)      Transition of Care Barriers None (P)         Physical Activity    On average, how many days per week do you engage in moderate to strenuous exercise (like a brisk walk)? 2 days (P)      On average, how many minutes do you engage in exercise at this level? 30 min (P)         Financial Resource Strain    How hard is it for you to pay for the very basics like food, housing, medical care, and heating? Not hard at all (P)         Housing Stability    In the last 12 months, was there a time when you were not able to pay the mortgage or rent on time? No (P)      At any time in  the past 12 months, were you homeless or living in a shelter (including now)? No (P)         Transportation Needs    In the past 12 months, has lack of transportation kept you from medical appointments or from getting medications? No (P)      In the past 12 months, has lack of transportation kept you from meetings, work, or from getting things needed for daily living? No (P)         Food Insecurity    Within the past 12 months, you worried that your food would run out before you got the money to buy more. Never true (P)      Within the past 12 months, the food you bought just didn't last and you didn't have money to get more. Never true (P)         Stress    Do you feel stress - tense, restless, nervous, or anxious, or unable to sleep at night because your mind is troubled all the time - these days? Only a little (P)         Social Isolation    How often do you feel lonely or isolated from those around you?  Never (P)         Alcohol Use    Q1: How often do you have a drink containing alcohol? Never (P)      Q2: How many drinks containing alcohol do you have on a typical day when you are drinking? Patient does not drink (P)      Q3: How often do you have six or more drinks on one occasion? Never (P)         Utilities    In the past 12 months has the electric, gas, oil, or water company threatened to shut off services in your home? No (P)         Health Literacy    How often do you need to have someone help you when you read instructions, pamphlets, or other written material from your doctor or pharmacy? Never (P)                    Discharge Plan A and Plan B have been determined by review of patient's clinical status, future medical and therapeutic needs, and coverage/benefits for post-acute care in coordination with multidisciplinary team members.    Dima Louise RN-CM  Case Management  [facility]  [phone]

## 2025-06-13 LAB
ABORH RETYPE: NORMAL
ABSOLUTE EOSINOPHIL (OHS): 0.13 K/UL
ABSOLUTE MONOCYTE (OHS): 0.75 K/UL (ref 0.3–1)
ABSOLUTE NEUTROPHIL COUNT (OHS): 5.15 K/UL (ref 1.8–7.7)
ANION GAP (OHS): 11 MMOL/L (ref 8–16)
BASOPHILS # BLD AUTO: 0.02 K/UL
BASOPHILS NFR BLD AUTO: 0.3 %
BUN SERPL-MCNC: 10 MG/DL (ref 8–23)
CALCIUM SERPL-MCNC: 8.4 MG/DL (ref 8.7–10.5)
CHLORIDE SERPL-SCNC: 105 MMOL/L (ref 95–110)
CO2 SERPL-SCNC: 23 MMOL/L (ref 23–29)
CREAT SERPL-MCNC: 0.7 MG/DL (ref 0.5–1.4)
ERYTHROCYTE [DISTWIDTH] IN BLOOD BY AUTOMATED COUNT: 16.3 % (ref 11.5–14.5)
GFR SERPLBLD CREATININE-BSD FMLA CKD-EPI: >60 ML/MIN/1.73/M2
GLUCOSE SERPL-MCNC: 122 MG/DL (ref 70–110)
HCT VFR BLD AUTO: 25.4 % (ref 37–48.5)
HGB BLD-MCNC: 7.5 GM/DL (ref 12–16)
IMM GRANULOCYTES # BLD AUTO: 0.03 K/UL (ref 0–0.04)
IMM GRANULOCYTES NFR BLD AUTO: 0.4 % (ref 0–0.5)
INDIRECT COOMBS: NORMAL
LYMPHOCYTES # BLD AUTO: 1.06 K/UL (ref 1–4.8)
MAGNESIUM SERPL-MCNC: 1.8 MG/DL (ref 1.6–2.6)
MCH RBC QN AUTO: 24.6 PG (ref 27–31)
MCHC RBC AUTO-ENTMCNC: 29.5 G/DL (ref 32–36)
MCV RBC AUTO: 83 FL (ref 82–98)
NUCLEATED RBC (/100WBC) (OHS): 0 /100 WBC
PHOSPHATE SERPL-MCNC: 2.5 MG/DL (ref 2.7–4.5)
PLATELET # BLD AUTO: 242 K/UL (ref 150–450)
PMV BLD AUTO: 10.4 FL (ref 9.2–12.9)
POTASSIUM SERPL-SCNC: 3.5 MMOL/L (ref 3.5–5.1)
RBC # BLD AUTO: 3.05 M/UL (ref 4–5.4)
RELATIVE EOSINOPHIL (OHS): 1.8 %
RELATIVE LYMPHOCYTE (OHS): 14.8 % (ref 18–48)
RELATIVE MONOCYTE (OHS): 10.5 % (ref 4–15)
RELATIVE NEUTROPHIL (OHS): 72.2 % (ref 38–73)
RH BLD: NORMAL
SODIUM SERPL-SCNC: 139 MMOL/L (ref 136–145)
SPECIMEN OUTDATE: NORMAL
WBC # BLD AUTO: 7.14 K/UL (ref 3.9–12.7)

## 2025-06-13 PROCEDURE — 25000003 PHARM REV CODE 250: Performed by: STUDENT IN AN ORGANIZED HEALTH CARE EDUCATION/TRAINING PROGRAM

## 2025-06-13 PROCEDURE — 83735 ASSAY OF MAGNESIUM: CPT | Performed by: STUDENT IN AN ORGANIZED HEALTH CARE EDUCATION/TRAINING PROGRAM

## 2025-06-13 PROCEDURE — 36415 COLL VENOUS BLD VENIPUNCTURE: CPT | Performed by: STUDENT IN AN ORGANIZED HEALTH CARE EDUCATION/TRAINING PROGRAM

## 2025-06-13 PROCEDURE — 25000003 PHARM REV CODE 250

## 2025-06-13 PROCEDURE — 63600175 PHARM REV CODE 636 W HCPCS: Performed by: STUDENT IN AN ORGANIZED HEALTH CARE EDUCATION/TRAINING PROGRAM

## 2025-06-13 PROCEDURE — 84100 ASSAY OF PHOSPHORUS: CPT | Performed by: STUDENT IN AN ORGANIZED HEALTH CARE EDUCATION/TRAINING PROGRAM

## 2025-06-13 PROCEDURE — 21400001 HC TELEMETRY ROOM

## 2025-06-13 PROCEDURE — 80048 BASIC METABOLIC PNL TOTAL CA: CPT | Performed by: STUDENT IN AN ORGANIZED HEALTH CARE EDUCATION/TRAINING PROGRAM

## 2025-06-13 PROCEDURE — 86901 BLOOD TYPING SEROLOGIC RH(D): CPT | Performed by: STUDENT IN AN ORGANIZED HEALTH CARE EDUCATION/TRAINING PROGRAM

## 2025-06-13 PROCEDURE — 85025 COMPLETE CBC W/AUTO DIFF WBC: CPT | Performed by: STUDENT IN AN ORGANIZED HEALTH CARE EDUCATION/TRAINING PROGRAM

## 2025-06-13 RX ORDER — SODIUM,POTASSIUM PHOSPHATES 280-250MG
2 POWDER IN PACKET (EA) ORAL
Status: DISPENSED | OUTPATIENT
Start: 2025-06-13 | End: 2025-06-13

## 2025-06-13 RX ADMIN — APIXABAN 5 MG: 5 TABLET, FILM COATED ORAL at 08:06

## 2025-06-13 RX ADMIN — MICONAZOLE NITRATE: 20 CREAM TOPICAL at 08:06

## 2025-06-13 RX ADMIN — CHOLECALCIFEROL TAB 125 MCG (5000 UNIT) 5000 UNITS: 125 TAB at 08:06

## 2025-06-13 RX ADMIN — POTASSIUM & SODIUM PHOSPHATES POWDER PACK 280-160-250 MG 2 PACKET: 280-160-250 PACK at 08:06

## 2025-06-13 RX ADMIN — APIXABAN 5 MG: 5 TABLET, FILM COATED ORAL at 09:06

## 2025-06-13 RX ADMIN — CEFEPIME 2 G: 2 INJECTION, POWDER, FOR SOLUTION INTRAVENOUS at 02:06

## 2025-06-13 RX ADMIN — CEFEPIME 2 G: 2 INJECTION, POWDER, FOR SOLUTION INTRAVENOUS at 09:06

## 2025-06-13 RX ADMIN — POTASSIUM BICARBONATE 40 MEQ: 391 TABLET, EFFERVESCENT ORAL at 08:06

## 2025-06-13 RX ADMIN — CEFEPIME 2 G: 2 INJECTION, POWDER, FOR SOLUTION INTRAVENOUS at 06:06

## 2025-06-13 RX ADMIN — MICONAZOLE NITRATE: 20 CREAM TOPICAL at 09:06

## 2025-06-13 RX ADMIN — VANCOMYCIN HYDROCHLORIDE 1500 MG: 1.5 INJECTION, POWDER, LYOPHILIZED, FOR SOLUTION INTRAVENOUS at 11:06

## 2025-06-13 NOTE — PLAN OF CARE
Recommendations  Consider modification to Carbohydrate consistent, Low Na to support blood sugar management  Encourage good intakes  Diabetic diet education given 6/13  RD to monitor intakes, weights, labs     Goals:   Maintain weight during admission   Meet % of nutritional needs with diet  Nutrition Goal Status: new  Communication of RD Recs:  (POC)

## 2025-06-13 NOTE — ASSESSMENT & PLAN NOTE
This patient does not have evidence of infective focus  My overall impression is sepsis without organ dysfunction.  Source: Unknown  Antibiotics given: Vanc / cefepime  Latest lactate reviewed: 1.8    Fluid challenge Ideal Body Weight- The patient is obese (BMI>30) and their ideal body weight of Ideal body weight: 47.8 kg (105 lb 6.1 oz) will be used to calculate fluid bolus of 30 ml/kg.     Post- resuscitation assessment Yes - I attest a sepsis perfusion exam was performed within 6 hours of sepsis, severe sepsis, or septic shock presentation, following fluid resuscitation.    Differential includes soft tissue infection, viral etiology (flu/COVID/RSV, hepatitis, common cold virus, EBV), sacral wound (given severe pain) / abdominopelvic abscess, osteomyelitis, UTI, pneumonia. Less likely VTE/PE, endocarditis (given given history of bacteremia), vasculitis, malignancy.    PLAN:  - continue antibiotics   - vancomycin for enterococcus and staph coverage   - cefepime for pseudomonas and GNR coverage (unable to give Zosyn due to penicillin allergy)   - follow up blood cultures; NGTD  - follow UA / culture: Multiple organisms with no predominance. Will get a high risk culture  - obtain MRSA PCR; Negative  - CXR without acute pathology, obtain respiratory culture  - CT a/p to assess for abdominal; left renal pelvis stranding  - MRI left foot ; No acute osteomyelitis. diffuse soft tissue edema, correlate for cellulitis

## 2025-06-13 NOTE — PLAN OF CARE
Problem: Adult Inpatient Plan of Care  Goal: Plan of Care Review  Outcome: Progressing  Goal: Patient-Specific Goal (Individualized)  Outcome: Progressing  Goal: Absence of Hospital-Acquired Illness or Injury  Outcome: Progressing  Goal: Optimal Comfort and Wellbeing  Outcome: Progressing  Goal: Readiness for Transition of Care  Outcome: Progressing     Problem: Skin Injury Risk Increased  Goal: Skin Health and Integrity  Outcome: Progressing       Patient AAO X4. Patient had anxiety managed with PRN meds. Plan of care is ongoing.

## 2025-06-13 NOTE — PROGRESS NOTES
"[facility] - [facility] Medicine Telemetry  Adult Nutrition  Consult Note    SUMMARY     Recommendations  Consider modification to Carbohydrate consistent, Low Na to support blood sugar management  Encourage good intakes  Diabetic diet education given 6/13  RD to monitor intakes, weights, labs     Goals:   Maintain weight during admission   Meet % of nutritional needs with diet  Nutrition Goal Status: new  Communication of RD Recs:  (POC)    Nutrition Discharge Planning     Nutrition Discharge Planning: Therapeutic diet (comments), Other (comments)  Therapeutic diet (comments): Diabetic Diet  Other (comments): diabetic diet education given 6/13 phone number for RD office [phone]    Reason for Assessment    Reason For Assessment: consult  Diagnosis: diabetes diagnosis/complications, infection/sepsis  General Information Comments: RD consult for new DM dx. Pt currently admitted with sepsis, also on the problem list are anemia, MS, lymphedema, hypokalemia, HTN, hx PE, contact dermatitis, anxiety. Currently on a low sodium diet, 50-75% intakes reported. Weight trends reviewed, no concerns for malnutrition at this time. RD met with patient and daughter at bedside where RD informed and educated about new diabetes diagnosis for A1C of 6.5%, discussed that with dietary modification we can improve blood sugar control. Discuss plate method of eating for people with diabetes, eating complimentary foods with CHO, limiting added sugars, choosing low fat dairy products, left education with patient's daughter, who she lives with. Daughter is also a nurse and educated on typical management strategies, answered all questions and concerns     Nutrition/Diet History    Spiritual, Cultural Beliefs, Restoration Practices, Values that Affect Care: no  Food Allergies: NKFA    Nutrition Related Social Determinants of Health: SDOH: Adequate food in home environment    Anthropometrics    Height: 5' 1" (154.9 cm)  Height (inches): 61 " in  Height Method: Stated  Weight: 95.2 kg (209 lb 14.1 oz)  Weight (lb): 209.88 lb  Weight Method: Stated  Ideal Body Weight (IBW), Female: 105 lb  % Ideal Body Weight, Female (lb): 199.89 %  BMI (Calculated): 39.7  BMI Grade: 35 - 39.9 - obesity - grade II       Lab/Procedures/Meds    Pertinent Labs Reviewed: reviewed  Pertinent Labs Comments: H/H: 7.5/25.4, MCH: 24.6, MCHC: 29.5, Glucose: 122, Ca: 8.4, Phos: 2.5, AST: 9, ALT: 6, CRP: 34.9  Pertinent Medications Reviewed: reviewed  Pertinent Medications Comments: apixaban, cefepime, cholecalciferol, lorazepam, ondansetron, potassium bicarb, potassium, sodium phosphates    Estimated/Assessed Needs    Weight Used For Calorie Calculations: 95.2 kg (209 lb 14.1 oz)  Energy Calorie Requirements (kcal): 2912-2093 kcal  Energy Need Method: [place]-St Jeor (x 1.0-1.2)  Protein Requirements: 119 (2.5 g/kg IBW)  Weight Used For Protein Calculations: 47.6 kg (105 lb)  Fluid Requirements (mL): 7187-1096 mL  Estimated Fluid Requirement Method: RDA Method  RDA Method (mL): 1384         Nutrition Prescription Ordered    Current Diet Order: Low Na  Nutrition Order Comments: Consider CHO consistent to help manage blood glucose    Evaluation of Received Nutrient/Fluid Intake    I/O: -0.74 L since admin  Comments: LBM: 6/13  % Intake of Estimated Energy Needs: 50 - 75 %  % Meal Intake: 50 - 75 %    PES Statement  Food and nutrition-related knowledge deficit related to Limited nutrition-related education as evidenced by Other (comment) (New diabetes diagnosis)  Status: New    Nutrition Risk    Level of Risk/Frequency of Follow-up: low - moderate       Monitor and Evaluation    Monitor and Evaluation: Energy intake, Food and beverage intake, Protein intake, Carbohydrate intake, Diet order, Food and nutrition knowledge, Weight, Beliefs and attitudes, Electrolyte and renal panel, Gastrointestinal profile, Glucose/endocrine profile, Inflammatory profile, Lipid profile, Skin        Nutrition Follow-Up    RD Follow-up?: Yes    Marty Velasquez, Dietetic Intern

## 2025-06-13 NOTE — ASSESSMENT & PLAN NOTE
Patient's blood pressure range in the last 24 hours was: BP  Min: 112/67  Max: 175/82.The patient's inpatient anti-hypertensive regimen is listed below:  Current Antihypertensives       Plan  - BP is controlled, no changes needed to their regimen  - hold antihypertensives in setting of sepsis  - Will give PRN Hydralazine for SBP>180

## 2025-06-13 NOTE — PROGRESS NOTES
[facility] Internal Medicine [facility] Medicine  Progress Note    Patient Name: Lupis Murcia  MRN: [id number]  Patient Class: IP- Inpatient   Admission Date: 6/10/2025  Length of Stay: 3 days  Attending Physician: Irasema Ruiz MD  Primary Care Provider: Bobby Tran MD        Subjective     Principal Problem:Sepsis        HPI:  Lupis Murcia is a 74-year-old female with a history of Multiple Sclerosis not on immunosuppression, Lymphedema of bilateral lower extremities c/b prior osteomyelitis of the left heel, and h/o PE on apixaban who presented with acute on chronic weakness. Daughter at bedside provides additional history. Patient reports needing to call her daughter earlier in the day as she felt herself sliding out of her chair. She has chronic weakness from MS with left foot drop, reports being able to sit up in her chair. She otherwise does not have new complaints. She reports chronic, intermittent lower extremity pain related to her lymphedema. She denies cough, congestion, dyspnea, chest pain, abdominal pain, worsening lower extremity pain.    Since arrival, she reports severe buttocks pain which she relates to being in a new and uncomfortable bed. In the ED, she was found to be tachycardic and febrile to 103.2 for which she was received 1.5L IVF and vanc/cefepime (h/o penicillin allergy).    Of note, she was seen by her podiatry recently who prescribed doxycycline and duloxetine, however, she did not start these medications. She also has history of staph epi and enterococcus bacteremia without known source, though thought likely related to sacral skin irritation with fecal contamination.    Overview/Hospital Course:  Admitted for management of suspected sepsis 2/2 cellulitis under a background of lower limb lymphedema and a possible UTI. On Cefepime and Vancomycin    Interval Update: Had a fever spike overnight (101.3)was anxious and on retake of vitals, temp was normal and has been  the entire night. WBC normal but uptrending from yesterday. Wound care saw patient yesterday and will be following. Phosphate and Potassium being repleted. ID wants to hold off on Abx for the UTI with patient being asymptomatic     Review of Systems   Constitutional:  Negative for activity change.   Eyes:  Negative for visual disturbance.   Respiratory:  Negative for cough and shortness of breath.    Cardiovascular:  Positive for leg swelling (chronic). Negative for chest pain.   Gastrointestinal:  Negative for abdominal pain and vomiting.   Musculoskeletal:  Positive for arthralgias, gait problem and joint swelling.   Skin:  Positive for rash and wound.   Neurological:  Positive for weakness.   Psychiatric/Behavioral:  Negative for confusion.      Objective:     Vital Signs (Most Recent):  Temp: 98.6 °F (37 °C) (06/13/25 0746)  Pulse: 92 (06/13/25 0746)  Resp: 18 (06/13/25 0325)  BP: 112/67 (06/13/25 0746)  SpO2: (!) 92 % (06/13/25 0746) Vital Signs (24h Range):  Temp:  [98.6 °F (37 °C)-101.8 °F (38.8 °C)] 98.6 °F (37 °C)  Pulse:  [] 92  Resp:  [16-20] 18  SpO2:  [88 %-95 %] 92 %  BP: (112-175)/(67-90) 112/67     Weight: 95.2 kg (209 lb 12.8 oz)  Body mass index is 39.64 kg/m².    Intake/Output Summary (Last 24 hours) at 6/13/2025 0750  Last data filed at 6/13/2025 0426  Gross per 24 hour   Intake 360 ml   Output 750 ml   Net -390 ml         Physical Exam  Vitals and nursing note reviewed.   Constitutional:       General: She is not in acute distress.     Appearance: She is obese. She is not toxic-appearing.   HENT:      Head: Normocephalic and atraumatic.      Mouth/Throat:      Mouth: Mucous membranes are dry.   Eyes:      Extraocular Movements: Extraocular movements intact.      Pupils: Pupils are equal, round, and reactive to light.   Cardiovascular:      Rate and Rhythm: Regular rhythm. Tachycardia present.      Heart sounds: Murmur (Sytolic Right sternal border murmur and radiating to the carotid)  heard.      Systolic murmur is present with a grade of 2/6.   Pulmonary:      Effort: Pulmonary effort is normal. No respiratory distress.      Breath sounds: No stridor. No wheezing or rhonchi.   Abdominal:      General: Abdomen is protuberant. There is no distension.      Tenderness: There is no abdominal tenderness.   Musculoskeletal:      Cervical back: Normal range of motion and neck supple.      Right lower leg: Edema (nonpitting) present.      Left lower leg: Edema (nonpitting) present.      Right foot: Swelling and deformity present.      Left foot: Swelling and deformity present.   Skin:     General: Skin is warm and dry.      Findings: Rash (Bilateral escoriating rash on lower limb) present. Rash is crusting and scaling.   Neurological:      Mental Status: She is alert and oriented to person, place, and time.      GCS: GCS eye subscore is 4. GCS verbal subscore is 5. GCS motor subscore is 6.      Motor: Weakness present.      Comments: Functionality unclear but close to baseline   Psychiatric:         Mood and Affect: Mood normal.         Behavior: Behavior is cooperative.               Significant Labs: All pertinent labs within the past 24 hours have been reviewed.  CBC:   Recent Labs   Lab 06/12/25  0542 06/13/25  0301   WBC 5.88 7.14   HGB 7.1* 7.5*   HCT 24.5* 25.4*    242     CMP:   Recent Labs   Lab 06/12/25  0542 06/13/25  0301    139   K 3.5 3.5    105   CO2 23 23   * 122*   BUN 10 10   CREATININE 0.7 0.7   CALCIUM 8.3* 8.4*   ANIONGAP 10 11       Significant Imaging: I have reviewed all pertinent imaging results/findings within the past 24 hours.      Assessment & Plan  MS (multiple sclerosis)  Reports feeling worsening numbness and tingling at time prior to diagnosis, then with weakness of her lower extremities. Underwent LP which confirmed diagnosis. Has not been on treatment.   No recent history of worsening numbness  Lymphedema  Pictures in media tab. Severe swelling  with chronic skin scaling, with scaling and crustings.    - podiatry on board; appreciate help  - wound care consulted; appreciate input in patient care  - continue abx and wound dressing  - Lower extremity USS: With no evidence of DVT  HTN (hypertension)  Patient's blood pressure range in the last 24 hours was: BP  Min: 112/67  Max: 175/82.The patient's inpatient anti-hypertensive regimen is listed below:  Current Antihypertensives       Plan  - BP is controlled, no changes needed to their regimen  - hold antihypertensives in setting of sepsis  - Will give PRN Hydralazine for SBP>180  History of pulmonary embolus (PE)  Diagnosed during admission for bacteremia.     - continue apixaban 5mg BID    Sepsis  This patient does not have evidence of infective focus  My overall impression is sepsis without organ dysfunction.  Source: Unknown  Antibiotics given: Vanc / cefepime  Latest lactate reviewed: 1.8    Fluid challenge Ideal Body Weight- The patient is obese (BMI>30) and their ideal body weight of Ideal body weight: 47.8 kg (105 lb 6.1 oz) will be used to calculate fluid bolus of 30 ml/kg.     Post- resuscitation assessment Yes - I attest a sepsis perfusion exam was performed within 6 hours of sepsis, severe sepsis, or septic shock presentation, following fluid resuscitation.    Differential includes soft tissue infection, viral etiology (flu/COVID/RSV, hepatitis, common cold virus, EBV), sacral wound (given severe pain) / abdominopelvic abscess, osteomyelitis, UTI, pneumonia. Less likely VTE/PE, endocarditis (given given history of bacteremia), vasculitis, malignancy.    PLAN:  - continue antibiotics   - vancomycin for enterococcus and staph coverage   - cefepime for pseudomonas and GNR coverage (unable to give Zosyn due to penicillin allergy)   - follow up blood cultures; NGTD  - follow UA / culture: Multiple organisms with no predominance. Will get a high risk culture  - obtain MRSA PCR; Negative  - CXR without  acute pathology, obtain respiratory culture  - CT a/p to assess for abdominal; left renal pelvis stranding  - MRI left foot ; No acute osteomyelitis. diffuse soft tissue edema, correlate for cellulitis  Irritant contact dermatitis due to fecal, urinary or dual incontinence  Wound care following    Severe obesity (BMI 35.0-39.9) with comorbidity  Body mass index is 39.64 kg/m². Morbid obesity complicates all aspects of disease management from diagnostic modalities to treatment. Weight loss encouraged and health benefits explained to patient.       Type 2 diabetes mellitus with hyperglycemia, without long-term current use of insulin  Hgb A1c 6.5% on 6/11/25    - LDSSI  - QID glucose POCT  Nutrition consulted; appreciate recs    Anxiety  Home ativan 1mg BID prn    Restore home Ativan for anxiety episodes  Normocytic anemia  Anemia is likely due to chronic disease. Most recent hemoglobin and hematocrit are listed below.  Recent Labs     06/11/25  0659 06/12/25  0542 06/13/25  0301   HGB 7.7* 7.1* 7.5*   HCT 26.7* 24.5* 25.4*     Plan  - Monitor serial CBC: Daily  - Transfuse PRBC if patient becomes hemodynamically unstable, symptomatic or H/H drops below 7/21.  - Patient has not received any PRBC transfusions to date  - Patient's anemia is currently stable.  -Low threshold for blood transfusion  Hypokalemia  Patient's most recent potassium results are listed below.   Recent Labs     06/11/25  0659 06/12/25  0542 06/13/25  0301   K 3.4* 3.5 3.5     Plan  - Replete potassium per protocol  - Monitor potassium Daily  - Patient's hypokalemia is stable  -  Limitation of activities due to disability  PT/OT consulted; appreciate recs    VTE Risk Mitigation (From admission, onward)           Ordered     apixaban tablet 5 mg  2 times daily         06/10/25 4892                    Discharge Planning   USMAN: 6/15/2025     Code Status: Full Code   Medical Readiness for Discharge Date:   Discharge Plan A: Home with family, Home Health             Adri Baer MD  Department of Hospital Medicine   [facility] - [facility] Tele[facility]

## 2025-06-13 NOTE — ASSESSMENT & PLAN NOTE
Patient's most recent potassium results are listed below.   Recent Labs     06/11/25  0659 06/12/25  0542 06/13/25  0301   K 3.4* 3.5 3.5     Plan  - Replete potassium per protocol  - Monitor potassium Daily  - Patient's hypokalemia is stable  -

## 2025-06-13 NOTE — ASSESSMENT & PLAN NOTE
Anemia is likely due to chronic disease. Most recent hemoglobin and hematocrit are listed below.  Recent Labs     06/11/25  0659 06/12/25  0542 06/13/25  0301   HGB 7.7* 7.1* 7.5*   HCT 26.7* 24.5* 25.4*     Plan  - Monitor serial CBC: Daily  - Transfuse PRBC if patient becomes hemodynamically unstable, symptomatic or H/H drops below 7/21.  - Patient has not received any PRBC transfusions to date  - Patient's anemia is currently stable.  -Low threshold for blood transfusion

## 2025-06-13 NOTE — PLAN OF CARE
Problem: Adult Inpatient Plan of Care  Goal: Plan of Care Review  6/12/2025 1912 by Alan Salinas RN  Outcome: Ongoing  6/12/2025 1911 by Alan Salinas RN  Flowsheets (Taken 6/12/2025 1911)  Plan of Care Reviewed With:   patient   family  Goal: Patient-Specific Goal (Individualized)  Outcome: Ongoing  Goal: Absence of Hospital-Acquired Illness or Injury  Outcome: Ongoing  Goal: Optimal Comfort and Wellbeing  Outcome: Ongoing  Goal: Readiness for Transition of Care  Outcome: Ongoing     Problem: Wound  Goal: Optimal Coping  Outcome: Ongoing  Goal: Optimal Functional Ability  Outcome: Ongoing  Goal: Absence of Infection Signs and Symptoms  Outcome: Ongoing  Goal: Improved Oral Intake  Outcome: Ongoing  Goal: Optimal Pain Control and Function  Outcome: Ongoing  Goal: Skin Health and Integrity  Outcome: Ongoing  Goal: Optimal Wound Healing  Outcome: Ongoing

## 2025-06-13 NOTE — SUBJECTIVE & OBJECTIVE
Interval Update: Had a fever spike overnight (101.3)was anxious and on retake of vitals, temp was normal and has been the entire night. WBC normal but uptrending from yesterday. Wound care saw patient yesterday and will be following. Phosphate and Potassium being repleted    Review of Systems   Constitutional:  Negative for activity change.   Eyes:  Negative for visual disturbance.   Respiratory:  Negative for cough and shortness of breath.    Cardiovascular:  Positive for leg swelling (chronic). Negative for chest pain.   Gastrointestinal:  Negative for abdominal pain and vomiting.   Musculoskeletal:  Positive for arthralgias, gait problem and joint swelling.   Skin:  Positive for rash and wound.   Neurological:  Positive for weakness.   Psychiatric/Behavioral:  Negative for confusion.      Objective:     Vital Signs (Most Recent):  Temp: 98.6 °F (37 °C) (06/13/25 0746)  Pulse: 92 (06/13/25 0746)  Resp: 18 (06/13/25 0325)  BP: 112/67 (06/13/25 0746)  SpO2: (!) 92 % (06/13/25 0746) Vital Signs (24h Range):  Temp:  [98.6 °F (37 °C)-101.8 °F (38.8 °C)] 98.6 °F (37 °C)  Pulse:  [] 92  Resp:  [16-20] 18  SpO2:  [88 %-95 %] 92 %  BP: (112-175)/(67-90) 112/67     Weight: 95.2 kg (209 lb 12.8 oz)  Body mass index is 39.64 kg/m².    Intake/Output Summary (Last 24 hours) at 6/13/2025 0750  Last data filed at 6/13/2025 0426  Gross per 24 hour   Intake 360 ml   Output 750 ml   Net -390 ml         Physical Exam  Vitals and nursing note reviewed.   Constitutional:       General: She is not in acute distress.     Appearance: She is obese. She is not toxic-appearing.   HENT:      Head: Normocephalic and atraumatic.      Mouth/Throat:      Mouth: Mucous membranes are dry.   Eyes:      Extraocular Movements: Extraocular movements intact.      Pupils: Pupils are equal, round, and reactive to light.   Cardiovascular:      Rate and Rhythm: Regular rhythm. Tachycardia present.      Heart sounds: Murmur (Sytolic Right sternal  border murmur and radiating to the carotid) heard.      Systolic murmur is present with a grade of 2/6.   Pulmonary:      Effort: Pulmonary effort is normal. No respiratory distress.      Breath sounds: No stridor. No wheezing or rhonchi.   Abdominal:      General: Abdomen is protuberant. There is no distension.      Tenderness: There is no abdominal tenderness.   Musculoskeletal:      Cervical back: Normal range of motion and neck supple.      Right lower leg: Edema (nonpitting) present.      Left lower leg: Edema (nonpitting) present.      Right foot: Swelling and deformity present.      Left foot: Swelling and deformity present.   Skin:     General: Skin is warm and dry.      Findings: Rash (Bilateral escoriating rash on lower limb) present. Rash is crusting and scaling.   Neurological:      Mental Status: She is alert and oriented to person, place, and time.      GCS: GCS eye subscore is 4. GCS verbal subscore is 5. GCS motor subscore is 6.      Motor: Weakness present.      Comments: Functionality unclear but close to baseline   Psychiatric:         Mood and Affect: Mood normal.         Behavior: Behavior is cooperative.               Significant Labs: All pertinent labs within the past 24 hours have been reviewed.  CBC:   Recent Labs   Lab 06/12/25  0542 06/13/25  0301   WBC 5.88 7.14   HGB 7.1* 7.5*   HCT 24.5* 25.4*    242     CMP:   Recent Labs   Lab 06/12/25  0542 06/13/25  0301    139   K 3.5 3.5    105   CO2 23 23   * 122*   BUN 10 10   CREATININE 0.7 0.7   CALCIUM 8.3* 8.4*   ANIONGAP 10 11       Significant Imaging: I have reviewed all pertinent imaging results/findings within the past 24 hours.

## 2025-06-13 NOTE — PROGRESS NOTES
"   06/12/25 2014   Vital Signs   Temp 98.7 °F (37.1 °C)   Temp Source Oral   Pulse (!) 111   Resp 20   SpO2 95 %   Flow (L/min) (Oxygen Therapy) 3   Device (Oxygen Therapy) nasal cannula   BP (!) 175/82   MAP (mmHg) 113       Patient is stating she "doesn't feel well" but can't pinpoint what's wrong. O2 sats on RA 88%. Patient placed her on 3L per NC. Daughter at bedside said patient had an "okay" day, so her feeling unwell right now is new for today. STEVAN Henry MD notified.   "

## 2025-06-14 LAB
ABSOLUTE EOSINOPHIL (OHS): 0.27 K/UL
ABSOLUTE MONOCYTE (OHS): 0.67 K/UL (ref 0.3–1)
ABSOLUTE NEUTROPHIL COUNT (OHS): 2.22 K/UL (ref 1.8–7.7)
ANION GAP (OHS): 10 MMOL/L (ref 8–16)
AORTIC SIZE INDEX (SOV): 1.7 CM/M2
AORTIC SIZE INDEX: 1.8 CM/M2
ASCENDING AORTA: 3.5 CM
AV AREA BY CONTINUOUS VTI: 1.4 CM2
AV INDEX (PROSTH): 0.43
AV LVOT MEAN GRADIENT: 6 MMHG
AV LVOT PEAK GRADIENT: 9 MMHG
AV MEAN GRADIENT: 36 MMHG
AV PEAK GRADIENT: 64 MMHG
AV VALVE AREA BY VELOCITY RATIO: 1.2 CM²
AV VALVE AREA: 1.4 CM²
AV VELOCITY RATIO: 0.38
BASOPHILS # BLD AUTO: 0.01 K/UL
BASOPHILS NFR BLD AUTO: 0.2 %
BSA FOR ECHO PROCEDURE: 2.02 M2
BUN SERPL-MCNC: 11 MG/DL (ref 8–23)
CALCIUM SERPL-MCNC: 8.5 MG/DL (ref 8.7–10.5)
CHLORIDE SERPL-SCNC: 105 MMOL/L (ref 95–110)
CO2 SERPL-SCNC: 25 MMOL/L (ref 23–29)
CREAT SERPL-MCNC: 0.7 MG/DL (ref 0.5–1.4)
CV ECHO LV RWT: 0.51 CM
DOP CALC AO PEAK VEL: 4 M/S
DOP CALC AO VTI: 60 CM
DOP CALC LVOT AREA: 3.1 CM2
DOP CALC LVOT DIAMETER: 2 CM
DOP CALC LVOT PEAK VEL: 1.5 M/S
DOP CALC LVOT STROKE VOLUME: 81 CM3
DOP CALCLVOT PEAK VEL VTI: 25.8 CM
E WAVE DECELERATION TIME: 133 MS
E/A RATIO: 1.07
E/E' RATIO: 13 M/S
ECHO EF ESTIMATED: 50 %
ECHO LV POSTERIOR WALL: 1.3 CM (ref 0.6–1.1)
ERYTHROCYTE [DISTWIDTH] IN BLOOD BY AUTOMATED COUNT: 16.5 % (ref 11.5–14.5)
FRACTIONAL SHORTENING: 27.5 % (ref 28–44)
GFR SERPLBLD CREATININE-BSD FMLA CKD-EPI: >60 ML/MIN/1.73/M2
GLUCOSE SERPL-MCNC: 114 MG/DL (ref 70–110)
HCT VFR BLD AUTO: 27.1 % (ref 37–48.5)
HGB BLD-MCNC: 7.9 GM/DL (ref 12–16)
IMM GRANULOCYTES # BLD AUTO: 0.03 K/UL (ref 0–0.04)
IMM GRANULOCYTES NFR BLD AUTO: 0.7 % (ref 0–0.5)
INTERVENTRICULAR SEPTUM: 1.1 CM (ref 0.6–1.1)
IVRT: 74 MS
LA MAJOR: 5.8 CM
LA MINOR: 5.8 CM
LA WIDTH: 5 CM
LEFT ATRIUM SIZE: 4.3 CM
LEFT ATRIUM VOLUME INDEX MOD: 43 ML/M2
LEFT ATRIUM VOLUME INDEX: 55 ML/M2
LEFT ATRIUM VOLUME MOD: 82 ML
LEFT ATRIUM VOLUME: 106 CM3
LEFT INTERNAL DIMENSION IN SYSTOLE: 3.7 CM (ref 2.1–4)
LEFT VENTRICLE DIASTOLIC VOLUME INDEX: 61.98 ML/M2
LEFT VENTRICLE DIASTOLIC VOLUME: 119 ML
LEFT VENTRICLE MASS INDEX: 125.6 G/M2
LEFT VENTRICLE SYSTOLIC VOLUME INDEX: 30.7 ML/M2
LEFT VENTRICLE SYSTOLIC VOLUME: 59 ML
LEFT VENTRICULAR INTERNAL DIMENSION IN DIASTOLE: 5.1 CM (ref 3.5–6)
LEFT VENTRICULAR MASS: 241.2 G
LV LATERAL E/E' RATIO: 12.4
LV SEPTAL E/E' RATIO: 14.5
LYMPHOCYTES # BLD AUTO: 0.99 K/UL (ref 1–4.8)
MCH RBC QN AUTO: 24.8 PG (ref 27–31)
MCHC RBC AUTO-ENTMCNC: 29.2 G/DL (ref 32–36)
MCV RBC AUTO: 85 FL (ref 82–98)
MV PEAK A VEL: 0.81 M/S
MV PEAK E VEL: 0.87 M/S
NUCLEATED RBC (/100WBC) (OHS): 0 /100 WBC
OHS CV RV/LV RATIO: 0.67 CM
PHOSPHATE SERPL-MCNC: 2.5 MG/DL (ref 2.7–4.5)
PISA TR MAX VEL: 3.5 M/S
PLATELET # BLD AUTO: 239 K/UL (ref 150–450)
PMV BLD AUTO: 10 FL (ref 9.2–12.9)
POTASSIUM SERPL-SCNC: 3.5 MMOL/L (ref 3.5–5.1)
RA MAJOR: 4.78 CM
RA PRESSURE ESTIMATED: 3 MMHG
RA WIDTH: 3.65 CM
RBC # BLD AUTO: 3.18 M/UL (ref 4–5.4)
RELATIVE EOSINOPHIL (OHS): 6.4 %
RELATIVE LYMPHOCYTE (OHS): 23.6 % (ref 18–48)
RELATIVE MONOCYTE (OHS): 16 % (ref 4–15)
RELATIVE NEUTROPHIL (OHS): 53.1 % (ref 38–73)
RIGHT ATRIAL AREA: 14.6 CM2
RIGHT VENTRICLE DIASTOLIC BASEL DIMENSION: 3.4 CM
RV TB RVSP: 7 MMHG
RV TISSUE DOPPLER FREE WALL SYSTOLIC VELOCITY 1 (APICAL 4 CHAMBER VIEW): 18.06 CM/S
SINUS: 3.34 CM
SODIUM SERPL-SCNC: 140 MMOL/L (ref 136–145)
STJ: 2.6 CM
TDI LATERAL: 0.07 M/S
TDI SEPTAL: 0.06 M/S
TDI: 0.07 M/S
TRICUSPID ANNULAR PLANE SYSTOLIC EXCURSION: 2.5 CM
TV PEAK GRADIENT: 49 MMHG
TV REST PULMONARY ARTERY PRESSURE: 52 MMHG
VANCOMYCIN TROUGH SERPL-MCNC: 13.5 UG/ML (ref 10–22)
WBC # BLD AUTO: 4.19 K/UL (ref 3.9–12.7)
Z-SCORE OF LEFT VENTRICULAR DIMENSION IN END DIASTOLE: -0.61
Z-SCORE OF LEFT VENTRICULAR DIMENSION IN END SYSTOLE: 0.82

## 2025-06-14 PROCEDURE — 36415 COLL VENOUS BLD VENIPUNCTURE: CPT | Performed by: STUDENT IN AN ORGANIZED HEALTH CARE EDUCATION/TRAINING PROGRAM

## 2025-06-14 PROCEDURE — 21400001 HC TELEMETRY ROOM

## 2025-06-14 PROCEDURE — 63600175 PHARM REV CODE 636 W HCPCS: Performed by: STUDENT IN AN ORGANIZED HEALTH CARE EDUCATION/TRAINING PROGRAM

## 2025-06-14 PROCEDURE — 25000003 PHARM REV CODE 250: Performed by: STUDENT IN AN ORGANIZED HEALTH CARE EDUCATION/TRAINING PROGRAM

## 2025-06-14 PROCEDURE — 80202 ASSAY OF VANCOMYCIN: CPT | Performed by: STUDENT IN AN ORGANIZED HEALTH CARE EDUCATION/TRAINING PROGRAM

## 2025-06-14 RX ORDER — POTASSIUM CHLORIDE 20 MEQ/1
20 TABLET, EXTENDED RELEASE ORAL ONCE
Status: COMPLETED | OUTPATIENT
Start: 2025-06-14 | End: 2025-06-14

## 2025-06-14 RX ADMIN — VANCOMYCIN HYDROCHLORIDE 1500 MG: 1.5 INJECTION, POWDER, LYOPHILIZED, FOR SOLUTION INTRAVENOUS at 12:06

## 2025-06-14 RX ADMIN — DIBASIC SODIUM PHOSPHATE, MONOBASIC POTASSIUM PHOSPHATE AND MONOBASIC SODIUM PHOSPHATE 2 TABLET: 852; 155; 130 TABLET ORAL at 09:06

## 2025-06-14 RX ADMIN — CHOLECALCIFEROL TAB 125 MCG (5000 UNIT) 5000 UNITS: 125 TAB at 09:06

## 2025-06-14 RX ADMIN — CEFEPIME 2 G: 2 INJECTION, POWDER, FOR SOLUTION INTRAVENOUS at 05:06

## 2025-06-14 RX ADMIN — CEFEPIME 2 G: 2 INJECTION, POWDER, FOR SOLUTION INTRAVENOUS at 02:06

## 2025-06-14 RX ADMIN — APIXABAN 5 MG: 5 TABLET, FILM COATED ORAL at 09:06

## 2025-06-14 RX ADMIN — CEFEPIME 2 G: 2 INJECTION, POWDER, FOR SOLUTION INTRAVENOUS at 09:06

## 2025-06-14 RX ADMIN — POTASSIUM CHLORIDE 20 MEQ: 1500 TABLET, EXTENDED RELEASE ORAL at 09:06

## 2025-06-14 RX ADMIN — LORAZEPAM 1 MG: 1 TABLET ORAL at 03:06

## 2025-06-14 NOTE — PLAN OF CARE
Problem: Adult Inpatient Plan of Care  Goal: Plan of Care Review  Outcome: Progressing  Flowsheets (Taken 6/13/2025 1922)  Plan of Care Reviewed With:   patient   family  Goal: Patient-Specific Goal (Individualized)  Outcome: Progressing  Goal: Absence of Hospital-Acquired Illness or Injury  Outcome: Progressing  Goal: Optimal Comfort and Wellbeing  Outcome: Progressing  Goal: Readiness for Transition of Care  Outcome: Progressing   Pt AAOX4, continues on iv abx, Denies any pain and discomfort during this shift, VS stable, scheduled meds given pure wick in place. Educated pt on the use of call light, instructed the pt to call for assitance if needed, call light within reach. No acute events noted during this shift, daughter remains at bedside. Plan of care ongoing.

## 2025-06-14 NOTE — ASSESSMENT & PLAN NOTE
Patient's blood pressure range in the last 24 hours was: BP  Min: 105/64  Max: 161/94.The patient's inpatient anti-hypertensive regimen is listed below       Plan  - BP is controlled, no changes needed to their regimen  - hold antihypertensives in setting of sepsis  - Will give PRN Hydralazine for SBP>180

## 2025-06-14 NOTE — ASSESSMENT & PLAN NOTE
Body mass index is 39.49 kg/m². Morbid obesity complicates all aspects of disease management from diagnostic modalities to treatment. Weight loss encouraged and health benefits explained to patient.

## 2025-06-14 NOTE — ASSESSMENT & PLAN NOTE
Anemia is likely due to chronic disease. Most recent hemoglobin and hematocrit are listed below.  Recent Labs     06/12/25  0542 06/13/25  0301 06/14/25  0445   HGB 7.1* 7.5* 7.9*   HCT 24.5* 25.4* 27.1*     Plan  - Monitor serial CBC: Daily  - Transfuse PRBC if patient becomes hemodynamically unstable, symptomatic or H/H drops below 7/21.  - Patient has not received any PRBC transfusions to date  - Patient's anemia is currently stable.  -Low threshold for blood transfusion

## 2025-06-14 NOTE — ASSESSMENT & PLAN NOTE
Pictures in media tab. Severe swelling with chronic skin scaling, with scaling and crustings.    - podiatry on board; appreciate help  - wound care consulted; appreciate input in patient care  - continue abx and wound dressing  - Lower extremity USS: With no evidence of DVT  PT/OT following

## 2025-06-14 NOTE — SUBJECTIVE & OBJECTIVE
Interval Update: NAEON. Echo with no significant findings. Wound care and PT following. With a  background of MDRO, ID consulted for Abx recs.    Review of Systems   Constitutional:  Negative for activity change.   Eyes:  Negative for visual disturbance.   Respiratory:  Negative for cough and shortness of breath.    Cardiovascular:  Positive for leg swelling (chronic). Negative for chest pain.   Gastrointestinal:  Negative for abdominal pain and vomiting.   Musculoskeletal:  Positive for arthralgias, gait problem and joint swelling.   Skin:  Positive for rash and wound.   Neurological:  Positive for weakness.   Psychiatric/Behavioral:  Negative for confusion.      Objective:     Vital Signs (Most Recent):  Temp: 98.5 °F (36.9 °C) (06/14/25 1435)  Pulse: 95 (06/14/25 1523)  Resp: 17 (06/14/25 1435)  BP: (!) 148/71 (06/14/25 1523)  SpO2: (!) 93 % (06/14/25 1435) Vital Signs (24h Range):  Temp:  [98.4 °F (36.9 °C)-100 °F (37.8 °C)] 98.5 °F (36.9 °C)  Pulse:  [81-95] 95  Resp:  [17-18] 17  SpO2:  [92 %-94 %] 93 %  BP: (105-161)/(64-94) 148/71     Weight: 94.8 kg (209 lb)  Body mass index is 39.49 kg/m².    Intake/Output Summary (Last 24 hours) at 6/14/2025 1540  Last data filed at 6/14/2025 0947  Gross per 24 hour   Intake 120 ml   Output 700 ml   Net -580 ml         Physical Exam  Vitals and nursing note reviewed.   Constitutional:       General: She is not in acute distress.     Appearance: She is obese. She is not toxic-appearing.   HENT:      Head: Normocephalic and atraumatic.      Mouth/Throat:      Mouth: Mucous membranes are dry.   Eyes:      Extraocular Movements: Extraocular movements intact.      Pupils: Pupils are equal, round, and reactive to light.   Cardiovascular:      Rate and Rhythm: Regular rhythm. Tachycardia present.      Heart sounds: Murmur (Sytolic Right sternal border murmur and radiating to the carotid) heard.      Systolic murmur is present with a grade of 2/6.   Pulmonary:      Effort:  Pulmonary effort is normal. No respiratory distress.      Breath sounds: No stridor. No wheezing or rhonchi.   Abdominal:      General: Abdomen is protuberant. There is no distension.      Tenderness: There is no abdominal tenderness.   Musculoskeletal:      Cervical back: Normal range of motion and neck supple.      Right lower leg: Edema (nonpitting) present.      Left lower leg: Edema (nonpitting) present.      Right foot: Swelling and deformity present.      Left foot: Swelling and deformity present.   Skin:     General: Skin is warm and dry.      Findings: Rash (Bilateral escoriating rash on lower limb) present. Rash is crusting and scaling.   Neurological:      Mental Status: She is alert and oriented to person, place, and time.      GCS: GCS eye subscore is 4. GCS verbal subscore is 5. GCS motor subscore is 6.      Motor: Weakness present.      Comments: Functionality unclear but close to baseline   Psychiatric:         Mood and Affect: Mood normal.         Behavior: Behavior is cooperative.               Significant Labs: All pertinent labs within the past 24 hours have been reviewed.  CBC:   Recent Labs   Lab 06/13/25  0301 06/14/25  0445   WBC 7.14 4.19   HGB 7.5* 7.9*   HCT 25.4* 27.1*    239     CMP:   Recent Labs   Lab 06/13/25  0301 06/14/25  0445    140   K 3.5 3.5    105   CO2 23 25   * 114*   BUN 10 11   CREATININE 0.7 0.7   CALCIUM 8.4* 8.5*   ANIONGAP 11 10       Significant Imaging: I have reviewed all pertinent imaging results/findings within the past 24 hours.

## 2025-06-14 NOTE — PROGRESS NOTES
[facility] Medicine  Progress Note    Patient Name: Lupis Murcia  MRN: [id number]  Patient Class: IP- Inpatient   Admission Date: 6/10/2025  Length of Stay: 4 days  Attending Physician: Irasema Ruiz MD  Primary Care Provider: Bobby Tran MD        Subjective     Principal Problem:Sepsis        HPI:  Lupis Murcia is a 74-year-old female with a history of Multiple Sclerosis not on immunosuppression, Lymphedema of bilateral lower extremities c/b prior osteomyelitis of the left heel, and h/o PE on apixaban who presented with acute on chronic weakness. Daughter at bedside provides additional history. Patient reports needing to call her daughter earlier in the day as she felt herself sliding out of her chair. She has chronic weakness from MS with left foot drop, reports being able to sit up in her chair. She otherwise does not have new complaints. She reports chronic, intermittent lower extremity pain related to her lymphedema. She denies cough, congestion, dyspnea, chest pain, abdominal pain, worsening lower extremity pain.    Since arrival, she reports severe buttocks pain which she relates to being in a new and uncomfortable bed. In the ED, she was found to be tachycardic and febrile to 103.2 for which she was received 1.5L IVF and vanc/cefepime (h/o penicillin allergy).    Of note, she was seen by her podiatry recently who prescribed doxycycline and duloxetine, however, she did not start these medications. She also has history of staph epi and enterococcus bacteremia without known source, though thought likely related to sacral skin irritation with fecal contamination.    Overview/Hospital Course:  Admitted for management of suspected sepsis 2/2 cellulitis under a background of lower limb lymphedema and a possible UTI. On Cefepime and Vancomycin. ID consulted for Antibiotics next steps    Interval Update: NAEON. Echo with no significant findings. Wound care and PT  following. With a  background of MDRO, ID consulted for Abx recs.    Review of Systems   Constitutional:  Negative for activity change.   Eyes:  Negative for visual disturbance.   Respiratory:  Negative for cough and shortness of breath.    Cardiovascular:  Positive for leg swelling (chronic). Negative for chest pain.   Gastrointestinal:  Negative for abdominal pain and vomiting.   Musculoskeletal:  Positive for arthralgias, gait problem and joint swelling.   Skin:  Positive for rash and wound.   Neurological:  Positive for weakness.   Psychiatric/Behavioral:  Negative for confusion.      Objective:     Vital Signs (Most Recent):  Temp: 98.5 °F (36.9 °C) (06/14/25 1435)  Pulse: 95 (06/14/25 1523)  Resp: 17 (06/14/25 1435)  BP: (!) 148/71 (06/14/25 1523)  SpO2: (!) 93 % (06/14/25 1435) Vital Signs (24h Range):  Temp:  [98.4 °F (36.9 °C)-100 °F (37.8 °C)] 98.5 °F (36.9 °C)  Pulse:  [81-95] 95  Resp:  [17-18] 17  SpO2:  [92 %-94 %] 93 %  BP: (105-161)/(64-94) 148/71     Weight: 94.8 kg (209 lb)  Body mass index is 39.49 kg/m².    Intake/Output Summary (Last 24 hours) at 6/14/2025 1540  Last data filed at 6/14/2025 0947  Gross per 24 hour   Intake 120 ml   Output 700 ml   Net -580 ml         Physical Exam  Vitals and nursing note reviewed.   Constitutional:       General: She is not in acute distress.     Appearance: She is obese. She is not toxic-appearing.   HENT:      Head: Normocephalic and atraumatic.      Mouth/Throat:      Mouth: Mucous membranes are dry.   Eyes:      Extraocular Movements: Extraocular movements intact.      Pupils: Pupils are equal, round, and reactive to light.   Cardiovascular:      Rate and Rhythm: Regular rhythm. Tachycardia present.      Heart sounds: Murmur (Sytolic Right sternal border murmur and radiating to the carotid) heard.      Systolic murmur is present with a grade of 2/6.   Pulmonary:      Effort: Pulmonary effort is normal. No respiratory distress.      Breath sounds: No  stridor. No wheezing or rhonchi.   Abdominal:      General: Abdomen is protuberant. There is no distension.      Tenderness: There is no abdominal tenderness.   Musculoskeletal:      Cervical back: Normal range of motion and neck supple.      Right lower leg: Edema (nonpitting) present.      Left lower leg: Edema (nonpitting) present.      Right foot: Swelling and deformity present.      Left foot: Swelling and deformity present.   Skin:     General: Skin is warm and dry.      Findings: Rash (Bilateral escoriating rash on lower limb) present. Rash is crusting and scaling.   Neurological:      Mental Status: She is alert and oriented to person, place, and time.      GCS: GCS eye subscore is 4. GCS verbal subscore is 5. GCS motor subscore is 6.      Motor: Weakness present.      Comments: Functionality unclear but close to baseline   Psychiatric:         Mood and Affect: Mood normal.         Behavior: Behavior is cooperative.               Significant Labs: All pertinent labs within the past 24 hours have been reviewed.  CBC:   Recent Labs   Lab 06/13/25  0301 06/14/25  0445   WBC 7.14 4.19   HGB 7.5* 7.9*   HCT 25.4* 27.1*    239     CMP:   Recent Labs   Lab 06/13/25  0301 06/14/25  0445    140   K 3.5 3.5    105   CO2 23 25   * 114*   BUN 10 11   CREATININE 0.7 0.7   CALCIUM 8.4* 8.5*   ANIONGAP 11 10       Significant Imaging: I have reviewed all pertinent imaging results/findings within the past 24 hours.      Assessment & Plan  MS (multiple sclerosis)  Reports feeling worsening numbness and tingling at time prior to diagnosis, then with weakness of her lower extremities. Underwent LP which confirmed diagnosis. Has not been on treatment.   No recent history of worsening numbness  Lymphedema  Pictures in media tab. Severe swelling with chronic skin scaling, with scaling and crustings.    - podiatry on board; appreciate help  - wound care consulted; appreciate input in patient care  -  continue abx and wound dressing  - Lower extremity USS: With no evidence of DVT  PT/OT following  HTN (hypertension)  Patient's blood pressure range in the last 24 hours was: BP  Min: 105/64  Max: 161/94.The patient's inpatient anti-hypertensive regimen is listed below       Plan  - BP is controlled, no changes needed to their regimen  - hold antihypertensives in setting of sepsis  - Will give PRN Hydralazine for SBP>180  History of pulmonary embolus (PE)  Diagnosed during admission for bacteremia.     - continue apixaban 5mg BID    Sepsis  This patient does not have evidence of infective focus  My overall impression is sepsis without organ dysfunction.  Source: Unknown  Antibiotics given: Vanc / cefepime  Latest lactate reviewed: 1.8    Fluid challenge Ideal Body Weight- The patient is obese (BMI>30) and their ideal body weight of Ideal body weight: 47.8 kg (105 lb 6.1 oz) will be used to calculate fluid bolus of 30 ml/kg.     Post- resuscitation assessment Yes - I attest a sepsis perfusion exam was performed within 6 hours of sepsis, severe sepsis, or septic shock presentation, following fluid resuscitation.    Differential includes soft tissue infection, viral etiology (flu/COVID/RSV, hepatitis, common cold virus, EBV), sacral wound (given severe pain) / abdominopelvic abscess, osteomyelitis, UTI, pneumonia. Less likely VTE/PE, endocarditis (given given history of bacteremia), vasculitis, malignancy.    PLAN:  - continue antibiotics   - vancomycin for enterococcus and staph coverage   - cefepime for pseudomonas and GNR coverage (unable to give Zosyn due to penicillin allergy)   - follow up blood cultures; NGTD  - follow UA / culture: Multiple organisms with no predominance. Will get a high risk culture  - obtain MRSA PCR; Negative  - CXR without acute pathology, obtain respiratory culture  - CT a/p to assess for abdominal; left renal pelvis stranding  - MRI left foot ; No acute osteomyelitis. diffuse soft tissue  edema, correlate for cellulitis  -ID consulted; Appreciate recs  Irritant contact dermatitis due to fecal, urinary or dual incontinence  Wound care following    Severe obesity (BMI 35.0-39.9) with comorbidity  Body mass index is 39.49 kg/m². Morbid obesity complicates all aspects of disease management from diagnostic modalities to treatment. Weight loss encouraged and health benefits explained to patient.       Type 2 diabetes mellitus with hyperglycemia, without long-term current use of insulin  Hgb A1c 6.5% on 6/11/25    - LDSSI  - QID glucose POCT  Nutrition consulted; appreciate recs    Anxiety  Home ativan 1mg BID prn    Restore home Ativan for anxiety episodes  Normocytic anemia  Anemia is likely due to chronic disease. Most recent hemoglobin and hematocrit are listed below.  Recent Labs     06/12/25  0542 06/13/25  0301 06/14/25  0445   HGB 7.1* 7.5* 7.9*   HCT 24.5* 25.4* 27.1*     Plan  - Monitor serial CBC: Daily  - Transfuse PRBC if patient becomes hemodynamically unstable, symptomatic or H/H drops below 7/21.  - Patient has not received any PRBC transfusions to date  - Patient's anemia is currently stable.  -Low threshold for blood transfusion  Hypokalemia  Patient's most recent potassium results are listed below.   Recent Labs     06/12/25  0542 06/13/25  0301 06/14/25  0445   K 3.5 3.5 3.5     Plan  - Replete potassium per protocol  - Monitor potassium Daily  - Patient's hypokalemia is stable  -  Limitation of activities due to disability  PT/OT consulted; appreciate recs    VTE Risk Mitigation (From admission, onward)           Ordered     apixaban tablet 5 mg  2 times daily         06/10/25 1363                    Discharge Planning   USMAN: 6/15/2025     Code Status: Full Code   Medical Readiness for Discharge Date:   Discharge Plan A: Home with family, Home Health            Adri Baer MD  Department of Hospital Medicine   [facility] - Internal Medicine Telemetry

## 2025-06-14 NOTE — ASSESSMENT & PLAN NOTE
This patient does not have evidence of infective focus  My overall impression is sepsis without organ dysfunction.  Source: Unknown  Antibiotics given: Vanc / cefepime  Latest lactate reviewed: 1.8    Fluid challenge Ideal Body Weight- The patient is obese (BMI>30) and their ideal body weight of Ideal body weight: 47.8 kg (105 lb 6.1 oz) will be used to calculate fluid bolus of 30 ml/kg.     Post- resuscitation assessment Yes - I attest a sepsis perfusion exam was performed within 6 hours of sepsis, severe sepsis, or septic shock presentation, following fluid resuscitation.    Differential includes soft tissue infection, viral etiology (flu/COVID/RSV, hepatitis, common cold virus, EBV), sacral wound (given severe pain) / abdominopelvic abscess, osteomyelitis, UTI, pneumonia. Less likely VTE/PE, endocarditis (given given history of bacteremia), vasculitis, malignancy.    PLAN:  - continue antibiotics   - vancomycin for enterococcus and staph coverage   - cefepime for pseudomonas and GNR coverage (unable to give Zosyn due to penicillin allergy)   - follow up blood cultures; NGTD  - follow UA / culture: Multiple organisms with no predominance. Will get a high risk culture  - obtain MRSA PCR; Negative  - CXR without acute pathology, obtain respiratory culture  - CT a/p to assess for abdominal; left renal pelvis stranding  - MRI left foot ; No acute osteomyelitis. diffuse soft tissue edema, correlate for cellulitis  -ID consulted; Appreciate recs

## 2025-06-14 NOTE — ASSESSMENT & PLAN NOTE
Patient's most recent potassium results are listed below.   Recent Labs     06/12/25  0542 06/13/25  0301 06/14/25  0445   K 3.5 3.5 3.5     Plan  - Replete potassium per protocol  - Monitor potassium Daily  - Patient's hypokalemia is stable  -

## 2025-06-14 NOTE — PROGRESS NOTES
Pharmacokinetic Assessment Follow Up: IV Vancomycin    Vancomycin serum concentration assessment(s):    The trough level was drawn correctly and can be used to guide therapy at this time. The measurement is within the desired definitive target range of 10 to 20 mcg/mL.    Vancomycin Regimen Plan:    Continue regimen to Vancomycin 1500 mg IV every 24 hours with next serum trough concentration measured at 6/16 @ 1100    Drug levels (last 3 results):  Recent Labs   Lab Result Units 06/12/25  1112 06/14/25  1110   Vancomycin Trough ug/ml 23.2* 13.5       Pharmacy will continue to follow and monitor vancomycin.    Please contact pharmacy at extension [phone] for questions regarding this assessment.    Thank you for the consult,   Helga Reeder       Patient brief summary:  Lupis Murcia is a 74 y.o. female initiated on antimicrobial therapy with IV Vancomycin for treatment of skin & soft tissue infection    The patient's current regimen is 1500 mg q24h    Drug Allergies:   Review of patient's allergies indicates:   Allergen Reactions    Contrast media Shortness Of Breath and Rash    Pcn [penicillins] Shortness Of Breath and Rash    Celebrex [celecoxib] Other (See Comments)     Swallowing problems     Diazepam Hives    Gabapentin Other (See Comments)     Confusion     Motrin [ibuprofen] Rash       Actual Body Weight:   95.2 kg    Renal Function:   Estimated Creatinine Clearance: 73.2 mL/min (based on SCr of 0.7 mg/dL).,     Dialysis Method (if applicable):  N/A    CBC (last 72 hours):  Recent Labs   Lab Result Units 06/12/25  0542 06/13/25  0301 06/14/25  0445   WBC K/uL 5.88 7.14 4.19   HGB gm/dL 7.1* 7.5* 7.9*   HCT % 24.5* 25.4* 27.1*   Platelet Count K/uL 221 242 239   Lymph % % 16.7* 14.8* 23.6   Mono % % 9.9 10.5 16.0*   Eos % % 3.2 1.8 6.4   Basophil % % 0.2 0.3 0.2       Metabolic Panel (last 72 hours):  Recent Labs   Lab Result Units 06/12/25  0542 06/13/25  0301 06/14/25  0445   Sodium mmol/L 140 139 140    Potassium mmol/L 3.5 3.5 3.5   Chloride mmol/L 107 105 105   CO2 mmol/L 23 23 25   Glucose mg/dL 122* 122* 114*   BUN mg/dL 10 10 11   Creatinine mg/dL 0.7 0.7 0.7   Magnesium  mg/dL 1.8 1.8  --    Phosphorus Level mg/dL 2.9 2.5* 2.5*       Vancomycin Administrations:  vancomycin given in the last 96 hours                     vancomycin 1,500 mg in 0.9% NaCl 250 mL IVPB (admixture device) (mg) 1,500 mg New Bag 06/14/25 1237     1,500 mg New Bag 06/13/25 1106    vancomycin 1,500 mg in 0.9% NaCl 250 mL IVPB (admixture device) (mg) 1,500 mg New Bag 06/12/25 1127     1,500 mg New Bag 06/11/25 2226     1,500 mg New Bag  1011    vancomycin (VANCOCIN) 2,500 mg in 0.9% NaCl 500 mL IVPB (mg) 2,500 mg New Bag 06/10/25 2152                    Microbiologic Results:  Microbiology Results (last 7 days)       Procedure Component Value Units Date/Time    Urine culture [[id number]]  (Abnormal) Collected: 06/11/25 0705    Order Status: Completed Specimen: Urine Updated: 06/14/25 1235     Urine Culture 50,000 - 99,999 cfu/ml Enterococcus faecalis     Comment: Susceptibility pending         10,000 - 49,999 cfu/ml Proteus mirabilis     Comment: Susceptibility pending         50,000 - 99,999 cfu/ml Corynebacterium striatum group    Blood culture x two cultures. Draw prior to antibiotics. [[id number]]  (Normal) Collected: 06/10/25 2032    Order Status: Completed Specimen: Blood from Peripheral, Hand, Left Updated: 06/13/25 2300     Blood Culture No Growth After 72 Hours    Blood culture x two cultures. Draw prior to antibiotics. [[id number]]  (Normal) Collected: 06/10/25 2015    Order Status: Completed Specimen: Blood from Peripheral, Antecubital, Right Updated: 06/13/25 2300     Blood Culture No Growth After 72 Hours    MRSA Screen by PCR [[id number]]  (Normal) Collected: 06/11/25 0707    Order Status: Completed Specimen: Nasal Swab Updated: 06/11/25 0939     MRSA PCR Screen Not Detected    Culture, Respiratory [[id number]]     Order  Status: Sent Specimen: Respiratory from Sputum, Expectorated     Sputum gram stain [[id number]]     Order Status: Sent Specimen: Respiratory

## 2025-06-15 PROBLEM — R53.1 WEAKNESS: Status: ACTIVE | Noted: 2025-06-15

## 2025-06-15 LAB
ABSOLUTE EOSINOPHIL (OHS): 0.22 K/UL
ABSOLUTE MONOCYTE (OHS): 0.62 K/UL (ref 0.3–1)
ABSOLUTE NEUTROPHIL COUNT (OHS): 1.71 K/UL (ref 1.8–7.7)
ANION GAP (OHS): 10 MMOL/L (ref 8–16)
BACTERIA BLD CULT: NORMAL
BACTERIA BLD CULT: NORMAL
BASOPHILS # BLD AUTO: 0.01 K/UL
BASOPHILS NFR BLD AUTO: 0.3 %
BUN SERPL-MCNC: 12 MG/DL (ref 8–23)
CALCIUM SERPL-MCNC: 8.4 MG/DL (ref 8.7–10.5)
CHLORIDE SERPL-SCNC: 107 MMOL/L (ref 95–110)
CO2 SERPL-SCNC: 23 MMOL/L (ref 23–29)
CREAT SERPL-MCNC: 0.7 MG/DL (ref 0.5–1.4)
ERYTHROCYTE [DISTWIDTH] IN BLOOD BY AUTOMATED COUNT: 16.5 % (ref 11.5–14.5)
GFR SERPLBLD CREATININE-BSD FMLA CKD-EPI: >60 ML/MIN/1.73/M2
GLUCOSE SERPL-MCNC: 109 MG/DL (ref 70–110)
HCT VFR BLD AUTO: 26.8 % (ref 37–48.5)
HGB BLD-MCNC: 7.5 GM/DL (ref 12–16)
IMM GRANULOCYTES # BLD AUTO: 0.03 K/UL (ref 0–0.04)
IMM GRANULOCYTES NFR BLD AUTO: 0.8 % (ref 0–0.5)
LYMPHOCYTES # BLD AUTO: 1.11 K/UL (ref 1–4.8)
MAGNESIUM SERPL-MCNC: 1.9 MG/DL (ref 1.6–2.6)
MCH RBC QN AUTO: 24.1 PG (ref 27–31)
MCHC RBC AUTO-ENTMCNC: 28 G/DL (ref 32–36)
MCV RBC AUTO: 86 FL (ref 82–98)
NUCLEATED RBC (/100WBC) (OHS): 0 /100 WBC
PHOSPHATE SERPL-MCNC: 2.5 MG/DL (ref 2.7–4.5)
PLATELET # BLD AUTO: 236 K/UL (ref 150–450)
PMV BLD AUTO: 10.1 FL (ref 9.2–12.9)
POTASSIUM SERPL-SCNC: 3.3 MMOL/L (ref 3.5–5.1)
RBC # BLD AUTO: 3.11 M/UL (ref 4–5.4)
RELATIVE EOSINOPHIL (OHS): 5.9 %
RELATIVE LYMPHOCYTE (OHS): 30 % (ref 18–48)
RELATIVE MONOCYTE (OHS): 16.8 % (ref 4–15)
RELATIVE NEUTROPHIL (OHS): 46.2 % (ref 38–73)
SODIUM SERPL-SCNC: 140 MMOL/L (ref 136–145)
WBC # BLD AUTO: 3.7 K/UL (ref 3.9–12.7)

## 2025-06-15 PROCEDURE — 25000003 PHARM REV CODE 250: Performed by: STUDENT IN AN ORGANIZED HEALTH CARE EDUCATION/TRAINING PROGRAM

## 2025-06-15 PROCEDURE — 36415 COLL VENOUS BLD VENIPUNCTURE: CPT | Performed by: STUDENT IN AN ORGANIZED HEALTH CARE EDUCATION/TRAINING PROGRAM

## 2025-06-15 PROCEDURE — 99223 1ST HOSP IP/OBS HIGH 75: CPT | Mod: ,,, | Performed by: PHYSICIAN ASSISTANT

## 2025-06-15 PROCEDURE — 84100 ASSAY OF PHOSPHORUS: CPT | Performed by: STUDENT IN AN ORGANIZED HEALTH CARE EDUCATION/TRAINING PROGRAM

## 2025-06-15 PROCEDURE — 83735 ASSAY OF MAGNESIUM: CPT | Performed by: STUDENT IN AN ORGANIZED HEALTH CARE EDUCATION/TRAINING PROGRAM

## 2025-06-15 PROCEDURE — 63600175 PHARM REV CODE 636 W HCPCS

## 2025-06-15 PROCEDURE — 21400001 HC TELEMETRY ROOM

## 2025-06-15 PROCEDURE — 80048 BASIC METABOLIC PNL TOTAL CA: CPT | Performed by: STUDENT IN AN ORGANIZED HEALTH CARE EDUCATION/TRAINING PROGRAM

## 2025-06-15 PROCEDURE — 25000003 PHARM REV CODE 250

## 2025-06-15 PROCEDURE — 63600175 PHARM REV CODE 636 W HCPCS: Performed by: STUDENT IN AN ORGANIZED HEALTH CARE EDUCATION/TRAINING PROGRAM

## 2025-06-15 PROCEDURE — 85025 COMPLETE CBC W/AUTO DIFF WBC: CPT | Performed by: STUDENT IN AN ORGANIZED HEALTH CARE EDUCATION/TRAINING PROGRAM

## 2025-06-15 RX ORDER — POTASSIUM CHLORIDE 20 MEQ/1
40 TABLET, EXTENDED RELEASE ORAL ONCE
Status: COMPLETED | OUTPATIENT
Start: 2025-06-15 | End: 2025-06-15

## 2025-06-15 RX ORDER — FUROSEMIDE 40 MG/1
40 TABLET ORAL ONCE
Status: COMPLETED | OUTPATIENT
Start: 2025-06-15 | End: 2025-06-15

## 2025-06-15 RX ORDER — SODIUM,POTASSIUM PHOSPHATES 280-250MG
2 POWDER IN PACKET (EA) ORAL EVERY 4 HOURS
Status: DISCONTINUED | OUTPATIENT
Start: 2025-06-15 | End: 2025-06-15

## 2025-06-15 RX ADMIN — VANCOMYCIN HYDROCHLORIDE 1500 MG: 1.5 INJECTION, POWDER, LYOPHILIZED, FOR SOLUTION INTRAVENOUS at 10:06

## 2025-06-15 RX ADMIN — CEFEPIME 2 G: 2 INJECTION, POWDER, FOR SOLUTION INTRAVENOUS at 06:06

## 2025-06-15 RX ADMIN — CEFEPIME 2 G: 2 INJECTION, POWDER, FOR SOLUTION INTRAVENOUS at 09:06

## 2025-06-15 RX ADMIN — APIXABAN 5 MG: 5 TABLET, FILM COATED ORAL at 08:06

## 2025-06-15 RX ADMIN — CEFEPIME 2 G: 2 INJECTION, POWDER, FOR SOLUTION INTRAVENOUS at 01:06

## 2025-06-15 RX ADMIN — FUROSEMIDE 40 MG: 40 TABLET ORAL at 12:06

## 2025-06-15 RX ADMIN — MICONAZOLE NITRATE: 20 CREAM TOPICAL at 09:06

## 2025-06-15 RX ADMIN — POTASSIUM CHLORIDE 40 MEQ: 1500 TABLET, EXTENDED RELEASE ORAL at 10:06

## 2025-06-15 RX ADMIN — ACETAMINOPHEN AND CODEINE PHOSPHATE 1 TABLET: 300; 30 TABLET ORAL at 01:06

## 2025-06-15 RX ADMIN — DIBASIC SODIUM PHOSPHATE, MONOBASIC POTASSIUM PHOSPHATE AND MONOBASIC SODIUM PHOSPHATE 2 TABLET: 852; 155; 130 TABLET ORAL at 05:06

## 2025-06-15 RX ADMIN — MICONAZOLE NITRATE: 20 CREAM TOPICAL at 08:06

## 2025-06-15 RX ADMIN — ACETAMINOPHEN AND CODEINE PHOSPHATE 1 TABLET: 300; 30 TABLET ORAL at 09:06

## 2025-06-15 RX ADMIN — LORAZEPAM 1 MG: 1 TABLET ORAL at 05:06

## 2025-06-15 RX ADMIN — ACETAMINOPHEN 650 MG: 325 TABLET ORAL at 09:06

## 2025-06-15 RX ADMIN — CHOLECALCIFEROL TAB 125 MCG (5000 UNIT) 5000 UNITS: 125 TAB at 08:06

## 2025-06-15 NOTE — ASSESSMENT & PLAN NOTE
Anemia is likely due to chronic disease. Most recent hemoglobin and hematocrit are listed below.  Recent Labs     06/13/25  0301 06/14/25  0445 06/15/25  0325   HGB 7.5* 7.9* 7.5*   HCT 25.4* 27.1* 26.8*     Plan  - Monitor serial CBC: Daily  - Transfuse PRBC if patient becomes hemodynamically unstable, symptomatic or H/H drops below 7/21.  - Patient has not received any PRBC transfusions to date  - Patient's anemia is currently stable.  -Low threshold for blood transfusion

## 2025-06-15 NOTE — SUBJECTIVE & OBJECTIVE
Past Medical History:   Diagnosis Date    Lymphedema     MS (multiple sclerosis)     Other pulmonary embolism without acute cor pulmonale     Vitamin B12 deficiency        Past Surgical History:   Procedure Laterality Date    BREAST CYST EXCISION Left     over 40yrs ago     SECTION      ESOPHAGOGASTRODUODENOSCOPY N/A 2022    Procedure: EGD (ESOPHAGOGASTRODUODENOSCOPY);  Surgeon: Radha Arango MD;  Location: [address]);  Service: Endoscopy;  Laterality: N/A;       Review of patient's allergies indicates:   Allergen Reactions    Contrast media Shortness Of Breath and Rash    Pcn [penicillins] Shortness Of Breath and Rash    Celebrex [celecoxib] Other (See Comments)     Swallowing problems     Diazepam Hives    Gabapentin Other (See Comments)     Confusion     Motrin [ibuprofen] Rash       Medications:  Medications Prior to Admission   Medication Sig    acetaminophen-codeine 300-30mg (TYLENOL #3) 300-30 mg Tab Take 1 tablet by mouth daily as needed (pain).    ammonium lactate (LAC-HYDRIN) 12 % lotion Apply topically 2 (two) times daily.    apixaban (ELIQUIS) 5 mg Tab Take 1 tablet (5 mg total) by mouth 2 (two) times a day.    cholecalciferol, vitamin D3, 125 mcg (5,000 unit) Tab Take 5,000 Units by mouth once daily.    cyanocobalamin 1,000 mcg/mL injection Inject 1,000 mcg into the muscle every 30 days.    LORazepam (ATIVAN) 1 MG tablet Take 1 mg by mouth every 12 (twelve) hours as needed for Anxiety.    ondansetron (ZOFRAN-ODT) 4 MG TbDL Dissolve 1 tablet (4 mg total) by mouth every 6 (six) hours as needed (Nausea).    predniSONE (DELTASONE) 20 MG tablet Take 1 tablet (20 mg total) by mouth daily as needed (foot/leg pain).     Antibiotics (From admission, onward)      Start     Stop Route Frequency Ordered    25 1100  vancomycin 1,500 mg in 0.9% NaCl 250 mL IVPB (admixture device)         -- IV Every 24 hours (non-standard times) 25 1228    25 0600  ceFEPIme injection 2 g      "    -- IV Every 8 hours (non-standard times) 06/11/25 0022    06/11/25 0113  vancomycin - pharmacy to dose  (vancomycin IVPB (PEDS and ADULTS))        Placed in "And" Linked Group    -- IV pharmacy to manage frequency 06/11/25 0017          Antifungals (From admission, onward)      Start     Stop Route Frequency Ordered    06/11/25 1300  miconazole 2 % cream         -- Top 2 times daily 06/11/25 1256          Antivirals (From admission, onward)      None             Immunization History   Administered Date(s) Administered    COVID-19, vector-nr, rS-Ad26, PF ([facility]) 03/31/2021    PPD Test 03/31/2021, 06/17/2022       Family History       Problem Relation (Age of Onset)    Brain cancer Brother    Coronary artery disease Father    Lung cancer Father, Mother          Social History     Socioeconomic History    Marital status:    Tobacco Use    Smoking status: Never    Smokeless tobacco: Never   Substance and Sexual Activity    Alcohol use: No    Drug use: No     Social Drivers of Health     Financial Resource Strain: Low Risk  (6/12/2025)    Overall Financial Resource Strain (CARDIA)     Difficulty of Paying Living Expenses: Not hard at all   Recent Concern: Financial Resource Strain - High Risk (3/24/2025)    Overall Financial Resource Strain (CARDIA)     Difficulty of Paying Living Expenses: Hard   Food Insecurity: No Food Insecurity (6/12/2025)    Hunger Vital Sign     Worried About Running Out of Food in the Last Year: Never true     Ran Out of Food in the Last Year: Never true   Transportation Needs: No Transportation Needs (6/12/2025)    PRAPARE - Transportation     Lack of Transportation (Medical): No     Lack of Transportation (Non-Medical): No   Physical Activity: Insufficiently Active (6/12/2025)    Exercise Vital Sign     Days of Exercise per Week: 2 days     Minutes of Exercise per Session: 30 min   Stress: No Stress Concern Present (6/12/2025)    [employer] - " Occupational Stress Questionnaire     Feeling of Stress : Only a little   Housing Stability: Low Risk  (6/12/2025)    Housing Stability Vital Sign     Unable to Pay for Housing in the Last Year: No     Number of Times Moved in the Last Year: 0     Homeless in the Last Year: No     Review of Systems   Constitutional:  Negative for appetite change, chills, diaphoresis, fatigue, fever and unexpected weight change.   HENT:  Negative for congestion, ear pain, hearing loss, sore throat and tinnitus.    Eyes:  Negative for pain, redness and visual disturbance.   Respiratory:  Negative for cough, chest tightness, shortness of breath and wheezing.    Cardiovascular:  Negative for chest pain.   Gastrointestinal:  Negative for abdominal pain, constipation, diarrhea, nausea and vomiting.   Endocrine: Negative for cold intolerance and heat intolerance.   Genitourinary:  Negative for decreased urine volume, difficulty urinating, dysuria, flank pain, frequency, hematuria and urgency.   Musculoskeletal:  Negative for arthralgias, back pain, myalgias and neck pain.   Skin:  Positive for color change and wound. Negative for rash.   Allergic/Immunologic: Negative for environmental allergies, food allergies and immunocompromised state.   Neurological:  Positive for weakness. Negative for dizziness, facial asymmetry, light-headedness, numbness and headaches.   Hematological:  Negative for adenopathy. Does not bruise/bleed easily.   Psychiatric/Behavioral:  Negative for agitation, behavioral problems and confusion.      Objective:     Vital Signs (Most Recent):  Temp: 98.3 °F (36.8 °C) (06/15/25 1127)  Pulse: 84 (06/15/25 1127)  Resp: 18 (06/15/25 0806)  BP: 132/83 (06/15/25 1127)  SpO2: (!) 94 % (06/15/25 1127) Vital Signs (24h Range):  Temp:  [98 °F (36.7 °C)-99.4 °F (37.4 °C)] 98.3 °F (36.8 °C)  Pulse:  [84-96] 84  Resp:  [17-18] 18  SpO2:  [93 %-95 %] 94 %  BP: (125-148)/(70-83) 132/83     Weight: 94.8 kg (209 lb)  Body mass index is  "39.49 kg/m².    Estimated Creatinine Clearance: 73 mL/min (based on SCr of 0.7 mg/dL).     Physical Exam  Constitutional:       General: She is not in acute distress.     Appearance: Normal appearance. She is well-developed. She is obese. She is not ill-appearing, toxic-appearing or diaphoretic.          Comments: Well-appearing, jovial and conversation   HENT:      Head: Normocephalic and atraumatic.   Cardiovascular:      Rate and Rhythm: Normal rate and regular rhythm.      Heart sounds: Murmur heard.      No friction rub. No gallop.   Pulmonary:      Effort: Pulmonary effort is normal. No respiratory distress.      Breath sounds: Normal breath sounds. No wheezing or rales.   Abdominal:      General: Bowel sounds are normal. There is no distension.      Palpations: Abdomen is soft. There is no mass.      Tenderness: There is no abdominal tenderness. There is no guarding or rebound.   Skin:     General: Skin is warm and dry.   Neurological:      Mental Status: She is alert and oriented to person, place, and time.   Psychiatric:         Behavior: Behavior normal.          Significant Labs: Blood Culture: No results for input(s): "LABBLOO" in the last 4320 hours.  CBC:   Recent Labs   Lab 06/14/25  0445 06/15/25  0325   WBC 4.19 3.70*   HGB 7.9* 7.5*   HCT 27.1* 26.8*    236     CMP:   Recent Labs   Lab 06/14/25  0445 06/15/25  0325    140   K 3.5 3.3*    107   CO2 25 23   * 109   BUN 11 12   CREATININE 0.7 0.7   CALCIUM 8.5* 8.4*   ANIONGAP 10 10     Wound Culture: No results for input(s): "LABAERO" in the last 4320 hours.  All pertinent labs within the past 24 hours have been reviewed.    Significant Imaging: I have reviewed all pertinent imaging results/findings within the past 24 hours.  X-Ray Chest AP Portable [[id number]] Resulted: 06/13/25 0851   Order Status: Completed Updated: 06/13/25 0854   Narrative:     EXAMINATION:  XR CHEST AP PORTABLE    CLINICAL HISTORY:  Worsening " hypoxia;    TECHNIQUE:  Single frontal view of the chest was performed.    COMPARISON:  Chest exam Rosemary 10, 2025 at 19:52 hours and after review of the lower lung zones as seen on abdomen and pelvis CT June 11, 2025    FINDINGS:  Stable enlargement of cardiopericardial silhouette and mild central pulmonary vascular prominence.  In this patient whose earlier exam had confirmed scattered though extensive bilateral coarse interstitial markings-interstitial opacities, these appear stable except in the left lower lung zone-retrocardiac region where they appear increased with now less well defined medial left hemidiaphragm.  The latter abnormalities could relate to progressive infiltrate and or atelectasis.  Clinical correlation.  No significant pleural fluid blunting right or left costophrenic sulci and no right or left pneumothorax.  EKG leads superimposed chest and abdomen.   Impression:       As above      Electronically signed by: Rafy Ortiz  Date: 06/13/2025  Time: 08:51   US Lower Extremity Veins Bilateral [[id number]] Resulted: 06/12/25 1551   Order Status: Completed Updated: 06/12/25 1553   Narrative:     EXAMINATION:  US LOWER EXTREMITY VEINS BILATERAL    CLINICAL HISTORY:  swelling LE, rule out DVT;    TECHNIQUE:  Duplex and color flow Doppler and dynamic compression was performed of the bilateral lower extremity veins was performed.    COMPARISON:  Lower extremity venous ultrasound 06/15/2022    FINDINGS:  Right thigh veins: The common femoral, femoral, popliteal, upper greater saphenous, and deep femoral veins are patent and free of thrombus. The veins are normally compressible and have normal phasic flow and augmentation response.    Right calf veins: The visualized calf veins are patent.    Left thigh veins: The common femoral, femoral, popliteal, upper greater saphenous, and deep femoral veins are patent and free of thrombus. The veins are normally compressible and have normal phasic flow and  augmentation response.    Left calf veins: The visualized calf veins are patent.    Miscellaneous: None   Impression:       No evidence of deep venous thrombosis in either lower extremity.    Electronically signed by resident: Salazar Brumfield  Date: 06/12/2025  Time: 15:38    Electronically signed by: Cedric Cardoza MD  Date: 06/12/2025  Time: 15:51   CT Abdomen Pelvis Without Contrast [[id number]] (Abnormal) Resulted: 06/11/25 0316   Order Status: Completed Updated: 06/11/25 0319   Narrative:     EXAMINATION:  CT ABDOMEN PELVIS WITHOUT CONTRAST    CLINICAL HISTORY:  Sepsis    TECHNIQUE:  Low dose axial images, sagittal and coronal reformations were obtained from the lung bases to the pubic symphysis.  Intravenous and oral contrast was not administered, this diminishes the sensitivity of the exam.    COMPARISON:  CT abdomen pelvis May 6, 2022    FINDINGS:  SOFT TISSUES: Remote operative change of the lower anterior midline abdominal wall.    LUNG BASES/VISUALIZED MEDIASTINUM: Unchanged 0.4 cm nodule in the left lower lobe.  Bibasilar dependent atelectasis.  Aortic and mitral annular calcification    HEPATOBILIARY: Liver is enlarged measuring 21 cm craniocaudal.  Diffuse parenchymal hypoattenuation suggestive of steatosis.  No focal hepatic lesions. No biliary ductal dilatation. Normal gallbladder.    PANCREAS: No focal masses or ductal dilatation.    SPLEEN: Normal size.    ADRENALS: No adrenal nodules.    KIDNEYS/URETERS: Kidneys are normal size.  Nonspecific bilateral perinephric fat stranding.  Numerous small layering stones in bilateral kidneys, left greater than right.  No hydronephrosis.  Mild urothelial thickening of the left renal pelvis and proximal ureter with adjacent inflammatory stranding about the left renal pelvis.  No contour-deforming mass lesions.  Ureters are unremarkable.    BLADDER/PELVIC ORGANS: No bladder wall thickening.  Calcified uterine fibroid in the fundus.  No adnexal  masses.    PERITONEUM / RETROPERITONEUM: No free air or fluid.    LYMPH NODES: No lymphadenopathy.    VESSELS: No aortic aneurysm.  Mild aortoiliac calcific atherosclerosis.    GI TRACT: No evidence of bowel obstruction or inflammation. Normal appendix.  Moderate stool burden throughout the colon and rectum.    BONES: Degenerative changes.  No acute fracture or aggressive osseous lesion.   Impression:       1. Numerous small layering gallstones bilateral kidneys, left greater than right.  Mild urothelial thickening of the left renal pelvis and proximal ureter with stranding about the left renal pelvis may be reactive or related to infectious or inflammatory process.  Recommend correlation with urinalysis.  No hydronephrosis.  2. Hepatomegaly with hepatic steatosis.  3. Additional findings as above.  This report was flagged in Epic as abnormal.    Electronically signed by resident: Khalif Green  Date: 06/11/2025  Time: 03:03    Electronically signed by: Jose Guadalupe Hussein  Date: 06/11/2025  Time: 03:16   MRI Hindfoot WO Contrast LT [[id number]] Resulted: 06/11/25 0143   Order Status: Completed Updated: 06/11/25 0145   Narrative:     EXAMINATION:  MRI HINDFOOT WO CONTRAST LT    CLINICAL HISTORY:  Osteomyelitis, foot;    TECHNIQUE:  Multisequence, multi planar magnetic resonance imaging of the left hindfoot without intravenous contrast.    COMPARISON:  MRI of the left hindfoot from 06/08/2023.    FINDINGS:  There is motion artifact which significantly limits the study.  There is no evidence of acute osteomyelitis.  Marrow signal is normal.  There is diffuse extensive soft tissue edema of the ankle and the dorsum of the foot.  There is no evidence of a focal fluid collection on this noncontrast study.  There are several cutaneous blisters of the dorsum of the foot.  Limited assessment of the tendons of the ankle due to motion artifact.  No definite tendon rupture or tenosynovitis seen.  There is diffuse severe fatty  atrophy of the visualized musculature.   Impression:       1. No acute osteomyelitis.  2. Diffuse soft tissue edema, correlate for cellulitis.  No focal fluid collection.      Electronically signed by: Faheem Villalpando  Date: 06/11/2025  Time: 01:43   XR Tibia Fibula 2 View Bilateral [[id number]] Resulted: 06/10/25 2133   Order Status: Completed Updated: 06/10/25 2135   Narrative:     EXAMINATION:  XR TIBIA FIBULA 2 VIEW BILATERAL    CLINICAL HISTORY:  Other injury of unspecified body region, initial encounter    TECHNIQUE:  AP and lateral radiographs of the bilateral tibia and fibula.    COMPARISON:  None    FINDINGS:  There is diffuse osteopenia.    Right tibia and fibula: There is no acute fracture or dislocation.  Alignment is normal.  There is no radiographic evidence of acute osteomyelitis.  There is mild joint space narrowing of the medial and lateral tibiofemoral compartments with adjacent osteophytes.  Remaining joint spaces are preserved.  There is diffuse soft tissue edema.  Soft tissue irregularities of the ankle may represent ulcerations.  There is no soft tissue gas.  There is no radiopaque foreign body in the field of view.    Left tibia and fibula: There is no acute fracture or dislocation. Alignment is normal. There is no radiographic evidence of acute osteomyelitis. There is mild joint space narrowing of the medial and lateral tibiofemoral compartments with adjacent osteophytes. Remaining joint spaces are preserved. There is diffuse soft tissue edema. Soft tissue irregularities of the ankle may represent ulcerations. There is no soft tissue gas. There is no radiopaque foreign body in the field of view.   Impression:       No acute osseous abnormality of the bilateral tibia and fibula.      Electronically signed by: Faheem Villalpando  Date: 06/10/2025  Time: 21:33   X-Ray Chest AP Portable [[id number]] Resulted: 06/10/25 2128   Order Status: Completed Updated: 06/10/25 2130   Narrative:      EXAMINATION:  XR CHEST AP PORTABLE    CLINICAL HISTORY:  Sepsis;    TECHNIQUE:  Single frontal view of the chest was performed.    COMPARISON:  10/04/2024.    FINDINGS:  The lungs are hypoexpanded.  Questionable right basilar opacity, may represent atelectasis, pneumonia or, or aspiration.  There is coarse interstitial prominence, unchanged.  The remaining lungs are clear.  The pleural spaces are clear the cardiac silhouette is enlarged.  There are calcifications of the aortic arch.  Osseous structures demonstrate degenerative changes.   Impression:       Questionable right basilar opacity, may represent atelectasis, pneumonia or, or aspiration      Electronically signed by: Faheem Villalpando  Date: 06/10/2025  Time: 21:28      Imaging History     2025    Date Procedure Name Study Review Link PACS Link Status Accession Number Location   06/15/25 11:59 AM Cardiac monitoring strips Study Review  Final     06/14/25 05:58 PM Cardiac monitoring strips Study Review  Final     06/14/25 03:23 PM Echo Study Review  Images Final [id number] [facility]   06/13/25 08:45 AM X-Ray Chest AP Portable Study Review  Images Final [id number] [facility]   06/13/25 07:16 AM Cardiac monitoring strips Study Review  Final     06/12/25 02:41 PM US Lower Extremity Veins Bilateral Study Review  Images Final [id number] [facility]   06/12/25 07:11 AM Cardiac monitoring strips Study Review  Final     06/12/25 02:30 AM Cardiac monitoring strips Study Review  Final     06/11/25 02:46 AM CT Abdomen Pelvis  Without Contrast Study Review  Images Final [id number] [facility]   06/11/25 01:26 AM MRI Hindfoot WO Contrast LT Study Review  Images Final [id number] [facility]   06/10/25 08:48 PM XR Tibia Fibula 2 View Bilateral Study Review  Images Final [id number] [facility]   06/10/25 07:55 PM X-Ray Chest AP Portable Study Review  Images Final [id number] [facility]

## 2025-06-15 NOTE — SUBJECTIVE & OBJECTIVE
Interval History: NAEON, HDS, and AF. Pending ID recs for abx . Urine cx with multiple organisms, pending susceptibilities.     Review of Systems   Constitutional:  Negative for activity change.   Eyes:  Negative for visual disturbance.   Respiratory:  Negative for cough and shortness of breath.    Cardiovascular:  Positive for leg swelling (chronic). Negative for chest pain.   Gastrointestinal:  Negative for abdominal pain and vomiting.   Musculoskeletal:  Positive for arthralgias, gait problem and joint swelling.   Skin:  Positive for rash and wound.   Neurological:  Positive for weakness.   Psychiatric/Behavioral:  Negative for confusion.      Objective:     Vital Signs (Most Recent):  Temp: 98.3 °F (36.8 °C) (06/15/25 1127)  Pulse: 84 (06/15/25 1127)  Resp: 18 (06/15/25 0806)  BP: 132/83 (06/15/25 1127)  SpO2: (!) 94 % (06/15/25 1127) Vital Signs (24h Range):  Temp:  [98 °F (36.7 °C)-99.4 °F (37.4 °C)] 98.3 °F (36.8 °C)  Pulse:  [84-96] 84  Resp:  [17-18] 18  SpO2:  [93 %-95 %] 94 %  BP: (125-148)/(70-83) 132/83     Weight: 94.8 kg (209 lb)  Body mass index is 39.49 kg/m².    Intake/Output Summary (Last 24 hours) at 6/15/2025 1324  Last data filed at 6/15/2025 0200  Gross per 24 hour   Intake 240 ml   Output --   Net 240 ml         Physical Exam  Vitals and nursing note reviewed.   Constitutional:       General: She is not in acute distress.     Appearance: She is obese. She is not toxic-appearing.   HENT:      Head: Normocephalic and atraumatic.      Mouth/Throat:      Mouth: Mucous membranes are dry.   Eyes:      Extraocular Movements: Extraocular movements intact.      Pupils: Pupils are equal, round, and reactive to light.   Cardiovascular:      Rate and Rhythm: Regular rhythm. Tachycardia present.      Heart sounds: Murmur (Sytolic Right sternal border murmur and radiating to the carotid) heard.      Systolic murmur is present with a grade of 2/6.   Pulmonary:      Effort: Pulmonary effort is normal. No  respiratory distress.      Breath sounds: No stridor. No wheezing or rhonchi.   Abdominal:      General: Abdomen is protuberant. There is no distension.      Tenderness: There is no abdominal tenderness.   Musculoskeletal:      Cervical back: Normal range of motion and neck supple.      Right lower leg: Edema (nonpitting) present.      Left lower leg: Edema (nonpitting) present.      Right foot: Swelling and deformity present.      Left foot: Swelling and deformity present.   Skin:     General: Skin is warm and dry.      Findings: Rash (Bilateral escoriating rash on lower limb) present. Rash is crusting and scaling.   Neurological:      Mental Status: She is alert and oriented to person, place, and time.      GCS: GCS eye subscore is 4. GCS verbal subscore is 5. GCS motor subscore is 6.      Motor: Weakness present.      Comments: Functionality unclear but close to baseline   Psychiatric:         Mood and Affect: Mood normal.         Behavior: Behavior is cooperative.               Significant Labs: All pertinent labs within the past 24 hours have been reviewed.  CBC:   Recent Labs   Lab 06/14/25  0445 06/15/25  0325   WBC 4.19 3.70*   HGB 7.9* 7.5*   HCT 27.1* 26.8*    236     CMP:   Recent Labs   Lab 06/14/25  0445 06/15/25  0325    140   K 3.5 3.3*    107   CO2 25 23   * 109   BUN 11 12   CREATININE 0.7 0.7   CALCIUM 8.5* 8.4*   ANIONGAP 10 10       Significant Imaging: I have reviewed all pertinent imaging results/findings within the past 24 hours.

## 2025-06-15 NOTE — ASSESSMENT & PLAN NOTE
This patient does not have evidence of infective focus  My overall impression is sepsis without organ dysfunction.  Source: Unknown  Antibiotics given: Vanc / cefepime  Latest lactate reviewed: 1.8    Fluid challenge Ideal Body Weight- The patient is obese (BMI>30) and their ideal body weight of Ideal body weight: 47.8 kg (105 lb 6.1 oz) will be used to calculate fluid bolus of 30 ml/kg.     Post- resuscitation assessment Yes - I attest a sepsis perfusion exam was performed within 6 hours of sepsis, severe sepsis, or septic shock presentation, following fluid resuscitation.    Differential includes soft tissue infection, viral etiology (flu/COVID/RSV, hepatitis, common cold virus, EBV), sacral wound (given severe pain) / abdominopelvic abscess, osteomyelitis, UTI, pneumonia. Less likely VTE/PE, endocarditis (given given history of bacteremia), vasculitis, malignancy.    PLAN:  - continue antibiotics   - vancomycin for enterococcus and staph coverage   - cefepime for pseudomonas and GNR coverage (unable to give Zosyn due to penicillin allergy)   - follow up blood cultures; NGTD  - follow UA / culture: Multiple organisms: e faecalis, proteus, and corynebacterium; pending susceptibilities   - obtain MRSA PCR; Negative  - CXR without acute pathology, obtain respiratory culture  - CT a/p to assess for abdominal; left renal pelvis stranding  - MRI left foot ; No acute osteomyelitis. diffuse soft tissue edema, correlate for cellulitis  -ID consulted; Appreciate recs

## 2025-06-15 NOTE — ASSESSMENT & PLAN NOTE
74-year-old female with a history of multiple sclerosis as well as bacteremia that was treated in 2024 who presented with weakness and was found to be febrile with tachycardia on admission.  She had been treated with vanc, Flagyl, and cefepime initially but just vanc and cefepime currently.  She is on day 6 of antibiotics.  Blood cultures are no growth to date.  Urine culture has Proteus, Enterococcus and Corynebacterium but suspect is a contaminated sample as was taken from pure [place] sewn not relevant inpatient asymptomatic.  Imaging possibly concerning for pneumonia and renal infection.  Her fever has resolved and she has been without leukocytosis.  She is currently back to baseline.  Cause of weakness and fever essentially remains elusive.     Plan:  Recommend complete 7 days total of vanc and cefepime  Recommend monitor for 24 hours after completion of IV antibiotics to ensure remains stable  Discussed with ID staff  We will sign off

## 2025-06-15 NOTE — ASSESSMENT & PLAN NOTE
Patient's most recent potassium results are listed below.   Recent Labs     06/13/25  0301 06/14/25  0445 06/15/25  0325   K 3.5 3.5 3.3*     Plan  - Replete potassium per protocol  - Monitor potassium Daily  - Patient's hypokalemia is stable  -

## 2025-06-15 NOTE — ASSESSMENT & PLAN NOTE
Patient's blood pressure range in the last 24 hours was: BP  Min: 125/70  Max: 148/71.The patient's inpatient anti-hypertensive regimen is listed below       Plan  - BP is controlled, no changes needed to their regimen  - hold antihypertensives in setting of sepsis  - Will give PRN Hydralazine for SBP>180

## 2025-06-15 NOTE — ASSESSMENT & PLAN NOTE
Pictures in media tab. Severe swelling with chronic skin scaling, with scaling and crustings.    - podiatry on board; appreciate help  - wound care consulted; appreciate input in patient care  - continue abx and wound dressing  - Lower extremity USS: With no evidence of DVT  PT/OT following  -Lasix PRN for LE edema

## 2025-06-15 NOTE — CONSULTS
[facility] - [facility] Telemetry  Infectious Disease  Consult Note    Patient Name: Lupis Murcia  MRN: [id number]  Admission Date: 6/10/2025  Hospital Length of Stay: 5 days  Attending Physician: Irasema Ruiz MD  Primary Care Provider: Bobby Tran MD     Isolation Status: No active isolations    Patient information was obtained from patient, past medical records, and ER records.      Inpatient consult to Infectious Diseases  Consult performed by: Khalif Shook Jr., PA  Consult ordered by: Irasema Ruiz MD        Assessment/Plan:     Other  Weakness  74-year-old female with a history of multiple sclerosis as well as bacteremia that was treated in 2024 who presented with weakness and was found to be febrile with tachycardia on admission.  She had been treated with vanc, Flagyl, and cefepime initially but just vanc and cefepime currently.  She is on day 6 of antibiotics.  Blood cultures are no growth to date.  Urine culture has Proteus, Enterococcus and Corynebacterium but suspect is a contaminated sample as was taken from pure Wick sewn not relevant inpatient asymptomatic.  Imaging possibly concerning for pneumonia and renal infection.  Her fever has resolved and she has been without leukocytosis.  She is currently back to baseline.  Cause of weakness and fever essentially remains elusive.     Plan:  Recommend complete 7 days total of vanc and cefepime  Recommend monitor for 24 hours after completion of IV antibiotics to ensure remains stable  Discussed with ID staff  We will sign off           Thank you for your consult. I will sign off. Please contact us if you have any additional questions.    LIZA Geiger  Infectious Disease  [facility] Telemetry    Subjective:     Principal Problem: Sepsis    HPI: 74-year-old female with a history of MS, lymphedema, PE without cor pulmonale, , aortic stenosis, prior bacteremia with Enterococcus and staph epi in 2024 that  "was treated with 6 weeks of daptomycin for presumptive endocarditis though 2D echo was negative.  She was admitted on 06/10/2025 out of weakness.  She was brought to the ED and found to be febrile to 103.  Chest x-ray was done showing a questionable right basilar opacity-aspiration, pneumonia, atelectasis.  X-rays of the tib fibs bilaterally showed no osseous abnormalities.  MRI of the left hindfoot showed no acute osteo or fluid collection.  CT abdomen and pelvis showed mild urothelial thickening of the left renal pelvis proximal ureter with stranding about the left renal pelvis which could imply infection or inflammation.  No hydronephrosis was noted.  Ultrasound of the lower extremities was negative for DVT.  Urinalysis was done in the ED and, per daughter who is present reports it was taken from the pure Wick, was positive and ultimately grew Enterococcus faecalis, Proteus mirabilis and Corynebacterium striatum.  She has been treated with vanc cefepime and Flagyl.  The Flagyl has been stopped and she is on day 6 of vanc and cefepime.  ID is consulted for Patient with history of MDR bacteremia presents with sepsis with multiple fevers. Suspected source is LE wounds. Please assist with antibiotic recs- possible oral regimen ".  Blood cultures are no growth to date    Patient is seen with daughter present.  She reported she became weak in the legs.  She confirmed fever upon admission but that has resolved.  She denies any respiratory difficulties, cough, wheezing, sputum.  As well she denies any dysuria prior to admit or currently as well as denying any nausea vomiting or flank pain.  She is back to her baseline without any current symptoms.  They note ongoing lymphedema but daughter reports there are no open wounds or concern for infection.  Per chart she has defervesced and she does not have leukocytosis.  She is tolerating antibiotics without issue    Past Medical History:   Diagnosis Date    Lymphedema     MS " (multiple sclerosis)     Other pulmonary embolism without acute cor pulmonale     Vitamin B12 deficiency        Past Surgical History:   Procedure Laterality Date    BREAST CYST EXCISION Left     over 40yrs ago     SECTION      ESOPHAGOGASTRODUODENOSCOPY N/A 2022    Procedure: EGD (ESOPHAGOGASTRODUODENOSCOPY);  Surgeon: Radha Arango MD;  Location: [address]);  Service: Endoscopy;  Laterality: N/A;       Review of patient's allergies indicates:   Allergen Reactions    Contrast media Shortness Of Breath and Rash    Pcn [penicillins] Shortness Of Breath and Rash    Celebrex [celecoxib] Other (See Comments)     Swallowing problems     Diazepam Hives    Gabapentin Other (See Comments)     Confusion     Motrin [ibuprofen] Rash       Medications:  Medications Prior to Admission   Medication Sig    acetaminophen-codeine 300-30mg (TYLENOL #3) 300-30 mg Tab Take 1 tablet by mouth daily as needed (pain).    ammonium lactate (LAC-HYDRIN) 12 % lotion Apply topically 2 (two) times daily.    apixaban (ELIQUIS) 5 mg Tab Take 1 tablet (5 mg total) by mouth 2 (two) times a day.    cholecalciferol, vitamin D3, 125 mcg (5,000 unit) Tab Take 5,000 Units by mouth once daily.    cyanocobalamin 1,000 mcg/mL injection Inject 1,000 mcg into the muscle every 30 days.    LORazepam (ATIVAN) 1 MG tablet Take 1 mg by mouth every 12 (twelve) hours as needed for Anxiety.    ondansetron (ZOFRAN-ODT) 4 MG TbDL Dissolve 1 tablet (4 mg total) by mouth every 6 (six) hours as needed (Nausea).    predniSONE (DELTASONE) 20 MG tablet Take 1 tablet (20 mg total) by mouth daily as needed (foot/leg pain).     Antibiotics (From admission, onward)      Start     Stop Route Frequency Ordered    25 1100  vancomycin 1,500 mg in 0.9% NaCl 250 mL IVPB (admixture device)         -- IV Every 24 hours (non-standard times) 25 1228    25 0600  ceFEPIme injection 2 g         -- IV Every 8 hours (non-standard times) 25 0022  "   06/11/25 0113  vancomycin - pharmacy to dose  (vancomycin IVPB (PEDS and ADULTS))        Placed in "And" Linked Group    -- IV pharmacy to manage frequency 06/11/25 0017          Antifungals (From admission, onward)      Start     Stop Route Frequency Ordered    06/11/25 1300  miconazole 2 % cream         -- Top 2 times daily 06/11/25 1256          Antivirals (From admission, onward)      None             Immunization History   Administered Date(s) Administered    COVID-19, vector-nr, rS-Ad26, PF ([employer]) 03/31/2021    PPD Test 03/31/2021, 06/17/2022       Family History       Problem Relation (Age of Onset)    Brain cancer Brother    Coronary artery disease Father    Lung cancer Father, Mother          Social History     Socioeconomic History    Marital status:    Tobacco Use    Smoking status: Never    Smokeless tobacco: Never   Substance and Sexual Activity    Alcohol use: No    Drug use: No     Social Drivers of Health     Financial Resource Strain: Low Risk  (6/12/2025)    Overall Financial Resource Strain (CARDIA)     Difficulty of Paying Living Expenses: Not hard at all   Recent Concern: Financial Resource Strain - High Risk (3/24/2025)    Overall Financial Resource Strain (CARDIA)     Difficulty of Paying Living Expenses: Hard   Food Insecurity: No Food Insecurity (6/12/2025)    Hunger Vital Sign     Worried About Running Out of Food in the Last Year: Never true     Ran Out of Food in the Last Year: Never true   Transportation Needs: No Transportation Needs (6/12/2025)    PRAPARE - Transportation     Lack of Transportation (Medical): No     Lack of Transportation (Non-Medical): No   Physical Activity: Insufficiently Active (6/12/2025)    Exercise Vital Sign     Days of Exercise per Week: 2 days     Minutes of Exercise per Session: 30 min   Stress: No Stress Concern Present (6/12/2025)    [employer] [place] [employer] - Occupational Stress Questionnaire     Feeling of Stress : Only a " little   Housing Stability: Low Risk  (6/12/2025)    Housing Stability Vital Sign     Unable to Pay for Housing in the Last Year: No     Number of Times Moved in the Last Year: 0     Homeless in the Last Year: No     Review of Systems   Constitutional:  Negative for appetite change, chills, diaphoresis, fatigue, fever and unexpected weight change.   HENT:  Negative for congestion, ear pain, hearing loss, sore throat and tinnitus.    Eyes:  Negative for pain, redness and visual disturbance.   Respiratory:  Negative for cough, chest tightness, shortness of breath and wheezing.    Cardiovascular:  Negative for chest pain.   Gastrointestinal:  Negative for abdominal pain, constipation, diarrhea, nausea and vomiting.   Endocrine: Negative for cold intolerance and heat intolerance.   Genitourinary:  Negative for decreased urine volume, difficulty urinating, dysuria, flank pain, frequency, hematuria and urgency.   Musculoskeletal:  Negative for arthralgias, back pain, myalgias and neck pain.   Skin:  Positive for color change and wound. Negative for rash.   Allergic/Immunologic: Negative for environmental allergies, food allergies and immunocompromised state.   Neurological:  Positive for weakness. Negative for dizziness, facial asymmetry, light-headedness, numbness and headaches.   Hematological:  Negative for adenopathy. Does not bruise/bleed easily.   Psychiatric/Behavioral:  Negative for agitation, behavioral problems and confusion.      Objective:     Vital Signs (Most Recent):  Temp: 98.3 °F (36.8 °C) (06/15/25 1127)  Pulse: 84 (06/15/25 1127)  Resp: 18 (06/15/25 0806)  BP: 132/83 (06/15/25 1127)  SpO2: (!) 94 % (06/15/25 1127) Vital Signs (24h Range):  Temp:  [98 °F (36.7 °C)-99.4 °F (37.4 °C)] 98.3 °F (36.8 °C)  Pulse:  [84-96] 84  Resp:  [17-18] 18  SpO2:  [93 %-95 %] 94 %  BP: (125-148)/(70-83) 132/83     Weight: 94.8 kg (209 lb)  Body mass index is 39.49 kg/m².    Estimated Creatinine Clearance: 73 mL/min (based  "on SCr of 0.7 mg/dL).     Physical Exam  Constitutional:       General: She is not in acute distress.     Appearance: Normal appearance. She is well-developed. She is obese. She is not ill-appearing, toxic-appearing or diaphoretic.          Comments: Well-appearing, jovial and conversation   HENT:      Head: Normocephalic and atraumatic.   Cardiovascular:      Rate and Rhythm: Normal rate and regular rhythm.      Heart sounds: Murmur heard.      No friction rub. No gallop.   Pulmonary:      Effort: Pulmonary effort is normal. No respiratory distress.      Breath sounds: Normal breath sounds. No wheezing or rales.   Abdominal:      General: Bowel sounds are normal. There is no distension.      Palpations: Abdomen is soft. There is no mass.      Tenderness: There is no abdominal tenderness. There is no guarding or rebound.   Skin:     General: Skin is warm and dry.   Neurological:      Mental Status: She is alert and oriented to person, place, and time.   Psychiatric:         Behavior: Behavior normal.          Significant Labs: Blood Culture: No results for input(s): "LABBLOO" in the last 4320 hours.  CBC:   Recent Labs   Lab 06/14/25  0445 06/15/25  0325   WBC 4.19 3.70*   HGB 7.9* 7.5*   HCT 27.1* 26.8*    236     CMP:   Recent Labs   Lab 06/14/25  0445 06/15/25  0325    140   K 3.5 3.3*    107   CO2 25 23   * 109   BUN 11 12   CREATININE 0.7 0.7   CALCIUM 8.5* 8.4*   ANIONGAP 10 10     Wound Culture: No results for input(s): "LABAERO" in the last 4320 hours.  All pertinent labs within the past 24 hours have been reviewed.    Significant Imaging: I have reviewed all pertinent imaging results/findings within the past 24 hours.  X-Ray Chest AP Portable [[id number]] Resulted: 06/13/25 0851   Order Status: Completed Updated: 06/13/25 0854   Narrative:     EXAMINATION:  XR CHEST AP PORTABLE    CLINICAL HISTORY:  Worsening hypoxia;    TECHNIQUE:  Single frontal view of the chest was " performed.    COMPARISON:  Chest exam Rosemary 10, 2025 at 19:52 hours and after review of the lower lung zones as seen on abdomen and pelvis CT June 11, 2025    FINDINGS:  Stable enlargement of cardiopericardial silhouette and mild central pulmonary vascular prominence.  In this patient whose earlier exam had confirmed scattered though extensive bilateral coarse interstitial markings-interstitial opacities, these appear stable except in the left lower lung zone-retrocardiac region where they appear increased with now less well defined medial left hemidiaphragm.  The latter abnormalities could relate to progressive infiltrate and or atelectasis.  Clinical correlation.  No significant pleural fluid blunting right or left costophrenic sulci and no right or left pneumothorax.  EKG leads superimposed chest and abdomen.   Impression:       As above      Electronically signed by: Rafy Ortiz  Date: 06/13/2025  Time: 08:51   US Lower Extremity Veins Bilateral [[id number]] Resulted: 06/12/25 1551   Order Status: Completed Updated: 06/12/25 1553   Narrative:     EXAMINATION:  US LOWER EXTREMITY VEINS BILATERAL    CLINICAL HISTORY:  swelling LE, rule out DVT;    TECHNIQUE:  Duplex and color flow Doppler and dynamic compression was performed of the bilateral lower extremity veins was performed.    COMPARISON:  Lower extremity venous ultrasound 06/15/2022    FINDINGS:  Right thigh veins: The common femoral, femoral, popliteal, upper greater saphenous, and deep femoral veins are patent and free of thrombus. The veins are normally compressible and have normal phasic flow and augmentation response.    Right calf veins: The visualized calf veins are patent.    Left thigh veins: The common femoral, femoral, popliteal, upper greater saphenous, and deep femoral veins are patent and free of thrombus. The veins are normally compressible and have normal phasic flow and augmentation response.    Left calf veins: The visualized calf veins are  patent.    Miscellaneous: None   Impression:       No evidence of deep venous thrombosis in either lower extremity.    Electronically signed by resident: Salazar Brumfield  Date: 06/12/2025  Time: 15:38    Electronically signed by: Cedric Cardoza MD  Date: 06/12/2025  Time: 15:51   CT Abdomen Pelvis Without Contrast [[id number]] (Abnormal) Resulted: 06/11/25 0316   Order Status: Completed Updated: 06/11/25 0319   Narrative:     EXAMINATION:  CT ABDOMEN PELVIS WITHOUT CONTRAST    CLINICAL HISTORY:  Sepsis    TECHNIQUE:  Low dose axial images, sagittal and coronal reformations were obtained from the lung bases to the pubic symphysis.  Intravenous and oral contrast was not administered, this diminishes the sensitivity of the exam.    COMPARISON:  CT abdomen pelvis May 6, 2022    FINDINGS:  SOFT TISSUES: Remote operative change of the lower anterior midline abdominal wall.    LUNG BASES/VISUALIZED MEDIASTINUM: Unchanged 0.4 cm nodule in the left lower lobe.  Bibasilar dependent atelectasis.  Aortic and mitral annular calcification    HEPATOBILIARY: Liver is enlarged measuring 21 cm craniocaudal.  Diffuse parenchymal hypoattenuation suggestive of steatosis.  No focal hepatic lesions. No biliary ductal dilatation. Normal gallbladder.    PANCREAS: No focal masses or ductal dilatation.    SPLEEN: Normal size.    ADRENALS: No adrenal nodules.    KIDNEYS/URETERS: Kidneys are normal size.  Nonspecific bilateral perinephric fat stranding.  Numerous small layering stones in bilateral kidneys, left greater than right.  No hydronephrosis.  Mild urothelial thickening of the left renal pelvis and proximal ureter with adjacent inflammatory stranding about the left renal pelvis.  No contour-deforming mass lesions.  Ureters are unremarkable.    BLADDER/PELVIC ORGANS: No bladder wall thickening.  Calcified uterine fibroid in the fundus.  No adnexal masses.    PERITONEUM / RETROPERITONEUM: No free air or fluid.    LYMPH NODES: No  lymphadenopathy.    VESSELS: No aortic aneurysm.  Mild aortoiliac calcific atherosclerosis.    GI TRACT: No evidence of bowel obstruction or inflammation. Normal appendix.  Moderate stool burden throughout the colon and rectum.    BONES: Degenerative changes.  No acute fracture or aggressive osseous lesion.   Impression:       1. Numerous small layering gallstones bilateral kidneys, left greater than right.  Mild urothelial thickening of the left renal pelvis and proximal ureter with stranding about the left renal pelvis may be reactive or related to infectious or inflammatory process.  Recommend correlation with urinalysis.  No hydronephrosis.  2. Hepatomegaly with hepatic steatosis.  3. Additional findings as above.  This report was flagged in Epic as abnormal.    Electronically signed by resident: Khalif Green  Date: 06/11/2025  Time: 03:03    Electronically signed by: Jose Guadalupe Hussein  Date: 06/11/2025  Time: 03:16   MRI Hindfoot WO Contrast LT [[id number]] Resulted: 06/11/25 0143   Order Status: Completed Updated: 06/11/25 0145   Narrative:     EXAMINATION:  MRI HINDFOOT WO CONTRAST LT    CLINICAL HISTORY:  Osteomyelitis, foot;    TECHNIQUE:  Multisequence, multi planar magnetic resonance imaging of the left hindfoot without intravenous contrast.    COMPARISON:  MRI of the left hindfoot from 06/08/2023.    FINDINGS:  There is motion artifact which significantly limits the study.  There is no evidence of acute osteomyelitis.  Marrow signal is normal.  There is diffuse extensive soft tissue edema of the ankle and the dorsum of the foot.  There is no evidence of a focal fluid collection on this noncontrast study.  There are several cutaneous blisters of the dorsum of the foot.  Limited assessment of the tendons of the ankle due to motion artifact.  No definite tendon rupture or tenosynovitis seen.  There is diffuse severe fatty atrophy of the visualized musculature.   Impression:       1. No acute  osteomyelitis.  2. Diffuse soft tissue edema, correlate for cellulitis.  No focal fluid collection.      Electronically signed by: Faheem Villalpando  Date: 06/11/2025  Time: 01:43   XR Tibia Fibula 2 View Bilateral [[id number]] Resulted: 06/10/25 2133   Order Status: Completed Updated: 06/10/25 2135   Narrative:     EXAMINATION:  XR TIBIA FIBULA 2 VIEW BILATERAL    CLINICAL HISTORY:  Other injury of unspecified body region, initial encounter    TECHNIQUE:  AP and lateral radiographs of the bilateral tibia and fibula.    COMPARISON:  None    FINDINGS:  There is diffuse osteopenia.    Right tibia and fibula: There is no acute fracture or dislocation.  Alignment is normal.  There is no radiographic evidence of acute osteomyelitis.  There is mild joint space narrowing of the medial and lateral tibiofemoral compartments with adjacent osteophytes.  Remaining joint spaces are preserved.  There is diffuse soft tissue edema.  Soft tissue irregularities of the ankle may represent ulcerations.  There is no soft tissue gas.  There is no radiopaque foreign body in the field of view.    Left tibia and fibula: There is no acute fracture or dislocation. Alignment is normal. There is no radiographic evidence of acute osteomyelitis. There is mild joint space narrowing of the medial and lateral tibiofemoral compartments with adjacent osteophytes. Remaining joint spaces are preserved. There is diffuse soft tissue edema. Soft tissue irregularities of the ankle may represent ulcerations. There is no soft tissue gas. There is no radiopaque foreign body in the field of view.   Impression:       No acute osseous abnormality of the bilateral tibia and fibula.      Electronically signed by: Faheem Villalpando  Date: 06/10/2025  Time: 21:33   X-Ray Chest AP Portable [[id number]] Resulted: 06/10/25 2128   Order Status: Completed Updated: 06/10/25 2130   Narrative:     EXAMINATION:  XR CHEST AP PORTABLE    CLINICAL HISTORY:  Sepsis;    TECHNIQUE:  Single  frontal view of the chest was performed.    COMPARISON:  10/04/2024.    FINDINGS:  The lungs are hypoexpanded.  Questionable right basilar opacity, may represent atelectasis, pneumonia or, or aspiration.  There is coarse interstitial prominence, unchanged.  The remaining lungs are clear.  The pleural spaces are clear the cardiac silhouette is enlarged.  There are calcifications of the aortic arch.  Osseous structures demonstrate degenerative changes.   Impression:       Questionable right basilar opacity, may represent atelectasis, pneumonia or, or aspiration      Electronically signed by: Faheem Villalpando  Date: 06/10/2025  Time: 21:28      Imaging History     2025    Date Procedure Name Study Review Link PACS Link Status Accession Number Location   06/15/25 11:59 AM Cardiac monitoring strips Study Review  Final     06/14/25 05:58 PM Cardiac monitoring strips Study Review  Final     06/14/25 03:23 PM Echo Study Review  Images Final [id number] [facility]   06/13/25 08:45 AM X-Ray Chest AP Portable Study Review  Images Final [id number] [facility]   06/13/25 07:16 AM Cardiac monitoring strips Study Review  Final     06/12/25 02:41 PM US Lower Extremity Veins Bilateral Study Review  Images Final [id number] [facility]   06/12/25 07:11 AM Cardiac monitoring strips Study Review  Final     06/12/25 02:30 AM Cardiac monitoring strips Study Review  Final     06/11/25 02:46 AM CT Abdomen Pelvis  Without Contrast Study Review  Images Final [id number] [facility]   06/11/25 01:26 AM MRI Hindfoot WO Contrast LT Study Review  Images Final [id number] [facility]   06/10/25 08:48 PM XR Tibia Fibula 2 View Bilateral Study Review  Images Final [id number] [facility]   06/10/25 07:55 PM X-Ray Chest AP Portable Study Review  Images Final [id number] [facility]

## 2025-06-15 NOTE — ASSESSMENT & PLAN NOTE
Hgb A1c 6.5% on 6/11/25    - LDSSI  - QID glucose POCT  Nutrition consulted; appreciate recs     1.74

## 2025-06-15 NOTE — PLAN OF CARE
Problem: Adult Inpatient Plan of Care  Goal: Plan of Care Review  Outcome: Progressing  Goal: Patient-Specific Goal (Individualized)  Outcome: Progressing  Goal: Absence of Hospital-Acquired Illness or Injury  Outcome: Progressing  Goal: Optimal Comfort and Wellbeing  Outcome: Progressing  Goal: Readiness for Transition of Care  Outcome: Progressing     Problem: Skin Injury Risk Increased  Goal: Skin Health and Integrity  Outcome: Progressing     Problem: Diabetes Comorbidity  Goal: Blood Glucose Level Within Targeted Range  Outcome: Progressing     Problem: Sepsis/Septic Shock  Goal: Optimal Coping  Outcome: Progressing  Goal: Absence of Bleeding  Outcome: Progressing  Goal: Blood Glucose Level Within Targeted Range  Outcome: Progressing  Goal: Absence of Infection Signs and Symptoms  Outcome: Progressing  Goal: Optimal Nutrition Intake  Outcome: Progressing     Problem: Wound  Goal: Optimal Coping  Outcome: Progressing  Goal: Optimal Functional Ability  Outcome: Progressing  Goal: Absence of Infection Signs and Symptoms  Outcome: Progressing  Goal: Improved Oral Intake  Outcome: Progressing  Goal: Optimal Pain Control and Function  Outcome: Progressing  Goal: Skin Health and Integrity  Outcome: Progressing  Goal: Optimal Wound Healing  Outcome: Progressing     Problem: Fall Injury Risk  Goal: Absence of Fall and Fall-Related Injury  Outcome: Progressing

## 2025-06-15 NOTE — PROGRESS NOTES
[facility] - Internal Medicine [facility] Medicine  Progress Note    Patient Name: Lupis Murcia  MRN: [id number]  Patient Class: IP- Inpatient   Admission Date: 6/10/2025  Length of Stay: 5 days  Attending Physician: Irasema Ruiz MD  Primary Care Provider: Bobby Tran MD        Subjective     Principal Problem:Sepsis        HPI:  Lupis Murcia is a 74-year-old female with a history of Multiple Sclerosis not on immunosuppression, Lymphedema of bilateral lower extremities c/b prior osteomyelitis of the left heel, and h/o PE on apixaban who presented with acute on chronic weakness. Daughter at bedside provides additional history. Patient reports needing to call her daughter earlier in the day as she felt herself sliding out of her chair. She has chronic weakness from MS with left foot drop, reports being able to sit up in her chair. She otherwise does not have new complaints. She reports chronic, intermittent lower extremity pain related to her lymphedema. She denies cough, congestion, dyspnea, chest pain, abdominal pain, worsening lower extremity pain.    Since arrival, she reports severe buttocks pain which she relates to being in a new and uncomfortable bed. In the ED, she was found to be tachycardic and febrile to 103.2 for which she was received 1.5L IVF and vanc/cefepime (h/o penicillin allergy).    Of note, she was seen by her podiatry recently who prescribed doxycycline and duloxetine, however, she did not start these medications. She also has history of staph epi and enterococcus bacteremia without known source, though thought likely related to sacral skin irritation with fecal contamination.    Overview/Hospital Course:  Admitted for management of suspected sepsis 2/2 cellulitis under a background of lower limb lymphedema and a possible UTI. On Cefepime and Vancomycin. ID consulted for Antibiotics next steps. Urine cultures growing enterococcus faecalis, proteus, and corynebacterium;  susceptibilities pending. Blood cx remain negative. PO lasix PRN for LE edema.     Interval History: NAEON, HDS, and AF. Pending ID recs for abx . Urine cx with multiple organisms, pending susceptibilities.     Review of Systems   Constitutional:  Negative for activity change.   Eyes:  Negative for visual disturbance.   Respiratory:  Negative for cough and shortness of breath.    Cardiovascular:  Positive for leg swelling (chronic). Negative for chest pain.   Gastrointestinal:  Negative for abdominal pain and vomiting.   Musculoskeletal:  Positive for arthralgias, gait problem and joint swelling.   Skin:  Positive for rash and wound.   Neurological:  Positive for weakness.   Psychiatric/Behavioral:  Negative for confusion.      Objective:     Vital Signs (Most Recent):  Temp: 98.3 °F (36.8 °C) (06/15/25 1127)  Pulse: 84 (06/15/25 1127)  Resp: 18 (06/15/25 0806)  BP: 132/83 (06/15/25 1127)  SpO2: (!) 94 % (06/15/25 1127) Vital Signs (24h Range):  Temp:  [98 °F (36.7 °C)-99.4 °F (37.4 °C)] 98.3 °F (36.8 °C)  Pulse:  [84-96] 84  Resp:  [17-18] 18  SpO2:  [93 %-95 %] 94 %  BP: (125-148)/(70-83) 132/83     Weight: 94.8 kg (209 lb)  Body mass index is 39.49 kg/m².    Intake/Output Summary (Last 24 hours) at 6/15/2025 1324  Last data filed at 6/15/2025 0200  Gross per 24 hour   Intake 240 ml   Output --   Net 240 ml         Physical Exam  Vitals and nursing note reviewed.   Constitutional:       General: She is not in acute distress.     Appearance: She is obese. She is not toxic-appearing.   HENT:      Head: Normocephalic and atraumatic.      Mouth/Throat:      Mouth: Mucous membranes are dry.   Eyes:      Extraocular Movements: Extraocular movements intact.      Pupils: Pupils are equal, round, and reactive to light.   Cardiovascular:      Rate and Rhythm: Regular rhythm. Tachycardia present.      Heart sounds: Murmur (Sytolic Right sternal border murmur and radiating to the carotid) heard.      Systolic murmur is  present with a grade of 2/6.   Pulmonary:      Effort: Pulmonary effort is normal. No respiratory distress.      Breath sounds: No stridor. No wheezing or rhonchi.   Abdominal:      General: Abdomen is protuberant. There is no distension.      Tenderness: There is no abdominal tenderness.   Musculoskeletal:      Cervical back: Normal range of motion and neck supple.      Right lower leg: Edema (nonpitting) present.      Left lower leg: Edema (nonpitting) present.      Right foot: Swelling and deformity present.      Left foot: Swelling and deformity present.   Skin:     General: Skin is warm and dry.      Findings: Rash (Bilateral escoriating rash on lower limb) present. Rash is crusting and scaling.   Neurological:      Mental Status: She is alert and oriented to person, place, and time.      GCS: GCS eye subscore is 4. GCS verbal subscore is 5. GCS motor subscore is 6.      Motor: Weakness present.      Comments: Functionality unclear but close to baseline   Psychiatric:         Mood and Affect: Mood normal.         Behavior: Behavior is cooperative.               Significant Labs: All pertinent labs within the past 24 hours have been reviewed.  CBC:   Recent Labs   Lab 06/14/25  0445 06/15/25  0325   WBC 4.19 3.70*   HGB 7.9* 7.5*   HCT 27.1* 26.8*    236     CMP:   Recent Labs   Lab 06/14/25  0445 06/15/25  0325    140   K 3.5 3.3*    107   CO2 25 23   * 109   BUN 11 12   CREATININE 0.7 0.7   CALCIUM 8.5* 8.4*   ANIONGAP 10 10       Significant Imaging: I have reviewed all pertinent imaging results/findings within the past 24 hours.      Assessment & Plan  MS (multiple sclerosis)  Reports feeling worsening numbness and tingling at time prior to diagnosis, then with weakness of her lower extremities. Underwent LP which confirmed diagnosis. Has not been on treatment.   No recent history of worsening numbness  Lymphedema  Pictures in media tab. Severe swelling with chronic skin scaling,  with scaling and crustings.    - podiatry on board; appreciate help  - wound care consulted; appreciate input in patient care  - continue abx and wound dressing  - Lower extremity USS: With no evidence of DVT  PT/OT following  -Lasix PRN for LE edema   HTN (hypertension)  Patient's blood pressure range in the last 24 hours was: BP  Min: 125/70  Max: 148/71.The patient's inpatient anti-hypertensive regimen is listed below       Plan  - BP is controlled, no changes needed to their regimen  - hold antihypertensives in setting of sepsis  - Will give PRN Hydralazine for SBP>180  History of pulmonary embolus (PE)  Diagnosed during admission for bacteremia.     - continue apixaban 5mg BID    Sepsis  This patient does not have evidence of infective focus  My overall impression is sepsis without organ dysfunction.  Source: Unknown  Antibiotics given: Vanc / cefepime  Latest lactate reviewed: 1.8    Fluid challenge Ideal Body Weight- The patient is obese (BMI>30) and their ideal body weight of Ideal body weight: 47.8 kg (105 lb 6.1 oz) will be used to calculate fluid bolus of 30 ml/kg.     Post- resuscitation assessment Yes - I attest a sepsis perfusion exam was performed within 6 hours of sepsis, severe sepsis, or septic shock presentation, following fluid resuscitation.    Differential includes soft tissue infection, viral etiology (flu/COVID/RSV, hepatitis, common cold virus, EBV), sacral wound (given severe pain) / abdominopelvic abscess, osteomyelitis, UTI, pneumonia. Less likely VTE/PE, endocarditis (given given history of bacteremia), vasculitis, malignancy.    PLAN:  - continue antibiotics   - vancomycin for enterococcus and staph coverage   - cefepime for pseudomonas and GNR coverage (unable to give Zosyn due to penicillin allergy)   - follow up blood cultures; NGTD  - follow UA / culture: Multiple organisms: e faecalis, proteus, and corynebacterium; pending susceptibilities   - obtain MRSA PCR; Negative  - CXR  without acute pathology, obtain respiratory culture  - CT a/p to assess for abdominal; left renal pelvis stranding  - MRI left foot ; No acute osteomyelitis. diffuse soft tissue edema, correlate for cellulitis  -ID consulted; Appreciate recs  Irritant contact dermatitis due to fecal, urinary or dual incontinence  Wound care following    Severe obesity (BMI 35.0-39.9) with comorbidity  Body mass index is 39.49 kg/m². Morbid obesity complicates all aspects of disease management from diagnostic modalities to treatment. Weight loss encouraged and health benefits explained to patient.       Type 2 diabetes mellitus with hyperglycemia, without long-term current use of insulin  Hgb A1c 6.5% on 6/11/25    - LDSSI  - QID glucose POCT  Nutrition consulted; appreciate recs    Anxiety  Home ativan 1mg BID prn    Restore home Ativan for anxiety episodes  Normocytic anemia  Anemia is likely due to chronic disease. Most recent hemoglobin and hematocrit are listed below.  Recent Labs     06/13/25  0301 06/14/25  0445 06/15/25  0325   HGB 7.5* 7.9* 7.5*   HCT 25.4* 27.1* 26.8*     Plan  - Monitor serial CBC: Daily  - Transfuse PRBC if patient becomes hemodynamically unstable, symptomatic or H/H drops below 7/21.  - Patient has not received any PRBC transfusions to date  - Patient's anemia is currently stable.  -Low threshold for blood transfusion  Hypokalemia  Patient's most recent potassium results are listed below.   Recent Labs     06/13/25  0301 06/14/25  0445 06/15/25  0325   K 3.5 3.5 3.3*     Plan  - Replete potassium per protocol  - Monitor potassium Daily  - Patient's hypokalemia is stable  -  Limitation of activities due to disability  PT/OT consulted; appreciate recs    Elevated troponin (Resolved: 6/12/2025)      Chronic venous stasis dermatitis      History of osteomyelitis      Other reduced mobility      VTE Risk Mitigation (From admission, onward)           Ordered     apixaban tablet 5 mg  2 times daily          06/10/25 2359                    Discharge Planning   USMAN: 6/15/2025     Code Status: Full Code   Medical Readiness for Discharge Date:   Discharge Plan A: Home with family, Home Health                Stevie Artis DO  Department of Hospital Medicine   [facility] - Internal Medicine Telemetry

## 2025-06-16 ENCOUNTER — PATIENT MESSAGE (OUTPATIENT)
Dept: NEUROLOGY | Facility: CLINIC | Age: 75
End: 2025-06-16
Payer: MEDICARE

## 2025-06-16 LAB
ABSOLUTE EOSINOPHIL (OHS): 0.28 K/UL
ABSOLUTE MONOCYTE (OHS): 0.55 K/UL (ref 0.3–1)
ABSOLUTE NEUTROPHIL COUNT (OHS): 1.64 K/UL (ref 1.8–7.7)
ANION GAP (OHS): 12 MMOL/L (ref 8–16)
BASOPHILS # BLD AUTO: 0.02 K/UL
BASOPHILS NFR BLD AUTO: 0.5 %
BUN SERPL-MCNC: 12 MG/DL (ref 8–23)
CALCIUM SERPL-MCNC: 8.9 MG/DL (ref 8.7–10.5)
CHLORIDE SERPL-SCNC: 105 MMOL/L (ref 95–110)
CO2 SERPL-SCNC: 25 MMOL/L (ref 23–29)
CREAT SERPL-MCNC: 0.6 MG/DL (ref 0.5–1.4)
ERYTHROCYTE [DISTWIDTH] IN BLOOD BY AUTOMATED COUNT: 16.5 % (ref 11.5–14.5)
GFR SERPLBLD CREATININE-BSD FMLA CKD-EPI: >60 ML/MIN/1.73/M2
GLUCOSE SERPL-MCNC: 112 MG/DL (ref 70–110)
HCT VFR BLD AUTO: 30.3 % (ref 37–48.5)
HGB BLD-MCNC: 8.6 GM/DL (ref 12–16)
IMM GRANULOCYTES # BLD AUTO: 0.01 K/UL (ref 0–0.04)
IMM GRANULOCYTES NFR BLD AUTO: 0.2 % (ref 0–0.5)
LYMPHOCYTES # BLD AUTO: 1.81 K/UL (ref 1–4.8)
MCH RBC QN AUTO: 24.5 PG (ref 27–31)
MCHC RBC AUTO-ENTMCNC: 28.4 G/DL (ref 32–36)
MCV RBC AUTO: 86 FL (ref 82–98)
NUCLEATED RBC (/100WBC) (OHS): 0 /100 WBC
PHOSPHATE SERPL-MCNC: 3 MG/DL (ref 2.7–4.5)
PLATELET # BLD AUTO: 256 K/UL (ref 150–450)
PMV BLD AUTO: 10 FL (ref 9.2–12.9)
POTASSIUM SERPL-SCNC: 3.6 MMOL/L (ref 3.5–5.1)
RBC # BLD AUTO: 3.51 M/UL (ref 4–5.4)
RELATIVE EOSINOPHIL (OHS): 6.5 %
RELATIVE LYMPHOCYTE (OHS): 42 % (ref 18–48)
RELATIVE MONOCYTE (OHS): 12.8 % (ref 4–15)
RELATIVE NEUTROPHIL (OHS): 38 % (ref 38–73)
SODIUM SERPL-SCNC: 142 MMOL/L (ref 136–145)
WBC # BLD AUTO: 4.31 K/UL (ref 3.9–12.7)

## 2025-06-16 PROCEDURE — 97110 THERAPEUTIC EXERCISES: CPT

## 2025-06-16 PROCEDURE — 97112 NEUROMUSCULAR REEDUCATION: CPT | Mod: CQ

## 2025-06-16 PROCEDURE — 80048 BASIC METABOLIC PNL TOTAL CA: CPT | Performed by: STUDENT IN AN ORGANIZED HEALTH CARE EDUCATION/TRAINING PROGRAM

## 2025-06-16 PROCEDURE — 25000003 PHARM REV CODE 250

## 2025-06-16 PROCEDURE — 84100 ASSAY OF PHOSPHORUS: CPT | Performed by: STUDENT IN AN ORGANIZED HEALTH CARE EDUCATION/TRAINING PROGRAM

## 2025-06-16 PROCEDURE — 25000003 PHARM REV CODE 250: Performed by: STUDENT IN AN ORGANIZED HEALTH CARE EDUCATION/TRAINING PROGRAM

## 2025-06-16 PROCEDURE — 11000001 HC ACUTE MED/SURG PRIVATE ROOM

## 2025-06-16 PROCEDURE — 63600175 PHARM REV CODE 636 W HCPCS

## 2025-06-16 PROCEDURE — 85025 COMPLETE CBC W/AUTO DIFF WBC: CPT | Performed by: STUDENT IN AN ORGANIZED HEALTH CARE EDUCATION/TRAINING PROGRAM

## 2025-06-16 PROCEDURE — 36415 COLL VENOUS BLD VENIPUNCTURE: CPT | Performed by: STUDENT IN AN ORGANIZED HEALTH CARE EDUCATION/TRAINING PROGRAM

## 2025-06-16 PROCEDURE — 63600175 PHARM REV CODE 636 W HCPCS: Performed by: STUDENT IN AN ORGANIZED HEALTH CARE EDUCATION/TRAINING PROGRAM

## 2025-06-16 RX ORDER — POTASSIUM CHLORIDE 20 MEQ/1
40 TABLET, EXTENDED RELEASE ORAL ONCE
Status: COMPLETED | OUTPATIENT
Start: 2025-06-16 | End: 2025-06-16

## 2025-06-16 RX ORDER — CEFEPIME HYDROCHLORIDE 2 G/1
2 INJECTION, POWDER, FOR SOLUTION INTRAVENOUS
Status: COMPLETED | OUTPATIENT
Start: 2025-06-16 | End: 2025-06-16

## 2025-06-16 RX ADMIN — POTASSIUM CHLORIDE 40 MEQ: 1500 TABLET, EXTENDED RELEASE ORAL at 10:06

## 2025-06-16 RX ADMIN — CEFEPIME 2 G: 2 INJECTION, POWDER, FOR SOLUTION INTRAVENOUS at 01:06

## 2025-06-16 RX ADMIN — ACETAMINOPHEN AND CODEINE PHOSPHATE 1 TABLET: 300; 30 TABLET ORAL at 01:06

## 2025-06-16 RX ADMIN — CHOLECALCIFEROL TAB 125 MCG (5000 UNIT) 5000 UNITS: 125 TAB at 08:06

## 2025-06-16 RX ADMIN — MICONAZOLE NITRATE: 20 CREAM TOPICAL at 08:06

## 2025-06-16 RX ADMIN — VANCOMYCIN HYDROCHLORIDE 1500 MG: 1.5 INJECTION, POWDER, LYOPHILIZED, FOR SOLUTION INTRAVENOUS at 10:06

## 2025-06-16 RX ADMIN — LORAZEPAM 1 MG: 1 TABLET ORAL at 08:06

## 2025-06-16 RX ADMIN — ACETAMINOPHEN AND CODEINE PHOSPHATE 1 TABLET: 300; 30 TABLET ORAL at 03:06

## 2025-06-16 RX ADMIN — APIXABAN 5 MG: 5 TABLET, FILM COATED ORAL at 08:06

## 2025-06-16 RX ADMIN — CEFEPIME 2 G: 2 INJECTION, POWDER, FOR SOLUTION INTRAVENOUS at 06:06

## 2025-06-16 NOTE — SUBJECTIVE & OBJECTIVE
Interval Update: LAURA. Day #7 of Abx. No new fevers. Will discontinue Antibiotics today. PT/OT following.  Will follow recs for possible discharge planning.     Review of Systems   Constitutional:  Negative for activity change.   Eyes:  Negative for visual disturbance.   Respiratory:  Negative for cough and shortness of breath.    Cardiovascular:  Positive for leg swelling (chronic). Negative for chest pain.   Gastrointestinal:  Negative for abdominal pain and vomiting.   Musculoskeletal:  Positive for arthralgias, gait problem and joint swelling.   Skin:  Positive for rash and wound.   Neurological:  Positive for weakness.   Psychiatric/Behavioral:  Negative for confusion.      Objective:     Vital Signs (Most Recent):  Temp: 98 °F (36.7 °C) (06/16/25 0755)  Pulse: 73 (06/16/25 0755)  Resp: 16 (06/16/25 0755)  BP: 134/70 (06/16/25 0755)  SpO2: 95 % (06/16/25 0755) Vital Signs (24h Range):  Temp:  [97.5 °F (36.4 °C)-98.8 °F (37.1 °C)] 98 °F (36.7 °C)  Pulse:  [73-87] 73  Resp:  [16-18] 16  SpO2:  [89 %-95 %] 95 %  BP: (111-134)/(68-84) 134/70     Weight: 94.8 kg (209 lb)  Body mass index is 39.49 kg/m².    Intake/Output Summary (Last 24 hours) at 6/16/2025 0956  Last data filed at 6/16/2025 0127  Gross per 24 hour   Intake 700 ml   Output 1050 ml   Net -350 ml         Physical Exam  Vitals and nursing note reviewed.   Constitutional:       General: She is not in acute distress.     Appearance: She is obese. She is not toxic-appearing.   HENT:      Head: Normocephalic and atraumatic.      Mouth/Throat:      Mouth: Mucous membranes are dry.   Eyes:      Extraocular Movements: Extraocular movements intact.      Pupils: Pupils are equal, round, and reactive to light.   Cardiovascular:      Rate and Rhythm: Regular rhythm. Tachycardia present.      Heart sounds: Murmur (Sytolic Right sternal border murmur and radiating to the carotid) heard.      Systolic murmur is present with a grade of 2/6.   Pulmonary:      Effort:  Pulmonary effort is normal. No respiratory distress.      Breath sounds: No stridor. No wheezing or rhonchi.   Abdominal:      General: Abdomen is protuberant. There is no distension.      Tenderness: There is no abdominal tenderness.   Musculoskeletal:      Cervical back: Normal range of motion and neck supple.      Right lower leg: Edema (nonpitting) present.      Left lower leg: Edema (nonpitting) present.      Right foot: Swelling and deformity present.      Left foot: Swelling and deformity present.   Skin:     General: Skin is warm and dry.      Findings: Rash (Bilateral escoriating rash on lower limb) present. Rash is crusting and scaling.   Neurological:      Mental Status: She is alert and oriented to person, place, and time.      GCS: GCS eye subscore is 4. GCS verbal subscore is 5. GCS motor subscore is 6.      Motor: Weakness present.      Comments: Functionality unclear but close to baseline   Psychiatric:         Mood and Affect: Mood normal.         Behavior: Behavior is cooperative.               Significant Labs: All pertinent labs within the past 24 hours have been reviewed.  CBC:   Recent Labs   Lab 06/15/25  0325 06/16/25  0345   WBC 3.70* 4.31   HGB 7.5* 8.6*   HCT 26.8* 30.3*    256     CMP:   Recent Labs   Lab 06/15/25  0325 06/16/25  0345    142   K 3.3* 3.6    105   CO2 23 25    112*   BUN 12 12   CREATININE 0.7 0.6   CALCIUM 8.4* 8.9   ANIONGAP 10 12       Significant Imaging: I have reviewed all pertinent imaging results/findings within the past 24 hours.

## 2025-06-16 NOTE — PLAN OF CARE
[facility] - Internal Medicine Telemetry  Discharge Reassessment    Primary Care Provider: Bobby Tran MD    Expected Discharge Date: 6/18/2025    Reassessment (most recent)       Discharge Reassessment - 06/16/25 1620          Discharge Reassessment    Assessment Type Discharge Planning Reassessment (P)      Did the patient's condition or plan change since previous assessment? Yes (P)      Discharge Plan discussed with: Patient;Adult children (P)      Communicated USMAN with patient/caregiver Yes (P)      Discharge Plan A Home with family;Home Health (P)      Discharge Plan B Skilled Nursing Facility (P)      DME Needed Upon Discharge  none (P)      Transition of Care Barriers None (P)      Why the patient remains in the hospital Requires continued medical care (P)         Post-Acute Status    Discharge Delays None known at this time (P)                    CM spoke with the patient's daughter Amanda regarding the discharge plan. Amanda notified the CM that they would like to return home  at the time of discharge. Amanda stated that the patient is current with [facility].     Discharge Plan A and Plan B have been determined by review of patient's clinical status, future medical and therapeutic needs, and coverage/benefits for post-acute care in coordination with multidisciplinary team members.    Dima Louise RN-CM  Case Management  [facility]  [phone]

## 2025-06-16 NOTE — PT/OT/SLP PROGRESS
Physical Therapy Treatment    Patient Name:  Lupis Murcia   MRN:  [id number]    Recommendations:     Discharge Recommendations: Low Intensity Therapy  Discharge Equipment Recommendations: lift device, hospital bed  Barriers to discharge: None    Assessment:     Lupis Murcia is a 74 y.o. female admitted with a medical diagnosis of Sepsis.  She presents with the following impairments/functional limitations: weakness, impaired endurance, impaired self care skills, impaired functional mobility, decreased lower extremity function, decreased safety awareness, pain, impaired skin, decreased ROM, impaired fine motor, edema.    Rehab Prognosis: Fair; patient would benefit from acute skilled PT services to address these deficits and reach maximum level of function.    Recent Surgery: * No surgery found *      Plan:     During this hospitalization, patient to be seen 3 x/week to address the identified rehab impairments via therapeutic activities, therapeutic exercises, neuromuscular re-education and progress toward the following goals:    Plan of Care Expires:  07/11/25    Subjective     Chief Complaint: Pt agreeable to session   Patient/Family Comments/goals: to get better   Pain/Comfort:  Pain Rating 1: 8/10  Location 1: leg      Objective:     Communicated with RN prior to session.  Patient found HOB elevated with PureWick upon PT entry to room.     General Precautions: Standard, diabetic, fall  Orthopedic Precautions: N/A  Braces: N/A  Respiratory Status: Room air     Functional Mobility:  Bed Mobility:     Supine to Sit: maximal assistance and of 2 persons  Sit to Supine: maximal assistance and of 2 persons      AM-PAC 6 CLICK MOBILITY  Turning over in bed (including adjusting bedclothes, sheets and blankets)?: 2  Sitting down on and standing up from a chair with arms (e.g., wheelchair, bedside commode, etc.): 1  Moving from lying on back to sitting on the side of the bed?: 1  Moving to and from a bed to a chair  (including a wheelchair)?: 1  Need to walk in hospital room?: 1  Climbing 3-5 steps with a railing?: 1  Basic Mobility Total Score: 7       Treatment & Education:  Pt sat EOB and performed therex as well as seated unsupported balance with CGA to SBA. Pt with rounded seated posture and only able to adjust a couple of degrees.   Bedside table in front of patient and area set up for function, convenience, and safety. RN aware of patient's mobility needs and status. Questions/concerns addressed within PTA scope of practice; patient  with no further questions. Time was provided for active listening, discussion of health disposition, and discussion of safe discharge.    Patient left HOB elevated with all lines intact and call button in reach..    GOALS:   Multidisciplinary Problems       Physical Therapy Goals          Problem: Physical Therapy    Goal Priority Disciplines Outcome Interventions   Physical Therapy Goal     PT, PT/OT Progressing    Description: Goals to be met by: 2025     Patient will increase functional independence with mobility by performin. Supine to sit with Maximum Assistance  2. Sit to supine with Maximum Assistance  3. Sit to stand transfer with Maximum Assistance  4. Bed to chair transfer with Maximum Assistance using LRAD  5. Sitting at edge of bed x10 minutes with Supervision    Patient requires a hospital bed for positioning of the body in ways that are not feasible with an ordinary bed. The patient requires special positioning for pain relief, limited mobility, and/or being unable to independently make changes in body position without the use of a hospital bed. Pillows and wedges will not be adequate for resolving these positional issues.                          DME Justifications:      Time Tracking:     PT Received On: 25  PT Start Time: 0942     PT Stop Time: 1002  PT Total Time (min): 20 min     Billable Minutes: Neuromuscular Re-education 20    Treatment Type:  Treatment  PT/PTA: PTA     Number of PTA visits since last PT visit: 1 06/16/2025

## 2025-06-16 NOTE — ASSESSMENT & PLAN NOTE
Patient's most recent potassium results are listed below.   Recent Labs     06/14/25  0445 06/15/25  0325 06/16/25  0345   K 3.5 3.3* 3.6     Plan  - Replete potassium per protocol  - Monitor potassium Daily  - Patient's hypokalemia is stable  -

## 2025-06-16 NOTE — PROGRESS NOTES
[facility] - Internal Medicine [facility] Medicine  Progress Note    Patient Name: Lupis Murcia  MRN: [id number]  Patient Class: IP- Inpatient   Admission Date: 6/10/2025  Length of Stay: 6 days  Attending Physician: Irasema Ruiz MD  Primary Care Provider: Bobby Tran MD        Subjective     Principal Problem:Sepsis        HPI:  Lupis Murcia is a 74-year-old female with a history of Multiple Sclerosis not on immunosuppression, Lymphedema of bilateral lower extremities c/b prior osteomyelitis of the left heel, and h/o PE on apixaban who presented with acute on chronic weakness. Daughter at bedside provides additional history. Patient reports needing to call her daughter earlier in the day as she felt herself sliding out of her chair. She has chronic weakness from MS with left foot drop, reports being able to sit up in her chair. She otherwise does not have new complaints. She reports chronic, intermittent lower extremity pain related to her lymphedema. She denies cough, congestion, dyspnea, chest pain, abdominal pain, worsening lower extremity pain.    Since arrival, she reports severe buttocks pain which she relates to being in a new and uncomfortable bed. In the ED, she was found to be tachycardic and febrile to 103.2 for which she was received 1.5L IVF and vanc/cefepime (h/o penicillin allergy).    Of note, she was seen by her podiatry recently who prescribed doxycycline and duloxetine, however, she did not start these medications. She also has history of staph epi and enterococcus bacteremia without known source, though thought likely related to sacral skin irritation with fecal contamination.    Overview/Hospital Course:  Admitted for management of suspected sepsis 2/2 cellulitis under a background of lower limb lymphedema and a possible UTI. On Cefepime and Vancomycin. ID consulted for Antibiotics next steps. Urine cultures growing enterococcus faecalis, proteus, and corynebacterium;  susceptibilities pending. Blood cx remain negative.   TTE shows EF 60-65% with grade 2 diastolic dysfunction and moderate Pulm HTN w/ PAP 52 mmHg.  IVC pressure 3 mm Hg.  PO lasix PRN for LE edema. Antibiotics ended on 6/16 per ID recs. Possible discharge tomorrow.    Interval Update: NAEON. Day #7 of Abx. No new fevers. Will discontinue Antibiotics today. PT/OT following.  Will follow recs for possible discharge planning.     Review of Systems   Constitutional:  Negative for activity change.   Eyes:  Negative for visual disturbance.   Respiratory:  Negative for cough and shortness of breath.    Cardiovascular:  Positive for leg swelling (chronic). Negative for chest pain.   Gastrointestinal:  Negative for abdominal pain and vomiting.   Musculoskeletal:  Positive for arthralgias, gait problem and joint swelling.   Skin:  Positive for rash and wound.   Neurological:  Positive for weakness.   Psychiatric/Behavioral:  Negative for confusion.      Objective:     Vital Signs (Most Recent):  Temp: 98 °F (36.7 °C) (06/16/25 0755)  Pulse: 73 (06/16/25 0755)  Resp: 16 (06/16/25 0755)  BP: 134/70 (06/16/25 0755)  SpO2: 95 % (06/16/25 0755) Vital Signs (24h Range):  Temp:  [97.5 °F (36.4 °C)-98.8 °F (37.1 °C)] 98 °F (36.7 °C)  Pulse:  [73-87] 73  Resp:  [16-18] 16  SpO2:  [89 %-95 %] 95 %  BP: (111-134)/(68-84) 134/70     Weight: 94.8 kg (209 lb)  Body mass index is 39.49 kg/m².    Intake/Output Summary (Last 24 hours) at 6/16/2025 0956  Last data filed at 6/16/2025 0127  Gross per 24 hour   Intake 700 ml   Output 1050 ml   Net -350 ml         Physical Exam  Vitals and nursing note reviewed.   Constitutional:       General: She is not in acute distress.     Appearance: She is obese. She is not toxic-appearing.   HENT:      Head: Normocephalic and atraumatic.      Mouth/Throat:      Mouth: Mucous membranes are dry.   Eyes:      Extraocular Movements: Extraocular movements intact.      Pupils: Pupils are equal, round, and  reactive to light.   Cardiovascular:      Rate and Rhythm: Regular rhythm. Tachycardia present.      Heart sounds: Murmur (Sytolic Right sternal border murmur and radiating to the carotid) heard.      Systolic murmur is present with a grade of 2/6.   Pulmonary:      Effort: Pulmonary effort is normal. No respiratory distress.      Breath sounds: No stridor. No wheezing or rhonchi.   Abdominal:      General: Abdomen is protuberant. There is no distension.      Tenderness: There is no abdominal tenderness.   Musculoskeletal:      Cervical back: Normal range of motion and neck supple.      Right lower leg: Edema (nonpitting) present.      Left lower leg: Edema (nonpitting) present.      Right foot: Swelling and deformity present.      Left foot: Swelling and deformity present.   Skin:     General: Skin is warm and dry.      Findings: Rash (Bilateral escoriating rash on lower limb) present. Rash is crusting and scaling.   Neurological:      Mental Status: She is alert and oriented to person, place, and time.      GCS: GCS eye subscore is 4. GCS verbal subscore is 5. GCS motor subscore is 6.      Motor: Weakness present.      Comments: Functionality unclear but close to baseline   Psychiatric:         Mood and Affect: Mood normal.         Behavior: Behavior is cooperative.               Significant Labs: All pertinent labs within the past 24 hours have been reviewed.  CBC:   Recent Labs   Lab 06/15/25  0325 06/16/25  0345   WBC 3.70* 4.31   HGB 7.5* 8.6*   HCT 26.8* 30.3*    256     CMP:   Recent Labs   Lab 06/15/25  0325 06/16/25  0345    142   K 3.3* 3.6    105   CO2 23 25    112*   BUN 12 12   CREATININE 0.7 0.6   CALCIUM 8.4* 8.9   ANIONGAP 10 12       Significant Imaging: I have reviewed all pertinent imaging results/findings within the past 24 hours.      Assessment & Plan  MS (multiple sclerosis)  Reports feeling worsening numbness and tingling at time prior to diagnosis, then with  weakness of her lower extremities. Underwent LP which confirmed diagnosis. Has not been on treatment.   No recent history of worsening numbness  Lymphedema  Pictures in media tab. Severe swelling with chronic skin scaling, with scaling and crustings.    - podiatry on board; appreciate help  - wound care consulted; appreciate input in patient care  - continue abx and wound dressing  - Lower extremity USS: With no evidence of DVT  PT/OT following  -Lasix PRN for LE edema   HTN (hypertension)  Patient's blood pressure range in the last 24 hours was: BP  Min: 111/68  Max: 147/78.The patient's inpatient anti-hypertensive regimen is listed below       Plan  - BP is controlled, no changes needed to their regimen  - hold antihypertensives in setting of sepsis  - Will give PRN Hydralazine for SBP>180  History of pulmonary embolus (PE)  Diagnosed during admission for bacteremia.     - continue apixaban 5mg BID    Sepsis  This patient does not have evidence of infective focus  My overall impression is sepsis without organ dysfunction.  Source: Unknown  Antibiotics given: Vanc / cefepime  Latest lactate reviewed: 1.8    Fluid challenge Ideal Body Weight- The patient is obese (BMI>30) and their ideal body weight of Ideal body weight: 47.8 kg (105 lb 6.1 oz) will be used to calculate fluid bolus of 30 ml/kg.     Post- resuscitation assessment Yes - I attest a sepsis perfusion exam was performed within 6 hours of sepsis, severe sepsis, or septic shock presentation, following fluid resuscitation.    Differential includes soft tissue infection, viral etiology (flu/COVID/RSV, hepatitis, common cold virus, EBV), sacral wound (given severe pain) / abdominopelvic abscess, osteomyelitis, UTI, pneumonia. Less likely VTE/PE, endocarditis (given given history of bacteremia), vasculitis, malignancy.    PLAN:  - continue antibiotics   - vancomycin for enterococcus and staph coverage   - cefepime for pseudomonas and GNR coverage (unable to  give Zosyn due to penicillin allergy)   - follow up blood cultures; NGTD  - follow UA / culture: Multiple organisms: e faecalis, proteus, and corynebacterium; pending susceptibilities   - obtain MRSA PCR; Negative  - CXR without acute pathology, obtain respiratory culture  - CT a/p to assess for abdominal; left renal pelvis stranding seen  - MRI left foot ; No acute osteomyelitis. diffuse soft tissue edema, correlate for cellulitis  -ID consulted; Appreciate recs. Abx End date is 6/16/2025  Irritant contact dermatitis due to fecal, urinary or dual incontinence  Wound care following    Severe obesity (BMI 35.0-39.9) with comorbidity  Body mass index is 39.49 kg/m². Morbid obesity complicates all aspects of disease management from diagnostic modalities to treatment. Weight loss encouraged and health benefits explained to patient.       Type 2 diabetes mellitus with hyperglycemia, without long-term current use of insulin  Hgb A1c 6.5% on 6/11/25    - LDSSI  - QID glucose POCT  Nutrition consulted; appreciate recs    Anxiety  Home ativan 1mg BID prn    Restore home Ativan for anxiety episodes  Normocytic anemia  Anemia is likely due to chronic disease. Most recent hemoglobin and hematocrit are listed below.  Recent Labs     06/14/25  0445 06/15/25  0325 06/16/25  0345   HGB 7.9* 7.5* 8.6*   HCT 27.1* 26.8* 30.3*     Plan  - Monitor serial CBC: Daily  - Transfuse PRBC if patient becomes hemodynamically unstable, symptomatic or H/H drops below 7/21.  - Patient has not received any PRBC transfusions to date  - Patient's anemia is currently stable.  -Low threshold for blood transfusion  Hypokalemia  Patient's most recent potassium results are listed below.   Recent Labs     06/14/25  0445 06/15/25  0325 06/16/25  0345   K 3.5 3.3* 3.6     Plan  - Replete potassium per protocol  - Monitor potassium Daily  - Patient's hypokalemia is stable  -  Limitation of activities due to disability  PT/OT consulted; appreciate recs    VTE  Risk Mitigation (From admission, onward)           Ordered     apixaban tablet 5 mg  2 times daily         06/10/25 7231                    Discharge Planning   USMAN: 6/18/2025     Code Status: Full Code   Medical Readiness for Discharge Date:   Discharge Plan A: Home with family, Home Health          Adri Baer MD  Department of Hospital Medicine   [facility] - Internal Medicine Telemetry

## 2025-06-16 NOTE — ASSESSMENT & PLAN NOTE
Patient's blood pressure range in the last 24 hours was: BP  Min: 111/68  Max: 147/78.The patient's inpatient anti-hypertensive regimen is listed below       Plan  - BP is controlled, no changes needed to their regimen  - hold antihypertensives in setting of sepsis  - Will give PRN Hydralazine for SBP>180

## 2025-06-16 NOTE — ASSESSMENT & PLAN NOTE
This patient does not have evidence of infective focus  My overall impression is sepsis without organ dysfunction.  Source: Unknown  Antibiotics given: Vanc / cefepime  Latest lactate reviewed: 1.8    Fluid challenge Ideal Body Weight- The patient is obese (BMI>30) and their ideal body weight of Ideal body weight: 47.8 kg (105 lb 6.1 oz) will be used to calculate fluid bolus of 30 ml/kg.     Post- resuscitation assessment Yes - I attest a sepsis perfusion exam was performed within 6 hours of sepsis, severe sepsis, or septic shock presentation, following fluid resuscitation.    Differential includes soft tissue infection, viral etiology (flu/COVID/RSV, hepatitis, common cold virus, EBV), sacral wound (given severe pain) / abdominopelvic abscess, osteomyelitis, UTI, pneumonia. Less likely VTE/PE, endocarditis (given given history of bacteremia), vasculitis, malignancy.    PLAN:  - continue antibiotics   - vancomycin for enterococcus and staph coverage   - cefepime for pseudomonas and GNR coverage (unable to give Zosyn due to penicillin allergy)   - follow up blood cultures; NGTD  - follow UA / culture: Multiple organisms: e faecalis, proteus, and corynebacterium; pending susceptibilities   - obtain MRSA PCR; Negative  - CXR without acute pathology, obtain respiratory culture  - CT a/p to assess for abdominal; left renal pelvis stranding seen  - MRI left foot ; No acute osteomyelitis. diffuse soft tissue edema, correlate for cellulitis  -ID consulted; Appreciate recs. Abx End date is 6/16/2025

## 2025-06-16 NOTE — PT/OT/SLP PROGRESS
Occupational Therapy   Co-Treatment  Co-treatment performed due to patient's multiple deficits requiring two skilled therapists to appropriately and safely assess patient's strength and endurance while facilitating functional tasks in addition to accommodating for patient's activity tolerance.        Name: Lupis Murcia  MRN: [id number]  Admitting Diagnosis:  Sepsis       Recommendations:     Discharge Recommendations: Low Intensity Therapy  Discharge Equipment Recommendations:  lift device, hospital bed  Barriers to discharge:  Decreased caregiver support    Assessment:     Lupis Murcia is a 74 y.o. female with a medical diagnosis of Sepsis.  She presents with the following performance deficits affecting function are weakness, impaired endurance, impaired self care skills, impaired functional mobility, impaired balance, decreased lower extremity function, pain.     Pt agreeable to therapy and tolerated fairly. Pt was cooperative & demonstrated good effort throughout the therapy session. However, pt remains limited in ADLs, functional mobility, and functional transfers. Pt would continue to benefit from skilled OT services to maximize functional independence with ADLs and functional mobility, reduce caregiver burden, and facilitate safe discharge in the least restrictive environment.      Rehab Prognosis:  Good; patient would benefit from acute skilled OT services to address these deficits and reach maximum level of function.       Plan:     Patient to be seen 3 x/week to address the above listed problems via self-care/home management, therapeutic activities, therapeutic exercises  Plan of Care Expires: 07/11/25  Plan of Care Reviewed with: patient    Subjective     Chief Complaint: B LE pain  Patient/Family Comments/goals: pt agreeable to treatment session  Pain/Comfort:  Pain Rating 1: 8/10  Location - Side 1: Bilateral  Location 1: leg    Objective:     Communicated with: nurse prior to session.  Patient  found HOB elevated with peripheral IV, PureWick upon OT entry to room.    General Precautions: Standard, fall, diabetic    Orthopedic Precautions:N/A  Braces: N/A  Respiratory Status: Room air     Occupational Performance:     Bed Mobility:  (additional time required due to B LE pain)  Patient completed Scooting to EOB with moderate assistance  Scooting to HOB with total assistance     Patient completed Supine to Sit with maximal assistance and 2 persons    Patient completed Sit to Supine with maximal assistance and 2 persons     Functional Mobility/Transfers:  Pt unable to stand due to insufficient L LE strength  Task deferred to maintain pt safety    Activities of Daily Living:  Upper Body Dressing: minimum assistance to don gown over back while seated EOB    [facility] 6 Click ADL: 12    Treatment & Education:  -Education on task modification to maximize safety and (I) during bed mobility ADLs and mobility/transfers  -Education on importance of OOB activity to improve/maintain overall strength, activity tolerance and promote recovery  -Unilateral chest press with emphasis on elbow ext x10 each UE (against min resistance)  -Trunk strengthening exercise which include 2 trials of unassisted support. Pt demonstrated several UE movements while seated EOB without placing feet on floor  -Pt educated to call for assistance and to transfer with hospital staff only  -Provided education regarding OT POC with pt verbalizing understanding.  Pt had no further questions & when asked whether there were any concerns pt reported none.     Patient left HOB elevated with all lines intact and call button in reach    GOALS:   Multidisciplinary Problems       Occupational Therapy Goals          Problem: Occupational Therapy    Goal Priority Disciplines Outcome Interventions   Occupational Therapy Goal     OT, PT/OT Progressing    Description: Goals to be met by: 7/11/2025     Patient will increase functional independence with ADLs by  performing:    Feeding with [place].  UE Dressing with Moderate Assistance.  LE Dressing with Moderate Assistance.  Grooming while seated with Minimal Assistance.  Toileting from bed level with Moderate Assistance for hygiene and clothing management.   Rolling to Bilateral with Minimal Assistance.   Squat pivot transfers with Maximum Assistance.  Toilet transfer to bedside commode with Maximum Assistance.                         DME Justifications:  Lupis requires a hospital bed due to her requiring positioning of the body in ways not feasible with an ordinary bed due to limited ability and cannot independently make changes in body position without the use of the bed.The positioning of the body cannot be sufficiently resolved by the use of pillows and wedges.    Time Tracking:     OT Date of Treatment: 06/16/25  OT Start Time: 0942  OT Stop Time: 1002  OT Total Time (min): 20 min    Billable Minutes:Therapeutic Exercise 20    OT/LOW: OT          6/16/2025

## 2025-06-16 NOTE — PLAN OF CARE
Problem: Adult Inpatient Plan of Care  Goal: Plan of Care Review  Outcome: Ongoing  Goal: Patient-Specific Goal (Individualized)  Outcome: Ongoing  Goal: Absence of Hospital-Acquired Illness or Injury  Outcome: Ongoing  Goal: Optimal Comfort and Wellbeing  Outcome: Ongoing  Goal: Readiness for Transition of Care  Outcome: Ongoing     Problem: Skin Injury Risk Increased  Goal: Skin Health and Integrity  Outcome: Ongoing     Problem: Diabetes Comorbidity  Goal: Blood Glucose Level Within Targeted Range  Outcome: Ongoing     Problem: Sepsis/Septic Shock  Goal: Optimal Coping  Outcome: Ongoing  Goal: Absence of Bleeding  Outcome: Ongoing  Goal: Blood Glucose Level Within Targeted Range  Outcome: Ongoing  Goal: Absence of Infection Signs and Symptoms  Outcome: Ongoing  Goal: Optimal Nutrition Intake  Outcome: Ongoing     Problem: Wound  Goal: Optimal Coping  Outcome: Ongoing  Goal: Optimal Functional Ability  Outcome: Ongoing  Goal: Absence of Infection Signs and Symptoms  Outcome: Ongoing  Goal: Improved Oral Intake  Outcome: Ongoing  Goal: Optimal Pain Control and Function  Outcome: Ongoing  Goal: Skin Health and Integrity  Outcome: Ongoing  Goal: Optimal Wound Healing  Outcome: Ongoing     Problem: Fall Injury Risk  Goal: Absence of Fall and Fall-Related Injury  Outcome: Ongoing

## 2025-06-16 NOTE — ASSESSMENT & PLAN NOTE
Anemia is likely due to chronic disease. Most recent hemoglobin and hematocrit are listed below.  Recent Labs     06/14/25  0445 06/15/25  0325 06/16/25  0345   HGB 7.9* 7.5* 8.6*   HCT 27.1* 26.8* 30.3*     Plan  - Monitor serial CBC: Daily  - Transfuse PRBC if patient becomes hemodynamically unstable, symptomatic or H/H drops below 7/21.  - Patient has not received any PRBC transfusions to date  - Patient's anemia is currently stable.  -Low threshold for blood transfusion

## 2025-06-17 LAB
ABSOLUTE EOSINOPHIL (OHS): 0.25 K/UL
ABSOLUTE MONOCYTE (OHS): 0.66 K/UL (ref 0.3–1)
ABSOLUTE NEUTROPHIL COUNT (OHS): 3.52 K/UL (ref 1.8–7.7)
ANION GAP (OHS): 9 MMOL/L (ref 8–16)
ANION GAP (OHS): 9 MMOL/L (ref 8–16)
BACTERIA #/AREA URNS AUTO: ABNORMAL /HPF
BASOPHILS # BLD AUTO: 0.02 K/UL
BASOPHILS NFR BLD AUTO: 0.3 %
BILIRUB UR QL STRIP.AUTO: NEGATIVE
BUN SERPL-MCNC: 15 MG/DL (ref 8–23)
BUN SERPL-MCNC: 16 MG/DL (ref 8–23)
CALCIUM SERPL-MCNC: 8.9 MG/DL (ref 8.7–10.5)
CALCIUM SERPL-MCNC: 8.9 MG/DL (ref 8.7–10.5)
CHLORIDE SERPL-SCNC: 104 MMOL/L (ref 95–110)
CHLORIDE SERPL-SCNC: 106 MMOL/L (ref 95–110)
CLARITY UR: ABNORMAL
CO2 SERPL-SCNC: 27 MMOL/L (ref 23–29)
CO2 SERPL-SCNC: 27 MMOL/L (ref 23–29)
COLOR UR AUTO: ABNORMAL
CREAT SERPL-MCNC: 1.1 MG/DL (ref 0.5–1.4)
CREAT SERPL-MCNC: 1.3 MG/DL (ref 0.5–1.4)
CREAT UR-MCNC: 246 MG/DL (ref 15–325)
ERYTHROCYTE [DISTWIDTH] IN BLOOD BY AUTOMATED COUNT: 16.7 % (ref 11.5–14.5)
GFR SERPLBLD CREATININE-BSD FMLA CKD-EPI: 43 ML/MIN/1.73/M2
GFR SERPLBLD CREATININE-BSD FMLA CKD-EPI: 53 ML/MIN/1.73/M2
GLUCOSE SERPL-MCNC: 111 MG/DL (ref 70–110)
GLUCOSE SERPL-MCNC: 116 MG/DL (ref 70–110)
GLUCOSE UR QL STRIP: ABNORMAL
HCT VFR BLD AUTO: 29.5 % (ref 37–48.5)
HGB BLD-MCNC: 8.6 GM/DL (ref 12–16)
HGB UR QL STRIP: ABNORMAL
HYALINE CASTS UR QL AUTO: 0 /LPF (ref 0–1)
IMM GRANULOCYTES # BLD AUTO: 0.04 K/UL (ref 0–0.04)
IMM GRANULOCYTES NFR BLD AUTO: 0.7 % (ref 0–0.5)
KETONES UR QL STRIP: ABNORMAL
LEUKOCYTE ESTERASE UR QL STRIP: NEGATIVE
LYMPHOCYTES # BLD AUTO: 1.38 K/UL (ref 1–4.8)
MAGNESIUM SERPL-MCNC: 2 MG/DL (ref 1.6–2.6)
MCH RBC QN AUTO: 24.8 PG (ref 27–31)
MCHC RBC AUTO-ENTMCNC: 29.2 G/DL (ref 32–36)
MCV RBC AUTO: 85 FL (ref 82–98)
MICROSCOPIC COMMENT: ABNORMAL
NITRITE UR QL STRIP: NEGATIVE
NUCLEATED RBC (/100WBC) (OHS): 0 /100 WBC
PH UR STRIP: 6 [PH]
PHOSPHATE SERPL-MCNC: 3.1 MG/DL (ref 2.7–4.5)
PLATELET # BLD AUTO: 300 K/UL (ref 150–450)
PMV BLD AUTO: 10.2 FL (ref 9.2–12.9)
POTASSIUM SERPL-SCNC: 3.7 MMOL/L (ref 3.5–5.1)
POTASSIUM SERPL-SCNC: 3.8 MMOL/L (ref 3.5–5.1)
PROT UR QL STRIP: ABNORMAL
RBC # BLD AUTO: 3.47 M/UL (ref 4–5.4)
RBC #/AREA URNS AUTO: 7 /HPF (ref 0–4)
RELATIVE EOSINOPHIL (OHS): 4.3 %
RELATIVE LYMPHOCYTE (OHS): 23.5 % (ref 18–48)
RELATIVE MONOCYTE (OHS): 11.2 % (ref 4–15)
RELATIVE NEUTROPHIL (OHS): 60 % (ref 38–73)
SODIUM SERPL-SCNC: 140 MMOL/L (ref 136–145)
SODIUM SERPL-SCNC: 142 MMOL/L (ref 136–145)
SODIUM UR-SCNC: 118 MMOL/L (ref 20–250)
SP GR UR STRIP: >=1.03
SQUAMOUS #/AREA URNS AUTO: 5 /HPF
UROBILINOGEN UR STRIP-ACNC: NEGATIVE EU/DL
WBC # BLD AUTO: 5.87 K/UL (ref 3.9–12.7)
WBC #/AREA URNS AUTO: 4 /HPF (ref 0–5)
YEAST UR QL AUTO: ABNORMAL /HPF

## 2025-06-17 PROCEDURE — 84100 ASSAY OF PHOSPHORUS: CPT | Performed by: STUDENT IN AN ORGANIZED HEALTH CARE EDUCATION/TRAINING PROGRAM

## 2025-06-17 PROCEDURE — 36415 COLL VENOUS BLD VENIPUNCTURE: CPT | Performed by: STUDENT IN AN ORGANIZED HEALTH CARE EDUCATION/TRAINING PROGRAM

## 2025-06-17 PROCEDURE — 25000003 PHARM REV CODE 250: Performed by: STUDENT IN AN ORGANIZED HEALTH CARE EDUCATION/TRAINING PROGRAM

## 2025-06-17 PROCEDURE — 80048 BASIC METABOLIC PNL TOTAL CA: CPT

## 2025-06-17 PROCEDURE — 83735 ASSAY OF MAGNESIUM: CPT | Performed by: STUDENT IN AN ORGANIZED HEALTH CARE EDUCATION/TRAINING PROGRAM

## 2025-06-17 PROCEDURE — 36415 COLL VENOUS BLD VENIPUNCTURE: CPT

## 2025-06-17 PROCEDURE — 97112 NEUROMUSCULAR REEDUCATION: CPT

## 2025-06-17 PROCEDURE — 51798 US URINE CAPACITY MEASURE: CPT

## 2025-06-17 PROCEDURE — 25000003 PHARM REV CODE 250

## 2025-06-17 PROCEDURE — 11000001 HC ACUTE MED/SURG PRIVATE ROOM

## 2025-06-17 PROCEDURE — 97530 THERAPEUTIC ACTIVITIES: CPT | Mod: CQ

## 2025-06-17 PROCEDURE — 82570 ASSAY OF URINE CREATININE: CPT | Performed by: STUDENT IN AN ORGANIZED HEALTH CARE EDUCATION/TRAINING PROGRAM

## 2025-06-17 PROCEDURE — 97110 THERAPEUTIC EXERCISES: CPT | Mod: CQ

## 2025-06-17 PROCEDURE — 80048 BASIC METABOLIC PNL TOTAL CA: CPT | Performed by: STUDENT IN AN ORGANIZED HEALTH CARE EDUCATION/TRAINING PROGRAM

## 2025-06-17 PROCEDURE — 84300 ASSAY OF URINE SODIUM: CPT | Performed by: STUDENT IN AN ORGANIZED HEALTH CARE EDUCATION/TRAINING PROGRAM

## 2025-06-17 PROCEDURE — 81003 URINALYSIS AUTO W/O SCOPE: CPT | Performed by: STUDENT IN AN ORGANIZED HEALTH CARE EDUCATION/TRAINING PROGRAM

## 2025-06-17 PROCEDURE — 97535 SELF CARE MNGMENT TRAINING: CPT

## 2025-06-17 PROCEDURE — 85025 COMPLETE CBC W/AUTO DIFF WBC: CPT | Performed by: STUDENT IN AN ORGANIZED HEALTH CARE EDUCATION/TRAINING PROGRAM

## 2025-06-17 RX ORDER — LOSARTAN POTASSIUM 50 MG/1
100 TABLET ORAL DAILY
Status: DISCONTINUED | OUTPATIENT
Start: 2025-06-17 | End: 2025-06-17

## 2025-06-17 RX ORDER — DOXYLAMINE SUCCINATE 25 MG
TABLET ORAL 2 TIMES DAILY
Qty: 92 G | Refills: 0 | Status: SHIPPED | OUTPATIENT
Start: 2025-06-17

## 2025-06-17 RX ORDER — SODIUM CHLORIDE 9 MG/ML
INJECTION, SOLUTION INTRAVENOUS CONTINUOUS
Status: DISCONTINUED | OUTPATIENT
Start: 2025-06-17 | End: 2025-06-17

## 2025-06-17 RX ORDER — POTASSIUM CHLORIDE 750 MG/1
30 CAPSULE, EXTENDED RELEASE ORAL ONCE
Status: COMPLETED | OUTPATIENT
Start: 2025-06-17 | End: 2025-06-17

## 2025-06-17 RX ORDER — LOSARTAN POTASSIUM 50 MG/1
50 TABLET ORAL DAILY
Status: DISCONTINUED | OUTPATIENT
Start: 2025-06-17 | End: 2025-06-18 | Stop reason: HOSPADM

## 2025-06-17 RX ORDER — SODIUM CHLORIDE 9 MG/ML
INJECTION, SOLUTION INTRAVENOUS CONTINUOUS
Status: ACTIVE | OUTPATIENT
Start: 2025-06-17 | End: 2025-06-17

## 2025-06-17 RX ADMIN — CHOLECALCIFEROL TAB 125 MCG (5000 UNIT) 5000 UNITS: 125 TAB at 08:06

## 2025-06-17 RX ADMIN — MICONAZOLE NITRATE: 20 CREAM TOPICAL at 08:06

## 2025-06-17 RX ADMIN — SODIUM CHLORIDE: 0.9 INJECTION, SOLUTION INTRAVENOUS at 04:06

## 2025-06-17 RX ADMIN — LORAZEPAM 1 MG: 1 TABLET ORAL at 08:06

## 2025-06-17 RX ADMIN — MICONAZOLE NITRATE: 20 CREAM TOPICAL at 09:06

## 2025-06-17 RX ADMIN — ACETAMINOPHEN AND CODEINE PHOSPHATE 1 TABLET: 300; 30 TABLET ORAL at 06:06

## 2025-06-17 RX ADMIN — APIXABAN 5 MG: 5 TABLET, FILM COATED ORAL at 08:06

## 2025-06-17 RX ADMIN — ACETAMINOPHEN AND CODEINE PHOSPHATE 1 TABLET: 300; 30 TABLET ORAL at 02:06

## 2025-06-17 RX ADMIN — LORAZEPAM 1 MG: 1 TABLET ORAL at 09:06

## 2025-06-17 RX ADMIN — POTASSIUM CHLORIDE 30 MEQ: 750 CAPSULE, EXTENDED RELEASE ORAL at 10:06

## 2025-06-17 RX ADMIN — SODIUM CHLORIDE: 9 INJECTION, SOLUTION INTRAVENOUS at 10:06

## 2025-06-17 NOTE — PLAN OF CARE
06/17/25 1006   Post-Acute Status   Post-Acute Authorization Hospice;Placement   Post-Acute Placement Status Pending post-acute provider review/more information requested   Hospice Status Pending equipment/medication delivery   Discharge Delays (!) Post-Acute Set-up   Discharge Plan   Discharge Plan A Assisted Living;Hospice/home   Discharge Plan B Skilled Nursing Facility     CHAYO spoke with patient's daughter and confirmed that they would like hospice services via [facility]. CHAYO spoke to Genesis at [facility]. Genesis confirmed that the patient's family has signed consents and that delivery of patient's DME is pending. CHAYO contacted Siena with [facility] and she confirmed that the patient has been accepted. Siena stated that the patient can be admitted upon the delivery of the patient's medical equipment. Siena also stated that the patient has to be admitted before 2 PM.     Discharge Plan A and Plan B have been determined by review of patient's clinical status, future medical and therapeutic needs, and coverage/benefits for post-acute care in coordination with multidisciplinary team members.    Dima Louise RN-CM  Case Management  [facility]  [phone]

## 2025-06-17 NOTE — SUBJECTIVE & OBJECTIVE
Interval Update:NAEON. Cr mildly elevated. IVF given and a recheck showed an increasing Creatinine. Will give further IVF and monitor BMP closely.    Review of Systems   Constitutional:  Negative for activity change.   Eyes:  Negative for visual disturbance.   Respiratory:  Negative for cough and shortness of breath.    Cardiovascular:  Positive for leg swelling (chronic). Negative for chest pain.   Gastrointestinal:  Negative for abdominal pain and vomiting.   Musculoskeletal:  Positive for arthralgias, gait problem and joint swelling.   Skin:  Positive for rash and wound.   Neurological:  Positive for weakness.   Psychiatric/Behavioral:  Negative for confusion.      Objective:     Vital Signs (Most Recent):  Temp: 98.4 °F (36.9 °C) (06/17/25 1553)  Pulse: 89 (06/17/25 1553)  Resp: 17 (06/17/25 1553)  BP: (!) 144/79 (06/17/25 1553)  SpO2: 95 % (06/17/25 1553) Vital Signs (24h Range):  Temp:  [98.1 °F (36.7 °C)-98.7 °F (37.1 °C)] 98.4 °F (36.9 °C)  Pulse:  [74-92] 89  Resp:  [16-18] 17  SpO2:  [93 %-96 %] 95 %  BP: (124-167)/(70-93) 144/79     Weight: 94.8 kg (209 lb)  Body mass index is 39.49 kg/m².    Intake/Output Summary (Last 24 hours) at 6/17/2025 1612  Last data filed at 6/17/2025 0851  Gross per 24 hour   Intake 240 ml   Output 1050 ml   Net -810 ml         Physical Exam  Vitals and nursing note reviewed.   Constitutional:       General: She is not in acute distress.     Appearance: She is obese. She is not toxic-appearing.   HENT:      Head: Normocephalic and atraumatic.      Mouth/Throat:      Mouth: Mucous membranes are dry.   Eyes:      Extraocular Movements: Extraocular movements intact.      Pupils: Pupils are equal, round, and reactive to light.   Cardiovascular:      Rate and Rhythm: Regular rhythm. Tachycardia present.      Heart sounds: Murmur (Sytolic Right sternal border murmur and radiating to the carotid) heard.      Systolic murmur is present with a grade of 2/6.   Pulmonary:      Effort:  Pulmonary effort is normal. No respiratory distress.      Breath sounds: No stridor. No wheezing or rhonchi.   Abdominal:      General: Abdomen is protuberant. There is no distension.      Tenderness: There is no abdominal tenderness.   Musculoskeletal:      Cervical back: Normal range of motion and neck supple.      Right lower leg: Edema (nonpitting) present.      Left lower leg: Edema (nonpitting) present.      Right foot: Swelling and deformity present.      Left foot: Swelling and deformity present.   Skin:     General: Skin is warm and dry.      Findings: Rash (Bilateral escoriating rash on lower limb) present. Rash is crusting and scaling.   Neurological:      Mental Status: She is alert and oriented to person, place, and time.      GCS: GCS eye subscore is 4. GCS verbal subscore is 5. GCS motor subscore is 6.      Motor: Weakness present.      Comments: Functionality unclear but close to baseline   Psychiatric:         Mood and Affect: Mood normal.         Behavior: Behavior is cooperative.          Significant Labs: All pertinent labs within the past 24 hours have been reviewed.  CBC:   Recent Labs   Lab 06/16/25  0345 06/17/25  0624   WBC 4.31 5.87   HGB 8.6* 8.6*   HCT 30.3* 29.5*    300     CMP:   Recent Labs   Lab 06/16/25  0345 06/17/25  0624 06/17/25  1441    140 142   K 3.6 3.7 3.8    104 106   CO2 25 27 27   * 111* 116*   BUN 12 15 16   CREATININE 0.6 1.1 1.3   CALCIUM 8.9 8.9 8.9   ANIONGAP 12 9 9       Significant Imaging: I have reviewed all pertinent imaging results/findings within the past 24 hours.

## 2025-06-17 NOTE — ASSESSMENT & PLAN NOTE
Anemia is likely due to chronic disease. Most recent hemoglobin and hematocrit are listed below.  Recent Labs     06/15/25  0325 06/16/25  0345 06/17/25  0624   HGB 7.5* 8.6* 8.6*   HCT 26.8* 30.3* 29.5*     Plan  - Monitor serial CBC: Daily  - Transfuse PRBC if patient becomes hemodynamically unstable, symptomatic or H/H drops below 7/21.  - Patient has not received any PRBC transfusions to date  - Patient's anemia is currently stable.  -Low threshold for blood transfusion

## 2025-06-17 NOTE — ASSESSMENT & PLAN NOTE
Patient's blood pressure range in the last 24 hours was: BP  Min: 124/70  Max: 167/92.The patient's inpatient anti-hypertensive regimen is listed below  losartan tablet 50 mg, Daily, Oral    Plan  - BP is controlled, no changes needed to their regimen  - Start home ARBs at half dose  - Will give PRN Hydralazine for SBP>180

## 2025-06-17 NOTE — ASSESSMENT & PLAN NOTE
HANDY is likely due to possibly prerenal(Lasix) or drug-induced  Baseline creatinine is 0.6 Most recent creatinine and eGFR are listed below.  Recent Labs     06/16/25  0345 06/17/25  0624 06/17/25  1441   CREATININE 0.6 1.1 1.3   EGFRNORACEVR >60 53* 43*      Plan  - HANDY is worsening. Will adjust treatment as follows:   - Avoid nephrotoxins and renally dose meds for GFR listed above  - Monitor urine output, serial BMP, and adjust therapy as needed  - Cautious IVF while monitoring for fluid overload.

## 2025-06-17 NOTE — PROGRESS NOTES
[facility]  Progress Note    Patient Name: Lupis Murcia  MRN: [id number]  Patient Class: IP- Inpatient   Admission Date: 6/10/2025  Length of Stay: 7 days  Attending Physician: Irasema Ruiz MD  Primary Care Provider: Bobby Tran MD        Subjective     Principal Problem:Sepsis        HPI:  Lupis Murcia is a 74-year-old female with a history of Multiple Sclerosis not on immunosuppression, Lymphedema of bilateral lower extremities c/b prior osteomyelitis of the left heel, and h/o PE on apixaban who presented with acute on chronic weakness. Daughter at bedside provides additional history. Patient reports needing to call her daughter earlier in the day as she felt herself sliding out of her chair. She has chronic weakness from MS with left foot drop, reports being able to sit up in her chair. She otherwise does not have new complaints. She reports chronic, intermittent lower extremity pain related to her lymphedema. She denies cough, congestion, dyspnea, chest pain, abdominal pain, worsening lower extremity pain.    Since arrival, she reports severe buttocks pain which she relates to being in a new and uncomfortable bed. In the ED, she was found to be tachycardic and febrile to 103.2 for which she was received 1.5L IVF and vanc/cefepime (h/o penicillin allergy).    Of note, she was seen by her podiatry recently who prescribed doxycycline and duloxetine, however, she did not start these medications. She also has history of staph epi and enterococcus bacteremia without known source, though thought likely related to sacral skin irritation with fecal contamination.    Overview/Hospital Course:  74-year-old female admitted with progressively worsening lower extremity swelling and erythema consistent with cellulitis, on a background of chronic lymphedema. She was started on appropriate antibiotic therapy based on Infectious Disease recommendations. Over the  course of her stay, her skin exam improved notably with drying wounds, reduced erythema, and improved drainage control. Blood cx remained negative. Urine cultures grew enterococcus faecalis, proteus, and corynebacterium; susceptibilities not defined.  TTE showed EF 60-65% with grade 2 diastolic dysfunction and moderate Pulm HTN w/ PAP 52 mmHg.  IVC pressure 3 mm Hg.  PO lasix PRN for LE edema. Antibiotics ended on 6/16 per ID recs.  Wound Care was consulted and followed her closely throughout admission. They provided daily dressing changes and offloading instructions. Physical Therapy evaluated the patient for mobility support, and her functional status was optimized for safe discharge with home PT in place.  Of note, routine lab testing revealed a new diagnosis of diabetes mellitus with an HbA1c of 6.5%. Endocrinology will be consulted and outpatient follow-up arranged for further metabolic workup and management.     Interval Update:NAEON. Cr mildly elevated. IVF given and a recheck showed an increasing Creatinine. Will give further IVF and monitor BMP closely.    Review of Systems   Constitutional:  Negative for activity change.   Eyes:  Negative for visual disturbance.   Respiratory:  Negative for cough and shortness of breath.    Cardiovascular:  Positive for leg swelling (chronic). Negative for chest pain.   Gastrointestinal:  Negative for abdominal pain and vomiting.   Musculoskeletal:  Positive for arthralgias, gait problem and joint swelling.   Skin:  Positive for rash and wound.   Neurological:  Positive for weakness.   Psychiatric/Behavioral:  Negative for confusion.      Objective:     Vital Signs (Most Recent):  Temp: 98.4 °F (36.9 °C) (06/17/25 1553)  Pulse: 89 (06/17/25 1553)  Resp: 17 (06/17/25 1553)  BP: (!) 144/79 (06/17/25 1553)  SpO2: 95 % (06/17/25 1553) Vital Signs (24h Range):  Temp:  [98.1 °F (36.7 °C)-98.7 °F (37.1 °C)] 98.4 °F (36.9 °C)  Pulse:  [74-92] 89  Resp:  [16-18] 17  SpO2:  [93 %-96  %] 95 %  BP: (124-167)/(70-93) 144/79     Weight: 94.8 kg (209 lb)  Body mass index is 39.49 kg/m².    Intake/Output Summary (Last 24 hours) at 6/17/2025 1612  Last data filed at 6/17/2025 0851  Gross per 24 hour   Intake 240 ml   Output 1050 ml   Net -810 ml         Physical Exam  Vitals and nursing note reviewed.   Constitutional:       General: She is not in acute distress.     Appearance: She is obese. She is not toxic-appearing.   HENT:      Head: Normocephalic and atraumatic.      Mouth/Throat:      Mouth: Mucous membranes are dry.   Eyes:      Extraocular Movements: Extraocular movements intact.      Pupils: Pupils are equal, round, and reactive to light.   Cardiovascular:      Rate and Rhythm: Regular rhythm. Tachycardia present.      Heart sounds: Murmur (Sytolic Right sternal border murmur and radiating to the carotid) heard.      Systolic murmur is present with a grade of 2/6.   Pulmonary:      Effort: Pulmonary effort is normal. No respiratory distress.      Breath sounds: No stridor. No wheezing or rhonchi.   Abdominal:      General: Abdomen is protuberant. There is no distension.      Tenderness: There is no abdominal tenderness.   Musculoskeletal:      Cervical back: Normal range of motion and neck supple.      Right lower leg: Edema (nonpitting) present.      Left lower leg: Edema (nonpitting) present.      Right foot: Swelling and deformity present.      Left foot: Swelling and deformity present.   Skin:     General: Skin is warm and dry.      Findings: Rash (Bilateral escoriating rash on lower limb) present. Rash is crusting and scaling.   Neurological:      Mental Status: She is alert and oriented to person, place, and time.      GCS: GCS eye subscore is 4. GCS verbal subscore is 5. GCS motor subscore is 6.      Motor: Weakness present.      Comments: Functionality unclear but close to baseline   Psychiatric:         Mood and Affect: Mood normal.         Behavior: Behavior is cooperative.           Significant Labs: All pertinent labs within the past 24 hours have been reviewed.  CBC:   Recent Labs   Lab 06/16/25  0345 06/17/25  0624   WBC 4.31 5.87   HGB 8.6* 8.6*   HCT 30.3* 29.5*    300     CMP:   Recent Labs   Lab 06/16/25  0345 06/17/25  0624 06/17/25  1441    140 142   K 3.6 3.7 3.8    104 106   CO2 25 27 27   * 111* 116*   BUN 12 15 16   CREATININE 0.6 1.1 1.3   CALCIUM 8.9 8.9 8.9   ANIONGAP 12 9 9       Significant Imaging: I have reviewed all pertinent imaging results/findings within the past 24 hours.      Assessment & Plan  MS (multiple sclerosis)  Reports feeling worsening numbness and tingling at time prior to diagnosis, then with weakness of her lower extremities. Underwent LP which confirmed diagnosis. Has not been on treatment.   No recent history of worsening numbness  Lymphedema  Pictures in media tab. Severe swelling with chronic skin scaling, with scaling and crustings.    - podiatry on board; appreciate help  - wound care consulted; appreciate input in patient care  - continue abx and wound dressing  - Lower extremity USS: With no evidence of DVT  PT/OT following  -Lasix PRN for LE edema   HTN (hypertension)  Patient's blood pressure range in the last 24 hours was: BP  Min: 124/70  Max: 167/92.The patient's inpatient anti-hypertensive regimen is listed below  losartan tablet 50 mg, Daily, Oral    Plan  - BP is controlled, no changes needed to their regimen  - Start home ARBs at half dose  - Will give PRN Hydralazine for SBP>180  History of pulmonary embolus (PE)  Diagnosed during admission for bacteremia.     - continue apixaban 5mg BID    Sepsis  This patient does not have evidence of infective focus  My overall impression is sepsis without organ dysfunction.  Source: Unknown  Antibiotics given: Vanc / cefepime  Latest lactate reviewed: 1.8    Fluid challenge Ideal Body Weight- The patient is obese (BMI>30) and their ideal body weight of Ideal body weight:  47.8 kg (105 lb 6.1 oz) will be used to calculate fluid bolus of 30 ml/kg.     Post- resuscitation assessment Yes - I attest a sepsis perfusion exam was performed within 6 hours of sepsis, severe sepsis, or septic shock presentation, following fluid resuscitation.    Differential includes soft tissue infection, viral etiology (flu/COVID/RSV, hepatitis, common cold virus, EBV), sacral wound (given severe pain) / abdominopelvic abscess, osteomyelitis, UTI, pneumonia. Less likely VTE/PE, endocarditis (given given history of bacteremia), vasculitis, malignancy.    PLAN:  - continue antibiotics   - vancomycin for enterococcus and staph coverage   - cefepime for pseudomonas and GNR coverage (unable to give Zosyn due to penicillin allergy)   - follow up blood cultures; NGTD  - follow UA / culture: Multiple organisms: e faecalis, proteus, and corynebacterium; pending susceptibilities   - obtain MRSA PCR; Negative  - CXR without acute pathology, obtain respiratory culture  - CT a/p to assess for abdominal; left renal pelvis stranding seen  - MRI left foot ; No acute osteomyelitis. diffuse soft tissue edema, correlate for cellulitis  -ID consulted; Appreciate recs. Abx End date is 6/16/2025  Irritant contact dermatitis due to fecal, urinary or dual incontinence  Wound care following    Severe obesity (BMI 35.0-39.9) with comorbidity  Body mass index is 39.49 kg/m². Morbid obesity complicates all aspects of disease management from diagnostic modalities to treatment. Weight loss encouraged and health benefits explained to patient.       Type 2 diabetes mellitus with hyperglycemia, without long-term current use of insulin  Hgb A1c 6.5% on 6/11/25    - LDSSI  - QID glucose POCT  Nutrition consulted; appreciate recs    Anxiety  Home ativan 1mg BID prn    Restore home Ativan for anxiety episodes  Normocytic anemia  Anemia is likely due to chronic disease. Most recent hemoglobin and hematocrit are listed below.  Recent Labs      06/15/25  0325 06/16/25  0345 06/17/25  0624   HGB 7.5* 8.6* 8.6*   HCT 26.8* 30.3* 29.5*     Plan  - Monitor serial CBC: Daily  - Transfuse PRBC if patient becomes hemodynamically unstable, symptomatic or H/H drops below 7/21.  - Patient has not received any PRBC transfusions to date  - Patient's anemia is currently stable.  -Low threshold for blood transfusion  Hypokalemia  Patient's most recent potassium results are listed below.   Recent Labs     06/16/25  0345 06/17/25  0624 06/17/25  1441   K 3.6 3.7 3.8     Plan  - Replete potassium per protocol  - Monitor potassium Daily  - Patient's hypokalemia is stable  -  Limitation of activities due to disability  PT/OT consulted; appreciate recs    HANDY (acute kidney injury)  HANDY is likely due to possibly prerenal(Lasix) or drug-induced  Baseline creatinine is 0.6 Most recent creatinine and eGFR are listed below.  Recent Labs     06/16/25 0345 06/17/25 0624 06/17/25  1441   CREATININE 0.6 1.1 1.3   EGFRNORACEVR >60 53* 43*      Plan  - HANDY is worsening. Will adjust treatment as follows:   - Avoid nephrotoxins and renally dose meds for GFR listed above  - Monitor urine output, serial BMP, and adjust therapy as needed  - Cautious IVF while monitoring for fluid overload.  VTE Risk Mitigation (From admission, onward)           Ordered     apixaban tablet 5 mg  2 times daily         06/10/25 5438                    Discharge Planning   USMAN: 6/17/2025     Code Status: Full Code   Medical Readiness for Discharge Date:   Discharge Plan A: Home with family, Home Health   Discharge Delays: None known at this time      Adri Baer MD  Department of Hospital Medicine   [facility] - Internal Medicine Telemetry

## 2025-06-17 NOTE — PT/OT/SLP PROGRESS
Occupational Therapy   Treatment    Name: Lupis Murcia  MRN: [id number]  Admitting Diagnosis:  Sepsis       Recommendations:     Discharge Recommendations: Low Intensity Therapy  Discharge Equipment Recommendations:  lift device, hospital bed  Barriers to discharge:  Decreased caregiver support    Assessment:   CO-TX with PT for pt safety and max participation with both disciplines.     Lupis Murcia is a 74 y.o. female with a medical diagnosis of Sepsis.  She presents with good participation but requires increased assistance for stands and transfers; pt appears to have assistance for some ADLs . Performance deficits affecting function are weakness, impaired endurance, impaired self care skills, impaired functional mobility, decreased lower extremity function, decreased safety awareness, pain, impaired skin, decreased ROM, impaired fine motor, edema.     Rehab Prognosis:  Good; patient would benefit from acute skilled OT services to address these deficits and reach maximum level of function.       Plan:     Patient to be seen 3 x/week to address the above listed problems via self-care/home management, therapeutic activities, therapeutic exercises  Plan of Care Expires: 07/11/25  Plan of Care Reviewed with: patient    Subjective     Chief Complaint: None stated  Patient/Family Comments/goals: return home  Pain/Comfort:  Pain Rating 1: 0/10    Objective:     Communicated with: Nsg prior to session.  Patient found HOB elevated with PureWick upon OT entry to room.    General Precautions: Standard, fall, diabetic    Orthopedic Precautions:N/A  Braces: N/A  Respiratory Status: Room air     Occupational Performance:     Bed Mobility:    Patient completed Rolling/Turning to Left with  maximal assistance  Patient completed Rolling/Turning to Right with maximal assistance  Patient completed Supine to Sit with maximal assistance and 2 persons  Patient completed Sit to Supine with maximal assistance and 2 persons      Functional Mobility/Transfers:  Patient completed Sit <> Stand Transfer with maximal assistance and of 2 persons  with  hand-held assist  on 2 attempts  Functional Mobility: Pt unable to take steps and does not ambulate at baseline    Activities of Daily Living:  Upper Body Dressing: minimum assistance don/off gown  Toileting: total assistance marcinlissette      [facility] 6 Click ADL: 11    Treatment & Education:  Pt educated on role and purpose of therapy  Pt educated on goal setting  Pt educated on benefits of OOB activity  Pt educated on self advocacy     Patient left HOB elevated with all lines intact, call button in reach, nsg notified, and dtr present    GOALS:   Multidisciplinary Problems       Occupational Therapy Goals          Problem: Occupational Therapy    Goal Priority Disciplines Outcome Interventions   Occupational Therapy Goal     OT, PT/OT Progressing    Description: Goals to be met by: 7/11/2025     Patient will increase functional independence with ADLs by performing:    Feeding with [place].  UE Dressing with Moderate Assistance.  LE Dressing with Moderate Assistance.  Grooming while seated with Minimal Assistance.  Toileting from bed level with Moderate Assistance for hygiene and clothing management.   Rolling to Bilateral with Minimal Assistance.   Squat pivot transfers with Maximum Assistance.  Toilet transfer to bedside commode with Maximum Assistance.                         DME Justifications:  Lupis requires a hospital bed due to her requiring positioning of the body in ways not feasible with an ordinary bed due to limited ability and cannot independently make changes in body position without the use of the bed.The positioning of the body cannot be sufficiently resolved by the use of pillows and wedges.    Time Tracking:     OT Date of Treatment: 06/17/25  OT Start Time: 1202  OT Stop Time: 1226  OT Total Time (min): 24 min    Billable Minutes:Self Care/Home Management 12  Neuromuscular  Re-education 12    OT/LOW: OT          6/17/2025

## 2025-06-17 NOTE — PLAN OF CARE
Problem: Adult Inpatient Plan of Care  Goal: Plan of Care Review  Outcome: Ongoing  Flowsheets (Taken 6/16/2025 1929)  Plan of Care Reviewed With:   patient   family  Goal: Patient-Specific Goal (Individualized)  Outcome: Ongoing  Goal: Absence of Hospital-Acquired Illness or Injury  Outcome: Ongoing  Goal: Optimal Comfort and Wellbeing  Outcome: Ongoing  Goal: Readiness for Transition of Care  Outcome: Ongoing     Problem: Fall Injury Risk  Goal: Absence of Fall and Fall-Related Injury  Outcome: Ongoing

## 2025-06-17 NOTE — ASSESSMENT & PLAN NOTE
Patient's blood pressure range in the last 24 hours was: BP  Min: 124/70  Max: 167/92.The patient's inpatient anti-hypertensive regimen is listed below  losartan tablet 50 mg, Daily, Oral    Plan  - BP is controlled, no changes needed to their regimen  - hold antihypertensives in setting of sepsis  - Will give PRN Hydralazine for SBP>180

## 2025-06-17 NOTE — ASSESSMENT & PLAN NOTE
Patient's most recent potassium results are listed below.   Recent Labs     06/15/25  0325 06/16/25  0345 06/17/25  0624   K 3.3* 3.6 3.7     Plan  - Replete potassium per protocol  - Monitor potassium Daily  - Patient's hypokalemia is stable  -

## 2025-06-17 NOTE — PT/OT/SLP PROGRESS
Physical Therapy Treatment  Co-treatment with OT due to acuity of condition, level of skilled assist needed for assessment of safety with mobility and potential of not tolerating a second treatment session.     Patient Name:  Lupis Murcia   MRN:  [id number]    Recommendations:     Discharge Recommendations: Low Intensity Therapy  Discharge Equipment Recommendations: lift device, hospital bed  Barriers to discharge: current level of assistance required    Assessment:     Lupis Murcia is a 74 y.o. female admitted with a medical diagnosis of Sepsis.  She presents with the following impairments/functional limitations: weakness, impaired endurance, impaired self care skills, impaired functional mobility, impaired balance, decreased safety awareness, decreased lower extremity function, decreased upper extremity function, impaired skin, edema, impaired cardiopulmonary response to activity Pt tolerated treatment session fairly well today. Pt is still requiring increased assistance for bed mobility, and transfers. Patient remains appropriate for continued skilled services within the acute environment and goals remain appropriate.   .    Rehab Prognosis: Fair; patient would benefit from acute skilled PT services to address these deficits and reach maximum level of function.    Recent Surgery: * No surgery found *      Plan:     During this hospitalization, patient to be seen 3 x/week to address the identified rehab impairments via therapeutic activities, therapeutic exercises, neuromuscular re-education and progress toward the following goals:    Plan of Care Expires:  07/11/25    Subjective     Chief Complaint: None stated  Patient/Family Comments/goals: Pt agreeable to PT   Pain/Comfort:  Pain Rating 1: 0/10      Objective:     Communicated with Rn prior to session.  Patient found supine with PureWick upon PT entry to room.     General Precautions: Standard, diabetic, fall  Orthopedic Precautions: N/A  Braces:  N/A  Respiratory Status: Room air     Functional Mobility:  Bed Mobility:     Supine to Sit: maximal assistance and of 2 persons  EOB sitting: CGA/Long initially, regressing to ModA post standing due to bad LE placement, and posterior lean   Sit to Supine: maximal assistance and of 2 persons    Transfers:     Sit to Stand:  maximal assistance and of 2 persons with hand-held assist  B foot and knee block provided   1st trial: Unable to fully achieve upright stance   2nd trial: achieved upright standing       AM-PAC 6 CLICK MOBILITY  Turning over in bed (including adjusting bedclothes, sheets and blankets)?: 2  Sitting down on and standing up from a chair with arms (e.g., wheelchair, bedside commode, etc.): 1  Moving from lying on back to sitting on the side of the bed?: 1  Moving to and from a bed to a chair (including a wheelchair)?: 1  Need to walk in hospital room?: 1  Climbing 3-5 steps with a railing?: 1  Basic Mobility Total Score: 7       Treatment & Education:  Therapist provided instruction and educated for safety during bed mobility and transfers. As well as proper body mechanics, energy conservation, and fall prevention strategies during tasks listed above, and the effects of prolonged immobility and the importance of performing EOB/OOB activity and exercises to promote healing and reduce recovery time.       Patient left supine with all lines intact, call button in reach, Rn notified, and family present..    GOALS:   Multidisciplinary Problems       Physical Therapy Goals          Problem: Physical Therapy    Goal Priority Disciplines Outcome Interventions   Physical Therapy Goal     PT, PT/OT Progressing    Description: Goals to be met by: 2025     Patient will increase functional independence with mobility by performin. Supine to sit with Maximum Assistance  2. Sit to supine with Maximum Assistance  3. Sit to stand transfer with Maximum Assistance  4. Bed to chair transfer with Maximum  Assistance using LRAD  5. Sitting at edge of bed x10 minutes with Supervision    Patient requires a hospital bed for positioning of the body in ways that are not feasible with an ordinary bed. The patient requires special positioning for pain relief, limited mobility, and/or being unable to independently make changes in body position without the use of a hospital bed. Pillows and wedges will not be adequate for resolving these positional issues.                          DME Justifications:  Lupis requires a hospital bed due to her requiring positioning of the body in ways not feasible with an ordinary bed due to being completely immobile and cannot independently make changes in body position without the use of the bed.The positioning of the body cannot be sufficiently resolved by the use of pillows and wedges.    Time Tracking:     PT Received On: 06/17/25  PT Start Time: 1202     PT Stop Time: 1226  PT Total Time (min): 24 min     Billable Minutes: Therapeutic Activity 12 and Therapeutic Exercise 12    Treatment Type: Treatment  PT/PTA: PTA     Number of PTA visits since last PT visit: 2     06/17/2025

## 2025-06-17 NOTE — PLAN OF CARE
[facility] - Internal Medicine Telemetry  Discharge Reassessment    Primary Care Provider: Bobby Tran MD    Expected Discharge Date: 6/17/2025    Reassessment (most recent)       Discharge Reassessment - 06/17/25 1458          Discharge Reassessment    Assessment Type Discharge Planning Reassessment (P)      Did the patient's condition or plan change since previous assessment? Yes (P)      Discharge Plan discussed with: Patient (P)      Communicated USMAN with patient/caregiver Yes (P)      Discharge Plan A Home with family;Home Health (P)      Discharge Plan B Skilled Nursing Facility (P)      DME Needed Upon Discharge  none (P)      Transition of Care Barriers None (P)      Why the patient remains in the hospital Requires continued medical care (P)         Post-Acute Status    Post-Acute Authorization Home Health (P)      Home Health Status Set-up Complete/Auth obtained (P)      Discharge Delays None known at this time (P)                    Patient's daughter Amanda notified the CM that they would not like any additional DME at discharge. Patient pending medical clearance at this time.    Discharge Plan A and Plan B have been determined by review of patient's clinical status, future medical and therapeutic needs, and coverage/benefits for post-acute care in coordination with multidisciplinary team members.    Dima Louise RN-CM  Case Management  [facility]  [phone]

## 2025-06-17 NOTE — PLAN OF CARE
[facility] - Internal Medicine Telemetry      HOME HEALTH ORDERS  FACE TO FACE ENCOUNTER    Patient Name: Lupis Murcia  YOB: 1950    PCP: Bobby Tran MD   PCP Address: [address]  PCP Phone Number: [phone]  PCP Fax: [phone]    Encounter Date: 6/10/25    Admit to Home Health    Diagnoses:  Active Hospital Problems    Diagnosis  POA    *Sepsis [A41.9]  Yes    Weakness [R53.1]  Yes     74-year-old female with a history of multiple sclerosis as well as bacteremia that was treated in 2024 who presented with weakness and was found to be febrile with tachycardia on admission.  She had been treated with vanc, Flagyl, and cefepime initially but just vanc and cefepime currently.  She is on day 6 of antibiotics.  Blood cultures are no growth to date.  Urine culture has Proteus, Enterococcus and Corynebacterium but suspect is a contaminated sample as was taken from pure Wick sewn not relevant inpatient asymptomatic.  Imaging possibly concerning for pneumonia and renal infection.  Her fever has resolved and she has been without leukocytosis.  She is currently back to baseline.  Cause of weakness and fever essentially remains elusive.    Plan:  Recommend complete 7 days total of vanc and cefepime  Recommend monitor for 24 hours after completion of IV antibiotics to ensure remains stable  Discussed with ID staff  We will sign off      Limitation of activities due to disability [Z73.6]  Yes    Type 2 diabetes mellitus with hyperglycemia, without long-term current use of insulin [E11.65]  Yes    Anxiety [F41.9]  Yes    Chronic venous stasis dermatitis [I87.2]  Yes    History of osteomyelitis [Z87.39]  Not Applicable    Other reduced mobility [Z74.09]  Yes    Severe obesity (BMI 35.0-39.9) with comorbidity [E66.01]  Yes    Irritant contact dermatitis due to fecal, urinary or dual incontinence [L24.A2]  Yes    History of pulmonary embolus (PE) [Z86.711]  Yes    HTN (hypertension)  [I10]  Yes    Hypokalemia [E87.6]  Yes    Lymphedema [I89.0]  Yes    MS (multiple sclerosis) [G35]  Yes    Normocytic anemia [D64.9]  Yes      Resolved Hospital Problems    Diagnosis Date Resolved POA    Elevated troponin [R79.89] 06/12/2025 Yes    History of bacteremia [Z87.898] 06/12/2025 Yes       Follow Up Appointments:  Future Appointments   Date Time Provider Department Center   7/31/2025  9:30 AM Amy Blanco MD [facility]       Allergies:  Review of patient's allergies indicates:   Allergen Reactions    Contrast media Shortness Of Breath and Rash    Pcn [penicillins] Shortness Of Breath and Rash    Celebrex [celecoxib] Other (See Comments)     Swallowing problems     Diazepam Hives    Gabapentin Other (See Comments)     Confusion     Motrin [ibuprofen] Rash       Medications: Review discharge medications with patient and family and provide education.    Current Medications[1]     Medication List        START taking these medications      miconazole 2 % cream  Commonly known as: MICOTIN  Apply topically 2 (two) times daily.            CONTINUE taking these medications      acetaminophen-codeine 300-30mg 300-30 mg Tab  Commonly known as: TYLENOL #3  Take 1 tablet by mouth daily as needed (pain).     ammonium lactate 12 % lotion  Commonly known as: LAC-HYDRIN  Apply topically 2 (two) times daily.     apixaban 5 mg Tab  Commonly known as: ELIQUIS  Take 1 tablet (5 mg total) by mouth 2 (two) times a day.     cholecalciferol (vitamin D3) 125 mcg (5,000 unit) Tab  Take 5,000 Units by mouth once daily.     cyanocobalamin 1,000 mcg/mL injection  Inject 1,000 mcg into the muscle every 30 days.     LORazepam 1 MG tablet  Commonly known as: ATIVAN  Take 1 mg by mouth every 12 (twelve) hours as needed for Anxiety.     ondansetron 4 MG Tbdl  Commonly known as: ZOFRAN-ODT  Dissolve 1 tablet (4 mg total) by mouth every 6 (six) hours as needed (Nausea).            STOP taking these medications       predniSONE 20 MG tablet  Commonly known as: DELTASONE                I have seen and examined this patient within the last 30 days. My clinical findings that support the need for the home health skilled services and home bound status are the following:no   Weakness/numbness causing balance and gait disturbance due to Weakness/Debility making it taxing to leave home.  Medical restrictions requiring assistance of another human to leave home due to  Unstable ambulation, Wound care needs, and Morbid Obesity.     Diet:   cardiac diet and diabetic diet 1800 calorie    Labs:  SN to perform labs:  CBC: Biweekly; and CMP: Biweekly;and Report Lab results to PCP.    Referrals/ Consults  Physical Therapy to evaluate and treat. Evaluate for home safety and equipment needs; Establish/upgrade home exercise program. Perform / instruct on therapeutic exercises, gait training, transfer training, and Range of Motion.  Occupational Therapy to evaluate and treat. Evaluate home environment for safety and equipment needs. Perform/Instruct on transfers, ADL training, ROM, and therapeutic exercises.    Activities:   activity as tolerated and ambulate in house with assistance    Nursing:     SN to complete comprehensive assessment including routine vital signs. Instruct on disease process and s/s of complications to report to MD. Review/verify medication list sent home with the patient at time of discharge  and instruct patient/caregiver as needed. Frequency may be adjusted depending on start of care date.     Skilled nurse to perform up to 3 visits PRN for symptoms related to diagnosis    Notify MD if SBP > 160 or < 90; DBP > 90 or < 50; HR > 120 or < 50; Temp > 101; O2 < 88%; Other:      Ok to schedule additional visits based on staff availability and patient request on consecutive days within the home health episode.    When multiple disciplines ordered:    Start of Care occurs on Sunday - Wednesday schedule remaining discipline  evaluations as ordered on separate consecutive days following the start of care.    Thursday SOC -schedule subsequent evaluations Friday and Monday the following week.     Friday - Saturday SOC - schedule subsequent discipline evaluations on consecutive days starting Monday of the following week.    For all post-discharge communication and subsequent orders please contact patient's primary care physician. If unable to reach primary care physician or do not receive response within 30 minutes, please contact Dima Louise for clinical staff order clarification    Miscellaneous   Routine Skin for Bedridden Patients: Instruct patient/caregiver to apply moisture barrier cream to all skin folds and wet areas in perineal area daily and after baths and all bowel movements.    Home Health Aide:  Physical Therapy Twice weekly, Occupational Therapy Twice weekly, and Home Health Aide Twice weekly    Wound Care Orders    - BLE: cleanse with vashe, pat dry, apply ABD pads and secure with kerlix/rolled gauze and elastic bandages every shift and prn drainage  - Buttocks and perineum: cleanse with bath wipes and apply triad bid and prn soilage  Dry or minimal exudate wound: Apply wound gel daily (supplied by [facility]), cover with telfa dressing    I certify that this patient is confined to her home and needs intermittent skilled nursing care and physical therapy.        Adri Baer MD  PGY-1 Internal Medicine          [1]   Current Facility-Administered Medications   Medication Dose Route Frequency Provider Last Rate Last Admin    acetaminophen tablet 650 mg  650 mg Oral Q8H PRN Bryan Henry MD   650 mg at 06/15/25 2144    acetaminophen-codeine 300-30mg per tablet 1 tablet  1 tablet Oral Q6H PRN Bryan Henry MD   1 tablet at 06/17/25 0656    apixaban tablet 5 mg  5 mg Oral BID Melva Selby DO   5 mg at 06/17/25 0847    cholecalciferol (vitamin D3) 125 mcg (5,000 unit) tablet 5,000 Units  5,000 Units Oral Daily Melva Selby DO    5,000 Units at 06/17/25 0847    LORazepam tablet 1 mg  1 mg Oral Q12H PRN Melva Selby DO   1 mg at 06/17/25 0847    losartan tablet 50 mg  50 mg Oral Daily Adri Baer MD        miconazole 2 % cream   Topical (Top) BID Irasema Ruiz MD   Given at 06/16/25 2032    ondansetron disintegrating tablet 8 mg  8 mg Oral Q8H PRN Melva Selby DO        sodium chloride 0.9% flush 10 mL  10 mL Intravenous Q12H PRN Melva Selby DO

## 2025-06-17 NOTE — ASSESSMENT & PLAN NOTE
Patient's most recent potassium results are listed below.   Recent Labs     06/16/25  0345 06/17/25  0624 06/17/25  1441   K 3.6 3.7 3.8     Plan  - Replete potassium per protocol  - Monitor potassium Daily  - Patient's hypokalemia is stable  -

## 2025-06-18 VITALS
HEIGHT: 61 IN | DIASTOLIC BLOOD PRESSURE: 81 MMHG | HEART RATE: 85 BPM | RESPIRATION RATE: 16 BRPM | TEMPERATURE: 99 F | SYSTOLIC BLOOD PRESSURE: 144 MMHG | WEIGHT: 207.25 LBS | BODY MASS INDEX: 39.13 KG/M2 | OXYGEN SATURATION: 94 %

## 2025-06-18 LAB
ABSOLUTE EOSINOPHIL (OHS): 0.22 K/UL
ABSOLUTE MONOCYTE (OHS): 0.6 K/UL (ref 0.3–1)
ABSOLUTE NEUTROPHIL COUNT (OHS): 3.11 K/UL (ref 1.8–7.7)
ANION GAP (OHS): 10 MMOL/L (ref 8–16)
BASOPHILS # BLD AUTO: 0.02 K/UL
BASOPHILS NFR BLD AUTO: 0.3 %
BUN SERPL-MCNC: 17 MG/DL (ref 8–23)
CALCIUM SERPL-MCNC: 9 MG/DL (ref 8.7–10.5)
CHLORIDE SERPL-SCNC: 109 MMOL/L (ref 95–110)
CO2 SERPL-SCNC: 24 MMOL/L (ref 23–29)
CREAT SERPL-MCNC: 1.2 MG/DL (ref 0.5–1.4)
ERYTHROCYTE [DISTWIDTH] IN BLOOD BY AUTOMATED COUNT: 16.7 % (ref 11.5–14.5)
GFR SERPLBLD CREATININE-BSD FMLA CKD-EPI: 47 ML/MIN/1.73/M2
GLUCOSE SERPL-MCNC: 121 MG/DL (ref 70–110)
HCT VFR BLD AUTO: 28 % (ref 37–48.5)
HGB BLD-MCNC: 7.9 GM/DL (ref 12–16)
IMM GRANULOCYTES # BLD AUTO: 0.04 K/UL (ref 0–0.04)
IMM GRANULOCYTES NFR BLD AUTO: 0.7 % (ref 0–0.5)
LYMPHOCYTES # BLD AUTO: 1.77 K/UL (ref 1–4.8)
MAGNESIUM SERPL-MCNC: 1.9 MG/DL (ref 1.6–2.6)
MCH RBC QN AUTO: 24.2 PG (ref 27–31)
MCHC RBC AUTO-ENTMCNC: 28.2 G/DL (ref 32–36)
MCV RBC AUTO: 86 FL (ref 82–98)
NUCLEATED RBC (/100WBC) (OHS): 0 /100 WBC
PHOSPHATE SERPL-MCNC: 2.6 MG/DL (ref 2.7–4.5)
PLATELET # BLD AUTO: 273 K/UL (ref 150–450)
PMV BLD AUTO: 10.1 FL (ref 9.2–12.9)
POTASSIUM SERPL-SCNC: 3.6 MMOL/L (ref 3.5–5.1)
RBC # BLD AUTO: 3.27 M/UL (ref 4–5.4)
RELATIVE EOSINOPHIL (OHS): 3.8 %
RELATIVE LYMPHOCYTE (OHS): 30.7 % (ref 18–48)
RELATIVE MONOCYTE (OHS): 10.4 % (ref 4–15)
RELATIVE NEUTROPHIL (OHS): 54.1 % (ref 38–73)
SODIUM SERPL-SCNC: 143 MMOL/L (ref 136–145)
WBC # BLD AUTO: 5.76 K/UL (ref 3.9–12.7)

## 2025-06-18 PROCEDURE — 83735 ASSAY OF MAGNESIUM: CPT | Performed by: STUDENT IN AN ORGANIZED HEALTH CARE EDUCATION/TRAINING PROGRAM

## 2025-06-18 PROCEDURE — 85025 COMPLETE CBC W/AUTO DIFF WBC: CPT | Performed by: STUDENT IN AN ORGANIZED HEALTH CARE EDUCATION/TRAINING PROGRAM

## 2025-06-18 PROCEDURE — 84100 ASSAY OF PHOSPHORUS: CPT | Performed by: STUDENT IN AN ORGANIZED HEALTH CARE EDUCATION/TRAINING PROGRAM

## 2025-06-18 PROCEDURE — 25000003 PHARM REV CODE 250

## 2025-06-18 PROCEDURE — 36415 COLL VENOUS BLD VENIPUNCTURE: CPT | Performed by: STUDENT IN AN ORGANIZED HEALTH CARE EDUCATION/TRAINING PROGRAM

## 2025-06-18 PROCEDURE — 82435 ASSAY OF BLOOD CHLORIDE: CPT | Performed by: STUDENT IN AN ORGANIZED HEALTH CARE EDUCATION/TRAINING PROGRAM

## 2025-06-18 PROCEDURE — 25000003 PHARM REV CODE 250: Performed by: STUDENT IN AN ORGANIZED HEALTH CARE EDUCATION/TRAINING PROGRAM

## 2025-06-18 RX ORDER — LORAZEPAM 0.5 MG/1
1 TABLET ORAL EVERY 8 HOURS PRN
Status: DISCONTINUED | OUTPATIENT
Start: 2025-06-18 | End: 2025-06-18 | Stop reason: HOSPADM

## 2025-06-18 RX ORDER — HYDROCORTISONE 1 %
CREAM (GRAM) TOPICAL 2 TIMES DAILY
Status: DISCONTINUED | OUTPATIENT
Start: 2025-06-18 | End: 2025-06-18 | Stop reason: HOSPADM

## 2025-06-18 RX ORDER — HYDROXYZINE HYDROCHLORIDE 25 MG/1
25 TABLET, FILM COATED ORAL 3 TIMES DAILY PRN
Status: DISCONTINUED | OUTPATIENT
Start: 2025-06-18 | End: 2025-06-18 | Stop reason: HOSPADM

## 2025-06-18 RX ADMIN — LORAZEPAM 1 MG: 0.5 TABLET ORAL at 05:06

## 2025-06-18 RX ADMIN — CHOLECALCIFEROL TAB 125 MCG (5000 UNIT) 5000 UNITS: 125 TAB at 08:06

## 2025-06-18 RX ADMIN — MICONAZOLE NITRATE: 20 CREAM TOPICAL at 08:06

## 2025-06-18 RX ADMIN — LORAZEPAM 1 MG: 0.5 TABLET ORAL at 02:06

## 2025-06-18 RX ADMIN — ACETAMINOPHEN AND CODEINE PHOSPHATE 1 TABLET: 300; 30 TABLET ORAL at 12:06

## 2025-06-18 RX ADMIN — APIXABAN 5 MG: 5 TABLET, FILM COATED ORAL at 08:06

## 2025-06-18 NOTE — PLAN OF CARE
Follow up with Bobby Tran MD  [facility] called to schedule appt rep stated its a walk in clinic [address] [facility] [address] [phone]

## 2025-06-18 NOTE — DISCHARGE INSTRUCTIONS
Our goal at [facility] is to always give you outstanding care and exceptional service. You may receive a survey from [id number] by mail, text or e-mail in the next 24-48 hours asking about the care you received with us. The survey should only take 5-10 minutes to complete and is very important to us.     Your feedback provides us with a way to recognize our staff who work tirelessly to provide the best care! Also, your responses help us learn how to improve when your experience was below our aspiration of excellence. We are always looking for ways to improve your stay. We WILL use your feedback to continue making improvements to help us provide the highest quality care. We keep your personal information and feedback confidential. We appreciate your time completing this survey and can't wait to hear from you!!!    We look forward to your continued care with us! Thanks so much for choosing [facility] for your healthcare needs!

## 2025-06-18 NOTE — PLAN OF CARE
Problem: Adult Inpatient Plan of Care  Goal: Plan of Care Review  Outcome: Progressing  Goal: Patient-Specific Goal (Individualized)  Outcome: Progressing  Goal: Absence of Hospital-Acquired Illness or Injury  Outcome: Progressing  Goal: Optimal Comfort and Wellbeing  Outcome: Progressing  Goal: Readiness for Transition of Care  Outcome: Progressing     Problem: Skin Injury Risk Increased  Goal: Skin Health and Integrity  Outcome: Progressing     Problem: Diabetes Comorbidity  Goal: Blood Glucose Level Within Targeted Range  Outcome: Progressing     Problem: Sepsis/Septic Shock  Goal: Optimal Coping  Outcome: Progressing  Goal: Absence of Bleeding  Outcome: Progressing  Goal: Blood Glucose Level Within Targeted Range  Outcome: Progressing  Goal: Absence of Infection Signs and Symptoms  Outcome: Progressing  Goal: Optimal Nutrition Intake  Outcome: Progressing   Pt AAO X 4; able to express needs.  Family and patient refused dressing change to BLE last night at 2300.  Stated it was too late, and the dressings were dry .  It can be changed in the morning.  C/o itching during the night.  Back cleaned with soap and water.  Pat dry.  Med ordered.  Patient refused and requested topical cream.   Lab coming to draw am labs.   Safety maintained.  Bed in low position,  call  light in reach.

## 2025-06-18 NOTE — PLAN OF CARE
Problem: Adult Inpatient Plan of Care  Goal: Plan of Care Review  Outcome: Ongoing  Flowsheets (Taken 6/17/2025 1943)  Plan of Care Reviewed With:   patient   family  Goal: Patient-Specific Goal (Individualized)  Outcome: Ongoing  Goal: Absence of Hospital-Acquired Illness or Injury  Outcome: Ongoing  Goal: Optimal Comfort and Wellbeing  Outcome: Ongoing  Goal: Readiness for Transition of Care  Outcome: Ongoing     Problem: Wound  Goal: Optimal Coping  Outcome: Ongoing  Goal: Optimal Functional Ability  Outcome: Ongoing  Goal: Absence of Infection Signs and Symptoms  Outcome: Ongoing  Goal: Improved Oral Intake  Outcome: Ongoing  Goal: Optimal Pain Control and Function  Outcome: Ongoing  Goal: Skin Health and Integrity  Outcome: Ongoing  Goal: Optimal Wound Healing  Outcome: Ongoing

## 2025-06-18 NOTE — PLAN OF CARE
Problem: Adult Inpatient Plan of Care  Goal: Plan of Care Review  Outcome: Adequate for Care Transition  Goal: Patient-Specific Goal (Individualized)  Outcome: Adequate for Care Transition  Goal: Absence of Hospital-Acquired Illness or Injury  Outcome: Adequate for Care Transition  Goal: Optimal Comfort and Wellbeing  Outcome: Adequate for Care Transition  Goal: Readiness for Transition of Care  Outcome: Adequate for Care Transition     Problem: Skin Injury Risk Increased  Goal: Skin Health and Integrity  Outcome: Adequate for Care Transition     Problem: Diabetes Comorbidity  Goal: Blood Glucose Level Within Targeted Range  Outcome: Adequate for Care Transition     Problem: Sepsis/Septic Shock  Goal: Optimal Coping  Outcome: Adequate for Care Transition  Goal: Absence of Bleeding  Outcome: Adequate for Care Transition  Goal: Blood Glucose Level Within Targeted Range  Outcome: Adequate for Care Transition  Goal: Absence of Infection Signs and Symptoms  Outcome: Adequate for Care Transition  Goal: Optimal Nutrition Intake  Outcome: Adequate for Care Transition     Problem: Wound  Goal: Optimal Coping  Outcome: Adequate for Care Transition  Goal: Optimal Functional Ability  Outcome: Adequate for Care Transition  Goal: Absence of Infection Signs and Symptoms  Outcome: Adequate for Care Transition  Goal: Improved Oral Intake  Outcome: Adequate for Care Transition  Goal: Optimal Pain Control and Function  Outcome: Adequate for Care Transition  Goal: Skin Health and Integrity  Outcome: Adequate for Care Transition  Goal: Optimal Wound Healing  Outcome: Adequate for Care Transition     Problem: Fall Injury Risk  Goal: Absence of Fall and Fall-Related Injury  Outcome: Adequate for Care Transition     Problem: Acute Kidney Injury/Impairment  Goal: Fluid and Electrolyte Balance  Outcome: Adequate for Care Transition  Goal: Improved Oral Intake  Outcome: Adequate for Care Transition  Goal: Effective Renal Function  Outcome:  Adequate for Care Transition

## 2025-06-18 NOTE — DISCHARGE SUMMARY
Ibrahima Xie - Internal Medicine [facility]  Discharge Summary      Patient Name: Lupis Murcia  MRN: [id number]  LEONORA: [id number]  Patient Class: IP- Inpatient  Admission Date: 6/10/2025  Hospital Length of Stay: 8 days  Discharge Date and Time: 06/18/2025 4:12 PM  Attending Physician: Irasema Ruiz MD   Discharging Provider: Adri Baer MD  Primary Care Provider: Bobby Tran MD  Hospital Medicine Team: [facility] 4 Adri Baer MD  Primary Care Team: [facility] 4    HPI:   Lupis Murcia is a 74-year-old female with a history of Multiple Sclerosis not on immunosuppression, Lymphedema of bilateral lower extremities c/b prior osteomyelitis of the left heel, and h/o PE on apixaban who presented with acute on chronic weakness. Daughter at bedside provides additional history. Patient reports needing to call her daughter earlier in the day as she felt herself sliding out of her chair. She has chronic weakness from MS with left foot drop, reports being able to sit up in her chair. She otherwise does not have new complaints. She reports chronic, intermittent lower extremity pain related to her lymphedema. She denies cough, congestion, dyspnea, chest pain, abdominal pain, worsening lower extremity pain.    Since arrival, she reports severe buttocks pain which she relates to being in a new and uncomfortable bed. In the ED, she was found to be tachycardic and febrile to 103.2 for which she was received 1.5L IVF and vanc/cefepime (h/o penicillin allergy).    Of note, she was seen by her podiatry recently who prescribed doxycycline and duloxetine, however, she did not start these medications. She also has history of staph epi and enterococcus bacteremia without known source, though thought likely related to sacral skin irritation with fecal contamination.    * No surgery found *      Hospital Course:   74-year-old female admitted with progressively worsening lower extremity  swelling and erythema consistent with cellulitis, on a background of chronic lymphedema. She was started on broad spectrum antibiotics with Vancomycin and Cefepime. Requested ID input. Despite CT changes including Mild urothelial thickening of the left renal pelvis and proximal ureter with stranding, per ID consider positive urine culture contamination in patient with no urinary symptoms. They recommended 7 day total antibiotics treatment with Vanc and Zosyn. Over the course of her stay, her skin exam improved notably with drying wounds, reduced erythema, and improved drainage control. Blood cx remained negative. Urine cultures grew enterococcus faecalis, proteus, and corynebacterium; susceptibilities not defined.  TTE showed EF 60-65% with grade 2 diastolic dysfunction and moderate Pulm HTN w/ PAP 52 mmHg.  IVC pressure 3 mm Hg.  PO lasix PRN for LE edema. Antibiotics ended on 6/16 per ID recs. Stay complicated by HANDY noted yesterday. Patient was treated with IV fluids. Cr has stabilized but not yet at baseline.   Wound Care was consulted and followed her closely throughout admission. They provided daily dressing changes and offloading instructions. Physical Therapy evaluated the patient for mobility support, and her functional status was optimized for safe discharge with home PT in place.   Of note, routine lab testing revealed an HbA1c of 6.5%, concerning for a new diagnosis of diabetes mellitus. Endocrinology referral and outpatient follow-up arranged for further metabolic workup and management.   Discharge Condition:Improved and stable. Tolerating oral diet. Ambulating with assistance. Wound dry and healing  Discharge Disposition:  Home with home health services  Discharge Plan:  · Medications: Discharged on home medication regimen. New diabetic medications not initiated at this time; pending outpatient endocrine evaluation.  · Wound Care: Continue wound care at home as coordinated by Home Health  Services.  · Home Health Services:   o Physical Therapy   o Wound Care follow-up and dressing management  · Follow-Up Appointments:   o Primary Care Physician: Within 1 week   o Endocrinology (Outpatient): Appointment scheduled  Instructions to Patient:  · Monitor for signs of worsening infection (increased redness, swelling, drainage, or fever).  · Maintain limb elevation as tolerated.  · Adhere to home health schedule and physical therapy.  · Maintain skin hygiene and follow Wound Care instructions closely.       Physical Exam  Vitals and nursing note reviewed.   Constitutional:       General: She is not in acute distress.     Appearance: She is obese. She is not toxic-appearing.   HENT:      Head: Normocephalic and atraumatic.      Mouth/Throat:      Mouth: Mucous membranes are dry.   Eyes:      Extraocular Movements: Extraocular movements intact.      Pupils: Pupils are equal, round, and reactive to light.   Cardiovascular:      Rate and Rhythm: Regular rhythm. Tachycardia present.      Heart sounds: Murmur (Sytolic Right sternal border murmur and radiating to the carotid) heard.      Systolic murmur is present with a grade of 2/6.   Pulmonary:      Effort: Pulmonary effort is normal. No respiratory distress.      Breath sounds: No stridor. No wheezing or rhonchi.   Abdominal:      General: Abdomen is protuberant. There is no distension.      Tenderness: There is no abdominal tenderness.   Musculoskeletal:      Cervical back: Normal range of motion and neck supple.      Right lower leg: Edema (nonpitting) present.      Left lower leg: Edema (nonpitting) present.      Right foot: Swelling and deformity present.      Left foot: Swelling and deformity present.   Skin:     General: Skin is warm and dry.      Findings: Rash (Bilateral escoriating rash on lower limb) present. Rash is crusting and scaling.   Neurological:      Mental Status: She is alert and oriented to person, place, and time.      GCS: GCS eye  subscore is 4. GCS verbal subscore is 5. GCS motor subscore is 6.      Motor: Weakness present.      Comments: Functionality unclear but close to baseline   Psychiatric:         Mood and Affect: Mood normal.         Behavior: Behavior is cooperative.     Goals of Care Treatment Preferences:  Code Status: Full Code      SDOH Screening:  The patient was screened for utility difficulties, food insecurity, transport difficulties, housing insecurity, and interpersonal safety and there were no concerns identified this admission.     Consults:   Consults (From admission, onward)          Status Ordering Provider     Inpatient consult to Infectious Diseases  Once        Provider:  (Not yet assigned)    Completed NOEL CASEY     Inpatient consult to Registered Dietitian/Nutritionist  Once        Provider:  (Not yet assigned)    Completed LILIBETH PORTER     Podiatry  Once        Provider:  (Not yet assigned)    Completed NO GLASER                Final Active Diagnoses:    Diagnosis Date Noted POA    PRINCIPAL PROBLEM:  Sepsis [A41.9] 07/09/2024 Yes    Weakness [R53.1] 06/15/2025 Yes    Limitation of activities due to disability [Z73.6] 06/12/2025 Yes    Type 2 diabetes mellitus with hyperglycemia, without long-term current use of insulin [E11.65] 06/11/2025 Yes    Anxiety [F41.9] 06/11/2025 Yes    Chronic venous stasis dermatitis [I87.2] 06/11/2025 Yes    History of osteomyelitis [Z87.39] 06/11/2025 Not Applicable    Other reduced mobility [Z74.09] 06/11/2025 Yes    Severe obesity (BMI 35.0-39.9) with comorbidity [E66.01] 04/03/2025 Yes    Irritant contact dermatitis due to fecal, urinary or dual incontinence [L24.A2] 10/04/2024 Yes    History of pulmonary embolus (PE) [Z86.711] 06/07/2023 Yes    HTN (hypertension) [I10] 06/07/2023 Yes    Hypokalemia [E87.6] 06/23/2022 Yes    Lymphedema [I89.0] 05/01/2022 Yes    HANDY (acute kidney injury) [N17.9] 05/01/2022 Yes    MS (multiple sclerosis) [G35] 06/09/2019  Yes    Normocytic anemia [D64.9] 08/09/2017 Yes      Problems Resolved During this Admission:    Diagnosis Date Noted Date Resolved POA    Elevated troponin [R79.89] 09/29/2024 06/12/2025 Yes    History of bacteremia [Z87.898] 07/10/2024 06/12/2025 Yes       Discharged Condition: stable    Disposition: Home-Health Care Svc    Follow Up:   Contact information for follow-up providers       Bobby Tran MD Follow up.    Specialty: Internal Medicine  Why: CHW called to schedule appt rep stated its a walk in clinic  Contact information:  [address] [place] [address]  [phone]                       Contact information for after-discharge care       Home Medical Care       [facility] .    Service: Home Nursing  Contact information:  [address]  [facility] [address]  [phone]                                 Patient Instructions:      Ambulatory referral/consult to Outpatient Case Management   Referral Priority: Routine Referral Type: Consultation   Referral Reason: Specialty Services Required   Number of Visits Requested: 1     Ambulatory referral/consult to Endocrinology   Standing Status: Future   Referral Priority: Routine Referral Type: Consultation   Requested Specialty: Endocrinology   Number of Visits Requested: 1     Diet diabetic     Notify your health care provider if you experience any of the following:  temperature >100.4     Notify your health care provider if you experience any of the following:  severe uncontrolled pain     Notify your health care provider if you experience any of the following:  worsening rash     Notify your health care provider if you experience any of the following:  persistent dizziness, light-headedness, or visual disturbances     Change dressing (specify)   Order Comments: - BLE: cleanse with vashe, pat dry, apply ABD pads and secure with kerlix/rolled gauze and elastic bandages every shift and prn drainage  - Buttocks and  perineum: cleanse with bath wipes and apply triad bid and prn soilage     Activity as tolerated       Significant Diagnostic Studies: Labs: CMP   Recent Labs   Lab 06/17/25  0624 06/17/25  1441 06/18/25  0821    142 143   K 3.7 3.8 3.6    106 109   CO2 27 27 24   * 116* 121*   BUN 15 16 17   CREATININE 1.1 1.3 1.2   CALCIUM 8.9 8.9 9.0   ANIONGAP 9 9 10    and CBC   Recent Labs   Lab 06/17/25  0624 06/18/25  0821   WBC 5.87 5.76   HGB 8.6* 7.9*   HCT 29.5* 28.0*    273       Pending Diagnostic Studies:       None           Medications:  Reconciled Home Medications:      Medication List        START taking these medications      miconazole 2 % cream  Commonly known as: MICOTIN  Apply topically 2 (two) times daily.            CONTINUE taking these medications      acetaminophen-codeine 300-30mg 300-30 mg Tab  Commonly known as: TYLENOL #3  Take 1 tablet by mouth daily as needed (pain).     ammonium lactate 12 % lotion  Commonly known as: LAC-HYDRIN  Apply topically 2 (two) times daily.     apixaban 5 mg Tab  Commonly known as: ELIQUIS  Take 1 tablet (5 mg total) by mouth 2 (two) times a day.     cholecalciferol (vitamin D3) 125 mcg (5,000 unit) Tab  Take 5,000 Units by mouth once daily.     cyanocobalamin 1,000 mcg/mL injection  Inject 1,000 mcg into the muscle every 30 days.     LORazepam 1 MG tablet  Commonly known as: ATIVAN  Take 1 mg by mouth every 12 (twelve) hours as needed for Anxiety.     ondansetron 4 MG Tbdl  Commonly known as: ZOFRAN-ODT  Dissolve 1 tablet (4 mg total) by mouth every 6 (six) hours as needed (Nausea).            STOP taking these medications      predniSONE 20 MG tablet  Commonly known as: DELTASONE              Indwelling Lines/Drains at time of discharge:   Lines/Drains/Airways       Drain  Duration             Female External Urinary Catheter w/ Suction 06/11/25 1515 7 days                   Time spent on the discharge of patient: 35 minutes         Adri  MD Buffy  Department of Hospital Medicine  [facility] Xie - Internal Medicine Telemetry

## 2025-06-19 ENCOUNTER — DOCUMENT SCAN (OUTPATIENT)
Dept: HOME HEALTH SERVICES | Facility: HOSPITAL | Age: 75
End: 2025-06-19
Payer: MEDICARE

## 2025-06-19 ENCOUNTER — PATIENT MESSAGE (OUTPATIENT)
Dept: PSYCHIATRY | Facility: CLINIC | Age: 75
End: 2025-06-19
Payer: MEDICARE

## 2025-06-19 ENCOUNTER — PATIENT OUTREACH (OUTPATIENT)
Dept: ADMINISTRATIVE | Facility: CLINIC | Age: 75
End: 2025-06-19
Payer: MEDICARE

## 2025-06-19 DIAGNOSIS — A41.9 SEPSIS, DUE TO UNSPECIFIED ORGANISM, UNSPECIFIED WHETHER ACUTE ORGAN DYSFUNCTION PRESENT: Primary | ICD-10-CM

## 2025-06-19 NOTE — PROGRESS NOTES
C3 nurse attempted to contact Lupis Murcia  for a TCC post hospital discharge follow up call. No answer. LVM requesting a callback at [phone].    The patient does not have a scheduled HOSFU appointment. Unable to route to pcp/staff.     Upon chart review, noted that PCP is a walk-in clinic

## 2025-06-20 ENCOUNTER — PATIENT MESSAGE (OUTPATIENT)
Dept: HOME HEALTH SERVICES | Facility: CLINIC | Age: 75
End: 2025-06-20
Payer: MEDICARE

## 2025-06-20 ENCOUNTER — TELEPHONE (OUTPATIENT)
Dept: HOME HEALTH SERVICES | Facility: CLINIC | Age: 75
End: 2025-06-20
Payer: MEDICARE

## 2025-06-21 LAB
BACTERIA UR CULT: ABNORMAL

## 2025-06-23 ENCOUNTER — TELEPHONE (OUTPATIENT)
Dept: HOME HEALTH SERVICES | Facility: CLINIC | Age: 75
End: 2025-06-23
Payer: MEDICARE

## 2025-06-24 ENCOUNTER — TELEPHONE (OUTPATIENT)
Dept: PODIATRY | Facility: CLINIC | Age: 75
End: 2025-06-24
Payer: MEDICARE

## 2025-06-25 ENCOUNTER — TELEPHONE (OUTPATIENT)
Dept: PODIATRY | Facility: CLINIC | Age: 75
End: 2025-06-25
Payer: MEDICARE

## 2025-06-25 DIAGNOSIS — R22.43 LOCALIZED SWELLING OF BOTH LOWER LEGS: ICD-10-CM

## 2025-06-25 DIAGNOSIS — I89.0 LYMPHEDEMA: Primary | ICD-10-CM

## 2025-06-26 ENCOUNTER — OUTPATIENT CASE MANAGEMENT (OUTPATIENT)
Dept: ADMINISTRATIVE | Facility: OTHER | Age: 75
End: 2025-06-26
Payer: MEDICARE

## 2025-06-26 NOTE — LETTER
Lupis Murcia  [address] [place] [address]      Dear Ms. Baugh,    Welcome to [facility]s Complex Care Management Program.  It was a pleasure talking with you today.  My name is Sue Li RN and I look forward to being your Care Manager.  My goal is to help you function at the healthiest and highest level possible.  You can contact me directly at [phone].    As an [facility] patient, some of the services we may be able to provide include:     Development of an individualized care plan with a Registered Nurse   Connection with a   Connection with available resources and services    Coordinate communication among your care team members   Provide coaching and education   Help you understand your doctors treatment plan  Help you obtain information about your insurance coverage.     All services provided by [facility]s Complex Care Managers and other care team members are coordinated with and communicated to your primary care team.      As part of your enrollment, you will be receiving education materials and more information about these services in your My [facility] account, by phone or through the mail.  If you do not wish to participate or receive information, please contact our office at [phone].      [facility] Patient Rights and Responsibilities available upon request.    Sincerely,        Sue Li RN  [facility]   Outpatient RN Complex Care Manager

## 2025-06-26 NOTE — PROGRESS NOTES
Outpatient Care Management  Initial Patient Assessment    Patient: Lupis Murcia  MRN: [id number]  Date of Service: 06/26/2025  Completed by: Sue Li RN  Referral Date: 06/17/2025  Date of Eligibility: 6/18/2025  Program:   High Risk  Status: Ongoing  Effective Dates: 6/26/2025 - present  Responsible Staff: Sue Li RN        Reason for Visit   Patient presents with    OPCM Enrollment Call     6/26/25    Nursing Assessment     6/26/25       Brief Summary:  Lupis Murcia was referred by Thuy Bain PA-C for limitation of activities due to disability, chronic venous stasis dermatitis, and history of bacteremia . Patient qualifies for program based on identified as high risk.   Active problem list, medical, surgical and social history reviewed. Active comorbidities include chronic venous stasis dermatitis and multiple scoliosis. Areas of need identified by patient include management of mobility with MS.   Next steps: Discuss management of mobility with MS and Chronic venous stasis dermatitis further.     Disability Status  Is the patient alert and oriented (person, place, time, and situation)?: Alert and oriented x 4  Hearing Difficulty or Deaf: no  Visual Difficulty or Blind: no  Visual and Hearing Conclusion Statement: No vison or hearing needs  Difficulty Concentrating, Remembering or Making Decisions: no  Communication Difficulty: no  Eating/Swallowing Difficulty: no  Walking or Climbing Stairs Difficulty: yes  Walking or Climbing Stairs: ambulation difficulty, dependent; stair climbing difficulty, dependent; transferring difficulty, dependent  Mobility Management: Pt reports she is dependent with mobility at this time due to MS  Dressing/Bathing Difficulty: yes  Dressing/Bathing: bathing difficulty, dependent; dressing difficulty, dependent  Dressing/Bathing Management: Daughter assists  Grooming: assistance required  Transferring (e.g., getting in and out of chairs): assistive equipment  and person  Toileting : Dependent  Continence : Incontinence - Bowel; Incontience - Bladder  Difficulty Managing Errands Independently: yes  Errands Management: Daughter assists  Equipment Currently Used at Home: blood pressure machine; bedside commode; bath bench  ADL Conclusion Statement: Pt dependent with ADLs due to MS  Change in Functional Status Since Onset of Current Illness/Injury: no        Spiritual Beliefs  Spiritual, Cultural Beliefs, Yazidism Practices, Values that Affect Care: no      Social History     Socioeconomic History    Marital status:    Tobacco Use    Smoking status: Never    Smokeless tobacco: Never   Substance and Sexual Activity    Alcohol use: No    Drug use: No     Social Drivers of Health     Financial Resource Strain: Low Risk  (6/12/2025)    Overall Financial Resource Strain (CARDIA)     Difficulty of Paying Living Expenses: Not hard at all   Recent Concern: Financial Resource Strain - High Risk (3/24/2025)    Overall Financial Resource Strain (CARDIA)     Difficulty of Paying Living Expenses: Hard   Food Insecurity: No Food Insecurity (6/12/2025)    Hunger Vital Sign     Worried About Running Out of Food in the Last Year: Never true     Ran Out of Food in the Last Year: Never true   Transportation Needs: No Transportation Needs (6/12/2025)    PRAPARE - Transportation     Lack of Transportation (Medical): No     Lack of Transportation (Non-Medical): No   Physical Activity: Insufficiently Active (6/12/2025)    Exercise Vital Sign     Days of Exercise per Week: 2 days     Minutes of Exercise per Session: 30 min   Stress: No Stress Concern Present (6/12/2025)    [employer] - Occupational Stress Questionnaire     Feeling of Stress : Only a little   Housing Stability: Low Risk  (6/12/2025)    Housing Stability Vital Sign     Unable to Pay for Housing in the Last Year: No     Number of Times Moved in the Last Year: 0     Homeless in the Last Year: No        Roles and Relationships  Primary Source of Support/Comfort: child(reinier)  Name of Support/Comfort Primary Source: Amanda Lowery (Daughter)      Advance Directives (For Healthcare)  Advance Directive  (If Adv Dir status is received, view document under Adv Dir in header or Chart Review Media tab): Patient does not have Advance Directive, declines information.        Patient Reported Insurance  Verified current insurance plan:: Medicaid; Medicare             No data to display                Learning Assessment       06/26/2025 1406 [facility] (6/26/2025 - Present)   Created by Sue Li, RN -  (Nurse) Status: Complete                 PRIMARY LEARNER     Primary Learner Name:  Lupis Murcia  - 06/26/2025 1406    Relationship:  Patient  - 06/26/2025 1406    Does the primary learner have any barriers to learning?:  No Barriers  - 06/26/2025 1406    What is the preferred language of the primary learner?:  English  - 06/26/2025 1406    Is an  required?:  No  - 06/26/2025 1406    How does the primary learner prefer to learn new concepts?:  Listening, Reading  - 06/26/2025 1406    How often do you need to have someone help you read instructions, pamphlets, or written material from your doctor or pharmacy?:  Rarely  - 06/26/2025 1406        CO-LEARNER #1     No question answered        CO-LEARNER #2     No question answered        SPECIAL TOPICS     Are there any special topics the patient/guardian would like to review?:  None at this time per pt.  - 06/26/2025 1406        ANSWERED BY:     -:  Patient  - 06/26/2025 1406        Comments         Edit History       Sue Li, RN -  (Nurse)   06/26/2025 1406

## 2025-06-27 ENCOUNTER — PATIENT OUTREACH (OUTPATIENT)
Dept: ADMINISTRATIVE | Facility: OTHER | Age: 75
End: 2025-06-27
Payer: MEDICARE

## 2025-06-27 NOTE — PROGRESS NOTES
This Community Health Worker (CHW) completed Social Determinant of Health (SDOH)  Questionnaire with patient/caregiver via telephone today.  Notified OPCM CM Sue Li RN of completion.      Patient denied any SDOH needs at this time.

## 2025-06-30 ENCOUNTER — LAB REQUISITION (OUTPATIENT)
Dept: LAB | Facility: HOSPITAL | Age: 75
End: 2025-06-30
Payer: MEDICARE

## 2025-06-30 ENCOUNTER — OFFICE VISIT (OUTPATIENT)
Dept: HOME HEALTH SERVICES | Facility: CLINIC | Age: 75
End: 2025-06-30
Payer: MEDICARE

## 2025-06-30 DIAGNOSIS — A41.9 SEPSIS, UNSPECIFIED ORGANISM: ICD-10-CM

## 2025-06-30 DIAGNOSIS — G35 MS (MULTIPLE SCLEROSIS): Primary | ICD-10-CM

## 2025-06-30 DIAGNOSIS — A41.9 SEPSIS, DUE TO UNSPECIFIED ORGANISM, UNSPECIFIED WHETHER ACUTE ORGAN DYSFUNCTION PRESENT: ICD-10-CM

## 2025-06-30 DIAGNOSIS — E11.65 TYPE 2 DIABETES MELLITUS WITH HYPERGLYCEMIA, WITHOUT LONG-TERM CURRENT USE OF INSULIN: ICD-10-CM

## 2025-06-30 LAB
ALBUMIN SERPL BCP-MCNC: 3.5 G/DL (ref 3.5–5.2)
ALP SERPL-CCNC: 62 UNIT/L (ref 40–150)
ALT SERPL W/O P-5'-P-CCNC: 7 UNIT/L (ref 10–44)
ANION GAP (OHS): 10 MMOL/L (ref 8–16)
AST SERPL-CCNC: 11 UNIT/L (ref 11–45)
BILIRUB SERPL-MCNC: 0.3 MG/DL (ref 0.1–1)
BUN SERPL-MCNC: 15 MG/DL (ref 8–23)
CALCIUM SERPL-MCNC: 9.2 MG/DL (ref 8.7–10.5)
CHLORIDE SERPL-SCNC: 105 MMOL/L (ref 95–110)
CO2 SERPL-SCNC: 28 MMOL/L (ref 23–29)
CREAT SERPL-MCNC: 0.8 MG/DL (ref 0.5–1.4)
ERYTHROCYTE [DISTWIDTH] IN BLOOD BY AUTOMATED COUNT: 16.3 % (ref 11.5–14.5)
GFR SERPLBLD CREATININE-BSD FMLA CKD-EPI: >60 ML/MIN/1.73/M2
GLUCOSE SERPL-MCNC: 112 MG/DL (ref 70–110)
HCT VFR BLD AUTO: 28.8 % (ref 37–48.5)
HGB BLD-MCNC: 8.3 GM/DL (ref 12–16)
MCH RBC QN AUTO: 24.4 PG (ref 27–31)
MCHC RBC AUTO-ENTMCNC: 28.8 G/DL (ref 32–36)
MCV RBC AUTO: 85 FL (ref 82–98)
PLATELET # BLD AUTO: 238 K/UL (ref 150–450)
PMV BLD AUTO: 10.3 FL (ref 9.2–12.9)
POTASSIUM SERPL-SCNC: 3.3 MMOL/L (ref 3.5–5.1)
PROT SERPL-MCNC: 7.2 GM/DL (ref 6–8.4)
RBC # BLD AUTO: 3.4 M/UL (ref 4–5.4)
SODIUM SERPL-SCNC: 143 MMOL/L (ref 136–145)
WBC # BLD AUTO: 5.76 K/UL (ref 3.9–12.7)

## 2025-06-30 PROCEDURE — 85027 COMPLETE CBC AUTOMATED: CPT | Performed by: SPECIALIST

## 2025-06-30 PROCEDURE — 84460 ALANINE AMINO (ALT) (SGPT): CPT | Performed by: SPECIALIST

## 2025-06-30 PROCEDURE — 99495 TRANSJ CARE MGMT MOD F2F 14D: CPT | Mod: S$GLB,,, | Performed by: NURSE PRACTITIONER

## 2025-06-30 NOTE — PATIENT INSTRUCTIONS
Instructions:  - [facility] Nurse Practitioner to schedule home follow-up visit with patient  as needed.  - Continue all medications, treatments and therapies as ordered.   - Follow all instructions, recommendations as discussed.  - Maintain Safety Precautions at all times.  - Attend all medical appointments as scheduled.  - For worsening symptoms: call Primary Care Physician or Nurse Practitioner.  - For emergencies, call 911 or immediately report to the nearest emergency room.  - Limit Risks of environmental exposure to coronavirus/COVID-19 as discussed including: social distancing, hand hygiene, and limiting departures from the home for necessities only.

## 2025-06-30 NOTE — PROGRESS NOTES
[facility]  Established Patient - Audio Only Post-Hospitalization Telehealth Visit with [facility] Provider    Visit Date: 6/30/25   Encounter Provider: Hanh Castanon NP  PCP:  Bobby Tran MD    PRESENTING HISTORY      Patient ID: Lupis Murcia     Consult Requested By:  Dr. Irasema Ruiz  Reason for Consult:  Post-Hospitalization Telehealth Visit    Chief Complaint: Transitional Care     History of Present Illness:       Admission Date: 6/10/2025  Hospital Length of Stay: 8 days  Discharge Date and Time: 06/18/2025   _______________________________    Date of Service: 6/30/25        The patient location is: HOME  The chief complaint leading to consultation is: routine care for evaluation and management of chronic medial issues and medication review.     Visit type: Virtual visit with audio only (telephone)     The reason for the audio only service rather than synchronous audio and video virtual visit was related to technical difficulties or patient preference/necessity.     Each patient to whom I provide medical services by telemedicine is:  (1) informed of the relationship between the physician and patient and the respective role of any other health care provider with respect to management of the patient; and (2) notified that they may decline to receive medical services by telemedicine and may withdraw from such care at any time. Patient verbally consented to receive this service via voice-only telephone call.     Subjective:   Today:  Ms. Lupis Murcia is a 74 y.o. female being evaluated by telephone today for  transitional care visit to the home environment post-discharge from inpatient hospitalization encounter described above. Patient presents at baseline state of health as reported by patient and caregiver. Denies any acute issues, concerns or complaints to address on today's visit. Reports taking all medications as prescribed. No other needs identified at this time. Risks of  "environmental exposure to coronavirus discussed including: social distancing, hand hygiene, and limiting departures from the home for necessities only. Reports understanding and willingness to comevaluation and management of chronic medical issues and medication review. Patient denies any chest pain, SOB, nausea, vomiting, diarrhea, constipation, fever, chills, cough, known COVID exposure or illness. Reports taking all medications as prescribed. No other acute needs identified at this time. The patient is provided contact information to call to discuss any presenting concerns, questions or complaints until our next visit. Refills for all maintenance medications are confirmed on file at the patient's pharrmacy of choice.     OBJECTIVE:   Vital Signs: None Taken for this visit      Physical Exam   Laboratory  Lab Results   Component Value Date    WBC 5.76 06/30/2025    HGB 8.3 (L) 06/30/2025    HCT 28.8 (L) 06/30/2025    MCV 85 06/30/2025     06/30/2025     Lab Results   Component Value Date    INR 1.1 06/14/2022     Lab Results   Component Value Date    HGBA1C 6.5 (H) 06/10/2025     No results for input(s): "POCTGLUCOSE" in the last 72 hours.    TRANSITION OF CARE:     Family and/or Caretaker present at visit?  No.  Diagnostic tests reviewed/disposition: No diagnosic tests pending after this hospitalization.  Disease/illness education: Importance of Compliance with all prescribed medications and treatments, COVID Precautions/Social Distancing/Mask Use  Home health/community services discussion/referrals: Patient does not have home health established from hospital visit.  They do not need home health.  If needed, we will set up home health for the patient.   Establishment or re-establishment of referral orders for community resources: No other necessary community resources.   Discussion with other health care providers: No discussion with other health care providers necessary.     Transition of Care Visit:  I " have reviewed and updated the history and problem list. I have reconciled the medication list. I have discussed the hospitalization and current medical issues, prognosis and plans with the patient/family.     Medications Reconciliation:   I have reconciled the patient's home medications and discharge medications with the patient/family. I have updated all changes. Refer to After-Visit Medication List.    Discharge plans, follow-up instructions, future appointments, provider contact information, indicators to seek emergency treatment and encouragement to call for any questions, concerns or clarification of the patient's plan of care explained to patient and/or caregiver(s), whom confirm understanding of provided information and endorse willingness to comply.     Assessment:   Problem List Items Addressed on this Visit:  1. Sepsis, due to unspecified organism, unspecified whether acute organ dysfunction present      Plan:     1. Sepsis, due to unspecified organism, unspecified whether acute organ dysfunction present      This patient does not have evidence of infective focus  My overall impression is sepsis without organ dysfunction.  Source: Unknown  Antibiotics given: Vanc / cefepime  Latest lactate reviewed: 1.8      - [facility] to schedule follow-up visit with patient in 4-6 weeks or PRN.    Patient Instructions Given:  - Continue all medications, treatments and therapies as ordered.   - Follow all instructions, recommendations as discussed.  - Maintain Safety Precautions at all times.  - Attend all medical appointments as scheduled.  - For worsening symptoms: call Primary Care Physician or Nurse Practitioner.  - For emergencies, call 911 or immediately report to the nearest emergency room.    After Visit Medication List :     Medication List            Accurate as of June 30, 2025  6:52 PM. If you have any questions, ask your nurse or doctor.                CONTINUE taking these medications       acetaminophen-codeine 300-30mg 300-30 mg Tab  Commonly known as: TYLENOL #3     ammonium lactate 12 % lotion  Commonly known as: LAC-HYDRIN  Apply topically 2 (two) times daily.     apixaban 5 mg Tab  Commonly known as: ELIQUIS  Take 1 tablet (5 mg total) by mouth 2 (two) times a day.     cholecalciferol (vitamin D3) 125 mcg (5,000 unit) Tab     cyanocobalamin 1,000 mcg/mL injection     LORazepam 1 MG tablet  Commonly known as: ATIVAN     miconazole 2 % cream  Commonly known as: MICOTIN  Apply topically 2 (two) times daily.     ondansetron 4 MG Tbdl  Commonly known as: ZOFRAN-ODT  Dissolve 1 tablet (4 mg total) by mouth every 6 (six) hours as needed (Nausea).            Future Appointments   Date Time Provider Department Center   7/8/2025  7:30 AM Scott Cox DPM [facility] P[facility]   7/22/2025  9:40 AM Kiki Henley MD [facility]   7/31/2025  9:30 AM Amy Blanco MD [facility]     Risks of environmental exposure to coronavirus discussed including: social distancing, hand hygiene, and limiting departures from the home for necessities only. Reports understanding and willingness to comply.     Signature:    Hanh Castanon, DNP, APRN, FNP-C  [facility]     Total time for this telephone encounter was 25 minutes. The following issues were discussed: primary and secondary diagnoses, co-morbidities, prescribed medications, treatment modalities, importance of compliance with medical advice and directives for follow-up care.    This service was not originating from a related E/M service provided within the previous 7 days nor will  to an E/M service or procedure within the next 24 hours or my soonest available appointment.  Prevailing standard of care was able to be met in this audio-only visit.

## 2025-06-30 NOTE — ASSESSMENT & PLAN NOTE
This patient does not have evidence of infective focus  My overall impression is sepsis without organ dysfunction.  Source: Unknown  Antibiotics given: Vanc / cefepime  Latest lactate reviewed: 1.8

## 2025-07-08 ENCOUNTER — OFFICE VISIT (OUTPATIENT)
Dept: PODIATRY | Facility: CLINIC | Age: 75
End: 2025-07-08
Payer: MEDICARE

## 2025-07-08 ENCOUNTER — TELEPHONE (OUTPATIENT)
Dept: PODIATRY | Facility: CLINIC | Age: 75
End: 2025-07-08

## 2025-07-08 DIAGNOSIS — I89.0 LYMPHEDEMA: Primary | ICD-10-CM

## 2025-07-08 DIAGNOSIS — Z91.89 AT HIGH RISK FOR ALTERED SKIN INTEGRITY: ICD-10-CM

## 2025-07-08 DIAGNOSIS — R22.43 LOCALIZED SWELLING OF BOTH LOWER LEGS: ICD-10-CM

## 2025-07-08 NOTE — PROGRESS NOTES
The patient location is: [place]  The chief complaint leading to consultation is: BLE swelling, healed wounds    Visit type: audiovisual    Face to Face time with patient: 5 min  10 minutes of total time spent on the encounter, which includes face to face time and non-face to face time preparing to see the patient (eg, review of tests), Obtaining and/or reviewing separately obtained history, Documenting clinical information in the electronic or other health record, Independently interpreting results (not separately reported) and communicating results to the patient/family/caregiver, or Care coordination (not separately reported).         Each patient to whom he or she provides medical services by telemedicine is:  (1) informed of the relationship between the physician and patient and the respective role of any other health care provider with respect to management of the patient; and (2) notified that he or she may decline to receive medical services by telemedicine and may withdraw from such care at any time.     Notes:     Returns for followup of bilateral lower limb wounds, swelling. Wounds now all healed. Needs updated HH orders. Recently diagnosed with diabetes. Left foot pain now doing much better, does not need medications for this currently.     -Updating HH dressing changes.   -Elevate BLE at all times when able.   -RTC in 3 months

## 2025-07-08 NOTE — TELEPHONE ENCOUNTER
----- Message from Scott Cox DPM sent at 7/8/2025  7:40 AM CDT -----  Regarding: HH orders  Please send subsequent HH orders from today to [facility]

## 2025-07-14 ENCOUNTER — LAB REQUISITION (OUTPATIENT)
Dept: LAB | Facility: HOSPITAL | Age: 75
End: 2025-07-14
Payer: MEDICARE

## 2025-07-14 DIAGNOSIS — A41.9 SEPSIS, UNSPECIFIED ORGANISM: ICD-10-CM

## 2025-07-14 LAB
ALBUMIN SERPL BCP-MCNC: 3.3 G/DL (ref 3.5–5.2)
ALP SERPL-CCNC: 59 UNIT/L (ref 40–150)
ALT SERPL W/O P-5'-P-CCNC: 6 UNIT/L (ref 10–44)
ANION GAP (OHS): 11 MMOL/L (ref 8–16)
AST SERPL-CCNC: 10 UNIT/L (ref 11–45)
BILIRUB SERPL-MCNC: 0.3 MG/DL (ref 0.1–1)
BUN SERPL-MCNC: 13 MG/DL (ref 8–23)
CALCIUM SERPL-MCNC: 9.3 MG/DL (ref 8.7–10.5)
CHLORIDE SERPL-SCNC: 106 MMOL/L (ref 95–110)
CO2 SERPL-SCNC: 26 MMOL/L (ref 23–29)
CREAT SERPL-MCNC: 0.8 MG/DL (ref 0.5–1.4)
ERYTHROCYTE [DISTWIDTH] IN BLOOD BY AUTOMATED COUNT: 16.3 % (ref 11.5–14.5)
GFR SERPLBLD CREATININE-BSD FMLA CKD-EPI: >60 ML/MIN/1.73/M2
GLUCOSE SERPL-MCNC: 110 MG/DL (ref 70–110)
HCT VFR BLD AUTO: 26.7 % (ref 37–48.5)
HGB BLD-MCNC: 8 GM/DL (ref 12–16)
MCH RBC QN AUTO: 24.9 PG (ref 27–31)
MCHC RBC AUTO-ENTMCNC: 30 G/DL (ref 32–36)
MCV RBC AUTO: 83 FL (ref 82–98)
PLATELET # BLD AUTO: 246 K/UL (ref 150–450)
PMV BLD AUTO: 10.5 FL (ref 9.2–12.9)
POTASSIUM SERPL-SCNC: 3.3 MMOL/L (ref 3.5–5.1)
PROT SERPL-MCNC: 6.8 GM/DL (ref 6–8.4)
RBC # BLD AUTO: 3.21 M/UL (ref 4–5.4)
SODIUM SERPL-SCNC: 143 MMOL/L (ref 136–145)
WBC # BLD AUTO: 5.85 K/UL (ref 3.9–12.7)

## 2025-07-14 PROCEDURE — 85027 COMPLETE CBC AUTOMATED: CPT | Performed by: INTERNAL MEDICINE

## 2025-07-14 PROCEDURE — 84075 ASSAY ALKALINE PHOSPHATASE: CPT | Performed by: INTERNAL MEDICINE

## 2025-07-22 ENCOUNTER — OFFICE VISIT (OUTPATIENT)
Dept: NEUROLOGY | Facility: CLINIC | Age: 75
End: 2025-07-22
Payer: MEDICARE

## 2025-07-22 ENCOUNTER — TELEPHONE (OUTPATIENT)
Dept: NEUROLOGY | Facility: CLINIC | Age: 75
End: 2025-07-22

## 2025-07-22 DIAGNOSIS — G35 MULTIPLE SCLEROSIS: Primary | ICD-10-CM

## 2025-07-22 DIAGNOSIS — Z71.89 COUNSELING REGARDING GOALS OF CARE: ICD-10-CM

## 2025-07-22 DIAGNOSIS — Z74.09 IMPAIRED FUNCTIONAL MOBILITY, BALANCE, GAIT, AND ENDURANCE: ICD-10-CM

## 2025-07-22 DIAGNOSIS — G82.20 PARAPARESIS: ICD-10-CM

## 2025-07-22 NOTE — Clinical Note
I think pt on waiver program waitlist.  This would be very helfpful ad Amanda is primary and sole caregiver and her mom is bedbound.

## 2025-07-22 NOTE — PROGRESS NOTES
The patient location is: [place]  The chief complaint leading to consultation is: MS    Visit type: audiovisual    Face to Face time with patient: 20  31 minutes of total time spent on the encounter, which includes face to face time and non-face to face time preparing to see the patient (eg, review of tests), Obtaining and/or reviewing separately obtained history, Documenting clinical information in the electronic or other health record, Independently interpreting results (not separately reported) and communicating results to the patient/family/caregiver, or Care coordination (not separately reported).     Each patient to whom he or she provides medical services by telemedicine is:  (1) informed of the relationship between the physician and patient and the respective role of any other health care provider with respect to management of the patient; and (2) notified that he or she may decline to receive medical services by telemedicine and may withdraw from such care at any time.              Subjective:          Patient ID: Lupis Murcia is a 74 y.o. female who presents today for a routine  visit for MS.      NEURO MULTIPLE SCLEROISIS SUMMARY:   Disease type currently:  Secondarily Progressive MS  Current Therapy:  None       MS HPI:  DMT: None  Taking vitamin D3 as recommended? Yes -  Was admitted 6 weeks ago for with infection - suspected cellulitis.     Leg wounds are all healed.   Did not receive the [place] - states she is scared of this.   She received a power chair earlier this year. Still not using it b/c she still cannot stand and pivot to get in it.   Getting PT twice / week via [facility] - pt is concerned they will change this to once / week   Pt wears depends 24 / 7 - her daughter is her primary caregiver.      Medications:  Current Outpatient Medications   Medication Sig    acetaminophen-codeine 300-30mg (TYLENOL #3) 300-30 mg Tab Take 1 tablet by mouth daily as needed (pain).     ammonium lactate (LAC-HYDRIN) 12 % lotion Apply topically 2 (two) times daily.    apixaban (ELIQUIS) 5 mg Tab Take 1 tablet (5 mg total) by mouth 2 (two) times a day.    cholecalciferol, vitamin D3, 125 mcg (5,000 unit) Tab Take 5,000 Units by mouth once daily.    cyanocobalamin 1,000 mcg/mL injection Inject 1,000 mcg into the muscle every 30 days.    LORazepam (ATIVAN) 1 MG tablet Take 1 mg by mouth every 12 (twelve) hours as needed for Anxiety.    ondansetron (ZOFRAN-ODT) 4 MG TbDL Dissolve 1 tablet (4 mg total) by mouth every 6 (six) hours as needed (Nausea).     No current facility-administered medications for this visit.       SOCIAL HISTORY  Social History[1]    Living arrangements - the patient lives alone.            10/23/2019    11:17 AM   REVIEW OF SYMPTOMS   Do you feel abnormally tired on most days? No    Do you feel you generally sleep well? Yes    Do you have difficulty controlling your bladder?  Yes    Do you have difficulty controlling your bowels?  No    Do you have frequent muscle cramps, tightness or spasms in your limbs?  Yes    Do you have new visual symptoms?  No    Do you have worsening difficulty with your memory or thinking? No    Do you have worsening symptoms of anxiety or depression?  Yes    For patients who walk, Do you have more difficulty walking?  No    Have you fallen since your last visit?  No    For patients who use wheelchairs: Do you have any skin wounds or breakdown? Not Applicable    Do you have difficulty using your hands?  No    Do you have shooting or burning pain? No    Do you have difficulty with sexual function?  No    If you are sexually active, are you using birth control? Y/N  N/A Not Applicable    Do you often choke when swallowing liquids or solid food?  No    Do you experience worsening symptoms when overheated? No    Do you need any new equipment such as a wheelchair, walker or shower chair? No    Do you receive co-pay financial assistance for your principal MS  medicine? Not Applicable    Would you be interested in participating in an MS research trial in the future? Not Applicable    Do you feel you have adequate family/friend support?  No    Do you have health insurance?   Yes    Are you currently employed? No    Do you receive SSDI/SSI?  No    Do you use marijuana or cannabis products? No    Have you been diagnosed with a urinary tract infection since your last visit here? No    Have you been diagnosed with a respiratory tract infection since your last visit here? No    Have you been to the emergency room since your last visit here? No    Have you been hospitalized since your last visit here?  No        Proxy-reported           10/23/2019    11:02 AM   FSS SCORE & INTERPRETATION   FSS SCORE  27   FSS SCORE INTERPRETATION May not be suffering from fatigue         10/23/2019    11:00 AM   MS KIMMY-D SCORE & INTERPRETATION   KIMMY-D SCORE  17   KIMMY-D INTERPRETATION  Mild to moderate Depression         10/23/2019    10:56 AM   MS ELSY-7 SCORE & INTERPRETATION   ELSY-7 SCORE  4   ELSY-7 SCORE INTERPRETATION Normal         10/23/2019    11:03 AM   PEQ MS MOS PAIN EFFECTS SCORE & INTERPRETATION   PES SCORE 11   PES SCORE INTERPRETATION Scores can range from 6-30.  Items are scaled so that higher scores indicate a greater impact of pain on a patients mood and behavior.          No data to display                  10/23/2019    11:05 AM   MS BLADDER CONTROL SCORE & INTERPRETATION   BLCS SCORE 8   BLCS SCORE INTERPRETATION  Scores can range from 0-22, with higher scores indicating greater bladder control problems.         10/23/2019    11:11 AM   MS BOWEL CONTROL SCORE & INTERPRETATION   BWCS SCORE 2   BWCS SCORE INTERPRETATION Scores can range from 0-26, with higher scores indicating greater bowel control problems.         10/23/2019    11:07 AM   PEQ MS IMPACT OF VISUAL IMPAIRMENT SCORE & INTERPRETATION   CARLOTA SCALE SCORE  5   CARLOTA SCORE INTERPRETATION Scores can range from 0-15,  "with higher scores indicating greater impact of visual problems on daily activites.         10/23/2019    11:10 AM   MS PDQ SCORE & INTERPRETATION   PDQ RETROSPECTIVE MEMORY SUBSCALE 1   PDQ ATTENTION/CONCENTRATION SUBSCALE 3   PDQ PROSPECTIVE MEMORY SUBSCALE 1   PDQ PLANNING/ORGANIZATION SUBSCALE 5   PDQ TOTAL SCORE 10   PDQ SCORE INTERPRETATION Scores can range from 0-80, with higher scores indicating greater perceived cognitive impairment.         10/23/2019    11:14 AM   MSSS SCORE & INTERPRETATION   MSSS TANGIBLE SUPPORT SUBSCALE 75   MSSS EMOTIONAL/INFORMATIONAL SUPPORT SUBSCALE 68.75   MSSS AFFECTIONATE SUPPORT SUBSCALE 75   MSSS POSITIVE SOCIAL INTERACTION SUBSCALE 66.67   MSSS TOTAL SCORE 71.36   MSSS SCORE INTERPRETATION Scores can range from 0-100, with higher scores indicating greater perceived support.               Imaging:     Results for orders placed during the hospital encounter of 10/24/20    MRI Brain Demyelinating Without Contrast    Impression  Stable distribution of white matter lesions in the supratentorial brain, in keeping with patient's known multiple sclerosis.    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      Electronically signed by: Domenico Alfred MD  Date:    10/24/2020  Time:    15:53    No results found for this or any previous visit.    No results found for this or any previous visit.    No results found for this or any previous visit.    No results found for this or any previous visit.    No results found for this or any previous visit.        Labs:     Lab Results   Component Value Date    NDTPQSSK80CE 35 04/09/2025    MFCRJEMR49LT 37 09/28/2023    ZDMFHUZS00GK 39 12/18/2021     No results found for: "JCVINDEX", "JCVANTIBODY"  @resufast(NEUROLGTCHN:3)   No results found for: "SN1CGIGV", "ABSOLUTECD3", "XA5RSYYI", "ABSOLUTECD8", "LR1PONPM", "ABSOLUTECD4", "LABCD48"  Lab Results   Component Value Date    WBC 5.85 07/14/2025    RBC 3.21 (L) 07/14/2025    HGB 8.0 (L) 07/14/2025    " HCT 26.7 (L) 07/14/2025    MCV 83 07/14/2025    MCH 24.9 (L) 07/14/2025    MCHC 30.0 (L) 07/14/2025    RDW 16.3 (H) 07/14/2025     07/14/2025    MPV 10.5 07/14/2025    GRAN 3.2 11/04/2024    GRAN 58.0 11/04/2024    LYMPH 30.7 06/18/2025    LYMPH 1.77 06/18/2025    MONO 10.4 06/18/2025    MONO 0.60 06/18/2025    EOS 3.8 06/18/2025    EOS 0.22 06/18/2025    BASO 0.02 11/04/2024    EOSINOPHIL 3.3 11/04/2024    BASOPHIL 0.3 06/18/2025    BASOPHIL 0.02 06/18/2025     Sodium   Date Value Ref Range Status   07/14/2025 143 136 - 145 mmol/L Final   02/24/2025 143 136 - 145 mmol/L Final     Potassium   Date Value Ref Range Status   07/14/2025 3.3 (L) 3.5 - 5.1 mmol/L Final   02/24/2025 4.2 3.5 - 5.1 mmol/L Final     Chloride   Date Value Ref Range Status   07/14/2025 106 95 - 110 mmol/L Final   02/24/2025 107 95 - 110 mmol/L Final     CO2   Date Value Ref Range Status   07/14/2025 26 23 - 29 mmol/L Final   02/24/2025 25 23 - 29 mmol/L Final     Glucose   Date Value Ref Range Status   07/14/2025 110 70 - 110 mg/dL Final   02/24/2025 116 (H) 70 - 110 mg/dL Final     BUN   Date Value Ref Range Status   07/14/2025 13 8 - 23 mg/dL Final     Creatinine   Date Value Ref Range Status   07/14/2025 0.8 0.5 - 1.4 mg/dL Final     Calcium   Date Value Ref Range Status   07/14/2025 9.3 8.7 - 10.5 mg/dL Final   02/24/2025 9.5 8.7 - 10.5 mg/dL Final     Protein Total   Date Value Ref Range Status   07/14/2025 6.8 6.0 - 8.4 gm/dL Final     Total Protein   Date Value Ref Range Status   02/24/2025 7.2 6.0 - 8.4 g/dL Final     Albumin   Date Value Ref Range Status   07/14/2025 3.3 (L) 3.5 - 5.2 g/dL Final   02/24/2025 3.3 (L) 3.5 - 5.2 g/dL Final     Total Bilirubin   Date Value Ref Range Status   02/24/2025 0.2 0.1 - 1.0 mg/dL Final     Comment:     For infants and newborns, interpretation of results should be based  on gestational age, weight and in agreement with clinical  observations.    Premature Infant recommended reference  "ranges:  Up to 24 hours.............<8.0 mg/dL  Up to 48 hours............<12.0 mg/dL  3-5 days..................<15.0 mg/dL  6-29 days.................<15.0 mg/dL       Bilirubin Total   Date Value Ref Range Status   07/14/2025 0.3 0.1 - 1.0 mg/dL Final     Comment:     For infants and newborns, interpretation of results should be based   on gestational age, weight and in agreement with clinical   observations.    Premature Infant recommended reference ranges:   0-24 hours:  <8.0 mg/dL   24-48 hours: <12.0 mg/dL   3-5 days:    <15.0 mg/dL   6-29 days:   <15.0 mg/dL     Alkaline Phosphatase   Date Value Ref Range Status   02/24/2025 60 40 - 150 U/L Final     ALP   Date Value Ref Range Status   07/14/2025 59 40 - 150 unit/L Final     AST   Date Value Ref Range Status   07/14/2025 10 (L) 11 - 45 unit/L Final   02/24/2025 19 10 - 40 U/L Final     ALT   Date Value Ref Range Status   07/14/2025 6 (L) 10 - 44 unit/L Final   02/24/2025 9 (L) 10 - 44 U/L Final     Anion Gap   Date Value Ref Range Status   07/14/2025 11 8 - 16 mmol/L Final     eGFR if    Date Value Ref Range Status   07/25/2022 >60.0 >60 mL/min/1.73 m^2 Final     eGFR if non    Date Value Ref Range Status   07/25/2022 >60.0 >60 mL/min/1.73 m^2 Final     Comment:     Calculation used to obtain the estimated glomerular filtration  rate (eGFR) is the CKD-EPI equation.        No results found for: "HEPBSAG", "HEPBSAB", "HEPBCAB"        MS Impression and Plan:     NEURO MULTIPLE SCLEROSIS IMPRESSION:   MRI Progression:  N/A  MS Classification:  Secondarily Progressive MS  Current DMT: none  DMT:  No change in management  Symptom Management:  Implement change in symptom management  Implement Change in Symptom Management:  Case Management and Adaptive Needs  Implement Change in Symptom Management comment: Will reach out to SW team about updating home health PT orders to focus on short term goal of patient gaining ability to stand and " pivot to power chair.   According to patient she is making progress in this regard. Will also reach out to SW team about any updates on the CC waiver program - pt is on waitlist  Additional Impressions: F/u 4 mo Blanca Vir[facility]            Problem List Items Addressed This Visit          Unprioritized    Paraparesis    Impaired functional mobility, balance, gait, and endurance     Other Visit Diagnoses         Multiple sclerosis    -  Primary      Counseling regarding goals of care                Kiki Henley MD      Visit today included increased complexity associated with the care of the episodic problem : chronic immunotherapy; addressed and managing the longitudinal care of the patient due to the serious and/or complex managed problem(s) MS.                 [1]   Social History  Tobacco Use    Smoking status: Never    Smokeless tobacco: Never   Substance Use Topics    Alcohol use: No    Drug use: No

## 2025-07-22 NOTE — Clinical Note
"Pt seeing [facility] for HH; this is not a new issue as you likely know.  Pt has a short term goal of standing and pivoting to her new power chair independently. Pt states that the PT feels this goal is achievable; That said, pt and her daughter feel that [facility] is about to change her to only once/week PT for "financial reasons".  Can we submit a renewed order specifying this short term goal so that regular PT can continue twice week?   Thanks"

## 2025-07-23 ENCOUNTER — TELEPHONE (OUTPATIENT)
Dept: NEUROLOGY | Facility: CLINIC | Age: 75
End: 2025-07-23
Payer: MEDICARE

## 2025-07-28 ENCOUNTER — LAB REQUISITION (OUTPATIENT)
Dept: LAB | Facility: HOSPITAL | Age: 75
End: 2025-07-28
Payer: MEDICARE

## 2025-07-28 DIAGNOSIS — I50.32 CHRONIC DIASTOLIC (CONGESTIVE) HEART FAILURE: ICD-10-CM

## 2025-07-28 DIAGNOSIS — D64.9 ANEMIA, UNSPECIFIED: ICD-10-CM

## 2025-07-28 LAB
ALBUMIN SERPL BCP-MCNC: 3.4 G/DL (ref 3.5–5.2)
ALP SERPL-CCNC: 62 UNIT/L (ref 40–150)
ALT SERPL W/O P-5'-P-CCNC: <8 UNIT/L (ref 0–55)
ANION GAP (OHS): 10 MMOL/L (ref 8–16)
AST SERPL-CCNC: 15 UNIT/L (ref 0–50)
BILIRUB SERPL-MCNC: 0.3 MG/DL (ref 0.1–1)
BUN SERPL-MCNC: 17 MG/DL (ref 8–23)
CALCIUM SERPL-MCNC: 9.3 MG/DL (ref 8.7–10.5)
CHLORIDE SERPL-SCNC: 106 MMOL/L (ref 95–110)
CO2 SERPL-SCNC: 23 MMOL/L (ref 23–29)
CREAT SERPL-MCNC: 1.1 MG/DL (ref 0.5–1.4)
ERYTHROCYTE [DISTWIDTH] IN BLOOD BY AUTOMATED COUNT: 17.2 % (ref 11.5–14.5)
FERRITIN SERPL-MCNC: 46 NG/ML (ref 20–300)
GFR SERPLBLD CREATININE-BSD FMLA CKD-EPI: 53 ML/MIN/1.73/M2
GLUCOSE SERPL-MCNC: 117 MG/DL (ref 70–110)
HCT VFR BLD AUTO: 26.6 % (ref 37–48.5)
HGB BLD-MCNC: 7.8 GM/DL (ref 12–16)
IRON SATN MFR SERPL: 5 % (ref 20–50)
IRON SERPL-MCNC: 18 UG/DL (ref 30–160)
MCH RBC QN AUTO: 25.2 PG (ref 27–31)
MCHC RBC AUTO-ENTMCNC: 29.3 G/DL (ref 32–36)
MCV RBC AUTO: 86 FL (ref 82–98)
PLATELET # BLD AUTO: 290 K/UL (ref 150–450)
PMV BLD AUTO: 10.5 FL (ref 9.2–12.9)
POTASSIUM SERPL-SCNC: 4 MMOL/L (ref 3.5–5.1)
PROT SERPL-MCNC: 7 GM/DL (ref 6–8.4)
RBC # BLD AUTO: 3.09 M/UL (ref 4–5.4)
SODIUM SERPL-SCNC: 139 MMOL/L (ref 136–145)
TIBC SERPL-MCNC: 389 UG/DL (ref 250–450)
TRANSFERRIN SERPL-MCNC: 263 MG/DL (ref 200–375)
VIT B12 SERPL-MCNC: 286 PG/ML (ref 210–950)
WBC # BLD AUTO: 5.93 K/UL (ref 3.9–12.7)

## 2025-07-28 PROCEDURE — 82728 ASSAY OF FERRITIN: CPT | Performed by: INTERNAL MEDICINE

## 2025-07-28 PROCEDURE — 82607 VITAMIN B-12: CPT | Performed by: INTERNAL MEDICINE

## 2025-07-28 PROCEDURE — 85027 COMPLETE CBC AUTOMATED: CPT | Performed by: INTERNAL MEDICINE

## 2025-07-28 PROCEDURE — 84466 ASSAY OF TRANSFERRIN: CPT | Performed by: INTERNAL MEDICINE

## 2025-07-28 PROCEDURE — 80053 COMPREHEN METABOLIC PANEL: CPT | Performed by: INTERNAL MEDICINE

## 2025-07-30 ENCOUNTER — PATIENT MESSAGE (OUTPATIENT)
Dept: PSYCHIATRY | Facility: CLINIC | Age: 75
End: 2025-07-30
Payer: MEDICARE

## 2025-07-31 ENCOUNTER — OFFICE VISIT (OUTPATIENT)
Dept: HEMATOLOGY/ONCOLOGY | Facility: CLINIC | Age: 75
End: 2025-07-31
Payer: MEDICARE

## 2025-07-31 DIAGNOSIS — D50.0 ANEMIA DUE TO CHRONIC BLOOD LOSS: Primary | ICD-10-CM

## 2025-07-31 DIAGNOSIS — G35 MS (MULTIPLE SCLEROSIS): ICD-10-CM

## 2025-07-31 RX ORDER — CANDESARTAN CILEXETIL AND HYDROCHLOROTHIAZIDE 16; 12.5 MG/1; MG/1
1 TABLET ORAL DAILY
COMMUNITY

## 2025-07-31 NOTE — PROGRESS NOTES
Hematology and Medical Oncology   Follow Up      07/31/2025      Reason For Referral: Anemia of chronic disease    Telemedicine Documentation:  The patient location is: home  The chief complaint leading to consultation is: anemia of chronic disease    Visit type: audiovisual    Face to Face time with patient: 25  40 minutes of total time spent on the encounter, which includes face to face time and non-face to face time preparing to see the patient (eg, review of tests), Obtaining and/or reviewing separately obtained history, Documenting clinical information in the electronic or other health record, Independently interpreting results (not separately reported) and communicating results to the patient/family/caregiver, or Care coordination (not separately reported).     Each patient to whom he or she provides medical services by telemedicine is:  (1) informed of the relationship between the physician and patient and the respective role of any other health care provider with respect to management of the patient; and (2) notified that he or she may decline to receive medical services by telemedicine and may withdraw from such care at any time.    History of Present Ilness:   Lupis Murcia (Lupis) is a pleasant 74 y.o.female who presents virtually to discuss progressive anemia along with known MS.    Overall doing fair. Has home health come draw labs as needed and do PT exercises.    Fairly recent past is significant for May 2022, she was hospitalized with encephalopathy, HANDY, and lymphedema. After 10 days, she was transferred to SNF. While in SNF and working out at the gym, she developed shortness of breath and went back to the ER and was found to have bilateral pulmonary emboli. She was admitted to the ICU.  In total, she was in skilled nursing for a total of 70 days. She is on Eliquis twice a day now.     Interval History:  Daughter [who works in [employer]] was there during the virtual visit to assist Ms.  Divina and answer questions as needed.     Hospitalized 6 weeks ago at [facility]. Continues to be weak. Febrile. Could not identify a source. But resolved with vanc and cefpime. NHas not been hospitalized since early July for altered mental status. Now resolved and returning to baseline.     Currently taking b12 injection every 2 weeks. Willing to add iron supplement every other day.          PAST MEDICAL HISTORY:   Past Medical History:   Diagnosis Date    Lymphedema     MS (multiple sclerosis)     Other pulmonary embolism without acute cor pulmonale     Vitamin B12 deficiency        PAST SURGICAL HISTORY:   Past Surgical History:   Procedure Laterality Date    BREAST CYST EXCISION Left     over 40yrs ago     SECTION      ESOPHAGOGASTRODUODENOSCOPY N/A 2022    Procedure: EGD (ESOPHAGOGASTRODUODENOSCOPY);  Surgeon: Radha Arango MD;  Location: [address]);  Service: Endoscopy;  Laterality: N/A;       PAST SOCIAL HISTORY:   reports that she has never smoked. She has never used smokeless tobacco. She reports that she does not drink alcohol and does not use drugs.    FAMILY HISTORY:  Family History   Problem Relation Name Age of Onset    Coronary artery disease Father      Lung cancer Father      Lung cancer Mother      Brain cancer Brother         CURRENT MEDICATIONS:   Current Outpatient Medications   Medication Sig    acetaminophen-codeine 300-30mg (TYLENOL #3) 300-30 mg Tab Take 1 tablet by mouth daily as needed (pain).    ammonium lactate (LAC-HYDRIN) 12 % lotion Apply topically 2 (two) times daily.    apixaban (ELIQUIS) 5 mg Tab Take 1 tablet (5 mg total) by mouth 2 (two) times a day.    candesartan-hydrochlorothiazide (ATACAND HCT) 16-12.5 mg per tablet Take 1 tablet by mouth once daily.    cholecalciferol, vitamin D3, 125 mcg (5,000 unit) Tab Take 5,000 Units by mouth once daily.    cyanocobalamin 1,000 mcg/mL injection Inject 1,000 mcg into the muscle every 30 days.    LORazepam  (ATIVAN) 1 MG tablet Take 1 mg by mouth every 12 (twelve) hours as needed for Anxiety.    ondansetron (ZOFRAN-ODT) 4 MG TbDL Dissolve 1 tablet (4 mg total) by mouth every 6 (six) hours as needed (Nausea).     No current facility-administered medications for this visit.     ALLERGIES:   Review of patient's allergies indicates:   Allergen Reactions    Contrast media Shortness Of Breath and Rash    Pcn [penicillins] Shortness Of Breath and Rash    Celebrex [celecoxib] Other (See Comments)     Swallowing problems     Diazepam Hives    Gabapentin Other (See Comments)     Confusion     Motrin [ibuprofen] Rash         Review of Systems:     Review of Systems   Constitutional:  Positive for fatigue. Negative for appetite change, chills, diaphoresis, fever and unexpected weight change.   HENT:   Negative for hearing loss, mouth sores, nosebleeds, sore throat, trouble swallowing and voice change.    Eyes:  Negative for eye problems and icterus.   Respiratory:  Positive for shortness of breath. Negative for chest tightness, cough, hemoptysis and wheezing.    Cardiovascular:  Positive for leg swelling. Negative for chest pain and palpitations.   Gastrointestinal:  Negative for abdominal distention, abdominal pain, blood in stool, diarrhea, nausea and vomiting.   Endocrine: Negative for hot flashes.   Genitourinary:  Negative for bladder incontinence, difficulty urinating, dysuria, frequency and hematuria.    Musculoskeletal:  Positive for gait problem. Negative for arthralgias, back pain, flank pain, myalgias, neck pain and neck stiffness.   Skin:  Negative for itching, rash and wound.   Neurological:  Positive for gait problem. Negative for dizziness, extremity weakness, headaches, numbness, seizures and speech difficulty.   Hematological:  Negative for adenopathy. Does not bruise/bleed easily.   Psychiatric/Behavioral:  Negative for confusion, depression and sleep disturbance. The patient is not nervous/anxious.            Physical Exam:     Limited Secondary to virtual visit    ECOG Performance Status: (foot note - ECOG PS provided by Eastern Cooperative Oncology Group) 2 - Symptomatic, <50% confined to bed    Karnofsky Performance Score:  80%- Normal Activity with Effort: Some Symptoms of Disease    Labs:   Lab Results   Component Value Date    WBC 5.93 07/28/2025    HGB 7.8 (L) 07/28/2025    HCT 26.6 (L) 07/28/2025     07/28/2025    CHOL 191 12/18/2021    TRIG 120 12/18/2021    HDL 48 12/18/2021    ALT <8 07/28/2025    AST 15 07/28/2025     07/28/2025    K 4.0 07/28/2025     07/28/2025    CREATININE 1.1 07/28/2025    BUN 17 07/28/2025    CO2 23 07/28/2025    TSH 1.863 03/15/2016    INR 1.1 06/14/2022    HGBA1C 6.5 (H) 06/10/2025         Imaging: Previous imaging has been reviewed     Assessment and Plan:     Ms. Murcia is pleasant 74 year old female with anemia of chronic disease.    Anemia  --Largely home bound  --Will request labs through home health in 3 months  --Return to clinic virtually to discuss results and possible interventions  --Continue B12 injection every other week  --Will start oral iron supplement every other day  --Will transition to classical hematology clinic    Multiple Sclerosis  --Managed as per neurology    I have provided the patient with an opportunity to ask questions and have all questions answered to her satisfaction.       she will return to clinic in 12 weeks with benign heme clinic, but knows to call in the interim if symptoms change or should a problem arise.        Amy Blanco MD  Hematology and Medical Oncology  Bone Marrow Transplant  [facility]      BMT Chart Routing      Follow up with physician 3 months. 1. labs [from home healthy] in 3 months: cbc,cmp,iron, ferritin, B12 2.see Renata in benign heme virtually several days after labs   Follow up with DMITRIY    Provider visit type    Infusion scheduling note    Injection scheduling note    Labs    Imaging     Pharmacy appointment    Other referrals

## 2025-08-01 ENCOUNTER — DOCUMENT SCAN (OUTPATIENT)
Dept: HOME HEALTH SERVICES | Facility: HOSPITAL | Age: 75
End: 2025-08-01
Payer: MEDICARE

## 2025-08-01 ENCOUNTER — PATIENT MESSAGE (OUTPATIENT)
Dept: PSYCHIATRY | Facility: CLINIC | Age: 75
End: 2025-08-01
Payer: MEDICARE

## 2025-08-01 ENCOUNTER — DOCUMENT SCAN (OUTPATIENT)
Dept: HOME HEALTH SERVICES | Facility: HOSPITAL | Age: 75
End: 2025-08-01

## 2025-08-04 ENCOUNTER — DOCUMENT SCAN (OUTPATIENT)
Dept: HOME HEALTH SERVICES | Facility: HOSPITAL | Age: 75
End: 2025-08-04
Payer: MEDICARE

## 2025-08-04 ENCOUNTER — OFFICE VISIT (OUTPATIENT)
Dept: ENDOCRINOLOGY | Facility: CLINIC | Age: 75
End: 2025-08-04
Payer: MEDICARE

## 2025-08-04 DIAGNOSIS — I10 HYPERTENSION, UNSPECIFIED TYPE: ICD-10-CM

## 2025-08-04 DIAGNOSIS — E55.9 VITAMIN D DEFICIENCY: ICD-10-CM

## 2025-08-04 DIAGNOSIS — E66.01 SEVERE OBESITY (BMI 35.0-39.9) WITH COMORBIDITY: ICD-10-CM

## 2025-08-04 DIAGNOSIS — R53.2 FUNCTIONAL QUADRIPLEGIA: ICD-10-CM

## 2025-08-04 DIAGNOSIS — E11.65 TYPE 2 DIABETES MELLITUS WITH HYPERGLYCEMIA, WITHOUT LONG-TERM CURRENT USE OF INSULIN: ICD-10-CM

## 2025-08-04 DIAGNOSIS — D64.9 NORMOCYTIC ANEMIA: ICD-10-CM

## 2025-08-04 DIAGNOSIS — R53.1 WEAKNESS: Primary | ICD-10-CM

## 2025-08-04 RX ORDER — INSULIN PUMP SYRINGE, 3 ML
EACH MISCELLANEOUS
Qty: 1 EACH | Refills: 0 | Status: SHIPPED | OUTPATIENT
Start: 2025-08-04

## 2025-08-04 RX ORDER — LANCETS
EACH MISCELLANEOUS
Qty: 100 EACH | Refills: 11 | Status: SHIPPED | OUTPATIENT
Start: 2025-08-04

## 2025-08-04 NOTE — PROGRESS NOTES
ENDOCRINOLOGY NEW PATIENT VISIT: 08/04/2025    The patient location is: Home  The chief complaint leading to consultation is: Diabetes, newly diagnosed    Visit type: audiovisual    Face to Face time with patient: 39 minutes  45 minutes of total time spent on the encounter, which includes face to face time and non-face to face time preparing to see the patient (eg, review of tests), Obtaining and/or reviewing separately obtained history, Documenting clinical information in the electronic or other health record, Independently interpreting results (not separately reported) and communicating results to the patient/family/caregiver, or Care coordination (not separately reported).     Each patient to whom he or she provides medical services by telemedicine is:  (1) informed of the relationship between the physician and patient and the respective role of any other health care provider with respect to management of the patient; and (2) notified that he or she may decline to receive medical services by telemedicine and may withdraw from such care at any time.    Subjective:      Patient ID: Lupis Murcia is a 75 y.o. female.    Chief Complaint:  Diabetes    History of Present Illness      Lupis Murcia presents today for diabetes management with elevated A1C.  She presents with he daughter helping as well with the history.    She reports recently discovered elevated hemoglobin A1C of 6.5% during hospitalization approximately 7 weeks ago. She denies any prior history of diabetes or elevated blood sugar. She reports frequent daytime thirst that is consistent but not causing significant distress. She denies excessive urination, nocturia, nausea, vomiting, and abdominal pain. She has multiple sclerosis with left-sided foot drop affecting ambulation. She has history of left foot osteomyelitis (resolved), recurrent episodes of sepsis with suspected urinary source, bilateral kidney stones, and cataract (not yet evaluated).  She had acute kidney injury in 2022 that has resolved. She denies history of pancreatitis, cardiac problems, additional bone infections, or retinal pathologies. She denies family history of diabetes. She has never smoked tobacco or consumed alcohol. She regularly drinks Coca-Cola, identifying it as her favorite beverage. She takes amlodipine with hydrochlorothiazide combination 1 tablet twice weekly for blood pressure management, taken intermittently to prevent excessive blood pressure reduction. She takes vitamin D 5000 IU daily. She denies adverse effects from current medications.          Regarding diabetes:    Initially Diagnosed with T2D:  June 2025 on labs.    Serum studies for glucose have showed consistent elevations.       Latest Reference Range & Units 06/13/25 03:01 06/14/25 04:45 06/15/25 03:25 06/16/25 03:45 06/17/25 06:24 06/17/25 14:41 06/18/25 08:21 06/30/25 11:15 07/14/25 12:00 07/28/25 11:30   Glucose 70 - 110 mg/dL 122 (H) 114 (H) 109 112 (H) 111 (H) 116 (H) 121 (H) 112 (H) 110 117 (H)   (H): Data is abnormally high    Current symptoms/problems:   Some UTIs over the years, some polydipsia.  Some numbness of her feet with foot drop on the left and osteomyelitis.    Has cataracts and some vision changes recently      Denies h/o pancreatitis, recurrent gallstones, daily etoh use    Known diabetic complications: none  Cardiovascular risk factors: advanced age (older than 55 for men, 65 for women), diabetes mellitus, dyslipidemia, hypertension, obesity (BMI >= 30 kg/m2), and sedentary lifestyle    Current diabetic medications include:    None    Other medications tried:  None    Diet: on average, 3 meals per day.  Admits to drinking regular soda.      Exercise: PT twice a week at home    Glucose Trends:  Not currently checking    Family Hx:  Denies FH of diabetes            Wt Readings from Last 10 Encounters:   06/17/25 94 kg (207 lb 3.7 oz)   10/01/24 95.2 kg (209 lb 14.1 oz)   07/14/24 98.2 kg (216  "lb 7.9 oz)   06/09/23 100.7 kg (222 lb 0.1 oz)   07/04/22 87 kg (191 lb 12.8 oz)   06/15/22 90.3 kg (199 lb)   06/13/22 92.2 kg (203 lb 4.2 oz)   05/11/22 100.7 kg (222 lb)   10/12/21 91.2 kg (201 lb)   03/03/20 91.6 kg (201 lb 15.1 oz)       Diabetes Management Status    Statin: Not taking  ACE/ARB: Taking      Screening or Prevention Patient's value   HgA1C Testing and Control Lab Results   Component Value Date    HGBA1C 6.5 (H) 06/10/2025    HGBA1C 5.8 (H) 06/08/2023    HGBA1C 6.2 (H) 05/03/2022    HGBA1C 5.7 (H) 07/14/2018      Lipid profile  Most Recent Lipid Panel Health Maintenance Topic Completion: Not Found   LDL control (goal LDL <70) Lab Results   Component Value Date    LDLCALC 119.0 12/18/2021      Nephropathy screening No results found for: "LABMICR"  Lab Results   Component Value Date    PROTEINUA 1+ (A) 06/17/2025      Blood pressure BP Readings from Last 3 Encounters:   06/18/25 (!) 144/81   10/05/24 (!) 166/77   07/14/24 (!) 157/74      Dilated retinal exam Most Recent Eye Exam Date: Not Found   Foot exam Most Recent Foot Exam Date: Not Found          ROS:   As above    Objective:     LMP  (LMP Unknown)   BP Readings from Last 3 Encounters:   06/18/25 (!) 144/81   10/05/24 (!) 166/77   07/14/24 (!) 157/74     Wt Readings from Last 1 Encounters:   06/17/25 2318 94 kg (207 lb 3.7 oz)   06/14/25 1523 94.8 kg (209 lb)   06/13/25 1138 95.2 kg (209 lb 14.1 oz)   06/10/25 1835 95.2 kg (209 lb 12.8 oz)     There is no height or weight on file to calculate BMI.    Physical Exam    General: No acute distress. Nontoxic appearing.  Psychiatric: Normal mood. Normal affect. No evidence of SI.           Lab Review:   Lab Results   Component Value Date    HGBA1C 6.5 (H) 06/10/2025     Lab Results   Component Value Date    CHOL 191 12/18/2021    HDL 48 12/18/2021    LDLCALC 119.0 12/18/2021    TRIG 120 12/18/2021    CHOLHDL 25.1 12/18/2021     Lab Results   Component Value Date     07/28/2025    K 4.0 " 07/28/2025     07/28/2025    CO2 23 07/28/2025     (H) 07/28/2025    BUN 17 07/28/2025    CREATININE 1.1 07/28/2025    CALCIUM 9.3 07/28/2025    PROT 7.0 07/28/2025    ALBUMIN 3.4 (L) 07/28/2025    BILITOT 0.3 07/28/2025    ALKPHOS 62 07/28/2025    AST 15 07/28/2025    ALT <8 07/28/2025    ANIONGAP 10 07/28/2025    ESTGFRAFRICA >60.0 07/25/2022    EGFRNONAA >60.0 07/25/2022    TSH 1.863 03/15/2016     Vitamin D   Date Value Ref Range Status   04/09/2025 35 30 - 96 ng/mL Final     Comment:     Vitamin D deficiency.........<10 ng/mL                              Vitamin D insufficiency......10-29 ng/mL       Vitamin D sufficiency........> or equal to 30 ng/mL  Vitamin D toxicity............>100 ng/mL       Vit D, 25-Hydroxy   Date Value Ref Range Status   09/28/2023 37 30 - 96 ng/mL Final     Comment:     Vitamin D deficiency.........<10 ng/mL                              Vitamin D insufficiency......10-29 ng/mL       Vitamin D sufficiency........> or equal to 30 ng/mL  Vitamin D toxicity............>100 ng/mL         Assessment and Plan        A1C level of 6.5% may be less accurate due to iron deficiency anemia.  Serum glucose levels over past year range from 100-140s, indicating at least prediabetes.  Ordered fructosamine as alternative marker for glucose control given unreliable A1C.  Avoiding SGLT2 inhibitors due to history of recurrent UTIs.  Checking TSH to evaluate weakness.  CKD stage 3A with GFR of 53 in late July.  Consider escalating to metformin or GLP-1 agonist if consistent elevated blood sugars.    TYPE 2 DIABETES MELLITUS WITH HYPERGLYCEMIA, WITHOUT LONG-TERM CURRENT USE OF INSULIN:  - Explained prediabetes and diabetes diagnostic criteria using A1C level   - Educated on simple sugars and their effect on glucose levels, benefits of whole grain alternatives and fiber-rich foods   - Discussed potential side effects of GLP-1 agonists (nausea, reflux, constipation/diarrhea)   - Informed about  cardiac and kidney benefits of certain diabetes medications   - Patient to avoid simple sugars, especially in beverages, and consider switching to Coke Zero or other zero-calorie beverages instead of regular Coke   - Patient to choose whole wheat versions of bread, pasta, and potatoes when possible   - Patient to focus on vegetables as the primary source of carbohydrates   - Patient to consume healthy lean meats and healthy fats   - Ordered glucometer prescription with test strips and lancets for glucose monitoring   - Ordered fructosamine level   - Ordered lipid panel   - Ordered TSH level   - Follow up in early 2026   - Will message patient through [facility] with lab results and any necessary treatment recommendations    SEVERE OBESITY (BMI 35.0-39.9) WITH COMORBIDITY:  - Discussed dietary adjustments as being possible  - May consider GLP-1 RA     HYPERTENSION, UNSPECIFIED TYPE:  - Informed about cardiac and kidney benefits of certain diabetes medications   - Ordered lipid panel    NORMOCYTIC ANEMIA:  - Impact of anemia on A1C accuracy   - Ordered fructosamine level    PLAN SUMMARY:  - Ordered lipid panel  - Ordered TSH level  - Ordered fructosamine level  - Prescribed glucometer with test strips and lancets  - Dietary recommendations: avoid simple sugars, choose whole wheat options, focus on vegetables, consume lean meats and healthy fats  - Educated patient on prediabetes, diabetes criteria, and effects of simple sugars  - Discussed potential side effects of GLP-1 agonists  - Informed about cardiac and kidney benefits of certain diabetes medications  - Follow up in early 2026  - Provider will message patient through [facility] with lab results and treatment recommendations          RTC in 5-6 months.       **Visit today included increased complexity associated with the care of the problems addressed and managing the longitudinal care of the patient due to the serious and/or complex managed problems      Bart LOPEZ  Michael, DO     This note was generated with the assistance of ambient listening technology. Verbal consent was obtained by the patient and accompanying visitor(s) for the recording of patient appointment to facilitate this note. I attest to having reviewed and edited the generated note for accuracy, though some syntax or spelling errors may persist. Please contact the author of this note for any clarification.

## 2025-08-04 NOTE — PATIENT INSTRUCTIONS
Personalized Lower-Carb Meal Plan    1. General Nutrition Goals    Daily Caloric Goal: Aim to reduce caloric intake gradually, starting with a reduction of 300-500 calories/day, without going below 1,200 calories/day. This can support a weight loss of about 1-2 pounds per week, which is a sustainable rate.    Macronutrient Distribution: Target about 25-30% of total calories from carbohydrates, focusing on high-fiber, non-starchy vegetables and whole foods to help manage hunger and maintain steady blood sugar levels.    2. Meal Structure and Composition    Protein-Rich Foods (20-30g per meal): To support muscle health and satiety, prioritize lean proteins like chicken, turkey, fish, tofu, and Greek yogurt. High-protein intake is linked to increased fullness and supports metabolism.    Healthy Fats (5-10g per meal): Include sources like avocado, olive oil, nuts, and seeds to support satiety. These fats provide flavor and satisfaction, helping prevent overeating.    Fiber-Rich Vegetables (2-3 cups per meal): Non-starchy vegetables like leafy greens, broccoli, peppers, and zucchini are low in carbs and calories but high in fiber and nutrients.    3. Sample Low-Carb Meal Ideas    Breakfast Options:  Veggie Omelet: 2 eggs, spinach, mushrooms, and tomatoes with a side of ½ avocado.  Greek Yogurt Parfait: 1 cup plain Greek yogurt with 1 tbsp janeth seeds, a few berries, and a sprinkle of nuts.    Lunch Options:  Salad Bowl: Grilled chicken, mixed greens, cucumbers, bell peppers, a few olives, and a drizzle of olive oil and vinegar.  Lettuce Wraps: Lean ground turkey with shredded lettuce, diced tomatoes, and a sprinkle of cheese.    Dinner Options:  Stir-Larson: Byron or tofu with broccoli, snap peas, bell peppers, and zucchini, sautéed in olive oil and garlic.  Cauliflower Rice Bowl: Ground beef, diced zucchini, cherry tomatoes, and cauliflower rice with herbs and spices.    Snacks (if needed):  Celery sticks with 1-2 tbsp  peanut or almond butter.  Cottage cheese with cucumber slices or a few berries.    4. Hydration and Hunger Management    Avoid Beverages with Calories: Replace sugary drinks, sodas, and juices with water, herbal tea, or black coffee.    Drink a Glass of Water Before Meals: This can help reduce appetite and improve digestion. Also, drinking water whenever experiencing hunger between meals may help distinguish true hunger from thirst.    Minimize Alcohol Consumption: Alcoholic drinks can add unnecessary calories and often increase appetite.    5. Carb Cycling (Optional): If interested in a slightly varied carb intake, try slightly increasing carbs on exercise days to support energy (adding an extra serving of fruit or whole grains like quinoa) and lowering them on rest days.        Physical Activity Recommendations    1. Gradual Increase in Activity:    Goal: Aim for 150-300 minutes of moderate activity per week (e.g., brisk walking, swimming, or cycling), which can be broken down into shorter sessions if needed.    Strength Training: Incorporate resistance training 2-3 times per week to build muscle and support metabolic health. Exercises like squats, lunges, push-ups, or resistance bands are accessible options.    2. Daily Movement and Tracking Steps: Try to work on a daily step goal (e.g., 7,000-10,000 steps/day) and to incorporate more movement throughout your day, such as walking during breaks or taking stairs when possible.      Healthy Lifestyle Habits    1. Mindful Eating Techniques:    Slow Down at Mealtimes: Eating slowly helps enhance fullness signals, preventing overeating.    Avoid Distractions: Eat without screens or other distractions, focusing on the flavors, textures, and enjoyment of food.    2. Sleep Hygiene:    Aim for 7-8 hours of sleep per night, as poor sleep can increase hunger hormones and affect weight regulation.    3. Manage Stress Levels:    Practice stress-relieving activities like yoga,  meditation, or hobbies you enjoy, as stress can lead to emotional eating and disrupt metabolic health.    4. Track Progress Without Over-Focusing on the Scale:    In addition to tracking weight, consider tracking non-scale victories like increased energy, improved fitness, better sleep, or positive changes in mood and stress.        Potential Medical and Supplementary Support  Since weight loss may still be challenging due to metabolic and genetic factors, obesity medicine consultation can provide access to potential medications or other therapies that may be appropriate your unique situation. For this purposes we can send a referral to that specialist.

## 2025-08-04 NOTE — Clinical Note
I have ordered some labs for this patient to be done with the help of home health team.  They stated it was [facility]?  Not sure how to get labs to them.  Follow up in 5-6 months.

## 2025-08-05 DIAGNOSIS — D50.0 ANEMIA DUE TO CHRONIC BLOOD LOSS: Primary | ICD-10-CM

## 2025-08-05 DIAGNOSIS — E53.8 DEFICIENCY OF OTHER SPECIFIED B GROUP VITAMINS: ICD-10-CM

## 2025-08-07 ENCOUNTER — TELEPHONE (OUTPATIENT)
Dept: ENDOCRINOLOGY | Facility: CLINIC | Age: 75
End: 2025-08-07
Payer: MEDICARE

## 2025-08-07 ENCOUNTER — PATIENT MESSAGE (OUTPATIENT)
Dept: ENDOCRINOLOGY | Facility: CLINIC | Age: 75
End: 2025-08-07
Payer: MEDICARE

## 2025-08-07 DIAGNOSIS — E11.65 TYPE 2 DIABETES MELLITUS WITH HYPERGLYCEMIA, WITHOUT LONG-TERM CURRENT USE OF INSULIN: Primary | ICD-10-CM

## 2025-08-07 DIAGNOSIS — R53.1 WEAKNESS: ICD-10-CM

## 2025-08-08 ENCOUNTER — EXTERNAL HOME HEALTH (OUTPATIENT)
Dept: HOME HEALTH SERVICES | Facility: HOSPITAL | Age: 75
End: 2025-08-08
Payer: MEDICARE

## 2025-08-14 ENCOUNTER — TELEPHONE (OUTPATIENT)
Dept: HEMATOLOGY/ONCOLOGY | Facility: CLINIC | Age: 75
End: 2025-08-14
Payer: MEDICARE

## 2025-08-14 ENCOUNTER — LAB VISIT (OUTPATIENT)
Dept: LAB | Facility: HOSPITAL | Age: 75
End: 2025-08-14
Attending: STUDENT IN AN ORGANIZED HEALTH CARE EDUCATION/TRAINING PROGRAM
Payer: MEDICARE

## 2025-08-14 DIAGNOSIS — R53.1 WEAKNESS: ICD-10-CM

## 2025-08-14 DIAGNOSIS — E11.65 TYPE 2 DIABETES MELLITUS WITH HYPERGLYCEMIA, WITHOUT LONG-TERM CURRENT USE OF INSULIN: ICD-10-CM

## 2025-08-14 LAB
CHOLEST SERPL-MCNC: 199 MG/DL (ref 120–199)
CHOLEST/HDLC SERPL: 4.9 {RATIO} (ref 2–5)
HDLC SERPL-MCNC: 41 MG/DL (ref 40–75)
HDLC SERPL: 20.6 % (ref 20–50)
LDLC SERPL CALC-MCNC: 141.4 MG/DL (ref 63–159)
NONHDLC SERPL-MCNC: 158 MG/DL
TRIGL SERPL-MCNC: 83 MG/DL (ref 30–150)
TSH SERPL-ACNC: 0.8 UIU/ML (ref 0.4–4)

## 2025-08-14 PROCEDURE — 82465 ASSAY BLD/SERUM CHOLESTEROL: CPT

## 2025-08-14 PROCEDURE — 36415 COLL VENOUS BLD VENIPUNCTURE: CPT

## 2025-08-14 PROCEDURE — 84443 ASSAY THYROID STIM HORMONE: CPT

## 2025-08-14 PROCEDURE — 82985 ASSAY OF GLYCATED PROTEIN: CPT

## 2025-08-18 ENCOUNTER — PATIENT MESSAGE (OUTPATIENT)
Dept: ENDOCRINOLOGY | Facility: CLINIC | Age: 75
End: 2025-08-18
Payer: MEDICARE

## 2025-08-18 LAB — W FRUCTOSAMINE: 252 UMOL /L

## 2025-08-19 ENCOUNTER — TELEPHONE (OUTPATIENT)
Dept: PRIMARY CARE CLINIC | Facility: CLINIC | Age: 75
End: 2025-08-19
Payer: MEDICARE

## 2025-08-26 RX ORDER — PREDNISONE 20 MG/1
TABLET ORAL
Qty: 30 TABLET | Refills: 0 | Status: SHIPPED | OUTPATIENT
Start: 2025-08-26

## 2025-08-28 ENCOUNTER — OFFICE VISIT (OUTPATIENT)
Dept: PRIMARY CARE CLINIC | Facility: CLINIC | Age: 75
End: 2025-08-28
Payer: MEDICARE

## 2025-08-28 ENCOUNTER — TELEPHONE (OUTPATIENT)
Dept: PRIMARY CARE CLINIC | Facility: CLINIC | Age: 75
End: 2025-08-28
Payer: MEDICARE

## 2025-08-28 ENCOUNTER — TELEPHONE (OUTPATIENT)
Dept: PRIMARY CARE CLINIC | Facility: CLINIC | Age: 75
End: 2025-08-28

## 2025-08-28 ENCOUNTER — PATIENT MESSAGE (OUTPATIENT)
Dept: PRIMARY CARE CLINIC | Facility: CLINIC | Age: 75
End: 2025-08-28

## 2025-08-28 ENCOUNTER — DOCUMENT SCAN (OUTPATIENT)
Dept: HOME HEALTH SERVICES | Facility: HOSPITAL | Age: 75
End: 2025-08-28
Payer: MEDICARE

## 2025-08-28 VITALS — DIASTOLIC BLOOD PRESSURE: 84 MMHG | SYSTOLIC BLOOD PRESSURE: 126 MMHG

## 2025-08-28 DIAGNOSIS — M79.672 CHRONIC HEEL PAIN, LEFT: ICD-10-CM

## 2025-08-28 DIAGNOSIS — G82.20 PARAPARESIS: ICD-10-CM

## 2025-08-28 DIAGNOSIS — E53.8 B12 DEFICIENCY: ICD-10-CM

## 2025-08-28 DIAGNOSIS — D50.9 IRON DEFICIENCY ANEMIA, UNSPECIFIED IRON DEFICIENCY ANEMIA TYPE: ICD-10-CM

## 2025-08-28 DIAGNOSIS — N18.31 CKD STAGE 3A, GFR 45-59 ML/MIN: ICD-10-CM

## 2025-08-28 DIAGNOSIS — I27.20 PULMONARY HYPERTENSION: ICD-10-CM

## 2025-08-28 DIAGNOSIS — G35 MULTIPLE SCLEROSIS: Primary | ICD-10-CM

## 2025-08-28 DIAGNOSIS — I35.0 NONRHEUMATIC AORTIC VALVE STENOSIS: ICD-10-CM

## 2025-08-28 DIAGNOSIS — I10 BENIGN ESSENTIAL HYPERTENSION: ICD-10-CM

## 2025-08-28 DIAGNOSIS — E11.65 TYPE 2 DIABETES MELLITUS WITH HYPERGLYCEMIA, WITHOUT LONG-TERM CURRENT USE OF INSULIN: ICD-10-CM

## 2025-08-28 DIAGNOSIS — I27.82 CHRONIC PULMONARY EMBOLISM WITHOUT ACUTE COR PULMONALE, UNSPECIFIED PULMONARY EMBOLISM TYPE: ICD-10-CM

## 2025-08-28 DIAGNOSIS — Z74.01 BEDBOUND: ICD-10-CM

## 2025-08-28 DIAGNOSIS — M21.372 LEFT FOOT DROP: ICD-10-CM

## 2025-08-28 DIAGNOSIS — Z86.73 HISTORY OF CVA (CEREBROVASCULAR ACCIDENT): ICD-10-CM

## 2025-08-28 DIAGNOSIS — F41.9 ANXIETY: ICD-10-CM

## 2025-08-28 DIAGNOSIS — R29.898 LEFT LEG WEAKNESS: ICD-10-CM

## 2025-08-28 DIAGNOSIS — I89.0 CHRONIC ACQUIRED LYMPHEDEMA: ICD-10-CM

## 2025-08-28 DIAGNOSIS — G89.29 CHRONIC HEEL PAIN, LEFT: ICD-10-CM

## 2025-08-28 DIAGNOSIS — D63.8 ANEMIA OF CHRONIC DISEASE: ICD-10-CM

## 2025-08-28 PROBLEM — R53.2 FUNCTIONAL QUADRIPLEGIA: Status: RESOLVED | Noted: 2025-08-04 | Resolved: 2025-08-28

## 2025-08-28 PROBLEM — R14.0 ABDOMINAL DISTENSION: Status: RESOLVED | Noted: 2022-05-06 | Resolved: 2025-08-28

## 2025-08-28 PROBLEM — Z87.2 HISTORY OF CELLULITIS: Status: ACTIVE | Noted: 2022-05-04

## 2025-08-28 PROBLEM — G93.41 ENCEPHALOPATHY, METABOLIC: Status: RESOLVED | Noted: 2022-05-01 | Resolved: 2025-08-28

## 2025-08-28 PROBLEM — L89.300 PRESSURE INJURY OF BUTTOCK, UNSTAGEABLE: Status: RESOLVED | Noted: 2022-05-01 | Resolved: 2025-08-28

## 2025-08-28 PROBLEM — N17.9 AKI (ACUTE KIDNEY INJURY): Status: RESOLVED | Noted: 2022-05-01 | Resolved: 2025-08-28

## 2025-08-28 PROBLEM — J96.01 ACUTE HYPOXEMIC RESPIRATORY FAILURE: Status: RESOLVED | Noted: 2022-06-14 | Resolved: 2025-08-28

## 2025-08-28 PROBLEM — A41.9 SEPSIS: Status: RESOLVED | Noted: 2024-07-09 | Resolved: 2025-08-28

## 2025-08-28 PROBLEM — R78.81 ENTEROCOCCAL BACTEREMIA: Status: RESOLVED | Noted: 2024-10-01 | Resolved: 2025-08-28

## 2025-08-28 PROBLEM — I26.09 ACUTE PULMONARY EMBOLISM WITH ACUTE COR PULMONALE: Status: RESOLVED | Noted: 2022-06-14 | Resolved: 2025-08-28

## 2025-08-28 PROBLEM — B95.2 ENTEROCOCCAL BACTEREMIA: Status: RESOLVED | Noted: 2024-10-01 | Resolved: 2025-08-28

## 2025-08-28 PROBLEM — L24.A2 IRRITANT CONTACT DERMATITIS DUE TO FECAL, URINARY OR DUAL INCONTINENCE: Status: RESOLVED | Noted: 2024-10-04 | Resolved: 2025-08-28

## 2025-08-28 PROBLEM — R33.9 URINARY RETENTION: Status: RESOLVED | Noted: 2022-05-02 | Resolved: 2025-08-28

## 2025-08-28 PROBLEM — E87.6 HYPOKALEMIA: Status: RESOLVED | Noted: 2022-06-23 | Resolved: 2025-08-28

## 2025-08-28 PROCEDURE — 98003 SYNCH AUDIO-VIDEO NEW HI 60: CPT | Mod: 95,,, | Performed by: INTERNAL MEDICINE

## 2025-08-28 RX ORDER — CYANOCOBALAMIN 1000 UG/ML
INJECTION, SOLUTION INTRAMUSCULAR; SUBCUTANEOUS
Qty: 6 ML | Refills: 3 | Status: SHIPPED | OUTPATIENT
Start: 2025-08-28

## 2025-08-28 RX ORDER — CANDESARTAN CILEXETIL AND HYDROCHLOROTHIAZIDE 16; 12.5 MG/1; MG/1
1 TABLET ORAL DAILY
Qty: 90 TABLET | Refills: 3 | Status: SHIPPED | OUTPATIENT
Start: 2025-08-28

## 2025-08-28 RX ORDER — LORAZEPAM 1 MG/1
1 TABLET ORAL EVERY 12 HOURS PRN
Qty: 60 TABLET | Refills: 3 | Status: SHIPPED | OUTPATIENT
Start: 2025-08-28

## 2025-08-28 RX ORDER — ACETAMINOPHEN AND CODEINE PHOSPHATE 300; 30 MG/1; MG/1
1 TABLET ORAL EVERY 12 HOURS PRN
Qty: 60 TABLET | Refills: 2 | Status: SHIPPED | OUTPATIENT
Start: 2025-08-28

## 2025-08-29 ENCOUNTER — TELEPHONE (OUTPATIENT)
Dept: PRIMARY CARE CLINIC | Facility: CLINIC | Age: 75
End: 2025-08-29
Payer: MEDICARE

## 2025-08-29 ENCOUNTER — PATIENT MESSAGE (OUTPATIENT)
Dept: PRIMARY CARE CLINIC | Facility: CLINIC | Age: 75
End: 2025-08-29
Payer: MEDICARE